# Patient Record
Sex: FEMALE | Race: WHITE | NOT HISPANIC OR LATINO | Employment: OTHER | URBAN - METROPOLITAN AREA
[De-identification: names, ages, dates, MRNs, and addresses within clinical notes are randomized per-mention and may not be internally consistent; named-entity substitution may affect disease eponyms.]

---

## 2017-06-22 ENCOUNTER — CONVERSION ENCOUNTER (OUTPATIENT)
Dept: MAMMOGRAPHY | Facility: CLINIC | Age: 72
End: 2017-06-22

## 2017-11-28 ENCOUNTER — CONVERSION ENCOUNTER (OUTPATIENT)
Dept: MAMMOGRAPHY | Facility: CLINIC | Age: 72
End: 2017-11-28

## 2018-06-25 DIAGNOSIS — M19.90 ARTHRITIS: Primary | ICD-10-CM

## 2018-06-25 RX ORDER — PREDNISONE 2.5 MG
TABLET ORAL
COMMUNITY
Start: 2018-02-09 | End: 2018-06-25 | Stop reason: SDUPTHER

## 2018-06-25 RX ORDER — PREDNISONE 2.5 MG
2.5 TABLET ORAL DAILY
Qty: 30 TABLET | Refills: 3 | Status: SHIPPED | OUTPATIENT
Start: 2018-06-25 | End: 2018-10-14 | Stop reason: HOSPADM

## 2018-06-26 DIAGNOSIS — J30.89 NON-SEASONAL ALLERGIC RHINITIS, UNSPECIFIED TRIGGER: Primary | ICD-10-CM

## 2018-06-26 RX ORDER — DIPHENHYDRAMINE HCL 25 MG
25 TABLET ORAL EVERY 6 HOURS PRN
Qty: 30 TABLET | Refills: 3 | Status: SHIPPED | OUTPATIENT
Start: 2018-06-26 | End: 2019-02-07 | Stop reason: SDUPTHER

## 2018-08-24 ENCOUNTER — APPOINTMENT (OUTPATIENT)
Dept: LAB | Facility: HOSPITAL | Age: 73
End: 2018-08-24
Payer: MEDICARE

## 2018-08-24 ENCOUNTER — TRANSCRIBE ORDERS (OUTPATIENT)
Dept: ADMINISTRATIVE | Facility: HOSPITAL | Age: 73
End: 2018-08-24

## 2018-08-24 DIAGNOSIS — M54.5 LOW BACK PAIN, UNSPECIFIED BACK PAIN LATERALITY, UNSPECIFIED CHRONICITY, WITH SCIATICA PRESENCE UNSPECIFIED: ICD-10-CM

## 2018-08-24 DIAGNOSIS — R06.02 SHORTNESS OF BREATH: Primary | ICD-10-CM

## 2018-08-24 DIAGNOSIS — R06.02 SHORTNESS OF BREATH: ICD-10-CM

## 2018-08-24 LAB
BASOPHILS # BLD AUTO: 0.1 THOUSANDS/ΜL (ref 0–0.1)
BASOPHILS NFR BLD AUTO: 1 % (ref 0–1)
CRP SERPL QL: 11.3 MG/L
EOSINOPHIL # BLD AUTO: 0.2 THOUSAND/ΜL (ref 0–0.4)
EOSINOPHIL NFR BLD AUTO: 2 % (ref 0–6)
ERYTHROCYTE [DISTWIDTH] IN BLOOD BY AUTOMATED COUNT: 15.3 %
ERYTHROCYTE [SEDIMENTATION RATE] IN BLOOD: 27 MM/HOUR (ref 1–20)
HCT VFR BLD AUTO: 43.4 % (ref 36–46)
HGB BLD-MCNC: 13.8 G/DL (ref 12–16)
LYMPHOCYTES # BLD AUTO: 1.3 THOUSANDS/ΜL (ref 0.5–4)
LYMPHOCYTES NFR BLD AUTO: 17 % (ref 20–50)
MCH RBC QN AUTO: 26.6 PG (ref 26–34)
MCHC RBC AUTO-ENTMCNC: 31.8 G/DL (ref 31–36)
MCV RBC AUTO: 84 FL (ref 80–100)
MONOCYTES # BLD AUTO: 0.7 THOUSAND/ΜL (ref 0.2–0.9)
MONOCYTES NFR BLD AUTO: 10 % (ref 1–10)
NEUTROPHILS # BLD AUTO: 5.5 THOUSANDS/ΜL (ref 1.8–7.8)
NEUTS SEG NFR BLD AUTO: 70 % (ref 45–65)
PLATELET # BLD AUTO: 268 THOUSANDS/UL (ref 150–450)
PMV BLD AUTO: 8 FL (ref 8.9–12.7)
RBC # BLD AUTO: 5.19 MILLION/UL (ref 4–5.2)
WBC # BLD AUTO: 7.8 THOUSAND/UL (ref 4.5–11)

## 2018-08-24 PROCEDURE — 36415 COLL VENOUS BLD VENIPUNCTURE: CPT

## 2018-08-24 PROCEDURE — 85025 COMPLETE CBC W/AUTO DIFF WBC: CPT

## 2018-08-24 PROCEDURE — 85652 RBC SED RATE AUTOMATED: CPT

## 2018-08-24 PROCEDURE — 86140 C-REACTIVE PROTEIN: CPT

## 2018-08-27 DIAGNOSIS — E04.9 ENLARGEMENT OF THYROID: Primary | ICD-10-CM

## 2018-08-27 RX ORDER — LEVOTHYROXINE SODIUM 0.03 MG/1
25 TABLET ORAL DAILY
COMMUNITY
Start: 2018-05-01 | End: 2018-08-27 | Stop reason: SDUPTHER

## 2018-08-27 RX ORDER — LEVOTHYROXINE SODIUM 0.03 MG/1
25 TABLET ORAL DAILY
Qty: 30 TABLET | Refills: 3 | Status: SHIPPED | OUTPATIENT
Start: 2018-08-27 | End: 2019-01-14 | Stop reason: SDUPTHER

## 2018-08-28 ENCOUNTER — OFFICE VISIT (OUTPATIENT)
Dept: FAMILY MEDICINE CLINIC | Facility: CLINIC | Age: 73
End: 2018-08-28
Payer: MEDICARE

## 2018-08-28 VITALS
OXYGEN SATURATION: 98 % | HEIGHT: 63 IN | HEART RATE: 71 BPM | RESPIRATION RATE: 14 BRPM | TEMPERATURE: 97.9 F | SYSTOLIC BLOOD PRESSURE: 120 MMHG | BODY MASS INDEX: 24.61 KG/M2 | WEIGHT: 138.9 LBS | DIASTOLIC BLOOD PRESSURE: 72 MMHG

## 2018-08-28 DIAGNOSIS — E03.9 HYPOTHYROIDISM, UNSPECIFIED TYPE: Primary | ICD-10-CM

## 2018-08-28 DIAGNOSIS — M31.6 TEMPORAL ARTERITIS (HCC): ICD-10-CM

## 2018-08-28 DIAGNOSIS — J44.9 CHRONIC OBSTRUCTIVE PULMONARY DISEASE, UNSPECIFIED COPD TYPE (HCC): ICD-10-CM

## 2018-08-28 DIAGNOSIS — H60.311 ACUTE DIFFUSE OTITIS EXTERNA OF RIGHT EAR: ICD-10-CM

## 2018-08-28 PROBLEM — E03.8 OTHER SPECIFIED HYPOTHYROIDISM: Status: ACTIVE | Noted: 2018-08-28

## 2018-08-28 PROBLEM — K21.9 GASTROESOPHAGEAL REFLUX DISEASE WITHOUT ESOPHAGITIS: Status: ACTIVE | Noted: 2018-08-28

## 2018-08-28 PROCEDURE — 99214 OFFICE O/P EST MOD 30 MIN: CPT | Performed by: INTERNAL MEDICINE

## 2018-08-28 RX ORDER — PANTOPRAZOLE SODIUM 40 MG/1
TABLET, DELAYED RELEASE ORAL EVERY 24 HOURS
COMMUNITY
Start: 2018-03-08 | End: 2018-10-17 | Stop reason: SDUPTHER

## 2018-08-28 RX ORDER — ALBUTEROL SULFATE 90 UG/1
AEROSOL, METERED RESPIRATORY (INHALATION)
COMMUNITY
Start: 2017-12-05 | End: 2020-01-07 | Stop reason: SDUPTHER

## 2018-08-28 RX ORDER — OFLOXACIN 3 MG/ML
5 SOLUTION AURICULAR (OTIC) 2 TIMES DAILY
Qty: 5 ML | Refills: 1 | Status: SHIPPED | OUTPATIENT
Start: 2018-08-28 | End: 2018-10-06 | Stop reason: ALTCHOICE

## 2018-08-28 NOTE — PROGRESS NOTES
Assessment/Plan:         Diagnoses and all orders for this visit:    Hypothyroidism, unspecified type: Stable  Continue same  RTC in 3mos w Blood work  -     Basic metabolic panel; Future  -     CBC and differential; Future  -     TSH, 3rd generation; Future  -     T4, free; Future  -     Sedimentation rate, automated; Future  -     C-reactive protein; Future  -     Urinalysis with reflex to microscopic; Future  -     Hepatic function panel; Future  -     Lipid panel; Future    Temporal arteritis ([facility] ): Pt would like to stay on prednisone 2 5 mg daily only  RTC in 2-3 mos w Blood work  -     Ambulatory referral to Ophthalmology; Future    Chronic obstructive pulmonary disease, unspecified COPD type ([facility] ): pt is Not smoking for almost 2 years  Continue Inhalers  -     indacaterol-glycopyrrolate (Marlan Flash) 27 5-15 6 MCG inhaler; Place 1 capsule into inhaler and inhale 2 (two) times a day    Acute diffuse otitis externa of right ear  -     ofloxacin (FLOXIN) 0 3 % otic solution; Administer 5 drops to the right ear 2 (two) times a day    Other orders  -     albuterol (PROVENTIL HFA) 90 mcg/act inhaler; inhale 2 puff by inhalation route  every 4 - 6 hours as needed  -     Discontinue: indacaterol-glycopyrrolate (UTIBRON NEOHALER) 27 5-15 6 MCG inhaler; Every 12 hours  -     pantoprazole (PROTONIX) 40 mg tablet; every 24 hours  -     aspirin (ASPIRIN LOW DOSE) 81 MG tablet; take one tab  every other day with Dinner        Subjective:      Patient ID: Carl Melendrez is a 67 y o  female  67 Y O lady with H/O Severe COPd,Temporal arteritis,  Is here toady for regular check up    Recent blood work showed mild elevated Esr and CRp    No new symptoms    Med list reviewed w pt in Detail            The following portions of the patient's history were reviewed and updated as appropriate: allergies, current medications, past family history, past medical history, past social history, past surgical history and problem list     Review of Systems   Constitutional: Positive for fatigue  Negative for chills and fever  HENT: Negative for congestion, facial swelling, sore throat, trouble swallowing and voice change  Eyes: Negative for pain, discharge and visual disturbance  Respiratory: Negative for cough, shortness of breath and wheezing  Cardiovascular: Negative for chest pain, palpitations and leg swelling  Gastrointestinal: Negative for abdominal pain, blood in stool, constipation, diarrhea and nausea  Endocrine: Negative for polydipsia, polyphagia and polyuria  Genitourinary: Negative for difficulty urinating, hematuria and urgency  Musculoskeletal: Positive for arthralgias and myalgias  Skin: Negative for rash  Neurological: Negative for dizziness, tremors, weakness and headaches  Hematological: Negative for adenopathy  Does not bruise/bleed easily  Psychiatric/Behavioral: Negative for dysphoric mood, sleep disturbance and suicidal ideas  Objective:      /72 (BP Location: Left arm, Patient Position: Sitting, Cuff Size: Standard)   Pulse 71   Temp 97 9 °F (36 6 °C) (Oral)   Resp 14   Ht 5' 3" (1 6 m)   Wt 63 kg (138 lb 14 4 oz)   SpO2 98%   BMI 24 61 kg/m²          Physical Exam   Constitutional: She is oriented to person, place, and time  She appears well-nourished  No distress  HENT:   Head: Normocephalic  Mouth/Throat: Oropharynx is clear and moist  No oropharyngeal exudate  Eyes: Conjunctivae are normal  Pupils are equal, round, and reactive to light  No scleral icterus  Neck: Neck supple  No thyromegaly present  Cardiovascular: Normal rate, regular rhythm and normal heart sounds  No murmur heard  Pulmonary/Chest: Effort normal and breath sounds normal  No respiratory distress  She has no wheezes  She has no rales  Decrease breathing sounds both bases   Abdominal: Soft  Bowel sounds are normal  She exhibits no distension  There is no tenderness   There is no rebound and no guarding  Obese   Musculoskeletal: She exhibits tenderness  She exhibits no edema  Lymphadenopathy:     She has no cervical adenopathy  Neurological: She is alert and oriented to person, place, and time  No cranial nerve deficit  Coordination normal    Skin: No rash noted  No erythema  Psychiatric: She has a normal mood and affect

## 2018-10-06 ENCOUNTER — HOSPITAL ENCOUNTER (EMERGENCY)
Facility: HOSPITAL | Age: 73
Discharge: HOME | End: 2018-10-06
Attending: EMERGENCY MEDICINE | Admitting: EMERGENCY MEDICINE
Payer: MEDICARE

## 2018-10-06 ENCOUNTER — APPOINTMENT (EMERGENCY)
Dept: RADIOLOGY | Facility: HOSPITAL | Age: 73
End: 2018-10-06
Payer: MEDICARE

## 2018-10-06 ENCOUNTER — APPOINTMENT (EMERGENCY)
Dept: CT IMAGING | Facility: HOSPITAL | Age: 73
End: 2018-10-06
Payer: MEDICARE

## 2018-10-06 VITALS
HEART RATE: 90 BPM | DIASTOLIC BLOOD PRESSURE: 86 MMHG | SYSTOLIC BLOOD PRESSURE: 161 MMHG | OXYGEN SATURATION: 96 % | WEIGHT: 123.9 LBS | RESPIRATION RATE: 16 BRPM | BODY MASS INDEX: 21.95 KG/M2 | TEMPERATURE: 98.3 F

## 2018-10-06 DIAGNOSIS — G47.00 INSOMNIA: ICD-10-CM

## 2018-10-06 DIAGNOSIS — F41.8 ANXIETY ABOUT HEALTH: Primary | ICD-10-CM

## 2018-10-06 DIAGNOSIS — E86.0 DEHYDRATION: ICD-10-CM

## 2018-10-06 DIAGNOSIS — J44.1 COPD EXACERBATION (HCC): ICD-10-CM

## 2018-10-06 LAB
ALBUMIN SERPL BCP-MCNC: 4.9 G/DL (ref 3–5.2)
ALP SERPL-CCNC: 99 U/L (ref 43–122)
ALT SERPL W P-5'-P-CCNC: 27 U/L (ref 9–52)
ANION GAP SERPL CALCULATED.3IONS-SCNC: 13 MMOL/L (ref 5–14)
AST SERPL W P-5'-P-CCNC: 40 U/L (ref 14–36)
ATRIAL RATE: 104 BPM
BACTERIA UR QL AUTO: ABNORMAL /HPF
BASOPHILS # BLD AUTO: 0.1 THOUSANDS/ΜL (ref 0–0.1)
BASOPHILS NFR BLD AUTO: 1 % (ref 0–1)
BILIRUB SERPL-MCNC: 0.9 MG/DL
BILIRUB UR QL STRIP: NEGATIVE
BUN SERPL-MCNC: 14 MG/DL (ref 5–25)
CALCIUM SERPL-MCNC: 9.9 MG/DL (ref 8.4–10.2)
CHLORIDE SERPL-SCNC: 99 MMOL/L (ref 97–108)
CLARITY UR: CLEAR
CO2 SERPL-SCNC: 28 MMOL/L (ref 22–30)
COLOR UR: ABNORMAL
CREAT SERPL-MCNC: 0.8 MG/DL (ref 0.6–1.2)
D-DIMER: 2.02 MG/L
EOSINOPHIL # BLD AUTO: 0.2 THOUSAND/ΜL (ref 0–0.4)
EOSINOPHIL NFR BLD AUTO: 2 % (ref 0–6)
ERYTHROCYTE [DISTWIDTH] IN BLOOD BY AUTOMATED COUNT: 15.3 %
GFR SERPL CREATININE-BSD FRML MDRD: 73 ML/MIN/1.73SQ M
GLUCOSE SERPL-MCNC: 113 MG/DL (ref 70–99)
GLUCOSE UR STRIP-MCNC: NEGATIVE MG/DL
HCT VFR BLD AUTO: 44.3 % (ref 36–46)
HGB BLD-MCNC: 14.2 G/DL (ref 12–16)
HGB UR QL STRIP.AUTO: 25
KETONES UR STRIP-MCNC: ABNORMAL MG/DL
LEUKOCYTE ESTERASE UR QL STRIP: 500
LYMPHOCYTES # BLD AUTO: 1 THOUSANDS/ΜL (ref 0.5–4)
LYMPHOCYTES NFR BLD AUTO: 8 % (ref 20–50)
MCH RBC QN AUTO: 26.6 PG (ref 26–34)
MCHC RBC AUTO-ENTMCNC: 32.1 G/DL (ref 31–36)
MCV RBC AUTO: 83 FL (ref 80–100)
MONOCYTES # BLD AUTO: 0.9 THOUSAND/ΜL (ref 0.2–0.9)
MONOCYTES NFR BLD AUTO: 8 % (ref 1–10)
NEUTROPHILS # BLD AUTO: 10.1 THOUSANDS/ΜL (ref 1.8–7.8)
NEUTS SEG NFR BLD AUTO: 82 % (ref 45–65)
NITRITE UR QL STRIP: NEGATIVE
NON-SQ EPI CELLS URNS QL MICRO: ABNORMAL /HPF
P AXIS: 36 DEGREES
PH UR STRIP.AUTO: 7 [PH] (ref 4.5–8)
PLATELET # BLD AUTO: 255 THOUSANDS/UL (ref 150–450)
PMV BLD AUTO: 8.1 FL (ref 8.9–12.7)
POTASSIUM SERPL-SCNC: 4.1 MMOL/L (ref 3.6–5)
PR INTERVAL: 124 MS
PROT SERPL-MCNC: 8.1 G/DL (ref 5.9–8.4)
PROT UR STRIP-MCNC: NEGATIVE MG/DL
QRS AXIS: 36 DEGREES
QRSD INTERVAL: 72 MS
QT INTERVAL: 350 MS
QTC INTERVAL: 460 MS
RBC # BLD AUTO: 5.35 MILLION/UL (ref 4–5.2)
RBC #/AREA URNS AUTO: ABNORMAL /HPF
SODIUM SERPL-SCNC: 140 MMOL/L (ref 137–147)
SP GR UR STRIP.AUTO: 1.01 (ref 1–1.04)
T WAVE AXIS: 67 DEGREES
TROPONIN I SERPL-MCNC: <0.01 NG/ML (ref 0–0.03)
TROPONIN I SERPL-MCNC: <0.01 NG/ML (ref 0–0.03)
TSH SERPL DL<=0.05 MIU/L-ACNC: 2.45 UIU/ML (ref 0.47–4.68)
UROBILINOGEN UA: NEGATIVE MG/DL
VENTRICULAR RATE: 104 BPM
WBC # BLD AUTO: 12.2 THOUSAND/UL (ref 4.5–11)
WBC #/AREA URNS AUTO: ABNORMAL /HPF

## 2018-10-06 PROCEDURE — 81001 URINALYSIS AUTO W/SCOPE: CPT | Performed by: EMERGENCY MEDICINE

## 2018-10-06 PROCEDURE — 81003 URINALYSIS AUTO W/O SCOPE: CPT | Performed by: EMERGENCY MEDICINE

## 2018-10-06 PROCEDURE — 71275 CT ANGIOGRAPHY CHEST: CPT

## 2018-10-06 PROCEDURE — 93005 ELECTROCARDIOGRAM TRACING: CPT

## 2018-10-06 PROCEDURE — 71046 X-RAY EXAM CHEST 2 VIEWS: CPT

## 2018-10-06 PROCEDURE — 80053 COMPREHEN METABOLIC PANEL: CPT | Performed by: EMERGENCY MEDICINE

## 2018-10-06 PROCEDURE — 96360 HYDRATION IV INFUSION INIT: CPT

## 2018-10-06 PROCEDURE — 84443 ASSAY THYROID STIM HORMONE: CPT | Performed by: EMERGENCY MEDICINE

## 2018-10-06 PROCEDURE — 84484 ASSAY OF TROPONIN QUANT: CPT | Performed by: EMERGENCY MEDICINE

## 2018-10-06 PROCEDURE — 85025 COMPLETE CBC W/AUTO DIFF WBC: CPT | Performed by: EMERGENCY MEDICINE

## 2018-10-06 PROCEDURE — 85379 FIBRIN DEGRADATION QUANT: CPT | Performed by: EMERGENCY MEDICINE

## 2018-10-06 PROCEDURE — 36415 COLL VENOUS BLD VENIPUNCTURE: CPT

## 2018-10-06 PROCEDURE — 93010 ELECTROCARDIOGRAM REPORT: CPT | Performed by: INTERNAL MEDICINE

## 2018-10-06 PROCEDURE — 99285 EMERGENCY DEPT VISIT HI MDM: CPT

## 2018-10-06 PROCEDURE — 96361 HYDRATE IV INFUSION ADD-ON: CPT

## 2018-10-06 RX ORDER — CHOLECALCIFEROL (VITAMIN D3) 125 MCG
1000 CAPSULE ORAL DAILY
COMMUNITY
End: 2019-01-31 | Stop reason: ALTCHOICE

## 2018-10-06 RX ORDER — SODIUM CHLORIDE 9 MG/ML
1000 INJECTION, SOLUTION INTRAVENOUS ONCE
Status: COMPLETED | OUTPATIENT
Start: 2018-10-06 | End: 2018-10-06

## 2018-10-06 RX ORDER — PREDNISONE 10 MG/1
TABLET ORAL
Status: DISCONTINUED
Start: 2018-10-06 | End: 2018-10-06 | Stop reason: HOSPADM

## 2018-10-06 RX ORDER — PREDNISONE 20 MG/1
TABLET ORAL
Status: DISCONTINUED
Start: 2018-10-06 | End: 2018-10-06 | Stop reason: HOSPADM

## 2018-10-06 RX ADMIN — IOHEXOL 100 ML: 350 INJECTION, SOLUTION INTRAVENOUS at 15:37

## 2018-10-06 RX ADMIN — PREDNISONE 50 MG: 20 TABLET ORAL at 13:47

## 2018-10-06 RX ADMIN — SODIUM CHLORIDE 1000 ML/HR: 9 INJECTION, SOLUTION INTRAVENOUS at 12:01

## 2018-10-06 NOTE — ED NOTES
Pt changed into a gown and made comfortable in room  Pt placed on the monitor for continuous cardiac monitoring  EKG done and given to Dr  For review       Sara Hines  10/06/18 3857

## 2018-10-06 NOTE — ED RE-EVALUATION NOTE
Patient felt much better after fluids and after prednisone and treatment for COPD  Patient was notably anxious at the beginning of the exam and felt but less anxious towards the and the exam   She patient admits that anxiety may be playing a large component for her symptoms today    I would agree     Tayla Hilliard MD  10/06/18 8879

## 2018-10-06 NOTE — ED PROVIDER NOTES
History  Chief Complaint   Patient presents with    Shortness of Breath     Patient states she has a cough for 5 days, chills, heaviness in chest, and dizziness started today  66-year-old white female COPD patient who has had trouble sleeping presents with an episode of presyncope where she almost passed out today when after she stood up  She did not pass out she did not have vertigo she did not have any nausea or vomiting should not have any vomiting or diarrhea  She said she felt a little bit of epigastric discomfort but no chest pain and no shortness of breath other than her usual COPD  She says she did feel a little bit of queasiness or nausea at 1 point but that was transient today  She did not sleep well last night and did not eat much the today at all  Patient denies any fevers or chills  Prior to Admission Medications   Prescriptions Last Dose Informant Patient Reported? Taking? Fluticasone Furoate (ARNUITY ELLIPTA IN)  Self Yes Yes   Sig: Inhale daily   albuterol (PROVENTIL HFA) 90 mcg/act inhaler 10/5/2018 at Unknown time  Yes Yes   Sig: inhale 2 puff by inhalation route  every 4 - 6 hours as needed   aspirin (ASPIRIN LOW DOSE) 81 MG tablet 10/5/2018 at Unknown time  Yes Yes   Sig: take one tab   every other day with Dinner   calcium carbonate-vitamin D (OSCAL-D) 500 mg-200 units per tablet  Self Yes Yes   Sig: Take 1 tablet by mouth 2 (two) times a day with meals   cyanocobalamin (VITAMIN B-12) 500 mcg tablet  Self Yes Yes   Sig: Take 1,000 mcg by mouth daily   diphenhydrAMINE (BENADRYL) 25 mg tablet 10/5/2018 at Unknown time  No Yes   Sig: Take 1 tablet (25 mg total) by mouth every 6 (six) hours as needed for itching   indacaterol-glycopyrrolate (UTIBRON NEOHALER) 27 5-15 6 MCG inhaler 10/5/2018 at Unknown time  No Yes   Sig: Place 1 capsule into inhaler and inhale 2 (two) times a day   levothyroxine (SYNTHROID) 25 mcg tablet 10/5/2018 at Unknown time  No Yes   Sig: Take 1 tablet (25 mcg total) by mouth daily   pantoprazole (PROTONIX) 40 mg tablet 10/5/2018 at Unknown time  Yes Yes   Sig: every 24 hours   predniSONE 2 5 mg tablet 10/5/2018 at Unknown time  No Yes   Sig: Take 1 tablet (2 5 mg total) by mouth daily      Facility-Administered Medications: None       Past Medical History:   Diagnosis Date    Asthma     Chronic obstructive pulmonary disease ([facility] ) 9/26/2012    GERD (gastroesophageal reflux disease)     Hypothyroid     No known health problems     ALSO NO RELEVANT PAST MEDICAL HX AS PER [facility]    Temporal arteritis ([place] [address] )     Tubular adenoma        Past Surgical History:   Procedure Laterality Date    HYSTERECTOMY      NO PAST SURGERIES      ALSO NO RELEVANT PAST SURRGICAL HX AS PER [facility]       Family History   Problem Relation Age of Onset    Breast cancer Mother     Emphysema Father      I have reviewed and agree with the history as documented  Social History   Substance Use Topics    Smoking status: Former Smoker     Years: 15 00     Types: Cigarettes    Smokeless tobacco: Never Used      Comment: TOBACCO USE, NO PASSIVE SMOKE EXPOSURE    Alcohol use No        Review of Systems   Constitutional: Negative  Eyes: Negative  Cardiovascular: Negative  Endocrine: Negative  Genitourinary: Negative  Allergic/Immunologic: Negative  Neurological: Positive for light-headedness  Negative for tremors, seizures, syncope, speech difficulty and weakness  Hematological: Negative  All other systems reviewed and are negative  Physical Exam  Physical Exam   Constitutional: She is oriented to person, place, and time  She appears well-developed and well-nourished  No distress  HENT:   Head: Normocephalic and atraumatic  Eyes: Pupils are equal, round, and reactive to light  Conjunctivae and EOM are normal    Neck: Normal range of motion  Neck supple  Cardiovascular: Normal rate, regular rhythm, normal heart sounds and intact distal pulses    Exam reveals no friction rub  No murmur heard  Pulmonary/Chest: Effort normal and breath sounds normal  No respiratory distress  She has no wheezes  Abdominal: Soft  Bowel sounds are normal  She exhibits no distension and no mass  There is no tenderness  There is no rebound and no guarding  Musculoskeletal: Normal range of motion  Neurological: She is alert and oriented to person, place, and time  No cranial nerve deficit  Skin: Skin is warm  Capillary refill takes less than 2 seconds  No rash noted  She is not diaphoretic  No erythema  Psychiatric: She has a normal mood and affect  Her behavior is normal  Judgment and thought content normal    Nursing note and vitals reviewed        Vital Signs  ED Triage Vitals [10/06/18 1100]   Temperature Pulse Respirations Blood Pressure SpO2   98 3 °F (36 8 °C) 95 20 155/93 95 %      Temp Source Heart Rate Source Patient Position - Orthostatic VS BP Location FiO2 (%)   Tympanic Monitor Sitting Right arm --      Pain Score       No Pain           Vitals:    10/06/18 1349 10/06/18 1356 10/06/18 1608 10/06/18 1643   BP: 155/97 (!) 185/94 170/96 161/86   Pulse: 99 96 98 90   Patient Position - Orthostatic VS: Sitting - Orthostatic VS Standing - Orthostatic VS Sitting Lying       Visual Acuity      ED Medications  Medications   predniSONE 20 mg tablet **AcuDose Override Pull** (  Not Given 10/6/18 1357)   predniSONE 10 mg tablet **AcuDose Override Pull** (  Not Given 10/6/18 1357)   sodium chloride 0 9 % infusion (0 mL/hr Intravenous Stopped 10/6/18 1336)   predniSONE tablet 50 mg (50 mg Oral Given 10/6/18 1347)   iohexol (OMNIPAQUE) 350 MG/ML injection (MULTI-DOSE) 100 mL (100 mL Intravenous Given 10/6/18 1537)       Diagnostic Studies  Results Reviewed     Procedure Component Value Units Date/Time    Urine Microscopic [[id number]]  (Abnormal) Collected:  10/06/18 1609    Lab Status:  Final result Specimen:  Urine from Urine, Clean Catch Updated:  10/06/18 1633     RBC, UA 1-2 (A) /hpf      WBC, UA 2-4 (A) /hpf      Epithelial Cells Occasional /hpf      Bacteria, UA Occasional /hpf     UA w Reflex to Microscopic w Reflex to Culture [[id number]]  (Abnormal) Collected:  10/06/18 1609    Lab Status:  Final result Specimen:  Urine from Urine, Clean Catch Updated:  10/06/18 1627     Color, UA Straw     Clarity, UA Clear     Specific Gravity, UA 1 010     pH, UA 7 0     Leukocytes,  0 (A)     Nitrite, UA Negative     Protein, UA Negative mg/dl      Glucose, UA Negative mg/dl      Ketones, UA 15 (1+) (A) mg/dl      Bilirubin, UA Negative     Blood, UA 25 0 (A)     UROBILINOGEN UA Negative mg/dL     Troponin I [[id number]]  (Normal) Collected:  10/06/18 1347    Lab Status:  Final result Specimen:  Blood from Arm, Right Updated:  10/06/18 1430     Troponin I <0 01 ng/mL     D-dimer, quantitative [[id number]]  (Abnormal) Collected:  10/06/18 1350    Lab Status:  Final result Specimen:  Blood from Arm, Right Updated:  10/06/18 1427     D-DIMER 2 02 (H) mg/L     Narrative:       D-DIMER:      TSH [[id number]]  (Normal) Collected:  10/06/18 1111    Lab Status:  Final result Specimen:  Blood from Arm, Right Updated:  10/06/18 1233     TSH 3RD GENERATON 2 450 uIU/mL     Narrative:         Patients undergoing fluorescein dye angiography may retain small amounts of fluorescein in the body for 48-72 hours post procedure  Samples containing fluorescein can produce falsely depressed TSH values  If the patient had this procedure,a specimen should be resubmitted post fluorescein clearance            The recommended reference ranges for TSH during pregnancy are as follows:  First trimester 0 1 to 2 5 uIU/mL  Second trimester  0 2 to 3 0 uIU/mL  Third trimester 0 3 to 3 0 uIU/m      Troponin I [[id number]]  (Normal) Collected:  10/06/18 1111    Lab Status:  Final result Specimen:  Blood from Arm, Right Updated:  10/06/18 1147     Troponin I <0 01 ng/mL     Comprehensive metabolic panel [[id number]]  (Abnormal) Collected:  10/06/18 1111    Lab Status:  Final result Specimen:  Blood from Arm, Right Updated:  10/06/18 1137     Sodium 140 mmol/L      Potassium 4 1 mmol/L      Chloride 99 mmol/L      CO2 28 mmol/L      ANION GAP 13 mmol/L      BUN 14 mg/dL      Creatinine 0 80 mg/dL      Glucose 113 (H) mg/dL      Calcium 9 9 mg/dL      AST 40 (H) U/L      ALT 27 U/L      Alkaline Phosphatase 99 U/L      Total Protein 8 1 g/dL      Albumin 4 9 g/dL      Total Bilirubin 0 90 mg/dL      eGFR 73 ml/min/1 73sq m     Narrative:         National Kidney Disease Education Program recommendations are as follows:  GFR calculation is accurate only with a steady state creatinine  Chronic Kidney disease less than 60 ml/min/1 73 sq  meters  Kidney failure less than 15 ml/min/1 73 sq  meters  CBC and differential [[id number]]  (Abnormal) Collected:  10/06/18 1111    Lab Status:  Final result Specimen:  Blood from Arm, Right Updated:  10/06/18 1124     WBC 12 20 (H) Thousand/uL      RBC 5 35 (H) Million/uL      Hemoglobin 14 2 g/dL      Hematocrit 44 3 %      MCV 83 fL      MCH 26 6 pg      MCHC 32 1 g/dL      RDW 15 3 (H) %      MPV 8 1 (L) fL      Platelets 982 Thousands/uL      Neutrophils Relative 82 (H) %      Lymphocytes Relative 8 (L) %      Monocytes Relative 8 %      Eosinophils Relative 2 %      Basophils Relative 1 %      Neutrophils Absolute 10 10 (H) Thousands/µL      Lymphocytes Absolute 1 00 Thousands/µL      Monocytes Absolute 0 90 Thousand/µL      Eosinophils Absolute 0 20 Thousand/µL      Basophils Absolute 0 10 Thousands/µL                  CTA ED chest PE study   Final Result by Claire Sun MD (10/06 0094)         1  No pulmonary embolism  2   Emphysema and mild bronchial wall thickening, consistent with COPD  Likely chronic atelectasis within the right middle lobe appears stable dating back to 4/19/2013              Workstation performed: [id number]         X-ray chest 2 views   ED Interpretation by Hardik Strickland Deep Almanzar MD (10/06 1447)   No infiltrate; wide mediast        Final Result by Giles Aguilar MD (10/06 1457)      No acute cardiopulmonary disease  Workstation performed: [id number]                    Procedures  Procedures       Phone Contacts  ED Phone Contact    ED Course                               MDM  Number of Diagnoses or Management Options  Diagnosis management comments: Differential for this patient include COPD exacerbation insomnia dehydration  Patient was given IV fluids and felt much better after the IV fluids  The patient was never orthostatic and did not have any lightheadedness when we stood her up or walker  Patient's laboratory data were unremarkable although she did have a positive D-dimer the CT scan ruled out a pulmonary embolus  Patient's cardiac enzymes and cardiac exam were also negative         Amount and/or Complexity of Data Reviewed  Clinical lab tests: reviewed and ordered  Tests in the radiology section of CPT®: ordered and reviewed  Tests in the medicine section of CPT®: ordered and reviewed  Review and summarize past medical records: yes  Discuss the patient with other providers: yes      CritCare Time    Disposition  Final diagnoses:   COPD exacerbation ([facility] 75 )   Insomnia   Dehydration   Anxiety about health     Time reflects when diagnosis was documented in both MDM as applicable and the Disposition within this note     Time User Action Codes Description Comment    10/6/2018  4:53 PM Ramirez Bitter Add [J44 1] COPD exacerbation ([facility] )     10/6/2018  4:53 PM Jim Fitzgerald [G47 00] Insomnia     10/6/2018  4:53 PM Jim Fitzgerald [E86 0] Dehydration     10/6/2018  4:53 PM Ramirez Bitter Add [F41 8] Anxiety about health     10/6/2018  4:53 PM Dolly Montero [J44 1] COPD exacerbation ([facility] )     10/6/2018  4:53 PM Ramirez Bitter Modify [F41 8] Anxiety about health       ED Disposition     ED Disposition Condition Comment    Discharge  Lakshmi Perez discharge to home/self care  Condition at discharge: Good        Follow-up Information     Follow up With Specialties Details Why Antoinette Gaines MD Internal Medicine In 1 day  [address]  [phone]            Patient's Medications   Discharge Prescriptions    No medications on file     No discharge procedures on file      ED Provider  Electronically Signed by           Kelvin Nunn MD  10/06/18 Shawn Martinez MD  10/06/18 4985

## 2018-10-06 NOTE — DISCHARGE INSTRUCTIONS
Anxiety   WHAT YOU SHOULD KNOW:   Anxiety is a condition that causes you to feel excessive worry, uneasiness, or fear  Family or work stress, smoking, caffeine, and alcohol can increase your risk for anxiety  Certain medicines or health conditions can also increase your risk  Anxiety may begin gradually, and can become a long-term condition if it is not managed or treated  AFTER YOU LEAVE:   Medicines:   · Medicines  can help you feel more calm and relaxed, and decrease your symptoms  · Take your medicine as directed  Contact your healthcare provider if you think your medicine is not helping or if you have side effects  Tell him if you are allergic to any medicine  Keep a list of the medicines, vitamins, and herbs you take  Include the amounts, and when and why you take them  Bring the list or the pill bottles to follow-up visits  Carry your medicine list with you in case of an emergency  Follow up with your healthcare provider within 2 weeks or as directed:  Write down your questions so you remember to ask them during your visits  Manage anxiety:   · Go to counseling as directed  Cognitive behavioral therapy can help you understand and change how you react to events that trigger your symptoms  · Find ways to manage your symptoms  Activities such as exercise, meditation, or listening to music can help you relax  · Practice deep breathing  Breathing can change how your body reacts to stress  Focus on taking slow, deep breaths several times a day, or during an anxiety attack  Breathe in through your nose, and out through your mouth  · Avoid caffeine  Caffeine can make your symptoms worse  Avoid foods or drinks that are meant to increase your energy level  · Limit or avoid alcohol  Ask your healthcare provider if alcohol is safe for you  You may not be able to drink alcohol if you take certain anxiety or depression medicines  Limit alcohol to 1 drink per day if you are a woman   Limit alcohol to 2 drinks per day if you are a man  A drink of alcohol is 12 ounces of beer, 5 ounces of wine, or 1½ ounces of liquor  Contact your healthcare provider if:   · Your symptoms get worse or do not get better with treatment  · You think your medicine may be causing side effects  · Your anxiety keeps you from doing your regular daily activities  · You have new symptoms since your last visit  · You have questions or concerns about your condition or care  Seek care immediately or call 911 if:   · You have chest pain, tightness, or heaviness that may spread to your shoulders, arms, jaw, neck, or back  · You feel like hurting yourself or someone else  · You feel dizzy, lightheaded, or faint  © 2014 [employer] is for End User's use only and may not be sold, redistributed or otherwise used for commercial purposes  All illustrations and images included in CareNotes® are the copyrighted property of A [employer] M [employer]  or [employer]  The above information is an  only  It is not intended as medical advice for individual conditions or treatments  Talk to your doctor, nurse or pharmacist before following any medical regimen to see if it is safe and effective for you

## 2018-10-06 NOTE — ED NOTES
Received report on pt from Diane, [facility]  Pt gowned and placed on monitor        Thalia Sherman RN  10/06/18 4303

## 2018-10-06 NOTE — ED NOTES
Pt states that she feels better since arriving  Son at bedside  Pt stable  Awaiting physician catalina  Pt states that she was having chest heaviness related to her COPD but it felt slightly heavier than normal  Pt has productive cough  Denies fever  Had recent ear problems  Pt states that today she had increase in dizziness and was not able to walk well  Pt had similar symptoms about 1 year ago and was admitted for 1 month per pt        Jethro Moe RN  10/06/18 2743

## 2018-10-10 ENCOUNTER — APPOINTMENT (EMERGENCY)
Dept: RADIOLOGY | Facility: HOSPITAL | Age: 73
DRG: 605 | End: 2018-10-10
Payer: MEDICARE

## 2018-10-10 ENCOUNTER — HOSPITAL ENCOUNTER (EMERGENCY)
Facility: HOSPITAL | Age: 73
End: 2018-10-10
Attending: EMERGENCY MEDICINE | Admitting: EMERGENCY MEDICINE
Payer: MEDICARE

## 2018-10-10 ENCOUNTER — HOSPITAL ENCOUNTER (INPATIENT)
Facility: HOSPITAL | Age: 73
LOS: 3 days | Discharge: HOME | DRG: 605 | End: 2018-10-14
Attending: SURGERY | Admitting: SURGERY
Payer: MEDICARE

## 2018-10-10 ENCOUNTER — APPOINTMENT (EMERGENCY)
Dept: CT IMAGING | Facility: HOSPITAL | Age: 73
End: 2018-10-10
Payer: MEDICARE

## 2018-10-10 VITALS
RESPIRATION RATE: 16 BRPM | HEIGHT: 63 IN | TEMPERATURE: 98.4 F | HEART RATE: 80 BPM | OXYGEN SATURATION: 98 % | BODY MASS INDEX: 21.97 KG/M2 | WEIGHT: 124 LBS | SYSTOLIC BLOOD PRESSURE: 129 MMHG | DIASTOLIC BLOOD PRESSURE: 71 MMHG

## 2018-10-10 DIAGNOSIS — S30.1XXA ABDOMINAL WALL HEMATOMA, INITIAL ENCOUNTER: Primary | ICD-10-CM

## 2018-10-10 DIAGNOSIS — J44.9 CHRONIC OBSTRUCTIVE PULMONARY DISEASE, UNSPECIFIED COPD TYPE (HCC): ICD-10-CM

## 2018-10-10 DIAGNOSIS — J44.1 CHRONIC OBSTRUCTIVE PULMONARY DISEASE WITH ACUTE EXACERBATION (HCC): ICD-10-CM

## 2018-10-10 DIAGNOSIS — R05.9 COUGH: ICD-10-CM

## 2018-10-10 DIAGNOSIS — S30.1XXA RECTUS SHEATH HEMATOMA, INITIAL ENCOUNTER: Primary | ICD-10-CM

## 2018-10-10 LAB
ALBUMIN SERPL BCP-MCNC: 4.2 G/DL (ref 3–5.2)
ALP SERPL-CCNC: 76 U/L (ref 43–122)
ALT SERPL W P-5'-P-CCNC: 36 U/L (ref 9–52)
ANION GAP SERPL CALCULATED.3IONS-SCNC: 9 MMOL/L (ref 5–14)
APTT PPP: 28 SECONDS (ref 23–34)
AST SERPL W P-5'-P-CCNC: 49 U/L (ref 14–36)
BACTERIA UR QL AUTO: ABNORMAL /HPF
BASOPHILS # BLD AUTO: 0.1 THOUSANDS/ΜL (ref 0–0.1)
BASOPHILS NFR BLD AUTO: 1 % (ref 0–1)
BILIRUB SERPL-MCNC: 0.7 MG/DL
BILIRUB UR QL STRIP: NEGATIVE
BUN SERPL-MCNC: 12 MG/DL (ref 5–25)
CALCIUM SERPL-MCNC: 9.3 MG/DL (ref 8.4–10.2)
CHLORIDE SERPL-SCNC: 97 MMOL/L (ref 97–108)
CLARITY UR: CLEAR
CO2 SERPL-SCNC: 31 MMOL/L (ref 22–30)
COLOR UR: ABNORMAL
CREAT SERPL-MCNC: 0.63 MG/DL (ref 0.6–1.2)
EOSINOPHIL # BLD AUTO: 0.1 THOUSAND/ΜL (ref 0–0.4)
EOSINOPHIL NFR BLD AUTO: 1 % (ref 0–6)
ERYTHROCYTE [DISTWIDTH] IN BLOOD BY AUTOMATED COUNT: 15.1 %
GFR SERPL CREATININE-BSD FRML MDRD: 89 ML/MIN/1.73SQ M
GLUCOSE SERPL-MCNC: 110 MG/DL (ref 70–99)
GLUCOSE UR STRIP-MCNC: NEGATIVE MG/DL
HCT VFR BLD AUTO: 37.9 % (ref 34.8–46.1)
HCT VFR BLD AUTO: 39.6 % (ref 36–46)
HGB BLD-MCNC: 11.7 G/DL (ref 11.5–15.4)
HGB BLD-MCNC: 12.8 G/DL (ref 12–16)
HGB UR QL STRIP.AUTO: 25
INR PPP: 0.95 (ref 0.89–1.1)
KETONES UR STRIP-MCNC: NEGATIVE MG/DL
LACTATE SERPL-SCNC: 1.2 MMOL/L (ref 0.7–2)
LEUKOCYTE ESTERASE UR QL STRIP: 500
LIPASE SERPL-CCNC: 56 U/L (ref 23–300)
LYMPHOCYTES # BLD AUTO: 1.2 THOUSANDS/ΜL (ref 0.5–4)
LYMPHOCYTES NFR BLD AUTO: 12 % (ref 20–50)
MCH RBC QN AUTO: 26.8 PG (ref 26–34)
MCHC RBC AUTO-ENTMCNC: 32.3 G/DL (ref 31–36)
MCV RBC AUTO: 83 FL (ref 80–100)
MONOCYTES # BLD AUTO: 0.8 THOUSAND/ΜL (ref 0.2–0.9)
MONOCYTES NFR BLD AUTO: 8 % (ref 1–10)
NEUTROPHILS # BLD AUTO: 7.6 THOUSANDS/ΜL (ref 1.8–7.8)
NEUTS SEG NFR BLD AUTO: 79 % (ref 45–65)
NITRITE UR QL STRIP: NEGATIVE
NON-SQ EPI CELLS URNS QL MICRO: ABNORMAL /HPF
PH UR STRIP.AUTO: 8 [PH] (ref 4.5–8)
PLATELET # BLD AUTO: 250 THOUSANDS/UL (ref 150–450)
PMV BLD AUTO: 8.9 FL (ref 8.9–12.7)
POTASSIUM SERPL-SCNC: 3.7 MMOL/L (ref 3.6–5)
PROT SERPL-MCNC: 7 G/DL (ref 5.9–8.4)
PROT UR STRIP-MCNC: NEGATIVE MG/DL
PROTHROMBIN TIME: 10.1 SECONDS (ref 9.5–11.6)
RBC # BLD AUTO: 4.76 MILLION/UL (ref 4–5.2)
RBC #/AREA URNS AUTO: ABNORMAL /HPF
SODIUM SERPL-SCNC: 137 MMOL/L (ref 137–147)
SP GR UR STRIP.AUTO: 1.01 (ref 1–1.04)
UROBILINOGEN UA: NEGATIVE MG/DL
WBC # BLD AUTO: 9.7 THOUSAND/UL (ref 4.5–11)
WBC #/AREA URNS AUTO: ABNORMAL /HPF

## 2018-10-10 PROCEDURE — 87186 SC STD MICRODIL/AGAR DIL: CPT | Performed by: EMERGENCY MEDICINE

## 2018-10-10 PROCEDURE — 96374 THER/PROPH/DIAG INJ IV PUSH: CPT

## 2018-10-10 PROCEDURE — 99285 EMERGENCY DEPT VISIT HI MDM: CPT

## 2018-10-10 PROCEDURE — 99220 PR INITIAL OBSERVATION CARE/DAY 70 MINUTES: CPT | Performed by: SURGERY

## 2018-10-10 PROCEDURE — 80053 COMPREHEN METABOLIC PANEL: CPT | Performed by: EMERGENCY MEDICINE

## 2018-10-10 PROCEDURE — 74177 CT ABD & PELVIS W/CONTRAST: CPT

## 2018-10-10 PROCEDURE — 85025 COMPLETE CBC W/AUTO DIFF WBC: CPT | Performed by: EMERGENCY MEDICINE

## 2018-10-10 PROCEDURE — 71046 X-RAY EXAM CHEST 2 VIEWS: CPT

## 2018-10-10 PROCEDURE — 85610 PROTHROMBIN TIME: CPT | Performed by: EMERGENCY MEDICINE

## 2018-10-10 PROCEDURE — 85014 HEMATOCRIT: CPT | Performed by: SURGERY

## 2018-10-10 PROCEDURE — 87086 URINE CULTURE/COLONY COUNT: CPT | Performed by: EMERGENCY MEDICINE

## 2018-10-10 PROCEDURE — 36415 COLL VENOUS BLD VENIPUNCTURE: CPT | Performed by: EMERGENCY MEDICINE

## 2018-10-10 PROCEDURE — 96361 HYDRATE IV INFUSION ADD-ON: CPT

## 2018-10-10 PROCEDURE — 85018 HEMOGLOBIN: CPT | Performed by: SURGERY

## 2018-10-10 PROCEDURE — 81003 URINALYSIS AUTO W/O SCOPE: CPT | Performed by: EMERGENCY MEDICINE

## 2018-10-10 PROCEDURE — 81001 URINALYSIS AUTO W/SCOPE: CPT | Performed by: EMERGENCY MEDICINE

## 2018-10-10 PROCEDURE — 83690 ASSAY OF LIPASE: CPT | Performed by: EMERGENCY MEDICINE

## 2018-10-10 PROCEDURE — 83605 ASSAY OF LACTIC ACID: CPT | Performed by: EMERGENCY MEDICINE

## 2018-10-10 PROCEDURE — 1124F ACP DISCUSS-NO DSCNMKR DOCD: CPT | Performed by: SURGERY

## 2018-10-10 PROCEDURE — 85730 THROMBOPLASTIN TIME PARTIAL: CPT | Performed by: EMERGENCY MEDICINE

## 2018-10-10 PROCEDURE — 87077 CULTURE AEROBIC IDENTIFY: CPT | Performed by: EMERGENCY MEDICINE

## 2018-10-10 PROCEDURE — 94640 AIRWAY INHALATION TREATMENT: CPT

## 2018-10-10 RX ORDER — HYDROMORPHONE HCL/PF 1 MG/ML
0.2 SYRINGE (ML) INJECTION EVERY 4 HOURS PRN
Status: DISCONTINUED | OUTPATIENT
Start: 2018-10-10 | End: 2018-10-14 | Stop reason: HOSPADM

## 2018-10-10 RX ORDER — PREDNISONE 2.5 MG
2.5 TABLET ORAL DAILY
Status: DISCONTINUED | OUTPATIENT
Start: 2018-10-11 | End: 2018-10-11

## 2018-10-10 RX ORDER — ONDANSETRON 2 MG/ML
4 INJECTION INTRAMUSCULAR; INTRAVENOUS EVERY 4 HOURS PRN
Status: DISCONTINUED | OUTPATIENT
Start: 2018-10-10 | End: 2018-10-14 | Stop reason: HOSPADM

## 2018-10-10 RX ORDER — BENZONATATE 100 MG/1
100 CAPSULE ORAL 3 TIMES DAILY PRN
Status: DISCONTINUED | OUTPATIENT
Start: 2018-10-10 | End: 2018-10-14 | Stop reason: HOSPADM

## 2018-10-10 RX ORDER — PANTOPRAZOLE SODIUM 40 MG/1
40 TABLET, DELAYED RELEASE ORAL
Status: DISCONTINUED | OUTPATIENT
Start: 2018-10-11 | End: 2018-10-14 | Stop reason: HOSPADM

## 2018-10-10 RX ORDER — GUAIFENESIN 100 MG/5ML
200 SOLUTION ORAL ONCE
Status: COMPLETED | OUTPATIENT
Start: 2018-10-10 | End: 2018-10-10

## 2018-10-10 RX ORDER — ACETAMINOPHEN 325 MG/1
650 TABLET ORAL ONCE
Status: COMPLETED | OUTPATIENT
Start: 2018-10-10 | End: 2018-10-10

## 2018-10-10 RX ORDER — FENTANYL CITRATE 50 UG/ML
50 INJECTION, SOLUTION INTRAMUSCULAR; INTRAVENOUS ONCE
Status: COMPLETED | OUTPATIENT
Start: 2018-10-10 | End: 2018-10-10

## 2018-10-10 RX ORDER — GUAIFENESIN/DEXTROMETHORPHAN 100-10MG/5
10 SYRUP ORAL EVERY 4 HOURS PRN
Status: DISCONTINUED | OUTPATIENT
Start: 2018-10-10 | End: 2018-10-12

## 2018-10-10 RX ORDER — ACETAMINOPHEN 325 MG/1
TABLET ORAL
Status: COMPLETED
Start: 2018-10-10 | End: 2018-10-10

## 2018-10-10 RX ORDER — OXYCODONE HYDROCHLORIDE 5 MG/1
5 TABLET ORAL EVERY 4 HOURS PRN
Status: DISCONTINUED | OUTPATIENT
Start: 2018-10-10 | End: 2018-10-14 | Stop reason: HOSPADM

## 2018-10-10 RX ORDER — LEVOTHYROXINE SODIUM 0.03 MG/1
25 TABLET ORAL
Status: DISCONTINUED | OUTPATIENT
Start: 2018-10-11 | End: 2018-10-14 | Stop reason: HOSPADM

## 2018-10-10 RX ORDER — BENZONATATE 100 MG/1
CAPSULE ORAL
Status: COMPLETED
Start: 2018-10-10 | End: 2018-10-10

## 2018-10-10 RX ORDER — BENZONATATE 100 MG/1
100 CAPSULE ORAL ONCE
Status: COMPLETED | OUTPATIENT
Start: 2018-10-10 | End: 2018-10-10

## 2018-10-10 RX ORDER — OXYCODONE HYDROCHLORIDE 5 MG/1
2.5 TABLET ORAL EVERY 4 HOURS PRN
Status: DISCONTINUED | OUTPATIENT
Start: 2018-10-10 | End: 2018-10-14 | Stop reason: HOSPADM

## 2018-10-10 RX ORDER — MORPHINE SULFATE 4 MG/ML
4 INJECTION, SOLUTION INTRAMUSCULAR; INTRAVENOUS ONCE
Status: DISCONTINUED | OUTPATIENT
Start: 2018-10-10 | End: 2018-10-10 | Stop reason: HOSPADM

## 2018-10-10 RX ORDER — DIPHENHYDRAMINE HCL 25 MG
25 TABLET ORAL EVERY 6 HOURS PRN
Status: DISCONTINUED | OUTPATIENT
Start: 2018-10-10 | End: 2018-10-14 | Stop reason: HOSPADM

## 2018-10-10 RX ORDER — ACETAMINOPHEN 325 MG/1
975 TABLET ORAL EVERY 8 HOURS SCHEDULED
Status: DISCONTINUED | OUTPATIENT
Start: 2018-10-10 | End: 2018-10-14 | Stop reason: HOSPADM

## 2018-10-10 RX ORDER — ALBUTEROL SULFATE 90 UG/1
1 AEROSOL, METERED RESPIRATORY (INHALATION) EVERY 4 HOURS PRN
Status: DISCONTINUED | OUTPATIENT
Start: 2018-10-10 | End: 2018-10-14 | Stop reason: HOSPADM

## 2018-10-10 RX ORDER — GUAIFENESIN 100 MG/5ML
SOLUTION ORAL
Status: COMPLETED
Start: 2018-10-10 | End: 2018-10-10

## 2018-10-10 RX ORDER — AMOXICILLIN 250 MG
1 CAPSULE ORAL 2 TIMES DAILY
Status: DISCONTINUED | OUTPATIENT
Start: 2018-10-10 | End: 2018-10-14 | Stop reason: HOSPADM

## 2018-10-10 RX ORDER — FENTANYL CITRATE 50 UG/ML
INJECTION, SOLUTION INTRAMUSCULAR; INTRAVENOUS
Status: COMPLETED
Start: 2018-10-10 | End: 2018-10-10

## 2018-10-10 RX ORDER — CHOLECALCIFEROL (VITAMIN D3) 125 MCG
1000 CAPSULE ORAL DAILY
Status: DISCONTINUED | OUTPATIENT
Start: 2018-10-11 | End: 2018-10-14 | Stop reason: HOSPADM

## 2018-10-10 RX ORDER — CALCIUM CARBONATE/VITAMIN D3 250-3.125
2 TABLET ORAL 2 TIMES DAILY WITH MEALS
Status: DISCONTINUED | OUTPATIENT
Start: 2018-10-10 | End: 2018-10-14 | Stop reason: HOSPADM

## 2018-10-10 RX ADMIN — SODIUM CHLORIDE 1000 ML: 9 INJECTION, SOLUTION INTRAVENOUS at 05:07

## 2018-10-10 RX ADMIN — FENTANYL CITRATE 50 MCG: 50 INJECTION INTRAMUSCULAR; INTRAVENOUS at 05:08

## 2018-10-10 RX ADMIN — ACETAMINOPHEN 650 MG: 325 TABLET ORAL at 05:09

## 2018-10-10 RX ADMIN — GUAIFENESIN 200 MG: 100 SOLUTION ORAL at 07:59

## 2018-10-10 RX ADMIN — GUAIFENESIN AND DEXTROMETHORPHAN 10 ML: 100; 10 SYRUP ORAL at 22:47

## 2018-10-10 RX ADMIN — IOHEXOL 100 ML: 350 INJECTION, SOLUTION INTRAVENOUS at 05:55

## 2018-10-10 RX ADMIN — BENZONATATE 100 MG: 100 CAPSULE ORAL at 07:59

## 2018-10-10 RX ADMIN — BENZONATATE 100 MG: 100 CAPSULE ORAL at 18:07

## 2018-10-10 RX ADMIN — FENTANYL CITRATE 50 MCG: 50 INJECTION, SOLUTION INTRAMUSCULAR; INTRAVENOUS at 05:08

## 2018-10-10 RX ADMIN — CALCIUM CARBONATE-CHOLECALCIFEROL TAB 250 MG-125 UNIT 2 TABLET: 250-125 TAB at 22:36

## 2018-10-10 RX ADMIN — ACETAMINOPHEN 975 MG: 325 TABLET ORAL at 18:06

## 2018-10-10 RX ADMIN — ACETAMINOPHEN 975 MG: 325 TABLET ORAL at 22:36

## 2018-10-10 RX ADMIN — ALBUTEROL SULFATE 1 PUFF: 90 AEROSOL, METERED RESPIRATORY (INHALATION) at 20:56

## 2018-10-10 NOTE — ED PROVIDER NOTES
History  Chief Complaint   Patient presents with    Abdominal Pain     This is a 51-year-old female with past medical history of COPD presents for evaluation of acute onset left lower quadrant pain  Patient states she had a coughing episode approximately 21:00 this evening when she felt a pop and started having severe sharp left lower quadrant pain was not radiating and seemed to subside a little bit when she was standing still but was excruciating whenever she would try to move  She tried to wait it out and went to sleep and did not take any medications for pain control  The pain continued to keep her up and got worse every time she tried to move her leg  No similar pain in the past, no associated nausea vomiting diarrhea constipation  No history of abdominal surgeries  No urinary or vaginal complaints  Otherwise denies any chest pain or shortness of breath  Prior to Admission Medications   Prescriptions Last Dose Informant Patient Reported? Taking? Fluticasone Furoate (ARNUITY ELLIPTA IN)  Self Yes No   Sig: Inhale daily   albuterol (PROVENTIL HFA) 90 mcg/act inhaler   Yes No   Sig: inhale 2 puff by inhalation route  every 4 - 6 hours as needed   aspirin (ASPIRIN LOW DOSE) 81 MG tablet   Yes No   Sig: take one tab   every other day with Dinner   calcium carbonate-vitamin D (OSCAL-D) 500 mg-200 units per tablet  Self Yes No   Sig: Take 1 tablet by mouth 2 (two) times a day with meals   cyanocobalamin (VITAMIN B-12) 500 mcg tablet  Self Yes No   Sig: Take 1,000 mcg by mouth daily   diphenhydrAMINE (BENADRYL) 25 mg tablet   No No   Sig: Take 1 tablet (25 mg total) by mouth every 6 (six) hours as needed for itching   indacaterol-glycopyrrolate (UTIBRON NEOHALER) 27 5-15 6 MCG inhaler   No No   Sig: Place 1 capsule into inhaler and inhale 2 (two) times a day   levothyroxine (SYNTHROID) 25 mcg tablet   No No   Sig: Take 1 tablet (25 mcg total) by mouth daily   pantoprazole (PROTONIX) 40 mg tablet Yes No   Sig: every 24 hours   predniSONE 2 5 mg tablet   No No   Sig: Take 1 tablet (2 5 mg total) by mouth daily      Facility-Administered Medications: None       Past Medical History:   Diagnosis Date    Asthma     Chronic obstructive pulmonary disease ([facility] [address] ) 9/26/2012    GERD (gastroesophageal reflux disease)     Hypothyroid     Temporal arteritis (HCC)     Tubular adenoma        Past Surgical History:   Procedure Laterality Date    HYSTERECTOMY      NO PAST SURGERIES      ALSO NO RELEVANT PAST SURRGICAL HX AS PER [facility]       Family History   Problem Relation Age of Onset    Breast cancer Mother     Emphysema Father      I have reviewed and agree with the history as documented  Social History   Substance Use Topics    Smoking status: Former Smoker     Years: 15 00     Types: Cigarettes    Smokeless tobacco: Never Used      Comment: TOBACCO USE, NO PASSIVE SMOKE EXPOSURE    Alcohol use No        Review of Systems   Constitutional: Negative for appetite change and fever  HENT: Negative for rhinorrhea and sore throat  Eyes: Negative for photophobia and visual disturbance  Respiratory: Negative for cough, chest tightness and wheezing  Cardiovascular: Negative for chest pain, palpitations and leg swelling  Gastrointestinal: Positive for abdominal pain  Negative for abdominal distention, blood in stool, constipation and diarrhea  Genitourinary: Negative for dysuria, flank pain, frequency, hematuria and urgency  Musculoskeletal: Negative for back pain  Skin: Negative for rash  Neurological: Negative for dizziness, weakness and headaches  All other systems reviewed and are negative  Physical Exam  Physical Exam   Constitutional: She is oriented to person, place, and time  She appears well-developed and well-nourished  HENT:   Head: Normocephalic and atraumatic  Eyes: Pupils are equal, round, and reactive to light  EOM are normal    Neck: Normal range of motion   Neck supple  Cardiovascular: Normal rate and regular rhythm  Exam reveals no gallop and no friction rub  No murmur heard  Pulmonary/Chest: Effort normal  She has no wheezes  She has no rales  She exhibits no tenderness  Abdominal: Soft  She exhibits distension  She exhibits no mass  There is tenderness  There is no rebound and no guarding  Mildly distended abdomen with voluntary guarding when palpated in the left lower quadrant, no palpable hernia   Neurological: She is alert and oriented to person, place, and time  Skin: Skin is warm and dry  Psychiatric: She has a normal mood and affect  Nursing note and vitals reviewed        Vital Signs  ED Triage Vitals   Temperature Pulse Respirations Blood Pressure SpO2   10/10/18 0418 10/10/18 0418 10/10/18 0418 10/10/18 0418 10/10/18 0418   98 4 °F (36 9 °C) 90 20 (!) 197/95 95 %      Temp Source Heart Rate Source Patient Position - Orthostatic VS BP Location FiO2 (%)   10/10/18 0418 10/10/18 0418 10/10/18 0418 10/10/18 0418 --   Tympanic Monitor Sitting Left arm       Pain Score       10/10/18 0508       6           Vitals:    10/10/18 0558 10/10/18 0600 10/10/18 0801 10/10/18 0859   BP: (!) 179/87 (!) 179/87 127/70 129/71   Pulse: 83 86 91 80   Patient Position - Orthostatic VS: Lying  Lying Lying       Visual Acuity      ED Medications  Medications   sodium chloride 0 9 % bolus 1,000 mL (0 mL Intravenous Stopped 10/10/18 0707)   fentanyl citrate (PF) 100 MCG/2ML 50 mcg (50 mcg Intravenous Given 10/10/18 0508)   acetaminophen (TYLENOL) tablet 650 mg (650 mg Oral Given 10/10/18 0509)   iohexol (OMNIPAQUE) 350 MG/ML injection (MULTI-DOSE) 100 mL (100 mL Intravenous Given 10/10/18 0555)   benzonatate (TESSALON PERLES) capsule 100 mg (100 mg Oral Given 10/10/18 0759)   guaiFENesin (ROBITUSSIN) oral solution 200 mg (200 mg Oral Given 10/10/18 0759)       Diagnostic Studies  Results Reviewed     Procedure Component Value Units Date/Time    Urine Microscopic [[id number]]  (Abnormal) Collected:  10/10/18 0736    Lab Status:  Final result Specimen:  Urine from Urine, Clean Catch Updated:  10/10/18 0806     RBC, UA 0-1 (A) /hpf      WBC, UA 30-50 (A) /hpf      Epithelial Cells Occasional /hpf      Bacteria, UA Moderate (A) /hpf     Urine culture [[id number]] Collected:  10/10/18 0736    Lab Status:   In process Specimen:  Urine from Urine, Clean Catch Updated:  10/10/18 0806    UA w Reflex to Microscopic w Reflex to Culture [[id number]]  (Abnormal) Collected:  10/10/18 0736    Lab Status:  Final result Specimen:  Urine from Urine, Clean Catch Updated:  10/10/18 0744     Color, UA Straw     Clarity, UA Clear     Specific Gravity, UA 1 010     pH, UA 8 0     Leukocytes,  0 (A)     Nitrite, UA Negative     Protein, UA Negative mg/dl      Glucose, UA Negative mg/dl      Ketones, UA Negative mg/dl      Bilirubin, UA Negative     Blood, UA 25 0 (A)     UROBILINOGEN UA Negative mg/dL     Protime-INR [[id number]]  (Normal) Collected:  10/10/18 0610    Lab Status:  Final result Specimen:  Blood from Arm, Right Updated:  10/10/18 5823     Protime 10 1 seconds      INR 0 95    Narrative:       INR:  ,PROTIME:      APTT [[id number]]  (Normal) Collected:  10/10/18 0610    Lab Status:  Final result Specimen:  Blood from Arm, Right Updated:  10/10/18 0632     PTT 28 seconds     Narrative:       PTT:      Lipase [[id number]]  (Normal) Collected:  10/10/18 0510    Lab Status:  Final result Specimen:  Blood from Arm, Right Updated:  10/10/18 0533     Lipase 56 u/L     Comprehensive metabolic panel [[id number]]  (Abnormal) Collected:  10/10/18 0510    Lab Status:  Final result Specimen:  Blood from Arm, Right Updated:  10/10/18 0533     Sodium 137 mmol/L      Potassium 3 7 mmol/L      Chloride 97 mmol/L      CO2 31 (H) mmol/L      ANION GAP 9 mmol/L      BUN 12 mg/dL      Creatinine 0 63 mg/dL      Glucose 110 (H) mg/dL      Calcium 9 3 mg/dL      AST 49 (H) U/L      ALT 36 U/L      Alkaline Phosphatase 76 U/L      Total Protein 7 0 g/dL      Albumin 4 2 g/dL      Total Bilirubin 0 70 mg/dL      eGFR 89 ml/min/1 73sq m     Narrative:         National Kidney Disease Education Program recommendations are as follows:  GFR calculation is accurate only with a steady state creatinine  Chronic Kidney disease less than 60 ml/min/1 73 sq  meters  Kidney failure less than 15 ml/min/1 73 sq  meters  Lactic acid, plasma [[id number]]  (Normal) Collected:  10/10/18 0510    Lab Status:  Final result Specimen:  Blood from Arm, Right Updated:  10/10/18 0530     LACTIC ACID 1 2 mmol/L     Narrative:         Result may be elevated if tourniquet was used during collection  CBC and differential [[id number]]  (Abnormal) Collected:  10/10/18 0510    Lab Status:  Final result Specimen:  Blood from Arm, Right Updated:  10/10/18 0528     WBC 9 70 Thousand/uL      RBC 4 76 Million/uL      Hemoglobin 12 8 g/dL      Hematocrit 39 6 %      MCV 83 fL      MCH 26 8 pg      MCHC 32 3 g/dL      RDW 15 1 %      MPV 8 9 fL      Platelets 164 Thousands/uL      Neutrophils Relative 79 (H) %      Lymphocytes Relative 12 (L) %      Monocytes Relative 8 %      Eosinophils Relative 1 %      Basophils Relative 1 %      Neutrophils Absolute 7 60 Thousands/µL      Lymphocytes Absolute 1 20 Thousands/µL      Monocytes Absolute 0 80 Thousand/µL      Eosinophils Absolute 0 10 Thousand/µL      Basophils Absolute 0 10 Thousands/µL                  CT abdomen pelvis with contrast   Final Result by Narayan Dunn MD (10/10 7092)      Left mid to lower quadrant intramuscular abdominal wall hematoma approximately 18 cm craniocaudal by 9 5 cm transverse by 4 5 cm AP  Punctate hyperdensities in the posterior upper portion of the hematoma  No corresponding calcifications in the    abdominal wall on the prior CT  This is attributed to tiny active bleeding vessels  No acute intra-abdominal or intrapelvic process  Hepatic steatosis  Bilateral renal cysts, the largest of which measures 8 1 cm in the lower pole of the left kidney  No perinephric collection  Colonic diverticulosis  I personally discussed pertinent acute findings on this study with KEI JEFF on 10/10/2018 at 6:17  Workstation performed: [id number]                    Procedures  Procedures       Phone Contacts  ED Phone Contact    ED Course  ED Course as of Oct 11 0507   Wed Oct 10, 2018   2353 Paged Dr Chele Scott,  General surgery    2209 Spoke to Dr Chele Scott, General Surgery, we do not have capability for intervention Radiology in case the patient decompensates as we only have IR on Tuesdays  Given this patient likely would benefit from being transferred to a facility that has 24/7 IR capabilities InCase she decompensates from the standpoint of her bleed  Case discussed with Dr Kadeem Johansen, trauma surgery [facility] and she will be transferred to [facility] for further care  Abdominal binder will be applied    0745 Patient resting comfortably in no acute distress, states that the pain is well controlled when she is not moving  Attempting to find abdominal binder prior to patient being transferred out to [address] [place] [address]  Number of Diagnoses or Management Options  Abdominal wall hematoma, initial encounter:   Diagnosis management comments: 80-year-old female presents for evaluation of acute onset left lower quadrant pain, will obtain lab work including lactic acid, urinalysis, CT of the abdomen    Will treat symptomatically and re-evaluate    CritCare Time    Disposition  Final diagnoses:   Abdominal wall hematoma, initial encounter   Cough     Time reflects when diagnosis was documented in both MDM as applicable and the Disposition within this note     Time User Action Codes Description Comment    10/10/2018  6:49 AM Ingrid Blackburn Add [S30 1XXA] Abdominal wall hematoma, initial encounter 10/10/2018  7:31 AM Chata Remy Add [R05] Cough       ED Disposition     ED Disposition Condition Comment    Transfer to Another [address] [place] [address] should be transferred out to [facility], Dr Deana Quintanilla MD Documentation      Most Recent Value   Patient Condition  The patient has been stabilized such that within reasonable medical probability, no material deterioration of the patient condition or the condition of the unborn child(luis) is likely to result from the transfer   Reason for Transfer  Level of Care needed not available at this facility   Benefits of Transfer  Specialized equipment and/or services available at the receiving facility (Include comment)________________________ [facility]]   Risks of Transfer  Potential for delay in receiving treatment, Potential deterioration of medical condition, Loss of IV, Increased discomfort during transfer, Possible worsening of condition or death during transfer   Accepting Physician  Dr Deana Quintanilla   Sending MD  Dr Tiara Peralta   Provider Certification  General risk, such as traffic hazards, adverse weather conditions, rough terrain or turbulence, possible failure of equipment (including vehicle or aircraft), or consequences of actions of persons outside the control of the transport personnel, Unanticipated needs of medical equipment and personnel during transport, Risk of worsening condition, The possibility of a transport vehicle being unavailable      Follow-up Information    None         Discharge Medication List as of 10/10/2018 10:09 AM      CONTINUE these medications which have NOT CHANGED    Details   aspirin (ASPIRIN LOW DOSE) 81 MG tablet take one tab   every other day with Dinner, Historical Med      albuterol (PROVENTIL HFA) 90 mcg/act inhaler inhale 2 puff by inhalation route  every 4 - 6 hours as needed, Historical Med      calcium carbonate-vitamin D (OSCAL-D) 500 mg-200 units per tablet Take 1 tablet by mouth 2 (two) times a day with meals, Historical Med cyanocobalamin (VITAMIN B-12) 500 mcg tablet Take 1,000 mcg by mouth daily, Historical Med      diphenhydrAMINE (BENADRYL) 25 mg tablet Take 1 tablet (25 mg total) by mouth every 6 (six) hours as needed for itching, Starting Tue 6/26/2018, Normal      Fluticasone Furoate (ARNUITY ELLIPTA IN) Inhale daily, Historical Med      indacaterol-glycopyrrolate (UTIBRON NEOHALER) 27 5-15 6 MCG inhaler Place 1 capsule into inhaler and inhale 2 (two) times a day, Starting Tue 8/28/2018, Normal      levothyroxine (SYNTHROID) 25 mcg tablet Take 1 tablet (25 mcg total) by mouth daily, Starting Mon 8/27/2018, Normal      pantoprazole (PROTONIX) 40 mg tablet every 24 hours, Starting Thu 3/8/2018, Historical Med      predniSONE 2 5 mg tablet Take 1 tablet (2 5 mg total) by mouth daily, Starting Mon 6/25/2018, Normal           No discharge procedures on file      ED Provider  Electronically Signed by           Jonathan Howard MD  10/10/18 121 MARK Lewis MD  10/11/18 0413

## 2018-10-10 NOTE — ASSESSMENT & PLAN NOTE
- Continue home medication regimen  - Obtain chest x-ray due to acute onset productive cough with subjective chills to rule out COPD exacerbation/pneumonia

## 2018-10-10 NOTE — EMTALA/ACUTE CARE TRANSFER
[facility] EMERGENCY DEPARTMENT  [address] [facility] [address] [phone]  Dept: [phone]      EMTALA TRANSFER CONSENT    NAME Morris Mckeon                                                                       MRN [id number]    I have been informed of my rights regarding examination, treatment, and transfer   by Dr Merry Romo MD    Benefits: Specialized equipment and/or services available at the receiving facility (Include comment)________________________ (Trauma/ IR)    Risks: Potential for delay in receiving treatment, Potential deterioration of medical condition, Loss of IV, Increased discomfort during transfer, Possible worsening of condition or death during transfer      Consent for Transfer:  I acknowledge that my medical condition has been evaluated and explained to me by the emergency department physician or other qualified medical person and/or my attending physician, who has recommended that I be transferred to the service of  Accepting Physician: Dr Roberto Donohue at    The above potential benefits of such transfer, the potential risks associated with such transfer, and the probable risks of not being transferred have been explained to me, and I fully understand them  The doctor has explained that, in my case, the benefits of transfer outweigh the risks  I agree to be transferred  I authorize the performance of emergency medical procedures and treatments upon me in both transit and upon arrival at the receiving facility  Additionally, I authorize the release of any and all medical records to the receiving facility and request they be transported with me, if possible  I understand that the safest mode of transportation during a medical emergency is an ambulance and that the Hospital advocates the use of this mode of transport   Risks of traveling to the receiving facility by car, including absence of medical control, life sustaining equipment, such as oxygen, and medical personnel has been explained to me and I fully understand them  (RAIN CORRECT BOX BELOW)  [  ]  I consent to the stated transfer and to be transported by ambulance/helicopter  [  ]  I consent to the stated transfer, but refuse transportation by ambulance and accept full responsibility for my transportation by car  I understand the risks of non-ambulance transfers and I exonerate the Hospital and its staff from any deterioration in my condition that results from this refusal     X___________________________________________    DATE  10/10/18  TIME________  Signature of patient or legally responsible individual signing on patient behalf           RELATIONSHIP TO PATIENT_________________________          Provider Certification    NAME Ekta Birmingham                                         1945                              MRN [id number]    A medical screening exam was performed on the above named patient  Based on the examination:    Condition Necessitating Transfer The encounter diagnosis was Abdominal wall hematoma, initial encounter      Patient Condition: The patient has been stabilized such that within reasonable medical probability, no material deterioration of the patient condition or the condition of the unborn child(luis) is likely to result from the transfer    Reason for Transfer: Level of Care needed not available at this facility    Transfer Requirements: Facility     · Space available and qualified personnel available for treatment as acknowledged by    · Agreed to accept transfer and to provide appropriate medical treatment as acknowledged by       Dr Dann Covarrubias  · Appropriate medical records of the examination and treatment of the patient are provided at the time of transfer   [address], [address] _______  · Transfer will be performed by qualified personnel from    and appropriate transfer equipment as required, including the use of necessary and appropriate life support measures  Provider Certification: I have examined the patient and explained the following risks and benefits of being transferred/refusing transfer to the patient/family:  General risk, such as traffic hazards, adverse weather conditions, rough terrain or turbulence, possible failure of equipment (including vehicle or aircraft), or consequences of actions of persons outside the control of the transport personnel, Unanticipated needs of medical equipment and personnel during transport, Risk of worsening condition, The possibility of a transport vehicle being unavailable      Based on these reasonable risks and benefits to the patient and/or the unborn child(luis), and based upon the information available at the time of the patients examination, I certify that the medical benefits reasonably to be expected from the provision of appropriate medical treatments at another medical facility outweigh the increasing risks, if any, to the individuals medical condition, and in the case of labor to the unborn child, from effecting the transfer      X____________________________________________ DATE 10/10/18        TIME_______      ORIGINAL - SEND TO MEDICAL RECORDS   COPY - SEND WITH PATIENT DURING TRANSFER

## 2018-10-10 NOTE — ED TRIAGE NOTES
Patient had coughing episode around 9pm tonight and developed sharp left lower quadrant pain  Patient states that it has not subsided since then

## 2018-10-10 NOTE — ASSESSMENT & PLAN NOTE
- Rectus sheath hematoma with evidence of active bleeding on contrast CT scan of the abdomen and pelvis  - Admit to acute care surgery  - Regular diet  - Serial abdominal exams and serial H&Hs  - Abdominal binder should be worn at all times for external compression   - Initiate multimodal analgesic regimen   - Consider IR consult for possible embolization of bleeding vessel if patient demonstrates continued drop in hemoglobin or evidence of active bleeding on clinical exam   - Transfuse packed red blood cells as indicated  - Hold aspirin for now

## 2018-10-10 NOTE — H&P
Acute Care Surgery H&P- Ky Ansari 1945, 68 y o  female MRN: [id number]    Unit/Bed#: [facility] 922-01 Encounter: [id number]    Primary Care Provider: Nikko Velasquez MD   Date and time admitted to hospital: 10/10/2018 10:33 AM        * Rectus sheath hematoma, initial encounter   Assessment & Plan    - Rectus sheath hematoma with evidence of active bleeding on contrast CT scan of the abdomen and pelvis  - Admit to acute care surgery  - Regular diet  - Serial abdominal exams and serial H&Hs  - Abdominal binder should be worn at all times for external compression   - Initiate multimodal analgesic regimen   - Consider IR consult for possible embolization of bleeding vessel if patient demonstrates continued drop in hemoglobin or evidence of active bleeding on clinical exam   - Transfuse packed red blood cells as indicated  - Hold aspirin for now  Chronic obstructive pulmonary disease (HCC)   Assessment & Plan    - Continue home medication regimen  - Obtain chest x-ray due to acute onset productive cough with subjective chills to rule out COPD exacerbation/pneumonia  Above assessment and plan was discussed with Dr Hill Presume  History of Present Illness     HPI:  Ky Ansari is a 68 y o  female who presents with acute onset moderately severe left abdominal pain  The patient states that she has had a significant cough over the last several days with it worsening yesterday  She had multiple coughing fits over the last 24 hours  During a severe coughing fit yesterday, she felt a popping sensation followed by significant pain in her left abdominal wall  She denied any nausea, vomiting, change in her bowel habits, or decrease in appetite  She was able to tolerated diet since he abdominal popping and pain began  She denied any fever, but noted some chills with her productive cough over the last several days  She offered no other new complaints    She denied any dysuria or change in her bowel habits  She was initially evaluated at [facility] prior to being transferred to [facility]  Review of Systems   Constitutional: Positive for chills  Negative for activity change, appetite change, diaphoresis, fatigue and fever  HENT: Negative  Negative for congestion, facial swelling and sore throat  Eyes: Negative  Respiratory: Positive for cough  Negative for chest tightness, shortness of breath and wheezing  Cardiovascular: Negative  Negative for chest pain and leg swelling  Gastrointestinal: Positive for abdominal distention and abdominal pain  Negative for constipation, diarrhea, nausea and vomiting  Endocrine: Negative  Genitourinary: Negative  Negative for difficulty urinating, dysuria, flank pain, frequency and hematuria  Musculoskeletal: Negative  Negative for arthralgias, back pain, myalgias and neck pain  Skin: Negative  Negative for color change, pallor, rash and wound  Allergic/Immunologic: Negative  Neurological: Negative  Negative for dizziness, syncope, weakness, light-headedness and headaches  Hematological: Negative  Psychiatric/Behavioral: Negative  Negative for agitation and confusion         Historical Information   Past Medical History:   Diagnosis Date    Asthma     Chronic obstructive pulmonary disease ([facility] [address] ) 9/26/2012    GERD (gastroesophageal reflux disease)     Hypothyroid     Temporal arteritis (HCC)     Tubular adenoma      Past Surgical History:   Procedure Laterality Date    HYSTERECTOMY      NO PAST SURGERIES      ALSO [facility]     Social History   History   Alcohol Use No     History   Drug Use No     History   Smoking Status    Former Smoker    Years: 15 00    Types: Cigarettes   Smokeless Tobacco    Never Used     Comment: TOBACCO USE, NO PASSIVE SMOKE EXPOSURE     Family History: non-contributory    Meds/Allergies   all medications and allergies reviewed  No Known Allergies    Objective   First Vitals:   Blood Pressure: 157/76 (10/10/18 1035)  Pulse: 80 (10/10/18 1035)  Temperature: 98 4 °F (36 9 °C) (10/10/18 1035)  Temp Source: Tympanic (10/10/18 1035)  Respirations: 18 (10/10/18 1035)  Height: 5' 3" (160 cm) (10/10/18 1645)  Weight - Scale: 53 7 kg (118 lb 6 2 oz) (10/10/18 1645)  SpO2: 98 % (10/10/18 1035)    Current Vitals:   Blood Pressure: 165/80 (10/10/18 1645)  Pulse: 103 (10/10/18 1645)  Temperature: 98 7 °F (37 1 °C) (10/10/18 1645)  Temp Source: Oral (10/10/18 1645)  Respirations: 18 (10/10/18 1645)  Height: 5' 3" (160 cm) (10/10/18 1645)  Weight - Scale: 53 7 kg (118 lb 6 2 oz) (10/10/18 1645)  SpO2: 98 % (10/10/18 1645)    No intake or output data in the 24 hours ending 10/10/18 1806    Invasive Devices     Peripheral Intravenous Line            Peripheral IV 10/10/18 Right Antecubital less than 1 day                Physical Exam   Constitutional: She is oriented to person, place, and time  She appears well-developed and well-nourished  She is active  No distress  Nasal cannula in place  HENT:   Head: Normocephalic and atraumatic  Right Ear: External ear normal    Left Ear: External ear normal    Eyes: Pupils are equal, round, and reactive to light  EOM are normal    Neck: Normal range of motion  Neck supple  No tracheal deviation present  Cardiovascular: Normal rate, regular rhythm, normal heart sounds and intact distal pulses  Exam reveals no gallop and no friction rub  No murmur heard  Pulses:       Radial pulses are 2+ on the right side, and 2+ on the left side  Dorsalis pedis pulses are 2+ on the right side, and 2+ on the left side  Pulmonary/Chest: Effort normal and breath sounds normal  No accessory muscle usage  No tachypnea  No respiratory distress  She has no decreased breath sounds  She has no wheezes  She has no rhonchi  She has no rales  She exhibits no tenderness  Abdominal: Soft   Bowel sounds are normal  She exhibits mass (Palpable mass in the left lower abdominal wall to the left of midline)  She exhibits no distension  There is tenderness  There is no rebound and no guarding  Musculoskeletal: Normal range of motion  She exhibits no edema, tenderness or deformity  Neurological: She is alert and oriented to person, place, and time  GCS eye subscore is 4  GCS verbal subscore is 5  GCS motor subscore is 6  Skin: Skin is warm, dry and intact  No rash noted  She is not diaphoretic  No erythema  No pallor  Psychiatric: She has a normal mood and affect  Nursing note and vitals reviewed  Lab Results:   I have personally reviewed pertinent lab results  , CBC:   Lab Results   Component Value Date    WBC 9 70 10/10/2018    HGB 11 7 10/10/2018    HCT 37 9 10/10/2018    MCV 83 10/10/2018     10/10/2018    MCH 26 8 10/10/2018    MCHC 32 3 10/10/2018    RDW 15 1 10/10/2018    MPV 8 9 10/10/2018   , CMP:   Lab Results   Component Value Date     10/10/2018    K 3 7 10/10/2018    CL 97 10/10/2018    CO2 31 (H) 10/10/2018    BUN 12 10/10/2018    CREATININE 0 63 10/10/2018    CALCIUM 9 3 10/10/2018    AST 49 (H) 10/10/2018    ALT 36 10/10/2018    ALKPHOS 76 10/10/2018    EGFR 89 10/10/2018   , Coagulation:   Lab Results   Component Value Date    INR 0 95 10/10/2018   , Urinalysis:   Lab Results   Component Value Date    COLORU Straw 10/10/2018    CLARITYU Clear 10/10/2018    SPECGRAV 1 010 10/10/2018    PHUR 8 0 10/10/2018    LEUKOCYTESUR 500 0 (A) 10/10/2018    NITRITE Negative 10/10/2018    PROTEINUA Negative 10/10/2018    GLUCOSEU Negative 10/10/2018    KETONESU Negative 10/10/2018    BILIRUBINUR Negative 10/10/2018    BLOODU 25 0 (A) 10/10/2018   , Lipase:   Lab Results   Component Value Date    LIPASE 56 10/10/2018     Imaging: I have personally reviewed pertinent reports  and I have personally reviewed pertinent films in PACS  EKG, Pathology, and Other Studies: I have personally reviewed pertinent reports        Code Status: Level 1 - Full Code  Advance Directive and Living Will:      Power of :    POLST:      Counseling / Coordination of Care  Total floor / unit time spent today 40 minutes  This involved direct patient contact where I performed a full history and physical, reviewed previous records, and reviewed laboratory and other diagnostic studies  Greater than 50% of total time was spent with the patient and / or family counseling and / or coordination of care      Yasemin Soto PA-C  10/10/2018 6:06 PM

## 2018-10-10 NOTE — RESPIRATORY THERAPY NOTE
RT Protocol Note  Ekta Birmingham 68 y o  female MRN: [id number]  Unit/Bed#: [facility] 922-01 Encounter: [id number]    Assessment    Principal Problem:    Rectus sheath hematoma, initial encounter  Active Problems:    Chronic obstructive pulmonary disease (HCC)      Home Pulmonary Medications Arnuity inhaler, Utibron Neohaler, Albuterol HFA       Past Medical History:   Diagnosis Date    Asthma     Chronic obstructive pulmonary disease ([facility] ) 9/26/2012    GERD (gastroesophageal reflux disease)     Hypothyroid     Temporal arteritis ([facility] )     Tubular adenoma      Social History     Social History    Marital status: Legally      Spouse name: N/A    Number of children: N/A    Years of education: N/A     Social History Main Topics    Smoking status: Former Smoker     Years: 15 00     Types: Cigarettes    Smokeless tobacco: Never Used      Comment: TOBACCO USE, NO PASSIVE SMOKE EXPOSURE    Alcohol use No    Drug use: No    Sexual activity: Not Asked     Other Topics Concern    None     Social History Narrative    None       Subjective         Objective    Physical Exam:   Assessment Type: Assess only  General Appearance: Alert, Awake  Respiratory Pattern: Normal  Chest Assessment: Chest expansion symmetrical  Bilateral Breath Sounds: Clear    Vitals:  Blood pressure 165/80, pulse 103, temperature 98 7 °F (37 1 °C), temperature source Oral, resp  rate 18, height 5' 3" (1 6 m), weight 53 7 kg (118 lb 6 2 oz), SpO2 98 %  Imaging and other studies: I have personally reviewed pertinent reports  Plan    Respiratory Plan: Home Bronchodilator Patient pathway        Resp Comments: Pt has history of COPD and utilizes Arnuity inhaler and Utibron Neohaler at home on scheduled doses, with albuterol HFA prn  will continue with the Albuterol HFA at this time and have left a sticky not on epic for MD to order something for pt to use in place of other inhalers

## 2018-10-10 NOTE — ED NOTES
Oxygen saturation while sleeping on room air dropped down to 84 & 85%, Dr Nell Garnica made aware and oxygen 2 liters put on via nasal cannula  Patient was given IV Fentanyl for pain and oxygen saturations started to drop after that       Poli George RN  10/10/18 1134

## 2018-10-11 PROBLEM — I10 HYPERTENSION: Status: ACTIVE | Noted: 2018-10-11

## 2018-10-11 LAB
ANION GAP SERPL CALCULATED.3IONS-SCNC: 6 MMOL/L (ref 4–13)
BUN SERPL-MCNC: 10 MG/DL (ref 5–25)
CALCIUM SERPL-MCNC: 8.7 MG/DL (ref 8.3–10.1)
CHLORIDE SERPL-SCNC: 100 MMOL/L (ref 100–108)
CO2 SERPL-SCNC: 29 MMOL/L (ref 21–32)
CREAT SERPL-MCNC: 0.68 MG/DL (ref 0.6–1.3)
ERYTHROCYTE [DISTWIDTH] IN BLOOD BY AUTOMATED COUNT: 14.2 % (ref 11.6–15.1)
GFR SERPL CREATININE-BSD FRML MDRD: 87 ML/MIN/1.73SQ M
GLUCOSE SERPL-MCNC: 97 MG/DL (ref 65–140)
HCT VFR BLD AUTO: 38.2 % (ref 34.8–46.1)
HGB BLD-MCNC: 11.8 G/DL (ref 11.5–15.4)
MCH RBC QN AUTO: 26.5 PG (ref 26.8–34.3)
MCHC RBC AUTO-ENTMCNC: 30.9 G/DL (ref 31.4–37.4)
MCV RBC AUTO: 86 FL (ref 82–98)
PLATELET # BLD AUTO: 232 THOUSANDS/UL (ref 149–390)
PMV BLD AUTO: 10.1 FL (ref 8.9–12.7)
POTASSIUM SERPL-SCNC: 3.4 MMOL/L (ref 3.5–5.3)
RBC # BLD AUTO: 4.46 MILLION/UL (ref 3.81–5.12)
SODIUM SERPL-SCNC: 135 MMOL/L (ref 136–145)
WBC # BLD AUTO: 8.9 THOUSAND/UL (ref 4.31–10.16)

## 2018-10-11 PROCEDURE — 87205 SMEAR GRAM STAIN: CPT | Performed by: SURGERY

## 2018-10-11 PROCEDURE — 85027 COMPLETE CBC AUTOMATED: CPT | Performed by: PHYSICIAN ASSISTANT

## 2018-10-11 PROCEDURE — 99232 SBSQ HOSP IP/OBS MODERATE 35: CPT | Performed by: SURGERY

## 2018-10-11 PROCEDURE — 99221 1ST HOSP IP/OBS SF/LOW 40: CPT | Performed by: INTERNAL MEDICINE

## 2018-10-11 PROCEDURE — 94640 AIRWAY INHALATION TREATMENT: CPT

## 2018-10-11 PROCEDURE — 80048 BASIC METABOLIC PNL TOTAL CA: CPT | Performed by: PHYSICIAN ASSISTANT

## 2018-10-11 PROCEDURE — 94760 N-INVAS EAR/PLS OXIMETRY 1: CPT

## 2018-10-11 RX ORDER — HEPARIN SODIUM 5000 [USP'U]/ML
5000 INJECTION, SOLUTION INTRAVENOUS; SUBCUTANEOUS EVERY 8 HOURS SCHEDULED
Status: DISCONTINUED | OUTPATIENT
Start: 2018-10-11 | End: 2018-10-14 | Stop reason: HOSPADM

## 2018-10-11 RX ORDER — IPRATROPIUM BROMIDE AND ALBUTEROL SULFATE 2.5; .5 MG/3ML; MG/3ML
3 SOLUTION RESPIRATORY (INHALATION)
Status: DISCONTINUED | OUTPATIENT
Start: 2018-10-11 | End: 2018-10-14 | Stop reason: HOSPADM

## 2018-10-11 RX ORDER — METHYLPREDNISOLONE SODIUM SUCCINATE 40 MG/ML
40 INJECTION, POWDER, LYOPHILIZED, FOR SOLUTION INTRAMUSCULAR; INTRAVENOUS EVERY 12 HOURS SCHEDULED
Status: DISCONTINUED | OUTPATIENT
Start: 2018-10-11 | End: 2018-10-12

## 2018-10-11 RX ORDER — POTASSIUM CHLORIDE 20 MEQ/1
40 TABLET, EXTENDED RELEASE ORAL ONCE
Status: COMPLETED | OUTPATIENT
Start: 2018-10-11 | End: 2018-10-11

## 2018-10-11 RX ADMIN — PANTOPRAZOLE SODIUM 40 MG: 40 TABLET, DELAYED RELEASE ORAL at 07:18

## 2018-10-11 RX ADMIN — ACETAMINOPHEN 975 MG: 325 TABLET ORAL at 21:02

## 2018-10-11 RX ADMIN — HEPARIN SODIUM 5000 UNITS: 5000 INJECTION, SOLUTION INTRAVENOUS; SUBCUTANEOUS at 12:11

## 2018-10-11 RX ADMIN — BENZONATATE 100 MG: 100 CAPSULE ORAL at 05:09

## 2018-10-11 RX ADMIN — SENNOSIDES AND DOCUSATE SODIUM 1 TABLET: 8.6; 5 TABLET ORAL at 17:04

## 2018-10-11 RX ADMIN — IPRATROPIUM BROMIDE AND ALBUTEROL SULFATE 3 ML: .5; 3 SOLUTION RESPIRATORY (INHALATION) at 14:07

## 2018-10-11 RX ADMIN — HEPARIN SODIUM 5000 UNITS: 5000 INJECTION, SOLUTION INTRAVENOUS; SUBCUTANEOUS at 21:02

## 2018-10-11 RX ADMIN — GUAIFENESIN AND DEXTROMETHORPHAN 10 ML: 100; 10 SYRUP ORAL at 21:59

## 2018-10-11 RX ADMIN — METHYLPREDNISOLONE SODIUM SUCCINATE 40 MG: 40 INJECTION, POWDER, FOR SOLUTION INTRAMUSCULAR; INTRAVENOUS at 09:55

## 2018-10-11 RX ADMIN — IPRATROPIUM BROMIDE AND ALBUTEROL SULFATE 3 ML: .5; 3 SOLUTION RESPIRATORY (INHALATION) at 20:24

## 2018-10-11 RX ADMIN — POTASSIUM CHLORIDE 40 MEQ: 1500 TABLET, EXTENDED RELEASE ORAL at 09:55

## 2018-10-11 RX ADMIN — BENZONATATE 100 MG: 100 CAPSULE ORAL at 21:02

## 2018-10-11 RX ADMIN — ACETAMINOPHEN 975 MG: 325 TABLET ORAL at 05:08

## 2018-10-11 RX ADMIN — AZITHROMYCIN MONOHYDRATE 500 MG: 500 INJECTION, POWDER, LYOPHILIZED, FOR SOLUTION INTRAVENOUS at 10:42

## 2018-10-11 RX ADMIN — ACETAMINOPHEN 975 MG: 325 TABLET ORAL at 14:00

## 2018-10-11 RX ADMIN — METHYLPREDNISOLONE SODIUM SUCCINATE 40 MG: 40 INJECTION, POWDER, FOR SOLUTION INTRAMUSCULAR; INTRAVENOUS at 21:02

## 2018-10-11 RX ADMIN — ALBUTEROL SULFATE 1 PUFF: 90 AEROSOL, METERED RESPIRATORY (INHALATION) at 09:53

## 2018-10-11 RX ADMIN — CYANOCOBALAMIN TAB 500 MCG 1000 MCG: 500 TAB at 09:55

## 2018-10-11 RX ADMIN — IPRATROPIUM BROMIDE AND ALBUTEROL SULFATE 3 ML: .5; 3 SOLUTION RESPIRATORY (INHALATION) at 10:26

## 2018-10-11 RX ADMIN — CALCIUM CARBONATE-CHOLECALCIFEROL TAB 250 MG-125 UNIT 2 TABLET: 250-125 TAB at 09:56

## 2018-10-11 RX ADMIN — LEVOTHYROXINE SODIUM 25 MCG: 25 TABLET ORAL at 06:00

## 2018-10-11 RX ADMIN — SENNOSIDES AND DOCUSATE SODIUM 1 TABLET: 8.6; 5 TABLET ORAL at 09:56

## 2018-10-11 RX ADMIN — CALCIUM CARBONATE-CHOLECALCIFEROL TAB 250 MG-125 UNIT 2 TABLET: 250-125 TAB at 16:35

## 2018-10-11 NOTE — ASSESSMENT & PLAN NOTE
- Hypertension noted on admission without any clear history of baseline hypertension   - Blood pressure trend has improved overnight and into this morning  Hypertension may have been secondary to pain associated with abdominal wall hematoma  - If patient continues to be hypertensive during her admission, consider initiating antihypertensive  Will need outpatient follow-up with primary care provider

## 2018-10-11 NOTE — ASSESSMENT & PLAN NOTE
- Continue home medication regimen  - Chest x-ray on 10/10/2018 demonstrated no acute cardiopulmonary disease   - Pulmonary consult today due to persistent cough and patient subjective a not feeling at her baseline   - Hold off on antibiotics without clear source of infectious pathology  - Continue p r n  Medications for symptomatic management

## 2018-10-11 NOTE — RESTORATIVE TECHNICIAN NOTE
Restorative Specialist Mobility Note       Activity: [facility] [place] [place] [facility]     Assistive Device: None     Ambulation Response: Tolerated well (O2 sats between 88-90% during ambulation)  Repositioned: Sitting, Up in chair           Range of Motion: Active, All extremities  Anti-Embolism Device On: Bilateral, Antiembolism stockings, knee     Patient left resting comfortably in bedside recliner, with call bell and table within reach

## 2018-10-11 NOTE — PROGRESS NOTES
Rounded with Dedra Aponte PA-C of [facility] plan for pt is to consult pulmonology, to trial on room air and maintain oxygen saturation between 88-92% due to COPD  Plan for pt to ambulate today

## 2018-10-11 NOTE — ASSESSMENT & PLAN NOTE
- Rectus sheath hematoma with evidence of active bleeding on contrast CT scan of the abdomen and pelvis, but no evidence of active bleeding clinically since admission with stable vital signs and stable hemoglobin  - Regular diet  - Continue to monitor abdominal exam   - Repeat CBC on 10/12/2018  - Abdominal binder should be worn at all times for external compression   - Initiate multimodal analgesic regimen  - Hold aspirin for now

## 2018-10-11 NOTE — PROGRESS NOTES
Progress Note - Jaqui Boone 1945, 68 y o  female MRN: [id number]    Unit/Bed#: [facility] 922-01 Encounter: [id number]    Primary Care Provider: Nani Figueroa MD   Date and time admitted to hospital: 10/10/2018 10:33 AM        * Rectus sheath hematoma, initial encounter   Assessment & Plan    - Rectus sheath hematoma with evidence of active bleeding on contrast CT scan of the abdomen and pelvis, but no evidence of active bleeding clinically since admission with stable vital signs and stable hemoglobin  - Regular diet  - Continue to monitor abdominal exam   - Repeat CBC on 10/12/2018  - Abdominal binder should be worn at all times for external compression   - Initiate multimodal analgesic regimen  - Hold aspirin for now  Chronic obstructive pulmonary disease (HCC)   Assessment & Plan    - Continue home medication regimen  - Chest x-ray on 10/10/2018 demonstrated no acute cardiopulmonary disease   - Pulmonary consult today due to persistent cough and patient subjective a not feeling at her baseline   - Hold off on antibiotics without clear source of infectious pathology  - Continue p r n  Medications for symptomatic management  Hypertension   Assessment & Plan    - Hypertension noted on admission without any clear history of baseline hypertension   - Blood pressure trend has improved overnight and into this morning  Hypertension may have been secondary to pain associated with abdominal wall hematoma  - If patient continues to be hypertensive during her admission, consider initiating antihypertensive  Will need outpatient follow-up with primary care provider  Acute Care Surgery  Progress Note   Jaqui Boone 68 y o  female MRN: [id number]  [facility] Encounter: [id number]        Subjective/Objective     Subjective: Patient is feeling ok this morning  She did not sleep to well secondary to persistent cough   She notes abdominal pain only with movement and feels it is well controlled with Tylenol  Aside from her cough, she offers no complaints right now  No fever, chills, chest pain, shortness of breath, nausea, vomiting or change in bowel habits  Objective:     Blood pressure 134/74, pulse 96, temperature 98 4 °F (36 9 °C), temperature source Oral, resp  rate 18, height 5' 3" (1 6 m), weight 53 7 kg (118 lb 6 2 oz), SpO2 98 %  ,Body mass index is 20 97 kg/m²  Temp (24hrs), Av 5 °F (36 9 °C), Min:98 4 °F (36 9 °C), Max:98 7 °F (37 1 °C)  Current: Temperature: 98 4 °F (36 9 °C)      Intake/Output Summary (Last 24 hours) at 10/11/18 0831  Last data filed at 10/11/18 9070   Gross per 24 hour   Intake              120 ml   Output              200 ml   Net              -80 ml       Invasive Devices     Peripheral Intravenous Line            Peripheral IV 10/10/18 Right Antecubital 1 day                Physical Exam:    GENERAL APPEARANCE: Patient in no acute distress  HEENT: NCAT; PERRL, EOMs intact; Mucous membranes moist  CV: Regular rate and rhythm; + S1, S2; no murmur/gallops/rubs appreciated  LUNGS: Clear to auscultation; no wheezes/rales/rhonci  ABD: NABS; soft; non-distended; minimally tender over left mid and left lower abdomen without peritonitis  EXT: +2 pulses bilaterally upper & lower extremities; no clubbing/cyanosis/edema  NEURO: GCS 15; no focal neurologic deficits; neurovascularly intact  SKIN: Warm, dry and well perfused; no rash; no jaundice  Lab, Imaging and other studies:  I have personally reviewed pertinent lab results    , CBC:   Lab Results   Component Value Date    WBC 8 90 10/11/2018    HGB 11 8 10/11/2018    HCT 38 2 10/11/2018    MCV 86 10/11/2018     10/11/2018    MCH 26 5 (L) 10/11/2018    MCHC 30 9 (L) 10/11/2018    RDW 14 2 10/11/2018    MPV 10 1 10/11/2018   , CMP:   Lab Results   Component Value Date     (L) 10/11/2018    K 3 4 (L) 10/11/2018     10/11/2018    CO2 29 10/11/2018    BUN 10 10/11/2018    CREATININE 0 68 10/11/2018    CALCIUM 8 7 10/11/2018    EGFR 87 10/11/2018   , Coagulation: No results found for: PT, INR, APTT  VTE Pharmacologic Prophylaxis: Heparin  VTE Mechanical Prophylaxis: sequential compression device    Elisabeth Caballero PA-C  10/11/2018 8:31 AM

## 2018-10-11 NOTE — UTILIZATION REVIEW
Initial Clinical Review    Admission: Date/Time/Statement: Observation 10/10 @ 1308    Orders Placed This Encounter   Procedures    Place in Observation     Standing Status:   Standing     Number of Occurrences:   1     Order Specific Question:   Admitting Physician     Answer:   Sigrid Pavon     Order Specific Question:   Level of Care     Answer:   Med Surg [16]       ED: Date/Time/Mode of Arrival:   ED Arrival Information     Expected Arrival [address] of Arrival Escorted By Service Admission Type    10/10/2018  10/10/2018 10:33 [facility]) Trauma Emergency    Arrival Complaint    hematoma          Chief Complaint:   Chief Complaint   Patient presents with    Trauma     pt is a trauma transfer from [facility]  pt coughed last night and felt a "pop" in abdomen  pt with abdominal wall hematoma  abdominal binder applied pta       History of Illness: 68 y o  female who presents with acute onset moderately severe left abdominal pain  The patient states that she has had a significant cough over the last several days with it worsening yesterday  She had multiple coughing fits over the last 24 hours  During a severe coughing fit yesterday, she felt a popping sensation followed by significant pain in her left abdominal wall  She was able to tolerated diet since he abdominal popping and pain began  She denied any fever, but noted some chills with her productive cough over the last several days      ED Vital Signs:   ED Triage Vitals [10/10/18 1035]   Temperature Pulse Respirations Blood Pressure SpO2   98 4 °F (36 9 °C) 80 18 157/76 98 %      Temp Source Heart Rate Source Patient Position - Orthostatic VS BP Location FiO2 (%)   Tympanic Monitor Lying Left arm --      Pain Score       3        Wt Readings from Last 1 Encounters:   10/10/18 53 7 kg (118 lb 6 2 oz)       Vital Signs (abnormal): B/P = 191/ 82    Abnormal Labs: Na = 135  K = 3 4  H/H = 11 8/ 38 2    10/11/2018  9:11 AM     Urine Culture 10,000-19,000 cfu/ml Gram Negative Artie            Diagnostic Test Results: CT Abd/ Pelvis - Left mid to lower quadrant intramuscular abdominal wall hematoma approximately 18 cm craniocaudal by 9 5 cm transverse by 4 5 cm AP  Punctate hyperdensities in the posterior upper portion of the hematoma  No corresponding calcifications in the   abdominal wall on the prior CT  This is attributed to tiny active bleeding vessels  Treatment:   Medication Administration from 10/10/2018 1030 to 10/10/2018 1612     None          Past Medical/Surgical History: Active Ambulatory Problems     Diagnosis Date Noted    Chronic obstructive pulmonary disease ([facility] ) 09/26/2012    Vitamin D deficiency 09/26/2012    Tobacco dependence 09/26/2012    Other specified hypothyroidism 08/28/2018    Gastroesophageal reflux disease without esophagitis 08/28/2018     Resolved Ambulatory Problems     Diagnosis Date Noted    No Resolved Ambulatory Problems     Past Medical History:   Diagnosis Date    Asthma     Chronic obstructive pulmonary disease ([facility] ) 9/26/2012    GERD (gastroesophageal reflux disease)     Hypertension 10/11/2018    Hypothyroid     Temporal arteritis (HCC)     Tubular adenoma        Admitting Diagnosis: Hematoma [T14  8XXA]    Age/Sex: 68 y o  female    Assessment/Plan:   76y Female Rectus Sheath Hematoma/ History of COPD  Acute Care Surgery consult  Serial abdominal exams and serial H&H  Abdominal binder to be worn at all times  Consider IR consult for possible embolization of bleeding vessel if drop in hemoglobin or evidence of active bleeding  Bld transfusion if needed  Hold aspirin for now   CXR     Admission Orders:  Abdominal Binder  Pulmonology cons    Scheduled Meds:   Current Facility-Administered Medications:  acetaminophen 975 mg Oral Q8H [facility] & long-term   azithromycin 500 mg Intravenous Q24H   calcium carbonate-vitamin D 2 tablet Oral BID With Meals   cyanocobalamin 1,000 mcg Oral Daily heparin (porcine) 5,000 Units Subcutaneous Q8H [facility] & alf   ipratropium-albuterol 3 mL Nebulization Q6H   levothyroxine 25 mcg Oral Early Morning   methylPREDNISolone sodium succinate 40 mg Intravenous Q12H RACHEL   pantoprazole 40 mg Oral Early Morning   senna-docusate sodium 1 tablet Oral BID     Continuous Infusions:    PRN Meds: albuterol    benzonatate    dextromethorphan-guaiFENesin    diphenhydrAMINE    HYDROmorphone    naloxone    ondansetron    oxyCODONE

## 2018-10-11 NOTE — UTILIZATION REVIEW
WAS OBSERVATION 10/10/18 @1312 CONVERTED TO INPATIENT ADMISSION 10/11/18 @1318 DUE TO >2MN REQUIRED FOR CONTINUED TREATMENT OF RECTUS SHEATH HEMATOMA  10/11/18 1319  Inpatient Admission Once     Transfer Service: Surgery-General       Question Answer Comment   Admitting Physician EVANGELINA Friend    Level of Care Med Surg    Estimated length of stay More than 2 Midnights    Certification I certify that inpatient services are medically necessary for this patient for a duration of greater than two midnights  See H&P and MD Progress Notes for additional information about the patient's course of treatment          10/11/18 1310

## 2018-10-11 NOTE — CONSULTS
Pulmonary Consultation   Lakshmi Perez 68 y o  female MRN: [id number]  Unit/Bed#: [facility] 922-01 Encounter: [id number]      Reason for consultation:   COPD    Requesting physician:   Surgery  Impressions:   Rectus sheath hematoma  , surgery managing    COPD with exacerbation due to URI  Acute bronchitis,  No definite pneumonia on CXR or Ct    Former smoker  15 PY    Recommendations: To help decrease the cough will start her on Solumedrol 40 mg IV Q 12 h ,  Continue Neb treatment  Check sputum sample  Zithromax daily then switch to oral to finish the course when discharge    Will see her tomorrow and switch to oral if better  Maintain O2 sat over 90%  Will need OP pulmonary follow up,  She has never been seen by Pulmonologist    Will follow    History of Present Illness   HPI:  Lakshmi Perez is a 68 y o  female who started having respiratory congestion, cough with greenish sputum for the last week then when she was coughing she felt sharp pain at L lower abdominal wall and because of that she came to hospital and was found to have hematoma at Rectus Sheath  Former smoker   No previous follow up with Pulmonary  Her COPD has been well controlled on inhalers Narda Gibson,   No hemoptysis    Review of systems:  12 point review of systems was completed and was otherwise negative except as listed in HPI  Historical Information   Past Medical History:   Diagnosis Date    Asthma     Chronic obstructive pulmonary disease ([address] ) 9/26/2012    GERD (gastroesophageal reflux disease)     Hypertension 10/11/2018    Hypothyroid     Temporal arteritis (HCC)     Tubular adenoma      Past Surgical History:   Procedure Laterality Date    HYSTERECTOMY      NO PAST SURGERIES      ALSO NO RELEVANT PAST SURRGICAL HX AS PER NEXTGEN     Family History   Problem Relation Age of Onset   Daly Aguilera Breast cancer Mother     Emphysema Father        Occupational History: none  Social History:  Former smoker 15 PY  Meds/Allergies   Current Facility-Administered Medications   Medication Dose Route Frequency    acetaminophen (TYLENOL) tablet 975 mg  975 mg Oral Q8H [facility]    albuterol (PROVENTIL HFA,VENTOLIN HFA) inhaler 1 puff  1 puff Inhalation Q4H PRN    benzonatate (TESSALON PERLES) capsule 100 mg  100 mg Oral TID PRN    calcium carbonate-vitamin D (OYSTER SHELL,OSCAL+D) 250 mg-125 units per tablet 2 tablet  2 tablet Oral BID With Meals    cyanocobalamin (VITAMIN B-12) tablet 1,000 mcg  1,000 mcg Oral Daily    dextromethorphan-guaiFENesin (ROBITUSSIN DM)  mg/5 mL oral syrup 10 mL  10 mL Oral Q4H PRN    diphenhydrAMINE (BENADRYL) tablet 25 mg  25 mg Oral Q6H PRN    heparin (porcine) subcutaneous injection 5,000 Units  5,000 Units Subcutaneous Q8H [facility]    HYDROmorphone (DILAUDID) injection 0 2 mg  0 2 mg Intravenous Q4H PRN    levothyroxine tablet 25 mcg  25 mcg Oral Early Morning    naloxone (NARCAN) 0 04 mg/mL syringe 0 04 mg  0 04 mg Intravenous Q1MIN PRN    ondansetron (ZOFRAN) injection 4 mg  4 mg Intravenous Q4H PRN    oxyCODONE (ROXICODONE) IR tablet 2 5 mg  2 5 mg Oral Q4H PRN    oxyCODONE (ROXICODONE) IR tablet 5 mg  5 mg Oral Q4H PRN    pantoprazole (PROTONIX) EC tablet 40 mg  40 mg Oral Early Morning    potassium chloride (K-DUR,KLOR-CON) CR tablet 40 mEq  40 mEq Oral Once    predniSONE tablet 2 5 mg  2 5 mg Oral Daily    senna-docusate sodium (SENOKOT S) 8 6-50 mg per tablet 1 tablet  1 tablet Oral BID     Prescriptions Prior to Admission   Medication    aspirin (ASPIRIN LOW DOSE) 81 MG tablet    albuterol (PROVENTIL HFA) 90 mcg/act inhaler    calcium carbonate-vitamin D (OSCAL-D) 500 mg-200 units per tablet    cyanocobalamin (VITAMIN B-12) 500 mcg tablet    diphenhydrAMINE (BENADRYL) 25 mg tablet    Fluticasone Furoate (ARNUITY ELLIPTA IN)    indacaterol-glycopyrrolate (UTIBRON NEOHALER) 27 5-15 6 MCG inhaler    levothyroxine (SYNTHROID) 25 mcg tablet    pantoprazole (PROTONIX) 40 mg tablet    predniSONE 2 5 mg tablet     No Known Allergies    Vitals: Blood pressure 134/74, pulse 96, temperature 98 4 °F (36 9 °C), temperature source Oral, resp  rate 18, height 5' 3" (1 6 m), weight 53 7 kg (118 lb 6 2 oz), SpO2 98 % , 2L NC Body mass index is 20 97 kg/m²  Intake/Output Summary (Last 24 hours) at 10/11/18 0839  Last data filed at 10/11/18 0643   Gross per 24 hour   Intake              120 ml   Output              200 ml   Net              -80 ml       Physical Exam  General: , Awake alert and oriented x 3, conversant without conversational dyspnea, NAD, normal affect  HEENT:  PERRL, Sclera noninjected, nonicteric OU, Nares patent, no nasal flaring, no nasal drainage, Mucous membranes, moist, no oral lesions, normal dentition  NECK: Trachea midline, no accessory muscle use, no stridor, no cervical or supraclavicular adenopathy, JVP not elevated  CARDIAC: RR  PULM: Decrease BS bilateral with rhonchi  CHEST: No gross deformities, equal chest expansion on inspiration bilaterally  ABD: pain and swelling over abdominal wall just left to umbilicus area    EXT: No cyanosis, no clubbing, no edema, normal capillary refill  SKIN:  No rashes, no lesions  NEURO: no focal neurologic deficits, AAOx3, moving all extremities appropriately    Labs: I have personally reviewed pertinent lab results  , ABG: No results found for: PHART, VQO5LCU, PO2ART, BOG3NHQ, Z6HTCOFK, BEART, SOURCE, BNP: No results found for: BNP, CBC:   Lab Results   Component Value Date    WBC 8 90 10/11/2018    HGB 11 8 10/11/2018    HCT 38 2 10/11/2018    MCV 86 10/11/2018     10/11/2018    MCH 26 5 (L) 10/11/2018    MCHC 30 9 (L) 10/11/2018    RDW 14 2 10/11/2018    MPV 10 1 10/11/2018   , CMP:   Lab Results   Component Value Date     (L) 10/11/2018    K 3 4 (L) 10/11/2018     10/11/2018    CO2 29 10/11/2018    BUN 10 10/11/2018    CREATININE 0 68 10/11/2018    CALCIUM 8 7 10/11/2018    EGFR 87 10/11/2018   , PT/INR: No results found for: PT, INR, Troponin: No results found for: TROPONINI    Results from last 7 days  Lab Units 10/11/18  0456 10/10/18  1635 10/10/18  0510 10/06/18  1111   WBC Thousand/uL 8 90  --  9 70 12 20*   HEMOGLOBIN g/dL 11 8 11 7 12 8 14 2   HEMATOCRIT % 38 2 37 9 39 6 44 3   PLATELETS Thousands/uL 232  --  250 255   NEUTROS PCT %  --   --  79* 82*   MONOS PCT %  --   --  8 8      Results from last 7 days  Lab Units 10/11/18  0456 10/10/18  0510 10/06/18  1111   SODIUM mmol/L 135* 137 140   POTASSIUM mmol/L 3 4* 3 7 4 1   CHLORIDE mmol/L 100 97 99   CO2 mmol/L 29 31* 28   BUN mg/dL 10 12 14   CREATININE mg/dL 0 68 0 63 0 80   CALCIUM mg/dL 8 7 9 3 9 9   ALK PHOS U/L  --  76 99   ALT U/L  --  36 27   AST U/L  --  49* 40*                Results from last 7 days  Lab Units 10/10/18  0610   INR  0 95   PTT seconds 28       Results from last 7 days  Lab Units 10/10/18  0510   LACTIC ACID mmol/L 1 2       0  Lab Value Date/Time   TROPONINI <0 01 10/06/2018 1347   TROPONINI <0 01 10/06/2018 1111       Imaging and other studies: I have personally reviewed pertinent reports  and I have personally reviewed pertinent films in PACS    Pulmonary function testing: NA    EKG, Pathology, and Other Studies: I have personally reviewed pertinent reports     and I have personally reviewed pertinent films in PACS      Code Status: Level 1 - Full Code    Antwon Riojas MD  [address]

## 2018-10-11 NOTE — SOCIAL WORK
MSW Student reviewed patient in care coordination rounds  Student met with patient and explained her role  Patient was alert and reported that her emergency contact is her son Jeremy  [phone]  Patient reported that she lives with her sister in a 3-story home  Pt reported that there is 1 step to enter the home and there is a bathroom on the first floor  Pt reported she goes upstairs to the bedroom at night and there is about 12 steps  Pt denied any hx of D/A or MH stays  Pt reported that she had HHC through [facility] for 4wks about 2 years ago  Pt reported that she had inpatient rehab at [facility]  Patient reported that she was independent prior to admittance with ADLS  Pt reported that she has a walker but does not use it  Patient reported that her PCP is Dr Patt Oakley in [place] [address] and her pharmacy of choice is [facility]  CM reviewed d/c planning process including the following: identifying help at home, patient preference for d/c planning needs, Discharge Lounge, [facility] Meds to Bed program, availability of treatment team to discuss questions or concerns patient and/or family may have regarding understanding medications and recognizing signs and symptoms once discharged  CM also encouraged patient to follow up with all recommended appointments after discharge   Patient advised of importance for patient and family to participate in managing patients medical well being     ~I have read and agree to the above documentation~    REESE Magallanes

## 2018-10-12 LAB
ANION GAP SERPL CALCULATED.3IONS-SCNC: 4 MMOL/L (ref 4–13)
BACTERIA SPT RESP CULT: NORMAL
BACTERIA UR CULT: ABNORMAL
BACTERIA UR CULT: ABNORMAL
BASOPHILS # BLD AUTO: 0.01 THOUSANDS/ΜL (ref 0–0.1)
BASOPHILS NFR BLD AUTO: 0 % (ref 0–1)
BUN SERPL-MCNC: 13 MG/DL (ref 5–25)
CALCIUM SERPL-MCNC: 9.6 MG/DL (ref 8.3–10.1)
CHLORIDE SERPL-SCNC: 102 MMOL/L (ref 100–108)
CO2 SERPL-SCNC: 30 MMOL/L (ref 21–32)
CREAT SERPL-MCNC: 0.66 MG/DL (ref 0.6–1.3)
EOSINOPHIL # BLD AUTO: 0 THOUSAND/ΜL (ref 0–0.61)
EOSINOPHIL NFR BLD AUTO: 0 % (ref 0–6)
ERYTHROCYTE [DISTWIDTH] IN BLOOD BY AUTOMATED COUNT: 14.2 % (ref 11.6–15.1)
GFR SERPL CREATININE-BSD FRML MDRD: 88 ML/MIN/1.73SQ M
GLUCOSE SERPL-MCNC: 152 MG/DL (ref 65–140)
GRAM STN SPEC: NORMAL
HCT VFR BLD AUTO: 35.6 % (ref 34.8–46.1)
HGB BLD-MCNC: 10.8 G/DL (ref 11.5–15.4)
IMM GRANULOCYTES # BLD AUTO: 0.02 THOUSAND/UL (ref 0–0.2)
IMM GRANULOCYTES NFR BLD AUTO: 0 % (ref 0–2)
LYMPHOCYTES # BLD AUTO: 0.3 THOUSANDS/ΜL (ref 0.6–4.47)
LYMPHOCYTES NFR BLD AUTO: 3 % (ref 14–44)
MCH RBC QN AUTO: 25.8 PG (ref 26.8–34.3)
MCHC RBC AUTO-ENTMCNC: 30.3 G/DL (ref 31.4–37.4)
MCV RBC AUTO: 85 FL (ref 82–98)
MONOCYTES # BLD AUTO: 0.08 THOUSAND/ΜL (ref 0.17–1.22)
MONOCYTES NFR BLD AUTO: 1 % (ref 4–12)
NEUTROPHILS # BLD AUTO: 9.34 THOUSANDS/ΜL (ref 1.85–7.62)
NEUTS SEG NFR BLD AUTO: 96 % (ref 43–75)
NRBC BLD AUTO-RTO: 0 /100 WBCS
PLATELET # BLD AUTO: 258 THOUSANDS/UL (ref 149–390)
PMV BLD AUTO: 10.3 FL (ref 8.9–12.7)
POTASSIUM SERPL-SCNC: 4.4 MMOL/L (ref 3.5–5.3)
RBC # BLD AUTO: 4.19 MILLION/UL (ref 3.81–5.12)
SODIUM SERPL-SCNC: 136 MMOL/L (ref 136–145)
WBC # BLD AUTO: 9.75 THOUSAND/UL (ref 4.31–10.16)

## 2018-10-12 PROCEDURE — 80048 BASIC METABOLIC PNL TOTAL CA: CPT | Performed by: SURGERY

## 2018-10-12 PROCEDURE — 85025 COMPLETE CBC W/AUTO DIFF WBC: CPT | Performed by: SURGERY

## 2018-10-12 PROCEDURE — 94640 AIRWAY INHALATION TREATMENT: CPT

## 2018-10-12 PROCEDURE — 94668 MNPJ CHEST WALL SBSQ: CPT

## 2018-10-12 PROCEDURE — 87205 SMEAR GRAM STAIN: CPT | Performed by: SURGERY

## 2018-10-12 PROCEDURE — 87070 CULTURE OTHR SPECIMN AEROBIC: CPT | Performed by: SURGERY

## 2018-10-12 PROCEDURE — 94760 N-INVAS EAR/PLS OXIMETRY 1: CPT

## 2018-10-12 PROCEDURE — 99232 SBSQ HOSP IP/OBS MODERATE 35: CPT | Performed by: INTERNAL MEDICINE

## 2018-10-12 RX ORDER — PREDNISONE 10 MG/1
TABLET ORAL
Qty: 40 TABLET | Refills: 0 | Status: SHIPPED | OUTPATIENT
Start: 2018-10-12 | End: 2018-10-14

## 2018-10-12 RX ORDER — AZITHROMYCIN 250 MG/1
TABLET, FILM COATED ORAL
Qty: 6 TABLET | Refills: 0 | Status: SHIPPED | OUTPATIENT
Start: 2018-10-13 | End: 2018-10-17

## 2018-10-12 RX ORDER — AZITHROMYCIN 250 MG/1
500 TABLET, FILM COATED ORAL EVERY 24 HOURS
Status: DISCONTINUED | OUTPATIENT
Start: 2018-10-13 | End: 2018-10-14 | Stop reason: HOSPADM

## 2018-10-12 RX ORDER — METHYLPREDNISOLONE SODIUM SUCCINATE 40 MG/ML
40 INJECTION, POWDER, LYOPHILIZED, FOR SOLUTION INTRAMUSCULAR; INTRAVENOUS EVERY 8 HOURS SCHEDULED
Status: DISCONTINUED | OUTPATIENT
Start: 2018-10-12 | End: 2018-10-13

## 2018-10-12 RX ORDER — GUAIFENESIN 600 MG
600 TABLET, EXTENDED RELEASE 12 HR ORAL EVERY 12 HOURS SCHEDULED
Status: DISCONTINUED | OUTPATIENT
Start: 2018-10-12 | End: 2018-10-14 | Stop reason: HOSPADM

## 2018-10-12 RX ADMIN — ACETAMINOPHEN 975 MG: 325 TABLET ORAL at 22:09

## 2018-10-12 RX ADMIN — AZITHROMYCIN MONOHYDRATE 500 MG: 500 INJECTION, POWDER, LYOPHILIZED, FOR SOLUTION INTRAVENOUS at 09:36

## 2018-10-12 RX ADMIN — METHYLPREDNISOLONE SODIUM SUCCINATE 40 MG: 40 INJECTION, POWDER, FOR SOLUTION INTRAMUSCULAR; INTRAVENOUS at 09:24

## 2018-10-12 RX ADMIN — GUAIFENESIN 600 MG: 600 TABLET, EXTENDED RELEASE ORAL at 13:38

## 2018-10-12 RX ADMIN — GUAIFENESIN AND DEXTROMETHORPHAN 10 ML: 100; 10 SYRUP ORAL at 05:42

## 2018-10-12 RX ADMIN — GUAIFENESIN 600 MG: 600 TABLET, EXTENDED RELEASE ORAL at 22:08

## 2018-10-12 RX ADMIN — SENNOSIDES AND DOCUSATE SODIUM 1 TABLET: 8.6; 5 TABLET ORAL at 17:11

## 2018-10-12 RX ADMIN — IPRATROPIUM BROMIDE AND ALBUTEROL SULFATE 3 ML: .5; 3 SOLUTION RESPIRATORY (INHALATION) at 01:06

## 2018-10-12 RX ADMIN — LEVOTHYROXINE SODIUM 25 MCG: 25 TABLET ORAL at 05:42

## 2018-10-12 RX ADMIN — IPRATROPIUM BROMIDE AND ALBUTEROL SULFATE 3 ML: .5; 3 SOLUTION RESPIRATORY (INHALATION) at 07:26

## 2018-10-12 RX ADMIN — HEPARIN SODIUM 5000 UNITS: 5000 INJECTION, SOLUTION INTRAVENOUS; SUBCUTANEOUS at 05:42

## 2018-10-12 RX ADMIN — CYANOCOBALAMIN TAB 500 MCG 1000 MCG: 500 TAB at 09:24

## 2018-10-12 RX ADMIN — IPRATROPIUM BROMIDE AND ALBUTEROL SULFATE 3 ML: .5; 3 SOLUTION RESPIRATORY (INHALATION) at 14:27

## 2018-10-12 RX ADMIN — ACETAMINOPHEN 975 MG: 325 TABLET ORAL at 13:38

## 2018-10-12 RX ADMIN — CALCIUM CARBONATE-CHOLECALCIFEROL TAB 250 MG-125 UNIT 2 TABLET: 250-125 TAB at 17:11

## 2018-10-12 RX ADMIN — HEPARIN SODIUM 5000 UNITS: 5000 INJECTION, SOLUTION INTRAVENOUS; SUBCUTANEOUS at 13:37

## 2018-10-12 RX ADMIN — METHYLPREDNISOLONE SODIUM SUCCINATE 40 MG: 40 INJECTION, POWDER, FOR SOLUTION INTRAMUSCULAR; INTRAVENOUS at 22:09

## 2018-10-12 RX ADMIN — CALCIUM CARBONATE-CHOLECALCIFEROL TAB 250 MG-125 UNIT 2 TABLET: 250-125 TAB at 09:24

## 2018-10-12 RX ADMIN — METHYLPREDNISOLONE SODIUM SUCCINATE 40 MG: 40 INJECTION, POWDER, FOR SOLUTION INTRAMUSCULAR; INTRAVENOUS at 13:38

## 2018-10-12 RX ADMIN — ACETAMINOPHEN 975 MG: 325 TABLET ORAL at 05:42

## 2018-10-12 RX ADMIN — PANTOPRAZOLE SODIUM 40 MG: 40 TABLET, DELAYED RELEASE ORAL at 05:42

## 2018-10-12 RX ADMIN — SENNOSIDES AND DOCUSATE SODIUM 1 TABLET: 8.6; 5 TABLET ORAL at 09:24

## 2018-10-12 RX ADMIN — IPRATROPIUM BROMIDE AND ALBUTEROL SULFATE 3 ML: .5; 3 SOLUTION RESPIRATORY (INHALATION) at 18:53

## 2018-10-12 NOTE — PROGRESS NOTES
Progress Note - General Surgery   Lakshmi Perez 68 y o  female MRN: [id number]  Unit/Bed#: [facility] 922-01 Encounter: [id number]    Assessment:  77-year-old female with COPD presents with rectus sheath hematoma  Hemodynamically stable and repeated hemoglobin level stable  Acute blood loss anemia, injury to abdominal wall blood vessel    Plan:  Diet as tolerated  Continue abdominal binder for rectus sheath hematoma  Check another Hgb level  Activity as tolerated  Okay to resume aspirin today  Appreciate pulmonology recommendations for COPD exacerbation  DVT prophylaxis  Continue home medications  Dispo planning  Anticipate d/c to home today vs tomorrow    Subjective/Objective   Chief Complaint:     Subjective:  Multiple bouts of coughing overnight  Abdominal pain controlled  Remains hemodynamically stable    Objective:     Blood pressure 165/77, pulse 85, temperature 97 7 °F (36 5 °C), temperature source Oral, resp  rate 20, height 5' 3" (1 6 m), weight 53 7 kg (118 lb 6 2 oz), SpO2 93 %  ,Body mass index is 20 97 kg/m²  Intake/Output Summary (Last 24 hours) at 10/12/18 0341  Last data filed at 10/12/18 0030   Gross per 24 hour   Intake              970 ml   Output              700 ml   Net              270 ml       Invasive Devices     Peripheral Intravenous Line            Peripheral IV 10/11/18 Right Wrist less than 1 day                Physical Exam:   No acute distress  Regular rate and rhythm  Nonlabored respirations on room air  Abdomen soft, mildly tender to palpation over hematoma    Nondistended    Lab, Imaging and other studies:  CBC:   Lab Results   Component Value Date    WBC 8 90 10/11/2018    HGB 11 8 10/11/2018    HCT 38 2 10/11/2018    MCV 86 10/11/2018     10/11/2018    MCH 26 5 (L) 10/11/2018    MCHC 30 9 (L) 10/11/2018    RDW 14 2 10/11/2018    MPV 10 1 10/11/2018   , CMP:   Lab Results   Component Value Date     (L) 10/11/2018    K 3 4 (L) 10/11/2018     10/11/2018    CO2 29 10/11/2018    BUN 10 10/11/2018    CREATININE 0 68 10/11/2018    CALCIUM 8 7 10/11/2018    EGFR 87 10/11/2018   , Coagulation: No results found for: PT, INR, APTT, Urinalysis: No results found for: COLORU, CLARITYU, SPECGRAV, PHUR, LEUKOCYTESUR, NITRITE, PROTEINUA, GLUCOSEU, KETONESU, BILIRUBINUR, BLOODU, Amylase: No results found for: AMYLASE, Lipase: No results found for: LIPASE  VTE Pharmacologic Prophylaxis: Heparin  VTE Mechanical Prophylaxis: sequential compression device

## 2018-10-12 NOTE — RESTORATIVE TECHNICIAN NOTE
Restorative Specialist Mobility Note       Activity: Ambulate in santana, Jayess privileges, Chair     Assistive Device: None, Other (Comment) (Started out with no AD, HHAx1 halway through ambulation )     Ambulation Response: Tolerated fairly well (Patient stating she is feeling more weak today compared to yesterday )  Repositioned: Sitting, Up in chair           Range of Motion: Active, All extremities  Anti-Embolism Device On: Bilateral, Antiembolism stockings, knee     Patient left resting comfortably in chair, with call bell and table within reach

## 2018-10-12 NOTE — PROGRESS NOTES
Progress Note - Pulmonary   Birgit Bertrand 68 y o  female MRN: [id number]  Unit/Bed#: [facility] 922-01 Encounter: [id number]      Assessment:  Rectus sheath hematoma  , surgery managing     COPD with exacerbation due to URI  Acute bronchitis,  No definite pneumonia on CXR or Ct     Former smoker  15 PY       Plan:  She continue to cough significantly and very anxious of going home today  Will increase her Steroid to Q 8 h to see if we can improve the cough  Continue Neb treatment  Mucinex 600mg BID and upon discharge  Go back on her inhalers upon discharge  And Z pack  For 5 days     When discharge hopefully tomorrow switch her to oral Prednisone 40 mg a day x 4 days and go down by 10 mg every 4 days till she gets back to 2 5 mg a day  Please  Call us tomorrow if needed    Will make her appointment in Pulmonary office    Subjective:   Cough kept her awake all night    Objective:       Vitals: Blood pressure 168/72, pulse 99, temperature 98 °F (36 7 °C), temperature source Oral, resp  rate 22, height 5' 3" (1 6 m), weight 53 7 kg (118 lb 6 2 oz), SpO2 95 % , RA Body mass index is 20 97 kg/m²  Intake/Output Summary (Last 24 hours) at 10/12/18 1246  Last data filed at 10/12/18 1227   Gross per 24 hour   Intake              600 ml   Output             1250 ml   Net             -650 ml         Physical Exam  Gen: Awake, alert, oriented x 3, no acute distress  HEENT: Mucous membranes moist, no oral lesions, no thrush  NECK: No accessory muscle use, JVP not elevated  Cardiac: Regular, single S1, single S2, no murmurs, no rubs, no gallops  Lungs: Decrease BS, scattered rhonchi  Abdomen: normoactive bowel sounds, soft   Extremities: no cyanosis, no clubbing, no edema    Labs: I have personally reviewed pertinent lab results  , ABG: No results found for: PHART, LXH1IYK, PO2ART, POK4MFD, C9IUHDZP, BEART, SOURCE, BNP: No results found for: BNP, CBC:   Lab Results   Component Value Date    WBC 9 75 10/12/2018    HGB 10 8 (L) 10/12/2018    HCT 35 6 10/12/2018    MCV 85 10/12/2018     10/12/2018    MCH 25 8 (L) 10/12/2018    MCHC 30 3 (L) 10/12/2018    RDW 14 2 10/12/2018    MPV 10 3 10/12/2018    NRBC 0 10/12/2018   , CMP:   Lab Results   Component Value Date     10/12/2018    K 4 4 10/12/2018     10/12/2018    CO2 30 10/12/2018    BUN 13 10/12/2018    CREATININE 0 66 10/12/2018    CALCIUM 9 6 10/12/2018    EGFR 88 10/12/2018   , PT/INR: No results found for: PT, INR, Troponin: No results found for: TROPONINI    Results from last 7 days  Lab Units 10/12/18  0504 10/11/18  0456 10/10/18  1635 10/10/18  0510 10/06/18  1111   WBC Thousand/uL 9 75 8 90  --  9 70 12 20*   HEMOGLOBIN g/dL 10 8* 11 8 11 7 12 8 14 2   HEMATOCRIT % 35 6 38 2 37 9 39 6 44 3   PLATELETS Thousands/uL 258 232  --  250 255   NEUTROS PCT % 96*  --   --  79* 82*   MONOS PCT % 1*  --   --  8 8      Results from last 7 days  Lab Units 10/12/18  0504 10/11/18  0456 10/10/18  0510 10/06/18  1111   SODIUM mmol/L 136 135* 137 140   POTASSIUM mmol/L 4 4 3 4* 3 7 4 1   CHLORIDE mmol/L 102 100 97 99   CO2 mmol/L 30 29 31* 28   BUN mg/dL 13 10 12 14   CREATININE mg/dL 0 66 0 68 0 63 0 80   CALCIUM mg/dL 9 6 8 7 9 3 9 9   ALK PHOS U/L  --   --  76 99   ALT U/L  --   --  36 27   AST U/L  --   --  49* 40*                Results from last 7 days  Lab Units 10/10/18  0610   INR  0 95   PTT seconds 28       Results from last 7 days  Lab Units 10/10/18  0510   LACTIC ACID mmol/L 1 2       0  Lab Value Date/Time   TROPONINI <0 01 10/06/2018 1347   TROPONINI <0 01 10/06/2018 1111       Imaging and other studies: I have personally reviewed pertinent reports     and I have personally reviewed pertinent films in PACS        MD Nora Marshall

## 2018-10-12 NOTE — PHYSICIAN ADVISOR
Current patient class: Inpatient  The patient is currently on Hospital Day: 2 at [address] [place] [address]      The patient was admitted to the hospital at 1318 on 10/11/18 for the following diagnosis:  Hematoma [T14  8XXA]       There is documentation in the medical record of an expected length of stay of at least 2 midnights  The patient is therefore expected to satisfy the 2 midnight benchmark and given the 2 midnight presumption is appropriate for INPATIENT ADMISSION  Given this expectation of a satisfying stay, CMS instructs us that the patient is most often appropriate for inpatient admission under part A provided medical necessity is documented in the chart  After review of the relevant documentation, labs, vital signs and test results, the patient is appropriate for INPATIENT ADMISSION  Admission to the hospital as an inpatient is a complex decision making process which requires the practitioner to consider the patients presenting complaint, history and physical examination and all relevant testing  With this in mind, in this case, the patient was deemed appropriate for INPATIENT ADMISSION  After review of the documentation and testing available at the time of the admission I concur with this clinical determination of medical necessity  Rationale is as follows: The patient is a 68 yrs old Female who presented to the ED at 10/10/2018 10:33 AM with a chief complaint of Trauma (pt is a trauma transfer [facility]  pt coughed last night and felt a "pop" in abdomen  pt with abdominal wall hematoma  abdominal binder applied pta)     Given the need for further hospitalization, and along with the documentation of medical necessity present in the chart, the patient is appropriate for inpatient admission  The patient is expected to satisfy the 2 midnight benchmark, and will require further acute medical care   The patient does have comorbid conditions which increases the risk for significant adverse outcome  Given this the patient is appropriate for inpatient admission  The patients vitals on arrival were ED Triage Vitals [10/10/18 1035]   Temperature Pulse Respirations Blood Pressure SpO2   98 4 °F (36 9 °C) 80 18 157/76 98 %      Temp Source Heart Rate Source Patient Position - Orthostatic VS BP Location FiO2 (%)   Tympanic Monitor Lying Left arm --      Pain Score       3           Past Medical History:   Diagnosis Date    Asthma     Chronic obstructive pulmonary disease (HCC) 9/26/2012    GERD (gastroesophageal reflux disease)     Hypertension 10/11/2018    Hypothyroid     Temporal arteritis (HCC)     Tubular adenoma      Past Surgical History:   Procedure Laterality Date    HYSTERECTOMY      NO PAST SURGERIES      ALSO NO RELEVANT PAST SURRGICAL HX AS PER NEXTGEN           Consults have been placed to:   IP CONSULT TO PULMONOLOGY    Vitals:    10/11/18 1511 10/11/18 1657 10/11/18 1754 10/11/18 2025   BP: 120/60      BP Location: Right arm      Pulse: (!) 112  98 (!) 110   Resp: 18   (!) 24   Temp: 98 2 °F (36 8 °C)      TempSrc: Oral      SpO2: 91% 94%  93%   Weight:       Height:           Most recent labs:    Recent Labs      10/10/18   0510  10/10/18   0610   10/11/18   0456   WBC  9 70   --    --   8 90   HGB  12 8   --    < >  11 8   HCT  39 6   --    < >  38 2   PLT  250   --    --   232   K  3 7   --    --   3 4*   NA  137   --    --   135*   CALCIUM  9 3   --    --   8 7   BUN  12   --    --   10   CREATININE  0 63   --    --   0 68   LIPASE  56   --    --    --    INR   --   0 95   --    --    AST  49*   --    --    --    ALT  36   --    --    --    ALKPHOS  76   --    --    --     < > = values in this interval not displayed         Scheduled Meds:  Current Facility-Administered Medications:  acetaminophen 975 mg Oral Q8H [facility] Shahbaz Hill PA-C    albuterol 1 puff Inhalation Q4H PRN Elvis Broderick PA-C    azithromycin 500 mg Intravenous Q24H Yo Rivers MD Nila Last Rate: Stopped (10/11/18 1142)   benzonatate 100 mg Oral TID PRN Radha Duncan    calcium carbonate-vitamin D 2 tablet Oral BID With Meals Shahbaz Hill PA-C    cyanocobalamin 1,000 mcg Oral Daily Yasemin Soto PA-C    dextromethorphan-guaiFENesin 10 mL Oral Q4H PRN Radha Duncan    diphenhydrAMINE 25 mg Oral Q6H PRN Yasemin Soto PA-C    heparin (porcine) 5,000 Units Subcutaneous [facility] [employer] MD    HYDROmorphone 0 2 mg Intravenous Q4H PRN Shahbaz Hill PA-C    ipratropium-albuterol 3 mL Nebulization Q6H Ck Barrett MD    levothyroxine 25 mcg Oral Early Morning Shahbaz Hill PA-C    methylPREDNISolone sodium succinate 40 mg Intravenous Q12H 401 Mariah Reagan MD    naloxone 0 04 mg Intravenous Q1MIN PRN Waldo GRANT Soto    ondansetron 4 mg Intravenous Q4H PRN Waldogerardo Soto PA-C    oxyCODONE 2 5 mg Oral Q4H PRN Yasemingerardo Soto PA-C    oxyCODONE 5 mg Oral Q4H PRN Yasemingerardo Soto PA-C    pantoprazole 40 mg Oral Early Morning Shahbaz Hill PA-C    senna-docusate sodium 1 tablet Oral BID Yasemin Soto PA-C      Continuous Infusions:   PRN Meds: albuterol    benzonatate    dextromethorphan-guaiFENesin    diphenhydrAMINE    HYDROmorphone    naloxone    ondansetron    oxyCODONE    oxyCODONE    Surgical procedures (if appropriate):

## 2018-10-12 NOTE — PROGRESS NOTES
The azithromycin has / have been converted to Oral per [facility] IV-to-PO Auto-Conversion Protocol for Adults as approved by the Pharmacy and Therapeutics Committee  The patient met all eligible criteria:  3 Age = 25years old   2) Received at least one dose of the IV form   3) Receiving at least one other scheduled oral/enteral medication   4) Tolerating an oral/enteral diet   and did not have any exclusions:   1) Critical care patient   2) Active GI bleed (IF assessing H2RAs or PPIs)   3) Continuous tube feeding (IF assessing cipro, doxycycline, levofloxacin, minocycline, rifampin, or voriconazole)   4) Receiving PO vancomycin (IF assessing metronidazole)   5) Persistent nausea and/or vomiting   6) Ileus or gastrointestinal obstruction   7) [place]/nasogastric tube set for continuous suction   8) Specific order not to automatically convert to PO (in the order's comments or if discussed in the most recent Infectious Disease or primary team's progress notes)

## 2018-10-13 LAB
BASOPHILS # BLD AUTO: 0.02 THOUSANDS/ΜL (ref 0–0.1)
BASOPHILS NFR BLD AUTO: 0 % (ref 0–1)
EOSINOPHIL # BLD AUTO: 0.01 THOUSAND/ΜL (ref 0–0.61)
EOSINOPHIL NFR BLD AUTO: 0 % (ref 0–6)
ERYTHROCYTE [DISTWIDTH] IN BLOOD BY AUTOMATED COUNT: 14.6 % (ref 11.6–15.1)
HCT VFR BLD AUTO: 38.3 % (ref 34.8–46.1)
HGB BLD-MCNC: 11.8 G/DL (ref 11.5–15.4)
IMM GRANULOCYTES # BLD AUTO: 0.15 THOUSAND/UL (ref 0–0.2)
IMM GRANULOCYTES NFR BLD AUTO: 1 % (ref 0–2)
LYMPHOCYTES # BLD AUTO: 0.52 THOUSANDS/ΜL (ref 0.6–4.47)
LYMPHOCYTES NFR BLD AUTO: 3 % (ref 14–44)
MCH RBC QN AUTO: 26.2 PG (ref 26.8–34.3)
MCHC RBC AUTO-ENTMCNC: 30.8 G/DL (ref 31.4–37.4)
MCV RBC AUTO: 85 FL (ref 82–98)
MONOCYTES # BLD AUTO: 1 THOUSAND/ΜL (ref 0.17–1.22)
MONOCYTES NFR BLD AUTO: 6 % (ref 4–12)
NEUTROPHILS # BLD AUTO: 14.74 THOUSANDS/ΜL (ref 1.85–7.62)
NEUTS SEG NFR BLD AUTO: 90 % (ref 43–75)
NRBC BLD AUTO-RTO: 0 /100 WBCS
PLATELET # BLD AUTO: 352 THOUSANDS/UL (ref 149–390)
PMV BLD AUTO: 10 FL (ref 8.9–12.7)
RBC # BLD AUTO: 4.51 MILLION/UL (ref 3.81–5.12)
WBC # BLD AUTO: 16.44 THOUSAND/UL (ref 4.31–10.16)

## 2018-10-13 PROCEDURE — 99232 SBSQ HOSP IP/OBS MODERATE 35: CPT | Performed by: INTERNAL MEDICINE

## 2018-10-13 PROCEDURE — 94760 N-INVAS EAR/PLS OXIMETRY 1: CPT

## 2018-10-13 PROCEDURE — 94668 MNPJ CHEST WALL SBSQ: CPT

## 2018-10-13 PROCEDURE — 94664 DEMO&/EVAL PT USE INHALER: CPT

## 2018-10-13 PROCEDURE — 85025 COMPLETE CBC W/AUTO DIFF WBC: CPT | Performed by: SURGERY

## 2018-10-13 PROCEDURE — 94640 AIRWAY INHALATION TREATMENT: CPT

## 2018-10-13 RX ORDER — ASPIRIN 81 MG/1
81 TABLET, CHEWABLE ORAL DAILY
Status: DISCONTINUED | OUTPATIENT
Start: 2018-10-14 | End: 2018-10-14 | Stop reason: HOSPADM

## 2018-10-13 RX ORDER — PREDNISONE 20 MG/1
40 TABLET ORAL DAILY
Status: DISCONTINUED | OUTPATIENT
Start: 2018-10-14 | End: 2018-10-14 | Stop reason: HOSPADM

## 2018-10-13 RX ADMIN — CALCIUM CARBONATE-CHOLECALCIFEROL TAB 250 MG-125 UNIT 2 TABLET: 250-125 TAB at 19:09

## 2018-10-13 RX ADMIN — HEPARIN SODIUM 5000 UNITS: 5000 INJECTION, SOLUTION INTRAVENOUS; SUBCUTANEOUS at 16:01

## 2018-10-13 RX ADMIN — HEPARIN SODIUM 5000 UNITS: 5000 INJECTION, SOLUTION INTRAVENOUS; SUBCUTANEOUS at 06:00

## 2018-10-13 RX ADMIN — GUAIFENESIN 600 MG: 600 TABLET, EXTENDED RELEASE ORAL at 21:11

## 2018-10-13 RX ADMIN — ACETAMINOPHEN 975 MG: 325 TABLET ORAL at 16:00

## 2018-10-13 RX ADMIN — LEVOTHYROXINE SODIUM 25 MCG: 25 TABLET ORAL at 06:00

## 2018-10-13 RX ADMIN — IPRATROPIUM BROMIDE AND ALBUTEROL SULFATE 3 ML: .5; 3 SOLUTION RESPIRATORY (INHALATION) at 07:48

## 2018-10-13 RX ADMIN — HEPARIN SODIUM 5000 UNITS: 5000 INJECTION, SOLUTION INTRAVENOUS; SUBCUTANEOUS at 00:15

## 2018-10-13 RX ADMIN — SENNOSIDES AND DOCUSATE SODIUM 1 TABLET: 8.6; 5 TABLET ORAL at 19:14

## 2018-10-13 RX ADMIN — AZITHROMYCIN 500 MG: 250 TABLET, FILM COATED ORAL at 09:24

## 2018-10-13 RX ADMIN — CYANOCOBALAMIN TAB 500 MCG 1000 MCG: 500 TAB at 09:16

## 2018-10-13 RX ADMIN — IPRATROPIUM BROMIDE AND ALBUTEROL SULFATE 3 ML: .5; 3 SOLUTION RESPIRATORY (INHALATION) at 00:50

## 2018-10-13 RX ADMIN — IPRATROPIUM BROMIDE AND ALBUTEROL SULFATE 3 ML: .5; 3 SOLUTION RESPIRATORY (INHALATION) at 19:17

## 2018-10-13 RX ADMIN — HEPARIN SODIUM 5000 UNITS: 5000 INJECTION, SOLUTION INTRAVENOUS; SUBCUTANEOUS at 21:11

## 2018-10-13 RX ADMIN — ACETAMINOPHEN 975 MG: 325 TABLET ORAL at 06:00

## 2018-10-13 RX ADMIN — PANTOPRAZOLE SODIUM 40 MG: 40 TABLET, DELAYED RELEASE ORAL at 06:00

## 2018-10-13 RX ADMIN — IPRATROPIUM BROMIDE AND ALBUTEROL SULFATE 3 ML: .5; 3 SOLUTION RESPIRATORY (INHALATION) at 13:46

## 2018-10-13 RX ADMIN — METHYLPREDNISOLONE SODIUM SUCCINATE 40 MG: 40 INJECTION, POWDER, FOR SOLUTION INTRAMUSCULAR; INTRAVENOUS at 06:00

## 2018-10-13 RX ADMIN — SENNOSIDES AND DOCUSATE SODIUM 1 TABLET: 8.6; 5 TABLET ORAL at 09:16

## 2018-10-13 RX ADMIN — METHYLPREDNISOLONE SODIUM SUCCINATE 40 MG: 40 INJECTION, POWDER, FOR SOLUTION INTRAMUSCULAR; INTRAVENOUS at 16:01

## 2018-10-13 RX ADMIN — CALCIUM CARBONATE-CHOLECALCIFEROL TAB 250 MG-125 UNIT 2 TABLET: 250-125 TAB at 09:25

## 2018-10-13 RX ADMIN — GUAIFENESIN 600 MG: 600 TABLET, EXTENDED RELEASE ORAL at 09:16

## 2018-10-13 RX ADMIN — ACETAMINOPHEN 975 MG: 325 TABLET ORAL at 21:11

## 2018-10-13 NOTE — PROGRESS NOTES
Progress Note - General Surgery   Kathy Clarks 68 y o  female MRN: [id number]  Unit/Bed#: [facility] 922-01 Encounter: [id number]    Assessment:  24-year-old female with COPD presents with rectus sheath hematoma  Hemodynamically stable and repeated hemoglobin level stable  Acute blood loss anemia, injury to abdominal wall blood vessel    Plan:  Diet Regular; Regular House  Discharge Diet  Continue abdominal binder for rectus sheath hematoma  Check another Hgb level  Activity as tolerated  Okay to resume aspirin today  Appreciate pulmonology recs for COPD exacerbation  DVT prophylaxis  Continue home medications    Subjective/Objective   Subjective:  Patient still having cough and "heavy breathing" keeping her up at night  She states that last night was better then the previous night  Afebrile  Objective:   Blood pressure 161/72, pulse 95, temperature 98 7 °F (37 1 °C), temperature source Oral, resp  rate 18, height 5' 3" (1 6 m), weight 53 7 kg (118 lb 6 2 oz), SpO2 98 %  ,Body mass index is 20 97 kg/m²        Intake/Output Summary (Last 24 hours) at 10/13/18 0729  Last data filed at 10/12/18 1700   Gross per 24 hour   Intake              480 ml   Output              350 ml   Net              130 ml       Invasive Devices     Peripheral Intravenous Line            Peripheral IV 10/11/18 Right Wrist 1 day                Physical Exam:   Gen: NAD, A&O, Comfortable   Chest: Normal work of breathing, no resp distress  Abd: S, ND, NT, abdominal binder in place  Ext: No edema  Skin: warm, dry, intact        Lab, Imaging and other studies:  CBC:   No results found for: WBC, HGB, HCT, MCV, PLT, ADJUSTEDWBC, MCH, MCHC, RDW, MPV, NRBC, CMP:   No results found for: NA, K, CL, CO2, ANIONGAP, BUN, CREATININE, GLUCOSE, CALCIUM, AST, ALT, ALKPHOS, PROT, ALBUMIN, BILITOT, EGFR, Coagulation: No results found for: PT, INR, APTT, Urinalysis: No results found for: COLORU, CLARITYU, SPECGRAV, PHUR, LEUKOCYTESUR, NITRITE, PROTEINUA, GLUCOSEU, Titus Vilchis, BLOODU, Amylase: No results found for: AMYLASE, Lipase: No results found for: LIPASE  VTE Pharmacologic Prophylaxis: Heparin  VTE Mechanical Prophylaxis: sequential compression device

## 2018-10-13 NOTE — PROGRESS NOTES
Progress Note - Pulmonary   Juan Alexandre 68 y o  female MRN: [id number]  Unit/Bed#: [facility] 922-01 Encounter: [id number]      Assessment:  Rectus sheath hematoma  , surgery managing    COPD with exacerbation due to URI  improving  Acute bronchitis,  No definite pneumonia on CXR or Ct     Former smoker  15 PY    Plan:  Switch to oral Steroids and antibiotics and discharge on following regimen    Mucinex 600mg BID and upon discharge  Go back on her inhalers upon discharge  And Z pack  For 5 days      When discharge hopefully tomorrow switch her to oral Prednisone 40 mg a day x 4 days and go down by 10 mg every 4 days till she gets back to 2 5 mg a day         Subjective:   Feels better    Objective:   Looks better      Vitals: Blood pressure 152/62, pulse 102, temperature 98 2 °F (36 8 °C), temperature source Oral, resp  rate 18, height 5' 3" (1 6 m), weight 53 7 kg (118 lb 6 2 oz), SpO2 96 % , RA, Body mass index is 20 97 kg/m²  Intake/Output Summary (Last 24 hours) at 10/13/18 1659  Last data filed at 10/13/18 1500   Gross per 24 hour   Intake             1890 ml   Output                0 ml   Net             1890 ml         Physical Exam  Gen: Awake, alert, oriented x 3, no acute distress  HEENT: Mucous membranes moist, no oral lesions, no thrush  NECK: No accessory muscle use, JVP not elevated  Cardiac: Regular, single S1, single S2, no murmurs, no rubs, no gallops  Lungs: Decrease BS bilateral  Better air flow  Abdomen: normoactive bowel sounds, soft nontender, nondistended, no rebound or rigidity, no guarding soft  Hematoma stable  Extremities: no cyanosis, no clubbing, no edema    Labs: I have personally reviewed pertinent lab results  , ABG: No results found for: PHART, FYA5XSO, PO2ART, AVV1HEA, Q3GZKRWG, BEART, SOURCE, BNP: No results found for: BNP, CBC:   Lab Results   Component Value Date    WBC 16 44 (H) 10/13/2018    HGB 11 8 10/13/2018    HCT 38 3 10/13/2018    MCV 85 10/13/2018     10/13/2018 MCH 26 2 (L) 10/13/2018    MCHC 30 8 (L) 10/13/2018    RDW 14 6 10/13/2018    MPV 10 0 10/13/2018   , CMP: No results found for: NA, K, CL, CO2, ANIONGAP, BUN, CREATININE, GLUCOSE, CALCIUM, AST, ALT, ALKPHOS, PROT, ALBUMIN, BILITOT, EGFR, PT/INR: No results found for: PT, INR, Troponin: No results found for: TROPONINI    Results from last 7 days  Lab Units 10/13/18  1631 10/12/18  0504 10/11/18  0456  10/10/18  0510   WBC Thousand/uL 16 44* 9 75 8 90  --  9 70   HEMOGLOBIN g/dL 11 8 10 8* 11 8  < > 12 8   HEMATOCRIT % 38 3 35 6 38 2  < > 39 6   PLATELETS Thousands/uL 352 258 232  --  250   NEUTROS PCT %  --  96*  --   --  79*   MONOS PCT %  --  1*  --   --  8   < > = values in this interval not displayed  Results from last 7 days  Lab Units 10/12/18  0504 10/11/18  0456 10/10/18  0510   SODIUM mmol/L 136 135* 137   POTASSIUM mmol/L 4 4 3 4* 3 7   CHLORIDE mmol/L 102 100 97   CO2 mmol/L 30 29 31*   BUN mg/dL 13 10 12   CREATININE mg/dL 0 66 0 68 0 63   CALCIUM mg/dL 9 6 8 7 9 3   ALK PHOS U/L  --   --  76   ALT U/L  --   --  36   AST U/L  --   --  49*                Results from last 7 days  Lab Units 10/10/18  0610   INR  0 95   PTT seconds 28       Results from last 7 days  Lab Units 10/10/18  0510   LACTIC ACID mmol/L 1 2       0  Lab Value Date/Time   TROPONINI <0 01 10/06/2018 1347   TROPONINI <0 01 10/06/2018 1111       Imaging and other studies: I have personally reviewed pertinent reports     and I have personally reviewed pertinent films in PACS        Raul Jaimes MD  [address]

## 2018-10-14 VITALS
HEART RATE: 87 BPM | BODY MASS INDEX: 20.98 KG/M2 | HEIGHT: 63 IN | DIASTOLIC BLOOD PRESSURE: 83 MMHG | OXYGEN SATURATION: 96 % | RESPIRATION RATE: 18 BRPM | WEIGHT: 118.39 LBS | SYSTOLIC BLOOD PRESSURE: 179 MMHG | TEMPERATURE: 97.9 F

## 2018-10-14 LAB
BACTERIA SPT RESP CULT: NORMAL
GRAM STN SPEC: NORMAL

## 2018-10-14 PROCEDURE — 99239 HOSP IP/OBS DSCHRG MGMT >30: CPT | Performed by: SURGERY

## 2018-10-14 PROCEDURE — 94760 N-INVAS EAR/PLS OXIMETRY 1: CPT

## 2018-10-14 PROCEDURE — 94640 AIRWAY INHALATION TREATMENT: CPT

## 2018-10-14 RX ORDER — GUAIFENESIN 600 MG
600 TABLET, EXTENDED RELEASE 12 HR ORAL EVERY 12 HOURS SCHEDULED
Qty: 14 TABLET | Refills: 0 | Status: SHIPPED | OUTPATIENT
Start: 2018-10-14 | End: 2018-10-19 | Stop reason: SURG

## 2018-10-14 RX ORDER — PREDNISONE 10 MG/1
TABLET ORAL
Qty: 40 TABLET | Refills: 0 | Status: SHIPPED | OUTPATIENT
Start: 2018-10-14 | End: 2018-10-19 | Stop reason: SURG

## 2018-10-14 RX ORDER — ACETAMINOPHEN 325 MG/1
975 TABLET ORAL EVERY 8 HOURS PRN
Qty: 30 TABLET | Refills: 0 | Status: SHIPPED | OUTPATIENT
Start: 2018-10-14 | End: 2020-01-14

## 2018-10-14 RX ORDER — BENZONATATE 100 MG/1
100 CAPSULE ORAL 3 TIMES DAILY PRN
Qty: 21 CAPSULE | Refills: 0 | Status: SHIPPED | OUTPATIENT
Start: 2018-10-14 | End: 2018-10-19 | Stop reason: SURG

## 2018-10-14 RX ADMIN — GUAIFENESIN 600 MG: 600 TABLET, EXTENDED RELEASE ORAL at 11:57

## 2018-10-14 RX ADMIN — SENNOSIDES AND DOCUSATE SODIUM 1 TABLET: 8.6; 5 TABLET ORAL at 11:57

## 2018-10-14 RX ADMIN — ASPIRIN 81 MG 81 MG: 81 TABLET ORAL at 11:55

## 2018-10-14 RX ADMIN — HEPARIN SODIUM 5000 UNITS: 5000 INJECTION, SOLUTION INTRAVENOUS; SUBCUTANEOUS at 05:45

## 2018-10-14 RX ADMIN — IPRATROPIUM BROMIDE AND ALBUTEROL SULFATE 3 ML: .5; 3 SOLUTION RESPIRATORY (INHALATION) at 00:25

## 2018-10-14 RX ADMIN — IPRATROPIUM BROMIDE AND ALBUTEROL SULFATE 3 ML: .5; 3 SOLUTION RESPIRATORY (INHALATION) at 07:26

## 2018-10-14 RX ADMIN — IPRATROPIUM BROMIDE AND ALBUTEROL SULFATE 3 ML: .5; 3 SOLUTION RESPIRATORY (INHALATION) at 13:17

## 2018-10-14 RX ADMIN — LEVOTHYROXINE SODIUM 25 MCG: 25 TABLET ORAL at 05:45

## 2018-10-14 RX ADMIN — PANTOPRAZOLE SODIUM 40 MG: 40 TABLET, DELAYED RELEASE ORAL at 05:45

## 2018-10-14 RX ADMIN — CYANOCOBALAMIN TAB 500 MCG 1000 MCG: 500 TAB at 11:56

## 2018-10-14 RX ADMIN — ACETAMINOPHEN 975 MG: 325 TABLET ORAL at 05:45

## 2018-10-14 RX ADMIN — AZITHROMYCIN 500 MG: 250 TABLET, FILM COATED ORAL at 12:08

## 2018-10-14 RX ADMIN — BENZONATATE 100 MG: 100 CAPSULE ORAL at 08:16

## 2018-10-14 RX ADMIN — PREDNISONE 40 MG: 20 TABLET ORAL at 11:55

## 2018-10-14 RX ADMIN — CALCIUM CARBONATE-CHOLECALCIFEROL TAB 250 MG-125 UNIT 2 TABLET: 250-125 TAB at 08:16

## 2018-10-14 NOTE — DISCHARGE SUMMARY
Discharge Summary - General Surgery   Kathya Nelson 68 y o  female MRN: [id number]  Unit/Bed#: [facility] 922-01 Encounter: [id number]    Admission Date: 10/10/2018     Discharge Date: 10/14/2018    Admitting Diagnosis: Hematoma [T14  8XXA]    Discharge Diagnosis: Rectus sheath hematoma, COPD exacerbation    Attending and Service: Myles Snow DO, [facility]  Consulting Physician(s):   Ugo Conteh MD - Pulmonology    Procedures Performed: None    Imaging:  CTA Chest 10/6:  1  No pulmonary embolism  2   Emphysema and mild bronchial wall thickening, consistent with COPD  Likely chronic atelectasis within the right middle lobe appears stable dating back to 4/19/2013  CT A/P 10/10:  Left mid to lower quadrant intramuscular abdominal wall hematoma approximately 18 cm craniocaudal by 9 5 cm transverse by 4 5 cm AP  Punctate hyperdensities in the posterior upper portion of the hematoma  No corresponding calcifications in the abdominal wall on the prior CT  This is attributed to tiny active bleeding vessels  No acute intra-abdominal or intrapelvic process  Hepatic steatosis  Bilateral renal cysts, the largest of which measures 8 1 cm in the lower pole of the left kidney  No perinephric collection  Colonic diverticulosis  Hospital Course: Kathya Nelson is a 68 y o  female who presented 10/10/2018 with left rectus sheath hematoma and COPD exacerbation  The patient was treated conservatively with serial exams and HGBs, which remained stable  Pulmonary was consulted for the patients COPD exacerbation  The patients hospital course was uncomplicated  Patient was discharged on HD5  On the day of discharge, the patient was voiding spontaneously, tolerating diet, ambulating at baseline, and pain was well controlled  The patient was sent home with medication for her COPD exacerbation per Pulmonary recommendations  She understood all instructions for discharge    She was also given the names and numbers of the providers as well as instructions for follow up appointments  Condition at Discharge: stable     Discharge instructions/Information to patient and family:   See after visit summary for information provided to patient and family  Provisions for Follow-Up Care:  See after visit summary for information related to follow-up care and any pertinent home health orders  Disposition: See After Visit Summary for discharge disposition information  Planned Readmission: No    Discharge Statement   I spent 30 minutes discharging the patient  This time was spent on the day of discharge  I had direct contact with the patient on the day of discharge  Additional documentation is required if more than 30 minutes were spent on discharge  Discharge Medications:  See after visit summary for reconciled discharge medications provided to patient and family      Ken Romo MD  10/14/2018  9:46 AM

## 2018-10-14 NOTE — PROGRESS NOTES
Progress Note - General Surgery   Donavon Greene 68 y o  female MRN: [id number]  Unit/Bed#: [facility] 922-01 Encounter: [id number]    Assessment:  79-year-old female with COPD presents with rectus sheath hematoma  Hemodynamically stable and repeated hemoglobin level stable  Acute blood loss anemia, injury to abdominal wall blood vessel    Plan:  Diet Regular; Regular House  Discharge Diet  Continue abdominal binder for rectus sheath hematoma, HGB Stable  Activity as tolerated  Appreciate pulmonology recs for COPD exacerbation  DVT prophylaxis  Continue home medications  Possible discharge today    Subjective/Objective   Subjective:  Patient states she is feeling much better  Sleep is improved  Cough is improved though still producing some mucus  Patient denies any fevers or chills  Patient denies any abdominal pain  Objective:   Blood pressure (!) 179/83, pulse 87, temperature 97 9 °F (36 6 °C), temperature source Oral, resp  rate 18, height 5' 3" (1 6 m), weight 53 7 kg (118 lb 6 2 oz), SpO2 93 %  ,Body mass index is 20 97 kg/m²        Intake/Output Summary (Last 24 hours) at 10/14/18 5842  Last data filed at 10/13/18 1924   Gross per 24 hour   Intake             1890 ml   Output              800 ml   Net             1090 ml       Invasive Devices     Peripheral Intravenous Line            Peripheral IV 10/11/18 Right Wrist 2 days                Physical Exam:   Gen: NAD, A&O, Comfortable, ambulating to restroom  Chest: Normal work of breathing, no resp distress  Abd: S, ND, NT, abd binder in place  Ext: No edema  Skin: warm, dry, intact      Lab, Imaging and other studies:  CBC:   Lab Results   Component Value Date    WBC 16 44 (H) 10/13/2018    HGB 11 8 10/13/2018    HCT 38 3 10/13/2018    MCV 85 10/13/2018     10/13/2018    MCH 26 2 (L) 10/13/2018    MCHC 30 8 (L) 10/13/2018    RDW 14 6 10/13/2018    MPV 10 0 10/13/2018    NRBC 0 10/13/2018   , CMP:   No results found for: NA, K, CL, CO2, ANIONGAP, BUN, CREATININE, GLUCOSE, CALCIUM, AST, ALT, ALKPHOS, PROT, ALBUMIN, BILITOT, EGFR, Coagulation: No results found for: PT, INR, APTT, Urinalysis: No results found for: COLORU, CLARITYU, SPECGRAV, PHUR, LEUKOCYTESUR, NITRITE, PROTEINUA, GLUCOSEU, KETONESU, BILIRUBINUR, BLOODU, Amylase: No results found for: AMYLASE, Lipase: No results found for: LIPASE  VTE Pharmacologic Prophylaxis: Heparin  VTE Mechanical Prophylaxis: sequential compression device

## 2018-10-14 NOTE — NURSING NOTE
ivsite discontinued  Scripts filled at [address]    Pt  dic ambulatory aescorted by rn to [address]

## 2018-10-15 ENCOUNTER — PATIENT OUTREACH (OUTPATIENT)
Dept: PULMONOLOGY | Age: 73
End: 2018-10-15

## 2018-10-15 NOTE — PROGRESS NOTES
Communication: Follow-up call  Date of Discharge: 10/14/  30 Day Readmission: no    Identified Risk for Readmission Per Risk Stratification: Medium Risk  Current Disposition: Home    FEV1 %: unknown  Gold Stage of COPD: unknown  History   Smoking Status    Former Smoker    Years: 15 00    Types: Cigarettes   Smokeless Tobacco    Never Used     Comment: TOBACCO USE, NO PASSIVE SMOKE EXPOSURE        Symptoms:  Wheezing: No  Cough: Yes  Difficulty Breathing (Dyspnea): No  Fever: No  Sputum Expectoration: Yes   clear/white    Oxygenation:  Pulse Oximetry: unknown  Oxygen therapy -no  Room air: Yes  Oxygen Used: No    Zone Tool Status: Yellow  Follow up Appointments: PCP: 10/19 and Pulmonary: 11/13    Counseling and Topics Reviewed:  Rescue vs Maintenance inhalers, Energy conservation, Physician follow-ups    Did you feel ready to be discharged from the hospital? Yes    Were you given written and/or verbal educational materials specific to your COPD diagnosis? yes    Were you given the opportunity to walk in the hallways with staff assistance to assess your breathing? no    Were you instructed on when to return to your normal activities (ie: work, social activities, etc)? No    Did staff offer to assist you in making follow-up appointments with your family doctor or other specialists? No    Narrative Summary: (ie: respiratory status, sputum production, persistent wheezing or coughing)  Patient is feeling better, still coughing and feeling a bit of tightness  She has her medication and taking as directed  She has her PCP, surgical and Pulmonary appts scheduled  She has not had PFT's done, and will see Dr Sergio Salinas in November  She quit smoking in 2016  Discussed activity limitation  Will send COPD Guide in the mail

## 2018-10-17 DIAGNOSIS — K21.9 GASTROESOPHAGEAL REFLUX DISEASE, ESOPHAGITIS PRESENCE NOT SPECIFIED: Primary | ICD-10-CM

## 2018-10-17 RX ORDER — PANTOPRAZOLE SODIUM 40 MG/1
40 TABLET, DELAYED RELEASE ORAL DAILY
Qty: 30 TABLET | Refills: 3 | Status: SHIPPED | OUTPATIENT
Start: 2018-10-17 | End: 2019-02-04 | Stop reason: SDUPTHER

## 2018-10-19 ENCOUNTER — OFFICE VISIT (OUTPATIENT)
Dept: FAMILY MEDICINE CLINIC | Facility: CLINIC | Age: 73
End: 2018-10-19
Payer: MEDICARE

## 2018-10-19 ENCOUNTER — TRANSITIONAL CARE MANAGEMENT (OUTPATIENT)
Dept: FAMILY MEDICINE CLINIC | Facility: CLINIC | Age: 73
End: 2018-10-19

## 2018-10-19 VITALS
WEIGHT: 126 LBS | RESPIRATION RATE: 14 BRPM | SYSTOLIC BLOOD PRESSURE: 150 MMHG | OXYGEN SATURATION: 100 % | HEIGHT: 62 IN | BODY MASS INDEX: 23.19 KG/M2 | TEMPERATURE: 95.9 F | DIASTOLIC BLOOD PRESSURE: 74 MMHG | HEART RATE: 74 BPM

## 2018-10-19 DIAGNOSIS — R05.9 COUGH: ICD-10-CM

## 2018-10-19 DIAGNOSIS — S30.1XXD ABDOMINAL WALL HEMATOMA, SUBSEQUENT ENCOUNTER: ICD-10-CM

## 2018-10-19 DIAGNOSIS — J44.1 ACUTE EXACERBATION OF CHRONIC OBSTRUCTIVE PULMONARY DISEASE (COPD) (HCC): Primary | ICD-10-CM

## 2018-10-19 PROCEDURE — 99495 TRANSJ CARE MGMT MOD F2F 14D: CPT | Performed by: INTERNAL MEDICINE

## 2018-10-19 RX ORDER — PROMETHAZINE HYDROCHLORIDE AND CODEINE PHOSPHATE 6.25; 1 MG/5ML; MG/5ML
5 SYRUP ORAL
Qty: 120 ML | Refills: 0 | Status: SHIPPED | OUTPATIENT
Start: 2018-10-19 | End: 2019-02-18 | Stop reason: ALTCHOICE

## 2018-10-19 NOTE — PROGRESS NOTES
Assessment/Plan:         Diagnoses and all orders for this visit:    Acute exacerbation of chronic obstructive pulmonary disease (COPD) ([facility] [address] ): Improving nicely  Finish Up Levaquin and Prednisone  Use Inhalers  -     promethazine-codeine (PHENERGAN WITH CODEINE) 6 25-10 mg/5 mL syrup; Take 5 mL by mouth daily at bedtime as needed for cough  RTC in 2-3 weeks    Abdominal wall hematoma, subsequent encounter: Improving nicely  RTC in 2-3 weeks    Cough; as above  -     promethazine-codeine (PHENERGAN WITH CODEINE) 6 25-10 mg/5 mL syrup; Take 5 mL by mouth daily at bedtime as needed for cough        Subjective:      Patient ID: Alia Owens is a 68 y o  female  68 Y O lady with Severe COPd, was recently D/C from Hospital for Hematoma abd  Wall and Exacerbation of COPD    She Feels Better    Med List reviewed  w pt in Detail     She still Coughs    The following portions of the patient's history were reviewed and updated as appropriate: allergies, current medications, past family history, past medical history, past social history, past surgical history and problem list     Review of Systems   Constitutional: Negative for chills, fatigue and fever  HENT: Negative for congestion, facial swelling, sore throat, trouble swallowing and voice change  Eyes: Negative for pain, discharge and visual disturbance  Respiratory: Positive for cough  Negative for shortness of breath and wheezing  Cardiovascular: Negative for chest pain, palpitations and leg swelling  Gastrointestinal: Negative for abdominal pain, blood in stool, constipation, diarrhea and nausea  Endocrine: Negative for polydipsia, polyphagia and polyuria  Genitourinary: Negative for difficulty urinating, hematuria and urgency  Musculoskeletal: Negative for arthralgias and myalgias  Skin: Negative for rash  Neurological: Negative for dizziness, tremors, weakness and headaches  Hematological: Negative for adenopathy   Does not bruise/bleed easily  Psychiatric/Behavioral: Negative for dysphoric mood, sleep disturbance and suicidal ideas  Objective:      /74 (BP Location: Left arm, Patient Position: Sitting, Cuff Size: Standard)   Pulse 74   Temp (!) 95 9 °F (35 5 °C) (Oral)   Resp 14   Ht 5' 2" (1 575 m)   Wt 57 2 kg (126 lb)   SpO2 100%   BMI 23 05 kg/m²          Physical Exam   Constitutional: She is oriented to person, place, and time  She appears well-nourished  No distress  HENT:   Head: Normocephalic  Mouth/Throat: Oropharynx is clear and moist  No oropharyngeal exudate  Eyes: Pupils are equal, round, and reactive to light  Conjunctivae are normal  No scleral icterus  Neck: Neck supple  No thyromegaly present  Cardiovascular: Normal rate and regular rhythm  Murmur heard  Pulmonary/Chest: Effort normal  No respiratory distress  She has wheezes  She has no rales  Abdominal: Soft  Bowel sounds are normal  She exhibits no distension  There is no tenderness  There is no rebound and no guarding  Musculoskeletal: She exhibits no edema or tenderness  Lymphadenopathy:     She has no cervical adenopathy  Neurological: She is alert and oriented to person, place, and time  No cranial nerve deficit  Coordination normal    Skin: No rash noted  No erythema  No pallor  Hematoma left abdominal wall is Improving Nicely    Psychiatric: She has a normal mood and affect

## 2018-10-23 ENCOUNTER — OFFICE VISIT (OUTPATIENT)
Dept: SURGERY | Facility: CLINIC | Age: 73
End: 2018-10-23
Payer: MEDICARE

## 2018-10-23 VITALS
BODY MASS INDEX: 23.74 KG/M2 | DIASTOLIC BLOOD PRESSURE: 78 MMHG | HEIGHT: 62 IN | SYSTOLIC BLOOD PRESSURE: 132 MMHG | TEMPERATURE: 98.5 F | WEIGHT: 129 LBS

## 2018-10-23 DIAGNOSIS — S30.1XXA RECTUS SHEATH HEMATOMA, INITIAL ENCOUNTER: Primary | ICD-10-CM

## 2018-10-23 PROCEDURE — 99212 OFFICE O/P EST SF 10 MIN: CPT | Performed by: SURGERY

## 2018-10-23 NOTE — PROGRESS NOTES
Office Visit - General Surgery  Fabricio Schaffer MRN: [id number]  Encounter: [id number]    Assessment and Plan    Problem List Items Addressed This Visit        Musculoskeletal and Integument    Rectus sheath hematoma, initial encounter - Primary     Rectus sheath hematoma seem stable at this point time  I told her this should gradually resolve but it may take a couple of months for to go away completely  Nothing to do surgically at this point time  See back if needed  Chief Complaint:  Fabricio Schaffer is a 68 y o  female who presents for Wound Check (rectus sheath hematoma )    Subjective  44-year-old female who was admitted to hospital and a with a and left rectus sheath hematoma  She has since been discharged  She still has a little discomfort on the left side of her abdomen  Still notices a fullness in that region  No other complaints or problems at this time  Past Medical History  Past Medical History:   Diagnosis Date    Asthma     Chronic obstructive pulmonary disease ([facility] [address] ) 9/26/2012    GERD (gastroesophageal reflux disease)     Hypertension 10/11/2018    Hypothyroid     Temporal arteritis ([facility] [address] )     Tubular adenoma        Past Surgical History  Past Surgical History:   Procedure Laterality Date    HYSTERECTOMY      NO PAST SURGERIES      ALSO NO RELEVANT PAST SURRGICAL HX AS PER NEXTGEN       Family History  Family History   Problem Relation Age of Onset    Breast cancer Mother     Emphysema Father        Medications  Current Outpatient Prescriptions on File Prior to Visit   Medication Sig Dispense Refill    acetaminophen (TYLENOL) 325 mg tablet Take 3 tablets (975 mg total) by mouth every 8 (eight) hours as needed for mild pain 30 tablet 0    albuterol (PROVENTIL HFA) 90 mcg/act inhaler inhale 2 puff by inhalation route  every 4 - 6 hours as needed      aspirin (ASPIRIN LOW DOSE) 81 MG tablet take one tab   every other day with Dinner      calcium carbonate-vitamin D (OSCAL-D) 500 mg-200 units per tablet Take 1 tablet by mouth 2 (two) times a day with meals      cyanocobalamin (VITAMIN B-12) 500 mcg tablet Take 1,000 mcg by mouth daily      diphenhydrAMINE (BENADRYL) 25 mg tablet Take 1 tablet (25 mg total) by mouth every 6 (six) hours as needed for itching 30 tablet 3    Fluticasone Furoate (ARNUITY ELLIPTA IN) Inhale daily      indacaterol-glycopyrrolate (UTIBRON NEOHALER) 27 5-15 6 MCG inhaler Place 1 capsule into inhaler and inhale 2 (two) times a day 60 capsule 3    levothyroxine (SYNTHROID) 25 mcg tablet Take 1 tablet (25 mcg total) by mouth daily 30 tablet 3    pantoprazole (PROTONIX) 40 mg tablet Take 1 tablet (40 mg total) by mouth daily 30 tablet 3    promethazine-codeine (PHENERGAN WITH CODEINE) 6 25-10 mg/5 mL syrup Take 5 mL by mouth daily at bedtime as needed for cough 120 mL 0     No current facility-administered medications on file prior to visit  Allergies  No Known Allergies    Review of Systems    Objective  Vitals:    10/23/18 1121   BP: 132/78   Temp: 98 5 °F (36 9 °C)       Physical Exam   Abdomen: flat soft no real tenderness  On the left rectus region in the lower abdomen is a fullness about a 10 x 8 cm  No real tenderness  No skin changes    Remaining abdomen soft

## 2018-10-23 NOTE — LETTER
October 23, 2018     Akua Robbins MD  [address] [facility] [address]    Patient: Juan Alexandre   YOB: 1945   Date of Visit: 10/23/2018       Dear Dr Krys Monroy: Thank you for referring Juan Alexandre to me for evaluation  Below are my notes for this consultation  If you have questions, please do not hesitate to call me  I look forward to following your patient along with you  Sincerely,        Gisele Pang MD        CC: No Recipients  Gisele Pang MD  10/23/2018  9:18 PM  Sign at close encounter  Office Visit - General Surgery  Juan Alexandre MRN: [id number]  Encounter: [id number]    Assessment and Plan    Problem List Items Addressed This Visit        Musculoskeletal and Integument    Rectus sheath hematoma, initial encounter - Primary     Rectus sheath hematoma seem stable at this point time  I told her this should gradually resolve but it may take a couple of months for to go away completely  Nothing to do surgically at this point time  See back if needed  Chief Complaint:  Juan Alexandre is a 68 y o  female who presents for Wound Check (rectus sheath hematoma )    Subjective  71-year-old female who was admitted to hospital and a with a and left rectus sheath hematoma  She has since been discharged  She still has a little discomfort on the left side of her abdomen  Still notices a fullness in that region  No other complaints or problems at this time      Past Medical History  Past Medical History:   Diagnosis Date    Asthma     Chronic obstructive pulmonary disease ([facility] [address] ) 9/26/2012    GERD (gastroesophageal reflux disease)     Hypertension 10/11/2018    Hypothyroid     Temporal arteritis ([facility] [address] )     Tubular adenoma        Past Surgical History  Past Surgical History:   Procedure Laterality Date    HYSTERECTOMY      NO PAST SURGERIES      ALSO NO RELEVANT PAST SURRGICAL HX AS PER NEXTGEN       Family History  Family History   Problem Relation Age of Onset    Breast cancer Mother     Emphysema Father        Medications  Current Outpatient Prescriptions on File Prior to Visit   Medication Sig Dispense Refill    acetaminophen (TYLENOL) 325 mg tablet Take 3 tablets (975 mg total) by mouth every 8 (eight) hours as needed for mild pain 30 tablet 0    albuterol (PROVENTIL HFA) 90 mcg/act inhaler inhale 2 puff by inhalation route  every 4 - 6 hours as needed      aspirin (ASPIRIN LOW DOSE) 81 MG tablet take one tab  every other day with Dinner      calcium carbonate-vitamin D (OSCAL-D) 500 mg-200 units per tablet Take 1 tablet by mouth 2 (two) times a day with meals      cyanocobalamin (VITAMIN B-12) 500 mcg tablet Take 1,000 mcg by mouth daily      diphenhydrAMINE (BENADRYL) 25 mg tablet Take 1 tablet (25 mg total) by mouth every 6 (six) hours as needed for itching 30 tablet 3    Fluticasone Furoate (ARNUITY ELLIPTA IN) Inhale daily      indacaterol-glycopyrrolate (UTIBRON NEOHALER) 27 5-15 6 MCG inhaler Place 1 capsule into inhaler and inhale 2 (two) times a day 60 capsule 3    levothyroxine (SYNTHROID) 25 mcg tablet Take 1 tablet (25 mcg total) by mouth daily 30 tablet 3    pantoprazole (PROTONIX) 40 mg tablet Take 1 tablet (40 mg total) by mouth daily 30 tablet 3    promethazine-codeine (PHENERGAN WITH CODEINE) 6 25-10 mg/5 mL syrup Take 5 mL by mouth daily at bedtime as needed for cough 120 mL 0     No current facility-administered medications on file prior to visit  Allergies  No Known Allergies    Review of Systems    Objective  Vitals:    10/23/18 1121   BP: 132/78   Temp: 98 5 °F (36 9 °C)       Physical Exam   Abdomen: flat soft no real tenderness  On the left rectus region in the lower abdomen is a fullness about a 10 x 8 cm  No real tenderness  No skin changes    Remaining abdomen soft

## 2018-10-24 NOTE — ASSESSMENT & PLAN NOTE
Rectus sheath hematoma seem stable at this point time  I told her this should gradually resolve but it may take a couple of months for to go away completely  Nothing to do surgically at this point time  See back if needed

## 2018-11-01 ENCOUNTER — OFFICE VISIT (OUTPATIENT)
Dept: FAMILY MEDICINE CLINIC | Facility: CLINIC | Age: 73
End: 2018-11-01
Payer: MEDICARE

## 2018-11-01 VITALS
TEMPERATURE: 98.1 F | SYSTOLIC BLOOD PRESSURE: 130 MMHG | OXYGEN SATURATION: 97 % | RESPIRATION RATE: 14 BRPM | HEIGHT: 62 IN | WEIGHT: 128.9 LBS | BODY MASS INDEX: 23.72 KG/M2 | HEART RATE: 72 BPM | DIASTOLIC BLOOD PRESSURE: 76 MMHG

## 2018-11-01 DIAGNOSIS — J43.9 PULMONARY EMPHYSEMA, UNSPECIFIED EMPHYSEMA TYPE (HCC): ICD-10-CM

## 2018-11-01 DIAGNOSIS — R73.01 IMPAIRED FASTING GLUCOSE: ICD-10-CM

## 2018-11-01 DIAGNOSIS — K21.9 GASTROESOPHAGEAL REFLUX DISEASE WITHOUT ESOPHAGITIS: ICD-10-CM

## 2018-11-01 DIAGNOSIS — E03.9 HYPOTHYROIDISM, UNSPECIFIED TYPE: Primary | ICD-10-CM

## 2018-11-01 DIAGNOSIS — M31.6 TEMPORAL ARTERITIS (HCC): ICD-10-CM

## 2018-11-01 DIAGNOSIS — Z23 NEED FOR VACCINATION: ICD-10-CM

## 2018-11-01 PROCEDURE — 99214 OFFICE O/P EST MOD 30 MIN: CPT | Performed by: INTERNAL MEDICINE

## 2018-11-01 PROCEDURE — 90662 IIV NO PRSV INCREASED AG IM: CPT

## 2018-11-01 PROCEDURE — G0008 ADMIN INFLUENZA VIRUS VAC: HCPCS

## 2018-11-01 RX ORDER — PREDNISONE 10 MG/1
10 TABLET ORAL DAILY
COMMUNITY
Start: 2018-11-01 | End: 2018-11-01 | Stop reason: ALTCHOICE

## 2018-11-01 RX ORDER — PREDNISONE 2.5 MG
2.5 TABLET ORAL DAILY
Qty: 30 TABLET | Refills: 3 | Status: SHIPPED | OUTPATIENT
Start: 2018-11-01 | End: 2019-03-27 | Stop reason: SDUPTHER

## 2018-11-01 RX ORDER — FLUTICASONE FUROATE 200 UG/1
POWDER RESPIRATORY (INHALATION)
COMMUNITY
Start: 2018-10-12 | End: 2018-11-13 | Stop reason: SDUPTHER

## 2018-11-01 NOTE — PROGRESS NOTES
Assessment/Plan:         Diagnoses and all orders for this visit:    Hypothyroidism, unspecified type ; stable   continue same  rtc in 3 mos w blood work ;  -     Basic metabolic panel; Future  -     CBC and differential; Future  -     Cancel: Hemoglobin A1C; Future  -     Lipid panel; Future  -     Hepatic function panel; Future  -     TSH, 3rd generation; Future  -     Urinalysis with reflex to microscopic  -     T4, free; Future    Pulmonary emphysema, unspecified emphysema type ([facility] [address] ); improving  Continue home neb txs and inhalers  -     influenza vaccine, 8901-0947, high-dose, PF 0 5 mL, for patients 65 yr+ (FLUZONE HIGH-DOSE)    Gastroesophageal reflux disease without esophagitis; stable  continue same  rtc in 3 mos w blood work;  -     Basic metabolic panel; Future  -     CBC and differential; Future  -     Cancel: Hemoglobin A1C; Future  -     Lipid panel; Future  -     Hepatic function panel; Future  -     TSH, 3rd generation; Future  -     Urinalysis with reflex to microscopic    Temporal arteritis (HCC)  -     Sedimentation rate, automated; Future  -     C-reactive protein; Future  -     predniSONE 2 5 mg tablet; Take 1 tablet (2 5 mg total) by mouth daily With food    Impaired fasting glucose  -     HEMOGLOBIN A1C W/ EAG ESTIMATION; Future    Other orders  -     ARNUITY ELLIPTA 200 MCG/ACT AEPB inhaler;   -     Discontinue: predniSONE 10 mg tablet; Take 10 mg by mouth daily        Subjective:      Patient ID: Kathya Nelson is a 68 y o  female  Nice 68 Y O lady with H/O COPD,Hypothyroidism,  Is here for Regular check up,  She feels better, less cough,less wheezings,  No new Symptoms,  Med list reviewed w pt in Detail         Cough   Pertinent negatives include no chest pain, chills, fever, headaches, myalgias, rash, sore throat, shortness of breath or wheezing     Fatigue   Pertinent negatives include no abdominal pain, arthralgias, chest pain, chills, congestion, coughing, fatigue, fever, headaches, myalgias, nausea, rash, sore throat or weakness  The following portions of the patient's history were reviewed and updated as appropriate: allergies, current medications, past family history, past medical history, past social history, past surgical history and problem list     Review of Systems   Constitutional: Negative for chills, fatigue and fever  HENT: Negative for congestion, facial swelling, sore throat, trouble swallowing and voice change  Eyes: Negative for pain, discharge and visual disturbance  Respiratory: Negative for cough, shortness of breath and wheezing  Cardiovascular: Negative for chest pain, palpitations and leg swelling  Gastrointestinal: Negative for abdominal pain, blood in stool, constipation, diarrhea and nausea  Endocrine: Negative for polydipsia, polyphagia and polyuria  Genitourinary: Negative for difficulty urinating, hematuria and urgency  Musculoskeletal: Negative for arthralgias and myalgias  Skin: Negative for rash  Neurological: Negative for dizziness, tremors, weakness and headaches  Hematological: Negative for adenopathy  Does not bruise/bleed easily  Psychiatric/Behavioral: Negative for dysphoric mood, sleep disturbance and suicidal ideas  Objective:      /76 (BP Location: Left arm, Patient Position: Sitting, Cuff Size: Standard)   Pulse 72   Temp 98 1 °F (36 7 °C) (Oral)   Resp 14   Ht 5' 2" (1 575 m)   Wt 58 5 kg (128 lb 14 4 oz)   SpO2 97%   BMI 23 58 kg/m²          Physical Exam   Constitutional: She is oriented to person, place, and time  She appears well-nourished  No distress  HENT:   Head: Normocephalic  Mouth/Throat: Oropharynx is clear and moist  No oropharyngeal exudate  Eyes: Pupils are equal, round, and reactive to light  Conjunctivae are normal  No scleral icterus  Neck: Neck supple  No thyromegaly present  Cardiovascular: Normal rate, regular rhythm and normal heart sounds      No murmur heard   Pulmonary/Chest: Effort normal  No respiratory distress  She has wheezes  She has no rales  Abdominal: Soft  Bowel sounds are normal  She exhibits no distension  There is no tenderness  There is no rebound and no guarding  Musculoskeletal: She exhibits no edema or tenderness  Lymphadenopathy:     She has no cervical adenopathy  Neurological: She is alert and oriented to person, place, and time  No cranial nerve deficit  Coordination normal    Skin: No rash noted  No erythema  No pallor  Psychiatric: She has a normal mood and affect

## 2018-11-13 ENCOUNTER — OFFICE VISIT (OUTPATIENT)
Dept: PULMONOLOGY | Facility: CLINIC | Age: 73
End: 2018-11-13
Payer: MEDICARE

## 2018-11-13 VITALS
TEMPERATURE: 98.1 F | HEIGHT: 63 IN | RESPIRATION RATE: 20 BRPM | WEIGHT: 129 LBS | HEART RATE: 90 BPM | SYSTOLIC BLOOD PRESSURE: 122 MMHG | DIASTOLIC BLOOD PRESSURE: 82 MMHG | BODY MASS INDEX: 22.86 KG/M2 | OXYGEN SATURATION: 93 %

## 2018-11-13 DIAGNOSIS — J44.1 CHRONIC OBSTRUCTIVE PULMONARY DISEASE WITH ACUTE EXACERBATION (HCC): Primary | ICD-10-CM

## 2018-11-13 DIAGNOSIS — K21.9 GASTROESOPHAGEAL REFLUX DISEASE WITHOUT ESOPHAGITIS: ICD-10-CM

## 2018-11-13 PROCEDURE — 99214 OFFICE O/P EST MOD 30 MIN: CPT | Performed by: INTERNAL MEDICINE

## 2018-11-15 NOTE — PROGRESS NOTES
Progress note - Pulmonary Medicine   Soraida Suarez 68 y o  female MRN: [id number]       Impression & Plan:     Chronic obstructive pulmonary disease ([address] )  Stable at this time and well managed by Dr Shakir Siegel, continue LAMA/LABA and ICS  Patient prefers to continue to follow up with her primary care physician  Gastroesophageal reflux disease without esophagitis  Continue Protonix      ______________________________________________________________________    HPI:    Soraida Suarez is a 68 y o  female who presents for post hospital follow-up after recent hospitalization with acute exacerbation of COPD  Patient his chronic COPD and she is currently managed by her primary care physician Dr Shakir Siegel with [facility], and she states that she is doing fine, she has mild shortness of breath with exertion only, she denies chronic cough, she denies any chest pain or fever or chills  She was admitted recently with COPD exacerbation and treated as acute bronchitis with antibiotics and corticosteroids and completed the course  She also had spontaneous left rectus sheath hematoma  She denies any weight loss, she denies any history of hemoptysis  Currently she feels at baseline with her chronic symptoms of emphysematous  She has GERD symptoms/hiatal hernia and currently on PPI with good control of symptoms  She smoked cigarettes in the past with 15 pack year history  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  She lives at home with her sister  Review of Systems:  Review of Systems   Constitutional: Negative  HENT: Negative  Eyes: Negative  Respiratory:        As Jose   Cardiovascular: Negative  Gastrointestinal: Negative  Endocrine: Negative  Genitourinary: Negative  Musculoskeletal: Negative  Skin: Negative  Allergic/Immunologic: Negative  Neurological: Negative  Hematological: Negative  Psychiatric/Behavioral: Negative            Historical Information Past Medical History:   Diagnosis Date    Asthma     Chronic obstructive pulmonary disease ([facility] [address] ) 9/26/2012    GERD (gastroesophageal reflux disease)     Hypertension 10/11/2018    Hypothyroid     Temporal arteritis (HCC)     Tubular adenoma      Past Surgical History:   Procedure Laterality Date    HYSTERECTOMY      NO PAST SURGERIES      ALSO NO RELEVANT PAST SURRGICAL HX AS PER [facility]EXTGEN     Social History   History   Smoking Status    Former Smoker    Years: 15 00    Types: Cigarettes   Smokeless Tobacco    Never Used     Comment: TOBACCO USE, NO PASSIVE SMOKE EXPOSURE       Occupational history:  No occupational exposure    Family History:   Family History   Problem Relation Age of Onset   [address] Breast cancer Mother     Emphysema Father          PhysicalExamination:  Vitals:   /82 (BP Location: Left arm, Patient Position: Sitting, Cuff Size: Standard)   Pulse 90   Temp 98 1 °F (36 7 °C) (Tympanic)   Resp 20   Ht 5' 3" (1 6 m)   Wt 58 5 kg (129 lb)   SpO2 93%   BMI 22 85 kg/m²     General: alert, not in acute distress  HEENT: PERRL, no icteric sclera or cyanosis, no thrush  Neck:  Supple, no lymphadenopathy or thyromegaly, no JVD  Lungs:  Equal but diminished breath sounds and clear auscultations bilaterally, no wheezing or crackles  Heart: S1S2 regular, no murmures or gallops  Abdomen: soft, non-tender, bowel sounds  present  Extrimities: no edema, no clubbing or cyanosis  Neuro: Alert and oriented x 3, no focal neurodeficits   Skin: intact, no rashes      Diagnostic Data:  Labs:   I personally reviewed the most recent laboratory data pertinent to today's visit    Lab Results   Component Value Date    WBC 16 44 (H) 10/13/2018    HGB 11 8 10/13/2018    HCT 38 3 10/13/2018    MCV 85 10/13/2018     10/13/2018     Lab Results   Component Value Date    CALCIUM 9 6 10/12/2018    K 4 4 10/12/2018    CO2 30 10/12/2018     10/12/2018    BUN 13 10/12/2018    CREATININE 0 66 10/12/2018 No results found for: IGE  Lab Results   Component Value Date    ALT 36 10/10/2018    AST 49 (H) 10/10/2018    ALKPHOS 76 10/10/2018         Imaging:  I personally reviewed the images on the [facility] system pertinent to today's visit  Chest x-ray reviewed on PACs:  Clear lungs  Chest CT scan reviewed on PACs:  Emphysema with right middle lobe chronic atelectasis can be seen on CT scan from 2013    Eugene Griggs MD

## 2018-11-15 NOTE — ASSESSMENT & PLAN NOTE
Stable at this time and well managed by Dr Yessy Shine, continue LAMA/LABA and ICS  Patient prefers to continue to follow up with her primary care physician

## 2018-12-06 DIAGNOSIS — J44.9 OBSTRUCTIVE CHRONIC BRONCHITIS WITHOUT EXACERBATION (HCC): Primary | ICD-10-CM

## 2018-12-12 ENCOUNTER — TELEPHONE (OUTPATIENT)
Dept: FAMILY MEDICINE CLINIC | Facility: CLINIC | Age: 73
End: 2018-12-12

## 2018-12-12 DIAGNOSIS — R05.9 COUGH: Primary | ICD-10-CM

## 2018-12-12 RX ORDER — BENZONATATE 100 MG/1
100 CAPSULE ORAL 3 TIMES DAILY PRN
Qty: 20 CAPSULE | Refills: 0 | Status: CANCELLED | OUTPATIENT
Start: 2018-12-12

## 2018-12-12 NOTE — TELEPHONE ENCOUNTER
Patient call she has a dry cough  and would like tessalon pearls called in to express care    No other symptoms

## 2019-01-11 DIAGNOSIS — J44.9 CHRONIC OBSTRUCTIVE PULMONARY DISEASE, UNSPECIFIED COPD TYPE (HCC): ICD-10-CM

## 2019-01-14 DIAGNOSIS — E04.9 ENLARGEMENT OF THYROID: ICD-10-CM

## 2019-01-14 RX ORDER — LEVOTHYROXINE SODIUM 0.03 MG/1
25 TABLET ORAL DAILY
Qty: 30 TABLET | Refills: 3 | Status: SHIPPED | OUTPATIENT
Start: 2019-01-14 | End: 2019-04-30 | Stop reason: SDUPTHER

## 2019-01-16 DIAGNOSIS — D51.3 OTHER DIETARY VITAMIN B12 DEFICIENCY ANEMIA: Primary | ICD-10-CM

## 2019-01-16 RX ORDER — LANOLIN ALCOHOL/MO/W.PET/CERES
1000 CREAM (GRAM) TOPICAL DAILY
Qty: 90 TABLET | Refills: 1 | Status: SHIPPED | OUTPATIENT
Start: 2019-01-16 | End: 2019-06-18 | Stop reason: SDUPTHER

## 2019-01-29 ENCOUNTER — APPOINTMENT (OUTPATIENT)
Dept: LAB | Facility: HOSPITAL | Age: 74
End: 2019-01-29
Payer: MEDICARE

## 2019-01-29 DIAGNOSIS — M31.6 TEMPORAL ARTERITIS (HCC): ICD-10-CM

## 2019-01-29 DIAGNOSIS — K21.9 GASTROESOPHAGEAL REFLUX DISEASE WITHOUT ESOPHAGITIS: ICD-10-CM

## 2019-01-29 DIAGNOSIS — R73.01 IMPAIRED FASTING GLUCOSE: ICD-10-CM

## 2019-01-29 DIAGNOSIS — E03.9 HYPOTHYROIDISM, UNSPECIFIED TYPE: ICD-10-CM

## 2019-01-29 LAB
ALBUMIN SERPL BCP-MCNC: 4.6 G/DL (ref 3–5.2)
ALP SERPL-CCNC: 92 U/L (ref 43–122)
ALT SERPL W P-5'-P-CCNC: 23 U/L (ref 9–52)
ANION GAP SERPL CALCULATED.3IONS-SCNC: 10 MMOL/L (ref 5–14)
AST SERPL W P-5'-P-CCNC: 29 U/L (ref 14–36)
BACTERIA UR QL AUTO: ABNORMAL /HPF
BASOPHILS # BLD AUTO: 0.1 THOUSANDS/ΜL (ref 0–0.1)
BASOPHILS NFR BLD AUTO: 1 % (ref 0–1)
BILIRUB DIRECT SERPL-MCNC: 0.1 MG/DL
BILIRUB SERPL-MCNC: 0.6 MG/DL
BILIRUB UR QL STRIP: NEGATIVE
BUN SERPL-MCNC: 16 MG/DL (ref 5–25)
CALCIUM SERPL-MCNC: 9.9 MG/DL (ref 8.4–10.2)
CHLORIDE SERPL-SCNC: 96 MMOL/L (ref 97–108)
CHOLEST SERPL-MCNC: 146 MG/DL
CLARITY UR: ABNORMAL
CO2 SERPL-SCNC: 32 MMOL/L (ref 22–30)
COLOR UR: YELLOW
CREAT SERPL-MCNC: 0.72 MG/DL (ref 0.6–1.2)
CRP SERPL QL: 15.3 MG/L
EOSINOPHIL # BLD AUTO: 0.3 THOUSAND/ΜL (ref 0–0.4)
EOSINOPHIL NFR BLD AUTO: 3 % (ref 0–6)
ERYTHROCYTE [DISTWIDTH] IN BLOOD BY AUTOMATED COUNT: 14.7 %
ERYTHROCYTE [SEDIMENTATION RATE] IN BLOOD: 36 MM/HOUR (ref 1–20)
EST. AVERAGE GLUCOSE BLD GHB EST-MCNC: 128 MG/DL
GFR SERPL CREATININE-BSD FRML MDRD: 83 ML/MIN/1.73SQ M
GLUCOSE P FAST SERPL-MCNC: 102 MG/DL (ref 70–99)
GLUCOSE UR STRIP-MCNC: NEGATIVE MG/DL
HBA1C MFR BLD: 6.1 % (ref 4.2–6.3)
HCT VFR BLD AUTO: 45.8 % (ref 36–46)
HDLC SERPL-MCNC: 52 MG/DL (ref 40–59)
HGB BLD-MCNC: 14.5 G/DL (ref 12–16)
HGB UR QL STRIP.AUTO: 10
KETONES UR STRIP-MCNC: NEGATIVE MG/DL
LDLC SERPL CALC-MCNC: 74 MG/DL
LEUKOCYTE ESTERASE UR QL STRIP: 500
LYMPHOCYTES # BLD AUTO: 1.3 THOUSANDS/ΜL (ref 0.5–4)
LYMPHOCYTES NFR BLD AUTO: 12 % (ref 25–45)
MCH RBC QN AUTO: 26.1 PG (ref 26–34)
MCHC RBC AUTO-ENTMCNC: 31.6 G/DL (ref 31–36)
MCV RBC AUTO: 83 FL (ref 80–100)
MONOCYTES # BLD AUTO: 0.9 THOUSAND/ΜL (ref 0.2–0.9)
MONOCYTES NFR BLD AUTO: 9 % (ref 1–10)
NEUTROPHILS # BLD AUTO: 8 THOUSANDS/ΜL (ref 1.8–7.8)
NEUTS SEG NFR BLD AUTO: 76 % (ref 45–65)
NITRITE UR QL STRIP: NEGATIVE
NON-SQ EPI CELLS URNS QL MICRO: ABNORMAL /HPF
NONHDLC SERPL-MCNC: 94 MG/DL
PH UR STRIP.AUTO: 6 [PH] (ref 4.5–8)
PLATELET # BLD AUTO: 319 THOUSANDS/UL (ref 150–450)
PMV BLD AUTO: 8.5 FL (ref 8.9–12.7)
POTASSIUM SERPL-SCNC: 4.1 MMOL/L (ref 3.6–5)
PROT SERPL-MCNC: 7.9 G/DL (ref 5.9–8.4)
PROT UR STRIP-MCNC: NEGATIVE MG/DL
RBC # BLD AUTO: 5.55 MILLION/UL (ref 4–5.2)
RBC #/AREA URNS AUTO: ABNORMAL /HPF
SODIUM SERPL-SCNC: 138 MMOL/L (ref 137–147)
SP GR UR STRIP.AUTO: 1.02 (ref 1–1.04)
T4 FREE SERPL-MCNC: 1.42 NG/DL (ref 0.76–1.46)
TRIGL SERPL-MCNC: 100 MG/DL
TSH SERPL DL<=0.05 MIU/L-ACNC: 2.67 UIU/ML (ref 0.47–4.68)
UROBILINOGEN UA: NEGATIVE MG/DL
WBC # BLD AUTO: 10.4 THOUSAND/UL (ref 4.5–11)
WBC #/AREA URNS AUTO: ABNORMAL /HPF

## 2019-01-29 PROCEDURE — 36415 COLL VENOUS BLD VENIPUNCTURE: CPT

## 2019-01-29 PROCEDURE — 84439 ASSAY OF FREE THYROXINE: CPT

## 2019-01-29 PROCEDURE — 81001 URINALYSIS AUTO W/SCOPE: CPT | Performed by: INTERNAL MEDICINE

## 2019-01-29 PROCEDURE — 80061 LIPID PANEL: CPT

## 2019-01-29 PROCEDURE — 80076 HEPATIC FUNCTION PANEL: CPT

## 2019-01-29 PROCEDURE — 84443 ASSAY THYROID STIM HORMONE: CPT

## 2019-01-29 PROCEDURE — 80048 BASIC METABOLIC PNL TOTAL CA: CPT

## 2019-01-29 PROCEDURE — 85652 RBC SED RATE AUTOMATED: CPT

## 2019-01-29 PROCEDURE — 83036 HEMOGLOBIN GLYCOSYLATED A1C: CPT

## 2019-01-29 PROCEDURE — 86140 C-REACTIVE PROTEIN: CPT

## 2019-01-29 PROCEDURE — 85025 COMPLETE CBC W/AUTO DIFF WBC: CPT

## 2019-01-31 ENCOUNTER — OFFICE VISIT (OUTPATIENT)
Dept: FAMILY MEDICINE CLINIC | Facility: CLINIC | Age: 74
End: 2019-01-31
Payer: MEDICARE

## 2019-01-31 VITALS
BODY MASS INDEX: 21.79 KG/M2 | HEART RATE: 70 BPM | HEIGHT: 63 IN | TEMPERATURE: 98.1 F | WEIGHT: 123 LBS | RESPIRATION RATE: 14 BRPM | SYSTOLIC BLOOD PRESSURE: 138 MMHG | OXYGEN SATURATION: 91 % | DIASTOLIC BLOOD PRESSURE: 88 MMHG

## 2019-01-31 DIAGNOSIS — Z12.11 SCREENING FOR COLON CANCER: ICD-10-CM

## 2019-01-31 DIAGNOSIS — Z00.01 ENCOUNTER FOR GENERAL ADULT MEDICAL EXAMINATION WITH ABNORMAL FINDINGS: Primary | ICD-10-CM

## 2019-01-31 DIAGNOSIS — E11.8 TYPE 2 DIABETES MELLITUS WITH COMPLICATION, WITHOUT LONG-TERM CURRENT USE OF INSULIN (HCC): ICD-10-CM

## 2019-01-31 DIAGNOSIS — N39.0 ACUTE UTI: ICD-10-CM

## 2019-01-31 DIAGNOSIS — J44.1 ACUTE EXACERBATION OF CHRONIC OBSTRUCTIVE PULMONARY DISEASE (COPD) (HCC): ICD-10-CM

## 2019-01-31 PROCEDURE — G0439 PPPS, SUBSEQ VISIT: HCPCS | Performed by: INTERNAL MEDICINE

## 2019-01-31 PROCEDURE — 99214 OFFICE O/P EST MOD 30 MIN: CPT | Performed by: INTERNAL MEDICINE

## 2019-01-31 PROCEDURE — 96372 THER/PROPH/DIAG INJ SC/IM: CPT | Performed by: INTERNAL MEDICINE

## 2019-01-31 RX ORDER — GUAIFENESIN/DEXTROMETHORPHAN 100-10MG/5
5 SYRUP ORAL 3 TIMES DAILY PRN
Qty: 118 ML | Refills: 0 | Status: SHIPPED | OUTPATIENT
Start: 2019-01-31 | End: 2019-02-18 | Stop reason: SDUPTHER

## 2019-01-31 RX ORDER — METHYLPREDNISOLONE SODIUM SUCCINATE 125 MG/2ML
125 INJECTION, POWDER, LYOPHILIZED, FOR SOLUTION INTRAMUSCULAR; INTRAVENOUS ONCE
Status: COMPLETED | OUTPATIENT
Start: 2019-01-31 | End: 2019-01-31

## 2019-01-31 RX ORDER — ALBUTEROL SULFATE 2.5 MG/3ML
2.5 SOLUTION RESPIRATORY (INHALATION) ONCE
Status: COMPLETED | OUTPATIENT
Start: 2019-01-31 | End: 2019-01-31

## 2019-01-31 RX ORDER — PREDNISONE 10 MG/1
TABLET ORAL
Qty: 20 TABLET | Refills: 0 | Status: SHIPPED | OUTPATIENT
Start: 2019-01-31 | End: 2019-02-18 | Stop reason: ALTCHOICE

## 2019-01-31 RX ORDER — LEVOFLOXACIN 500 MG/1
500 TABLET, FILM COATED ORAL EVERY 24 HOURS
Qty: 10 TABLET | Refills: 0 | Status: SHIPPED | OUTPATIENT
Start: 2019-01-31 | End: 2019-02-07 | Stop reason: ALTCHOICE

## 2019-01-31 RX ADMIN — Medication 0.5 MG: at 10:11

## 2019-01-31 RX ADMIN — ALBUTEROL SULFATE 2.5 MG: 2.5 SOLUTION RESPIRATORY (INHALATION) at 10:11

## 2019-01-31 RX ADMIN — METHYLPREDNISOLONE SODIUM SUCCINATE 125 MG: 125 INJECTION, POWDER, LYOPHILIZED, FOR SOLUTION INTRAMUSCULAR; INTRAVENOUS at 10:12

## 2019-01-31 NOTE — PROGRESS NOTES
Assessment and Plan:    Problem List Items Addressed This Visit     None      Visit Diagnoses     Screening for colon cancer    -  Primary        Health Maintenance Due   Topic Date Due    Hepatitis C Screening  1945    Depression Screening PHQ  1945    Medicare Annual Wellness Visit (AWV)  1945    CRC Screening: Colonoscopy  1945    Fall Risk  09/16/2010    Urinary Incontinence Screening  09/16/2010    DTaP,Tdap,and Td Vaccines (1 - Tdap) 11/14/2017         HPI:  Marcos Solorzano is a 68 y o  female here for her Subsequent Wellness Visit      Patient Active Problem List   Diagnosis    Chronic obstructive pulmonary disease (HCC)    Vitamin D deficiency    Other specified hypothyroidism    Gastroesophageal reflux disease without esophagitis    Rectus sheath hematoma, initial encounter    Hypertension     Past Medical History:   Diagnosis Date    Asthma     Chronic obstructive pulmonary disease ([facility] [address] ) 9/26/2012    GERD (gastroesophageal reflux disease)     Hypertension 10/11/2018    Hypothyroid     Temporal arteritis ([facility] )     Tubular adenoma      Past Surgical History:   Procedure Laterality Date    HYSTERECTOMY      NO PAST SURGERIES      ALSO NO RELEVANT PAST SURRGICAL HX AS PER [facility]     Family History   Problem Relation Age of Onset   Mehran Mejia Breast cancer Mother     Emphysema Father      History   Smoking Status    Former Smoker    Years: 15 00    Types: Cigarettes   Smokeless Tobacco    Never Used     Comment: TOBACCO USE, NO PASSIVE SMOKE EXPOSURE     History   Alcohol Use No      History   Drug Use No       Current Outpatient Prescriptions   Medication Sig Dispense Refill    acetaminophen (TYLENOL) 325 mg tablet Take 3 tablets (975 mg total) by mouth every 8 (eight) hours as needed for mild pain 30 tablet 0    albuterol (PROVENTIL HFA) 90 mcg/act inhaler inhale 2 puff by inhalation route  every 4 - 6 hours as needed      aspirin (ASPIRIN LOW DOSE) 81 MG tablet take one tab  every other day with Dinner      calcium carbonate-vitamin D (OSCAL-D) 500 mg-200 units per tablet Take 1 tablet by mouth 2 (two) times a day with meals      cyanocobalamin (VITAMIN B-12) 1,000 mcg tablet Take 1 tablet (1,000 mcg total) by mouth daily 90 tablet 1    cyanocobalamin (VITAMIN B-12) 500 mcg tablet Take 1,000 mcg by mouth daily      diphenhydrAMINE (BENADRYL) 25 mg tablet Take 1 tablet (25 mg total) by mouth every 6 (six) hours as needed for itching (Patient not taking: Reported on 11/13/2018 ) 30 tablet 3    fluticasone (ARNUITY ELLIPTA) 200 MCG/ACT AEPB inhaler Inhale 1 puff (200 mcg total) daily 30 Act 3    indacaterol-glycopyrrolate ([employer]) 27 5-15 6 MCG inhaler Place 1 capsule into inhaler and inhale 2 (two) times a day 60 capsule 3    levothyroxine (SYNTHROID) 25 mcg tablet Take 1 tablet (25 mcg total) by mouth daily 30 tablet 3    pantoprazole (PROTONIX) 40 mg tablet Take 1 tablet (40 mg total) by mouth daily 30 tablet 3    predniSONE 2 5 mg tablet Take 1 tablet (2 5 mg total) by mouth daily With food 30 tablet 3    promethazine-codeine (PHENERGAN WITH CODEINE) 6 25-10 mg/5 mL syrup Take 5 mL by mouth daily at bedtime as needed for cough (Patient not taking: Reported on 11/13/2018 ) 120 mL 0     No current facility-administered medications for this visit        No Known Allergies  Immunization History   Administered Date(s) Administered    Influenza 10/17/2007, 10/13/2010, 10/07/2015, 10/13/2016, 10/02/2017, 11/01/2018    Influenza Split 10/04/2012, 09/23/2013    Influenza Split High Dose Preservative Free IM 10/13/2016, 10/02/2017    Influenza TIV (IM) 10/16/2014, 10/07/2015    Influenza, high dose seasonal 0 5 mL 11/01/2018    Pneumococcal Conjugate 13-Valent 10/02/2017    Pneumococcal Polysaccharide PPV23 09/01/2011    TD (adult) Preservative Free 11/13/2017    Td (adult), adsorbed 11/13/2017       Patient Care Team:  Natalie Brand MD as PCP - General (Internal Medicine)    Medicare Screening Tests and Risk Assessments: Carlos Phan is here for her Subsequent Wellness visit  Health Risk Assessment:  Patient rates overall health as fair  Patient feels that their physical health rating is Slightly worse  Eyesight was rated as Slightly worse  Hearing was rated as Same  Patient feels that their emotional and mental health rating is Much better  Pain experienced by patient in the last 7 days has been Some  Patient's pain rating has been 1/10  Patient states that she has experienced no weight loss or gain in last 6 months  (Additional comments: Back pain)    Emotional/Mental Health:  Patient has been feeling nervous/anxious  PHQ-9 Depression Screening:    Frequency of the following problems over the past two weeks:      1  Little interest or pleasure in doing things: 0 - not at all      2  Feeling down, depressed, or hopeless: 0 - not at all  PHQ-2 Score: 0          Broken Bones/Falls: Fall Risk Assessment:    In the past year, patient has experienced: No history of falling in past year          Bladder/Bowel:  Patient has leaked urine accidently in the last six months  Patient reports no loss of bowel control  Immunizations:  Patient has had a flu vaccination within the last year  Patient has received a pneumonia shot  Patient has not received a shingles shot  Patient has received tetanus/diphtheria shot  Date of tetanus/diphtheria shot: 11/13/2017    Home Safety:  Patient does not have trouble with stairs inside or outside of their home  Patient currently reports that there are no safety hazards present in home, working smoke alarms, working carbon monoxide detectors        Preventative Screenings:   No breast cancer screening performed, colon cancer screen completed, cholesterol screen completed, no glaucoma eye exam completed    Nutrition:  Current diet: Regular with servings of the following:    Medications:  Patient is currently taking over-the-counter supplements  List of OTC medications includes: Multi Vitmains and Caltrate  Patient is able to manage medications  Lifestyle Choices:  Patient reports no tobacco use  Patient has smoked or used tobacco in the past   Patient has stopped her tobacco use  Tobacco use quit date: 2016  Patient reports no alcohol use  Patient does not drive a vehicle  Patient wears seat belt  Current level of exercise of physical activity described by patient as: moderately active  Activities of Daily Living:  Can get out of bed by his or her self, able to dress self, able to make own meals, able to do own shopping, able to bathe self, can do own laundry/housekeeping, can manage own money, pay bills and track expenses    Previous Hospitalizations:  Hospitalization or ED visit in past 12 months  Number of hospitalizations within the last year: 1-2        Advanced Directives:  Patient has decided on a power of   Patient has spoken to designated power of   Patient has completed advanced directive

## 2019-01-31 NOTE — PROGRESS NOTES
Assessment/Plan:         Diagnoses and all orders for this visit:    Encounter for general adult medical examination with abnormal findings  : done In detail       Screening for colon cancer: Pt is Up to date    Acute exacerbation of chronic obstructive pulmonary disease (COPD) ([facility] ):   -     Peak flow: pre and Post Neb Tx     -     albuterol inhalation solution 2 5 mg; Take 3 mL (2 5 mg total) by nebulization once   -     ipratropium (ATROVENT) 0 02 % inhalation solution 0 5 mg; Take 2 5 mL (0 5 mg total) by nebulization once   -     methylPREDNISolone sodium succinate (Solu-MEDROL) injection 125 mg; Inject 2 mL (125 mg total) into a muscle once   -     levofloxacin (LEVAQUIN) 500 mg tablet; Take 1 tablet (500 mg total) by mouth every 24 hours for 10 days  -     fluticasone-umeclidinium-vilanterol (TRELEGY ELLIPTA) 100-62 5-25 MCG/INH inhaler; Inhale 1 puff daily Rinse mouth after use  -     dextromethorphan-guaiFENesin (ROBITUSSIN DM)  mg/5 mL syrup; Take 5 mL by mouth 3 (three) times a day as needed for cough  -     predniSONE 10 mg tablet; Take 3 Tabs Daily with Breakfast for 3 days then 2 Tabs Daily for 3 days then one Tab daily for 3 days then stop  RTC in 1 week    Type 2 diabetes mellitus with complication, without long-term current use of insulin ([facility] ): Life Style mod  RTC in 3mos w Blood work    Acute UTI: Try :  -     levofloxacin (LEVAQUIN) 500 mg tablet; Take 1 tablet (500 mg total) by mouth every 24 hours for 10 days  Patient's shoes and socks removed  Right Foot/Ankle   Right Foot Inspection  Skin Exam: skin intact, dry skin and abnormal color no ulcer                          Toe Exam: swelling, tenderness and erythema  Sensory   Vibration: diminished  Proprioception: diminished   Monofilament testing: diminished  Vascular    The right DP pulse is 1+  The right PT pulse is 1+       Left Foot/Ankle  Left Foot Inspection  Skin Exam: skin intact, dry skin and abnormal colorno ulcer Toe Exam: swelling, tenderness and erythema                   Sensory   Vibration: diminished  Proprioception: diminished  Monofilament: diminished  Vascular    The left DP pulse is 1+  The left PT pulse is 1+  Assign Risk Category:  No deformity present; No loss of protective sensation; Weak pulses       Risk: 1    Other orders  -     Cancel: Ambulatory referral to Gastroenterology; Future        Subjective:      Patient ID: Ekta Birmingham is a 68 y o  female  [id number] is here for AWV, and also for Regular check up and Increasing SOB,Cough,Wheezing lately    Recent Blood work and med list reviewed w pt in detail           The following portions of the patient's history were reviewed and updated as appropriate: allergies, current medications, past family history, past medical history, past social history, past surgical history and problem list     Review of Systems   Constitutional: Positive for fatigue  Negative for chills and fever  HENT: Positive for sore throat  Negative for congestion, facial swelling, trouble swallowing and voice change  Eyes: Negative for pain, discharge and visual disturbance  Respiratory: Positive for cough, shortness of breath and wheezing  Cardiovascular: Negative for chest pain, palpitations and leg swelling  Gastrointestinal: Negative for abdominal pain, blood in stool, constipation, diarrhea and nausea  Endocrine: Negative for polydipsia, polyphagia and polyuria  Genitourinary: Positive for frequency  Negative for difficulty urinating, hematuria and urgency  Musculoskeletal: Negative for arthralgias and myalgias  Skin: Negative for rash  Neurological: Positive for dizziness  Negative for tremors, weakness and headaches  Hematological: Negative for adenopathy  Does not bruise/bleed easily  Psychiatric/Behavioral: Negative for dysphoric mood, sleep disturbance and suicidal ideas           Objective:      /88 (BP Location: Left arm, Patient Position: Sitting, Cuff Size: Standard)   Pulse 70   Temp 98 1 °F (36 7 °C) (Oral)   Resp 14   Ht 5' 3" (1 6 m)   Wt 55 8 kg (123 lb)   SpO2 91%    L/min   BMI 21 79 kg/m²          Physical Exam   Constitutional: She is oriented to person, place, and time  She appears well-nourished  No distress  HENT:   Head: Normocephalic  Mouth/Throat: Oropharynx is clear and moist  No oropharyngeal exudate  Acute URI   Eyes: Pupils are equal, round, and reactive to light  Conjunctivae are normal  No scleral icterus  Neck: Neck supple  No thyromegaly present  Cardiovascular: Normal rate, regular rhythm and normal heart sounds  Pulses are weak pulses  No murmur heard  Pulses:       Dorsalis pedis pulses are 1+ on the right side, and 1+ on the left side  Posterior tibial pulses are 1+ on the right side, and 1+ on the left side  Pulmonary/Chest: Effort normal  No respiratory distress  She has wheezes  She has rales  Abdominal: Soft  Bowel sounds are normal  She exhibits no distension  There is no tenderness  There is no rebound and no guarding  Musculoskeletal: She exhibits tenderness  She exhibits no edema  Feet:   Right Foot:   Skin Integrity: Positive for dry skin  Negative for ulcer  Left Foot:   Skin Integrity: Positive for dry skin  Negative for ulcer  Lymphadenopathy:     She has no cervical adenopathy  Neurological: She is alert and oriented to person, place, and time  No cranial nerve deficit  Skin: No rash noted  No erythema  Psychiatric: She has a normal mood and affect         Assessment and Plan:    Problem List Items Addressed This Visit     None      Visit Diagnoses     Encounter for general adult medical examination with abnormal findings    -  Primary    Screening for colon cancer        Acute exacerbation of chronic obstructive pulmonary disease (COPD) ([facility] )        Relevant Medications    albuterol inhalation solution 2 5 mg (Completed)    ipratropium (ATROVENT) 0 02 % inhalation solution 0 5 mg (Completed)    methylPREDNISolone sodium succinate (Solu-MEDROL) injection 125 mg (Completed)    levofloxacin (LEVAQUIN) 500 mg tablet    fluticasone-umeclidinium-vilanterol (TRELEGY ELLIPTA) 100-62 5-25 MCG/INH inhaler    dextromethorphan-guaiFENesin (ROBITUSSIN DM)  mg/5 mL syrup    predniSONE 10 mg tablet    Other Relevant Orders    Peak flow    Type 2 diabetes mellitus with complication, without long-term current use of insulin (HCC)        Acute UTI        Relevant Medications    levofloxacin (LEVAQUIN) 500 mg tablet        Health Maintenance Due   Topic Date Due    Hepatitis C Screening  1945   Maritza Singh Medicare Annual Wellness Visit (AWV)  1945    CRC Screening: Colonoscopy  1945    Diabetic Foot Exam  09/16/1955    DM Eye Exam  09/16/1955    URINE MICROALBUMIN  09/16/1955         HPI:  Juan Alexandre is a 68 y o  female here for her Subsequent Wellness Visit      Patient Active Problem List   Diagnosis    Chronic obstructive pulmonary disease (HCC)    Vitamin D deficiency    Other specified hypothyroidism    Gastroesophageal reflux disease without esophagitis    Rectus sheath hematoma, initial encounter    Hypertension     Past Medical History:   Diagnosis Date    Asthma     Chronic obstructive pulmonary disease ([address] ) 9/26/2012    GERD (gastroesophageal reflux disease)     Hypertension 10/11/2018    Hypothyroid     Temporal arteritis ([facility] )     Tubular adenoma      Past Surgical History:   Procedure Laterality Date    HYSTERECTOMY      NO PAST SURGERIES      ALSO NO RELEVANT PAST SURRGICAL HX AS PER NEXTGEN     Family History   Problem Relation Age of Onset   Maritza Singh Breast cancer Mother     Emphysema Father      History   Smoking Status    Former Smoker    Years: 15 00    Types: Cigarettes   Smokeless Tobacco    Never Used     Comment: TOBACCO USE, NO PASSIVE SMOKE EXPOSURE     History   Alcohol Use No      History   Drug Use No       Current Outpatient Prescriptions   Medication Sig Dispense Refill    acetaminophen (TYLENOL) 325 mg tablet Take 3 tablets (975 mg total) by mouth every 8 (eight) hours as needed for mild pain 30 tablet 0    albuterol (PROVENTIL HFA) 90 mcg/act inhaler inhale 2 puff by inhalation route  every 4 - 6 hours as needed      aspirin (ASPIRIN LOW DOSE) 81 MG tablet take one tab  every other day with Dinner      calcium carbonate-vitamin D (OSCAL-D) 500 mg-200 units per tablet Take 1 tablet by mouth 2 (two) times a day with meals      cyanocobalamin (VITAMIN B-12) 1,000 mcg tablet Take 1 tablet (1,000 mcg total) by mouth daily 90 tablet 1    dextromethorphan-guaiFENesin (ROBITUSSIN DM)  mg/5 mL syrup Take 5 mL by mouth 3 (three) times a day as needed for cough 118 mL 0    diphenhydrAMINE (BENADRYL) 25 mg tablet Take 1 tablet (25 mg total) by mouth every 6 (six) hours as needed for itching (Patient not taking: Reported on 11/13/2018 ) 30 tablet 3    fluticasone-umeclidinium-vilanterol (TRELEGY ELLIPTA) 100-62 5-25 MCG/INH inhaler Inhale 1 puff daily Rinse mouth after use  1 Inhaler 3    levofloxacin (LEVAQUIN) 500 mg tablet Take 1 tablet (500 mg total) by mouth every 24 hours for 10 days 10 tablet 0    levothyroxine (SYNTHROID) 25 mcg tablet Take 1 tablet (25 mcg total) by mouth daily 30 tablet 3    pantoprazole (PROTONIX) 40 mg tablet Take 1 tablet (40 mg total) by mouth daily 30 tablet 3    predniSONE 10 mg tablet Take 3 Tabs Daily with Breakfast for 3 days then 2 Tabs Daily for 3 days then one Tab daily for 3 days then stop 20 tablet 0    predniSONE 2 5 mg tablet Take 1 tablet (2 5 mg total) by mouth daily With food 30 tablet 3    promethazine-codeine (PHENERGAN WITH CODEINE) 6 25-10 mg/5 mL syrup Take 5 mL by mouth daily at bedtime as needed for cough (Patient not taking: Reported on 11/13/2018 ) 120 mL 0     No current facility-administered medications for this visit        No Known Allergies  Immunization History   Administered Date(s) Administered    Influenza 10/17/2007, 10/13/2010, 10/07/2015, 10/13/2016, 10/02/2017, 11/01/2018    Influenza Split 10/04/2012, 09/23/2013    Influenza Split High Dose Preservative Free IM 10/13/2016, 10/02/2017    Influenza TIV (IM) 10/16/2014, 10/07/2015    Influenza, high dose seasonal 0 5 mL 11/01/2018    Pneumococcal Conjugate 13-Valent 10/02/2017    Pneumococcal Polysaccharide PPV23 09/01/2011    TD (adult) Preservative Free 11/13/2017    Td (adult), adsorbed 11/13/2017       Patient Care Team:  Jossy Arnett MD as PCP - General (Internal Medicine)    Medicare Screening Tests and Risk Assessments:  Last Medicare Wellness visit information reviewed, patient interviewed and updates made to the record today  Broken Bones/Falls: Fall Risk Assessment:    In the past year, patient has experienced: visual disturbance    Preventative Screening/Counseling:      Cardiovascular:      General: Risks and Benefits Discussed          Diabetes:      General: Risks and Benefits Discussed          Colorectal Cancer:      General: Risks and Benefits Discussed          Breast Cancer:      General: Risks and Benefits Discussed          Cervical Cancer:      General: Risks and Benefits Discussed          Osteoporosis:      General: Risks and Benefits Discussed          AAA:      General: Risks and Benefits Discussed          Glaucoma:      General: Risks and Benefits Discussed          HIV:      General: Screening Not Indicated          Hepatitis C:      General: Screening Not Indicated        Advanced Directives:   Patient has no living will for healthcare, does not have durable POA for healthcare, patient does not have an advanced directive  Information on ACP and/or AD not provided  No 5 wishes given  No end of life assessment reviewed with patient  Provider does not agree with end of life deisions  Other Preventative Counseling (Non-Medicare):   Fall Prevention, Increase physical activity and Car/seat belt/driving safety reviewed

## 2019-02-04 DIAGNOSIS — K21.9 GASTROESOPHAGEAL REFLUX DISEASE, ESOPHAGITIS PRESENCE NOT SPECIFIED: ICD-10-CM

## 2019-02-05 ENCOUNTER — TELEPHONE (OUTPATIENT)
Dept: OTHER | Facility: OTHER | Age: 74
End: 2019-02-05

## 2019-02-05 ENCOUNTER — TELEPHONE (OUTPATIENT)
Dept: FAMILY MEDICINE CLINIC | Facility: CLINIC | Age: 74
End: 2019-02-05

## 2019-02-05 RX ORDER — PANTOPRAZOLE SODIUM 40 MG/1
40 TABLET, DELAYED RELEASE ORAL DAILY
Qty: 30 TABLET | Refills: 3 | Status: SHIPPED | OUTPATIENT
Start: 2019-02-05 | End: 2019-05-21 | Stop reason: SDUPTHER

## 2019-02-05 NOTE — TELEPHONE ENCOUNTER
Wilmer Thomason 1945  CONFIDENTIALTY NOTICE: This fax transmission is intended only for the addressee  It contains information that is legally privileged,  confidential or otherwise protected from use or disclosure  If you are not the intended recipient, you are strictly prohibited from reviewing,  disclosing, copying using or disseminating any of this information or taking any action in reliance on or regarding this information  If you have  received this fax in error, please notify us immediately by telephone so that we can arrange for its return to us  Page:   Call Id: [id number]  Health Call  Standard Call Report  Health Call  Patient Name: Wilmer Thomason  Gender: Female  : 1945  Age: 68 Y 3 M 21 D  Return Phone  Number: [phone] (Home)  Address: [address]/State/Zip: [place]  [address]  Practice Name: [address] [place] [address] Charged:  Physician:  [address] [place] [address] Name:  Relationship To  Patient: Self  Return Phone Number: [phone] (Home)  Presenting Problem: "I noticed some swelling and  tenderness to the left side of my face  near my jaw  I'm not sure if this could  be a reaction to my new inhalder  Trelegy "  Service Type: Triage  Charged Service 1: [address]  38  Name and  Number:  Nurse Assessment  Nurse: Eulalia Almaguer RN, Javier Guzmán Date/Time: 2019 3:33:32 PM  Type of assessment required:  ---General (Adult or Child)  Duration of Current S/S  ---Today since the morning  Location/Radiation  ---Left side of face  Temperature (F) and route:  ---Denies fever  Symptom Specific Meds (Dose/Time):  ---None  Other S/S  ---Noticed some swelling near the jaw area on the left side of her face towards her  ear and down the jaw  Swelling is said to be larger then an inch in size  No redness to  the skin  No rashes anywhere on body or itchiness  Was concerned that she could be  having a reaction to her new inhaler Trelegy Ellipta   Patient denies itchiness to face or  difficulty breathing  Pain Scale on scale of 1-10, 10 being the worst:  ---No pain  Symptom progression:  Juan Alexandre 1945  CONFIDENTIALTY NOTICE: This fax transmission is intended only for the addressee  It contains information that is legally privileged,  confidential or otherwise protected from use or disclosure  If you are not the intended recipient, you are strictly prohibited from reviewing,  disclosing, copying using or disseminating any of this information or taking any action in reliance on or regarding this information  If you have  received this fax in error, please notify us immediately by telephone so that we can arrange for its return to us  Page: 2 of 2  Call Id: [id number]  Nurse Assessment  ---same  Intake and Output  ---Drinking normally / WNL  Last Exam/Treatment:  ---Seen in the office last on 1/31/2019  Protocols  Protocol Title Nurse Date/Time  Lymph Nodes Swollen BRANDEN Duenas Heddie Faes 2/5/2019 3:41:20 PM  Question Caller Affirmed  Disp  Time Disposition Final User  2/5/2019 3:51:10 PM [facility] Elton Webber RN, Heddie Faes  2/5/2019 3:52:48 PM RN Triaged Yes Lili Barbosa RN, [facility] Advice Given Per Protocol  SEE PHYSICIAN WITHIN 24 HOURS: * IF OFFICE WILL BE OPEN: You need to be seen within the next 24 hours  Call your doctor  when the office opens, and make an appointment  PAIN MEDICINES: CAUTION - NSAIDS (E G , IBUPROFEN, NAPROXEN):  * Do not take nonsteroidal anti-inflammatory drugs (NSAIDs) if you have stomach problems, kidney disease, heart failure, or other  contraindications to using this type of medication  * Do not take NSAID medications for over 7 days without consulting your PCP  * Do  not take NSAID medications if you are pregnant  * You may take this medicine with or without food  Taking it with food or milk may  lessen the chance the drug will upset your stomach  * GASTROINTESTINAL RISK: There is an increased risk of stomach ulcers, GI  bleeding, perforation   * CARDIOVASCULAR RISK: There may be an increased risk of heart attack and stroke  CALL BACK IF: * You  become worse  CARE ADVICE given per Lymph Nodes Swollen (Adult) guideline  Caller Understands: Yes   Caller Disagree/Comply: Comply  PreDisposition: Unsure  Comments  User: Jo Rubin RN Date/Time: 2/5/2019 3:52:42 PM  Patient already has an appointment with the office on Thursday  Said she would follow up with the office tomorrow morning

## 2019-02-07 ENCOUNTER — OFFICE VISIT (OUTPATIENT)
Dept: FAMILY MEDICINE CLINIC | Facility: CLINIC | Age: 74
End: 2019-02-07
Payer: MEDICARE

## 2019-02-07 VITALS
WEIGHT: 124.6 LBS | TEMPERATURE: 97.7 F | SYSTOLIC BLOOD PRESSURE: 140 MMHG | DIASTOLIC BLOOD PRESSURE: 88 MMHG | HEIGHT: 63 IN | BODY MASS INDEX: 22.08 KG/M2 | HEART RATE: 86 BPM | OXYGEN SATURATION: 94 % | RESPIRATION RATE: 14 BRPM

## 2019-02-07 DIAGNOSIS — J30.89 NON-SEASONAL ALLERGIC RHINITIS, UNSPECIFIED TRIGGER: ICD-10-CM

## 2019-02-07 DIAGNOSIS — K04.7 ABSCESSED TOOTH: Primary | ICD-10-CM

## 2019-02-07 DIAGNOSIS — Z12.11 SCREENING FOR COLON CANCER: ICD-10-CM

## 2019-02-07 DIAGNOSIS — Z11.59 ENCOUNTER FOR HEPATITIS C SCREENING TEST FOR LOW RISK PATIENT: ICD-10-CM

## 2019-02-07 DIAGNOSIS — J43.9 PULMONARY EMPHYSEMA, UNSPECIFIED EMPHYSEMA TYPE (HCC): ICD-10-CM

## 2019-02-07 DIAGNOSIS — J30.1 ALLERGIC RHINITIS DUE TO POLLEN, UNSPECIFIED SEASONALITY: ICD-10-CM

## 2019-02-07 PROCEDURE — 99213 OFFICE O/P EST LOW 20 MIN: CPT | Performed by: INTERNAL MEDICINE

## 2019-02-07 PROCEDURE — 96372 THER/PROPH/DIAG INJ SC/IM: CPT | Performed by: INTERNAL MEDICINE

## 2019-02-07 RX ORDER — CLINDAMYCIN HYDROCHLORIDE 150 MG/1
150 CAPSULE ORAL 2 TIMES DAILY WITH MEALS
Qty: 14 CAPSULE | Refills: 0 | Status: SHIPPED | OUTPATIENT
Start: 2019-02-07 | End: 2019-02-14

## 2019-02-07 RX ORDER — METHYLPREDNISOLONE SODIUM SUCCINATE 125 MG/2ML
125 INJECTION, POWDER, LYOPHILIZED, FOR SOLUTION INTRAMUSCULAR; INTRAVENOUS ONCE
Status: COMPLETED | OUTPATIENT
Start: 2019-02-07 | End: 2019-02-07

## 2019-02-07 RX ORDER — DIPHENHYDRAMINE HCL 25 MG
25 TABLET ORAL EVERY 6 HOURS PRN
Qty: 30 TABLET | Refills: 3 | Status: SHIPPED | OUTPATIENT
Start: 2019-02-07 | End: 2019-07-09 | Stop reason: ALTCHOICE

## 2019-02-07 RX ADMIN — METHYLPREDNISOLONE SODIUM SUCCINATE 125 MG: 125 INJECTION, POWDER, LYOPHILIZED, FOR SOLUTION INTRAMUSCULAR; INTRAVENOUS at 10:32

## 2019-02-07 NOTE — PROGRESS NOTES
Assessment/Plan:         Diagnoses and all orders for this visit:    Abscessed tooth: most Likely : try ;  -     methylPREDNISolone sodium succinate (Solu-MEDROL) injection 125 mg; Inject 2 mL (125 mg total) into a muscle once   -     clindamycin (CLEOCIN) 150 mg capsule; Take 1 capsule (150 mg total) by mouth 2 (two) times a day with meals for 7 days  -     Ambulatory referral to Dentistry; Future  RTC in 2 weeks    Screening for colon cancer; discussed w Pt    Encounter for hepatitis C screening test for low risk patient  -     Hepatitis panel, acute; Future    Non-seasonal allergic rhinitis, unspecified trigger: renew :  -     diphenhydrAMINE (BENADRYL) 25 mg tablet; Take 1 tablet (25 mg total) by mouth every 6 (six) hours as needed for itching    Pulmonary emphysema, unspecified emphysema type ([facility] [address] ): stop smoking  Use Trelegy daily      Other orders  -     Cancel: Ambulatory referral to Gastroenterology; Future        Subjective:      Patient ID: Bren Sesay is a 68 y o  female  68 Y O  lady is here for Increasing swelling tenderness erythema  left lower jaw     For 3-4 days, it is Improving slowly , Pt was on levaquin     Shortness of Breath   Pertinent negatives include no abdominal pain, chest pain, fever, headaches, leg swelling, rash, sore throat or wheezing  Cough   Pertinent negatives include no chest pain, chills, fever, headaches, myalgias, rash, sore throat, shortness of breath or wheezing  Sinus Problem   Pertinent negatives include no chills, congestion, coughing, headaches, shortness of breath or sore throat  The following portions of the patient's history were reviewed and updated as appropriate: allergies, current medications, past family history, past medical history, past social history, past surgical history and problem list     Review of Systems   Constitutional: Negative for chills, fatigue and fever     HENT: Negative for congestion, facial swelling, sore throat, trouble swallowing and voice change  Eyes: Negative for pain, discharge and visual disturbance  Respiratory: Negative for cough, shortness of breath and wheezing  Cardiovascular: Negative for chest pain, palpitations and leg swelling  Gastrointestinal: Negative for abdominal pain, blood in stool, constipation, diarrhea and nausea  Endocrine: Negative for polydipsia, polyphagia and polyuria  Genitourinary: Negative for difficulty urinating, hematuria and urgency  Musculoskeletal: Negative for arthralgias and myalgias  Skin: Negative for rash  Neurological: Negative for dizziness, tremors, weakness and headaches  Hematological: Negative for adenopathy  Does not bruise/bleed easily  Psychiatric/Behavioral: Negative for dysphoric mood, sleep disturbance and suicidal ideas  Objective:      /88 (BP Location: Left arm, Patient Position: Sitting, Cuff Size: Standard)   Pulse 86   Temp 97 7 °F (36 5 °C) (Tympanic)   Resp 14   Ht 5' 3" (1 6 m)   Wt 56 5 kg (124 lb 9 6 oz)   SpO2 94%   BMI 22 07 kg/m²          Physical Exam   Constitutional: She is oriented to person, place, and time  She appears well-nourished  No distress  HENT:   Head: Normocephalic  Mouth/Throat: Oropharynx is clear and moist  No oropharyngeal exudate  Left lower jaw swelling tenderness    Eyes: Pupils are equal, round, and reactive to light  Conjunctivae are normal  No scleral icterus  Neck: Neck supple  No thyromegaly present  Cardiovascular: Normal rate, regular rhythm and normal heart sounds  No murmur heard  Pulmonary/Chest: Effort normal  No respiratory distress  She has wheezes  She has no rales  Abdominal: Soft  Bowel sounds are normal  She exhibits no distension  There is no tenderness  There is no rebound and no guarding  Musculoskeletal: She exhibits no edema or tenderness  Lymphadenopathy:     She has no cervical adenopathy     Neurological: She is alert and oriented to person, place, and time  Skin: No erythema  Psychiatric: She has a normal mood and affect

## 2019-02-18 ENCOUNTER — OFFICE VISIT (OUTPATIENT)
Dept: FAMILY MEDICINE CLINIC | Facility: CLINIC | Age: 74
End: 2019-02-18
Payer: MEDICARE

## 2019-02-18 VITALS
WEIGHT: 126 LBS | DIASTOLIC BLOOD PRESSURE: 90 MMHG | HEART RATE: 88 BPM | SYSTOLIC BLOOD PRESSURE: 140 MMHG | TEMPERATURE: 97.5 F | OXYGEN SATURATION: 91 % | BODY MASS INDEX: 22.32 KG/M2 | HEIGHT: 63 IN | RESPIRATION RATE: 16 BRPM

## 2019-02-18 DIAGNOSIS — I10 ESSENTIAL HYPERTENSION: ICD-10-CM

## 2019-02-18 DIAGNOSIS — M19.90 ARTHRITIS: ICD-10-CM

## 2019-02-18 DIAGNOSIS — R22.1 LOCALIZED SWELLING, MASS AND LUMP, NECK: Primary | ICD-10-CM

## 2019-02-18 DIAGNOSIS — J44.1 ACUTE EXACERBATION OF CHRONIC OBSTRUCTIVE PULMONARY DISEASE (COPD) (HCC): ICD-10-CM

## 2019-02-18 DIAGNOSIS — Z12.11 SCREENING FOR COLON CANCER: ICD-10-CM

## 2019-02-18 DIAGNOSIS — J43.9 PULMONARY EMPHYSEMA, UNSPECIFIED EMPHYSEMA TYPE (HCC): ICD-10-CM

## 2019-02-18 PROCEDURE — 99214 OFFICE O/P EST MOD 30 MIN: CPT | Performed by: INTERNAL MEDICINE

## 2019-02-18 RX ORDER — GUAIFENESIN/DEXTROMETHORPHAN 100-10MG/5
5 SYRUP ORAL 3 TIMES DAILY PRN
Qty: 118 ML | Refills: 1 | Status: SHIPPED | OUTPATIENT
Start: 2019-02-18 | End: 2019-06-27 | Stop reason: SDUPTHER

## 2019-02-18 RX ORDER — ALBUTEROL SULFATE 90 UG/1
2 AEROSOL, METERED RESPIRATORY (INHALATION) EVERY 6 HOURS PRN
Qty: 1 INHALER | Refills: 5 | Status: SHIPPED | OUTPATIENT
Start: 2019-02-18 | End: 2020-01-07 | Stop reason: SDUPTHER

## 2019-02-18 NOTE — PROGRESS NOTES
Assessment/Plan:         Diagnoses and all orders for this visit:    Localized swelling, mass and lump, neck: left side, is getting a lot smaller,   -      head neck soft tissue; Future    Screening for colon cancer:  -     Ambulatory referral to Gastroenterology; Future    Pulmonary emphysema, unspecified emphysema type ([place] [address] ): stable  Continue trelegy daily  -     albuterol (PROVENTIL HFA,VENTOLIN HFA) 90 mcg/act inhaler; Inhale 2 puffs every 6 (six) hours as needed for wheezing or shortness of breath    Acute exacerbation of chronic obstructive pulmonary disease (COPD) ([facility] ): Improving nicely  -     dextromethorphan-guaiFENesin (ROBITUSSIN DM)  mg/5 mL syrup; Take 5 mL by mouth 3 (three) times a day as needed for cough or congestion    Essential hypertension: Stable  Continue same  RTC in 2-3 mos    Arthritis  -     Basic metabolic panel; Future  -     CBC and differential; Future  -     Sedimentation rate, automated; Future  -     Hepatic function panel; Future  -     C-reactive protein; Future  -     Urinalysis with reflex to microscopic    Other orders  -     Discontinue: fluticasone (ARNUITY ELLIPTA) 200 MCG/ACT AEPB inhaler; 1 puff every 24 hours        Subjective:      Patient ID: Eleanor Renae is a 68 y o  female  [id number] is here for Re check from last visit, she feels better, less cough,Less Sob, Recent blood work and med list reviewed w pt         The following portions of the patient's history were reviewed and updated as appropriate: allergies, current medications, past family history, past medical history, past social history, past surgical history and problem list     Review of Systems   Constitutional: Negative for chills, fatigue and fever  HENT: Negative for congestion, facial swelling, sore throat, trouble swallowing and voice change  Eyes: Negative for pain, discharge and visual disturbance  Respiratory: Positive for shortness of breath  Negative for cough and wheezing  Cardiovascular: Negative for chest pain, palpitations and leg swelling  Gastrointestinal: Negative for abdominal pain, blood in stool, constipation, diarrhea and nausea  Endocrine: Negative for polydipsia, polyphagia and polyuria  Genitourinary: Negative for difficulty urinating, hematuria and urgency  Musculoskeletal: Negative for arthralgias and myalgias  Skin: Negative for rash  Neurological: Negative for dizziness, tremors, weakness and headaches  Hematological: Negative for adenopathy  Does not bruise/bleed easily  Psychiatric/Behavioral: Negative for dysphoric mood, sleep disturbance and suicidal ideas  Objective:      /90 (BP Location: Left arm, Patient Position: Sitting, Cuff Size: Standard)   Pulse 88   Temp 97 5 °F (36 4 °C) (Tympanic)   Resp 16   Ht 5' 3" (1 6 m)   Wt 57 2 kg (126 lb)   SpO2 91%   BMI 22 32 kg/m²          Physical Exam   Constitutional: She is oriented to person, place, and time  She appears well-nourished  No distress  HENT:   Head: Normocephalic  Mouth/Throat: Oropharynx is clear and moist  No oropharyngeal exudate  Eyes: Pupils are equal, round, and reactive to light  Conjunctivae are normal  No scleral icterus  Neck: Neck supple  No thyromegaly present  Cardiovascular: Normal rate, regular rhythm and normal heart sounds  No murmur heard  Pulmonary/Chest: Effort normal  No respiratory distress  She has wheezes  She has no rales  Abdominal: Soft  Bowel sounds are normal  She exhibits no distension  There is no tenderness  There is no rebound and no guarding  Musculoskeletal: She exhibits tenderness  She exhibits no edema  Lymphadenopathy:     She has cervical adenopathy  Neurological: She is alert and oriented to person, place, and time  Psychiatric: She has a normal mood and affect

## 2019-02-28 ENCOUNTER — APPOINTMENT (OUTPATIENT)
Dept: LAB | Facility: HOSPITAL | Age: 74
End: 2019-02-28
Payer: MEDICARE

## 2019-02-28 ENCOUNTER — HOSPITAL ENCOUNTER (OUTPATIENT)
Dept: ULTRASOUND IMAGING | Facility: HOSPITAL | Age: 74
Discharge: HOME | End: 2019-02-28
Payer: MEDICARE

## 2019-02-28 DIAGNOSIS — M19.90 ARTHRITIS: ICD-10-CM

## 2019-02-28 DIAGNOSIS — Z11.59 ENCOUNTER FOR HEPATITIS C SCREENING TEST FOR LOW RISK PATIENT: ICD-10-CM

## 2019-02-28 DIAGNOSIS — R22.1 LOCALIZED SWELLING, MASS AND LUMP, NECK: ICD-10-CM

## 2019-02-28 LAB
ALBUMIN SERPL BCP-MCNC: 4.7 G/DL (ref 3–5.2)
ALP SERPL-CCNC: 80 U/L (ref 43–122)
ALT SERPL W P-5'-P-CCNC: 20 U/L (ref 9–52)
ANION GAP SERPL CALCULATED.3IONS-SCNC: 8 MMOL/L (ref 5–14)
AST SERPL W P-5'-P-CCNC: 32 U/L (ref 14–36)
BACTERIA UR QL AUTO: ABNORMAL /HPF
BASOPHILS # BLD AUTO: 0 THOUSANDS/ΜL (ref 0–0.1)
BASOPHILS NFR BLD AUTO: 1 % (ref 0–1)
BILIRUB DIRECT SERPL-MCNC: 0.1 MG/DL
BILIRUB SERPL-MCNC: 0.7 MG/DL
BILIRUB UR QL STRIP: NEGATIVE
BUN SERPL-MCNC: 24 MG/DL (ref 5–25)
CALCIUM SERPL-MCNC: 10 MG/DL (ref 8.4–10.2)
CHLORIDE SERPL-SCNC: 98 MMOL/L (ref 97–108)
CLARITY UR: CLEAR
CO2 SERPL-SCNC: 32 MMOL/L (ref 22–30)
COLOR UR: YELLOW
CREAT SERPL-MCNC: 0.76 MG/DL (ref 0.6–1.2)
CRP SERPL QL: 18.5 MG/L
EOSINOPHIL # BLD AUTO: 0.2 THOUSAND/ΜL (ref 0–0.4)
EOSINOPHIL NFR BLD AUTO: 3 % (ref 0–6)
ERYTHROCYTE [DISTWIDTH] IN BLOOD BY AUTOMATED COUNT: 15.7 %
ERYTHROCYTE [SEDIMENTATION RATE] IN BLOOD: 42 MM/HOUR (ref 1–20)
GFR SERPL CREATININE-BSD FRML MDRD: 78 ML/MIN/1.73SQ M
GLUCOSE P FAST SERPL-MCNC: 83 MG/DL (ref 70–99)
GLUCOSE UR STRIP-MCNC: NEGATIVE MG/DL
HCT VFR BLD AUTO: 43.4 % (ref 36–46)
HGB BLD-MCNC: 13.7 G/DL (ref 12–16)
HGB UR QL STRIP.AUTO: 50
KETONES UR STRIP-MCNC: NEGATIVE MG/DL
LEUKOCYTE ESTERASE UR QL STRIP: 500
LYMPHOCYTES # BLD AUTO: 1.1 THOUSANDS/ΜL (ref 0.5–4)
LYMPHOCYTES NFR BLD AUTO: 16 % (ref 25–45)
MCH RBC QN AUTO: 25.9 PG (ref 26–34)
MCHC RBC AUTO-ENTMCNC: 31.6 G/DL (ref 31–36)
MCV RBC AUTO: 82 FL (ref 80–100)
MONOCYTES # BLD AUTO: 0.7 THOUSAND/ΜL (ref 0.2–0.9)
MONOCYTES NFR BLD AUTO: 11 % (ref 1–10)
MUCOUS THREADS UR QL AUTO: ABNORMAL
NEUTROPHILS # BLD AUTO: 4.8 THOUSANDS/ΜL (ref 1.8–7.8)
NEUTS SEG NFR BLD AUTO: 70 % (ref 45–65)
NITRITE UR QL STRIP: NEGATIVE
NON-SQ EPI CELLS URNS QL MICRO: ABNORMAL /HPF
PH UR STRIP.AUTO: 5 [PH] (ref 4.5–8)
PLATELET # BLD AUTO: 250 THOUSANDS/UL (ref 150–450)
PMV BLD AUTO: 8.6 FL (ref 8.9–12.7)
POTASSIUM SERPL-SCNC: 4.6 MMOL/L (ref 3.6–5)
PROT SERPL-MCNC: 7.6 G/DL (ref 5.9–8.4)
PROT UR STRIP-MCNC: NEGATIVE MG/DL
RBC # BLD AUTO: 5.29 MILLION/UL (ref 4–5.2)
RBC #/AREA URNS AUTO: ABNORMAL /HPF
SODIUM SERPL-SCNC: 138 MMOL/L (ref 137–147)
SP GR UR STRIP.AUTO: 1.02 (ref 1–1.04)
UROBILINOGEN UA: NEGATIVE MG/DL
WBC # BLD AUTO: 6.9 THOUSAND/UL (ref 4.5–11)
WBC #/AREA URNS AUTO: ABNORMAL /HPF

## 2019-02-28 PROCEDURE — 36415 COLL VENOUS BLD VENIPUNCTURE: CPT

## 2019-02-28 PROCEDURE — 80076 HEPATIC FUNCTION PANEL: CPT

## 2019-02-28 PROCEDURE — 76536 US EXAM OF HEAD AND NECK: CPT

## 2019-02-28 PROCEDURE — 80074 ACUTE HEPATITIS PANEL: CPT

## 2019-02-28 PROCEDURE — 80048 BASIC METABOLIC PNL TOTAL CA: CPT

## 2019-02-28 PROCEDURE — 86140 C-REACTIVE PROTEIN: CPT

## 2019-02-28 PROCEDURE — 85025 COMPLETE CBC W/AUTO DIFF WBC: CPT

## 2019-02-28 PROCEDURE — 85652 RBC SED RATE AUTOMATED: CPT

## 2019-02-28 PROCEDURE — 81001 URINALYSIS AUTO W/SCOPE: CPT | Performed by: INTERNAL MEDICINE

## 2019-03-01 LAB
HAV IGM SER QL: NORMAL
HBV CORE IGM SER QL: NORMAL
HBV SURFACE AG SER QL: NORMAL
HCV AB SER QL: NORMAL

## 2019-03-27 DIAGNOSIS — M31.6 TEMPORAL ARTERITIS (HCC): ICD-10-CM

## 2019-03-27 RX ORDER — PREDNISONE 2.5 MG
2.5 TABLET ORAL DAILY
Qty: 30 TABLET | Refills: 3 | Status: SHIPPED | OUTPATIENT
Start: 2019-03-27 | End: 2019-08-07 | Stop reason: SDUPTHER

## 2019-04-30 DIAGNOSIS — E04.9 ENLARGEMENT OF THYROID: ICD-10-CM

## 2019-04-30 RX ORDER — LEVOTHYROXINE SODIUM 0.03 MG/1
25 TABLET ORAL DAILY
Qty: 30 TABLET | Refills: 3 | Status: SHIPPED | OUTPATIENT
Start: 2019-04-30 | End: 2019-08-13 | Stop reason: SDUPTHER

## 2019-05-15 ENCOUNTER — OFFICE VISIT (OUTPATIENT)
Dept: FAMILY MEDICINE CLINIC | Facility: CLINIC | Age: 74
End: 2019-05-15
Payer: MEDICARE

## 2019-05-15 VITALS
WEIGHT: 126.3 LBS | RESPIRATION RATE: 16 BRPM | DIASTOLIC BLOOD PRESSURE: 84 MMHG | OXYGEN SATURATION: 94 % | TEMPERATURE: 98.2 F | HEIGHT: 63 IN | BODY MASS INDEX: 22.38 KG/M2 | SYSTOLIC BLOOD PRESSURE: 138 MMHG | HEART RATE: 88 BPM
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
## 2019-05-15 DIAGNOSIS — S09.90XA INJURY OF HEAD, INITIAL ENCOUNTER: ICD-10-CM

## 2019-05-15 PROCEDURE — 3044F HG A1C LEVEL LT 7.0%: CPT | Performed by: INTERNAL MEDICINE

## 2019-05-15 PROCEDURE — 99213 OFFICE O/P EST LOW 20 MIN: CPT | Performed by: INTERNAL MEDICINE

## 2019-05-15 PROCEDURE — 1160F RVW MEDS BY RX/DR IN RCRD: CPT | Performed by: INTERNAL MEDICINE

## 2019-05-21 ENCOUNTER — HOSPITAL ENCOUNTER (OUTPATIENT)
Dept: CT IMAGING | Facility: HOSPITAL | Age: 74
Discharge: HOME | End: 2019-05-21
Payer: MEDICARE

## 2019-05-21 DIAGNOSIS — S09.90XA INJURY OF HEAD, INITIAL ENCOUNTER: ICD-10-CM

## 2019-05-21 DIAGNOSIS — J44.1 ACUTE EXACERBATION OF CHRONIC OBSTRUCTIVE PULMONARY DISEASE (COPD) (HCC): ICD-10-CM

## 2019-05-21 DIAGNOSIS — K21.9 GASTROESOPHAGEAL REFLUX DISEASE, ESOPHAGITIS PRESENCE NOT SPECIFIED: ICD-10-CM
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
## 2019-05-21 PROCEDURE — 70450 CT HEAD/BRAIN W/O DYE: CPT

## 2019-05-21 RX ORDER — PANTOPRAZOLE SODIUM 40 MG/1
40 TABLET, DELAYED RELEASE ORAL DAILY
Qty: 30 TABLET | Refills: 3 | Status: SHIPPED | OUTPATIENT
Start: 2019-05-21 | End: 2019-09-04 | Stop reason: SDUPTHER

## 2019-06-18 DIAGNOSIS — D51.3 OTHER DIETARY VITAMIN B12 DEFICIENCY ANEMIA: ICD-10-CM

## 2019-06-18 RX ORDER — LANOLIN ALCOHOL/MO/W.PET/CERES
1000 CREAM (GRAM) TOPICAL DAILY
Qty: 90 TABLET | Refills: 1 | Status: SHIPPED | OUTPATIENT
Start: 2019-06-18 | End: 2019-09-04 | Stop reason: SDUPTHER

## 2019-06-27 ENCOUNTER — OFFICE VISIT (OUTPATIENT)
Dept: FAMILY MEDICINE CLINIC | Facility: CLINIC | Age: 74
End: 2019-06-27
Payer: MEDICARE

## 2019-06-27 VITALS
RESPIRATION RATE: 14 BRPM | TEMPERATURE: 97.8 F | DIASTOLIC BLOOD PRESSURE: 88 MMHG | BODY MASS INDEX: 22.32 KG/M2 | OXYGEN SATURATION: 91 % | SYSTOLIC BLOOD PRESSURE: 130 MMHG | WEIGHT: 126 LBS | HEIGHT: 63 IN | HEART RATE: 88 BPM

## 2019-06-27 DIAGNOSIS — J44.1 ACUTE EXACERBATION OF CHRONIC OBSTRUCTIVE PULMONARY DISEASE (COPD) (HCC): ICD-10-CM

## 2019-06-27 DIAGNOSIS — J44.1 CHRONIC OBSTRUCTIVE PULMONARY DISEASE WITH ACUTE EXACERBATION (HCC): Primary | ICD-10-CM

## 2019-06-27 LAB — NON VENT ROOM AIR: 110 %

## 2019-06-27 PROCEDURE — 99213 OFFICE O/P EST LOW 20 MIN: CPT | Performed by: INTERNAL MEDICINE

## 2019-06-27 PROCEDURE — 94640 AIRWAY INHALATION TREATMENT: CPT | Performed by: INTERNAL MEDICINE

## 2019-06-27 RX ORDER — PREDNISONE 10 MG/1
TABLET ORAL
Qty: 20 TABLET | Refills: 0 | Status: SHIPPED | OUTPATIENT
Start: 2019-06-27 | End: 2019-07-09 | Stop reason: ALTCHOICE

## 2019-06-27 RX ORDER — ALBUTEROL SULFATE 2.5 MG/3ML
2.5 SOLUTION RESPIRATORY (INHALATION) ONCE
Status: COMPLETED | OUTPATIENT
Start: 2019-06-27 | End: 2019-06-27

## 2019-06-27 RX ORDER — GUAIFENESIN/DEXTROMETHORPHAN 100-10MG/5
5 SYRUP ORAL 3 TIMES DAILY PRN
Qty: 118 ML | Refills: 1 | Status: SHIPPED | OUTPATIENT
Start: 2019-06-27 | End: 2019-07-09 | Stop reason: ALTCHOICE

## 2019-06-27 RX ORDER — ALBUTEROL SULFATE 2.5 MG/3ML
2.5 SOLUTION RESPIRATORY (INHALATION) EVERY 6 HOURS PRN
Status: CANCELLED | OUTPATIENT
Start: 2019-06-27

## 2019-06-27 RX ORDER — LEVOFLOXACIN 500 MG/1
500 TABLET, FILM COATED ORAL EVERY 24 HOURS
Qty: 10 TABLET | Refills: 0 | Status: SHIPPED | OUTPATIENT
Start: 2019-06-27 | End: 2019-07-07

## 2019-06-27 RX ADMIN — ALBUTEROL SULFATE 2.5 MG: 2.5 SOLUTION RESPIRATORY (INHALATION) at 10:35

## 2019-06-27 RX ADMIN — Medication 0.5 MG: at 10:35

## 2019-07-09 ENCOUNTER — OFFICE VISIT (OUTPATIENT)
Dept: FAMILY MEDICINE CLINIC | Facility: CLINIC | Age: 74
End: 2019-07-09
Payer: COMMERCIAL

## 2019-07-09 VITALS
RESPIRATION RATE: 14 BRPM | OXYGEN SATURATION: 93 % | SYSTOLIC BLOOD PRESSURE: 144 MMHG | WEIGHT: 127 LBS | DIASTOLIC BLOOD PRESSURE: 90 MMHG | BODY MASS INDEX: 22.5 KG/M2 | TEMPERATURE: 97.5 F | HEART RATE: 78 BPM | HEIGHT: 63 IN

## 2019-07-09 DIAGNOSIS — R22.1 LOCALIZED SWELLING, MASS AND LUMP, NECK: ICD-10-CM

## 2019-07-09 DIAGNOSIS — F17.210 CIGARETTE SMOKER: ICD-10-CM

## 2019-07-09 DIAGNOSIS — J44.1 ACUTE EXACERBATION OF CHRONIC OBSTRUCTIVE PULMONARY DISEASE (COPD) (HCC): Primary | ICD-10-CM

## 2019-07-09 DIAGNOSIS — J44.1 CHRONIC OBSTRUCTIVE PULMONARY DISEASE WITH ACUTE EXACERBATION (HCC): ICD-10-CM

## 2019-07-09 PROCEDURE — 99213 OFFICE O/P EST LOW 20 MIN: CPT | Performed by: INTERNAL MEDICINE

## 2019-07-09 PROCEDURE — 96372 THER/PROPH/DIAG INJ SC/IM: CPT | Performed by: INTERNAL MEDICINE

## 2019-07-09 RX ORDER — PREDNISONE 10 MG/1
TABLET ORAL
Qty: 20 TABLET | Refills: 0 | Status: SHIPPED | OUTPATIENT
Start: 2019-07-09 | End: 2019-07-24 | Stop reason: SDUPTHER

## 2019-07-09 RX ORDER — METHYLPREDNISOLONE SODIUM SUCCINATE 125 MG/2ML
125 INJECTION, POWDER, LYOPHILIZED, FOR SOLUTION INTRAMUSCULAR; INTRAVENOUS ONCE
Status: COMPLETED | OUTPATIENT
Start: 2019-07-09 | End: 2019-07-09

## 2019-07-09 RX ORDER — BENZONATATE 100 MG/1
100 CAPSULE ORAL 3 TIMES DAILY PRN
Qty: 30 CAPSULE | Refills: 0 | Status: SHIPPED | OUTPATIENT
Start: 2019-07-09 | End: 2019-10-23

## 2019-07-09 RX ORDER — AZITHROMYCIN 250 MG/1
TABLET, FILM COATED ORAL
Qty: 10 TABLET | Refills: 0 | Status: SHIPPED | OUTPATIENT
Start: 2019-07-09 | End: 2019-07-17

## 2019-07-09 RX ORDER — ALBUTEROL SULFATE 2.5 MG/3ML
2.5 SOLUTION RESPIRATORY (INHALATION) ONCE
Status: COMPLETED | OUTPATIENT
Start: 2019-07-09 | End: 2019-07-09

## 2019-07-09 RX ADMIN — METHYLPREDNISOLONE SODIUM SUCCINATE 125 MG: 125 INJECTION, POWDER, LYOPHILIZED, FOR SOLUTION INTRAMUSCULAR; INTRAVENOUS at 09:24

## 2019-07-09 RX ADMIN — ALBUTEROL SULFATE 2.5 MG: 2.5 SOLUTION RESPIRATORY (INHALATION) at 09:44

## 2019-07-09 RX ADMIN — Medication 0.5 MG: at 09:44

## 2019-07-09 NOTE — PROGRESS NOTES
Assessment/Plan:         Diagnoses and all orders for this visit:    Acute exacerbation of chronic obstructive pulmonary disease (COPD) ([facility] [address] ) ; improved Only slightly ;  -     predniSONE 10 mg tablet; Take 3 tabs daily with breakfast for 3 days then Take 2 tabs daily for 3 Days then take one tab daily for 3 days then stop     -     fluticasone-umeclidinium-vilanterol (TRELEGY ELLIPTA) 100-62 5-25 MCG/INH inhaler; Inhale 1 puff daily Rinse mouth after use  -     azithromycin (ZITHROMAX) 250 mg tablet; Take two tabs Daily with food for 3 days then one Tab Daily for 4 Days  -     benzonatate (TESSALON PERLES) 100 mg capsule; Take 1 capsule (100 mg total) by mouth 3 (three) times a day as needed for cough With food  -     methylPREDNISolone sodium succinate (Solu-MEDROL) injection 125 mg  -     albuterol inhalation solution 2 5 mg  -     ipratropium (ATROVENT) 0 02 % inhalation solution 0 5 mg  Pt was advised to stay inside when it is Hot and Humid  Joanne Cutting RTc in 10-14 days  Chronic obstructive pulmonary disease with acute exacerbation (HCC)  -     Peak flow    Localized swelling, mass and lump, neck  -     CT soft tissue neck w wo contrast; Future    Cigarette smoker  -     CT lung screening program; Future        Subjective:      Patient ID: Royal Holder is a 68 y o  female  68 Y O lady is here for Re check from last visit, she feels Only a little Better, But still Sob,wheezing,       The following portions of the patient's history were reviewed and updated as appropriate: allergies, current medications, past family history, past medical history, past social history, past surgical history and problem list     Review of Systems   Constitutional: Positive for fatigue  Negative for chills and fever  HENT: Negative for congestion, facial swelling, sore throat, trouble swallowing and voice change  Eyes: Negative for pain, discharge and visual disturbance     Respiratory: Positive for cough, shortness of breath and wheezing  Cardiovascular: Negative for chest pain, palpitations and leg swelling  Gastrointestinal: Negative for abdominal pain, blood in stool, constipation, diarrhea and nausea  Endocrine: Negative for polydipsia, polyphagia and polyuria  Genitourinary: Negative for difficulty urinating, hematuria and urgency  Musculoskeletal: Negative for arthralgias and myalgias  Skin: Negative for rash  Neurological: Negative for dizziness, tremors, weakness and headaches  Hematological: Negative for adenopathy  Does not bruise/bleed easily  Psychiatric/Behavioral: Negative for dysphoric mood, sleep disturbance and suicidal ideas  Objective:      /90 (BP Location: Left arm, Patient Position: Sitting, Cuff Size: Standard)   Pulse 78   Temp 97 5 °F (36 4 °C) (Oral)   Resp 14   Ht 5' 3" (1 6 m)   Wt 57 6 kg (127 lb)   SpO2 93%   PF 75 L/min   BMI 22 50 kg/m²          Physical Exam   Constitutional: She is oriented to person, place, and time  She appears well-nourished  No distress  HENT:   Head: Normocephalic  Mouth/Throat: Oropharynx is clear and moist  No oropharyngeal exudate  Eyes: Pupils are equal, round, and reactive to light  Conjunctivae are normal  No scleral icterus  Neck: Neck supple  No thyromegaly present  Cardiovascular: Normal rate, regular rhythm and normal heart sounds  No murmur heard  Pulmonary/Chest: Effort normal  No respiratory distress  She has wheezes  She has no rales  Abdominal: Soft  Bowel sounds are normal  She exhibits no distension  There is no tenderness  There is no rebound and no guarding  Musculoskeletal: She exhibits no edema  Lymphadenopathy:     She has no cervical adenopathy  Neurological: She is alert and oriented to person, place, and time  No cranial nerve deficit  Skin: No erythema  Psychiatric: She has a normal mood and affect

## 2019-07-17 ENCOUNTER — HOSPITAL ENCOUNTER (OUTPATIENT)
Dept: CT IMAGING | Facility: HOSPITAL | Age: 74
Discharge: HOME | End: 2019-07-17
Payer: COMMERCIAL

## 2019-07-17 DIAGNOSIS — R22.1 LOCALIZED SWELLING, MASS AND LUMP, NECK: ICD-10-CM

## 2019-07-17 DIAGNOSIS — F17.210 CIGARETTE SMOKER: ICD-10-CM

## 2019-07-17 PROCEDURE — 70491 CT SOFT TISSUE NECK W/DYE: CPT

## 2019-07-17 RX ADMIN — IOHEXOL 85 ML: 350 INJECTION, SOLUTION INTRAVENOUS at 11:06

## 2019-07-24 ENCOUNTER — OFFICE VISIT (OUTPATIENT)
Dept: FAMILY MEDICINE CLINIC | Facility: CLINIC | Age: 74
End: 2019-07-24
Payer: COMMERCIAL

## 2019-07-24 VITALS
HEIGHT: 63 IN | RESPIRATION RATE: 14 BRPM | TEMPERATURE: 98.2 F | OXYGEN SATURATION: 97 % | DIASTOLIC BLOOD PRESSURE: 88 MMHG | SYSTOLIC BLOOD PRESSURE: 138 MMHG | HEART RATE: 80 BPM | WEIGHT: 127.4 LBS | BODY MASS INDEX: 22.57 KG/M2

## 2019-07-24 DIAGNOSIS — M31.6 TEMPORAL ARTERITIS (HCC): ICD-10-CM

## 2019-07-24 DIAGNOSIS — J44.1 CHRONIC OBSTRUCTIVE PULMONARY DISEASE WITH ACUTE EXACERBATION (HCC): ICD-10-CM

## 2019-07-24 DIAGNOSIS — M54.2 NECK PAIN: ICD-10-CM

## 2019-07-24 DIAGNOSIS — H53.8 BLURRED VISION: Primary | ICD-10-CM

## 2019-07-24 PROCEDURE — 99214 OFFICE O/P EST MOD 30 MIN: CPT | Performed by: INTERNAL MEDICINE

## 2019-07-24 RX ORDER — BENZONATATE 100 MG/1
100 CAPSULE ORAL 3 TIMES DAILY PRN
Qty: 30 CAPSULE | Refills: 1 | Status: SHIPPED | OUTPATIENT
Start: 2019-07-24 | End: 2019-10-23

## 2019-07-24 NOTE — PROGRESS NOTES
Assessment/Plan:         Diagnoses and all orders for this visit:    Blurred vision; cause ?? ;  -     Ambulatory Referral to Ophthalmology; Future    Neck pain; Muscle spasm, moist heat  Home P T ,  Chronic obstructive pulmonary disease with acute exacerbation ([facility] [address] ); Stable, RTC in 3mos w :  -     Comprehensive metabolic panel; Future  -     CBC and differential; Future  -     Magnesium; Future  -     C-reactive protein; Future  -     Sedimentation rate, automated; Future  -     Lipid panel; Future  -     Hepatic function panel; Future  -     Urinalysis with reflex to microscopic  -     benzonatate (TESSALON PERLES) 100 mg capsule; Take 1 capsule (100 mg total) by mouth 3 (three) times a day as needed for cough With food    Temporal arteritis (HCC); stable, On prednisone 2 5 mg daily  RTC in 3mos w :  -     C-reactive protein; Future  -     Sedimentation rate, automated; Future        Subjective:      Patient ID: Brannon Quiroga is a 68 y o  female  68 Y O lady is here for Regular check up,and Re Check from last Visit, she feels better,  Recent blood work and Med list reviewed w pt in detail, she has few issues as per r O S ,  The following portions of the patient's history were reviewed and updated as appropriate: allergies, current medications, past family history, past medical history, past social history, past surgical history and problem list     Review of Systems   Constitutional: Negative for chills, fatigue and fever  HENT: Negative for congestion, facial swelling, sore throat, trouble swallowing and voice change  Eyes: Positive for visual disturbance  Negative for pain and discharge  Respiratory: Negative for cough, shortness of breath and wheezing  Cardiovascular: Negative for chest pain, palpitations and leg swelling  Gastrointestinal: Negative for abdominal pain, blood in stool, constipation, diarrhea and nausea  Endocrine: Negative for polydipsia, polyphagia and polyuria  Genitourinary: Negative for difficulty urinating, hematuria and urgency  Musculoskeletal: Positive for neck pain and neck stiffness  Negative for arthralgias and myalgias  Skin: Negative for rash  Neurological: Negative for dizziness, tremors, weakness and headaches  Hematological: Negative for adenopathy  Does not bruise/bleed easily  Psychiatric/Behavioral: Negative for dysphoric mood, sleep disturbance and suicidal ideas  Objective:      /88 (BP Location: Left arm, Patient Position: Sitting, Cuff Size: Standard)   Pulse 80   Temp 98 2 °F (36 8 °C) (Oral)   Resp 14   Ht 5' 3" (1 6 m)   Wt 57 8 kg (127 lb 6 4 oz)   SpO2 97%   BMI 22 57 kg/m²          Physical Exam   Constitutional: She is oriented to person, place, and time  She appears well-nourished  No distress  HENT:   Head: Normocephalic  Mouth/Throat: Oropharynx is clear and moist  No oropharyngeal exudate  Eyes: Pupils are equal, round, and reactive to light  Conjunctivae are normal  No scleral icterus  Neck: Neck supple  No thyromegaly present  Cardiovascular: Normal rate, regular rhythm and normal heart sounds  No murmur heard  Pulmonary/Chest: Effort normal  No respiratory distress  She has wheezes  She has no rales  Abdominal: Soft  Bowel sounds are normal  She exhibits no distension  There is no tenderness  There is no rebound and no guarding  Musculoskeletal: She exhibits tenderness  She exhibits no edema  Lymphadenopathy:     She has no cervical adenopathy  Neurological: She is alert and oriented to person, place, and time  No cranial nerve deficit  Coordination normal    Skin: No erythema  Psychiatric: She has a normal mood and affect

## 2019-08-07 DIAGNOSIS — M31.6 TEMPORAL ARTERITIS (HCC): ICD-10-CM

## 2019-08-07 RX ORDER — PREDNISONE 2.5 MG
2.5 TABLET ORAL DAILY
Qty: 30 TABLET | Refills: 3 | Status: SHIPPED | OUTPATIENT
Start: 2019-08-07 | End: 2019-08-07 | Stop reason: SDUPTHER

## 2019-08-07 RX ORDER — PREDNISONE 2.5 MG
2.5 TABLET ORAL DAILY
Qty: 30 TABLET | Refills: 3 | Status: SHIPPED | OUTPATIENT
Start: 2019-08-07 | End: 2019-12-03 | Stop reason: SDUPTHER

## 2019-08-13 DIAGNOSIS — E04.9 ENLARGEMENT OF THYROID: ICD-10-CM

## 2019-08-13 RX ORDER — LEVOTHYROXINE SODIUM 0.03 MG/1
25 TABLET ORAL DAILY
Qty: 30 TABLET | Refills: 3 | Status: SHIPPED | OUTPATIENT
Start: 2019-08-13 | End: 2019-11-19 | Stop reason: SDUPTHER

## 2019-09-04 DIAGNOSIS — D51.3 OTHER DIETARY VITAMIN B12 DEFICIENCY ANEMIA: ICD-10-CM

## 2019-09-04 DIAGNOSIS — K21.9 GASTROESOPHAGEAL REFLUX DISEASE, ESOPHAGITIS PRESENCE NOT SPECIFIED: ICD-10-CM

## 2019-09-04 RX ORDER — LANOLIN ALCOHOL/MO/W.PET/CERES
1000 CREAM (GRAM) TOPICAL DAILY
Qty: 90 TABLET | Refills: 1 | Status: SHIPPED | OUTPATIENT
Start: 2019-09-04 | End: 2019-09-09 | Stop reason: SDUPTHER

## 2019-09-04 RX ORDER — PANTOPRAZOLE SODIUM 40 MG/1
40 TABLET, DELAYED RELEASE ORAL DAILY
Qty: 90 TABLET | Refills: 1 | Status: SHIPPED | OUTPATIENT
Start: 2019-09-04 | End: 2020-02-12 | Stop reason: SDUPTHER

## 2019-09-09 DIAGNOSIS — D51.3 OTHER DIETARY VITAMIN B12 DEFICIENCY ANEMIA: ICD-10-CM

## 2019-09-09 RX ORDER — LANOLIN ALCOHOL/MO/W.PET/CERES
1000 CREAM (GRAM) TOPICAL DAILY
Qty: 90 TABLET | Refills: 1 | Status: SHIPPED | OUTPATIENT
Start: 2019-09-09 | End: 2020-01-14 | Stop reason: SDUPTHER

## 2019-10-23 ENCOUNTER — OFFICE VISIT (OUTPATIENT)
Dept: FAMILY MEDICINE CLINIC | Facility: CLINIC | Age: 74
End: 2019-10-23
Payer: COMMERCIAL

## 2019-10-23 VITALS
HEIGHT: 63 IN | TEMPERATURE: 98.5 F | RESPIRATION RATE: 14 BRPM | DIASTOLIC BLOOD PRESSURE: 88 MMHG | OXYGEN SATURATION: 97 % | SYSTOLIC BLOOD PRESSURE: 134 MMHG | BODY MASS INDEX: 22.38 KG/M2 | WEIGHT: 126.3 LBS | HEART RATE: 80 BPM

## 2019-10-23 DIAGNOSIS — J43.9 PULMONARY EMPHYSEMA, UNSPECIFIED EMPHYSEMA TYPE (HCC): ICD-10-CM

## 2019-10-23 DIAGNOSIS — J30.9 ALLERGIC RHINITIS, UNSPECIFIED SEASONALITY, UNSPECIFIED TRIGGER: ICD-10-CM

## 2019-10-23 DIAGNOSIS — E03.9 HYPOTHYROIDISM, UNSPECIFIED TYPE: Primary | ICD-10-CM

## 2019-10-23 DIAGNOSIS — Z12.31 SCREENING MAMMOGRAM, ENCOUNTER FOR: ICD-10-CM

## 2019-10-23 DIAGNOSIS — M31.6 TEMPORAL ARTERITIS (HCC): ICD-10-CM

## 2019-10-23 DIAGNOSIS — Z23 FLU VACCINE NEED: ICD-10-CM

## 2019-10-23 PROCEDURE — 90662 IIV NO PRSV INCREASED AG IM: CPT

## 2019-10-23 PROCEDURE — 99214 OFFICE O/P EST MOD 30 MIN: CPT | Performed by: INTERNAL MEDICINE

## 2019-10-23 PROCEDURE — G0008 ADMIN INFLUENZA VIRUS VAC: HCPCS

## 2019-10-23 RX ORDER — LORATADINE 10 MG/1
10 TABLET ORAL DAILY
Qty: 30 TABLET | Refills: 3 | Status: SHIPPED | OUTPATIENT
Start: 2019-10-23 | End: 2020-09-28

## 2019-10-24 NOTE — PROGRESS NOTES
Assessment/Plan:         Diagnoses and all orders for this visit:    Hypothyroidism, unspecified type; stable  Continue same  RTC in 3mos w Blood work    Flu vaccine need  -     influenza vaccine, 2923-5344, high-dose, PF 0 5 mL (FLUZONE HIGH-DOSE)    Pulmonary emphysema, unspecified emphysema type ([facility] [address] ); severe  Continue Home neb Txs and Inhalers  RTC in 3mos w PFts       Temporal arteritis ([facility] ); stable  Continue prednisone  RTC in 3mos w ESR    Screening mammogram, encounter for  -     Mammo screening bilateral w 3d & cad; Future    Allergic rhinitis, unspecified seasonality, unspecified trigger; Start :  -     loratadine (CLARITIN) 10 mg tablet; Take 1 tablet (10 mg total) by mouth daily In the evening        Subjective:      Patient ID: Trey Olmos is a 76 y o  female  76 Y O lady is here for Regular check up, she feels Ok, No New Symptoms, recent blood work and med List reviewed w pt in detail    The following portions of the patient's history were reviewed and updated as appropriate: allergies, current medications, past family history, past medical history, past social history, past surgical history and problem list     Review of Systems   Constitutional: Positive for fatigue  Negative for chills and fever  HENT: Negative for congestion, facial swelling, sore throat, trouble swallowing and voice change  Eyes: Negative for pain, discharge and visual disturbance  Respiratory: Positive for shortness of breath  Negative for cough and wheezing  Cardiovascular: Negative for chest pain, palpitations and leg swelling  Gastrointestinal: Negative for abdominal pain, blood in stool, constipation, diarrhea and nausea  Endocrine: Negative for polydipsia, polyphagia and polyuria  Genitourinary: Negative for difficulty urinating, hematuria and urgency  Musculoskeletal: Positive for arthralgias  Negative for myalgias  Skin: Negative for rash     Neurological: Negative for dizziness, tremors, weakness and headaches  Hematological: Negative for adenopathy  Does not bruise/bleed easily  Psychiatric/Behavioral: Negative for dysphoric mood, sleep disturbance and suicidal ideas  Objective:      /88 (BP Location: Left arm, Patient Position: Sitting, Cuff Size: Standard)   Pulse 80   Temp 98 5 °F (36 9 °C) (Probe)   Resp 14   Ht 5' 3" (1 6 m)   Wt 57 3 kg (126 lb 4 8 oz)   SpO2 97%   BMI 22 37 kg/m²          Physical Exam   Constitutional: She is oriented to person, place, and time  She appears well-nourished  No distress  HENT:   Head: Normocephalic  Mouth/Throat: Oropharynx is clear and moist  No oropharyngeal exudate  Eyes: Pupils are equal, round, and reactive to light  Conjunctivae are normal  No scleral icterus  Neck: Neck supple  No thyromegaly present  Cardiovascular: Normal rate and regular rhythm  Murmur heard  Pulmonary/Chest: Effort normal  No respiratory distress  She has wheezes  She has no rales  Abdominal: Soft  Bowel sounds are normal  She exhibits no distension  There is no tenderness  There is no rebound and no guarding  Musculoskeletal: She exhibits no edema or tenderness  Lymphadenopathy:     She has no cervical adenopathy  Neurological: She is alert and oriented to person, place, and time  No cranial nerve deficit  Coordination normal    Skin: No rash noted  No erythema  Psychiatric: She has a normal mood and affect

## 2019-11-19 DIAGNOSIS — E04.9 ENLARGEMENT OF THYROID: ICD-10-CM

## 2019-11-19 RX ORDER — LEVOTHYROXINE SODIUM 0.03 MG/1
25 TABLET ORAL DAILY
Qty: 30 TABLET | Refills: 3 | Status: SHIPPED | OUTPATIENT
Start: 2019-11-19 | End: 2020-03-03 | Stop reason: SDUPTHER

## 2019-12-03 ENCOUNTER — CONSULT (OUTPATIENT)
Dept: FAMILY MEDICINE CLINIC | Facility: CLINIC | Age: 74
End: 2019-12-03
Payer: COMMERCIAL

## 2019-12-03 VITALS
RESPIRATION RATE: 14 BRPM | SYSTOLIC BLOOD PRESSURE: 132 MMHG | TEMPERATURE: 98.6 F | OXYGEN SATURATION: 96 % | HEIGHT: 63 IN | HEART RATE: 92 BPM | DIASTOLIC BLOOD PRESSURE: 82 MMHG | WEIGHT: 123 LBS | BODY MASS INDEX: 21.79 KG/M2

## 2019-12-03 DIAGNOSIS — M31.6 TEMPORAL ARTERITIS (HCC): Primary | ICD-10-CM

## 2019-12-03 DIAGNOSIS — E06.3 HYPOTHYROIDISM DUE TO HASHIMOTO'S THYROIDITIS: ICD-10-CM

## 2019-12-03 DIAGNOSIS — H25.9 AGE-RELATED CATARACT OF BOTH EYES, UNSPECIFIED AGE-RELATED CATARACT TYPE: ICD-10-CM

## 2019-12-03 DIAGNOSIS — J43.9 PULMONARY EMPHYSEMA, UNSPECIFIED EMPHYSEMA TYPE (HCC): ICD-10-CM

## 2019-12-03 DIAGNOSIS — E03.8 HYPOTHYROIDISM DUE TO HASHIMOTO'S THYROIDITIS: ICD-10-CM

## 2019-12-03 PROCEDURE — 99214 OFFICE O/P EST MOD 30 MIN: CPT | Performed by: INTERNAL MEDICINE

## 2019-12-03 RX ORDER — PREDNISONE 2.5 MG
2.5 TABLET ORAL DAILY
Qty: 30 TABLET | Refills: 3 | Status: SHIPPED | OUTPATIENT
Start: 2019-12-03 | End: 2020-03-17 | Stop reason: SDUPTHER

## 2019-12-03 NOTE — PROGRESS NOTES
Assessment/Plan:         Diagnoses and all orders for this visit:    Temporal arteritis ([facility] [address] ); stable/in remission  On :  -     predniSONE 2 5 mg tablet; Take 1 tablet (2 5 mg total) by mouth daily With food    Pulmonary emphysema, unspecified emphysema type ([address] ): Stable  Continue same  RTC in 2-3 mos w Blood work    Hypothyroidism due to Hashimoto's thyroiditis; stable, continue Synthroid  RTC in 2-3 mos w :  -     Thyroid Antibodies Panel; Future  -     T4, free; Future  -     TSH, 3rd generation; Future    Age-related cataract of both eyes, unspecified age-related cataract type : Pt is Clinically stable at this time for cataract surgery         Subjective:      Patient ID: Abiola León is a 76 y o  female  76 Y O lady is here for pre Cataract surgery Clearance, she feels Ok, No New Symptoms, No recent Blood work, Med list Reviewed w pt in Detail    The following portions of the patient's history were reviewed and updated as appropriate: allergies, current medications, past medical history, past social history, past surgical history and problem list     Review of Systems   Constitutional: Negative for chills, fatigue and fever  HENT: Negative for congestion, facial swelling, sore throat, trouble swallowing and voice change  Eyes: Negative for pain, discharge and visual disturbance  Respiratory: Negative for cough, shortness of breath and wheezing  Cardiovascular: Negative for chest pain, palpitations and leg swelling  Gastrointestinal: Negative for abdominal pain, blood in stool, constipation, diarrhea and nausea  Endocrine: Negative for polydipsia, polyphagia and polyuria  Genitourinary: Negative for difficulty urinating, hematuria and urgency  Musculoskeletal: Negative for arthralgias and myalgias  Skin: Negative for rash  Neurological: Negative for dizziness, tremors, weakness and headaches  Hematological: Negative for adenopathy  Does not bruise/bleed easily  Psychiatric/Behavioral: Negative for dysphoric mood, sleep disturbance and suicidal ideas  Objective:      /82 (BP Location: Left arm, Patient Position: Sitting, Cuff Size: Standard)   Pulse 92   Temp 98 6 °F (37 °C) (Probe)   Resp 14   Ht 5' 3" (1 6 m)   Wt 55 8 kg (123 lb)   SpO2 96%   BMI 21 79 kg/m²          Physical Exam   Constitutional: She is oriented to person, place, and time  She appears well-nourished  No distress  HENT:   Head: Normocephalic  Mouth/Throat: Oropharynx is clear and moist  No oropharyngeal exudate  Eyes: Pupils are equal, round, and reactive to light  Conjunctivae are normal  No scleral icterus  Neck: Neck supple  No thyromegaly present  Cardiovascular: Normal rate and regular rhythm  Murmur heard  Pulmonary/Chest: Effort normal and breath sounds normal  No respiratory distress  She has no wheezes  She has no rales  Abdominal: Soft  Bowel sounds are normal  She exhibits no distension  There is no tenderness  There is no rebound and no guarding  Musculoskeletal: She exhibits no edema or tenderness  Lymphadenopathy:     She has no cervical adenopathy  Neurological: She is alert and oriented to person, place, and time  No cranial nerve deficit  Coordination normal    Skin: No rash noted  No erythema  Psychiatric: She has a normal mood and affect

## 2019-12-10 DIAGNOSIS — J44.1 ACUTE EXACERBATION OF CHRONIC OBSTRUCTIVE PULMONARY DISEASE (COPD) (HCC): ICD-10-CM

## 2019-12-27 ENCOUNTER — HOSPITAL ENCOUNTER (EMERGENCY)
Facility: HOSPITAL | Age: 74
Discharge: HOME | End: 2019-12-27
Attending: EMERGENCY MEDICINE
Payer: COMMERCIAL

## 2019-12-27 ENCOUNTER — APPOINTMENT (EMERGENCY)
Dept: RADIOLOGY | Facility: HOSPITAL | Age: 74
End: 2019-12-27
Payer: COMMERCIAL

## 2019-12-27 VITALS
TEMPERATURE: 98.4 F | HEART RATE: 109 BPM | WEIGHT: 123.46 LBS | SYSTOLIC BLOOD PRESSURE: 132 MMHG | DIASTOLIC BLOOD PRESSURE: 60 MMHG | RESPIRATION RATE: 18 BRPM | BODY MASS INDEX: 21.87 KG/M2 | OXYGEN SATURATION: 97 %

## 2019-12-27 DIAGNOSIS — J10.1 INFLUENZA A: Primary | ICD-10-CM

## 2019-12-27 LAB
ANION GAP SERPL CALCULATED.3IONS-SCNC: 11 MMOL/L (ref 5–14)
APTT PPP: 28 SECONDS (ref 25–32)
BASOPHILS # BLD AUTO: 0 THOUSANDS/ΜL (ref 0–0.1)
BASOPHILS NFR BLD AUTO: 1 % (ref 0–1)
BUN SERPL-MCNC: 13 MG/DL (ref 5–25)
CALCIUM SERPL-MCNC: 9.7 MG/DL (ref 8.4–10.2)
CHLORIDE SERPL-SCNC: 98 MMOL/L (ref 97–108)
CO2 SERPL-SCNC: 29 MMOL/L (ref 22–30)
CREAT SERPL-MCNC: 0.6 MG/DL (ref 0.6–1.2)
EOSINOPHIL # BLD AUTO: 0 THOUSAND/ΜL (ref 0–0.4)
EOSINOPHIL NFR BLD AUTO: 0 % (ref 0–6)
ERYTHROCYTE [DISTWIDTH] IN BLOOD BY AUTOMATED COUNT: 15 %
FLUAV RNA NPH QL NAA+PROBE: DETECTED
FLUBV RNA NPH QL NAA+PROBE: ABNORMAL
GFR SERPL CREATININE-BSD FRML MDRD: 90 ML/MIN/1.73SQ M
GLUCOSE SERPL-MCNC: 99 MG/DL (ref 70–99)
HCT VFR BLD AUTO: 42.2 % (ref 36–46)
HGB BLD-MCNC: 13.7 G/DL (ref 12–16)
INR PPP: 0.91 (ref 0.91–1.09)
LACTATE SERPL-SCNC: 1 MMOL/L (ref 0.7–2)
LIPASE SERPL-CCNC: 90 U/L (ref 23–300)
LYMPHOCYTES # BLD AUTO: 0.4 THOUSANDS/ΜL (ref 0.5–4)
LYMPHOCYTES NFR BLD AUTO: 6 % (ref 25–45)
MCH RBC QN AUTO: 27.2 PG (ref 26–34)
MCHC RBC AUTO-ENTMCNC: 32.6 G/DL (ref 31–36)
MCV RBC AUTO: 84 FL (ref 80–100)
MONOCYTES # BLD AUTO: 0.8 THOUSAND/ΜL (ref 0.2–0.9)
MONOCYTES NFR BLD AUTO: 11 % (ref 1–10)
NEUTROPHILS # BLD AUTO: 5.6 THOUSANDS/ΜL (ref 1.8–7.8)
NEUTS SEG NFR BLD AUTO: 82 % (ref 45–65)
PLATELET # BLD AUTO: 226 THOUSANDS/UL (ref 150–450)
PMV BLD AUTO: 8.1 FL (ref 8.9–12.7)
POTASSIUM SERPL-SCNC: 4.1 MMOL/L (ref 3.6–5)
PROCALCITONIN SERPL-MCNC: <0.05 NG/ML
PROTHROMBIN TIME: 10.1 SECONDS (ref 9.8–12)
RBC # BLD AUTO: 5.05 MILLION/UL (ref 4–5.2)
RSV RNA NPH QL NAA+PROBE: ABNORMAL
S PYO DNA THROAT QL NAA+PROBE: NORMAL
SODIUM SERPL-SCNC: 138 MMOL/L (ref 137–147)
TROPONIN I SERPL-MCNC: <0.01 NG/ML (ref 0–0.03)
WBC # BLD AUTO: 6.8 THOUSAND/UL (ref 4.5–11)

## 2019-12-27 PROCEDURE — 85025 COMPLETE CBC W/AUTO DIFF WBC: CPT | Performed by: PHYSICIAN ASSISTANT

## 2019-12-27 PROCEDURE — 83690 ASSAY OF LIPASE: CPT | Performed by: PHYSICIAN ASSISTANT

## 2019-12-27 PROCEDURE — 99285 EMERGENCY DEPT VISIT HI MDM: CPT | Performed by: PHYSICIAN ASSISTANT

## 2019-12-27 PROCEDURE — 87040 BLOOD CULTURE FOR BACTERIA: CPT | Performed by: PHYSICIAN ASSISTANT

## 2019-12-27 PROCEDURE — 85610 PROTHROMBIN TIME: CPT | Performed by: PHYSICIAN ASSISTANT

## 2019-12-27 PROCEDURE — 83605 ASSAY OF LACTIC ACID: CPT | Performed by: PHYSICIAN ASSISTANT

## 2019-12-27 PROCEDURE — 99284 EMERGENCY DEPT VISIT MOD MDM: CPT

## 2019-12-27 PROCEDURE — 85730 THROMBOPLASTIN TIME PARTIAL: CPT | Performed by: PHYSICIAN ASSISTANT

## 2019-12-27 PROCEDURE — 94640 AIRWAY INHALATION TREATMENT: CPT

## 2019-12-27 PROCEDURE — 36415 COLL VENOUS BLD VENIPUNCTURE: CPT | Performed by: PHYSICIAN ASSISTANT

## 2019-12-27 PROCEDURE — 87651 STREP A DNA AMP PROBE: CPT | Performed by: PHYSICIAN ASSISTANT

## 2019-12-27 PROCEDURE — 84484 ASSAY OF TROPONIN QUANT: CPT | Performed by: PHYSICIAN ASSISTANT

## 2019-12-27 PROCEDURE — 71046 X-RAY EXAM CHEST 2 VIEWS: CPT

## 2019-12-27 PROCEDURE — 80048 BASIC METABOLIC PNL TOTAL CA: CPT | Performed by: PHYSICIAN ASSISTANT

## 2019-12-27 PROCEDURE — 87631 RESP VIRUS 3-5 TARGETS: CPT | Performed by: PHYSICIAN ASSISTANT

## 2019-12-27 PROCEDURE — 84145 PROCALCITONIN (PCT): CPT | Performed by: PHYSICIAN ASSISTANT

## 2019-12-27 PROCEDURE — 93005 ELECTROCARDIOGRAM TRACING: CPT

## 2019-12-27 RX ORDER — ACETAMINOPHEN 325 MG/1
650 TABLET ORAL ONCE
Status: COMPLETED | OUTPATIENT
Start: 2019-12-27 | End: 2019-12-27

## 2019-12-27 RX ORDER — ALBUTEROL SULFATE 90 UG/1
2 AEROSOL, METERED RESPIRATORY (INHALATION) EVERY 4 HOURS PRN
Qty: 1 INHALER | Refills: 0 | Status: SHIPPED | OUTPATIENT
Start: 2019-12-27 | End: 2021-03-02

## 2019-12-27 RX ORDER — ACETAMINOPHEN 500 MG
500 TABLET ORAL EVERY 6 HOURS PRN
Qty: 30 TABLET | Refills: 0 | Status: SHIPPED | OUTPATIENT
Start: 2019-12-27

## 2019-12-27 RX ORDER — ALBUTEROL SULFATE 2.5 MG/3ML
10 SOLUTION RESPIRATORY (INHALATION) ONCE
Status: COMPLETED | OUTPATIENT
Start: 2019-12-27 | End: 2019-12-27

## 2019-12-27 RX ADMIN — ACETAMINOPHEN 650 MG: 325 TABLET ORAL at 12:30

## 2019-12-27 RX ADMIN — IPRATROPIUM BROMIDE 0.5 MG: 0.5 SOLUTION RESPIRATORY (INHALATION) at 12:31

## 2019-12-27 RX ADMIN — ALBUTEROL SULFATE 10 MG: 2.5 SOLUTION RESPIRATORY (INHALATION) at 12:31

## 2019-12-27 NOTE — ED PROVIDER NOTES
History  Chief Complaint   Patient presents with    Flu Symptoms     reports cough and aching legs  since yesterday     Patient is a 42-year-old female hx of COPD presents today with her son for evaluation of 1 day's worth of nonproductive tight cough and body aches and subjective fevers and chills  Patient is in contact with her granddaughter who was recently diagnosed with both the flu and strep pharyngitis  Patient reports generalized fatigue, nasal congestion and a nonproductive cough  Patient denies any sore throat, ear pain, lightheadedness or dizziness, chest pain, shortness of breath, abdominal pain, nausea, vomiting, diarrhea  Patient reports she has been taking Mucinex with minimal relief for symptoms  History provided by:  Patient   used: No    Flu Symptoms   Presenting symptoms: cough, fatigue, fever, headache, myalgias and rhinorrhea    Presenting symptoms: no diarrhea, no nausea, no shortness of breath, no sore throat and no vomiting    Fatigue:     Severity:  Moderate    Duration:  1 day    Timing:  Constant    Progression:  Unchanged  Associated symptoms: chills and nasal congestion    Associated symptoms: no ear pain    Risk factors: being elderly        Prior to Admission Medications   Prescriptions Last Dose Informant Patient Reported? Taking?   acetaminophen (TYLENOL) 325 mg tablet  Self No No   Sig: Take 3 tablets (975 mg total) by mouth every 8 (eight) hours as needed for mild pain   albuterol (PROVENTIL HFA) 90 mcg/act inhaler  Self Yes No   Sig: inhale 2 puff by inhalation route  every 4 - 6 hours as needed   albuterol (PROVENTIL HFA,VENTOLIN HFA) 90 mcg/act inhaler   No No   Sig: Inhale 2 puffs every 6 (six) hours as needed for wheezing or shortness of breath   aspirin (ASPIRIN LOW DOSE) 81 MG tablet  Self Yes No   Sig: take one tab   every other day with Dinner   calcium carbonate-vitamin D (OSCAL-D) 500 mg-200 units per tablet  Self Yes No   Sig: Take 1 tablet by mouth 2 (two) times a day with meals   fluticasone-umeclidinium-vilanterol (TRELEGY) 100-62 5-25 MCG/INH inhaler   No No   Sig: Inhale 1 puff daily Rinse mouth after use  levothyroxine (SYNTHROID) 25 mcg tablet   No No   Sig: Take 1 tablet (25 mcg total) by mouth daily   loratadine (CLARITIN) 10 mg tablet   No No   Sig: Take 1 tablet (10 mg total) by mouth daily In the evening   pantoprazole (PROTONIX) 40 mg tablet   No No   Sig: Take 1 tablet (40 mg total) by mouth daily   predniSONE 2 5 mg tablet   No No   Sig: Take 1 tablet (2 5 mg total) by mouth daily With food   vitamin B-12 (VITAMIN B-12) 1,000 mcg tablet   No No   Sig: Take 1 tablet (1,000 mcg total) by mouth daily      Facility-Administered Medications: None       Past Medical History:   Diagnosis Date    Asthma     Chronic obstructive pulmonary disease ([facility] ) 9/26/2012    GERD (gastroesophageal reflux disease)     Hypertension 10/11/2018    Hypothyroid     Osteoarthritis 9/26/2012    Temporal arteritis ([facility] )     Tubular adenoma        Past Surgical History:   Procedure Laterality Date    EYE SURGERY      HYSTERECTOMY      NO PAST SURGERIES      ALSO NO RELEVANT PAST SURRGICAL HX AS PER [facility]       Family History   Problem Relation Age of Onset    Breast cancer Mother     Emphysema Father      I have reviewed and agree with the history as documented  Social History     Tobacco Use    Smoking status: Former Smoker     Years: 15 00     Types: Cigarettes    Smokeless tobacco: Never Used    Tobacco comment: TOBACCO USE, NO PASSIVE SMOKE EXPOSURE   Substance Use Topics    Alcohol use: No    Drug use: No        Review of Systems   Constitutional: Positive for appetite change, chills, fatigue and fever  HENT: Positive for congestion, postnasal drip and rhinorrhea  Negative for ear pain and sore throat  Eyes: Negative for redness  Respiratory: Positive for cough  Negative for chest tightness and shortness of breath  Cardiovascular: Negative for chest pain and palpitations  Gastrointestinal: Negative for abdominal pain, diarrhea, nausea and vomiting  Genitourinary: Negative for dysuria and hematuria  Musculoskeletal: Positive for myalgias  Skin: Negative for rash  Neurological: Positive for headaches  Negative for dizziness, syncope, light-headedness and numbness  Physical Exam  Physical Exam   Constitutional: She is oriented to person, place, and time  She appears well-developed and well-nourished  Tired appearing female in no acute distress   HENT:   Head: Normocephalic  Head is with right periorbital erythema ( mild)  Eyes: No scleral icterus  Cardiovascular: Normal rate and regular rhythm  Pulmonary/Chest: Effort normal and breath sounds normal  No stridor  Abdominal: Soft  She exhibits no distension  There is no tenderness  Musculoskeletal: Normal range of motion  Lymphadenopathy:     She has no cervical adenopathy  Neurological: She is alert and oriented to person, place, and time  Skin: Skin is warm and dry  Capillary refill takes less than 2 seconds  Psychiatric: She has a normal mood and affect  Nursing note and vitals reviewed        Vital Signs  ED Triage Vitals   Temperature Pulse Respirations Blood Pressure SpO2   12/27/19 1128 12/27/19 1128 12/27/19 1128 12/27/19 1128 12/27/19 1128   99 3 °F (37 4 °C) 102 20 (!) 185/103 90 %      Temp Source Heart Rate Source Patient Position - Orthostatic VS BP Location FiO2 (%)   12/27/19 1128 12/27/19 1128 12/27/19 1128 12/27/19 1128 --   Tympanic Monitor Sitting Left arm       Pain Score       12/27/19 1326       No Pain           Vitals:    12/27/19 1128 12/27/19 1232 12/27/19 1508   BP: (!) 185/103 140/85 138/62   Pulse: 102 105 (!) 111   Patient Position - Orthostatic VS: Sitting Sitting Sitting         Visual Acuity      ED Medications  Medications   albuterol inhalation solution 10 mg (10 mg Nebulization Given 12/27/19 1231) ipratropium (ATROVENT) 0 02 % inhalation solution 0 5 mg (0 5 mg Nebulization Given 12/27/19 1231)   acetaminophen (TYLENOL) tablet 650 mg (650 mg Oral Given 12/27/19 1230)       Diagnostic Studies  Results Reviewed     Procedure Component Value Units Date/Time    Influenza A/B and RSV PCR [[id number]]  (Abnormal) Collected:  12/27/19 1507    Lab Status:  Final result Specimen:  Nose Updated:  12/27/19 1550     INFLUENZA A PCR Detected     INFLUENZA B PCR None Detected     RSV PCR None Detected    Strep A PCR [[id number]]  (Normal) Collected:  12/27/19 1508    Lab Status:  Final result Specimen:  Throat Updated:  12/27/19 1543     STREP A PCR None Detected    Blood culture #2 [[id number]] Collected:  12/27/19 1244    Lab Status: In process Specimen:  Blood from Arm, Right Updated:  12/27/19 1445    Blood culture #1 [[id number]] Collected:  12/27/19 1209    Lab Status:   In process Specimen:  Blood from Arm, Left Updated:  12/27/19 1445    Troponin I [[id number]]  (Normal) Collected:  12/27/19 1244    Lab Status:  Final result Specimen:  Blood from Arm, Right Updated:  12/27/19 1323     Troponin I <0 01 ng/mL     Protime-INR [[id number]]  (Normal) Collected:  12/27/19 1244    Lab Status:  Final result Specimen:  Blood from Arm, Right Updated:  12/27/19 1312     Protime 10 1 seconds      INR 0 91    APTT [[id number]]  (Normal) Collected:  12/27/19 1244    Lab Status:  Final result Specimen:  Blood from Arm, Right Updated:  12/27/19 1312     PTT 28 seconds     Lipase [[id number]]  (Normal) Collected:  12/27/19 1244    Lab Status:  Final result Specimen:  Blood from Arm, Right Updated:  12/27/19 1311     Lipase 90 u/L     Basic metabolic panel [[id number]]  (Normal) Collected:  12/27/19 1244    Lab Status:  Final result Specimen:  Blood from Arm, Right Updated:  12/27/19 1311     Sodium 138 mmol/L      Potassium 4 1 mmol/L      Chloride 98 mmol/L      CO2 29 mmol/L      ANION GAP 11 mmol/L      BUN 13 mg/dL      Creatinine 0 60 mg/dL      Glucose 99 mg/dL      Calcium 9 7 mg/dL      eGFR 90 ml/min/1 73sq m     Narrative:       [place] guidelines for Chronic Kidney Disease (CKD):     Stage 1 with normal or high GFR (GFR > 90 mL/min/1 73 square meters)    Stage 2 Mild CKD (GFR = 60-89 mL/min/1 73 square meters)    Stage 3A Moderate CKD (GFR = 45-59 mL/min/1 73 square meters)    Stage 3B Moderate CKD (GFR = 30-44 mL/min/1 73 square meters)    Stage 4 Severe CKD (GFR = 15-29 mL/min/1 73 square meters)    Stage 5 End Stage CKD (GFR <15 mL/min/1 73 square meters)  Note: GFR calculation is accurate only with a steady state creatinine    Lactic acid x2 [[id number]]  (Normal) Collected:  12/27/19 1244    Lab Status:  Final result Specimen:  Blood from Arm, Right Updated:  12/27/19 1311     LACTIC ACID 1 0 mmol/L     Narrative:       Result may be elevated if tourniquet was used during collection  CBC and differential [[id number]]  (Abnormal) Collected:  12/27/19 1244    Lab Status:  Final result Specimen:  Blood from Arm, Right Updated:  12/27/19 1303     WBC 6 80 Thousand/uL      RBC 5 05 Million/uL      Hemoglobin 13 7 g/dL      Hematocrit 42 2 %      MCV 84 fL      MCH 27 2 pg      MCHC 32 6 g/dL      RDW 15 0 %      MPV 8 1 fL      Platelets 264 Thousands/uL      Neutrophils Relative 82 %      Lymphocytes Relative 6 %      Monocytes Relative 11 %      Eosinophils Relative 0 %      Basophils Relative 1 %      Neutrophils Absolute 5 60 Thousands/µL      Lymphocytes Absolute 0 40 Thousands/µL      Monocytes Absolute 0 80 Thousand/µL      Eosinophils Absolute 0 00 Thousand/µL      Basophils Absolute 0 00 Thousands/µL     Procalcitonin [[id number]] Collected:  12/27/19 1244    Lab Status:   In process Specimen:  Blood from Arm, Right Updated:  12/27/19 1259    Lactic acid x2 [[id number]]     Lab Status:  No result Specimen:  Blood                  XR chest pa & lateral   ED Interpretation by Magdalena Cooley, GRANT (12/27 7233)   No acute consolidation to suggest pneumonia      Final Result by Addie Strickland MD (12/27 7114)      No acute pulmonary disease            Workstation performed: [id number]                    Procedures  ECG 12 Lead Documentation Only  Date/Time: 12/27/2019 2:12 PM  Performed by: Lexx Grace PA-C  Authorized by: Lexx Grace PA-C     Indications / Diagnosis:  Fevers / cough  ECG reviewed by me, the ED Provider: yes    Patient location:  ED  Previous ECG:     Previous ECG:  Compared to current    Comparison ECG info:  10/06/18    Similarity:  No change    Comparison to cardiac monitor: Yes    Interpretation:     Interpretation: normal    Rate:     ECG rate:  106    ECG rate assessment: tachycardic    Rhythm:     Rhythm: sinus tachycardia    Ectopy:     Ectopy: none    QRS:     QRS axis:  Normal    QRS intervals:  Normal  Conduction:     Conduction: normal    ST segments:     ST segments:  Normal  T waves:     T waves: normal    Comments:                   ED Course  ED Course as of Dec 27 1612   Fri Dec 27, 2019   1602 Patient reports significant improvement in symptoms after albuterol treatment  Patient was reexamined at this time and informed of laboratory and/or imaging results and was found to be stable for discharge  Return to emergency department criteria was reviewed with the patient who verbalized understanding and was agreeable to discharge and the treatment plan at this time  MDM      Disposition  Final diagnoses:   Influenza A     Time reflects when diagnosis was documented in both MDM as applicable and the Disposition within this note     Time User Action Codes Description Comment    12/27/2019  4:03 PM Anais Garrett [J10 1] Influenza A       ED Disposition     ED Disposition Condition Date/Time Comment    Discharge Stable Fri Dec 27, 2019  4:03 PM Irma Lezama discharge to home/self care              Follow-up Information     Follow up With Specialties Details Why Mary Ann Baker MD Internal Medicine Schedule an appointment as soon as possible for a visit in 2 days  [place] [address]  [phone]            Patient's Medications   Discharge Prescriptions    ACETAMINOPHEN (TYLENOL) 500 MG TABLET    Take 1 tablet (500 mg total) by mouth every 6 (six) hours as needed (pain fevers)       Start Date: 12/27/2019End Date: --       Order Dose: 500 mg       Quantity: 30 tablet    Refills: 0    ALBUTEROL (PROVENTIL HFA,VENTOLIN HFA) 90 MCG/ACT INHALER    Inhale 2 puffs every 4 (four) hours as needed for wheezing       Start Date: 12/27/2019End Date: --       Order Dose: 2 puffs       Quantity: 1 Inhaler    Refills: 0     No discharge procedures on file      ED Provider  Electronically Signed by           Brittney Holm PA-C  12/27/19 7729

## 2019-12-27 NOTE — ED NOTES
Patient provided with face masks to wear out of ED and for public use     Eleazar Dutta RN  12/27/19 3968

## 2019-12-29 LAB
ATRIAL RATE: 106 BPM
P AXIS: 31 DEGREES
PR INTERVAL: 118 MS
QRS AXIS: 36 DEGREES
QRSD INTERVAL: 74 MS
QT INTERVAL: 348 MS
QTC INTERVAL: 462 MS
T WAVE AXIS: 57 DEGREES
VENTRICULAR RATE: 106 BPM

## 2019-12-29 PROCEDURE — 93010 ELECTROCARDIOGRAM REPORT: CPT | Performed by: INTERNAL MEDICINE

## 2020-01-01 LAB
BACTERIA BLD CULT: NORMAL
BACTERIA BLD CULT: NORMAL

## 2020-01-07 ENCOUNTER — CONSULT (OUTPATIENT)
Dept: FAMILY MEDICINE CLINIC | Facility: CLINIC | Age: 75
End: 2020-01-07
Payer: COMMERCIAL

## 2020-01-07 VITALS
SYSTOLIC BLOOD PRESSURE: 134 MMHG | BODY MASS INDEX: 21.69 KG/M2 | HEART RATE: 84 BPM | WEIGHT: 122.4 LBS | TEMPERATURE: 98.2 F | HEIGHT: 63 IN | OXYGEN SATURATION: 96 % | RESPIRATION RATE: 16 BRPM | DIASTOLIC BLOOD PRESSURE: 78 MMHG

## 2020-01-07 DIAGNOSIS — J10.1: ICD-10-CM

## 2020-01-07 DIAGNOSIS — J44.1 ACUTE EXACERBATION OF CHRONIC OBSTRUCTIVE PULMONARY DISEASE (COPD) (HCC): Primary | ICD-10-CM

## 2020-01-07 LAB
NON VENT ROOM AIR: 120 %
NON VENT ROOM AIR: 60 %

## 2020-01-07 PROCEDURE — 94640 AIRWAY INHALATION TREATMENT: CPT | Performed by: INTERNAL MEDICINE

## 2020-01-07 PROCEDURE — 96372 THER/PROPH/DIAG INJ SC/IM: CPT | Performed by: INTERNAL MEDICINE

## 2020-01-07 PROCEDURE — 99213 OFFICE O/P EST LOW 20 MIN: CPT | Performed by: INTERNAL MEDICINE

## 2020-01-07 PROCEDURE — 3725F SCREEN DEPRESSION PERFORMED: CPT | Performed by: INTERNAL MEDICINE

## 2020-01-07 RX ORDER — OSELTAMIVIR PHOSPHATE 75 MG/1
75 CAPSULE ORAL 2 TIMES DAILY
Qty: 10 CAPSULE | Refills: 0 | Status: SHIPPED | OUTPATIENT
Start: 2020-01-07 | End: 2020-01-12

## 2020-01-07 RX ORDER — LEVOFLOXACIN 500 MG/1
500 TABLET, FILM COATED ORAL EVERY 24 HOURS
Qty: 10 TABLET | Refills: 0 | Status: SHIPPED | OUTPATIENT
Start: 2020-01-07 | End: 2020-01-14

## 2020-01-07 RX ORDER — METHYLPREDNISOLONE SODIUM SUCCINATE 125 MG/2ML
125 INJECTION, POWDER, LYOPHILIZED, FOR SOLUTION INTRAMUSCULAR; INTRAVENOUS ONCE
Status: COMPLETED | OUTPATIENT
Start: 2020-01-07 | End: 2020-01-07

## 2020-01-07 RX ORDER — ALBUTEROL SULFATE 2.5 MG/3ML
2.5 SOLUTION RESPIRATORY (INHALATION) ONCE
Status: COMPLETED | OUTPATIENT
Start: 2020-01-07 | End: 2020-01-07

## 2020-01-07 RX ORDER — PREDNISONE 10 MG/1
TABLET ORAL
Qty: 20 TABLET | Refills: 0 | Status: SHIPPED | OUTPATIENT
Start: 2020-01-07 | End: 2020-01-14

## 2020-01-07 RX ORDER — GUAIFENESIN/DEXTROMETHORPHAN 100-10MG/5
5 SYRUP ORAL 3 TIMES DAILY PRN
Qty: 118 ML | Refills: 1 | Status: SHIPPED | OUTPATIENT
Start: 2020-01-07 | End: 2020-01-14

## 2020-01-07 RX ADMIN — ALBUTEROL SULFATE 2.5 MG: 2.5 SOLUTION RESPIRATORY (INHALATION) at 16:16

## 2020-01-07 RX ADMIN — Medication 0.5 MG: at 16:17

## 2020-01-07 RX ADMIN — METHYLPREDNISOLONE SODIUM SUCCINATE 125 MG: 125 INJECTION, POWDER, LYOPHILIZED, FOR SOLUTION INTRAMUSCULAR; INTRAVENOUS at 14:29

## 2020-01-07 NOTE — PROGRESS NOTES
Assessment/Plan:         Diagnoses and all orders for this visit:    Acute exacerbation of chronic obstructive pulmonary disease (COPD) ([facility] ) : Bed Rest, Increase PO fluids, TRY :  -     POCT peak flow  -     methylPREDNISolone sodium succinate (Solu-MEDROL) injection 125 mg  -     oseltamivir (TAMIFLU) 75 mg capsule; Take 1 capsule (75 mg total) by mouth 2 (two) times a day for 5 days  -     levofloxacin (LEVAQUIN) 500 mg tablet; Take 1 tablet (500 mg total) by mouth every 24 hours for 10 days With Food/Lunch  -     predniSONE 10 mg tablet; Take 3 tabs daily with breakfast for 3 days then Take  2 tabs daily for 3 Days then take one tab daily for 3 days then stop     -     dextromethorphan-guaiFENesin (ROBITUSSIN DM)  mg/5 mL syrup; Take 5 mL by mouth 3 (three) times a day as needed for cough or congestion  -     albuterol inhalation solution 2 5 mg  -     ipratropium (ATROVENT) 0 02 % inhalation solution 0 5 mg  RTC in 10 days  Acute respiratory infection due to influenza A subtype H1N1 virus  -     oseltamivir (TAMIFLU) 75 mg capsule; Take 1 capsule (75 mg total) by mouth 2 (two) times a day for 5 days  -     albuterol inhalation solution 2 5 mg  -     ipratropium (ATROVENT) 0 02 % inhalation solution 0 5 mg        Subjective:      Patient ID: Pallavi Dillon is a 76 y o  female  [address] is here for Increasing cold symptoms for 2-3 weeks, she was seen in ER, But No treatment ? She has worse SOB/COLD symptoms,    The following portions of the patient's history were reviewed and updated as appropriate: allergies, current medications, past family history, past social history, past surgical history and problem list     Review of Systems   Constitutional: Positive for fatigue  Negative for chills and fever  HENT: Positive for postnasal drip, sinus pressure, sinus pain and sore throat  Negative for congestion, facial swelling, trouble swallowing and voice change      Eyes: Negative for pain, discharge and visual disturbance  Respiratory: Positive for cough and wheezing  Negative for shortness of breath  Cardiovascular: Negative for chest pain, palpitations and leg swelling  Gastrointestinal: Negative for abdominal pain, blood in stool, constipation, diarrhea and nausea  Endocrine: Negative for polydipsia, polyphagia and polyuria  Genitourinary: Negative for difficulty urinating, hematuria and urgency  Musculoskeletal: Negative for arthralgias and myalgias  Skin: Negative for rash  Neurological: Negative for dizziness, tremors, weakness and headaches  Hematological: Negative for adenopathy  Does not bruise/bleed easily  Psychiatric/Behavioral: Negative for dysphoric mood, sleep disturbance and suicidal ideas  Objective:      /78 (BP Location: Left arm, Patient Position: Sitting, Cuff Size: Standard)   Pulse 84   Temp 98 2 °F (36 8 °C) (Probe)   Resp 16   Ht 5' 3" (1 6 m)   Wt 55 5 kg (122 lb 6 4 oz)   SpO2 96%   BMI 21 68 kg/m²          Physical Exam   Constitutional: She is oriented to person, place, and time  She appears well-nourished  No distress  HENT:   Head: Normocephalic  Mouth/Throat: No oropharyngeal exudate  Acute URI   Eyes: Pupils are equal, round, and reactive to light  Conjunctivae are normal  No scleral icterus  Neck: Neck supple  No thyromegaly present  Cardiovascular: Normal rate, regular rhythm and normal heart sounds  No murmur heard  Pulmonary/Chest: Effort normal  No respiratory distress  She has wheezes  She has no rales  Abdominal: Soft  Bowel sounds are normal  She exhibits no distension  There is no tenderness  There is no rebound and no guarding  Musculoskeletal: She exhibits no edema or tenderness  Lymphadenopathy:     She has no cervical adenopathy  Neurological: She is alert and oriented to person, place, and time  No cranial nerve deficit  Skin: No erythema  Psychiatric: She has a normal mood and affect

## 2020-01-14 ENCOUNTER — OFFICE VISIT (OUTPATIENT)
Dept: FAMILY MEDICINE CLINIC | Facility: CLINIC | Age: 75
End: 2020-01-14
Payer: COMMERCIAL

## 2020-01-14 VITALS
DIASTOLIC BLOOD PRESSURE: 90 MMHG | HEIGHT: 63 IN | RESPIRATION RATE: 14 BRPM | OXYGEN SATURATION: 93 % | SYSTOLIC BLOOD PRESSURE: 140 MMHG | TEMPERATURE: 98.9 F | WEIGHT: 122 LBS | BODY MASS INDEX: 21.62 KG/M2 | HEART RATE: 80 BPM

## 2020-01-14 DIAGNOSIS — M81.8 IDIOPATHIC OSTEOPOROSIS: ICD-10-CM

## 2020-01-14 DIAGNOSIS — M31.6 TEMPORAL ARTERITIS (HCC): ICD-10-CM

## 2020-01-14 DIAGNOSIS — E11.8 TYPE 2 DIABETES MELLITUS WITH COMPLICATION, WITHOUT LONG-TERM CURRENT USE OF INSULIN (HCC): ICD-10-CM

## 2020-01-14 DIAGNOSIS — D51.3 OTHER DIETARY VITAMIN B12 DEFICIENCY ANEMIA: ICD-10-CM

## 2020-01-14 DIAGNOSIS — J44.1 ACUTE EXACERBATION OF CHRONIC OBSTRUCTIVE PULMONARY DISEASE (COPD) (HCC): Primary | ICD-10-CM

## 2020-01-14 LAB
NON VENT ROOM AIR: 70 %
NON VENT ROOM AIR: 80 %

## 2020-01-14 PROCEDURE — 99213 OFFICE O/P EST LOW 20 MIN: CPT | Performed by: INTERNAL MEDICINE

## 2020-01-14 PROCEDURE — 96372 THER/PROPH/DIAG INJ SC/IM: CPT | Performed by: INTERNAL MEDICINE

## 2020-01-14 PROCEDURE — 1160F RVW MEDS BY RX/DR IN RCRD: CPT | Performed by: INTERNAL MEDICINE

## 2020-01-14 RX ORDER — LANOLIN ALCOHOL/MO/W.PET/CERES
1000 CREAM (GRAM) TOPICAL DAILY
Qty: 90 TABLET | Refills: 1 | Status: SHIPPED | OUTPATIENT
Start: 2020-01-14 | End: 2020-06-16 | Stop reason: SDUPTHER

## 2020-01-14 RX ORDER — BENZONATATE 100 MG/1
100 CAPSULE ORAL 3 TIMES DAILY PRN
Qty: 30 CAPSULE | Refills: 1 | Status: SHIPPED | OUTPATIENT
Start: 2020-01-14 | End: 2020-06-08

## 2020-01-14 RX ORDER — ALBUTEROL SULFATE 2.5 MG/3ML
2.5 SOLUTION RESPIRATORY (INHALATION) ONCE
Status: COMPLETED | OUTPATIENT
Start: 2020-01-14 | End: 2020-01-14

## 2020-01-14 RX ORDER — CYANOCOBALAMIN 1000 UG/ML
1000 INJECTION INTRAMUSCULAR; SUBCUTANEOUS
Status: SHIPPED | OUTPATIENT
Start: 2020-01-14

## 2020-01-14 RX ADMIN — ALBUTEROL SULFATE 2.5 MG: 2.5 SOLUTION RESPIRATORY (INHALATION) at 17:18

## 2020-01-14 RX ADMIN — CYANOCOBALAMIN 1000 MCG: 1000 INJECTION INTRAMUSCULAR; SUBCUTANEOUS at 15:08

## 2020-01-14 RX ADMIN — Medication 0.5 MG: at 17:17

## 2020-01-15 ENCOUNTER — DOCUMENTATION (OUTPATIENT)
Dept: FAMILY MEDICINE CLINIC | Facility: CLINIC | Age: 75
End: 2020-01-15

## 2020-01-15 NOTE — PROGRESS NOTES
Assessment/Plan:         Diagnoses and all orders for this visit:    Acute exacerbation of chronic obstructive pulmonary disease (COPD) ([facility] ); finish up meds;   -     POCT peak flow  -     benzonatate (TESSALON PERLES) 100 mg capsule; Take 1 capsule (100 mg total) by mouth 3 (three) times a day as needed for cough With Food/Meals  -     albuterol inhalation solution 2 5 mg  -     ipratropium (ATROVENT) 0 02 % inhalation solution 0 5 mg  RTC in 2-3 weeks  Other dietary vitamin B12 deficiency anemia  -     cyanocobalamin injection 1,000 mcg  -     vitamin B-12 (VITAMIN B-12) 1,000 mcg tablet; Take 1 tablet (1,000 mcg total) by mouth daily    Temporal arteritis (HCC) : Re start prednisone 2 5 mg Daily  RTC in 3mos w ESR/Crp    Type 2 diabetes mellitus with complication, without long-term current use of insulin ([facility] ); life style mod  Continue same  RTC in 3mos w Blood work    Idiopathic osteoporosis  -     DXA bone density spine hip and pelvis; Future    Pt is Clinically Stable at this Time for Eye Surgery    Subjective:      Patient ID: Pavel Hennesys is a 76 y o  female  76 Y O lady is here for Re check from last visit, she feels Better, no new symptoms, still with cough,  Med list reviewed  w pt in Detail    The following portions of the patient's history were reviewed and updated as appropriate: allergies, current medications, past family history, past social history, past surgical history and problem list     Review of Systems   Constitutional: Negative for chills, fatigue and fever  HENT: Negative for congestion, facial swelling, sore throat, trouble swallowing and voice change  Eyes: Negative for pain, discharge and visual disturbance  Respiratory: Positive for cough and wheezing  Negative for shortness of breath  Cardiovascular: Negative for chest pain, palpitations and leg swelling  Gastrointestinal: Negative for abdominal pain, blood in stool, constipation, diarrhea and nausea  Endocrine: Negative for polydipsia, polyphagia and polyuria  Genitourinary: Negative for difficulty urinating, hematuria and urgency  Musculoskeletal: Negative for arthralgias and myalgias  Skin: Negative for rash  Neurological: Negative for dizziness, tremors, weakness and headaches  Hematological: Negative for adenopathy  Does not bruise/bleed easily  Psychiatric/Behavioral: Negative for dysphoric mood, sleep disturbance and suicidal ideas  Objective:      /90 (BP Location: Left arm, Patient Position: Sitting, Cuff Size: Standard)   Pulse 80   Temp 98 9 °F (37 2 °C) (Tympanic)   Resp 14   Ht 5' 3" (1 6 m)   Wt 55 3 kg (122 lb)   SpO2 93%   BMI 21 61 kg/m²          Physical Exam   Constitutional: She is oriented to person, place, and time  She appears well-nourished  No distress  HENT:   Head: Normocephalic  Mouth/Throat: Oropharynx is clear and moist  No oropharyngeal exudate  Eyes: Pupils are equal, round, and reactive to light  Conjunctivae are normal  No scleral icterus  Neck: Neck supple  No thyromegaly present  Cardiovascular: Normal rate and regular rhythm  Murmur heard  Pulmonary/Chest: Effort normal  No respiratory distress  She has wheezes  She has no rales  Abdominal: Soft  Bowel sounds are normal  She exhibits no distension  There is no tenderness  There is no rebound and no guarding  Musculoskeletal: She exhibits no edema  Lymphadenopathy:     She has no cervical adenopathy  Neurological: She is alert and oriented to person, place, and time  No cranial nerve deficit  Skin: No erythema  Psychiatric: She has a normal mood and affect

## 2020-01-22 ENCOUNTER — HOSPITAL ENCOUNTER (OUTPATIENT)
Dept: BONE DENSITY | Facility: CLINIC | Age: 75
Discharge: HOME | End: 2020-01-22
Payer: COMMERCIAL

## 2020-01-22 DIAGNOSIS — M81.8 IDIOPATHIC OSTEOPOROSIS: ICD-10-CM

## 2020-01-22 PROCEDURE — 77080 DXA BONE DENSITY AXIAL: CPT

## 2020-01-28 ENCOUNTER — OFFICE VISIT (OUTPATIENT)
Dept: FAMILY MEDICINE CLINIC | Facility: CLINIC | Age: 75
End: 2020-01-28
Payer: COMMERCIAL

## 2020-01-28 VITALS
SYSTOLIC BLOOD PRESSURE: 137 MMHG | WEIGHT: 121 LBS | BODY MASS INDEX: 21.44 KG/M2 | RESPIRATION RATE: 16 BRPM | DIASTOLIC BLOOD PRESSURE: 88 MMHG | TEMPERATURE: 98.4 F | HEART RATE: 86 BPM | OXYGEN SATURATION: 98 % | HEIGHT: 63 IN

## 2020-01-28 DIAGNOSIS — J30.9 ALLERGIC RHINITIS, UNSPECIFIED SEASONALITY, UNSPECIFIED TRIGGER: ICD-10-CM

## 2020-01-28 DIAGNOSIS — J43.9 PULMONARY EMPHYSEMA, UNSPECIFIED EMPHYSEMA TYPE (HCC): Primary | ICD-10-CM

## 2020-01-28 LAB — NON VENT ROOM AIR: 70 %

## 2020-01-28 PROCEDURE — 1036F TOBACCO NON-USER: CPT | Performed by: INTERNAL MEDICINE

## 2020-01-28 PROCEDURE — 94150 VITAL CAPACITY TEST: CPT | Performed by: INTERNAL MEDICINE

## 2020-01-28 PROCEDURE — 99213 OFFICE O/P EST LOW 20 MIN: CPT | Performed by: INTERNAL MEDICINE

## 2020-01-28 PROCEDURE — 1160F RVW MEDS BY RX/DR IN RCRD: CPT | Performed by: INTERNAL MEDICINE

## 2020-01-28 RX ORDER — DIPHENHYDRAMINE HCL 25 MG
25 TABLET ORAL EVERY 6 HOURS PRN
Qty: 30 TABLET | Refills: 3 | Status: SHIPPED | OUTPATIENT
Start: 2020-01-28 | End: 2020-09-28

## 2020-01-28 RX ORDER — FEXOFENADINE HCL 180 MG/1
180 TABLET ORAL DAILY
Qty: 30 TABLET | Refills: 3 | Status: SHIPPED | OUTPATIENT
Start: 2020-01-28 | End: 2020-03-23 | Stop reason: SDUPTHER

## 2020-01-29 NOTE — PROGRESS NOTES
Assessment/Plan:         Diagnoses and all orders for this visit:    Pulmonary emphysema, unspecified emphysema type ([facility] [address] ); stable, continue same  RTC in 3mos w Blood work  -     POCT peak flow  -     fexofenadine (ALLEGRA) 180 MG tablet; Take 1 tablet (180 mg total) by mouth daily In the morning with Breakfast/food  -     diphenhydrAMINE (BENADRYL) 25 mg tablet; Take 1 tablet (25 mg total) by mouth every 6 (six) hours as needed for itching In the evening    Allergic rhinitis, unspecified seasonality, unspecified trigger; which is causing cough : TRY :  -     fexofenadine (ALLEGRA) 180 MG tablet; Take 1 tablet (180 mg total) by mouth daily In the morning with Breakfast/food  -     diphenhydrAMINE (BENADRYL) 25 mg tablet; Take 1 tablet (25 mg total) by mouth every 6 (six) hours as needed for itching In the evening    RTC in 1 mo    Subjective:      Patient ID: Usha Bocanegra is a 76 y o  female  76 y O lady is here for Re check from last visit  And pre cataract surgery clearance,  She still has dry cough, no other issues    The following portions of the patient's history were reviewed and updated as appropriate: allergies, current medications, past medical history, past social history, past surgical history and problem list     Review of Systems   Constitutional: Negative for chills, fatigue and fever  HENT: Positive for postnasal drip  Negative for congestion, facial swelling, sore throat, trouble swallowing and voice change  Eyes: Negative for pain, discharge and visual disturbance  Respiratory: Positive for cough  Negative for shortness of breath and wheezing  Cardiovascular: Negative for chest pain, palpitations and leg swelling  Gastrointestinal: Negative for abdominal pain, blood in stool, constipation, diarrhea and nausea  Endocrine: Negative for polydipsia, polyphagia and polyuria  Genitourinary: Negative for difficulty urinating, hematuria and urgency     Musculoskeletal: Negative for arthralgias and myalgias  Skin: Negative for rash  Neurological: Negative for dizziness, tremors, weakness and headaches  Hematological: Negative for adenopathy  Does not bruise/bleed easily  Psychiatric/Behavioral: Negative for dysphoric mood, sleep disturbance and suicidal ideas  Objective:      /88 (BP Location: Left arm, Patient Position: Sitting, Cuff Size: Standard)   Pulse 86   Temp 98 4 °F (36 9 °C) (Probe)   Resp 16   Ht 5' 3" (1 6 m)   Wt 54 9 kg (121 lb)   SpO2 98%   BMI 21 43 kg/m²          Physical Exam   Constitutional: She is oriented to person, place, and time  She appears well-nourished  No distress  HENT:   Head: Normocephalic  Mouth/Throat: No oropharyngeal exudate  Post nasal drip/allergic rhinitis   Eyes: Pupils are equal, round, and reactive to light  Conjunctivae are normal  No scleral icterus  Neck: Neck supple  No thyromegaly present  Cardiovascular: Normal rate and regular rhythm  Murmur heard  Pulmonary/Chest: Effort normal  No respiratory distress  She has wheezes  She has no rales  Abdominal: Soft  Bowel sounds are normal  She exhibits no distension  There is no tenderness  There is no rebound and no guarding  Musculoskeletal: She exhibits no edema or tenderness  Lymphadenopathy:     She has no cervical adenopathy  Neurological: She is alert and oriented to person, place, and time  No cranial nerve deficit  Skin: No erythema  Psychiatric: She has a normal mood and affect

## 2020-02-12 DIAGNOSIS — K21.9 GASTROESOPHAGEAL REFLUX DISEASE, ESOPHAGITIS PRESENCE NOT SPECIFIED: ICD-10-CM

## 2020-02-12 RX ORDER — PANTOPRAZOLE SODIUM 40 MG/1
40 TABLET, DELAYED RELEASE ORAL DAILY
Qty: 90 TABLET | Refills: 1 | Status: SHIPPED | OUTPATIENT
Start: 2020-02-12 | End: 2020-08-19 | Stop reason: SDUPTHER

## 2020-03-03 DIAGNOSIS — E04.9 ENLARGEMENT OF THYROID: ICD-10-CM

## 2020-03-03 RX ORDER — LEVOTHYROXINE SODIUM 0.03 MG/1
25 TABLET ORAL DAILY
Qty: 30 TABLET | Refills: 3 | Status: SHIPPED | OUTPATIENT
Start: 2020-03-03 | End: 2020-06-09 | Stop reason: SDUPTHER

## 2020-03-17 DIAGNOSIS — M31.6 TEMPORAL ARTERITIS (HCC): ICD-10-CM

## 2020-03-17 RX ORDER — PREDNISONE 2.5 MG
2.5 TABLET ORAL DAILY
Qty: 30 TABLET | Refills: 3 | Status: SHIPPED | OUTPATIENT
Start: 2020-03-17 | End: 2020-08-19 | Stop reason: SDUPTHER

## 2020-03-23 DIAGNOSIS — J30.9 ALLERGIC RHINITIS, UNSPECIFIED SEASONALITY, UNSPECIFIED TRIGGER: ICD-10-CM

## 2020-03-23 DIAGNOSIS — J43.9 PULMONARY EMPHYSEMA, UNSPECIFIED EMPHYSEMA TYPE (HCC): ICD-10-CM

## 2020-03-23 RX ORDER — FEXOFENADINE HCL 180 MG/1
180 TABLET ORAL DAILY
Qty: 30 TABLET | Refills: 3 | Status: SHIPPED | OUTPATIENT
Start: 2020-03-23 | End: 2020-06-22 | Stop reason: SDUPTHER

## 2020-04-02 ENCOUNTER — TELEPHONE (OUTPATIENT)
Dept: FAMILY MEDICINE CLINIC | Facility: CLINIC | Age: 75
End: 2020-04-02

## 2020-04-28 DIAGNOSIS — J44.1 ACUTE EXACERBATION OF CHRONIC OBSTRUCTIVE PULMONARY DISEASE (COPD) (HCC): ICD-10-CM

## 2020-06-08 ENCOUNTER — OFFICE VISIT (OUTPATIENT)
Dept: FAMILY MEDICINE CLINIC | Facility: CLINIC | Age: 75
End: 2020-06-08
Payer: COMMERCIAL

## 2020-06-08 VITALS
HEART RATE: 83 BPM | WEIGHT: 120.4 LBS | HEIGHT: 63 IN | RESPIRATION RATE: 16 BRPM | OXYGEN SATURATION: 95 % | SYSTOLIC BLOOD PRESSURE: 134 MMHG | BODY MASS INDEX: 21.33 KG/M2 | TEMPERATURE: 98 F | DIASTOLIC BLOOD PRESSURE: 90 MMHG

## 2020-06-08 DIAGNOSIS — K21.9 GASTROESOPHAGEAL REFLUX DISEASE WITHOUT ESOPHAGITIS: ICD-10-CM

## 2020-06-08 DIAGNOSIS — L03.211 CELLULITIS, FACE: ICD-10-CM

## 2020-06-08 DIAGNOSIS — I10 ESSENTIAL HYPERTENSION: ICD-10-CM

## 2020-06-08 DIAGNOSIS — E11.8 TYPE 2 DIABETES MELLITUS WITH COMPLICATION, WITHOUT LONG-TERM CURRENT USE OF INSULIN (HCC): ICD-10-CM

## 2020-06-08 DIAGNOSIS — Z12.31 SCREENING MAMMOGRAM FOR HIGH-RISK PATIENT: ICD-10-CM

## 2020-06-08 DIAGNOSIS — Z01.00 DIABETIC EYE EXAM (HCC): ICD-10-CM

## 2020-06-08 DIAGNOSIS — Z00.01 ENCOUNTER FOR GENERAL ADULT MEDICAL EXAMINATION WITH ABNORMAL FINDINGS: Primary | ICD-10-CM

## 2020-06-08 DIAGNOSIS — M81.0 OSTEOPOROSIS, POSTMENOPAUSAL: ICD-10-CM

## 2020-06-08 DIAGNOSIS — E11.8 TYPE II DIABETES MELLITUS WITH MANIFESTATIONS (HCC): ICD-10-CM

## 2020-06-08 DIAGNOSIS — Z12.11 SCREEN FOR COLON CANCER: ICD-10-CM

## 2020-06-08 DIAGNOSIS — E11.9 DIABETIC EYE EXAM (HCC): ICD-10-CM

## 2020-06-08 DIAGNOSIS — E04.9 ENLARGEMENT OF THYROID: ICD-10-CM

## 2020-06-08 LAB
ALBUMIN SERPL BCP-MCNC: 4 G/DL (ref 3.5–5)
ALP SERPL-CCNC: 80 U/L (ref 46–116)
ALT SERPL W P-5'-P-CCNC: 24 U/L (ref 12–78)
ANION GAP SERPL CALCULATED.3IONS-SCNC: 6 MMOL/L (ref 4–13)
AST SERPL W P-5'-P-CCNC: 20 U/L (ref 5–45)
BACTERIA UR QL AUTO: ABNORMAL /HPF
BASOPHILS # BLD AUTO: 0.05 THOUSANDS/ΜL (ref 0–0.1)
BASOPHILS NFR BLD AUTO: 1 % (ref 0–1)
BILIRUB DIRECT SERPL-MCNC: 0.15 MG/DL (ref 0–0.2)
BILIRUB SERPL-MCNC: 0.52 MG/DL (ref 0.2–1)
BILIRUB UR QL STRIP: NEGATIVE
BUN SERPL-MCNC: 24 MG/DL (ref 5–25)
CALCIUM SERPL-MCNC: 9.5 MG/DL (ref 8.3–10.1)
CHLORIDE SERPL-SCNC: 103 MMOL/L (ref 100–108)
CHOLEST SERPL-MCNC: 124 MG/DL (ref 50–200)
CLARITY UR: ABNORMAL
CO2 SERPL-SCNC: 30 MMOL/L (ref 21–32)
COLOR UR: YELLOW
CREAT SERPL-MCNC: 0.76 MG/DL (ref 0.6–1.3)
CREAT UR-MCNC: 92.2 MG/DL
CRP SERPL HS-MCNC: 11.9 MG/L
EOSINOPHIL # BLD AUTO: 0.38 THOUSAND/ΜL (ref 0–0.61)
EOSINOPHIL NFR BLD AUTO: 5 % (ref 0–6)
ERYTHROCYTE [DISTWIDTH] IN BLOOD BY AUTOMATED COUNT: 13.6 % (ref 11.6–15.1)
ERYTHROCYTE [SEDIMENTATION RATE] IN BLOOD: 47 MM/HOUR (ref 0–20)
EST. AVERAGE GLUCOSE BLD GHB EST-MCNC: 126 MG/DL
GFR SERPL CREATININE-BSD FRML MDRD: 78 ML/MIN/1.73SQ M
GLUCOSE P FAST SERPL-MCNC: 88 MG/DL (ref 65–99)
GLUCOSE UR STRIP-MCNC: NEGATIVE MG/DL
HBA1C MFR BLD: 6 %
HCT VFR BLD AUTO: 44.1 % (ref 34.8–46.1)
HDLC SERPL-MCNC: 62 MG/DL
HGB BLD-MCNC: 13.7 G/DL (ref 11.5–15.4)
HGB UR QL STRIP.AUTO: ABNORMAL
HYALINE CASTS #/AREA URNS LPF: ABNORMAL /LPF
IMM GRANULOCYTES # BLD AUTO: 0.01 THOUSAND/UL (ref 0–0.2)
IMM GRANULOCYTES NFR BLD AUTO: 0 % (ref 0–2)
KETONES UR STRIP-MCNC: NEGATIVE MG/DL
LDLC SERPL CALC-MCNC: 53 MG/DL (ref 0–100)
LEUKOCYTE ESTERASE UR QL STRIP: ABNORMAL
LYMPHOCYTES # BLD AUTO: 1.37 THOUSANDS/ΜL (ref 0.6–4.47)
LYMPHOCYTES NFR BLD AUTO: 17 % (ref 14–44)
MAGNESIUM SERPL-MCNC: 2.6 MG/DL (ref 1.6–2.6)
MCH RBC QN AUTO: 27.2 PG (ref 26.8–34.3)
MCHC RBC AUTO-ENTMCNC: 31.1 G/DL (ref 31.4–37.4)
MCV RBC AUTO: 88 FL (ref 82–98)
MICROALBUMIN UR-MCNC: 58.3 MG/L (ref 0–20)
MICROALBUMIN/CREAT 24H UR: 63 MG/G CREATININE (ref 0–30)
MONOCYTES # BLD AUTO: 0.77 THOUSAND/ΜL (ref 0.17–1.22)
MONOCYTES NFR BLD AUTO: 9 % (ref 4–12)
NEUTROPHILS # BLD AUTO: 5.68 THOUSANDS/ΜL (ref 1.85–7.62)
NEUTS SEG NFR BLD AUTO: 68 % (ref 43–75)
NITRITE UR QL STRIP: NEGATIVE
NON-SQ EPI CELLS URNS QL MICRO: ABNORMAL /HPF
NONHDLC SERPL-MCNC: 62 MG/DL
NRBC BLD AUTO-RTO: 0 /100 WBCS
PH UR STRIP.AUTO: 6.5 [PH]
PLATELET # BLD AUTO: 256 THOUSANDS/UL (ref 149–390)
PMV BLD AUTO: 11.1 FL (ref 8.9–12.7)
POTASSIUM SERPL-SCNC: 3.7 MMOL/L (ref 3.5–5.3)
PROT SERPL-MCNC: 7.7 G/DL (ref 6.4–8.2)
PROT UR STRIP-MCNC: NEGATIVE MG/DL
RBC # BLD AUTO: 5.03 MILLION/UL (ref 3.81–5.12)
RBC #/AREA URNS AUTO: ABNORMAL /HPF
SODIUM SERPL-SCNC: 139 MMOL/L (ref 136–145)
SP GR UR STRIP.AUTO: 1.02 (ref 1–1.03)
TRIGL SERPL-MCNC: 46 MG/DL
TSH SERPL DL<=0.05 MIU/L-ACNC: 1.37 UIU/ML (ref 0.36–3.74)
UROBILINOGEN UR QL STRIP.AUTO: 0.2 E.U./DL
WBC # BLD AUTO: 8.26 THOUSAND/UL (ref 4.31–10.16)
WBC #/AREA URNS AUTO: ABNORMAL /HPF

## 2020-06-08 PROCEDURE — 3080F DIAST BP >= 90 MM HG: CPT | Performed by: INTERNAL MEDICINE

## 2020-06-08 PROCEDURE — 86141 C-REACTIVE PROTEIN HS: CPT | Performed by: INTERNAL MEDICINE

## 2020-06-08 PROCEDURE — 4040F PNEUMOC VAC/ADMIN/RCVD: CPT | Performed by: INTERNAL MEDICINE

## 2020-06-08 PROCEDURE — 82043 UR ALBUMIN QUANTITATIVE: CPT | Performed by: INTERNAL MEDICINE

## 2020-06-08 PROCEDURE — 83735 ASSAY OF MAGNESIUM: CPT | Performed by: INTERNAL MEDICINE

## 2020-06-08 PROCEDURE — 3060F POS MICROALBUMINURIA REV: CPT | Performed by: INTERNAL MEDICINE

## 2020-06-08 PROCEDURE — 85025 COMPLETE CBC W/AUTO DIFF WBC: CPT | Performed by: INTERNAL MEDICINE

## 2020-06-08 PROCEDURE — 99214 OFFICE O/P EST MOD 30 MIN: CPT | Performed by: INTERNAL MEDICINE

## 2020-06-08 PROCEDURE — 81001 URINALYSIS AUTO W/SCOPE: CPT | Performed by: INTERNAL MEDICINE

## 2020-06-08 PROCEDURE — 86800 THYROGLOBULIN ANTIBODY: CPT | Performed by: INTERNAL MEDICINE

## 2020-06-08 PROCEDURE — 3008F BODY MASS INDEX DOCD: CPT | Performed by: INTERNAL MEDICINE

## 2020-06-08 PROCEDURE — 3288F FALL RISK ASSESSMENT DOCD: CPT | Performed by: INTERNAL MEDICINE

## 2020-06-08 PROCEDURE — G0439 PPPS, SUBSEQ VISIT: HCPCS | Performed by: INTERNAL MEDICINE

## 2020-06-08 PROCEDURE — 3044F HG A1C LEVEL LT 7.0%: CPT | Performed by: INTERNAL MEDICINE

## 2020-06-08 PROCEDURE — 86376 MICROSOMAL ANTIBODY EACH: CPT | Performed by: INTERNAL MEDICINE

## 2020-06-08 PROCEDURE — 83036 HEMOGLOBIN GLYCOSYLATED A1C: CPT | Performed by: INTERNAL MEDICINE

## 2020-06-08 PROCEDURE — 1160F RVW MEDS BY RX/DR IN RCRD: CPT | Performed by: INTERNAL MEDICINE

## 2020-06-08 PROCEDURE — 36415 COLL VENOUS BLD VENIPUNCTURE: CPT | Performed by: INTERNAL MEDICINE

## 2020-06-08 PROCEDURE — 1101F PT FALLS ASSESS-DOCD LE1/YR: CPT | Performed by: INTERNAL MEDICINE

## 2020-06-08 PROCEDURE — 80061 LIPID PANEL: CPT | Performed by: INTERNAL MEDICINE

## 2020-06-08 PROCEDURE — 1125F AMNT PAIN NOTED PAIN PRSNT: CPT | Performed by: INTERNAL MEDICINE

## 2020-06-08 PROCEDURE — 1170F FXNL STATUS ASSESSED: CPT | Performed by: INTERNAL MEDICINE

## 2020-06-08 PROCEDURE — 85652 RBC SED RATE AUTOMATED: CPT | Performed by: INTERNAL MEDICINE

## 2020-06-08 PROCEDURE — 1036F TOBACCO NON-USER: CPT | Performed by: INTERNAL MEDICINE

## 2020-06-08 PROCEDURE — 82248 BILIRUBIN DIRECT: CPT | Performed by: INTERNAL MEDICINE

## 2020-06-08 PROCEDURE — 80053 COMPREHEN METABOLIC PANEL: CPT | Performed by: INTERNAL MEDICINE

## 2020-06-08 PROCEDURE — 82570 ASSAY OF URINE CREATININE: CPT | Performed by: INTERNAL MEDICINE

## 2020-06-08 PROCEDURE — 84443 ASSAY THYROID STIM HORMONE: CPT | Performed by: INTERNAL MEDICINE

## 2020-06-08 PROCEDURE — 96372 THER/PROPH/DIAG INJ SC/IM: CPT | Performed by: INTERNAL MEDICINE

## 2020-06-08 PROCEDURE — 3075F SYST BP GE 130 - 139MM HG: CPT | Performed by: INTERNAL MEDICINE

## 2020-06-08 RX ORDER — AMOXICILLIN AND CLAVULANATE POTASSIUM 875; 125 MG/1; MG/1
1 TABLET, FILM COATED ORAL EVERY 12 HOURS SCHEDULED
Qty: 14 TABLET | Refills: 0 | Status: SHIPPED | OUTPATIENT
Start: 2020-06-08 | End: 2020-06-15

## 2020-06-09 DIAGNOSIS — E04.9 ENLARGEMENT OF THYROID: ICD-10-CM

## 2020-06-09 LAB
THYROGLOB AB SERPL-ACNC: 2.5 IU/ML (ref 0–0.9)
THYROPEROXIDASE AB SERPL-ACNC: 70 IU/ML (ref 0–34)

## 2020-06-09 RX ORDER — LEVOTHYROXINE SODIUM 0.03 MG/1
25 TABLET ORAL DAILY
Qty: 30 TABLET | Refills: 3 | Status: SHIPPED | OUTPATIENT
Start: 2020-06-09 | End: 2020-08-19 | Stop reason: SDUPTHER

## 2020-06-16 DIAGNOSIS — D51.3 OTHER DIETARY VITAMIN B12 DEFICIENCY ANEMIA: ICD-10-CM

## 2020-06-16 RX ORDER — LANOLIN ALCOHOL/MO/W.PET/CERES
1000 CREAM (GRAM) TOPICAL DAILY
Qty: 90 TABLET | Refills: 1 | Status: SHIPPED | OUTPATIENT
Start: 2020-06-16

## 2020-06-22 ENCOUNTER — OFFICE VISIT (OUTPATIENT)
Dept: FAMILY MEDICINE CLINIC | Facility: CLINIC | Age: 75
End: 2020-06-22
Payer: COMMERCIAL

## 2020-06-22 VITALS
HEIGHT: 63 IN | WEIGHT: 118.9 LBS | BODY MASS INDEX: 21.07 KG/M2 | RESPIRATION RATE: 14 BRPM | TEMPERATURE: 97.9 F | HEART RATE: 82 BPM | DIASTOLIC BLOOD PRESSURE: 84 MMHG | OXYGEN SATURATION: 97 % | SYSTOLIC BLOOD PRESSURE: 136 MMHG

## 2020-06-22 DIAGNOSIS — M31.6 TEMPORAL ARTERITIS (HCC): ICD-10-CM

## 2020-06-22 DIAGNOSIS — J30.9 ALLERGIC RHINITIS, UNSPECIFIED SEASONALITY, UNSPECIFIED TRIGGER: ICD-10-CM

## 2020-06-22 DIAGNOSIS — L03.211 CELLULITIS, FACE: ICD-10-CM

## 2020-06-22 DIAGNOSIS — J43.9 PULMONARY EMPHYSEMA, UNSPECIFIED EMPHYSEMA TYPE (HCC): Primary | ICD-10-CM

## 2020-06-22 PROCEDURE — 3075F SYST BP GE 130 - 139MM HG: CPT | Performed by: INTERNAL MEDICINE

## 2020-06-22 PROCEDURE — 4040F PNEUMOC VAC/ADMIN/RCVD: CPT | Performed by: INTERNAL MEDICINE

## 2020-06-22 PROCEDURE — 1160F RVW MEDS BY RX/DR IN RCRD: CPT | Performed by: INTERNAL MEDICINE

## 2020-06-22 PROCEDURE — 1170F FXNL STATUS ASSESSED: CPT | Performed by: INTERNAL MEDICINE

## 2020-06-22 PROCEDURE — 1036F TOBACCO NON-USER: CPT | Performed by: INTERNAL MEDICINE

## 2020-06-22 PROCEDURE — 3008F BODY MASS INDEX DOCD: CPT | Performed by: INTERNAL MEDICINE

## 2020-06-22 PROCEDURE — 3079F DIAST BP 80-89 MM HG: CPT | Performed by: INTERNAL MEDICINE

## 2020-06-22 PROCEDURE — 3060F POS MICROALBUMINURIA REV: CPT | Performed by: INTERNAL MEDICINE

## 2020-06-22 PROCEDURE — 99213 OFFICE O/P EST LOW 20 MIN: CPT | Performed by: INTERNAL MEDICINE

## 2020-06-22 PROCEDURE — 3044F HG A1C LEVEL LT 7.0%: CPT | Performed by: INTERNAL MEDICINE

## 2020-06-22 RX ORDER — FEXOFENADINE HCL 180 MG/1
180 TABLET ORAL DAILY
Qty: 30 TABLET | Refills: 3 | Status: SHIPPED | OUTPATIENT
Start: 2020-06-22 | End: 2020-09-28

## 2020-06-30 ENCOUNTER — TELEPHONE (OUTPATIENT)
Dept: FAMILY MEDICINE CLINIC | Facility: CLINIC | Age: 75
End: 2020-06-30

## 2020-08-19 DIAGNOSIS — K21.9 GASTROESOPHAGEAL REFLUX DISEASE, ESOPHAGITIS PRESENCE NOT SPECIFIED: ICD-10-CM

## 2020-08-19 DIAGNOSIS — J44.1 ACUTE EXACERBATION OF CHRONIC OBSTRUCTIVE PULMONARY DISEASE (COPD) (HCC): ICD-10-CM

## 2020-08-19 DIAGNOSIS — E04.9 ENLARGEMENT OF THYROID: ICD-10-CM

## 2020-08-19 DIAGNOSIS — M31.6 TEMPORAL ARTERITIS (HCC): ICD-10-CM

## 2020-08-19 RX ORDER — PANTOPRAZOLE SODIUM 40 MG/1
40 TABLET, DELAYED RELEASE ORAL DAILY
Qty: 90 TABLET | Refills: 1 | Status: SHIPPED | OUTPATIENT
Start: 2020-08-19 | End: 2021-04-19 | Stop reason: SDUPTHER

## 2020-08-19 RX ORDER — LEVOTHYROXINE SODIUM 0.03 MG/1
25 TABLET ORAL DAILY
Qty: 30 TABLET | Refills: 3 | Status: SHIPPED | OUTPATIENT
Start: 2020-08-19 | End: 2020-09-28 | Stop reason: SDUPTHER

## 2020-08-19 RX ORDER — PREDNISONE 2.5 MG
2.5 TABLET ORAL DAILY
Qty: 30 TABLET | Refills: 3 | Status: SHIPPED | OUTPATIENT
Start: 2020-08-19 | End: 2020-09-28

## 2020-08-27 ENCOUNTER — HOSPITAL ENCOUNTER (EMERGENCY)
Facility: HOSPITAL | Age: 75
Discharge: HOME | End: 2020-08-27
Attending: EMERGENCY MEDICINE | Admitting: EMERGENCY MEDICINE
Payer: COMMERCIAL

## 2020-08-27 ENCOUNTER — APPOINTMENT (EMERGENCY)
Dept: RADIOLOGY | Facility: HOSPITAL | Age: 75
End: 2020-08-27
Payer: COMMERCIAL

## 2020-08-27 ENCOUNTER — APPOINTMENT (EMERGENCY)
Dept: CT IMAGING | Facility: HOSPITAL | Age: 75
End: 2020-08-27
Payer: COMMERCIAL

## 2020-08-27 VITALS
HEART RATE: 89 BPM | OXYGEN SATURATION: 95 % | BODY MASS INDEX: 21.51 KG/M2 | RESPIRATION RATE: 20 BRPM | WEIGHT: 121.4 LBS | DIASTOLIC BLOOD PRESSURE: 96 MMHG | SYSTOLIC BLOOD PRESSURE: 182 MMHG | TEMPERATURE: 99 F

## 2020-08-27 DIAGNOSIS — R20.2 NUMBNESS AND TINGLING: Primary | ICD-10-CM

## 2020-08-27 DIAGNOSIS — R20.0 NUMBNESS AND TINGLING: Primary | ICD-10-CM

## 2020-08-27 LAB
ALBUMIN SERPL BCP-MCNC: 4.7 G/DL (ref 3–5.2)
ALP SERPL-CCNC: 64 U/L (ref 43–122)
ALT SERPL W P-5'-P-CCNC: 15 U/L (ref 9–52)
AMORPH URATE CRY URNS QL MICRO: ABNORMAL /HPF
ANION GAP SERPL CALCULATED.3IONS-SCNC: 6 MMOL/L (ref 5–14)
APTT PPP: 26 SECONDS (ref 23–37)
AST SERPL W P-5'-P-CCNC: 33 U/L (ref 14–36)
BACTERIA UR QL AUTO: ABNORMAL /HPF
BASOPHILS # BLD AUTO: 0.1 THOUSANDS/ΜL (ref 0–0.1)
BASOPHILS NFR BLD AUTO: 1 % (ref 0–1)
BILIRUB SERPL-MCNC: 0.5 MG/DL
BILIRUB UR QL STRIP: NEGATIVE
BUN SERPL-MCNC: 21 MG/DL (ref 5–25)
CALCIUM SERPL-MCNC: 10.5 MG/DL (ref 8.4–10.2)
CHLORIDE SERPL-SCNC: 100 MMOL/L (ref 97–108)
CLARITY UR: CLEAR
CO2 SERPL-SCNC: 31 MMOL/L (ref 22–30)
COLOR UR: YELLOW
CREAT SERPL-MCNC: 0.6 MG/DL (ref 0.6–1.2)
EOSINOPHIL # BLD AUTO: 0.1 THOUSAND/ΜL (ref 0–0.4)
EOSINOPHIL NFR BLD AUTO: 1 % (ref 0–6)
ERYTHROCYTE [DISTWIDTH] IN BLOOD BY AUTOMATED COUNT: 14.8 %
GFR SERPL CREATININE-BSD FRML MDRD: 90 ML/MIN/1.73SQ M
GLUCOSE SERPL-MCNC: 95 MG/DL (ref 70–99)
GLUCOSE UR STRIP-MCNC: NEGATIVE MG/DL
HCT VFR BLD AUTO: 44.6 % (ref 36–46)
HGB BLD-MCNC: 14.8 G/DL (ref 12–16)
HGB UR QL STRIP.AUTO: 25
HYALINE CASTS #/AREA URNS LPF: ABNORMAL /LPF
INR PPP: 0.91 (ref 0.84–1.19)
KETONES UR STRIP-MCNC: ABNORMAL MG/DL
LEUKOCYTE ESTERASE UR QL STRIP: 500
LYMPHOCYTES # BLD AUTO: 0.8 THOUSANDS/ΜL (ref 0.5–4)
LYMPHOCYTES NFR BLD AUTO: 8 % (ref 25–45)
MCH RBC QN AUTO: 28 PG (ref 26–34)
MCHC RBC AUTO-ENTMCNC: 33.2 G/DL (ref 31–36)
MCV RBC AUTO: 85 FL (ref 80–100)
MONOCYTES # BLD AUTO: 0.4 THOUSAND/ΜL (ref 0.2–0.9)
MONOCYTES NFR BLD AUTO: 5 % (ref 1–10)
MUCOUS THREADS UR QL AUTO: ABNORMAL
NEUTROPHILS # BLD AUTO: 8.3 THOUSANDS/ΜL (ref 1.8–7.8)
NEUTS SEG NFR BLD AUTO: 86 % (ref 45–65)
NITRITE UR QL STRIP: NEGATIVE
NON-SQ EPI CELLS URNS QL MICRO: ABNORMAL /HPF
NT-PROBNP SERPL-MCNC: 166 PG/ML (ref 0–299)
PH UR STRIP.AUTO: 6 [PH]
PLATELET # BLD AUTO: 225 THOUSANDS/UL (ref 150–450)
PMV BLD AUTO: 8.1 FL (ref 8.9–12.7)
POTASSIUM SERPL-SCNC: 4.4 MMOL/L (ref 3.6–5)
PROT SERPL-MCNC: 7.9 G/DL (ref 5.9–8.4)
PROT UR STRIP-MCNC: ABNORMAL MG/DL
PROTHROMBIN TIME: 12.3 SECONDS (ref 11.6–14.5)
RBC # BLD AUTO: 5.28 MILLION/UL (ref 4–5.2)
RBC #/AREA URNS AUTO: ABNORMAL /HPF
SODIUM SERPL-SCNC: 137 MMOL/L (ref 137–147)
SP GR UR STRIP.AUTO: 1.02 (ref 1–1.04)
TROPONIN I SERPL-MCNC: <0.01 NG/ML (ref 0–0.03)
TSH SERPL DL<=0.05 MIU/L-ACNC: 0.41 UIU/ML (ref 0.47–4.68)
UROBILINOGEN UA: NEGATIVE MG/DL
WBC # BLD AUTO: 9.7 THOUSAND/UL (ref 4.5–11)
WBC #/AREA URNS AUTO: ABNORMAL /HPF

## 2020-08-27 PROCEDURE — 70450 CT HEAD/BRAIN W/O DYE: CPT

## 2020-08-27 PROCEDURE — 71046 X-RAY EXAM CHEST 2 VIEWS: CPT

## 2020-08-27 PROCEDURE — 84484 ASSAY OF TROPONIN QUANT: CPT | Performed by: EMERGENCY MEDICINE

## 2020-08-27 PROCEDURE — 83880 ASSAY OF NATRIURETIC PEPTIDE: CPT | Performed by: EMERGENCY MEDICINE

## 2020-08-27 PROCEDURE — 36415 COLL VENOUS BLD VENIPUNCTURE: CPT | Performed by: EMERGENCY MEDICINE

## 2020-08-27 PROCEDURE — 99284 EMERGENCY DEPT VISIT MOD MDM: CPT | Performed by: EMERGENCY MEDICINE

## 2020-08-27 PROCEDURE — G1004 CDSM NDSC: HCPCS

## 2020-08-27 PROCEDURE — 84443 ASSAY THYROID STIM HORMONE: CPT | Performed by: EMERGENCY MEDICINE

## 2020-08-27 PROCEDURE — 87086 URINE CULTURE/COLONY COUNT: CPT | Performed by: EMERGENCY MEDICINE

## 2020-08-27 PROCEDURE — 80053 COMPREHEN METABOLIC PANEL: CPT | Performed by: EMERGENCY MEDICINE

## 2020-08-27 PROCEDURE — 93005 ELECTROCARDIOGRAM TRACING: CPT

## 2020-08-27 PROCEDURE — 85610 PROTHROMBIN TIME: CPT | Performed by: EMERGENCY MEDICINE

## 2020-08-27 PROCEDURE — 81001 URINALYSIS AUTO W/SCOPE: CPT | Performed by: EMERGENCY MEDICINE

## 2020-08-27 PROCEDURE — 99285 EMERGENCY DEPT VISIT HI MDM: CPT

## 2020-08-27 PROCEDURE — 85730 THROMBOPLASTIN TIME PARTIAL: CPT | Performed by: EMERGENCY MEDICINE

## 2020-08-27 PROCEDURE — 85025 COMPLETE CBC W/AUTO DIFF WBC: CPT | Performed by: EMERGENCY MEDICINE

## 2020-08-27 NOTE — ED PROVIDER NOTES
History  Chief Complaint   Patient presents with    Numbness     pt c/o BLE numbness x 2 days     75 yo female with a complicated past medical history including asthma, COPD, HTN, DM, hypothyroidism, and anxiety presents to the ED with bilateral lower extremity numbness x 3 days  The patient reports intermittent "pins and needles" in both feet and lower legs  She says the symptoms occur most often at rest or "when I'm thinking about it"  The numbness started on the right but now involves both legs  No pain, swelling, or discoloration  No weakness in the legs  The patient denies chest pain and shortness of breath  No difficulty ambulating/weight bearing  No fevers/chills  She denies speech difficulty, contusion, and facial droop  No other specific complaints  Of note, the patient is currently asymptomatic  Prior to Admission Medications   Prescriptions Last Dose Informant Patient Reported? Taking?   acetaminophen (TYLENOL) 500 mg tablet   No No   Sig: Take 1 tablet (500 mg total) by mouth every 6 (six) hours as needed (pain fevers)   albuterol (PROVENTIL HFA,VENTOLIN HFA) 90 mcg/act inhaler   No No   Sig: Inhale 2 puffs every 4 (four) hours as needed for wheezing   aspirin (ASPIRIN LOW DOSE) 81 MG tablet  Self Yes No   Sig: take one tab  every other day with Dinner   calcium carbonate-vitamin D (OSCAL-D) 500 mg-200 units per tablet  Self Yes No   Sig: Take 1 tablet by mouth 2 (two) times a day with meals   diphenhydrAMINE (BENADRYL) 25 mg tablet   No No   Sig: Take 1 tablet (25 mg total) by mouth every 6 (six) hours as needed for itching In the evening   fexofenadine (ALLEGRA) 180 MG tablet   No No   Sig: Take 1 tablet (180 mg total) by mouth daily In the morning with Breakfast/food   fluticasone-umeclidinium-vilanterol (TRELEGY) 100-62 5-25 MCG/INH inhaler   No No   Sig: Inhale 1 puff daily Rinse mouth after use     levothyroxine (Synthroid) 25 mcg tablet   No No   Sig: Take 1 tablet (25 mcg total) by mouth daily   loratadine (CLARITIN) 10 mg tablet   No No   Sig: Take 1 tablet (10 mg total) by mouth daily In the evening   mupirocin (BACTROBAN) 2 % ointment   No No   Sig: Apply topically 2 (two) times a day Apply to face   pantoprazole (Protonix) 40 mg tablet   No No   Sig: Take 1 tablet (40 mg total) by mouth daily   predniSONE 2 5 mg tablet   No No   Sig: Take 1 tablet (2 5 mg total) by mouth daily With food   vitamin B-12 (VITAMIN B-12) 1,000 mcg tablet   No No   Sig: Take 1 tablet (1,000 mcg total) by mouth daily      Facility-Administered Medications Last Administration Doses Remaining   cyanocobalamin injection 1,000 mcg 1/14/2020  3:08 PM           Past Medical History:   Diagnosis Date    Asthma     [facility] 9/26/2012    GERD (gastroesophageal reflux disease)     Hypertension 10/11/2018    Hypothyroid     Osteoarthritis 9/26/2012    Temporal arteritis ([facility] )     Tubular adenoma     Type 2 diabetes mellitus with complication, without long-term current use of insulin ([facility] ) 1/14/2020       Past Surgical History:   Procedure Laterality Date    EYE SURGERY Right 12/06/2019    cataract LVCFS    HYSTERECTOMY      NO PAST SURGERIES      ALSO NO RELEVANT PAST SURRGICAL HX AS PER [facility]       Family History   Problem Relation Age of Onset    Breast cancer Mother     Emphysema Father      I have reviewed and agree with the history as documented  E-Cigarette/Vaping    E-Cigarette Use Never User      E-Cigarette/Vaping Substances    Nicotine No     THC No     CBD No     Flavoring No      Social History     Tobacco Use    Smoking status: Former Smoker     Years: 15 00     Types: Cigarettes    Smokeless tobacco: Never Used    Tobacco comment: TOBACCO USE, NO PASSIVE SMOKE EXPOSURE   Substance Use Topics    Alcohol use: No    Drug use: No       Review of Systems   Constitutional: Negative for chills and fever  HENT: Negative for sore throat      Eyes: Negative for visual disturbance  Respiratory: Negative for cough and shortness of breath  Cardiovascular: Negative for chest pain  Gastrointestinal: Negative for abdominal pain, diarrhea and vomiting  Endocrine: Negative for cold intolerance and heat intolerance  Genitourinary: Negative for dysuria and frequency  Musculoskeletal: Negative for back pain and gait problem  Skin: Negative for rash  Allergic/Immunologic: Negative for immunocompromised state  Neurological: Positive for numbness  Negative for dizziness, weakness and light-headedness  Hematological: Negative for adenopathy  Psychiatric/Behavioral: Negative for self-injury  The patient is nervous/anxious  Physical Exam  Physical Exam  Constitutional:       General: She is not in acute distress  Appearance: She is well-developed  HENT:      Head: Normocephalic and atraumatic  Eyes:      Pupils: Pupils are equal, round, and reactive to light  Neck:      Musculoskeletal: Normal range of motion and neck supple  Cardiovascular:      Rate and Rhythm: Normal rate and regular rhythm  Pulses:           Dorsalis pedis pulses are 2+ on the right side and 2+ on the left side  Posterior tibial pulses are 2+ on the right side and 2+ on the left side  Pulmonary:      Effort: Pulmonary effort is normal       Breath sounds: Normal breath sounds  Abdominal:      General: There is no distension  Palpations: Abdomen is soft  Tenderness: There is no abdominal tenderness  Musculoskeletal: Normal range of motion  Skin:     General: Skin is warm and dry  Neurological:      Mental Status: She is alert and oriented to person, place, and time  Cranial Nerves: Cranial nerves are intact  Sensory: Sensation is intact  Motor: Motor function is intact  Coordination: Coordination is intact  Gait: Gait is intact  Deep Tendon Reflexes: Reflexes are normal and symmetric        Comments: 5/5 strength in all extremities  Sensation intact           Vital Signs  ED Triage Vitals [08/27/20 1529]   Temperature Pulse Respirations Blood Pressure SpO2   99 °F (37 2 °C) (!) 107 18 (!) 224/130 93 %      Temp Source Heart Rate Source Patient Position - Orthostatic VS BP Location FiO2 (%)   Tympanic Monitor Sitting Left arm --      Pain Score       --           Vitals:    08/27/20 1529 08/27/20 1625   BP: (!) 224/130 (!) 182/96   Pulse: (!) 107 89   Patient Position - Orthostatic VS: Sitting Sitting         Visual Acuity  Visual Acuity      Most Recent Value   L Pupil Size (mm)  3   R Pupil Size (mm)  3          ED Medications  Medications - No data to display    Diagnostic Studies  Results Reviewed     Procedure Component Value Units Date/Time    Urine culture [[id number]]     Lab Status:  No result Specimen:  Urine     Urine Microscopic [[id number]]  (Abnormal) Collected:  08/27/20 1714    Lab Status:  Final result Specimen:  Urine, Clean Catch Updated:  08/27/20 1744     RBC, UA 2-4 /hpf      WBC, UA 2-4 /hpf      Epithelial Cells Moderate /hpf      Bacteria, UA Occasional /hpf      Hyaline Casts, UA 0-1 /lpf      AMORPH URATES Occasional /hpf      MUCUS THREADS Occasional    UA (URINE) with reflex to Scope [[id number]]  (Abnormal) Collected:  08/27/20 1714    Lab Status:  Final result Specimen:  Urine, Clean Catch Updated:  08/27/20 1739     Color, UA Yellow     Clarity, UA Clear     Specific [place], UA 1 020     pH, UA 6 0     Leukocytes,  0     Nitrite, UA Negative     Protein, UA 30 (1+) mg/dl      Glucose, UA Negative mg/dl      Ketones, UA 5 (Trace) mg/dl      Bilirubin, UA Negative     Blood, UA 25 0     UROBILINOGEN UA Negative mg/dL     TSH [[id number]]  (Abnormal) Collected:  08/27/20 1621    Lab Status:  Final result Specimen:  Blood from Arm, Right Updated:  08/27/20 1712     TSH 3RD GENERATON 0 414 uIU/mL     Narrative:       Patients undergoing fluorescein dye angiography may retain small amounts of fluorescein in the body for 48-72 hours post procedure  Samples containing fluorescein can produce falsely depressed TSH values  If the patient had this procedure,a specimen should be resubmitted post fluorescein clearance        Troponin I [[id number]]  (Normal) Collected:  08/27/20 1621    Lab Status:  Final result Specimen:  Blood from Arm, Right Updated:  08/27/20 1654     Troponin I <0 01 ng/mL     NT-BNP PRO [[id number]]  (Normal) Collected:  08/27/20 1621    Lab Status:  Final result Specimen:  Blood from Arm, Right Updated:  08/27/20 1651     NT-proBNP 166 pg/mL     Comprehensive metabolic panel [[id number]]  (Abnormal) Collected:  08/27/20 1621    Lab Status:  Final result Specimen:  Blood from Arm, Right Updated:  08/27/20 1644     Sodium 137 mmol/L      Potassium 4 4 mmol/L      Chloride 100 mmol/L      CO2 31 mmol/L      ANION GAP 6 mmol/L      BUN 21 mg/dL      Creatinine 0 60 mg/dL      Glucose 95 mg/dL      Calcium 10 5 mg/dL      AST 33 U/L      ALT 15 U/L      Alkaline Phosphatase 64 U/L      Total Protein 7 9 g/dL      Albumin 4 7 g/dL      Total Bilirubin 0 50 mg/dL      eGFR 90 ml/min/1 73sq m     Narrative:       [place] guidelines for Chronic Kidney Disease (CKD):     Stage 1 with normal or high GFR (GFR > 90 mL/min/1 73 square meters)    Stage 2 Mild CKD (GFR = 60-89 mL/min/1 73 square meters)    Stage 3A Moderate CKD (GFR = 45-59 mL/min/1 73 square meters)    Stage 3B Moderate CKD (GFR = 30-44 mL/min/1 73 square meters)    Stage 4 Severe CKD (GFR = 15-29 mL/min/1 73 square meters)    Stage 5 End Stage CKD (GFR <15 mL/min/1 73 square meters)  Note: GFR calculation is accurate only with a steady state creatinine    Protime-INR [[id number]]  (Normal) Collected:  08/27/20 1621    Lab Status:  Final result Specimen:  Blood from Arm, Right Updated:  08/27/20 1642     Protime 12 3 seconds      INR 0 91    APTT [[id number]]  (Normal) Collected:  08/27/20 1621    Lab Status: Final result Specimen:  Blood from Arm, Right Updated:  08/27/20 1642     PTT 26 seconds     CBC and differential [[id number]]  (Abnormal) Collected:  08/27/20 1621    Lab Status:  Final result Specimen:  Blood from Arm, Right Updated:  08/27/20 1633     WBC 9 70 Thousand/uL      RBC 5 28 Million/uL      Hemoglobin 14 8 g/dL      Hematocrit 44 6 %      MCV 85 fL      MCH 28 0 pg      MCHC 33 2 g/dL      RDW 14 8 %      MPV 8 1 fL      Platelets 069 Thousands/uL      Neutrophils Relative 86 %      Lymphocytes Relative 8 %      Monocytes Relative 5 %      Eosinophils Relative 1 %      Basophils Relative 1 %      Neutrophils Absolute 8 30 Thousands/µL      Lymphocytes Absolute 0 80 Thousands/µL      Monocytes Absolute 0 40 Thousand/µL      Eosinophils Absolute 0 10 Thousand/µL      Basophils Absolute 0 10 Thousands/µL                  XR chest 2 views   Final Result by Celio Ratliff MD (08/27 1711)      No acute cardiopulmonary disease  Workstation performed: [id number]         CT head without contrast   Final Result by Viridiana Wilkes MD (08/27 1723)      No evidence of acute vascular territorial infarction, intracranial hemorrhage or mass effect  Workstation performed: [id number]                    Procedures  ECG 12 Lead Documentation Only    Date/Time: 8/27/2020 6:51 PM  Performed by: Heena Rothman MD  Authorized by: Heena Rothman MD     Indications / Diagnosis:  Numbness  ECG reviewed by me, the ED Provider: yes    Patient location:  ED  Interpretation:     Interpretation: abnormal    Rate:     ECG rate:  88 bpm    ECG rate assessment: normal    Rhythm:     Rhythm: sinus rhythm    Ectopy:     Ectopy: none    QRS:     QRS axis:  Normal  Conduction:     Conduction: normal    ST segments:     ST segments:  Non-specific  T waves:     T waves: normal               ED Course       US AUDIT      Most Recent Value   Initial Alcohol Screen: US AUDIT-C    1   How often do you have a drink containing alcohol?  0 Filed at: 08/27/2020 1530   2  How many drinks containing alcohol do you have on a typical day you are drinking? 0 Filed at: 08/27/2020 1530   3b  FEMALE Any Age, or MALE 65+: How often do you have 4 or more drinks on one occassion? 0 Filed at: 08/27/2020 1530   Audit-C Score  0 Filed at: 08/27/2020 1530                  MARIMAR/DAST-10      Most Recent Value   How many times in the past year have you    Used an illegal drug or used a prescription medication for non-medical reasons? Never Filed at: 08/27/2020 1530                                [facility]  Number of Diagnoses or Management Options  Numbness and tingling:   Diagnosis management comments: The patient is obviously anxious but otherwise well appearing  BP and HR moderately elevated but the patient says he pressure "always goes high" when she is nervous  She is otherwise currently asymptomatic  Neuro exam unremarkable  Very low clinical suspicion for an acute CVA or peripheral vascular occlusion  Will check EKG, head CT, CXR, basic labs, and troponin  Will continue to monitor in the ED  18:15 Workup unremarkable  BP trending down and the patient says she feels "better"  She is requesting discharge and says she will follow up with her PCP tomorrow  The patient was also encouraged to schedule an appointment with Neurology next week  She is agreeable to this plan  Strict return precautions provided         Amount and/or Complexity of Data Reviewed  Clinical lab tests: ordered and reviewed  Tests in the radiology section of CPT®: ordered and reviewed  Tests in the medicine section of CPT®: ordered and reviewed    Patient Progress  Patient progress: improved        Disposition  Final diagnoses:   Numbness and tingling     Time reflects when diagnosis was documented in both MDM as applicable and the Disposition within this note     Time User Action Codes Description Comment    8/27/2020  6:15 PM Brea Lopez 22 [R20 0,  R20 2] Numbness and tingling       ED Disposition     ED Disposition Condition Date/Time Comment    Discharge Stable Thu Aug 27, 2020  6:15 PM Yulia Dobbins discharge to home/self care  Follow-up Information     Follow up With Specialties Details Why Contact Taqueria Lund MD Internal Medicine Schedule an appointment as soon as possible for a visit in 1 day  [place] [id number]  [phone]            Discharge Medication List as of 8/27/2020  6:15 PM      CONTINUE these medications which have NOT CHANGED    Details   acetaminophen (TYLENOL) 500 mg tablet Take 1 tablet (500 mg total) by mouth every 6 (six) hours as needed (pain fevers), Starting Fri 12/27/2019, Print      albuterol (PROVENTIL HFA,VENTOLIN HFA) 90 mcg/act inhaler Inhale 2 puffs every 4 (four) hours as needed for wheezing, Starting Fri 12/27/2019, Print      aspirin (ASPIRIN LOW DOSE) 81 MG tablet take one tab   every other day with Dinner, Historical Med      calcium carbonate-vitamin D (OSCAL-D) 500 mg-200 units per tablet Take 1 tablet by mouth 2 (two) times a day with meals, Historical Med      diphenhydrAMINE (BENADRYL) 25 mg tablet Take 1 tablet (25 mg total) by mouth every 6 (six) hours as needed for itching In the evening, Starting Tue 1/28/2020, Normal      fexofenadine (ALLEGRA) 180 MG tablet Take 1 tablet (180 mg total) by mouth daily In the morning with Breakfast/food, Starting Mon 6/22/2020, Normal      fluticasone-umeclidinium-vilanterol (TRELEGY) 100-62 5-25 MCG/INH inhaler Inhale 1 puff daily Rinse mouth after use , Starting Wed 8/19/2020, Normal      levothyroxine (Synthroid) 25 mcg tablet Take 1 tablet (25 mcg total) by mouth daily, Starting Wed 8/19/2020, Normal      loratadine (CLARITIN) 10 mg tablet Take 1 tablet (10 mg total) by mouth daily In the evening, Starting Wed 10/23/2019, Normal      mupirocin (BACTROBAN) 2 % ointment Apply topically 2 (two) times a day Apply to face, Starting Mon 6/22/2020, Normal      pantoprazole (Protonix) 40 mg tablet Take 1 tablet (40 mg total) by mouth daily, Starting Wed 8/19/2020, Normal      predniSONE 2 5 mg tablet Take 1 tablet (2 5 mg total) by mouth daily With food, Starting Wed 8/19/2020, Normal      vitamin B-12 (VITAMIN B-12) 1,000 mcg tablet Take 1 tablet (1,000 mcg total) by mouth daily, Starting Tue 6/16/2020, Normal           No discharge procedures on file      PDMP Review     None          ED Provider  Electronically Signed by           Randy Gibson MD  08/27/20 9778

## 2020-08-28 ENCOUNTER — TELEPHONE (OUTPATIENT)
Dept: NEUROLOGY | Facility: CLINIC | Age: 75
End: 2020-08-28

## 2020-08-28 LAB
ATRIAL RATE: 88 BPM
BACTERIA UR CULT: NORMAL
P AXIS: 55 DEGREES
PR INTERVAL: 132 MS
QRS AXIS: 32 DEGREES
QRSD INTERVAL: 74 MS
QT INTERVAL: 372 MS
QTC INTERVAL: 450 MS
T WAVE AXIS: 61 DEGREES
VENTRICULAR RATE: 88 BPM

## 2020-08-28 PROCEDURE — 93010 ELECTROCARDIOGRAM REPORT: CPT | Performed by: INTERNAL MEDICINE

## 2020-08-28 NOTE — TELEPHONE ENCOUNTER
Best contact number for DHRUV:[phone]    Emergency Contact name and number:None    Referring provider and telephone number:[facility] Provider Name and if affiliated with Juvencio Arshad:     Reason for Appointment/Dx:Myasthenia Gravis W/O Acute Exacerbation    Neurology Location patient would like to be seen:[facility] received? Yes                                                Records Received? No    Have you ever seen another Neurologist?       No    Insurance [id number] Medicare Replacement PPO    ID/Policy #:    Secondary Insurance:    ID/Policy#: Workman's Comp/ Accident/ School  Information      Workman's Comp/Accident/School related?        None    If yes name of Insurance company:    Date of Injury:    Type of Injury:     Name and Telephone Number:    Notes:Appointment schedule with patient new patient pack sent                    Appointment date: 1-19-21 9:30am with Dr Oneal Osler

## 2020-09-14 ENCOUNTER — TRANSCRIBE ORDERS (OUTPATIENT)
Dept: LAB | Facility: HOSPITAL | Age: 75
End: 2020-09-14

## 2020-09-14 ENCOUNTER — APPOINTMENT (OUTPATIENT)
Dept: LAB | Facility: HOSPITAL | Age: 75
End: 2020-09-14
Payer: COMMERCIAL

## 2020-09-14 DIAGNOSIS — G70.00 MYASTHENIA GRAVIS WITHOUT EXACERBATION (HCC): Primary | ICD-10-CM

## 2020-09-14 DIAGNOSIS — M31.6 TEMPORAL ARTERITIS (HCC): ICD-10-CM

## 2020-09-14 DIAGNOSIS — E06.3 HYPOTHYROIDISM DUE TO HASHIMOTO'S THYROIDITIS: ICD-10-CM

## 2020-09-14 DIAGNOSIS — E03.8 HYPOTHYROIDISM DUE TO HASHIMOTO'S THYROIDITIS: ICD-10-CM

## 2020-09-14 LAB
ALBUMIN SERPL BCP-MCNC: 4.6 G/DL (ref 3–5.2)
ALP SERPL-CCNC: 63 U/L (ref 43–122)
ALT SERPL W P-5'-P-CCNC: 20 U/L (ref 9–52)
ANION GAP SERPL CALCULATED.3IONS-SCNC: 7 MMOL/L (ref 5–14)
AST SERPL W P-5'-P-CCNC: 34 U/L (ref 14–36)
BACTERIA UR QL AUTO: ABNORMAL /HPF
BASOPHILS # BLD AUTO: 0 THOUSANDS/ΜL (ref 0–0.1)
BASOPHILS NFR BLD AUTO: 1 % (ref 0–1)
BILIRUB SERPL-MCNC: 0.5 MG/DL
BILIRUB UR QL STRIP: NEGATIVE
BUN SERPL-MCNC: 17 MG/DL (ref 5–25)
CALCIUM SERPL-MCNC: 9.8 MG/DL (ref 8.4–10.2)
CHLORIDE SERPL-SCNC: 98 MMOL/L (ref 97–108)
CLARITY UR: CLEAR
CO2 SERPL-SCNC: 32 MMOL/L (ref 22–30)
COLOR UR: YELLOW
CREAT SERPL-MCNC: 0.58 MG/DL (ref 0.6–1.2)
CRP SERPL QL: 7 MG/L
EOSINOPHIL # BLD AUTO: 0.1 THOUSAND/ΜL (ref 0–0.4)
EOSINOPHIL NFR BLD AUTO: 2 % (ref 0–6)
ERYTHROCYTE [DISTWIDTH] IN BLOOD BY AUTOMATED COUNT: 14.4 %
ERYTHROCYTE [SEDIMENTATION RATE] IN BLOOD: 44 MM/HOUR (ref 0–29)
GFR SERPL CREATININE-BSD FRML MDRD: 91 ML/MIN/1.73SQ M
GLUCOSE P FAST SERPL-MCNC: 93 MG/DL (ref 70–99)
GLUCOSE UR STRIP-MCNC: NEGATIVE MG/DL
HCT VFR BLD AUTO: 44.7 % (ref 36–46)
HGB BLD-MCNC: 14.6 G/DL (ref 12–16)
HGB UR QL STRIP.AUTO: 25
KETONES UR STRIP-MCNC: NEGATIVE MG/DL
LEUKOCYTE ESTERASE UR QL STRIP: 500
LYMPHOCYTES # BLD AUTO: 1.1 THOUSANDS/ΜL (ref 0.5–4)
LYMPHOCYTES NFR BLD AUTO: 14 % (ref 25–45)
MCH RBC QN AUTO: 27.3 PG (ref 26–34)
MCHC RBC AUTO-ENTMCNC: 32.5 G/DL (ref 31–36)
MCV RBC AUTO: 84 FL (ref 80–100)
MONOCYTES # BLD AUTO: 0.7 THOUSAND/ΜL (ref 0.2–0.9)
MONOCYTES NFR BLD AUTO: 8 % (ref 1–10)
NEUTROPHILS # BLD AUTO: 6.1 THOUSANDS/ΜL (ref 1.8–7.8)
NEUTS SEG NFR BLD AUTO: 75 % (ref 45–65)
NITRITE UR QL STRIP: NEGATIVE
NON-SQ EPI CELLS URNS QL MICRO: ABNORMAL /HPF
PH UR STRIP.AUTO: 7 [PH]
PLATELET # BLD AUTO: 263 THOUSANDS/UL (ref 150–450)
PMV BLD AUTO: 9.2 FL (ref 8.9–12.7)
POTASSIUM SERPL-SCNC: 4.3 MMOL/L (ref 3.6–5)
PROT SERPL-MCNC: 8 G/DL (ref 5.9–8.4)
PROT UR STRIP-MCNC: NEGATIVE MG/DL
RBC # BLD AUTO: 5.34 MILLION/UL (ref 4–5.2)
RBC #/AREA URNS AUTO: ABNORMAL /HPF
SODIUM SERPL-SCNC: 137 MMOL/L (ref 137–147)
SP GR UR STRIP.AUTO: 1.01 (ref 1–1.04)
T4 FREE SERPL-MCNC: 1.51 NG/DL (ref 0.76–1.46)
TSH SERPL DL<=0.05 MIU/L-ACNC: 1.01 UIU/ML (ref 0.47–4.68)
URINE COMMENT: ABNORMAL
UROBILINOGEN UA: NEGATIVE MG/DL
WBC # BLD AUTO: 8 THOUSAND/UL (ref 4.5–11)
WBC #/AREA URNS AUTO: ABNORMAL /HPF

## 2020-09-14 PROCEDURE — 36415 COLL VENOUS BLD VENIPUNCTURE: CPT

## 2020-09-14 PROCEDURE — 84439 ASSAY OF FREE THYROXINE: CPT

## 2020-09-14 PROCEDURE — 85025 COMPLETE CBC W/AUTO DIFF WBC: CPT

## 2020-09-14 PROCEDURE — 84443 ASSAY THYROID STIM HORMONE: CPT

## 2020-09-14 PROCEDURE — 80053 COMPREHEN METABOLIC PANEL: CPT

## 2020-09-14 PROCEDURE — 86140 C-REACTIVE PROTEIN: CPT

## 2020-09-14 PROCEDURE — 81001 URINALYSIS AUTO W/SCOPE: CPT | Performed by: INTERNAL MEDICINE

## 2020-09-14 PROCEDURE — 83519 RIA NONANTIBODY: CPT

## 2020-09-14 PROCEDURE — 84238 ASSAY NONENDOCRINE RECEPTOR: CPT

## 2020-09-14 PROCEDURE — 85652 RBC SED RATE AUTOMATED: CPT

## 2020-09-16 LAB — ACHR BIND AB SER-SCNC: <0.03 NMOL/L (ref 0–0.24)

## 2020-09-17 LAB — ACHR BLOCK AB/ACHR TOTAL SFR SER: 21 % (ref 0–25)

## 2020-09-28 ENCOUNTER — OFFICE VISIT (OUTPATIENT)
Dept: FAMILY MEDICINE CLINIC | Facility: CLINIC | Age: 75
End: 2020-09-28
Payer: COMMERCIAL

## 2020-09-28 VITALS
TEMPERATURE: 98.7 F | BODY MASS INDEX: 20.98 KG/M2 | DIASTOLIC BLOOD PRESSURE: 84 MMHG | HEART RATE: 86 BPM | OXYGEN SATURATION: 93 % | WEIGHT: 118.4 LBS | RESPIRATION RATE: 16 BRPM | SYSTOLIC BLOOD PRESSURE: 134 MMHG | HEIGHT: 63 IN

## 2020-09-28 DIAGNOSIS — E03.8 HYPOTHYROIDISM DUE TO HASHIMOTO'S THYROIDITIS: ICD-10-CM

## 2020-09-28 DIAGNOSIS — E06.3 HYPOTHYROIDISM DUE TO HASHIMOTO'S THYROIDITIS: ICD-10-CM

## 2020-09-28 DIAGNOSIS — E11.8 TYPE 2 DIABETES MELLITUS WITH COMPLICATION, WITHOUT LONG-TERM CURRENT USE OF INSULIN (HCC): ICD-10-CM

## 2020-09-28 DIAGNOSIS — Z23 NEED FOR INFLUENZA VACCINATION: ICD-10-CM

## 2020-09-28 DIAGNOSIS — E11.8 TYPE II DIABETES MELLITUS WITH MANIFESTATIONS (HCC): ICD-10-CM

## 2020-09-28 DIAGNOSIS — E04.9 ENLARGEMENT OF THYROID: ICD-10-CM

## 2020-09-28 DIAGNOSIS — M31.6 TEMPORAL ARTERITIS (HCC): Primary | ICD-10-CM

## 2020-09-28 PROCEDURE — 99214 OFFICE O/P EST MOD 30 MIN: CPT | Performed by: INTERNAL MEDICINE

## 2020-09-28 PROCEDURE — G0008 ADMIN INFLUENZA VIRUS VAC: HCPCS

## 2020-09-28 PROCEDURE — 3075F SYST BP GE 130 - 139MM HG: CPT | Performed by: INTERNAL MEDICINE

## 2020-09-28 PROCEDURE — 3079F DIAST BP 80-89 MM HG: CPT | Performed by: INTERNAL MEDICINE

## 2020-09-28 PROCEDURE — 1036F TOBACCO NON-USER: CPT | Performed by: INTERNAL MEDICINE

## 2020-09-28 PROCEDURE — 1160F RVW MEDS BY RX/DR IN RCRD: CPT | Performed by: INTERNAL MEDICINE

## 2020-09-28 PROCEDURE — 90662 IIV NO PRSV INCREASED AG IM: CPT

## 2020-09-28 RX ORDER — LEVOTHYROXINE SODIUM 0.03 MG/1
12.5 TABLET ORAL DAILY
Qty: 30 TABLET | Refills: 3 | Status: SHIPPED | OUTPATIENT
Start: 2020-09-28 | End: 2020-11-30

## 2020-09-28 RX ORDER — PREDNISONE 1 MG/1
5 TABLET ORAL DAILY
Qty: 30 TABLET | Refills: 3 | Status: SHIPPED | OUTPATIENT
Start: 2020-09-28 | End: 2020-11-30

## 2020-09-28 NOTE — PROGRESS NOTES
Assessment/Plan:         Diagnoses and all orders for this visit:    Tem[facility] ([facility] ); Increase :  -     predniSONE TO  5 mg tablet; Take 1 tablet (5 mg total) by mouth daily  RTC in 2 mos w :  -     Sedimentation rate, automated; Future  -     C-reactive protein; Future    Need for influenza vaccination  -     influenza vaccine, high-dose, PF 0 7 mL (FLUZONE HIGH-DOSE)  -     Ambulatory referral to Ophthalmology; Future    Reduce :  -     levothyroxine (Synthroid) 25 mcg tablet;  TO Take 0 5 tablets (12 5 mcg total) by mouth daily    Type II diabetes mellitus with manifestations ([facility] ); Life style mod  RTC in 2-3 mos w Blood work    Hypothyroidism due to Hashimoto's thyroiditis; as above :  RTC in 2mos w :  -     T4, free; Future  -     TSH, 3rd generation; Future        Subjective:      Patient ID: Eleanor Renae is a 76 y o  female  76 Y O lady is here for Regular Check up, she feels Erlin Vargas, recent Blood work and med List reviewed w pt in detail, No New symptoms,  The following portions of the patient's history were reviewed and updated as appropriate: allergies, current medications, past family history, past social history, past surgical history and problem list     Review of Systems   Constitutional: Positive for fatigue  Negative for chills and fever  HENT: Negative for congestion, facial swelling, sore throat, trouble swallowing and voice change  Eyes: Negative for pain, discharge and visual disturbance  Respiratory: Negative for cough, shortness of breath and wheezing  Cardiovascular: Negative for chest pain, palpitations and leg swelling  Gastrointestinal: Negative for abdominal pain, blood in stool, constipation, diarrhea and nausea  Endocrine: Negative for polydipsia, polyphagia and polyuria  Genitourinary: Negative for difficulty urinating, hematuria and urgency  Musculoskeletal: Positive for arthralgias  Negative for myalgias  Skin: Negative for rash     Neurological: Negative for dizziness, tremors, weakness and headaches  Hematological: Negative for adenopathy  Does not bruise/bleed easily  Psychiatric/Behavioral: Negative for dysphoric mood, sleep disturbance and suicidal ideas  Objective:      /84 (BP Location: Left arm, Patient Position: Sitting, Cuff Size: Standard)   Pulse 86   Temp 98 7 °F (37 1 °C) (Temporal)   Resp 16   Ht 5' 3" (1 6 m)   Wt 53 7 kg (118 lb 6 4 oz)   SpO2 93%   BMI 20 97 kg/m²          Physical Exam  Constitutional:       General: She is not in acute distress  HENT:      Head: Normocephalic  Mouth/Throat:      Pharynx: No oropharyngeal exudate  Eyes:      General: No scleral icterus  Conjunctiva/sclera: Conjunctivae normal       Pupils: Pupils are equal, round, and reactive to light  Neck:      Musculoskeletal: Neck supple  Thyroid: No thyromegaly  Cardiovascular:      Rate and Rhythm: Normal rate and regular rhythm  Heart sounds: Murmur present  Pulmonary:      Effort: Pulmonary effort is normal  No respiratory distress  Breath sounds: Normal breath sounds  No wheezing or rales  Abdominal:      General: Bowel sounds are normal  There is no distension  Palpations: Abdomen is soft  Tenderness: There is no abdominal tenderness  There is no guarding or rebound  Musculoskeletal:         General: No tenderness  Lymphadenopathy:      Cervical: No cervical adenopathy  Skin:     Coloration: Skin is not pale  Neurological:      Mental Status: She is alert and oriented to person, place, and time  Sensory: No sensory deficit  Motor: No weakness

## 2020-10-27 DIAGNOSIS — J44.1 ACUTE EXACERBATION OF CHRONIC OBSTRUCTIVE PULMONARY DISEASE (COPD) (HCC): ICD-10-CM

## 2020-11-27 ENCOUNTER — APPOINTMENT (OUTPATIENT)
Dept: LAB | Facility: HOSPITAL | Age: 75
End: 2020-11-27
Payer: COMMERCIAL

## 2020-11-27 DIAGNOSIS — M31.6 TEMPORAL ARTERITIS (HCC): ICD-10-CM

## 2020-11-27 DIAGNOSIS — E06.3 HYPOTHYROIDISM DUE TO HASHIMOTO'S THYROIDITIS: ICD-10-CM

## 2020-11-27 DIAGNOSIS — E03.8 HYPOTHYROIDISM DUE TO HASHIMOTO'S THYROIDITIS: ICD-10-CM

## 2020-11-27 LAB
CRP SERPL QL: 7.3 MG/L
ERYTHROCYTE [SEDIMENTATION RATE] IN BLOOD: 34 MM/HOUR (ref 0–29)
T4 FREE SERPL-MCNC: 1.37 NG/DL (ref 0.76–1.46)
TSH SERPL DL<=0.05 MIU/L-ACNC: 4.86 UIU/ML (ref 0.47–4.68)

## 2020-11-27 PROCEDURE — 36415 COLL VENOUS BLD VENIPUNCTURE: CPT

## 2020-11-27 PROCEDURE — 84443 ASSAY THYROID STIM HORMONE: CPT

## 2020-11-27 PROCEDURE — 86140 C-REACTIVE PROTEIN: CPT

## 2020-11-27 PROCEDURE — 84439 ASSAY OF FREE THYROXINE: CPT

## 2020-11-27 PROCEDURE — 85652 RBC SED RATE AUTOMATED: CPT

## 2020-11-30 ENCOUNTER — OFFICE VISIT (OUTPATIENT)
Dept: FAMILY MEDICINE CLINIC | Facility: CLINIC | Age: 75
End: 2020-11-30
Payer: COMMERCIAL

## 2020-11-30 VITALS
SYSTOLIC BLOOD PRESSURE: 140 MMHG | WEIGHT: 120 LBS | OXYGEN SATURATION: 98 % | HEART RATE: 88 BPM | HEIGHT: 63 IN | TEMPERATURE: 98.6 F | BODY MASS INDEX: 21.26 KG/M2 | DIASTOLIC BLOOD PRESSURE: 88 MMHG | RESPIRATION RATE: 16 BRPM

## 2020-11-30 DIAGNOSIS — M81.8 IDIOPATHIC OSTEOPOROSIS: ICD-10-CM

## 2020-11-30 DIAGNOSIS — I10 ESSENTIAL HYPERTENSION: ICD-10-CM

## 2020-11-30 DIAGNOSIS — E06.3 HYPOTHYROIDISM DUE TO HASHIMOTO'S THYROIDITIS: ICD-10-CM

## 2020-11-30 DIAGNOSIS — G93.81 TEMPORAL LOBE EPILEPSY WITH MESIAL TEMPORAL SCLEROSIS (HCC): ICD-10-CM

## 2020-11-30 DIAGNOSIS — E03.8 HYPOTHYROIDISM DUE TO HASHIMOTO'S THYROIDITIS: ICD-10-CM

## 2020-11-30 DIAGNOSIS — R06.02 SOB (SHORTNESS OF BREATH): ICD-10-CM

## 2020-11-30 DIAGNOSIS — M31.6 TEMPORAL ARTERITIS (HCC): Primary | ICD-10-CM

## 2020-11-30 DIAGNOSIS — G40.109 TEMPORAL LOBE EPILEPSY WITH MESIAL TEMPORAL SCLEROSIS (HCC): ICD-10-CM

## 2020-11-30 DIAGNOSIS — E04.9 ENLARGEMENT OF THYROID: ICD-10-CM

## 2020-11-30 DIAGNOSIS — J44.1 ACUTE EXACERBATION OF CHRONIC OBSTRUCTIVE PULMONARY DISEASE (COPD) (HCC): ICD-10-CM

## 2020-11-30 DIAGNOSIS — J43.9 PULMONARY EMPHYSEMA, UNSPECIFIED EMPHYSEMA TYPE (HCC): ICD-10-CM

## 2020-11-30 PROCEDURE — 99214 OFFICE O/P EST MOD 30 MIN: CPT | Performed by: INTERNAL MEDICINE

## 2020-11-30 RX ORDER — CALCIUM CARBONATE/VITAMIN D3 600 MG-20
1 TABLET ORAL 2 TIMES DAILY
Qty: 60 TABLET | Refills: 6 | Status: SHIPPED | OUTPATIENT
Start: 2020-11-30 | End: 2021-03-02 | Stop reason: SDUPTHER

## 2020-11-30 RX ORDER — PREDNISONE 2.5 MG
2.5 TABLET ORAL DAILY
Qty: 30 TABLET | Refills: 3 | Status: SHIPPED | OUTPATIENT
Start: 2020-11-30 | End: 2021-03-02 | Stop reason: SDUPTHER

## 2020-11-30 RX ORDER — ALBUTEROL SULFATE 90 UG/1
2 AEROSOL, METERED RESPIRATORY (INHALATION) EVERY 6 HOURS PRN
Qty: 1 INHALER | Refills: 5 | Status: SHIPPED | OUTPATIENT
Start: 2020-11-30

## 2020-11-30 RX ORDER — LEVOTHYROXINE SODIUM 0.03 MG/1
25 TABLET ORAL DAILY
Qty: 30 TABLET | Refills: 3 | Status: SHIPPED | OUTPATIENT
Start: 2020-11-30 | End: 2021-03-02 | Stop reason: SDUPTHER

## 2021-01-04 ENCOUNTER — CLINICAL SUPPORT (OUTPATIENT)
Dept: FAMILY MEDICINE CLINIC | Facility: CLINIC | Age: 76
End: 2021-01-04
Payer: COMMERCIAL

## 2021-01-04 DIAGNOSIS — M81.8 IDIOPATHIC OSTEOPOROSIS: Primary | ICD-10-CM

## 2021-01-04 PROCEDURE — 96372 THER/PROPH/DIAG INJ SC/IM: CPT

## 2021-02-04 DIAGNOSIS — J44.1 ACUTE EXACERBATION OF CHRONIC OBSTRUCTIVE PULMONARY DISEASE (COPD) (HCC): ICD-10-CM

## 2021-02-08 ENCOUNTER — IMMUNIZATIONS (OUTPATIENT)
Dept: FAMILY MEDICINE CLINIC | Facility: HOSPITAL | Age: 76
End: 2021-02-08

## 2021-02-08 DIAGNOSIS — Z23 ENCOUNTER FOR IMMUNIZATION: Primary | ICD-10-CM

## 2021-02-08 PROCEDURE — 0011A SARS-COV-2 / COVID-19 MRNA VACCINE (MODERNA) 100 MCG: CPT

## 2021-02-08 PROCEDURE — 91301 SARS-COV-2 / COVID-19 MRNA VACCINE (MODERNA) 100 MCG: CPT

## 2021-03-02 ENCOUNTER — OFFICE VISIT (OUTPATIENT)
Dept: FAMILY MEDICINE CLINIC | Facility: CLINIC | Age: 76
End: 2021-03-02
Payer: COMMERCIAL

## 2021-03-02 VITALS
WEIGHT: 119 LBS | RESPIRATION RATE: 14 BRPM | OXYGEN SATURATION: 96 % | SYSTOLIC BLOOD PRESSURE: 122 MMHG | HEART RATE: 80 BPM | DIASTOLIC BLOOD PRESSURE: 84 MMHG | TEMPERATURE: 97.3 F | HEIGHT: 63 IN | BODY MASS INDEX: 21.09 KG/M2

## 2021-03-02 DIAGNOSIS — J44.9 STAGE 4 VERY SEVERE COPD BY GOLD CLASSIFICATION (HCC): ICD-10-CM

## 2021-03-02 DIAGNOSIS — G57.90 NEUROPATHY OF FOOT, UNSPECIFIED LATERALITY: ICD-10-CM

## 2021-03-02 DIAGNOSIS — E11.8 TYPE 2 DIABETES MELLITUS WITH COMPLICATION, WITHOUT LONG-TERM CURRENT USE OF INSULIN (HCC): ICD-10-CM

## 2021-03-02 DIAGNOSIS — M81.8 IDIOPATHIC OSTEOPOROSIS: ICD-10-CM

## 2021-03-02 DIAGNOSIS — G40.109 TEMPORAL LOBE EPILEPSY WITH MESIAL TEMPORAL SCLEROSIS (HCC): ICD-10-CM

## 2021-03-02 DIAGNOSIS — J44.1 ACUTE EXACERBATION OF CHRONIC OBSTRUCTIVE PULMONARY DISEASE (COPD) (HCC): Primary | ICD-10-CM

## 2021-03-02 DIAGNOSIS — E04.9 ENLARGEMENT OF THYROID: ICD-10-CM

## 2021-03-02 DIAGNOSIS — R73.09 ELEVATED HEMOGLOBIN A1C: ICD-10-CM

## 2021-03-02 DIAGNOSIS — G93.81 TEMPORAL LOBE EPILEPSY WITH MESIAL TEMPORAL SCLEROSIS (HCC): ICD-10-CM

## 2021-03-02 DIAGNOSIS — M31.6 TEMPORAL ARTERITIS (HCC): ICD-10-CM

## 2021-03-02 PROCEDURE — 1160F RVW MEDS BY RX/DR IN RCRD: CPT | Performed by: INTERNAL MEDICINE

## 2021-03-02 PROCEDURE — 3074F SYST BP LT 130 MM HG: CPT | Performed by: INTERNAL MEDICINE

## 2021-03-02 PROCEDURE — 3079F DIAST BP 80-89 MM HG: CPT | Performed by: INTERNAL MEDICINE

## 2021-03-02 PROCEDURE — 99214 OFFICE O/P EST MOD 30 MIN: CPT | Performed by: INTERNAL MEDICINE

## 2021-03-02 PROCEDURE — 1036F TOBACCO NON-USER: CPT | Performed by: INTERNAL MEDICINE

## 2021-03-02 RX ORDER — LEVOTHYROXINE SODIUM 0.03 MG/1
25 TABLET ORAL DAILY
Qty: 30 TABLET | Refills: 3 | Status: SHIPPED | OUTPATIENT
Start: 2021-03-02 | End: 2021-04-12 | Stop reason: SDUPTHER

## 2021-03-02 RX ORDER — CALCIUM CARBONATE/VITAMIN D3 600 MG-20
1 TABLET ORAL 2 TIMES DAILY
Qty: 60 TABLET | Refills: 6 | Status: SHIPPED | OUTPATIENT
Start: 2021-03-02

## 2021-03-02 RX ORDER — IPRATROPIUM BROMIDE AND ALBUTEROL SULFATE 2.5; .5 MG/3ML; MG/3ML
3 SOLUTION RESPIRATORY (INHALATION) 4 TIMES DAILY
Qty: 120 VIAL | Refills: 5 | Status: SHIPPED | OUTPATIENT
Start: 2021-03-02 | End: 2021-03-08 | Stop reason: SDUPTHER

## 2021-03-02 RX ORDER — PREDNISONE 2.5 MG
2.5 TABLET ORAL DAILY
Qty: 30 TABLET | Refills: 3 | Status: SHIPPED | OUTPATIENT
Start: 2021-03-02 | End: 2021-04-19 | Stop reason: SDUPTHER

## 2021-03-02 RX ORDER — ALBUTEROL SULFATE 2.5 MG/3ML
2.5 SOLUTION RESPIRATORY (INHALATION) ONCE
Status: COMPLETED | OUTPATIENT
Start: 2021-03-02 | End: 2021-03-02

## 2021-03-02 RX ADMIN — Medication 0.5 MG: at 14:48

## 2021-03-02 RX ADMIN — ALBUTEROL SULFATE 2.5 MG: 2.5 SOLUTION RESPIRATORY (INHALATION) at 14:49

## 2021-03-02 NOTE — PROGRESS NOTES
Assessment/Plan:         Diagnoses and all orders for this visit:    Acute exacerbation of chronic obstructive pulmonary disease (COPD) ([facility] ); [place], neb tx  -     fluticasone-umeclidinium-vilanterol (TRELEGY) 100-62 5-25 MCG/INH inhaler; Inhale 1 puff daily Rinse mouth after use  Use Neb txs  RTC in 1-2 weeks w Blood work    Temporal arteritis ([place] [address] )  -     predniSONE 2 5 mg tablet; Take 1 tablet (2 5 mg total) by mouth daily  -     Sedimentation rate, automated; Future  -     C-reactive protein; Future    Enlargement of thyroid/ Hypothyroidism : continue :  -     levothyroxine (Synthroid) 25 mcg tablet; Take 1 tablet (25 mcg total) by mouth daily    Idiopathic osteoporosis  -     Calcium Carb-Cholecalciferol (Caltrate 600+D3) 600-800 MG-UNIT TABS; Take 1 tablet by mouth 2 (two) times a day    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 ([place] [address] ); stable  Continue same  FU w neurology    Stage 4 very severe COPD by GOLD classification ([place] [address] ); Start :  -     Nebulizer  -     Nebulizer Supplies  -     ipratropium-albuterol (DUO-NEB) 0 5-2 5 mg/3 mL nebulizer solution; Take 1 vial (3 mL total) by nebulization 4 (four) times a day  -     albuterol inhalation solution 2 5 mg  -     ipratropium (ATROVENT) 0 02 % inhalation solution 0 5 mg    Elevated hemoglobin A1c ; life style Mod  RTC in 3 mos w :  -     Comprehensive metabolic panel; Future  -     CBC and differential; Future  -     Ferritin; Future  -     Magnesium; Future  -     Lipid panel; Future  -     TSH, 3rd generation; Future  -     UA (URINE) with reflex to Scope; Future  -     Ambulatory referral to Podiatry; Future  -     Hemoglobin A1C; Future    Neuropathy of foot, unspecified laterality  -     Ferritin; Future  -     TSH, 3rd generation; Future  -     T4, free; Future  -     Vitamin B12; Future  -     Vitamin D 25 hydroxy; Future  -     Ambulatory referral to Podiatry;  Future    Other orders  -     Cancel: POCT hemoglobin A1c        Subjective: Patient ID: Stephanie Taveras is a 76 y o  female  [id number] is here for Regular check up, and increased cold symptoms for last few days, No expose to covid, recent Blood work and med list reviewed    The following portions of the patient's history were reviewed and updated as appropriate: allergies, current medications, past family history, past social history, past surgical history and problem list     Review of Systems   Constitutional: Positive for fatigue  Negative for chills and fever  HENT: Positive for congestion, postnasal drip and sore throat  Negative for facial swelling, trouble swallowing and voice change  Eyes: Negative for pain, discharge and visual disturbance  Respiratory: Positive for shortness of breath and wheezing  Negative for cough  Cardiovascular: Negative for chest pain, palpitations and leg swelling  Gastrointestinal: Negative for abdominal pain, blood in stool, constipation, diarrhea and nausea  Endocrine: Negative for polydipsia, polyphagia and polyuria  Genitourinary: Negative for difficulty urinating, hematuria and urgency  Musculoskeletal: Negative for arthralgias and myalgias  Skin: Negative for rash  Neurological: Negative for dizziness, tremors, weakness and headaches  Hematological: Negative for adenopathy  Does not bruise/bleed easily  Psychiatric/Behavioral: Negative for dysphoric mood, sleep disturbance and suicidal ideas  Objective:      /84 (BP Location: Left arm, Patient Position: Sitting, Cuff Size: Standard)   Pulse 80   Temp (!) 97 3 °F (36 3 °C) (Tympanic)   Resp 14   Ht 5' 3" (1 6 m)   Wt 54 kg (119 lb)   SpO2 96%   BMI 21 08 kg/m²          Physical Exam  Constitutional:       General: She is not in acute distress  HENT:      Head: Normocephalic  Mouth/Throat:      Pharynx: No oropharyngeal exudate  Eyes:      General: No scleral icterus       Conjunctiva/sclera: Conjunctivae normal       Pupils: Pupils are equal, round, and reactive to light  Neck:      Musculoskeletal: Neck supple  Thyroid: No thyromegaly  Cardiovascular:      Rate and Rhythm: Normal rate and regular rhythm  Heart sounds: Normal heart sounds  No murmur  Pulmonary:      Effort: Pulmonary effort is normal  No respiratory distress  Breath sounds: Wheezing present  No rales  Abdominal:      General: Bowel sounds are normal  There is no distension  Palpations: Abdomen is soft  Tenderness: There is no abdominal tenderness  There is no guarding or rebound  Musculoskeletal:         General: No tenderness  Lymphadenopathy:      Cervical: No cervical adenopathy  Skin:     Coloration: Skin is not pale  Findings: No rash  Neurological:      Mental Status: She is alert and oriented to person, place, and time  Sensory: No sensory deficit  Motor: No weakness

## 2021-03-08 ENCOUNTER — IMMUNIZATIONS (OUTPATIENT)
Dept: FAMILY MEDICINE CLINIC | Facility: HOSPITAL | Age: 76
End: 2021-03-08

## 2021-03-08 DIAGNOSIS — Z23 ENCOUNTER FOR IMMUNIZATION: Primary | ICD-10-CM

## 2021-03-08 DIAGNOSIS — J44.9 STAGE 4 VERY SEVERE COPD BY GOLD CLASSIFICATION (HCC): ICD-10-CM

## 2021-03-08 PROCEDURE — 0012A SARS-COV-2 / COVID-19 MRNA VACCINE (MODERNA) 100 MCG: CPT

## 2021-03-08 PROCEDURE — 91301 SARS-COV-2 / COVID-19 MRNA VACCINE (MODERNA) 100 MCG: CPT

## 2021-03-08 RX ORDER — IPRATROPIUM BROMIDE AND ALBUTEROL SULFATE 2.5; .5 MG/3ML; MG/3ML
3 SOLUTION RESPIRATORY (INHALATION) 4 TIMES DAILY
Qty: 120 VIAL | Refills: 5 | Status: SHIPPED | OUTPATIENT
Start: 2021-03-08

## 2021-03-22 ENCOUNTER — APPOINTMENT (OUTPATIENT)
Dept: LAB | Facility: HOSPITAL | Age: 76
End: 2021-03-22
Payer: COMMERCIAL

## 2021-03-22 DIAGNOSIS — E06.3 HYPOTHYROIDISM DUE TO HASHIMOTO'S THYROIDITIS: ICD-10-CM

## 2021-03-22 DIAGNOSIS — M31.6 TEMPORAL ARTERITIS (HCC): ICD-10-CM

## 2021-03-22 DIAGNOSIS — R73.09 ELEVATED HEMOGLOBIN A1C: ICD-10-CM

## 2021-03-22 DIAGNOSIS — G57.90 NEUROPATHY OF FOOT, UNSPECIFIED LATERALITY: ICD-10-CM

## 2021-03-22 DIAGNOSIS — E03.8 HYPOTHYROIDISM DUE TO HASHIMOTO'S THYROIDITIS: ICD-10-CM

## 2021-03-22 LAB
25(OH)D3 SERPL-MCNC: 68.1 NG/ML (ref 30–100)
ALBUMIN SERPL BCP-MCNC: 4.9 G/DL (ref 3–5.2)
ALP SERPL-CCNC: 70 U/L (ref 43–122)
ALT SERPL W P-5'-P-CCNC: 25 U/L (ref 9–52)
ANION GAP SERPL CALCULATED.3IONS-SCNC: 10 MMOL/L (ref 5–14)
AST SERPL W P-5'-P-CCNC: 40 U/L (ref 14–36)
BASOPHILS # BLD AUTO: 0.1 THOUSANDS/ΜL (ref 0–0.1)
BASOPHILS NFR BLD AUTO: 1 % (ref 0–1)
BILIRUB SERPL-MCNC: 0.7 MG/DL
BUN SERPL-MCNC: 19 MG/DL (ref 5–25)
CALCIUM SERPL-MCNC: 9.7 MG/DL (ref 8.4–10.2)
CHLORIDE SERPL-SCNC: 96 MMOL/L (ref 97–108)
CHOLEST SERPL-MCNC: 152 MG/DL
CO2 SERPL-SCNC: 32 MMOL/L (ref 22–30)
CREAT SERPL-MCNC: 0.64 MG/DL (ref 0.6–1.2)
CRP SERPL QL: 11.8 MG/L
EOSINOPHIL # BLD AUTO: 0.2 THOUSAND/ΜL (ref 0–0.4)
EOSINOPHIL NFR BLD AUTO: 2 % (ref 0–6)
ERYTHROCYTE [DISTWIDTH] IN BLOOD BY AUTOMATED COUNT: 14.3 %
ERYTHROCYTE [SEDIMENTATION RATE] IN BLOOD: 32 MM/HOUR (ref 0–29)
FERRITIN SERPL-MCNC: 62 NG/ML (ref 8–388)
GFR SERPL CREATININE-BSD FRML MDRD: 88 ML/MIN/1.73SQ M
GLUCOSE P FAST SERPL-MCNC: 89 MG/DL (ref 70–99)
HCT VFR BLD AUTO: 44.8 % (ref 36–46)
HDLC SERPL-MCNC: 48 MG/DL
HGB BLD-MCNC: 14.3 G/DL (ref 12–16)
LDLC SERPL CALC-MCNC: 81 MG/DL
LYMPHOCYTES # BLD AUTO: 1.1 THOUSANDS/ΜL (ref 0.5–4)
LYMPHOCYTES NFR BLD AUTO: 12 % (ref 25–45)
MAGNESIUM SERPL-MCNC: 2.2 MG/DL (ref 1.6–2.3)
MCH RBC QN AUTO: 27.5 PG (ref 26–34)
MCHC RBC AUTO-ENTMCNC: 31.8 G/DL (ref 31–36)
MCV RBC AUTO: 86 FL (ref 80–100)
MONOCYTES # BLD AUTO: 0.6 THOUSAND/ΜL (ref 0.2–0.9)
MONOCYTES NFR BLD AUTO: 7 % (ref 1–10)
NEUTROPHILS # BLD AUTO: 6.8 THOUSANDS/ΜL (ref 1.8–7.8)
NEUTS SEG NFR BLD AUTO: 78 % (ref 45–65)
NONHDLC SERPL-MCNC: 104 MG/DL
PLATELET # BLD AUTO: 262 THOUSANDS/UL (ref 150–450)
PMV BLD AUTO: 8.6 FL (ref 8.9–12.7)
POTASSIUM SERPL-SCNC: 3.7 MMOL/L (ref 3.6–5)
PROT SERPL-MCNC: 8.5 G/DL (ref 5.9–8.4)
RBC # BLD AUTO: 5.18 MILLION/UL (ref 4–5.2)
SODIUM SERPL-SCNC: 138 MMOL/L (ref 137–147)
T4 FREE SERPL-MCNC: 1.38 NG/DL (ref 0.76–1.46)
TRIGL SERPL-MCNC: 113 MG/DL
TSH SERPL DL<=0.05 MIU/L-ACNC: 2.51 UIU/ML (ref 0.47–4.68)
VIT B12 SERPL-MCNC: >6000 PG/ML (ref 100–900)
WBC # BLD AUTO: 8.7 THOUSAND/UL (ref 4.5–11)

## 2021-03-22 PROCEDURE — 85025 COMPLETE CBC W/AUTO DIFF WBC: CPT

## 2021-03-22 PROCEDURE — 84439 ASSAY OF FREE THYROXINE: CPT

## 2021-03-22 PROCEDURE — 83735 ASSAY OF MAGNESIUM: CPT

## 2021-03-22 PROCEDURE — 36415 COLL VENOUS BLD VENIPUNCTURE: CPT

## 2021-03-22 PROCEDURE — 84443 ASSAY THYROID STIM HORMONE: CPT

## 2021-03-22 PROCEDURE — 86800 THYROGLOBULIN ANTIBODY: CPT

## 2021-03-22 PROCEDURE — 86140 C-REACTIVE PROTEIN: CPT

## 2021-03-22 PROCEDURE — 80061 LIPID PANEL: CPT

## 2021-03-22 PROCEDURE — 82306 VITAMIN D 25 HYDROXY: CPT

## 2021-03-22 PROCEDURE — 82607 VITAMIN B-12: CPT

## 2021-03-22 PROCEDURE — 80053 COMPREHEN METABOLIC PANEL: CPT

## 2021-03-22 PROCEDURE — 86376 MICROSOMAL ANTIBODY EACH: CPT

## 2021-03-22 PROCEDURE — 82728 ASSAY OF FERRITIN: CPT

## 2021-03-22 PROCEDURE — 85652 RBC SED RATE AUTOMATED: CPT

## 2021-03-23 LAB
THYROGLOB AB SERPL-ACNC: <1 IU/ML (ref 0–0.9)
THYROPEROXIDASE AB SERPL-ACNC: 92 IU/ML (ref 0–34)

## 2021-04-12 DIAGNOSIS — E04.9 ENLARGEMENT OF THYROID: ICD-10-CM

## 2021-04-12 RX ORDER — LEVOTHYROXINE SODIUM 0.03 MG/1
25 TABLET ORAL DAILY
Qty: 30 TABLET | Refills: 3 | Status: SHIPPED | OUTPATIENT
Start: 2021-04-12 | End: 2021-04-19 | Stop reason: SDUPTHER

## 2021-04-19 ENCOUNTER — OFFICE VISIT (OUTPATIENT)
Dept: FAMILY MEDICINE CLINIC | Facility: CLINIC | Age: 76
End: 2021-04-19
Payer: COMMERCIAL

## 2021-04-19 VITALS
OXYGEN SATURATION: 96 % | WEIGHT: 120.6 LBS | HEART RATE: 94 BPM | DIASTOLIC BLOOD PRESSURE: 82 MMHG | HEIGHT: 63 IN | TEMPERATURE: 98 F | RESPIRATION RATE: 22 BRPM | SYSTOLIC BLOOD PRESSURE: 142 MMHG | BODY MASS INDEX: 21.37 KG/M2

## 2021-04-19 DIAGNOSIS — K21.9 GASTROESOPHAGEAL REFLUX DISEASE: ICD-10-CM

## 2021-04-19 DIAGNOSIS — M31.6 TEMPORAL ARTERITIS (HCC): ICD-10-CM

## 2021-04-19 DIAGNOSIS — E04.9 ENLARGEMENT OF THYROID: ICD-10-CM

## 2021-04-19 DIAGNOSIS — J30.9 ALLERGIC RHINITIS, UNSPECIFIED SEASONALITY, UNSPECIFIED TRIGGER: Primary | ICD-10-CM

## 2021-04-19 PROCEDURE — 1036F TOBACCO NON-USER: CPT | Performed by: INTERNAL MEDICINE

## 2021-04-19 PROCEDURE — 1160F RVW MEDS BY RX/DR IN RCRD: CPT | Performed by: INTERNAL MEDICINE

## 2021-04-19 PROCEDURE — 3725F SCREEN DEPRESSION PERFORMED: CPT | Performed by: INTERNAL MEDICINE

## 2021-04-19 PROCEDURE — 3079F DIAST BP 80-89 MM HG: CPT | Performed by: INTERNAL MEDICINE

## 2021-04-19 PROCEDURE — 3288F FALL RISK ASSESSMENT DOCD: CPT | Performed by: INTERNAL MEDICINE

## 2021-04-19 PROCEDURE — 99214 OFFICE O/P EST MOD 30 MIN: CPT | Performed by: INTERNAL MEDICINE

## 2021-04-19 PROCEDURE — 1101F PT FALLS ASSESS-DOCD LE1/YR: CPT | Performed by: INTERNAL MEDICINE

## 2021-04-19 PROCEDURE — 3077F SYST BP >= 140 MM HG: CPT | Performed by: INTERNAL MEDICINE

## 2021-04-19 RX ORDER — LEVOTHYROXINE SODIUM 0.03 MG/1
25 TABLET ORAL DAILY
Qty: 30 TABLET | Refills: 3 | Status: SHIPPED | OUTPATIENT
Start: 2021-04-19 | End: 2021-08-09 | Stop reason: SDUPTHER

## 2021-04-19 RX ORDER — CETIRIZINE HYDROCHLORIDE 10 MG/1
10 TABLET ORAL DAILY
Qty: 30 TABLET | Refills: 3 | Status: SHIPPED | OUTPATIENT
Start: 2021-04-19 | End: 2022-03-07 | Stop reason: SDUPTHER

## 2021-04-19 RX ORDER — AZELASTINE 1 MG/ML
1 SPRAY, METERED NASAL 2 TIMES DAILY
Qty: 1 BOTTLE | Refills: 3 | Status: SHIPPED | OUTPATIENT
Start: 2021-04-19

## 2021-04-19 RX ORDER — PREDNISONE 2.5 MG
2.5 TABLET ORAL DAILY
Qty: 30 TABLET | Refills: 3 | Status: SHIPPED | OUTPATIENT
Start: 2021-04-19 | End: 2021-11-15 | Stop reason: SDUPTHER

## 2021-04-19 RX ORDER — PANTOPRAZOLE SODIUM 40 MG/1
40 TABLET, DELAYED RELEASE ORAL DAILY
Qty: 90 TABLET | Refills: 1 | Status: SHIPPED | OUTPATIENT
Start: 2021-04-19 | End: 2021-10-28 | Stop reason: SDUPTHER

## 2021-04-20 NOTE — PROGRESS NOTES
Assessment/Plan:         Diagnoses and all orders for this visit:    Allergic rhinitis, unspecified seasonality, unspecified trigger;  -     cetirizine (ZyrTEC) 10 mg tablet; Take 1 tablet (10 mg total) by mouth daily With food/in the evening  -     azelastine (ASTELIN) 0 1 % nasal spray; 1 spray into each nostril 2 (two) times a day Use in each nostril as directed    Gastroesophageal reflux disease  -     pantoprazole (Protonix) 40 mg tablet; Take 1 tablet (40 mg total) by mouth daily    Temporal arteritis ([facility])  -     predniSONE 2 5 mg tablet; Take 1 tablet (2 5 mg total) by mouth daily    Hypothyroid ;  -     levothyroxine (Synthroid) 25 mcg tablet; Take 1 tablet (25 mcg total) by mouth daily        Subjective:      Patient ID: Birgit Bertrand is a 76 y o  female  [address] is here for regular check up, she feels same, recent blood work and med list reviewed ,  The following portions of the patient's history were reviewed and updated as appropriate: allergies, past family history, past medical history, past social history, past surgical history and problem list     Review of Systems   Constitutional: Negative for chills, fatigue and fever  HENT: Positive for postnasal drip and rhinorrhea  Negative for congestion, facial swelling, sore throat, trouble swallowing and voice change  Eyes: Negative for pain, discharge and visual disturbance  Respiratory: Positive for cough  Negative for shortness of breath and wheezing  Cardiovascular: Negative for chest pain, palpitations and leg swelling  Gastrointestinal: Negative for abdominal pain, blood in stool, constipation, diarrhea and nausea  Endocrine: Negative for polydipsia, polyphagia and polyuria  Genitourinary: Negative for difficulty urinating, hematuria and urgency  Musculoskeletal: Negative for arthralgias and myalgias  Skin: Negative for rash  Neurological: Negative for dizziness, tremors, weakness and headaches     Hematological: Negative for adenopathy  Does not bruise/bleed easily  Psychiatric/Behavioral: Negative for dysphoric mood, sleep disturbance and suicidal ideas  Objective:      /82   Pulse 94   Temp 98 °F (36 7 °C)   Resp 22   Ht 5' 3" (1 6 m)   Wt 54 7 kg (120 lb 9 6 oz)   SpO2 96%   BMI 21 36 kg/m²          Physical Exam  Constitutional:       General: She is not in acute distress  HENT:      Head: Normocephalic  Ears:      Comments: Post nasal drip     Mouth/Throat:      Pharynx: No oropharyngeal exudate  Eyes:      General: No scleral icterus  Conjunctiva/sclera: Conjunctivae normal       Pupils: Pupils are equal, round, and reactive to light  Neck:      Musculoskeletal: Neck supple  Thyroid: No thyromegaly  Cardiovascular:      Rate and Rhythm: Normal rate and regular rhythm  Heart sounds: Murmur present  Pulmonary:      Effort: Pulmonary effort is normal  No respiratory distress  Breath sounds: Wheezing present  No rales  Abdominal:      General: Bowel sounds are normal  There is no distension  Palpations: Abdomen is soft  Tenderness: There is no abdominal tenderness  There is no guarding or rebound  Musculoskeletal:         General: No tenderness  Lymphadenopathy:      Cervical: No cervical adenopathy  Skin:     Coloration: Skin is not pale  Findings: No rash  Neurological:      Mental Status: She is alert and oriented to person, place, and time  Sensory: No sensory deficit  Motor: No weakness

## 2021-07-12 ENCOUNTER — TELEPHONE (OUTPATIENT)
Dept: FAMILY MEDICINE CLINIC | Facility: CLINIC | Age: 76
End: 2021-07-12

## 2021-07-12 DIAGNOSIS — J06.9 ACUTE URI: Primary | ICD-10-CM

## 2021-07-12 RX ORDER — BENZONATATE 100 MG/1
100 CAPSULE ORAL 3 TIMES DAILY PRN
Qty: 30 CAPSULE | Refills: 0 | Status: SHIPPED | OUTPATIENT
Start: 2021-07-12 | End: 2021-08-09 | Stop reason: SDUPTHER

## 2021-07-12 RX ORDER — AMOXICILLIN 500 MG/1
500 CAPSULE ORAL EVERY 8 HOURS SCHEDULED
Qty: 30 CAPSULE | Refills: 0 | Status: SHIPPED | OUTPATIENT
Start: 2021-07-12 | End: 2021-07-20 | Stop reason: SDUPTHER

## 2021-07-20 DIAGNOSIS — J06.9 ACUTE URI: ICD-10-CM

## 2021-07-20 RX ORDER — AMOXICILLIN 500 MG/1
500 CAPSULE ORAL EVERY 8 HOURS SCHEDULED
Qty: 30 CAPSULE | Refills: 0 | Status: SHIPPED | OUTPATIENT
Start: 2021-07-20 | End: 2021-07-30

## 2021-08-09 ENCOUNTER — OFFICE VISIT (OUTPATIENT)
Dept: FAMILY MEDICINE CLINIC | Facility: CLINIC | Age: 76
End: 2021-08-09
Payer: COMMERCIAL

## 2021-08-09 VITALS
TEMPERATURE: 98 F | BODY MASS INDEX: 20.2 KG/M2 | RESPIRATION RATE: 18 BRPM | SYSTOLIC BLOOD PRESSURE: 138 MMHG | WEIGHT: 114 LBS | OXYGEN SATURATION: 96 % | HEIGHT: 63 IN | HEART RATE: 93 BPM | DIASTOLIC BLOOD PRESSURE: 78 MMHG

## 2021-08-09 DIAGNOSIS — E11.8 TYPE 2 DIABETES MELLITUS WITH COMPLICATION, WITHOUT LONG-TERM CURRENT USE OF INSULIN (HCC): ICD-10-CM

## 2021-08-09 DIAGNOSIS — J44.1 ACUTE EXACERBATION OF CHRONIC OBSTRUCTIVE PULMONARY DISEASE (COPD) (HCC): ICD-10-CM

## 2021-08-09 DIAGNOSIS — J06.9 ACUTE URI: ICD-10-CM

## 2021-08-09 DIAGNOSIS — Z12.31 ENCOUNTER FOR SCREENING MAMMOGRAM FOR MALIGNANT NEOPLASM OF BREAST: ICD-10-CM

## 2021-08-09 DIAGNOSIS — E04.9 ENLARGEMENT OF THYROID: ICD-10-CM

## 2021-08-09 DIAGNOSIS — E03.8 HYPOTHYROIDISM DUE TO HASHIMOTO'S THYROIDITIS: ICD-10-CM

## 2021-08-09 DIAGNOSIS — E06.3 HYPOTHYROIDISM DUE TO HASHIMOTO'S THYROIDITIS: ICD-10-CM

## 2021-08-09 DIAGNOSIS — J43.9 PULMONARY EMPHYSEMA, UNSPECIFIED EMPHYSEMA TYPE (HCC): ICD-10-CM

## 2021-08-09 DIAGNOSIS — Z00.01 ENCOUNTER FOR GENERAL ADULT MEDICAL EXAMINATION WITH ABNORMAL FINDINGS: Primary | ICD-10-CM

## 2021-08-09 PROCEDURE — 1160F RVW MEDS BY RX/DR IN RCRD: CPT | Performed by: INTERNAL MEDICINE

## 2021-08-09 PROCEDURE — 1170F FXNL STATUS ASSESSED: CPT | Performed by: INTERNAL MEDICINE

## 2021-08-09 PROCEDURE — G0439 PPPS, SUBSEQ VISIT: HCPCS | Performed by: INTERNAL MEDICINE

## 2021-08-09 PROCEDURE — 1036F TOBACCO NON-USER: CPT | Performed by: INTERNAL MEDICINE

## 2021-08-09 PROCEDURE — 1125F AMNT PAIN NOTED PAIN PRSNT: CPT | Performed by: INTERNAL MEDICINE

## 2021-08-09 PROCEDURE — 3288F FALL RISK ASSESSMENT DOCD: CPT | Performed by: INTERNAL MEDICINE

## 2021-08-09 PROCEDURE — 99214 OFFICE O/P EST MOD 30 MIN: CPT | Performed by: INTERNAL MEDICINE

## 2021-08-09 RX ORDER — BENZONATATE 100 MG/1
100 CAPSULE ORAL 3 TIMES DAILY PRN
Qty: 30 CAPSULE | Refills: 0 | Status: SHIPPED | OUTPATIENT
Start: 2021-08-09 | End: 2022-03-07 | Stop reason: SDUPTHER

## 2021-08-09 RX ORDER — LEVOTHYROXINE SODIUM 0.03 MG/1
25 TABLET ORAL DAILY
Qty: 30 TABLET | Refills: 3 | Status: SHIPPED | OUTPATIENT
Start: 2021-08-09 | End: 2021-11-15 | Stop reason: SDUPTHER

## 2021-08-09 NOTE — PATIENT INSTRUCTIONS
Medicare Preventive Visit Patient Instructions  Thank you for completing your Welcome to Medicare Visit or Medicare Annual Wellness Visit today  Your next wellness visit will be due in one year (8/10/2022)  The screening/preventive services that you may require over the next 5-10 years are detailed below  Some tests may not apply to you based off risk factors and/or age  Screening tests ordered at today's visit but not completed yet may show as past due  Also, please note that scanned in results may not display below  Preventive Screenings:  Service Recommendations Previous Testing/Comments   Colorectal Cancer Screening  * Colonoscopy    * Fecal Occult Blood Test (FOBT)/Fecal Immunochemical Test (FIT)  * Fecal DNA/Cologuard Test  * Flexible Sigmoidoscopy Age: 54-65 years old   Colonoscopy: every 10 years (may be performed more frequently if at higher risk)  OR  FOBT/FIT: every 1 year  OR  Cologuard: every 3 years  OR  Sigmoidoscopy: every 5 years  Screening may be recommended earlier than age 48 if at higher risk for colorectal cancer  Also, an individualized decision between you and your healthcare provider will decide whether screening between the ages of 74-80 would be appropriate  Colonoscopy: 02/01/2018  FOBT/FIT: Not on file  Cologuard: Not on file  Sigmoidoscopy: Not on file    Screening Current     Breast Cancer Screening Age: 36 years old  Frequency: every 1-2 years  Not required if history of left and right mastectomy Mammogram: 11/28/2017        Cervical Cancer Screening Between the ages of 21-29, pap smear recommended once every 3 years  Between the ages of 33-67, can perform pap smear with HPV co-testing every 5 years     Recommendations may differ for women with a history of total hysterectomy, cervical cancer, or abnormal pap smears in past  Pap Smear: Not on file    Screening Not Indicated   Hepatitis C Screening Once for adults born between 1945 and 1965  More frequently in patients at high risk for Hepatitis C Hep C Antibody: 02/28/2019    Screening Current   Diabetes Screening 1-2 times per year if you're at risk for diabetes or have pre-diabetes Fasting glucose: 89 mg/dL   A1C: 6 0 %    Screening Not Indicated  History Diabetes   Cholesterol Screening Once every 5 years if you don't have a lipid disorder  May order more often based on risk factors  Lipid panel: 03/22/2021    Screening Current     Other Preventive Screenings Covered by Medicare:  1  Abdominal Aortic Aneurysm (AAA) Screening: covered once if your at risk  You're considered to be at risk if you have a family history of AAA  2  Lung Cancer Screening: covers low dose CT scan once per year if you meet all of the following conditions: (1) Age 50-69; (2) No signs or symptoms of lung cancer; (3) Current smoker or have quit smoking within the last 15 years; (4) You have a tobacco smoking history of at least 30 pack years (packs per day multiplied by number of years you smoked); (5) You get a written order from a healthcare provider  3  Glaucoma Screening: covered annually if you're considered high risk: (1) You have diabetes OR (2) Family history of glaucoma OR (3)  aged 48 and older OR (3)  American aged 72 and older  3  Osteoporosis Screening: covered every 2 years if you meet one of the following conditions: (1) You're estrogen deficient and at risk for osteoporosis based off medical history and other findings; (2) Have a vertebral abnormality; (3) On glucocorticoid therapy for more than 3 months; (4) Have primary hyperparathyroidism; (5) On osteoporosis medications and need to assess response to drug therapy  · Last bone density test (DXA Scan): 01/22/2020   5  HIV Screening: covered annually if you're between the age of 15-65  Also covered annually if you are younger than 13 and older than 72 with risk factors for HIV infection   For pregnant patients, it is covered up to 3 times per pregnancy  Immunizations:  Immunization Recommendations   Influenza Vaccine Annual influenza vaccination during flu season is recommended for all persons aged >= 6 months who do not have contraindications   Pneumococcal Vaccine (Prevnar and Pneumovax)  * Prevnar = PCV13  * Pneumovax = PPSV23   Adults 25-60 years old: 1-3 doses may be recommended based on certain risk factors  Adults 72 years old: Prevnar (PCV13) vaccine recommended followed by Pneumovax (PPSV23) vaccine  If already received PPSV23 since turning 65, then PCV13 recommended at least one year after PPSV23 dose  Hepatitis B Vaccine 3 dose series if at intermediate or high risk (ex: diabetes, end stage renal disease, liver disease)   Tetanus (Td) Vaccine - COST NOT COVERED BY MEDICARE PART B Following completion of primary series, a booster dose should be given every 10 years to maintain immunity against tetanus  Td may also be given as tetanus wound prophylaxis  Tdap Vaccine - COST NOT COVERED BY MEDICARE PART B Recommended at least once for all adults  For pregnant patients, recommended with each pregnancy  Shingles Vaccine (Shingrix) - COST NOT COVERED BY MEDICARE PART B  2 shot series recommended in those aged 48 and above     Health Maintenance Due:      Topic Date Due    Breast Cancer Screening: Mammogram  11/28/2019    Colorectal Cancer Screening  02/01/2028    Hepatitis C Screening  Completed     Immunizations Due:      Topic Date Due    Influenza Vaccine (1) 09/01/2021     Advance Directives   What are advance directives? Advance directives are legal documents that state your wishes and plans for medical care  These plans are made ahead of time in case you lose your ability to make decisions for yourself  Advance directives can apply to any medical decision, such as the treatments you want, and if you want to donate organs  What are the types of advance directives?   There are many types of advance directives, and each state has rules about how to use them  You may choose a combination of any of the following:  · Living will: This is a written record of the treatment you want  You can also choose which treatments you do not want, which to limit, and which to stop at a certain time  This includes surgery, medicine, IV fluid, and tube feedings  · Durable power of  for healthcare [place] [facility]): This is a written record that states who you want to make healthcare choices for you when you are unable to make them for yourself  This person, called a proxy, is usually a family member or a friend  You may choose more than 1 proxy  · Do not resuscitate (DNR) order:  A DNR order is used in case your heart stops beating or you stop breathing  It is a request not to have certain forms of treatment, such as CPR  A DNR order may be included in other types of advance directives  · Medical directive: This covers the care that you want if you are in a coma, near death, or unable to make decisions for yourself  You can list the treatments you want for each condition  Treatment may include pain medicine, surgery, blood transfusions, dialysis, IV or tube feedings, and a ventilator (breathing machine)  · Values history: This document has questions about your views, beliefs, and how you feel and think about life  This information can help others choose the care that you would choose  Why are advance directives important? An advance directive helps you control your care  Although spoken wishes may be used, it is better to have your wishes written down  Spoken wishes can be misunderstood, or not followed  Treatments may be given even if you do not want them  An advance directive may make it easier for your family to make difficult choices about your care  © Copyright [employer] 2018 Information is for End User's use only and may not be sold, redistributed or otherwise used for commercial purposes   All illustrations and images included in CareNotes® are the copyrighted property of A D A M , [employer]  or [address] [place], [place] [address]bria pablito

## 2021-08-09 NOTE — PROGRESS NOTES
Assessment and Plan:     Problem List Items Addressed This Visit        Endocrine    Type 2 diabetes mellitus with complication, without long-term current use of insulin ([facility] )      Other Visit Diagnoses     Encounter for screening mammogram for malignant neoplasm of breast    -  Primary           Preventive health issues were discussed with patient, and age appropriate screening tests were ordered as noted in patient's After Visit Summary  Personalized health advice and appropriate referrals for health education or preventive services given if needed, as noted in patient's After Visit Summary       History of Present Illness:     Patient presents for Medicare Annual Wellness visit    Patient Care Team:  Segun Uribe MD as PCP - General (Internal Medicine)     Problem List:     Patient Active Problem List   Diagnosis    Chronic obstructive pulmonary disease ([facility] )    Vitamin D deficiency    Other specified hypothyroidism    Gastroesophageal reflux disease without esophagitis    Rectus sheath hematoma, initial encounter    Hypertension    Osteoarthritis    Temporal arteritis ([facility] )    Type 2 diabetes mellitus with complication, without long-term current use of insulin ([facility] )    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 [facility])      Past Medical and Surgical History:     Past Medical History:   Diagnosis Date    Asthma     Chronic obstructive pulmonary disease ([facility] ) 9/26/2012    GERD (gastroesophageal reflux disease)     Hypertension 10/11/2018    Hypothyroid     Osteoarthritis 9/26/2012    Temporal arteritis ([facility] )     Tubular adenoma     Type 2 diabetes mellitus with complication, without long-term current use of insulin ([facility] ) 1/14/2020     Past Surgical History:   Procedure Laterality Date    EYE SURGERY Right 12/06/2019    cataract LVCFS    HYSTERECTOMY      NO PAST SURGERIES      ALSO NO RELEVANT PAST SURRGICAL HX AS PER [facility]EXTGEN      Family History:     Family History   Problem Relation Age of Onset    Breast cancer Mother     Emphysema Father       Social History:     Social History     Socioeconomic History    Marital status: Legally      Spouse name: None    Number of children: None    Years of education: None    Highest education level: None   Occupational History    Occupation: retired   Tobacco Use    Smoking status: Former Smoker     Years: 15 00     Types: Cigarettes    Smokeless tobacco: Never Used    Tobacco comment: TOBACCO USE, NO PASSIVE SMOKE EXPOSURE   Vaping Use    Vaping Use: Never used   Substance and Sexual Activity    Alcohol use: No    Drug use: No    Sexual activity: Not Currently   Other Topics Concern    None   Social History Narrative    None     Social Determinants of Health     Financial Resource Strain: Low Risk     Difficulty of Paying Living Expenses: Not hard at all   Food Insecurity: No Food Insecurity    Worried About Running Out of Food in the Last Year: Never true    Suman of Food in the Last Year: Never true   Transportation Needs: No Transportation Needs    Lack of Transportation (Medical): No    Lack of Transportation (Non-Medical):  No   Physical Activity:     Days of Exercise per Week:     Minutes of Exercise per Session:    Stress:     Feeling of Stress :    Social Connections:     Frequency of Communication with Friends and Family:     Frequency of Social Gatherings with Friends and Family:     Attends Sabianist Services:     Active Member of Clubs or Organizations:     Attends Club or Organization Meetings:     Marital Status:    Intimate Partner Violence:     Fear of Current or Ex-Partner:     Emotionally Abused:     Physically Abused:     Sexually Abused:       Medications and Allergies:     Current Outpatient Medications   Medication Sig Dispense Refill    acetaminophen (TYLENOL) 500 mg tablet Take 1 tablet (500 mg total) by mouth every 6 (six) hours as needed (pain fevers) 30 tablet 0    albuterol (PROVENTIL HFA,VENTOLIN HFA) 90 mcg/act inhaler Inhale 2 puffs every 6 (six) hours as needed for wheezing or shortness of breath 1 Inhaler 5    aspirin (ASPIRIN LOW DOSE) 81 MG tablet take one tab  every other day with Dinner      azelastine (ASTELIN) 0 1 % nasal spray 1 spray into each nostril 2 (two) times a day Use in each nostril as directed 1 Bottle 3    benzonatate (TESSALON PERLES) 100 mg capsule Take 1 capsule (100 mg total) by mouth 3 (three) times a day as needed for cough With food/Meals 30 capsule 0    Calcium Carb-Cholecalciferol (Caltrate 600+D3) 600-800 MG-UNIT TABS Take 1 tablet by mouth 2 (two) times a day 60 tablet 6    cetirizine (ZyrTEC) 10 mg tablet Take 1 tablet (10 mg total) by mouth daily With food/in the evening 30 tablet 3    fluticasone-umeclidinium-vilanterol (TRELEGY) 100-62 5-25 MCG/INH inhaler Inhale 1 puff daily Rinse mouth after use   1 Inhaler 3    ipratropium-albuterol (DUO-NEB) 0 5-2 5 mg/3 mL nebulizer solution Take 1 vial (3 mL total) by nebulization 4 (four) times a day 120 vial 5    levothyroxine (Synthroid) 25 mcg tablet Take 1 tablet (25 mcg total) by mouth daily 30 tablet 3    pantoprazole (Protonix) 40 mg tablet Take 1 tablet (40 mg total) by mouth daily 90 tablet 1    predniSONE 2 5 mg tablet Take 1 tablet (2 5 mg total) by mouth daily 30 tablet 3    vitamin B-12 (VITAMIN B-12) 1,000 mcg tablet Take 1 tablet (1,000 mcg total) by mouth daily 90 tablet 1     Current Facility-Administered Medications   Medication Dose Route Frequency Provider Last Rate Last Admin    cyanocobalamin injection 1,000 mcg  1,000 mcg Intramuscular Q30 Days Kael Aaron MD   1,000 mcg at 01/14/20 1508     No Known Allergies   Immunizations:     Immunization History   Administered Date(s) Administered    INFLUENZA 10/17/2007, 10/13/2010, 10/07/2015, 10/13/2016, 10/02/2017, 11/01/2018    Influenza Split 10/04/2012, 09/23/2013    Influenza Split High Dose Preservative Free IM 10/13/2016, 10/02/2017    Influenza, high dose seasonal 0 7 mL 11/01/2018, 10/23/2019, 09/28/2020    Influenza, seasonal, injectable 10/16/2014, 10/07/2015    Pneumococcal Conjugate 13-Valent 10/02/2017    Pneumococcal Polysaccharide PPV23 09/01/2011    SARS-CoV-2 / COVID-19 mRNA IM (Ferman Epley) 02/08/2021, 03/08/2021    TD (adult) Preservative Free 11/13/2017    Td (adult), adsorbed 11/13/2017      Health Maintenance:         Topic Date Due    Breast Cancer Screening: Mammogram  11/28/2019    Colorectal Cancer Screening  02/01/2028    Hepatitis C Screening  Completed         Topic Date Due    Influenza Vaccine (1) 09/01/2021      Medicare Health Risk Assessment:     /78 (BP Location: Left arm, Patient Position: Sitting, Cuff Size: Standard)   Pulse 93   Temp 98 °F (36 7 °C) (Tympanic)   Resp 18   Ht 5' 3" (1 6 m)   Wt 51 7 kg (114 lb)   SpO2 96%   BMI 20 19 kg/m²          Health Risk Assessment:   Patient rates overall health as good  Patient feels that their physical health rating is same  Patient is satisfied with their life  Eyesight was rated as same  Hearing was rated as same  Patient feels that their emotional and mental health rating is same  Patients states they are never, rarely angry  Patient states they are never, rarely unusually tired/fatigued  Pain experienced in the last 7 days has been none  Patient states that she has experienced no weight loss or gain in last 6 months  Fall Risk Screening: In the past year, patient has experienced: no history of falling in past year      Urinary Incontinence Screening:   Patient has not leaked urine accidently in the last six months  Home Safety:  Patient does not have trouble with stairs inside or outside of their home  Patient has working smoke alarms and has working carbon monoxide detector  Home safety hazards include: none  Nutrition:   Current diet is Regular       Medications:   Patient is not currently taking any over-the-counter supplements  Patient is able to manage medications  Activities of Daily Living (ADLs)/Instrumental Activities of Daily Living (IADLs):   Walk and transfer into and out of bed and chair?: Yes  Dress and groom yourself?: Yes    Bathe or shower yourself?: Yes    Feed yourself?  Yes  Do your laundry/housekeeping?: Yes  Manage your money, pay your bills and track your expenses?: Yes  Make your own meals?: Yes    Do your own shopping?: Yes    Previous Hospitalizations:   Any hospitalizations or ED visits within the last 12 months?: No      Advance Care Planning:   Living will: No    Durable POA for healthcare: No    Advanced directive counseling given: No    Five wishes given: No      PREVENTIVE SCREENINGS      Cardiovascular Screening:    General: Screening Current and Risks and Benefits Discussed      Diabetes Screening:     General: Screening Not Indicated, History Diabetes and Risks and Benefits Discussed      Colorectal Cancer Screening:     General: Screening Current      Breast Cancer Screening:     General: Risks and Benefits Discussed      Cervical Cancer Screening:    General: Screening Not Indicated and Risks and Benefits Discussed      Osteoporosis Screening:    General: Screening Not Indicated, History Osteoporosis and Risks and Benefits Discussed      Abdominal Aortic Aneurysm (AAA) Screening:        General: Risks and Benefits Discussed      Lung Cancer Screening:     General: Screening Not Indicated and Risks and Benefits Discussed      Hepatitis C Screening:    General: Screening Current and Risks and Benefits Discussed    Screening, Brief Intervention, and Referral to Treatment (SBIRT)    Screening      Single Item Drug Screening:  How often have you used an illegal drug (including marijuana) or a prescription medication for non-medical reasons in the past year? never    Single Item Drug Screen Score: 0  Interpretation: Negative screen for possible drug use disorder    Other Counseling Topics: Car/seat belt/driving safety, skin self-exam, sunscreen and calcium and vitamin D intake and regular weightbearing exercise         Ilir Zurita MD

## 2021-08-09 NOTE — PROGRESS NOTES
Assessment/Plan:         Diagnoses and all orders for this visit:    Encounter for general adult medical examination with abnormal findings; Done in detail    Encounter for screening mammogram for malignant neoplasm of breast  -     Mammo screening bilateral w 3d & cad; Future    RTC in 3mos w :  -     Microalbumin / creatinine urine ratio    Acute URI; improved ;  -     benzonatate (TESSALON PERLES) 100 mg capsule; Take 1 capsule (100 mg total) by mouth 3 (three) times a day as needed for cough With food/Meals    Acute exacerbation of chronic obstructive pulmonary disease (COPD) ([facility] ); Improved,  -     fluticasone-umeclidinium-vilanterol (TRELEGY) 100-62 5-25 MCG/INH inhaler; Inhale 1 puff daily Rinse mouth after use  Hypothyroidism;  Continue :  -     levothyroxine (Synthroid) 25 mcg tablet; Take 1 tablet (25 mcg total) by mouth daily  RTC in 3mos w Blood  Work    Pulmonary emphysema, unspecified emphysema type ([facility] )  -     UA (URINE) with reflex to Scope; Future  -     Comprehensive metabolic panel; Future  -     CBC and differential; Future  -     Lipid panel; Future    Hypothyroidism due to Hashimoto's thyroiditis; as above,   -     TSH, 3rd generation; Future  -     T4, free; Future    Other orders  -     Cancel: Mammo screening bilateral w cad; Future        Subjective:      Patient ID: Tabitha Seals is a 76 y o  female  [employer] is here for AWV and Regular check up, she feels better, recent Blood work and med list reviewed w Pt,  The following portions of the patient's history were reviewed and updated as appropriate: allergies, current medications, past medical history, past social history, past surgical history and problem list     Review of Systems   Constitutional: Negative for chills, fatigue and fever  HENT: Negative for congestion, facial swelling, sore throat, trouble swallowing and voice change  Eyes: Negative for pain, discharge and visual disturbance     Respiratory: Negative for cough, shortness of breath and wheezing  Cardiovascular: Negative for chest pain, palpitations and leg swelling  Gastrointestinal: Negative for abdominal pain, blood in stool, constipation, diarrhea and nausea  Endocrine: Negative for polydipsia, polyphagia and polyuria  Genitourinary: Negative for difficulty urinating, hematuria and urgency  Musculoskeletal: Negative for arthralgias and myalgias  Skin: Negative for rash  Neurological: Negative for dizziness, tremors, weakness and headaches  Hematological: Negative for adenopathy  Does not bruise/bleed easily  Psychiatric/Behavioral: Negative for dysphoric mood, sleep disturbance and suicidal ideas  Objective:      /78 (BP Location: Left arm, Patient Position: Sitting, Cuff Size: Standard)   Pulse 93   Temp 98 °F (36 7 °C) (Tympanic)   Resp 18   Ht 5' 3" (1 6 m)   Wt 51 7 kg (114 lb)   SpO2 96%   BMI 20 19 kg/m²          Physical Exam  Constitutional:       General: She is not in acute distress  HENT:      Head: Normocephalic  Mouth/Throat:      Pharynx: No oropharyngeal exudate  Eyes:      General: No scleral icterus  Conjunctiva/sclera: Conjunctivae normal       Pupils: Pupils are equal, round, and reactive to light  Neck:      Thyroid: No thyromegaly  Cardiovascular:      Rate and Rhythm: Normal rate and regular rhythm  Heart sounds: Normal heart sounds  No murmur heard  Pulmonary:      Effort: Pulmonary effort is normal  No respiratory distress  Breath sounds: Normal breath sounds  No wheezing or rales  Abdominal:      General: Bowel sounds are normal  There is no distension  Palpations: Abdomen is soft  Tenderness: There is no abdominal tenderness  There is no guarding or rebound  Musculoskeletal:         General: No tenderness  Cervical back: Neck supple  Lymphadenopathy:      Cervical: No cervical adenopathy  Skin:     Coloration: Skin is not pale        Findings: No rash  Neurological:      Mental Status: She is alert and oriented to person, place, and time  Sensory: No sensory deficit  Motor: No weakness

## 2021-10-28 DIAGNOSIS — K21.9 GASTROESOPHAGEAL REFLUX DISEASE: ICD-10-CM

## 2021-10-28 RX ORDER — PANTOPRAZOLE SODIUM 40 MG/1
40 TABLET, DELAYED RELEASE ORAL DAILY
Qty: 90 TABLET | Refills: 1 | Status: SHIPPED | OUTPATIENT
Start: 2021-10-28 | End: 2022-03-07 | Stop reason: SDUPTHER

## 2021-11-15 ENCOUNTER — OFFICE VISIT (OUTPATIENT)
Dept: FAMILY MEDICINE CLINIC | Facility: CLINIC | Age: 76
End: 2021-11-15
Payer: COMMERCIAL

## 2021-11-15 VITALS
OXYGEN SATURATION: 96 % | SYSTOLIC BLOOD PRESSURE: 126 MMHG | TEMPERATURE: 97.4 F | RESPIRATION RATE: 16 BRPM | WEIGHT: 114 LBS | HEART RATE: 62 BPM | HEIGHT: 63 IN | BODY MASS INDEX: 20.2 KG/M2 | DIASTOLIC BLOOD PRESSURE: 84 MMHG

## 2021-11-15 DIAGNOSIS — E04.9 ENLARGEMENT OF THYROID: ICD-10-CM

## 2021-11-15 DIAGNOSIS — E11.8 TYPE 2 DIABETES MELLITUS WITH COMPLICATION, WITHOUT LONG-TERM CURRENT USE OF INSULIN (HCC): ICD-10-CM

## 2021-11-15 DIAGNOSIS — M31.6 TEMPORAL ARTERITIS (HCC): ICD-10-CM

## 2021-11-15 DIAGNOSIS — E03.8 HYPOTHYROIDISM DUE TO HASHIMOTO'S THYROIDITIS: ICD-10-CM

## 2021-11-15 DIAGNOSIS — E06.3 HYPOTHYROIDISM DUE TO HASHIMOTO'S THYROIDITIS: ICD-10-CM

## 2021-11-15 DIAGNOSIS — R73.09 ELEVATED HEMOGLOBIN A1C: ICD-10-CM

## 2021-11-15 DIAGNOSIS — Z23 NEED FOR INFLUENZA VACCINATION: Primary | ICD-10-CM

## 2021-11-15 PROCEDURE — 1160F RVW MEDS BY RX/DR IN RCRD: CPT | Performed by: INTERNAL MEDICINE

## 2021-11-15 PROCEDURE — 90662 IIV NO PRSV INCREASED AG IM: CPT

## 2021-11-15 PROCEDURE — 99214 OFFICE O/P EST MOD 30 MIN: CPT | Performed by: INTERNAL MEDICINE

## 2021-11-15 PROCEDURE — 1036F TOBACCO NON-USER: CPT | Performed by: INTERNAL MEDICINE

## 2021-11-15 PROCEDURE — G0008 ADMIN INFLUENZA VIRUS VAC: HCPCS

## 2021-11-15 RX ORDER — PREDNISONE 2.5 MG
2.5 TABLET ORAL DAILY
Qty: 30 TABLET | Refills: 3 | Status: SHIPPED | OUTPATIENT
Start: 2021-11-15 | End: 2022-04-18 | Stop reason: SDUPTHER

## 2021-11-15 RX ORDER — LEVOTHYROXINE SODIUM 0.03 MG/1
25 TABLET ORAL DAILY
Qty: 30 TABLET | Refills: 3 | Status: SHIPPED | OUTPATIENT
Start: 2021-11-15 | End: 2022-03-07 | Stop reason: SDUPTHER

## 2022-02-28 ENCOUNTER — APPOINTMENT (OUTPATIENT)
Dept: LAB | Facility: HOSPITAL | Age: 77
End: 2022-02-28
Payer: COMMERCIAL

## 2022-02-28 DIAGNOSIS — E03.8 HYPOTHYROIDISM DUE TO HASHIMOTO'S THYROIDITIS: ICD-10-CM

## 2022-02-28 DIAGNOSIS — J43.9 PULMONARY EMPHYSEMA, UNSPECIFIED EMPHYSEMA TYPE (HCC): ICD-10-CM

## 2022-02-28 DIAGNOSIS — R73.09 ELEVATED HEMOGLOBIN A1C: ICD-10-CM

## 2022-02-28 DIAGNOSIS — E06.3 HYPOTHYROIDISM DUE TO HASHIMOTO'S THYROIDITIS: ICD-10-CM

## 2022-02-28 LAB
ALBUMIN SERPL BCP-MCNC: 4.6 G/DL (ref 3–5.2)
ALP SERPL-CCNC: 95 U/L (ref 43–122)
ALT SERPL W P-5'-P-CCNC: 23 U/L
ANION GAP SERPL CALCULATED.3IONS-SCNC: 10 MMOL/L (ref 5–14)
AST SERPL W P-5'-P-CCNC: 35 U/L (ref 14–36)
BASOPHILS # BLD AUTO: 0 THOUSANDS/ΜL (ref 0–0.1)
BASOPHILS NFR BLD AUTO: 1 % (ref 0–1)
BILIRUB SERPL-MCNC: 0.64 MG/DL
BUN SERPL-MCNC: 21 MG/DL (ref 5–25)
CALCIUM SERPL-MCNC: 9.4 MG/DL (ref 8.4–10.2)
CHLORIDE SERPL-SCNC: 101 MMOL/L (ref 97–108)
CHOLEST SERPL-MCNC: 139 MG/DL
CO2 SERPL-SCNC: 29 MMOL/L (ref 22–30)
CREAT SERPL-MCNC: 0.62 MG/DL (ref 0.6–1.2)
EOSINOPHIL # BLD AUTO: 0.2 THOUSAND/ΜL (ref 0–0.4)
EOSINOPHIL NFR BLD AUTO: 3 % (ref 0–6)
ERYTHROCYTE [DISTWIDTH] IN BLOOD BY AUTOMATED COUNT: 14.7 %
GFR SERPL CREATININE-BSD FRML MDRD: 87 ML/MIN/1.73SQ M
GLUCOSE P FAST SERPL-MCNC: 99 MG/DL (ref 70–99)
HCT VFR BLD AUTO: 43.2 % (ref 36–46)
HDLC SERPL-MCNC: 54 MG/DL
HGB BLD-MCNC: 13.9 G/DL (ref 12–16)
LDLC SERPL CALC-MCNC: 67 MG/DL
LYMPHOCYTES # BLD AUTO: 1.7 THOUSANDS/ΜL (ref 0.5–4)
LYMPHOCYTES NFR BLD AUTO: 24 % (ref 25–45)
MCH RBC QN AUTO: 27.6 PG (ref 26–34)
MCHC RBC AUTO-ENTMCNC: 32.1 G/DL (ref 31–36)
MCV RBC AUTO: 86 FL (ref 80–100)
MONOCYTES # BLD AUTO: 0.7 THOUSAND/ΜL (ref 0.2–0.9)
MONOCYTES NFR BLD AUTO: 10 % (ref 1–10)
NEUTROPHILS # BLD AUTO: 4.3 THOUSANDS/ΜL (ref 1.8–7.8)
NEUTS SEG NFR BLD AUTO: 62 % (ref 45–65)
NONHDLC SERPL-MCNC: 85 MG/DL
PLATELET # BLD AUTO: 248 THOUSANDS/UL (ref 150–450)
PMV BLD AUTO: 9.2 FL (ref 8.9–12.7)
POTASSIUM SERPL-SCNC: 3.4 MMOL/L (ref 3.6–5)
PROT SERPL-MCNC: 8 G/DL (ref 5.9–8.4)
RBC # BLD AUTO: 5.02 MILLION/UL (ref 4–5.2)
SODIUM SERPL-SCNC: 140 MMOL/L (ref 137–147)
TRIGL SERPL-MCNC: 90 MG/DL
TSH SERPL DL<=0.05 MIU/L-ACNC: 0.62 UIU/ML (ref 0.47–4.68)
WBC # BLD AUTO: 6.9 THOUSAND/UL (ref 4.5–11)

## 2022-02-28 PROCEDURE — 80053 COMPREHEN METABOLIC PANEL: CPT

## 2022-02-28 PROCEDURE — 80061 LIPID PANEL: CPT

## 2022-02-28 PROCEDURE — 84443 ASSAY THYROID STIM HORMONE: CPT

## 2022-02-28 PROCEDURE — 84439 ASSAY OF FREE THYROXINE: CPT

## 2022-02-28 PROCEDURE — 36415 COLL VENOUS BLD VENIPUNCTURE: CPT

## 2022-02-28 PROCEDURE — 83036 HEMOGLOBIN GLYCOSYLATED A1C: CPT

## 2022-02-28 PROCEDURE — 85025 COMPLETE CBC W/AUTO DIFF WBC: CPT

## 2022-03-01 ENCOUNTER — RA CDI HCC (OUTPATIENT)
Dept: OTHER | Facility: HOSPITAL | Age: 77
End: 2022-03-01

## 2022-03-01 LAB
EST. AVERAGE GLUCOSE BLD GHB EST-MCNC: 114 MG/DL
HBA1C MFR BLD: 5.6 %
T4 FREE SERPL-MCNC: 1.33 NG/DL (ref 0.76–1.46)

## 2022-03-01 NOTE — PROGRESS NOTES
[facility]  coding opportunities       Chart reviewed, no opportunity found: CHART REVIEWED, NO OPPORTUNITY FOUND                        Patients insurance company: [employer] (Medicare Advantage and Commercial)

## 2022-03-07 ENCOUNTER — OFFICE VISIT (OUTPATIENT)
Dept: FAMILY MEDICINE CLINIC | Facility: CLINIC | Age: 77
End: 2022-03-07
Payer: COMMERCIAL

## 2022-03-07 VITALS
DIASTOLIC BLOOD PRESSURE: 76 MMHG | SYSTOLIC BLOOD PRESSURE: 128 MMHG | RESPIRATION RATE: 14 BRPM | BODY MASS INDEX: 19.84 KG/M2 | HEIGHT: 63 IN | HEART RATE: 93 BPM | OXYGEN SATURATION: 94 % | TEMPERATURE: 97.1 F | WEIGHT: 112 LBS

## 2022-03-07 DIAGNOSIS — K21.9 GASTROESOPHAGEAL REFLUX DISEASE: ICD-10-CM

## 2022-03-07 DIAGNOSIS — M31.6 TEMPORAL ARTERITIS (HCC): ICD-10-CM

## 2022-03-07 DIAGNOSIS — J30.9 ALLERGIC RHINITIS, UNSPECIFIED SEASONALITY, UNSPECIFIED TRIGGER: ICD-10-CM

## 2022-03-07 DIAGNOSIS — G93.81 TEMPORAL LOBE EPILEPSY WITH MESIAL TEMPORAL SCLEROSIS (HCC): ICD-10-CM

## 2022-03-07 DIAGNOSIS — Z01.00 DIABETIC EYE EXAM (HCC): ICD-10-CM

## 2022-03-07 DIAGNOSIS — E87.6 HYPOKALEMIA: Primary | ICD-10-CM

## 2022-03-07 DIAGNOSIS — J44.1 ACUTE EXACERBATION OF CHRONIC OBSTRUCTIVE PULMONARY DISEASE (COPD) (HCC): ICD-10-CM

## 2022-03-07 DIAGNOSIS — G40.109 TEMPORAL LOBE EPILEPSY WITH MESIAL TEMPORAL SCLEROSIS (HCC): ICD-10-CM

## 2022-03-07 DIAGNOSIS — E04.9 ENLARGEMENT OF THYROID: ICD-10-CM

## 2022-03-07 DIAGNOSIS — J06.9 ACUTE URI: ICD-10-CM

## 2022-03-07 DIAGNOSIS — E11.9 DIABETIC EYE EXAM (HCC): ICD-10-CM

## 2022-03-07 PROCEDURE — 1036F TOBACCO NON-USER: CPT | Performed by: INTERNAL MEDICINE

## 2022-03-07 PROCEDURE — 99214 OFFICE O/P EST MOD 30 MIN: CPT | Performed by: INTERNAL MEDICINE

## 2022-03-07 PROCEDURE — 1160F RVW MEDS BY RX/DR IN RCRD: CPT | Performed by: INTERNAL MEDICINE

## 2022-03-07 RX ORDER — BENZONATATE 100 MG/1
100 CAPSULE ORAL 3 TIMES DAILY PRN
Qty: 30 CAPSULE | Refills: 0 | Status: SHIPPED | OUTPATIENT
Start: 2022-03-07

## 2022-03-07 RX ORDER — LEVOTHYROXINE SODIUM 0.03 MG/1
25 TABLET ORAL DAILY
Qty: 30 TABLET | Refills: 3 | Status: SHIPPED | OUTPATIENT
Start: 2022-03-07 | End: 2022-06-13 | Stop reason: SDUPTHER

## 2022-03-07 RX ORDER — CETIRIZINE HYDROCHLORIDE 10 MG/1
10 TABLET ORAL DAILY
Qty: 30 TABLET | Refills: 3 | Status: SHIPPED | OUTPATIENT
Start: 2022-03-07

## 2022-03-07 RX ORDER — AMOXICILLIN 500 MG/1
500 CAPSULE ORAL EVERY 8 HOURS SCHEDULED
Qty: 30 CAPSULE | Refills: 0 | Status: SHIPPED | OUTPATIENT
Start: 2022-03-07 | End: 2022-03-17

## 2022-03-07 RX ORDER — PREDNISONE 10 MG/1
TABLET ORAL
Qty: 20 TABLET | Refills: 0 | Status: SHIPPED | OUTPATIENT
Start: 2022-03-07 | End: 2022-05-11

## 2022-03-07 RX ORDER — POTASSIUM CHLORIDE 750 MG/1
10 CAPSULE, EXTENDED RELEASE ORAL DAILY
Qty: 30 CAPSULE | Refills: 0 | Status: SHIPPED | OUTPATIENT
Start: 2022-03-07 | End: 2022-06-13

## 2022-03-07 RX ORDER — PANTOPRAZOLE SODIUM 40 MG/1
40 TABLET, DELAYED RELEASE ORAL DAILY
Qty: 90 TABLET | Refills: 1 | Status: SHIPPED | OUTPATIENT
Start: 2022-03-07 | End: 2022-05-02 | Stop reason: SDUPTHER

## 2022-03-07 NOTE — PROGRESS NOTES
Assessment/Plan:         Diagnoses and all orders for this visit:    Hypokalemia; start :  -     potassium chloride (MICRO-K) 10 MEQ CR capsule; Take 1 capsule (10 mEq total) by mouth daily    Acute URI; start;  -     benzonatate (TESSALON PERLES) 100 mg capsule; Take 1 capsule (100 mg total) by mouth 3 (three) times a day as needed for cough With food/Meals    Allergic rhinitis, unspecified seasonality, unspecified trigger  -     cetirizine (ZyrTEC) 10 mg tablet; Take 1 tablet (10 mg total) by mouth daily With food/in the evening    Acute exacerbation of chronic obstructive pulmonary disease (COPD) ([facility] ); start :  -     fluticasone-umeclidinium-vilanterol (TRELEGY) 100-62 5-25 MCG/INH inhaler; Inhale 1 puff daily Rinse mouth after use  -     amoxicillin (AMOXIL) 500 mg capsule; Take 1 capsule (500 mg total) by mouth every 8 (eight) hours for 10 days  -     predniSONE 10 mg tablet; Take 3 tabs daily with breakfast for 3 days then Take 2 tabs daily for 3 Days then take one tab daily for 3 days then stop       Enlargement of thyroid/Hypothyroidism; continue :  -     levothyroxine (Synthroid) 25 mcg tablet; Take 1 tablet (25 mcg total) by mouth daily  RTC in 3mos w Blood work    Gastroesophageal reflux disease  -     pantoprazole (Protonix) 40 mg tablet; Take 1 tablet (40 mg total) by mouth daily      Temporal arteritis ([facility] ); stable  Continue prednisone 2 5 mg daily  RTC in 3mos w Blood  work        Subjective:      Patient ID: Abdoul Zamora is a 68 y o  female  [address] [place] [address] is here for Regular check up, and Increased cold symptoms, recent Blood work and med list reviewed w Pt in detail    The following portions of the patient's history were reviewed and updated as appropriate: allergies, current medications, past medical history, past social history, past surgical history and problem list     Review of Systems   Constitutional: Negative for chills, fatigue and fever     HENT: Positive for congestion, postnasal drip and sinus pressure  Negative for facial swelling, sore throat, trouble swallowing and voice change  Eyes: Negative for pain, discharge and visual disturbance  Respiratory: Positive for cough and shortness of breath  Negative for wheezing  Cardiovascular: Negative for chest pain, palpitations and leg swelling  Gastrointestinal: Negative for abdominal pain, blood in stool, constipation, diarrhea and nausea  Endocrine: Negative for polydipsia, polyphagia and polyuria  Genitourinary: Negative for difficulty urinating, hematuria and urgency  Musculoskeletal: Negative for arthralgias and myalgias  Skin: Negative for rash  Neurological: Negative for dizziness, tremors, weakness and headaches  Hematological: Negative for adenopathy  Does not bruise/bleed easily  Psychiatric/Behavioral: Negative for dysphoric mood, sleep disturbance and suicidal ideas  Objective:      /76 (BP Location: Left arm, Patient Position: Sitting, Cuff Size: Standard)   Pulse 93   Temp (!) 97 1 °F (36 2 °C) (Tympanic)   Resp 14   Ht 5' 3" (1 6 m)   Wt 50 8 kg (112 lb)   SpO2 94%   BMI 19 84 kg/m²          Physical Exam  Constitutional:       General: She is not in acute distress  HENT:      Head: Normocephalic  Mouth/Throat:      Pharynx: No oropharyngeal exudate  Eyes:      General: No scleral icterus  Conjunctiva/sclera: Conjunctivae normal       Pupils: Pupils are equal, round, and reactive to light  Neck:      Thyroid: No thyromegaly  Cardiovascular:      Rate and Rhythm: Normal rate and regular rhythm  Heart sounds: Normal heart sounds  No murmur heard  Pulmonary:      Effort: Pulmonary effort is normal  No respiratory distress  Breath sounds: Wheezing present  No rales  Abdominal:      General: Bowel sounds are normal  There is no distension  Palpations: Abdomen is soft  Tenderness: There is no abdominal tenderness  There is no guarding or rebound  Musculoskeletal:         General: No tenderness  Cervical back: Neck supple  Lymphadenopathy:      Cervical: No cervical adenopathy  Skin:     Coloration: Skin is not pale  Findings: No rash  Neurological:      Mental Status: She is alert and oriented to person, place, and time  Sensory: Sensory deficit present  Motor: No weakness

## 2022-04-13 DIAGNOSIS — M31.6 TEMPORAL ARTERITIS (HCC): ICD-10-CM

## 2022-04-13 DIAGNOSIS — J44.1 ACUTE EXACERBATION OF CHRONIC OBSTRUCTIVE PULMONARY DISEASE (COPD) (HCC): ICD-10-CM

## 2022-04-13 RX ORDER — PREDNISONE 10 MG/1
TABLET ORAL
Qty: 20 TABLET | Refills: 0 | OUTPATIENT
Start: 2022-04-13

## 2022-04-13 RX ORDER — PREDNISONE 2.5 MG
2.5 TABLET ORAL DAILY
Qty: 30 TABLET | Refills: 3 | OUTPATIENT
Start: 2022-04-13

## 2022-04-18 DIAGNOSIS — M31.6 TEMPORAL ARTERITIS (HCC): ICD-10-CM

## 2022-04-18 RX ORDER — PREDNISONE 2.5 MG
2.5 TABLET ORAL DAILY
Qty: 30 TABLET | Refills: 3 | Status: SHIPPED | OUTPATIENT
Start: 2022-04-18 | End: 2022-06-13 | Stop reason: SDUPTHER

## 2022-05-02 DIAGNOSIS — K21.9 GASTROESOPHAGEAL REFLUX DISEASE: ICD-10-CM

## 2022-05-02 RX ORDER — PANTOPRAZOLE SODIUM 40 MG/1
40 TABLET, DELAYED RELEASE ORAL DAILY
Qty: 90 TABLET | Refills: 1 | Status: SHIPPED | OUTPATIENT
Start: 2022-05-02

## 2022-05-11 ENCOUNTER — TELEPHONE (OUTPATIENT)
Dept: FAMILY MEDICINE CLINIC | Facility: CLINIC | Age: 77
End: 2022-05-11

## 2022-05-11 DIAGNOSIS — N39.0 ACUTE UTI: Primary | ICD-10-CM

## 2022-05-11 RX ORDER — CIPROFLOXACIN 250 MG/1
250 TABLET, FILM COATED ORAL EVERY 12 HOURS SCHEDULED
Qty: 10 TABLET | Refills: 0 | Status: SHIPPED | OUTPATIENT
Start: 2022-05-11 | End: 2022-05-16

## 2022-05-11 NOTE — TELEPHONE ENCOUNTER
Patient c/o UTI  Lower back pain, and cloudy urine  She is unable to get into the office, and is asking if medication can be sent in for her  Please advise

## 2022-06-09 ENCOUNTER — APPOINTMENT (OUTPATIENT)
Dept: LAB | Facility: HOSPITAL | Age: 77
End: 2022-06-09
Payer: COMMERCIAL

## 2022-06-09 DIAGNOSIS — E11.8 TYPE 2 DIABETES MELLITUS WITH COMPLICATION, WITHOUT LONG-TERM CURRENT USE OF INSULIN (HCC): ICD-10-CM

## 2022-06-09 DIAGNOSIS — I10 ESSENTIAL HYPERTENSION: ICD-10-CM

## 2022-06-09 DIAGNOSIS — E06.3 HYPOTHYROIDISM DUE TO HASHIMOTO'S THYROIDITIS: ICD-10-CM

## 2022-06-09 DIAGNOSIS — E03.8 HYPOTHYROIDISM DUE TO HASHIMOTO'S THYROIDITIS: ICD-10-CM

## 2022-06-09 DIAGNOSIS — E87.6 HYPOKALEMIA: Primary | ICD-10-CM

## 2022-06-09 DIAGNOSIS — E87.6 HYPOKALEMIA: ICD-10-CM

## 2022-06-09 LAB
ALBUMIN SERPL BCP-MCNC: 4.8 G/DL (ref 3–5.2)
ALP SERPL-CCNC: 117 U/L (ref 43–122)
ALT SERPL W P-5'-P-CCNC: 22 U/L
ANION GAP SERPL CALCULATED.3IONS-SCNC: 10 MMOL/L (ref 5–14)
AST SERPL W P-5'-P-CCNC: 41 U/L (ref 14–36)
BACTERIA UR QL AUTO: ABNORMAL /HPF
BASOPHILS # BLD AUTO: 0.07 THOUSANDS/ΜL (ref 0–0.1)
BASOPHILS NFR BLD AUTO: 1 % (ref 0–1)
BILIRUB SERPL-MCNC: 0.6 MG/DL
BILIRUB UR QL STRIP: NEGATIVE
BUN SERPL-MCNC: 20 MG/DL (ref 5–25)
CALCIUM SERPL-MCNC: 10.1 MG/DL (ref 8.4–10.2)
CHLORIDE SERPL-SCNC: 99 MMOL/L (ref 97–108)
CHOLEST SERPL-MCNC: 157 MG/DL
CLARITY UR: ABNORMAL
CO2 SERPL-SCNC: 31 MMOL/L (ref 22–30)
COLOR UR: ABNORMAL
CREAT SERPL-MCNC: 0.66 MG/DL (ref 0.6–1.2)
EOSINOPHIL # BLD AUTO: 0.22 THOUSAND/ΜL (ref 0–0.61)
EOSINOPHIL NFR BLD AUTO: 2 % (ref 0–6)
ERYTHROCYTE [DISTWIDTH] IN BLOOD BY AUTOMATED COUNT: 13.2 % (ref 11.6–15.1)
EST. AVERAGE GLUCOSE BLD GHB EST-MCNC: 123 MG/DL
GFR SERPL CREATININE-BSD FRML MDRD: 86 ML/MIN/1.73SQ M
GLUCOSE P FAST SERPL-MCNC: 89 MG/DL (ref 70–99)
GLUCOSE UR STRIP-MCNC: NEGATIVE MG/DL
HBA1C MFR BLD: 5.9 %
HCT VFR BLD AUTO: 48.3 % (ref 34.8–46.1)
HDLC SERPL-MCNC: 63 MG/DL
HGB BLD-MCNC: 15.3 G/DL (ref 11.5–15.4)
HGB UR QL STRIP.AUTO: 10
IMM GRANULOCYTES # BLD AUTO: 0.03 THOUSAND/UL (ref 0–0.2)
IMM GRANULOCYTES NFR BLD AUTO: 0 % (ref 0–2)
KETONES UR STRIP-MCNC: NEGATIVE MG/DL
LDLC SERPL CALC-MCNC: 76 MG/DL
LEUKOCYTE ESTERASE UR QL STRIP: 500
LYMPHOCYTES # BLD AUTO: 1.46 THOUSANDS/ΜL (ref 0.6–4.47)
LYMPHOCYTES NFR BLD AUTO: 14 % (ref 14–44)
MCH RBC QN AUTO: 27.5 PG (ref 26.8–34.3)
MCHC RBC AUTO-ENTMCNC: 31.7 G/DL (ref 31.4–37.4)
MCV RBC AUTO: 87 FL (ref 82–98)
MONOCYTES # BLD AUTO: 0.96 THOUSAND/ΜL (ref 0.17–1.22)
MONOCYTES NFR BLD AUTO: 10 % (ref 4–12)
NEUTROPHILS # BLD AUTO: 7.41 THOUSANDS/ΜL (ref 1.85–7.62)
NEUTS SEG NFR BLD AUTO: 73 % (ref 43–75)
NITRITE UR QL STRIP: NEGATIVE
NON-SQ EPI CELLS URNS QL MICRO: ABNORMAL /HPF
NONHDLC SERPL-MCNC: 94 MG/DL
NRBC BLD AUTO-RTO: 0 /100 WBCS
PH UR STRIP.AUTO: 6 [PH]
PLATELET # BLD AUTO: 263 THOUSANDS/UL (ref 149–390)
PMV BLD AUTO: 10.7 FL (ref 8.9–12.7)
POTASSIUM SERPL-SCNC: 5.1 MMOL/L (ref 3.6–5)
PROT SERPL-MCNC: 8.3 G/DL (ref 5.9–8.4)
PROT UR STRIP-MCNC: ABNORMAL MG/DL
RBC # BLD AUTO: 5.57 MILLION/UL (ref 3.81–5.12)
RBC #/AREA URNS AUTO: ABNORMAL /HPF
SODIUM SERPL-SCNC: 140 MMOL/L (ref 137–147)
SP GR UR STRIP.AUTO: 1.01 (ref 1–1.04)
T4 FREE SERPL-MCNC: 1.38 NG/DL (ref 0.76–1.46)
TRIGL SERPL-MCNC: 91 MG/DL
TSH SERPL DL<=0.05 MIU/L-ACNC: 0.63 UIU/ML (ref 0.45–4.5)
UROBILINOGEN UA: NEGATIVE MG/DL
WBC # BLD AUTO: 10.15 THOUSAND/UL (ref 4.31–10.16)
WBC #/AREA URNS AUTO: ABNORMAL /HPF

## 2022-06-09 PROCEDURE — 80061 LIPID PANEL: CPT

## 2022-06-09 PROCEDURE — 80053 COMPREHEN METABOLIC PANEL: CPT

## 2022-06-09 PROCEDURE — 85025 COMPLETE CBC W/AUTO DIFF WBC: CPT

## 2022-06-09 PROCEDURE — 81003 URINALYSIS AUTO W/O SCOPE: CPT

## 2022-06-09 PROCEDURE — 83036 HEMOGLOBIN GLYCOSYLATED A1C: CPT

## 2022-06-09 PROCEDURE — 81001 URINALYSIS AUTO W/SCOPE: CPT

## 2022-06-09 PROCEDURE — 87086 URINE CULTURE/COLONY COUNT: CPT

## 2022-06-09 PROCEDURE — 84439 ASSAY OF FREE THYROXINE: CPT

## 2022-06-09 PROCEDURE — 84443 ASSAY THYROID STIM HORMONE: CPT

## 2022-06-09 PROCEDURE — 87077 CULTURE AEROBIC IDENTIFY: CPT

## 2022-06-09 PROCEDURE — 36415 COLL VENOUS BLD VENIPUNCTURE: CPT

## 2022-06-11 LAB — BACTERIA UR CULT: ABNORMAL

## 2022-06-13 ENCOUNTER — OFFICE VISIT (OUTPATIENT)
Dept: FAMILY MEDICINE CLINIC | Facility: CLINIC | Age: 77
End: 2022-06-13
Payer: COMMERCIAL

## 2022-06-13 ENCOUNTER — HOSPITAL ENCOUNTER (OUTPATIENT)
Dept: RADIOLOGY | Facility: HOSPITAL | Age: 77
Discharge: HOME | End: 2022-06-13
Payer: COMMERCIAL

## 2022-06-13 ENCOUNTER — APPOINTMENT (OUTPATIENT)
Dept: LAB | Facility: HOSPITAL | Age: 77
End: 2022-06-13
Payer: COMMERCIAL

## 2022-06-13 VITALS
OXYGEN SATURATION: 99 % | DIASTOLIC BLOOD PRESSURE: 82 MMHG | WEIGHT: 111 LBS | TEMPERATURE: 97.3 F | BODY MASS INDEX: 19.67 KG/M2 | HEART RATE: 101 BPM | RESPIRATION RATE: 14 BRPM | SYSTOLIC BLOOD PRESSURE: 126 MMHG | HEIGHT: 63 IN

## 2022-06-13 DIAGNOSIS — R31.9 HEMATURIA, UNSPECIFIED TYPE: ICD-10-CM

## 2022-06-13 DIAGNOSIS — E06.3 HYPOTHYROIDISM DUE TO HASHIMOTO'S THYROIDITIS: Primary | ICD-10-CM

## 2022-06-13 DIAGNOSIS — E04.9 ENLARGEMENT OF THYROID: ICD-10-CM

## 2022-06-13 DIAGNOSIS — R73.09 ELEVATED HEMOGLOBIN A1C: ICD-10-CM

## 2022-06-13 DIAGNOSIS — M31.6 TEMPORAL ARTERITIS (HCC): ICD-10-CM

## 2022-06-13 DIAGNOSIS — M81.0 AGE-RELATED OSTEOPOROSIS WITHOUT CURRENT PATHOLOGICAL FRACTURE: ICD-10-CM

## 2022-06-13 DIAGNOSIS — E06.3 HYPOTHYROIDISM DUE TO HASHIMOTO'S THYROIDITIS: ICD-10-CM

## 2022-06-13 DIAGNOSIS — E03.8 HYPOTHYROIDISM DUE TO HASHIMOTO'S THYROIDITIS: ICD-10-CM

## 2022-06-13 DIAGNOSIS — E03.8 HYPOTHYROIDISM DUE TO HASHIMOTO'S THYROIDITIS: Primary | ICD-10-CM

## 2022-06-13 LAB
BACTERIA UR QL AUTO: ABNORMAL /HPF
BILIRUB UR QL STRIP: NEGATIVE
CHOLEST SERPL-MCNC: 149 MG/DL
CLARITY UR: ABNORMAL
COLOR UR: YELLOW
CRP SERPL QL: 7.9 MG/L
ERYTHROCYTE [SEDIMENTATION RATE] IN BLOOD: 23 MM/HOUR (ref 0–29)
GLUCOSE UR STRIP-MCNC: NEGATIVE MG/DL
HDLC SERPL-MCNC: 61 MG/DL
HGB UR QL STRIP.AUTO: 25
KETONES UR STRIP-MCNC: NEGATIVE MG/DL
LDLC SERPL CALC-MCNC: 64 MG/DL
LEUKOCYTE ESTERASE UR QL STRIP: 500
NITRITE UR QL STRIP: NEGATIVE
NON-SQ EPI CELLS URNS QL MICRO: ABNORMAL /HPF
NONHDLC SERPL-MCNC: 88 MG/DL
PH UR STRIP.AUTO: 6 [PH]
PROT UR STRIP-MCNC: ABNORMAL MG/DL
RBC #/AREA URNS AUTO: ABNORMAL /HPF
SP GR UR STRIP.AUTO: 1.02 (ref 1–1.04)
T4 FREE SERPL-MCNC: 1.24 NG/DL (ref 0.76–1.46)
TRIGL SERPL-MCNC: 121 MG/DL
TSH SERPL DL<=0.05 MIU/L-ACNC: 0.47 UIU/ML (ref 0.45–4.5)
UROBILINOGEN UA: NEGATIVE MG/DL
WBC #/AREA URNS AUTO: ABNORMAL /HPF

## 2022-06-13 PROCEDURE — 1160F RVW MEDS BY RX/DR IN RCRD: CPT | Performed by: INTERNAL MEDICINE

## 2022-06-13 PROCEDURE — 85652 RBC SED RATE AUTOMATED: CPT

## 2022-06-13 PROCEDURE — 86140 C-REACTIVE PROTEIN: CPT

## 2022-06-13 PROCEDURE — 80061 LIPID PANEL: CPT

## 2022-06-13 PROCEDURE — 36415 COLL VENOUS BLD VENIPUNCTURE: CPT

## 2022-06-13 PROCEDURE — 84443 ASSAY THYROID STIM HORMONE: CPT

## 2022-06-13 PROCEDURE — 74018 RADEX ABDOMEN 1 VIEW: CPT

## 2022-06-13 PROCEDURE — 96372 THER/PROPH/DIAG INJ SC/IM: CPT | Performed by: INTERNAL MEDICINE

## 2022-06-13 PROCEDURE — 1036F TOBACCO NON-USER: CPT | Performed by: INTERNAL MEDICINE

## 2022-06-13 PROCEDURE — 99214 OFFICE O/P EST MOD 30 MIN: CPT | Performed by: INTERNAL MEDICINE

## 2022-06-13 PROCEDURE — 3725F SCREEN DEPRESSION PERFORMED: CPT | Performed by: INTERNAL MEDICINE

## 2022-06-13 PROCEDURE — 84439 ASSAY OF FREE THYROXINE: CPT

## 2022-06-13 PROCEDURE — 81001 URINALYSIS AUTO W/SCOPE: CPT

## 2022-06-13 RX ORDER — LEVOTHYROXINE SODIUM 0.03 MG/1
25 TABLET ORAL DAILY
Qty: 30 TABLET | Refills: 3 | Status: SHIPPED | OUTPATIENT
Start: 2022-06-13

## 2022-06-13 RX ORDER — PREDNISONE 2.5 MG
2.5 TABLET ORAL DAILY
Qty: 30 TABLET | Refills: 3 | Status: SHIPPED | OUTPATIENT
Start: 2022-06-13

## 2022-06-13 NOTE — PATIENT INSTRUCTIONS
Osteoporosis Education   Osteoporosis  is a long-term medical condition that causes your bones to become weak, brittle, and more likely to fracture  Osteoporosis occurs when your body absorbs more bone than it makes  It is also caused by a lack of calcium and estrogen (female hormone)  Common symptoms include the following: You may not have any signs or symptoms  You may break a bone after a muscle strain, bump, or fall  A break usually occurs in the hip, spine, or wrist  A collapsed vertebra (bone in your spine) may cause severe back pain or loss of height from bent posture  Call your doctor if:   ·  You have severe pain  ·  You have increasing pain after a fall  ·  You have pain when you do your daily activities  ·  You have questions or concerns about your condition or care  Diagnosis of osteoporosis:   · Blood and urine tests  measure your calcium, vitamin D, and estrogen levels  · An x-ray or CT may show thinned bones or a fracture  You may be given contrast liquid to help the bones show up better in the pictures  Tell the healthcare provider if you have ever had an allergic reaction to contrast liquid  Do not enter the MRI room with anything metal  Metal can cause serious injury  Tell the healthcare provider if you have any metal in or on your body  · A bone density test  compares your bone thickness with what is expected for someone of your age, gender, and ethnicity  Treatment for osteoporosis may include medicines to prevent bone loss, build new bone, and increase estrogen  These medicines help prevent fractures and may be given as a pill or injection  Ask your healthcare provider for more information on these medicines  Prevent bone loss:  · Eat healthy foods that are high in calcium  This helps keep your bones strong  Good sources of calcium are milk, cheese, broccoli, tofu, almonds, and canned salmon and sardines  Recommended to get at least 1200mg daily of calcium    · Increase your vitamin D intake  Vitamin D is in fish oils, some vegetables, and fortified milk, cereal, and bread  Vitamin D is also formed in the skin when it is exposed to the sun  Ask your healthcare provider how much sunlight is safe for you  You will require at least 800 units of vitamin D daily taken as a supplement  · Drink liquids as directed  Ask your healthcare provider how much liquid to drink each day and which liquids are best for you  Do not have alcohol or caffeine  They decrease bone mineral density, which can weaken your bones  · Exercise regularly  Ask your healthcare provider about the best exercise plan for you  Weight bearing exercise for 30 minutes, 3 times a week can help build and strengthen bone  · Do not smoke  Nicotine and other chemicals in cigarettes and cigars can cause lung damage  Ask your healthcare provider for information if you currently smoke and need help to quit  E-cigarettes or smokeless tobacco still contain nicotine  Talk to your healthcare provider before you use these products  · Go to physical therapy as directed  A physical therapist teaches you exercises to help improve movement and muscle strength  · Alcohol  It is recommended to avoid heavy alcohol use as increased consumption of alcohol is known to cause bone loss  © Copyright [employer] 2021 Information is for End User's use only and may not be sold, redistributed or otherwise used for commercial purposes  All illustrations and images included in CareNotes® are the copyrighted property of [employer]  or [facility] [employer]     Calcium and vitamin D for bone health (Beyond the Basics)    CALCIUM AND VITAMIN D OVERVIEW  Osteoporosis is a common bone disorder that causes a progressive loss in bone density and mass  As a result, bones become thin, weakened, and easily fractured   It is estimated that more than 1 3 million osteoporosis-associated (or "osteoporotic") fractures occur every year in the [place], primarily of bone within the spine (the vertebrae), the hip, and the forearm near the wrist  =    A number of treatments can help to prevent loss of bone and treat low bone mass  However, the first step in preventing or treating osteoporosis is to consume foods and drinks that provide calcium, a mineral essential for bone strength, and vitamin D, which aids in calcium breakdown and absorption  =    CALCIUM AND VITAMIN D BENEFITS  Good nutrition is important at all ages to keep the bones healthy  · Taking calcium reduces bone loss and decreases the risk of fracturing the vertebrae (the bones that surround the spinal cord)  · Consuming calcium during childhood (eg, in milk) can lead to higher bone mass in adulthood  This increase in bone density can reduce the risk of fractures later in life  · Calcium may also have benefits in other body systems by reducing blood pressure and cholesterol levels  · Calcium and vitamin D supplements may help prevent tooth loss in older adults  RECOMMENDATIONS FOR CALCIUM    General recommendations -- Premenopausal women and men should consume at least 1000 mg of calcium, while postmenopausal women should consume 1200 mg (total diet plus supplement)  You should not consume more than 2000 mg of calcium per day (total diet plus supplement) due to the risk of side effects  Calcium in the diet -- The primary sources of calcium in the diet include milk and other dairy products, such as hard cheese, cottage cheese, or yogurt, as well as green vegetables, such as kale and broccoli (table 1)  Some cereals, soy products, and fruit juices are fortified with calcium  Calcium supplements -- The body is able to absorb calcium contained in supplements as well as from dietary sources  If it is not possible to get enough calcium from dietary sources, consult a health care provider to determine the best type, dose, and timing of calcium supplements       · Calcium carbonate is effective and is the least expensive form of calcium  It is best absorbed with a low-iron meal (such as breakfast)  Calcium carbonate may not be absorbed well in people who also take a specific medication for gastroesophageal reflux (called a proton pump inhibitor or H2 blocker), which blocks stomach acid  · Many natural calcium carbonate preparations such as oyster shells or bone meal contain some lead  · Calcium citrate is well absorbed in the fasting state as well as with a meal   · Calcium doses above 500 mg are not absorbed as well as smaller doses, so large doses of supplements should be taken in divided doses (eg, in the morning and evening)  · Calcium supplements do not replace other osteoporosis treatments such as hormone replacement, bisphosphonates (eg, risedronate [sample brand name: Actonel] and alendronate [brand name: Fosamax]), and raloxifene (brand name: Evista)  Calcium and vitamin D supplements alone are usually insufficient to prevent age-related bone loss, although they may be beneficial in some subgroups (older adults, those with very low intake)  In most patients with or at risk for osteoporosis, the addition of medication or hormonal therapy is necessary in order to slow the breakdown and removal of bone (ie, resorption)  Underlying gastrointestinal diseases -- Patients who do not adequately absorb nutrients from the gastrointestinal tract (due to malabsorption) may require more than 1000 to 1200 mg of calcium per day  In such cases, a health care provider can help to determine the optimal dose of calcium  Medications -- All medications should be discussed with a health care provider to ensure that possible interactions with calcium are identified  Certain medications change the amount of calcium that is absorbed and/or excreted  As an example, loop diuretics (eg, furosemide [sample brand name: Lasix]) increase the amount of calcium excreted in the urine      On the other hand, thiazide diuretics (eg, hydrochlorothiazide) can reduce levels of calcium in the urine, potentially reducing the risk of bone loss and kidney stones  Side effects of calcium -- Calcium is usually easily tolerated when it is taken in divided doses several times per day  Some people experience side effects related to calcium, including constipation and indigestion  Calcium supplements interfere with the absorption of iron and thyroid hormone, and therefore, these medications should be taken at different times  Kidney stones -- There is no evidence that consuming large amounts of calcium (from foods and drinks) increases the risk of kidney stones, or that avoiding dietary calcium decreases the risk  In fact, avoiding dairy products is likely to increase the risk of kidney stones  However, use of calcium supplements may increase the risk of kidney stones in susceptible individuals by raising the level of calcium in the urine  This is particularly true if the supplement is taken between meals or at bedtime, and this is one of the reasons we prefer for patients to get as much of their calcium requirement through dietary means  IMPORTANCE OF VITAMIN D  Vitamin D decreases bone loss and lowers the risk of fracture, especially in older men and women  Along with calcium, vitamin D also helps to prevent and treat osteoporosis  To absorb calcium efficiently, an adequate amount of vitamin D must be present  Vitamin D is normally made in the skin after exposure to sunlight  Recommendations for vitamin D -- The current recommendation is that men over 70 years and postmenopausal women consume at least 800 international units (20 micrograms) of vitamin D per day  Lower levels of vitamin D are not as effective, while high doses can be toxic, especially if taken for long periods of time   Although the optimal intake has not been clearly established in premenopausal women or in younger men with osteoporosis, 600 international units (15 micrograms) of vitamin D daily is generally suggested  Vitamin D is available as an individual supplement and is included in most multivitamins and some calcium supplements (table 2)  Milk is a relatively good dietary source of vitamin D, with approximately 100 international units (2 5 micrograms) per cup (240 mL), and salmon has 800 to 1000 international units (20 to 25 micrograms) of vitamin D per serving  Most healthy people don't need to check with their health care provider before taking standard doses of vitamin D and don't need monitoring of vitamin D levels if they are not being treated for vitamin D deficiency  However, recommendations for vitamin D supplementation may be different in people with certain underlying medical conditions, such as sarcoidosis or lymphoma  In these situations, a provider will determine if supplements are needed; if so, the person's vitamin D and calcium levels should be monitored with regular tests  ©3912 [employer], [address] rights reserved

## 2022-06-13 NOTE — PROGRESS NOTES
Assessment/Plan:         Diagnoses and all orders for this visit:    Hypothyroidism due to Hashimoto's thyroiditis; continue same  RTC in 3 mos w :  -     TSH, 3rd generation; Future  -     T4, free; Future    Renew :  -     levothyroxine (Synthroid) 25 mcg tablet; Take 1 tablet (25 mcg total) by mouth daily    Temporal arteritis ([address] ); stable on :  -     predniSONE 2 5 mg tablet; Take 1 tablet (2 5 mg total) by mouth daily  RTC in 3 mos w :  -     Sedimentation rate, automated; Future  -     C-reactive protein; Future    Hematuria, unspecified type;  -     RTC in 2-3 mos w :  -     XR abdomen 1 view kub; Future  -     UA (URINE) with reflex to Scope; Future    Elevated hemoglobin A1c  -     Lipid panel; Future  -     Hemoglobin A1C; Future    Age-related osteoporosis without current pathological fracture  -     denosumab (PROLIA) subcutaneous injection 60 mg        Subjective:      Patient ID: Chris Bush is a 68 y o  female  [facility] is here for Regular check up, she feels ok, recent b;lood work and med list reviewed w Pt in detail         The following portions of the patient's history were reviewed and updated as appropriate: allergies, current medications, past medical history, past social history, past surgical history and problem list     Review of Systems   Constitutional: Negative for chills, fatigue and fever  HENT: Negative for congestion, facial swelling, sore throat, trouble swallowing and voice change  Eyes: Negative for pain, discharge and visual disturbance  Respiratory: Negative for cough, shortness of breath and wheezing  Cardiovascular: Negative for chest pain, palpitations and leg swelling  Gastrointestinal: Negative for abdominal pain, blood in stool, constipation, diarrhea and nausea  Endocrine: Negative for polydipsia, polyphagia and polyuria  Genitourinary: Negative for difficulty urinating, hematuria and urgency  Musculoskeletal: Negative for arthralgias and myalgias  Skin: Negative for rash  Neurological: Negative for dizziness, tremors, weakness and headaches  Hematological: Negative for adenopathy  Does not bruise/bleed easily  Psychiatric/Behavioral: Negative for dysphoric mood, sleep disturbance and suicidal ideas  Objective:      /82 (BP Location: Left arm, Patient Position: Sitting, Cuff Size: Standard)   Pulse 101   Temp (!) 97 3 °F (36 3 °C) (Tympanic)   Resp 14   Ht 5' 3" (1 6 m)   Wt 50 3 kg (111 lb)   SpO2 99%   BMI 19 66 kg/m²          Physical Exam  Constitutional:       General: She is not in acute distress  HENT:      Head: Normocephalic  Mouth/Throat:      Pharynx: No oropharyngeal exudate  Eyes:      General: No scleral icterus  Conjunctiva/sclera: Conjunctivae normal       Pupils: Pupils are equal, round, and reactive to light  Neck:      Thyroid: No thyromegaly  Cardiovascular:      Rate and Rhythm: Normal rate and regular rhythm  Heart sounds: Normal heart sounds  No murmur heard  Pulmonary:      Effort: Pulmonary effort is normal  No respiratory distress  Breath sounds: Normal breath sounds  No wheezing or rales  Abdominal:      General: Bowel sounds are normal  There is no distension  Palpations: Abdomen is soft  Tenderness: There is no abdominal tenderness  There is no guarding or rebound  Musculoskeletal:         General: No tenderness  Cervical back: Neck supple  Lymphadenopathy:      Cervical: No cervical adenopathy  Skin:     Coloration: Skin is not pale  Findings: No rash  Neurological:      Mental Status: She is alert and oriented to person, place, and time  Sensory: No sensory deficit  Motor: No weakness

## 2022-06-17 ENCOUNTER — TELEPHONE (OUTPATIENT)
Dept: FAMILY MEDICINE CLINIC | Facility: CLINIC | Age: 77
End: 2022-06-17

## 2022-08-09 ENCOUNTER — APPOINTMENT (OUTPATIENT)
Dept: LAB | Facility: HOSPITAL | Age: 77
End: 2022-08-09
Payer: COMMERCIAL

## 2022-08-09 DIAGNOSIS — R73.09 ELEVATED HEMOGLOBIN A1C: ICD-10-CM

## 2022-08-09 LAB
CREAT UR-MCNC: 64.4 MG/DL
EST. AVERAGE GLUCOSE BLD GHB EST-MCNC: 126 MG/DL
HBA1C MFR BLD: 6 %
MICROALBUMIN UR-MCNC: 21.5 MG/L (ref 0–20)
MICROALBUMIN/CREAT 24H UR: 33 MG/G CREATININE (ref 0–30)

## 2022-08-09 PROCEDURE — 36415 COLL VENOUS BLD VENIPUNCTURE: CPT

## 2022-08-09 PROCEDURE — 82043 UR ALBUMIN QUANTITATIVE: CPT | Performed by: INTERNAL MEDICINE

## 2022-08-09 PROCEDURE — 83036 HEMOGLOBIN GLYCOSYLATED A1C: CPT

## 2022-08-09 PROCEDURE — 82570 ASSAY OF URINE CREATININE: CPT | Performed by: INTERNAL MEDICINE

## 2022-08-15 ENCOUNTER — OFFICE VISIT (OUTPATIENT)
Dept: FAMILY MEDICINE CLINIC | Facility: CLINIC | Age: 77
End: 2022-08-15
Payer: COMMERCIAL

## 2022-08-15 VITALS
HEART RATE: 96 BPM | BODY MASS INDEX: 20.09 KG/M2 | HEIGHT: 63 IN | RESPIRATION RATE: 14 BRPM | WEIGHT: 113.4 LBS | DIASTOLIC BLOOD PRESSURE: 98 MMHG | SYSTOLIC BLOOD PRESSURE: 142 MMHG | OXYGEN SATURATION: 99 % | TEMPERATURE: 97.8 F

## 2022-08-15 DIAGNOSIS — E11.69 HYPERLIPIDEMIA ASSOCIATED WITH TYPE 2 DIABETES MELLITUS (HCC): ICD-10-CM

## 2022-08-15 DIAGNOSIS — E04.9 ENLARGEMENT OF THYROID: ICD-10-CM

## 2022-08-15 DIAGNOSIS — Z00.01 ENCOUNTER FOR GENERAL ADULT MEDICAL EXAMINATION WITH ABNORMAL FINDINGS: Primary | ICD-10-CM

## 2022-08-15 DIAGNOSIS — R73.09 ELEVATED HEMOGLOBIN A1C: ICD-10-CM

## 2022-08-15 DIAGNOSIS — E78.5 HYPERLIPIDEMIA ASSOCIATED WITH TYPE 2 DIABETES MELLITUS (HCC): ICD-10-CM

## 2022-08-15 DIAGNOSIS — I73.9 PVD (PERIPHERAL VASCULAR DISEASE) (HCC): ICD-10-CM

## 2022-08-15 DIAGNOSIS — J43.9 PULMONARY EMPHYSEMA, UNSPECIFIED EMPHYSEMA TYPE (HCC): ICD-10-CM

## 2022-08-15 DIAGNOSIS — E03.8 HYPOTHYROIDISM DUE TO HASHIMOTO'S THYROIDITIS: ICD-10-CM

## 2022-08-15 DIAGNOSIS — J44.1 ACUTE EXACERBATION OF CHRONIC OBSTRUCTIVE PULMONARY DISEASE (COPD) (HCC): ICD-10-CM

## 2022-08-15 DIAGNOSIS — M81.8 IDIOPATHIC OSTEOPOROSIS: ICD-10-CM

## 2022-08-15 DIAGNOSIS — G62.9 NEUROPATHY: ICD-10-CM

## 2022-08-15 DIAGNOSIS — Z12.11 SCREEN FOR COLON CANCER: ICD-10-CM

## 2022-08-15 DIAGNOSIS — E06.3 HYPOTHYROIDISM DUE TO HASHIMOTO'S THYROIDITIS: ICD-10-CM

## 2022-08-15 DIAGNOSIS — Z12.31 ENCOUNTER FOR SCREENING MAMMOGRAM FOR BREAST CANCER: ICD-10-CM

## 2022-08-15 PROCEDURE — 3288F FALL RISK ASSESSMENT DOCD: CPT | Performed by: INTERNAL MEDICINE

## 2022-08-15 PROCEDURE — 1003F LEVEL OF ACTIVITY ASSESS: CPT | Performed by: INTERNAL MEDICINE

## 2022-08-15 PROCEDURE — G0439 PPPS, SUBSEQ VISIT: HCPCS | Performed by: INTERNAL MEDICINE

## 2022-08-15 PROCEDURE — 1125F AMNT PAIN NOTED PAIN PRSNT: CPT | Performed by: INTERNAL MEDICINE

## 2022-08-15 PROCEDURE — 1160F RVW MEDS BY RX/DR IN RCRD: CPT | Performed by: INTERNAL MEDICINE

## 2022-08-15 PROCEDURE — 1090F PRES/ABSN URINE INCON ASSESS: CPT | Performed by: INTERNAL MEDICINE

## 2022-08-15 PROCEDURE — 99214 OFFICE O/P EST MOD 30 MIN: CPT | Performed by: INTERNAL MEDICINE

## 2022-08-15 PROCEDURE — 1170F FXNL STATUS ASSESSED: CPT | Performed by: INTERNAL MEDICINE

## 2022-08-15 RX ORDER — ALBUTEROL SULFATE 90 UG/1
2 AEROSOL, METERED RESPIRATORY (INHALATION) EVERY 6 HOURS PRN
Qty: 18 G | Refills: 2 | Status: SHIPPED | OUTPATIENT
Start: 2022-08-15

## 2022-08-15 RX ORDER — LEVOTHYROXINE SODIUM 0.03 MG/1
25 TABLET ORAL DAILY
Qty: 30 TABLET | Refills: 3 | Status: SHIPPED | OUTPATIENT
Start: 2022-08-15

## 2022-08-15 RX ORDER — GABAPENTIN 100 MG/1
100 CAPSULE ORAL 2 TIMES DAILY
Qty: 60 CAPSULE | Refills: 2 | Status: SHIPPED | OUTPATIENT
Start: 2022-08-15

## 2022-08-15 NOTE — PATIENT INSTRUCTIONS
Medicare Preventive Visit Patient Instructions  Thank you for completing your Welcome to Medicare Visit or Medicare Annual Wellness Visit today  Your next wellness visit will be due in one year (8/16/2023)  The screening/preventive services that you may require over the next 5-10 years are detailed below  Some tests may not apply to you based off risk factors and/or age  Screening tests ordered at today's visit but not completed yet may show as past due  Also, please note that scanned in results may not display below  Preventive Screenings:  Service Recommendations Previous Testing/Comments   Colorectal Cancer Screening  * Colonoscopy    * Fecal Occult Blood Test (FOBT)/Fecal Immunochemical Test (FIT)  * Fecal DNA/Cologuard Test  * Flexible Sigmoidoscopy Age: 39-70 years old   Colonoscopy: every 10 years (may be performed more frequently if at higher risk)  OR  FOBT/FIT: every 1 year  OR  Cologuard: every 3 years  OR  Sigmoidoscopy: every 5 years  Screening may be recommended earlier than age 39 if at higher risk for colorectal cancer  Also, an individualized decision between you and your healthcare provider will decide whether screening between the ages of 74-80 would be appropriate  Colonoscopy: 02/01/2018  FOBT/FIT: Not on file  Cologuard: Not on file  Sigmoidoscopy: Not on file    Screening Current     Breast Cancer Screening Age: 36 years old  Frequency: every 1-2 years  Not required if history of left and right mastectomy Mammogram: 11/28/2017    Risks and Benefits Discussed   Cervical Cancer Screening Between the ages of 21-29, pap smear recommended once every 3 years  Between the ages of 33-67, can perform pap smear with HPV co-testing every 5 years     Recommendations may differ for women with a history of total hysterectomy, cervical cancer, or abnormal pap smears in past  Pap Smear: Not on file    Screening Not Indicated   Hepatitis C Screening Once for adults born between [facility] frequently in patients at high risk for Hepatitis C Hep C Antibody: 02/28/2019    Screening Current   Diabetes Screening 1-2 times per year if you're at risk for diabetes or have pre-diabetes Fasting glucose: 89 mg/dL (6/9/2022)  A1C: 6 0 % (8/9/2022)  Screening Not Indicated  History Diabetes   Cholesterol Screening Once every 5 years if you don't have a lipid disorder  May order more often based on risk factors  Lipid panel: 06/13/2022    Screening Current     Other Preventive Screenings Covered by Medicare:  1  Abdominal Aortic Aneurysm (AAA) Screening: covered once if your at risk  You're considered to be at risk if you have a family history of AAA  2  Lung Cancer Screening: covers low dose CT scan once per year if you meet all of the following conditions: (1) Age 50-69; (2) No signs or symptoms of lung cancer; (3) Current smoker or have quit smoking within the last 15 years; (4) You have a tobacco smoking history of at least 20 pack years (packs per day multiplied by number of years you smoked); (5) You get a written order from a healthcare provider  3  Glaucoma Screening: covered annually if you're considered high risk: (1) You have diabetes OR (2) Family history of glaucoma OR (3)  aged 48 and older OR (3)  American aged 72 and older  3  Osteoporosis Screening: covered every 2 years if you meet one of the following conditions: (1) You're estrogen deficient and at risk for osteoporosis based off medical history and other findings; (2) Have a vertebral abnormality; (3) On glucocorticoid therapy for more than 3 months; (4) Have primary hyperparathyroidism; (5) On osteoporosis medications and need to assess response to drug therapy  · Last bone density test (DXA Scan): 01/22/2020   5  HIV Screening: covered annually if you're between the age of 15-65  Also covered annually if you are younger than 13 and older than 72 with risk factors for HIV infection   For pregnant patients, it is covered up to 3 times per pregnancy  Immunizations:  Immunization Recommendations   Influenza Vaccine Annual influenza vaccination during flu season is recommended for all persons aged >= 6 months who do not have contraindications   Pneumococcal Vaccine   * Pneumococcal conjugate vaccine = PCV13 (Prevnar 13), PCV15 (Vaxneuvance), PCV20 (Prevnar 20)  * Pneumococcal polysaccharide vaccine = PPSV23 (Pneumovax) Adults 25-60 years old: 1-3 doses may be recommended based on certain risk factors  Adults 72 years old: 1-2 doses may be recommended based off what pneumonia vaccine you previously received   Hepatitis B Vaccine 3 dose series if at intermediate or high risk (ex: diabetes, end stage renal disease, liver disease)   Tetanus (Td) Vaccine - COST NOT COVERED BY MEDICARE PART B Following completion of primary series, a booster dose should be given every 10 years to maintain immunity against tetanus  Td may also be given as tetanus wound prophylaxis  Tdap Vaccine - COST NOT COVERED BY MEDICARE PART B Recommended at least once for all adults  For pregnant patients, recommended with each pregnancy  Shingles Vaccine (Shingrix) - COST NOT COVERED BY MEDICARE PART B  2 shot series recommended in those aged 48 and above     Health Maintenance Due:      Topic Date Due    Breast Cancer Screening: Mammogram  11/28/2019    Hepatitis C Screening  Completed     Immunizations Due:      Topic Date Due    COVID-19 Vaccine (3 - Booster for Moderna series) 08/08/2021    Influenza Vaccine (1) 09/01/2022     Advance Directives   What are advance directives? Advance directives are legal documents that state your wishes and plans for medical care  These plans are made ahead of time in case you lose your ability to make decisions for yourself  Advance directives can apply to any medical decision, such as the treatments you want, and if you want to donate organs  What are the types of advance directives?   There are many types of advance directives, and each state has rules about how to use them  You may choose a combination of any of the following:  · Living will: This is a written record of the treatment you want  You can also choose which treatments you do not want, which to limit, and which to stop at a certain time  This includes surgery, medicine, IV fluid, and tube feedings  · Durable power of  for healthcare [facility]): This is a written record that states who you want to make healthcare choices for you when you are unable to make them for yourself  This person, called a proxy, is usually a family member or a friend  You may choose more than 1 proxy  · Do not resuscitate (DNR) order:  A DNR order is used in case your heart stops beating or you stop breathing  It is a request not to have certain forms of treatment, such as CPR  A DNR order may be included in other types of advance directives  · Medical directive: This covers the care that you want if you are in a coma, near death, or unable to make decisions for yourself  You can list the treatments you want for each condition  Treatment may include pain medicine, surgery, blood transfusions, dialysis, IV or tube feedings, and a ventilator (breathing machine)  · Values history: This document has questions about your views, beliefs, and how you feel and think about life  This information can help others choose the care that you would choose  Why are advance directives important? An advance directive helps you control your care  Although spoken wishes may be used, it is better to have your wishes written down  Spoken wishes can be misunderstood, or not followed  Treatments may be given even if you do not want them  An advance directive may make it easier for your family to make difficult choices about your care  © Copyright [address] 2018 Information is for End User's use only and may not be sold, redistributed or otherwise used for commercial purposes  All illustrations and images included in CareNotes® are the copyrighted property of A D A M , Inc  or [address] [place], [place] [address]bria Tuttle     Osteoporosis Education   Osteoporosis  is a long-term medical condition that causes your bones to become weak, brittle, and more likely to fracture  Osteoporosis occurs when your body absorbs more bone than it makes  It is also caused by a lack of calcium and estrogen (female hormone)  Common symptoms include the following: You may not have any signs or symptoms  You may break a bone after a muscle strain, bump, or fall  A break usually occurs in the hip, spine, or wrist  A collapsed vertebra (bone in your spine) may cause severe back pain or loss of height from bent posture  Call your doctor if:   ·  You have severe pain  ·  You have increasing pain after a fall  ·  You have pain when you do your daily activities  ·  You have questions or concerns about your condition or care  Diagnosis of osteoporosis:   · Blood and urine tests  measure your calcium, vitamin D, and estrogen levels  · An x-ray or CT may show thinned bones or a fracture  You may be given contrast liquid to help the bones show up better in the pictures  Tell the healthcare provider if you have ever had an allergic reaction to contrast liquid  Do not enter the MRI room with anything metal  Metal can cause serious injury  Tell the healthcare provider if you have any metal in or on your body  · A bone density test  compares your bone thickness with what is expected for someone of your age, gender, and ethnicity  Treatment for osteoporosis may include medicines to prevent bone loss, build new bone, and increase estrogen  These medicines help prevent fractures and may be given as a pill or injection  Ask your healthcare provider for more information on these medicines  Prevent bone loss:  · Eat healthy foods that are high in calcium  This helps keep your bones strong   Good sources of calcium are milk, cheese, broccoli, tofu, almonds, and canned salmon and sardines  Recommended to get at least 1200mg daily of calcium  · Increase your vitamin D intake  Vitamin D is in fish oils, some vegetables, and fortified milk, cereal, and bread  Vitamin D is also formed in the skin when it is exposed to the sun  Ask your healthcare provider how much sunlight is safe for you  You will require at least 800 units of vitamin D daily taken as a supplement  · Drink liquids as directed  Ask your healthcare provider how much liquid to drink each day and which liquids are best for you  Do not have alcohol or caffeine  They decrease bone mineral density, which can weaken your bones  · Exercise regularly  Ask your healthcare provider about the best exercise plan for you  Weight bearing exercise for 30 minutes, 3 times a week can help build and strengthen bone  · Do not smoke  Nicotine and other chemicals in cigarettes and cigars can cause lung damage  Ask your healthcare provider for information if you currently smoke and need help to quit  E-cigarettes or smokeless tobacco still contain nicotine  Talk to your healthcare provider before you use these products  · Go to physical therapy as directed  A physical therapist teaches you exercises to help improve movement and muscle strength  · Alcohol  It is recommended to avoid heavy alcohol use as increased consumption of alcohol is known to cause bone loss  © Copyright [employer] 2021 Information is for End User's use only and may not be sold, redistributed or otherwise used for commercial purposes  All illustrations and images included in CareNotes® are the copyrighted property of [employer]  or [facility] Meet Tuttle     Calcium and vitamin D for bone health (Beyond the Basics)    CALCIUM AND VITAMIN D OVERVIEW  Osteoporosis is a common bone disorder that causes a progressive loss in bone density and mass  As a result, bones become thin, weakened, and easily fractured   It is estimated that more than 1 3 million osteoporosis-associated (or "osteoporotic") fractures occur every year in the [place], primarily of bone within the spine (the vertebrae), the hip, and the forearm near the wrist  =    A number of treatments can help to prevent loss of bone and treat low bone mass  However, the first step in preventing or treating osteoporosis is to consume foods and drinks that provide calcium, a mineral essential for bone strength, and vitamin D, which aids in calcium breakdown and absorption  =    CALCIUM AND VITAMIN D BENEFITS  Good nutrition is important at all ages to keep the bones healthy  · Taking calcium reduces bone loss and decreases the risk of fracturing the vertebrae (the bones that surround the spinal cord)  · Consuming calcium during childhood (eg, in milk) can lead to higher bone mass in adulthood  This increase in bone density can reduce the risk of fractures later in life  · Calcium may also have benefits in other body systems by reducing blood pressure and cholesterol levels  · Calcium and vitamin D supplements may help prevent tooth loss in older adults  RECOMMENDATIONS FOR CALCIUM    General recommendations -- Premenopausal women and men should consume at least 1000 mg of calcium, while postmenopausal women should consume 1200 mg (total diet plus supplement)  You should not consume more than 2000 mg of calcium per day (total diet plus supplement) due to the risk of side effects  Calcium in the diet -- The primary sources of calcium in the diet include milk and other dairy products, such as hard cheese, cottage cheese, or yogurt, as well as green vegetables, such as kale and broccoli (table 1)  Some cereals, soy products, and fruit juices are fortified with calcium  Calcium supplements -- The body is able to absorb calcium contained in supplements as well as from dietary sources   If it is not possible to get enough calcium from dietary sources, consult a health care provider to determine the best type, dose, and timing of calcium supplements  · Calcium carbonate is effective and is the least expensive form of calcium  It is best absorbed with a low-iron meal (such as breakfast)  Calcium carbonate may not be absorbed well in people who also take a specific medication for gastroesophageal reflux (called a proton pump inhibitor or H2 blocker), which blocks stomach acid  · Many natural calcium carbonate preparations such as oyster shells or bone meal contain some lead  · Calcium citrate is well absorbed in the fasting state as well as with a meal   · Calcium doses above 500 mg are not absorbed as well as smaller doses, so large doses of supplements should be taken in divided doses (eg, in the morning and evening)  · Calcium supplements do not replace other osteoporosis treatments such as hormone replacement, bisphosphonates (eg, risedronate [sample brand name: Actonel] and alendronate [brand name: Fosamax]), and raloxifene (brand name: Evista)  Calcium and vitamin D supplements alone are usually insufficient to prevent age-related bone loss, although they may be beneficial in some subgroups (older adults, those with very low intake)  In most patients with or at risk for osteoporosis, the addition of medication or hormonal therapy is necessary in order to slow the breakdown and removal of bone (ie, resorption)  Underlying gastrointestinal diseases -- Patients who do not adequately absorb nutrients from the gastrointestinal tract (due to malabsorption) may require more than 1000 to 1200 mg of calcium per day  In such cases, a health care provider can help to determine the optimal dose of calcium  Medications -- All medications should be discussed with a health care provider to ensure that possible interactions with calcium are identified  Certain medications change the amount of calcium that is absorbed and/or excreted   As an example, loop diuretics (eg, furosemide [sample brand name: Lasix]) increase the amount of calcium excreted in the urine  On the other hand, thiazide diuretics (eg, hydrochlorothiazide) can reduce levels of calcium in the urine, potentially reducing the risk of bone loss and kidney stones  Side effects of calcium -- Calcium is usually easily tolerated when it is taken in divided doses several times per day  Some people experience side effects related to calcium, including constipation and indigestion  Calcium supplements interfere with the absorption of iron and thyroid hormone, and therefore, these medications should be taken at different times  Kidney stones -- There is no evidence that consuming large amounts of calcium (from foods and drinks) increases the risk of kidney stones, or that avoiding dietary calcium decreases the risk  In fact, avoiding dairy products is likely to increase the risk of kidney stones  However, use of calcium supplements may increase the risk of kidney stones in susceptible individuals by raising the level of calcium in the urine  This is particularly true if the supplement is taken between meals or at bedtime, and this is one of the reasons we prefer for patients to get as much of their calcium requirement through dietary means  IMPORTANCE OF VITAMIN D  Vitamin D decreases bone loss and lowers the risk of fracture, especially in older men and women  Along with calcium, vitamin D also helps to prevent and treat osteoporosis  To absorb calcium efficiently, an adequate amount of vitamin D must be present  Vitamin D is normally made in the skin after exposure to sunlight  Recommendations for vitamin D -- The current recommendation is that men over 70 years and postmenopausal women consume at least 800 international units (20 micrograms) of vitamin D per day  Lower levels of vitamin D are not as effective, while high doses can be toxic, especially if taken for long periods of time   Although the optimal intake has not been clearly established in premenopausal women or in younger men with osteoporosis, 600 international units (15 micrograms) of vitamin D daily is generally suggested  Vitamin D is available as an individual supplement and is included in most multivitamins and some calcium supplements (table 2)  Milk is a relatively good dietary source of vitamin D, with approximately 100 international units (2 5 micrograms) per cup (240 mL), and salmon has 800 to 1000 international units (20 to 25 micrograms) of vitamin D per serving  Most healthy people don't need to check with their health care provider before taking standard doses of vitamin D and don't need monitoring of vitamin D levels if they are not being treated for vitamin D deficiency  However, recommendations for vitamin D supplementation may be different in people with certain underlying medical conditions, such as sarcoidosis or lymphoma  In these situations, a provider will determine if supplements are needed; if so, the person's vitamin D and calcium levels should be monitored with regular tests  ©5235 [employer], [address] rights reserved

## 2022-08-15 NOTE — PROGRESS NOTES
Assessment and Plan:     Problem List Items Addressed This Visit    None     Visit Diagnoses     Encounter for screening mammogram for breast cancer    -  Primary           Preventive health issues were discussed with patient, and age appropriate screening tests were ordered as noted in patient's After Visit Summary  Personalized health advice and appropriate referrals for health education or preventive services given if needed, as noted in patient's After Visit Summary       History of Present Illness:     Patient presents for a Medicare Wellness Visit    HPI   Patient Care Team:  Georgie Malcolm MD as PCP - General (Internal Medicine)     Review of Systems:     Review of Systems     Problem List:     Patient Active Problem List   Diagnosis    Chronic obstructive pulmonary disease ([address] )    Vitamin D deficiency    Other specified hypothyroidism    Gastroesophageal reflux disease without esophagitis    Rectus sheath hematoma, initial encounter    Hypertension    Osteoarthritis    [facility]    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 ([address] )    Diabetic eye exam ([facility] )      Past Medical and Surgical History:     Past Medical History:   Diagnosis Date    Asthma     Chronic obstructive pulmonary disease ([address] ) 9/26/2012    GERD (gastroesophageal reflux disease)     Hypertension 10/11/2018    Hypothyroid     Osteoarthritis 9/26/2012    Temporal arteritis ([address] )     Tubular adenoma     Type 2 diabetes mellitus with complication, without long-term current use of insulin ([address] ) 1/14/2020     Past Surgical History:   Procedure Laterality Date    EYE SURGERY Right 12/06/2019    cataract LVCFS    HYSTERECTOMY      NO PAST SURGERIES      ALSO NO RELEVANT PAST SURRGICAL HX AS PER [facility]      Family History:     Family History   Problem Relation Age of Onset    Breast cancer Mother     Emphysema Father       Social History:     Social History     Socioeconomic History    Marital status: Legally      Spouse name: Not on file    Number of children: Not on file    Years of education: Not on file    Highest education level: Not on file   Occupational History    Occupation: retired   Tobacco Use    Smoking status: Former Smoker     Years: 15 00     Types: Cigarettes    Smokeless tobacco: Never Used    Tobacco comment: TOBACCO USE, NO PASSIVE SMOKE EXPOSURE   Vaping Use    Vaping Use: Never used   Substance and Sexual Activity    Alcohol use: No    Drug use: No    Sexual activity: Not Currently   Other Topics Concern    Not on file   Social History Narrative    Not on file     Social Determinants of Health     Financial Resource Strain: Not on file   Food Insecurity: Not on file   Transportation Needs: Not on file   Physical Activity: Not on file   Stress: Not on file   Social Connections: Not on file   Intimate Partner Violence: Not on file   Housing Stability: Not on file      Medications and Allergies:     Current Outpatient Medications   Medication Sig Dispense Refill    acetaminophen (TYLENOL) 500 mg tablet Take 1 tablet (500 mg total) by mouth every 6 (six) hours as needed (pain fevers) 30 tablet 0    albuterol (PROVENTIL HFA,VENTOLIN HFA) 90 mcg/act inhaler Inhale 2 puffs every 6 (six) hours as needed for wheezing or shortness of breath 1 Inhaler 5    aspirin 81 MG tablet take one tab   every other day with Dinner      azelastine (ASTELIN) 0 1 % nasal spray 1 spray into each nostril 2 (two) times a day Use in each nostril as directed 1 Bottle 3    benzonatate (TESSALON PERLES) 100 mg capsule Take 1 capsule (100 mg total) by mouth 3 (three) times a day as needed for cough With food/Meals 30 capsule 0    Calcium Carb-Cholecalciferol (Caltrate 600+D3) 600-800 MG-UNIT TABS Take 1 tablet by mouth 2 (two) times a day 60 tablet 6    cetirizine (ZyrTEC) 10 mg tablet Take 1 tablet (10 mg total) by mouth daily With food/in the evening 30 tablet 3    fluticasone-umeclidinium-vilanterol (TRELEGY) 100-62 5-25 MCG/INH inhaler Inhale 1 puff daily Rinse mouth after use  3 blister 3    ipratropium-albuterol (DUO-NEB) 0 5-2 5 mg/3 mL nebulizer solution Take 1 vial (3 mL total) by nebulization 4 (four) times a day 120 vial 5    levothyroxine (Synthroid) 25 mcg tablet Take 1 tablet (25 mcg total) by mouth daily 30 tablet 3    pantoprazole (Protonix) 40 mg tablet Take 1 tablet (40 mg total) by mouth daily 90 tablet 1    predniSONE 2 5 mg tablet Take 1 tablet (2 5 mg total) by mouth daily 30 tablet 3    vitamin B-12 (VITAMIN B-12) 1,000 mcg tablet Take 1 tablet (1,000 mcg total) by mouth daily 90 tablet 1     Current Facility-Administered Medications   Medication Dose Route Frequency Provider Last Rate Last Admin    cyanocobalamin injection 1,000 mcg  1,000 mcg Intramuscular Q30 Days Natalie Brand MD   1,000 mcg at 01/14/20 1508     No Known Allergies   Immunizations:     Immunization History   Administered Date(s) Administered    COVID-19 MODERNA VACC 0 5 ML IM 02/08/2021, 03/08/2021    INFLUENZA 10/17/2007, 10/13/2010, 10/07/2015, 10/13/2016, 10/02/2017, 11/01/2018    Influenza Split 10/04/2012, 09/23/2013    Influenza Split High Dose Preservative Free IM 10/13/2016, 10/02/2017    Influenza, high dose seasonal 0 7 mL 11/01/2018, 10/23/2019, 09/28/2020, 11/15/2021    Influenza, seasonal, injectable 10/16/2014, 10/07/2015    Pneumococcal Conjugate 13-Valent 10/02/2017    Pneumococcal Polysaccharide PPV23 09/01/2011    TD (adult) Preservative Free 11/13/2017    Td (adult), adsorbed 11/13/2017      Health Maintenance:         Topic Date Due    Breast Cancer Screening: Mammogram  11/28/2019    Hepatitis C Screening  Completed         Topic Date Due    COVID-19 Vaccine (3 - Booster for Moderna series) 08/08/2021    Influenza Vaccine (1) 09/01/2022      Medicare Screening Tests and Risk Assessments: Saurav Lopez is here for her Subsequent Wellness visit  Health Risk Assessment:   Patient rates overall health as good  Patient feels that their physical health rating is same  Patient is satisfied with their life  Eyesight was rated as same  Hearing was rated as same  Patient feels that their emotional and mental health rating is same  Patients states they are never, rarely angry  Patient states they are never, rarely unusually tired/fatigued  Pain experienced in the last 7 days has been none  Patient states that she has experienced no weight loss or gain in last 6 months  Fall Risk Screening: In the past year, patient has experienced: no history of falling in past year      Urinary Incontinence Screening:   Patient has not leaked urine accidently in the last six months  Home Safety:  Patient does not have trouble with stairs inside or outside of their home  Patient has working smoke alarms and has working carbon monoxide detector  Home safety hazards include: none  Nutrition:   Current diet is Regular  Medications:   Patient is not currently taking any over-the-counter supplements  Patient is able to manage medications  Activities of Daily Living (ADLs)/Instrumental Activities of Daily Living (IADLs):   Walk and transfer into and out of bed and chair?: Yes  Dress and groom yourself?: Yes    Bathe or shower yourself?: Yes    Feed yourself?  Yes  Do your laundry/housekeeping?: Yes  Manage your money, pay your bills and track your expenses?: Yes  Make your own meals?: Yes    Do your own shopping?: Yes    Previous Hospitalizations:   Any hospitalizations or ED visits within the last 12 months?: No      Advance Care Planning:   Living will: No    Durable POA for healthcare: No    Advanced directive counseling given: No    Five wishes given: No      PREVENTIVE SCREENINGS      Cardiovascular Screening:    General: Screening Current and Risks and Benefits Discussed      Diabetes Screening:     General: Screening Not Indicated, History Diabetes, Risks and Benefits Discussed and Screening Current      Colorectal Cancer Screening:     General: Screening Current and Risks and Benefits Discussed      Breast Cancer Screening:     General: Risks and Benefits Discussed      Cervical Cancer Screening:    General: Screening Not Indicated and Risks and Benefits Discussed      Osteoporosis Screening:    General: Screening Not Indicated, History Osteoporosis and Risks and Benefits Discussed      Abdominal Aortic Aneurysm (AAA) Screening:        General: Risks and Benefits Discussed      Lung Cancer Screening:     General: Screening Not Indicated and Risks and Benefits Discussed      Hepatitis C Screening:    General: Screening Current and Risks and Benefits Discussed    Screening, Brief Intervention, and Referral to Treatment (SBIRT)    Screening      Single Item Drug Screening:  How often have you used an illegal drug (including marijuana) or a prescription medication for non-medical reasons in the past year? never    Single Item Drug Screen Score: 0  Interpretation: Negative screen for possible drug use disorder    Other Counseling Topics:   Car/seat belt/driving safety, skin self-exam, sunscreen and calcium and vitamin D intake and regular weightbearing exercise  No exam data present     Physical Exam:     There were no vitals taken for this visit      Physical Exam     Gilmer Orozco MD

## 2022-08-15 NOTE — PROGRESS NOTES
Assessment/Plan:         Diagnoses and all orders for this visit:    Encounter for general adult medical examination with abnormal findings; done in Detail  RTc in 3 mos w  :  -     UA (URINE) with reflex to Scope; Future  -     Comprehensive metabolic panel; Future  -     CBC and differential; Future  -     Magnesium; Future    Encounter for screening mammogram for breast cancer  -     Mammo screening bilateral w 3d & cad; Future    Pulmonary emphysema, unspecified emphysema type ([place] [address] )  -     albuterol (PROVENTIL HFA,VENTOLIN HFA) 90 mcg/act inhaler; Inhale 2 puffs every 6 (six) hours as needed for wheezing or shortness of breath    PVD (peripheral vascular disease) ([place] [address] ) ; RTC in 3 mos w :  -     EMG 2 limb lower extremity; Future  -     VAS lower limb arterial duplex, complete bilateral; Future    Neuropathy; RTC in 3 mos w :  -     EMG 2 limb lower extremity; Future  -     VAS lower limb arterial duplex, complete bilateral; Future  -     b complex-C-E-zinc (STRESS FORMULA/ZINC) tablet; Take 1 tablet by mouth daily  -     gabapentin (Neurontin) 100 mg capsule; Take 1 capsule (100 mg total) by mouth 2 (two) times a day With food/Meals    Idiopathic osteoporosis  -     Calcium Carb-Cholecalciferol (Caltrate 600+D3) 600-800 MG-UNIT TABS; Take 1 tablet by mouth 2 (two) times a day  -     DXA bone density spine hip and pelvis; Future  -     Vitamin B12; Future  -     Vitamin D 25 hydroxy; Future    Acute exacerbation of chronic obstructive pulmonary disease (COPD) ([place] [address] )  -     fluticasone-umeclidinium-vilanterol (TRELEGY) 100-62 5-25 MCG/INH inhaler; Inhale 1 puff daily Rinse mouth after use  Enlargement of thyroid  -     levothyroxine (Synthroid) 25 mcg tablet; Take 1 tablet (25 mcg total) by mouth daily    Screen for colon cancer  -     Cologuard    Elevated hemoglobin A1c  -     Vitamin B12; Future    Hyperlipidemia associated with type 2 diabetes mellitus ([place] [address] )  -     Lipid panel;  Future  -     Vitamin B12; Future    Hypothyroidism due to Hashimoto's thyroiditis  -     TSH, 3rd generation; Future  -     T4, free; Future  -     Vitamin B12; Future        Subjective:      Patient ID: Delaney Owens is a 68 y o  female  68 Y O lady is here for AWV and Regular check Up, she has few symptoms, recent Blood work and med list reviewed w Pt in detail    The following portions of the patient's history were reviewed and updated as appropriate: allergies, current medications, past family history, past medical history, past social history, past surgical history and problem list     Review of Systems   Constitutional: Negative for chills, fatigue and fever  HENT: Negative for congestion, facial swelling, sore throat, trouble swallowing and voice change  Eyes: Negative for pain, discharge and visual disturbance  Respiratory: Negative for cough, shortness of breath and wheezing  Cardiovascular: Negative for chest pain, palpitations and leg swelling  Gastrointestinal: Negative for abdominal pain, blood in stool, constipation, diarrhea and nausea  Endocrine: Negative for polydipsia, polyphagia and polyuria  Genitourinary: Negative for difficulty urinating, hematuria and urgency  Musculoskeletal: Negative for arthralgias and myalgias  Skin: Negative for rash  Neurological: Positive for numbness  Negative for dizziness, tremors, weakness and headaches  Hematological: Negative for adenopathy  Does not bruise/bleed easily  Psychiatric/Behavioral: Negative for dysphoric mood, sleep disturbance and suicidal ideas  Objective:      /98 (BP Location: Left arm, Patient Position: Sitting, Cuff Size: Standard)   Pulse 96   Temp 97 8 °F (36 6 °C)   Resp 14   Ht 5' 3" (1 6 m)   Wt 51 4 kg (113 lb 6 4 oz)   SpO2 99%   BMI 20 09 kg/m²          Physical Exam  Constitutional:       General: She is not in acute distress  HENT:      Head: Normocephalic        Mouth/Throat:      Pharynx: No oropharyngeal exudate  Eyes:      General: No scleral icterus  Conjunctiva/sclera: Conjunctivae normal       Pupils: Pupils are equal, round, and reactive to light  Neck:      Thyroid: No thyromegaly  Cardiovascular:      Rate and Rhythm: Normal rate and regular rhythm  Heart sounds: Murmur heard  Pulmonary:      Effort: Pulmonary effort is normal  No respiratory distress  Breath sounds: Normal breath sounds  No wheezing or rales  Abdominal:      General: Bowel sounds are normal  There is no distension  Palpations: Abdomen is soft  Tenderness: There is no abdominal tenderness  There is no guarding or rebound  Musculoskeletal:         General: No tenderness  Cervical back: Neck supple  Lymphadenopathy:      Cervical: No cervical adenopathy  Skin:     Coloration: Skin is not pale  Findings: No rash  Neurological:      Mental Status: She is alert and oriented to person, place, and time  Sensory: Sensory deficit present  Motor: No weakness

## 2022-09-06 ENCOUNTER — HOSPITAL ENCOUNTER (EMERGENCY)
Facility: HOSPITAL | Age: 77
Discharge: HOME | End: 2022-09-06
Attending: EMERGENCY MEDICINE
Payer: COMMERCIAL

## 2022-09-06 ENCOUNTER — APPOINTMENT (OUTPATIENT)
Dept: RADIOLOGY | Facility: HOSPITAL | Age: 77
End: 2022-09-06
Payer: COMMERCIAL

## 2022-09-06 ENCOUNTER — APPOINTMENT (EMERGENCY)
Dept: CT IMAGING | Facility: HOSPITAL | Age: 77
End: 2022-09-06
Payer: COMMERCIAL

## 2022-09-06 VITALS
TEMPERATURE: 97.5 F | BODY MASS INDEX: 19.69 KG/M2 | DIASTOLIC BLOOD PRESSURE: 91 MMHG | WEIGHT: 111.11 LBS | SYSTOLIC BLOOD PRESSURE: 169 MMHG | HEART RATE: 83 BPM | HEIGHT: 63 IN | RESPIRATION RATE: 16 BRPM | OXYGEN SATURATION: 95 %

## 2022-09-06 DIAGNOSIS — R07.89 OTHER CHEST PAIN: Primary | ICD-10-CM

## 2022-09-06 LAB
2HR DELTA HS TROPONIN: 0 NG/L
ANION GAP SERPL CALCULATED.3IONS-SCNC: 9 MMOL/L (ref 5–14)
ATRIAL RATE: 84 BPM
BASOPHILS # BLD AUTO: 0.04 THOUSANDS/ΜL (ref 0–0.1)
BASOPHILS NFR BLD AUTO: 1 % (ref 0–1)
BUN SERPL-MCNC: 18 MG/DL (ref 5–25)
CALCIUM SERPL-MCNC: 9.3 MG/DL (ref 8.4–10.2)
CARDIAC TROPONIN I PNL SERPL HS: 5 NG/L
CARDIAC TROPONIN I PNL SERPL HS: 5 NG/L
CHLORIDE SERPL-SCNC: 99 MMOL/L (ref 96–108)
CO2 SERPL-SCNC: 32 MMOL/L (ref 21–32)
CREAT SERPL-MCNC: 0.56 MG/DL (ref 0.6–1.2)
D DIMER PPP FEU-MCNC: 0.42 UG/ML FEU
EOSINOPHIL # BLD AUTO: 0.11 THOUSAND/ΜL (ref 0–0.61)
EOSINOPHIL NFR BLD AUTO: 1 % (ref 0–6)
ERYTHROCYTE [DISTWIDTH] IN BLOOD BY AUTOMATED COUNT: 13.6 % (ref 11.6–15.1)
GFR SERPL CREATININE-BSD FRML MDRD: 90 ML/MIN/1.73SQ M
GLUCOSE SERPL-MCNC: 97 MG/DL (ref 70–99)
HCT VFR BLD AUTO: 48 % (ref 34.8–46.1)
HGB BLD-MCNC: 15.2 G/DL (ref 11.5–15.4)
IMM GRANULOCYTES # BLD AUTO: 0.02 THOUSAND/UL (ref 0–0.2)
IMM GRANULOCYTES NFR BLD AUTO: 0 % (ref 0–2)
LYMPHOCYTES # BLD AUTO: 1.01 THOUSANDS/ΜL (ref 0.6–4.47)
LYMPHOCYTES NFR BLD AUTO: 12 % (ref 14–44)
MCH RBC QN AUTO: 27.1 PG (ref 26.8–34.3)
MCHC RBC AUTO-ENTMCNC: 31.7 G/DL (ref 31.4–37.4)
MCV RBC AUTO: 86 FL (ref 82–98)
MONOCYTES # BLD AUTO: 0.68 THOUSAND/ΜL (ref 0.17–1.22)
MONOCYTES NFR BLD AUTO: 8 % (ref 4–12)
NEUTROPHILS # BLD AUTO: 6.45 THOUSANDS/ΜL (ref 1.85–7.62)
NEUTS SEG NFR BLD AUTO: 78 % (ref 43–75)
NRBC BLD AUTO-RTO: 0 /100 WBCS
P AXIS: 41 DEGREES
PLATELET # BLD AUTO: 241 THOUSANDS/UL (ref 149–390)
PMV BLD AUTO: 10.1 FL (ref 8.9–12.7)
POTASSIUM SERPL-SCNC: 3.9 MMOL/L (ref 3.5–5.3)
PR INTERVAL: 118 MS
QRS AXIS: 26 DEGREES
QRSD INTERVAL: 76 MS
QT INTERVAL: 378 MS
QTC INTERVAL: 446 MS
RBC # BLD AUTO: 5.61 MILLION/UL (ref 3.81–5.12)
SODIUM SERPL-SCNC: 140 MMOL/L (ref 135–147)
T WAVE AXIS: 51 DEGREES
VENTRICULAR RATE: 84 BPM
WBC # BLD AUTO: 8.31 THOUSAND/UL (ref 4.31–10.16)

## 2022-09-06 PROCEDURE — 84484 ASSAY OF TROPONIN QUANT: CPT

## 2022-09-06 PROCEDURE — 70450 CT HEAD/BRAIN W/O DYE: CPT

## 2022-09-06 PROCEDURE — 99285 EMERGENCY DEPT VISIT HI MDM: CPT

## 2022-09-06 PROCEDURE — 93010 ELECTROCARDIOGRAM REPORT: CPT | Performed by: INTERNAL MEDICINE

## 2022-09-06 PROCEDURE — 93005 ELECTROCARDIOGRAM TRACING: CPT

## 2022-09-06 PROCEDURE — 80048 BASIC METABOLIC PNL TOTAL CA: CPT

## 2022-09-06 PROCEDURE — G1004 CDSM NDSC: HCPCS

## 2022-09-06 PROCEDURE — 36415 COLL VENOUS BLD VENIPUNCTURE: CPT

## 2022-09-06 PROCEDURE — 71046 X-RAY EXAM CHEST 2 VIEWS: CPT

## 2022-09-06 PROCEDURE — 85379 FIBRIN DEGRADATION QUANT: CPT

## 2022-09-06 PROCEDURE — 85025 COMPLETE CBC W/AUTO DIFF WBC: CPT

## 2022-09-06 RX ORDER — SODIUM CHLORIDE 9 MG/ML
3 INJECTION INTRAVENOUS
Status: DISCONTINUED | OUTPATIENT
Start: 2022-09-06 | End: 2022-09-06 | Stop reason: HOSPADM

## 2022-09-06 RX ORDER — ASPIRIN 81 MG/1
324 TABLET, CHEWABLE ORAL ONCE
Status: COMPLETED | OUTPATIENT
Start: 2022-09-06 | End: 2022-09-06

## 2022-09-06 RX ADMIN — ASPIRIN 81 MG CHEWABLE TABLET 324 MG: 81 TABLET CHEWABLE at 12:21

## 2022-09-06 NOTE — DISCHARGE INSTRUCTIONS
Follow up with PCP, Cardiology  Cardiology should call you to make an appointment, if they do not call you tomorrow, call them to make an appointment  Return to ED for new or worsening symptoms as discussed

## 2022-09-06 NOTE — ED PROVIDER NOTES
History  Chief Complaint   Patient presents with    Chest Pain     States yesterday morning had chest pain for 10 minutes- states has COPD but hasn't ever has pain like this, sister said to come today to have it checked  68 y o  F with PMH of COPD, HTN, hypothyroid, DM2, temporal arteritis, GERD, OA presents to ED c/o CP x 1 day  States that yesterday she had really sharp chest pain lasted 10 minutes  Substernal   She states she was diaphoretic when it was occurring  She denies any radiation  Since then she has had chest pressure  No radiation  Has not taken anything  She reports left upper arm numbness for few months, intermittent, not currently  Reports left eye droop about baseline        History provided by:  Patient  Chest Pain  Pain location:  Substernal area  Pain quality: pressure and sharp    Pain radiates to:  Does not radiate  Pain radiates to the back: no    Pain severity:  Severe  Onset quality:  Sudden  Duration:  10 minutes  Date/time of last known well:  9/5/2022 8:00 AM  Progression:  Partially resolved (sharp pain resolved after 10 minutes, she notes light pressure since then )  Chronicity:  New  Context: at rest    Relieved by:  Nothing  Worsened by:  Nothing tried  Ineffective treatments:  None tried  Associated symptoms: anxiety, diaphoresis (during episode only ) and numbness    Associated symptoms: no abdominal pain, no altered mental status, no anorexia, no back pain, no claudication, no cough, no dizziness, no dysphagia, no fatigue, no fever, no headache, no heartburn, no lower extremity edema, no nausea, no near-syncope, no orthopnea, no palpitations, no PND, no shortness of breath (per baseline with her COPD, no acute SOB ), no syncope, not vomiting and no weakness    Risk factors: diabetes mellitus, hypertension and smoking    Risk factors: no aortic disease, no coronary artery disease, no immobilization, no prior DVT/PE and no surgery        Prior to Admission Medications   Prescriptions Last Dose Informant Patient Reported? Taking? Calcium Carb-Cholecalciferol (Caltrate 600+D3) 600-800 MG-UNIT TABS   No No   Sig: Take 1 tablet by mouth 2 (two) times a day   acetaminophen (TYLENOL) 500 mg tablet   No No   Sig: Take 1 tablet (500 mg total) by mouth every 6 (six) hours as needed (pain fevers)   albuterol (PROVENTIL HFA,VENTOLIN HFA) 90 mcg/act inhaler   No No   Sig: Inhale 2 puffs every 6 (six) hours as needed for wheezing or shortness of breath   aspirin 81 MG tablet  Self Yes No   Sig: take one tab   every other day with Dinner   azelastine (ASTELIN) 0 1 % nasal spray   No No   Si spray into each nostril 2 (two) times a day Use in each nostril as directed   b complex-C-E-zinc (STRESS FORMULA/ZINC) tablet   No No   Sig: Take 1 tablet by mouth daily   cetirizine (ZyrTEC) 10 mg tablet   No No   Sig: Take 1 tablet (10 mg total) by mouth daily With food/in the evening   fluticasone-umeclidinium-vilanterol (TRELEGY) 100-62 5-25 MCG/INH inhaler   No No   Sig: Inhale 1 puff daily Rinse mouth after use    gabapentin (Neurontin) 100 mg capsule   No No   Sig: Take 1 capsule (100 mg total) by mouth 2 (two) times a day With food/Meals   ipratropium-albuterol (DUO-NEB) 0 5-2 5 mg/3 mL nebulizer solution   No No   Sig: Take 1 vial (3 mL total) by nebulization 4 (four) times a day   levothyroxine (Synthroid) 25 mcg tablet   No No   Sig: Take 1 tablet (25 mcg total) by mouth daily   pantoprazole (Protonix) 40 mg tablet   No No   Sig: Take 1 tablet (40 mg total) by mouth daily   predniSONE 2 5 mg tablet   No No   Sig: Take 1 tablet (2 5 mg total) by mouth daily   vitamin B-12 (VITAMIN B-12) 1,000 mcg tablet   No No   Sig: Take 1 tablet (1,000 mcg total) by mouth daily      Facility-Administered Medications Last Administration Doses Remaining   cyanocobalamin injection 1,000 mcg 2020  3:08 PM           Past Medical History:   Diagnosis Date    Asthma     Chronic obstructive pulmonary disease ([facility] ) 9/26/2012    GERD (gastroesophageal reflux disease)     Hypertension 10/11/2018    Hypothyroid     Osteoarthritis 9/26/2012    Temporal arteritis (HCC)     Tubular adenoma     Type 2 diabetes mellitus with complication, without long-term current use of insulin ([facility] ) 1/14/2020       Past Surgical History:   Procedure Laterality Date    EYE SURGERY Right 12/06/2019    cataract LVCFS    HYSTERECTOMY      NO PAST SURGERIES      ALSO NO RELEVANT PAST SURRGICAL HX AS PER [facility]       Family History   Problem Relation Age of Onset    Breast cancer Mother     Emphysema Father      I have reviewed and agree with the history as documented  E-Cigarette/Vaping    E-Cigarette Use Never User      E-Cigarette/Vaping Substances    Nicotine No     THC No     CBD No     Flavoring No      Social History     Tobacco Use    Smoking status: Former Smoker     Years: 15 00     Types: Cigarettes    Smokeless tobacco: Never Used    Tobacco comment: TOBACCO USE, NO PASSIVE SMOKE EXPOSURE   Vaping Use    Vaping Use: Never used   Substance Use Topics    Alcohol use: No    Drug use: No       Review of Systems   Constitutional: Positive for diaphoresis (during episode only )  Negative for fatigue and fever  HENT: Negative for congestion, rhinorrhea and trouble swallowing  Eyes: Negative for pain and visual disturbance  Respiratory: Negative for cough and shortness of breath (per baseline with her COPD, no acute SOB )  Cardiovascular: Positive for chest pain  Negative for palpitations, orthopnea, claudication, leg swelling, syncope, PND and near-syncope  Gastrointestinal: Negative for abdominal pain, anorexia, blood in stool, heartburn, nausea and vomiting  Genitourinary: Negative for decreased urine volume, difficulty urinating and hematuria  Musculoskeletal: Negative for back pain, joint swelling and neck stiffness  Skin: Negative for color change and rash     Neurological: Positive for numbness  Negative for dizziness, seizures, syncope, speech difficulty, weakness and headaches  Facial asymmetry: per baseline    Psychiatric/Behavioral: Negative for confusion  All other systems reviewed and are negative  Physical Exam  Physical Exam  Vitals and nursing note reviewed  Constitutional:       General: She is awake  She is not in acute distress  Appearance: Normal appearance  She is not toxic-appearing or diaphoretic  HENT:      Head: Normocephalic and atraumatic  Jaw: No swelling  Right Ear: External ear normal       Left Ear: External ear normal       Mouth/Throat:      Lips: Pink  Mouth: Mucous membranes are moist    Eyes:      General: Lids are normal  Vision grossly intact  Right eye: No discharge  Left eye: No discharge  Extraocular Movements: Extraocular movements intact  Conjunctiva/sclera: Conjunctivae normal       Pupils: Pupils are equal, round, and reactive to light  Cardiovascular:      Rate and Rhythm: Normal rate and regular rhythm  Pulses:           Radial pulses are 2+ on the right side and 2+ on the left side  Dorsalis pedis pulses are 2+ on the right side and 2+ on the left side  Heart sounds: Normal heart sounds  Pulmonary:      Effort: Pulmonary effort is normal  No tachypnea, accessory muscle usage or respiratory distress  Breath sounds: Normal breath sounds  No wheezing, rhonchi or rales  Comments: Decreased breath sounds everywhere   Chest:      Chest wall: No tenderness  Abdominal:      General: There is no distension  Palpations: Abdomen is soft  Tenderness: There is no abdominal tenderness  Musculoskeletal:      Cervical back: Neck supple  Right lower leg: No edema  Left lower leg: No edema  Skin:     General: Skin is warm and dry  Capillary Refill: Capillary refill takes less than 2 seconds  Coloration: Skin is not jaundiced or pale  Findings: No rash  Neurological:      General: No focal deficit present  Mental Status: She is alert and oriented to person, place, and time  Mental status is at baseline  Cranial Nerves: No cranial nerve deficit  Sensory: No sensory deficit  Motor: No weakness  Coordination: Coordination normal       Gait: Abnormal gait: ambulated in and out without difficulty or ataxia    Psychiatric:         Mood and Affect: Mood normal          Behavior: Behavior normal          Thought Content:  Thought content normal          Vital Signs  ED Triage Vitals [09/06/22 1203]   Temperature Pulse Respirations Blood Pressure SpO2   97 5 °F (36 4 °C) 95 18 (!) 175/98 95 %      Temp Source Heart Rate Source Patient Position - Orthostatic VS BP Location FiO2 (%)   Oral Monitor Sitting Left arm --      Pain Score       No Pain           Vitals:    09/06/22 1203 09/06/22 1505   BP: (!) 175/98 169/91   Pulse: 95 83   Patient Position - Orthostatic VS: Sitting Sitting         Visual Acuity      ED Medications  Medications   sodium chloride (PF) 0 9 % injection 3 mL (has no administration in time range)   aspirin chewable tablet 324 mg (324 mg Oral Given 9/6/22 1221)       Diagnostic Studies  Results Reviewed     Procedure Component Value Units Date/Time    HS Troponin I 2hr [[id number]]  (Normal) Collected: 09/06/22 1433    Lab Status: Final result Specimen: Blood from Line, Venous Updated: 09/06/22 1500     hs TnI 2hr 5 ng/L      Delta 2hr hsTnI 0 ng/L     HS Troponin I 4hr [[id number]]     Lab Status: No result Specimen: Blood     HS Troponin 0hr (reflex protocol) [[id number]]  (Normal) Collected: 09/06/22 1236    Lab Status: Final result Specimen: Blood from Arm, Right Updated: 09/06/22 1308     hs TnI 0hr 5 ng/L     D-dimer, quantitative [[id number]]  (Normal) Collected: 09/06/22 1236    Lab Status: Final result Specimen: Blood from Arm, Right Updated: 09/06/22 1258     D-Dimer, Quant 0 42 ug/ml FEU     Narrative: In the evaluation for possible pulmonary embolism, in the appropriate (Well's Score of 4 or less) patient, the age adjusted d-dimer cutoff for this patient can be calculated as:    Age x 0 01 (in ug/mL) for Age-adjusted D-dimer exclusion threshold for a patient over 50 years      Basic metabolic panel [[id number]]  (Abnormal) Collected: 09/06/22 1236    Lab Status: Final result Specimen: Blood from Arm, Right Updated: 09/06/22 1257     Sodium 140 mmol/L      Potassium 3 9 mmol/L      Chloride 99 mmol/L      CO2 32 mmol/L      ANION GAP 9 mmol/L      BUN 18 mg/dL      Creatinine 0 56 mg/dL      Glucose 97 mg/dL      Calcium 9 3 mg/dL      eGFR 90 ml/min/1 73sq m     Narrative:      [place] guidelines for Chronic Kidney Disease (CKD):     Stage 1 with normal or high GFR (GFR > 90 mL/min/1 73 square meters)    Stage 2 Mild CKD (GFR = 60-89 mL/min/1 73 square meters)    Stage 3A Moderate CKD (GFR = 45-59 mL/min/1 73 square meters)    Stage 3B Moderate CKD (GFR = 30-44 mL/min/1 73 square meters)    Stage 4 Severe CKD (GFR = 15-29 mL/min/1 73 square meters)    Stage 5 End Stage CKD (GFR <15 mL/min/1 73 square meters)  Note: GFR calculation is accurate only with a steady state creatinine    CBC and differential [[id number]]  (Abnormal) Collected: 09/06/22 1236    Lab Status: Final result Specimen: Blood from Arm, Right Updated: 09/06/22 1246     WBC 8 31 Thousand/uL      RBC 5 61 Million/uL      Hemoglobin 15 2 g/dL      Hematocrit 48 0 %      MCV 86 fL      MCH 27 1 pg      MCHC 31 7 g/dL      RDW 13 6 %      MPV 10 1 fL      Platelets 531 Thousands/uL      nRBC 0 /100 WBCs      Neutrophils Relative 78 %      Immat GRANS % 0 %      Lymphocytes Relative 12 %      Monocytes Relative 8 %      Eosinophils Relative 1 %      Basophils Relative 1 %      Neutrophils Absolute 6 45 Thousands/µL      Immature Grans Absolute 0 02 Thousand/uL      Lymphocytes Absolute 1 01 Thousands/µL      Monocytes Absolute 0 68 Thousand/µL      Eosinophils Absolute 0 11 Thousand/µL      Basophils Absolute 0 04 Thousands/µL                  X-ray chest 2 views   Final Result by Josh Encinas MD (09/06 1514)      No acute cardiopulmonary disease  Workstation performed: [id number]         CT head without contrast   Final Result by Ian Kirkland MD (09/06 1424)      No acute intracranial abnormality  Workstation performed: [id number]                    Procedures  ECG 12 Lead Documentation Only    Date/Time: 9/6/2022 1:02 PM  Performed by: Karina Desir PA-C  Authorized by: Karina Desir PA-C     Indications / Diagnosis:  Cp   ECG reviewed by me, the ED Provider: yes    Patient location:  ED  Previous ECG:     Previous ECG:  Compared to current    Similarity:  No change    Comparison to cardiac monitor: Yes    Interpretation:     Interpretation: abnormal    Rate:     ECG rate:  84 bpm    ECG rate assessment: normal    Rhythm:     Rhythm: sinus rhythm      Rhythm comment:  With sinus arrhythmia   QRS:     QRS axis:  Normal    QRS intervals:  Normal  ST segments:     ST segments:  Non-specific  T waves:     T waves: normal    Comments:      WI interval 118ms   QRS Duration 76 ms  QT/QTc 378/446 ms   P-R-T axes 41 26 51              ED Course  ED Course as of 09/06/22 1542   Tue Sep 06, 2022   1334 Waiting for xray, CT to be performed    1334 hs TnI 0hr: 5  Requires delta    1432   IMPRESSION:     No acute intracranial abnormality      1500 Delta 2hr hsTnI: 0   1500 Patient was reexamined at this time and informed of laboratory and/or imaging results and was found to be stable for discharge  Return to emergency department criteria was reviewed with the patient who verbalized understanding and was agreeable to discharge and the treatment plan at this time                   HEART Risk Score    Flowsheet Row Most Recent Value   Heart Score Risk Calculator    History 1 Filed at: 09/06/2022 1500   ECG 1 Filed at: 09/06/2022 1500   Age 2 Filed at: 09/06/2022 1500   Risk Factors 2 Filed at: 09/06/2022 1500   Troponin 0 Filed at: 09/06/2022 1500   HEART Score 6 Filed at: 09/06/2022 1500                                      MDM  Number of Diagnoses or Management Options  Other chest pain  Diagnosis management comments: Background: 68 y o  female with chest pain    Differential DX includes but is not limited to: acs/mi, pe, pleurisy, dissection, pneumonia, musculoskeletal chest pain    Plan: cardiac workup    VSS  Afebrile  No focal neuro deficits on exam, findings per baseline  Symptoms have been occurring for at least today, some greater than a day  Will do CT head  No acute ischemic changes or other acute findings on CT head  No acute shortness of breath, no other signs of COPD exacerbation  ECG similar to previous, no signs of acute ischemia, pathologic arrhythmia  Chest x-ray without any acute changes, no focal consolidation  High sensitivity troponin delta of 0  Negative D-dimer, low suspicion for PE this time no further workup required  Heart score is 6, ambulatory referral to Cardiology placed  All imaging and/or lab testing discussed with patient, strict return to ED precautions discussed  Patient recommended to follow up promptly with appropriate outpatient provider  Patient and/or family members verbalizes understanding and agrees with plan  Patient and/or family members were given opportunity to ask questions, all questions were answered at this time  Patient is stable for discharge      Portions of the record may have been created with voice recognition software  Occasional wrong word or "sound a like" substitutions may have occurred due to the inherent limitations of voice recognition software  Read the chart carefully and recognize, using context, where substitutions have occurred            Amount and/or Complexity of Data Reviewed  Clinical lab tests: ordered and reviewed  Tests in the radiology section of CPT®: ordered and reviewed        Disposition  Final diagnoses:   Other chest pain     Time reflects when diagnosis was documented in both MDM as applicable and the Disposition within this note     Time User Action Codes Description Comment    9/6/2022  3:01 PM Tj Pryor Jl [R07 89] Other chest pain       ED Disposition     ED Disposition   Discharge    Condition   Stable    Date/Time   Tue Sep 6, 2022  3:01 PM    [id number] [address] discharge to home/self care  Follow-up Information     Follow up With Specialties Details Why Contact Info Additional Marisol Cruz MD Internal Medicine Schedule an appointment as soon as possible for a visit  For follow up regarding your symptoms [address] [facility]   [facility] [address]  [address], [place], [address] [phone]          Discharge Medication List as of 9/6/2022  3:06 PM      CONTINUE these medications which have NOT CHANGED    Details   acetaminophen (TYLENOL) 500 mg tablet Take 1 tablet (500 mg total) by mouth every 6 (six) hours as needed (pain fevers), Starting Fri 12/27/2019, Print      albuterol (PROVENTIL HFA,VENTOLIN HFA) 90 mcg/act inhaler Inhale 2 puffs every 6 (six) hours as needed for wheezing or shortness of breath, Starting Mon 8/15/2022, Normal      aspirin 81 MG tablet take one tab   every other day with Dinner, Historical Med      azelastine (ASTELIN) 0 1 % nasal spray 1 spray into each nostril 2 (two) times a day Use in each nostril as directed, Starting Mon 4/19/2021, Normal      b complex-C-E-zinc (STRESS FORMULA/ZINC) tablet Take 1 tablet by mouth daily, Starting Mon 8/15/2022, Normal      Calcium Carb-Cholecalciferol (Caltrate 600+D3) 600-800 MG-UNIT TABS Take 1 tablet by mouth 2 (two) times a day, Starting Mon 8/15/2022, Normal      cetirizine (ZyrTEC) 10 mg tablet Take 1 tablet (10 mg total) by mouth daily With food/in the evening, Starting Mon 3/7/2022, Normal      fluticasone-umeclidinium-vilanterol (TRELEGY) 100-62 5-25 MCG/INH inhaler Inhale 1 puff daily Rinse mouth after use , Starting Mon 8/15/2022, Normal      gabapentin (Neurontin) 100 mg capsule Take 1 capsule (100 mg total) by mouth 2 (two) times a day With food/Meals, Starting Mon 8/15/2022, Normal      ipratropium-albuterol (DUO-NEB) 0 5-2 5 mg/3 mL nebulizer solution Take 1 vial (3 mL total) by nebulization 4 (four) times a day, Starting Mon 3/8/2021, Normal      levothyroxine (Synthroid) 25 mcg tablet Take 1 tablet (25 mcg total) by mouth daily, Starting Mon 8/15/2022, Normal      pantoprazole (Protonix) 40 mg tablet Take 1 tablet (40 mg total) by mouth daily, Starting Mon 5/2/2022, Normal      predniSONE 2 5 mg tablet Take 1 tablet (2 5 mg total) by mouth daily, Starting Mon 6/13/2022, Normal      vitamin B-12 (VITAMIN B-12) 1,000 mcg tablet Take 1 tablet (1,000 mcg total) by mouth daily, Starting Tue 6/16/2020, Normal                 PDMP Review     None          ED Provider  Electronically Signed by           Gabriela Carson PA-C  09/06/22 [id number]

## 2022-09-07 ENCOUNTER — TELEPHONE (OUTPATIENT)
Dept: FAMILY MEDICINE CLINIC | Facility: CLINIC | Age: 77
End: 2022-09-07

## 2022-09-07 NOTE — TELEPHONE ENCOUNTER
Patient wants to follow up with Cardiology first   If she feels she needs to see Dr Grey, she will call back

## 2022-09-19 ENCOUNTER — HOSPITAL ENCOUNTER (OUTPATIENT)
Dept: NON INVASIVE DIAGNOSTICS | Facility: HOSPITAL | Age: 77
Discharge: HOME | End: 2022-09-19
Payer: COMMERCIAL

## 2022-09-19 DIAGNOSIS — G62.9 NEUROPATHY: ICD-10-CM

## 2022-09-19 DIAGNOSIS — I73.9 PVD (PERIPHERAL VASCULAR DISEASE) (HCC): ICD-10-CM

## 2022-09-19 PROCEDURE — 93925 LOWER EXTREMITY STUDY: CPT

## 2022-09-19 PROCEDURE — 93923 UPR/LXTR ART STDY 3+ LVLS: CPT

## 2022-09-20 PROCEDURE — 93925 LOWER EXTREMITY STUDY: CPT | Performed by: SURGERY

## 2022-09-20 PROCEDURE — 93922 UPR/L XTREMITY ART 2 LEVELS: CPT | Performed by: SURGERY

## 2022-09-21 DIAGNOSIS — I73.9 PVD (PERIPHERAL VASCULAR DISEASE) (HCC): Primary | ICD-10-CM

## 2022-10-02 NOTE — PROGRESS NOTES
Cardiology Consultation     Hedy Jimenez  [id number]  1945  [facility] CARDIOLOGY [place]  [address] [place] [id number]      1  Other chest pain  Ambulatory Referral to Cardiology    POCT ECG    NM myocardial perfusion spect (rx stress and/or rest)   2  Primary hypertension  lisinopril (ZESTRIL) 20 mg tablet   3  Pulmonary emphysema, unspecified emphysema type ([address] )         Discussion/Summary:  Chest pain   -2 episodes of chest pain at rest  - EKG today shows sinus rhythm with PACs and subtle ST depressions that appear unchanged compared to prior EKGs going back to 2018  -on aspirin 81 mg daily  -given her multiple risk factors and limited mobility, will check nuclear pharmacologic stress test to rule out ischemic heart disease as a cause of her chest pain  Hypertension   -BP elevated at today's visit and appears to been elevated on her last 2 office visits as well  -initial BP was 167/100 and on my repeat it was 180/95  -will start patient on lisinopril 20 mg daily  Hyperlipidemia  - not on statin currently   -lipid panel 06/13/2022 total cholesterol 149, triglycerides 121, HDL 61, LDL 64  Prediabetic  -hemoglobin A1c 6 0 on 08/09/2022   -not currently on diabetic medications  COPD   -former smoker, quit about 7 years ago  -on prednisone 2 5 mg daily  Temporal arteritis     History of Present Illness:  Hedy Jimenez is a 68y o  year old female with a past medical history of hyperlipidemia, prediabetic, COPD, temporal arteritis, GERD, hypothyroidism and MARIA ELENA  She had an ED visit on 09/06/2022 with chest pain  Troponins negative x2  She reports 2 episodes of chest pain  First 1 occurred on 09/06 prompted to go to the emergency department  She described the chest pain as a twisting like sensation in the middle of her chest that last about 10 seconds before resolving    It occurred at rest while she was sitting watching TV and denies having this type of chest pain in the past   She had a 2nd episode of the same type of pain a few days ago  This time it lasted a few seconds before resolving  Also reports sitting down watching TV when this chest pain occurred  Reports not being very active due to neuropathy in her feet  Denies any prior heart attacks or other cardiac problems        Patient Active Problem List   Diagnosis    Chronic obstructive pulmonary disease (HCC)    Vitamin D deficiency    Other specified hypothyroidism    Gastroesophageal reflux disease without esophagitis    Rectus sheath hematoma, initial encounter    Hypertension    Osteoarthritis    Temporal arteritis ([facility] )    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 ([place] [id number] )    Diabetic eye exam ([place] [address] )     Past Medical History:   Diagnosis Date    Asthma     Chronic obstructive pulmonary disease ([facility] ) 9/26/2012    GERD (gastroesophageal reflux disease)     Hypertension 10/11/2018    Hypothyroid     Osteoarthritis 9/26/2012    Temporal arteritis (HCC)     Tubular adenoma     Type 2 diabetes mellitus with complication, without long-term current use of insulin ([place] [address] ) 1/14/2020     Social History     Socioeconomic History    Marital status: Legally      Spouse name: Not on file    Number of children: Not on file    Years of education: Not on file    Highest education level: Not on file   Occupational History    Occupation: retired   Tobacco Use    Smoking status: Former Smoker     Years: 15 00     Types: Cigarettes    Smokeless tobacco: Never Used    Tobacco comment: TOBACCO USE, NO PASSIVE SMOKE EXPOSURE   Vaping Use    Vaping Use: Never used   Substance and Sexual Activity    Alcohol use: No    Drug use: No    Sexual activity: Not Currently   Other Topics Concern    Not on file   Social History Narrative    Not on file     Social Determinants of Health     Financial Resource Strain: Not on file   Food Insecurity: Not on file   Transportation Needs: Not on file   Physical Activity: Not on file Stress: Not on file   Social Connections: Not on file   Intimate Partner Violence: Not on file   Housing Stability: Not on file      Family History   Problem Relation Age of Onset    Breast cancer Mother     Emphysema Father      Past Surgical History:   Procedure Laterality Date    EYE SURGERY Right 12/06/2019    cataract LVCFS    HYSTERECTOMY      NO PAST SURGERIES      ALSO NO RELEVANT PAST SURRGICAL HX AS PER [facility]       Current Outpatient Medications:     acetaminophen (TYLENOL) 500 mg tablet, Take 1 tablet (500 mg total) by mouth every 6 (six) hours as needed (pain fevers), Disp: 30 tablet, Rfl: 0    albuterol (PROVENTIL HFA,VENTOLIN HFA) 90 mcg/act inhaler, Inhale 2 puffs every 6 (six) hours as needed for wheezing or shortness of breath, Disp: 18 g, Rfl: 2    aspirin 81 MG tablet, take one tab   every other day with Dinner, Disp: , Rfl:     b complex-C-E-zinc (STRESS FORMULA/ZINC) tablet, Take 1 tablet by mouth daily, Disp: 90 tablet, Rfl: 3    Biotin 10 MG TABS, Take by mouth in the morning, Disp: , Rfl:     Calcium Carb-Cholecalciferol (Caltrate 600+D3) 600-800 MG-UNIT TABS, Take 1 tablet by mouth 2 (two) times a day, Disp: 60 tablet, Rfl: 6    cetirizine (ZyrTEC) 10 mg tablet, Take 1 tablet (10 mg total) by mouth daily With food/in the evening, Disp: 30 tablet, Rfl: 3    fluticasone-umeclidinium-vilanterol (TRELEGY) 100-62 5-25 MCG/INH inhaler, Inhale 1 puff daily Rinse mouth after use , Disp: 3 blister, Rfl: 3    levothyroxine (Synthroid) 25 mcg tablet, Take 1 tablet (25 mcg total) by mouth daily, Disp: 30 tablet, Rfl: 3    lisinopril (ZESTRIL) 20 mg tablet, Take 1 tablet (20 mg total) by mouth daily, Disp: 30 tablet, Rfl: 3    pantoprazole (Protonix) 40 mg tablet, Take 1 tablet (40 mg total) by mouth daily, Disp: 90 tablet, Rfl: 1    predniSONE 2 5 mg tablet, Take 1 tablet (2 5 mg total) by mouth daily, Disp: 30 tablet, Rfl: 3    vitamin B-12 (VITAMIN B-12) 1,000 mcg tablet, Take 1 tablet (1,000 mcg total) by mouth daily, Disp: 90 tablet, Rfl: 1    azelastine (ASTELIN) 0 1 % nasal spray, 1 spray into each nostril 2 (two) times a day Use in each nostril as directed (Patient not taking: Reported on 10/3/2022), Disp: 1 Bottle, Rfl: 3    gabapentin (Neurontin) 100 mg capsule, Take 1 capsule (100 mg total) by mouth 2 (two) times a day With food/Meals (Patient not taking: Reported on 10/3/2022), Disp: 60 capsule, Rfl: 2    ipratropium-albuterol (DUO-NEB) 0 5-2 5 mg/3 mL nebulizer solution, Take 1 vial (3 mL total) by nebulization 4 (four) times a day (Patient not taking: Reported on 10/3/2022), Disp: 120 vial, Rfl: 5    Current Facility-Administered Medications:     cyanocobalamin injection 1,000 mcg, 1,000 mcg, Intramuscular, Q30 Days, Nicolas Grey MD, 1,000 mcg at 01/14/20 1508  No Known Allergies      Labs:  Lab Results   Component Value Date    ALT 22 06/09/2022    AST 41 (H) 06/09/2022    BUN 18 09/06/2022    CALCIUM 9 3 09/06/2022    CL 99 09/06/2022    CO2 32 09/06/2022    CREATININE 0 56 (L) 09/06/2022    HDL 61 06/13/2022    HCT 48 0 (H) 09/06/2022    HGB 15 2 09/06/2022    HGBA1C 6 0 (H) 08/09/2022    MG 2 2 03/22/2021     09/06/2022    K 3 9 09/06/2022    TRIG 121 06/13/2022    WBC 8 31 09/06/2022       Imaging: X-ray chest 2 views    Result Date: 9/6/2022  Narrative: CHEST INDICATION:   chest pain  COMPARISON:  CXR from August 27, 2020 EXAM PERFORMED/VIEWS:  XR CHEST PA & LATERAL FINDINGS: Cardiomediastinal silhouette appears unremarkable  The lungs are clear  No pneumothorax or pleural effusion  Kyphosis and degenerative changes of the thoracic spine  Impression: No acute cardiopulmonary disease  Workstation performed: [id number]     CT head without contrast    Result Date: 9/6/2022  Narrative: CT BRAIN - WITHOUT CONTRAST INDICATION:   1 day of left arm numbness  COMPARISON:  August 27, 2020 TECHNIQUE:  CT examination of the brain was performed    In addition to axial images, sagittal and coronal 2D reformatted images were created and submitted for interpretation  Radiation dose length product (DLP) for this visit:  809 mGy-cm   This examination, like all CT scans performed in the [facility], was performed utilizing techniques to minimize radiation dose exposure, including the use of iterative reconstruction and automated exposure control  IMAGE QUALITY:  Diagnostic  FINDINGS: PARENCHYMA: Decreased attenuation is noted in periventricular and subcortical white matter demonstrating an appearance that is statistically most likely to represent mild microangiopathic change; this appearance is similar when compared to most recent prior examination  No CT signs of acute infarction  No intracranial mass, mass effect or midline shift  No acute parenchymal hemorrhage  VENTRICLES AND EXTRA-AXIAL SPACES:  Age-appropriate volume loss  No hydrocephalus  No acute extra-axial hemorrhage  VISUALIZED ORBITS AND PARANASAL SINUSES:  Unremarkable  CALVARIUM AND EXTRACRANIAL SOFT TISSUES:  Normal      Impression: No acute intracranial abnormality  Workstation performed: [id number]     VAS lower limb arterial duplex, complete bilateral    Result Date: 9/20/2022  Narrative:  [facility] REPORT CLINICAL: Indications:  PVD, Unspecified [I73 9]  Patient presents with pain in the  legs after walking  Clinical Right Pressure:  148/ mm Hg, Left Pressure:  152/ mm Hg    FINDINGS:  Segment                Right            Left                                          PSV (cm/s)  EDV  PSV (cm/s)  EDV  Common Femoral Artery          91    0          94    9  Prox Profunda                  71    0          78    7  Prox SFA                      101    4         104    5  Mid SFA                        91    6          81    0  Dist SFA                       76    9          84    6  Proximal Pop                   56    9          79    5  Distal Pop                     74    2 88    0  Dist Post Tibial               48    2          82    5  Dist  Ant  Tibial              56    0          56    0     CONCLUSION: Impression: RIGHT LOWER LIMB: Diffuse disease noted throughout the femoral-popliteal arteries without significant focal stenosis  Ankle/Brachial index:   1 39 within supra-normal PVR/ PPG tracings are normal  Metatarsal pressure of 124 mmHg Great toe pressure of 132 mmHg, within the healing range  LEFT LOWER LIMB: Diffuse disease noted throughout the femoral-popliteal arteries without significant focal stenosis  The ankle brachial indices could not be obtained secondary to the inability to occlude the ankle arteries  PVR/ PPG tracings are normal  Metatarsal pressure of 161 mmHg Great toe pressure of 139 mmHg, within the healing range  SIGNATURE: Electronically Signed by: Carl Vann MD, RPVI on 2022-09-20 04:09:56 PM      ECG:  Sinus rhythm with PACs, nonspecific ST abnormalities    Review of Systems:  Review of Systems   Constitutional: Negative for chills, diaphoresis, fatigue and fever  HENT: Negative for congestion  Eyes: Negative for photophobia and visual disturbance  Respiratory: Negative for chest tightness and shortness of breath  Cardiovascular: Positive for chest pain  Negative for palpitations and leg swelling  Gastrointestinal: Negative for abdominal distention, abdominal pain, diarrhea, nausea and vomiting  Genitourinary: Negative for difficulty urinating and dysuria  Musculoskeletal: Positive for arthralgias  Negative for gait problem and joint swelling  Skin: Negative for color change, pallor and rash  Neurological: Negative for dizziness, syncope, numbness and headaches         + neuropathy   Psychiatric/Behavioral: Negative for agitation, behavioral problems and confusion  Vitals:    10/03/22 1434   BP: 167/100   Pulse: 97      There were no vitals filed for this visit          Physical Exam:  General appearance:  Appears stated age, alert, well appearing and in no distress  HEENT:  PERRLA, EOMI, no scleral icterus, no conjunctival pallor  NECK:  Supple, No elevated JVP, no thyromegaly, no carotid bruits  HEART:  Tachycardic, regular rhythm, normal S1/S2, no significant audible murmurs  LUNGS:  Clear to auscultation bilaterally  ABDOMEN:  Soft, non-tender, positive bowel sounds, no rebound or guarding, no organomegaly   EXTREMITIES:  No edema  VASCULAR:  Normal pedal pulses   SKIN: No lesions or rashes on exposed skin  NEURO:  CN II-XII intact, no focal deficits

## 2022-10-03 ENCOUNTER — CONSULT (OUTPATIENT)
Dept: CARDIOLOGY CLINIC | Facility: CLINIC | Age: 77
End: 2022-10-03
Payer: COMMERCIAL

## 2022-10-03 VITALS — HEART RATE: 97 BPM | SYSTOLIC BLOOD PRESSURE: 167 MMHG | DIASTOLIC BLOOD PRESSURE: 100 MMHG

## 2022-10-03 DIAGNOSIS — I10 PRIMARY HYPERTENSION: ICD-10-CM

## 2022-10-03 DIAGNOSIS — J43.9 PULMONARY EMPHYSEMA, UNSPECIFIED EMPHYSEMA TYPE (HCC): ICD-10-CM

## 2022-10-03 DIAGNOSIS — R07.89 OTHER CHEST PAIN: Primary | ICD-10-CM

## 2022-10-03 PROCEDURE — 93000 ELECTROCARDIOGRAM COMPLETE: CPT | Performed by: STUDENT IN AN ORGANIZED HEALTH CARE EDUCATION/TRAINING PROGRAM

## 2022-10-03 PROCEDURE — 99204 OFFICE O/P NEW MOD 45 MIN: CPT | Performed by: STUDENT IN AN ORGANIZED HEALTH CARE EDUCATION/TRAINING PROGRAM

## 2022-10-03 RX ORDER — BIOTIN 10 MG
TABLET ORAL DAILY
COMMUNITY

## 2022-10-03 RX ORDER — LISINOPRIL 20 MG/1
20 TABLET ORAL DAILY
Qty: 30 TABLET | Refills: 3 | Status: SHIPPED | OUTPATIENT
Start: 2022-10-03

## 2022-10-10 ENCOUNTER — TELEPHONE (OUTPATIENT)
Dept: FAMILY MEDICINE CLINIC | Facility: CLINIC | Age: 77
End: 2022-10-10

## 2022-10-10 DIAGNOSIS — J44.1 ACUTE EXACERBATION OF CHRONIC OBSTRUCTIVE PULMONARY DISEASE (COPD) (HCC): Primary | ICD-10-CM

## 2022-10-10 RX ORDER — BENZONATATE 100 MG/1
100 CAPSULE ORAL 3 TIMES DAILY PRN
Qty: 30 CAPSULE | Refills: 0 | Status: SHIPPED | OUTPATIENT
Start: 2022-10-10

## 2022-10-10 RX ORDER — CLINDAMYCIN HYDROCHLORIDE 300 MG/1
300 CAPSULE ORAL 2 TIMES DAILY WITH MEALS
Qty: 20 CAPSULE | Refills: 0 | Status: SHIPPED | OUTPATIENT
Start: 2022-10-10 | End: 2022-10-20

## 2022-10-10 NOTE — TELEPHONE ENCOUNTER
Unable to l/m, patient's v/m is not set up  Dr Tania Viramontes did send medication to the pharmacy

## 2022-10-10 NOTE — TELEPHONE ENCOUNTER
Patient l/m c/o dry cough, congestion  Nose is clogged  X 3 days  She is asking if you can call something in because she has tests the next couple of Mondays and she doesn't not want to reschedule  Please advise

## 2022-10-11 DIAGNOSIS — M31.6 TEMPORAL ARTERITIS (HCC): ICD-10-CM

## 2022-10-11 RX ORDER — PREDNISONE 2.5 MG
TABLET ORAL
Qty: 30 TABLET | Refills: 2 | Status: SHIPPED | OUTPATIENT
Start: 2022-10-11

## 2022-10-25 ENCOUNTER — HOSPITAL ENCOUNTER (EMERGENCY)
Facility: HOSPITAL | Age: 77
Discharge: HOME | End: 2022-10-25
Attending: EMERGENCY MEDICINE
Payer: COMMERCIAL

## 2022-10-25 ENCOUNTER — APPOINTMENT (EMERGENCY)
Dept: RADIOLOGY | Facility: HOSPITAL | Age: 77
End: 2022-10-25
Payer: COMMERCIAL

## 2022-10-25 VITALS
DIASTOLIC BLOOD PRESSURE: 71 MMHG | RESPIRATION RATE: 22 BRPM | SYSTOLIC BLOOD PRESSURE: 137 MMHG | TEMPERATURE: 97.9 F | HEART RATE: 82 BPM | OXYGEN SATURATION: 95 % | WEIGHT: 111.8 LBS | BODY MASS INDEX: 19.8 KG/M2

## 2022-10-25 DIAGNOSIS — K21.9 GASTROESOPHAGEAL REFLUX DISEASE: ICD-10-CM

## 2022-10-25 DIAGNOSIS — J44.9 COPD (CHRONIC OBSTRUCTIVE PULMONARY DISEASE) (HCC): Primary | ICD-10-CM

## 2022-10-25 DIAGNOSIS — R05.9 COUGH: ICD-10-CM

## 2022-10-25 LAB
ALBUMIN SERPL BCP-MCNC: 4.6 G/DL (ref 3.5–5)
ALP SERPL-CCNC: 52 U/L (ref 43–122)
ALT SERPL W P-5'-P-CCNC: 26 U/L
ANION GAP SERPL CALCULATED.3IONS-SCNC: 9 MMOL/L (ref 5–14)
AST SERPL W P-5'-P-CCNC: 42 U/L (ref 14–36)
ATRIAL RATE: 90 BPM
BASOPHILS # BLD AUTO: 0.05 THOUSANDS/ÂΜL (ref 0–0.1)
BASOPHILS NFR BLD AUTO: 1 % (ref 0–1)
BILIRUB SERPL-MCNC: 0.85 MG/DL (ref 0.2–1)
BUN SERPL-MCNC: 11 MG/DL (ref 5–25)
CALCIUM SERPL-MCNC: 9.6 MG/DL (ref 8.4–10.2)
CARDIAC TROPONIN I PNL SERPL HS: 4 NG/L (ref 8–18)
CHLORIDE SERPL-SCNC: 100 MMOL/L (ref 96–108)
CO2 SERPL-SCNC: 29 MMOL/L (ref 21–32)
CREAT SERPL-MCNC: 0.55 MG/DL (ref 0.6–1.2)
EOSINOPHIL # BLD AUTO: 0.14 THOUSAND/ÂΜL (ref 0–0.61)
EOSINOPHIL NFR BLD AUTO: 2 % (ref 0–6)
ERYTHROCYTE [DISTWIDTH] IN BLOOD BY AUTOMATED COUNT: 13.6 % (ref 11.6–15.1)
FLUAV RNA RESP QL NAA+PROBE: NEGATIVE
FLUBV RNA RESP QL NAA+PROBE: NEGATIVE
GFR SERPL CREATININE-BSD FRML MDRD: 90 ML/MIN/1.73SQ M
GLUCOSE SERPL-MCNC: 86 MG/DL (ref 70–99)
HCT VFR BLD AUTO: 46.3 % (ref 34.8–46.1)
HGB BLD-MCNC: 14.3 G/DL (ref 11.5–15.4)
IMM GRANULOCYTES # BLD AUTO: 0.02 THOUSAND/UL (ref 0–0.2)
IMM GRANULOCYTES NFR BLD AUTO: 0 % (ref 0–2)
LIPASE SERPL-CCNC: 155 U/L (ref 23–300)
LYMPHOCYTES # BLD AUTO: 1.28 THOUSANDS/ÂΜL (ref 0.6–4.47)
LYMPHOCYTES NFR BLD AUTO: 18 % (ref 14–44)
MCH RBC QN AUTO: 27.2 PG (ref 26.8–34.3)
MCHC RBC AUTO-ENTMCNC: 30.9 G/DL (ref 31.4–37.4)
MCV RBC AUTO: 88 FL (ref 82–98)
MONOCYTES # BLD AUTO: 0.7 THOUSAND/ÂΜL (ref 0.17–1.22)
MONOCYTES NFR BLD AUTO: 10 % (ref 4–12)
NEUTROPHILS # BLD AUTO: 5.09 THOUSANDS/ÂΜL (ref 1.85–7.62)
NEUTS SEG NFR BLD AUTO: 69 % (ref 43–75)
NRBC BLD AUTO-RTO: 0 /100 WBCS
NT-PROBNP SERPL-MCNC: 161 PG/ML (ref 0–299)
P AXIS: 54 DEGREES
PLATELET # BLD AUTO: 242 THOUSANDS/UL (ref 149–390)
PMV BLD AUTO: 9.9 FL (ref 8.9–12.7)
POTASSIUM SERPL-SCNC: 4.4 MMOL/L (ref 3.5–5.3)
PR INTERVAL: 136 MS
PROT SERPL-MCNC: 8 G/DL (ref 6.4–8.4)
QRS AXIS: 52 DEGREES
QRSD INTERVAL: 74 MS
QT INTERVAL: 374 MS
QTC INTERVAL: 457 MS
RBC # BLD AUTO: 5.26 MILLION/UL (ref 3.81–5.12)
RSV RNA RESP QL NAA+PROBE: NEGATIVE
SARS-COV-2 RNA RESP QL NAA+PROBE: NEGATIVE
SODIUM SERPL-SCNC: 138 MMOL/L (ref 135–147)
T WAVE AXIS: 54 DEGREES
VENTRICULAR RATE: 90 BPM
WBC # BLD AUTO: 7.28 THOUSAND/UL (ref 4.31–10.16)

## 2022-10-25 PROCEDURE — 84484 ASSAY OF TROPONIN QUANT: CPT | Performed by: EMERGENCY MEDICINE

## 2022-10-25 PROCEDURE — 0241U HB NFCT DS VIR RESP RNA 4 TRGT: CPT | Performed by: EMERGENCY MEDICINE

## 2022-10-25 PROCEDURE — 83690 ASSAY OF LIPASE: CPT | Performed by: EMERGENCY MEDICINE

## 2022-10-25 PROCEDURE — 36415 COLL VENOUS BLD VENIPUNCTURE: CPT | Performed by: EMERGENCY MEDICINE

## 2022-10-25 PROCEDURE — 99285 EMERGENCY DEPT VISIT HI MDM: CPT | Performed by: EMERGENCY MEDICINE

## 2022-10-25 PROCEDURE — 71045 X-RAY EXAM CHEST 1 VIEW: CPT

## 2022-10-25 PROCEDURE — 93010 ELECTROCARDIOGRAM REPORT: CPT | Performed by: INTERNAL MEDICINE

## 2022-10-25 PROCEDURE — 85025 COMPLETE CBC W/AUTO DIFF WBC: CPT | Performed by: EMERGENCY MEDICINE

## 2022-10-25 PROCEDURE — 80053 COMPREHEN METABOLIC PANEL: CPT | Performed by: EMERGENCY MEDICINE

## 2022-10-25 PROCEDURE — 83880 ASSAY OF NATRIURETIC PEPTIDE: CPT | Performed by: EMERGENCY MEDICINE

## 2022-10-25 PROCEDURE — 93005 ELECTROCARDIOGRAM TRACING: CPT

## 2022-10-25 RX ORDER — ALBUTEROL SULFATE 90 UG/1
2 AEROSOL, METERED RESPIRATORY (INHALATION) EVERY 6 HOURS PRN
Qty: 8.5 G | Refills: 0 | Status: SHIPPED | OUTPATIENT
Start: 2022-10-25

## 2022-10-25 RX ORDER — PANTOPRAZOLE SODIUM 40 MG/1
TABLET, DELAYED RELEASE ORAL
Qty: 90 TABLET | Refills: 0 | Status: SHIPPED | OUTPATIENT
Start: 2022-10-25 | End: 2022-10-28

## 2022-10-25 RX ORDER — IPRATROPIUM BROMIDE AND ALBUTEROL SULFATE 2.5; .5 MG/3ML; MG/3ML
3 SOLUTION RESPIRATORY (INHALATION) ONCE
Status: COMPLETED | OUTPATIENT
Start: 2022-10-25 | End: 2022-10-25

## 2022-10-25 RX ORDER — DEXAMETHASONE 4 MG/1
12 TABLET ORAL ONCE
Qty: 3 TABLET | Refills: 0 | Status: SHIPPED | OUTPATIENT
Start: 2022-10-25 | End: 2022-10-25

## 2022-10-25 RX ADMIN — DEXAMETHASONE SODIUM PHOSPHATE 10 MG: 10 INJECTION, SOLUTION INTRAMUSCULAR; INTRAVENOUS at 10:13

## 2022-10-25 RX ADMIN — IPRATROPIUM BROMIDE AND ALBUTEROL SULFATE 3 ML: 2.5; .5 SOLUTION RESPIRATORY (INHALATION) at 10:13

## 2022-10-25 NOTE — ED PROVIDER NOTES
History  Chief Complaint   Patient presents with   • Shortness of Breath     Hx of COPD, with cold sweats  Pt states chest fears heavy  She was taking clindamycin     Patient is a 70-year-old female who presents the emergency room for persistent cough  She states she has had a cough for 3 weeks  Has history of COPD  Has been taking clindamycin which prescribed by her primary care doctor as well as texture Marfan for mucus  She states she is having increased clear mucus  No fevers, subjective chills  States she gets occasional cold sweats  Coughing happens the point where she gets fatigued  No associated chest pain  She has had no fall nausea vomiting diarrhea  States he is tolerating oral intake  Denies being an active smoker this time, states she quit approximately 8 years ago  Nothing makes her symptoms significantly better or worse  She states she is having decreasing physical strength and abilities since the beginning of COVID where she has been spending more time at in her house  States she lives with her sister who is younger than her unable to take care for  States that she takes truly daily at home, has not had to use a rescue inhaler  Prior to Admission Medications   Prescriptions Last Dose Informant Patient Reported? Taking? Biotin 10 MG TABS   Yes No   Sig: Take by mouth in the morning   Calcium Carb-Cholecalciferol (Caltrate 600+D3) 600-800 MG-UNIT TABS   No No   Sig: Take 1 tablet by mouth 2 (two) times a day   acetaminophen (TYLENOL) 500 mg tablet   No No   Sig: Take 1 tablet (500 mg total) by mouth every 6 (six) hours as needed (pain fevers)   albuterol (PROVENTIL HFA,VENTOLIN HFA) 90 mcg/act inhaler   No No   Sig: Inhale 2 puffs every 6 (six) hours as needed for wheezing or shortness of breath   aspirin 81 MG tablet  Self Yes No   Sig: take one tab   every other day with Dinner   azelastine (ASTELIN) 0 1 % nasal spray   No No   Si spray into each nostril 2 (two) times a day Use in each nostril as directed   Patient not taking: Reported on 10/3/2022   b complex-C-E-zinc (STRESS FORMULA/ZINC) tablet   No No   Sig: Take 1 tablet by mouth daily   benzonatate (TESSALON PERLES) 100 mg capsule   No No   Sig: Take 1 capsule (100 mg total) by mouth 3 (three) times a day as needed for cough   cetirizine (ZyrTEC) 10 mg tablet   No No   Sig: Take 1 tablet (10 mg total) by mouth daily With food/in the evening   fluticasone-umeclidinium-vilanterol (TRELEGY) 100-62 5-25 MCG/INH inhaler   No No   Sig: Inhale 1 puff daily Rinse mouth after use    gabapentin (Neurontin) 100 mg capsule   No No   Sig: Take 1 capsule (100 mg total) by mouth 2 (two) times a day With food/Meals   Patient not taking: Reported on 10/3/2022   ipratropium-albuterol (DUO-NEB) 0 5-2 5 mg/3 mL nebulizer solution   No No   Sig: Take 1 vial (3 mL total) by nebulization 4 (four) times a day   Patient not taking: Reported on 10/3/2022   levothyroxine (Synthroid) 25 mcg tablet   No No   Sig: Take 1 tablet (25 mcg total) by mouth daily   lisinopril (ZESTRIL) 20 mg tablet   No No   Sig: Take 1 tablet (20 mg total) by mouth daily   pantoprazole (PROTONIX) 40 mg tablet   No No   Sig: TAKE ONE TABLET BY MOUTH ONCE DAILY   predniSONE 2 5 mg tablet   No No   Sig: TAKE ONE TABLET BY MOUTH ONCE DAILY   vitamin B-12 (VITAMIN B-12) 1,000 mcg tablet   No No   Sig: Take 1 tablet (1,000 mcg total) by mouth daily      Facility-Administered Medications Last Administration Doses Remaining   cyanocobalamin injection 1,000 mcg 1/14/2020  3:08 PM           Past Medical History:   Diagnosis Date   • Asthma    • Chronic obstructive pulmonary disease ([email or web address] ) 9/26/2012   • GERD (gastroesophageal reflux disease)    • Hypertension 10/11/2018   • Hypothyroid    • Osteoarthritis 9/26/2012   • Temporal arteritis (HCC)    • Tubular adenoma    • Type 2 diabetes mellitus with complication, without long-term current use of insulin ([facility] [[email or web address] 75 ) 1/14/2020       Past Surgical History:   Procedure Laterality Date   • EYE SURGERY Right 12/06/2019    cataract LVCFS   • HYSTERECTOMY     • NO PAST SURGERIES      ALSO NO RELEVANT PAST SURRGICAL HX AS PER [facility]       Family History   Problem Relation Age of Onset   • Breast cancer Mother    • Emphysema Father      I have reviewed and agree with the history as documented  E-Cigarette/Vaping   • E-Cigarette Use Never User      E-Cigarette/Vaping Substances   • Nicotine No    • THC No    • CBD No    • Flavoring No      Social History     Tobacco Use   • Smoking status: Former Smoker     Years: 15 00     Types: Cigarettes   • Smokeless tobacco: Never Used   • Tobacco comment: TOBACCO USE, NO PASSIVE SMOKE EXPOSURE   Vaping Use   • Vaping Use: Never used   Substance Use Topics   • Alcohol use: No   • Drug use: No       Review of Systems   Constitutional: Negative  Negative for chills and fever  HENT: Negative  Negative for rhinorrhea, sore throat, trouble swallowing and voice change  Eyes: Negative  Negative for pain and visual disturbance  Respiratory: Positive for cough and shortness of breath  Negative for wheezing  Cardiovascular: Negative  Negative for chest pain and palpitations  Gastrointestinal: Negative for abdominal pain, diarrhea, nausea and vomiting  Genitourinary: Negative  Negative for dysuria and frequency  Musculoskeletal: Negative  Negative for neck pain and neck stiffness  Skin: Negative  Negative for rash  Neurological: Negative  Negative for dizziness, speech difficulty, weakness, light-headedness and numbness  Physical Exam  Physical Exam  Vitals and nursing note reviewed  Constitutional:       General: She is not in acute distress  Appearance: She is well-developed  HENT:      Head: Normocephalic and atraumatic  Eyes:      Conjunctiva/sclera: Conjunctivae normal       Pupils: Pupils are equal, round, and reactive to light  Neck:      Trachea: No tracheal deviation  Cardiovascular:      Rate and Rhythm: Normal rate and regular rhythm  Pulmonary:      Effort: Pulmonary effort is normal  No respiratory distress  Breath sounds: Normal breath sounds  No wheezing or rales  Abdominal:      General: Bowel sounds are normal  There is no distension  Palpations: Abdomen is soft  Tenderness: There is no abdominal tenderness  There is no guarding or rebound  Musculoskeletal:         General: No tenderness or deformity  Normal range of motion  Cervical back: Normal range of motion and neck supple  Skin:     General: Skin is warm and dry  Capillary Refill: Capillary refill takes less than 2 seconds  Findings: No rash  Neurological:      Mental Status: She is alert and oriented to person, place, and time     Psychiatric:         Behavior: Behavior normal          Vital Signs  ED Triage Vitals [10/25/22 0957]   Temperature Pulse Respirations Blood Pressure SpO2   97 9 °F (36 6 °C) 86 18 (!) 176/98 97 %      Temp Source Heart Rate Source Patient Position - Orthostatic VS BP Location FiO2 (%)   Oral Monitor Sitting Left arm --      Pain Score       --           Vitals:    10/25/22 0957 10/25/22 1115 10/25/22 1130   BP: (!) 176/98  162/80   Pulse: 86 91    Patient Position - Orthostatic VS: Sitting  Lying         Visual Acuity      ED Medications  Medications   dexamethasone oral liquid 10 mg 1 mL (10 mg Oral Given 10/25/22 1013)   ipratropium-albuterol (DUO-NEB) 0 5-2 5 mg/3 mL inhalation solution 3 mL (3 mL Nebulization Given 10/25/22 1013)       Diagnostic Studies  Results Reviewed     Procedure Component Value Units Date/Time    FLU/RSV/COVID - if FLU/RSV clinically relevant [[id number]]  (Normal) Collected: 10/25/22 1010    Lab Status: Final result Specimen: Nares from Nose Updated: 10/25/22 1058     SARS-CoV-2 Negative     INFLUENZA A PCR Negative     INFLUENZA B PCR Negative     RSV PCR Negative    Narrative:      FOR PEDIATRIC PATIENTS - copy/paste COVID Guidelines URL to browser: [email or web address]    SARS-CoV-2 assay is a Nucleic Acid Amplification assay intended for the  qualitative detection of nucleic acid from SARS-CoV-2 in nasopharyngeal  swabs  Results are for the presumptive identification of SARS-CoV-2 RNA  Positive results are indicative of infection with SARS-CoV-2, the virus  causing COVID-19, but do not rule out bacterial infection or co-infection  with other viruses  Laboratories within the [place] and its  territories are required to report all positive results to the appropriate  public health authorities  Negative results do not preclude SARS-CoV-2  infection and should not be used as the sole basis for treatment or other  patient management decisions  Negative results must be combined with  clinical observations, patient history, and epidemiological information  This test has not been FDA cleared or approved  This test has been authorized by FDA under an Emergency Use Authorization  (EUA)  This test is only authorized for the duration of time the  declaration that circumstances exist justifying the authorization of the  emergency use of an in vitro diagnostic tests for detection of SARS-CoV-2  virus and/or diagnosis of COVID-19 infection under section 564(b)(1) of  the Act, 21 U  S C  664UXE-6(U)(9), unless the authorization is terminated  or revoked sooner  The test has been validated but independent review by FDA  and CLIA is pending  Test performed using [employer] GeneXpert: This RT-PCR assay targets N2,  a region unique to SARS-CoV-2  A conserved region in the E-gene was chosen  for pan-Sarbecovirus detection which includes SARS-CoV-2  According to CMS-2020-01-R, this platform meets the definition of high-throughput technology      High Sensitivity Troponin I Random [[id number]]  (Abnormal) Collected: 10/25/22 1010    Lab Status: Final result Specimen: Blood from Arm, Right Updated: 10/25/22 1043     HS TnI random 4 ng/L     NT-BNP PRO [[id number]]  (Normal) Collected: 10/25/22 1010    Lab Status: Final result Specimen: Blood from Arm, Right Updated: 10/25/22 1041     NT-proBNP 161 pg/mL     Lipase [[id number]]  (Normal) Collected: 10/25/22 1010    Lab Status: Final result Specimen: Blood from Arm, Right Updated: 10/25/22 1033     Lipase 155 u/L     Narrative:      Hemolysis    Comprehensive metabolic panel [[id number]]  (Abnormal) Collected: 10/25/22 1010    Lab Status: Final result Specimen: Blood from Arm, Right Updated: 10/25/22 1033     Sodium 138 mmol/L      Potassium 4 4 mmol/L      Chloride 100 mmol/L      CO2 29 mmol/L      ANION GAP 9 mmol/L      BUN 11 mg/dL      Creatinine 0 55 mg/dL      Glucose 86 mg/dL      Calcium 9 6 mg/dL      AST 42 U/L      ALT 26 U/L      Alkaline Phosphatase 52 U/L      Total Protein 8 0 g/dL      Albumin 4 6 g/dL      Total Bilirubin 0 85 mg/dL      eGFR 90 ml/min/1 73sq m     Narrative:      Hemolysis  National Kidney Disease Foundation guidelines for Chronic Kidney Disease (CKD):   •  Stage 1 with normal or high GFR (GFR > 90 mL/min/1 73 square meters)  •  Stage 2 Mild CKD (GFR = 60-89 mL/min/1 73 square meters)  •  Stage 3A Moderate CKD (GFR = 45-59 mL/min/1 73 square meters)  •  Stage 3B Moderate CKD (GFR = 30-44 mL/min/1 73 square meters)  •  Stage 4 Severe CKD (GFR = 15-29 mL/min/1 73 square meters)  •  Stage 5 End Stage CKD (GFR <15 mL/min/1 73 square meters)  Note: GFR calculation is accurate only with a steady state creatinine    CBC and differential [[id number]]  (Abnormal) Collected: 10/25/22 1010    Lab Status: Final result Specimen: Blood from Arm, Right Updated: 10/25/22 1030     WBC 7 28 Thousand/uL      RBC 5 26 Million/uL      Hemoglobin 14 3 g/dL      Hematocrit 46 3 %      MCV 88 fL      MCH 27 2 pg      MCHC 30 9 g/dL      RDW 13 6 %      MPV 9 9 fL      Platelets 294 Thousands/uL      nRBC 0 /100 WBCs      Neutrophils Relative 69 %      Immat GRANS % 0 %      Lymphocytes Relative 18 %      Monocytes Relative 10 %      Eosinophils Relative 2 %      Basophils Relative 1 %      Neutrophils Absolute 5 09 Thousands/µL      Immature Grans Absolute 0 02 Thousand/uL      Lymphocytes Absolute 1 28 Thousands/µL      Monocytes Absolute 0 70 Thousand/µL      Eosinophils Absolute 0 14 Thousand/µL      Basophils Absolute 0 05 Thousands/µL     UA (URINE) with reflex to Scope [[id number]]     Lab Status: No result Specimen: Urine                  XR chest 1 view portable    (Results Pending)              Procedures  Procedures         ED Course  ED Course as of 10/25/22 [address] [place] [address] Oct 25, 2022   9885 Procedure Note: EKG  Date/Time: 10/25/22 9:54 AM   Performed by: Delaney Marcano  Authorized by: Delaney Marcano  ECG interpreted by me, the ED Provider: yes   The EKG demonstrates:  Rate 90  Rhythm sinus  QTc 457  No ST elevations/depressions       1106 Patient feeling better  1205 Awaiting urine sample to test for UTI as patient states she is drained and has poor energy  Otherwise testing his benign at this point  1328 Patient declining UA now  Requesting to go home  SBIRT 22yo+    Flowsheet Row Most Recent Value   SBIRT (25 yo +)    In order to provide better care to our patients, we are screening all of our patients for alcohol and drug use  Would it be okay to ask you these screening questions? No Filed at: 10/25/2022 9664                    MDM  Number of Diagnoses or Management Options  COPD (chronic obstructive pulmonary disease) (HCC)  Cough  Diagnosis management comments: Patient feeling better after treatment in the emergency room  Workup without acute abnormalities  Patient declined to have urinalysis drawn  Will continue symptomatic treatment  I have reviewed the patient's visit and any testing done in the emergency department    They have verbalized their understanding of any testing done today and have no further questions or concerns regarding their care in the emergency room  They will follow up with their primary care physician as well as with any specialist in their discharge instructions  Strict return precautions were discussed  Amount and/or Complexity of Data Reviewed  Clinical lab tests: ordered and reviewed  Tests in the radiology section of CPT®: ordered and reviewed  Tests in the medicine section of CPT®: ordered and reviewed  Independent visualization of images, tracings, or specimens: yes        Disposition  Final diagnoses:   COPD (chronic obstructive pulmonary disease) ([facility] 75 )   Cough     Time reflects when diagnosis was documented in both MDM as applicable and the Disposition within this note     Time User Action Codes Description Comment    10/25/2022 11:46 AM [place] [facility] Add [J44 9] COPD (chronic obstructive pulmonary disease) ([facility] 75 )     10/25/2022 11:48 AM [place] [facility] Add [R05 9] Cough       ED Disposition     ED Disposition   Discharge    Condition   Stable    Date/Time   Tue Oct 25, 2022 [address] discharge to home/self care  Follow-up Information     Follow up With Specialties Details Why Allen Vaughn MD Internal Medicine   [address] [place] [address]  [phone]            Patient's Medications   Discharge Prescriptions    ALBUTEROL (PROAIR HFA) 90 MCG/ACT INHALER    Inhale 2 puffs every 6 (six) hours as needed for wheezing       Start Date: 10/25/2022End Date: --       Order Dose: 2 puffs       Quantity: 8 5 g    Refills: 0    DEXAMETHASONE (DECADRON) 4 MG TABLET    Take 3 tablets (12 mg total) by mouth 1 (one) time for 1 dose       Start Date: 10/25/2022End Date: 10/25/2022       Order Dose: 12 mg       Quantity: 3 tablet    Refills: 0       No discharge procedures on file      PDMP Review     None          ED Provider  Electronically Signed by           Gladys Kate DO  10/25/22 1500

## 2022-10-28 DIAGNOSIS — K21.9 GASTROESOPHAGEAL REFLUX DISEASE: ICD-10-CM

## 2022-10-28 RX ORDER — PANTOPRAZOLE SODIUM 40 MG/1
TABLET, DELAYED RELEASE ORAL
Qty: 90 TABLET | Refills: 0 | Status: SHIPPED | OUTPATIENT
Start: 2022-10-28

## 2022-11-07 ENCOUNTER — OFFICE VISIT (OUTPATIENT)
Dept: FAMILY MEDICINE CLINIC | Facility: CLINIC | Age: 77
End: 2022-11-07

## 2022-11-07 VITALS
DIASTOLIC BLOOD PRESSURE: 82 MMHG | HEART RATE: 89 BPM | TEMPERATURE: 98.3 F | BODY MASS INDEX: 19.14 KG/M2 | SYSTOLIC BLOOD PRESSURE: 124 MMHG | HEIGHT: 63 IN | WEIGHT: 108 LBS | OXYGEN SATURATION: 94 % | RESPIRATION RATE: 16 BRPM

## 2022-11-07 DIAGNOSIS — Z12.31 SCREENING MAMMOGRAM FOR BREAST CANCER: ICD-10-CM

## 2022-11-07 DIAGNOSIS — M31.6 TEMPORAL ARTERITIS (HCC): ICD-10-CM

## 2022-11-07 DIAGNOSIS — J44.1 ACUTE EXACERBATION OF CHRONIC OBSTRUCTIVE PULMONARY DISEASE (COPD) (HCC): ICD-10-CM

## 2022-11-07 DIAGNOSIS — R05.3 CHRONIC COUGH: ICD-10-CM

## 2022-11-07 DIAGNOSIS — M81.8 IDIOPATHIC OSTEOPOROSIS: ICD-10-CM

## 2022-11-07 DIAGNOSIS — R61 NIGHT SWEAT: ICD-10-CM

## 2022-11-07 DIAGNOSIS — J45.901 ACUTE EXACERBATION OF COPD WITH ASTHMA (HCC): Primary | ICD-10-CM

## 2022-11-07 DIAGNOSIS — J44.1 ACUTE EXACERBATION OF COPD WITH ASTHMA (HCC): Primary | ICD-10-CM

## 2022-11-07 RX ORDER — BENZONATATE 100 MG/1
100 CAPSULE ORAL 3 TIMES DAILY PRN
Qty: 30 CAPSULE | Refills: 0 | Status: SHIPPED | OUTPATIENT
Start: 2022-11-07 | End: 2022-11-15

## 2022-11-07 RX ORDER — VALSARTAN 160 MG/1
160 TABLET ORAL DAILY
Qty: 90 TABLET | Refills: 3 | Status: SHIPPED | OUTPATIENT
Start: 2022-11-07

## 2022-11-07 RX ORDER — ALBUTEROL SULFATE 2.5 MG/3ML
2.5 SOLUTION RESPIRATORY (INHALATION) ONCE
Status: COMPLETED | OUTPATIENT
Start: 2022-11-07 | End: 2022-11-07

## 2022-11-07 RX ORDER — METHYLPREDNISOLONE SODIUM SUCCINATE 125 MG/2ML
125 INJECTION, POWDER, LYOPHILIZED, FOR SOLUTION INTRAMUSCULAR; INTRAVENOUS ONCE
Status: COMPLETED | OUTPATIENT
Start: 2022-11-07 | End: 2022-11-07

## 2022-11-07 RX ADMIN — Medication 0.5 MG: at 14:12

## 2022-11-07 RX ADMIN — ALBUTEROL SULFATE 2.5 MG: 2.5 SOLUTION RESPIRATORY (INHALATION) at 14:12

## 2022-11-07 RX ADMIN — METHYLPREDNISOLONE SODIUM SUCCINATE 125 MG: 125 INJECTION, POWDER, LYOPHILIZED, FOR SOLUTION INTRAMUSCULAR; INTRAVENOUS at 14:11

## 2022-11-07 NOTE — PATIENT INSTRUCTIONS
Osteoporosis Education   Osteoporosis  is a long-term medical condition that causes your bones to become weak, brittle, and more likely to fracture  Osteoporosis occurs when your body absorbs more bone than it makes  It is also caused by a lack of calcium and estrogen (female hormone)  Common symptoms include the following: You may not have any signs or symptoms  You may break a bone after a muscle strain, bump, or fall  A break usually occurs in the hip, spine, or wrist  A collapsed vertebra (bone in your spine) may cause severe back pain or loss of height from bent posture  Call your doctor if:   ·  You have severe pain  ·  You have increasing pain after a fall  ·  You have pain when you do your daily activities  ·  You have questions or concerns about your condition or care  Diagnosis of osteoporosis:   · Blood and urine tests  measure your calcium, vitamin D, and estrogen levels  · An x-ray or CT may show thinned bones or a fracture  You may be given contrast liquid to help the bones show up better in the pictures  Tell the healthcare provider if you have ever had an allergic reaction to contrast liquid  Do not enter the MRI room with anything metal  Metal can cause serious injury  Tell the healthcare provider if you have any metal in or on your body  · A bone density test  compares your bone thickness with what is expected for someone of your age, gender, and ethnicity  Treatment for osteoporosis may include medicines to prevent bone loss, build new bone, and increase estrogen  These medicines help prevent fractures and may be given as a pill or injection  Ask your healthcare provider for more information on these medicines  Prevent bone loss:  · Eat healthy foods that are high in calcium  This helps keep your bones strong  Good sources of calcium are milk, cheese, broccoli, tofu, almonds, and canned salmon and sardines  Recommended to get at least 1200mg daily of calcium    · Increase your vitamin D intake  Vitamin D is in fish oils, some vegetables, and fortified milk, cereal, and bread  Vitamin D is also formed in the skin when it is exposed to the sun  Ask your healthcare provider how much sunlight is safe for you  You will require at least 800 units of vitamin D daily taken as a supplement  · Drink liquids as directed  Ask your healthcare provider how much liquid to drink each day and which liquids are best for you  Do not have alcohol or caffeine  They decrease bone mineral density, which can weaken your bones  · Exercise regularly  Ask your healthcare provider about the best exercise plan for you  Weight bearing exercise for 30 minutes, 3 times a week can help build and strengthen bone  · Do not smoke  Nicotine and other chemicals in cigarettes and cigars can cause lung damage  Ask your healthcare provider for information if you currently smoke and need help to quit  E-cigarettes or smokeless tobacco still contain nicotine  Talk to your healthcare provider before you use these products  · Go to physical therapy as directed  A physical therapist teaches you exercises to help improve movement and muscle strength  · Alcohol  It is recommended to avoid heavy alcohol use as increased consumption of alcohol is known to cause bone loss  © Copyright [employer] 2021 Information is for End User's use only and may not be sold, redistributed or otherwise used for commercial purposes  All illustrations and images included in CareNotes® are the copyrighted property of [employer]  or [facility]     Calcium and vitamin D for bone health (Beyond the Basics)    CALCIUM AND VITAMIN D OVERVIEW  Osteoporosis is a common bone disorder that causes a progressive loss in bone density and mass  As a result, bones become thin, weakened, and easily fractured   It is estimated that more than 1 3 million osteoporosis-associated (or "osteoporotic") fractures occur every year in the [place], primarily of bone within the spine (the vertebrae), the hip, and the forearm near the wrist  =    A number of treatments can help to prevent loss of bone and treat low bone mass  However, the first step in preventing or treating osteoporosis is to consume foods and drinks that provide calcium, a mineral essential for bone strength, and vitamin D, which aids in calcium breakdown and absorption  =    CALCIUM AND VITAMIN D BENEFITS  Good nutrition is important at all ages to keep the bones healthy  · Taking calcium reduces bone loss and decreases the risk of fracturing the vertebrae (the bones that surround the spinal cord)  · Consuming calcium during childhood (eg, in milk) can lead to higher bone mass in adulthood  This increase in bone density can reduce the risk of fractures later in life  · Calcium may also have benefits in other body systems by reducing blood pressure and cholesterol levels  · Calcium and vitamin D supplements may help prevent tooth loss in older adults  RECOMMENDATIONS FOR CALCIUM    General recommendations -- Premenopausal women and men should consume at least 1000 mg of calcium, while postmenopausal women should consume 1200 mg (total diet plus supplement)  You should not consume more than 2000 mg of calcium per day (total diet plus supplement) due to the risk of side effects  Calcium in the diet -- The primary sources of calcium in the diet include milk and other dairy products, such as hard cheese, cottage cheese, or yogurt, as well as green vegetables, such as kale and broccoli (table 1)  Some cereals, soy products, and fruit juices are fortified with calcium  Calcium supplements -- The body is able to absorb calcium contained in supplements as well as from dietary sources  If it is not possible to get enough calcium from dietary sources, consult a health care provider to determine the best type, dose, and timing of calcium supplements       · Calcium carbonate is effective and is the least expensive form of calcium  It is best absorbed with a low-iron meal (such as breakfast)  Calcium carbonate may not be absorbed well in people who also take a specific medication for gastroesophageal reflux (called a proton pump inhibitor or H2 blocker), which blocks stomach acid  · Many natural calcium carbonate preparations such as oyster shells or bone meal contain some lead  · Calcium citrate is well absorbed in the fasting state as well as with a meal   · Calcium doses above 500 mg are not absorbed as well as smaller doses, so large doses of supplements should be taken in divided doses (eg, in the morning and evening)  · Calcium supplements do not replace other osteoporosis treatments such as hormone replacement, bisphosphonates (eg, risedronate [sample brand name: Actonel] and alendronate [brand name: Fosamax]), and raloxifene (brand name: Evista)  Calcium and vitamin D supplements alone are usually insufficient to prevent age-related bone loss, although they may be beneficial in some subgroups (older adults, those with very low intake)  In most patients with or at risk for osteoporosis, the addition of medication or hormonal therapy is necessary in order to slow the breakdown and removal of bone (ie, resorption)  Underlying gastrointestinal diseases -- Patients who do not adequately absorb nutrients from the gastrointestinal tract (due to malabsorption) may require more than 1000 to 1200 mg of calcium per day  In such cases, a health care provider can help to determine the optimal dose of calcium  Medications -- All medications should be discussed with a health care provider to ensure that possible interactions with calcium are identified  Certain medications change the amount of calcium that is absorbed and/or excreted  As an example, loop diuretics (eg, furosemide [sample brand name: Lasix]) increase the amount of calcium excreted in the urine      On the other hand, thiazide diuretics (eg, hydrochlorothiazide) can reduce levels of calcium in the urine, potentially reducing the risk of bone loss and kidney stones  Side effects of calcium -- Calcium is usually easily tolerated when it is taken in divided doses several times per day  Some people experience side effects related to calcium, including constipation and indigestion  Calcium supplements interfere with the absorption of iron and thyroid hormone, and therefore, these medications should be taken at different times  Kidney stones -- There is no evidence that consuming large amounts of calcium (from foods and drinks) increases the risk of kidney stones, or that avoiding dietary calcium decreases the risk  In fact, avoiding dairy products is likely to increase the risk of kidney stones  However, use of calcium supplements may increase the risk of kidney stones in susceptible individuals by raising the level of calcium in the urine  This is particularly true if the supplement is taken between meals or at bedtime, and this is one of the reasons we prefer for patients to get as much of their calcium requirement through dietary means  IMPORTANCE OF VITAMIN D  Vitamin D decreases bone loss and lowers the risk of fracture, especially in older men and women  Along with calcium, vitamin D also helps to prevent and treat osteoporosis  To absorb calcium efficiently, an adequate amount of vitamin D must be present  Vitamin D is normally made in the skin after exposure to sunlight  Recommendations for vitamin D -- The current recommendation is that men over 70 years and postmenopausal women consume at least 800 international units (20 micrograms) of vitamin D per day  Lower levels of vitamin D are not as effective, while high doses can be toxic, especially if taken for long periods of time   Although the optimal intake has not been clearly established in premenopausal women or in younger men with osteoporosis, 600 international units (15 micrograms) of vitamin D daily is generally suggested  Vitamin D is available as an individual supplement and is included in most multivitamins and some calcium supplements (table 2)  Milk is a relatively good dietary source of vitamin D, with approximately 100 international units (2 5 micrograms) per cup (240 mL), and salmon has 800 to 1000 international units (20 to 25 micrograms) of vitamin D per serving  Most healthy people don't need to check with their health care provider before taking standard doses of vitamin D and don't need monitoring of vitamin D levels if they are not being treated for vitamin D deficiency  However, recommendations for vitamin D supplementation may be different in people with certain underlying medical conditions, such as sarcoidosis or lymphoma  In these situations, a provider will determine if supplements are needed; if so, the person's vitamin D and calcium levels should be monitored with regular tests  ©6049 [employer], [address] rights reserved

## 2022-11-07 NOTE — PROGRESS NOTES
Name: Ángel Tabor      : [birth year]      MRN: [id number]  Encounter Provider: Gael Durand MD  Encounter Date: 2022   Encounter department: [facility] [address] [place] [address]     1  Acute exacerbation of COPD with asthma ([facility] [address] )  -     valsartan (DIOVAN) 160 mg tablet; Take 1 tablet (160 mg total) by mouth daily Please STOP Lisinopril,     -     Quantiferon TB Gold Plus; Future  -     XR chest pa & lateral; Future; Expected date: 2022  -     methylPREDNISolone sodium succinate (Solu-MEDROL) injection 125 mg  -     albuterol inhalation solution 2 5 mg  -     ipratropium (ATROVENT) 0 02 % inhalation solution 0 5 mg    2  Chronic cough  -     valsartan (DIOVAN) 160 mg tablet; Take 1 tablet (160 mg total) by mouth daily Please STOP Lisinopril,     -     Quantiferon TB Gold Plus; Future  -     XR chest pa & lateral; Future; Expected date: 2022    3  Screening mammogram for breast cancer  -     Mammo screening bilateral w 3d & cad; Future; Expected date: 2022    4  Idiopathic osteoporosis  -     DXA bone density spine hip and pelvis; Future; Expected date: 2022    5  Night sweat  -     Ferritin; Future  -     UA (URINE) with reflex to Scope; Future; Expected date: 2022  -     Comprehensive metabolic panel; Future  -     CBC and differential; Future  -     Magnesium; Future  -     Sedimentation rate, automated; Future  -     C-reactive protein; Future  -     TSH, 3rd generation; Future; Expected date: 2022    6  Temporal arteritis (HCC)  -     Ferritin; Future  -     UA (URINE) with reflex to Scope; Future; Expected date: 2022  -     Comprehensive metabolic panel; Future  -     CBC and differential; Future  -     Magnesium; Future  -     Sedimentation rate, automated; Future  -     C-reactive protein; Future  -     TSH, 3rd generation; Future; Expected date: 2022    7   Acute exacerbation of chronic obstructive pulmonary disease (COPD) ([address] )  -     benzonatate (TESSALON PERLES) 100 mg capsule; Take 1 capsule (100 mg total) by mouth 3 (three) times a day as needed for cough    RTC in 1 mo w Blood work       Subjective      [address] is here for Regular check up, and has increased cold symptoms, and still with cough for last few weeks, recent Blood work and med list reviewed w Pt in detail    Review of Systems   Constitutional: Negative for chills, fatigue and fever  HENT: Positive for postnasal drip, sinus pressure and sore throat  Negative for congestion, facial swelling, trouble swallowing and voice change  Eyes: Negative for pain, discharge and visual disturbance  Respiratory: Positive for cough and wheezing  Negative for shortness of breath  Cardiovascular: Negative for chest pain, palpitations and leg swelling  Gastrointestinal: Negative for abdominal pain, blood in stool, constipation, diarrhea and nausea  Endocrine: Negative for polydipsia, polyphagia and polyuria  Genitourinary: Negative for difficulty urinating, hematuria and urgency  Musculoskeletal: Negative for arthralgias and myalgias  Skin: Negative for rash  Neurological: Negative for dizziness, tremors, weakness and headaches  Hematological: Negative for adenopathy  Does not bruise/bleed easily  Psychiatric/Behavioral: Negative for dysphoric mood, sleep disturbance and suicidal ideas  Current Outpatient Medications on File Prior to Visit   Medication Sig   • acetaminophen (TYLENOL) 500 mg tablet Take 1 tablet (500 mg total) by mouth every 6 (six) hours as needed (pain fevers)   • albuterol (ProAir HFA) 90 mcg/act inhaler Inhale 2 puffs every 6 (six) hours as needed for wheezing   • albuterol (PROVENTIL HFA,VENTOLIN HFA) 90 mcg/act inhaler Inhale 2 puffs every 6 (six) hours as needed for wheezing or shortness of breath   • aspirin 81 MG tablet take one tab   every other day with Dinner   • azelastine (ASTELIN) 0 1 % nasal spray 1 spray into each nostril 2 (two) times a day Use in each nostril as directed (Patient not taking: Reported on 10/3/2022)   • b complex-C-E-zinc (STRESS FORMULA/ZINC) tablet Take 1 tablet by mouth daily   • Biotin 10 MG TABS Take by mouth in the morning   • Calcium Carb-Cholecalciferol (Caltrate 600+D3) 600-800 MG-UNIT TABS Take 1 tablet by mouth 2 (two) times a day   • cetirizine (ZyrTEC) 10 mg tablet Take 1 tablet (10 mg total) by mouth daily With food/in the evening   • fluticasone-umeclidinium-vilanterol (TRELEGY) 100-62 5-25 MCG/INH inhaler Inhale 1 puff daily Rinse mouth after use  • ipratropium-albuterol (DUO-NEB) 0 5-2 5 mg/3 mL nebulizer solution Take 1 vial (3 mL total) by nebulization 4 (four) times a day (Patient not taking: Reported on 10/3/2022)   • levothyroxine (Synthroid) 25 mcg tablet Take 1 tablet (25 mcg total) by mouth daily   • pantoprazole (PROTONIX) 40 mg tablet TAKE ONE TABLET BY MOUTH ONCE DAILY   • predniSONE 2 5 mg tablet TAKE ONE TABLET BY MOUTH ONCE DAILY   • vitamin B-12 (VITAMIN B-12) 1,000 mcg tablet Take 1 tablet (1,000 mcg total) by mouth daily   • [DISCONTINUED] benzonatate (TESSALON PERLES) 100 mg capsule Take 1 capsule (100 mg total) by mouth 3 (three) times a day as needed for cough   • [DISCONTINUED] gabapentin (Neurontin) 100 mg capsule Take 1 capsule (100 mg total) by mouth 2 (two) times a day With food/Meals (Patient not taking: Reported on 10/3/2022)   • [DISCONTINUED] lisinopril (ZESTRIL) 20 mg tablet Take 1 tablet (20 mg total) by mouth daily       Objective     /82 (BP Location: Left arm, Patient Position: Sitting, Cuff Size: Standard)   Pulse 89   Temp 98 3 °F (36 8 °C) (Tympanic)   Resp 16   Ht 5' 3" (1 6 m)   Wt 49 kg (108 lb)   SpO2 94%   BMI 19 13 kg/m²     Physical Exam  Constitutional:       General: She is not in acute distress  HENT:      Head: Normocephalic  Mouth/Throat:      Pharynx: No oropharyngeal exudate  Eyes:      General: No scleral icterus  Conjunctiva/sclera: Conjunctivae normal       Pupils: Pupils are equal, round, and reactive to light  Neck:      Thyroid: No thyromegaly  Cardiovascular:      Rate and Rhythm: Normal rate and regular rhythm  Heart sounds: Murmur heard  Pulmonary:      Effort: Pulmonary effort is normal  No respiratory distress  Breath sounds: Wheezing present  No rales  Abdominal:      General: Bowel sounds are normal  There is no distension  Palpations: Abdomen is soft  Tenderness: There is no abdominal tenderness  There is no guarding or rebound  Musculoskeletal:         General: No tenderness  Cervical back: Neck supple  Lymphadenopathy:      Cervical: No cervical adenopathy  Skin:     Coloration: Skin is not pale  Findings: No rash  Neurological:      Mental Status: She is alert and oriented to person, place, and time  Sensory: No sensory deficit  Motor: No weakness         Nicole Covington MD

## 2022-11-08 DIAGNOSIS — J44.9 STAGE 4 VERY SEVERE COPD BY GOLD CLASSIFICATION (HCC): ICD-10-CM

## 2022-11-08 RX ORDER — IPRATROPIUM BROMIDE AND ALBUTEROL SULFATE 2.5; .5 MG/3ML; MG/3ML
3 SOLUTION RESPIRATORY (INHALATION) 4 TIMES DAILY
Qty: 120 ML | Refills: 4 | Status: SHIPPED | OUTPATIENT
Start: 2022-11-08

## 2022-11-10 ENCOUNTER — HOSPITAL ENCOUNTER (OUTPATIENT)
Dept: RADIOLOGY | Facility: HOSPITAL | Age: 77
End: 2022-11-10

## 2022-11-10 ENCOUNTER — APPOINTMENT (OUTPATIENT)
Dept: LAB | Facility: HOSPITAL | Age: 77
End: 2022-11-10

## 2022-11-10 DIAGNOSIS — J45.901 ACUTE EXACERBATION OF COPD WITH ASTHMA (HCC): ICD-10-CM

## 2022-11-10 DIAGNOSIS — R61 NIGHT SWEAT: ICD-10-CM

## 2022-11-10 DIAGNOSIS — R05.3 CHRONIC COUGH: ICD-10-CM

## 2022-11-10 DIAGNOSIS — J44.1 ACUTE EXACERBATION OF COPD WITH ASTHMA (HCC): ICD-10-CM

## 2022-11-10 DIAGNOSIS — M31.6 TEMPORAL ARTERITIS (HCC): ICD-10-CM

## 2022-11-10 LAB
ALBUMIN SERPL BCP-MCNC: 4.6 G/DL (ref 3.5–5)
ALP SERPL-CCNC: 62 U/L (ref 43–122)
ALT SERPL W P-5'-P-CCNC: 33 U/L
ANION GAP SERPL CALCULATED.3IONS-SCNC: 10 MMOL/L (ref 5–14)
AST SERPL W P-5'-P-CCNC: 33 U/L (ref 14–36)
BACTERIA UR QL AUTO: ABNORMAL /HPF
BASOPHILS # BLD AUTO: 0.05 THOUSANDS/ÂΜL (ref 0–0.1)
BASOPHILS NFR BLD AUTO: 1 % (ref 0–1)
BILIRUB SERPL-MCNC: 0.58 MG/DL (ref 0.2–1)
BILIRUB UR QL STRIP: NEGATIVE
BUN SERPL-MCNC: 22 MG/DL (ref 5–25)
CALCIUM SERPL-MCNC: 9.9 MG/DL (ref 8.4–10.2)
CHLORIDE SERPL-SCNC: 100 MMOL/L (ref 96–108)
CLARITY UR: ABNORMAL
CO2 SERPL-SCNC: 33 MMOL/L (ref 21–32)
COLOR UR: ABNORMAL
CREAT SERPL-MCNC: 0.64 MG/DL (ref 0.6–1.2)
CRP SERPL QL: 5.8 MG/L
EOSINOPHIL # BLD AUTO: 0.17 THOUSAND/ÂΜL (ref 0–0.61)
EOSINOPHIL NFR BLD AUTO: 2 % (ref 0–6)
ERYTHROCYTE [DISTWIDTH] IN BLOOD BY AUTOMATED COUNT: 14.2 % (ref 11.6–15.1)
ERYTHROCYTE [SEDIMENTATION RATE] IN BLOOD: 32 MM/HOUR (ref 0–29)
FERRITIN SERPL-MCNC: 77 NG/ML (ref 8–388)
GFR SERPL CREATININE-BSD FRML MDRD: 86 ML/MIN/1.73SQ M
GLUCOSE P FAST SERPL-MCNC: 101 MG/DL (ref 70–99)
GLUCOSE UR STRIP-MCNC: NEGATIVE MG/DL
HCT VFR BLD AUTO: 47.7 % (ref 34.8–46.1)
HGB BLD-MCNC: 15.1 G/DL (ref 11.5–15.4)
HGB UR QL STRIP.AUTO: 25
IMM GRANULOCYTES # BLD AUTO: 0.02 THOUSAND/UL (ref 0–0.2)
IMM GRANULOCYTES NFR BLD AUTO: 0 % (ref 0–2)
KETONES UR STRIP-MCNC: ABNORMAL MG/DL
LEUKOCYTE ESTERASE UR QL STRIP: 500
LYMPHOCYTES # BLD AUTO: 1.8 THOUSANDS/ÂΜL (ref 0.6–4.47)
LYMPHOCYTES NFR BLD AUTO: 18 % (ref 14–44)
MAGNESIUM SERPL-MCNC: 2.1 MG/DL (ref 1.6–2.3)
MCH RBC QN AUTO: 28 PG (ref 26.8–34.3)
MCHC RBC AUTO-ENTMCNC: 31.7 G/DL (ref 31.4–37.4)
MCV RBC AUTO: 88 FL (ref 82–98)
MONOCYTES # BLD AUTO: 0.79 THOUSAND/ÂΜL (ref 0.17–1.22)
MONOCYTES NFR BLD AUTO: 8 % (ref 4–12)
NEUTROPHILS # BLD AUTO: 6.95 THOUSANDS/ÂΜL (ref 1.85–7.62)
NEUTS SEG NFR BLD AUTO: 71 % (ref 43–75)
NITRITE UR QL STRIP: NEGATIVE
NON-SQ EPI CELLS URNS QL MICRO: ABNORMAL /HPF
NRBC BLD AUTO-RTO: 0 /100 WBCS
PH UR STRIP.AUTO: 6 [PH]
PLATELET # BLD AUTO: 265 THOUSANDS/UL (ref 149–390)
PMV BLD AUTO: 10.8 FL (ref 8.9–12.7)
POTASSIUM SERPL-SCNC: 4.1 MMOL/L (ref 3.5–5.3)
PROT SERPL-MCNC: 8.2 G/DL (ref 6.4–8.4)
PROT UR STRIP-MCNC: ABNORMAL MG/DL
RBC # BLD AUTO: 5.4 MILLION/UL (ref 3.81–5.12)
RBC #/AREA URNS AUTO: ABNORMAL /HPF
SODIUM SERPL-SCNC: 143 MMOL/L (ref 135–147)
SP GR UR STRIP.AUTO: 1.02 (ref 1–1.04)
TSH SERPL DL<=0.05 MIU/L-ACNC: 2.38 UIU/ML (ref 0.45–4.5)
URINE COMMENT: ABNORMAL
UROBILINOGEN UA: NEGATIVE MG/DL
WBC # BLD AUTO: 9.78 THOUSAND/UL (ref 4.31–10.16)
WBC #/AREA URNS AUTO: ABNORMAL /HPF

## 2022-11-12 LAB
GAMMA INTERFERON BACKGROUND BLD IA-ACNC: 0.04 IU/ML
M TB IFN-G BLD-IMP: ABNORMAL
M TB IFN-G CD4+ BCKGRND COR BLD-ACNC: -0.01 IU/ML
M TB IFN-G CD4+ BCKGRND COR BLD-ACNC: 0.01 IU/ML
MITOGEN IGNF BCKGRD COR BLD-ACNC: 0.45 IU/ML

## 2022-11-14 ENCOUNTER — CONSULT (OUTPATIENT)
Dept: VASCULAR SURGERY | Facility: CLINIC | Age: 77
End: 2022-11-14

## 2022-11-14 DIAGNOSIS — J44.1 ACUTE EXACERBATION OF CHRONIC OBSTRUCTIVE PULMONARY DISEASE (COPD) (HCC): ICD-10-CM

## 2022-11-14 DIAGNOSIS — I73.00 RAYNAUD'S PHENOMENON WITHOUT GANGRENE: Primary | ICD-10-CM

## 2022-11-14 DIAGNOSIS — I73.9 PVD (PERIPHERAL VASCULAR DISEASE) (HCC): ICD-10-CM

## 2022-11-14 NOTE — PROGRESS NOTES
Assessment/Plan:    Raynaud's phenomenon without gangrene  Bilateral sensitivity of the fingers and toes to cold  Symptoms have been longstanding but progressively worse in both forefeet/toes over the past year  She has no claudication symptoms, rest pain or tissue loss but reports imbalance from the numbness in her feet  She is not walking as much due to uncertainty that she experiences when walking  She also reports severe dyspnea from COPD that limits her ability to ambulate far  She feels that her legs feel heavy and fatigued all the time  LEAD - supernormal BRODY/unobtainable due to calcified vessels, normal metatarsal and great toe pressures/waveforms bilaterally  Easily palpable DP/PT pulse on exam  Purplish discoloration of fingers or toes on exam  No obvious varicosities, spider veins or leg swelling  No skin changes     -Discussed the pathophysiology of Raynaud's phenomenon, which is most commonly idiopathic and a clinical diagnosis though further testing can be done to confirm the diagnosis if there is doubt  Suspect peripheral neuropathy in the toes is secondary to severe vasospasm of the digital vessels   -No obvious connective tissue disorder or other associated symptoms suggests that she has an underlying disease process causing secondary Raynaud's  Her arterial perfusion appears normal on her duplex and she has palpable pedal pulses  She does have a history of temporal arteritis - repeat ESR/CRP levels were obtained recently and are fairly unremarkable  -Treatment is mostly conservative and preventative to reduce frequency and duration of attacks    -Recommend simple measures to maintain warmth and avoid cold such as wearing gloves and thick socks, avoid nicotine products and vasoconstricting medications   Pharmacologic therapy is reserved for those who fail conservative measures, which could include calcium channel blockers   -Discussed rheumatology referral for further work-up/evaluation but she declines seeing additional physicians at this time  -Recommend increasing activity to improve overall perfusion to the lower extremities  She states that walking has been an issue but is considering an exercise bike  -She does report feeling of heaviness and fatigue in both legs  Could consider trial of light over the counter compression socks (8-15mmHg) to see if she has any relief   -Follow-up as needed         Diagnoses and all orders for this visit:    Raynaud's phenomenon without gangrene    PVD (peripheral vascular disease) ([facility] [address] )  -     Ambulatory Referral to Vascular Surgery        I have spent 30 minutes with Patient  today in which greater than 50% of this time was spent in counseling/coordination of care regarding Intructions for management, Importance of tx compliance and Impressions  Subjective:      Patient ID: Kathya Nelson is a 68 y o  female  This patient is a consult and referred by Dr Linsey Greco  She is here to review an ERICA done on 9/19/22  Patient c/o numbness in both of her feet, she also c/o pain in both calfs when she lays down in bed  She said that only happened 2x in a couple of days  Pt does not wear compression stockings  Pt hasn't smoked for 10 years  HPI  Ms Jeffrey Lee is a 68yo female former smoker with HTN, COPD, GERD, temporal arteritis, OA who presents as a new referral for progressive peripheral neuropathy in her feet  She states that the symptoms have been ongoing for the past year but getting worse  She used to love walking all the time but reports a sense of imbalance and uneasiness walking because of the numbness in her feet  She no longer walks and is fairly inactive  She also reports chronic cough and shortness of breath with activity  She also feels that her legs feel heavy and fatigued and she has difficulty picking them up though she does not notice any swelling or varicose veins in her legs  She has had cold fingers and toes for as long as she can remember   She does not necessarily not a sensitivity to cold in particular but simply states that they are cold all the time  The following portions of the patient's history were reviewed and updated as appropriate: allergies, current medications, past family history, past medical history, past social history, past surgical history and problem list     I have reviewed and made appropriate changes to the review of systems input by the medical assistant  There were no vitals filed for this visit      Patient Active Problem List   Diagnosis   • Chronic obstructive pulmonary disease (HCC)   • Vitamin D deficiency   • Other specified hypothyroidism   • Gastroesophageal reflux disease without esophagitis   • Rectus sheath hematoma, initial encounter   • Hypertension   • Osteoarthritis   • Temporal arteritis ([facility] [address] )   • 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 ([facility] [address] )   • Diabetic eye exam ([facility] [address] )   • Raynaud's phenomenon without gangrene       Past Surgical History:   Procedure Laterality Date   • EYE SURGERY Right 12/06/2019    cataract LVCFS   • HYSTERECTOMY     • NO PAST SURGERIES      ALSO NO RELEVANT PAST SURRGICAL HX AS PER [facility]       Family History   Problem Relation Age of Onset   • Breast cancer Mother    • Emphysema Father        Social History     Socioeconomic History   • Marital status: Legally      Spouse name: Not on file   • Number of children: Not on file   • Years of education: Not on file   • Highest education level: Not on file   Occupational History   • Occupation: retired   Tobacco Use   • Smoking status: Former Smoker     Years: 15 00     Types: Cigarettes   • Smokeless tobacco: Never Used   • Tobacco comment: TOBACCO USE, NO PASSIVE SMOKE EXPOSURE   Vaping Use   • Vaping Use: Never used   Substance and Sexual Activity   • Alcohol use: No   • Drug use: No   • Sexual activity: Not Currently   Other Topics Concern   • Not on file   Social History Narrative   • Not on file     Social Determinants of Health     Financial Resource Strain: Not on file   Food Insecurity: Not on file   Transportation Needs: Not on file   Physical Activity: Not on file   Stress: Not on file   Social Connections: Not on file   Intimate Partner Violence: Not on file   Housing Stability: Not on file       No Known Allergies      Current Outpatient Medications:   •  acetaminophen (TYLENOL) 500 mg tablet, Take 1 tablet (500 mg total) by mouth every 6 (six) hours as needed (pain fevers), Disp: 30 tablet, Rfl: 0  •  albuterol (ProAir HFA) 90 mcg/act inhaler, Inhale 2 puffs every 6 (six) hours as needed for wheezing, Disp: 8 5 g, Rfl: 0  •  albuterol (PROVENTIL HFA,VENTOLIN HFA) 90 mcg/act inhaler, Inhale 2 puffs every 6 (six) hours as needed for wheezing or shortness of breath, Disp: 18 g, Rfl: 2  •  aspirin 81 MG tablet, take one tab   every other day with Dinner, Disp: , Rfl:   •  b complex-C-E-zinc (STRESS FORMULA/ZINC) tablet, Take 1 tablet by mouth daily, Disp: 90 tablet, Rfl: 3  •  benzonatate (TESSALON PERLES) 100 mg capsule, Take 1 capsule (100 mg total) by mouth 3 (three) times a day as needed for cough, Disp: 30 capsule, Rfl: 0  •  Biotin 10 MG TABS, Take by mouth in the morning, Disp: , Rfl:   •  Calcium Carb-Cholecalciferol (Caltrate 600+D3) 600-800 MG-UNIT TABS, Take 1 tablet by mouth 2 (two) times a day, Disp: 60 tablet, Rfl: 6  •  fluticasone-umeclidinium-vilanterol (TRELEGY) 100-62 5-25 MCG/INH inhaler, Inhale 1 puff daily Rinse mouth after use , Disp: 3 blister, Rfl: 3  •  ipratropium-albuterol (DUO-NEB) 0 5-2 5 mg/3 mL nebulizer solution, Take 3 mL by nebulization 4 (four) times a day, Disp: 120 mL, Rfl: 4  •  levothyroxine (Synthroid) 25 mcg tablet, Take 1 tablet (25 mcg total) by mouth daily, Disp: 30 tablet, Rfl: 3  •  pantoprazole (PROTONIX) 40 mg tablet, TAKE ONE TABLET BY MOUTH ONCE DAILY, Disp: 90 tablet, Rfl: 0  •  predniSONE 2 5 mg tablet, TAKE ONE TABLET BY MOUTH ONCE DAILY, Disp: 30 tablet, Rfl: 2  •  valsartan (DIOVAN) 160 mg tablet, Take 1 tablet (160 mg total) by mouth daily Please STOP Lisinopril, , Disp: 90 tablet, Rfl: 3  •  vitamin B-12 (VITAMIN B-12) 1,000 mcg tablet, Take 1 tablet (1,000 mcg total) by mouth daily, Disp: 90 tablet, Rfl: 1  •  azelastine (ASTELIN) 0 1 % nasal spray, 1 spray into each nostril 2 (two) times a day Use in each nostril as directed (Patient not taking: No sig reported), Disp: 1 Bottle, Rfl: 3  •  cetirizine (ZyrTEC) 10 mg tablet, Take 1 tablet (10 mg total) by mouth daily With food/in the evening (Patient not taking: Reported on 11/14/2022), Disp: 30 tablet, Rfl: 3    Current Facility-Administered Medications:   •  cyanocobalamin injection 1,000 mcg, 1,000 mcg, Intramuscular, Q30 Days, Aayush Barboza MD, 1,000 mcg at 01/14/20 1508    Review of Systems   Constitutional: Positive for activity change  HENT: Negative  Eyes: Positive for visual disturbance (pt has macular degeneration)  Respiratory: Positive for cough, chest tightness and shortness of breath  Cardiovascular: Negative  Gastrointestinal: Positive for constipation  Endocrine: Negative  Genitourinary: Negative  Musculoskeletal: Positive for back pain  Skin: Negative  Allergic/Immunologic: Negative  Neurological: Positive for numbness (both feet)  Hematological: Bruises/bleeds easily  Psychiatric/Behavioral: Negative  I have personally reviewed the ROS entered by MA and agree as documented  Objective: There were no vitals taken for this visit  Physical Exam  Constitutional:       Appearance: Normal appearance  HENT:      Head: Normocephalic and atraumatic  Cardiovascular:      Rate and Rhythm: Normal rate  Pulses: Normal pulses  Pulmonary:      Effort: Pulmonary effort is normal    Abdominal:      General: There is no distension  Palpations: Abdomen is soft  Tenderness: There is no abdominal tenderness     Musculoskeletal:         General: Normal range of motion  Cervical back: Normal range of motion and neck supple  Skin:     General: Skin is warm and dry  Capillary Refill: Capillary refill takes less than 2 seconds  Comments: Purplish discoloration of all the fingertips and toes   Neurological:      General: No focal deficit present  Mental Status: She is alert and oriented to person, place, and time  Psychiatric:         Mood and Affect: Mood normal          Behavior: Behavior normal          Thought Content:  Thought content normal          Judgment: Judgment normal

## 2022-11-14 NOTE — PATIENT INSTRUCTIONS
Raynaud's phenomenon without gangrene  Bilateral sensitivity of the fingers and toes to cold  Symptoms have been longstanding but progressively worse in both forefeet/toes over the past year  She has no claudication symptoms, rest pain or tissue loss but reports imbalance from the numbness in her feet  She is not walking as much due to uncertainty that she experiences when walking  She also reports severe dyspnea from COPD that limits her ability to ambulate far  She feels that her legs feel heavy and fatigued all the time  LEAD - supernormal BRODY/unobtainable due to calcified vessels, normal metatarsal and great toe pressures/waveforms bilaterally  Easily palpable DP/PT pulse on exam  Purplish discoloration of fingers or toes on exam  No obvious varicosities, spider veins or leg swelling  No skin changes      -Discussed the pathophysiology of Raynaud's phenomenon, which is most commonly idiopathic and a clinical diagnosis though further testing can be done to confirm the diagnosis if there is doubt  Suspect peripheral neuropathy in the toes is secondary to severe vasospasm of the digital vessels   -No obvious connective tissue disorder or other associated symptoms suggests that she has an underlying disease process causing secondary Raynaud's  Her arterial perfusion appears normal on her duplex and she has palpable pedal pulses  She does have a history of temporal arteritis - repeat ESR/CRP levels were obtained recently and are fairly unremarkable  -Treatment is mostly conservative and preventative to reduce frequency and duration of attacks    -Recommend simple measures to maintain warmth and avoid cold such as wearing gloves and thick socks, avoid nicotine products and vasoconstricting medications   Pharmacologic therapy is reserved for those who fail conservative measures, which could include calcium channel blockers   -Discussed rheumatology referral for further work-up/evaluation but she declines seeing additional physicians at this time  -Recommend increasing activity to improve overall perfusion to the lower extremities  She states that walking has been an issue but is considering an exercise bike  -She does report feeling of heaviness and fatigue in both legs   Could consider trial of light over the counter compression socks (8-15mmHg) to see if she has any relief   -Follow-up as needed

## 2022-11-14 NOTE — LETTER
November 14, 2022     [address]     Patient: Angelica Garcia   YOB: 1945   Date of Visit: 11/14/2022       Dear Dr Rajni Ward: Thank you for referring Angelica Garcia to me for evaluation  Below are the relevant portions of my assessment and plan of care  Diagnoses and all orders for this visit:    Raynaud's phenomenon without gangrene  Bilateral sensitivity of the fingers and toes to cold  Symptoms have been longstanding but progressively worse in both forefeet/toes over the past year  She has no claudication symptoms, rest pain or tissue loss but reports imbalance from the numbness in her feet  She is not walking as much due to uncertainty that she experiences when walking  She also reports severe dyspnea from COPD that limits her ability to ambulate far  She feels that her legs feel heavy and fatigued all the time      LEAD - supernormal BRODY/unobtainable due to calcified vessels, normal metatarsal and great toe pressures/waveforms bilaterally  Easily palpable DP/PT pulse on exam  Purplish discoloration of fingers or toes on exam  No obvious varicosities, spider veins or leg swelling  No skin changes      -Discussed the pathophysiology of Raynaud's phenomenon, which is most commonly idiopathic and a clinical diagnosis though further testing can be done to confirm the diagnosis if there is doubt  Suspect peripheral neuropathy in the toes is secondary to severe vasospasm of the digital vessels   -No obvious connective tissue disorder or other associated symptoms suggests that she has an underlying disease process causing secondary Raynaud's  Her arterial perfusion appears normal on her duplex and she has palpable pedal pulses  She does have a history of temporal arteritis - repeat ESR/CRP levels were obtained recently and are fairly unremarkable    -Treatment is mostly conservative and preventative to reduce frequency and duration of attacks    -Recommend simple measures to maintain warmth and avoid cold such as wearing gloves and thick socks, avoid nicotine products and vasoconstricting medications  Pharmacologic therapy is reserved for those who fail conservative measures, which could include calcium channel blockers   -Discussed rheumatology referral for further work-up/evaluation but she declines seeing additional physicians at this time  -Recommend increasing activity to improve overall perfusion to the lower extremities  She states that walking has been an issue but is considering an exercise bike  -She does report feeling of heaviness and fatigue in both legs  Could consider trial of light over the counter compression socks (8-15mmHg) to see if she has any relief   -Follow-up as needed      If you have questions, please do not hesitate to call me  I look forward to following Fritz Valadez along with you           Sincerely,        Elvira Johnson MD        CC: No Recipients

## 2022-11-14 NOTE — ASSESSMENT & PLAN NOTE
Bilateral sensitivity of the fingers and toes to cold  Symptoms have been longstanding but progressively worse in both forefeet/toes over the past year  She has no claudication symptoms, rest pain or tissue loss but reports imbalance from the numbness in her feet  She is not walking as much due to uncertainty that she experiences when walking  She also reports severe dyspnea from COPD that limits her ability to ambulate far  She feels that her legs feel heavy and fatigued all the time  LEAD - supernormal BRODY/unobtainable due to calcified vessels, normal metatarsal and great toe pressures/waveforms bilaterally  Easily palpable DP/PT pulse on exam  Purplish discoloration of fingers or toes on exam  No obvious varicosities, spider veins or leg swelling  No skin changes     -Discussed the pathophysiology of Raynaud's phenomenon, which is most commonly idiopathic and a clinical diagnosis though further testing can be done to confirm the diagnosis if there is doubt  Suspect peripheral neuropathy in the toes is secondary to severe vasospasm of the digital vessels   -No obvious connective tissue disorder or other associated symptoms suggests that she has an underlying disease process causing secondary Raynaud's  Her arterial perfusion appears normal on her duplex and she has palpable pedal pulses  She does have a history of temporal arteritis - repeat ESR/CRP levels were obtained recently and are fairly unremarkable  -Treatment is mostly conservative and preventative to reduce frequency and duration of attacks    -Recommend simple measures to maintain warmth and avoid cold such as wearing gloves and thick socks, avoid nicotine products and vasoconstricting medications   Pharmacologic therapy is reserved for those who fail conservative measures, which could include calcium channel blockers   -Discussed rheumatology referral for further work-up/evaluation but she declines seeing additional physicians at this time   -Recommend increasing activity to improve overall perfusion to the lower extremities  She states that walking has been an issue but is considering an exercise bike  -She does report feeling of heaviness and fatigue in both legs   Could consider trial of light over the counter compression socks (8-15mmHg) to see if she has any relief   -Follow-up as needed

## 2022-11-15 RX ORDER — BENZONATATE 100 MG/1
CAPSULE ORAL
Qty: 30 CAPSULE | Refills: 0 | Status: SHIPPED | OUTPATIENT
Start: 2022-11-15 | End: 2022-11-21

## 2022-11-16 ENCOUNTER — RA CDI HCC (OUTPATIENT)
Dept: OTHER | Facility: HOSPITAL | Age: 77
End: 2022-11-16

## 2022-11-16 NOTE — PROGRESS NOTES
[facility]  coding opportunities       Chart reviewed, no opportunity found:   [address]        Patients Insurance     Medicare Insurance: [employer]

## 2022-11-21 ENCOUNTER — OFFICE VISIT (OUTPATIENT)
Dept: FAMILY MEDICINE CLINIC | Facility: CLINIC | Age: 77
End: 2022-11-21

## 2022-11-21 VITALS
RESPIRATION RATE: 16 BRPM | HEIGHT: 63 IN | BODY MASS INDEX: 18.96 KG/M2 | DIASTOLIC BLOOD PRESSURE: 68 MMHG | HEART RATE: 102 BPM | WEIGHT: 107 LBS | TEMPERATURE: 97.7 F | SYSTOLIC BLOOD PRESSURE: 122 MMHG | OXYGEN SATURATION: 92 %

## 2022-11-21 DIAGNOSIS — R05.2 SUBACUTE COUGH: ICD-10-CM

## 2022-11-21 DIAGNOSIS — Z23 FLU VACCINE NEED: ICD-10-CM

## 2022-11-21 DIAGNOSIS — R06.02 SOB (SHORTNESS OF BREATH): ICD-10-CM

## 2022-11-21 DIAGNOSIS — J30.9 ALLERGIC RHINITIS, UNSPECIFIED SEASONALITY, UNSPECIFIED TRIGGER: ICD-10-CM

## 2022-11-21 DIAGNOSIS — R31.9 HEMATURIA, UNSPECIFIED TYPE: Primary | ICD-10-CM

## 2022-11-21 DIAGNOSIS — R80.8 OTHER PROTEINURIA: ICD-10-CM

## 2022-11-21 LAB
SL AMB  POCT GLUCOSE, UA: ABNORMAL
SL AMB LEUKOCYTE ESTERASE,UA: ABNORMAL
SL AMB POCT BILIRUBIN,UA: ABNORMAL
SL AMB POCT BLOOD,UA: ABNORMAL
SL AMB POCT CLARITY,UA: ABNORMAL
SL AMB POCT COLOR,UA: YELLOW
SL AMB POCT KETONES,UA: ABNORMAL
SL AMB POCT NITRITE,UA: ABNORMAL
SL AMB POCT PH,UA: 5
SL AMB POCT SPECIFIC GRAVITY,UA: 1.02
SL AMB POCT URINE PROTEIN: ABNORMAL
SL AMB POCT UROBILINOGEN: 0.2

## 2022-11-21 RX ORDER — DEXTROMETHORPHAN HYDROBROMIDE AND GUAIFENESIN 5; 100 MG/5ML; MG/5ML
10 SOLUTION ORAL 3 TIMES DAILY PRN
Qty: 237 ML | Refills: 0 | Status: SHIPPED | OUTPATIENT
Start: 2022-11-21

## 2022-11-21 NOTE — PROGRESS NOTES
Name: Ata Keller      :       MRN: [id number]  Encounter Provider: Janice Mary MD  Encounter Date: 2022   Encounter department: [address] [place] [address]     1  Hematuria, unspecified type  -     POCT urine dip  -     US kidney and bladder with pvr; Future; Expected date: 2022    2  Subacute cough  -     Dextromethorphan-guaiFENesin (Delsym Cgh/Chest Den DM Child) 5-100 MG/5ML LIQD; Take 10 mL by mouth 3 (three) times a day as needed (cough)  -     CT chest wo contrast; Future; Expected date: 2022    3  Allergic rhinitis, unspecified seasonality, unspecified trigger  -     sodium chloride (OCEAN) 0 65 % nasal spray; 1 spray into each nostril 3 (three) times a day    4  Flu vaccine need  -     influenza vaccine, high-dose, PF 0 7 mL (FLUZONE HIGH-DOSE)    5  Other proteinuria  -     US kidney and bladder with pvr; Future; Expected date: 2022    6  SOB (shortness of breath)  -     CT chest wo contrast; Future; Expected date: 2022     Finish Up Meds  Life style Mod  RTC in 2-3 mos w Blood  work       Subjective      68 Y O lady is here for Regular check Up, and Re check From last visit, she feels a Lot better, yet still has Cough, recent CXR,Blood work,and med list all reviewed w pt in detail    Review of Systems   Constitutional: Negative for chills, fatigue and fever  HENT: Positive for postnasal drip  Negative for congestion, facial swelling, sore throat, trouble swallowing and voice change  Eyes: Negative for pain, discharge and visual disturbance  Respiratory: Positive for cough  Negative for shortness of breath and wheezing  Cardiovascular: Negative for chest pain, palpitations and leg swelling  Gastrointestinal: Negative for abdominal pain, blood in stool, constipation, diarrhea and nausea  Endocrine: Negative for polydipsia, polyphagia and polyuria     Genitourinary: Negative for difficulty urinating, hematuria and urgency  Musculoskeletal: Negative for arthralgias and myalgias  Skin: Negative for rash  Neurological: Negative for dizziness, tremors, weakness and headaches  Hematological: Negative for adenopathy  Does not bruise/bleed easily  Psychiatric/Behavioral: Negative for dysphoric mood, sleep disturbance and suicidal ideas  Current Outpatient Medications on File Prior to Visit   Medication Sig   • acetaminophen (TYLENOL) 500 mg tablet Take 1 tablet (500 mg total) by mouth every 6 (six) hours as needed (pain fevers)   • albuterol (ProAir HFA) 90 mcg/act inhaler Inhale 2 puffs every 6 (six) hours as needed for wheezing   • albuterol (PROVENTIL HFA,VENTOLIN HFA) 90 mcg/act inhaler Inhale 2 puffs every 6 (six) hours as needed for wheezing or shortness of breath   • aspirin 81 MG tablet take one tab  every other day with Dinner   • b complex-C-E-zinc (STRESS FORMULA/ZINC) tablet Take 1 tablet by mouth daily   • Biotin 10 MG TABS Take by mouth in the morning   • Calcium Carb-Cholecalciferol (Caltrate 600+D3) 600-800 MG-UNIT TABS Take 1 tablet by mouth 2 (two) times a day   • cetirizine (ZyrTEC) 10 mg tablet Take 1 tablet (10 mg total) by mouth daily With food/in the evening   • fluticasone-umeclidinium-vilanterol (TRELEGY) 100-62 5-25 MCG/INH inhaler Inhale 1 puff daily Rinse mouth after use  • ipratropium-albuterol (DUO-NEB) 0 5-2 5 mg/3 mL nebulizer solution Take 3 mL by nebulization 4 (four) times a day   • levothyroxine (Synthroid) 25 mcg tablet Take 1 tablet (25 mcg total) by mouth daily   • pantoprazole (PROTONIX) 40 mg tablet TAKE ONE TABLET BY MOUTH ONCE DAILY   • predniSONE 2 5 mg tablet TAKE ONE TABLET BY MOUTH ONCE DAILY   • valsartan (DIOVAN) 160 mg tablet Take 1 tablet (160 mg total) by mouth daily Please STOP Lisinopril,      • vitamin B-12 (VITAMIN B-12) 1,000 mcg tablet Take 1 tablet (1,000 mcg total) by mouth daily   • [DISCONTINUED] azelastine (ASTELIN) 0 1 % nasal spray 1 spray into each nostril 2 (two) times a day Use in each nostril as directed   • [DISCONTINUED] benzonatate (TESSALON PERLES) 100 mg capsule TAKE ONE CAPSULE BY MOUTH THREE TIMES A DAY AS NEEDED FOR COUGH       Objective     /68 (BP Location: Left arm, Patient Position: Sitting, Cuff Size: Standard)   Pulse 102   Temp 97 7 °F (36 5 °C) (Tympanic)   Resp 16   Ht 5' 3" (1 6 m)   Wt 48 5 kg (107 lb)   SpO2 92%   BMI 18 95 kg/m²     Physical Exam  Constitutional:       General: She is not in acute distress  Appearance: She is well-nourished  HENT:      Head: Normocephalic  Mouth/Throat:      Mouth: Oropharynx is clear and moist       Pharynx: No oropharyngeal exudate  Eyes:      General: No scleral icterus  Conjunctiva/sclera: Conjunctivae normal       Pupils: Pupils are equal, round, and reactive to light  Neck:      Thyroid: No thyromegaly  Cardiovascular:      Rate and Rhythm: Normal rate and regular rhythm  Heart sounds: Murmur heard  Pulmonary:      Effort: Pulmonary effort is normal  No respiratory distress  Breath sounds: Normal breath sounds  No wheezing or rales  Abdominal:      General: Bowel sounds are normal  There is no distension  Palpations: Abdomen is soft  Tenderness: There is no abdominal tenderness  There is no guarding or rebound  Musculoskeletal:         General: No tenderness or edema  Cervical back: Neck supple  Lymphadenopathy:      Cervical: No cervical adenopathy  Skin:     Coloration: Skin is not pale  Findings: No rash  Neurological:      Mental Status: She is alert and oriented to person, place, and time  Sensory: No sensory deficit  Motor: No weakness     Psychiatric:         Mood and Affect: Mood and affect normal        Libia Alexis MD

## 2022-12-02 DIAGNOSIS — E04.9 ENLARGEMENT OF THYROID: ICD-10-CM

## 2022-12-02 RX ORDER — LEVOTHYROXINE SODIUM 0.03 MG/1
TABLET ORAL
Qty: 30 TABLET | Refills: 2 | Status: SHIPPED | OUTPATIENT
Start: 2022-12-02

## 2022-12-06 ENCOUNTER — TELEPHONE (OUTPATIENT)
Dept: FAMILY MEDICINE CLINIC | Facility: CLINIC | Age: 77
End: 2022-12-06

## 2022-12-06 DIAGNOSIS — R05.2 SUBACUTE COUGH: Primary | ICD-10-CM

## 2022-12-06 DIAGNOSIS — R05.3 CHRONIC COUGH: ICD-10-CM

## 2022-12-12 ENCOUNTER — HOSPITAL ENCOUNTER (OUTPATIENT)
Dept: ULTRASOUND IMAGING | Facility: HOSPITAL | Age: 77
Discharge: HOME | End: 2022-12-12

## 2022-12-12 ENCOUNTER — APPOINTMENT (OUTPATIENT)
Dept: CT IMAGING | Facility: HOSPITAL | Age: 77
End: 2022-12-12

## 2022-12-12 DIAGNOSIS — R80.8 OTHER PROTEINURIA: ICD-10-CM

## 2022-12-12 DIAGNOSIS — R31.9 HEMATURIA, UNSPECIFIED TYPE: ICD-10-CM

## 2022-12-16 DIAGNOSIS — N28.1 RENAL CYST: Primary | ICD-10-CM

## 2022-12-16 DIAGNOSIS — R31.9 HEMATURIA, UNSPECIFIED TYPE: ICD-10-CM

## 2022-12-21 ENCOUNTER — TELEPHONE (OUTPATIENT)
Dept: UROLOGY | Facility: MEDICAL CENTER | Age: 77
End: 2022-12-21

## 2022-12-21 NOTE — TELEPHONE ENCOUNTER
Please Triage -   New Patient- WE     What is the reason for the patients appointment? Patient called stating she was referred by her family doctor due to having a u/s kidney/bladder and it showed Bilateral renal cysts with dominant 9 cm in the left lower pole cyst containing a thin, partial septation  Overall size is increased compared to prior ultrasound of 12/27/2016  Also referred for Hematuria  Patient stated the only day she can come is on a Monday  Her son is the one who can bring her  Please review and adivise  Patient can be reached at [phone]            Imaging/Lab Results:      Do we accept the patient's insurance or is the patient Self-Pay? Provider & Plan:   Member ID#:       Has the patient had any previous urologist(s)?no       Have patient records been requested?in epic        Has the patient had any outside testing done?u/s       Does the patient have a personal history of cancer?no       Patient can be reached at :

## 2022-12-21 NOTE — TELEPHONE ENCOUNTER
New available  Unable to schedule at [address] [facility] location  Scheduled at other [place] site [facility], [address] [facility]    Apt made for 01/30/2023 at 13:30       Telephone call to pt and she is aware of date and location

## 2023-01-09 ENCOUNTER — CONSULT (OUTPATIENT)
Dept: PULMONOLOGY | Facility: CLINIC | Age: 78
End: 2023-01-09

## 2023-01-09 VITALS
BODY MASS INDEX: 18.96 KG/M2 | HEART RATE: 107 BPM | WEIGHT: 107 LBS | HEIGHT: 63 IN | SYSTOLIC BLOOD PRESSURE: 150 MMHG | OXYGEN SATURATION: 98 % | DIASTOLIC BLOOD PRESSURE: 80 MMHG

## 2023-01-09 DIAGNOSIS — R05.2 SUBACUTE COUGH: ICD-10-CM

## 2023-01-09 DIAGNOSIS — I73.00 RAYNAUD'S PHENOMENON WITHOUT GANGRENE: ICD-10-CM

## 2023-01-09 DIAGNOSIS — K21.9 GASTROESOPHAGEAL REFLUX DISEASE WITHOUT ESOPHAGITIS: ICD-10-CM

## 2023-01-09 DIAGNOSIS — Z12.2 ENCOUNTER FOR SCREENING FOR MALIGNANT NEOPLASM OF LUNG IN FORMER SMOKER WHO QUIT IN PAST 15 YEARS WITH 30 PACK YEAR HISTORY OR GREATER: ICD-10-CM

## 2023-01-09 DIAGNOSIS — Z87.891 ENCOUNTER FOR SCREENING FOR MALIGNANT NEOPLASM OF LUNG IN FORMER SMOKER WHO QUIT IN PAST 15 YEARS WITH 30 PACK YEAR HISTORY OR GREATER: ICD-10-CM

## 2023-01-09 DIAGNOSIS — R05.8 COUGH SECONDARY TO ANGIOTENSIN CONVERTING ENZYME INHIBITOR (ACE-I): ICD-10-CM

## 2023-01-09 DIAGNOSIS — T46.4X5A COUGH SECONDARY TO ANGIOTENSIN CONVERTING ENZYME INHIBITOR (ACE-I): ICD-10-CM

## 2023-01-09 DIAGNOSIS — R09.82 POSTNASAL DRIP: ICD-10-CM

## 2023-01-09 DIAGNOSIS — J43.9 PULMONARY EMPHYSEMA, UNSPECIFIED EMPHYSEMA TYPE (HCC): ICD-10-CM

## 2023-01-09 DIAGNOSIS — M31.6 TEMPORAL ARTERITIS (HCC): ICD-10-CM

## 2023-01-09 DIAGNOSIS — R05.3 CHRONIC COUGH: Primary | ICD-10-CM

## 2023-01-09 RX ORDER — IPRATROPIUM BROMIDE 21 UG/1
2 SPRAY, METERED NASAL EVERY 12 HOURS
Qty: 30 ML | Refills: 6 | Status: SHIPPED | OUTPATIENT
Start: 2023-01-09

## 2023-01-09 NOTE — ASSESSMENT & PLAN NOTE
Resolved after switching to valsartan    No further intervention Full range of motion of upper and lower extremities, no joint tenderness/swelling.

## 2023-01-09 NOTE — ASSESSMENT & PLAN NOTE
I discussed with her that she is a candidate for lung cancer CT screening  The following Shared Decision-Making points were covered:  1  Benefits of screening were discussed, including the rates of reduction in death from lung cancer and other causes  Harms of screening were reviewed, including false positive tests, radiation exposure levels, risks of invasive procedures, risks of complications of screening, and risk of overdiagnosis  2  I counseled on the importance of adherence to annual lung cancer LDCT screening, impact of co-morbidities, and ability or willingness to undergo diagnosis and treatment  3  I counseled on the importance of maintaining abstinence as a former smoker or was counseled on the importance of smoking cessation if a current smoker    Review of Eligibility Criteria: She meets all of the criteria for Lung Cancer Screening  · She is 68 y o  · She has 20 pack year tobacco history and is a current smoker or has quit within the past 15 years  · She presents no signs or symptoms of lung cancer    After discussion, the patient decided to elect lung cancer screening      Will order low-dose CT scan

## 2023-01-09 NOTE — PROGRESS NOTES
Consultation - Pulmonary Medicine   Lyndell Lesches 68 y o  female MRN: [id number]    Physician Requesting Consult: Jennifer Dunn MD  Reason for Consult: cough    Cough secondary to angiotensin converting enzyme inhibitor (ACE-I)  Resolved after switching to valsartan  No further intervention    Chronic obstructive pulmonary disease (HCC)  Currently stable and managed by PCP, continue Trelegy and as needed    Temporal arteritis (HCC)  Continue prednisone 2 5 mg daily and follow-up with PCP    Postnasal drip  Will order ipratropium nasal spray to try    Encounter for screening for malignant neoplasm of lung in former smoker who quit in past 15 years with 30 pack year history or greater  I discussed with her that she is a candidate for lung cancer CT screening  The following Shared Decision-Making points were covered:  1  Benefits of screening were discussed, including the rates of reduction in death from lung cancer and other causes  Harms of screening were reviewed, including false positive tests, radiation exposure levels, risks of invasive procedures, risks of complications of screening, and risk of overdiagnosis  2  I counseled on the importance of adherence to annual lung cancer LDCT screening, impact of co-morbidities, and ability or willingness to undergo diagnosis and treatment  3  I counseled on the importance of maintaining abstinence as a former smoker or was counseled on the importance of smoking cessation if a current smoker    Review of Eligibility Criteria: She meets all of the criteria for Lung Cancer Screening  · She is 68 y o  · She has 20 pack year tobacco history and is a current smoker or has quit within the past 15 years  · She presents no signs or symptoms of lung cancer    After discussion, the patient decided to elect lung cancer screening      Will order low-dose CT scan    Raynaud's phenomenon without gangrene  No evidence of autoimmune disease    Gastroesophageal reflux disease without esophagitis  Controlled, continue pantoprazole      Diagnoses and all orders for this visit:    Chronic cough  -     Ambulatory Referral to Pulmonology    Subacute cough  -     Ambulatory Referral to Pulmonology    Raynaud's phenomenon without gangrene    Pulmonary emphysema, unspecified emphysema type ([facility] )    Gastroesophageal reflux disease without esophagitis    Postnasal drip  -     ipratropium (ATROVENT) 0 03 % nasal spray; 2 sprays into each nostril every 12 (twelve) hours    Temporal arteritis ([facility] )    Encounter for screening for malignant neoplasm of lung in former smoker who quit in past 15 years with 30 pack year history or greater  -     CT lung screening program; Future    Cough secondary to angiotensin converting enzyme inhibitor (ACE-I)      ______________________________________________________________________    HPI:    Juan Alexandre is a 68 y o  female who presents for evaluation of chronic cough     Patient has history of COPD currently managed with Trelegy and as needed albuterol  Denies exacerbations recently  She has chronic mild dyspnea on exertion and denies chest pain or fever chills or night sweats, denies wheezing  She fortunately quit smoking  She has hypertension and was started on lisinopril in the past that started to cause her dry cough after few months, she was scheduled for evaluation of this cough but Dr Krys Monroy switched her to valsartan and her cough has resolved  She presents today feeling fine and denies cough  She denies legs edema or orthopnea  Patient has postnasal drip and runny nose, did not respond to Flonase before  She has GERD that is controlled with pantoprazole  Patient is on chronic prednisone 2 5 mg initially she did not know why but on her problem list she has history of temporal arteritis  Patient has Raynaud's phenomenon but no autoimmune disease    She lives with her sister, no pets at home, 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  She is a former smoker who smoked 1 pack/day for more than 30 years and quit 10 years ago  Review of Systems:  Review of Systems   Constitutional: Negative  HENT: Negative  Eyes: Negative  Cardiovascular: Negative  Gastrointestinal: Negative  Endocrine: Negative  Genitourinary: Negative  Musculoskeletal: Negative  Skin: Negative  Allergic/Immunologic: Negative  Neurological: Negative  Hematological: Negative  Psychiatric/Behavioral: Negative  Aside from what is mentioned in the HPI, the review of systems otherwise negative  Current Medications:    Current Outpatient Medications:   •  acetaminophen (TYLENOL) 500 mg tablet, Take 1 tablet (500 mg total) by mouth every 6 (six) hours as needed (pain fevers), Disp: 30 tablet, Rfl: 0  •  albuterol (ProAir HFA) 90 mcg/act inhaler, Inhale 2 puffs every 6 (six) hours as needed for wheezing, Disp: 8 5 g, Rfl: 0  •  albuterol (PROVENTIL HFA,VENTOLIN HFA) 90 mcg/act inhaler, Inhale 2 puffs every 6 (six) hours as needed for wheezing or shortness of breath, Disp: 18 g, Rfl: 2  •  aspirin 81 MG tablet, take one tab   every other day with Dinner, Disp: , Rfl:   •  b complex-C-E-zinc (STRESS FORMULA/ZINC) tablet, Take 1 tablet by mouth daily, Disp: 90 tablet, Rfl: 3  •  Biotin 10 MG TABS, Take by mouth in the morning, Disp: , Rfl:   •  Calcium Carb-Cholecalciferol (Caltrate 600+D3) 600-800 MG-UNIT TABS, Take 1 tablet by mouth 2 (two) times a day, Disp: 60 tablet, Rfl: 6  •  fluticasone-umeclidinium-vilanterol (TRELEGY) 100-62 5-25 MCG/INH inhaler, Inhale 1 puff daily Rinse mouth after use , Disp: 3 blister, Rfl: 3  •  ipratropium-albuterol (DUO-NEB) 0 5-2 5 mg/3 mL nebulizer solution, Take 3 mL by nebulization 4 (four) times a day, Disp: 120 mL, Rfl: 4  •  levothyroxine 25 mcg tablet, TAKE ONE TABLET BY MOUTH ONCE DAILY, Disp: 30 tablet, Rfl: 2  •  pantoprazole (PROTONIX) 40 mg tablet, TAKE ONE TABLET BY MOUTH ONCE DAILY, Disp: 90 tablet, Rfl: 0  •  predniSONE 2 5 mg tablet, TAKE ONE TABLET BY MOUTH ONCE DAILY, Disp: 30 tablet, Rfl: 2  •  sodium chloride (OCEAN) 0 65 % nasal spray, 1 spray into each nostril 3 (three) times a day, Disp: 30 mL, Rfl: 2  •  valsartan (DIOVAN) 160 mg tablet, Take 1 tablet (160 mg total) by mouth daily Please STOP Lisinopril, , Disp: 90 tablet, Rfl: 3  •  vitamin B-12 (VITAMIN B-12) 1,000 mcg tablet, Take 1 tablet (1,000 mcg total) by mouth daily, Disp: 90 tablet, Rfl: 1  •  cetirizine (ZyrTEC) 10 mg tablet, Take 1 tablet (10 mg total) by mouth daily With food/in the evening (Patient not taking: Reported on 1/9/2023), Disp: 30 tablet, Rfl: 3  •  Dextromethorphan-guaiFENesin (Delsym Cgh/Chest Den DM Child) 5-100 MG/5ML LIQD, Take 10 mL by mouth 3 (three) times a day as needed (cough) (Patient not taking: Reported on 1/9/2023), Disp: 237 mL, Rfl: 0    Current Facility-Administered Medications:   •  cyanocobalamin injection 1,000 mcg, 1,000 mcg, Intramuscular, Q30 Days, Jossy Arnett MD, 1,000 mcg at 01/14/20 1508    Historical Information   Past Medical History:   Diagnosis Date   • Asthma    • Chronic obstructive pulmonary disease ([facility] ) 9/26/2012   • GERD (gastroesophageal reflux disease)    • Hypertension 10/11/2018   • Hypothyroid    • Osteoarthritis 9/26/2012   • Temporal arteritis ([facility] )    • Tubular adenoma    • Type 2 diabetes mellitus with complication, without long-term current use of insulin ([facility] ) 1/14/2020     Past Surgical History:   Procedure Laterality Date   • EYE SURGERY Right 12/06/2019    cataract LVCFS   • HYSTERECTOMY     • NO PAST SURGERIES      ALSO NO RELEVANT PAST SURRGICAL HX AS PER [facility]     Social History   Social History     Tobacco Use   Smoking Status Former   • Packs/day: 1 00   • Years: 35 00   • Pack years: 35 00   • Types: Cigarettes   • Quit date: 2013   • Years since quitting: 10 0   Smokeless Tobacco Never   Tobacco Comments    TOBACCO USE, NO PASSIVE SMOKE EXPOSURE Occupational history:  No occupational exposure    Family History:   Family History   Problem Relation Age of Onset   • Breast cancer Mother    • Emphysema Father          PhysicalExamination:  Vitals:   /80 (BP Location: Right arm, Patient Position: Sitting, Cuff Size: Adult)   Pulse (!) 107   Ht 5' 3" (1 6 m)   Wt 48 5 kg (107 lb)   SpO2 98%   BMI 18 95 kg/m²     General: alert, not in acute distress  HEENT: PERRL, no icteric sclera or cyanosis, no thrush  Neck: Supple, no lymphadenopathy or thyromegaly, no JVD  Lungs: Equal but slightly diminished breath sounds and clear auscultations bilaterally, no wheezing or crackles  Heart: S1S2 regular, no murmurs or gallops  Abdomen: soft, nontender, bowel sounds present  Extremities: no edema, no clubbing or cyanosis  Neuro: Alert and oriented x 3, no focal neurodeficits   Skin: intact, no rashes      Diagnostic Data:  Labs:   I personally reviewed the most recent laboratory data pertinent to today's visit    Lab Results   Component Value Date    WBC 9 78 11/10/2022    HGB 15 1 11/10/2022    HCT 47 7 (H) 11/10/2022    MCV 88 11/10/2022     11/10/2022     Lab Results   Component Value Date    CALCIUM 9 9 11/10/2022    K 4 1 11/10/2022    CO2 33 (H) 11/10/2022     11/10/2022    BUN 22 11/10/2022    CREATININE 0 64 11/10/2022     No results found for: IGE  Lab Results   Component Value Date    ALT 33 11/10/2022    AST 33 11/10/2022    ALKPHOS 62 11/10/2022         Imaging:  I personally reviewed the images on the [facility] system pertinent to today's visit  Chest x-ray from November reviewed on PACS: Clear lungs with possible emphysematous changes and kyphoscoliosis    Chest CT scan from 2019 reviewed on PACS: Mild emphysematous changes, no suspicious nodules or lesions        Kayla Gómez MD

## 2023-01-11 DIAGNOSIS — M31.6 TEMPORAL ARTERITIS (HCC): ICD-10-CM

## 2023-01-11 RX ORDER — PREDNISONE 2.5 MG
TABLET ORAL
Qty: 30 TABLET | Refills: 1 | Status: SHIPPED | OUTPATIENT
Start: 2023-01-11

## 2023-01-27 DIAGNOSIS — K21.9 GASTROESOPHAGEAL REFLUX DISEASE: ICD-10-CM

## 2023-01-27 RX ORDER — PANTOPRAZOLE SODIUM 40 MG/1
TABLET, DELAYED RELEASE ORAL
Qty: 90 TABLET | Refills: 0 | Status: SHIPPED | OUTPATIENT
Start: 2023-01-27

## 2023-01-30 ENCOUNTER — OFFICE VISIT (OUTPATIENT)
Dept: UROLOGY | Facility: MEDICAL CENTER | Age: 78
End: 2023-01-30

## 2023-01-30 ENCOUNTER — APPOINTMENT (OUTPATIENT)
Dept: LAB | Facility: HOSPITAL | Age: 78
End: 2023-01-30

## 2023-01-30 VITALS
OXYGEN SATURATION: 98 % | HEART RATE: 81 BPM | HEIGHT: 63 IN | WEIGHT: 105.8 LBS | DIASTOLIC BLOOD PRESSURE: 74 MMHG | SYSTOLIC BLOOD PRESSURE: 118 MMHG | BODY MASS INDEX: 18.75 KG/M2

## 2023-01-30 DIAGNOSIS — N28.1 RENAL CYST: ICD-10-CM

## 2023-01-30 DIAGNOSIS — E78.5 HYPERLIPIDEMIA ASSOCIATED WITH TYPE 2 DIABETES MELLITUS (HCC): ICD-10-CM

## 2023-01-30 DIAGNOSIS — R31.9 HEMATURIA, UNSPECIFIED TYPE: Primary | ICD-10-CM

## 2023-01-30 DIAGNOSIS — E11.69 HYPERLIPIDEMIA ASSOCIATED WITH TYPE 2 DIABETES MELLITUS (HCC): ICD-10-CM

## 2023-01-30 LAB
ANION GAP SERPL CALCULATED.3IONS-SCNC: 10 MMOL/L (ref 5–14)
BUN SERPL-MCNC: 22 MG/DL (ref 5–25)
CALCIUM SERPL-MCNC: 9.7 MG/DL (ref 8.4–10.2)
CHLORIDE SERPL-SCNC: 99 MMOL/L (ref 96–108)
CO2 SERPL-SCNC: 30 MMOL/L (ref 21–32)
CREAT SERPL-MCNC: 0.65 MG/DL (ref 0.6–1.2)
GFR SERPL CREATININE-BSD FRML MDRD: 85 ML/MIN/1.73SQ M
GLUCOSE SERPL-MCNC: 133 MG/DL (ref 70–99)
POTASSIUM SERPL-SCNC: 4.4 MMOL/L (ref 3.5–5.3)
SL AMB  POCT GLUCOSE, UA: NORMAL
SL AMB LEUKOCYTE ESTERASE,UA: NORMAL
SL AMB POCT BILIRUBIN,UA: NORMAL
SL AMB POCT BLOOD,UA: NORMAL
SL AMB POCT CLARITY,UA: CLEAR
SL AMB POCT COLOR,UA: YELLOW
SL AMB POCT KETONES,UA: NORMAL
SL AMB POCT NITRITE,UA: NORMAL
SL AMB POCT PH,UA: 6.5
SL AMB POCT SPECIFIC GRAVITY,UA: 1.01
SL AMB POCT URINE PROTEIN: NORMAL
SL AMB POCT UROBILINOGEN: 0.2
SODIUM SERPL-SCNC: 139 MMOL/L (ref 135–147)

## 2023-01-30 NOTE — PROGRESS NOTES
1/30/2023      Chief Complaint   Patient presents with   • Kidney Cyst   • Microhematuria         Assessment and Plan    68 y o  female new patient     1  Renal cysts   · Urine today: negative   · Simple left renal cyst on CT w contrast in 2018  · Thin septation on US from Dec 2022  · CT renal protocol ordered for further evaluation of thin septation and newly appreciated right sided cyst with peripheral echogenicity  · BMP prior  · Will call with results  History of Present Illness  Agusto Dyson is a 68 y o  female here for evaluation of renal cysts  Patient with known renal cysts  Microscopic hematuria noted in consult but no evidence of true microscopic hematuria with greater than 3 red blood cells on urine testing since 2020  Patient notes these tests were during issues with infections  CT abdomen pelvis with contrast revealed left renal cysts, largest measuring 8 1 cm in the left lower pole  Ultrasound kidneys and bladder from 5- revealed 9 appearing cysts measuring up to 1 cm in the right kidney with the additional upper pole cyst with peripheral echogenicity consistent with milk of calcium or possible adjacent stone  Left kidney with multiple simple cysts, with the 8 9 cm cyst with a thin partial septation  Denies any episodes of gross hematuria  She denies any flank pain  She offers no urinary symptoms  She denies any fevers or chills  He is agreeable to plan above  Review of Systems   Constitutional: Negative for chills and fever  HENT: Negative  Respiratory: Negative  Cardiovascular: Negative  Gastrointestinal: Negative for abdominal pain, nausea and vomiting  Genitourinary: Negative for difficulty urinating, dysuria, flank pain, frequency, hematuria and urgency  Musculoskeletal: Negative  Skin: Negative  Neurological: Negative        Vitals  Vitals:    01/30/23 1319   BP: 118/74   BP Location: Left arm   Patient Position: Sitting   Cuff Size: Adult Pulse: 81   SpO2: 98%   Weight: 48 kg (105 lb 12 8 oz)   Height: 5' 3" (1 6 m)       Physical Exam  Vitals reviewed  Constitutional:       General: She is not in acute distress  Appearance: Normal appearance  She is normal weight  She is not ill-appearing, toxic-appearing or diaphoretic  HENT:      Head: Normocephalic and atraumatic  Eyes:      Conjunctiva/sclera: Conjunctivae normal    Pulmonary:      Effort: Pulmonary effort is normal  No respiratory distress  Abdominal:      General: There is no distension  Musculoskeletal:         General: Normal range of motion  Cervical back: Normal range of motion  Skin:     General: Skin is warm and dry  Neurological:      General: No focal deficit present  Mental Status: She is alert and oriented to person, place, and time  Psychiatric:         Mood and Affect: Mood normal          Behavior: Behavior normal          Thought Content:  Thought content normal          Judgment: Judgment normal        Past History  Past Medical History:   Diagnosis Date   • Asthma    • Chronic obstructive pulmonary disease ([facility] ) 9/26/2012   • GERD (gastroesophageal reflux disease)    • Hypertension 10/11/2018   • Hypothyroid    • Osteoarthritis 9/26/2012   • Temporal arteritis (HCC)    • Tubular adenoma    • Type 2 diabetes mellitus with complication, without long-term current use of insulin ([facility] ) 1/14/2020     Social History     Socioeconomic History   • Marital status: Legally      Spouse name: None   • Number of children: None   • Years of education: None   • Highest education level: None   Occupational History   • Occupation: retired   Tobacco Use   • Smoking status: Former     Packs/day: 1 00     Years: 35 00     Pack years: 35 00     Types: Cigarettes     Quit date: 2013     Years since quitting: 10 0   • Smokeless tobacco: Never   • Tobacco comments:     TOBACCO USE, NO PASSIVE SMOKE EXPOSURE   Vaping Use   • Vaping Use: Never used   Substance and Sexual Activity   • Alcohol use: No   • Drug use: No   • Sexual activity: Not Currently   Other Topics Concern   • None   Social History Narrative   • None     Social Determinants of Health     Financial Resource Strain: Not on file   Food Insecurity: Not on file   Transportation Needs: Not on file   Physical Activity: Not on file   Stress: Not on file   Social Connections: Not on file   Intimate Partner Violence: Not on file   Housing Stability: Not on file     Social History     Tobacco Use   Smoking Status Former   • Packs/day: 1 00   • Years: 35 00   • Pack years: 35 00   • Types: Cigarettes   • Quit date: 2013   • Years since quitting: 10 0   Smokeless Tobacco Never   Tobacco Comments    TOBACCO USE, NO PASSIVE SMOKE EXPOSURE     Family History   Problem Relation Age of Onset   • Breast cancer Mother    • Emphysema Father        The following portions of the patient's history were reviewed and updated as appropriate: allergies, current medications, past medical history, past social history, past surgical history and problem list     Results  No results found for this or any previous visit (from the past 1 hour(s))  ]  No results found for: PSA  Lab Results   Component Value Date    CALCIUM 9 9 11/10/2022    K 4 1 11/10/2022    CO2 33 (H) 11/10/2022     11/10/2022    BUN 22 11/10/2022    CREATININE 0 64 11/10/2022     Lab Results   Component Value Date    WBC 9 78 11/10/2022    HGB 15 1 11/10/2022    HCT 47 7 (H) 11/10/2022    MCV 88 11/10/2022     11/10/2022     Nataliia Vázquez PA-C

## 2023-01-30 NOTE — PATIENT INSTRUCTIONS
Left kidney cyst with "thin septation" - want to take closer look with a CT with contrast  Gives us a clearer, more descriptive picture than an US  Currently looks benign on US  CT is to make sure we aren't missing anything  Will need BMP (blood test to evaluate kidneys) prior to the CT to make sure kidney function is okay to receive contrast  If it looks like kidneys are working too hard to get a CT, we will have to order an MRI instead  The CT dye is harder on the kidneys than MRI dye  Kidney function looked great in November but just want to make sure

## 2023-01-31 ENCOUNTER — TELEPHONE (OUTPATIENT)
Dept: UROLOGY | Facility: MEDICAL CENTER | Age: 78
End: 2023-01-31

## 2023-01-31 NOTE — TELEPHONE ENCOUNTER
Call placed to Pt to give her results and recommendations from [facility] is not set up and I couldn't leave message

## 2023-01-31 NOTE — TELEPHONE ENCOUNTER
----- Message from Jessica Lambert PA-C sent at 1/30/2023  4:23 PM EST -----  Pls call patient and let her know her renal fn is ok for CT contrast  Keep CT as scheduled       Thanks

## 2023-02-06 ENCOUNTER — HOSPITAL ENCOUNTER (OUTPATIENT)
Dept: CT IMAGING | Facility: HOSPITAL | Age: 78
Discharge: HOME | End: 2023-02-06
Attending: INTERNAL MEDICINE

## 2023-02-06 DIAGNOSIS — Z87.891 ENCOUNTER FOR SCREENING FOR MALIGNANT NEOPLASM OF LUNG IN FORMER SMOKER WHO QUIT IN PAST 15 YEARS WITH 30 PACK YEAR HISTORY OR GREATER: ICD-10-CM

## 2023-02-06 DIAGNOSIS — N28.1 RENAL CYST: ICD-10-CM

## 2023-02-06 DIAGNOSIS — Z12.2 ENCOUNTER FOR SCREENING FOR MALIGNANT NEOPLASM OF LUNG IN FORMER SMOKER WHO QUIT IN PAST 15 YEARS WITH 30 PACK YEAR HISTORY OR GREATER: ICD-10-CM

## 2023-02-06 RX ADMIN — IOHEXOL 85 ML: 350 INJECTION, SOLUTION INTRAVENOUS at 14:23

## 2023-02-13 ENCOUNTER — TELEPHONE (OUTPATIENT)
Dept: FAMILY MEDICINE CLINIC | Facility: CLINIC | Age: 78
End: 2023-02-13

## 2023-02-13 ENCOUNTER — TELEPHONE (OUTPATIENT)
Dept: PULMONOLOGY | Facility: CLINIC | Age: 78
End: 2023-02-13

## 2023-02-13 DIAGNOSIS — R91.1 LUNG NODULE: Primary | ICD-10-CM

## 2023-02-16 ENCOUNTER — TELEPHONE (OUTPATIENT)
Dept: FAMILY MEDICINE CLINIC | Facility: CLINIC | Age: 78
End: 2023-02-16

## 2023-02-16 NOTE — TELEPHONE ENCOUNTER
Patient has been calling both specialists for results of her CT renal and CT lung  The office won't give her the results  Can you read results for patient? Please advise

## 2023-02-16 NOTE — TELEPHONE ENCOUNTER
Called spoke with patient there is a new left lower lobe nodule of 5 mm that was not there in 2018    -Patient agreeable to repeating chest CT in 6 months-has been placed

## 2023-02-20 DIAGNOSIS — N28.1 RENAL CYST: Primary | ICD-10-CM

## 2023-02-27 ENCOUNTER — OFFICE VISIT (OUTPATIENT)
Dept: FAMILY MEDICINE CLINIC | Facility: CLINIC | Age: 78
End: 2023-02-27

## 2023-02-27 VITALS
HEIGHT: 63 IN | DIASTOLIC BLOOD PRESSURE: 62 MMHG | SYSTOLIC BLOOD PRESSURE: 126 MMHG | WEIGHT: 108.8 LBS | HEART RATE: 93 BPM | OXYGEN SATURATION: 96 % | TEMPERATURE: 98.6 F | BODY MASS INDEX: 19.28 KG/M2 | RESPIRATION RATE: 14 BRPM

## 2023-02-27 DIAGNOSIS — N28.1 KIDNEY CYSTS: ICD-10-CM

## 2023-02-27 DIAGNOSIS — R73.09 ELEVATED HEMOGLOBIN A1C: ICD-10-CM

## 2023-02-27 DIAGNOSIS — R91.1 LUNG NODULE: ICD-10-CM

## 2023-02-27 DIAGNOSIS — J45.901 ACUTE EXACERBATION OF COPD WITH ASTHMA (HCC): ICD-10-CM

## 2023-02-27 DIAGNOSIS — E78.5 HYPERLIPIDEMIA ASSOCIATED WITH TYPE 2 DIABETES MELLITUS (HCC): ICD-10-CM

## 2023-02-27 DIAGNOSIS — G40.109 TEMPORAL LOBE EPILEPSY WITH MESIAL TEMPORAL SCLEROSIS (HCC): ICD-10-CM

## 2023-02-27 DIAGNOSIS — J44.1 ACUTE EXACERBATION OF COPD WITH ASTHMA (HCC): ICD-10-CM

## 2023-02-27 DIAGNOSIS — E04.9 ENLARGEMENT OF THYROID: ICD-10-CM

## 2023-02-27 DIAGNOSIS — E06.3 HYPOTHYROIDISM DUE TO HASHIMOTO'S THYROIDITIS: ICD-10-CM

## 2023-02-27 DIAGNOSIS — R05.3 CHRONIC COUGH: ICD-10-CM

## 2023-02-27 DIAGNOSIS — J30.9 ALLERGIC RHINITIS, UNSPECIFIED SEASONALITY, UNSPECIFIED TRIGGER: ICD-10-CM

## 2023-02-27 DIAGNOSIS — I73.9 PVD (PERIPHERAL VASCULAR DISEASE) (HCC): ICD-10-CM

## 2023-02-27 DIAGNOSIS — E03.8 HYPOTHYROIDISM DUE TO HASHIMOTO'S THYROIDITIS: ICD-10-CM

## 2023-02-27 DIAGNOSIS — G93.81 TEMPORAL LOBE EPILEPSY WITH MESIAL TEMPORAL SCLEROSIS (HCC): ICD-10-CM

## 2023-02-27 DIAGNOSIS — M79.602 LEFT ARM PAIN: Primary | ICD-10-CM

## 2023-02-27 DIAGNOSIS — M31.6 GIANT CELL ARTERITIS (HCC): ICD-10-CM

## 2023-02-27 DIAGNOSIS — E11.69 HYPERLIPIDEMIA ASSOCIATED WITH TYPE 2 DIABETES MELLITUS (HCC): ICD-10-CM

## 2023-02-27 LAB — SL AMB POCT HEMOGLOBIN AIC: 5.8 (ref ?–6.5)

## 2023-02-27 RX ORDER — VALSARTAN 160 MG/1
160 TABLET ORAL DAILY
Qty: 90 TABLET | Refills: 3 | Status: SHIPPED | OUTPATIENT
Start: 2023-02-27

## 2023-02-27 RX ORDER — LEVOTHYROXINE SODIUM 0.03 MG/1
25 TABLET ORAL DAILY
Qty: 30 TABLET | Refills: 2 | Status: SHIPPED | OUTPATIENT
Start: 2023-02-27

## 2023-02-27 RX ORDER — AZELASTINE 1 MG/ML
1 SPRAY, METERED NASAL 2 TIMES DAILY
Qty: 30 ML | Refills: 3 | Status: SHIPPED | OUTPATIENT
Start: 2023-02-27

## 2023-02-27 RX ORDER — CETIRIZINE HYDROCHLORIDE 10 MG/1
10 TABLET ORAL DAILY
Qty: 30 TABLET | Refills: 3 | Status: SHIPPED | OUTPATIENT
Start: 2023-02-27

## 2023-02-27 NOTE — PROGRESS NOTES
Name: Agusto Dyson      :       MRN: [id number]  Encounter Provider: Erika Witt MD  Encounter Date: 2023   Encounter department: [facility] [address] [place] [address]     1  Left arm pain  -     XR humerus left; Future; Expected date: 2023    2  Hyperlipidemia associated with type 2 diabetes mellitus ([place] [address] )  -     Ambulatory Referral to Ophthalmology; Future  -     POCT hemoglobin A1c  -     Lipid panel; Future    3  Lung nodule    4  Kidney cysts    5  Elevated hemoglobin A1c  -     UA (URINE) with reflex to Scope; Future; Expected date: 2023  -     Comprehensive metabolic panel; Future  -     CBC and differential; Future  -     Magnesium; Future    6  Acute exacerbation of COPD with asthma ([place] [address] )  -     valsartan (DIOVAN) 160 mg tablet; Take 1 tablet (160 mg total) by mouth daily Please STOP Lisinopril,     7  Chronic cough  -     valsartan (DIOVAN) 160 mg tablet; Take 1 tablet (160 mg total) by mouth daily Please STOP Lisinopril,     8  Enlargement of thyroid  -     levothyroxine 25 mcg tablet; Take 1 tablet (25 mcg total) by mouth daily    9  Allergic rhinitis, unspecified seasonality, unspecified trigger  -     cetirizine (ZyrTEC) 10 mg tablet; Take 1 tablet (10 mg total) by mouth daily With food/in the evening  -     azelastine (ASTELIN) 0 1 % nasal spray; 1 spray into each nostril 2 (two) times a day Use in each nostril as directed    10  PVD (peripheral vascular disease) ([place] [address] )    11  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 ([place] [address] )    12  Giant cell arteritis (HCC)  -     Sedimentation rate, automated; Future  -     C-reactive protein; Future    13  Hypothyroidism due to Hashimoto's thyroiditis  -     TSH, 3rd generation; Future; Expected date: 2023  -     T4, free;  Future; Expected date: 2023    Life style Mod  Continue and Renew Meds  Repeat Ct Lungs in 6 mos  Repeat US Kidneys in 1 year  RTC in 3 mos w Blood work Subjective      [address] is here for Regular check Up, she feels better, recent Blood work and med list reviewed w  Pt in detail    Review of Systems   Constitutional: Negative for chills, fatigue and fever  HENT: Negative for congestion, facial swelling, sore throat, trouble swallowing and voice change  Eyes: Negative for pain, discharge and visual disturbance  Respiratory: Negative for cough, shortness of breath and wheezing  Cardiovascular: Negative for chest pain, palpitations and leg swelling  Gastrointestinal: Negative for abdominal pain, blood in stool, constipation, diarrhea and nausea  Endocrine: Negative for polydipsia, polyphagia and polyuria  Genitourinary: Negative for difficulty urinating, hematuria and urgency  Musculoskeletal: Negative for arthralgias and myalgias  Skin: Negative for rash  Neurological: Negative for dizziness, tremors, weakness and headaches  Hematological: Negative for adenopathy  Does not bruise/bleed easily  Psychiatric/Behavioral: Negative for dysphoric mood, sleep disturbance and suicidal ideas  Current Outpatient Medications on File Prior to Visit   Medication Sig   • albuterol (ProAir HFA) 90 mcg/act inhaler Inhale 2 puffs every 6 (six) hours as needed for wheezing   • albuterol (PROVENTIL HFA,VENTOLIN HFA) 90 mcg/act inhaler Inhale 2 puffs every 6 (six) hours as needed for wheezing or shortness of breath   • aspirin 81 MG tablet take one tab  every other day with Dinner   • b complex-C-E-zinc (STRESS FORMULA/ZINC) tablet Take 1 tablet by mouth daily   • Biotin 10 MG TABS Take by mouth in the morning   • fluticasone-umeclidinium-vilanterol (TRELEGY) 100-62 5-25 MCG/INH inhaler Inhale 1 puff daily Rinse mouth after use     • ipratropium (ATROVENT) 0 03 % nasal spray 2 sprays into each nostril every 12 (twelve) hours   • pantoprazole (PROTONIX) 40 mg tablet TAKE ONE TABLET BY MOUTH ONCE DAILY   • predniSONE 2 5 mg tablet TAKE ONE TABLET BY MOUTH ONCE DAILY   • vitamin B-12 (VITAMIN B-12) 1,000 mcg tablet Take 1 tablet (1,000 mcg total) by mouth daily   • [DISCONTINUED] levothyroxine 25 mcg tablet TAKE ONE TABLET BY MOUTH ONCE DAILY   • [DISCONTINUED] valsartan (DIOVAN) 160 mg tablet Take 1 tablet (160 mg total) by mouth daily Please STOP Lisinopril,      • acetaminophen (TYLENOL) 500 mg tablet Take 1 tablet (500 mg total) by mouth every 6 (six) hours as needed (pain fevers) (Patient not taking: Reported on 1/30/2023)   • Calcium Carb-Cholecalciferol (Caltrate 600+D3) 600-800 MG-UNIT TABS Take 1 tablet by mouth 2 (two) times a day   • ipratropium-albuterol (DUO-NEB) 0 5-2 5 mg/3 mL nebulizer solution Take 3 mL by nebulization 4 (four) times a day (Patient not taking: Reported on 1/30/2023)   • sodium chloride (OCEAN) 0 65 % nasal spray 1 spray into each nostril 3 (three) times a day (Patient not taking: Reported on 1/30/2023)   • [DISCONTINUED] cetirizine (ZyrTEC) 10 mg tablet Take 1 tablet (10 mg total) by mouth daily With food/in the evening (Patient not taking: Reported on 1/9/2023)   • [DISCONTINUED] Dextromethorphan-guaiFENesin (Delsym Cgh/Chest Den DM Child) 5-100 MG/5ML LIQD Take 10 mL by mouth 3 (three) times a day as needed (cough) (Patient not taking: Reported on 1/9/2023)       Objective     /62 (BP Location: Left arm, Patient Position: Standing, Cuff Size: Standard)   Pulse 93   Temp 98 6 °F (37 °C) (Tympanic)   Resp 14   Ht 5' 3" (1 6 m)   Wt 49 4 kg (108 lb 12 8 oz)   SpO2 96%   BMI 19 27 kg/m²     Physical Exam  Constitutional:       General: She is not in acute distress  HENT:      Head: Normocephalic  Mouth/Throat:      Pharynx: No oropharyngeal exudate  Eyes:      General: No scleral icterus  Conjunctiva/sclera: Conjunctivae normal       Pupils: Pupils are equal, round, and reactive to light  Neck:      Thyroid: No thyromegaly  Cardiovascular:      Rate and Rhythm: Normal rate and regular rhythm  Heart sounds: Normal heart sounds  No murmur heard  Pulmonary:      Effort: Pulmonary effort is normal  No respiratory distress  Breath sounds: Normal breath sounds  No wheezing or rales  Abdominal:      General: Bowel sounds are normal  There is no distension  Palpations: Abdomen is soft  Tenderness: There is no abdominal tenderness  There is no guarding or rebound  Musculoskeletal:         General: Tenderness present  Cervical back: Neck supple  Lymphadenopathy:      Cervical: No cervical adenopathy  Skin:     Coloration: Skin is not pale  Findings: No rash  Neurological:      Mental Status: She is alert and oriented to person, place, and time  Sensory: No sensory deficit  Motor: No weakness         Ann Reilly MD

## 2023-02-27 NOTE — PROGRESS NOTES
Diabetic Foot Exam    Patient's shoes and socks removed  Right Foot/Ankle   Right Foot Inspection  Skin Exam: skin normal and skin intact  No dry skin, no warmth, no callus, no erythema, no maceration, no abnormal color, no pre-ulcer, no ulcer and no callus  Toe Exam: right toe deformity  Sensory   Monofilament testing: intact    Vascular  Capillary refills: < 3 seconds          Left Foot/Ankle  Left Foot Inspection  Skin Exam: skin normal and skin intact  No dry skin, no warmth, no erythema, no maceration, normal color, no pre-ulcer, no ulcer and no callus  Toe Exam: left toe deformity       Sensory   Monofilament testing: intact    Vascular  Capillary refills: < 3 seconds        Assign Risk Category  Deformity present  No loss of protective sensation  No weak pulses  Risk: 0

## 2023-03-08 ENCOUNTER — HOSPITAL ENCOUNTER (OUTPATIENT)
Dept: RADIOLOGY | Facility: HOSPITAL | Age: 78
Discharge: HOME | End: 2023-03-08

## 2023-03-08 ENCOUNTER — APPOINTMENT (OUTPATIENT)
Dept: LAB | Facility: HOSPITAL | Age: 78
End: 2023-03-08

## 2023-03-08 DIAGNOSIS — M31.6 GIANT CELL ARTERITIS (HCC): ICD-10-CM

## 2023-03-08 DIAGNOSIS — E03.8 HYPOTHYROIDISM DUE TO HASHIMOTO'S THYROIDITIS: ICD-10-CM

## 2023-03-08 DIAGNOSIS — M79.602 LEFT ARM PAIN: ICD-10-CM

## 2023-03-08 DIAGNOSIS — E06.3 HYPOTHYROIDISM DUE TO HASHIMOTO'S THYROIDITIS: ICD-10-CM

## 2023-03-08 DIAGNOSIS — R73.09 ELEVATED HEMOGLOBIN A1C: ICD-10-CM

## 2023-03-08 DIAGNOSIS — E78.5 HYPERLIPIDEMIA ASSOCIATED WITH TYPE 2 DIABETES MELLITUS (HCC): ICD-10-CM

## 2023-03-08 DIAGNOSIS — E11.69 HYPERLIPIDEMIA ASSOCIATED WITH TYPE 2 DIABETES MELLITUS (HCC): ICD-10-CM

## 2023-03-08 LAB
ALBUMIN SERPL BCP-MCNC: 4.8 G/DL (ref 3.5–5)
ALP SERPL-CCNC: 64 U/L (ref 43–122)
ALT SERPL W P-5'-P-CCNC: 22 U/L
ANION GAP SERPL CALCULATED.3IONS-SCNC: 10 MMOL/L (ref 5–14)
AST SERPL W P-5'-P-CCNC: 33 U/L (ref 14–36)
BASOPHILS # BLD AUTO: 0.04 THOUSANDS/ÂΜL (ref 0–0.1)
BASOPHILS NFR BLD AUTO: 1 % (ref 0–1)
BILIRUB SERPL-MCNC: 0.63 MG/DL (ref 0.2–1)
BUN SERPL-MCNC: 23 MG/DL (ref 5–25)
CALCIUM SERPL-MCNC: 9.7 MG/DL (ref 8.4–10.2)
CHLORIDE SERPL-SCNC: 98 MMOL/L (ref 96–108)
CHOLEST SERPL-MCNC: 158 MG/DL
CO2 SERPL-SCNC: 31 MMOL/L (ref 21–32)
CREAT SERPL-MCNC: 0.71 MG/DL (ref 0.6–1.2)
CRP SERPL QL: <5 MG/L
EOSINOPHIL # BLD AUTO: 0.19 THOUSAND/ÂΜL (ref 0–0.61)
EOSINOPHIL NFR BLD AUTO: 2 % (ref 0–6)
ERYTHROCYTE [DISTWIDTH] IN BLOOD BY AUTOMATED COUNT: 13.3 % (ref 11.6–15.1)
ERYTHROCYTE [SEDIMENTATION RATE] IN BLOOD: 28 MM/HOUR (ref 0–29)
GFR SERPL CREATININE-BSD FRML MDRD: 82 ML/MIN/1.73SQ M
GLUCOSE P FAST SERPL-MCNC: 103 MG/DL (ref 70–99)
HCT VFR BLD AUTO: 46.8 % (ref 34.8–46.1)
HDLC SERPL-MCNC: 67 MG/DL
HGB BLD-MCNC: 14.5 G/DL (ref 11.5–15.4)
IMM GRANULOCYTES # BLD AUTO: 0.02 THOUSAND/UL (ref 0–0.2)
IMM GRANULOCYTES NFR BLD AUTO: 0 % (ref 0–2)
LDLC SERPL CALC-MCNC: 79 MG/DL
LYMPHOCYTES # BLD AUTO: 1.81 THOUSANDS/ÂΜL (ref 0.6–4.47)
LYMPHOCYTES NFR BLD AUTO: 21 % (ref 14–44)
MAGNESIUM SERPL-MCNC: 2.3 MG/DL (ref 1.6–2.3)
MCH RBC QN AUTO: 28 PG (ref 26.8–34.3)
MCHC RBC AUTO-ENTMCNC: 31 G/DL (ref 31.4–37.4)
MCV RBC AUTO: 91 FL (ref 82–98)
MONOCYTES # BLD AUTO: 0.85 THOUSAND/ÂΜL (ref 0.17–1.22)
MONOCYTES NFR BLD AUTO: 10 % (ref 4–12)
NEUTROPHILS # BLD AUTO: 5.76 THOUSANDS/ÂΜL (ref 1.85–7.62)
NEUTS SEG NFR BLD AUTO: 66 % (ref 43–75)
NONHDLC SERPL-MCNC: 91 MG/DL
NRBC BLD AUTO-RTO: 0 /100 WBCS
PLATELET # BLD AUTO: 258 THOUSANDS/UL (ref 149–390)
PMV BLD AUTO: 9.8 FL (ref 8.9–12.7)
POTASSIUM SERPL-SCNC: 4.1 MMOL/L (ref 3.5–5.3)
PROT SERPL-MCNC: 8.4 G/DL (ref 6.4–8.4)
RBC # BLD AUTO: 5.17 MILLION/UL (ref 3.81–5.12)
SODIUM SERPL-SCNC: 139 MMOL/L (ref 135–147)
T4 FREE SERPL-MCNC: 1.34 NG/DL (ref 0.76–1.46)
TRIGL SERPL-MCNC: 60 MG/DL
TSH SERPL DL<=0.05 MIU/L-ACNC: 1.09 UIU/ML (ref 0.45–4.5)
WBC # BLD AUTO: 8.67 THOUSAND/UL (ref 4.31–10.16)

## 2023-03-10 PROBLEM — R05.8 COUGH SECONDARY TO ANGIOTENSIN CONVERTING ENZYME INHIBITOR (ACE-I): Status: RESOLVED | Noted: 2023-01-09 | Resolved: 2023-03-10

## 2023-03-10 PROBLEM — T46.4X5A COUGH SECONDARY TO ANGIOTENSIN CONVERTING ENZYME INHIBITOR (ACE-I): Status: RESOLVED | Noted: 2023-01-09 | Resolved: 2023-03-10

## 2023-03-14 DIAGNOSIS — M31.6 TEMPORAL ARTERITIS (HCC): ICD-10-CM

## 2023-03-14 RX ORDER — PREDNISONE 2.5 MG
TABLET ORAL
Qty: 30 TABLET | Refills: 0 | Status: SHIPPED | OUTPATIENT
Start: 2023-03-14

## 2023-04-03 ENCOUNTER — TELEPHONE (OUTPATIENT)
Dept: FAMILY MEDICINE CLINIC | Facility: CLINIC | Age: 78
End: 2023-04-03

## 2023-04-03 NOTE — TELEPHONE ENCOUNTER
PC from pt asking about her bill from 6/13/2023  Spoke to patient and told her I will reach out to billing and see what the issue is and I will get back to her

## 2023-05-02 ENCOUNTER — HOSPITAL ENCOUNTER (EMERGENCY)
Facility: HOSPITAL | Age: 78
Discharge: HOME | End: 2023-05-02
Attending: EMERGENCY MEDICINE

## 2023-05-02 ENCOUNTER — APPOINTMENT (EMERGENCY)
Dept: RADIOLOGY | Facility: HOSPITAL | Age: 78
End: 2023-05-02

## 2023-05-02 VITALS
WEIGHT: 123.8 LBS | DIASTOLIC BLOOD PRESSURE: 66 MMHG | OXYGEN SATURATION: 96 % | SYSTOLIC BLOOD PRESSURE: 156 MMHG | TEMPERATURE: 98.3 F | BODY MASS INDEX: 21.93 KG/M2 | HEART RATE: 73 BPM | RESPIRATION RATE: 18 BRPM

## 2023-05-02 DIAGNOSIS — R42 EPISODIC LIGHTHEADEDNESS: Primary | ICD-10-CM

## 2023-05-02 DIAGNOSIS — N39.0 UTI (URINARY TRACT INFECTION): ICD-10-CM

## 2023-05-02 LAB
2HR DELTA HS TROPONIN: 0 NG/L
ALBUMIN SERPL BCP-MCNC: 4.3 G/DL (ref 3.5–5)
ALP SERPL-CCNC: 45 U/L (ref 34–104)
ALT SERPL W P-5'-P-CCNC: 16 U/L (ref 7–52)
ANION GAP SERPL CALCULATED.3IONS-SCNC: 11 MMOL/L (ref 4–13)
AST SERPL W P-5'-P-CCNC: 22 U/L (ref 13–39)
ATRIAL RATE: 71 BPM
BACTERIA UR QL AUTO: ABNORMAL /HPF
BASOPHILS # BLD AUTO: 0.04 THOUSANDS/ÂΜL (ref 0–0.1)
BASOPHILS NFR BLD AUTO: 0 % (ref 0–1)
BILIRUB SERPL-MCNC: 0.5 MG/DL (ref 0.2–1)
BILIRUB UR QL STRIP: NEGATIVE
BUN SERPL-MCNC: 22 MG/DL (ref 5–25)
CALCIUM SERPL-MCNC: 9.9 MG/DL (ref 8.4–10.2)
CARDIAC TROPONIN I PNL SERPL HS: 5 NG/L
CARDIAC TROPONIN I PNL SERPL HS: 5 NG/L
CHLORIDE SERPL-SCNC: 98 MMOL/L (ref 96–108)
CLARITY UR: ABNORMAL
CO2 SERPL-SCNC: 29 MMOL/L (ref 21–32)
COLOR UR: YELLOW
CREAT SERPL-MCNC: 0.73 MG/DL (ref 0.6–1.3)
EOSINOPHIL # BLD AUTO: 0.13 THOUSAND/ÂΜL (ref 0–0.61)
EOSINOPHIL NFR BLD AUTO: 1 % (ref 0–6)
ERYTHROCYTE [DISTWIDTH] IN BLOOD BY AUTOMATED COUNT: 12.9 % (ref 11.6–15.1)
GFR SERPL CREATININE-BSD FRML MDRD: 79 ML/MIN/1.73SQ M
GLUCOSE SERPL-MCNC: 100 MG/DL (ref 65–140)
GLUCOSE SERPL-MCNC: 155 MG/DL (ref 65–140)
GLUCOSE UR STRIP-MCNC: NEGATIVE MG/DL
HCT VFR BLD AUTO: 42.3 % (ref 34.8–46.1)
HGB BLD-MCNC: 13.7 G/DL (ref 11.5–15.4)
HGB UR QL STRIP.AUTO: 10
IMM GRANULOCYTES # BLD AUTO: 0.03 THOUSAND/UL (ref 0–0.2)
IMM GRANULOCYTES NFR BLD AUTO: 0 % (ref 0–2)
KETONES UR STRIP-MCNC: NEGATIVE MG/DL
LEUKOCYTE ESTERASE UR QL STRIP: 100
LYMPHOCYTES # BLD AUTO: 0.89 THOUSANDS/ÂΜL (ref 0.6–4.47)
LYMPHOCYTES NFR BLD AUTO: 10 % (ref 14–44)
MAGNESIUM SERPL-MCNC: 2 MG/DL (ref 1.9–2.7)
MCH RBC QN AUTO: 28.7 PG (ref 26.8–34.3)
MCHC RBC AUTO-ENTMCNC: 32.4 G/DL (ref 31.4–37.4)
MCV RBC AUTO: 89 FL (ref 82–98)
MONOCYTES # BLD AUTO: 0.57 THOUSAND/ÂΜL (ref 0.17–1.22)
MONOCYTES NFR BLD AUTO: 6 % (ref 4–12)
NEUTROPHILS # BLD AUTO: 7.37 THOUSANDS/ÂΜL (ref 1.85–7.62)
NEUTS SEG NFR BLD AUTO: 83 % (ref 43–75)
NITRITE UR QL STRIP: NEGATIVE
NON-SQ EPI CELLS URNS QL MICRO: ABNORMAL /HPF
NRBC BLD AUTO-RTO: 0 /100 WBCS
P AXIS: 56 DEGREES
PH UR STRIP.AUTO: 7 [PH]
PLATELET # BLD AUTO: 219 THOUSANDS/UL (ref 149–390)
PMV BLD AUTO: 9.5 FL (ref 8.9–12.7)
POTASSIUM SERPL-SCNC: 4.1 MMOL/L (ref 3.5–5.3)
PR INTERVAL: 152 MS
PROT SERPL-MCNC: 7.3 G/DL (ref 6.4–8.4)
PROT UR STRIP-MCNC: NEGATIVE MG/DL
QRS AXIS: 52 DEGREES
QRSD INTERVAL: 74 MS
QT INTERVAL: 400 MS
QTC INTERVAL: 434 MS
RBC # BLD AUTO: 4.78 MILLION/UL (ref 3.81–5.12)
RBC #/AREA URNS AUTO: ABNORMAL /HPF
SODIUM SERPL-SCNC: 138 MMOL/L (ref 135–147)
SP GR UR STRIP.AUTO: 1.01 (ref 1–1.04)
T WAVE AXIS: 58 DEGREES
T4 FREE SERPL-MCNC: 1.24 NG/DL (ref 0.76–1.46)
TSH SERPL DL<=0.05 MIU/L-ACNC: 5.7 UIU/ML (ref 0.45–4.5)
UROBILINOGEN UA: NEGATIVE MG/DL
VENTRICULAR RATE: 71 BPM
WBC # BLD AUTO: 9.03 THOUSAND/UL (ref 4.31–10.16)
WBC #/AREA URNS AUTO: ABNORMAL /HPF

## 2023-05-02 RX ORDER — CEFUROXIME AXETIL 250 MG/1
500 TABLET ORAL ONCE
Status: COMPLETED | OUTPATIENT
Start: 2023-05-02 | End: 2023-05-02

## 2023-05-02 RX ORDER — CEFUROXIME AXETIL 500 MG/1
500 TABLET ORAL EVERY 12 HOURS SCHEDULED
Qty: 13 TABLET | Refills: 0 | Status: SHIPPED | OUTPATIENT
Start: 2023-05-02 | End: 2023-05-09

## 2023-05-02 RX ADMIN — CEFUROXIME AXETIL 500 MG: 250 TABLET ORAL at 16:16

## 2023-05-02 RX ADMIN — SODIUM CHLORIDE 1000 ML: 0.9 INJECTION, SOLUTION INTRAVENOUS at 15:18

## 2023-05-02 NOTE — DISCHARGE INSTRUCTIONS
Stay hydrated, follow up with PCP  Return to ED for new or worsening symptoms as discussed  Take antibiotic as prescribed

## 2023-05-02 NOTE — ED PROVIDER NOTES
History  Chief Complaint   Patient presents with   • Dizziness     Pt was at home when she became light headed, nausea, and hot flash  Pt brought by EMS  26-year-old female past medical history of COPD, hypertension, hypothyroid, temporal arteritis, type 2 diabetes mellitus presents to emergency department complaining of a 10-minute episode of lightheadedness, shakiness, weakness, nausea  She states that she was scared to walk because she thought that she would fall or pass out  She said that the symptoms stopped when she was in the ambulance  No report from EMS any stroke signs or symptoms  She reports that her only remaining symptom is generalized weakness/fatigue  History provided by:  Patient  Dizziness  Quality:  Lightheadedness  Duration: 10 minutes   Progression:  Resolved  Chronicity:  New  Context: not with loss of consciousness    Context comment:  Was standing and cooking  had not yet eaten anything   Associated symptoms: nausea, palpitations, shortness of breath (per baseline with COPD, no acute worsening) and weakness (generalized, only remaining symptom  denies focal weakness  )    Associated symptoms: no blood in stool, no chest pain, no diarrhea, no headaches, no hearing loss, no syncope, no tinnitus, no vision changes and no vomiting    Risk factors: no hx of stroke        Prior to Admission Medications   Prescriptions Last Dose Informant Patient Reported? Taking?    Biotin 10 MG TABS   Yes No   Sig: Take by mouth in the morning   Calcium Carb-Cholecalciferol (Caltrate 600+D3) 600-800 MG-UNIT TABS   No No   Sig: Take 1 tablet by mouth 2 (two) times a day   acetaminophen (TYLENOL) 500 mg tablet   No No   Sig: Take 1 tablet (500 mg total) by mouth every 6 (six) hours as needed (pain fevers)   Patient not taking: Reported on 1/30/2023   albuterol (PROVENTIL HFA,VENTOLIN HFA) 90 mcg/act inhaler   No No   Sig: Inhale 2 puffs every 6 (six) hours as needed for wheezing or shortness of breath albuterol (ProAir HFA) 90 mcg/act inhaler   No No   Sig: Inhale 2 puffs every 6 (six) hours as needed for wheezing   aspirin 81 MG tablet   Yes No   Sig: take one tab  every other day with Dinner   azelastine (ASTELIN) 0 1 % nasal spray   No No   Si spray into each nostril 2 (two) times a day Use in each nostril as directed   b complex-C-E-zinc (STRESS FORMULA/ZINC) tablet   No No   Sig: Take 1 tablet by mouth daily   cetirizine (ZyrTEC) 10 mg tablet   No No   Sig: Take 1 tablet (10 mg total) by mouth daily With food/in the evening   fluticasone-umeclidinium-vilanterol (TRELEGY) 100-62 5-25 MCG/INH inhaler   No No   Sig: Inhale 1 puff daily Rinse mouth after use  ipratropium (ATROVENT) 0 03 % nasal spray   No No   Si sprays into each nostril every 12 (twelve) hours   ipratropium-albuterol (DUO-NEB) 0 5-2 5 mg/3 mL nebulizer solution   No No   Sig: Take 3 mL by nebulization 4 (four) times a day   Patient not taking: Reported on 2023   levothyroxine 25 mcg tablet   No No   Sig: Take 1 tablet (25 mcg total) by mouth daily   pantoprazole (PROTONIX) 40 mg tablet   No No   Sig: TAKE ONE TABLET BY MOUTH ONCE DAILY   predniSONE 2 5 mg tablet   No No   Sig: TAKE ONE TABLET BY MOUTH ONCE DAILY   sodium chloride (OCEAN) 0 65 % nasal spray   No No   Si spray into each nostril 3 (three) times a day   Patient not taking: Reported on 2023   valsartan (DIOVAN) 160 mg tablet   No No   Sig: Take 1 tablet (160 mg total) by mouth daily Please STOP Lisinopril,      vitamin B-12 (VITAMIN B-12) 1,000 mcg tablet   No No   Sig: Take 1 tablet (1,000 mcg total) by mouth daily      Facility-Administered Medications Last Administration Doses Remaining   cyanocobalamin injection 1,000 mcg 2020  3:08 PM           Past Medical History:   Diagnosis Date   • Asthma    • Chronic obstructive pulmonary disease ([facility] ) 2012   • GERD (gastroesophageal reflux disease)    • Hypertension 10/11/2018   • Hypothyroid    • Osteoarthritis 9/26/2012   • Temporal arteritis (HCC)    • Tubular adenoma    • Type 2 diabetes mellitus with complication, without long-term current use of insulin ([facility] [address] ) 1/14/2020       Past Surgical History:   Procedure Laterality Date   • EYE SURGERY Right 12/06/2019    cataract LVCFS   • HYSTERECTOMY     • NO PAST SURGERIES      ALSO NO RELEVANT PAST SURRGICAL HX AS PER [facility]       Family History   Problem Relation Age of Onset   • Breast cancer Mother    • Emphysema Father      I have reviewed and agree with the history as documented  E-Cigarette/Vaping   • E-Cigarette Use Never User      E-Cigarette/Vaping Substances   • Nicotine No    • THC No    • CBD No    • Flavoring No    • Other No    • Unknown No      Social History     Tobacco Use   • Smoking status: Former     Packs/day: 1 00     Years: 35 00     Pack years: 35 00     Types: Cigarettes     Quit date: 2013     Years since quitting: 10 3   • Smokeless tobacco: Never   • Tobacco comments:     TOBACCO USE, NO PASSIVE SMOKE EXPOSURE   Vaping Use   • Vaping Use: Never used   Substance Use Topics   • Alcohol use: No   • Drug use: No       Review of Systems   Constitutional: Negative for chills and fever  HENT: Negative for hearing loss and tinnitus  Eyes: Negative for pain and visual disturbance  Respiratory: Positive for shortness of breath (per baseline with COPD, no acute worsening)  Cardiovascular: Positive for palpitations  Negative for chest pain and syncope  Gastrointestinal: Positive for nausea  Negative for blood in stool, diarrhea and vomiting  Genitourinary: Negative for decreased urine volume, difficulty urinating, frequency and hematuria  Musculoskeletal: Negative for myalgias  Skin: Negative for color change and rash  Neurological: Positive for dizziness, weakness (generalized, only remaining symptom  denies focal weakness  ) and light-headedness   Negative for seizures, syncope, facial asymmetry, speech difficulty, numbness and headaches  Psychiatric/Behavioral: Negative for confusion  All other systems reviewed and are negative  Physical Exam  Physical Exam  Vitals and nursing note reviewed  Constitutional:       General: She is awake  She is not in acute distress  Appearance: Normal appearance  She is not ill-appearing, toxic-appearing or diaphoretic  HENT:      Head: Normocephalic and atraumatic  Mouth/Throat:      Lips: Pink  Mouth: Mucous membranes are moist       Pharynx: Oropharynx is clear  Eyes:      General: Vision grossly intact  Extraocular Movements: Extraocular movements intact  Conjunctiva/sclera: Conjunctivae normal       Pupils: Pupils are equal, round, and reactive to light  Cardiovascular:      Rate and Rhythm: Normal rate and regular rhythm  Pulses:           Radial pulses are 2+ on the right side and 2+ on the left side  Dorsalis pedis pulses are 2+ on the right side and 2+ on the left side  Heart sounds: Normal heart sounds  Pulmonary:      Effort: Pulmonary effort is normal  No respiratory distress  Breath sounds: Normal breath sounds  Comments: Patient in no respiratory distress, speaking in full sentences, managing oral secretions without difficulty, no accessory muscle use, retractions, or belly breathing noted, no adventitious lung sounds auscultated bilaterally  Abdominal:      General: There is no distension  Palpations: Abdomen is soft  Tenderness: There is no abdominal tenderness  Musculoskeletal:      Right lower leg: No edema  Left lower leg: No edema  Skin:     General: Skin is warm and dry  Capillary Refill: Capillary refill takes less than 2 seconds  Findings: No rash  Neurological:      General: No focal deficit present  Mental Status: She is alert and oriented to person, place, and time        Cranial Nerves: No cranial nerve deficit (asymmetric L eyelid but per baseline according to patient and son- and no deficits on cranial nerve testing  )  Comments: GCS 15  AAOx4  No focal neuro deficits  CN II-XII intact  PERRL  EOMI  No pronator drift   strength 5/5 bilaterally  B/L UE strength 5/5 throughout  Finger to nose, heel shin, rapid alternating movements Cerebellar function normal  Ambulates without difficulty  B/L LE strength 5/5 throughout  Gross sensation to b/l upper and lower extremities intact              Vital Signs  ED Triage Vitals [05/02/23 1319]   Temperature Pulse Respirations Blood Pressure SpO2   98 3 °F (36 8 °C) 75 20 152/88 94 %      Temp Source Heart Rate Source Patient Position - Orthostatic VS BP Location FiO2 (%)   Tympanic Monitor Lying Left arm --      Pain Score       --           Vitals:    05/02/23 1403 05/02/23 1404 05/02/23 1406 05/02/23 1514   BP: 145/80 133/81 127/70 156/66   Pulse: 73 78 89 73   Patient Position - Orthostatic VS: Lying - Orthostatic VS Sitting - Orthostatic VS Standing - Orthostatic VS Sitting         Visual Acuity  Visual Acuity    Flowsheet Row Most Recent Value   L Pupil Size (mm) 2   R Pupil Size (mm) 2          ED Medications  Medications   sodium chloride 0 9 % bolus 1,000 mL (0 mL Intravenous Stopped 5/2/23 1633)   cefuroxime (CEFTIN) tablet 500 mg (500 mg Oral Given 5/2/23 1616)       Diagnostic Studies  Results Reviewed     Procedure Component Value Units Date/Time    Urine culture [[id number]] Collected: 05/02/23 1518    Lab Status: Preliminary result Specimen: Urine, Clean Catch Updated: 05/03/23 1449     Urine Culture Culture results to follow      T4, free [id number]  (Normal) Collected: 05/02/23 1342    Lab Status: Final result Specimen: Blood from Arm, Right Updated: 05/02/23 2104     Free T4 1 24 ng/dL     Urine Microscopic [[id number]]  (Abnormal) Collected: 05/02/23 1518    Lab Status: Final result Specimen: Urine, Clean Catch Updated: 05/02/23 1605     RBC, UA 1-2 /hpf      WBC, UA 4-10 /hpf      Epithelial Cells Occasional /hpf      Bacteria, UA Occasional /hpf     HS Troponin I 2hr [[id number]]  (Normal) Collected: 05/02/23 1517    Lab Status: Final result Specimen: Blood from Arm, Right Updated: 05/02/23 1557     hs TnI 2hr 5 ng/L      Delta 2hr hsTnI 0 ng/L     UA (URINE) with reflex to Scope [[id number]]  (Abnormal) Collected: 05/02/23 1518    Lab Status: Final result Specimen: Urine, Clean Catch Updated: 05/02/23 1537     Color, UA Yellow     Clarity, UA Cloudy     Specific Gravity, UA 1 015     pH, UA 7 0     Leukocytes,  0     Nitrite, UA Negative     Protein, UA Negative mg/dl      Glucose, UA Negative mg/dl      Ketones, UA Negative mg/dl      Bilirubin, UA Negative     Occult Blood, UA 10 0     UROBILINOGEN UA Negative mg/dL     TSH, 3rd generation with Free T4 reflex [[id number]]  (Abnormal) Collected: 05/02/23 1342    Lab Status: Final result Specimen: Blood from Arm, Right Updated: 05/02/23 1424     TSH 3RD GENERATON 5 704 uIU/mL     HS Troponin 0hr (reflex protocol) [[id number]]  (Normal) Collected: 05/02/23 1342    Lab Status: Final result Specimen: Blood from Arm, Right Updated: 05/02/23 1415     hs TnI 0hr 5 ng/L     Comprehensive metabolic panel [[id number]]  (Abnormal) Collected: 05/02/23 1342    Lab Status: Final result Specimen: Blood from Arm, Right Updated: 05/02/23 1412     Sodium 138 mmol/L      Potassium 4 1 mmol/L      Chloride 98 mmol/L      CO2 29 mmol/L      ANION GAP 11 mmol/L      BUN 22 mg/dL      Creatinine 0 73 mg/dL      Glucose 155 mg/dL      Calcium 9 9 mg/dL      AST 22 U/L      ALT 16 U/L      Alkaline Phosphatase 45 U/L      Total Protein 7 3 g/dL      Albumin 4 3 g/dL      Total Bilirubin 0 50 mg/dL      eGFR 79 ml/min/1 73sq m     Narrative:      Sammie guidelines for Chronic Kidney Disease (CKD):   •  Stage 1 with normal or high GFR (GFR > 90 mL/min/1 73 square meters)  •  Stage 2 Mild CKD (GFR = 60-89 mL/min/1 73 square meters)  •  Stage 3A Moderate CKD (GFR = 45-59 mL/min/1 73 square meters)  •  Stage 3B Moderate CKD (GFR = 30-44 mL/min/1 73 square meters)  •  Stage 4 Severe CKD (GFR = 15-29 mL/min/1 73 square meters)  •  Stage 5 End Stage CKD (GFR <15 mL/min/1 73 square meters)  Note: GFR calculation is accurate only with a steady state creatinine    Magnesium [[id number]]  (Normal) Collected: 05/02/23 1342    Lab Status: Final result Specimen: Blood from Arm, Right Updated: 05/02/23 1412     Magnesium 2 0 mg/dL     Fingerstick Glucose (POCT) [[id number]]  (Normal) Collected: 05/02/23 1402    Lab Status: Final result Updated: 05/02/23 1411     POC Glucose 100 mg/dl     CBC and differential [[id number]]  (Abnormal) Collected: 05/02/23 1342    Lab Status: Final result Specimen: Blood from Arm, Right Updated: 05/02/23 1352     WBC 9 03 Thousand/uL      RBC 4 78 Million/uL      Hemoglobin 13 7 g/dL      Hematocrit 42 3 %      MCV 89 fL      MCH 28 7 pg      MCHC 32 4 g/dL      RDW 12 9 %      MPV 9 5 fL      Platelets 275 Thousands/uL      nRBC 0 /100 WBCs      Neutrophils Relative 83 %      Immat GRANS % 0 %      Lymphocytes Relative 10 %      Monocytes Relative 6 %      Eosinophils Relative 1 %      Basophils Relative 0 %      Neutrophils Absolute 7 37 Thousands/µL      Immature Grans Absolute 0 03 Thousand/uL      Lymphocytes Absolute 0 89 Thousands/µL      Monocytes Absolute 0 57 Thousand/µL      Eosinophils Absolute 0 13 Thousand/µL      Basophils Absolute 0 04 Thousands/µL                  XR chest 2 views   Final Result by Kameron Burden MD (05/02 1504)      No acute cardiopulmonary disease                    Workstation performed: [id number]                    Procedures  Procedures         ED Course  ED Course as of 05/03/23 1526   Tue May 02, 2023   1341 Procedure Note: EKG  Date/Time: 05/02/23 1:41 PM   Performed by: Ger Ochoa by: Samantha Beltran  ECG interpreted by me, the ED Provider: yes   The EKG demonstrates:  Rate 71 bpm  Rhythm NSR QTc 434 ms   No ST elevations/depressions     1602 Delta 2hr hsTnI: 0   1609 WBC, UA(!): 4-10   1609 Bacteria, UA: Occasional   1623 Ambulatory pulse ox WNL, no recurrence of symptoms  SBIRT 20yo+    Flowsheet Row Most Recent Value   Initial Alcohol Screen: US AUDIT-C     1  How often do you have a drink containing alcohol? 0 Filed at: 05/02/2023 1321   2  How many drinks containing alcohol do you have on a typical day you are drinking? 0 Filed at: 05/02/2023 1321   3b  FEMALE Any Age, or MALE 65+: How often do you have 4 or more drinks on one occassion? 0 Filed at: 05/02/2023 1321   Audit-C Score 0 Filed at: 05/02/2023 1321   MARIMAR: How many times in the past year have you    Used an illegal drug or used a prescription medication for non-medical reasons? Never Filed at: 05/02/2023 1321                    Medical Decision Making  Patient c/o generalized weakness following 10 minute episode of pre-syncopal symptoms  Ddx includes but is not limited to acs, arrhythmia, infection/sepsis, hypoglycemia, electrolyte disturbance, anemia, dehydration, hypothyroidism, polypharmacy  No focal neuro deficits  Strength intact  Sensation intact  Low clinical suspicion for CVA, neurologic etiology  Will plan to obtain CBC, CMP, ECG, TSH, glucose  UA with leukocytes, some bacteria, will treat for UTI  No other evidence of acute infection, VSS, no leukocytosis  Hgb stable, denies active bleeding  ECG without acute ischemic changes or dysrhythmia  Hs troponin delta of 0, do not suspect ACS at this time  electrolytes WNL  Kidney function WNL  No hypoglycemia  TSH abnormal however no evidence of myxedema coma, patient to follow up with PCP  Symptoms resolved with IVF  Panic vs pre-syncopal episode low suspicion for cardiac etiology at this time, will have her follow up outpatient  All imaging and/or lab testing discussed with patient, strict return to ED precautions discussed   Patient recommended "to follow up promptly with appropriate outpatient provider  Patient and/or family members verbalizes understanding and agrees with plan  Patient and/or family members were given opportunity to ask questions, all questions were answered at this time  Patient is stable for discharge      Portions of the record may have been created with voice recognition software  Occasional wrong word or \"sound a like\" substitutions may have occurred due to the inherent limitations of voice recognition software  Read the chart carefully and recognize, using context, where substitutions have occurred  Amount and/or Complexity of Data Reviewed  Labs: ordered  Decision-making details documented in ED Course  Radiology: ordered  Risk  Prescription drug management  Disposition  Final diagnoses:   Episodic lightheadedness   UTI (urinary tract infection)     Time reflects when diagnosis was documented in both MDM as applicable and the Disposition within this note     Time User Action Codes Description Comment    5/2/2023  4:22 PM Nakia Shell Add [R42] Episodic lightheadedness     5/2/2023  4:23 PM Nakia Shell Add [N39 0] UTI (urinary tract infection)       ED Disposition     ED Disposition   Discharge    Condition   Stable    Date/Time   Tue May 2, 2023  4:24 PM    [id number] [facility] discharge to home/self care                 Follow-up Information     Follow up With Specialties Details Why Noelle Bhagat MD Internal Medicine Schedule an appointment as soon as possible for a visit  For follow up regarding your symptoms, abnormal thyroid labs [facility] [address]   [phone]            Discharge Medication List as of 5/2/2023  4:25 PM      START taking these medications    Details   cefuroxime (CEFTIN) 500 mg tablet Take 1 tablet (500 mg total) by mouth every 12 (twelve) hours for 7 days, Starting Tue 5/2/2023, Until Tue 5/9/2023, Normal         CONTINUE these medications which have NOT " CHANGED    Details   acetaminophen (TYLENOL) 500 mg tablet Take 1 tablet (500 mg total) by mouth every 6 (six) hours as needed (pain fevers), Starting Fri 12/27/2019, Print      !! albuterol (ProAir HFA) 90 mcg/act inhaler Inhale 2 puffs every 6 (six) hours as needed for wheezing, Starting Tue 10/25/2022, Normal      !! albuterol (PROVENTIL HFA,VENTOLIN HFA) 90 mcg/act inhaler Inhale 2 puffs every 6 (six) hours as needed for wheezing or shortness of breath, Starting Mon 8/15/2022, Normal      aspirin 81 MG tablet take one tab   every other day with Dinner, Historical Med      azelastine (ASTELIN) 0 1 % nasal spray 1 spray into each nostril 2 (two) times a day Use in each nostril as directed, Starting Mon 2/27/2023, Normal      b complex-C-E-zinc (STRESS FORMULA/ZINC) tablet Take 1 tablet by mouth daily, Starting Mon 8/15/2022, Normal      Biotin 10 MG TABS Take by mouth in the morning, Historical Med      Calcium Carb-Cholecalciferol (Caltrate 600+D3) 600-800 MG-UNIT TABS Take 1 tablet by mouth 2 (two) times a day, Starting Mon 8/15/2022, Normal      cetirizine (ZyrTEC) 10 mg tablet Take 1 tablet (10 mg total) by mouth daily With food/in the evening, Starting Mon 2/27/2023, Normal      fluticasone-umeclidinium-vilanterol (TRELEGY) 100-62 5-25 MCG/INH inhaler Inhale 1 puff daily Rinse mouth after use , Starting Mon 8/15/2022, Normal      ipratropium (ATROVENT) 0 03 % nasal spray 2 sprays into each nostril every 12 (twelve) hours, Starting Mon 1/9/2023, Normal      ipratropium-albuterol (DUO-NEB) 0 5-2 5 mg/3 mL nebulizer solution Take 3 mL by nebulization 4 (four) times a day, Starting Tue 11/8/2022, Normal      levothyroxine 25 mcg tablet Take 1 tablet (25 mcg total) by mouth daily, Starting Mon 2/27/2023, Normal      pantoprazole (PROTONIX) 40 mg tablet TAKE ONE TABLET BY MOUTH ONCE DAILY, Normal      predniSONE 2 5 mg tablet TAKE ONE TABLET BY MOUTH ONCE DAILY, Normal      sodium chloride (OCEAN) 0 65 % nasal spray 1 spray into each nostril 3 (three) times a day, Starting Mon 11/21/2022, Normal      valsartan (DIOVAN) 160 mg tablet Take 1 tablet (160 mg total) by mouth daily Please STOP Lisinopril, , Starting Mon 2/27/2023, Normal      vitamin B-12 (VITAMIN B-12) 1,000 mcg tablet Take 1 tablet (1,000 mcg total) by mouth daily, Starting Tue 6/16/2020, Normal       !! - Potential duplicate medications found  Please discuss with provider  No discharge procedures on file      PDMP Review     None          ED Provider  Electronically Signed by           Karey Hernández PA-C  05/03/23 9015

## 2023-05-02 NOTE — ED NOTES
Provider at bedside, Madeleine Aviles [facility]       Zainab Ayers RN  05/02/23 5875

## 2023-05-03 LAB — BACTERIA UR CULT: ABNORMAL

## 2023-05-04 ENCOUNTER — RA CDI HCC (OUTPATIENT)
Dept: OTHER | Facility: HOSPITAL | Age: 78
End: 2023-05-04

## 2023-05-04 NOTE — PROGRESS NOTES
[address]  coding opportunities     E11 51     Chart Reviewed number of suggestions sent to Provider: 1     Patients Insurance     Medicare Insurance: [facility]

## 2023-05-08 DIAGNOSIS — M31.6 TEMPORAL ARTERITIS (HCC): ICD-10-CM

## 2023-05-08 RX ORDER — PREDNISONE 2.5 MG/1
TABLET ORAL
Qty: 30 TABLET | Refills: 0 | Status: SHIPPED | OUTPATIENT
Start: 2023-05-08

## 2023-05-09 ENCOUNTER — OFFICE VISIT (OUTPATIENT)
Dept: FAMILY MEDICINE CLINIC | Facility: CLINIC | Age: 78
End: 2023-05-09

## 2023-05-09 VITALS
OXYGEN SATURATION: 96 % | RESPIRATION RATE: 14 BRPM | TEMPERATURE: 97.6 F | DIASTOLIC BLOOD PRESSURE: 62 MMHG | SYSTOLIC BLOOD PRESSURE: 124 MMHG | HEIGHT: 63 IN | WEIGHT: 122 LBS | BODY MASS INDEX: 21.62 KG/M2 | HEART RATE: 87 BPM

## 2023-05-09 DIAGNOSIS — E78.5 HYPERLIPIDEMIA ASSOCIATED WITH TYPE 2 DIABETES MELLITUS (HCC): ICD-10-CM

## 2023-05-09 DIAGNOSIS — E11.69 HYPERLIPIDEMIA ASSOCIATED WITH TYPE 2 DIABETES MELLITUS (HCC): ICD-10-CM

## 2023-05-09 DIAGNOSIS — M31.6 TEMPORAL ARTERITIS (HCC): ICD-10-CM

## 2023-05-09 DIAGNOSIS — E86.0 DEHYDRATION: ICD-10-CM

## 2023-05-09 DIAGNOSIS — R73.09 ELEVATED HEMOGLOBIN A1C: ICD-10-CM

## 2023-05-09 DIAGNOSIS — R42 DIZZINESS: Primary | ICD-10-CM

## 2023-05-09 NOTE — PROGRESS NOTES
Name: Edwar Sainz      :       MRN: [id number]  Encounter Provider: Bailee Judge MD  Encounter Date: 2023   Encounter department: [facility] [address] [place] [address]     1  Temporal arteritis (HCC)  -     TSH, 3rd generation; Future; Expected date: 2023  -     T4, free; Future; Expected date: 2023  -     UA (URINE) with reflex to Scope; Future; Expected date: 2023  -     Sedimentation rate, automated; Future  -     C-reactive protein; Future    2  Hyperlipidemia associated with type 2 diabetes mellitus (HCC)  -     TSH, 3rd generation; Future; Expected date: 2023  -     T4, free; Future; Expected date: 2023  -     UA (URINE) with reflex to Scope; Future; Expected date: 2023  -     Comprehensive metabolic panel; Future  -     CBC and differential; Future  -     Magnesium; Future  -     Lipid panel; Future    3  Elevated hemoglobin A1c  -     TSH, 3rd generation; Future; Expected date: 2023  -     T4, free; Future; Expected date: 2023  -     UA (URINE) with reflex to Scope; Future; Expected date: 2023  -     Comprehensive metabolic panel; Future  -     CBC and differential; Future  -     Magnesium; Future  -     Lipid panel; Future    bed rest  Increase po fluids  Continue meds  rtc in 3-4 weeks w blood work       Subjective      68 y o lady is here for post E R Visit, she feels a lot better, recent blood work, and med list reviewed w pt,  Review of Systems   Constitutional: Negative for chills, fatigue and fever  HENT: Positive for postnasal drip  Negative for congestion, facial swelling, sore throat, trouble swallowing and voice change  Eyes: Negative for pain, discharge and visual disturbance  Respiratory: Negative for cough, shortness of breath and wheezing  Cardiovascular: Negative for chest pain, palpitations and leg swelling     Gastrointestinal: Negative for abdominal pain, blood in stool, constipation, diarrhea and nausea  Endocrine: Negative for polydipsia, polyphagia and polyuria  Genitourinary: Negative for difficulty urinating, hematuria and urgency  Musculoskeletal: Negative for arthralgias and myalgias  Skin: Negative for rash  Neurological: Negative for dizziness, tremors, weakness and headaches  Hematological: Negative for adenopathy  Does not bruise/bleed easily  Psychiatric/Behavioral: Negative for dysphoric mood, sleep disturbance and suicidal ideas  Current Outpatient Medications on File Prior to Visit   Medication Sig   • albuterol (ProAir HFA) 90 mcg/act inhaler Inhale 2 puffs every 6 (six) hours as needed for wheezing   • albuterol (PROVENTIL HFA,VENTOLIN HFA) 90 mcg/act inhaler Inhale 2 puffs every 6 (six) hours as needed for wheezing or shortness of breath   • aspirin 81 MG tablet take one tab  every other day with Dinner   • azelastine (ASTELIN) 0 1 % nasal spray 1 spray into each nostril 2 (two) times a day Use in each nostril as directed   • b complex-C-E-zinc (STRESS FORMULA/ZINC) tablet Take 1 tablet by mouth daily   • Biotin 10 MG TABS Take by mouth in the morning   • Calcium Carb-Cholecalciferol (Caltrate 600+D3) 600-800 MG-UNIT TABS Take 1 tablet by mouth 2 (two) times a day   • cefuroxime (CEFTIN) 500 mg tablet Take 1 tablet (500 mg total) by mouth every 12 (twelve) hours for 7 days   • cetirizine (ZyrTEC) 10 mg tablet Take 1 tablet (10 mg total) by mouth daily With food/in the evening   • fluticasone-umeclidinium-vilanterol (TRELEGY) 100-62 5-25 MCG/INH inhaler Inhale 1 puff daily Rinse mouth after use     • ipratropium (ATROVENT) 0 03 % nasal spray 2 sprays into each nostril every 12 (twelve) hours   • levothyroxine 25 mcg tablet Take 1 tablet (25 mcg total) by mouth daily   • pantoprazole (PROTONIX) 40 mg tablet TAKE ONE TABLET BY MOUTH ONCE DAILY   • predniSONE 2 5 mg tablet TAKE ONE TABLET BY MOUTH ONCE DAILY   • valsartan (DIOVAN) 160 mg tablet Take "1 tablet (160 mg total) by mouth daily Please STOP Lisinopril,      • vitamin B-12 (VITAMIN B-12) 1,000 mcg tablet Take 1 tablet (1,000 mcg total) by mouth daily   • acetaminophen (TYLENOL) 500 mg tablet Take 1 tablet (500 mg total) by mouth every 6 (six) hours as needed (pain fevers) (Patient not taking: Reported on 1/30/2023)   • ipratropium-albuterol (DUO-NEB) 0 5-2 5 mg/3 mL nebulizer solution Take 3 mL by nebulization 4 (four) times a day (Patient not taking: Reported on 1/30/2023)   • sodium chloride (OCEAN) 0 65 % nasal spray 1 spray into each nostril 3 (three) times a day (Patient not taking: Reported on 1/30/2023)       Objective     /62 (BP Location: Left arm, Patient Position: Sitting, Cuff Size: Standard)   Pulse 87   Temp 97 6 °F (36 4 °C) (Tympanic)   Resp 14   Ht 5' 3\" (1 6 m)   Wt 55 3 kg (122 lb)   SpO2 96%   BMI 21 61 kg/m²     Physical Exam  Constitutional:       General: She is not in acute distress  HENT:      Head: Normocephalic  Mouth/Throat:      Pharynx: No oropharyngeal exudate  Eyes:      General: No scleral icterus  Conjunctiva/sclera: Conjunctivae normal       Pupils: Pupils are equal, round, and reactive to light  Neck:      Thyroid: No thyromegaly  Cardiovascular:      Rate and Rhythm: Normal rate and regular rhythm  Heart sounds: Murmur heard  Pulmonary:      Effort: Pulmonary effort is normal  No respiratory distress  Breath sounds: Normal breath sounds  No wheezing or rales  Abdominal:      General: Bowel sounds are normal  There is no distension  Palpations: Abdomen is soft  Tenderness: There is no abdominal tenderness  There is no guarding or rebound  Musculoskeletal:         General: No tenderness  Cervical back: Neck supple  Lymphadenopathy:      Cervical: No cervical adenopathy  Skin:     Coloration: Skin is not pale  Findings: No rash     Neurological:      Mental Status: She is alert and oriented to person, " place, and time  Sensory: No sensory deficit  Motor: No weakness         Cathy Samuels MD

## 2023-05-09 NOTE — PROGRESS NOTES
BMI Counseling: Body mass index is 21 93 kg/m²  The BMI is below normal  Patient was advised to gain weight

## 2023-05-30 DIAGNOSIS — M31.6 TEMPORAL ARTERITIS (HCC): ICD-10-CM

## 2023-05-30 RX ORDER — PREDNISONE 2.5 MG/1
TABLET ORAL
Qty: 30 TABLET | Refills: 0 | Status: SHIPPED | OUTPATIENT
Start: 2023-05-30 | End: 2023-06-05 | Stop reason: SDUPTHER

## 2023-06-03 ENCOUNTER — APPOINTMENT (OUTPATIENT)
Dept: LAB | Facility: HOSPITAL | Age: 78
End: 2023-06-03
Payer: COMMERCIAL

## 2023-06-03 DIAGNOSIS — R73.09 ELEVATED HEMOGLOBIN A1C: ICD-10-CM

## 2023-06-03 DIAGNOSIS — E78.5 HYPERLIPIDEMIA ASSOCIATED WITH TYPE 2 DIABETES MELLITUS (HCC): ICD-10-CM

## 2023-06-03 DIAGNOSIS — E11.69 HYPERLIPIDEMIA ASSOCIATED WITH TYPE 2 DIABETES MELLITUS (HCC): ICD-10-CM

## 2023-06-03 DIAGNOSIS — M31.6 TEMPORAL ARTERITIS (HCC): ICD-10-CM

## 2023-06-03 LAB
ALBUMIN SERPL BCP-MCNC: 4.7 G/DL (ref 3.5–5)
ALP SERPL-CCNC: 53 U/L (ref 34–104)
ALT SERPL W P-5'-P-CCNC: 13 U/L (ref 7–52)
ANION GAP SERPL CALCULATED.3IONS-SCNC: 9 MMOL/L (ref 4–13)
AST SERPL W P-5'-P-CCNC: 22 U/L (ref 13–39)
BASOPHILS # BLD AUTO: 0.06 THOUSANDS/ÂΜL (ref 0–0.1)
BASOPHILS NFR BLD AUTO: 1 % (ref 0–1)
BILIRUB SERPL-MCNC: 0.6 MG/DL (ref 0.2–1)
BUN SERPL-MCNC: 16 MG/DL (ref 5–25)
CALCIUM SERPL-MCNC: 10 MG/DL (ref 8.4–10.2)
CHLORIDE SERPL-SCNC: 101 MMOL/L (ref 96–108)
CHOLEST SERPL-MCNC: 155 MG/DL
CO2 SERPL-SCNC: 30 MMOL/L (ref 21–32)
CREAT SERPL-MCNC: 0.7 MG/DL (ref 0.6–1.3)
CRP SERPL QL: 4.5 MG/L
EOSINOPHIL # BLD AUTO: 0.2 THOUSAND/ÂΜL (ref 0–0.61)
EOSINOPHIL NFR BLD AUTO: 3 % (ref 0–6)
ERYTHROCYTE [DISTWIDTH] IN BLOOD BY AUTOMATED COUNT: 12.5 % (ref 11.6–15.1)
ERYTHROCYTE [SEDIMENTATION RATE] IN BLOOD: 32 MM/HOUR (ref 0–29)
GFR SERPL CREATININE-BSD FRML MDRD: 83 ML/MIN/1.73SQ M
GLUCOSE P FAST SERPL-MCNC: 93 MG/DL (ref 65–99)
HCT VFR BLD AUTO: 46.4 % (ref 34.8–46.1)
HDLC SERPL-MCNC: 81 MG/DL
HGB BLD-MCNC: 14.1 G/DL (ref 11.5–15.4)
IMM GRANULOCYTES # BLD AUTO: 0.02 THOUSAND/UL (ref 0–0.2)
IMM GRANULOCYTES NFR BLD AUTO: 0 % (ref 0–2)
LDLC SERPL CALC-MCNC: 59 MG/DL (ref 0–100)
LYMPHOCYTES # BLD AUTO: 1.55 THOUSANDS/ÂΜL (ref 0.6–4.47)
LYMPHOCYTES NFR BLD AUTO: 21 % (ref 14–44)
MAGNESIUM SERPL-MCNC: 2.2 MG/DL (ref 1.9–2.7)
MCH RBC QN AUTO: 27.4 PG (ref 26.8–34.3)
MCHC RBC AUTO-ENTMCNC: 30.4 G/DL (ref 31.4–37.4)
MCV RBC AUTO: 90 FL (ref 82–98)
MONOCYTES # BLD AUTO: 0.64 THOUSAND/ÂΜL (ref 0.17–1.22)
MONOCYTES NFR BLD AUTO: 9 % (ref 4–12)
NEUTROPHILS # BLD AUTO: 5.03 THOUSANDS/ÂΜL (ref 1.85–7.62)
NEUTS SEG NFR BLD AUTO: 66 % (ref 43–75)
NONHDLC SERPL-MCNC: 74 MG/DL
NRBC BLD AUTO-RTO: 0 /100 WBCS
PLATELET # BLD AUTO: 240 THOUSANDS/UL (ref 149–390)
PMV BLD AUTO: 9.9 FL (ref 8.9–12.7)
POTASSIUM SERPL-SCNC: 4.2 MMOL/L (ref 3.5–5.3)
PROT SERPL-MCNC: 7.6 G/DL (ref 6.4–8.4)
RBC # BLD AUTO: 5.15 MILLION/UL (ref 3.81–5.12)
SODIUM SERPL-SCNC: 140 MMOL/L (ref 135–147)
T4 FREE SERPL-MCNC: 1.23 NG/DL (ref 0.61–1.12)
TRIGL SERPL-MCNC: 73 MG/DL
TSH SERPL DL<=0.05 MIU/L-ACNC: 4.62 UIU/ML (ref 0.45–4.5)
WBC # BLD AUTO: 7.5 THOUSAND/UL (ref 4.31–10.16)

## 2023-06-03 PROCEDURE — 84439 ASSAY OF FREE THYROXINE: CPT

## 2023-06-03 PROCEDURE — 80061 LIPID PANEL: CPT

## 2023-06-03 PROCEDURE — 85652 RBC SED RATE AUTOMATED: CPT

## 2023-06-03 PROCEDURE — 84443 ASSAY THYROID STIM HORMONE: CPT

## 2023-06-03 PROCEDURE — 83735 ASSAY OF MAGNESIUM: CPT

## 2023-06-03 PROCEDURE — 36415 COLL VENOUS BLD VENIPUNCTURE: CPT

## 2023-06-03 PROCEDURE — 80053 COMPREHEN METABOLIC PANEL: CPT

## 2023-06-03 PROCEDURE — 86140 C-REACTIVE PROTEIN: CPT

## 2023-06-03 PROCEDURE — 85025 COMPLETE CBC W/AUTO DIFF WBC: CPT

## 2023-06-05 ENCOUNTER — OFFICE VISIT (OUTPATIENT)
Dept: FAMILY MEDICINE CLINIC | Facility: CLINIC | Age: 78
End: 2023-06-05
Payer: COMMERCIAL

## 2023-06-05 VITALS
RESPIRATION RATE: 15 BRPM | OXYGEN SATURATION: 96 % | BODY MASS INDEX: 19.35 KG/M2 | HEIGHT: 63 IN | HEART RATE: 75 BPM | DIASTOLIC BLOOD PRESSURE: 74 MMHG | TEMPERATURE: 98 F | SYSTOLIC BLOOD PRESSURE: 118 MMHG | WEIGHT: 109.2 LBS

## 2023-06-05 DIAGNOSIS — I50.9 HEART FAILURE, UNSPECIFIED HF CHRONICITY, UNSPECIFIED HEART FAILURE TYPE (HCC): ICD-10-CM

## 2023-06-05 DIAGNOSIS — J43.9 PULMONARY EMPHYSEMA, UNSPECIFIED EMPHYSEMA TYPE (HCC): ICD-10-CM

## 2023-06-05 DIAGNOSIS — M81.8 IDIOPATHIC OSTEOPOROSIS: ICD-10-CM

## 2023-06-05 DIAGNOSIS — J45.901 ACUTE EXACERBATION OF COPD WITH ASTHMA (HCC): ICD-10-CM

## 2023-06-05 DIAGNOSIS — E11.69 HYPERLIPIDEMIA ASSOCIATED WITH TYPE 2 DIABETES MELLITUS (HCC): Primary | ICD-10-CM

## 2023-06-05 DIAGNOSIS — E06.3 HYPOTHYROIDISM DUE TO HASHIMOTO'S THYROIDITIS: ICD-10-CM

## 2023-06-05 DIAGNOSIS — K21.9 GASTROESOPHAGEAL REFLUX DISEASE: ICD-10-CM

## 2023-06-05 DIAGNOSIS — J44.1 ACUTE EXACERBATION OF COPD WITH ASTHMA (HCC): ICD-10-CM

## 2023-06-05 DIAGNOSIS — I73.9 PVD (PERIPHERAL VASCULAR DISEASE) (HCC): ICD-10-CM

## 2023-06-05 DIAGNOSIS — D51.3 OTHER DIETARY VITAMIN B12 DEFICIENCY ANEMIA: ICD-10-CM

## 2023-06-05 DIAGNOSIS — R53.83 OTHER FATIGUE: ICD-10-CM

## 2023-06-05 DIAGNOSIS — R73.09 ELEVATED HEMOGLOBIN A1C: ICD-10-CM

## 2023-06-05 DIAGNOSIS — E03.8 HYPOTHYROIDISM DUE TO HASHIMOTO'S THYROIDITIS: ICD-10-CM

## 2023-06-05 DIAGNOSIS — E78.5 HYPERLIPIDEMIA ASSOCIATED WITH TYPE 2 DIABETES MELLITUS (HCC): Primary | ICD-10-CM

## 2023-06-05 DIAGNOSIS — M31.6 TEMPORAL ARTERITIS (HCC): ICD-10-CM

## 2023-06-05 DIAGNOSIS — E44.1 MILD PROTEIN-CALORIE MALNUTRITION (HCC): ICD-10-CM

## 2023-06-05 DIAGNOSIS — J30.9 ALLERGIC RHINITIS, UNSPECIFIED SEASONALITY, UNSPECIFIED TRIGGER: ICD-10-CM

## 2023-06-05 DIAGNOSIS — J44.1 ACUTE EXACERBATION OF CHRONIC OBSTRUCTIVE PULMONARY DISEASE (COPD) (HCC): ICD-10-CM

## 2023-06-05 PROBLEM — E11.9 DIABETIC EYE EXAM (HCC): Status: RESOLVED | Noted: 2022-03-07 | Resolved: 2023-06-05

## 2023-06-05 PROBLEM — Z01.00 DIABETIC EYE EXAM (HCC): Status: RESOLVED | Noted: 2022-03-07 | Resolved: 2023-06-05

## 2023-06-05 PROBLEM — G93.81 TEMPORAL LOBE EPILEPSY WITH MESIAL TEMPORAL SCLEROSIS (HCC): Status: RESOLVED | Noted: 2020-11-30 | Resolved: 2023-06-05

## 2023-06-05 PROBLEM — G40.109 TEMPORAL LOBE EPILEPSY WITH MESIAL TEMPORAL SCLEROSIS (HCC): Status: RESOLVED | Noted: 2020-11-30 | Resolved: 2023-06-05

## 2023-06-05 LAB — SL AMB POCT HEMOGLOBIN AIC: 5.8 (ref ?–6.5)

## 2023-06-05 PROCEDURE — 83036 HEMOGLOBIN GLYCOSYLATED A1C: CPT | Performed by: INTERNAL MEDICINE

## 2023-06-05 PROCEDURE — 99214 OFFICE O/P EST MOD 30 MIN: CPT | Performed by: INTERNAL MEDICINE

## 2023-06-05 RX ORDER — CETIRIZINE HYDROCHLORIDE 10 MG/1
10 TABLET ORAL DAILY
Qty: 90 TABLET | Refills: 3 | Status: SHIPPED | OUTPATIENT
Start: 2023-06-05

## 2023-06-05 RX ORDER — GINGER ROOT/GINGER ROOT EXT 262.5 MG
1 CAPSULE ORAL 2 TIMES DAILY
Qty: 200 TABLET | Refills: 6 | Status: SHIPPED | OUTPATIENT
Start: 2023-06-05

## 2023-06-05 RX ORDER — LEVOTHYROXINE SODIUM 0.05 MG/1
50 TABLET ORAL
Qty: 30 TABLET | Refills: 5 | Status: SHIPPED | OUTPATIENT
Start: 2023-06-05

## 2023-06-05 RX ORDER — ASPIRIN 81 MG/1
81 TABLET ORAL DAILY
Qty: 90 TABLET | Refills: 3 | Status: SHIPPED | OUTPATIENT
Start: 2023-06-05

## 2023-06-05 RX ORDER — VALSARTAN 160 MG/1
160 TABLET ORAL DAILY
Qty: 90 TABLET | Refills: 3 | Status: SHIPPED | OUTPATIENT
Start: 2023-06-05

## 2023-06-05 RX ORDER — LANOLIN ALCOHOL/MO/W.PET/CERES
1000 CREAM (GRAM) TOPICAL DAILY
Qty: 90 TABLET | Refills: 1 | Status: SHIPPED | OUTPATIENT
Start: 2023-06-05

## 2023-06-05 RX ORDER — PREDNISONE 2.5 MG/1
2.5 TABLET ORAL DAILY
Qty: 90 TABLET | Refills: 0 | Status: SHIPPED | OUTPATIENT
Start: 2023-06-05 | End: 2023-06-12

## 2023-06-05 RX ORDER — ALBUTEROL SULFATE 90 UG/1
2 AEROSOL, METERED RESPIRATORY (INHALATION) EVERY 6 HOURS PRN
Qty: 8.5 G | Refills: 0 | Status: SHIPPED | OUTPATIENT
Start: 2023-06-05

## 2023-06-05 RX ORDER — FLUTICASONE FUROATE, UMECLIDINIUM BROMIDE AND VILANTEROL TRIFENATATE 100; 62.5; 25 UG/1; UG/1; UG/1
1 POWDER RESPIRATORY (INHALATION) DAILY
Qty: 1 EACH | Refills: 5 | Status: SHIPPED | OUTPATIENT
Start: 2023-06-05

## 2023-06-05 RX ORDER — PANTOPRAZOLE SODIUM 40 MG/1
40 TABLET, DELAYED RELEASE ORAL DAILY
Qty: 90 TABLET | Refills: 3 | Status: SHIPPED | OUTPATIENT
Start: 2023-06-05

## 2023-06-05 NOTE — PROGRESS NOTES
Name: Gogo Barrett      :       MRN: [id number]  Encounter Provider: Archana Oro MD  Encounter Date: 2023   Encounter department: [address] [place] [address]     1  Hyperlipidemia associated with type 2 diabetes mellitus ([place] [address] )  -     Lipid Panel with Direct LDL reflex; Future; Expected date: 2023    2  Acute exacerbation of chronic obstructive pulmonary disease (COPD) ([place] [address] )  -     fluticasone-umeclidinium-vilanterol (Trelegy Ellipta) 100-62 5-25 mcg/actuation inhaler; Inhale 1 puff daily Rinse mouth after use  -     Ambulatory Referral to Cardiology; Future  -     aspirin (ECOTRIN LOW STRENGTH) 81 mg EC tablet; Take 1 tablet (81 mg total) by mouth daily    3  Pulmonary emphysema, unspecified emphysema type ([place] [address] )  -     Ambulatory Referral to Cardiology; Future  -     aspirin (ECOTRIN LOW STRENGTH) 81 mg EC tablet; Take 1 tablet (81 mg total) by mouth daily    4  Temporal arteritis ([facility])  -     predniSONE 2 5 mg tablet; Take 1 tablet (2 5 mg total) by mouth daily    5  PVD (peripheral vascular disease) ([facility])    6  Other fatigue  -     albuterol (ProAir HFA) 90 mcg/act inhaler; Inhale 2 puffs every 6 (six) hours as needed for wheezing    7  Idiopathic osteoporosis  -     Calcium Carb-Cholecalciferol (Caltrate 600+D3) 600-20 MG-MCG TABS; Take 1 tablet by mouth 2 (two) times a day    8  Allergic rhinitis, unspecified seasonality, unspecified trigger  -     cetirizine (ZyrTEC) 10 mg tablet; Take 1 tablet (10 mg total) by mouth daily With food/in the evening    9  Gastroesophageal reflux disease  -     pantoprazole (PROTONIX) 40 mg tablet; Take 1 tablet (40 mg total) by mouth daily    10  Acute exacerbation of COPD with asthma ([place] [address] )  -     valsartan (DIOVAN) 160 mg tablet; Take 1 tablet (160 mg total) by mouth daily Please STOP Lisinopril,     11  Other dietary vitamin B12 deficiency anemia  -     vitamin B-12 (VITAMIN B-12) 1,000 mcg tablet;  Take 1 tablet (1,000 mcg total) by mouth daily    12  Elevated hemoglobin A1c  -     POCT hemoglobin A1c  -     Comprehensive metabolic panel; Future; Expected date: 09/05/2023  -     CBC and differential; Future; Expected date: 09/05/2023  -     Lipid Panel with Direct LDL reflex; Future; Expected date: 09/05/2023  -     TSH, 3rd generation with Free T4 reflex; Future; Expected date: 09/05/2023    13  Hypothyroidism due to Hashimoto's thyroiditis  -     TSH, 3rd generation with Free T4 reflex; Future; Expected date: 09/05/2023  -     levothyroxine (Synthroid) 50 mcg tablet; Take 1 tablet (50 mcg total) by mouth daily in the early morning    14  Mild protein-calorie malnutrition ([facility] [address] )    15  Heart failure, unspecified HF chronicity, unspecified heart failure type ([facility] [address] )     Continue and Renew Meds  F/U w Pulmonary  Cardiology consult ASAP  RTC in 2-3 mos w  Blood work       Subjective      68 Y O lady is here for Regular check Up, she feels a Lot better, recent blood work and med list reviewed w  Pt in detail    Review of Systems   Constitutional: Negative for chills, fatigue and fever  HENT: Positive for postnasal drip  Negative for congestion, facial swelling, sore throat, trouble swallowing and voice change  Eyes: Negative for pain, discharge and visual disturbance  Respiratory: Negative for cough, shortness of breath and wheezing  Cardiovascular: Negative for chest pain, palpitations and leg swelling  Gastrointestinal: Negative for abdominal pain, blood in stool, constipation, diarrhea and nausea  Endocrine: Negative for polydipsia, polyphagia and polyuria  Genitourinary: Negative for difficulty urinating, hematuria and urgency  Musculoskeletal: Negative for arthralgias and myalgias  Skin: Negative for rash  Neurological: Negative for dizziness, tremors, weakness and headaches  Hematological: Negative for adenopathy  Does not bruise/bleed easily     Psychiatric/Behavioral: Negative for dysphoric mood, sleep disturbance and suicidal ideas  Current Outpatient Medications on File Prior to Visit   Medication Sig   • albuterol (PROVENTIL HFA,VENTOLIN HFA) 90 mcg/act inhaler Inhale 2 puffs every 6 (six) hours as needed for wheezing or shortness of breath   • azelastine (ASTELIN) 0 1 % nasal spray 1 spray into each nostril 2 (two) times a day Use in each nostril as directed   • b complex-C-E-zinc (STRESS FORMULA/ZINC) tablet Take 1 tablet by mouth daily   • Biotin 10 MG TABS Take by mouth in the morning   • ipratropium (ATROVENT) 0 03 % nasal spray 2 sprays into each nostril every 12 (twelve) hours   • [DISCONTINUED] albuterol (ProAir HFA) 90 mcg/act inhaler Inhale 2 puffs every 6 (six) hours as needed for wheezing   • [DISCONTINUED] aspirin 81 MG tablet take one tab  every other day with Dinner   • [DISCONTINUED] Calcium Carb-Cholecalciferol (Caltrate 600+D3) 600-800 MG-UNIT TABS Take 1 tablet by mouth 2 (two) times a day   • [DISCONTINUED] cetirizine (ZyrTEC) 10 mg tablet Take 1 tablet (10 mg total) by mouth daily With food/in the evening   • [DISCONTINUED] fluticasone-umeclidinium-vilanterol (TRELEGY) 100-62 5-25 MCG/INH inhaler Inhale 1 puff daily Rinse mouth after use     • [DISCONTINUED] levothyroxine 25 mcg tablet Take 1 tablet (25 mcg total) by mouth daily   • [DISCONTINUED] pantoprazole (PROTONIX) 40 mg tablet TAKE ONE TABLET BY MOUTH ONCE DAILY   • [DISCONTINUED] valsartan (DIOVAN) 160 mg tablet Take 1 tablet (160 mg total) by mouth daily Please STOP Lisinopril,    • [DISCONTINUED] vitamin B-12 (VITAMIN B-12) 1,000 mcg tablet Take 1 tablet (1,000 mcg total) by mouth daily   • acetaminophen (TYLENOL) 500 mg tablet Take 1 tablet (500 mg total) by mouth every 6 (six) hours as needed (pain fevers) (Patient not taking: Reported on 1/30/2023)   • ipratropium-albuterol (DUO-NEB) 0 5-2 5 mg/3 mL nebulizer solution Take 3 mL by nebulization 4 (four) times a day (Patient not taking: Reported on 1/30/2023)   • "[DISCONTINUED] predniSONE 2 5 mg tablet TAKE ONE TABLET BY MOUTH ONCE DAILY   • [DISCONTINUED] sodium chloride (OCEAN) 0 65 % nasal spray 1 spray into each nostril 3 (three) times a day (Patient not taking: Reported on 1/30/2023)       Objective     /74 (BP Location: Left arm, Patient Position: Sitting, Cuff Size: Standard)   Pulse 75   Temp 98 °F (36 7 °C) (Tympanic)   Resp 15   Ht 5' 3\" (1 6 m)   Wt 49 5 kg (109 lb 3 2 oz)   SpO2 96%   BMI 19 34 kg/m²     Physical Exam  Constitutional:       General: She is not in acute distress  HENT:      Head: Normocephalic  Mouth/Throat:      Pharynx: No oropharyngeal exudate  Eyes:      General: No scleral icterus  Conjunctiva/sclera: Conjunctivae normal       Pupils: Pupils are equal, round, and reactive to light  Neck:      Thyroid: No thyromegaly  Cardiovascular:      Rate and Rhythm: Normal rate and regular rhythm  Heart sounds: Normal heart sounds  No murmur heard  Pulmonary:      Effort: Pulmonary effort is normal  No respiratory distress  Breath sounds: Normal breath sounds  No wheezing or rales  Abdominal:      General: Bowel sounds are normal  There is no distension  Palpations: Abdomen is soft  Tenderness: There is no abdominal tenderness  There is no guarding or rebound  Musculoskeletal:         General: Tenderness present  Cervical back: Neck supple  Lymphadenopathy:      Cervical: No cervical adenopathy  Skin:     Coloration: Skin is not pale  Findings: No rash  Neurological:      Mental Status: She is alert and oriented to person, place, and time  Sensory: No sensory deficit  Motor: No weakness         Sonali Wolff MD  "

## 2023-06-05 NOTE — PATIENT INSTRUCTIONS
Osteoporosis Education   Osteoporosis  is a long-term medical condition that causes your bones to become weak, brittle, and more likely to fracture  Osteoporosis occurs when your body absorbs more bone than it makes  It is also caused by a lack of calcium and estrogen (female hormone)  Common symptoms include the following: You may not have any signs or symptoms  You may break a bone after a muscle strain, bump, or fall  A break usually occurs in the hip, spine, or wrist  A collapsed vertebra (bone in your spine) may cause severe back pain or loss of height from bent posture  Call your doctor if:   ·  You have severe pain  ·  You have increasing pain after a fall  ·  You have pain when you do your daily activities  ·  You have questions or concerns about your condition or care  Diagnosis of osteoporosis:   · Blood and urine tests  measure your calcium, vitamin D, and estrogen levels  · An x-ray or CT may show thinned bones or a fracture  You may be given contrast liquid to help the bones show up better in the pictures  Tell the healthcare provider if you have ever had an allergic reaction to contrast liquid  Do not enter the MRI room with anything metal  Metal can cause serious injury  Tell the healthcare provider if you have any metal in or on your body  · A bone density test  compares your bone thickness with what is expected for someone of your age, gender, and ethnicity  Treatment for osteoporosis may include medicines to prevent bone loss, build new bone, and increase estrogen  These medicines help prevent fractures and may be given as a pill or injection  Ask your healthcare provider for more information on these medicines  Prevent bone loss:  · Eat healthy foods that are high in calcium  This helps keep your bones strong  Good sources of calcium are milk, cheese, broccoli, tofu, almonds, and canned salmon and sardines  Recommended to get at least 1200mg daily of calcium    · Increase your "vitamin D intake  Vitamin D is in fish oils, some vegetables, and fortified milk, cereal, and bread  Vitamin D is also formed in the skin when it is exposed to the sun  Ask your healthcare provider how much sunlight is safe for you  You will require at least 800 units of vitamin D daily taken as a supplement  · Drink liquids as directed  Ask your healthcare provider how much liquid to drink each day and which liquids are best for you  Do not have alcohol or caffeine  They decrease bone mineral density, which can weaken your bones  · Exercise regularly  Ask your healthcare provider about the best exercise plan for you  Weight bearing exercise for 30 minutes, 3 times a week can help build and strengthen bone  · Do not smoke  Nicotine and other chemicals in cigarettes and cigars can cause lung damage  Ask your healthcare provider for information if you currently smoke and need help to quit  E-cigarettes or smokeless tobacco still contain nicotine  Talk to your healthcare provider before you use these products  · Go to physical therapy as directed  A physical therapist teaches you exercises to help improve movement and muscle strength  · Alcohol  It is recommended to avoid heavy alcohol use as increased consumption of alcohol is known to cause bone loss  © Copyright [employer] 2021 Information is for End User's use only and may not be sold, redistributed or otherwise used for commercial purposes  All illustrations and images included in CareNotes® are the copyrighted property of [employer]  or [facility]    Calcium and vitamin D for bone health (Beyond the Basics)    CALCIUM AND VITAMIN D OVERVIEW  Osteoporosis is a common bone disorder that causes a progressive loss in bone density and mass  As a result, bones become thin, weakened, and easily fractured   It is estimated that more than 1 3 million osteoporosis-associated (or \"osteoporotic\") fractures occur every year in the [place], " primarily of bone within the spine (the vertebrae), the hip, and the forearm near the wrist  =    A number of treatments can help to prevent loss of bone and treat low bone mass  However, the first step in preventing or treating osteoporosis is to consume foods and drinks that provide calcium, a mineral essential for bone strength, and vitamin D, which aids in calcium breakdown and absorption  =    CALCIUM AND VITAMIN D BENEFITS  Good nutrition is important at all ages to keep the bones healthy  · Taking calcium reduces bone loss and decreases the risk of fracturing the vertebrae (the bones that surround the spinal cord)  · Consuming calcium during childhood (eg, in milk) can lead to higher bone mass in adulthood  This increase in bone density can reduce the risk of fractures later in life  · Calcium may also have benefits in other body systems by reducing blood pressure and cholesterol levels  · Calcium and vitamin D supplements may help prevent tooth loss in older adults  RECOMMENDATIONS FOR CALCIUM    General recommendations -- Premenopausal women and men should consume at least 1000 mg of calcium, while postmenopausal women should consume 1200 mg (total diet plus supplement)  You should not consume more than 2000 mg of calcium per day (total diet plus supplement) due to the risk of side effects  Calcium in the diet -- The primary sources of calcium in the diet include milk and other dairy products, such as hard cheese, cottage cheese, or yogurt, as well as green vegetables, such as kale and broccoli (table 1)  Some cereals, soy products, and fruit juices are fortified with calcium  Calcium supplements -- The body is able to absorb calcium contained in supplements as well as from dietary sources  If it is not possible to get enough calcium from dietary sources, consult a health care provider to determine the best type, dose, and timing of calcium supplements       · Calcium carbonate is effective and is the least expensive form of calcium  It is best absorbed with a low-iron meal (such as breakfast)  Calcium carbonate may not be absorbed well in people who also take a specific medication for gastroesophageal reflux (called a proton pump inhibitor or H2 blocker), which blocks stomach acid  · Many natural calcium carbonate preparations such as oyster shells or bone meal contain some lead  · Calcium citrate is well absorbed in the fasting state as well as with a meal   · Calcium doses above 500 mg are not absorbed as well as smaller doses, so large doses of supplements should be taken in divided doses (eg, in the morning and evening)  · Calcium supplements do not replace other osteoporosis treatments such as hormone replacement, bisphosphonates (eg, risedronate [sample brand name: Actonel] and alendronate [brand name: Fosamax]), and raloxifene (brand name: Evista)  Calcium and vitamin D supplements alone are usually insufficient to prevent age-related bone loss, although they may be beneficial in some subgroups (older adults, those with very low intake)  In most patients with or at risk for osteoporosis, the addition of medication or hormonal therapy is necessary in order to slow the breakdown and removal of bone (ie, resorption)  Underlying gastrointestinal diseases -- Patients who do not adequately absorb nutrients from the gastrointestinal tract (due to malabsorption) may require more than 1000 to 1200 mg of calcium per day  In such cases, a health care provider can help to determine the optimal dose of calcium  Medications -- All medications should be discussed with a health care provider to ensure that possible interactions with calcium are identified  Certain medications change the amount of calcium that is absorbed and/or excreted  As an example, loop diuretics (eg, furosemide [sample brand name: Lasix]) increase the amount of calcium excreted in the urine      On the other hand, thiazide diuretics (eg, hydrochlorothiazide) can reduce levels of calcium in the urine, potentially reducing the risk of bone loss and kidney stones  Side effects of calcium -- Calcium is usually easily tolerated when it is taken in divided doses several times per day  Some people experience side effects related to calcium, including constipation and indigestion  Calcium supplements interfere with the absorption of iron and thyroid hormone, and therefore, these medications should be taken at different times  Kidney stones -- There is no evidence that consuming large amounts of calcium (from foods and drinks) increases the risk of kidney stones, or that avoiding dietary calcium decreases the risk  In fact, avoiding dairy products is likely to increase the risk of kidney stones  However, use of calcium supplements may increase the risk of kidney stones in susceptible individuals by raising the level of calcium in the urine  This is particularly true if the supplement is taken between meals or at bedtime, and this is one of the reasons we prefer for patients to get as much of their calcium requirement through dietary means  IMPORTANCE OF VITAMIN D  Vitamin D decreases bone loss and lowers the risk of fracture, especially in older men and women  Along with calcium, vitamin D also helps to prevent and treat osteoporosis  To absorb calcium efficiently, an adequate amount of vitamin D must be present  Vitamin D is normally made in the skin after exposure to sunlight  Recommendations for vitamin D -- The current recommendation is that men over 70 years and postmenopausal women consume at least 800 international units (20 micrograms) of vitamin D per day  Lower levels of vitamin D are not as effective, while high doses can be toxic, especially if taken for long periods of time   Although the optimal intake has not been clearly established in premenopausal women or in younger men with osteoporosis, 600 international units (15 micrograms) of vitamin D daily is generally suggested  Vitamin D is available as an individual supplement and is included in most multivitamins and some calcium supplements (table 2)  Milk is a relatively good dietary source of vitamin D, with approximately 100 international units (2 5 micrograms) per cup (240 mL), and salmon has 800 to 1000 international units (20 to 25 micrograms) of vitamin D per serving  Most healthy people don't need to check with their health care provider before taking standard doses of vitamin D and don't need monitoring of vitamin D levels if they are not being treated for vitamin D deficiency  However, recommendations for vitamin D supplementation may be different in people with certain underlying medical conditions, such as sarcoidosis or lymphoma  In these situations, a provider will determine if supplements are needed; if so, the person's vitamin D and calcium levels should be monitored with regular tests  ©6239 [employer], [address] rights reserved

## 2023-06-12 DIAGNOSIS — M31.6 TEMPORAL ARTERITIS (HCC): ICD-10-CM

## 2023-06-12 RX ORDER — PREDNISONE 2.5 MG/1
TABLET ORAL
Qty: 30 TABLET | Refills: 0 | Status: SHIPPED | OUTPATIENT
Start: 2023-06-12

## 2023-06-26 DIAGNOSIS — M31.6 TEMPORAL ARTERITIS (HCC): ICD-10-CM

## 2023-06-26 RX ORDER — PREDNISONE 2.5 MG/1
TABLET ORAL
Qty: 30 TABLET | Refills: 0 | Status: SHIPPED | OUTPATIENT
Start: 2023-06-26

## 2023-07-31 DIAGNOSIS — J44.1 ACUTE EXACERBATION OF CHRONIC OBSTRUCTIVE PULMONARY DISEASE (COPD) (HCC): ICD-10-CM

## 2023-07-31 DIAGNOSIS — M31.6 TEMPORAL ARTERITIS (HCC): ICD-10-CM

## 2023-07-31 RX ORDER — FLUTICASONE FUROATE, UMECLIDINIUM BROMIDE AND VILANTEROL TRIFENATATE 100; 62.5; 25 UG/1; UG/1; UG/1
POWDER RESPIRATORY (INHALATION)
Qty: 3 EACH | Refills: 3 | Status: SHIPPED | OUTPATIENT
Start: 2023-07-31

## 2023-07-31 RX ORDER — PREDNISONE 2.5 MG/1
TABLET ORAL
Qty: 30 TABLET | Refills: 0 | Status: SHIPPED | OUTPATIENT
Start: 2023-07-31

## 2023-08-21 DIAGNOSIS — M31.6 TEMPORAL ARTERITIS (HCC): ICD-10-CM

## 2023-08-21 RX ORDER — PREDNISONE 2.5 MG/1
TABLET ORAL
Qty: 30 TABLET | Refills: 0 | Status: SHIPPED | OUTPATIENT
Start: 2023-08-21

## 2023-08-28 DIAGNOSIS — R53.83 OTHER FATIGUE: ICD-10-CM

## 2023-08-28 RX ORDER — ALBUTEROL SULFATE 90 UG/1
2 AEROSOL, METERED RESPIRATORY (INHALATION) EVERY 6 HOURS PRN
Qty: 6.7 G | Refills: 6 | Status: SHIPPED | OUTPATIENT
Start: 2023-08-28

## 2023-09-11 DIAGNOSIS — M31.6 TEMPORAL ARTERITIS (HCC): ICD-10-CM

## 2023-09-11 RX ORDER — PREDNISONE 2.5 MG/1
TABLET ORAL
Qty: 30 TABLET | Refills: 0 | Status: SHIPPED | OUTPATIENT
Start: 2023-09-11 | End: 2023-09-18 | Stop reason: SDUPTHER

## 2023-09-15 ENCOUNTER — OFFICE VISIT (OUTPATIENT)
Dept: DENTISTRY | Facility: CLINIC | Age: 78
End: 2023-09-15

## 2023-09-15 VITALS — SYSTOLIC BLOOD PRESSURE: 174 MMHG | HEART RATE: 105 BPM | TEMPERATURE: 98 F | DIASTOLIC BLOOD PRESSURE: 71 MMHG

## 2023-09-15 DIAGNOSIS — K08.531 FRACTURED DENTAL RESTORATION WITH LOSS OF MATERIAL: Primary | ICD-10-CM

## 2023-09-15 PROCEDURE — D0220 INTRAORAL - PERIAPICAL FIRST RADIOGRAPHIC IMAGE: HCPCS

## 2023-09-15 PROCEDURE — D0140 LIMITED ORAL EVALUATION - PROBLEM FOCUSED: HCPCS

## 2023-09-15 NOTE — DENTAL PROCEDURE DETAILS
Quintin Hicks is a 67 y/o F that presents for an emergency visit. PMH reviewed: ASA III. CC: "a part of my filling on my lower right fell out over the past weekend". Pain level: 6/10. Patient said she took tylenol for the pain which helped a little. Patient pointed to #30. PA and BW of #30 taken. Radiographic and clinical findings: #30 has a large DOL amalgam that is fractured. It also has a B amalgam restoration. No periapical pathology noted. Explained to patient that it is necessary to take the amalgam out and replace with composite restoration. Explained to patient that it might necessitate an RCT. The other option is extraction of #30. Patient said that she does not want an RCT. After discussion with Dr. Simone Maciel, it is unlikely that #30 would need an RCT and a composite restoration is the favorable treatment option. Patient emphasized she is interested in saving the tooth. Treatment planned #30-DOL resin for the next appointment. Smoothened the existing amalgam on #30 until it was comfortable for the patient. Patient left satisfied and ambulatory.    Attending: Dr. Simone Maciel   NV: #30-DOL resin  NVV: comprehensive exam

## 2023-09-18 ENCOUNTER — OFFICE VISIT (OUTPATIENT)
Dept: FAMILY MEDICINE CLINIC | Facility: CLINIC | Age: 78
End: 2023-09-18
Payer: COMMERCIAL

## 2023-09-18 VITALS
TEMPERATURE: 98.4 F | OXYGEN SATURATION: 97 % | SYSTOLIC BLOOD PRESSURE: 124 MMHG | WEIGHT: 107.4 LBS | BODY MASS INDEX: 19.03 KG/M2 | HEART RATE: 99 BPM | HEIGHT: 63 IN | RESPIRATION RATE: 14 BRPM | DIASTOLIC BLOOD PRESSURE: 64 MMHG

## 2023-09-18 DIAGNOSIS — M31.6 TEMPORAL ARTERITIS (HCC): ICD-10-CM

## 2023-09-18 DIAGNOSIS — J44.9 STAGE 4 VERY SEVERE COPD BY GOLD CLASSIFICATION (HCC): ICD-10-CM

## 2023-09-18 DIAGNOSIS — J44.1 ACUTE EXACERBATION OF CHRONIC OBSTRUCTIVE PULMONARY DISEASE (COPD) (HCC): ICD-10-CM

## 2023-09-18 DIAGNOSIS — Z23 NEED FOR VACCINATION AGAINST STREPTOCOCCUS PNEUMONIAE USING PNEUMOCOCCAL CONJUGATE VACCINE 13: ICD-10-CM

## 2023-09-18 DIAGNOSIS — I50.9 HEART FAILURE, UNSPECIFIED HF CHRONICITY, UNSPECIFIED HEART FAILURE TYPE (HCC): ICD-10-CM

## 2023-09-18 DIAGNOSIS — Z23 FLU VACCINE NEED: ICD-10-CM

## 2023-09-18 DIAGNOSIS — R06.02 SOB (SHORTNESS OF BREATH): ICD-10-CM

## 2023-09-18 DIAGNOSIS — I73.9 PVD (PERIPHERAL VASCULAR DISEASE) (HCC): ICD-10-CM

## 2023-09-18 DIAGNOSIS — J43.9 PULMONARY EMPHYSEMA, UNSPECIFIED EMPHYSEMA TYPE (HCC): ICD-10-CM

## 2023-09-18 DIAGNOSIS — E11.69 HYPERLIPIDEMIA ASSOCIATED WITH TYPE 2 DIABETES MELLITUS: ICD-10-CM

## 2023-09-18 DIAGNOSIS — E78.5 HYPERLIPIDEMIA ASSOCIATED WITH TYPE 2 DIABETES MELLITUS: ICD-10-CM

## 2023-09-18 DIAGNOSIS — Z00.01 ENCOUNTER FOR GENERAL ADULT MEDICAL EXAMINATION WITH ABNORMAL FINDINGS: Primary | ICD-10-CM

## 2023-09-18 PROBLEM — E44.1 MILD PROTEIN-CALORIE MALNUTRITION (HCC): Status: RESOLVED | Noted: 2023-06-05 | Resolved: 2023-09-18

## 2023-09-18 LAB — SL AMB POCT HEMOGLOBIN AIC: 6.1 (ref ?–6.5)

## 2023-09-18 PROCEDURE — G0009 ADMIN PNEUMOCOCCAL VACCINE: HCPCS

## 2023-09-18 PROCEDURE — 99214 OFFICE O/P EST MOD 30 MIN: CPT | Performed by: INTERNAL MEDICINE

## 2023-09-18 PROCEDURE — 90677 PCV20 VACCINE IM: CPT

## 2023-09-18 PROCEDURE — 90686 IIV4 VACC NO PRSV 0.5 ML IM: CPT

## 2023-09-18 PROCEDURE — 83036 HEMOGLOBIN GLYCOSYLATED A1C: CPT | Performed by: INTERNAL MEDICINE

## 2023-09-18 PROCEDURE — G0439 PPPS, SUBSEQ VISIT: HCPCS | Performed by: INTERNAL MEDICINE

## 2023-09-18 PROCEDURE — G0008 ADMIN INFLUENZA VIRUS VAC: HCPCS

## 2023-09-18 RX ORDER — IPRATROPIUM BROMIDE AND ALBUTEROL SULFATE 2.5; .5 MG/3ML; MG/3ML
3 SOLUTION RESPIRATORY (INHALATION) 4 TIMES DAILY
Qty: 120 ML | Refills: 4 | Status: SHIPPED | OUTPATIENT
Start: 2023-09-18

## 2023-09-18 RX ORDER — PREDNISONE 10 MG/1
TABLET ORAL
Qty: 20 TABLET | Refills: 0 | Status: SHIPPED | OUTPATIENT
Start: 2023-09-18

## 2023-09-18 RX ORDER — METHYLPREDNISOLONE SODIUM SUCCINATE 125 MG/2ML
125 INJECTION, POWDER, LYOPHILIZED, FOR SOLUTION INTRAMUSCULAR; INTRAVENOUS ONCE
Status: COMPLETED | OUTPATIENT
Start: 2023-09-18 | End: 2023-09-18

## 2023-09-18 RX ORDER — BUDESONIDE, GLYCOPYRROLATE, AND FORMOTEROL FUMARATE 160; 9; 4.8 UG/1; UG/1; UG/1
2 AEROSOL, METERED RESPIRATORY (INHALATION) 2 TIMES DAILY
Qty: 10.7 G | Refills: 3 | Status: SHIPPED | OUTPATIENT
Start: 2023-09-18

## 2023-09-18 RX ORDER — IPRATROPIUM BROMIDE AND ALBUTEROL SULFATE 2.5; .5 MG/3ML; MG/3ML
3 SOLUTION RESPIRATORY (INHALATION) ONCE
Status: COMPLETED | OUTPATIENT
Start: 2023-09-18 | End: 2023-09-18

## 2023-09-18 RX ORDER — PREDNISONE 2.5 MG/1
2.5 TABLET ORAL DAILY
Qty: 90 TABLET | Refills: 3 | Status: SHIPPED | OUTPATIENT
Start: 2023-09-18

## 2023-09-18 RX ORDER — BENZONATATE 100 MG/1
100 CAPSULE ORAL 3 TIMES DAILY PRN
Qty: 30 CAPSULE | Refills: 0 | Status: SHIPPED | OUTPATIENT
Start: 2023-09-18

## 2023-09-18 RX ADMIN — METHYLPREDNISOLONE SODIUM SUCCINATE 125 MG: 125 INJECTION, POWDER, LYOPHILIZED, FOR SOLUTION INTRAMUSCULAR; INTRAVENOUS at 11:54

## 2023-09-18 RX ADMIN — IPRATROPIUM BROMIDE AND ALBUTEROL SULFATE 3 ML: 2.5; .5 SOLUTION RESPIRATORY (INHALATION) at 11:54

## 2023-09-18 NOTE — PROGRESS NOTES
Name: Severo Spearman      :       MRN: [id number]  Encounter Provider: Aziza Wolf MD  Encounter Date: 2023   Encounter department: [address] [place] [address]     1. Encounter for general adult medical examination with abnormal findings  -     POCT hemoglobin A1c  -     Comprehensive metabolic panel; Future; Expected date: 2023  -     CBC and differential; Future; Expected date: 2023  -     Lipid Panel with Direct LDL reflex; Future; Expected date: 2023  -     TSH, 3rd generation with Free T4 reflex; Future; Expected date: 2023  -     Microalbumin, Random Urine (W/Creatinine) ([facility] ONLY); Future; Expected date: 2023    2. Hyperlipidemia associated with type 2 diabetes mellitus ([address])  Comments:  life style mod. continue same. rtc in 1-2 mos w blood work  Orders:  -     POCT hemoglobin A1c  -     Comprehensive metabolic panel; Future; Expected date: 2023  -     CBC and differential; Future; Expected date: 2023  -     Lipid Panel with Direct LDL reflex; Future; Expected date: 2023  -     TSH, 3rd generation with Free T4 reflex; Future; Expected date: 2023  -     Microalbumin, Random Urine (W/Creatinine) ([facility] ONLY); Future; Expected date: 2023  -     Echo complete w/ contrast if indicated; Future; Expected date: 2023    3. Pulmonary emphysema, unspecified emphysema type ([address])  Comments:  is getting worse. change trelegy to breztri. use neb tx q 6 h at home. cxr. pfts, consider pulmonary consult. she might need home O2. ..    4. Temporal arteritis ([address])  Comments:  stable continue prednisone 2.5 mg daily  Orders:  -     Ferritin; Future  -     Sedimentation rate, automated; Future  -     C-reactive protein; Future  -     Magnesium; Future  -     UA (URINE) with reflex to Scope; Future; Expected date: 2023  -     predniSONE 2.5 mg tablet; Take 1 tablet (2.5 mg total) by mouth daily    5.  PVD (peripheral vascular disease) ([address])  Comments:  stable. continue same . fu w vascular    6. Heart failure, unspecified HF chronicity, unspecified heart failure type ([address])  Comments:  stable. continue same . fu w cardiology. rtc in 1 mo w echo and blood work  Orders:  -     Echo complete w/ contrast if indicated; Future; Expected date: 09/18/2023    7. Stage 4 very severe COPD by GOLD classification ([address] [facility])  Comments:  as above. .  Orders:  -     ipratropium-albuterol (DUO-NEB) 0.5-2.5 mg/3 mL nebulizer solution; Take 3 mL by nebulization 4 (four) times a day    8. Acute exacerbation of chronic obstructive pulmonary disease (COPD) ([facility])  Comments:  Prednisone 10 mg. .., tessalon Pearles. .  Orders:  -     XR chest pa & lateral; Future; Expected date: 09/18/2023  -     Complete PFT with post bronchodilator; Future  -     Budeson-Glycopyrrol-Formoterol (Breztri Aerosphere) 160-9-4.8 MCG/ACT AERO; Inhale 2 puffs 2 (two) times a day Rinse mouth after use. Please Stop Trelegy  -     predniSONE 10 mg tablet; Take 3 tabs daily with breakfast for 3 days then Take 2 tabs daily for 3 Days then take one tab daily for 3 days then stop. .  -     methylPREDNISolone sodium succinate (Solu-MEDROL) injection 125 mg  -     ipratropium-albuterol (DUO-NEB) 0.5-2.5 mg/3 mL inhalation solution 3 mL  -     benzonatate (TESSALON PERLES) 100 mg capsule; Take 1 capsule (100 mg total) by mouth 3 (three) times a day as needed for cough    9. SOB (shortness of breath)  -     XR chest pa & lateral; Future; Expected date: 09/18/2023  -     Complete PFT with post bronchodilator; Future  -     Budeson-Glycopyrrol-Formoterol (Breztri Aerosphere) 160-9-4.8 MCG/ACT AERO; Inhale 2 puffs 2 (two) times a day Rinse mouth after use. Please Stop Trelegy  -     Ferritin; Future  -     Ferritin; Future  -     Sedimentation rate, automated; Future  -     C-reactive protein; Future  -     Magnesium; Future  -     UA (URINE) with reflex to Scope;  Future; Expected date: 09/25/2023  -     Echo complete w/ contrast if indicated; Future; Expected date: 09/18/2023    10. Flu vaccine need  -     influenza vaccine, quadrivalent, 0.5 mL, preservative-free, for adult and pediatric patients 6 mos+ (AFLURIA, FLUARIX, FLULAVAL, FLUZONE)    11. Need for vaccination against Streptococcus pneumoniae using pneumococcal conjugate vaccine 13  -     Pneumococcal Conjugate Vaccine 20-valent (Pcv20)    consider Pulmonary  FU w cardiology  RTc in 1-2 mos w Blood work  AWV; Done in Detail. .. Subjective      [address] is here for AWV and Regular check Up, she has increasing SOB lately, No recent Blood work, med list reviewed w  Pt. .. Review of Systems   Constitutional: Positive for fatigue. Negative for chills and fever. HENT: Positive for postnasal drip. Negative for congestion, facial swelling, sore throat, trouble swallowing and voice change. Eyes: Negative for pain, discharge and visual disturbance. Respiratory: Positive for cough and shortness of breath. Negative for wheezing. Cardiovascular: Negative for chest pain, palpitations and leg swelling. Gastrointestinal: Negative for abdominal pain, blood in stool, constipation, diarrhea and nausea. Endocrine: Negative for polydipsia, polyphagia and polyuria. Genitourinary: Negative for difficulty urinating, hematuria and urgency. Musculoskeletal: Negative for arthralgias and myalgias. Skin: Negative for rash. Neurological: Positive for dizziness. Negative for tremors, weakness and headaches. Hematological: Negative for adenopathy. Does not bruise/bleed easily. Psychiatric/Behavioral: Negative for dysphoric mood, sleep disturbance and suicidal ideas.        Current Outpatient Medications on File Prior to Visit   Medication Sig   • albuterol (PROVENTIL HFA,VENTOLIN HFA) 90 mcg/act inhaler Inhale 2 puffs every 6 (six) hours as needed for wheezing or shortness of breath   • albuterol (PROVENTIL HFA,VENTOLIN HFA) 90 mcg/act inhaler INHALE 2 PUFFS BY MOUTH EVERY 6 HOURS AS NEEDED FOR WHEEZING   • aspirin (ECOTRIN LOW STRENGTH) 81 mg EC tablet Take 1 tablet (81 mg total) by mouth daily   • azelastine (ASTELIN) 0.1 % nasal spray 1 spray into each nostril 2 (two) times a day Use in each nostril as directed   • b complex-C-E-zinc (STRESS FORMULA/ZINC) tablet Take 1 tablet by mouth daily   • Biotin 10 MG TABS Take by mouth in the morning   • Calcium Carb-Cholecalciferol (Caltrate 600+D3) 600-20 MG-MCG TABS Take 1 tablet by mouth 2 (two) times a day   • cetirizine (ZyrTEC) 10 mg tablet Take 1 tablet (10 mg total) by mouth daily With food/in the evening   • ipratropium (ATROVENT) 0.03 % nasal spray 2 sprays into each nostril every 12 (twelve) hours   • levothyroxine (Synthroid) 50 mcg tablet Take 1 tablet (50 mcg total) by mouth daily in the early morning   • pantoprazole (PROTONIX) 40 mg tablet Take 1 tablet (40 mg total) by mouth daily   • valsartan (DIOVAN) 160 mg tablet Take 1 tablet (160 mg total) by mouth daily Please STOP Lisinopril,. .   • vitamin B-12 (VITAMIN B-12) 1,000 mcg tablet Take 1 tablet (1,000 mcg total) by mouth daily   • [DISCONTINUED] predniSONE 2.5 mg tablet TAKE ONE TABLET BY MOUTH ONCE DAILY   • [DISCONTINUED] Trelegy Ellipta 100-62.5-25 MCG/ACT inhaler INHALE 1 PUFF BY MOUTH ONCE DAILY AND RINSE MOUTH AFTER USE   • acetaminophen (TYLENOL) 500 mg tablet Take 1 tablet (500 mg total) by mouth every 6 (six) hours as needed (pain fevers) (Patient not taking: Reported on 1/30/2023)   • [DISCONTINUED] ipratropium-albuterol (DUO-NEB) 0.5-2.5 mg/3 mL nebulizer solution Take 3 mL by nebulization 4 (four) times a day (Patient not taking: Reported on 1/30/2023)       Objective     /64 (BP Location: Left arm, Patient Position: Sitting, Cuff Size: Standard)   Pulse 99   Temp 98.4 °F (36.9 °C) (Tympanic)   Resp 14   Ht 5' 3" (1.6 m)   Wt 48.7 kg (107 lb 6.4 oz)   SpO2 97%   BMI 19.03 kg/m²     Physical Exam  Constitutional:       General: She is not in acute distress. HENT:      Head: Normocephalic. Mouth/Throat:      Pharynx: No oropharyngeal exudate. Eyes:      General: No scleral icterus. Conjunctiva/sclera: Conjunctivae normal.      Pupils: Pupils are equal, round, and reactive to light. Neck:      Thyroid: No thyromegaly. Cardiovascular:      Rate and Rhythm: Normal rate and regular rhythm. Heart sounds: Normal heart sounds. No murmur heard. Pulmonary:      Effort: Pulmonary effort is normal. No respiratory distress. Breath sounds: Wheezing present. No rales. Comments: There is No enough air going in and out. .. Abdominal:      General: Bowel sounds are normal. There is no distension. Palpations: Abdomen is soft. Tenderness: There is no abdominal tenderness. There is no guarding or rebound. Musculoskeletal:         General: No tenderness. Cervical back: Neck supple. Lymphadenopathy:      Cervical: No cervical adenopathy. Skin:     Coloration: Skin is not pale. Findings: No rash. Neurological:      Mental Status: She is alert and oriented to person, place, and time. Sensory: No sensory deficit. Motor: No weakness.        Justine Mahmood MD

## 2023-09-18 NOTE — PROGRESS NOTES
BMI Counseling: Body mass index is 19.03 kg/m². The BMI is Under Normal,. Pt was advised to increase Po calories intake. Sabi Ho

## 2023-09-18 NOTE — PATIENT INSTRUCTIONS
Medicare Preventive Visit Patient Instructions  Thank you for completing your Welcome to Medicare Visit or Medicare Annual Wellness Visit today. Your next wellness visit will be due in one year (9/18/2024). The screening/preventive services that you may require over the next 5-10 years are detailed below. Some tests may not apply to you based off risk factors and/or age. Screening tests ordered at today's visit but not completed yet may show as past due. Also, please note that scanned in results may not display below. Preventive Screenings:  Service Recommendations Previous Testing/Comments   Colorectal Cancer Screening  * Colonoscopy    * Fecal Occult Blood Test (FOBT)/Fecal Immunochemical Test (FIT)  * Fecal DNA/Cologuard Test  * Flexible Sigmoidoscopy Age: 43-73 years old   Colonoscopy: every 10 years (may be performed more frequently if at higher risk)  OR  FOBT/FIT: every 1 year  OR  Cologuard: every 3 years  OR  Sigmoidoscopy: every 5 years  Screening may be recommended earlier than age 39 if at higher risk for colorectal cancer. Also, an individualized decision between you and your healthcare provider will decide whether screening between the ages of 77-80 would be appropriate. Colonoscopy: 02/01/2018  FOBT/FIT: Not on file  Cologuard: Not on file  Sigmoidoscopy: Not on file          Breast Cancer Screening Age: 36 years old  Frequency: every 1-2 years  Not required if history of left and right mastectomy Mammogram: 06/22/2017        Cervical Cancer Screening Between the ages of 21-29, pap smear recommended once every 3 years. Between the ages of 32-69, can perform pap smear with HPV co-testing every 5 years.    Recommendations may differ for women with a history of total hysterectomy, cervical cancer, or abnormal pap smears in past. Pap Smear: Not on file    Screening Not Indicated   Hepatitis C Screening Once for adults born between [address] [place] [address]  More frequently in patients at high risk for Hepatitis C Hep C Antibody: 02/28/2019    Screening Current   Diabetes Screening 1-2 times per year if you're at risk for diabetes or have pre-diabetes Fasting glucose: 93 mg/dL (6/3/2023)  A1C: 5.8 (6/5/2023)  Screening Not Indicated  History Diabetes   Cholesterol Screening Once every 5 years if you don't have a lipid disorder. May order more often based on risk factors. Lipid panel: 06/03/2023    Screening Not Indicated  History Lipid Disorder     Other Preventive Screenings Covered by Medicare:  1. Abdominal Aortic Aneurysm (AAA) Screening: covered once if your at risk. You're considered to be at risk if you have a family history of AAA. 2. Lung Cancer Screening: covers low dose CT scan once per year if you meet all of the following conditions: (1) Age 48-67; (2) No signs or symptoms of lung cancer; (3) Current smoker or have quit smoking within the last 15 years; (4) You have a tobacco smoking history of at least 20 pack years (packs per day multiplied by number of years you smoked); (5) You get a written order from a healthcare provider. 3. Glaucoma Screening: covered annually if you're considered high risk: (1) You have diabetes OR (2) Family history of glaucoma OR (3)  aged 48 and older OR (3)  American aged 72 and older  3. Osteoporosis Screening: covered every 2 years if you meet one of the following conditions: (1) You're estrogen deficient and at risk for osteoporosis based off medical history and other findings; (2) Have a vertebral abnormality; (3) On glucocorticoid therapy for more than 3 months; (4) Have primary hyperparathyroidism; (5) On osteoporosis medications and need to assess response to drug therapy. · Last bone density test (DXA Scan): 01/22/2020.  5. HIV Screening: covered annually if you're between the age of 15-65. Also covered annually if you are younger than 13 and older than 72 with risk factors for HIV infection.  For pregnant patients, it is covered up to 3 times per pregnancy. Immunizations:  Immunization Recommendations   Influenza Vaccine Annual influenza vaccination during flu season is recommended for all persons aged >= 6 months who do not have contraindications   Pneumococcal Vaccine   * Pneumococcal conjugate vaccine = PCV13 (Prevnar 13), PCV15 (Vaxneuvance), PCV20 (Prevnar 20)  * Pneumococcal polysaccharide vaccine = PPSV23 (Pneumovax) Adults 20-63 years old: 1-3 doses may be recommended based on certain risk factors  Adults 72 years old: 1-2 doses may be recommended based off what pneumonia vaccine you previously received   Hepatitis B Vaccine 3 dose series if at intermediate or high risk (ex: diabetes, end stage renal disease, liver disease)   Tetanus (Td) Vaccine - COST NOT COVERED BY MEDICARE PART B Following completion of primary series, a booster dose should be given every 10 years to maintain immunity against tetanus. Td may also be given as tetanus wound prophylaxis. Tdap Vaccine - COST NOT COVERED BY MEDICARE PART B Recommended at least once for all adults. For pregnant patients, recommended with each pregnancy. Shingles Vaccine (Shingrix) - COST NOT COVERED BY MEDICARE PART B  2 shot series recommended in those aged 48 and above     Health Maintenance Due:      Topic Date Due   • Breast Cancer Screening: Mammogram  06/22/2019   • Lung Cancer Screening  02/06/2024   • Hepatitis C Screening  Completed   • Colorectal Cancer Screening  Discontinued     Immunizations Due:      Topic Date Due   • COVID-19 Vaccine (3 - Moderna series) 05/03/2021   • Influenza Vaccine (1) 09/01/2023     Advance Directives   What are advance directives? Advance directives are legal documents that state your wishes and plans for medical care. These plans are made ahead of time in case you lose your ability to make decisions for yourself. Advance directives can apply to any medical decision, such as the treatments you want, and if you want to donate organs.    What are the types of advance directives? There are many types of advance directives, and each state has rules about how to use them. You may choose a combination of any of the following:  · Living will: This is a written record of the treatment you want. You can also choose which treatments you do not want, which to limit, and which to stop at a certain time. This includes surgery, medicine, IV fluid, and tube feedings. · Durable power of  for healthcare [facility]): This is a written record that states who you want to make healthcare choices for you when you are unable to make them for yourself. This person, called a proxy, is usually a family member or a friend. You may choose more than 1 proxy. · Do not resuscitate (DNR) order:  A DNR order is used in case your heart stops beating or you stop breathing. It is a request not to have certain forms of treatment, such as CPR. A DNR order may be included in other types of advance directives. · Medical directive: This covers the care that you want if you are in a coma, near death, or unable to make decisions for yourself. You can list the treatments you want for each condition. Treatment may include pain medicine, surgery, blood transfusions, dialysis, IV or tube feedings, and a ventilator (breathing machine). · Values history: This document has questions about your views, beliefs, and how you feel and think about life. This information can help others choose the care that you would choose. Why are advance directives important? An advance directive helps you control your care. Although spoken wishes may be used, it is better to have your wishes written down. Spoken wishes can be misunderstood, or not followed. Treatments may be given even if you do not want them. An advance directive may make it easier for your family to make difficult choices about your care.        © Copyright [employer] 2018 Information is for End User's use only and may not be sold, redistributed or otherwise used for commercial purposes.  All illustrations and images included in CareNotes® are the copyrighted property of [employer].D.A[employer]M., Inc. or [address] [place] [address]

## 2023-09-18 NOTE — PROGRESS NOTES
Assessment and Plan:     Problem List Items Addressed This Visit    None       Preventive health issues were discussed with patient, and age appropriate screening tests were ordered as noted in patient's After Visit Summary. Personalized health advice and appropriate referrals for health education or preventive services given if needed, as noted in patient's After Visit Summary.      History of Present Illness:     Patient presents for a Medicare Wellness Visit    HPI   Patient Care Team:  Adrienne Gillis MD as PCP - General (Internal Medicine)     Review of Systems:     Review of Systems     Problem List:     Patient Active Problem List   Diagnosis   • Chronic obstructive pulmonary disease ([address])   • Vitamin D deficiency   • Other specified hypothyroidism   • Gastroesophageal reflux disease without esophagitis   • Rectus sheath hematoma, initial encounter   • Hypertension   • Osteoarthritis   • Temporal arteritis ([address])   • Raynaud's phenomenon without gangrene   • Postnasal drip   • Encounter for screening for malignant neoplasm of lung in former smoker who quit in past 15 years with 30 pack year history or greater   • Hyperlipidemia associated with type 2 diabetes mellitus ([address])   • PVD (peripheral vascular disease) ([address])   • Numbness and tingling of both feet   • Mild protein-calorie malnutrition ([address])   • Heart failure, unspecified HF chronicity, unspecified heart failure type [facility])      Past Medical and Surgical History:     Past Medical History:   Diagnosis Date   • Asthma    • Chronic obstructive pulmonary disease ([address]) 9/26/2012   • GERD (gastroesophageal reflux disease)    • Hypertension 10/11/2018   • Hypothyroid    • Osteoarthritis 9/26/2012   • Temporal arteritis ([address])    • Tubular adenoma    • Type 2 diabetes mellitus with complication, without long-term current use of insulin ([address]) 1/14/2020     Past Surgical History:   Procedure Laterality Date   • EYE SURGERY Right 12/06/2019    cataract LVCFS   • HYSTERECTOMY     • NO PAST SURGERIES      ALSO NO RELEVANT PAST SURRGICAL HX AS PER [facility]      Family History:     Family History   Problem Relation Age of Onset   • Breast cancer Mother    • Emphysema Father       Social History:     Social History     Socioeconomic History   • Marital status: Legally      Spouse name: Not on file   • Number of children: Not on file   • Years of education: Not on file   • Highest education level: Not on file   Occupational History   • Occupation: retired   Tobacco Use   • Smoking status: Former     Packs/day: 1.00     Years: 35.00     Total pack years: 35.00     Types: Cigarettes     Quit date: 2013     Years since quitting: 10.7   • Smokeless tobacco: Never   • Tobacco comments:     TOBACCO USE, NO PASSIVE SMOKE EXPOSURE   Vaping Use   • Vaping Use: Never used   Substance and Sexual Activity   • Alcohol use: No   • Drug use: No   • Sexual activity: Not Currently   Other Topics Concern   • Not on file   Social History Narrative   • Not on file     Social Determinants of Health     Financial Resource Strain: Low Risk  (3/2/2021)    Overall Financial Resource Strain (CARDIA)    • Difficulty of Paying Living Expenses: Not hard at all   Food Insecurity: No Food Insecurity (3/2/2021)    Hunger Vital Sign    • Worried About Running Out of Food in the Last Year: Never true    • Ran Out of Food in the Last Year: Never true   Transportation Needs: No Transportation Needs (3/2/2021)    PRAPARE - Transportation    • Lack of Transportation (Medical): No    • Lack of Transportation (Non-Medical):  No   Physical Activity: Unknown (1/7/2020)    Exercise Vital Sign    • Days of Exercise per Week: Patient refused    • Minutes of Exercise per Session: Patient refused   Stress: No Stress Concern Present (1/7/2020)    [address] [place] [address]    • Feeling of Stress : Not at all   Social Connections: Unknown (1/7/2020)    Social Connection and Isolation Panel [NHANES]    • Frequency of Communication with Friends and Family: Patient refused    • Frequency of Social Gatherings with Friends and Family: Patient refused    • Attends Cheondo[employer] Services: Patient refused    • Active Member of Clubs or Organizations: Patient refused    • Attends Club or Organization Meetings: Patient refused    • Marital Status: Patient refused   Intimate Partner Violence: Not At Risk (1/7/2020)    Humiliation, Afraid, Rape, and Kick questionnaire    • Fear of Current or Ex-Partner: No    • Emotionally Abused: No    • Physically Abused: No    • Sexually Abused: No   Housing Stability: Not on file      Medications and Allergies:     Current Outpatient Medications   Medication Sig Dispense Refill   • acetaminophen (TYLENOL) 500 mg tablet Take 1 tablet (500 mg total) by mouth every 6 (six) hours as needed (pain fevers) (Patient not taking: Reported on 1/30/2023) 30 tablet 0   • albuterol (PROVENTIL HFA,VENTOLIN HFA) 90 mcg/act inhaler Inhale 2 puffs every 6 (six) hours as needed for wheezing or shortness of breath 18 g 2   • albuterol (PROVENTIL HFA,VENTOLIN HFA) 90 mcg/act inhaler INHALE 2 PUFFS BY MOUTH EVERY 6 HOURS AS NEEDED FOR WHEEZING 6.7 g 6   • aspirin (ECOTRIN LOW STRENGTH) 81 mg EC tablet Take 1 tablet (81 mg total) by mouth daily 90 tablet 3   • azelastine (ASTELIN) 0.1 % nasal spray 1 spray into each nostril 2 (two) times a day Use in each nostril as directed 30 mL 3   • b complex-C-E-zinc (STRESS FORMULA/ZINC) tablet Take 1 tablet by mouth daily 90 tablet 3   • Biotin 10 MG TABS Take by mouth in the morning     • Calcium Carb-Cholecalciferol (Caltrate 600+D3) 600-20 MG-MCG TABS Take 1 tablet by mouth 2 (two) times a day 200 tablet 6   • cetirizine (ZyrTEC) 10 mg tablet Take 1 tablet (10 mg total) by mouth daily With food/in the evening 90 tablet 3   • ipratropium (ATROVENT) 0.03 % nasal spray 2 sprays into each nostril every 12 (twelve) hours 30 mL 6   • ipratropium-albuterol (DUO-NEB) 0.5-2.5 mg/3 mL nebulizer solution Take 3 mL by nebulization 4 (four) times a day (Patient not taking: Reported on 1/30/2023) 120 mL 4   • levothyroxine (Synthroid) 50 mcg tablet Take 1 tablet (50 mcg total) by mouth daily in the early morning 30 tablet 5   • pantoprazole (PROTONIX) 40 mg tablet Take 1 tablet (40 mg total) by mouth daily 90 tablet 3   • predniSONE 2.5 mg tablet TAKE ONE TABLET BY MOUTH ONCE DAILY 30 tablet 0   • Trelegy Ellipta 100-62.5-25 MCG/ACT inhaler INHALE 1 PUFF BY MOUTH ONCE DAILY AND RINSE MOUTH AFTER USE 3 each 3   • valsartan (DIOVAN) 160 mg tablet Take 1 tablet (160 mg total) by mouth daily Please STOP Lisinopril,. . 90 tablet 3   • vitamin B-12 (VITAMIN B-12) 1,000 mcg tablet Take 1 tablet (1,000 mcg total) by mouth daily 90 tablet 1     Current Facility-Administered Medications   Medication Dose Route Frequency Provider Last Rate Last Admin   • cyanocobalamin injection 1,000 mcg  1,000 mcg Intramuscular Q30 Days Romel Callejas MD   1,000 mcg at 01/14/20 1508     No Known Allergies   Immunizations:     Immunization History   Administered Date(s) Administered   • COVID-19 MODERNA VACC 0.5 ML IM 02/08/2021, 03/08/2021   • INFLUENZA 10/17/2007, 10/13/2010, 10/07/2015, 10/13/2016, 10/02/2017, 11/01/2018   • Influenza Split 10/04/2012, 09/23/2013   • Influenza Split High Dose Preservative Free IM 10/13/2016, 10/02/2017   • Influenza, high dose seasonal 0.7 mL 11/01/2018, 10/23/2019, 09/28/2020, 11/15/2021, 11/21/2022   • Influenza, seasonal, injectable 10/16/2014, 10/07/2015   • Pneumococcal Conjugate 13-Valent 10/02/2017   • Pneumococcal Polysaccharide PPV23 09/01/2011   • TD (adult) Preservative Free 11/13/2017   • Td (adult), adsorbed 11/13/2017      Health Maintenance:         Topic Date Due   • Breast Cancer Screening: Mammogram  06/22/2019   • Lung Cancer Screening  02/06/2024   • Hepatitis C Screening  Completed   • Colorectal Cancer Screening Discontinued         Topic Date Due   • COVID-19 Vaccine (3 - Moderna series) 05/03/2021   • Influenza Vaccine (1) 09/01/2023      Medicare Screening Tests and Risk Assessments: Marquez Diaz is here for her Subsequent Wellness visit. Health Risk Assessment:   Patient rates overall health as fair. Patient feels that their physical health rating is same. Patient is satisfied with their life. Eyesight was rated as same. Hearing was rated as same. Patient feels that their emotional and mental health rating is same. Patients states they are sometimes angry. Patient states they are sometimes unusually tired/fatigued. Pain experienced in the last 7 days has been none. Patient states that she has experienced no weight loss or gain in last 6 months. Depression Screening:   PHQ-2 Score: 0      Fall Risk Screening: In the past year, patient has experienced: no history of falling in past year      Urinary Incontinence Screening:   Patient has not leaked urine accidently in the last six months. Home Safety:  Patient does not have trouble with stairs inside or outside of their home. Patient has working smoke alarms and has working carbon monoxide detector. Home safety hazards include: none. Nutrition:   Current diet is Regular. Medications:   Patient is not currently taking any over-the-counter supplements. Patient is able to manage medications. Activities of Daily Living (ADLs)/Instrumental Activities of Daily Living (IADLs):   Walk and transfer into and out of bed and chair?: Yes  Dress and groom yourself?: Yes    Bathe or shower yourself?: Yes    Feed yourself?  Yes  Do your laundry/housekeeping?: Yes  Manage your money, pay your bills and track your expenses?: Yes  Make your own meals?: Yes    Do your own shopping?: Yes    Previous Hospitalizations:   Any hospitalizations or ED visits within the last 12 months?: No      Advance Care Planning:   Living will: No    Durable POA for healthcare: No    Advanced directive counseling given: No    Five wishes given: No      PREVENTIVE SCREENINGS      Cardiovascular Screening:    General: Screening Not Indicated, History Lipid Disorder and Risks and Benefits Discussed      Diabetes Screening:     General: Screening Not Indicated, History Diabetes, Risks and Benefits Discussed and Screening Current      Colorectal Cancer Screening:     General: Risks and Benefits Discussed      Breast Cancer Screening:     General: Risks and Benefits Discussed      Cervical Cancer Screening:    General: Screening Not Indicated and Risks and Benefits Discussed      Osteoporosis Screening:    General: Screening Not Indicated, History Osteoporosis and Risks and Benefits Discussed      Abdominal Aortic Aneurysm (AAA) Screening:        General: Risks and Benefits Discussed      Lung Cancer Screening:     General: Screening Not Indicated and Risks and Benefits Discussed      Hepatitis C Screening:    General: Screening Current and Risks and Benefits Discussed    Other Counseling Topics:   Car/seat belt/driving safety, skin self-exam, sunscreen and calcium and vitamin D intake and regular weightbearing exercise. No results found. Physical Exam:     There were no vitals taken for this visit.     Physical Exam     Remy Weinberg MD

## 2023-09-21 ENCOUNTER — OFFICE VISIT (OUTPATIENT)
Dept: DENTISTRY | Facility: CLINIC | Age: 78
End: 2023-09-21

## 2023-09-21 VITALS — HEART RATE: 84 BPM | DIASTOLIC BLOOD PRESSURE: 90 MMHG | TEMPERATURE: 97.5 F | SYSTOLIC BLOOD PRESSURE: 165 MMHG

## 2023-09-21 DIAGNOSIS — K08.50 DEFECTIVE DENTAL RESTORATION: Primary | ICD-10-CM

## 2023-09-21 PROCEDURE — D2391 RESIN-BASED COMPOSITE - 1 SURFACE, POSTERIOR: HCPCS | Performed by: DENTIST

## 2023-09-21 NOTE — DENTAL PROCEDURE DETAILS
Composite Filling #30 L    Ricki Gutiérrez presents for composite filling. Patient consent treatment. PMH reviewed, no changes. ASA: III  Paibn score: 0  Chief complain: "Chipped tooth, I know they told me may I need root canal but I would rather extract the tooth when needed". DX: Tooth #30 with extensive large MODBL old amalgam filling, gum recession around 4-5mm, no mobility, asymptomatic compromised tooth. Chipped lingual part of existing filling. Informed and demonstrated to patient. Informed patient tooth #30 eventually needs to be extracted but we can provide composite bonding filling to seal the chipped part for now. Patient agreed and approved. Applied topical benzocaine, administered half one carps 2% lido 1:100k epi via mandibular infiltration injection. Prepped tooth #30 L with 245 carbide on high speed. Isolation with cotton rolls and dri-angles    Etch with 37% H2PO4, rinse, dry. Applied Adhese with 20 second scrub once, gentle air dry and light cured for 10s. Restored with Tetric bulk jevon shade A3 and light cured. Refined with finishing burs, polished with enhance point. Verified occlusion and contacts. POI is given. Pt left satisfied and ambulatory.     NV: Prophy, COMP Exam and X-rays with Hyg.

## 2023-09-25 ENCOUNTER — HOSPITAL ENCOUNTER (OUTPATIENT)
Dept: RADIOLOGY | Facility: HOSPITAL | Age: 78
Discharge: HOME | End: 2023-09-25
Payer: COMMERCIAL

## 2023-09-25 ENCOUNTER — APPOINTMENT (OUTPATIENT)
Dept: LAB | Facility: HOSPITAL | Age: 78
End: 2023-09-25
Payer: COMMERCIAL

## 2023-09-25 ENCOUNTER — TELEPHONE (OUTPATIENT)
Dept: OTHER | Facility: OTHER | Age: 78
End: 2023-09-25

## 2023-09-25 DIAGNOSIS — J44.1 ACUTE EXACERBATION OF CHRONIC OBSTRUCTIVE PULMONARY DISEASE (COPD) (HCC): ICD-10-CM

## 2023-09-25 DIAGNOSIS — R06.02 SOB (SHORTNESS OF BREATH): ICD-10-CM

## 2023-09-25 DIAGNOSIS — M31.6 TEMPORAL ARTERITIS (HCC): ICD-10-CM

## 2023-09-25 LAB
BACTERIA UR QL AUTO: ABNORMAL /HPF
BILIRUB UR QL STRIP: NEGATIVE
CLARITY UR: CLEAR
COLOR UR: YELLOW
CRP SERPL QL: 2.2 MG/L
ERYTHROCYTE [SEDIMENTATION RATE] IN BLOOD: 17 MM/HOUR (ref 0–29)
FERRITIN SERPL-MCNC: 52 NG/ML (ref 11–307)
GLUCOSE UR STRIP-MCNC: NEGATIVE MG/DL
HGB UR QL STRIP.AUTO: 10
HYALINE CASTS #/AREA URNS LPF: ABNORMAL /LPF
KETONES UR STRIP-MCNC: NEGATIVE MG/DL
LEUKOCYTE ESTERASE UR QL STRIP: 100
MAGNESIUM SERPL-MCNC: 2.1 MG/DL (ref 1.9–2.7)
MUCOUS THREADS UR QL AUTO: ABNORMAL
NITRITE UR QL STRIP: NEGATIVE
NON-SQ EPI CELLS URNS QL MICRO: ABNORMAL /HPF
PH UR STRIP.AUTO: 7 [PH]
PROT UR STRIP-MCNC: NEGATIVE MG/DL
RBC #/AREA URNS AUTO: ABNORMAL /HPF
SP GR UR STRIP.AUTO: 1.01 (ref 1–1.04)
UROBILINOGEN UA: NEGATIVE MG/DL
WBC #/AREA URNS AUTO: ABNORMAL /HPF

## 2023-09-25 PROCEDURE — 85652 RBC SED RATE AUTOMATED: CPT

## 2023-09-25 PROCEDURE — 71046 X-RAY EXAM CHEST 2 VIEWS: CPT

## 2023-09-25 PROCEDURE — 36415 COLL VENOUS BLD VENIPUNCTURE: CPT

## 2023-09-25 PROCEDURE — 82728 ASSAY OF FERRITIN: CPT

## 2023-09-25 PROCEDURE — 86140 C-REACTIVE PROTEIN: CPT

## 2023-09-25 PROCEDURE — 83735 ASSAY OF MAGNESIUM: CPT

## 2023-09-25 PROCEDURE — 81001 URINALYSIS AUTO W/SCOPE: CPT

## 2023-09-25 NOTE — TELEPHONE ENCOUNTER
Patient calling to inquire about whether she needs to fast for am blood work. Advised patient that she does need to fast for some tests; ok to take medications unless directed otherwise by physician.

## 2023-09-26 ENCOUNTER — TELEPHONE (OUTPATIENT)
Dept: FAMILY MEDICINE CLINIC | Facility: CLINIC | Age: 78
End: 2023-09-26

## 2023-09-26 NOTE — TELEPHONE ENCOUNTER
Pt called stating that her nebulizer machine is not working properly I offered her to come to the office for us to check it but she stated that she called her insurance company and might have a nurse come out to look at it.  - as

## 2023-10-02 ENCOUNTER — HOSPITAL ENCOUNTER (OUTPATIENT)
Dept: NON INVASIVE DIAGNOSTICS | Facility: HOSPITAL | Age: 78
Discharge: HOME | End: 2023-10-02
Payer: COMMERCIAL

## 2023-10-02 VITALS
SYSTOLIC BLOOD PRESSURE: 165 MMHG | HEIGHT: 63 IN | WEIGHT: 107 LBS | HEART RATE: 84 BPM | DIASTOLIC BLOOD PRESSURE: 90 MMHG | BODY MASS INDEX: 18.96 KG/M2

## 2023-10-02 DIAGNOSIS — E11.69 HYPERLIPIDEMIA ASSOCIATED WITH TYPE 2 DIABETES MELLITUS: ICD-10-CM

## 2023-10-02 DIAGNOSIS — R06.02 SOB (SHORTNESS OF BREATH): ICD-10-CM

## 2023-10-02 DIAGNOSIS — I50.9 HEART FAILURE, UNSPECIFIED HF CHRONICITY, UNSPECIFIED HEART FAILURE TYPE (HCC): ICD-10-CM

## 2023-10-02 DIAGNOSIS — E78.5 HYPERLIPIDEMIA ASSOCIATED WITH TYPE 2 DIABETES MELLITUS: ICD-10-CM

## 2023-10-02 LAB
AORTIC ROOT: 3.6 CM
AORTIC VALVE MEAN VELOCITY: 9 M/S
APICAL FOUR CHAMBER EJECTION FRACTION: 67 %
AV LVOT MEAN GRADIENT: 2 MMHG
AV LVOT PEAK GRADIENT: 5 MMHG
AV MEAN GRADIENT: 4 MMHG
AV PEAK GRADIENT: 8 MMHG
AV REGURGITATION PRESSURE HALF TIME: 864 MS
AV VELOCITY RATIO: 0.77
DOP CALC AO PEAK VEL: 1.42 M/S
DOP CALC AO VTI: 21.56 CM
DOP CALC LVOT PEAK VEL VTI: 19.45 CM
DOP CALC LVOT PEAK VEL: 1.1 M/S
E WAVE DECELERATION TIME: 160 MS
FRACTIONAL SHORTENING: 26 (ref 28–44)
INTERVENTRICULAR SEPTUM IN DIASTOLE (PARASTERNAL SHORT AXIS VIEW): 1.2 CM
INTERVENTRICULAR SEPTUM: 1.2 CM (ref 0.6–1.1)
LAAS-AP2: 10.5 CM2
LAAS-AP4: 6.6 CM2
LEFT ATRIUM SIZE: 4 CM
LEFT ATRIUM VOLUME (MOD BIPLANE): 19 ML
LEFT INTERNAL DIMENSION IN SYSTOLE: 2.5 CM (ref 2.1–4)
LEFT VENTRICULAR INTERNAL DIMENSION IN DIASTOLE: 3.4 CM (ref 3.5–6)
LEFT VENTRICULAR POSTERIOR WALL IN END DIASTOLE: 1 CM
LEFT VENTRICULAR STROKE VOLUME: 25 ML
LVSV (TEICH): 25 ML
MV E'TISSUE VEL-SEP: 7 CM/S
MV PEAK A VEL: 0.86 M/S
MV PEAK E VEL: 80 CM/S
MV STENOSIS PRESSURE HALF TIME: 47 MS
MV VALVE AREA P 1/2 METHOD: 4.68
RA PRESSURE ESTIMATED: 3 MMHG
RIGHT VENTRICLE ID DIMENSION: 3.6 CM
RV PSP: 56 MMHG
SL CV AV DECELERATION TIME RETROGRADE: 2978 MS
SL CV AV PEAK GRADIENT RETROGRADE: 61 MMHG
SL CV LEFT ATRIUM LENGTH A2C: 4.1 CM
SL CV LV EF: 75
SL CV PED ECHO LEFT VENTRICLE DIASTOLIC VOLUME (MOD BIPLANE) 2D: 47 ML
SL CV PED ECHO LEFT VENTRICLE SYSTOLIC VOLUME (MOD BIPLANE) 2D: 22 ML
TR MAX PG: 53 MMHG
TR PEAK VELOCITY: 3.6 M/S
TRICUSPID ANNULAR PLANE SYSTOLIC EXCURSION: 1.9 CM
TRICUSPID VALVE PEAK REGURGITATION VELOCITY: 3.64 M/S

## 2023-10-02 PROCEDURE — 93306 TTE W/DOPPLER COMPLETE: CPT

## 2023-10-03 ENCOUNTER — TELEPHONE (OUTPATIENT)
Dept: FAMILY MEDICINE CLINIC | Facility: CLINIC | Age: 78
End: 2023-10-03

## 2023-10-30 ENCOUNTER — TELEPHONE (OUTPATIENT)
Dept: FAMILY MEDICINE CLINIC | Facility: CLINIC | Age: 78
End: 2023-10-30

## 2023-10-30 ENCOUNTER — OFFICE VISIT (OUTPATIENT)
Dept: FAMILY MEDICINE CLINIC | Facility: CLINIC | Age: 78
End: 2023-10-30
Payer: COMMERCIAL

## 2023-10-30 VITALS
RESPIRATION RATE: 15 BRPM | TEMPERATURE: 97.4 F | SYSTOLIC BLOOD PRESSURE: 126 MMHG | DIASTOLIC BLOOD PRESSURE: 74 MMHG | HEIGHT: 63 IN | BODY MASS INDEX: 17.89 KG/M2 | HEART RATE: 90 BPM | WEIGHT: 101 LBS | OXYGEN SATURATION: 94 %

## 2023-10-30 DIAGNOSIS — I50.9 HEART FAILURE, UNSPECIFIED HF CHRONICITY, UNSPECIFIED HEART FAILURE TYPE (HCC): ICD-10-CM

## 2023-10-30 DIAGNOSIS — E03.8 HYPOTHYROIDISM DUE TO HASHIMOTO'S THYROIDITIS: ICD-10-CM

## 2023-10-30 DIAGNOSIS — J43.8 OTHER EMPHYSEMA (HCC): ICD-10-CM

## 2023-10-30 DIAGNOSIS — F41.9 ANXIETY: Primary | ICD-10-CM

## 2023-10-30 DIAGNOSIS — R06.02 SOB (SHORTNESS OF BREATH): ICD-10-CM

## 2023-10-30 DIAGNOSIS — I73.9 PVD (PERIPHERAL VASCULAR DISEASE) (HCC): ICD-10-CM

## 2023-10-30 DIAGNOSIS — E06.3 HYPOTHYROIDISM DUE TO HASHIMOTO'S THYROIDITIS: ICD-10-CM

## 2023-10-30 DIAGNOSIS — J44.1 ACUTE EXACERBATION OF CHRONIC OBSTRUCTIVE PULMONARY DISEASE (COPD) (HCC): ICD-10-CM

## 2023-10-30 DIAGNOSIS — M31.6 TEMPORAL ARTERITIS (HCC): ICD-10-CM

## 2023-10-30 PROCEDURE — 99214 OFFICE O/P EST MOD 30 MIN: CPT | Performed by: INTERNAL MEDICINE

## 2023-10-30 RX ORDER — LEVOTHYROXINE SODIUM 0.05 MG/1
TABLET ORAL
Qty: 30 TABLET | Refills: 4 | Status: SHIPPED | OUTPATIENT
Start: 2023-10-30

## 2023-10-30 RX ORDER — BENZONATATE 100 MG/1
100 CAPSULE ORAL 3 TIMES DAILY PRN
Qty: 30 CAPSULE | Refills: 0 | Status: SHIPPED | OUTPATIENT
Start: 2023-10-30

## 2023-10-30 RX ORDER — ALPRAZOLAM 0.25 MG/1
0.25 TABLET ORAL
Qty: 30 TABLET | Refills: 1 | Status: SHIPPED | OUTPATIENT
Start: 2023-10-30

## 2023-10-30 RX ORDER — LEVALBUTEROL INHALATION SOLUTION 0.63 MG/3ML
0.63 SOLUTION RESPIRATORY (INHALATION) ONCE
Status: DISCONTINUED | OUTPATIENT
Start: 2023-10-30 | End: 2023-10-30

## 2023-10-30 RX ORDER — BUDESONIDE, GLYCOPYRROLATE, AND FORMOTEROL FUMARATE 160; 9; 4.8 UG/1; UG/1; UG/1
2 AEROSOL, METERED RESPIRATORY (INHALATION) 2 TIMES DAILY
Qty: 10.7 G | Refills: 3 | Status: CANCELLED | OUTPATIENT
Start: 2023-10-30

## 2023-10-30 RX ORDER — LEVALBUTEROL INHALATION SOLUTION 1.25 MG/3ML
1.25 SOLUTION RESPIRATORY (INHALATION) ONCE
Status: COMPLETED | OUTPATIENT
Start: 2023-10-30 | End: 2023-10-30

## 2023-10-30 RX ADMIN — LEVALBUTEROL INHALATION SOLUTION 1.25 MG: 1.25 SOLUTION RESPIRATORY (INHALATION) at 11:17

## 2023-10-30 NOTE — PROGRESS NOTES
Name: Anupama Frazier      : [id number]      MRN: [id number]  Encounter Provider: Justine Mahmood MD  Encounter Date: 10/30/2023   Encounter department: [address] [place] [address]     1. Anxiety  -     ALPRAZolam (XANAX) 0.25 mg tablet; Take 1 tablet (0.25 mg total) by mouth daily at bedtime as needed for anxiety or sleep    2. Other emphysema ([address])  Comments:  severe  Pulmonary consult  Orders:  -     CT chest wo contrast; Future; Expected date: 10/30/2023  -     Ambulatory Referral to Pulmonology; Future    3. Temporal arteritis ([address])  Comments:  in remission  continue Prednione 2.5 mg daily  RTC in 3 mos w Blood work    4. PVD (peripheral vascular disease) ([address])  Comments:  stable  continue same    5. Heart failure, unspecified HF chronicity, unspecified heart failure type ([address])  Comments:  continue same  FU w cardiology    6. SOB (shortness of breath)  -     CT chest wo contrast; Future; Expected date: 10/30/2023  -     Ambulatory Referral to Pulmonology; Future    7. Acute exacerbation of chronic obstructive pulmonary disease (COPD) ([facility])  Comments:  Prednisone 10 mg. .., tessalon Pearles. .  Orders:  -     benzonatate (TESSALON PERLES) 100 mg capsule; Take 1 capsule (100 mg total) by mouth 3 (three) times a day as needed for cough  -     CT chest wo contrast; Future; Expected date: 10/30/2023  -     Ambulatory Referral to Pulmonology; Future  -     levalbuterol (XOPENEX) inhalation solution 1.25 mg    Consider Home O2. .. RTC in 2-3 mos w Blood work    Depression Screening and Follow-up Plan: Patient was screened for depression during today's encounter. They screened negative with a PHQ-2 score of 0. Subjective      66 Y O lady is here for regular check up, she still has sob, recent blood work and med List reviewed w Pt,... Review of Systems   Constitutional:  Negative for chills, fatigue and fever.    HENT:  Negative for congestion, facial swelling, sore throat, trouble swallowing and voice change. Eyes:  Negative for pain, discharge and visual disturbance. Respiratory:  Positive for shortness of breath. Negative for cough and wheezing. Cardiovascular:  Negative for chest pain, palpitations and leg swelling. Gastrointestinal:  Negative for abdominal pain, blood in stool, constipation, diarrhea and nausea. Endocrine: Negative for polydipsia, polyphagia and polyuria. Genitourinary:  Negative for difficulty urinating, hematuria and urgency. Musculoskeletal:  Negative for arthralgias and myalgias. Skin:  Negative for rash. Neurological:  Negative for dizziness, tremors, weakness and headaches. Hematological:  Negative for adenopathy. Does not bruise/bleed easily. Psychiatric/Behavioral:  Negative for dysphoric mood, sleep disturbance and suicidal ideas. Current Outpatient Medications on File Prior to Visit   Medication Sig    albuterol (PROVENTIL HFA,VENTOLIN HFA) 90 mcg/act inhaler Inhale 2 puffs every 6 (six) hours as needed for wheezing or shortness of breath    albuterol (PROVENTIL HFA,VENTOLIN HFA) 90 mcg/act inhaler INHALE 2 PUFFS BY MOUTH EVERY 6 HOURS AS NEEDED FOR WHEEZING    aspirin (ECOTRIN LOW STRENGTH) 81 mg EC tablet Take 1 tablet (81 mg total) by mouth daily    azelastine (ASTELIN) 0.1 % nasal spray 1 spray into each nostril 2 (two) times a day Use in each nostril as directed    b complex-C-E-zinc (STRESS FORMULA/ZINC) tablet Take 1 tablet by mouth daily    Biotin 10 MG TABS Take by mouth in the morning    Budeson-Glycopyrrol-Formoterol (Breztri Aerosphere) 160-9-4.8 MCG/ACT AERO Inhale 2 puffs 2 (two) times a day Rinse mouth after use.  Please Stop Trelegy    Calcium Carb-Cholecalciferol (Caltrate 600+D3) 600-20 MG-MCG TABS Take 1 tablet by mouth 2 (two) times a day    cetirizine (ZyrTEC) 10 mg tablet Take 1 tablet (10 mg total) by mouth daily With food/in the evening    ipratropium (ATROVENT) 0.03 % nasal spray 2 sprays into each nostril every 12 (twelve) hours    pantoprazole (PROTONIX) 40 mg tablet Take 1 tablet (40 mg total) by mouth daily    predniSONE 2.5 mg tablet Take 1 tablet (2.5 mg total) by mouth daily    valsartan (DIOVAN) 160 mg tablet Take 1 tablet (160 mg total) by mouth daily Please STOP Lisinopril,. .    vitamin B-12 (VITAMIN B-12) 1,000 mcg tablet Take 1 tablet (1,000 mcg total) by mouth daily    [DISCONTINUED] benzonatate (TESSALON PERLES) 100 mg capsule Take 1 capsule (100 mg total) by mouth 3 (three) times a day as needed for cough    [DISCONTINUED] ipratropium-albuterol (DUO-NEB) 0.5-2.5 mg/3 mL nebulizer solution Take 3 mL by nebulization 4 (four) times a day    [DISCONTINUED] levothyroxine (Synthroid) 50 mcg tablet Take 1 tablet (50 mcg total) by mouth daily in the early morning    [DISCONTINUED] predniSONE 10 mg tablet Take 3 tabs daily with breakfast for 3 days then Take 2 tabs daily for 3 Days then take one tab daily for 3 days then stop. Jody Cisneros acetaminophen (TYLENOL) 500 mg tablet Take 1 tablet (500 mg total) by mouth every 6 (six) hours as needed (pain fevers) (Patient not taking: Reported on 1/30/2023)       Objective     /74 (BP Location: Left arm, Patient Position: Sitting, Cuff Size: Adult)   Pulse 90   Temp (!) 97.4 °F (36.3 °C) (Tympanic)   Resp 15   Ht 5' 3" (1.6 m)   Wt 45.8 kg (101 lb)   SpO2 94%   BMI 17.89 kg/m²     Physical Exam  Constitutional:       General: She is not in acute distress. HENT:      Head: Normocephalic. Mouth/Throat:      Pharynx: No oropharyngeal exudate. Eyes:      General: No scleral icterus. Conjunctiva/sclera: Conjunctivae normal.      Pupils: Pupils are equal, round, and reactive to light. Neck:      Thyroid: No thyromegaly. Cardiovascular:      Rate and Rhythm: Normal rate and regular rhythm. Heart sounds: Normal heart sounds. No murmur heard. Pulmonary:      Effort: Pulmonary effort is normal. No respiratory distress.       Breath sounds: Normal breath sounds. No wheezing or rales. Comments: Decrease Breathing sounds Both bases  Abdominal:      General: Bowel sounds are normal. There is no distension. Palpations: Abdomen is soft. Tenderness: There is no abdominal tenderness. There is no guarding or rebound. Musculoskeletal:         General: No tenderness. Cervical back: Neck supple. Lymphadenopathy:      Cervical: No cervical adenopathy. Skin:     Coloration: Skin is not pale. Findings: No rash. Neurological:      Mental Status: She is alert and oriented to person, place, and time. Sensory: No sensory deficit. Motor: No weakness.        MD Cooper De La Rosa

## 2023-10-30 NOTE — TELEPHONE ENCOUNTER
Patient is checking to be sure she should still be taking prednisone 2.5 once a day because her visit summary stated to change the way she is taking it. Please advise.

## 2023-11-02 ENCOUNTER — TELEPHONE (OUTPATIENT)
Dept: FAMILY MEDICINE CLINIC | Facility: CLINIC | Age: 78
End: 2023-11-02

## 2023-11-06 ENCOUNTER — OFFICE VISIT (OUTPATIENT)
Dept: PULMONOLOGY | Facility: CLINIC | Age: 78
End: 2023-11-06
Payer: COMMERCIAL

## 2023-11-06 VITALS
HEIGHT: 63 IN | OXYGEN SATURATION: 93 % | SYSTOLIC BLOOD PRESSURE: 120 MMHG | HEART RATE: 102 BPM | BODY MASS INDEX: 17.89 KG/M2 | DIASTOLIC BLOOD PRESSURE: 80 MMHG | TEMPERATURE: 98.3 F

## 2023-11-06 DIAGNOSIS — J43.8 OTHER EMPHYSEMA (HCC): Primary | ICD-10-CM

## 2023-11-06 DIAGNOSIS — Z12.2 ENCOUNTER FOR SCREENING FOR MALIGNANT NEOPLASM OF LUNG IN FORMER SMOKER WHO QUIT IN PAST 15 YEARS WITH 30 PACK YEAR HISTORY OR GREATER: ICD-10-CM

## 2023-11-06 DIAGNOSIS — R06.02 SOB (SHORTNESS OF BREATH): ICD-10-CM

## 2023-11-06 DIAGNOSIS — J44.1 ACUTE EXACERBATION OF CHRONIC OBSTRUCTIVE PULMONARY DISEASE (COPD) (HCC): ICD-10-CM

## 2023-11-06 DIAGNOSIS — R91.8 LUNG NODULES: ICD-10-CM

## 2023-11-06 DIAGNOSIS — Z87.891 ENCOUNTER FOR SCREENING FOR MALIGNANT NEOPLASM OF LUNG IN FORMER SMOKER WHO QUIT IN PAST 15 YEARS WITH 30 PACK YEAR HISTORY OR GREATER: ICD-10-CM

## 2023-11-06 DIAGNOSIS — K21.9 GASTROESOPHAGEAL REFLUX DISEASE WITHOUT ESOPHAGITIS: ICD-10-CM

## 2023-11-06 DIAGNOSIS — R09.82 POSTNASAL DRIP: ICD-10-CM

## 2023-11-06 PROCEDURE — 99214 OFFICE O/P EST MOD 30 MIN: CPT | Performed by: INTERNAL MEDICINE

## 2023-11-06 NOTE — PROGRESS NOTES
Progress note - Pulmonary Medicine   Severa Popp 66 y.o. female MRN: [id number]       Impression & Plan:     Chronic obstructive pulmonary disease ([address])  Continue breast 3 twice daily. I explained to the patient that the [facility] and [facility] are similar and so it all depends on her preference or insurance coverage. He could not tolerate albuterol as it made her anxious/jittery so I told her she can use only as needed and she does not have to use it frequently. We can consider Xopenex in the future if needed although it may not make a difference. I believe patient needs to exercise more so I referred patient to pulmonary rehab at [facility], at least she can do few sessions and if she cannot commute in the future she will learn some exercises that she can do at home. Meanwhile I encouraged her to start walking as much as possible. Patient was hesitant about doing her PFTs due to the commute and the procedure itself, I agree with her and I believe no need for PFTs since she is on maximal inhaler therapy and knowing the severity of her COPD will not change her management. Lung nodules  We will repeat CT scan anytime now, this has been going for about 9 months and these nodules with her smoking history needs follow-up. Encounter for screening for malignant neoplasm of lung in former smoker who quit in past 15 years with 30 pack year history or greater  After establishing stability of the current lung nodules she might benefit from 1 more lung cancer screening CT scan as she will be 79 next year    Postnasal drip  Continue azelastine and as needed antihistamine    Gastroesophageal reflux disease without esophagitis  Controlled, continue pantoprazole      No follow-ups on file. Diagnoses and all orders for this visit:    Other emphysema [facility])  Comments:  severe  Pulmonary consult  Orders:  -     Ambulatory Referral to Pulmonology  -     Ambulatory Referral to Pulmonary Rehabilitation;  Future    SOB (shortness of breath)  -     Ambulatory Referral to Pulmonology  -     Ambulatory Referral to Pulmonary Rehabilitation; Future    Acute exacerbation of chronic obstructive pulmonary disease (COPD) ([facility])  Comments:  Prednisone 10 mg. .., tessalon Pearles. .  Orders:  -     Ambulatory Referral to Pulmonology    Encounter for screening for malignant neoplasm of lung in former smoker who quit in past 15 years with 30 pack year history or greater    Lung nodules  -     CT chest without contrast; Future    Gastroesophageal reflux disease without esophagitis    Postnasal drip    She is up-to-date with vaccines for pneumonia and influenza  ______________________________________________________________________    HPI:    La Jenkins presents today for follow-up of severe COPD/emphysema. .  Patient feels the same as before but in general stable, she has chronic dyspnea on exertion with minimal activity but she is usually very sedentary. Denies significant cough or sputum production, denies wheezing, denies chest pain or fever chills or night sweats. She is currently on Breztri twice daily. She does not use her as needed albuterol MDI frequently but recently was given his albuterol that caused her to feel jittery and so she stopped. She has anxiety in general.  Patient has postnasal drip on azelastine. She has GERD that is controlled with pantoprazole and denies dysphagia. She is a former smoker who quit in 2013.     Current Medications:    Current Outpatient Medications:     acetaminophen (TYLENOL) 500 mg tablet, Take 1 tablet (500 mg total) by mouth every 6 (six) hours as needed (pain fevers), Disp: 30 tablet, Rfl: 0    albuterol (PROVENTIL HFA,VENTOLIN HFA) 90 mcg/act inhaler, Inhale 2 puffs every 6 (six) hours as needed for wheezing or shortness of breath, Disp: 18 g, Rfl: 2    albuterol (PROVENTIL HFA,VENTOLIN HFA) 90 mcg/act inhaler, INHALE 2 PUFFS BY MOUTH EVERY 6 HOURS AS NEEDED FOR WHEEZING, Disp: 6.7 g, Rfl: 6 aspirin (ECOTRIN LOW STRENGTH) 81 mg EC tablet, Take 1 tablet (81 mg total) by mouth daily, Disp: 90 tablet, Rfl: 3    b complex-C-E-zinc (STRESS FORMULA/ZINC) tablet, Take 1 tablet by mouth daily, Disp: 90 tablet, Rfl: 3    benzonatate (TESSALON PERLES) 100 mg capsule, Take 1 capsule (100 mg total) by mouth 3 (three) times a day as needed for cough, Disp: 30 capsule, Rfl: 0    Biotin 10 MG TABS, Take by mouth in the morning, Disp: , Rfl:     Budeson-Glycopyrrol-Formoterol (Breztri Aerosphere) 160-9-4.8 MCG/ACT AERO, Inhale 2 puffs 2 (two) times a day Rinse mouth after use.  Please Stop Trelegy, Disp: 10.7 g, Rfl: 3    Calcium Carb-Cholecalciferol (Caltrate 600+D3) 600-20 MG-MCG TABS, Take 1 tablet by mouth 2 (two) times a day, Disp: 200 tablet, Rfl: 6    cetirizine (ZyrTEC) 10 mg tablet, Take 1 tablet (10 mg total) by mouth daily With food/in the evening, Disp: 90 tablet, Rfl: 3    ipratropium (ATROVENT) 0.03 % nasal spray, 2 sprays into each nostril every 12 (twelve) hours, Disp: 30 mL, Rfl: 6    levothyroxine 50 mcg tablet, TAKE ONE TABLET BY MOUTH ONCE DAILY EARLY IN THE MORNING, Disp: 30 tablet, Rfl: 4    pantoprazole (PROTONIX) 40 mg tablet, Take 1 tablet (40 mg total) by mouth daily, Disp: 90 tablet, Rfl: 3    predniSONE 2.5 mg tablet, Take 1 tablet (2.5 mg total) by mouth daily, Disp: 90 tablet, Rfl: 3    valsartan (DIOVAN) 160 mg tablet, Take 1 tablet (160 mg total) by mouth daily Please STOP Lisinopril,.., Disp: 90 tablet, Rfl: 3    vitamin B-12 (VITAMIN B-12) 1,000 mcg tablet, Take 1 tablet (1,000 mcg total) by mouth daily, Disp: 90 tablet, Rfl: 1    ALPRAZolam (XANAX) 0.25 mg tablet, Take 1 tablet (0.25 mg total) by mouth daily at bedtime as needed for anxiety or sleep (Patient not taking: Reported on 11/6/2023), Disp: 30 tablet, Rfl: 1    azelastine (ASTELIN) 0.1 % nasal spray, 1 spray into each nostril 2 (two) times a day Use in each nostril as directed (Patient not taking: Reported on 11/6/2023), Disp: 30 mL, Rfl: 3    Current Facility-Administered Medications:     cyanocobalamin injection 1,000 mcg, 1,000 mcg, Intramuscular, Q30 Days, Van Sher MD, 1,000 mcg at 01/14/20 1508    Review of Systems:  Review of Systems   Constitutional: Negative. HENT: Negative. Eyes: Negative. Respiratory:  Positive for shortness of breath. Cardiovascular: Negative. Gastrointestinal: Negative. Endocrine: Negative. Genitourinary: Negative. Musculoskeletal: Negative. Skin: Negative. Allergic/Immunologic: Negative. Neurological: Negative. Hematological: Negative. Psychiatric/Behavioral: Negative. Aside from what is mentioned in the HPI, the review of systems is otherwise negative    Past medical history, surgical history, and family history were reviewed and updated as appropriate    Social history updates:  Social History     Tobacco Use   Smoking Status Former    Packs/day: 1.00    Years: 35.00    Total pack years: 35.00    Types: Cigarettes    Start date: 65    Quit date: 2013    Years since quitting: 10.8   Smokeless Tobacco Never   Tobacco Comments    TOBACCO USE, NO PASSIVE SMOKE EXPOSURE       PhysicalExamination:  Vitals:   /80 (BP Location: Left arm, Patient Position: Sitting, Cuff Size: Standard)   Pulse 102   Temp 98.3 °F (36.8 °C) (Tympanic)   Ht 5' 3" (1.6 m)   SpO2 93%   BMI 17.89 kg/m²     Gen:  Comfortable on room air. No conversational dyspnea  HEENT:  Conjugate gaze. sclerae anicteric. Oropharynx moist  Neck: Trachea is midline. No JVD. No adenopathy  Chest: Equal but severely diminished breath sounds bilaterally, clear to auscultation with no wheezing or crackles  Cardiac: S1-S2 regular. no murmur  Abdomen:  benign  Extremities: No edema  Neuro:  Normal speech and mentation    Diagnostic Data:  Labs:   I personally reviewed the most recent laboratory data pertinent to today's visit    Lab Results   Component Value Date    WBC 7.50 06/03/2023    HGB 14.1 06/03/2023    HCT 46.4 (H) 06/03/2023    MCV 90 06/03/2023     06/03/2023     Lab Results   Component Value Date    SODIUM 140 06/03/2023    K 4.2 06/03/2023    CO2 30 06/03/2023     06/03/2023    BUN 16 06/03/2023    CREATININE 0.70 06/03/2023    CALCIUM 10.0 06/03/2023         Imaging:  I personally reviewed the images on the [facility] system pertinent to today's visit  Chest x-ray reviewed on PACS: Clear lungs    Chest CT scan reviewed on PACS: Mild emphysematous changes, stable left apical scarring, chronic consolidation of the right middle lobe, 4-5 mm lung nodules bilaterally    Other studies:  Echocardiogram: LVEF 49%, grade 1 diastolic dysfunction, estimated peak PA pressure 56, normal RV    Ravinder Tyler MD

## 2023-11-06 NOTE — ASSESSMENT & PLAN NOTE
We will repeat CT scan anytime now, this has been going for about 9 months and these nodules with her smoking history needs follow-up.

## 2023-11-06 NOTE — ASSESSMENT & PLAN NOTE
Continue breast 3 twice daily. I explained to the patient that the [facility] and [facility] are similar and so it all depends on her preference or insurance coverage. He could not tolerate albuterol as it made her anxious/jittery so I told her she can use only as needed and she does not have to use it frequently. We can consider Xopenex in the future if needed although it may not make a difference. I believe patient needs to exercise more so I referred patient to pulmonary rehab at [facility], at least she can do few sessions and if she cannot commute in the future she will learn some exercises that she can do at home. Meanwhile I encouraged her to start walking as much as possible. Patient was hesitant about doing her PFTs due to the commute and the procedure itself, I agree with her and I believe no need for PFTs since she is on maximal inhaler therapy and knowing the severity of her COPD will not change her management.

## 2023-11-06 NOTE — ASSESSMENT & PLAN NOTE
After establishing stability of the current lung nodules she might benefit from 1 more lung cancer screening CT scan as she will be 79 next year

## 2023-11-13 ENCOUNTER — TELEPHONE (OUTPATIENT)
Dept: FAMILY MEDICINE CLINIC | Facility: CLINIC | Age: 78
End: 2023-11-13

## 2023-11-13 DIAGNOSIS — J43.8 OTHER EMPHYSEMA (HCC): Primary | ICD-10-CM

## 2023-11-13 RX ORDER — FLUTICASONE FUROATE, UMECLIDINIUM BROMIDE AND VILANTEROL TRIFENATATE 100; 62.5; 25 UG/1; UG/1; UG/1
1 POWDER RESPIRATORY (INHALATION) DAILY
Qty: 60 BLISTER | Refills: 3 | Status: SHIPPED | OUTPATIENT
Start: 2023-11-13 | End: 2023-12-13

## 2023-11-13 NOTE — TELEPHONE ENCOUNTER
Patient has been using the Breztri inhaler, but she states it does not agree with her. She started using her Trelegy inhaler, and feels better using this.      Is this ok, and if so, please send Trelegy refill to pharmacy,

## 2023-11-16 ENCOUNTER — DOCUMENTATION (OUTPATIENT)
Age: 78
End: 2023-11-16

## 2023-11-16 NOTE — PROGRESS NOTES
This was in error patient meant cat scan authorization. This is not scheduled until 02/2024 and will be taken care of by then from authorization department. Patient was informed of this.

## 2023-11-20 ENCOUNTER — OFFICE VISIT (OUTPATIENT)
Dept: FAMILY MEDICINE CLINIC | Facility: CLINIC | Age: 78
End: 2023-11-20
Payer: COMMERCIAL

## 2023-11-20 VITALS
DIASTOLIC BLOOD PRESSURE: 82 MMHG | RESPIRATION RATE: 14 BRPM | OXYGEN SATURATION: 96 % | HEIGHT: 63 IN | SYSTOLIC BLOOD PRESSURE: 118 MMHG | TEMPERATURE: 98.9 F | BODY MASS INDEX: 19.14 KG/M2 | WEIGHT: 108 LBS | HEART RATE: 90 BPM

## 2023-11-20 DIAGNOSIS — E03.8 HYPOTHYROIDISM DUE TO HASHIMOTO'S THYROIDITIS: ICD-10-CM

## 2023-11-20 DIAGNOSIS — R73.09 ELEVATED HEMOGLOBIN A1C: ICD-10-CM

## 2023-11-20 DIAGNOSIS — E06.3 HYPOTHYROIDISM DUE TO HASHIMOTO'S THYROIDITIS: ICD-10-CM

## 2023-11-20 DIAGNOSIS — Z12.31 ENCOUNTER FOR SCREENING MAMMOGRAM FOR BREAST CANCER: Primary | ICD-10-CM

## 2023-11-20 DIAGNOSIS — M31.6 TEMPORAL ARTERITIS (HCC): ICD-10-CM

## 2023-11-20 DIAGNOSIS — E78.5 HYPERLIPIDEMIA ASSOCIATED WITH TYPE 2 DIABETES MELLITUS: ICD-10-CM

## 2023-11-20 DIAGNOSIS — E11.69 HYPERLIPIDEMIA ASSOCIATED WITH TYPE 2 DIABETES MELLITUS: ICD-10-CM

## 2023-11-20 LAB — SL AMB POCT HEMOGLOBIN AIC: 5.8 (ref ?–6.5)

## 2023-11-20 PROCEDURE — 83036 HEMOGLOBIN GLYCOSYLATED A1C: CPT | Performed by: INTERNAL MEDICINE

## 2023-11-20 PROCEDURE — 99214 OFFICE O/P EST MOD 30 MIN: CPT | Performed by: INTERNAL MEDICINE

## 2023-11-20 NOTE — PROGRESS NOTES
Name: Jonas Villavicencio      : [birth year]      MRN: [id number]  Encounter Provider: Suzette Foote MD  Encounter Date: 2023   Encounter department: [address] [place] [address]     1. Encounter for screening mammogram for breast cancer  -     Mammo screening bilateral w 3d & cad; Future; Expected date: 2023    2. Hypothyroidism due to Hashimoto's thyroiditis  -     TSH, 3rd generation with Free T4 reflex; Future; Expected date: 2024    3. Hyperlipidemia associated with type 2 diabetes mellitus   -     POCT hemoglobin A1c  -     Comprehensive metabolic panel; Future; Expected date: 2024  -     CBC and differential; Future; Expected date: 2024  -     Lipid Panel with Direct LDL reflex; Future; Expected date: 2024  -     Microalbumin, Random Urine (W/Creatinine) ([facility] ONLY); Future; Expected date: 2024  -     Microalbumin, Random Urine (W/Creatinine) ([facility] ONLY)  -     UA (URINE) with reflex to Scope; Future; Expected date: 2023  -     Magnesium; Future  -     Sedimentation rate, automated; Future  -     C-reactive protein; Future    4. Elevated hemoglobin A1c  -     POCT hemoglobin A1c  -     Comprehensive metabolic panel; Future; Expected date: 2024  -     CBC and differential; Future; Expected date: 2024  -     Lipid Panel with Direct LDL reflex; Future; Expected date: 2024  -     Microalbumin, Random Urine (W/Creatinine) ([facility] ONLY); Future; Expected date: 2024  -     Microalbumin, Random Urine (W/Creatinine) ([facility] ONLY)    5. Temporal arteritis ([facility])  Comments:  in remission  continue Prednisone 2.5 mg daily  RTC in 3 mos w Blood work  Orders:  -     Sedimentation rate, automated; Future  -     C-reactive protein; Future    RTC in 3 mos w Blood work    Depression Screening and Follow-up Plan: Patient was screened for depression during today's encounter.  They screened negative with a PHQ-2 score of 0.        Subjective      66 Y O lady is here for regular check up, she feels a little better, med list reviewed w Pt,... Review of Systems   Constitutional:  Negative for chills, fatigue and fever. HENT:  Negative for congestion, facial swelling, sore throat, trouble swallowing and voice change. Eyes:  Negative for pain, discharge and visual disturbance. Respiratory:  Positive for shortness of breath. Negative for cough and wheezing. Cardiovascular:  Negative for chest pain, palpitations and leg swelling. Gastrointestinal:  Negative for abdominal pain, blood in stool, constipation, diarrhea and nausea. Endocrine: Negative for polydipsia, polyphagia and polyuria. Genitourinary:  Negative for difficulty urinating, hematuria and urgency. Musculoskeletal:  Negative for arthralgias and myalgias. Skin:  Negative for rash. Neurological:  Negative for dizziness, tremors, weakness and headaches. Hematological:  Negative for adenopathy. Does not bruise/bleed easily. Psychiatric/Behavioral:  Negative for dysphoric mood, sleep disturbance and suicidal ideas.         Current Outpatient Medications on File Prior to Visit   Medication Sig    acetaminophen (TYLENOL) 500 mg tablet Take 1 tablet (500 mg total) by mouth every 6 (six) hours as needed (pain fevers)    albuterol (PROVENTIL HFA,VENTOLIN HFA) 90 mcg/act inhaler Inhale 2 puffs every 6 (six) hours as needed for wheezing or shortness of breath    albuterol (PROVENTIL HFA,VENTOLIN HFA) 90 mcg/act inhaler INHALE 2 PUFFS BY MOUTH EVERY 6 HOURS AS NEEDED FOR WHEEZING    aspirin (ECOTRIN LOW STRENGTH) 81 mg EC tablet Take 1 tablet (81 mg total) by mouth daily    benzonatate (TESSALON PERLES) 100 mg capsule Take 1 capsule (100 mg total) by mouth 3 (three) times a day as needed for cough    Biotin 10 MG TABS Take by mouth in the morning    Calcium Carb-Cholecalciferol (Caltrate 600+D3) 600-20 MG-MCG TABS Take 1 tablet by mouth 2 (two) times a day cetirizine (ZyrTEC) 10 mg tablet Take 1 tablet (10 mg total) by mouth daily With food/in the evening    fluticasone-umeclidinium-vilanterol (Trelegy Ellipta) 100-62.5-25 mcg/actuation inhaler Inhale 1 puff daily Rinse mouth after use. ipratropium (ATROVENT) 0.03 % nasal spray 2 sprays into each nostril every 12 (twelve) hours    levothyroxine 50 mcg tablet TAKE ONE TABLET BY MOUTH ONCE DAILY EARLY IN THE MORNING    pantoprazole (PROTONIX) 40 mg tablet Take 1 tablet (40 mg total) by mouth daily    predniSONE 2.5 mg tablet Take 1 tablet (2.5 mg total) by mouth daily    valsartan (DIOVAN) 160 mg tablet Take 1 tablet (160 mg total) by mouth daily Please STOP Lisinopril,. .    vitamin B-12 (VITAMIN B-12) 1,000 mcg tablet Take 1 tablet (1,000 mcg total) by mouth daily    [DISCONTINUED] b complex-C-E-zinc (STRESS FORMULA/ZINC) tablet Take 1 tablet by mouth daily    ALPRAZolam (XANAX) 0.25 mg tablet Take 1 tablet (0.25 mg total) by mouth daily at bedtime as needed for anxiety or sleep (Patient not taking: Reported on 11/6/2023)    azelastine (ASTELIN) 0.1 % nasal spray 1 spray into each nostril 2 (two) times a day Use in each nostril as directed (Patient not taking: Reported on 11/6/2023)       Objective     /82 (BP Location: Left arm, Patient Position: Sitting, Cuff Size: Standard)   Pulse 90   Temp 98.9 °F (37.2 °C) (Tympanic)   Resp 14   Ht 5' 3" (1.6 m)   Wt 49 kg (108 lb)   SpO2 96%   BMI 19.13 kg/m²     Physical Exam  Constitutional:       General: She is not in acute distress. HENT:      Head: Normocephalic. Mouth/Throat:      Pharynx: No oropharyngeal exudate. Eyes:      General: No scleral icterus. Conjunctiva/sclera: Conjunctivae normal.      Pupils: Pupils are equal, round, and reactive to light. Neck:      Thyroid: No thyromegaly. Cardiovascular:      Rate and Rhythm: Normal rate and regular rhythm. Heart sounds: Murmur heard.    Pulmonary:      Effort: Pulmonary effort is normal. No respiratory distress. Breath sounds: No wheezing or rales. Abdominal:      General: Bowel sounds are normal. There is no distension. Palpations: Abdomen is soft. Tenderness: There is no abdominal tenderness. There is no guarding or rebound. Musculoskeletal:         General: No tenderness. Cervical back: Neck supple. Lymphadenopathy:      Cervical: No cervical adenopathy. Skin:     Coloration: Skin is not pale. Findings: No rash. Neurological:      Mental Status: She is alert and oriented to person, place, and time. Sensory: No sensory deficit. Motor: No weakness.        Aziza Wolf MD

## 2023-11-21 ENCOUNTER — TELEPHONE (OUTPATIENT)
Dept: FAMILY MEDICINE CLINIC | Facility: CLINIC | Age: 78
End: 2023-11-21

## 2023-11-21 NOTE — TELEPHONE ENCOUNTER
Discharge instructions reviewed with patient and daughter. Daughter had questions regarding Eye gtts and ASA, in which I TT with Dr Sen Fu and instructed daughter per Dr. Goddard Band reply. Mask placed on patient and pt taken to [facility] where he met daughter with her car for transport to home.
Patient woke up today with laryngitis. She has no symptoms, but can't talk much, and is very hoarse.
Spoke to patient,she is aware
0
0

## 2023-11-22 ENCOUNTER — TELEPHONE (OUTPATIENT)
Dept: FAMILY MEDICINE CLINIC | Facility: CLINIC | Age: 78
End: 2023-11-22

## 2023-11-22 ENCOUNTER — HOSPITAL ENCOUNTER (EMERGENCY)
Facility: HOSPITAL | Age: 78
Discharge: HOME | DRG: 872 | End: 2023-11-22
Attending: EMERGENCY MEDICINE
Payer: COMMERCIAL

## 2023-11-22 ENCOUNTER — APPOINTMENT (EMERGENCY)
Dept: CT IMAGING | Facility: HOSPITAL | Age: 78
DRG: 872 | End: 2023-11-22
Payer: COMMERCIAL

## 2023-11-22 ENCOUNTER — APPOINTMENT (EMERGENCY)
Dept: RADIOLOGY | Facility: HOSPITAL | Age: 78
DRG: 872 | End: 2023-11-22
Payer: COMMERCIAL

## 2023-11-22 VITALS
WEIGHT: 108.91 LBS | DIASTOLIC BLOOD PRESSURE: 74 MMHG | SYSTOLIC BLOOD PRESSURE: 99 MMHG | TEMPERATURE: 98 F | OXYGEN SATURATION: 97 % | BODY MASS INDEX: 19.29 KG/M2 | HEART RATE: 97 BPM | RESPIRATION RATE: 20 BRPM

## 2023-11-22 DIAGNOSIS — J02.9 VIRAL PHARYNGITIS: Primary | ICD-10-CM

## 2023-11-22 LAB
ALBUMIN SERPL BCP-MCNC: 4.6 G/DL (ref 3.5–5)
ALP SERPL-CCNC: 62 U/L (ref 34–104)
ALT SERPL W P-5'-P-CCNC: 15 U/L (ref 7–52)
ANION GAP SERPL CALCULATED.3IONS-SCNC: 8 MMOL/L
AST SERPL W P-5'-P-CCNC: 22 U/L (ref 13–39)
BASOPHILS # BLD AUTO: 0.04 THOUSANDS/ÂΜL (ref 0–0.1)
BASOPHILS NFR BLD AUTO: 0 % (ref 0–1)
BILIRUB SERPL-MCNC: 0.41 MG/DL (ref 0.2–1)
BUN SERPL-MCNC: 17 MG/DL (ref 5–25)
CALCIUM SERPL-MCNC: 10.6 MG/DL (ref 8.4–10.2)
CHLORIDE SERPL-SCNC: 97 MMOL/L (ref 96–108)
CO2 SERPL-SCNC: 31 MMOL/L (ref 21–32)
CREAT SERPL-MCNC: 0.66 MG/DL (ref 0.6–1.3)
EOSINOPHIL # BLD AUTO: 0.06 THOUSAND/ÂΜL (ref 0–0.61)
EOSINOPHIL NFR BLD AUTO: 1 % (ref 0–6)
ERYTHROCYTE [DISTWIDTH] IN BLOOD BY AUTOMATED COUNT: 13.7 % (ref 11.6–15.1)
FLUAV RNA RESP QL NAA+PROBE: NEGATIVE
FLUBV RNA RESP QL NAA+PROBE: NEGATIVE
GFR SERPL CREATININE-BSD FRML MDRD: 84 ML/MIN/1.73SQ M
GLUCOSE SERPL-MCNC: 113 MG/DL (ref 65–140)
HCT VFR BLD AUTO: 44.9 % (ref 34.8–46.1)
HGB BLD-MCNC: 14.1 G/DL (ref 11.5–15.4)
IMM GRANULOCYTES # BLD AUTO: 0.05 THOUSAND/UL (ref 0–0.2)
IMM GRANULOCYTES NFR BLD AUTO: 0 % (ref 0–2)
LYMPHOCYTES # BLD AUTO: 0.78 THOUSANDS/ÂΜL (ref 0.6–4.47)
LYMPHOCYTES NFR BLD AUTO: 6 % (ref 14–44)
MCH RBC QN AUTO: 27.6 PG (ref 26.8–34.3)
MCHC RBC AUTO-ENTMCNC: 31.4 G/DL (ref 31.4–37.4)
MCV RBC AUTO: 88 FL (ref 82–98)
MONOCYTES # BLD AUTO: 0.74 THOUSAND/ÂΜL (ref 0.17–1.22)
MONOCYTES NFR BLD AUTO: 6 % (ref 4–12)
NEUTROPHILS # BLD AUTO: 10.87 THOUSANDS/ÂΜL (ref 1.85–7.62)
NEUTS SEG NFR BLD AUTO: 87 % (ref 43–75)
NRBC BLD AUTO-RTO: 0 /100 WBCS
PLATELET # BLD AUTO: 234 THOUSANDS/UL (ref 149–390)
PMV BLD AUTO: 9.9 FL (ref 8.9–12.7)
POTASSIUM SERPL-SCNC: 4.3 MMOL/L (ref 3.5–5.3)
PROCALCITONIN SERPL-MCNC: 0.09 NG/ML
PROT SERPL-MCNC: 7.4 G/DL (ref 6.4–8.4)
RBC # BLD AUTO: 5.1 MILLION/UL (ref 3.81–5.12)
RSV RNA RESP QL NAA+PROBE: NEGATIVE
SARS-COV-2 RNA RESP QL NAA+PROBE: NEGATIVE
SODIUM SERPL-SCNC: 136 MMOL/L (ref 135–147)
WBC # BLD AUTO: 12.54 THOUSAND/UL (ref 4.31–10.16)

## 2023-11-22 PROCEDURE — 99285 EMERGENCY DEPT VISIT HI MDM: CPT

## 2023-11-22 PROCEDURE — 71260 CT THORAX DX C+: CPT

## 2023-11-22 PROCEDURE — 71046 X-RAY EXAM CHEST 2 VIEWS: CPT

## 2023-11-22 PROCEDURE — 99285 EMERGENCY DEPT VISIT HI MDM: CPT | Performed by: EMERGENCY MEDICINE

## 2023-11-22 PROCEDURE — 80053 COMPREHEN METABOLIC PANEL: CPT | Performed by: EMERGENCY MEDICINE

## 2023-11-22 PROCEDURE — 70491 CT SOFT TISSUE NECK W/DYE: CPT

## 2023-11-22 PROCEDURE — 36415 COLL VENOUS BLD VENIPUNCTURE: CPT | Performed by: EMERGENCY MEDICINE

## 2023-11-22 PROCEDURE — 85025 COMPLETE CBC W/AUTO DIFF WBC: CPT | Performed by: EMERGENCY MEDICINE

## 2023-11-22 PROCEDURE — G1004 CDSM NDSC: HCPCS

## 2023-11-22 PROCEDURE — 0241U HB NFCT DS VIR RESP RNA 4 TRGT: CPT | Performed by: EMERGENCY MEDICINE

## 2023-11-22 PROCEDURE — 84145 PROCALCITONIN (PCT): CPT | Performed by: EMERGENCY MEDICINE

## 2023-11-22 RX ADMIN — IOHEXOL 140 ML: 350 INJECTION, SOLUTION INTRAVENOUS at 20:01

## 2023-11-22 NOTE — ED PROVIDER NOTES
History  Chief Complaint   Patient presents with    Sore Throat     Pt reports she woke up this morning with a sore throat, states "feels like I have laryngitis" reports she was coughing up mucus and noticed a little bit of blood in the mucus. HPI  Patient 66year old female with hx of COPD, GERD, noninsulin dependent diabetes, hypothyroidism presenting with cough and sore throat. Patient states that the symptom started since this morning. She's been having increase in cough and states that her mucus appeared slightly green with a tinge of blood. Patient reports no chest pain and doesn't have shortness of breath. States that the only complaint is the cough and sore throat. Denies any fever, chills, n/v, diarrhea. Prior to Admission Medications   Prescriptions Last Dose Informant Patient Reported? Taking?    ALPRAZolam (XANAX) 0.25 mg tablet   No No   Sig: Take 1 tablet (0.25 mg total) by mouth daily at bedtime as needed for anxiety or sleep   Patient not taking: Reported on 2023   Biotin 10 MG TABS  Self Yes No   Sig: Take by mouth in the morning   Calcium Carb-Cholecalciferol (Caltrate 600+D3) 600-20 MG-MCG TABS   No No   Sig: Take 1 tablet by mouth 2 (two) times a day   acetaminophen (TYLENOL) 500 mg tablet  Self No No   Sig: Take 1 tablet (500 mg total) by mouth every 6 (six) hours as needed (pain fevers)   albuterol (PROVENTIL HFA,VENTOLIN HFA) 90 mcg/act inhaler  Self No No   Sig: Inhale 2 puffs every 6 (six) hours as needed for wheezing or shortness of breath   albuterol (PROVENTIL HFA,VENTOLIN HFA) 90 mcg/act inhaler   No No   Sig: INHALE 2 PUFFS BY MOUTH EVERY 6 HOURS AS NEEDED FOR WHEEZING   aspirin (ECOTRIN LOW STRENGTH) 81 mg EC tablet   No No   Sig: Take 1 tablet (81 mg total) by mouth daily   azelastine (ASTELIN) 0.1 % nasal spray   No No   Si spray into each nostril 2 (two) times a day Use in each nostril as directed   Patient not taking: Reported on 2023   benzonatate (TESSALON PERLES) 100 mg capsule   No No   Sig: Take 1 capsule (100 mg total) by mouth 3 (three) times a day as needed for cough   cetirizine (ZyrTEC) 10 mg tablet   No No   Sig: Take 1 tablet (10 mg total) by mouth daily With food/in the evening   fluticasone-umeclidinium-vilanterol (Trelegy Ellipta) 100-62.5-25 mcg/actuation inhaler   No No   Sig: Inhale 1 puff daily Rinse mouth after use. ipratropium (ATROVENT) 0.03 % nasal spray  Self No No   Si sprays into each nostril every 12 (twelve) hours   levothyroxine 50 mcg tablet   No No   Sig: TAKE ONE TABLET BY MOUTH ONCE DAILY EARLY IN THE MORNING   pantoprazole (PROTONIX) 40 mg tablet   No No   Sig: Take 1 tablet (40 mg total) by mouth daily   predniSONE 2.5 mg tablet   No No   Sig: Take 1 tablet (2.5 mg total) by mouth daily   valsartan (DIOVAN) 160 mg tablet   No No   Sig: Take 1 tablet (160 mg total) by mouth daily Please STOP Lisinopril,. .   vitamin B-12 (VITAMIN B-12) 1,000 mcg tablet   No No   Sig: Take 1 tablet (1,000 mcg total) by mouth daily      Facility-Administered Medications Last Administration Doses Remaining   cyanocobalamin injection 1,000 mcg 2020  3:08 PM           Past Medical History:   Diagnosis Date    Asthma     Chronic obstructive pulmonary disease ([address]) 2012    GERD (gastroesophageal reflux disease)     Hypertension 10/11/2018    Hypothyroid     Osteoarthritis 2012    Temporal arteritis ([address])     Tubular adenoma     Type 2 diabetes mellitus with complication, without long-term current use of insulin ([address]) 2020       Past Surgical History:   Procedure Laterality Date    EYE SURGERY Right 2019    cataract LVCFS    HYSTERECTOMY      NO PAST SURGERIES      ALSO NO RELEVANT PAST SURRGICAL HX AS PER [facility]       Family History   Problem Relation Age of Onset    Breast cancer Mother     Emphysema Father      I have reviewed and agree with the history as documented.     E-Cigarette/Vaping    E-Cigarette Use Never User E-Cigarette/Vaping Substances    Nicotine No     THC No     CBD No     Flavoring No     Other No     Unknown No      Social History     Tobacco Use    Smoking status: Former     Packs/day: 1.00     Years: 35.00     Total pack years: 35.00     Types: Cigarettes     Start date: 65     Quit date: 2013     Years since quitting: 10.8    Smokeless tobacco: Never    Tobacco comments:     TOBACCO USE, NO PASSIVE SMOKE EXPOSURE   Vaping Use    Vaping Use: Never used   Substance Use Topics    Alcohol use: No    Drug use: No       Review of Systems   Constitutional:  Negative for chills, diaphoresis, fever and unexpected weight change. HENT:  Positive for sore throat. Negative for ear pain. Eyes:  Negative for visual disturbance. Respiratory:  Positive for cough. Negative for chest tightness and shortness of breath. Cardiovascular:  Negative for chest pain and leg swelling. Gastrointestinal:  Negative for abdominal distention, abdominal pain, constipation, diarrhea, nausea and vomiting. Endocrine: Negative. Genitourinary:  Negative for difficulty urinating and dysuria. Musculoskeletal: Negative. Skin: Negative. Allergic/Immunologic: Negative. Neurological: Negative. Hematological: Negative. Psychiatric/Behavioral: Negative. All other systems reviewed and are negative. Physical Exam  Physical Exam  Vitals reviewed. Constitutional:       General: She is not in acute distress. Appearance: Normal appearance. She is not ill-appearing. HENT:      Head: Normocephalic and atraumatic. Right Ear: External ear normal.      Left Ear: External ear normal.      Nose: Nose normal.      Mouth/Throat:      Mouth: Mucous membranes are moist.      Pharynx: Oropharynx is clear. Eyes:      General: No scleral icterus. Right eye: No discharge. Left eye: No discharge. Extraocular Movements: Extraocular movements intact.       Conjunctiva/sclera: Conjunctivae normal. Pupils: Pupils are equal, round, and reactive to light. Cardiovascular:      Rate and Rhythm: Normal rate and regular rhythm. Pulses: Normal pulses. Heart sounds: Normal heart sounds. Pulmonary:      Effort: Pulmonary effort is normal.      Breath sounds: Normal breath sounds. Abdominal:      General: Abdomen is flat. Bowel sounds are normal. There is no distension. Palpations: Abdomen is soft. Tenderness: There is no abdominal tenderness. There is no guarding or rebound. Musculoskeletal:         General: Normal range of motion. Cervical back: Normal range of motion and neck supple. Skin:     General: Skin is warm and dry. Capillary Refill: Capillary refill takes less than 2 seconds. Neurological:      General: No focal deficit present. Mental Status: She is alert and oriented to person, place, and time. Mental status is at baseline. Psychiatric:         Mood and Affect: Mood normal.         Behavior: Behavior normal.         Thought Content:  Thought content normal.         Judgment: Judgment normal.         Vital Signs  ED Triage Vitals [11/22/23 1725]   Temperature Pulse Respirations Blood Pressure SpO2   98 °F (36.7 °C) 99 22 (!) 188/92 90 %      Temp Source Heart Rate Source Patient Position - Orthostatic VS BP Location FiO2 (%)   Tympanic Monitor Lying Left arm --      Pain Score       --           Vitals:    11/22/23 1725 11/22/23 2133   BP: (!) 188/92 99/74   Pulse: 99 97   Patient Position - Orthostatic VS: Lying Sitting         Visual Acuity      ED Medications  Medications   iohexol (OMNIPAQUE) 350 MG/ML injection (MULTI-DOSE) 140 mL (140 mL Intravenous Given 11/22/23 2001)       Diagnostic Studies  Results Reviewed       Procedure Component Value Units Date/Time    FLU/RSV/COVID - if FLU/RSV clinically relevant [[id number]]  (Normal) Collected: 11/22/23 1920    Lab Status: Final result Specimen: Nares from Nose Updated: 11/22/23 2006     SARS-CoV-2 Negative     INFLUENZA A PCR Negative     INFLUENZA B PCR Negative     RSV PCR Negative    Narrative:      FOR PEDIATRIC PATIENTS - copy/paste COVID Guidelines URL to browser: [email or web address]    SARS-CoV-2 assay is a Nucleic Acid Amplification assay intended for the  qualitative detection of nucleic acid from SARS-CoV-2 in nasopharyngeal  swabs. Results are for the presumptive identification of SARS-CoV-2 RNA. Positive results are indicative of infection with SARS-CoV-2, the virus  causing COVID-19, but do not rule out bacterial infection or co-infection  with other viruses. Laboratories within the [facility] and its  territories are required to report all positive results to the appropriate  public health authorities. Negative results do not preclude SARS-CoV-2  infection and should not be used as the sole basis for treatment or other  patient management decisions. Negative results must be combined with  clinical observations, patient history, and epidemiological information. This test has not been FDA cleared or approved. This test has been authorized by FDA under an Emergency Use Authorization  (EUA). This test is only authorized for the duration of time the  declaration that circumstances exist justifying the authorization of the  emergency use of an in vitro diagnostic tests for detection of SARS-CoV-2  virus and/or diagnosis of COVID-19 infection under section 564(b)(1) of  the Act, 21 U. S.C. 383EII-6(H)(2), unless the authorization is terminated  or revoked sooner. The test has been validated but independent review by FDA  and CLIA is pending. Test performed using [employer] GeneXpert: This RT-PCR assay targets N2,  a region unique to SARS-CoV-2. A conserved region in the E-gene was chosen  for pan-Sarbecovirus detection which includes SARS-CoV-2.     According to CMS-2020-01-R, this platform meets the definition of high-throughput technology.     Procalcitonin [[id number]]  (Normal) Collected: 11/22/23 1920    Lab Status: Final result Specimen: Blood from Arm, Right Updated: 11/22/23 1951     Procalcitonin 0.09 ng/ml     Comprehensive metabolic panel [[id number]]  (Abnormal) Collected: 11/22/23 1920    Lab Status: Final result Specimen: Blood from Arm, Right Updated: 11/1945     Sodium 136 mmol/L      Potassium 4.3 mmol/L      Chloride 97 mmol/L      CO2 31 mmol/L      ANION GAP 8 mmol/L      BUN 17 mg/dL      Creatinine 0.66 mg/dL      Glucose 113 mg/dL      Calcium 10.6 mg/dL      AST 22 U/L      ALT 15 U/L      Alkaline Phosphatase 62 U/L      Total Protein 7.4 g/dL      Albumin 4.6 g/dL      Total Bilirubin 0.41 mg/dL      eGFR 84 ml/min/1.73sq m     Narrative:      [facility] guidelines for Chronic Kidney Disease (CKD):     Stage 1 with normal or high GFR (GFR > 90 mL/min/1.73 square meters)    Stage 2 Mild CKD (GFR = 60-89 mL/min/1.73 square meters)    Stage 3A Moderate CKD (GFR = 45-59 mL/min/1.73 square meters)    Stage 3B Moderate CKD (GFR = 30-44 mL/min/1.73 square meters)    Stage 4 Severe CKD (GFR = 15-29 mL/min/1.73 square meters)    Stage 5 End Stage CKD (GFR <15 mL/min/1.73 square meters)  Note: GFR calculation is accurate only with a steady state creatinine    CBC and differential [[id number]]  (Abnormal) Collected: 11/22/23 1920    Lab Status: Final result Specimen: Blood from Arm, Right Updated: 11/22/23 1926     WBC 12.54 Thousand/uL      RBC 5.10 Million/uL      Hemoglobin 14.1 g/dL      Hematocrit 44.9 %      MCV 88 fL      MCH 27.6 pg      MCHC 31.4 g/dL      RDW 13.7 %      MPV 9.9 fL      Platelets 342 Thousands/uL      nRBC 0 /100 WBCs      Neutrophils Relative 87 %      Immat GRANS % 0 %      Lymphocytes Relative 6 %      Monocytes Relative 6 %      Eosinophils Relative 1 %      Basophils Relative 0 %      Neutrophils Absolute 10.87 Thousands/µL      Immature Grans Absolute 0.05 Thousand/uL Lymphocytes Absolute 0.78 Thousands/µL      Monocytes Absolute 0.74 Thousand/µL      Eosinophils Absolute 0.06 Thousand/µL      Basophils Absolute 0.04 Thousands/µL                    CT soft tissue neck   Final Result by Basia Urban MD (11/22 2135)      No evidence of mass, inflammation or lymphadenopathy in the neck            Workstation performed: [id number]         CT chest with contrast   Final Result by Basia Urban MD (11/22 2132)         1. No acute cardiopulmonary process. 2. New left upper lobe 5 mm pulmonary nodule and stable left lower lobe 4 mm pulmonary nodule. Recommend 12-month follow-up CT. Workstation performed: [id number]         XR chest 2 views   Final Result by Shu Miranda MD (11/23 4813)      No acute cardiopulmonary disease. Workstation performed: [id number]                    Procedures  Procedures         ED Course                               SBIRT 22yo+      Flowsheet Row Most Recent Value   Initial Alcohol Screen: US AUDIT-C     1. How often do you have a drink containing alcohol? 0 Filed at: 11/22/2023 1725   2. How many drinks containing alcohol do you have on a typical day you are drinking? 0 Filed at: 11/22/2023 1725   3a. Male UNDER 65: How often do you have five or more drinks on one occasion? 0 Filed at: 11/22/2023 1725   3b. FEMALE Any Age, or MALE 65+: How often do you have 4 or more drinks on one occassion? 0 Filed at: 11/22/2023 1725   Audit-C Score 0 Filed at: 11/22/2023 1725   MARIMAR: How many times in the past year have you. .. Used an illegal drug or used a prescription medication for non-medical reasons?  Never Filed at: 11/22/2023 1725                      Medical Decision Making  66year old female presenting with cough, and sore throat   Patient does have hx of COPD but without hx of supplemental oxygen   Patient with O2 sat ranging between 88-92  Also complaining of throat swelling   On exam no signs of bacterial pharyngitis or peritonsilar abscess   CXR did not reveal any obvious sign of pneumonia   Due to unknown baseline O2 status and it being in the high 80s at times coinciding with cough will assess for pneumonia   While obtaining causes of throat narrowing symptom will assess for possible lung pathology via CT soft tissue neck and CT Chest   Labs ordered with procal   Labs unremarkable   CT scan did show lung nodule with recommended follow up. Otherwise no lung pathology and no throat abscess or mass  Patient was informed of this information and to follow up with PCP   Patient agreed. Discharged with return precaution provided     Problems Addressed:  Viral pharyngitis: acute illness or injury    Amount and/or Complexity of Data Reviewed  Labs: ordered. Radiology: ordered. Risk  Prescription drug management. Disposition  Final diagnoses:   Viral pharyngitis     Time reflects when diagnosis was documented in both MDM as applicable and the Disposition within this note       Time User Action Codes Description Comment    11/22/2023  9:43 PM Dong Rosales Add [J02.9] Viral pharyngitis           ED Disposition       ED Disposition   Discharge    Condition   Stable    Date/Time   Wed Nov 22, 2023 2143    [address] discharge to home/self care.                    Follow-up Information       Follow up With Specialties Details Why Durga Lazo MD Internal Medicine Schedule an appointment as soon as possible for a visit   [address]  [phone]              Discharge Medication List as of 11/22/2023  9:43 PM        CONTINUE these medications which have NOT CHANGED    Details   acetaminophen (TYLENOL) 500 mg tablet Take 1 tablet (500 mg total) by mouth every 6 (six) hours as needed (pain fevers), Starting Fri 12/27/2019, Print      !! albuterol (PROVENTIL HFA,VENTOLIN HFA) 90 mcg/act inhaler Inhale 2 puffs every 6 (six) hours as needed for wheezing or shortness of breath, Starting Mon 8/15/2022, Normal      !! albuterol (PROVENTIL HFA,VENTOLIN HFA) 90 mcg/act inhaler INHALE 2 PUFFS BY MOUTH EVERY 6 HOURS AS NEEDED FOR WHEEZING, Starting Mon 8/28/2023, Normal      ALPRAZolam (XANAX) 0.25 mg tablet Take 1 tablet (0.25 mg total) by mouth daily at bedtime as needed for anxiety or sleep, Starting Mon 10/30/2023, Normal      aspirin (ECOTRIN LOW STRENGTH) 81 mg EC tablet Take 1 tablet (81 mg total) by mouth daily, Starting Mon 6/5/2023, Normal      azelastine (ASTELIN) 0.1 % nasal spray 1 spray into each nostril 2 (two) times a day Use in each nostril as directed, Starting Mon 2/27/2023, Normal      benzonatate (TESSALON PERLES) 100 mg capsule Take 1 capsule (100 mg total) by mouth 3 (three) times a day as needed for cough, Starting Mon 10/30/2023, Normal      Biotin 10 MG TABS Take by mouth in the morning, Historical Med      Calcium Carb-Cholecalciferol (Caltrate 600+D3) 600-20 MG-MCG TABS Take 1 tablet by mouth 2 (two) times a day, Starting Mon 6/5/2023, Normal      cetirizine (ZyrTEC) 10 mg tablet Take 1 tablet (10 mg total) by mouth daily With food/in the evening, Starting Mon 6/5/2023, Normal      fluticasone-umeclidinium-vilanterol (Trelegy Ellipta) 100-62.5-25 mcg/actuation inhaler Inhale 1 puff daily Rinse mouth after use., Starting Mon 11/13/2023, Until Wed 12/13/2023, Normal      ipratropium (ATROVENT) 0.03 % nasal spray 2 sprays into each nostril every 12 (twelve) hours, Starting Mon 1/9/2023, Normal      levothyroxine 50 mcg tablet TAKE ONE TABLET BY MOUTH ONCE DAILY EARLY IN THE MORNING, Normal      pantoprazole (PROTONIX) 40 mg tablet Take 1 tablet (40 mg total) by mouth daily, Starting Mon 6/5/2023, Normal      predniSONE 2.5 mg tablet Take 1 tablet (2.5 mg total) by mouth daily, Starting Mon 9/18/2023, Normal      valsartan (DIOVAN) 160 mg tablet Take 1 tablet (160 mg total) by mouth daily Please STOP Lisinopril,.., Starting Mon 6/5/2023, Normal vitamin B-12 (VITAMIN B-12) 1,000 mcg tablet Take 1 tablet (1,000 mcg total) by mouth daily, Starting Mon 6/5/2023, Normal       !! - Potential duplicate medications found. Please discuss with provider. No discharge procedures on file.     PDMP Review         Value Time User    PDMP Reviewed  Yes 11/20/2023  1:58 PM Alexandria Ronquillo MD            ED Provider  Electronically Signed by             Sherley Henry MD  11/23/23 1253       Sherley Henry MD  11/23/23 1253

## 2023-11-22 NOTE — TELEPHONE ENCOUNTER
Pt called again stating that she still is hoarse and has a sore throat. She noted that she is aware of the dayquil and nitequill that was recommended to her yesterday but is now concerned because she is coughing up green mucus. Pt notes that she is very nervous because she is travelling tomorrow with her family for the holiday. She would like something called into the pharmacy for her.  Please advise - as

## 2023-11-23 ENCOUNTER — HOSPITAL ENCOUNTER (INPATIENT)
Facility: HOSPITAL | Age: 78
LOS: 2 days | Discharge: HOME | DRG: 872 | End: 2023-11-25
Attending: EMERGENCY MEDICINE | Admitting: INTERNAL MEDICINE
Payer: COMMERCIAL

## 2023-11-23 ENCOUNTER — APPOINTMENT (EMERGENCY)
Dept: CT IMAGING | Facility: HOSPITAL | Age: 78
DRG: 872 | End: 2023-11-23
Payer: COMMERCIAL

## 2023-11-23 DIAGNOSIS — K11.20 SIALOADENITIS: Primary | ICD-10-CM

## 2023-11-23 DIAGNOSIS — K11.20 INFECTIOUS SIALOADENITIS OF MAJOR SALIVARY GLAND: ICD-10-CM

## 2023-11-23 DIAGNOSIS — L03.90 CELLULITIS: ICD-10-CM

## 2023-11-23 DIAGNOSIS — L03.221 CELLULITIS OF NECK: ICD-10-CM

## 2023-11-23 PROBLEM — E78.5 HYPERLIPIDEMIA ASSOCIATED WITH TYPE 2 DIABETES MELLITUS: Status: RESOLVED | Noted: 2023-01-30 | Resolved: 2023-11-23

## 2023-11-23 PROBLEM — E11.69 HYPERLIPIDEMIA ASSOCIATED WITH TYPE 2 DIABETES MELLITUS: Status: RESOLVED | Noted: 2023-01-30 | Resolved: 2023-11-23

## 2023-11-23 PROBLEM — I50.9 HEART FAILURE, UNSPECIFIED HF CHRONICITY, UNSPECIFIED HEART FAILURE TYPE (HCC): Status: RESOLVED | Noted: 2023-06-05 | Resolved: 2023-11-23

## 2023-11-23 PROBLEM — R91.1 LEFT UPPER LOBE PULMONARY NODULE: Status: ACTIVE | Noted: 2023-11-06

## 2023-11-23 PROBLEM — Z12.2 ENCOUNTER FOR SCREENING FOR MALIGNANT NEOPLASM OF LUNG IN FORMER SMOKER WHO QUIT IN PAST 15 YEARS WITH 30 PACK YEAR HISTORY OR GREATER: Status: RESOLVED | Noted: 2023-01-09 | Resolved: 2023-11-23

## 2023-11-23 PROBLEM — Z87.891 ENCOUNTER FOR SCREENING FOR MALIGNANT NEOPLASM OF LUNG IN FORMER SMOKER WHO QUIT IN PAST 15 YEARS WITH 30 PACK YEAR HISTORY OR GREATER: Status: RESOLVED | Noted: 2023-01-09 | Resolved: 2023-11-23

## 2023-11-23 LAB
ALBUMIN SERPL BCP-MCNC: 4.7 G/DL (ref 3.5–5)
ALP SERPL-CCNC: 66 U/L (ref 34–104)
ALT SERPL W P-5'-P-CCNC: 19 U/L (ref 7–52)
ANION GAP SERPL CALCULATED.3IONS-SCNC: 10 MMOL/L
AST SERPL W P-5'-P-CCNC: 23 U/L (ref 13–39)
BASOPHILS # BLD AUTO: 0.05 THOUSANDS/ÂΜL (ref 0–0.1)
BASOPHILS NFR BLD AUTO: 0 % (ref 0–1)
BILIRUB SERPL-MCNC: 0.69 MG/DL (ref 0.2–1)
BUN SERPL-MCNC: 14 MG/DL (ref 5–25)
CALCIUM SERPL-MCNC: 10.3 MG/DL (ref 8.4–10.2)
CHLORIDE SERPL-SCNC: 95 MMOL/L (ref 96–108)
CO2 SERPL-SCNC: 32 MMOL/L (ref 21–32)
CREAT SERPL-MCNC: 0.75 MG/DL (ref 0.6–1.3)
EOSINOPHIL # BLD AUTO: 0.06 THOUSAND/ÂΜL (ref 0–0.61)
EOSINOPHIL NFR BLD AUTO: 0 % (ref 0–6)
ERYTHROCYTE [DISTWIDTH] IN BLOOD BY AUTOMATED COUNT: 13.8 % (ref 11.6–15.1)
GFR SERPL CREATININE-BSD FRML MDRD: 76 ML/MIN/1.73SQ M
GLUCOSE SERPL-MCNC: 147 MG/DL (ref 65–140)
HCT VFR BLD AUTO: 47.1 % (ref 34.8–46.1)
HGB BLD-MCNC: 14.8 G/DL (ref 11.5–15.4)
IMM GRANULOCYTES # BLD AUTO: 0.06 THOUSAND/UL (ref 0–0.2)
IMM GRANULOCYTES NFR BLD AUTO: 0 % (ref 0–2)
LYMPHOCYTES # BLD AUTO: 0.84 THOUSANDS/ÂΜL (ref 0.6–4.47)
LYMPHOCYTES NFR BLD AUTO: 6 % (ref 14–44)
MCH RBC QN AUTO: 27.5 PG (ref 26.8–34.3)
MCHC RBC AUTO-ENTMCNC: 31.4 G/DL (ref 31.4–37.4)
MCV RBC AUTO: 88 FL (ref 82–98)
MONOCYTES # BLD AUTO: 1.05 THOUSAND/ÂΜL (ref 0.17–1.22)
MONOCYTES NFR BLD AUTO: 7 % (ref 4–12)
NEUTROPHILS # BLD AUTO: 12.04 THOUSANDS/ÂΜL (ref 1.85–7.62)
NEUTS SEG NFR BLD AUTO: 87 % (ref 43–75)
NRBC BLD AUTO-RTO: 0 /100 WBCS
PLATELET # BLD AUTO: 270 THOUSANDS/UL (ref 149–390)
PMV BLD AUTO: 10.1 FL (ref 8.9–12.7)
POTASSIUM SERPL-SCNC: 3.8 MMOL/L (ref 3.5–5.3)
PROT SERPL-MCNC: 7.7 G/DL (ref 6.4–8.4)
RBC # BLD AUTO: 5.38 MILLION/UL (ref 3.81–5.12)
SODIUM SERPL-SCNC: 137 MMOL/L (ref 135–147)
TSH SERPL DL<=0.05 MIU/L-ACNC: 1.2 UIU/ML (ref 0.45–4.5)
WBC # BLD AUTO: 14.1 THOUSAND/UL (ref 4.31–10.16)

## 2023-11-23 PROCEDURE — 70491 CT SOFT TISSUE NECK W/DYE: CPT

## 2023-11-23 PROCEDURE — G1004 CDSM NDSC: HCPCS

## 2023-11-23 PROCEDURE — 99285 EMERGENCY DEPT VISIT HI MDM: CPT | Performed by: PHYSICIAN ASSISTANT

## 2023-11-23 PROCEDURE — 80053 COMPREHEN METABOLIC PANEL: CPT | Performed by: PHYSICIAN ASSISTANT

## 2023-11-23 PROCEDURE — 99223 1ST HOSP IP/OBS HIGH 75: CPT | Performed by: FAMILY MEDICINE

## 2023-11-23 PROCEDURE — 84443 ASSAY THYROID STIM HORMONE: CPT | Performed by: PHYSICIAN ASSISTANT

## 2023-11-23 PROCEDURE — 85025 COMPLETE CBC W/AUTO DIFF WBC: CPT | Performed by: PHYSICIAN ASSISTANT

## 2023-11-23 PROCEDURE — 36415 COLL VENOUS BLD VENIPUNCTURE: CPT | Performed by: PHYSICIAN ASSISTANT

## 2023-11-23 RX ORDER — PREDNISONE 2.5 MG/1
2.5 TABLET ORAL DAILY
Status: DISCONTINUED | OUTPATIENT
Start: 2023-11-24 | End: 2023-11-23

## 2023-11-23 RX ORDER — PANTOPRAZOLE SODIUM 40 MG/1
40 TABLET, DELAYED RELEASE ORAL DAILY
Status: DISCONTINUED | OUTPATIENT
Start: 2023-11-24 | End: 2023-11-25 | Stop reason: HOSPADM

## 2023-11-23 RX ORDER — ACETAMINOPHEN 325 MG/1
650 TABLET ORAL EVERY 6 HOURS PRN
Status: DISCONTINUED | OUTPATIENT
Start: 2023-11-23 | End: 2023-11-25 | Stop reason: HOSPADM

## 2023-11-23 RX ORDER — ALBUTEROL SULFATE 90 UG/1
2 AEROSOL, METERED RESPIRATORY (INHALATION) EVERY 6 HOURS PRN
Status: DISCONTINUED | OUTPATIENT
Start: 2023-11-23 | End: 2023-11-25 | Stop reason: HOSPADM

## 2023-11-23 RX ORDER — FLUTICASONE FUROATE AND VILANTEROL 100; 25 UG/1; UG/1
1 POWDER RESPIRATORY (INHALATION) DAILY
Status: DISCONTINUED | OUTPATIENT
Start: 2023-11-24 | End: 2023-11-25 | Stop reason: HOSPADM

## 2023-11-23 RX ORDER — BENZONATATE 100 MG/1
100 CAPSULE ORAL 3 TIMES DAILY PRN
Status: DISCONTINUED | OUTPATIENT
Start: 2023-11-23 | End: 2023-11-25 | Stop reason: HOSPADM

## 2023-11-23 RX ORDER — LOSARTAN POTASSIUM 50 MG/1
100 TABLET ORAL DAILY
Status: DISCONTINUED | OUTPATIENT
Start: 2023-11-24 | End: 2023-11-25 | Stop reason: HOSPADM

## 2023-11-23 RX ORDER — ENOXAPARIN SODIUM 100 MG/ML
40 INJECTION SUBCUTANEOUS DAILY
Status: DISCONTINUED | OUTPATIENT
Start: 2023-11-24 | End: 2023-11-25 | Stop reason: HOSPADM

## 2023-11-23 RX ORDER — LEVOTHYROXINE SODIUM 0.05 MG/1
50 TABLET ORAL
Status: DISCONTINUED | OUTPATIENT
Start: 2023-11-24 | End: 2023-11-25 | Stop reason: HOSPADM

## 2023-11-23 RX ORDER — DEXAMETHASONE SODIUM PHOSPHATE 10 MG/ML
8 INJECTION, SOLUTION INTRAMUSCULAR; INTRAVENOUS EVERY 8 HOURS SCHEDULED
Status: DISCONTINUED | OUTPATIENT
Start: 2023-11-23 | End: 2023-11-24

## 2023-11-23 RX ADMIN — DEXAMETHASONE SODIUM PHOSPHATE 8 MG: 10 INJECTION INTRAMUSCULAR; INTRAVENOUS at 22:46

## 2023-11-23 RX ADMIN — DEXAMETHASONE SODIUM PHOSPHATE 8 MG: 10 INJECTION INTRAMUSCULAR; INTRAVENOUS at 17:41

## 2023-11-23 RX ADMIN — SODIUM CHLORIDE 3 G: 900 INJECTION INTRAVENOUS at 21:52

## 2023-11-23 RX ADMIN — AMPICILLIN SODIUM AND SULBACTAM SODIUM 3 G: 2; 1 INJECTION, POWDER, FOR SOLUTION INTRAMUSCULAR; INTRAVENOUS at 15:56

## 2023-11-23 RX ADMIN — IOHEXOL 85 ML: 350 INJECTION, SOLUTION INTRAVENOUS at 14:54

## 2023-11-23 NOTE — ASSESSMENT & PLAN NOTE
- Monitor airway   - Continue IV unasyn Q8H  - Discussed case with ENT who also recommends dexamethasone 8mg TID and taper upon discharge   - Trend CBC

## 2023-11-23 NOTE — H&P
[facility]  H&P  Name: Baldo Mcgregor 66 y.o. female I MRN: [id number]  Unit/Bed#: 7T [facility] 703-01 I Date of Admission: 11/23/2023   Date of Service: 11/23/2023 I Hospital Day: 0      Assessment/Plan   * Infectious sialoadenitis of major salivary gland  Assessment & Plan  - Monitor airway   - Continue IV unasyn Q8H  - Discussed case with ENT who also recommends dexamethasone 8mg TID and taper upon discharge   - Trend CBC        Cellulitis of neck  Assessment & Plan  - Management as above with IV abx    Left upper lobe pulmonary nodule  Assessment & Plan  - 6MM Noted on CT scan   - Recommended outpatient follow up with outpatient CT scan in 3 months     Hypertension  Assessment & Plan  - Not at goal   - Will give losartan 100mg QD   - BP goal <150/90  - Home medication: Valsartan 160mg QD    Gastroesophageal reflux disease without esophagitis  Assessment & Plan  - Continue PPI QD    Other specified hypothyroidism  Assessment & Plan  - Continue levothryroxine 50mcg     Chronic obstructive pulmonary disease (HCC)  Assessment & Plan  - Continue outpatient Trelagy, albuterol PRN  - Will hold home dose of prednisone as giving dexamethasone while inpatient          VTE Prophylaxis: Lovenox   Code Status: Full code     Anticipated Length of Stay:  Patient will be admitted on an Inpatient basis with an anticipated length of stay of < 2 midnights. Justification for Hospital Stay: need of IV antibiotics.      Total Time for Visit, including Counseling / Coordination of Care: I have spent a total time of 45 minutes on 11/23/23 in caring for this patient including Diagnostic results, Prognosis, Risks and benefits of tx options, Instructions for management, Patient and family education, Impressions, Counseling / Coordination of care, Documenting in the medical record, Reviewing / ordering tests, medicine, procedures  , Obtaining or reviewing history  , and Communicating with other healthcare professionals . Chief Complaint:   Neck swelling    History of Present Illness:    Sophia Servin is a 66 y.o. female with PMH of HTN, Hypothyroidism, GERD, and COPD on chronic prednisone who presents to the ED for neck swelling. Patient reports that she was in the ED yesterday for sore throat and coughing up sputum that was green compared to her regular white sputum. She states that from yesterday to today she noticed increased swelling in her neck and got worried and decided to come back to the ED where she was found to have an infection of her submandibular glands on CT scan. Patient denies any sick contacts, fever, CP, SOB, N/V, diarrhea, or constipation. States she has been complaint with following up with her PCP and adherent to her medication. ED Course: Given 1 dose of IV Unasyn. Review of Systems:    Review of Systems   Constitutional:  Negative for appetite change, chills and fatigue. HENT:  Positive for sore throat. Negative for congestion, ear pain, hearing loss, trouble swallowing and voice change. Respiratory:  Positive for cough. Negative for chest tightness and shortness of breath. Cardiovascular:  Negative for chest pain, palpitations and leg swelling. Gastrointestinal:  Negative for abdominal pain, anal bleeding, nausea and vomiting. Endocrine: Negative for cold intolerance, heat intolerance, polydipsia, polyphagia and polyuria. Genitourinary:  Negative for difficulty urinating, pelvic pain, vaginal bleeding and vaginal discharge. Musculoskeletal:  Negative for back pain, joint swelling and neck stiffness. Neurological:  Negative for dizziness, syncope, numbness and headaches.        Past Medical and Surgical History:     Past Medical History:   Diagnosis Date    Asthma     Chronic obstructive pulmonary disease ([facility]) 9/26/2012    GERD (gastroesophageal reflux disease)     Hypertension 10/11/2018    Hypothyroid     Osteoarthritis 9/26/2012    Temporal arteritis ([address] [place] [address]) Tubular adenoma     Type 2 diabetes mellitus with complication, without long-term current use of insulin ([address]) 1/14/2020       Past Surgical History:   Procedure Laterality Date    EYE SURGERY Right 12/06/2019    cataract LVCFS    HYSTERECTOMY      NO PAST SURGERIES      ALSO NO RELEVANT PAST SURRGICAL HX AS PER [facility]       Meds/Allergies:    Prior to Admission medications    Medication Sig Start Date End Date Taking?  Authorizing Provider   acetaminophen (TYLENOL) 500 mg tablet Take 1 tablet (500 mg total) by mouth every 6 (six) hours as needed (pain fevers) 12/27/19   Tracy Hastings PA-C   albuterol (PROVENTIL HFA,VENTOLIN HFA) 90 mcg/act inhaler Inhale 2 puffs every 6 (six) hours as needed for wheezing or shortness of breath 8/15/22   Raenell Fothergill, MD   albuterol (PROVENTIL HFA,VENTOLIN HFA) 90 mcg/act inhaler INHALE 2 PUFFS BY MOUTH EVERY 6 HOURS AS NEEDED FOR WHEEZING 8/28/23   Raenell Fothergill, MD   ALPRAZolam [place]) 0.25 mg tablet Take 1 tablet (0.25 mg total) by mouth daily at bedtime as needed for anxiety or sleep  Patient not taking: Reported on 11/6/2023 10/30/23   Raenell Fothergill, MD   aspirin (ECOTRIN LOW STRENGTH) 81 mg EC tablet Take 1 tablet (81 mg total) by mouth daily 6/5/23   Raenell Fothergill, MD   azelastine (ASTELIN) 0.1 % nasal spray 1 spray into each nostril 2 (two) times a day Use in each nostril as directed  Patient not taking: Reported on 11/6/2023 2/27/23   Raenell Fothergill, MD   benzonatate (TESSALON PERLES) 100 mg capsule Take 1 capsule (100 mg total) by mouth 3 (three) times a day as needed for cough 10/30/23   Raenell Fothergill, MD   Biotin 10 MG TABS Take by mouth in the morning    Historical Provider, MD   Calcium Carb-Cholecalciferol (Caltrate 600+D3) 600-20 MG-MCG TABS Take 1 tablet by mouth 2 (two) times a day 6/5/23   Raenell Fothergill, MD   cetirizine (ZyrTEC) 10 mg tablet Take 1 tablet (10 mg total) by mouth daily With food/in the evening 6/5/23   Raenell Fothergill, MD fluticasone-umeclidinium-vilanterol (Trelegy Ellipta) 100-62.5-25 mcg/actuation inhaler Inhale 1 puff daily Rinse mouth after use. 11/13/23 12/13/23  Justine Mahmood MD   ipratropium (ATROVENT) 0.03 % nasal spray 2 sprays into each nostril every 12 (twelve) hours 1/9/23   Tiki Parr MD   levothyroxine 50 mcg tablet TAKE ONE TABLET BY MOUTH ONCE DAILY EARLY IN THE MORNING 10/30/23   Justine Mahmood MD   pantoprazole (PROTONIX) 40 mg tablet Take 1 tablet (40 mg total) by mouth daily 6/5/23   Justine Mahmood MD   predniSONE 2.5 mg tablet Take 1 tablet (2.5 mg total) by mouth daily 9/18/23   Justine Mahmood MD   valsartan (DIOVAN) 160 mg tablet Take 1 tablet (160 mg total) by mouth daily Please STOP Lisinopril,. . 6/5/23   Nicolas Bautista MD   vitamin B-12 (VITAMIN B-12) 1,000 mcg tablet Take 1 tablet (1,000 mcg total) by mouth daily 6/5/23   Justine Mahmood MD       Allergies: No Known Allergies    Social History:     Marital Status: Legally    Substance Use History:   Social History     Substance and Sexual Activity   Alcohol Use No     Social History     Tobacco Use   Smoking Status Former    Packs/day: 1.00    Years: 35.00    Total pack years: 35.00    Types: Cigarettes    Start date: 65    Quit date: 2013    Years since quitting: 10.8   Smokeless Tobacco Never   Tobacco Comments    TOBACCO USE, NO PASSIVE SMOKE EXPOSURE     Social History     Substance and Sexual Activity   Drug Use No       Family History:    Pertinent family history reviewed    Physical Exam:     Vitals:   Blood Pressure: (!) 171/91 (11/23/23 1304)  Pulse: 88 (11/23/23 1304)  Temperature: 98.9 °F (37.2 °C) (11/23/23 1304)  Temp Source: Tympanic (11/23/23 1304)  Respirations: 18 (11/23/23 1304)  Weight - Scale: 48.6 kg (107 lb 2.3 oz) (11/23/23 1304)  SpO2: 92 % (11/23/23 1304)    Physical Exam  Vitals and nursing note reviewed. Constitutional:       General: She is not in acute distress. Appearance: She is well-developed.    HENT: Head: Normocephalic and atraumatic. Eyes:      Conjunctiva/sclera: Conjunctivae normal.   Neck:      Thyroid: Thyroid mass present. Trachea: Trachea normal.        Comments: Movable, tender mass noted anteriorly under mandible   Cardiovascular:      Rate and Rhythm: Normal rate and regular rhythm. Heart sounds: No murmur heard. Pulmonary:      Effort: Pulmonary effort is normal. No respiratory distress. Breath sounds: Normal breath sounds. Abdominal:      Palpations: Abdomen is soft. Tenderness: There is no abdominal tenderness. Musculoskeletal:         General: No swelling. Cervical back: Neck supple. No edema, erythema, signs of trauma, rigidity, torticollis or crepitus. No pain with movement. Normal range of motion. Lymphadenopathy:      Cervical: No cervical adenopathy. Skin:     General: Skin is warm and dry. Capillary Refill: Capillary refill takes less than 2 seconds. Neurological:      Mental Status: She is alert.    Psychiatric:         Mood and Affect: Mood normal.          Additional Data:     Lab Results: I have reviewed pertinent results     Results from last 7 days   Lab Units 11/23/23  1316   WBC Thousand/uL 14.10*   HEMOGLOBIN g/dL 14.8   HEMATOCRIT % 47.1*   PLATELETS Thousands/uL 270   NEUTROS PCT % 87*   LYMPHS PCT % 6*   MONOS PCT % 7   EOS PCT % 0     Results from last 7 days   Lab Units 11/23/23  1316   SODIUM mmol/L 137   POTASSIUM mmol/L 3.8   CHLORIDE mmol/L 95*   CO2 mmol/L 32   BUN mg/dL 14   CREATININE mg/dL 0.75   ANION GAP mmol/L 10   CALCIUM mg/dL 10.3*   ALBUMIN g/dL 4.7   TOTAL BILIRUBIN mg/dL 0.69   ALK PHOS U/L 66   ALT U/L 19   AST U/L 23   GLUCOSE RANDOM mg/dL 147*             Results from last 7 days   Lab Units 11/20/23  1434   HEMOGLOBIN A1C  5.8     Results from last 7 days   Lab Units 11/22/23  1920   PROCALCITONIN ng/ml 0.09       Imaging: I have reviewed pertinent imaging     CT soft tissue neck   Final Result by Yash Betts MD (11/23 1542)      Interval development of bilateral submandibular and sublingual sialoadenitis with cellulitis within the right greater than left submandibular space and tracking inferiorly along the anterior neck. Given rapid progression from yesterday correlate    clinically for developing Edwin angina. No significant airway compromise at time of exam. No abscess. New 6 mm pulmonary nodule within the left upper lobe compared to CT from 2/6/2023. Recommend follow-up chest CT in 3 months. Findings were directly discussed with Lupe MATAMOROS at 3:31 p.m. on 11/23/2023 by Dr. Nani Ferris. Workstation performed: [id number]             EKG, Pathology, and Other Studies Reviewed on Admission:   EKG: Pending review    Allscripts / Epic Records Reviewed    ** Please Note: This note has been constructed using a voice recognition system.  **

## 2023-11-23 NOTE — ASSESSMENT & PLAN NOTE
- Not at goal   - Will give losartan 100mg QD   - BP goal <150/90  - Home medication: Valsartan 160mg QD

## 2023-11-23 NOTE — ED PROVIDER NOTES
History  Chief Complaint   Patient presents with    Neck Swelling     Pt. Has a Fluid filled pocket on the outside of her throat. Seen last night for sore throat and cough     22-year-old female presents the emergency department with complaints of swelling over the anterior part of the neck. States that she had sensation of swelling in the neck yesterday along with some labored breathing. Seen in the emergency department with a negative evaluation. Notes that last night she had some coughing overnight and now she has some swelling over the anterior part of the neck starting today. Feels that it is pushing on her airway at times. Patient has history of COPD and states that she does not breathe well at baseline. Does not require supplemental oxygen at home. She denies fevers. History provided by:  Patient   used: No        Prior to Admission Medications   Prescriptions Last Dose Informant Patient Reported? Taking?    ALPRAZolam (XANAX) 0.25 mg tablet   No No   Sig: Take 1 tablet (0.25 mg total) by mouth daily at bedtime as needed for anxiety or sleep   Patient not taking: Reported on 2023   Biotin 10 MG TABS  Self Yes No   Sig: Take by mouth in the morning   Calcium Carb-Cholecalciferol (Caltrate 600+D3) 600-20 MG-MCG TABS   No No   Sig: Take 1 tablet by mouth 2 (two) times a day   acetaminophen (TYLENOL) 500 mg tablet  Self No No   Sig: Take 1 tablet (500 mg total) by mouth every 6 (six) hours as needed (pain fevers)   albuterol (PROVENTIL HFA,VENTOLIN HFA) 90 mcg/act inhaler  Self No No   Sig: Inhale 2 puffs every 6 (six) hours as needed for wheezing or shortness of breath   albuterol (PROVENTIL HFA,VENTOLIN HFA) 90 mcg/act inhaler   No No   Sig: INHALE 2 PUFFS BY MOUTH EVERY 6 HOURS AS NEEDED FOR WHEEZING   aspirin (ECOTRIN LOW STRENGTH) 81 mg EC tablet   No No   Sig: Take 1 tablet (81 mg total) by mouth daily   azelastine (ASTELIN) 0.1 % nasal spray   No No   Si spray into each nostril 2 (two) times a day Use in each nostril as directed   Patient not taking: Reported on 2023   benzonatate (TESSALON PERLES) 100 mg capsule   No No   Sig: Take 1 capsule (100 mg total) by mouth 3 (three) times a day as needed for cough   cetirizine (ZyrTEC) 10 mg tablet   No No   Sig: Take 1 tablet (10 mg total) by mouth daily With food/in the evening   fluticasone-umeclidinium-vilanterol (Trelegy Ellipta) 100-62.5-25 mcg/actuation inhaler   No No   Sig: Inhale 1 puff daily Rinse mouth after use. ipratropium (ATROVENT) 0.03 % nasal spray  Self No No   Si sprays into each nostril every 12 (twelve) hours   levothyroxine 50 mcg tablet   No No   Sig: TAKE ONE TABLET BY MOUTH ONCE DAILY EARLY IN THE MORNING   pantoprazole (PROTONIX) 40 mg tablet   No No   Sig: Take 1 tablet (40 mg total) by mouth daily   predniSONE 2.5 mg tablet   No No   Sig: Take 1 tablet (2.5 mg total) by mouth daily   valsartan (DIOVAN) 160 mg tablet   No No   Sig: Take 1 tablet (160 mg total) by mouth daily Please STOP Lisinopril,. .   vitamin B-12 (VITAMIN B-12) 1,000 mcg tablet   No No   Sig: Take 1 tablet (1,000 mcg total) by mouth daily      Facility-Administered Medications Last Administration Doses Remaining   cyanocobalamin injection 1,000 mcg 2020  3:08 PM           Past Medical History:   Diagnosis Date    Asthma     Chronic obstructive pulmonary disease ([address]) 2012    GERD (gastroesophageal reflux disease)     Hypertension 10/11/2018    Hypothyroid     Osteoarthritis 2012    Temporal arteritis ([address])     Tubular adenoma     Type 2 diabetes mellitus with complication, without long-term current use of insulin ([address]) 2020       Past Surgical History:   Procedure Laterality Date    EYE SURGERY Right 2019    cataract LVCFS    HYSTERECTOMY      NO PAST SURGERIES      ALSO NO RELEVANT PAST SURRGICAL HX AS PER [facility]       Family History   Problem Relation Age of Onset    Breast cancer Mother Emphysema Father      I have reviewed and agree with the history as documented. E-Cigarette/Vaping    E-Cigarette Use Never User      E-Cigarette/Vaping Substances    Nicotine No     THC No     CBD No     Flavoring No     Other No     Unknown No      Social History     Tobacco Use    Smoking status: Former     Packs/day: 1.00     Years: 35.00     Total pack years: 35.00     Types: Cigarettes     Start date: 65     Quit date: 2013     Years since quitting: 10.8    Smokeless tobacco: Never    Tobacco comments:     TOBACCO USE, NO PASSIVE SMOKE EXPOSURE   Vaping Use    Vaping Use: Never used   Substance Use Topics    Alcohol use: No    Drug use: No       Review of Systems   Constitutional:  Negative for activity change, appetite change, chills and fever. HENT:  Negative for congestion, dental problem, drooling, ear discharge, ear pain, mouth sores, nosebleeds, rhinorrhea, sore throat and trouble swallowing. Neck/throat swelling   Eyes:  Negative for pain, discharge and itching. Respiratory:  Positive for shortness of breath. Negative for cough, chest tightness and wheezing. Cardiovascular:  Negative for chest pain and palpitations. Gastrointestinal:  Negative for abdominal pain, blood in stool, constipation, diarrhea, nausea and vomiting. Endocrine: Negative for cold intolerance and heat intolerance. Genitourinary:  Negative for difficulty urinating, dysuria, flank pain, frequency and urgency. Skin:  Negative for rash and wound. Allergic/Immunologic: Negative for food allergies and immunocompromised state. Neurological:  Negative for dizziness, seizures, syncope, weakness, numbness and headaches. Psychiatric/Behavioral:  Negative for agitation, behavioral problems and confusion. Physical Exam  Physical Exam  Vitals and nursing note reviewed. Constitutional:       Appearance: She is well-developed. HENT:      Head: Normocephalic and atraumatic.    Neck:     Cardiovascular: Rate and Rhythm: Normal rate and regular rhythm. Pulmonary:      Effort: Pulmonary effort is normal. No respiratory distress. Breath sounds: Decreased breath sounds present. No wheezing, rhonchi or rales. Musculoskeletal:      Cervical back: Edema present. No erythema. Normal range of motion. Skin:     General: Skin is warm and dry. Neurological:      General: No focal deficit present. Mental Status: She is alert and oriented to person, place, and time. Mental status is at baseline.    Psychiatric:         Mood and Affect: Mood normal.         Behavior: Behavior normal.         Vital Signs  ED Triage Vitals [11/23/23 1304]   Temperature Pulse Respirations Blood Pressure SpO2   98.9 °F (37.2 °C) 88 18 (!) 171/91 92 %      Temp Source Heart Rate Source Patient Position - Orthostatic VS BP Location FiO2 (%)   Tympanic Monitor Sitting Left arm --      Pain Score       5           Vitals:    11/23/23 1304   BP: (!) 171/91   Pulse: 88   Patient Position - Orthostatic VS: Sitting         Visual Acuity      ED Medications  Medications   iohexol (OMNIPAQUE) 350 MG/ML injection (MULTI-DOSE) 85 mL (85 mL Intravenous Given 11/23/23 1454)   ampicillin-sulbactam (UNASYN) 3 g in sodium chloride 0.9 % 100 mL IVPB (3 g Intravenous New Bag 11/23/23 1556)       Diagnostic Studies  Results Reviewed       Procedure Component Value Units Date/Time    Comprehensive metabolic panel [[id number]]  (Abnormal) Collected: 11/23/23 1316    Lab Status: Final result Specimen: Blood from Arm, Right Updated: 11/23/23 1341     Sodium 137 mmol/L      Potassium 3.8 mmol/L      Chloride 95 mmol/L      CO2 32 mmol/L      ANION GAP 10 mmol/L      BUN 14 mg/dL      Creatinine 0.75 mg/dL      Glucose 147 mg/dL      Calcium 10.3 mg/dL      AST 23 U/L      ALT 19 U/L      Alkaline Phosphatase 66 U/L      Total Protein 7.7 g/dL      Albumin 4.7 g/dL      Total Bilirubin 0.69 mg/dL      eGFR 76 ml/min/1.73sq m     Narrative:      [employer] guidelines for Chronic Kidney Disease (CKD):     Stage 1 with normal or high GFR (GFR > 90 mL/min/1.73 square meters)    Stage 2 Mild CKD (GFR = 60-89 mL/min/1.73 square meters)    Stage 3A Moderate CKD (GFR = 45-59 mL/min/1.73 square meters)    Stage 3B Moderate CKD (GFR = 30-44 mL/min/1.73 square meters)    Stage 4 Severe CKD (GFR = 15-29 mL/min/1.73 square meters)    Stage 5 End Stage CKD (GFR <15 mL/min/1.73 square meters)  Note: GFR calculation is accurate only with a steady state creatinine    TSH [[id number]]     Lab Status: No result Specimen: Blood     CBC and differential [[id number]]  (Abnormal) Collected: 11/23/23 1316    Lab Status: Final result Specimen: Blood from Arm, Right Updated: 11/23/23 1327     WBC 14.10 Thousand/uL      RBC 5.38 Million/uL      Hemoglobin 14.8 g/dL      Hematocrit 47.1 %      MCV 88 fL      MCH 27.5 pg      MCHC 31.4 g/dL      RDW 13.8 %      MPV 10.1 fL      Platelets 636 Thousands/uL      nRBC 0 /100 WBCs      Neutrophils Relative 87 %      Immat GRANS % 0 %      Lymphocytes Relative 6 %      Monocytes Relative 7 %      Eosinophils Relative 0 %      Basophils Relative 0 %      Neutrophils Absolute 12.04 Thousands/µL      Immature Grans Absolute 0.06 Thousand/uL      Lymphocytes Absolute 0.84 Thousands/µL      Monocytes Absolute 1.05 Thousand/µL      Eosinophils Absolute 0.06 Thousand/µL      Basophils Absolute 0.05 Thousands/µL                    CT soft tissue neck   Final Result by Jordan Wong MD (11/23 4942)      Interval development of bilateral submandibular and sublingual sialoadenitis with cellulitis within the right greater than left submandibular space and tracking inferiorly along the anterior neck. Given rapid progression from yesterday correlate    clinically for developing Edwin angina. No significant airway compromise at time of exam. No abscess. New 6 mm pulmonary nodule within the left upper lobe compared to CT from 2/6/2023. Recommend follow-up chest CT in 3 months. Findings were directly discussed with Rekha MATAMOROS at 3:31 p.m. on 11/23/2023 by Dr. Terence Avelar. Workstation performed: [id number]                    Procedures  Procedures         ED Course  ED Course as of 11/23/23 1634   Thu Nov 23, 2023   1541 Message sent to [facility] for admission                                             Medical Decision Making  Differential diagnosis includes but not limited to: cellulitis, edema    Problems Addressed:  Cellulitis: acute illness or injury  Sialoadenitis: acute illness or injury    Amount and/or Complexity of Data Reviewed  Labs: ordered. Decision-making details documented in ED Course. Radiology: ordered. Decision-making details documented in ED Course. Risk  Prescription drug management. Decision regarding hospitalization. Disposition  Final diagnoses:   Sialoadenitis   Cellulitis     Time reflects when diagnosis was documented in both MDM as applicable and the Disposition within this note       Time User Action Codes Description Comment    11/23/2023  4:14 PM Trevin Hines Add [K11.20] Sialoadenitis     11/23/2023  4:14 PM Raul Brown Add [L03.90] Cellulitis           ED Disposition       ED Disposition   Admit    Condition   Stable    Date/Time   u Nov 23, 2023  4:10 PM    Comment   Case was discussed with [facility] and the patient's admission status was agreed to be Admission Status: inpatient status to the service of Dr. Valentino Hernandez . Follow-up Information    None         Patient's Medications   Discharge Prescriptions    No medications on file       No discharge procedures on file.     PDMP Review         Value Time User    PDMP Reviewed  Yes 11/20/2023  1:58 PM Rachel Werner MD            ED Provider  Electronically Signed by             Trevin Hines PA-C  11/23/23 8084

## 2023-11-23 NOTE — PLAN OF CARE
Problem: PAIN - ADULT  Goal: Verbalizes/displays adequate comfort level or baseline comfort level  Description: Interventions:  - Encourage patient to monitor pain and request assistance  - Assess pain using appropriate pain scale  - Administer analgesics based on type and severity of pain and evaluate response  - Implement non-pharmacological measures as appropriate and evaluate response  - Consider cultural and social influences on pain and pain management  - Notify physician/advanced practitioner if interventions unsuccessful or patient reports new pain  Outcome: Progressing     Problem: INFECTION - ADULT  Goal: Absence or prevention of progression during hospitalization  Description: INTERVENTIONS:  - Assess and monitor for signs and symptoms of infection  - Monitor lab/diagnostic results  - Monitor all insertion sites, i.e. indwelling lines, tubes, and drains  - Monitor endotracheal if appropriate and nasal secretions for changes in amount and color  - [place] appropriate cooling/warming therapies per order  - Administer medications as ordered  - Instruct and encourage patient and family to use good hand hygiene technique  - Identify and instruct in appropriate isolation precautions for identified infection/condition  Outcome: Progressing  Goal: Absence of fever/infection during neutropenic period  Description: INTERVENTIONS:  - Monitor WBC    Outcome: Progressing     Problem: SAFETY ADULT  Goal: Patient will remain free of falls  Description: INTERVENTIONS:  - Educate patient/family on patient safety including physical limitations  - Instruct patient to call for assistance with activity   - Consult OT/PT to assist with strengthening/mobility   - Keep Call bell within reach  - Keep bed low and locked with side rails adjusted as appropriate  - Keep care items and personal belongings within reach  - Initiate and maintain comfort rounds  - Make Fall Risk Sign visible to staff  - Offer Toileting every 2 Hours, in advance of need  - Apply yellow socks and bracelet for high fall risk patients  - Consider moving patient to room near nurses station  Outcome: Progressing  Goal: Maintain or return to baseline ADL function  Description: INTERVENTIONS:  -  Assess patient's ability to carry out ADLs; assess patient's baseline for ADL function and identify physical deficits which impact ability to perform ADLs (bathing, care of mouth/teeth, toileting, grooming, dressing, etc.)  - Assess/evaluate cause of self-care deficits   - Assess range of motion  - Assess patient's mobility; develop plan if impaired  - Assess patient's need for assistive devices and provide as appropriate  - Encourage maximum independence but intervene and supervise when necessary  - Involve family in performance of ADLs  - Assess for home care needs following discharge   - Consider OT consult to assist with ADL evaluation and planning for discharge  - Provide patient education as appropriate  Outcome: Progressing  Goal: Maintains/Returns to pre admission functional level  Description: INTERVENTIONS:  - Perform AM-PAC 6 Click Basic Mobility/ Daily Activity assessment daily.  - Set and communicate daily mobility goal to care team and patient/family/caregiver. - Collaborate with rehabilitation services on mobility goals if consulted  - Perform Range of Motion 2 times a day. - Reposition patient every 2 hours.   - Dangle patient 2 times a day  - Stand patient 2 times a day  - Ambulate patient 2 times a day  - Out of bed to chair 2 times a day   - Out of bed for meals 2 times a day  - Out of bed for toileting  - Record patient progress and toleration of activity level   Outcome: Progressing     Problem: DISCHARGE PLANNING  Goal: Discharge to home or other facility with appropriate resources  Description: INTERVENTIONS:  - Identify barriers to discharge w/patient and caregiver  - Arrange for needed discharge resources and transportation as appropriate  - Identify discharge learning needs (meds, wound care, etc.)  - Arrange for interpretive services to assist at discharge as needed  - Refer to Case Management Department for coordinating discharge planning if the patient needs post-hospital services based on physician/advanced practitioner order or complex needs related to functional status, cognitive ability, or social support system  Outcome: Progressing     Problem: Knowledge Deficit  Goal: Patient/family/caregiver demonstrates understanding of disease process, treatment plan, medications, and discharge instructions  Description: Complete learning assessment and assess knowledge base.   Interventions:  - Provide teaching at level of understanding  - Provide teaching via preferred learning methods  Outcome: Progressing

## 2023-11-23 NOTE — ASSESSMENT & PLAN NOTE
- Continue outpatient Trelagy, albuterol PRN  - Will hold home dose of prednisone as giving dexamethasone while inpatient

## 2023-11-24 PROBLEM — A41.9 SEPSIS (HCC): Status: ACTIVE | Noted: 2023-11-24

## 2023-11-24 PROBLEM — A41.9 SEPSIS (HCC): Status: RESOLVED | Noted: 2023-11-24 | Resolved: 2023-11-24

## 2023-11-24 LAB
ANION GAP SERPL CALCULATED.3IONS-SCNC: 10 MMOL/L
BASOPHILS # BLD MANUAL: 0 THOUSAND/UL (ref 0–0.1)
BASOPHILS NFR MAR MANUAL: 0 % (ref 0–1)
BUN SERPL-MCNC: 16 MG/DL (ref 5–25)
CALCIUM SERPL-MCNC: 9.6 MG/DL (ref 8.4–10.2)
CHLORIDE SERPL-SCNC: 99 MMOL/L (ref 96–108)
CO2 SERPL-SCNC: 29 MMOL/L (ref 21–32)
CREAT SERPL-MCNC: 0.72 MG/DL (ref 0.6–1.3)
EOSINOPHIL # BLD MANUAL: 0 THOUSAND/UL (ref 0–0.4)
EOSINOPHIL NFR BLD MANUAL: 0 % (ref 0–6)
ERYTHROCYTE [DISTWIDTH] IN BLOOD BY AUTOMATED COUNT: 13.6 % (ref 11.6–15.1)
GFR SERPL CREATININE-BSD FRML MDRD: 80 ML/MIN/1.73SQ M
GLUCOSE SERPL-MCNC: 130 MG/DL (ref 65–140)
HCT VFR BLD AUTO: 41.1 % (ref 34.8–46.1)
HGB BLD-MCNC: 13.2 G/DL (ref 11.5–15.4)
LYMPHOCYTES # BLD AUTO: 0.34 THOUSAND/UL (ref 0.6–4.47)
LYMPHOCYTES # BLD AUTO: 3 % (ref 14–44)
MCH RBC QN AUTO: 27.8 PG (ref 26.8–34.3)
MCHC RBC AUTO-ENTMCNC: 32.1 G/DL (ref 31.4–37.4)
MCV RBC AUTO: 87 FL (ref 82–98)
MONOCYTES # BLD AUTO: 0.07 THOUSAND/UL (ref 0–1.22)
MONOCYTES NFR BLD: 1 % (ref 4–12)
NEUTROPHILS # BLD MANUAL: 6.45 THOUSAND/UL (ref 1.85–7.62)
NEUTS SEG NFR BLD AUTO: 94 % (ref 43–75)
PLATELET # BLD AUTO: 227 THOUSANDS/UL (ref 149–390)
PLATELET BLD QL SMEAR: ADEQUATE
PMV BLD AUTO: 10.8 FL (ref 8.9–12.7)
POTASSIUM SERPL-SCNC: 4.1 MMOL/L (ref 3.5–5.3)
RBC # BLD AUTO: 4.74 MILLION/UL (ref 3.81–5.12)
RBC MORPH BLD: NORMAL
SODIUM SERPL-SCNC: 138 MMOL/L (ref 135–147)
VARIANT LYMPHS # BLD AUTO: 2 %
WBC # BLD AUTO: 6.86 THOUSAND/UL (ref 4.31–10.16)

## 2023-11-24 PROCEDURE — 85027 COMPLETE CBC AUTOMATED: CPT | Performed by: FAMILY MEDICINE

## 2023-11-24 PROCEDURE — 80048 BASIC METABOLIC PNL TOTAL CA: CPT | Performed by: FAMILY MEDICINE

## 2023-11-24 PROCEDURE — 99233 SBSQ HOSP IP/OBS HIGH 50: CPT | Performed by: FAMILY MEDICINE

## 2023-11-24 PROCEDURE — 85007 BL SMEAR W/DIFF WBC COUNT: CPT | Performed by: FAMILY MEDICINE

## 2023-11-24 RX ORDER — DEXAMETHASONE SODIUM PHOSPHATE 10 MG/ML
6 INJECTION, SOLUTION INTRAMUSCULAR; INTRAVENOUS EVERY 8 HOURS SCHEDULED
Status: DISCONTINUED | OUTPATIENT
Start: 2023-11-24 | End: 2023-11-25

## 2023-11-24 RX ADMIN — SODIUM CHLORIDE 3 G: 900 INJECTION INTRAVENOUS at 08:41

## 2023-11-24 RX ADMIN — ENOXAPARIN SODIUM 40 MG: 40 INJECTION SUBCUTANEOUS at 08:41

## 2023-11-24 RX ADMIN — DEXAMETHASONE SODIUM PHOSPHATE 6 MG: 10 INJECTION INTRAMUSCULAR; INTRAVENOUS at 15:27

## 2023-11-24 RX ADMIN — SODIUM CHLORIDE 3 G: 900 INJECTION INTRAVENOUS at 20:55

## 2023-11-24 RX ADMIN — PANTOPRAZOLE SODIUM 40 MG: 40 TABLET, DELAYED RELEASE ORAL at 08:42

## 2023-11-24 RX ADMIN — CYANOCOBALAMIN TAB 1000 MCG 1000 MCG: 1000 TAB at 08:42

## 2023-11-24 RX ADMIN — UMECLIDINIUM 1 PUFF: 62.5 AEROSOL, POWDER ORAL at 08:40

## 2023-11-24 RX ADMIN — FLUTICASONE FUROATE AND VILANTEROL TRIFENATATE 1 PUFF: 100; 25 POWDER RESPIRATORY (INHALATION) at 08:40

## 2023-11-24 RX ADMIN — SODIUM CHLORIDE 3 G: 900 INJECTION INTRAVENOUS at 15:27

## 2023-11-24 RX ADMIN — DEXAMETHASONE SODIUM PHOSPHATE 6 MG: 10 INJECTION INTRAMUSCULAR; INTRAVENOUS at 20:55

## 2023-11-24 RX ADMIN — SODIUM CHLORIDE 3 G: 900 INJECTION INTRAVENOUS at 03:50

## 2023-11-24 RX ADMIN — LOSARTAN POTASSIUM 100 MG: 50 TABLET, FILM COATED ORAL at 08:42

## 2023-11-24 RX ADMIN — LEVOTHYROXINE SODIUM 50 MCG: 50 TABLET ORAL at 05:00

## 2023-11-24 RX ADMIN — DEXAMETHASONE SODIUM PHOSPHATE 8 MG: 10 INJECTION INTRAMUSCULAR; INTRAVENOUS at 05:00

## 2023-11-24 NOTE — PROGRESS NOTES
[facility]  Progress Note  Name: Dwaine Woods  MRN: [id number]  Unit/Bed#: 7T [facility] 703-01 I Date of Admission: 11/23/2023   Date of Service: 11/24/2023 I Hospital Day: 1    Assessment/Plan   * Infectious sialoadenitis of major salivary gland  Assessment & Plan  - Much improved  - Continue IV unasyn Q8H  - decrease dexamethasone to 6mg TID and taper upon discharge   - Trend CBC        Cellulitis of neck  Assessment & Plan  - Management as above with IV abx    Sepsis (HCC)-resolved as of 11/24/2023  Assessment & Plan  Sepsis POA (now resolved)) due to cellulitis of neck in the setting of Infectious sialadenitis a/e/b leukocytosis (WBC 14.10 ) and tachycardia (HR 91 - 108) treated with IV Unasyn. Left upper lobe pulmonary nodule  Assessment & Plan  - 6MM Noted on CT scan   - Recommended outpatient follow up with outpatient CT scan in 3 months     Hypertension  Assessment & Plan  - BP at goal   - Continue losartan 100mg QD   - BP goal <150/90  - Home medication: Valsartan 160mg QD    Gastroesophageal reflux disease without esophagitis  Assessment & Plan  - Continue PPI QD    Other specified hypothyroidism  Assessment & Plan  - Recent TSH reviewed and WNL  - Continue levothryroxine 50mcg       Chronic obstructive pulmonary disease (HCC)  Assessment & Plan  - Continue outpatient Trelagy, albuterol PRN  - Will hold home dose of prednisone as giving dexamethasone while inpatient                  VTE Pharmacologic Prophylaxis: VTE Score: 4 Moderate Risk (Score 3-4) - Pharmacological DVT Prophylaxis Ordered: enoxaparin (Lovenox). Mobility:   Basic Mobility Inpatient Raw Score: 19  JH-HLM Goal: 6: Walk 10 steps or more  JH-HLM Achieved: 7: Walk 25 feet or more  HLM Goal achieved. Continue to encourage appropriate mobility. Patient Centered Rounds: I performed bedside rounds with nursing staff today.    Discussions with Specialists or Other Care Team Provider: None    Education and Discussions with Family / Patient: Patient declined call to . Total Time Spent on Date of Encounter in care of patient: 35 mins. This time was spent on one or more of the following: performing physical exam; counseling and coordination of care; obtaining or reviewing history; documenting in the medical record; reviewing/ordering tests, medications or procedures; communicating with other healthcare professionals and discussing with patient's family/caregivers. Current Length of Stay: 1 day(s)  Current Patient Status: Inpatient   Certification Statement: The patient will continue to require additional inpatient hospital stay due to continued need for IV abx. Discharge Plan: Anticipate discharge tomorrow to home. Code Status: Level 1 - Full Code    Subjective:   Patient seen and examined at the bedside and reports she is feeling better then yesterday. Still reports sore throat and some mild cough that is chronic for her. Denies any other complaints. Objective:     Vitals:   Temp (24hrs), Av.8 °F (36.6 °C), Min:97.5 °F (36.4 °C), Max:98 °F (36.7 °C)    Temp:  [97.5 °F (36.4 °C)-98 °F (36.7 °C)] 97.8 °F (36.6 °C)  HR:  [] 94  Resp:  [16-20] 19  BP: (127-151)/(78-90) 127/78  SpO2:  [94 %-95 %] 95 %  Body mass index is 19.31 kg/m². Input and Output Summary (last 24 hours): Intake/Output Summary (Last 24 hours) at 2023 1744  Last data filed at 2023 1701  Gross per 24 hour   Intake 1090 ml   Output --   Net 1090 ml       Physical Exam:   Physical Exam  Vitals and nursing note reviewed. Constitutional:       General: She is not in acute distress. Appearance: She is well-developed. HENT:      Head: Normocephalic and atraumatic. Mouth/Throat:      Mouth: Mucous membranes are moist.      Pharynx: Oropharynx is clear. Uvula midline. No pharyngeal swelling, oropharyngeal exudate, posterior oropharyngeal erythema or uvula swelling.       Tonsils: No tonsillar exudate. Eyes:      Conjunctiva/sclera: Conjunctivae normal.   Neck:        Comments: - Anterior swelling that is TTP and improved   Cardiovascular:      Rate and Rhythm: Normal rate and regular rhythm. Heart sounds: No murmur heard. Pulmonary:      Effort: Pulmonary effort is normal. No respiratory distress. Breath sounds: Normal breath sounds. Decreased air movement present. Abdominal:      Palpations: Abdomen is soft. Tenderness: There is no abdominal tenderness. Musculoskeletal:         General: No swelling. Cervical back: Neck supple. Skin:     General: Skin is warm and dry. Capillary Refill: Capillary refill takes less than 2 seconds. Neurological:      Mental Status: She is alert. Psychiatric:         Mood and Affect: Mood normal.          Additional Data:     Labs:  Results from last 7 days   Lab Units 11/24/23  0443 11/23/23  1316   WBC Thousand/uL 6.86 14.10*   HEMOGLOBIN g/dL 13.2 14.8   HEMATOCRIT % 41.1 47.1*   PLATELETS Thousands/uL 227 270   NEUTROS PCT %  --  87*   LYMPHS PCT %  --  6*   LYMPHO PCT % 3*  --    MONOS PCT %  --  7   MONO PCT % 1*  --    EOS PCT % 0 0     Results from last 7 days   Lab Units 11/24/23  0443 11/23/23  1316   SODIUM mmol/L 138 137   POTASSIUM mmol/L 4.1 3.8   CHLORIDE mmol/L 99 95*   CO2 mmol/L 29 32   BUN mg/dL 16 14   CREATININE mg/dL 0.72 0.75   ANION GAP mmol/L 10 10   CALCIUM mg/dL 9.6 10.3*   ALBUMIN g/dL  --  4.7   TOTAL BILIRUBIN mg/dL  --  0.69   ALK PHOS U/L  --  66   ALT U/L  --  19   AST U/L  --  23   GLUCOSE RANDOM mg/dL 130 147*             Results from last 7 days   Lab Units 11/20/23  1434   HEMOGLOBIN A1C  5.8     Results from last 7 days   Lab Units 11/22/23  1920   PROCALCITONIN ng/ml 0.09       Lines/Drains:  Invasive Devices       Peripheral Intravenous Line  Duration             Peripheral IV 11/23/23 Left Antecubital 1 day                          Imaging: No pertinent imaging reviewed.     Recent Cultures (last 7 days): Last 24 Hours Medication List:   Current Facility-Administered Medications   Medication Dose Route Frequency Provider Last Rate    acetaminophen  650 mg Oral Q6H PRN Demetris Navarro MD      albuterol  2 puff Inhalation Q6H PRN Demetris Navarro MD      ampicillin-sulbactam  3 g Intravenous [address] [facility], MD 3 g (11/24/23 1837)    aspirin  81 mg Oral Every Other Day Demetris Navarro MD      benzonatate  100 mg Oral TID PRN Demetris Navarro MD      vitamin B-12  1,000 mcg Oral Daily Demetris Navarro MD      dexamethasone  6 mg Intravenous [facility] Demetris Navarro MD      enoxaparin  40 mg Subcutaneous Daily Demetris Navarro MD      Fluticasone Furoate-Vilanterol  1 puff Inhalation Daily Demetris Navarro MD      And    umeclidinium  1 puff Inhalation Daily Demetris Navarro MD      levothyroxine  50 mcg Oral Early Morning Demetris Navarro MD      losartan  100 mg Oral Daily Demetris Navarro MD      pantoprazole  40 mg Oral Daily Demetris Navarro MD          Today, Patient Was Seen By: Demetris Navarro MD    **Please Note: This note may have been constructed using a voice recognition system. **

## 2023-11-24 NOTE — ASSESSMENT & PLAN NOTE
- Much improved  - Continue IV unasyn Q8H  - decrease dexamethasone to 6mg TID and taper upon discharge   - Trend CBC

## 2023-11-24 NOTE — ASSESSMENT & PLAN NOTE
- BP at goal   - Continue losartan 100mg QD   - BP goal <150/90  - Home medication: Valsartan 160mg QD

## 2023-11-24 NOTE — ASSESSMENT & PLAN NOTE
- Much improved  - Will discharge with Augmentin 875mg BID for 7 days  - Will discharge with prednisone taper 40mg x 2, 30mg x 2, 20mg x 2, 10mg x 2, 5mg x 2 then back to her regular 2.5mg QD does

## 2023-11-24 NOTE — UTILIZATION REVIEW
Initial Clinical Review    Admission: Date/Time/Statement:   Admission Orders (From admission, onward)       Ordered        11/23/23 1559  INPATIENT ADMISSION  Once                          Orders Placed This Encounter   Procedures    INPATIENT ADMISSION     Standing Status:   Standing     Number of Occurrences:   1     Order Specific Question:   Level of Care     Answer:   Med Surg [16]     Order Specific Question:   Estimated length of stay     Answer:   More than 2 Midnights     Order Specific Question:   Certification     Answer:   I certify that inpatient services are medically necessary for this patient for a duration of greater than two midnights. See H&P and MD Progress Notes for additional information about the patient's course of treatment. ED Arrival Information       Expected   -    Arrival   11/23/2023 12:50    Acuity   Urgent              Means of arrival   Walk-In    Escorted by   Family Member    Service   Hospitalist    Admission type   Emergency              Arrival complaint   lump on throat             Chief Complaint   Patient presents with    Neck Swelling     Pt. Has a Fluid filled pocket on the outside of her throat. Seen last night for sore throat and cough       Initial Presentation: 66 y.o. female who presented self from home to [facility] ED. Inpatient admission for evaluation and treatment of sialoadenitis of salivary gland. PMHx: asthma, COPD (on chronic prednisone), GERD, HTN, hypothyroidism, T2DM. Presented w/ neck swelling, sore throat, green sputum. CT showed infection of submandibular glands. On exam, mass that is moveable/tender anterior under mandible. WBC 14.10. Plan: IV ABX, IV dexamethasone TID, continue PTA meds except prednisone, losartan. Trend labs, replete electrolytes as needed. Date: 11/24/23   Day 2: Reports feeling improved from yesterday. Notes ongoing sore throat and cough that is chronic.  On exam, anterior submandibular mass swelling but improved, decreased air movement. WBC 14.10. Plan: continue IV ABX, decrease dexamethasone to 6 mg TID. Continue other current meds, Trend labs, replete electrolytes as needed. ED Triage Vitals [11/23/23 1304]   Temperature Pulse Respirations Blood Pressure SpO2   98.9 °F (37.2 °C) 88 18 (!) 171/91 92 %      Temp Source Heart Rate Source Patient Position - Orthostatic VS BP Location FiO2 (%)   Tympanic Monitor Sitting Left arm --      Pain Score       5          Wt Readings from Last 1 Encounters:   11/23/23 47.9 kg (105 lb 9.6 oz)     Additional Vital Signs:   Date/Time Temp Pulse Resp BP MAP (mmHg) SpO2 O2 Device   11/24/23 0716 97.5 °F (36.4 °C) 92 19 151/83 97 94 % None (Room air)   11/23/23 2035 98 °F (36.7 °C) 91 20 138/90 98 94 % None (Room air)   11/23/23 1824 -- 102 16 -- -- 94 % --   11/23/23 1712 96.4 °F (35.8 °C) Abnormal  108 Abnormal  20 158/88 98 92 % None (Room air)     Pertinent Labs/Diagnostic Test Results:   CT soft tissue neck   Final Result by Jonathon Hester MD (11/23 1542)      Interval development of bilateral submandibular and sublingual sialoadenitis with cellulitis within the right greater than left submandibular space and tracking inferiorly along the anterior neck. Given rapid progression from yesterday correlate    clinically for developing Edwin angina. No significant airway compromise at time of exam. No abscess. New 6 mm pulmonary nodule within the left upper lobe compared to CT from 2/6/2023. Recommend follow-up chest CT in 3 months. Findings were directly discussed with Angelique MATAMOROS at 3:31 p.m. on 11/23/2023 by Dr. Jonathon Hester.       Workstation performed: [id number]           Results from last 7 days   Lab Units 11/22/23 1920   SARS-COV-2  Negative     Results from last 7 days   Lab Units 11/24/23  0443 11/23/23  1316 11/22/23  1920   WBC Thousand/uL 6.86 14.10* 12.54*   HEMOGLOBIN g/dL 13.2 14.8 14.1   HEMATOCRIT % 41.1 47.1* 44.9   PLATELETS Thousands/uL 227 270 555 [facility] Thousands/µL  --  12.04* 10.87*         Results from last 7 days   Lab Units 11/24/23  0443 11/23/23  1316 11/22/23  1920   SODIUM mmol/L 138 137 136   POTASSIUM mmol/L 4.1 3.8 4.3   CHLORIDE mmol/L 99 95* 97   CO2 mmol/L 29 32 31   ANION GAP mmol/L 10 10 8   BUN mg/dL 16 14 17   CREATININE mg/dL 0.72 0.75 0.66   EGFR ml/min/1.73sq m 80 76 84   CALCIUM mg/dL 9.6 10.3* 10.6*     Results from last 7 days   Lab Units 11/23/23  1316 11/22/23  1920   AST U/L 23 22   ALT U/L 19 15   ALK PHOS U/L 66 62   TOTAL PROTEIN g/dL 7.7 7.4   ALBUMIN g/dL 4.7 4.6   TOTAL BILIRUBIN mg/dL 0.69 0.41         Results from last 7 days   Lab Units 11/24/23  0443 11/23/23  1316 11/22/23  1920   GLUCOSE RANDOM mg/dL 130 147* 113         Results from last 7 days   Lab Units 11/20/23  1434   HEMOGLOBIN A1C  5.8     Results from last 7 days   Lab Units 11/23/23  1316   TSH 3RD GENERATON uIU/mL 1.203     Results from last 7 days   Lab Units 11/22/23  1920   PROCALCITONIN ng/ml 0.09         Results from last 7 days   Lab Units 11/22/23  1920   INFLUENZA A PCR  Negative   INFLUENZA B PCR  Negative   RSV PCR  Negative           ED Treatment:   Medication Administration from 11/23/2023 1250 to 11/23/2023 1704         Date/Time Order Dose Route Action     11/23/2023 1454 EST iohexol (OMNIPAQUE) 350 MG/ML injection (MULTI-DOSE) 85 mL 85 mL Intravenous Given     11/23/2023 1556 EST ampicillin-sulbactam (UNASYN) 3 g in sodium chloride 0.9 % 100 mL IVPB 3 g Intravenous New Bag          Past Medical History:   Diagnosis Date    Asthma     Chronic obstructive pulmonary disease ([address]) 9/26/2012    GERD (gastroesophageal reflux disease)     Hypertension 10/11/2018    Hypothyroid     Osteoarthritis 9/26/2012    Temporal arteritis ([address])     Tubular adenoma     Type 2 diabetes mellitus with complication, without long-term current use of insulin ([address]) 1/14/2020     Present on Admission:   Chronic obstructive pulmonary disease ([address])   Other specified hypothyroidism   Gastroesophageal reflux disease without esophagitis   Hypertension   Left upper lobe pulmonary nodule   Infectious sialoadenitis of major salivary gland   Cellulitis of neck      Admitting Diagnosis: Sialoadenitis [K11.20]  Cellulitis [L03.90]  Neck swelling [R22.1]  Age/Sex: 66 y.o. female  Admission Orders:  Cardiac Diet. DW. SCDs. Scheduled Medications:  ampicillin-sulbactam, 3 g, Intravenous, Q6H  aspirin, 81 mg, Oral, Every Other Day  vitamin B-12, 1,000 mcg, Oral, Daily  dexamethasone, 6 mg, Intravenous, Q8H RACHEL  enoxaparin, 40 mg, Subcutaneous, Daily  Fluticasone Furoate-Vilanterol, 1 puff, Inhalation, Daily   And  umeclidinium, 1 puff, Inhalation, Daily  levothyroxine, 50 mcg, Oral, Early Morning  losartan, 100 mg, Oral, Daily  pantoprazole, 40 mg, Oral, Daily    Continuous IV Infusions: none    PRN Meds:  acetaminophen, 650 mg, Oral, Q6H PRN  albuterol, 2 puff, Inhalation, Q6H PRN  benzonatate, 100 mg, Oral, TID PRN      dexamethasone (PF) (DECADRON) injection 8 mg  Dose: 8 mg  Freq: Every 8 hours scheduled Route: IV  Start: 11/23/23 1715 End: 11/24/23 0916     Network Utilization Review Department  ATTENTION: Please call with any questions or concerns to [phone] and carefully listen to the prompts so that you are directed to the right person. All voicemails are confidential.   For Discharge needs, contact Care Management DC Support Team at [phone] opt. 2  Send all requests for admission clinical reviews, approved or denied determinations and any other requests to dedicated fax number below belonging to the campus where the patient is receiving treatment. List of dedicated fax numbers for the Facilities:  [place] DENIALS (Administrative/Medical Necessity) [phone]   DISCHARGE SUPPORT TEAM (NETWORK) [id number] Rashad Agrawal (Maternity/NICU/Pediatrics) [phone]   [address] [place] [address] Drive [phone]   [facility] [address] [facility] [phone] [address] [facility] [address] [address] [facility] [address] [place] [address] [phone]

## 2023-11-24 NOTE — PLAN OF CARE
Problem: INFECTION - ADULT  Goal: Absence or prevention of progression during hospitalization  Description: INTERVENTIONS:  - Assess and monitor for signs and symptoms of infection  - Monitor lab/diagnostic results  - Monitor all insertion sites, i.e. indwelling lines, tubes, and drains  - Monitor endotracheal if appropriate and nasal secretions for changes in amount and color  - [place] appropriate cooling/warming therapies per order  - Administer medications as ordered  - Instruct and encourage patient and family to use good hand hygiene technique  - Identify and instruct in appropriate isolation precautions for identified infection/condition  11/23/2023 2343 by Rose Fernando  Outcome: Progressing  11/23/2023 2343 by Rose Fernando  Outcome: Progressing  Goal: Absence of fever/infection during neutropenic period  Description: INTERVENTIONS:  - Monitor WBC    11/23/2023 2343 by Rose Fernando  Outcome: Progressing  11/23/2023 2343 by Rose Fernando  Outcome: Progressing     Problem: SAFETY ADULT  Goal: Patient will remain free of falls  Description: INTERVENTIONS:  - Educate patient/family on patient safety including physical limitations  - Instruct patient to call for assistance with activity   - Consult OT/PT to assist with strengthening/mobility   - Keep Call bell within reach  - Keep bed low and locked with side rails adjusted as appropriate  - Keep care items and personal belongings within reach  - Initiate and maintain comfort rounds  - Make Fall Risk Sign visible to staff  - Apply yellow socks and bracelet for high fall risk patients  - Consider moving patient to room near nurses station  11/23/2023 2343 by Rose Fernando  Outcome: Progressing  11/23/2023 2343 by Rose Fernando  Outcome: Progressing  Goal: Maintain or return to baseline ADL function  Description: INTERVENTIONS:  -  Assess patient's ability to carry out ADLs; assess patient's baseline for ADL function and identify physical deficits which impact ability to perform ADLs (bathing, care of mouth/teeth, toileting, grooming, dressing, etc.)  - Assess/evaluate cause of self-care deficits   - Assess range of motion  - Assess patient's mobility; develop plan if impaired  - Assess patient's need for assistive devices and provide as appropriate  - Encourage maximum independence but intervene and supervise when necessary  - Involve family in performance of ADLs  - Assess for home care needs following discharge   - Consider OT consult to assist with ADL evaluation and planning for discharge  - Provide patient education as appropriate  Outcome: Progressing  Goal: Maintains/Returns to pre admission functional level  Description: INTERVENTIONS:  - Perform AM-PAC 6 Click Basic Mobility/ Daily Activity assessment daily.  - Set and communicate daily mobility goal to care team and patient/family/caregiver.    - Collaborate with rehabilitation services on mobility goals if consulted  - Out of bed for toileting  - Record patient progress and toleration of activity level   Outcome: Progressing     Problem: DISCHARGE PLANNING  Goal: Discharge to home or other facility with appropriate resources  Description: INTERVENTIONS:  - Identify barriers to discharge w/patient and caregiver  - Arrange for needed discharge resources and transportation as appropriate  - Identify discharge learning needs (meds, wound care, etc.)  - Arrange for interpretive services to assist at discharge as needed  - Refer to Case Management Department for coordinating discharge planning if the patient needs post-hospital services based on physician/advanced practitioner order or complex needs related to functional status, cognitive ability, or social support system  Outcome: Progressing     Problem: Knowledge Deficit  Goal: Patient/family/caregiver demonstrates understanding of disease process, treatment plan, medications, and discharge instructions  Description: Complete learning assessment and assess knowledge base.   Interventions:  - Provide teaching at level of understanding  - Provide teaching via preferred learning methods  Outcome: Progressing

## 2023-11-24 NOTE — ASSESSMENT & PLAN NOTE
Sepsis POA (now resolved)) due to cellulitis of neck in the setting of Infectious sialadenitis a/e/b leukocytosis (WBC 14.10 ) and tachycardia (HR 91 - 108) treated with IV Unasyn.

## 2023-11-25 VITALS
HEART RATE: 83 BPM | BODY MASS INDEX: 19.43 KG/M2 | OXYGEN SATURATION: 92 % | RESPIRATION RATE: 20 BRPM | DIASTOLIC BLOOD PRESSURE: 84 MMHG | HEIGHT: 62 IN | SYSTOLIC BLOOD PRESSURE: 158 MMHG | WEIGHT: 105.6 LBS | TEMPERATURE: 97.4 F

## 2023-11-25 LAB
ANION GAP SERPL CALCULATED.3IONS-SCNC: 10 MMOL/L
BASOPHILS # BLD AUTO: 0.02 THOUSANDS/ÂΜL (ref 0–0.1)
BASOPHILS NFR BLD AUTO: 0 % (ref 0–1)
BUN SERPL-MCNC: 28 MG/DL (ref 5–25)
CALCIUM SERPL-MCNC: 9.1 MG/DL (ref 8.4–10.2)
CHLORIDE SERPL-SCNC: 101 MMOL/L (ref 96–108)
CO2 SERPL-SCNC: 26 MMOL/L (ref 21–32)
CREAT SERPL-MCNC: 0.74 MG/DL (ref 0.6–1.3)
EOSINOPHIL # BLD AUTO: 0 THOUSAND/ÂΜL (ref 0–0.61)
EOSINOPHIL NFR BLD AUTO: 0 % (ref 0–6)
ERYTHROCYTE [DISTWIDTH] IN BLOOD BY AUTOMATED COUNT: 13.9 % (ref 11.6–15.1)
GFR SERPL CREATININE-BSD FRML MDRD: 77 ML/MIN/1.73SQ M
GLUCOSE SERPL-MCNC: 120 MG/DL (ref 65–140)
HCT VFR BLD AUTO: 36.6 % (ref 34.8–46.1)
HGB BLD-MCNC: 11.9 G/DL (ref 11.5–15.4)
IMM GRANULOCYTES # BLD AUTO: 0.06 THOUSAND/UL (ref 0–0.2)
IMM GRANULOCYTES NFR BLD AUTO: 1 % (ref 0–2)
LYMPHOCYTES # BLD AUTO: 0.46 THOUSANDS/ÂΜL (ref 0.6–4.47)
LYMPHOCYTES NFR BLD AUTO: 4 % (ref 14–44)
MCH RBC QN AUTO: 27.8 PG (ref 26.8–34.3)
MCHC RBC AUTO-ENTMCNC: 32.5 G/DL (ref 31.4–37.4)
MCV RBC AUTO: 86 FL (ref 82–98)
MONOCYTES # BLD AUTO: 0.79 THOUSAND/ÂΜL (ref 0.17–1.22)
MONOCYTES NFR BLD AUTO: 6 % (ref 4–12)
NEUTROPHILS # BLD AUTO: 10.96 THOUSANDS/ÂΜL (ref 1.85–7.62)
NEUTS SEG NFR BLD AUTO: 89 % (ref 43–75)
NRBC BLD AUTO-RTO: 0 /100 WBCS
PLATELET # BLD AUTO: 224 THOUSANDS/UL (ref 149–390)
PMV BLD AUTO: 10.8 FL (ref 8.9–12.7)
POTASSIUM SERPL-SCNC: 4 MMOL/L (ref 3.5–5.3)
RBC # BLD AUTO: 4.28 MILLION/UL (ref 3.81–5.12)
SODIUM SERPL-SCNC: 137 MMOL/L (ref 135–147)
WBC # BLD AUTO: 12.29 THOUSAND/UL (ref 4.31–10.16)

## 2023-11-25 PROCEDURE — 80048 BASIC METABOLIC PNL TOTAL CA: CPT | Performed by: FAMILY MEDICINE

## 2023-11-25 PROCEDURE — 99238 HOSP IP/OBS DSCHRG MGMT 30/<: CPT | Performed by: FAMILY MEDICINE

## 2023-11-25 PROCEDURE — 85025 COMPLETE CBC W/AUTO DIFF WBC: CPT | Performed by: FAMILY MEDICINE

## 2023-11-25 RX ORDER — AMOXICILLIN AND CLAVULANATE POTASSIUM 875; 125 MG/1; MG/1
1 TABLET, FILM COATED ORAL EVERY 12 HOURS SCHEDULED
Qty: 14 TABLET | Refills: 0 | Status: SHIPPED | OUTPATIENT
Start: 2023-11-25 | End: 2023-12-04

## 2023-11-25 RX ORDER — PREDNISONE 10 MG/1
TABLET ORAL
Qty: 21 TABLET | Refills: 0 | Status: SHIPPED | OUTPATIENT
Start: 2023-11-25 | End: 2023-12-04

## 2023-11-25 RX ADMIN — SODIUM CHLORIDE 3 G: 900 INJECTION INTRAVENOUS at 03:30

## 2023-11-25 RX ADMIN — FLUTICASONE FUROATE AND VILANTEROL TRIFENATATE 1 PUFF: 100; 25 POWDER RESPIRATORY (INHALATION) at 08:31

## 2023-11-25 RX ADMIN — DEXAMETHASONE SODIUM PHOSPHATE 6 MG: 10 INJECTION INTRAMUSCULAR; INTRAVENOUS at 05:33

## 2023-11-25 RX ADMIN — CYANOCOBALAMIN TAB 1000 MCG 1000 MCG: 1000 TAB at 08:31

## 2023-11-25 RX ADMIN — SODIUM CHLORIDE 3 G: 900 INJECTION INTRAVENOUS at 08:31

## 2023-11-25 RX ADMIN — PANTOPRAZOLE SODIUM 40 MG: 40 TABLET, DELAYED RELEASE ORAL at 08:31

## 2023-11-25 RX ADMIN — UMECLIDINIUM 1 PUFF: 62.5 AEROSOL, POWDER ORAL at 08:31

## 2023-11-25 RX ADMIN — LOSARTAN POTASSIUM 100 MG: 50 TABLET, FILM COATED ORAL at 08:31

## 2023-11-25 RX ADMIN — ENOXAPARIN SODIUM 40 MG: 40 INJECTION SUBCUTANEOUS at 08:31

## 2023-11-25 RX ADMIN — ASPIRIN 81 MG: 81 TABLET, COATED ORAL at 08:31

## 2023-11-25 RX ADMIN — LEVOTHYROXINE SODIUM 50 MCG: 50 TABLET ORAL at 05:33

## 2023-11-25 NOTE — NURSING NOTE
Patient discharged to home, IV removed. Discharge instructions reviewed with patient with stated understanding. Patient's home meds returned and new medications picked up from [address] and given to patient. Patient left unit with belongings via w/c in stable condition.

## 2023-11-25 NOTE — DISCHARGE SUMMARY
[facility]  Discharge- Stepan Fernandez 1945, 66 y.o. female MRN: [id number]  Unit/Bed#: [address] [place] [address] Encounter: [id number]  Primary Care Provider: Rachel Werner MD   Date and time admitted to hospital: 11/23/2023 12:51 PM    * Infectious sialoadenitis of major salivary gland  Assessment & Plan  - Much improved  - Will discharge with Augmentin 875mg BID for 7 days  - Will discharge with prednisone taper 40mg x 2, 30mg x 2, 20mg x 2, 10mg x 2, 5mg x 2 then back to her regular 2.5mg QD does            Cellulitis of neck  Assessment & Plan  - Management as above with oral abx    Sepsis (HCC)-resolved as of 11/24/2023  Assessment & Plan  Sepsis POA (now resolved)) due to cellulitis of neck in the setting of Infectious sialadenitis a/e/b leukocytosis (WBC 14.10 ) and tachycardia (HR 91 - 108) treated with IV Unasyn. Left upper lobe pulmonary nodule  Assessment & Plan  - 6MM Noted on CT scan   - Recommended outpatient follow up with outpatient CT scan in 3 months     Hypertension  Assessment & Plan  - BP at goal   - BP goal <150/90  - Home medication: Valsartan 160mg QD    Gastroesophageal reflux disease without esophagitis  Assessment & Plan  - Continue PPI QD    Other specified hypothyroidism  Assessment & Plan  - Recent TSH reviewed and WNL  - Continue levothryroxine 50mcg       Chronic obstructive pulmonary disease (HCC)  Assessment & Plan  - Continue outpatient Trelagy, albuterol PRN  - Resume home dose of 2.5m QD after prednisone taper                 Medical Problems       Resolved Problems  Date Reviewed: 11/23/2023            Resolved    Sepsis ([address]) 11/24/2023     Resolved by  Randy Pelletier MD          Admission Date:   Admission Orders (From admission, onward)       Ordered        11/23/23 1559  INPATIENT ADMISSION  Once                            Admitting Diagnosis: Sialoadenitis [K11.20]  Cellulitis [L03.90]  Neck swelling [R22.1]    HPI: Stepan Fernandez is a 66 y.o. female with PMH of HTN, Hypothyroidism, GERD, and COPD on chronic prednisone who presents to the ED for neck swelling. Patient reports that she was in the ED yesterday for sore throat and coughing up sputum that was green compared to her regular white sputum. She states that from yesterday to today she noticed increased swelling in her neck and got worried and decided to come back to the ED where she was found to have an infection of her submandibular glands on CT scan. Patient denies any sick contacts, fever, CP, SOB, N/V, diarrhea, or constipation. States she has been complaint with following up with her PCP and adherent to her medication. Procedures Performed: No orders of the defined types were placed in this encounter. Summary of Hospital Course: Patient was treated with IV steroids and abx for her infection which showed much improvement. Management was discussed with ENT who agreed with plan. Patient improved significantly, specifically the swelling on her neck. On day of discharge patient felt very good. Had no issues swallowing or breathing during her entire hospital course. Will be discharged with oral abx for 7 days and prednisone taper for the next 10 days. Significant Findings, Care, Treatment and Services Provided: CT scan which was reviewed with ENT. Complications: None    Condition at Discharge: stable   Physical Exam  Vitals reviewed. Constitutional:       General: She is not in acute distress. Appearance: She is well-developed. HENT:      Head: Normocephalic and atraumatic. Mouth/Throat:      Pharynx: No pharyngeal swelling or posterior oropharyngeal erythema. Eyes:      Pupils: Pupils are equal, round, and reactive to light. Cardiovascular:      Rate and Rhythm: Normal rate and regular rhythm. Heart sounds: Normal heart sounds. No murmur heard. No friction rub. No gallop. Pulmonary:      Effort: Pulmonary effort is normal. No respiratory distress. Breath sounds: Normal breath sounds. Abdominal:      General: Bowel sounds are normal. There is no distension. Palpations: Abdomen is soft. Musculoskeletal:         General: Normal range of motion. Cervical back: Normal range of motion and neck supple. Skin:     General: Skin is warm. Neurological:      Mental Status: She is alert and oriented to person, place, and time. Discharge instructions/Information to patient and family:   See after visit summary for information provided to patient and family. Provisions for Follow-Up Care:  See after visit summary for information related to follow-up care and any pertinent home health orders. PCP: Martita Rivera MD    Disposition: Home    Planned Readmission: No    Discharge Statement   I spent 30 minutes discharging the patient. This time was spent on the day of discharge. I had direct contact with the patient on the day of discharge. Additional documentation is required if more than 30 minutes were spent on discharge. Discharge Medications:  See after visit summary for reconciled discharge medications provided to patient and family.

## 2023-11-27 ENCOUNTER — TRANSITIONAL CARE MANAGEMENT (OUTPATIENT)
Dept: FAMILY MEDICINE CLINIC | Facility: CLINIC | Age: 78
End: 2023-11-27

## 2023-11-27 DIAGNOSIS — Z71.89 COORDINATION OF COMPLEX CARE: Primary | ICD-10-CM

## 2023-11-27 NOTE — UTILIZATION REVIEW
NOTIFICATION OF ADMISSION DISCHARGE   This is a Notification of Discharge from [address] [facility]. Please be advised that this patient has been discharge from our facility. Below you will find the admission and discharge date and time including the patient’s disposition. UTILIZATION REVIEW CONTACT:  Viviana Mello  Utilization   Network Utilization Review Department  Phone: [phone] x carefully listen to the prompts. All voicemails are confidential.  Email: [email or web address]     ADMISSION INFORMATION  PRESENTATION DATE: 11/23/2023 12:51 PM  OBERVATION ADMISSION DATE:   INPATIENT ADMISSION DATE: 11/23/23  3:59 PM   DISCHARGE DATE: 11/25/2023 12:46 PM   DISPOSITION:Home/Self Care    Network Utilization Review Department  ATTENTION: Please call with any questions or concerns to [phone] and carefully listen to the prompts so that you are directed to the right person. All voicemails are confidential.   For Discharge needs, contact Care Management DC Support Team at [phone] opt. 2  Send all requests for admission clinical reviews, approved or denied determinations and any other requests to dedicated fax number below belonging to the campus where the patient is receiving treatment.  List of dedicated fax numbers for the Facilities:  [place] DENIALS (Administrative/Medical Necessity) [phone]   DISCHARGE SUPPORT TEAM (Network) [phone] [facility] ([facility]/N[facility]/Pediatrics) [phone]   [address] [facility] [address] [phone] [address] [facility] [phone]   [address] [place] [address]  [address]  [phone]

## 2023-11-28 ENCOUNTER — PATIENT OUTREACH (OUTPATIENT)
Dept: FAMILY MEDICINE CLINIC | Facility: CLINIC | Age: 78
End: 2023-11-28

## 2023-11-28 NOTE — PROGRESS NOTES
spoke with pt who has over 80% improvement of redness and swelling at the cellulitis area. She is still taking prednisone and antibiotic. Pt reports that she had excellent care while inpt at the hospital and is grateful that the drs found a nodule, which will be watched. Reviewed upcoming appts. Pt declines need for future outreach for care management and was appreciative of the call.
No

## 2023-12-04 ENCOUNTER — OFFICE VISIT (OUTPATIENT)
Dept: FAMILY MEDICINE CLINIC | Facility: CLINIC | Age: 78
End: 2023-12-04
Payer: COMMERCIAL

## 2023-12-04 VITALS
OXYGEN SATURATION: 95 % | DIASTOLIC BLOOD PRESSURE: 68 MMHG | TEMPERATURE: 98.1 F | SYSTOLIC BLOOD PRESSURE: 106 MMHG | BODY MASS INDEX: 19.77 KG/M2 | HEIGHT: 62 IN | WEIGHT: 107.4 LBS | HEART RATE: 71 BPM

## 2023-12-04 DIAGNOSIS — J43.8 OTHER EMPHYSEMA (HCC): ICD-10-CM

## 2023-12-04 DIAGNOSIS — K11.20 INFECTIOUS SIALOADENITIS OF MAJOR SALIVARY GLAND: ICD-10-CM

## 2023-12-04 DIAGNOSIS — M31.6 TEMPORAL ARTERITIS (HCC): Primary | ICD-10-CM

## 2023-12-04 PROCEDURE — 99496 TRANSJ CARE MGMT HIGH F2F 7D: CPT | Performed by: INTERNAL MEDICINE

## 2023-12-04 NOTE — PROGRESS NOTES
Name: Sophia Servin      :       MRN: [id number]  Encounter Provider: Kristian Guevara MD  Encounter Date: 2023   Encounter department: [address] [place] [address]     1. Temporal arteritis ([address])  Comments:  stable  continue prednisone 2.5 mg daily  RTC in 2-3 mosw  Blood work    2. Infectious sialoadenitis of major salivary gland  Comments:  improved Nicely  finish up meds then stop  RTC in 2-3 mos w Blood work    3. Other emphysema ([address])    Continue same  FU w pulmonary  RTC in 2-3 mos w Blood work       Subjective      [address] Whipple is here for [facility] /TCM Visit, and  regular check Up, she feels a lot better, d/C summary and med list reviewed w Pt,... Review of Systems   Constitutional:  Negative for chills, fatigue and fever. HENT:  Negative for congestion, facial swelling, sore throat, trouble swallowing and voice change. Eyes:  Negative for pain, discharge and visual disturbance. Respiratory:  Negative for cough, shortness of breath and wheezing. Cardiovascular:  Negative for chest pain, palpitations and leg swelling. Gastrointestinal:  Negative for abdominal pain, blood in stool, constipation, diarrhea and nausea. Endocrine: Negative for polydipsia, polyphagia and polyuria. Genitourinary:  Negative for difficulty urinating, hematuria and urgency. Musculoskeletal:  Negative for arthralgias and myalgias. Skin:  Negative for rash. Neurological:  Negative for dizziness, tremors, weakness and headaches. Hematological:  Negative for adenopathy. Does not bruise/bleed easily. Psychiatric/Behavioral:  Negative for dysphoric mood, sleep disturbance and suicidal ideas.         Current Outpatient Medications on File Prior to Visit   Medication Sig    acetaminophen (TYLENOL) 500 mg tablet Take 1 tablet (500 mg total) by mouth every 6 (six) hours as needed (pain fevers)    albuterol (PROVENTIL HFA,VENTOLIN HFA) 90 mcg/act inhaler Inhale 2 puffs every 6 (six) hours as needed for wheezing or shortness of breath    ALPRAZolam (XANAX) 0.25 mg tablet Take 1 tablet (0.25 mg total) by mouth daily at bedtime as needed for anxiety or sleep    aspirin (ECOTRIN LOW STRENGTH) 81 mg EC tablet Take 1 tablet (81 mg total) by mouth daily    Biotin 10 MG TABS Take by mouth in the morning    Calcium Carb-Cholecalciferol (Caltrate 600+D3) 600-20 MG-MCG TABS Take 1 tablet by mouth 2 (two) times a day    cetirizine (ZyrTEC) 10 mg tablet Take 1 tablet (10 mg total) by mouth daily With food/in the evening    fluticasone-umeclidinium-vilanterol (Trelegy Ellipta) 100-62.5-25 mcg/actuation inhaler Inhale 1 puff daily Rinse mouth after use. ipratropium (ATROVENT) 0.03 % nasal spray 2 sprays into each nostril every 12 (twelve) hours    levothyroxine 50 mcg tablet TAKE ONE TABLET BY MOUTH ONCE DAILY EARLY IN THE MORNING    pantoprazole (PROTONIX) 40 mg tablet Take 1 tablet (40 mg total) by mouth daily    predniSONE 2.5 mg tablet Take 1 tablet (2.5 mg total) by mouth daily    valsartan (DIOVAN) 160 mg tablet Take 1 tablet (160 mg total) by mouth daily Please STOP Lisinopril,. .    vitamin B-12 (VITAMIN B-12) 1,000 mcg tablet Take 1 tablet (1,000 mcg total) by mouth daily    [DISCONTINUED] predniSONE 10 mg tablet Take 4 tablets (40 mg total) by mouth daily for 2 days, THEN 3 tablets (30 mg total) daily for 2 days, THEN 2 tablets (20 mg total) daily for 2 days, THEN 1 tablet (10 mg total) daily for 2 days, THEN 0.5 tablets (5 mg total) daily for 2 days.     [DISCONTINUED] amoxicillin-clavulanate (AUGMENTIN) 875-125 mg per tablet Take 1 tablet by mouth every 12 (twelve) hours for 7 days       Objective     /68 (BP Location: Left arm, Patient Position: Sitting, Cuff Size: Adult)   Pulse 71   Temp 98.1 °F (36.7 °C) (Tympanic)   Ht 5' 2" (1.575 m)   Wt 48.7 kg (107 lb 6.4 oz)   SpO2 95%   BMI 19.64 kg/m²     Physical Exam  Constitutional:       General: She is not in acute distress. HENT:      Head: Normocephalic. Mouth/Throat:      Pharynx: No oropharyngeal exudate. Eyes:      General: No scleral icterus. Conjunctiva/sclera: Conjunctivae normal.      Pupils: Pupils are equal, round, and reactive to light. Neck:      Thyroid: No thyromegaly. Cardiovascular:      Rate and Rhythm: Normal rate and regular rhythm. Heart sounds: Normal heart sounds. No murmur heard. Pulmonary:      Effort: Pulmonary effort is normal. No respiratory distress. Breath sounds: Normal breath sounds. No wheezing or rales. Abdominal:      General: Bowel sounds are normal. There is no distension. Palpations: Abdomen is soft. Tenderness: There is no abdominal tenderness. There is no guarding or rebound. Musculoskeletal:         General: No tenderness. Cervical back: Neck supple. Lymphadenopathy:      Cervical: No cervical adenopathy. Skin:     Coloration: Skin is not pale. Findings: No rash. Neurological:      Mental Status: She is alert and oriented to person, place, and time. Sensory: No sensory deficit. Motor: No weakness.        Remy Weinberg MD

## 2023-12-06 ENCOUNTER — TELEPHONE (OUTPATIENT)
Dept: FAMILY MEDICINE CLINIC | Facility: CLINIC | Age: 78
End: 2023-12-06

## 2023-12-06 DIAGNOSIS — K11.20 INFECTIOUS SIALOADENITIS OF MAJOR SALIVARY GLAND: Primary | ICD-10-CM

## 2023-12-06 RX ORDER — DOXYCYCLINE HYCLATE 100 MG/1
100 CAPSULE ORAL EVERY 12 HOURS SCHEDULED
Qty: 20 CAPSULE | Refills: 0 | Status: ON HOLD | OUTPATIENT
Start: 2023-12-06 | End: 2023-12-09 | Stop reason: SDUPTHER

## 2023-12-06 RX ORDER — SACCHAROMYCES BOULARDII 250 MG
250 CAPSULE ORAL 2 TIMES DAILY
Qty: 60 CAPSULE | Refills: 6 | Status: SHIPPED | OUTPATIENT
Start: 2023-12-06

## 2023-12-06 RX ORDER — BENZONATATE 100 MG/1
100 CAPSULE ORAL 3 TIMES DAILY PRN
Qty: 30 CAPSULE | Refills: 0 | Status: SHIPPED | OUTPATIENT
Start: 2023-12-06

## 2023-12-06 NOTE — TELEPHONE ENCOUNTER
Patient is still not feeling better. She was up all night coughing, and she states her neck feels heavy. Should she go back to ER? Please advise.

## 2023-12-07 ENCOUNTER — APPOINTMENT (EMERGENCY)
Dept: CT IMAGING | Facility: HOSPITAL | Age: 78
DRG: 871 | End: 2023-12-07
Payer: COMMERCIAL

## 2023-12-07 ENCOUNTER — APPOINTMENT (EMERGENCY)
Dept: RADIOLOGY | Facility: HOSPITAL | Age: 78
DRG: 871 | End: 2023-12-07
Payer: COMMERCIAL

## 2023-12-07 ENCOUNTER — HOSPITAL ENCOUNTER (INPATIENT)
Facility: HOSPITAL | Age: 78
LOS: 2 days | Discharge: HOME HEALTH | DRG: 871 | End: 2023-12-09
Attending: EMERGENCY MEDICINE | Admitting: INTERNAL MEDICINE
Payer: COMMERCIAL

## 2023-12-07 DIAGNOSIS — U07.1 COVID-19: ICD-10-CM

## 2023-12-07 DIAGNOSIS — J96.01 ACUTE RESPIRATORY FAILURE WITH HYPOXIA (HCC): Primary | ICD-10-CM

## 2023-12-07 DIAGNOSIS — K11.20 INFECTIOUS SIALOADENITIS OF MAJOR SALIVARY GLAND: ICD-10-CM

## 2023-12-07 PROBLEM — R93.89 ABNORMAL CT SCAN: Status: ACTIVE | Noted: 2023-12-07

## 2023-12-07 LAB
2HR DELTA HS TROPONIN: 1 NG/L
ALBUMIN SERPL BCP-MCNC: 4.5 G/DL (ref 3.5–5)
ALP SERPL-CCNC: 52 U/L (ref 34–104)
ALT SERPL W P-5'-P-CCNC: 22 U/L (ref 7–52)
ANION GAP SERPL CALCULATED.3IONS-SCNC: 8 MMOL/L
APTT PPP: 28 SECONDS (ref 23–37)
AST SERPL W P-5'-P-CCNC: 24 U/L (ref 13–39)
ATRIAL RATE: 86 BPM
BASOPHILS # BLD AUTO: 0.02 THOUSANDS/ÂΜL (ref 0–0.1)
BASOPHILS NFR BLD AUTO: 0 % (ref 0–1)
BILIRUB SERPL-MCNC: 0.59 MG/DL (ref 0.2–1)
BNP SERPL-MCNC: 33 PG/ML (ref 0–100)
BUN SERPL-MCNC: 12 MG/DL (ref 5–25)
CALCIUM SERPL-MCNC: 9.5 MG/DL (ref 8.4–10.2)
CARDIAC TROPONIN I PNL SERPL HS: 6 NG/L
CARDIAC TROPONIN I PNL SERPL HS: 7 NG/L
CHLORIDE SERPL-SCNC: 94 MMOL/L (ref 96–108)
CK SERPL-CCNC: 41 U/L (ref 26–192)
CO2 SERPL-SCNC: 32 MMOL/L (ref 21–32)
CREAT SERPL-MCNC: 0.68 MG/DL (ref 0.6–1.3)
D DIMER PPP FEU-MCNC: <0.27 UG/ML FEU
EOSINOPHIL # BLD AUTO: 0.01 THOUSAND/ÂΜL (ref 0–0.61)
EOSINOPHIL NFR BLD AUTO: 0 % (ref 0–6)
ERYTHROCYTE [DISTWIDTH] IN BLOOD BY AUTOMATED COUNT: 14.7 % (ref 11.6–15.1)
FLUAV RNA RESP QL NAA+PROBE: NEGATIVE
FLUBV RNA RESP QL NAA+PROBE: NEGATIVE
GFR SERPL CREATININE-BSD FRML MDRD: 84 ML/MIN/1.73SQ M
GLUCOSE SERPL-MCNC: 105 MG/DL (ref 65–140)
HCT VFR BLD AUTO: 44 % (ref 34.8–46.1)
HGB BLD-MCNC: 13.8 G/DL (ref 11.5–15.4)
IMM GRANULOCYTES # BLD AUTO: 0.04 THOUSAND/UL (ref 0–0.2)
IMM GRANULOCYTES NFR BLD AUTO: 1 % (ref 0–2)
INR PPP: 0.96 (ref 0.84–1.19)
LYMPHOCYTES # BLD AUTO: 0.59 THOUSANDS/ÂΜL (ref 0.6–4.47)
LYMPHOCYTES NFR BLD AUTO: 8 % (ref 14–44)
MAGNESIUM SERPL-MCNC: 1.9 MG/DL (ref 1.9–2.7)
MCH RBC QN AUTO: 27.6 PG (ref 26.8–34.3)
MCHC RBC AUTO-ENTMCNC: 31.4 G/DL (ref 31.4–37.4)
MCV RBC AUTO: 88 FL (ref 82–98)
MONOCYTES # BLD AUTO: 1.06 THOUSAND/ÂΜL (ref 0.17–1.22)
MONOCYTES NFR BLD AUTO: 14 % (ref 4–12)
NEUTROPHILS # BLD AUTO: 5.98 THOUSANDS/ÂΜL (ref 1.85–7.62)
NEUTS SEG NFR BLD AUTO: 77 % (ref 43–75)
NRBC BLD AUTO-RTO: 0 /100 WBCS
P AXIS: 61 DEGREES
PHOSPHATE SERPL-MCNC: 2.8 MG/DL (ref 2.3–4.1)
PLATELET # BLD AUTO: 194 THOUSANDS/UL (ref 149–390)
PMV BLD AUTO: 9.6 FL (ref 8.9–12.7)
POTASSIUM SERPL-SCNC: 3.5 MMOL/L (ref 3.5–5.3)
PR INTERVAL: 106 MS
PROCALCITONIN SERPL-MCNC: 0.15 NG/ML
PROT SERPL-MCNC: 7.4 G/DL (ref 6.4–8.4)
PROTHROMBIN TIME: 13.1 SECONDS (ref 11.6–14.5)
QRS AXIS: 36 DEGREES
QRSD INTERVAL: 72 MS
QT INTERVAL: 366 MS
QTC INTERVAL: 437 MS
RBC # BLD AUTO: 5 MILLION/UL (ref 3.81–5.12)
RSV RNA RESP QL NAA+PROBE: NEGATIVE
SARS-COV-2 RNA RESP QL NAA+PROBE: POSITIVE
SODIUM SERPL-SCNC: 134 MMOL/L (ref 135–147)
T WAVE AXIS: 51 DEGREES
VENTRICULAR RATE: 86 BPM
WBC # BLD AUTO: 7.7 THOUSAND/UL (ref 4.31–10.16)

## 2023-12-07 PROCEDURE — 99285 EMERGENCY DEPT VISIT HI MDM: CPT

## 2023-12-07 PROCEDURE — 85730 THROMBOPLASTIN TIME PARTIAL: CPT | Performed by: EMERGENCY MEDICINE

## 2023-12-07 PROCEDURE — 93005 ELECTROCARDIOGRAM TRACING: CPT

## 2023-12-07 PROCEDURE — 85025 COMPLETE CBC W/AUTO DIFF WBC: CPT | Performed by: EMERGENCY MEDICINE

## 2023-12-07 PROCEDURE — 87040 BLOOD CULTURE FOR BACTERIA: CPT | Performed by: EMERGENCY MEDICINE

## 2023-12-07 PROCEDURE — 96361 HYDRATE IV INFUSION ADD-ON: CPT

## 2023-12-07 PROCEDURE — G1004 CDSM NDSC: HCPCS

## 2023-12-07 PROCEDURE — 96374 THER/PROPH/DIAG INJ IV PUSH: CPT

## 2023-12-07 PROCEDURE — 70491 CT SOFT TISSUE NECK W/DYE: CPT

## 2023-12-07 PROCEDURE — 84484 ASSAY OF TROPONIN QUANT: CPT | Performed by: EMERGENCY MEDICINE

## 2023-12-07 PROCEDURE — 71275 CT ANGIOGRAPHY CHEST: CPT

## 2023-12-07 PROCEDURE — 99285 EMERGENCY DEPT VISIT HI MDM: CPT | Performed by: EMERGENCY MEDICINE

## 2023-12-07 PROCEDURE — 83880 ASSAY OF NATRIURETIC PEPTIDE: CPT | Performed by: EMERGENCY MEDICINE

## 2023-12-07 PROCEDURE — XW033E5 INTRODUCTION OF REMDESIVIR ANTI-INFECTIVE INTO PERIPHERAL VEIN, PERCUTANEOUS APPROACH, NEW TECHNOLOGY GROUP 5: ICD-10-PCS | Performed by: INTERNAL MEDICINE

## 2023-12-07 PROCEDURE — 85379 FIBRIN DEGRADATION QUANT: CPT | Performed by: INTERNAL MEDICINE

## 2023-12-07 PROCEDURE — 84100 ASSAY OF PHOSPHORUS: CPT | Performed by: EMERGENCY MEDICINE

## 2023-12-07 PROCEDURE — 85610 PROTHROMBIN TIME: CPT | Performed by: EMERGENCY MEDICINE

## 2023-12-07 PROCEDURE — 82550 ASSAY OF CK (CPK): CPT | Performed by: EMERGENCY MEDICINE

## 2023-12-07 PROCEDURE — 0241U HB NFCT DS VIR RESP RNA 4 TRGT: CPT | Performed by: EMERGENCY MEDICINE

## 2023-12-07 PROCEDURE — 80053 COMPREHEN METABOLIC PANEL: CPT | Performed by: EMERGENCY MEDICINE

## 2023-12-07 PROCEDURE — 83735 ASSAY OF MAGNESIUM: CPT | Performed by: EMERGENCY MEDICINE

## 2023-12-07 PROCEDURE — 71045 X-RAY EXAM CHEST 1 VIEW: CPT

## 2023-12-07 PROCEDURE — 99222 1ST HOSP IP/OBS MODERATE 55: CPT | Performed by: INTERNAL MEDICINE

## 2023-12-07 PROCEDURE — 84145 PROCALCITONIN (PCT): CPT | Performed by: INTERNAL MEDICINE

## 2023-12-07 PROCEDURE — 36415 COLL VENOUS BLD VENIPUNCTURE: CPT | Performed by: EMERGENCY MEDICINE

## 2023-12-07 RX ORDER — ACETAMINOPHEN 325 MG/1
650 TABLET ORAL EVERY 6 HOURS PRN
Status: DISCONTINUED | OUTPATIENT
Start: 2023-12-07 | End: 2023-12-09 | Stop reason: HOSPADM

## 2023-12-07 RX ORDER — LORATADINE 10 MG/1
10 TABLET ORAL DAILY
Status: DISCONTINUED | OUTPATIENT
Start: 2023-12-08 | End: 2023-12-09 | Stop reason: HOSPADM

## 2023-12-07 RX ORDER — ENOXAPARIN SODIUM 100 MG/ML
30 INJECTION SUBCUTANEOUS EVERY 12 HOURS SCHEDULED
Status: DISCONTINUED | OUTPATIENT
Start: 2023-12-07 | End: 2023-12-09 | Stop reason: HOSPADM

## 2023-12-07 RX ORDER — ALBUTEROL SULFATE 90 UG/1
2 AEROSOL, METERED RESPIRATORY (INHALATION) EVERY 6 HOURS PRN
Status: DISCONTINUED | OUTPATIENT
Start: 2023-12-07 | End: 2023-12-09 | Stop reason: HOSPADM

## 2023-12-07 RX ORDER — LORAZEPAM 2 MG/ML
0.5 INJECTION INTRAMUSCULAR ONCE
Status: COMPLETED | OUTPATIENT
Start: 2023-12-07 | End: 2023-12-07

## 2023-12-07 RX ORDER — IPRATROPIUM BROMIDE 21 UG/1
2 SPRAY, METERED NASAL EVERY 12 HOURS
Status: DISCONTINUED | OUTPATIENT
Start: 2023-12-07 | End: 2023-12-07

## 2023-12-07 RX ORDER — LANOLIN ALCOHOL/MO/W.PET/CERES
1 CREAM (GRAM) TOPICAL 2 TIMES DAILY WITH MEALS
Status: DISCONTINUED | OUTPATIENT
Start: 2023-12-07 | End: 2023-12-08

## 2023-12-07 RX ORDER — ONDANSETRON 2 MG/ML
4 INJECTION INTRAMUSCULAR; INTRAVENOUS EVERY 6 HOURS PRN
Status: DISCONTINUED | OUTPATIENT
Start: 2023-12-07 | End: 2023-12-09 | Stop reason: HOSPADM

## 2023-12-07 RX ORDER — ALPRAZOLAM 0.5 MG/1
0.25 TABLET ORAL
Status: DISCONTINUED | OUTPATIENT
Start: 2023-12-07 | End: 2023-12-09 | Stop reason: HOSPADM

## 2023-12-07 RX ORDER — BENZONATATE 100 MG/1
100 CAPSULE ORAL 3 TIMES DAILY PRN
Status: DISCONTINUED | OUTPATIENT
Start: 2023-12-07 | End: 2023-12-09 | Stop reason: HOSPADM

## 2023-12-07 RX ORDER — LOSARTAN POTASSIUM 50 MG/1
100 TABLET ORAL DAILY
Status: DISCONTINUED | OUTPATIENT
Start: 2023-12-08 | End: 2023-12-09 | Stop reason: HOSPADM

## 2023-12-07 RX ORDER — ASPIRIN 81 MG/1
81 TABLET, CHEWABLE ORAL ONCE
Status: COMPLETED | OUTPATIENT
Start: 2023-12-07 | End: 2023-12-07

## 2023-12-07 RX ORDER — POLYETHYLENE GLYCOL 3350 17 G/17G
17 POWDER, FOR SOLUTION ORAL DAILY PRN
Status: DISCONTINUED | OUTPATIENT
Start: 2023-12-07 | End: 2023-12-09 | Stop reason: HOSPADM

## 2023-12-07 RX ORDER — FLUTICASONE FUROATE AND VILANTEROL 100; 25 UG/1; UG/1
1 POWDER RESPIRATORY (INHALATION) DAILY
Status: DISCONTINUED | OUTPATIENT
Start: 2023-12-08 | End: 2023-12-09 | Stop reason: HOSPADM

## 2023-12-07 RX ORDER — IPRATROPIUM BROMIDE AND ALBUTEROL SULFATE 2.5; .5 MG/3ML; MG/3ML
3 SOLUTION RESPIRATORY (INHALATION) ONCE
Status: COMPLETED | OUTPATIENT
Start: 2023-12-07 | End: 2023-12-07

## 2023-12-07 RX ORDER — LEVOTHYROXINE SODIUM 0.05 MG/1
50 TABLET ORAL
Status: DISCONTINUED | OUTPATIENT
Start: 2023-12-08 | End: 2023-12-09 | Stop reason: HOSPADM

## 2023-12-07 RX ORDER — PANTOPRAZOLE SODIUM 40 MG/1
40 TABLET, DELAYED RELEASE ORAL
Status: DISCONTINUED | OUTPATIENT
Start: 2023-12-08 | End: 2023-12-09 | Stop reason: HOSPADM

## 2023-12-07 RX ORDER — SACCHAROMYCES BOULARDII 250 MG
250 CAPSULE ORAL 2 TIMES DAILY
Status: DISCONTINUED | OUTPATIENT
Start: 2023-12-07 | End: 2023-12-09 | Stop reason: HOSPADM

## 2023-12-07 RX ORDER — DEXAMETHASONE SODIUM PHOSPHATE 10 MG/ML
6 INJECTION, SOLUTION INTRAMUSCULAR; INTRAVENOUS EVERY 24 HOURS
Status: DISCONTINUED | OUTPATIENT
Start: 2023-12-07 | End: 2023-12-09 | Stop reason: HOSPADM

## 2023-12-07 RX ORDER — MAGNESIUM HYDROXIDE/ALUMINUM HYDROXICE/SIMETHICONE 120; 1200; 1200 MG/30ML; MG/30ML; MG/30ML
30 SUSPENSION ORAL EVERY 6 HOURS PRN
Status: DISCONTINUED | OUTPATIENT
Start: 2023-12-07 | End: 2023-12-09 | Stop reason: HOSPADM

## 2023-12-07 RX ORDER — LEVALBUTEROL INHALATION SOLUTION 1.25 MG/3ML
1.25 SOLUTION RESPIRATORY (INHALATION) EVERY 6 HOURS PRN
Status: DISCONTINUED | OUTPATIENT
Start: 2023-12-07 | End: 2023-12-09 | Stop reason: HOSPADM

## 2023-12-07 RX ADMIN — IOHEXOL 100 ML: 350 INJECTION, SOLUTION INTRAVENOUS at 13:41

## 2023-12-07 RX ADMIN — ASPIRIN 81 MG CHEWABLE TABLET 81 MG: 81 TABLET CHEWABLE at 12:05

## 2023-12-07 RX ADMIN — BENZONATATE 100 MG: 100 CAPSULE ORAL at 18:02

## 2023-12-07 RX ADMIN — IPRATROPIUM BROMIDE AND ALBUTEROL SULFATE 3 ML: 2.5; .5 SOLUTION RESPIRATORY (INHALATION) at 13:53

## 2023-12-07 RX ADMIN — SODIUM CHLORIDE 1000 ML: 0.9 INJECTION, SOLUTION INTRAVENOUS at 12:05

## 2023-12-07 RX ADMIN — LORAZEPAM 0.5 MG: 2 INJECTION INTRAMUSCULAR; INTRAVENOUS at 12:54

## 2023-12-07 RX ADMIN — Medication 250 MG: at 17:47

## 2023-12-07 RX ADMIN — DEXAMETHASONE SODIUM PHOSPHATE 6 MG: 10 INJECTION INTRAMUSCULAR; INTRAVENOUS at 17:48

## 2023-12-07 RX ADMIN — REMDESIVIR 200 MG: 100 INJECTION, POWDER, LYOPHILIZED, FOR SOLUTION INTRAVENOUS at 18:06

## 2023-12-07 RX ADMIN — ENOXAPARIN SODIUM 30 MG: 30 INJECTION SUBCUTANEOUS at 20:37

## 2023-12-07 NOTE — ASSESSMENT & PLAN NOTE
Incidental findings of soft tissue in the right breast is of uncertain significance. Mammography is advised.

## 2023-12-07 NOTE — ED PROVIDER NOTES
History  Chief Complaint   Patient presents with    Generalized Body Aches     Pt reports to ER after an admission for a throat infection, having body aches, sore throat, and shortness of breath      51-year-old female present emerged department with shortness of breath body aches sore throat. Patient very short of breath this morning. Notes that was feeling better after antibiotics for salivary gland infection. No lower extremity edema. Cough that is new. Prior to Admission Medications   Prescriptions Last Dose Informant Patient Reported? Taking?    ALPRAZolam (XANAX) 0.25 mg tablet Not Taking  No No   Sig: Take 1 tablet (0.25 mg total) by mouth daily at bedtime as needed for anxiety or sleep   Patient not taking: Reported on 12/7/2023   Biotin 10 MG TABS 12/6/2023 Self Yes Yes   Sig: Take by mouth in the morning   Calcium Carb-Cholecalciferol (Caltrate 600+D3) 600-20 MG-MCG TABS 12/6/2023  No Yes   Sig: Take 1 tablet by mouth 2 (two) times a day   acetaminophen (TYLENOL) 500 mg tablet Not Taking Self No No   Sig: Take 1 tablet (500 mg total) by mouth every 6 (six) hours as needed (pain fevers)   Patient not taking: Reported on 12/7/2023   albuterol (PROVENTIL HFA,VENTOLIN HFA) 90 mcg/act inhaler 12/7/2023 Self No Yes   Sig: Inhale 2 puffs every 6 (six) hours as needed for wheezing or shortness of breath   aspirin (ECOTRIN LOW STRENGTH) 81 mg EC tablet 12/7/2023  No Yes   Sig: Take 1 tablet (81 mg total) by mouth daily   benzonatate (TESSALON PERLES) 100 mg capsule 12/6/2023  No Yes   Sig: Take 1 capsule (100 mg total) by mouth 3 (three) times a day as needed for cough   cetirizine (ZyrTEC) 10 mg tablet Not Taking  No No   Sig: Take 1 tablet (10 mg total) by mouth daily With food/in the evening   Patient not taking: Reported on 12/7/2023   doxycycline hyclate (VIBRAMYCIN) 100 mg capsule 12/7/2023  No Yes   Sig: Take 1 capsule (100 mg total) by mouth every 12 (twelve) hours for 10 days fluticasone-umeclidinium-vilanterol (Trelegy Ellipta) 100-62.5-25 mcg/actuation inhaler Unknown  No No   Sig: Inhale 1 puff daily Rinse mouth after use. ipratropium (ATROVENT) 0.03 % nasal spray Unknown Self No No   Si sprays into each nostril every 12 (twelve) hours   levothyroxine 50 mcg tablet 2023  No Yes   Sig: TAKE ONE TABLET BY MOUTH ONCE DAILY EARLY IN THE MORNING   pantoprazole (PROTONIX) 40 mg tablet 2023  No Yes   Sig: Take 1 tablet (40 mg total) by mouth daily   predniSONE 2.5 mg tablet 2023  No Yes   Sig: Take 1 tablet (2.5 mg total) by mouth daily   saccharomyces boulardii (FLORASTOR) 250 mg capsule 2023  No Yes   Sig: Take 1 capsule (250 mg total) by mouth 2 (two) times a day   valsartan (DIOVAN) 160 mg tablet 2023  No Yes   Sig: Take 1 tablet (160 mg total) by mouth daily Please STOP Lisinopril,. .   vitamin B-12 (VITAMIN B-12) 1,000 mcg tablet Not Taking  No No   Sig: Take 1 tablet (1,000 mcg total) by mouth daily   Patient not taking: Reported on 2023      Facility-Administered Medications: None       Past Medical History:   Diagnosis Date    Asthma     Chronic obstructive pulmonary disease ([address]) 2012    GERD (gastroesophageal reflux disease)     Hypertension 10/11/2018    Hypothyroid     Osteoarthritis 2012    Temporal arteritis ([address])     Tubular adenoma     Type 2 diabetes mellitus with complication, without long-term current use of insulin ([address]) 2020       Past Surgical History:   Procedure Laterality Date    EYE SURGERY Right 2019    cataract LVCFS    HYSTERECTOMY      NO PAST SURGERIES      ALSO NO RELEVANT PAST SURRGICAL HX AS PER [facility]       Family History   Problem Relation Age of Onset    Breast cancer Mother     Emphysema Father      I have reviewed and agree with the history as documented.     E-Cigarette/Vaping    E-Cigarette Use Never User      E-Cigarette/Vaping Substances    Nicotine No     THC No     CBD No     Flavoring No Other No     Unknown No      Social History     Tobacco Use    Smoking status: Former     Packs/day: 1.00     Years: 35.00     Total pack years: 35.00     Types: Cigarettes     Start date: 65     Quit date: 2013     Years since quitting: 10.9    Smokeless tobacco: Never    Tobacco comments:     TOBACCO USE, NO PASSIVE SMOKE EXPOSURE   Vaping Use    Vaping Use: Never used   Substance Use Topics    Alcohol use: Never    Drug use: No       Review of Systems   Constitutional:  Negative for chills and fever. HENT:  Negative for ear pain and sore throat. Eyes:  Negative for pain and visual disturbance. Respiratory:  Positive for cough, shortness of breath and wheezing. Cardiovascular:  Negative for chest pain and palpitations. Gastrointestinal:  Negative for abdominal pain and vomiting. Genitourinary:  Negative for dysuria and hematuria. Musculoskeletal:  Negative for arthralgias and back pain. Skin:  Negative for color change and rash. Neurological:  Negative for seizures and syncope. All other systems reviewed and are negative. Physical Exam  Physical Exam  Vitals and nursing note reviewed. Constitutional:       General: She is not in acute distress. Appearance: She is well-developed. HENT:      Head: Normocephalic and atraumatic. Nose: Nose normal.      Mouth/Throat:      Mouth: Mucous membranes are moist.   Eyes:      Conjunctiva/sclera: Conjunctivae normal.   Cardiovascular:      Rate and Rhythm: Normal rate and regular rhythm. Heart sounds: No murmur heard. Pulmonary:      Effort: Pulmonary effort is normal. No respiratory distress. Breath sounds: Wheezing present. Abdominal:      Palpations: Abdomen is soft. Tenderness: There is no abdominal tenderness. Musculoskeletal:         General: No swelling. Cervical back: Neck supple. Skin:     General: Skin is warm and dry. Capillary Refill: Capillary refill takes less than 2 seconds. Neurological:      Mental Status: She is alert.    Psychiatric:         Mood and Affect: Mood normal.         Vital Signs  ED Triage Vitals   Temperature Pulse Respirations Blood Pressure SpO2   12/07/23 1202 12/07/23 1144 12/07/23 1144 12/07/23 1144 12/07/23 1144   97.6 °F (36.4 °C) 97 22 161/91 (!) 89 %      Temp Source Heart Rate Source Patient Position - Orthostatic VS BP Location FiO2 (%)   12/07/23 1202 12/07/23 1144 12/07/23 1144 12/07/23 1144 --   Tympanic Monitor Sitting Left arm       Pain Score       12/07/23 1300       No Pain           Vitals:    12/07/23 1356 12/07/23 1601 12/07/23 1633 12/07/23 1700   BP: 115/59 107/74 125/74 137/79   Pulse: 85 101 96 101   Patient Position - Orthostatic VS: Lying Lying Lying Lying         Visual Acuity      ED Medications  Medications   dexamethasone (PF) (DECADRON) injection 6 mg (6 mg Intravenous Given 12/7/23 1748)   enoxaparin (LOVENOX) subcutaneous injection 30 mg (has no administration in time range)   remdesivir (Veklury) 200 mg in sodium chloride 0.9 % 290 mL IVPB (has no administration in time range)     Followed by   remdesivir Harden ) 100 mg in sodium chloride 0.9 % 270 mL IVPB (has no administration in time range)   acetaminophen (TYLENOL) tablet 650 mg (has no administration in time range)   polyethylene glycol (MIRALAX) packet 17 g (has no administration in time range)   ondansetron (ZOFRAN) injection 4 mg (has no administration in time range)   aluminum-magnesium hydroxide-simethicone (MAALOX) oral suspension 30 mL (has no administration in time range)   albuterol (PROVENTIL HFA,VENTOLIN HFA) inhaler 2 puff (has no administration in time range)   ALPRAZolam (XANAX) tablet 0.25 mg (has no administration in time range)   aspirin (ECOTRIN LOW STRENGTH) EC tablet 81 mg (has no administration in time range)   benzonatate (TESSALON PERLES) capsule 100 mg (has no administration in time range)   calcium carbonate-vitamin D 500 mg-5 mcg tablet 1 tablet (1 tablet Oral Not Given 12/7/23 1746)   loratadine (CLARITIN) tablet 10 mg (has no administration in time range)   Fluticasone Furoate-Vilanterol 100-25 mcg/actuation 1 puff (has no administration in time range)     And   umeclidinium 62.5 mcg/actuation inhaler AEPB 1 puff (has no administration in time range)   levothyroxine tablet 50 mcg (has no administration in time range)   pantoprazole (PROTONIX) EC tablet 40 mg (has no administration in time range)   saccharomyces boulardii (FLORASTOR) capsule 250 mg (250 mg Oral Given 12/7/23 1747)   losartan (COZAAR) tablet 100 mg (has no administration in time range)   cyanocobalamin (VITAMIN B-12) tablet 1,000 mcg (has no administration in time range)   levalbuterol (XOPENEX) inhalation solution 1.25 mg (has no administration in time range)   sodium chloride 0.9 % bolus 1,000 mL (0 mL Intravenous Stopped 12/7/23 1601)   aspirin chewable tablet 81 mg (81 mg Oral Given 12/7/23 1205)   LORazepam (ATIVAN) injection 0.5 mg (0.5 mg Intravenous Given 12/7/23 1254)   ipratropium-albuterol (DUO-NEB) 0.5-2.5 mg/3 mL inhalation solution 3 mL (3 mL Nebulization Given 12/7/23 1353)   iohexol (OMNIPAQUE) 350 MG/ML injection (SINGLE-DOSE) 100 mL (100 mL Intravenous Given 12/7/23 1341)       Diagnostic Studies  Results Reviewed       Procedure Component Value Units Date/Time    Procalcitonin [[id number]]  (Normal) Collected: 12/07/23 1204    Lab Status: Final result Specimen: Blood from Arm, Right Updated: 12/07/23 1751     Procalcitonin 0.15 ng/ml     HS Troponin I 2hr [[id number]] Collected: 12/07/23 1735    Lab Status: In process Specimen: Blood from Arm, Left Updated: 12/07/23 1739    D-dimer, quantitative [[id number]] Collected: 12/07/23 1204    Lab Status:  In process Specimen: Blood from Arm, Right Updated: 12/07/23 1625    FLU/RSV/COVID - if FLU/RSV clinically relevant [[id number]]  (Abnormal) Collected: 12/07/23 1204    Lab Status: Final result Specimen: Nares from Nose Updated: 12/07/23 1252     SARS-CoV-2 Positive     INFLUENZA A PCR Negative     INFLUENZA B PCR Negative     RSV PCR Negative    Narrative:      FOR PEDIATRIC PATIENTS - copy/paste COVID Guidelines URL to browser: [email or web address]    SARS-CoV-2 assay is a Nucleic Acid Amplification assay intended for the  qualitative detection of nucleic acid from SARS-CoV-2 in nasopharyngeal  swabs. Results are for the presumptive identification of SARS-CoV-2 RNA. Positive results are indicative of infection with SARS-CoV-2, the virus  causing COVID-19, but do not rule out bacterial infection or co-infection  with other viruses. Laboratories within the [facility] and its  territories are required to report all positive results to the appropriate  public health authorities. Negative results do not preclude SARS-CoV-2  infection and should not be used as the sole basis for treatment or other  patient management decisions. Negative results must be combined with  clinical observations, patient history, and epidemiological information. This test has not been FDA cleared or approved. This test has been authorized by FDA under an Emergency Use Authorization  (EUA). This test is only authorized for the duration of time the  declaration that circumstances exist justifying the authorization of the  emergency use of an in vitro diagnostic tests for detection of SARS-CoV-2  virus and/or diagnosis of COVID-19 infection under section 564(b)(1) of  the Act, 21 U. S.C. 043TNS-4(M)(8), unless the authorization is terminated  or revoked sooner. The test has been validated but independent review by FDA  and CLIA is pending. Test performed using [employer] GeneXpert: This RT-PCR assay targets N2,  a region unique to SARS-CoV-2. A conserved region in the E-gene was chosen  for pan-Sarbecovirus detection which includes SARS-CoV-2.     According to CMS-2020-01-R, this platform meets the definition of high-Dmailer technology.     B-Type Natriuretic Peptide(BNP) [[id number]]  (Normal) Collected: 12/07/23 1204    Lab Status: Final result Specimen: Blood from Arm, Right Updated: 12/07/23 1243     BNP 33 pg/mL     HS Troponin I 4hr [[id number]]     Lab Status: No result Specimen: Blood     HS Troponin 0hr (reflex protocol) [[id number]]  (Normal) Collected: 12/07/23 1204    Lab Status: Final result Specimen: Blood from Arm, Right Updated: 12/07/23 1242     hs TnI 0hr 6 ng/L     CK [[id number]]  (Normal) Collected: 12/07/23 1204    Lab Status: Final result Specimen: Blood from Arm, Right Updated: 12/07/23 1237     Total CK 41 U/L     Comprehensive metabolic panel [[id number]]  (Abnormal) Collected: 12/07/23 1204    Lab Status: Final result Specimen: Blood from Arm, Right Updated: 12/07/23 1237     Sodium 134 mmol/L      Potassium 3.5 mmol/L      Chloride 94 mmol/L      CO2 32 mmol/L      ANION GAP 8 mmol/L      BUN 12 mg/dL      Creatinine 0.68 mg/dL      Glucose 105 mg/dL      Calcium 9.5 mg/dL      AST 24 U/L      ALT 22 U/L      Alkaline Phosphatase 52 U/L      Total Protein 7.4 g/dL      Albumin 4.5 g/dL      Total Bilirubin 0.59 mg/dL      eGFR 84 ml/min/1.73sq m     Narrative:      [facility] guidelines for Chronic Kidney Disease (CKD):     Stage 1 with normal or high GFR (GFR > 90 mL/min/1.73 square meters)    Stage 2 Mild CKD (GFR = 60-89 mL/min/1.73 square meters)    Stage 3A Moderate CKD (GFR = 45-59 mL/min/1.73 square meters)    Stage 3B Moderate CKD (GFR = 30-44 mL/min/1.73 square meters)    Stage 4 Severe CKD (GFR = 15-29 mL/min/1.73 square meters)    Stage 5 End Stage CKD (GFR <15 mL/min/1.73 square meters)  Note: GFR calculation is accurate only with a steady state creatinine    Phosphorus [[id number]]  (Normal) Collected: 12/07/23 1204    Lab Status: Final result Specimen: Blood from Arm, Right Updated: 12/07/23 1237     Phosphorus 2.8 mg/dL     Magnesium [[id number]]  (Normal) Collected: 12/07/23 1204    Lab Status: Final result Specimen: Blood from Arm, Right Updated: 12/07/23 1237     Magnesium 1.9 mg/dL     Protime-INR [[id number]]  (Normal) Collected: 12/07/23 1204    Lab Status: Final result Specimen: Blood from Arm, Right Updated: 12/07/23 1232     Protime 13.1 seconds      INR 0.96    APTT [[id number]]  (Normal) Collected: 12/07/23 1204    Lab Status: Final result Specimen: Blood from Arm, Right Updated: 12/07/23 1232     PTT 28 seconds     CBC and differential [[id number]]  (Abnormal) Collected: 12/07/23 1204    Lab Status: Final result Specimen: Blood from Arm, Right Updated: 12/07/23 1215     WBC 7.70 Thousand/uL      RBC 5.00 Million/uL      Hemoglobin 13.8 g/dL      Hematocrit 44.0 %      MCV 88 fL      MCH 27.6 pg      MCHC 31.4 g/dL      RDW 14.7 %      MPV 9.6 fL      Platelets 097 Thousands/uL      nRBC 0 /100 WBCs      Neutrophils Relative 77 %      Immat GRANS % 1 %      Lymphocytes Relative 8 %      Monocytes Relative 14 %      Eosinophils Relative 0 %      Basophils Relative 0 %      Neutrophils Absolute 5.98 Thousands/µL      Immature Grans Absolute 0.04 Thousand/uL      Lymphocytes Absolute 0.59 Thousands/µL      Monocytes Absolute 1.06 Thousand/µL      Eosinophils Absolute 0.01 Thousand/µL      Basophils Absolute 0.02 Thousands/µL     Blood culture #1 [[id number]] Collected: 12/07/23 1204    Lab Status: In process Specimen: Blood from Arm, Left Updated: 12/07/23 1211    Blood culture #2 [[id number]] Collected: 12/07/23 1204    Lab Status: In process Specimen: Blood from Arm, Right Updated: 12/07/23 1211                   CTA ED chest PE Study   Final Result by Ailyn Alvarado MD (12/07 1538)      1. No pulmonary embolus or acute thoracic pathology. 2.  Soft tissue in the right breast is of uncertain significance. Mammography is advised. 3.  Few small pulmonary nodules are unchanged. Follow-up noncontrast chest CT is advised in 12 months.    4.  Chronic complete right middle lobe atelectasis. The study was marked in [facility] for immediate notification. Resident: Christine Lowry, the attending radiologist, have reviewed the images and agree with the final report above. Workstation performed: [id number]         CT soft tissue neck with contrast   Final Result by Kaci Loja MD (12/07 9343)      There is no suspicious mucosal lesion or tonsillar enlargement. The previously noted hyperenhancement involving the bilateral submandibular and sublingual glands related to sialoadenitis appears resolved. Resolved adjacent cellulitic changes. Workstation performed: [id number]         XR chest 1 view portable   ED Interpretation by Matheus Sadler MD (12/07 9406)   No acute cardiopulmonary disease                 Procedures  Procedures         ED Course                               SBIRT 22yo+      Flowsheet Row Most Recent Value   Initial Alcohol Screen: US AUDIT-C     1. How often do you have a drink containing alcohol? 0 Filed at: 12/07/2023 1142   2. How many drinks containing alcohol do you have on a typical day you are drinking? 0 Filed at: 12/07/2023 1142   3a. Male UNDER 65: How often do you have five or more drinks on one occasion? 0 Filed at: 12/07/2023 1142   3b. FEMALE Any Age, or MALE 65+: How often do you have 4 or more drinks on one occassion? 0 Filed at: 12/07/2023 1142   Audit-C Score 0 Filed at: 12/07/2023 1142   MARIMAR: How many times in the past year have you. .. Used an illegal drug or used a prescription medication for non-medical reasons? Never Filed at: 12/07/2023 1142                      Medical Decision Making  66-year-old female present emerged department shortness of breath. Patient found to be 60% on room air on arrival.  Required 6 L nasal cannula oxygen to maintain pulse ox over 90%. Differential diagnosis includes pulmonary embolism, COVID, pneumonia, pneumothorax.   Slight wheezing on exam, DuoNeb given.  The soft tissue neck shows resolution of cellulitis and salivary gland infection. Chest x-ray without pneumonia or pneumothorax. CT PE negative. COVID test positive, likely cause of hypoxia. Admit to internal medicine service for further management. Amount and/or Complexity of Data Reviewed  Labs: ordered. Radiology: ordered and independent interpretation performed. Risk  OTC drugs. Prescription drug management. Decision regarding hospitalization. Disposition  Final diagnoses:   Acute respiratory failure with hypoxia ([address])   COVID-19     Time reflects when diagnosis was documented in both MDM as applicable and the Disposition within this note       Time User Action Codes Description Comment    12/7/2023 11:54 AM Arpit Atkins Add [J96.01] Acute respiratory failure with hypoxia ([address])     12/7/2023  3:52 PM Arpit Atkins Add [U07.1] COVID-19           ED Disposition       ED Disposition   Admit    Condition   Stable    Date/Time   Thu Dec 7, 2023 5420    Comment   Case was discussed with Dr. Kenzie Miranda and the patient's admission status was agreed to be Admission Status: inpatient status to the service of Dr. Kenzie Miranda . Follow-up Information    None         Current Discharge Medication List        CONTINUE these medications which have NOT CHANGED    Details   albuterol (PROVENTIL HFA,VENTOLIN HFA) 90 mcg/act inhaler Inhale 2 puffs every 6 (six) hours as needed for wheezing or shortness of breath  Qty: 18 g, Refills: 2    Comments: Substitution to a formulary equivalent within the same pharmaceutical class is authorized. Associated Diagnoses: Pulmonary emphysema, unspecified emphysema type (HCC)      aspirin (ECOTRIN LOW STRENGTH) 81 mg EC tablet Take 1 tablet (81 mg total) by mouth daily  Qty: 90 tablet, Refills: 3    Associated Diagnoses: Acute exacerbation of chronic obstructive pulmonary disease (COPD) ([address]);  Pulmonary emphysema, unspecified emphysema type ([address]) benzonatate (TESSALON PERLES) 100 mg capsule Take 1 capsule (100 mg total) by mouth 3 (three) times a day as needed for cough  Qty: 30 capsule, Refills: 0    Associated Diagnoses: Infectious sialoadenitis of major salivary gland      Biotin 10 MG TABS Take by mouth in the morning      Calcium Carb-Cholecalciferol (Caltrate 600+D3) 600-20 MG-MCG TABS Take 1 tablet by mouth 2 (two) times a day  Qty: 200 tablet, Refills: 6    Associated Diagnoses: Idiopathic osteoporosis      doxycycline hyclate (VIBRAMYCIN) 100 mg capsule Take 1 capsule (100 mg total) by mouth every 12 (twelve) hours for 10 days  Qty: 20 capsule, Refills: 0    Associated Diagnoses: Infectious sialoadenitis of major salivary gland      levothyroxine 50 mcg tablet TAKE ONE TABLET BY MOUTH ONCE DAILY EARLY IN THE MORNING  Qty: 30 tablet, Refills: 4    Associated Diagnoses: Hypothyroidism due to Hashimoto's thyroiditis      pantoprazole (PROTONIX) 40 mg tablet Take 1 tablet (40 mg total) by mouth daily  Qty: 90 tablet, Refills: 3    Associated Diagnoses: Gastroesophageal reflux disease      predniSONE 2.5 mg tablet Take 1 tablet (2.5 mg total) by mouth daily  Qty: 90 tablet, Refills: 3    Associated Diagnoses: Temporal arteritis (HCC)      saccharomyces boulardii (FLORASTOR) 250 mg capsule Take 1 capsule (250 mg total) by mouth 2 (two) times a day  Qty: 60 capsule, Refills: 6    Associated Diagnoses: Infectious sialoadenitis of major salivary gland      valsartan (DIOVAN) 160 mg tablet Take 1 tablet (160 mg total) by mouth daily Please STOP Lisinopril,. [facility]: 90 tablet, Refills: 3    Associated Diagnoses: Acute exacerbation of COPD with asthma       acetaminophen (TYLENOL) 500 mg tablet Take 1 tablet (500 mg total) by mouth every 6 (six) hours as needed (pain fevers)  Qty: 30 tablet, Refills: 0    Associated Diagnoses: Influenza A      ALPRAZolam (XANAX) 0.25 mg tablet Take 1 tablet (0.25 mg total) by mouth daily at bedtime as needed for anxiety or sleep  Qty: 30 tablet, Refills: 1    Associated Diagnoses: Anxiety      cetirizine (ZyrTEC) 10 mg tablet Take 1 tablet (10 mg total) by mouth daily With food/in the evening  Qty: 90 tablet, Refills: 3    Associated Diagnoses: Allergic rhinitis, unspecified seasonality, unspecified trigger      fluticasone-umeclidinium-vilanterol (Trelegy Ellipta) 100-62.5-25 mcg/actuation inhaler Inhale 1 puff daily Rinse mouth after use. Qty: 60 blister, Refills: 3    Associated Diagnoses: Other emphysema (HCC)      ipratropium (ATROVENT) 0.03 % nasal spray 2 sprays into each nostril every 12 (twelve) hours  Qty: 30 mL, Refills: 6    Associated Diagnoses: Postnasal drip      vitamin B-12 (VITAMIN B-12) 1,000 mcg tablet Take 1 tablet (1,000 mcg total) by mouth daily  Qty: 90 tablet, Refills: 1    Associated Diagnoses: Other dietary vitamin B12 deficiency anemia             No discharge procedures on file.     PDMP Review         Value Time User    PDMP Reviewed  Yes 11/20/2023  1:58 PM Justine Mahmood MD            ED Provider  Electronically Signed by             Mina Saha MD  12/07/23 8559

## 2023-12-07 NOTE — ASSESSMENT & PLAN NOTE
Not on home oxygen  Continue prehospital inhalers  Will give nebulizer treatment while in the hospital.

## 2023-12-07 NOTE — ASSESSMENT & PLAN NOTE
CT PE study showed few small pulmonary nodules are unchanged. Follow-up noncontrast chest CT is advised in 12 months.

## 2023-12-07 NOTE — H&P
[facility]  H&P  Name: Katlyn Villalobos 66 y.o. female I MRN: [id number]  Unit/Bed#: ED 10 I Date of Admission: 12/7/2023   Date of Service: 12/7/2023 I Hospital Day: 0      Assessment/Plan   * COVID-19  Assessment & Plan  Background: Presented with shortness of breath, throat infection  Mild COVID pathway as below   COVID19- positive on 12/7/2023   Supplemental oxygen with 2 L oxygen saturating 93%  Wean as tolerated for oxygen saturation greater than 89-92%  D-dimer pending  Chest CT scan revealed " no PE, small pulmonary nodules"   CBC and CMP daily  Normal troponin, CK, BNP   Anticoagulation per protocol   Since patient is on oxygen  Dexamethasone 6mg IV daily X 10 days  Remdesivir 200mg IV day 1 and 100mg IV daily X4 days   Baricitinib 4mg PO daily if escalated to any supplemental O2 for > 12 hours x 14 days  No fever, no leukocytosis hold off antibiotic for now   OOB TID; ambulation and mobilization strongly encouraged  PT/OT consult and evaluation   Self proning strongly encouraged  Supportive care      Abnormal CT scan  Assessment & Plan  Incidental findings of soft tissue in the right breast is of uncertain significance. Mammography is advised. Acute hypoxic respiratory failure (HCC)  Assessment & Plan  Due to COVID infection  See treatment as above    Left upper lobe pulmonary nodule  Assessment & Plan  CT PE study showed few small pulmonary nodules are unchanged. Follow-up noncontrast chest CT is advised in 12 months.      Hypertension  Assessment & Plan  Continue prehospital Cozaar 100 mg daily    Gastroesophageal reflux disease without esophagitis  Assessment & Plan  Continue prehospital Protonix 40 mg daily    Other specified hypothyroidism  Assessment & Plan  Continue prehospital Synthroid 50 mcg daily    Vitamin D deficiency  Assessment & Plan  Continue prehospital vitamin D supplementation    Chronic obstructive pulmonary disease ([address])  Assessment & Plan  Not on home oxygen  Continue prehospital inhalers  Will give nebulizer treatment while in the hospital.         VTE Prophylaxis: Enoxaparin (Lovenox)  / sequential compression device   Code Status: Full code  POLST: There is no POLST form on file for this patient (pre-hospital)  Discussion with family: Patient declined calling a     Anticipated Length of Stay:  Patient will be admitted on an Inpatient basis with an anticipated length of stay of less than 2 midnights. Justification for Hospital Stay: Acute hypoxic respiratory failure due to COVID infection    Total Time for Visit, including Counseling / Coordination of Care: 60 minutes. Greater than 50% of this total time spent on direct patient counseling and coordination of care. Chief Complaint:   Shortness of breath    History of Present Illness:    Saida Nolan is a 66 y.o. female with PMH of COPD not on home oxygen, on chronic prednisone, HTN, hypothyroidism, GERD, pulmonary nodules who presents with shortness of breath, throat infection and generalized body ache. According to the patient, symptom started yesterday and this morning which has been progressively worse. She does not need oxygen at baseline for her COPD. Her shortness of breath got better after treatment in ED. Patient was found to have COVID infection in oxygen dropped to 89% in ED requiring 2 L oxygen via NC. Review of Systems:    Review of Systems Patient was seen and examined at bedside. The patient stated her breathing is better. She has severe generalized body ache. She denies any fever, cough, chest pain, palpitation, N/V, abd pain.       Past Medical and Surgical History:     Past Medical History:   Diagnosis Date    Asthma     Chronic obstructive pulmonary disease ([address]) 9/26/2012    GERD (gastroesophageal reflux disease)     Hypertension 10/11/2018    Hypothyroid     Osteoarthritis 9/26/2012    Temporal arteritis ([address])     Tubular adenoma     Type 2 diabetes mellitus with complication, without long-term current use of insulin ([address]) 1/14/2020       Past Surgical History:   Procedure Laterality Date    EYE SURGERY Right 12/06/2019    cataract LVCFS    HYSTERECTOMY      NO PAST SURGERIES      ALSO NO RELEVANT PAST SURRGICAL HX AS PER [facility]       Meds/Allergies:    Prior to Admission medications    Medication Sig Start Date End Date Taking? Authorizing Provider   acetaminophen (TYLENOL) 500 mg tablet Take 1 tablet (500 mg total) by mouth every 6 (six) hours as needed (pain fevers) 12/27/19   Helena Fletcher PA-C   albuterol (PROVENTIL HFA,VENTOLIN HFA) 90 mcg/act inhaler Inhale 2 puffs every 6 (six) hours as needed for wheezing or shortness of breath 8/15/22   Thomas Morrison MD   ALPRAZolam [place]) 0.25 mg tablet Take 1 tablet (0.25 mg total) by mouth daily at bedtime as needed for anxiety or sleep 10/30/23   Thomas Morrison MD   aspirin (ECOTRIN LOW STRENGTH) 81 mg EC tablet Take 1 tablet (81 mg total) by mouth daily 6/5/23   Thomas Morrison MD   benzonatate (TESSALON PERLES) 100 mg capsule Take 1 capsule (100 mg total) by mouth 3 (three) times a day as needed for cough 12/6/23   Thomas Morrison MD   Biotin 10 MG TABS Take by mouth in the morning    Historical Provider, MD   Calcium Carb-Cholecalciferol (Caltrate 600+D3) 600-20 MG-MCG TABS Take 1 tablet by mouth 2 (two) times a day 6/5/23   Thomas Morrison MD   cetirizine (ZyrTEC) 10 mg tablet Take 1 tablet (10 mg total) by mouth daily With food/in the evening 6/5/23   Thomas Morrison MD   doxycycline hyclate (VIBRAMYCIN) 100 mg capsule Take 1 capsule (100 mg total) by mouth every 12 (twelve) hours for 10 days 12/6/23 12/16/23  Thomas Morrison MD   fluticasone-umeclidinium-vilanterol (Trelegy Ellipta) 100-62.5-25 mcg/actuation inhaler Inhale 1 puff daily Rinse mouth after use.  11/13/23 12/13/23  Thomas Morrison MD   ipratropium (ATROVENT) 0.03 % nasal spray 2 sprays into each nostril every 12 (twelve) hours 1/9/23   Sherri Burris MD levothyroxine 50 mcg tablet TAKE ONE TABLET BY MOUTH ONCE DAILY EARLY IN THE MORNING 10/30/23   Luis Mederos MD   pantoprazole (PROTONIX) 40 mg tablet Take 1 tablet (40 mg total) by mouth daily 6/5/23   Luis Mederos MD   predniSONE 2.5 mg tablet Take 1 tablet (2.5 mg total) by mouth daily 9/18/23   Luis Mederos MD   saccharomyces boulardii (FLORASTOR) 250 mg capsule Take 1 capsule (250 mg total) by mouth 2 (two) times a day 12/6/23   Luis Mederos MD   valsartan (DIOVAN) 160 mg tablet Take 1 tablet (160 mg total) by mouth daily Please STOP Lisinopril,. . 6/5/23   Luis Mederos MD   vitamin B-12 (VITAMIN B-12) 1,000 mcg tablet Take 1 tablet (1,000 mcg total) by mouth daily 6/5/23   Luis Mederos MD     I have reviewed home medications with patient personally.     Allergies: No Known Allergies    Social History:     Marital Status: Legally    Occupation: Retired  Patient Pre-hospital Living Situation: Lives with sister  Patient Pre-hospital Level of Mobility: Independent  Patient Pre-hospital Diet Restrictions: No restrictions  Substance Use History:   Social History     Substance and Sexual Activity   Alcohol Use Never     Social History     Tobacco Use   Smoking Status Former    Packs/day: 1.00    Years: 35.00    Total pack years: 35.00    Types: Cigarettes    Start date: 65    Quit date: 2013    Years since quitting: 10.9   Smokeless Tobacco Never   Tobacco Comments    TOBACCO USE, NO PASSIVE SMOKE EXPOSURE     Social History     Substance and Sexual Activity   Drug Use No       Family History:    non-contributory    Physical Exam:     Vitals:   Blood Pressure: 107/74 (12/07/23 1601)  Pulse: 101 (12/07/23 1601)  Temperature: 97.6 °F (36.4 °C) (12/07/23 1202)  Temp Source: Tympanic (12/07/23 1202)  Respirations: 18 (12/07/23 1601)  Weight - Scale: 47.8 kg (105 lb 6.1 oz) (12/07/23 1202)  SpO2: 95 % (12/07/23 1601)    Physical Exam    General: no acute distress, 2 L oxygen via NC  HEENT: NC/AT, PERRL, EOM - normal  Neck: Supple  Pulm/Chest: Normal chest wall expansion, diminished breath sounds on both side  CVS: normal S1&S2, capillary refill <2s  Abd: soft, non tender, non distended, bowel sounds +  MSK: move all 4 extremities spontaneously  Skin: warm  CNS: no acute focal neuro deficit      Additional Data:     Lab Results: I have personally reviewed pertinent reports. Results from last 7 days   Lab Units 12/07/23  1204   WBC Thousand/uL 7.70   HEMOGLOBIN g/dL 13.8   HEMATOCRIT % 44.0   PLATELETS Thousands/uL 194   NEUTROS PCT % 77*   LYMPHS PCT % 8*   MONOS PCT % 14*   EOS PCT % 0     Results from last 7 days   Lab Units 12/07/23  1204   SODIUM mmol/L 134*   POTASSIUM mmol/L 3.5   CHLORIDE mmol/L 94*   CO2 mmol/L 32   BUN mg/dL 12   CREATININE mg/dL 0.68   ANION GAP mmol/L 8   CALCIUM mg/dL 9.5   ALBUMIN g/dL 4.5   TOTAL BILIRUBIN mg/dL 0.59   ALK PHOS U/L 52   ALT U/L 22   AST U/L 24   GLUCOSE RANDOM mg/dL 105     Results from last 7 days   Lab Units 12/07/23  1204   INR  0.96                   Imaging: I have personally reviewed pertinent reports. CTA ED chest PE Study   Final Result by Horace Varela MD (12/07 0978)      1. No pulmonary embolus or acute thoracic pathology. 2.  Soft tissue in the right breast is of uncertain significance. Mammography is advised. 3.  Few small pulmonary nodules are unchanged. Follow-up noncontrast chest CT is advised in 12 months. 4.  Chronic complete right middle lobe atelectasis. The study was marked in [facility] for immediate notification. Resident: Luann Naidu, the attending radiologist, have reviewed the images and agree with the final report above. Workstation performed: [id number]         CT soft tissue neck with contrast   Final Result by Pooja Link MD (12/07 7943)      There is no suspicious mucosal lesion or tonsillar enlargement.       The previously noted hyperenhancement involving the bilateral submandibular and sublingual glands related to sialoadenitis appears resolved. Resolved adjacent cellulitic changes. Workstation performed: [id number]         XR chest 1 view portable   ED Interpretation by Ora Alexandre MD (12/07 1336)   No acute cardiopulmonary disease          EKG, Pathology, and Other Studies Reviewed on Admission:   EKG: Sinus rhythm with short VT    Allscripts / Epic Records Reviewed: Yes     ** Please Note: This note has been constructed using a voice recognition system.  **

## 2023-12-07 NOTE — ASSESSMENT & PLAN NOTE
Background: Presented with shortness of breath, throat infection  Mild COVID pathway as below   COVID19- positive on 12/7/2023   Supplemental oxygen with 2 L oxygen saturating 93%  Wean as tolerated for oxygen saturation greater than 89-92%  D-dimer pending  Chest CT scan revealed " no PE, small pulmonary nodules"   CBC and CMP daily  Normal troponin, CK, BNP   Anticoagulation per protocol   Since patient is on oxygen  Dexamethasone 6mg IV daily X 10 days  Remdesivir 200mg IV day 1 and 100mg IV daily X4 days   Baricitinib 4mg PO daily if escalated to any supplemental O2 for > 12 hours x 14 days  No fever, no leukocytosis hold off antibiotic for now   OOB TID; ambulation and mobilization strongly encouraged  PT/OT consult and evaluation   Self proning strongly encouraged  Supportive care

## 2023-12-08 ENCOUNTER — HOME HEALTH ADMISSION (OUTPATIENT)
Dept: HOME HEALTH SERVICES | Facility: HOME HEALTHCARE | Age: 78
End: 2023-12-08
Payer: COMMERCIAL

## 2023-12-08 PROBLEM — A41.89 VIRAL SEPSIS: Status: ACTIVE | Noted: 2023-12-08

## 2023-12-08 PROBLEM — B97.89 VIRAL SEPSIS: Status: ACTIVE | Noted: 2023-12-08

## 2023-12-08 LAB
ANION GAP SERPL CALCULATED.3IONS-SCNC: 9 MMOL/L
BASOPHILS # BLD AUTO: 0.02 THOUSANDS/ÂΜL (ref 0–0.1)
BASOPHILS NFR BLD AUTO: 0 % (ref 0–1)
BUN SERPL-MCNC: 15 MG/DL (ref 5–25)
CALCIUM SERPL-MCNC: 9.2 MG/DL (ref 8.4–10.2)
CHLORIDE SERPL-SCNC: 101 MMOL/L (ref 96–108)
CO2 SERPL-SCNC: 28 MMOL/L (ref 21–32)
CREAT SERPL-MCNC: 0.59 MG/DL (ref 0.6–1.3)
CRP SERPL QL: 40.2 MG/L
EOSINOPHIL # BLD AUTO: 0 THOUSAND/ÂΜL (ref 0–0.61)
EOSINOPHIL NFR BLD AUTO: 0 % (ref 0–6)
ERYTHROCYTE [DISTWIDTH] IN BLOOD BY AUTOMATED COUNT: 14.6 % (ref 11.6–15.1)
GFR SERPL CREATININE-BSD FRML MDRD: 88 ML/MIN/1.73SQ M
GLUCOSE SERPL-MCNC: 104 MG/DL (ref 65–140)
HCT VFR BLD AUTO: 42.6 % (ref 34.8–46.1)
HGB BLD-MCNC: 13.1 G/DL (ref 11.5–15.4)
IMM GRANULOCYTES # BLD AUTO: 0.02 THOUSAND/UL (ref 0–0.2)
IMM GRANULOCYTES NFR BLD AUTO: 0 % (ref 0–2)
LYMPHOCYTES # BLD AUTO: 0.39 THOUSANDS/ÂΜL (ref 0.6–4.47)
LYMPHOCYTES NFR BLD AUTO: 8 % (ref 14–44)
MCH RBC QN AUTO: 27.8 PG (ref 26.8–34.3)
MCHC RBC AUTO-ENTMCNC: 30.8 G/DL (ref 31.4–37.4)
MCV RBC AUTO: 90 FL (ref 82–98)
MONOCYTES # BLD AUTO: 0.34 THOUSAND/ÂΜL (ref 0.17–1.22)
MONOCYTES NFR BLD AUTO: 7 % (ref 4–12)
NEUTROPHILS # BLD AUTO: 4.35 THOUSANDS/ÂΜL (ref 1.85–7.62)
NEUTS SEG NFR BLD AUTO: 85 % (ref 43–75)
NRBC BLD AUTO-RTO: 0 /100 WBCS
PLATELET # BLD AUTO: 205 THOUSANDS/UL (ref 149–390)
PMV BLD AUTO: 10.4 FL (ref 8.9–12.7)
POTASSIUM SERPL-SCNC: 5.2 MMOL/L (ref 3.5–5.3)
PROCALCITONIN SERPL-MCNC: 0.15 NG/ML
RBC # BLD AUTO: 4.72 MILLION/UL (ref 3.81–5.12)
SODIUM SERPL-SCNC: 138 MMOL/L (ref 135–147)
WBC # BLD AUTO: 5.12 THOUSAND/UL (ref 4.31–10.16)

## 2023-12-08 PROCEDURE — 86140 C-REACTIVE PROTEIN: CPT | Performed by: INTERNAL MEDICINE

## 2023-12-08 PROCEDURE — 84145 PROCALCITONIN (PCT): CPT | Performed by: INTERNAL MEDICINE

## 2023-12-08 PROCEDURE — 99232 SBSQ HOSP IP/OBS MODERATE 35: CPT | Performed by: INTERNAL MEDICINE

## 2023-12-08 PROCEDURE — 85025 COMPLETE CBC W/AUTO DIFF WBC: CPT | Performed by: INTERNAL MEDICINE

## 2023-12-08 PROCEDURE — 97166 OT EVAL MOD COMPLEX 45 MIN: CPT

## 2023-12-08 PROCEDURE — 80048 BASIC METABOLIC PNL TOTAL CA: CPT | Performed by: INTERNAL MEDICINE

## 2023-12-08 PROCEDURE — 97163 PT EVAL HIGH COMPLEX 45 MIN: CPT

## 2023-12-08 PROCEDURE — XW0DXM6 INTRODUCTION OF BARICITINIB INTO MOUTH AND PHARYNX, EXTERNAL APPROACH, NEW TECHNOLOGY GROUP 6: ICD-10-PCS | Performed by: INTERNAL MEDICINE

## 2023-12-08 RX ORDER — DOXYCYCLINE HYCLATE 100 MG/1
100 CAPSULE ORAL EVERY 12 HOURS SCHEDULED
Status: DISCONTINUED | OUTPATIENT
Start: 2023-12-08 | End: 2023-12-08

## 2023-12-08 RX ADMIN — DEXAMETHASONE SODIUM PHOSPHATE 6 MG: 10 INJECTION INTRAMUSCULAR; INTRAVENOUS at 17:10

## 2023-12-08 RX ADMIN — FLUTICASONE FUROATE AND VILANTEROL TRIFENATATE 1 PUFF: 100; 25 POWDER RESPIRATORY (INHALATION) at 09:22

## 2023-12-08 RX ADMIN — CYANOCOBALAMIN TAB 1000 MCG 1000 MCG: 1000 TAB at 09:25

## 2023-12-08 RX ADMIN — ENOXAPARIN SODIUM 30 MG: 30 INJECTION SUBCUTANEOUS at 23:48

## 2023-12-08 RX ADMIN — ASPIRIN 81 MG: 81 TABLET, COATED ORAL at 09:25

## 2023-12-08 RX ADMIN — REMDESIVIR 100 MG: 100 INJECTION, POWDER, LYOPHILIZED, FOR SOLUTION INTRAVENOUS at 17:10

## 2023-12-08 RX ADMIN — LEVOTHYROXINE SODIUM 50 MCG: 50 TABLET ORAL at 06:45

## 2023-12-08 RX ADMIN — BARICITINIB 4 MG: 2 TABLET, FILM COATED ORAL at 09:26

## 2023-12-08 RX ADMIN — LOSARTAN POTASSIUM 100 MG: 50 TABLET, FILM COATED ORAL at 09:25

## 2023-12-08 RX ADMIN — Medication 250 MG: at 17:10

## 2023-12-08 RX ADMIN — UMECLIDINIUM 1 PUFF: 62.5 AEROSOL, POWDER ORAL at 09:22

## 2023-12-08 RX ADMIN — LORATADINE 10 MG: 10 TABLET ORAL at 09:25

## 2023-12-08 RX ADMIN — ALPRAZOLAM 0.25 MG: 0.5 TABLET ORAL at 23:48

## 2023-12-08 RX ADMIN — Medication 1 TABLET: at 09:26

## 2023-12-08 RX ADMIN — Medication 250 MG: at 09:25

## 2023-12-08 RX ADMIN — BENZONATATE 100 MG: 100 CAPSULE ORAL at 23:48

## 2023-12-08 RX ADMIN — PANTOPRAZOLE SODIUM 40 MG: 40 TABLET, DELAYED RELEASE ORAL at 06:45

## 2023-12-08 RX ADMIN — DOXYCYCLINE 100 MG: 100 CAPSULE ORAL at 13:44

## 2023-12-08 RX ADMIN — ENOXAPARIN SODIUM 30 MG: 30 INJECTION SUBCUTANEOUS at 09:25

## 2023-12-08 NOTE — CASE MANAGEMENT
Case Management Discharge Planning Note    Patient name Jonas Villavicencio  Location 7T U 716/7T U 716-01 MRN [id number]  : 1945 Date 2023       Current Admission Date: 2023  Current Admission Diagnosis:COVID-19   Patient Active Problem List    Diagnosis Date Noted    Viral sepsis  2023    Acute hypoxic respiratory failure ([address]) 2023    COVID-19 2023    Abnormal CT scan 2023    Infectious sialoadenitis of major salivary gland 2023    Cellulitis of neck 2023    Left upper lobe pulmonary nodule 2023    PVD (peripheral vascular disease) ([address]) 2023    Postnasal drip 2023    Raynaud's phenomenon without gangrene 2022    Temporal arteritis ([address]) 2020    Hypertension 10/11/2018    Rectus sheath hematoma, initial encounter 10/10/2018    Other specified hypothyroidism 2018    Gastroesophageal reflux disease without esophagitis 2018    Chronic obstructive pulmonary disease ([address]) 2012    Vitamin D deficiency 2012    Osteoarthritis 2012      LOS (days): 1  Geometric Mean LOS (GMLOS) (days): 5.00  Days to GMLOS:4     OBJECTIVE:  Risk of Unplanned Readmission Score: 17.83         Current admission status: Inpatient   Preferred Pharmacy:   [address] [place] [address], [address] [place] [address] [address] [facility]  [address] [place] [address]  Phone: [phone] Fax: [phone] [address] [facility], [address] [place] [address]  Phone: [phone] Fax: [phone]    Primary Care Provider: Suzette Foote MD    Primary Insurance: [employer]im[employer] Ellison [facility]  Secondary Insurance:     DISCHARGE DETAILS:    Discharge planning discussed with[de-identified] Patient     Comments - Freedom of Choice: Provided with [address] [place] [address].   Choice is [facility]     Were Treatment Team discharge recommendations reviewed with patient/caregiver?: Yes  Did patient/caregiver verbalize understanding of patient care needs?: Yes  Were patient/caregiver advised of the risks associated with not following Treatment Team discharge recommendations?: Yes      Requested [address]          Is the patient interested in [facility] at discharge?: Yes  [facility] requested[de-identified] Nursing, Physical [address] [facility] Name[de-identified] Gael Provider[de-identified] PCP  Home Health Services Needed[de-identified] COPD Management, Evaluate Functional Status and Safety, Gait/ADL Training, Strengthening/Theraputic Exercises to Improve Function  Homebound Criteria Met[de-identified] Requires the Assistance of Another Person for Safe Ambulation or to Leave the Home  Supporting Clincal Findings[de-identified] Dyspnea with Exertion, Fatigues Easliy in [employer], Limited Endurance      Treatment Team Recommendation: Home with [address]

## 2023-12-08 NOTE — PHYSICAL THERAPY NOTE
Physical Therapy Evaluation    Patient's Name: Rebecca Chandra    Admitting Diagnosis  Acute respiratory failure with hypoxia ([address]) [J96.01]  Generalized body aches [R52]  COVID-19 [U07.1]    Problem List  Patient Active Problem List   Diagnosis    Chronic obstructive pulmonary disease ([address])    Vitamin D deficiency    Other specified hypothyroidism    Gastroesophageal reflux disease without esophagitis    Rectus sheath hematoma, initial encounter    Hypertension    Osteoarthritis    Temporal arteritis ([address])    Raynaud's phenomenon without gangrene    Postnasal drip    PVD (peripheral vascular disease) ([address])    Left upper lobe pulmonary nodule    Infectious sialoadenitis of major salivary gland    Cellulitis of neck    Acute hypoxic respiratory failure ([address])    COVID-19    Abnormal CT scan       Past Medical History  Past Medical History:   Diagnosis Date    Asthma     Chronic obstructive pulmonary disease ([address]) 9/26/2012    GERD (gastroesophageal reflux disease)     Hypertension 10/11/2018    Hypothyroid     Osteoarthritis 9/26/2012    Temporal arteritis ([address])     Tubular adenoma     Type 2 diabetes mellitus with complication, without long-term current use of insulin ([address]) 1/14/2020       Past Surgical History  Past Surgical History:   Procedure Laterality Date    EYE SURGERY Right 12/06/2019    cataract LVCFS    HYSTERECTOMY      NO PAST SURGERIES      ALSO NO RELEVANT PAST SURRGICAL HX AS PER [facility]       Recent Imaging  CTA ED chest PE Study   Final Result by Tiffanie Blanco MD (12/07 6688)      1. No pulmonary embolus or acute thoracic pathology. 2.  Soft tissue in the right breast is of uncertain significance. Mammography is advised. 3.  Few small pulmonary nodules are unchanged. Follow-up noncontrast chest CT is advised in 12 months. 4.  Chronic complete right middle lobe atelectasis. The study was marked in [facility] for immediate notification.       Resident: Sydnie Rg, the attending radiologist, have reviewed the images and agree with the final report above. Workstation performed: [id number]         CT soft tissue neck with contrast   Final Result by Ilda Reyes MD (12/07 8643)      There is no suspicious mucosal lesion or tonsillar enlargement. The previously noted hyperenhancement involving the bilateral submandibular and sublingual glands related to sialoadenitis appears resolved. Resolved adjacent cellulitic changes. Workstation performed: [id number]         XR chest 1 view portable   ED Interpretation by Serafin Srinivasan MD (12/07 1336)   No acute cardiopulmonary disease      Final Result by Moy Siddiqui DO (12/08 9656)      Limited study with no acute cardiopulmonary disease. Workstation performed: [id number]             Recent Vital Signs  Vitals:    12/07/23 1802 12/08/23 0007 12/08/23 0749 12/08/23 1000   BP:  (!) 125/110 122/77    BP Location:  Right arm Right arm    Pulse:  92 77    Resp:  18 17    Temp:  97.6 °F (36.4 °C) (!) 97.4 °F (36.3 °C)    TempSrc:  Temporal Temporal    SpO2: 97% 98% 97% 97%   Weight:       Height:            12/08/23 1025   PT Last Visit   PT Visit Date 12/08/23   Note Type   Note type Evaluation   Pain Assessment   Pain Assessment Tool 0-10   Pain Score No Pain   Restrictions/Precautions   Other Precautions Airborne/isolation   Home Living   Type of Home House   Home Layout Two level   Bathroom Shower/Tub Tub/shower unit   Bathroom Toilet Standard   Prior Function   Level of [place] Independent with ADLs; Independent with functional mobility   Lives With [facility] Help From Family   General   Family/Caregiver Present No   Cognition   Overall Cognitive Status WFL   Arousal/Participation Alert   Orientation Level Oriented X4   Memory Within functional limits   Following Commands Follows all commands and directions without difficulty   RLE Assessment   RLE Assessment   (4/5)   LLE Assessment   LLE Assessment   (4/5)   Coordination   Movements are Fluid and Coordinated 0   Coordination and Movement Description decreased gross motor   Sensation WFL   Light Touch   RLE Light Touch Grossly intact   LLE Light Touch Grossly intact   Bed Mobility   Supine to Sit 6  Modified independent   Additional items Increased time required   Sit to Supine 6  Modified independent   Additional items Increased time required   Transfers   Sit to Stand 5  Supervision   Additional items Increased time required   Stand to Sit 5  Supervision   Additional items Increased time required   Ambulation/Elevation   Gait pattern Step through pattern;Decreased toe off;Decreased heel strike;Decreased foot clearance   Gait Assistance 5  Supervision   Additional items Verbal cues   Assistive Device None   Distance 125ft   Balance   Static Sitting Fair +   Dynamic Sitting Fair +   Static Standing Fair   Dynamic Standing Fair -   Ambulatory Fair -   Endurance Deficit   Endurance Deficit Yes   Endurance Deficit Description reduced from baseline due to fatigue   Activity Tolerance   Activity Tolerance Patient tolerated treatment well   Medical Staff Made Aware spoke to CM   Nurse Made Aware spoke to RN   Assessment   Prognosis Good   Problem List Decreased strength;Decreased endurance; Impaired balance;Decreased mobility; Decreased coordination   Barriers to Discharge Inaccessible home environment;Decreased caregiver support   Goals   Patient Goals to go home   STG Expiration Date 12/18/23   Plan   Treatment/Interventions ADL retraining;LE strengthening/ROM; Functional transfer training;Elevations; Therapeutic exercise; Endurance training;Patient/family training;Equipment eval/education; Bed mobility;Gait training;Spoke to case management;Spoke to nursing;OT   PT Frequency 2-3x/wk   Discharge Recommendation   Rehab Resource Intensity Level, PT III (Minimum Resource Intensity)  (in home therapy)   AM-PAC Basic Mobility Inpatient   Turning in Flat Bed Without Bedrails 4   Lying on Back to Sitting on Edge of Flat Bed Without Bedrails 4   Moving Bed to Chair 4   Standing Up From Chair Using Arms 4   Walk in Room 3   Climb 3-5 Stairs With Railing 3   Basic Mobility Inpatient Raw Score 22   Basic Mobility Standardized Score 47.4   Highest Level Of Mobility   JH-HLM Goal 7: Walk 25 feet or more   JH-HLM Achieved 7: Walk 25 feet or more   End of Consult   Patient Position at End of Consult Bedside chair; All needs within reach       ASSESSMENT                                                                                                                     Genaro Dia is a 66 y.o. female admitted to [facility] on [facility]/7/[facility]23 for COVID-19. Pt  has a past medical history of Asthma, Chronic obstructive pulmonary disease ([address]) (9/26/2012), GERD (gastroesophageal reflux disease), Hypertension (10/11/2018), Hypothyroid, Osteoarthritis (9/26/2012), Temporal arteritis ([address]), Tubular adenoma, and Type 2 diabetes mellitus with complication, without long-term current use of insulin ([address]) (1/14/2020). . PT was consulted and pt was seen on 12/8/2023 for mobility assessment and d/c planning. Impairments limiting pt at this time include impaired balance, decreased endurance, decreased coordination, and decreased strength. Pt is currently functioning at a modified independent assistance level for bed mobility, supervision assistance x1 level for transfers, supervision assistance x1 level for ambulation with no assistive device. The patient's AM-PAC Basic Mobility Inpatient Short Form Raw Score is 22. A Raw score of greater than 16 suggests the patient may benefit from discharge to home. Please also refer to the recommendation of the Physical Therapist for safe discharge planning.     Goals                                                                                                                                    1) Bed mobility skills with modified independent assistance to facilitate safe return to previous living environment 2) Functional transfers with modified independent assistance to facilitate safe return to previous living environment  3) Ambulation with least restrictive AD modified independent assistance without LOB and stable vitals for safe ambulation home/ community distances. 4) Stair training up/down flight 12 step/s with appropriate rail/s  and modified independent assistance for safe access to previous living environment. 5) Improve balance grades to fair + to reduce risk of falls. 6)Improve LE strength grades by 1 to increase independence w/ transfers and gait. 7) PT for ongoing pt and family education; DME needs and D/C planning to promote highest level of function in least restrictive environment. Recommendations                                                                                                              Pt will benefit from continued skilled IP PT to address the above mentioned impairments in order to maximize recovery and increase functional independence when completing mobility and ADLs. See flow sheet for goals and POC.      DME: None    Discharge Disposition:  Home with Home Physical Therapy      Jaye Villaseñor PT, DPT Breathing spontaneous and unlabored. Breath sounds clear and equal bilaterally with regular rhythm.

## 2023-12-08 NOTE — PLAN OF CARE
Problem: PAIN - ADULT  Goal: Verbalizes/displays adequate comfort level or baseline comfort level  Description: Interventions:  - Encourage patient to monitor pain and request assistance  - Assess pain using appropriate pain scale  - Administer analgesics based on type and severity of pain and evaluate response  - Implement non-pharmacological measures as appropriate and evaluate response  - Consider cultural and social influences on pain and pain management  - Notify physician/advanced practitioner if interventions unsuccessful or patient reports new pain  Outcome: Progressing     Problem: SAFETY ADULT  Goal: Patient will remain free of falls  Description: INTERVENTIONS:  - Educate patient/family on patient safety including physical limitations  - Instruct patient to call for assistance with activity   - Consult OT/PT to assist with strengthening/mobility   - Keep Call bell within reach  - Keep bed low and locked with side rails adjusted as appropriate  - Keep care items and personal belongings within reach  - Initiate and maintain comfort rounds  - Make Fall Risk Sign visible to staff  - Apply yellow socks and bracelet for high fall risk patients  - Consider moving patient to room near nurses station  Outcome: Progressing     Problem: DISCHARGE PLANNING  Goal: Discharge to home or other facility with appropriate resources  Description: INTERVENTIONS:  - Identify barriers to discharge w/patient and caregiver  - Arrange for needed discharge resources and transportation as appropriate  - Identify discharge learning needs (meds, wound care, etc.)  - Arrange for interpretive services to assist at discharge as needed  - Refer to Case Management Department for coordinating discharge planning if the patient needs post-hospital services based on physician/advanced practitioner order or complex needs related to functional status, cognitive ability, or social support system  Outcome: Progressing     Problem: Knowledge Deficit  Goal: Patient/family/caregiver demonstrates understanding of disease process, treatment plan, medications, and discharge instructions  Description: Complete learning assessment and assess knowledge base.   Interventions:  - Provide teaching at level of understanding  - Provide teaching via preferred learning methods  Outcome: Progressing

## 2023-12-08 NOTE — OCCUPATIONAL THERAPY NOTE
Occupational Therapy Evaluation     Patient Name: Quintin Hicks  Today's Date: 12/8/2023  Problem List  Principal Problem:    COVID-19  Active Problems:    Chronic obstructive pulmonary disease ([address])    Vitamin D deficiency    Other specified hypothyroidism    Gastroesophageal reflux disease without esophagitis    Hypertension    Left upper lobe pulmonary nodule    Acute hypoxic respiratory failure (HCC)    Abnormal CT scan    Viral sepsis     Past Medical History  Past Medical History:   Diagnosis Date    Asthma     Chronic obstructive pulmonary disease ([address]) 9/26/2012    GERD (gastroesophageal reflux disease)     Hypertension 10/11/2018    Hypothyroid     Osteoarthritis 9/26/2012    Temporal arteritis ([address])     Tubular adenoma     Type 2 diabetes mellitus with complication, without long-term current use of insulin ([address]) 1/14/2020     Past Surgical History  Past Surgical History:   Procedure Laterality Date    EYE SURGERY Right 12/06/2019    cataract LVCFS    HYSTERECTOMY      NO PAST SURGERIES      ALSO NO RELEVANT PAST SURRGICAL HX AS PER [facility]             12/08/23 1445   OT Last Visit   OT Visit Date 12/08/23   Note Type   Note type Evaluation   Pain Assessment   Pain Assessment Tool 0-10   Pain Score No Pain   Restrictions/Precautions   Weight Bearing Precautions Per Order No   Other Precautions Airborne/isolation   Home Living   Type of [address] [place] [address] Two level;1/2 bath on main level;Bed/bath upstairs   Bathroom Shower/Tub Tub/shower unit   Bathroom Toilet Standard   Bathroom Equipment   (none)   One CORD:USE [address]   (none)   Prior Function   Level of [place] Independent with ADLs; Independent with functional mobility   Lives With [facility] Help From Family   Vocational Retired   Lifestyle   Autonomy (I) PLOF. ambulates without AD.    Reciprocal Relationships Family   Subjective   Subjective " I just want to feel better"   ADL   Eating Assistance 7 Independent   Grooming Assistance 7  Independent   UB Bathing Assistance 7  Independent   LB Bathing Assistance 5  Supervision/Setup   [address] [facility] 5  Supervision/Setup   Toileting Assistance  6  Modified independent   Additional Comments Endorses increased SOB when bending down. Encouraged tailor sit method for dressing and reacher for retrieving items. Bed Mobility   Additional Comments Received OOB in recliner. Remains there w/ all needs. Transfers   Sit to Stand 6  Modified independent   Stand to Sit 6  Modified independent   Stand pivot 5  Supervision   Additional Comments without AD   Functional Mobility   Functional Mobility 5  Supervision   Additional Comments (S) without AD multiple laps in room. + coughing. Balance   Static Sitting Good   Dynamic Sitting Fair +   Static Standing Fair +   Dynamic Standing Fair   Ambulatory Fair   Activity Tolerance   Activity Tolerance Patient tolerated treatment well   Nurse Made Aware spoke to RN   RUE Assessment   RUE Assessment WFL  (4-/5)   LUE Assessment   LUE Assessment WFL  (4-/5)   Hand Function   Gross Motor Coordination Functional   Fine Motor Coordination Functional   Vision-Basic Assessment   Current Vision Wears glasses all the time   Psychosocial   Psychosocial (WDL) WDL   Cognition   Overall Cognitive Status WFL   Arousal/Participation Responsive; Cooperative   Attention Within functional limits   Orientation Level Oriented X4   Memory Within functional limits   Following Commands Follows all commands and directions without difficulty   Comments Pleasant and cooperative. Assessment   Limitation Decreased ADL status; Decreased UE strength;Decreased endurance;Decreased high-level ADLs   Prognosis Good   Assessment Pt admit 12/7/23 with COVID. OT completed expanded review of pt's medical and social history. Pt with h/o asthma, COPD, GERD, HTN, OA, DM, and pulmonary nodules.  Prior to admit was living w/ family in multi story house. SD on 1st fl and full bath and bed on 2nd fl. (I) PLOF. Ambulates without AD. Pt presents to OT below baseline due to the following performance deficits: strength; balance; stand tolerance; functional mobility; community integration; self care; and IADLs. Therefore, pt would benefit from OT services to achieve optimal level of performance. Occupational performance areas to be addressed include: bathing, toileting, dressing, activity tolerance, functional mobility, and clothing management. Pt is Independent for UB ADLS and (S) for LB ADLS. Would benefit from further education on EC and AE to decrease need to bend. Encouraged tailor sit method. MI STS and (S) for mobility in room without AD. + coughing w/ increased activity. Based on findings, pt is of moderate complexity. The patient's raw score on the -PAC Daily Activity Inpatient Short Form is 22. A raw score of greater than or equal to 19 suggests the patient may benefit from discharge to home. Recommend minimal level 3 resources. Goals   Patient Goals to go home   Plan   Treatment Interventions ADL retraining;Functional transfer training;UE strengthening/ROM; Endurance training;Patient/family training;Equipment evaluation/education; Energy conservation; Activityengagement   Goal Expiration Date 12/22/23   OT Treatment Day 0   OT Frequency 2-3x/wk   Discharge Recommendation   Rehab Resource Intensity Level, OT III (Minimum Resource Intensity)   -PAC Daily Activity Inpatient   Lower Body Dressing 3   Bathing 3   Toileting 4   Upper Body Dressing 4   Grooming 4   Eating 4   Daily Activity Raw Score 22   Daily Activity Standardized Score (Calc for Raw Score >=11) 47.1      GOALS:     Pt will complete UB ADL's w/ MI     Pt will complete LB ADL's w/ MI using AE PRN    Pt will complete toileting including hygiene and clothing management w/ MI     Pt will complete functional transfers w/ MI without AD    Pt will complete dynamic and unsupported stand balance w/ G- for safe clothing management     Pt will improve stand tolerance to 5-10 mins for safety w/ ADL tasks     Pt will improve activity tolerance to G for 20- 30 min tx session for increased engagement in functional tasks     Pt will achieve UE MMT 4/5 to increase independence w/ ADL txfs      After education, pt will verbalize 3 energy conservation techs to utilize to increase activity tolerance during functional tasks       Sunny MONET, OTR/L

## 2023-12-08 NOTE — CASE MANAGEMENT
Case Management Assessment & Discharge Planning Note    Patient name Kimo Jalloh  Location 7T U 716/7T U 716-01 MRN [id number]  : 1945 Date 2023       Current Admission Date: 2023  Current Admission Diagnosis:COVID-19   Patient Active Problem List    Diagnosis Date Noted    Acute hypoxic respiratory failure ([address]) 2023    COVID-19 2023    Abnormal CT scan 2023    Infectious sialoadenitis of major salivary gland 2023    Cellulitis of neck 2023    Left upper lobe pulmonary nodule 2023    PVD (peripheral vascular disease) ([address]) 2023    Postnasal drip 2023    Raynaud's phenomenon without gangrene 2022    Temporal arteritis ([address]) 2020    Hypertension 10/11/2018    Rectus sheath hematoma, initial encounter 10/10/2018    Other specified hypothyroidism 2018    Gastroesophageal reflux disease without esophagitis 2018    Chronic obstructive pulmonary disease ([address]) 2012    Vitamin D deficiency 2012    Osteoarthritis 2012      LOS (days): 1  Geometric Mean LOS (GMLOS) (days): 5.00  Days to GMLOS:4.1     OBJECTIVE:  PATIENT READMITTED TO HOSPITAL  Risk of Unplanned Readmission Score: 17.83         Current admission status: Inpatient  Referral Reason: VNA    Preferred Pharmacy:   [address] [place] [address], [address] [place] [address] [address]  [address]  [address] [place] [address]  Phone: [phone] Fax: [phone] [facility], [id number] [place] [address]  Phone: [phone] Fax: [phone]    Primary Care Provider: Jessi Raya MD    Primary Insurance: [facility]  Secondary Insurance:     ASSESSMENT:  [place] Proxies    There are no active Health Care Proxies on file.          Readmission Root Cause  30 Day Readmission: Yes  Who directed you to return to the hospital?: PCP  Did you understand whom to contact if you had questions or problems?: Yes  Did you get your prescriptions before you left the hospital?: Yes  Were you able to get your prescriptions filled when you left the hospital?: Yes  Did you take your medications as prescribed?: Yes  Were you able to get to your follow-up appointments?: Yes  During previous admission, was a post-acute recommendation made?: No  Patient was readmitted due to: [address]: Home with [facility]    Patient Information  Admitted from[de-identified] Home  Mental Status: Alert  During Assessment patient was accompanied by: Not accompanied during assessment  Assessment information provided by[de-identified] Patient  Primary Caregiver: Self  Support Systems: Children, Family members, [address] of Residence: [address] [place] [address] do you live in?: [address] [facility],[address] entry access options. Select all that apply.: Stairs  Number of steps to enter home. : 1  Do the steps have railings?: No  Type of Current Residence: [address] [place] home  Upon entering residence, is there a bedroom on the main floor (no further steps)?: No  A bedroom is located on the following floor levels of residence (select all that apply):: 2nd Floor  Upon entering residence, is there a bathroom on the main floor (no further steps)?: Yes  Number of steps to 2nd floor from main floor: One Flight  Living Arrangements: Lives w/ Extended Family  Is patient a ?: No    Activities of Daily Living Prior to Admission  Functional Status: Independent  Completes ADLs independently?: Yes  Ambulates independently?: Yes  Does patient use assisted devices?: No  Does patient currently own DME?: No  Does patient have a history of Outpatient Therapy (PT/OT)?: No  Does the patient have a history of Short-Term Rehab?: No  Does patient have a history of HHC?: Yes  Does patient currently have [facility]?: No      Patient Information Continued  Income Source: Pension/jail  Does patient have prescription coverage?: Yes  Does patient receive dialysis treatments?: No  Does patient have a history of substance abuse?: No  Does patient have a history of Mental Health Diagnosis?: No      Means of Transportation  Means of Transport to Appts[de-identified] Family transport      Housing Stability: Low Risk  (12/8/2023)    Housing Stability Vital Sign     Unable to Pay for Housing in the Last Year: No     Number of Places Lived in the Last Year: 1     Unstable Housing in the Last Year: No   Food Insecurity: No Food Insecurity (12/8/2023)    Hunger Vital Sign     Worried About Running Out of Food in the Last Year: Never true     Ran Out of Food in the Last Year: Never true   Transportation Needs: No Transportation Needs (12/8/2023)    PRAPARE - Transportation     Lack of Transportation (Medical): No     Lack of Transportation (Non-Medical): No   Utilities: Not At Risk (12/8/2023)    [facility] Utilities     Threatened with loss of utilities: No       DISCHARGE DETAILS:    Discharge planning discussed with[de-identified] Patient  Freedom of Choice: Yes  Comments - Freedom of Choice: Discussed therapy eval and recs for [facility] [address]. Agreeable to [address] [place] [address] blanket referrals made via Aidin  CM contacted family/caregiver?: No- see comments (AAOx3)    Other Referral/Resources/Interventions Provided:  Interventions: C    Would you like to participate in our [address] [facility] [address] [facility] [address] service program?  : No - Declined    Treatment Team Recommendation: Home with [address] [facility]       Additional Comments: Patient in [facility]. Talked to patient on phone and assessment completed. [facility] referrals made via Aidin.    department following thru discharge

## 2023-12-08 NOTE — PLAN OF CARE
Problem: OCCUPATIONAL THERAPY ADULT  Goal: Performs self-care activities at highest level of function for planned discharge setting. See evaluation for individualized goals. Description: Treatment Interventions: ADL retraining, Functional transfer training, UE strengthening/ROM, Endurance training, Patient/family training, Equipment evaluation/education, Energy conservation, Activityengagement          See flowsheet documentation for full assessment, interventions and recommendations. 12/8/2023 1500 by Thong Fajardo OT  Note: Limitation: Decreased ADL status, Decreased UE strength, Decreased endurance, Decreased high-level ADLs  Prognosis: Good  Assessment: Pt admit 12/7/23 with COVID. OT completed expanded review of pt's medical and social history. Pt with h/o asthma, COPD, GERD, HTN, OA, DM, and pulmonary nodules. Prior to admit was living w/ family in multi story house. IN on 1st fl and full bath and bed on 2nd fl. (I) PLOF. Ambulates without AD. Pt presents to OT below baseline due to the following performance deficits: strength; balance; stand tolerance; functional mobility; community integration; self care; and IADLs. Therefore, pt would benefit from OT services to achieve optimal level of performance. Occupational performance areas to be addressed include: bathing, toileting, dressing, activity tolerance, functional mobility, and clothing management. Pt is Independent for UB ADLS and (S) for LB ADLS. Would benefit from further education on EC and AE to decrease need to bend. Encouraged tailor sit method. MI STS and (S) for mobility in room without AD. + coughing w/ increased activity. Based on findings, pt is of moderate complexity. The patient's raw score on the AM-PAC Daily Activity Inpatient Short Form is 22. A raw score of greater than or equal to 19 suggests the patient may benefit from discharge to home. Recommend minimal level 3 resources.      Rehab Resource Intensity Level, OT: III (Minimum Resource Intensity)       12/8/2023 1459 by Nathaniel Flores OT  Note: Limitation: Decreased ADL status, Decreased UE strength, Decreased endurance, Decreased high-level ADLs  Prognosis: Good  Assessment: Pt admit 12/7/23 with COVID. OT completed expanded review of pt's medical and social history. Pt with h/o asthma, COPD, GERD, HTN, OA, DM, and pulmonary nodules. Prior to admit was living w/ family in multi story house. OR on 1st fl and full bath and bed on 2nd fl. (I) PLOF. Ambulates without AD. Pt presents to OT below baseline due to the following performance deficits: strength; balance; stand tolerance; functional mobility; community integration; self care; and IADLs. Therefore, pt would benefit from OT services to achieve optimal level of performance. Occupational performance areas to be addressed include: bathing, toileting, dressing, activity tolerance, functional mobility, and clothing management. Pt is Independent for UB ADLS and (S) for LB ADLS. Would benefit from further education on EC and AE to decrease need to bend. Encouraged tailor sit method. MI STS and (S) for mobility in room without AD. + coughing w/ increased activity. Based on findings, pt is of moderate complexity. The patient's raw score on the AM-PAC Daily Activity Inpatient Short Form is 22. A raw score of greater than or equal to 19 suggests the patient may benefit from discharge to home. Recommend minimal level 3 resources.      Rehab Resource Intensity Level, OT: III (Minimum Resource Intensity)

## 2023-12-08 NOTE — UTILIZATION REVIEW
Initial Clinical Review    Admission: Date/Time/Statement:   Admission Orders (From admission, onward)       Ordered        12/07/23 1552  INPATIENT ADMISSION  Once                          Orders Placed This Encounter   Procedures    INPATIENT ADMISSION     Standing Status:   Standing     Number of Occurrences:   1     Order Specific Question:   Level of Care     Answer:   Med Surg [16]     Order Specific Question:   Estimated length of stay     Answer:   More than 2 Midnights     Order Specific Question:   Certification     Answer:   I certify that inpatient services are medically necessary for this patient for a duration of greater than two midnights. See H&P and MD Progress Notes for additional information about the patient's course of treatment. ED Arrival Information       Expected   -    Arrival   12/7/2023 11:35    Acuity   Emergent              Means of arrival   Walk-In    Escorted by   Family Member    Service   Hospitalist    Admission type   Emergency              Arrival complaint   sob/body aches             Chief Complaint   Patient presents with    Generalized Body Aches     Pt reports to ER after an admission for a throat infection, having body aches, sore throat, and shortness of breath        Initial Presentation: 66 y.o. female who presented self from home to [facility] ED. Inpatient admission for evaluation and treatment of COVID-19 infection. PMHx: COPD (on chronic prednisone), GERD, HTN, hypothyroidism, T2DM. Presented w/ SOB, throat irritaion, generalized body aches worsening overnight. O2 89% on room air; improved w/ supplemental O2. On exam, diminished breath sounds, on 2L O2 NC. COVID-19 positive. Plan: IV remdesivir, IV decadron, baricitinib, SQ Lovenox, supplemental O2 weaned as tolerated, self-proning, incentive spirometry, trend labs, replete electrolytes as needed. PT/OT evals. Continue PTA meds. Continuous pulse ox.      Date: 12/08/23   Day 2: Pt reports SOB improving, but remains weak and fatigued. Exam unremarkable. Plan: continue current meds, IV remdesivir, IV decadron, baricitinib, Supplemental O2, weaned as tolerated; Trend labs, replete electrolytes as needed. ED Triage Vitals   Temperature Pulse Respirations Blood Pressure SpO2   12/07/23 1202 12/07/23 1144 12/07/23 1144 12/07/23 1144 12/07/23 1144   97.6 °F (36.4 °C) 97 22 161/91 (!) 89 %      Temp Source Heart Rate Source Patient Position - Orthostatic VS BP Location FiO2 (%)   12/07/23 1202 12/07/23 1144 12/07/23 1144 12/07/23 1144 --   Tympanic Monitor Sitting Left arm       Pain Score       12/07/23 1300       No Pain          Wt Readings from Last 1 Encounters:   12/07/23 47.8 kg (105 lb 6.1 oz)     Additional Vital Signs:   Date/Time Temp Pulse Resp BP MAP (mmHg) SpO2 Calculated FIO2 (%) - Nasal Cannula O2 Flow Rate (L/min) Nasal Cannula O2 Flow Rate (L/min) O2 Device   12/08/23 0749 97.4 °F (36.3 °C) Abnormal  77 17 122/77 94 97 % 28 -- 2 L/min Nasal cannula   12/08/23 0007 97.6 °F (36.4 °C) 92 18 125/110 Abnormal  115 98 % 28 -- 2 L/min Nasal cannula   12/07/23 1802 -- -- -- -- -- 97 % -- 2 L/min -- Nasal cannula   12/07/23 1700 97.5 °F (36.4 °C) 101 18 137/79 100 98 % 28 -- 2 L/min Nasal cannula   12/07/23 1633 -- 96 18 125/74 -- 94 % 28 -- 2 L/min Nasal cannula   12/07/23 1601 -- 101 18 107/74 -- 95 % 28 -- 2 L/min Nasal cannula   12/07/23 1356 -- 85 18 115/59 -- 97 % 28 -- 2 L/min Nasal cannula     Pertinent Labs/Diagnostic Test Results:   12/7 - EKG  Sinus rhythm with short CA  Poor anterior R wave progression is noted    CTA ED chest PE Study   Final Result by Antony Delgadillo MD (12/07 4035)      1. No pulmonary embolus or acute thoracic pathology. 2.  Soft tissue in the right breast is of uncertain significance. Mammography is advised. 3.  Few small pulmonary nodules are unchanged. Follow-up noncontrast chest CT is advised in 12 months.    4.  Chronic complete right middle lobe atelectasis. The study was marked in [facility] for immediate notification. Resident: Trina Borrego, the attending radiologist, have reviewed the images and agree with the final report above. Workstation performed: [id number]         CT soft tissue neck with contrast   Final Result by Raquel Ott MD (12/07 6583)      There is no suspicious mucosal lesion or tonsillar enlargement. The previously noted hyperenhancement involving the bilateral submandibular and sublingual glands related to sialoadenitis appears resolved. Resolved adjacent cellulitic changes. Workstation performed: [id number]         XR chest 1 view portable   ED Interpretation by Don Parr MD (12/07 3950)   No acute cardiopulmonary disease      Final Result by Abelardo Denise DO (12/08 5977)      Limited study with no acute cardiopulmonary disease.                   Workstation performed: [id number]           Results from last 7 days   Lab Units 12/07/23  1204   SARS-COV-2  Positive*     Results from last 7 days   Lab Units 12/08/23  0512 12/07/23  1204   WBC Thousand/uL 5.12 7.70   HEMOGLOBIN g/dL 13.1 13.8   HEMATOCRIT % 42.6 44.0   PLATELETS Thousands/uL 205 194   NEUTROS ABS Thousands/µL 4.35 5.98         Results from last 7 days   Lab Units 12/08/23  0512 12/07/23  1204   SODIUM mmol/L 138 134*   POTASSIUM mmol/L 5.2 3.5   CHLORIDE mmol/L 101 94*   CO2 mmol/L 28 32   ANION GAP mmol/L 9 8   BUN mg/dL 15 12   CREATININE mg/dL 0.59* 0.68   EGFR ml/min/1.73sq m 88 84   CALCIUM mg/dL 9.2 9.5   MAGNESIUM mg/dL  --  1.9   PHOSPHORUS mg/dL  --  2.8     Results from last 7 days   Lab Units 12/07/23  1204   AST U/L 24   ALT U/L 22   ALK PHOS U/L 52   TOTAL PROTEIN g/dL 7.4   ALBUMIN g/dL 4.5   TOTAL BILIRUBIN mg/dL 0.59         Results from last 7 days   Lab Units 12/08/23  0512 12/07/23  1204   GLUCOSE RANDOM mg/dL 104 105     Results from last 7 days   Lab Units 12/07/23  1204   CK TOTAL U/L 41     Results from last 7 days   Lab Units 12/07/23  1735 12/07/23  1204   HS TNI 0HR ng/L  --  6   HS TNI 2HR ng/L 7  --    HSTNI D2 ng/L 1  --      Results from last 7 days   Lab Units 12/07/23  1204   D-DIMER QUANTITATIVE ug/ml FEU <0.27     Results from last 7 days   Lab Units 12/07/23  1204   PROTIME seconds 13.1   INR  0.96   PTT seconds 28         Results from last 7 days   Lab Units 12/08/23  0512 12/07/23  1204   PROCALCITONIN ng/ml 0.15 0.15     Results from last 7 days   Lab Units 12/07/23  1204   BNP pg/mL 33     Results from last 7 days   Lab Units 12/08/23  0512   CRP mg/L 40.2*     Results from last 7 days   Lab Units 12/07/23  1204   INFLUENZA A PCR  Negative   INFLUENZA B PCR  Negative   RSV PCR  Negative     Results from last 7 days   Lab Units 12/07/23  1204   BLOOD CULTURE  Received in Microbiology Lab. Culture in Progress. Received in Microbiology Lab. Culture in Progress.        ED Treatment:   Medication Administration from 12/07/2023 1135 to 12/07/2023 1654         Date/Time Order Dose Route Action     12/07/2023 1205 EST sodium chloride 0.9 % bolus 1,000 mL 1,000 mL Intravenous New Bag     12/07/2023 1205 EST aspirin chewable tablet 81 mg 81 mg Oral Given     12/07/2023 1254 EST LORazepam (ATIVAN) injection 0.5 mg 0.5 mg Intravenous Given     12/07/2023 1353 EST ipratropium-albuterol (DUO-NEB) 0.5-2.5 mg/3 mL inhalation solution 3 mL 3 mL Nebulization Given     12/07/2023 1341 EST iohexol (OMNIPAQUE) 350 MG/ML injection (SINGLE-DOSE) 100 mL 100 mL Intravenous Given          Past Medical History:   Diagnosis Date    Asthma     Chronic obstructive pulmonary disease ([address]) 9/26/2012    GERD (gastroesophageal reflux disease)     Hypertension 10/11/2018    Hypothyroid     Osteoarthritis 9/26/2012    Temporal arteritis ([address])     Tubular adenoma     Type 2 diabetes mellitus with complication, without long-term current use of insulin ([address]) 1/14/2020     Present on Admission:   Vitamin D deficiency   Other specified hypothyroidism   Hypertension   Left upper lobe pulmonary nodule   Gastroesophageal reflux disease without esophagitis   Chronic obstructive pulmonary disease (HCC)      Admitting Diagnosis: Acute respiratory failure with hypoxia (HCC) [J96.01]  Generalized body aches [R52]  COVID-19 [U07.1]  Age/Sex: 66 y.o. female  Admission Orders:  Regular Diet. Incentive Spirometry. Continuous Pulse Ox. SCDs. Scheduled Medications:  aspirin, 81 mg, Oral, Daily  baricitinib, 4 mg, Oral, Q24H  calcium carbonate-vitamin D, 1 tablet, Oral, BID With Meals  vitamin B-12, 1,000 mcg, Oral, Daily  dexamethasone, 6 mg, Intravenous, Q24H  enoxaparin, 30 mg, Subcutaneous, Q12H RACHEL  Fluticasone Furoate-Vilanterol, 1 puff, Inhalation, Daily   And  umeclidinium, 1 puff, Inhalation, Daily  levothyroxine, 50 mcg, Oral, Early Morning  loratadine, 10 mg, Oral, Daily  losartan, 100 mg, Oral, Daily  pantoprazole, 40 mg, Oral, Daily Before Breakfast  remdesivir, 100 mg, Intravenous, Q24H  saccharomyces boulardii, 250 mg, Oral, BID    Continuous IV Infusions: none    PRN Meds:  acetaminophen, 650 mg, Oral, Q6H PRN  albuterol, 2 puff, Inhalation, Q6H PRN  ALPRAZolam, 0.25 mg, Oral, HS PRN  aluminum-magnesium hydroxide-simethicone, 30 mL, Oral, Q6H PRN  benzonatate, 100 mg, Oral, TID PRN; 12/7 x1  levalbuterol, 1.25 mg, Nebulization, Q6H PRN  ondansetron, 4 mg, Intravenous, Q6H PRN  polyethylene glycol, 17 g, Oral, Daily PRN    remdesivir (Veklury) 200 mg in sodium chloride 0.9 % 290 mL IVPB  Dose: 200 mg  Freq: Every 24 hours Route: IV  Indications of Use: INFECTION CAUSED BY 2019 NOVEL CORONAVIRUS  Last Dose: 200 mg (12/07/23 1806)  Start: 12/07/23 1630 End: 12/07/23 1836       Network Utilization Review Department  ATTENTION: Please call with any questions or concerns to [phone] and carefully listen to the prompts so that you are directed to the right person.  All voicemails are confidential.   For Discharge needs, contact Care Management DC Support Team at [phone] opt. [phone]  Send all requests for admission clinical reviews, approved or denied determinations and any other requests to dedicated fax number below belonging to the campus where the patient is receiving treatment.  List of dedicated fax numbers for the Facilities:  [place] PAUL (Administrative/Medical Necessity) [phone]   DISCHARGE SUPPORT TEAM (NETWORK) [id number] [facility] (Maternity/NICU/Pediatrics) [phone]   [address] [place] [address] Drive [address] [place] [address] [address] [facility] [phone]   [facility] [address] [place] [address] [address] [address] [facility] [address] [facility] [address] [phone]

## 2023-12-08 NOTE — PLAN OF CARE
Problem: PHYSICAL THERAPY ADULT  Goal: Performs mobility at highest level of function for planned discharge setting. See evaluation for individualized goals. Description: Treatment/Interventions: ADL retraining, LE strengthening/ROM, Functional transfer training, Elevations, Therapeutic exercise, Endurance training, Patient/family training, Equipment eval/education, Bed mobility, Gait training, Spoke to case management, Spoke to nursing, OT          See flowsheet documentation for full assessment, interventions and recommendations. Outcome: Progressing  Note: Prognosis: Good  Problem List: Decreased strength, Decreased endurance, Impaired balance, Decreased mobility, Decreased coordination     Barriers to Discharge: Inaccessible home environment, Decreased caregiver support     Rehab Resource Intensity Level, PT: III (Minimum Resource Intensity) (in home therapy)    See flowsheet documentation for full assessment.

## 2023-12-08 NOTE — ASSESSMENT & PLAN NOTE
Viral sepsis due to [employer], [address]   Patient had tachycardia (HR ) and tachypnea (RR 22)  Treated with blood cultures, IVF bolus, IV Remdesivir, IV Decadron and serial labs.

## 2023-12-08 NOTE — PROGRESS NOTES
Patient:    MRN:  [id number]    [place] Request ID:  [id number]    Level of care reserved:    Partner Reserved:    Clinical needs requested:    Geography searched:  [id number]    Start of Service:    Request sent:  12:02pm EST on 12/8/2023 by Chemo Meza    Partner reserved:  by Chemo Meza    Choice list shared:  3:05pm EST on 12/8/2023 by Chemo Meza

## 2023-12-08 NOTE — PROGRESS NOTES
[address] [place]  Progress Note  Name: Marquis Wise  MRN: [id number]  Unit/Bed#: 7T [facility] 716-01 I Date of Admission: 12/7/2023   Date of Service: 12/8/2023 I Hospital Day: 1    Assessment/Plan   * COVID-19  Assessment & Plan  Background: Presented with shortness of breath, throat infection  Mild COVID pathway as below   COVID19- positive on 12/7/2023   Supplemental oxygen with 2 L oxygen saturating 93%  Wean as tolerated for oxygen saturation greater than 89-92%  D-dimer normal  Chest CT scan revealed " no PE, small pulmonary nodules"   CBC and CMP daily  Normal troponin, CK, BNP   Anticoagulation per protocol   Since patient is on oxygen  Dexamethasone 6mg IV daily X 10 days  Remdesivir 200mg IV day 1 and 100mg IV daily X4 days   Baricitinib 4mg PO daily if escalated to any supplemental O2 for > 12 hours x 14 days  No fever, no leukocytosis, procalcitonin negative for 2 times-hold off antibiotic for now. OOB TID; ambulation and mobilization strongly encouraged  PT/OT consult and evaluation   Self proning strongly encouraged  Supportive care      Viral sepsis   Assessment & Plan  Viral sepsis due to [facility], [facility] [address]   Patient had tachycardia (HR ) and tachypnea (RR 22)  Treated with blood cultures, IVF bolus, IV Remdesivir, IV Decadron and serial labs. Abnormal CT scan  Assessment & Plan  Incidental findings of soft tissue in the right breast is of uncertain significance. Mammography is advised. Acute hypoxic respiratory failure (HCC)  Assessment & Plan  Due to COVID infection  See treatment as above    Left upper lobe pulmonary nodule  Assessment & Plan  CT PE study showed few small pulmonary nodules are unchanged. Follow-up noncontrast chest CT is advised in 12 months.      Hypertension  Assessment & Plan  Continue prehospital Cozaar 100 mg daily    Gastroesophageal reflux disease without esophagitis  Assessment & Plan  Continue prehospital Protonix 40 mg daily    Other specified hypothyroidism  Assessment & Plan  Continue prehospital Synthroid 50 mcg daily    Vitamin D deficiency  Assessment & Plan  Continue prehospital vitamin D supplementation    Chronic obstructive pulmonary disease (HCC)  Assessment & Plan  Not on home oxygen  Continue prehospital inhalers             VTE Pharmacologic Prophylaxis:   Pharmacologic: Enoxaparin (Lovenox)  Mechanical VTE Prophylaxis in Place: Yes    Patient Centered Rounds: I have performed bedside rounds with nursing staff today. Discussions with Specialists or Other Care Team Provider: Discussed with , RN    Education and Discussions with Family / Patient: Discussed with patient    Time Spent for Care:  35 minutes . This time was spent on one or more of the following: performing physical exam; counseling and coordination of care; obtaining or reviewing history; documenting in the medical record; reviewing/ordering tests, medications or procedures; communicating with other healthcare professionals and discussing with patient's family/caregivers. Current Length of Stay: 1 day(s)    Current Patient Status: Inpatient   Certification Statement: The patient will continue to require additional inpatient hospital stay due to viralsepsis due to COVID infection    Discharge Plan / Estimated Discharge Date: Tomorrow      Code Status: Level 1 - Full Code      Subjective:   Patient was seen and examined at bedside. The patient stated her shortness of breath is better, however still very weak and fatigue. She denies chest pain, palpitation, N/V, abd pain. Objective:     Vitals:   Temp (24hrs), Av.5 °F (36.4 °C), Min:97.4 °F (36.3 °C), Max:97.6 °F (36.4 °C)    Temp:  [97.4 °F (36.3 °C)-97.6 °F (36.4 °C)] 97.4 °F (36.3 °C)  HR:  [] 77  Resp:  [17-18] 17  BP: (107-137)/() 122/77  SpO2:  [94 %-100 %] 100 %  Body mass index is 19.27 kg/m². Input and Output Summary (last 24 hours):        Intake/Output Summary (Last 24 hours) at 12/8/2023 1449  Last data filed at 12/8/2023 1424  Gross per 24 hour   Intake 1600 ml   Output --   Net 1600 ml       Physical Exam:     Physical Exam  General: no acute distress  HEENT: NC/AT, PERRL, EOM - normal  Neck: Supple  Pulm/Chest: Normal chest wall expansion, clear breath sounds on both side  CVS: normal S1&S2, capillary refill <2s  Abd: soft, non tender, non distended, bowel sounds +  MSK: move all 4 extremities spontaneously  Skin: warm  CNS: no acute focal neuro deficit      Additional Data:     Labs:    Results from last 7 days   Lab Units 12/08/23  0512   WBC Thousand/uL 5.12   HEMOGLOBIN g/dL 13.1   HEMATOCRIT % 42.6   PLATELETS Thousands/uL 205   NEUTROS PCT % 85*   LYMPHS PCT % 8*   MONOS PCT % 7   EOS PCT % 0     Results from last 7 days   Lab Units 12/08/23  0512 12/07/23  1204   POTASSIUM mmol/L 5.2 3.5   CHLORIDE mmol/L 101 94*   CO2 mmol/L 28 32   BUN mg/dL 15 12   CREATININE mg/dL 0.59* 0.68   CALCIUM mg/dL 9.2 9.5   ALK PHOS U/L  --  52   ALT U/L  --  22   AST U/L  --  24     Results from last 7 days   Lab Units 12/07/23  1204   INR  0.96       * I Have Reviewed All Lab Data Listed Above. * Additional Pertinent Lab Tests Reviewed: [address] Admission Reviewed    Imaging:      I have reviewed pertinent imaging. Recent Cultures (last 7 days):     Results from last 7 days   Lab Units 12/07/23  1204   BLOOD CULTURE  Received in Microbiology Lab. Culture in Progress. Received in Microbiology Lab. Culture in Progress.        Last 24 Hours Medication List:   Current Facility-Administered Medications   Medication Dose Route Frequency Provider Last Rate    acetaminophen  650 mg Oral Q6H PRN Megan Leyva MD      albuterol  2 puff Inhalation Q6H PRN Megan Leyva MD      ALPRAZolam  0.25 mg Oral HS PRN Megan Leyva MD      aluminum-magnesium hydroxide-simethicone  30 mL Oral Q6H PRN Megan Leyva MD      aspirin  81 mg Oral Daily Megan Leyva MD      baricitinib  4 mg Oral Q24H [facility] MD Jaziel      benzonatate  100 mg Oral TID PRN Kinjal Rai MD      vitamin B-12  1,000 mcg Oral Daily Kinjal Rai MD      dexamethasone  6 mg Intravenous Q24H Kinjal Rai MD      doxycycline hyclate  100 mg Oral Q12H [address] Kinjal Rai MD      enoxaparin  30 mg Subcutaneous Q12H [address] Kinjal Rai MD      Fluticasone Furoate-Vilanterol  1 puff Inhalation Daily Kinjal Rai MD      And    umeclidinium  1 puff Inhalation Daily Kinjal Rai MD      levalbuterol  1.25 mg Nebulization Q6H PRN Kinjal Rai MD      levothyroxine  50 mcg Oral Early Morning iKnjal Rai MD      loratadine  10 mg Oral Daily Kinjal Rai MD      losartan  100 mg Oral Daily Kinjal Rai MD      ondansetron  4 mg Intravenous Q6H PRN Kinjal Rai MD      pantoprazole  40 mg Oral Daily Before Breakfast Kinjal Rai MD      polyethylene glycol  17 g Oral Daily PRN Kinjal Rai MD      remdesivir  100 mg Intravenous Q24H Kinjal Rai MD      saccharomyces boulardii  250 mg Oral BID Kinjal Rai MD          Today, Patient Was Seen By: Kinjal Rai MD    ** Please Note: Dragon 360 Dictation voice to text software may have been used in the creation of this document.  **

## 2023-12-09 VITALS
TEMPERATURE: 97.4 F | BODY MASS INDEX: 19.39 KG/M2 | DIASTOLIC BLOOD PRESSURE: 79 MMHG | OXYGEN SATURATION: 93 % | WEIGHT: 105.38 LBS | HEART RATE: 69 BPM | SYSTOLIC BLOOD PRESSURE: 130 MMHG | HEIGHT: 62 IN | RESPIRATION RATE: 19 BRPM

## 2023-12-09 LAB
ANION GAP SERPL CALCULATED.3IONS-SCNC: 7 MMOL/L
BASOPHILS # BLD AUTO: 0 THOUSANDS/ÂΜL (ref 0–0.1)
BASOPHILS NFR BLD AUTO: 0 % (ref 0–1)
BUN SERPL-MCNC: 22 MG/DL (ref 5–25)
CALCIUM SERPL-MCNC: 9.1 MG/DL (ref 8.4–10.2)
CHLORIDE SERPL-SCNC: 100 MMOL/L (ref 96–108)
CO2 SERPL-SCNC: 31 MMOL/L (ref 21–32)
CREAT SERPL-MCNC: 0.58 MG/DL (ref 0.6–1.3)
EOSINOPHIL # BLD AUTO: 0 THOUSAND/ÂΜL (ref 0–0.61)
EOSINOPHIL NFR BLD AUTO: 0 % (ref 0–6)
ERYTHROCYTE [DISTWIDTH] IN BLOOD BY AUTOMATED COUNT: 14.4 % (ref 11.6–15.1)
GFR SERPL CREATININE-BSD FRML MDRD: 88 ML/MIN/1.73SQ M
GLUCOSE SERPL-MCNC: 120 MG/DL (ref 65–140)
HCT VFR BLD AUTO: 38.1 % (ref 34.8–46.1)
HGB BLD-MCNC: 11.8 G/DL (ref 11.5–15.4)
IMM GRANULOCYTES # BLD AUTO: 0.01 THOUSAND/UL (ref 0–0.2)
IMM GRANULOCYTES NFR BLD AUTO: 1 % (ref 0–2)
LYMPHOCYTES # BLD AUTO: 0.36 THOUSANDS/ÂΜL (ref 0.6–4.47)
LYMPHOCYTES NFR BLD AUTO: 18 % (ref 14–44)
MCH RBC QN AUTO: 27.3 PG (ref 26.8–34.3)
MCHC RBC AUTO-ENTMCNC: 31 G/DL (ref 31.4–37.4)
MCV RBC AUTO: 88 FL (ref 82–98)
MONOCYTES # BLD AUTO: 0.22 THOUSAND/ÂΜL (ref 0.17–1.22)
MONOCYTES NFR BLD AUTO: 11 % (ref 4–12)
NEUTROPHILS # BLD AUTO: 1.4 THOUSANDS/ÂΜL (ref 1.85–7.62)
NEUTS SEG NFR BLD AUTO: 70 % (ref 43–75)
NRBC BLD AUTO-RTO: 0 /100 WBCS
PLATELET # BLD AUTO: 192 THOUSANDS/UL (ref 149–390)
PMV BLD AUTO: 10.5 FL (ref 8.9–12.7)
POTASSIUM SERPL-SCNC: 4.9 MMOL/L (ref 3.5–5.3)
RBC # BLD AUTO: 4.32 MILLION/UL (ref 3.81–5.12)
SODIUM SERPL-SCNC: 138 MMOL/L (ref 135–147)
WBC # BLD AUTO: 2.01 THOUSAND/UL (ref 4.31–10.16)

## 2023-12-09 PROCEDURE — 85025 COMPLETE CBC W/AUTO DIFF WBC: CPT | Performed by: INTERNAL MEDICINE

## 2023-12-09 PROCEDURE — 80048 BASIC METABOLIC PNL TOTAL CA: CPT | Performed by: INTERNAL MEDICINE

## 2023-12-09 PROCEDURE — 99239 HOSP IP/OBS DSCHRG MGMT >30: CPT | Performed by: INTERNAL MEDICINE

## 2023-12-09 RX ORDER — DOXYCYCLINE HYCLATE 100 MG/1
100 CAPSULE ORAL EVERY 12 HOURS SCHEDULED
Qty: 16 CAPSULE | Refills: 0 | Status: SHIPPED | OUTPATIENT
Start: 2023-12-09 | End: 2023-12-09

## 2023-12-09 RX ADMIN — ASPIRIN 81 MG: 81 TABLET, COATED ORAL at 08:54

## 2023-12-09 RX ADMIN — CYANOCOBALAMIN TAB 1000 MCG 1000 MCG: 1000 TAB at 08:54

## 2023-12-09 RX ADMIN — Medication 250 MG: at 08:54

## 2023-12-09 RX ADMIN — LORATADINE 10 MG: 10 TABLET ORAL at 08:54

## 2023-12-09 RX ADMIN — ENOXAPARIN SODIUM 30 MG: 30 INJECTION SUBCUTANEOUS at 08:53

## 2023-12-09 RX ADMIN — UMECLIDINIUM 1 PUFF: 62.5 AEROSOL, POWDER ORAL at 08:54

## 2023-12-09 RX ADMIN — LOSARTAN POTASSIUM 100 MG: 50 TABLET, FILM COATED ORAL at 08:54

## 2023-12-09 RX ADMIN — LEVOTHYROXINE SODIUM 50 MCG: 50 TABLET ORAL at 06:13

## 2023-12-09 RX ADMIN — BARICITINIB 4 MG: 2 TABLET, FILM COATED ORAL at 08:54

## 2023-12-09 RX ADMIN — FLUTICASONE FUROATE AND VILANTEROL TRIFENATATE 1 PUFF: 100; 25 POWDER RESPIRATORY (INHALATION) at 08:54

## 2023-12-09 RX ADMIN — PANTOPRAZOLE SODIUM 40 MG: 40 TABLET, DELAYED RELEASE ORAL at 06:13

## 2023-12-09 NOTE — DISCHARGE INSTR - AVS FIRST PAGE
Discharge instructions from hospitalist  1. Follow-up with your primary care physician in 1 week in regards to recent hospitalization. Soft tissue in the right breast is of uncertain significance. Mammography is advised. Few small pulmonary nodules are unchanged. Follow-up noncontrast chest CT is advised in 12 months. 2. Take medications regularly    3. Come back to the ER if symptoms recur or worsen  4. Activity as tolerated  5.  Diet :  Heart healthy diet; information packet has more detailed information

## 2023-12-09 NOTE — ASSESSMENT & PLAN NOTE
[facility] [address]   Patient had tachycardia (HR ) and tachypnea (RR 22)  Treated with blood cultures, IVF bolus, IV Remdesivir, IV Decadron and serial labs.

## 2023-12-09 NOTE — UTILIZATION REVIEW
Date: 12/9    Day 3: Has surpassed a 2nd midnight with active treatments and services, which include repeat BMP, CBC, assessment of O2 sat on RA       ate/Time Temp Pulse Resp BP MAP (mmHg) SpO2 O2 Device Patient Position - Orthostatic VS   12/09/23 0756 97.4 °F (36.3 °C) Abnormal  69 19 130/79 91 93 % None (Room air) Lying

## 2023-12-09 NOTE — DISCHARGE SUMMARY
[address] [facility]  Discharge- Bertrum Frazier 1945, 66 y.o. female MRN: [id number]  Unit/Bed#: 7T [facility] 716-01 Encounter: [id number]  Primary Care Provider: Justine Mahmood MD   Date and time admitted to hospital: 12/7/2023 11:44 AM    * COVID-19  Assessment & Plan  Background: Presented with shortness of breath, throat infection  Mild COVID pathway as below   COVID19- positive on 12/7/2023   Patient is on room air wean as tolerated for oxygen saturation greater than 89-92%  D-dimer normal  Chest CT scan revealed " no PE, small pulmonary nodules"   CBC and CMP daily  Normal troponin, CK, BNP   Anticoagulation per protocol   Since patient is on oxygen  Dexamethasone 6mg IV daily X 10 days  Remdesivir 200mg IV day 1 and 100mg IV daily X4 days   Baricitinib 4mg PO daily if escalated to any supplemental O2 for > 12 hours x 14 days  No fever, no leukocytosis hold off antibiotic for now   OOB TID; ambulation and mobilization strongly encouraged  PT/OT consult and evaluation   Self proning strongly encouraged  Supportive care  Patient has significant clinical improvement, currently on room air. Patient will be discharged today. Viral sepsis   Assessment & Plan  Viral sepsis due to [facility] [address]   Patient had tachycardia (HR ) and tachypnea (RR 22)  Treated with blood cultures, IVF bolus, IV Remdesivir, IV Decadron and serial labs. Abnormal CT scan  Assessment & Plan  Incidental findings of soft tissue in the right breast is of uncertain significance. Mammography is advised. Acute hypoxic respiratory failure (HCC)  Assessment & Plan  Due to COVID infection  See treatment as above    Left upper lobe pulmonary nodule  Assessment & Plan  CT PE study showed few small pulmonary nodules are unchanged. Follow-up noncontrast chest CT is advised in 12 months.      Hypertension  Assessment & Plan  Continue prehospital Cozaar 100 mg daily    Gastroesophageal reflux disease without esophagitis  Assessment & Plan  Continue prehospital Protonix 40 mg daily    Other specified hypothyroidism  Assessment & Plan  Continue prehospital Synthroid 50 mcg daily    Vitamin D deficiency  Assessment & Plan  Continue prehospital vitamin D supplementation    Chronic obstructive pulmonary disease ([address])  Assessment & Plan  Not on home oxygen  Continue prehospital inhalers        Discharging Physician / Practitioner: Lucas Bailey MD  PCP: Chacho Art MD  Admission Date:   Admission Orders (From admission, onward)       Ordered        12/07/23 1552  INPATIENT ADMISSION  Once                          Discharge Date: 12/09/23    Medical Problems       Resolved Problems  Date Reviewed: 12/9/2023   None         Consultations During Hospital Stay:  None    Procedures Performed:   None    Significant Findings / Test Results:   XR chest 1 view portable Result Date: 12/8/2023  Impression: Limited study with no acute cardiopulmonary disease. Workstation performed: [id number]     CTA ED chest PE Study Result Date: 12/7/2023  Impression: 1. No pulmonary embolus or acute thoracic pathology. 2.  Soft tissue in the right breast is of uncertain significance. Mammography is advised. 3.  Few small pulmonary nodules are unchanged. Follow-up noncontrast chest CT is advised in 12 months. 4.  Chronic complete right middle lobe atelectasis. The study was marked in [facility] for immediate notification. Resident: Flo Samson, the attending radiologist, have reviewed the images and agree with the final report above. Workstation performed: [id number]     CT soft tissue neck with contrast Result Date: 12/7/2023  Impression: There is no suspicious mucosal lesion or tonsillar enlargement. The previously noted hyperenhancement involving the bilateral submandibular and sublingual glands related to sialoadenitis appears resolved. Resolved adjacent cellulitic changes.  Workstation performed: [id number]       Incidental Findings:   See above      Test Results Pending at Discharge (will require follow up): None     Outpatient Tests Requested:  None    Complications:  None    Reason for Admission: Shortness of breath    Hospital Course: Manjula Genao is a 66 y.o. female patient who originally presented to the hospital on 12/7/2023 due to shortness of breath and generalized weakness. Patient was admitted for viral sepsis due to COVID infection. Patient was started on mild COVID pathway as she required 2 L oxygen initially. On the next day, patient had clinical improvement and did not require oxygen anymore. Patient was evaluated by PT and OT who recommended home with home health. Since patient does not require oxygen over the last 24 hours, COVID protocol was discontinued. Patient had no fever or no leukocytosis with normal procalcitonin, therefore antibiotic was hold off. CT PE study showed soft tissue in the right breast is of uncertain significance. Mammography is advised. Few small pulmonary nodules are unchanged. Follow-up noncontrast chest CT is advised in 12 months. Patient is recommended to follow-up with PCP in 1 week. Patient is clinically and hemodynamically stable to be discharged today. Please see above list of diagnoses and related plan for additional information. Condition at Discharge: stable     Discharge Day Visit / Exam:     Subjective:  Patient was seen and examined at bedside. The patient denies any pain, headache, blurry vision, chest pain, palpitation, shortness of breath, N/V, abd pain.     Vitals: Blood Pressure: 130/79 (12/09/23 0756)  Pulse: 69 (12/09/23 0756)  Temperature: (!) 97.4 °F (36.3 °C) (12/09/23 0756)  Temp Source: Temporal (12/09/23 0756)  Respirations: 19 (12/09/23 0756)  Height: 5' 2" (157.5 cm) (12/07/23 1700)  Weight - Scale: 47.8 kg (105 lb 6.1 oz) (12/07/23 1700)  SpO2: 93 % (12/09/23 0756)  Exam:   Physical Exam  General: no acute distress  HEENT: NC/AT, PERRL, EOM - normal  Neck: Supple  Pulm/Chest: Normal chest wall expansion, clear breath sounds on both side  CVS: normal S1&S2, capillary refill <2s  Abd: soft, non tender, non distended, bowel sounds +  MSK: move all 4 extremities spontaneously  Skin: warm  CNS: no acute focal neuro deficit    Discussion with Family: Discussed with patient. Patient stated she spoke with her sister and declined calling a     Discharge instructions/Information to patient and family:   See after visit summary for information provided to patient and family. Provisions for Follow-Up Care:  See after visit summary for information related to follow-up care and any pertinent home health orders. Disposition:     Home    For Discharges to [facility] SNF:   Not Applicable to this Patient - Not Applicable to this Patient    Planned Readmission: No     Discharge Statement:  I spent 45 minutes discharging the patient. This time was spent on the day of discharge. I had direct contact with the patient on the day of discharge. Greater than 50% of the total time was spent examining patient, answering all patient questions, arranging and discussing plan of care with patient as well as directly providing post-discharge instructions. Additional time then spent on discharge activities. Discharge Medications:  See after visit summary for reconciled discharge medications provided to patient and family.       ** Please Note: This note has been constructed using a voice recognition system **

## 2023-12-09 NOTE — ASSESSMENT & PLAN NOTE
Background: Presented with shortness of breath, throat infection  Mild COVID pathway as below   COVID19- positive on 12/7/2023   Patient is on room air wean as tolerated for oxygen saturation greater than 89-92%  D-dimer normal  Chest CT scan revealed " no PE, small pulmonary nodules"   CBC and CMP daily  Normal troponin, CK, BNP   Anticoagulation per protocol   Since patient is on oxygen  Dexamethasone 6mg IV daily X 10 days  Remdesivir 200mg IV day 1 and 100mg IV daily X4 days   Baricitinib 4mg PO daily if escalated to any supplemental O2 for > 12 hours x 14 days  No fever, no leukocytosis hold off antibiotic for now   OOB TID; ambulation and mobilization strongly encouraged  PT/OT consult and evaluation   Self proning strongly encouraged  Supportive care  Patient has significant clinical improvement, currently on room air. Patient will be discharged today.

## 2023-12-09 NOTE — CASE MANAGEMENT
Case Management Discharge Planning Note    Patient name Myles Cornea  Location 7T /7T -01 MRN [id number]  : 1945 Date 2023       Current Admission Date: 2023  Current Admission Diagnosis:COVID-19   Patient Active Problem List    Diagnosis Date Noted    Viral sepsis  2023    Acute hypoxic respiratory failure ([address]) 2023    COVID-19 2023    Abnormal CT scan 2023    Infectious sialoadenitis of major salivary gland 2023    Cellulitis of neck 2023    Left upper lobe pulmonary nodule 2023    PVD (peripheral vascular disease) ([address]) 2023    Postnasal drip 2023    Raynaud's phenomenon without gangrene 2022    Temporal arteritis ([address]) 2020    Hypertension 10/11/2018    Rectus sheath hematoma, initial encounter 10/10/2018    Other specified hypothyroidism 2018    Gastroesophageal reflux disease without esophagitis 2018    Chronic obstructive pulmonary disease ([address]) 2012    Vitamin D deficiency 2012    Osteoarthritis 2012      LOS (days): 2  Geometric Mean LOS (GMLOS) (days): 5.00  Days to GMLOS:3.2     OBJECTIVE:  Risk of Unplanned Readmission Score: 16.78         Current admission status: Inpatient   Preferred Pharmacy:   [address]  [address] [place] [address] Road [address]  Phone: [phone] Fax: [phone]    [address] [place] [address] [facility] [place] [address] [address] [address]  Phone: [phone] Fax: [phone]    Primary Care Provider: Mina Amanda MD    [facility]  Secondary Insurance:     DISCHARGE DETAILS:    Freedom of Choice: Yes  Comments - Freedom of Choice: Pt discharging home w/ preferred VNA    Discharge Destination Plan[de-identified] Home with [address] at Discharge : Auto with designated   Dispatcher Contacted: Yes  Number/Name of Dispatcher: Roundtrip  Transported by (Company and Unit #): [employer]  ETA of Transport (Date): 12/09/23  ETA of Transport (Time): 1130  Transfer Mode: Ambulate  Accompanied by:  Other (Comment) ()

## 2023-12-11 ENCOUNTER — HOME CARE VISIT (OUTPATIENT)
Dept: HOME HEALTH SERVICES | Facility: HOME HEALTHCARE | Age: 78
End: 2023-12-11

## 2023-12-11 ENCOUNTER — TRANSITIONAL CARE MANAGEMENT (OUTPATIENT)
Dept: FAMILY MEDICINE CLINIC | Facility: CLINIC | Age: 78
End: 2023-12-11

## 2023-12-11 ENCOUNTER — TELEPHONE (OUTPATIENT)
Dept: FAMILY MEDICINE CLINIC | Facility: CLINIC | Age: 78
End: 2023-12-11

## 2023-12-11 NOTE — UTILIZATION REVIEW
NOTIFICATION OF ADMISSION DISCHARGE   This is a Notification of Discharge from [address] [facility]. Please be advised that this patient has been discharge from our facility. Below you will find the admission and discharge date and time including the patient’s disposition. UTILIZATION REVIEW CONTACT:  Luan Tolbert MA  Utilization   Network Utilization Review Department  Phone: [phone] x carefully listen to the prompts. All voicemails are confidential.  Email: [email or web address]     ADMISSION INFORMATION  PRESENTATION DATE: 12/7/2023 11:44 AM  OBERVATION ADMISSION DATE:  INPATIENT ADMISSION DATE: 12/7/23  3:52 PM   DISCHARGE DATE: 12/9/2023 11:31 AM   DISPOSITION:Home/Self Care    Network Utilization Review Department  ATTENTION: Please call with any questions or concerns to [phone] and carefully listen to the prompts so that you are directed to the right person. All voicemails are confidential.   For Discharge needs, contact Care Management DC Support Team at [phone] opt. 2  Send all requests for admission clinical reviews, approved or denied determinations and any other requests to dedicated fax number below belonging to the campus where the patient is receiving treatment.  List of dedicated fax numbers for the Facilities:  [place] DENIALS (Administrative/Medical Necessity) [phone]   DISCHARGE SUPPORT TEAM (Network) [phone] [facility]Pediatric[facility]) [phone] [address] [facility] [address] [facility] [address] [place] [address] [address] [phone]

## 2023-12-11 NOTE — TELEPHONE ENCOUNTER
Patient is asking if she can do a virtual TCM apt? She does not want to come into the office due to having COVID>    Please advise.

## 2023-12-12 ENCOUNTER — HOME CARE VISIT (OUTPATIENT)
Dept: HOME HEALTH SERVICES | Facility: HOME HEALTHCARE | Age: 78
End: 2023-12-12
Payer: COMMERCIAL

## 2023-12-12 LAB
BACTERIA BLD CULT: NORMAL
BACTERIA BLD CULT: NORMAL

## 2023-12-12 PROCEDURE — 10330081 VN NO-PAY CLAIM PROCEDURE

## 2023-12-12 PROCEDURE — G0299 HHS/HOSPICE OF RN EA 15 MIN: HCPCS

## 2023-12-12 PROCEDURE — 400013 VN SOC

## 2023-12-13 ENCOUNTER — HOME CARE VISIT (OUTPATIENT)
Dept: HOME HEALTH SERVICES | Facility: HOME HEALTHCARE | Age: 78
End: 2023-12-13
Payer: COMMERCIAL

## 2023-12-13 VITALS — OXYGEN SATURATION: 95 % | HEART RATE: 67 BPM

## 2023-12-13 PROCEDURE — G0151 HHCP-SERV OF PT,EA 15 MIN: HCPCS

## 2023-12-14 ENCOUNTER — HOME CARE VISIT (OUTPATIENT)
Dept: HOME HEALTH SERVICES | Facility: HOME HEALTHCARE | Age: 78
End: 2023-12-14
Payer: COMMERCIAL

## 2023-12-14 PROCEDURE — G0299 HHS/HOSPICE OF RN EA 15 MIN: HCPCS

## 2023-12-17 VITALS
TEMPERATURE: 97.3 F | SYSTOLIC BLOOD PRESSURE: 140 MMHG | OXYGEN SATURATION: 96 % | RESPIRATION RATE: 18 BRPM | HEART RATE: 78 BPM | DIASTOLIC BLOOD PRESSURE: 80 MMHG

## 2023-12-17 VITALS
TEMPERATURE: 97.4 F | HEART RATE: 68 BPM | RESPIRATION RATE: 18 BRPM | OXYGEN SATURATION: 95 % | DIASTOLIC BLOOD PRESSURE: 88 MMHG | SYSTOLIC BLOOD PRESSURE: 130 MMHG

## 2023-12-19 ENCOUNTER — HOME CARE VISIT (OUTPATIENT)
Dept: HOME HEALTH SERVICES | Facility: HOME HEALTHCARE | Age: 78
End: 2023-12-19
Payer: COMMERCIAL

## 2023-12-19 ENCOUNTER — TELEPHONE (OUTPATIENT)
Dept: FAMILY MEDICINE CLINIC | Facility: CLINIC | Age: 78
End: 2023-12-19

## 2023-12-19 VITALS
OXYGEN SATURATION: 96 % | HEART RATE: 83 BPM | RESPIRATION RATE: 20 BRPM | DIASTOLIC BLOOD PRESSURE: 80 MMHG | TEMPERATURE: 97.9 F | SYSTOLIC BLOOD PRESSURE: 140 MMHG

## 2023-12-19 PROCEDURE — G0299 HHS/HOSPICE OF RN EA 15 MIN: HCPCS

## 2023-12-19 PROCEDURE — G0151 HHCP-SERV OF PT,EA 15 MIN: HCPCS

## 2023-12-19 NOTE — TELEPHONE ENCOUNTER
Natalie her visiting nurse called, [phone]    Patient is still having a thick mucous cough.  She was only d/c with Tessalon Perles, and its not breaking up the mucous.    Because of her history of high blood pressure, is it ok to take Mucinex or Delsym?  Please advise.

## 2023-12-21 ENCOUNTER — HOME CARE VISIT (OUTPATIENT)
Dept: HOME HEALTH SERVICES | Facility: HOME HEALTHCARE | Age: 78
End: 2023-12-21
Payer: COMMERCIAL

## 2023-12-21 VITALS — OXYGEN SATURATION: 99 % | SYSTOLIC BLOOD PRESSURE: 140 MMHG | HEART RATE: 78 BPM | DIASTOLIC BLOOD PRESSURE: 76 MMHG

## 2023-12-21 PROCEDURE — G0151 HHCP-SERV OF PT,EA 15 MIN: HCPCS

## 2023-12-21 PROCEDURE — G0299 HHS/HOSPICE OF RN EA 15 MIN: HCPCS

## 2023-12-24 PROCEDURE — G0180 MD CERTIFICATION HHA PATIENT: HCPCS | Performed by: INTERNAL MEDICINE

## 2023-12-26 ENCOUNTER — HOME CARE VISIT (OUTPATIENT)
Dept: HOME HEALTH SERVICES | Facility: HOME HEALTHCARE | Age: 78
End: 2023-12-26
Payer: COMMERCIAL

## 2023-12-26 VITALS
DIASTOLIC BLOOD PRESSURE: 80 MMHG | OXYGEN SATURATION: 97 % | TEMPERATURE: 97.4 F | HEART RATE: 78 BPM | RESPIRATION RATE: 18 BRPM | SYSTOLIC BLOOD PRESSURE: 146 MMHG

## 2023-12-26 VITALS — OXYGEN SATURATION: 97 % | HEART RATE: 96 BPM

## 2023-12-26 PROCEDURE — G0151 HHCP-SERV OF PT,EA 15 MIN: HCPCS

## 2023-12-26 PROCEDURE — G0299 HHS/HOSPICE OF RN EA 15 MIN: HCPCS

## 2023-12-28 ENCOUNTER — TELEPHONE (OUTPATIENT)
Dept: HOME HEALTH SERVICES | Facility: HOME HEALTHCARE | Age: 78
End: 2023-12-28

## 2023-12-28 ENCOUNTER — HOME CARE VISIT (OUTPATIENT)
Dept: HOME HEALTH SERVICES | Facility: HOME HEALTHCARE | Age: 78
End: 2023-12-28
Payer: COMMERCIAL

## 2023-12-29 ENCOUNTER — HOME CARE VISIT (OUTPATIENT)
Dept: HOME HEALTH SERVICES | Facility: HOME HEALTHCARE | Age: 78
End: 2023-12-29
Payer: COMMERCIAL

## 2023-12-29 VITALS — HEART RATE: 80 BPM | OXYGEN SATURATION: 83 % | SYSTOLIC BLOOD PRESSURE: 150 MMHG | DIASTOLIC BLOOD PRESSURE: 70 MMHG

## 2023-12-29 PROCEDURE — G0151 HHCP-SERV OF PT,EA 15 MIN: HCPCS

## 2024-01-03 ENCOUNTER — HOME CARE VISIT (OUTPATIENT)
Dept: HOME HEALTH SERVICES | Facility: HOME HEALTHCARE | Age: 79
End: 2024-01-03
Payer: COMMERCIAL

## 2024-01-03 VITALS
OXYGEN SATURATION: 95 % | TEMPERATURE: 97.8 F | RESPIRATION RATE: 18 BRPM | DIASTOLIC BLOOD PRESSURE: 68 MMHG | HEART RATE: 90 BPM | SYSTOLIC BLOOD PRESSURE: 124 MMHG

## 2024-01-03 VITALS — SYSTOLIC BLOOD PRESSURE: 122 MMHG | DIASTOLIC BLOOD PRESSURE: 62 MMHG | HEART RATE: 92 BPM | OXYGEN SATURATION: 92 %

## 2024-01-03 PROCEDURE — G0151 HHCP-SERV OF PT,EA 15 MIN: HCPCS

## 2024-01-03 PROCEDURE — G0299 HHS/HOSPICE OF RN EA 15 MIN: HCPCS

## 2024-01-05 ENCOUNTER — HOME CARE VISIT (OUTPATIENT)
Dept: HOME HEALTH SERVICES | Facility: HOME HEALTHCARE | Age: 79
End: 2024-01-05
Payer: COMMERCIAL

## 2024-01-05 VITALS — DIASTOLIC BLOOD PRESSURE: 88 MMHG | SYSTOLIC BLOOD PRESSURE: 134 MMHG

## 2024-01-05 PROCEDURE — G0151 HHCP-SERV OF PT,EA 15 MIN: HCPCS

## 2024-01-05 NOTE — CASE COMMUNICATION
Note informational only, no response needed.  Pt DC from home PT services as goals have been met. Pt to continue to HEP and walking program

## 2024-01-10 ENCOUNTER — HOME CARE VISIT (OUTPATIENT)
Dept: HOME HEALTH SERVICES | Facility: HOME HEALTHCARE | Age: 79
End: 2024-01-10
Payer: COMMERCIAL

## 2024-02-01 DIAGNOSIS — J45.901 ACUTE EXACERBATION OF COPD WITH ASTHMA: ICD-10-CM

## 2024-02-01 DIAGNOSIS — J44.1 ACUTE EXACERBATION OF COPD WITH ASTHMA: ICD-10-CM

## 2024-02-01 NOTE — TELEPHONE ENCOUNTER
Patient called back, [facility] about no refills left and they stated no refills remain. I spoke to the pharmacy and they looked up her record and found remaining refill. They will get it ready for the patient. Patient thankful for call back

## 2024-02-01 NOTE — TELEPHONE ENCOUNTER
Patient called for refill of her valsartan, pt advised there [facility], pt stated her bottle says no refill, I advised her to call [facility] to verify and if they need a new script to call back and we will be happy to reorder for her.    Patient verbally acknowledged.

## 2024-02-06 PROBLEM — B97.89 VIRAL SEPSIS: Status: RESOLVED | Noted: 2023-12-08 | Resolved: 2024-02-06

## 2024-02-06 PROBLEM — A41.89 VIRAL SEPSIS: Status: RESOLVED | Noted: 2023-12-08 | Resolved: 2024-02-06

## 2024-02-19 DIAGNOSIS — J43.8 OTHER EMPHYSEMA (HCC): ICD-10-CM

## 2024-02-19 RX ORDER — FLUTICASONE FUROATE, UMECLIDINIUM BROMIDE AND VILANTEROL TRIFENATATE 100; 62.5; 25 UG/1; UG/1; UG/1
1 POWDER RESPIRATORY (INHALATION) DAILY
Qty: 3 EACH | Refills: 2 | Status: SHIPPED | OUTPATIENT
Start: 2024-02-19

## 2024-03-04 ENCOUNTER — APPOINTMENT (OUTPATIENT)
Dept: LAB | Facility: HOSPITAL | Age: 79
End: 2024-03-04
Payer: COMMERCIAL

## 2024-03-04 DIAGNOSIS — E11.69 HYPERLIPIDEMIA ASSOCIATED WITH TYPE 2 DIABETES MELLITUS: ICD-10-CM

## 2024-03-04 DIAGNOSIS — E03.8 HYPOTHYROIDISM DUE TO HASHIMOTO'S THYROIDITIS: ICD-10-CM

## 2024-03-04 DIAGNOSIS — R73.09 ELEVATED HEMOGLOBIN A1C: ICD-10-CM

## 2024-03-04 DIAGNOSIS — E06.3 HYPOTHYROIDISM DUE TO HASHIMOTO'S THYROIDITIS: ICD-10-CM

## 2024-03-04 DIAGNOSIS — M31.6 TEMPORAL ARTERITIS (HCC): ICD-10-CM

## 2024-03-04 DIAGNOSIS — R06.02 SOB (SHORTNESS OF BREATH): ICD-10-CM

## 2024-03-04 DIAGNOSIS — E78.5 HYPERLIPIDEMIA ASSOCIATED WITH TYPE 2 DIABETES MELLITUS: ICD-10-CM

## 2024-03-04 LAB
ALBUMIN SERPL BCP-MCNC: 4.6 G/DL (ref 3.5–5)
ALP SERPL-CCNC: 62 U/L (ref 34–104)
ALT SERPL W P-5'-P-CCNC: 15 U/L (ref 7–52)
ANION GAP SERPL CALCULATED.3IONS-SCNC: 7 MMOL/L
AST SERPL W P-5'-P-CCNC: 24 U/L (ref 13–39)
BASOPHILS # BLD AUTO: 0.05 THOUSANDS/ÂΜL (ref 0–0.1)
BASOPHILS NFR BLD AUTO: 1 % (ref 0–1)
BILIRUB SERPL-MCNC: 0.57 MG/DL (ref 0.2–1)
BUN SERPL-MCNC: 15 MG/DL (ref 5–25)
CALCIUM SERPL-MCNC: 10.2 MG/DL (ref 8.4–10.2)
CHLORIDE SERPL-SCNC: 100 MMOL/L (ref 96–108)
CHOLEST SERPL-MCNC: 139 MG/DL
CO2 SERPL-SCNC: 32 MMOL/L (ref 21–32)
CREAT SERPL-MCNC: 0.72 MG/DL (ref 0.6–1.3)
CRP SERPL QL: 4.4 MG/L
EOSINOPHIL # BLD AUTO: 0.14 THOUSAND/ÂΜL (ref 0–0.61)
EOSINOPHIL NFR BLD AUTO: 2 % (ref 0–6)
ERYTHROCYTE [DISTWIDTH] IN BLOOD BY AUTOMATED COUNT: 13.7 % (ref 11.6–15.1)
ERYTHROCYTE [SEDIMENTATION RATE] IN BLOOD: 20 MM/HOUR (ref 0–29)
FERRITIN SERPL-MCNC: 34 NG/ML (ref 11–307)
GFR SERPL CREATININE-BSD FRML MDRD: 80 ML/MIN/1.73SQ M
GLUCOSE P FAST SERPL-MCNC: 90 MG/DL (ref 65–99)
HCT VFR BLD AUTO: 45.7 % (ref 34.8–46.1)
HDLC SERPL-MCNC: 79 MG/DL
HGB BLD-MCNC: 14 G/DL (ref 11.5–15.4)
IMM GRANULOCYTES # BLD AUTO: 0.03 THOUSAND/UL (ref 0–0.2)
IMM GRANULOCYTES NFR BLD AUTO: 0 % (ref 0–2)
LDLC SERPL CALC-MCNC: 45 MG/DL (ref 0–100)
LYMPHOCYTES # BLD AUTO: 1.62 THOUSANDS/ÂΜL (ref 0.6–4.47)
LYMPHOCYTES NFR BLD AUTO: 20 % (ref 14–44)
MAGNESIUM SERPL-MCNC: 2.1 MG/DL (ref 1.9–2.7)
MCH RBC QN AUTO: 27 PG (ref 26.8–34.3)
MCHC RBC AUTO-ENTMCNC: 30.6 G/DL (ref 31.4–37.4)
MCV RBC AUTO: 88 FL (ref 82–98)
MONOCYTES # BLD AUTO: 0.64 THOUSAND/ÂΜL (ref 0.17–1.22)
MONOCYTES NFR BLD AUTO: 8 % (ref 4–12)
NEUTROPHILS # BLD AUTO: 5.63 THOUSANDS/ÂΜL (ref 1.85–7.62)
NEUTS SEG NFR BLD AUTO: 69 % (ref 43–75)
NRBC BLD AUTO-RTO: 0 /100 WBCS
PLATELET # BLD AUTO: 256 THOUSANDS/UL (ref 149–390)
PMV BLD AUTO: 10.1 FL (ref 8.9–12.7)
POTASSIUM SERPL-SCNC: 4.1 MMOL/L (ref 3.5–5.3)
PROT SERPL-MCNC: 7.4 G/DL (ref 6.4–8.4)
RBC # BLD AUTO: 5.19 MILLION/UL (ref 3.81–5.12)
SODIUM SERPL-SCNC: 139 MMOL/L (ref 135–147)
TRIGL SERPL-MCNC: 74 MG/DL
TSH SERPL DL<=0.05 MIU/L-ACNC: 2.89 UIU/ML (ref 0.45–4.5)
WBC # BLD AUTO: 8.11 THOUSAND/UL (ref 4.31–10.16)

## 2024-03-04 PROCEDURE — 86140 C-REACTIVE PROTEIN: CPT

## 2024-03-04 PROCEDURE — 85652 RBC SED RATE AUTOMATED: CPT

## 2024-03-04 PROCEDURE — 36415 COLL VENOUS BLD VENIPUNCTURE: CPT

## 2024-03-04 PROCEDURE — 84443 ASSAY THYROID STIM HORMONE: CPT

## 2024-03-04 PROCEDURE — 82728 ASSAY OF FERRITIN: CPT

## 2024-03-04 PROCEDURE — 85025 COMPLETE CBC W/AUTO DIFF WBC: CPT

## 2024-03-04 PROCEDURE — 80053 COMPREHEN METABOLIC PANEL: CPT

## 2024-03-04 PROCEDURE — 83735 ASSAY OF MAGNESIUM: CPT

## 2024-03-04 PROCEDURE — 80061 LIPID PANEL: CPT

## 2024-03-11 ENCOUNTER — TELEPHONE (OUTPATIENT)
Age: 79
End: 2024-03-11

## 2024-03-11 ENCOUNTER — OFFICE VISIT (OUTPATIENT)
Dept: FAMILY MEDICINE CLINIC | Facility: CLINIC | Age: 79
End: 2024-03-11
Payer: COMMERCIAL

## 2024-03-11 VITALS
WEIGHT: 107 LBS | BODY MASS INDEX: 19.69 KG/M2 | HEART RATE: 90 BPM | OXYGEN SATURATION: 95 % | HEIGHT: 62 IN | RESPIRATION RATE: 14 BRPM | DIASTOLIC BLOOD PRESSURE: 90 MMHG | TEMPERATURE: 97.8 F | SYSTOLIC BLOOD PRESSURE: 162 MMHG

## 2024-03-11 DIAGNOSIS — E06.3 HYPOTHYROIDISM DUE TO HASHIMOTO'S THYROIDITIS: ICD-10-CM

## 2024-03-11 DIAGNOSIS — M31.6 TEMPORAL ARTERITIS (HCC): ICD-10-CM

## 2024-03-11 DIAGNOSIS — E03.8 HYPOTHYROIDISM DUE TO HASHIMOTO'S THYROIDITIS: ICD-10-CM

## 2024-03-11 DIAGNOSIS — F41.9 ANXIETY: Primary | ICD-10-CM

## 2024-03-11 DIAGNOSIS — J44.9 COPD, SEVERE (HCC): ICD-10-CM

## 2024-03-11 DIAGNOSIS — Z23 ENCOUNTER FOR IMMUNIZATION: ICD-10-CM

## 2024-03-11 DIAGNOSIS — J43.8 OTHER EMPHYSEMA (HCC): ICD-10-CM

## 2024-03-11 DIAGNOSIS — J45.901 ACUTE EXACERBATION OF COPD WITH ASTHMA: ICD-10-CM

## 2024-03-11 DIAGNOSIS — J44.1 ACUTE EXACERBATION OF COPD WITH ASTHMA: ICD-10-CM

## 2024-03-11 PROBLEM — I73.9 PVD (PERIPHERAL VASCULAR DISEASE) (HCC): Status: RESOLVED | Noted: 2023-02-27 | Resolved: 2024-03-11

## 2024-03-11 PROBLEM — J96.01 ACUTE HYPOXIC RESPIRATORY FAILURE (HCC): Status: RESOLVED | Noted: 2023-12-07 | Resolved: 2024-03-11

## 2024-03-11 PROCEDURE — 90678 RSV VACC PREF BIVALENT IM: CPT

## 2024-03-11 PROCEDURE — 99214 OFFICE O/P EST MOD 30 MIN: CPT | Performed by: INTERNAL MEDICINE

## 2024-03-11 PROCEDURE — 96372 THER/PROPH/DIAG INJ SC/IM: CPT

## 2024-03-11 PROCEDURE — 90471 IMMUNIZATION ADMIN: CPT

## 2024-03-11 RX ORDER — FLUTICASONE FUROATE, UMECLIDINIUM BROMIDE AND VILANTEROL TRIFENATATE 100; 62.5; 25 UG/1; UG/1; UG/1
1 POWDER RESPIRATORY (INHALATION) DAILY
Qty: 3 EACH | Refills: 2 | Status: SHIPPED | OUTPATIENT
Start: 2024-03-11

## 2024-03-11 RX ORDER — ALPRAZOLAM 0.25 MG/1
0.25 TABLET ORAL
Qty: 30 TABLET | Refills: 1 | Status: SHIPPED | OUTPATIENT
Start: 2024-03-11

## 2024-03-11 RX ORDER — VALSARTAN 160 MG/1
160 TABLET ORAL DAILY
Qty: 90 TABLET | Refills: 3 | Status: SHIPPED | OUTPATIENT
Start: 2024-03-11

## 2024-03-11 RX ORDER — LEVOTHYROXINE SODIUM 0.05 MG/1
50 TABLET ORAL DAILY
Qty: 30 TABLET | Refills: 4 | Status: SHIPPED | OUTPATIENT
Start: 2024-03-11

## 2024-03-11 RX ORDER — METHYLPREDNISOLONE SODIUM SUCCINATE 125 MG/2ML
125 INJECTION, POWDER, LYOPHILIZED, FOR SOLUTION INTRAMUSCULAR; INTRAVENOUS ONCE
Status: COMPLETED | OUTPATIENT
Start: 2024-03-11 | End: 2024-03-11

## 2024-03-11 RX ORDER — IPRATROPIUM BROMIDE AND ALBUTEROL SULFATE 2.5; .5 MG/3ML; MG/3ML
3 SOLUTION RESPIRATORY (INHALATION) ONCE
Status: COMPLETED | OUTPATIENT
Start: 2024-03-11 | End: 2024-03-11

## 2024-03-11 RX ORDER — PROPRANOLOL HYDROCHLORIDE 10 MG/1
10 TABLET ORAL 2 TIMES DAILY
Qty: 60 TABLET | Refills: 3 | Status: SHIPPED | OUTPATIENT
Start: 2024-03-11

## 2024-03-11 RX ORDER — IPRATROPIUM BROMIDE AND ALBUTEROL SULFATE 2.5; .5 MG/3ML; MG/3ML
3 SOLUTION RESPIRATORY (INHALATION) EVERY 6 HOURS PRN
Qty: 270 ML | Refills: 3 | Status: SHIPPED | OUTPATIENT
Start: 2024-03-11 | End: 2024-06-09

## 2024-03-11 RX ADMIN — IPRATROPIUM BROMIDE AND ALBUTEROL SULFATE 3 ML: 2.5; .5 SOLUTION RESPIRATORY (INHALATION) at 14:33

## 2024-03-11 RX ADMIN — METHYLPREDNISOLONE SODIUM SUCCINATE 125 MG: 125 INJECTION, POWDER, LYOPHILIZED, FOR SOLUTION INTRAMUSCULAR; INTRAVENOUS at 14:34

## 2024-03-11 NOTE — PROGRESS NOTES
Name: Sarah Zayas      : 1945      MRN: [id number]  Encounter Provider: Nicolas Nix MD  Encounter Date: 3/11/2024   Encounter department: [facility]    Assessment & Plan     1. Anxiety  Comments:  try; Inderal 10 mg BID  RTC in 1 mo w Blood work  Orders:  -     ALPRAZolam (XANAX) 0.25 mg tablet; Take 1 tablet (0.25 mg total) by mouth daily at bedtime as needed for anxiety or sleep  -     propranolol (INDERAL) 10 mg tablet; Take 1 tablet (10 mg total) by mouth 2 (two) times a day    2. Hypothyroidism due to Hashimoto's thyroiditis  -     levothyroxine 50 mcg tablet; Take 1 tablet (50 mcg total) by mouth daily    3. Acute exacerbation of COPD with asthma   -     valsartan (DIOVAN) 160 mg tablet; Take 1 tablet (160 mg total) by mouth daily Please STOP Lisinopril,..  -     methylPREDNISolone sodium succinate (Solu-MEDROL) injection 125 mg  -     ipratropium-albuterol (DUO-NEB) 0.5-2.5 mg/3 mL inhalation solution 3 mL    4. Encounter for immunization  -     Respiratory Syncytial Virus (RSV) vaccine (recombinant) (Abrysvo)    5. Temporal arteritis (HCC)    6. Other emphysema (HCC)  -     fluticasone-umeclidinium-vilanterol (Trelegy Ellipta) 100-62.5-25 mcg/actuation inhaler; Inhale 1 puff daily Rinse mouth after use    7. COPD, severe (HCC)  Comments:  consider Home O2..  use inhalers  Pulmonary consult ASAP  RTC in 1 mo w Blood work  Orders:  -     Nebulizer  -     Nebulizer Supplies  -     ipratropium-albuterol (DUO-NEB) 0.5-2.5 mg/3 mL nebulizer solution; Take 3 mL by nebulization every 6 (six) hours as needed for wheezing or shortness of breath  -     Complete PFT with post bronchodilator; Future  -     Ambulatory Referral to Pulmonology; Future    RTC in 1 mo w Blood work       Subjective      78 Y O lady is here for Regular check up, she has few symptoms, med list reviewed,...      Review of Systems   Constitutional:  Negative for chills, fatigue and fever.   HENT:   Positive for postnasal drip. Negative for congestion, facial swelling, sore throat, trouble swallowing and voice change.    Eyes:  Negative for pain, discharge and visual disturbance.   Respiratory:  Positive for shortness of breath. Negative for cough and wheezing.    Cardiovascular:  Positive for palpitations. Negative for chest pain and leg swelling.   Gastrointestinal:  Negative for abdominal pain, blood in stool, constipation, diarrhea and nausea.   Endocrine: Negative for polydipsia, polyphagia and polyuria.   Genitourinary:  Negative for difficulty urinating, hematuria and urgency.   Musculoskeletal:  Negative for arthralgias and myalgias.   Skin:  Negative for rash.   Neurological:  Negative for dizziness, tremors, weakness and headaches.   Hematological:  Negative for adenopathy. Does not bruise/bleed easily.   Psychiatric/Behavioral:  Negative for dysphoric mood, sleep disturbance and suicidal ideas. The patient is nervous/anxious.        Current Outpatient Medications on File Prior to Visit   Medication Sig    acetaminophen (TYLENOL) 500 mg tablet Take 1 tablet (500 mg total) by mouth every 6 (six) hours as needed (pain fevers)    albuterol (PROVENTIL HFA,VENTOLIN HFA) 90 mcg/act inhaler Inhale 2 puffs every 6 (six) hours as needed for wheezing or shortness of breath    aspirin (ECOTRIN LOW STRENGTH) 81 mg EC tablet Take 1 tablet (81 mg total) by mouth daily    benzonatate (TESSALON PERLES) 100 mg capsule Take 1 capsule (100 mg total) by mouth 3 (three) times a day as needed for cough    Biotin 10 MG TABS Take by mouth in the morning    Calcium Carb-Cholecalciferol (Caltrate 600+D3) 600-20 MG-MCG TABS Take 1 tablet by mouth 2 (two) times a day    cetirizine (ZyrTEC) 10 mg tablet Take 1 tablet (10 mg total) by mouth daily With food/in the evening    ipratropium (ATROVENT) 0.03 % nasal spray 2 sprays into each nostril every 12 (twelve) hours    pantoprazole (PROTONIX) 40 mg tablet Take 1 tablet (40 mg  "total) by mouth daily    predniSONE 2.5 mg tablet Take 1 tablet (2.5 mg total) by mouth daily    saccharomyces boulardii (FLORASTOR) 250 mg capsule Take 1 capsule (250 mg total) by mouth 2 (two) times a day    vitamin B-12 (VITAMIN B-12) 1,000 mcg tablet Take 1 tablet (1,000 mcg total) by mouth daily    [DISCONTINUED] ALPRAZolam (XANAX) 0.25 mg tablet Take 1 tablet (0.25 mg total) by mouth daily at bedtime as needed for anxiety or sleep    [DISCONTINUED] fluticasone-umeclidinium-vilanterol (Trelegy Ellipta) 100-62.5-25 mcg/actuation inhaler INHALE 1 PUFF BY MOUTH ONCE DAILY . RINSE MOUTH AFTER USE    [DISCONTINUED] levothyroxine 50 mcg tablet TAKE ONE TABLET BY MOUTH ONCE DAILY EARLY IN THE MORNING    [DISCONTINUED] valsartan (DIOVAN) 160 mg tablet Take 1 tablet (160 mg total) by mouth daily Please STOP Lisinopril,..       Objective     /90 (BP Location: Left arm, Patient Position: Sitting, Cuff Size: Standard)   Pulse 90   Temp 97.8 °F (36.6 °C) (Tympanic)   Resp 14   Ht 5' 2\" (1.575 m)   Wt 48.5 kg (107 lb)   SpO2 95%   BMI 19.57 kg/m²     Physical Exam  Constitutional:       General: She is not in acute distress.  HENT:      Head: Normocephalic.      Mouth/Throat:      Pharynx: No oropharyngeal exudate.   Eyes:      General: No scleral icterus.     Conjunctiva/sclera: Conjunctivae normal.      Pupils: Pupils are equal, round, and reactive to light.   Neck:      Thyroid: No thyromegaly.   Cardiovascular:      Rate and Rhythm: Normal rate and regular rhythm.      Heart sounds: Murmur heard.   Pulmonary:      Effort: Pulmonary effort is normal. No respiratory distress.      Breath sounds: Wheezing present. No rales.   Abdominal:      General: Bowel sounds are normal. There is no distension.      Palpations: Abdomen is soft.      Tenderness: There is no abdominal tenderness. There is no guarding or rebound.   Musculoskeletal:         General: No tenderness.      Cervical back: Neck supple. "   Lymphadenopathy:      Cervical: No cervical adenopathy.   Skin:     Coloration: Skin is not pale.      Findings: No rash.   Neurological:      Mental Status: She is alert and oriented to person, place, and time.      Sensory: No sensory deficit.      Motor: No weakness.       Nicolas Nix MD

## 2024-03-11 NOTE — TELEPHONE ENCOUNTER
Scheduled patient per pulm referral, patient is current pt. [place]. She could only do Monday afternoons.

## 2024-04-15 ENCOUNTER — OFFICE VISIT (OUTPATIENT)
Dept: FAMILY MEDICINE CLINIC | Facility: CLINIC | Age: 79
End: 2024-04-15
Payer: COMMERCIAL

## 2024-04-15 VITALS
OXYGEN SATURATION: 94 % | WEIGHT: 100 LBS | DIASTOLIC BLOOD PRESSURE: 78 MMHG | BODY MASS INDEX: 18.4 KG/M2 | RESPIRATION RATE: 15 BRPM | HEART RATE: 94 BPM | TEMPERATURE: 96.7 F | HEIGHT: 62 IN | SYSTOLIC BLOOD PRESSURE: 124 MMHG

## 2024-04-15 DIAGNOSIS — M81.8 IDIOPATHIC OSTEOPOROSIS: Primary | ICD-10-CM

## 2024-04-15 DIAGNOSIS — Z12.31 ENCOUNTER FOR SCREENING MAMMOGRAM FOR BREAST CANCER: ICD-10-CM

## 2024-04-15 DIAGNOSIS — M31.6 TEMPORAL ARTERITIS (HCC): ICD-10-CM

## 2024-04-15 DIAGNOSIS — J44.9 COPD, SEVERE (HCC): ICD-10-CM

## 2024-04-15 DIAGNOSIS — E03.8 HYPOTHYROIDISM DUE TO HASHIMOTO'S THYROIDITIS: ICD-10-CM

## 2024-04-15 DIAGNOSIS — E06.3 HYPOTHYROIDISM DUE TO HASHIMOTO'S THYROIDITIS: ICD-10-CM

## 2024-04-15 PROCEDURE — 96372 THER/PROPH/DIAG INJ SC/IM: CPT | Performed by: INTERNAL MEDICINE

## 2024-04-15 PROCEDURE — 99214 OFFICE O/P EST MOD 30 MIN: CPT | Performed by: INTERNAL MEDICINE

## 2024-04-15 RX ORDER — METHYLPREDNISOLONE SODIUM SUCCINATE 125 MG/2ML
125 INJECTION, POWDER, LYOPHILIZED, FOR SOLUTION INTRAMUSCULAR; INTRAVENOUS ONCE
Status: COMPLETED | OUTPATIENT
Start: 2024-04-15 | End: 2024-04-15

## 2024-04-15 RX ORDER — IPRATROPIUM BROMIDE AND ALBUTEROL SULFATE 2.5; .5 MG/3ML; MG/3ML
3 SOLUTION RESPIRATORY (INHALATION) ONCE
Status: COMPLETED | OUTPATIENT
Start: 2024-04-15 | End: 2024-04-15

## 2024-04-15 RX ORDER — PREDNISONE 5 MG/1
5 TABLET ORAL
Qty: 30 TABLET | Refills: 1 | Status: SHIPPED | OUTPATIENT
Start: 2024-04-15

## 2024-04-15 RX ADMIN — METHYLPREDNISOLONE SODIUM SUCCINATE 125 MG: 125 INJECTION, POWDER, LYOPHILIZED, FOR SOLUTION INTRAMUSCULAR; INTRAVENOUS at 14:46

## 2024-04-15 RX ADMIN — IPRATROPIUM BROMIDE AND ALBUTEROL SULFATE 3 ML: 2.5; .5 SOLUTION RESPIRATORY (INHALATION) at 14:46

## 2024-04-15 NOTE — PATIENT INSTRUCTIONS
Osteoporosis Education   Osteoporosis  is a long-term medical condition that causes your bones to become weak, brittle, and more likely to fracture. Osteoporosis occurs when your body absorbs more bone than it makes. It is also caused by a lack of calcium and estrogen (female hormone).  Common symptoms include the following:  You may not have any signs or symptoms. You may break a bone after a muscle strain, bump, or fall. A break usually occurs in the hip, spine, or wrist. A collapsed vertebra (bone in your spine) may cause severe back pain or loss of height from bent posture.  Call your doctor if:    You have severe pain.   You have increasing pain after a fall.   You have pain when you do your daily activities.   You have questions or concerns about your condition or care.  Diagnosis of osteoporosis:   Blood and urine tests  measure your calcium, vitamin D, and estrogen levels.    An x-ray or CT may show thinned bones or a fracture. You may be given contrast liquid to help the bones show up better in the pictures. Tell the healthcare provider if you have ever had an allergic reaction to contrast liquid. Do not enter the MRI room with anything metal. Metal can cause serious injury. Tell the healthcare provider if you have any metal in or on your body.    A bone density test  compares your bone thickness with what is expected for someone of your age, gender, and ethnicity.  Treatment for osteoporosis may include medicines to prevent bone loss, build new bone, and increase estrogen. These medicines help prevent fractures and may be given as a pill or injection. Ask your healthcare provider for more information on these medicines.  Prevent bone loss:  Eat healthy foods that are high in calcium.  This helps keep your bones strong. Good sources of calcium are milk, cheese, broccoli, tofu, almonds, and canned salmon and sardines. Recommended to get at least 1200mg daily of calcium.  Increase your vitamin D intake.   "Vitamin D is in fish oils, some vegetables, and fortified milk, cereal, and bread. Vitamin D is also formed in the skin when it is exposed to the sun. Ask your healthcare provider how much sunlight is safe for you. You will require at least 800 units of vitamin D daily taken as a supplement.  Drink liquids as directed.  Ask your healthcare provider how much liquid to drink each day and which liquids are best for you. Do not have alcohol or caffeine. They decrease bone mineral density, which can weaken your bones.  Exercise regularly.  Ask your healthcare provider about the best exercise plan for you. Weight bearing exercise for 30 minutes, 3 times a week can help build and strengthen bone.  Do not smoke.  Nicotine and other chemicals in cigarettes and cigars can cause lung damage. Ask your healthcare provider for information if you currently smoke and need help to quit. E-cigarettes or smokeless tobacco still contain nicotine. Talk to your healthcare provider before you use these products.  Go to physical therapy as directed.  A physical therapist teaches you exercises to help improve movement and muscle strength.  Alcohol. It is recommended to avoid heavy alcohol use as increased consumption of alcohol is known to cause bone loss  © Copyright [employer] 2021 Information is for End User's use only and may not be sold, redistributed or otherwise used for commercial purposes. All illustrations and images included in CareNotes® are the copyrighted property of [employer] or [employer]    Calcium and vitamin D for bone health (Beyond the Basics)    CALCIUM AND VITAMIN D OVERVIEW  Osteoporosis is a common bone disorder that causes a progressive loss in bone density and mass. As a result, bones become thin, weakened, and easily fractured. It is estimated that more than 1.3 million osteoporosis-associated (or \"osteoporotic\") fractures occur every year in the [place], primarily of bone within the spine " (the vertebrae), the hip, and the forearm near the wrist. =    A number of treatments can help to prevent loss of bone and treat low bone mass. However, the first step in preventing or treating osteoporosis is to consume foods and drinks that provide calcium, a mineral essential for bone strength, and vitamin D, which aids in calcium breakdown and absorption. =    CALCIUM AND VITAMIN D BENEFITS  Good nutrition is important at all ages to keep the bones healthy.    Taking calcium reduces bone loss and decreases the risk of fracturing the vertebrae (the bones that surround the spinal cord).  Consuming calcium during childhood (eg, in milk) can lead to higher bone mass in adulthood. This increase in bone density can reduce the risk of fractures later in life.  Calcium may also have benefits in other body systems by reducing blood pressure and cholesterol levels.  Calcium and vitamin D supplements may help prevent tooth loss in older adults.    RECOMMENDATIONS FOR CALCIUM    General recommendations -- Premenopausal women and men should consume at least 1000 mg of calcium, while postmenopausal women should consume 1200 mg (total diet plus supplement). You should not consume more than 2000 mg of calcium per day (total diet plus supplement) due to the risk of side effects.    Calcium in the diet -- The primary sources of calcium in the diet include milk and other dairy products, such as hard cheese, cottage cheese, or yogurt, as well as green vegetables, such as kale and broccoli (table 1). Some cereals, soy products, and fruit juices are fortified with calcium.    Calcium supplements -- The body is able to absorb calcium contained in supplements as well as from dietary sources. If it is not possible to get enough calcium from dietary sources, consult a health care provider to determine the best type, dose, and timing of calcium supplements.     Calcium carbonate is effective and is the least expensive form of calcium. It  is best absorbed with a low-iron meal (such as breakfast). Calcium carbonate may not be absorbed well in people who also take a specific medication for gastroesophageal reflux (called a proton pump inhibitor or H2 blocker), which blocks stomach acid.  Many natural calcium carbonate preparations such as oyster shells or bone meal contain some lead.  Calcium citrate is well absorbed in the fasting state as well as with a meal.  Calcium doses above 500 mg are not absorbed as well as smaller doses, so large doses of supplements should be taken in divided doses (eg, in the morning and evening).  Calcium supplements do not replace other osteoporosis treatments such as hormone replacement, bisphosphonates (eg, risedronate [sample brand name: Actonel] and alendronate [brand name: Fosamax]), and raloxifene (brand name: Evista).    Calcium and vitamin D supplements alone are usually insufficient to prevent age-related bone loss, although they may be beneficial in some subgroups (older adults, those with very low intake). In most patients with or at risk for osteoporosis, the addition of medication or hormonal therapy is necessary in order to slow the breakdown and removal of bone (ie, resorption).     Underlying gastrointestinal diseases -- Patients who do not adequately absorb nutrients from the gastrointestinal tract (due to malabsorption) may require more than 1000 to 1200 mg of calcium per day. In such cases, a health care provider can help to determine the optimal dose of calcium.    Medications -- All medications should be discussed with a health care provider to ensure that possible interactions with calcium are identified. Certain medications change the amount of calcium that is absorbed and/or excreted. As an example, loop diuretics (eg, furosemide [sample brand name: Lasix]) increase the amount of calcium excreted in the urine.    On the other hand, thiazide diuretics (eg, hydrochlorothiazide) can reduce levels of  calcium in the urine, potentially reducing the risk of bone loss and kidney stones.    Side effects of calcium -- Calcium is usually easily tolerated when it is taken in divided doses several times per day. Some people experience side effects related to calcium, including constipation and indigestion. Calcium supplements interfere with the absorption of iron and thyroid hormone, and therefore, these medications should be taken at different times.    Kidney stones -- There is no evidence that consuming large amounts of calcium (from foods and drinks) increases the risk of kidney stones, or that avoiding dietary calcium decreases the risk. In fact, avoiding dairy products is likely to increase the risk of kidney stones.    However, use of calcium supplements may increase the risk of kidney stones in susceptible individuals by raising the level of calcium in the urine. This is particularly true if the supplement is taken between meals or at bedtime, and this is one of the reasons we prefer for patients to get as much of their calcium requirement through dietary means.     IMPORTANCE OF VITAMIN D  Vitamin D decreases bone loss and lowers the risk of fracture, especially in older men and women. Along with calcium, vitamin D also helps to prevent and treat osteoporosis. To absorb calcium efficiently, an adequate amount of vitamin D must be present.    Vitamin D is normally made in the skin after exposure to sunlight.    Recommendations for vitamin D -- The current recommendation is that men over 70 years and postmenopausal women consume at least 800 international units (20 micrograms) of vitamin D per day. Lower levels of vitamin D are not as effective, while high doses can be toxic, especially if taken for long periods of time. Although the optimal intake has not been clearly established in premenopausal women or in younger men with osteoporosis, 600 international units (15 micrograms) of vitamin D daily is generally  suggested.    Vitamin D is available as an individual supplement and is included in most multivitamins and some calcium supplements (table 2). Milk is a relatively good dietary source of vitamin D, with approximately 100 international units (2.5 micrograms) per cup (240 mL), and salmon has 800 to 1000 international units (20 to 25 micrograms) of vitamin D per serving.    Most healthy people don't need to check with their health care provider before taking standard doses of vitamin D and don't need monitoring of vitamin D levels if they are not being treated for vitamin D deficiency. However, recommendations for vitamin D supplementation may be different in people with certain underlying medical conditions, such as sarcoidosis or lymphoma. In these situations, a provider will determine if supplements are needed; if so, the person's vitamin D and calcium levels should be monitored with regular tests.    ©2021 [employer] All rights reserved.

## 2024-04-15 NOTE — PROGRESS NOTES
Name: Sarah Zayas      : 1945      MRN: [id number]  Encounter Provider: Nicolas Nix MD  Encounter Date: 4/15/2024   Encounter department: [facility]    Assessment & Plan     1. Idiopathic osteoporosis  -     DXA bone density spine hip and pelvis; Future; Expected date: 04/15/2024  -     denosumab (PROLIA) 60 mg/mL; Inject 1 mL (60 mg total) under the skin once for 1 dose    2. Hypothyroidism due to Hashimoto's thyroiditis    3. COPD, severe (HCC)  Comments:  Home Neb Tx  Use inhalers  FU w Pulmonary  RTC in  3mos w Blood work  Orders:  -     nystatin (MYCOSTATIN) 500,000 units/5 mL suspension; Apply 5 mL (500,000 Units total) to the mouth or throat 4 (four) times a day for 10 days  -     dextromethorphan-guaifenesin (MUCINEX DM)  MG per 12 hr tablet; Take 1 tablet by mouth 2 (two) times a day with meals  -     methylPREDNISolone sodium succinate (Solu-MEDROL) injection 125 mg  -     ipratropium-albuterol (DUO-NEB) 0.5-2.5 mg/3 mL inhalation solution 3 mL  -     predniSONE 5 mg tablet; Take 1 tablet (5 mg total) by mouth daily with breakfast    4. Encounter for screening mammogram for breast cancer  -     Mammo screening bilateral w 3d & cad; Future    5. Temporal arteritis (HCC)  Comments:  stable on 5 mg Prednisone daily  Orders:  -     predniSONE 5 mg tablet; Take 1 tablet (5 mg total) by mouth daily with breakfast    RTC in 2-3 mos w Blood work       Subjective      78 Y O Lady is here for Regular check up, she has few symptoms, recent blood work and med list reviewed...      Review of Systems   Constitutional:  Negative for chills, fatigue and fever.   HENT:  Positive for postnasal drip. Negative for congestion, facial swelling, sore throat, trouble swallowing and voice change.    Eyes:  Negative for pain, discharge and visual disturbance.   Respiratory:  Negative for cough, shortness of breath and wheezing.    Cardiovascular:  Negative for chest pain,  palpitations and leg swelling.   Gastrointestinal:  Negative for abdominal pain, blood in stool, constipation, diarrhea and nausea.   Endocrine: Negative for polydipsia, polyphagia and polyuria.   Genitourinary:  Negative for difficulty urinating, hematuria and urgency.   Musculoskeletal:  Negative for arthralgias and myalgias.   Skin:  Negative for rash.   Neurological:  Negative for dizziness, tremors, weakness and headaches.   Hematological:  Negative for adenopathy. Does not bruise/bleed easily.   Psychiatric/Behavioral:  Positive for sleep disturbance. Negative for dysphoric mood and suicidal ideas. The patient is nervous/anxious.        Current Outpatient Medications on File Prior to Visit   Medication Sig    acetaminophen (TYLENOL) 500 mg tablet Take 1 tablet (500 mg total) by mouth every 6 (six) hours as needed (pain fevers)    albuterol (PROVENTIL HFA,VENTOLIN HFA) 90 mcg/act inhaler Inhale 2 puffs every 6 (six) hours as needed for wheezing or shortness of breath    ALPRAZolam (XANAX) 0.25 mg tablet Take 1 tablet (0.25 mg total) by mouth daily at bedtime as needed for anxiety or sleep    aspirin (ECOTRIN LOW STRENGTH) 81 mg EC tablet Take 1 tablet (81 mg total) by mouth daily    Biotin 10 MG TABS Take by mouth in the morning    Calcium Carb-Cholecalciferol (Caltrate 600+D3) 600-20 MG-MCG TABS Take 1 tablet by mouth 2 (two) times a day    cetirizine (ZyrTEC) 10 mg tablet Take 1 tablet (10 mg total) by mouth daily With food/in the evening    fluticasone-umeclidinium-vilanterol (Trelegy Ellipta) 100-62.5-25 mcg/actuation inhaler Inhale 1 puff daily Rinse mouth after use    ipratropium (ATROVENT) 0.03 % nasal spray 2 sprays into each nostril every 12 (twelve) hours    ipratropium-albuterol (DUO-NEB) 0.5-2.5 mg/3 mL nebulizer solution Take 3 mL by nebulization every 6 (six) hours as needed for wheezing or shortness of breath    levothyroxine 50 mcg tablet Take 1 tablet (50 mcg total) by mouth daily     "pantoprazole (PROTONIX) 40 mg tablet Take 1 tablet (40 mg total) by mouth daily    propranolol (INDERAL) 10 mg tablet Take 1 tablet (10 mg total) by mouth 2 (two) times a day    saccharomyces boulardii (FLORASTOR) 250 mg capsule Take 1 capsule (250 mg total) by mouth 2 (two) times a day    valsartan (DIOVAN) 160 mg tablet Take 1 tablet (160 mg total) by mouth daily Please STOP Lisinopril,..    vitamin B-12 (VITAMIN B-12) 1,000 mcg tablet Take 1 tablet (1,000 mcg total) by mouth daily    [DISCONTINUED] benzonatate (TESSALON PERLES) 100 mg capsule Take 1 capsule (100 mg total) by mouth 3 (three) times a day as needed for cough    [DISCONTINUED] predniSONE 2.5 mg tablet Take 1 tablet (2.5 mg total) by mouth daily       Objective     /78 (BP Location: Left arm, Patient Position: Sitting, Cuff Size: Standard)   Pulse 94   Temp (!) 96.7 °F (35.9 °C) (Tympanic)   Resp 15   Ht 5' 2\" (1.575 m)   Wt 45.4 kg (100 lb)   SpO2 94%   BMI 18.29 kg/m²     Physical Exam  Constitutional:       General: She is not in acute distress.  HENT:      Head: Normocephalic.      Mouth/Throat:      Pharynx: No oropharyngeal exudate.   Eyes:      General: No scleral icterus.     Conjunctiva/sclera: Conjunctivae normal.      Pupils: Pupils are equal, round, and reactive to light.   Neck:      Thyroid: No thyromegaly.   Cardiovascular:      Rate and Rhythm: Normal rate and regular rhythm.      Heart sounds: Murmur heard.   Pulmonary:      Effort: Pulmonary effort is normal. No respiratory distress.      Breath sounds: Normal breath sounds. No wheezing or rales.   Abdominal:      General: Bowel sounds are normal. There is no distension.      Palpations: Abdomen is soft.      Tenderness: There is no abdominal tenderness. There is no guarding or rebound.   Musculoskeletal:         General: No tenderness.      Cervical back: Neck supple.   Lymphadenopathy:      Cervical: No cervical adenopathy.   Skin:     Coloration: Skin is not pale.     "  Findings: No rash.   Neurological:      Mental Status: She is alert and oriented to person, place, and time.      Sensory: No sensory deficit.      Motor: No weakness.       Nicolas Nix MD

## 2024-04-17 ENCOUNTER — TELEPHONE (OUTPATIENT)
Age: 79
End: 2024-04-17

## 2024-04-17 NOTE — TELEPHONE ENCOUNTER
Patient called in wanting to schedule a appointment with Dr. Nix for a Prolia injection. Patient stated the pharmacy delivered the mediation to her but she is unable to inject it herself. Warm transferred to [facility] clinical.

## 2024-04-17 NOTE — TELEPHONE ENCOUNTER
Patient called back and wanted the ladies in the office to know that the pharmacy stated that the Prolia is good out of the refrigerator for 2 weeks.   So all is well

## 2024-04-22 ENCOUNTER — CLINICAL SUPPORT (OUTPATIENT)
Dept: FAMILY MEDICINE CLINIC | Facility: CLINIC | Age: 79
End: 2024-04-22
Payer: COMMERCIAL

## 2024-04-22 DIAGNOSIS — M81.8 IDIOPATHIC OSTEOPOROSIS: Primary | ICD-10-CM

## 2024-04-22 PROCEDURE — 96372 THER/PROPH/DIAG INJ SC/IM: CPT

## 2024-05-06 ENCOUNTER — OFFICE VISIT (OUTPATIENT)
Dept: PULMONOLOGY | Facility: CLINIC | Age: 79
End: 2024-05-06
Payer: COMMERCIAL

## 2024-05-06 VITALS
WEIGHT: 107 LBS | HEIGHT: 62 IN | DIASTOLIC BLOOD PRESSURE: 70 MMHG | OXYGEN SATURATION: 95 % | SYSTOLIC BLOOD PRESSURE: 138 MMHG | HEART RATE: 69 BPM | TEMPERATURE: 97.7 F | BODY MASS INDEX: 19.69 KG/M2

## 2024-05-06 DIAGNOSIS — R09.82 POSTNASAL DRIP: ICD-10-CM

## 2024-05-06 DIAGNOSIS — J44.9 COPD, SEVERE (HCC): ICD-10-CM

## 2024-05-06 DIAGNOSIS — R06.02 SOB (SHORTNESS OF BREATH): Primary | ICD-10-CM

## 2024-05-06 DIAGNOSIS — R91.8 LUNG NODULES: ICD-10-CM

## 2024-05-06 DIAGNOSIS — R05.3 CHRONIC COUGH: ICD-10-CM

## 2024-05-06 PROCEDURE — 99214 OFFICE O/P EST MOD 30 MIN: CPT | Performed by: INTERNAL MEDICINE

## 2024-05-06 PROCEDURE — 94010 BREATHING CAPACITY TEST: CPT | Performed by: INTERNAL MEDICINE

## 2024-05-06 RX ORDER — BUDESONIDE 0.5 MG/2ML
0.5 INHALANT ORAL 2 TIMES DAILY
Qty: 120 ML | Refills: 5 | Status: SHIPPED | OUTPATIENT
Start: 2024-05-06 | End: 2024-06-05

## 2024-05-06 RX ORDER — LEVALBUTEROL INHALATION SOLUTION 1.25 MG/3ML
1.25 SOLUTION RESPIRATORY (INHALATION) 3 TIMES DAILY
Qty: 270 ML | Refills: 5 | Status: SHIPPED | OUTPATIENT
Start: 2024-05-06 | End: 2024-06-05

## 2024-05-06 NOTE — PROGRESS NOTES
"Office Progress Note - Pulmonary    Sarah Zayas 78 y.o. female MRN: [id number]    Encounter: [id number]      Assessment:  Chronic obstructive pulmonary disease.  Severe.  Dyspnea on exertion.  Chronic cough.  Lung nodules.    Plan:   Spirometry.  Discontinue Trelegy.  Budesonide 0.5 mg nebulized twice a day.  Levalbuterol nebulizer treatments 3 times a day.  Ipratropium nebulizer treatments 3 times a day.  Repeat CT scan of the chest in December.  Follow-up in 3 months.    Discussion:   The patient has very severe COPD this is based on the spirometry done today in the office.  She is not appropriate for inhalers as she is unable to generate enough airflow to carry the medicine to her lungs.  I have discontinued the trilogy Ellipta.  I have started her on budesonide 0.5 mg nebulized twice a day and ipratropium/levalbuterol nebulizer treatments 3 times a day.  She will try to take regular walks to improve stamina.  He will follow-up in 3 months.      Subjective:   The patient is here for a follow-up visit.  She has more mucus that she is coughing up.  She has dyspnea on exertion.  She is fairly sedentary.  She is taken Trelegy Ellipta 1 inhalation once a day.  She denies fever or chills.  No chest tightness or wheezing.  Denies chest pain.    Review of systems:  A 12 point system review is done and aside from what is stated above the rest of the review of systems is negative.      Family history and social history are reviewed.    Medications list is reviewed.      Vitals: Blood pressure 138/70, pulse 69, temperature 97.7 °F (36.5 °C), temperature source Tympanic, height 5' 2\" (1.575 m), weight 48.5 kg (107 lb), SpO2 95%, not currently breastfeeding.,     Physical Exam  Gen: Awake, alert, oriented x 3, no acute distress  HEENT: Mucous membranes moist, no oral lesions, no thrush  NECK: No accessory muscle use, JVP not elevated  Cardiac: Regular, single S1, single S2, no murmurs, no rubs, no gallops  Lungs: Decreased " breath sounds.  No wheezing or rhonchi  Abdomen: normoactive bowel sounds, soft nontender, nondistended, no rebound or rigidity, no guarding  Extremities: no cyanosis, no clubbing, no edema  Neuro:  Grossly nonfocal.  Skin:  No rash.    Spirometry done today in the office is reviewed.  It showed a very severe airflow limitation with FEV1 of 0.41 (22% predicted).    Lab Results   Component Value Date    WBC 8.11 03/04/2024    HGB 14.0 03/04/2024    HCT 45.7 03/04/2024    MCV 88 03/04/2024     03/04/2024     Lab Results   Component Value Date    SODIUM 139 03/04/2024    K 4.1 03/04/2024     03/04/2024    CO2 32 03/04/2024    BUN 15 03/04/2024    CREATININE 0.72 03/04/2024    GLUC 120 12/09/2023    CALCIUM 10.2 03/04/2024

## 2024-05-07 ENCOUNTER — TELEPHONE (OUTPATIENT)
Age: 79
End: 2024-05-07

## 2024-05-07 NOTE — TELEPHONE ENCOUNTER
Patient calling stating she needs help with using her nebulizer. She has tried to google it but cannot figure it out. She states she also is having trouble bathing herself. She feels she needs a Visiting Nurse to come to her home to help her with these things. She said she is struggling because of her COPD. Please advise.    Patient also requesting to speak with Elizabeth once order is placed.

## 2024-05-07 NOTE — TELEPHONE ENCOUNTER
Patient calling in requesting referral for Visiting nurse to help with ADL's, Showering, and using her Nebulizer. States her insurance said they will cover up to 8 hr a day, but patient doesn't think she needs them that much.

## 2024-05-09 ENCOUNTER — HOME HEALTH ADMISSION (OUTPATIENT)
Dept: HOME HEALTH SERVICES | Facility: HOME HEALTHCARE | Age: 79
End: 2024-05-09
Payer: COMMERCIAL

## 2024-05-09 DIAGNOSIS — J44.9 COPD, SEVERE (HCC): ICD-10-CM

## 2024-05-09 DIAGNOSIS — J43.8 OTHER EMPHYSEMA (HCC): Primary | ICD-10-CM

## 2024-05-10 ENCOUNTER — HOME CARE VISIT (OUTPATIENT)
Dept: HOME HEALTH SERVICES | Facility: HOME HEALTHCARE | Age: 79
End: 2024-05-10

## 2024-05-11 ENCOUNTER — HOME CARE VISIT (OUTPATIENT)
Dept: HOME HEALTH SERVICES | Facility: HOME HEALTHCARE | Age: 79
End: 2024-05-11
Payer: COMMERCIAL

## 2024-05-11 VITALS
SYSTOLIC BLOOD PRESSURE: 118 MMHG | RESPIRATION RATE: 16 BRPM | HEART RATE: 65 BPM | OXYGEN SATURATION: 95 % | TEMPERATURE: 97.7 F | DIASTOLIC BLOOD PRESSURE: 72 MMHG

## 2024-05-11 DIAGNOSIS — K21.9 GASTROESOPHAGEAL REFLUX DISEASE: ICD-10-CM

## 2024-05-11 PROCEDURE — G0299 HHS/HOSPICE OF RN EA 15 MIN: HCPCS

## 2024-05-11 PROCEDURE — 400013 VN SOC

## 2024-05-13 RX ORDER — PANTOPRAZOLE SODIUM 40 MG/1
40 TABLET, DELAYED RELEASE ORAL DAILY
Qty: 90 TABLET | Refills: 1 | Status: SHIPPED | OUTPATIENT
Start: 2024-05-13

## 2024-05-14 ENCOUNTER — HOME CARE VISIT (OUTPATIENT)
Dept: HOME HEALTH SERVICES | Facility: HOME HEALTHCARE | Age: 79
End: 2024-05-14
Payer: COMMERCIAL

## 2024-05-14 ENCOUNTER — TELEPHONE (OUTPATIENT)
Age: 79
End: 2024-05-14

## 2024-05-14 VITALS
HEART RATE: 68 BPM | SYSTOLIC BLOOD PRESSURE: 130 MMHG | DIASTOLIC BLOOD PRESSURE: 60 MMHG | RESPIRATION RATE: 18 BRPM | OXYGEN SATURATION: 99 % | TEMPERATURE: 96.6 F

## 2024-05-14 DIAGNOSIS — R06.02 SOB (SHORTNESS OF BREATH): ICD-10-CM

## 2024-05-14 DIAGNOSIS — M31.6 TEMPORAL ARTERITIS (HCC): ICD-10-CM

## 2024-05-14 DIAGNOSIS — Z71.89 COORDINATION OF COMPLEX CARE: ICD-10-CM

## 2024-05-14 DIAGNOSIS — J43.8 OTHER EMPHYSEMA (HCC): Primary | ICD-10-CM

## 2024-05-14 DIAGNOSIS — F41.9 ANXIETY: ICD-10-CM

## 2024-05-14 DIAGNOSIS — J44.9 COPD, SEVERE (HCC): ICD-10-CM

## 2024-05-14 PROCEDURE — G0300 HHS/HOSPICE OF LPN EA 15 MIN: HCPCS

## 2024-05-14 NOTE — TELEPHONE ENCOUNTER
Called Ashley, spoke with her and the patient and advised per Joellen's recommendations. No further questions at this time.

## 2024-05-14 NOTE — TELEPHONE ENCOUNTER
Ashley, with [facility] calling regarding nebulizer solutions. Pt is having difficultly managing the current regimen. Currently prescribed Budesonide twice per day and ipratropium and levalbuterol 3 times per day. Ashley is wondering if pt can combine any of the treatments to make it easier for her.

## 2024-05-14 NOTE — TELEPHONE ENCOUNTER
[facility] nurse called to ask for a referral for home OT services. Patient needs assist with showering . We need to place an order for OT services for ADL safety.

## 2024-05-14 NOTE — TELEPHONE ENCOUNTER
Can you please let patient know: Ideally it would be best to combine levalbuterol/Atrovent, then budesonide by itself-however if that is still too cumbersome she can combine all 3

## 2024-05-16 ENCOUNTER — HOME CARE VISIT (OUTPATIENT)
Dept: HOME HEALTH SERVICES | Facility: HOME HEALTHCARE | Age: 79
End: 2024-05-16
Payer: COMMERCIAL

## 2024-05-16 VITALS
OXYGEN SATURATION: 94 % | TEMPERATURE: 97.8 F | SYSTOLIC BLOOD PRESSURE: 122 MMHG | DIASTOLIC BLOOD PRESSURE: 68 MMHG | HEART RATE: 66 BPM

## 2024-05-16 PROCEDURE — G0299 HHS/HOSPICE OF RN EA 15 MIN: HCPCS

## 2024-05-20 ENCOUNTER — HOME CARE VISIT (OUTPATIENT)
Dept: HOME HEALTH SERVICES | Facility: HOME HEALTHCARE | Age: 79
End: 2024-05-20
Payer: COMMERCIAL

## 2024-05-20 VITALS — DIASTOLIC BLOOD PRESSURE: 76 MMHG | OXYGEN SATURATION: 96 % | HEART RATE: 65 BPM | SYSTOLIC BLOOD PRESSURE: 108 MMHG

## 2024-05-20 PROCEDURE — G0152 HHCP-SERV OF OT,EA 15 MIN: HCPCS

## 2024-05-20 PROCEDURE — G0180 MD CERTIFICATION HHA PATIENT: HCPCS | Performed by: INTERNAL MEDICINE

## 2024-05-21 ENCOUNTER — HOME CARE VISIT (OUTPATIENT)
Dept: HOME HEALTH SERVICES | Facility: HOME HEALTHCARE | Age: 79
End: 2024-05-21
Payer: COMMERCIAL

## 2024-05-21 VITALS
OXYGEN SATURATION: 94 % | DIASTOLIC BLOOD PRESSURE: 68 MMHG | SYSTOLIC BLOOD PRESSURE: 122 MMHG | HEART RATE: 72 BPM | TEMPERATURE: 98.2 F

## 2024-05-21 PROCEDURE — G0299 HHS/HOSPICE OF RN EA 15 MIN: HCPCS

## 2024-05-22 ENCOUNTER — HOME CARE VISIT (OUTPATIENT)
Dept: HOME HEALTH SERVICES | Facility: HOME HEALTHCARE | Age: 79
End: 2024-05-22
Payer: COMMERCIAL

## 2024-05-22 VITALS — SYSTOLIC BLOOD PRESSURE: 122 MMHG | DIASTOLIC BLOOD PRESSURE: 70 MMHG

## 2024-05-22 PROCEDURE — G0152 HHCP-SERV OF OT,EA 15 MIN: HCPCS

## 2024-05-22 NOTE — ASSESSMENT & PLAN NOTE
- 6MM Noted on CT scan   - Recommended outpatient follow up with outpatient CT scan in 3 months Initiate Treatment: Cool packs Detail Level: Zone

## 2024-05-23 ENCOUNTER — TELEPHONE (OUTPATIENT)
Dept: HOME HEALTH SERVICES | Facility: HOME HEALTHCARE | Age: 79
End: 2024-05-23

## 2024-05-24 ENCOUNTER — HOME CARE VISIT (OUTPATIENT)
Dept: HOME HEALTH SERVICES | Facility: HOME HEALTHCARE | Age: 79
End: 2024-05-24
Payer: COMMERCIAL

## 2024-05-24 NOTE — TELEPHONE ENCOUNTER
Good morning Rowena,    I forwarded your message to Dr. Nicholson and his reply is below. Please let us know if you or the patient have further questions.    Take care,  Nya  ===View-only below this line===  ----- Message -----  From: Mike Nicholson MD  Sent: 5/23/2024  12:44 PM EDT  To: Nya Moya RN; *    She can skip the Ipratropium/levalbuterol but should do the evening dose of budesonide.    MT  ----- Message -----  From: Nya Moya RN  Sent: 5/22/2024   9:25 AM EDT  To: Mike Nicholson MD; *      ----- Message -----  From: Rowena Yeh RN  Sent: 5/21/2024   6:21 PM EDT  To: Nya Moya RN    Hello I wanted to relay information re Mrs. Zayas. She is having problems with PM nebulizer treatments. She has no problems with morning and 2 pm treatments but evening treatments  leaving her with severe jitters,  she feels that her heart is racing and she feels heaviness in her chest and anxiety. This last treatment is around 7 pm and it is of  Ipratropium bromide and Levabuterol and since it makes her feel that way she does not do her second treatment of  the day of the Budesonide. Please advise, thank you.

## 2024-05-28 ENCOUNTER — HOME CARE VISIT (OUTPATIENT)
Dept: HOME HEALTH SERVICES | Facility: HOME HEALTHCARE | Age: 79
End: 2024-05-28
Payer: COMMERCIAL

## 2024-05-28 VITALS — SYSTOLIC BLOOD PRESSURE: 128 MMHG | HEART RATE: 77 BPM | DIASTOLIC BLOOD PRESSURE: 70 MMHG | OXYGEN SATURATION: 96 %

## 2024-05-28 PROCEDURE — G0152 HHCP-SERV OF OT,EA 15 MIN: HCPCS

## 2024-05-29 ENCOUNTER — HOME CARE VISIT (OUTPATIENT)
Dept: HOME HEALTH SERVICES | Facility: HOME HEALTHCARE | Age: 79
End: 2024-05-29
Payer: COMMERCIAL

## 2024-05-29 PROCEDURE — G0299 HHS/HOSPICE OF RN EA 15 MIN: HCPCS

## 2024-05-30 ENCOUNTER — HOME CARE VISIT (OUTPATIENT)
Dept: HOME HEALTH SERVICES | Facility: HOME HEALTHCARE | Age: 79
End: 2024-05-30
Payer: COMMERCIAL

## 2024-05-30 VITALS
DIASTOLIC BLOOD PRESSURE: 68 MMHG | HEART RATE: 93 BPM | OXYGEN SATURATION: 94 % | SYSTOLIC BLOOD PRESSURE: 138 MMHG | TEMPERATURE: 97.8 F

## 2024-05-30 VITALS — OXYGEN SATURATION: 97 % | DIASTOLIC BLOOD PRESSURE: 64 MMHG | HEART RATE: 74 BPM | SYSTOLIC BLOOD PRESSURE: 130 MMHG

## 2024-05-30 PROCEDURE — G0152 HHCP-SERV OF OT,EA 15 MIN: HCPCS

## 2024-05-31 ENCOUNTER — HOME CARE VISIT (OUTPATIENT)
Dept: HOME HEALTH SERVICES | Facility: HOME HEALTHCARE | Age: 79
End: 2024-05-31
Payer: COMMERCIAL

## 2024-05-31 PROCEDURE — G0299 HHS/HOSPICE OF RN EA 15 MIN: HCPCS

## 2024-06-02 ENCOUNTER — HOME CARE VISIT (OUTPATIENT)
Dept: HOME HEALTH SERVICES | Facility: HOME HEALTHCARE | Age: 79
End: 2024-06-02
Payer: COMMERCIAL

## 2024-06-02 VITALS
TEMPERATURE: 97.9 F | DIASTOLIC BLOOD PRESSURE: 62 MMHG | HEART RATE: 78 BPM | SYSTOLIC BLOOD PRESSURE: 122 MMHG | OXYGEN SATURATION: 95 %

## 2024-06-03 ENCOUNTER — HOME CARE VISIT (OUTPATIENT)
Dept: HOME HEALTH SERVICES | Facility: HOME HEALTHCARE | Age: 79
End: 2024-06-03
Payer: COMMERCIAL

## 2024-06-03 VITALS — OXYGEN SATURATION: 93 % | HEART RATE: 79 BPM | DIASTOLIC BLOOD PRESSURE: 74 MMHG | SYSTOLIC BLOOD PRESSURE: 128 MMHG

## 2024-06-03 PROCEDURE — G0152 HHCP-SERV OF OT,EA 15 MIN: HCPCS

## 2024-06-07 ENCOUNTER — HOME CARE VISIT (OUTPATIENT)
Dept: HOME HEALTH SERVICES | Facility: HOME HEALTHCARE | Age: 79
End: 2024-06-07
Payer: COMMERCIAL

## 2024-06-10 ENCOUNTER — HOME CARE VISIT (OUTPATIENT)
Dept: HOME HEALTH SERVICES | Facility: HOME HEALTHCARE | Age: 79
End: 2024-06-10
Payer: COMMERCIAL

## 2024-06-10 VITALS — OXYGEN SATURATION: 98 % | HEART RATE: 78 BPM | DIASTOLIC BLOOD PRESSURE: 70 MMHG | SYSTOLIC BLOOD PRESSURE: 128 MMHG

## 2024-06-10 DIAGNOSIS — M31.6 TEMPORAL ARTERITIS (HCC): ICD-10-CM

## 2024-06-10 DIAGNOSIS — J44.9 COPD, SEVERE (HCC): ICD-10-CM

## 2024-06-10 PROCEDURE — G0152 HHCP-SERV OF OT,EA 15 MIN: HCPCS

## 2024-06-11 RX ORDER — PREDNISONE 5 MG/1
5 TABLET ORAL
Qty: 30 TABLET | Refills: 0 | Status: SHIPPED | OUTPATIENT
Start: 2024-06-11 | End: 2024-06-14

## 2024-06-12 ENCOUNTER — HOME CARE VISIT (OUTPATIENT)
Dept: HOME HEALTH SERVICES | Facility: HOME HEALTHCARE | Age: 79
End: 2024-06-12
Payer: COMMERCIAL

## 2024-06-12 VITALS — OXYGEN SATURATION: 96 % | DIASTOLIC BLOOD PRESSURE: 74 MMHG | HEART RATE: 70 BPM | SYSTOLIC BLOOD PRESSURE: 118 MMHG

## 2024-06-12 PROCEDURE — G0152 HHCP-SERV OF OT,EA 15 MIN: HCPCS

## 2024-06-14 DIAGNOSIS — J44.9 COPD, SEVERE (HCC): ICD-10-CM

## 2024-06-14 DIAGNOSIS — M31.6 TEMPORAL ARTERITIS (HCC): ICD-10-CM

## 2024-06-14 RX ORDER — PREDNISONE 5 MG/1
5 TABLET ORAL
Qty: 30 TABLET | Refills: 0 | Status: SHIPPED | OUTPATIENT
Start: 2024-06-14

## 2024-06-16 ENCOUNTER — APPOINTMENT (EMERGENCY)
Dept: RADIOLOGY | Facility: HOSPITAL | Age: 79
End: 2024-06-16
Payer: COMMERCIAL

## 2024-06-16 ENCOUNTER — HOSPITAL ENCOUNTER (EMERGENCY)
Facility: HOSPITAL | Age: 79
Discharge: HOME | End: 2024-06-16
Attending: EMERGENCY MEDICINE | Admitting: EMERGENCY MEDICINE
Payer: COMMERCIAL

## 2024-06-16 VITALS
OXYGEN SATURATION: 97 % | WEIGHT: 103.17 LBS | BODY MASS INDEX: 18.99 KG/M2 | RESPIRATION RATE: 16 BRPM | HEART RATE: 80 BPM | HEIGHT: 62 IN | SYSTOLIC BLOOD PRESSURE: 185 MMHG | TEMPERATURE: 98.2 F | DIASTOLIC BLOOD PRESSURE: 88 MMHG

## 2024-06-16 DIAGNOSIS — J43.8 OTHER EMPHYSEMA (HCC): ICD-10-CM

## 2024-06-16 DIAGNOSIS — F41.9 ANXIETY: ICD-10-CM

## 2024-06-16 DIAGNOSIS — R07.9 CHEST PAIN WITH LOW RISK OF ACUTE CORONARY SYNDROME: Primary | ICD-10-CM

## 2024-06-16 DIAGNOSIS — J44.9 COPD, SEVERE (HCC): ICD-10-CM

## 2024-06-16 LAB
2HR DELTA HS TROPONIN: 0 NG/L
ALBUMIN SERPL BCP-MCNC: 4.4 G/DL (ref 3.5–5)
ALP SERPL-CCNC: 54 U/L (ref 34–104)
ALT SERPL W P-5'-P-CCNC: 13 U/L (ref 7–52)
ANION GAP SERPL CALCULATED.3IONS-SCNC: 7 MMOL/L (ref 4–13)
AST SERPL W P-5'-P-CCNC: 21 U/L (ref 13–39)
ATRIAL RATE: 81 BPM
ATRIAL RATE: 84 BPM
BASOPHILS # BLD AUTO: 0.05 THOUSANDS/ÂΜL (ref 0–0.1)
BASOPHILS NFR BLD AUTO: 1 % (ref 0–1)
BILIRUB SERPL-MCNC: 0.41 MG/DL (ref 0.2–1)
BUN SERPL-MCNC: 16 MG/DL (ref 5–25)
CALCIUM SERPL-MCNC: 9.9 MG/DL (ref 8.4–10.2)
CARDIAC TROPONIN I PNL SERPL HS: 4 NG/L
CARDIAC TROPONIN I PNL SERPL HS: 4 NG/L
CHLORIDE SERPL-SCNC: 100 MMOL/L (ref 96–108)
CO2 SERPL-SCNC: 31 MMOL/L (ref 21–32)
CREAT SERPL-MCNC: 0.91 MG/DL (ref 0.6–1.3)
EOSINOPHIL # BLD AUTO: 0.1 THOUSAND/ÂΜL (ref 0–0.61)
EOSINOPHIL NFR BLD AUTO: 1 % (ref 0–6)
ERYTHROCYTE [DISTWIDTH] IN BLOOD BY AUTOMATED COUNT: 13.9 % (ref 11.6–15.1)
GFR SERPL CREATININE-BSD FRML MDRD: 60 ML/MIN/1.73SQ M
GLUCOSE SERPL-MCNC: 113 MG/DL (ref 65–140)
HCT VFR BLD AUTO: 44.3 % (ref 34.8–46.1)
HGB BLD-MCNC: 14.1 G/DL (ref 11.5–15.4)
IMM GRANULOCYTES # BLD AUTO: 0.03 THOUSAND/UL (ref 0–0.2)
IMM GRANULOCYTES NFR BLD AUTO: 0 % (ref 0–2)
LYMPHOCYTES # BLD AUTO: 1.02 THOUSANDS/ÂΜL (ref 0.6–4.47)
LYMPHOCYTES NFR BLD AUTO: 12 % (ref 14–44)
MCH RBC QN AUTO: 27.6 PG (ref 26.8–34.3)
MCHC RBC AUTO-ENTMCNC: 31.8 G/DL (ref 31.4–37.4)
MCV RBC AUTO: 87 FL (ref 82–98)
MONOCYTES # BLD AUTO: 0.63 THOUSAND/ÂΜL (ref 0.17–1.22)
MONOCYTES NFR BLD AUTO: 7 % (ref 4–12)
NEUTROPHILS # BLD AUTO: 7 THOUSANDS/ÂΜL (ref 1.85–7.62)
NEUTS SEG NFR BLD AUTO: 79 % (ref 43–75)
NRBC BLD AUTO-RTO: 0 /100 WBCS
P AXIS: 65 DEGREES
P AXIS: 70 DEGREES
PLATELET # BLD AUTO: 252 THOUSANDS/UL (ref 149–390)
PMV BLD AUTO: 10.1 FL (ref 8.9–12.7)
POTASSIUM SERPL-SCNC: 4.2 MMOL/L (ref 3.5–5.3)
PR INTERVAL: 136 MS
PR INTERVAL: 136 MS
PROT SERPL-MCNC: 7.5 G/DL (ref 6.4–8.4)
QRS AXIS: 58 DEGREES
QRS AXIS: 62 DEGREES
QRSD INTERVAL: 62 MS
QRSD INTERVAL: 70 MS
QT INTERVAL: 356 MS
QT INTERVAL: 360 MS
QTC INTERVAL: 413 MS
QTC INTERVAL: 425 MS
RBC # BLD AUTO: 5.1 MILLION/UL (ref 3.81–5.12)
SODIUM SERPL-SCNC: 138 MMOL/L (ref 135–147)
T WAVE AXIS: 71 DEGREES
T WAVE AXIS: 75 DEGREES
VENTRICULAR RATE: 81 BPM
VENTRICULAR RATE: 84 BPM
WBC # BLD AUTO: 8.83 THOUSAND/UL (ref 4.31–10.16)

## 2024-06-16 PROCEDURE — 80053 COMPREHEN METABOLIC PANEL: CPT | Performed by: EMERGENCY MEDICINE

## 2024-06-16 PROCEDURE — 93005 ELECTROCARDIOGRAM TRACING: CPT

## 2024-06-16 PROCEDURE — 99285 EMERGENCY DEPT VISIT HI MDM: CPT

## 2024-06-16 PROCEDURE — 93010 ELECTROCARDIOGRAM REPORT: CPT | Performed by: INTERNAL MEDICINE

## 2024-06-16 PROCEDURE — 36415 COLL VENOUS BLD VENIPUNCTURE: CPT | Performed by: EMERGENCY MEDICINE

## 2024-06-16 PROCEDURE — 84484 ASSAY OF TROPONIN QUANT: CPT | Performed by: EMERGENCY MEDICINE

## 2024-06-16 PROCEDURE — 71045 X-RAY EXAM CHEST 1 VIEW: CPT

## 2024-06-16 PROCEDURE — 99285 EMERGENCY DEPT VISIT HI MDM: CPT | Performed by: EMERGENCY MEDICINE

## 2024-06-16 PROCEDURE — 71046 X-RAY EXAM CHEST 2 VIEWS: CPT

## 2024-06-16 PROCEDURE — 85025 COMPLETE CBC W/AUTO DIFF WBC: CPT | Performed by: EMERGENCY MEDICINE

## 2024-06-16 RX ORDER — ALPRAZOLAM 0.25 MG/1
0.25 TABLET ORAL ONCE
Status: COMPLETED | OUTPATIENT
Start: 2024-06-16 | End: 2024-06-16

## 2024-06-16 RX ORDER — ALBUTEROL SULFATE 2.5 MG/3ML
2.5 SOLUTION RESPIRATORY (INHALATION) 2 TIMES DAILY
Qty: 75 ML | Refills: 0 | Status: SHIPPED | OUTPATIENT
Start: 2024-06-16

## 2024-06-16 RX ADMIN — ALPRAZOLAM 0.25 MG: 0.25 TABLET ORAL at 19:11

## 2024-06-16 NOTE — ED PROVIDER NOTES
History  Chief Complaint   Patient presents with    Chest Pain     Pt arrives from home via ems for CP, SOB, and anxiety.  Pt attempted to use nebulizer but couldn't get it to function and states she has a panic attack which is when her chest heaviness started.       78-year-old female with history of COPD, hypertension, diabetes, anxiety presents to the ED with chest pain and shortness of breath.  Patient states she started with some shortness of breath few hours ago.  She states that her nebulizer machine was leaking and she could give herself a treatment which she usually does twice a day.  After she could not get the nebulizer to work she started complaining of left-sided chest tightness as well as increased shortness of breath.  EMS was called and patient was given nitroglycerin and aspirin with some relief.  Patient states she still has a little bit of left-sided chest pressure.  She is complaining of anxiety.  No known coronary artery disease.      History provided by:  Patient   used: No    Chest Pain  Pain location:  L chest  Pain quality: tightness    Pain radiates to:  Does not radiate  Pain radiates to the back: no    Onset quality:  Gradual  Duration:  2 hours  Timing:  Constant  Progression:  Partially resolved  Chronicity:  Recurrent  Context comment:  Felt short of breath and nebulizer was not working  Relieved by:  Nothing  Worsened by:  Nothing tried  Ineffective treatments:  Aspirin and nitroglycerin  Associated symptoms: anxiety and shortness of breath    Associated symptoms: no abdominal pain, no altered mental status, no anorexia, no back pain, no claudication, no cough, no diaphoresis, no dizziness, no dysphagia, no fatigue, no fever, no headache, no heartburn, no lower extremity edema, no nausea, no near-syncope, no numbness, no orthopnea, no palpitations, no PND, no syncope, not vomiting and no weakness    Risk factors: diabetes mellitus and hypertension    Risk factors:  no coronary artery disease, no prior DVT/PE, no smoking and no surgery        Prior to Admission Medications   Prescriptions Last Dose Informant Patient Reported? Taking?   ALPRAZolam (XANAX) 0.25 mg tablet  Self No No   Sig: Take 1 tablet (0.25 mg total) by mouth daily at bedtime as needed for anxiety or sleep   Biotin 10 MG TABS  Self Yes No   Sig: Take by mouth in the morning   Patient not taking: Reported on 5/6/2024   Calcium Carb-Cholecalciferol (Caltrate 600+D3) 600-20 MG-MCG TABS  Self No No   Sig: Take 1 tablet by mouth 2 (two) times a day   acetaminophen (TYLENOL) 500 mg tablet  Self No No   Sig: Take 1 tablet (500 mg total) by mouth every 6 (six) hours as needed (pain fevers)   albuterol (PROVENTIL HFA,VENTOLIN HFA) 90 mcg/act inhaler  Self No No   Sig: Inhale 2 puffs every 6 (six) hours as needed for wheezing or shortness of breath   aspirin (ECOTRIN LOW STRENGTH) 81 mg EC tablet  Self No No   Sig: Take 1 tablet (81 mg total) by mouth daily   budesonide (Pulmicort) 0.5 mg/2 mL nebulizer solution   No No   Sig: Take 2 mL (0.5 mg total) by nebulization 2 (two) times a day Rinse mouth after use.   Patient taking differently: Take 2 mL by nebulization 2 (two) times a day. Rinse mouth after use.   May take this right after taking levabuterol/ipratropium.   Indications: Chronic Obstructive Lung Disease   cetirizine (ZyrTEC) 10 mg tablet  Self No No   Sig: Take 1 tablet (10 mg total) by mouth daily With food/in the evening   Patient not taking: Reported on 5/6/2024   denosumab (PROLIA) 60 mg/mL   No No   Sig: Inject 1 mL (60 mg total) under the skin once for 1 dose   dextromethorphan-guaifenesin (MUCINEX DM)  MG per 12 hr tablet  Self No No   Sig: Take 1 tablet by mouth 2 (two) times a day with meals   Patient not taking: Reported on 5/6/2024   ipratropium (ATROVENT) 0.02 % nebulizer solution   No No   Sig: Take 2.5 mL (0.5 mg total) by nebulization 3 (three) times a day   Patient taking differently: Take  2.5 mL by nebulization 3 (three) times a day. May combine with levalbuterol.  Indications: Chronic Obstructive Lung Disease   levothyroxine 50 mcg tablet  Self No No   Sig: Take 1 tablet (50 mcg total) by mouth daily   pantoprazole (PROTONIX) 40 mg tablet   No No   Sig: TAKE ONE TABLET BY MOUTH ONCE DAILY   predniSONE 5 mg tablet   No No   Sig: TAKE ONE TABLET BY MOUTH ONCE DAILY WITH BREAKFAST   propranolol (INDERAL) 10 mg tablet  Self No No   Sig: Take 1 tablet (10 mg total) by mouth 2 (two) times a day   saccharomyces boulardii (FLORASTOR) 250 mg capsule  Self No No   Sig: Take 1 capsule (250 mg total) by mouth 2 (two) times a day   valsartan (DIOVAN) 160 mg tablet  Self No No   Sig: Take 1 tablet (160 mg total) by mouth daily Please STOP Lisinopril,..   vitamin B-12 (VITAMIN B-12) 1,000 mcg tablet  Self No No   Sig: Take 1 tablet (1,000 mcg total) by mouth daily      Facility-Administered Medications: None       Past Medical History:   Diagnosis Date    Asthma     Chronic obstructive pulmonary disease (HCC) 9/26/2012    GERD (gastroesophageal reflux disease)     Hypertension 10/11/2018    Hypothyroid     Osteoarthritis 9/26/2012    Temporal arteritis (HCC)     Tubular adenoma     Type 2 diabetes mellitus with complication, without long-term current use of insulin ([facility]) 1/14/2020       Past Surgical History:   Procedure Laterality Date    EYE SURGERY Right 12/06/2019    cataract LVCFS    HYSTERECTOMY      NO PAST SURGERIES      ALSO NO RELEVANT PAST SURRGICAL HX AS PER [facility]       Family History   Problem Relation Age of Onset    Breast cancer Mother     Emphysema Father      I have reviewed and agree with the history as documented.    E-Cigarette/Vaping    E-Cigarette Use Never User      E-Cigarette/Vaping Substances    Nicotine No     THC No     CBD No     Flavoring No     Other No     Unknown No      Social History     Tobacco Use    Smoking status: Former     Current packs/day: 0.00     Average packs/day: 1  pack/day for 54.0 years (54.0 ttl pk-yrs)     Types: Cigarettes     Start date:      Quit date: 2013     Years since quittin.4    Smokeless tobacco: Never    Tobacco comments:     TOBACCO USE, NO PASSIVE SMOKE EXPOSURE   Vaping Use    Vaping status: Never Used   Substance Use Topics    Alcohol use: Never    Drug use: No       Review of Systems   Constitutional: Negative.  Negative for chills, diaphoresis, fatigue and fever.   HENT: Negative.  Negative for congestion, rhinorrhea, sore throat and trouble swallowing.    Eyes: Negative.  Negative for discharge, redness and itching.   Respiratory:  Positive for shortness of breath. Negative for apnea, cough, chest tightness and wheezing.    Cardiovascular:  Positive for chest pain. Negative for palpitations, orthopnea, claudication, leg swelling, syncope, PND and near-syncope.   Gastrointestinal: Negative.  Negative for abdominal pain, anorexia, heartburn, nausea and vomiting.   Endocrine: Negative.    Genitourinary: Negative.  Negative for flank pain, frequency and urgency.   Musculoskeletal: Negative.  Negative for back pain.   Skin: Negative.    Allergic/Immunologic: Negative.    Neurological: Negative.  Negative for dizziness, syncope, weakness, light-headedness, numbness and headaches.   Hematological: Negative.    All other systems reviewed and are negative.      Physical Exam  Physical Exam  Vitals and nursing note reviewed.   Constitutional:       General: She is awake. She is not in acute distress.     Appearance: Normal appearance. She is well-developed and normal weight. She is not ill-appearing, toxic-appearing or diaphoretic.   HENT:      Head: Normocephalic and atraumatic.      Right Ear: External ear normal.      Left Ear: External ear normal.      Nose: Nose normal.      Mouth/Throat:      Mouth: Oropharynx is clear and moist.   Eyes:      General: No scleral icterus.        Right eye: No discharge.         Left eye: No discharge.       Extraocular Movements: EOM normal.      Conjunctiva/sclera: Conjunctivae normal.   Neck:      Thyroid: No thyromegaly.      Vascular: No JVD.      Trachea: No tracheal deviation.   Cardiovascular:      Rate and Rhythm: Normal rate and regular rhythm.      Heart sounds: Normal heart sounds. No murmur heard.     No friction rub. No gallop.   Pulmonary:      Effort: Pulmonary effort is normal. No tachypnea, accessory muscle usage or respiratory distress.      Breath sounds: Normal breath sounds. No stridor. No wheezing, rhonchi or rales.   Chest:      Chest wall: No tenderness.   Abdominal:      General: Bowel sounds are normal. There is no distension.      Palpations: Abdomen is soft. There is no mass.      Tenderness: There is no abdominal tenderness.      Hernia: No hernia is present.   Musculoskeletal:         General: No tenderness, deformity or edema. Normal range of motion.      Right lower leg: No edema.      Left lower leg: No edema.   Skin:     General: Skin is warm and dry.      Coloration: Skin is not jaundiced or pale.      Findings: No bruising, erythema, lesion or rash.   Neurological:      General: No focal deficit present.      Mental Status: She is alert and oriented to person, place, and time.      Motor: No weakness or abnormal muscle tone.      Deep Tendon Reflexes: Reflexes are normal and symmetric.   Psychiatric:         Mood and Affect: Mood and affect and mood normal.         Behavior: Behavior is cooperative.         Vital Signs  ED Triage Vitals [06/16/24 1859]   Temperature Pulse Respirations Blood Pressure SpO2   98.2 °F (36.8 °C) 85 18 170/86 96 %      Temp Source Heart Rate Source Patient Position - Orthostatic VS BP Location FiO2 (%)   Oral Monitor Lying Right arm --      Pain Score       --           Vitals:    06/16/24 1859 06/16/24 2105   BP: 170/86 (!) 185/88   Pulse: 85 80   Patient Position - Orthostatic VS: Lying Lying         Visual Acuity      ED Medications  Medications    ALPRAZolam (XANAX) tablet 0.25 mg (0.25 mg Oral Given 6/16/24 1911)       Diagnostic Studies  Results Reviewed       Procedure Component Value Units Date/Time    HS Troponin I 2hr [[id number]]  (Normal) Collected: 06/16/24 2104    Lab Status: Final result Specimen: Blood from Arm, Left Updated: 06/16/24 2135     hs TnI 2hr 4 ng/L      Delta 2hr hsTnI 0 ng/L     HS Troponin I 4hr [[id number]]     Lab Status: No result Specimen: Blood     Comprehensive metabolic panel [[id number]] Collected: 06/16/24 1912    Lab Status: Final result Specimen: Blood from Arm, Left Updated: 06/16/24 1943     Sodium 138 mmol/L      Potassium 4.2 mmol/L      Chloride 100 mmol/L      CO2 31 mmol/L      ANION GAP 7 mmol/L      BUN 16 mg/dL      Creatinine 0.91 mg/dL      Glucose 113 mg/dL      Calcium 9.9 mg/dL      AST 21 U/L      ALT 13 U/L      Alkaline Phosphatase 54 U/L      Total Protein 7.5 g/dL      Albumin 4.4 g/dL      Total Bilirubin 0.41 mg/dL      eGFR 60 ml/min/1.73sq m     Narrative:      National Kid[employer] Foundation guidelines for Chronic Kidney Disease (CKD):     Stage 1 with normal or high GFR (GFR > 90 mL/min/1.73 square meters)    Stage 2 Mild CKD (GFR = 60-89 mL/min/1.73 square meters)    Stage 3A Moderate CKD (GFR = 45-59 mL/min/1.73 square meters)    Stage 3B Moderate CKD (GFR = 30-44 mL/min/1.73 square meters)    Stage 4 Severe CKD (GFR = 15-29 mL/min/1.73 square meters)    Stage 5 End Stage CKD (GFR <15 mL/min/1.73 square meters)  Note: GFR calculation is accurate only with a steady state creatinine    HS Troponin 0hr (reflex protocol) [[id number]]  (Normal) Collected: 06/16/24 1912    Lab Status: Final result Specimen: Blood from Arm, Left Updated: 06/16/24 1939     hs TnI 0hr 4 ng/L     CBC and differential [[id number]]  (Abnormal) Collected: 06/16/24 1912    Lab Status: Final result Specimen: Blood from Arm, Left Updated: 06/16/24 1917     WBC 8.83 Thousand/uL      RBC 5.10 Million/uL      Hemoglobin 14.1 g/dL       Hematocrit 44.3 %      MCV 87 fL      MCH 27.6 pg      MCHC 31.8 g/dL      RDW 13.9 %      MPV 10.1 fL      Platelets 252 Thousands/uL      nRBC 0 /100 WBCs      Segmented % 79 %      Immature Grans % 0 %      Lymphocytes % 12 %      Monocytes % 7 %      Eosinophils Relative 1 %      Basophils Relative 1 %      Absolute Neutrophils 7.00 Thousands/µL      Absolute Immature Grans 0.03 Thousand/uL      Absolute Lymphocytes 1.02 Thousands/µL      Absolute Monocytes 0.63 Thousand/µL      Eosinophils Absolute 0.10 Thousand/µL      Basophils Absolute 0.05 Thousands/µL                    XR chest 2 views   ED Interpretation by Linsey Navarro DO (06/16 2031)   Nad.  Similar to prior chest x-ray with chronic middle lobe atelectasis      XR chest 1 view portable    (Results Pending)              Procedures  ECG 12 Lead Documentation Only    Date/Time: 6/16/2024 7:49 PM    Performed by: Linsey Navarro DO  Authorized by: Linsey Navarro DO    Indications / Diagnosis:  Cp  ECG reviewed by me, the ED Provider: yes    Patient location:  ED  Interpretation:     Interpretation: non-specific    Rate:     ECG rate:  84    ECG rate assessment: normal    Rhythm:     Rhythm: sinus rhythm    Ectopy:     Ectopy: none    Conduction:     Conduction: normal    ST segments:     ST segments:  Non-specific  T waves:     T waves: normal             ED Course  ED Course as of 06/16/24 2146   Sun Jun 16, 2024 1941 hs TnI 0hr: 4   1941 WBC: 8.83   1941 Hemoglobin: 14.1   1944 Normal CMP   2031 Chest x-ray:NAD.  Similar to prior chest x-ray with chronic middle lobe atelectasis   2143 hs TnI 2hr: 4   2143 Delta 2hr hsTnI: 0             HEART Risk Score      Flowsheet Row Most Recent Value   Heart Score Risk Calculator    History 0 Filed at: 06/16/2024 2143   ECG 1 Filed at: 06/16/2024 2143   Age 2 Filed at: 06/16/2024 2143   Risk Factors 1 Filed at: 06/16/2024 2143   Troponin 0 Filed at: 06/16/2024 2143   HEART Score 4 Filed  at: 06/16/2024 2143                                        Medical Decision Making  78-year-old female presents to the emergency department with chest pain, shortness of breath.  She states she started feeling short of breath a few hours ago.  She normally uses her nebulizer twice a day for her COPD, she could not get it to function tonight which caused anxiety and chest tightness.  EMS was called and patient was given nitroglycerin and aspirin with partial relief of her symptoms but she still states she has some left chest tightness and feels anxious.  She has no known coronary artery disease and had a normal nuclear medicine stress test in April 2023.  On exam she is alert in no acute distress.  Her exam is normal.  Given her age and risk factors we will do cardiac workup but lower clinical suspicion for ACS.  Will treat anxiety with her usual Xanax.  Will reassess    Amount and/or Complexity of Data Reviewed  Labs: ordered. Decision-making details documented in ED Course.  Radiology: ordered and independent interpretation performed.  ECG/medicine tests: ordered and independent interpretation performed.     Details: Normal sinus rhythm rate 84.  No ectopy.  Nonspecific ST-T wave changes.  No ischemia.  No STEMI.    Risk  Prescription drug management.             Disposition  Final diagnoses:   Chest pain with low risk of acute coronary syndrome   Anxiety   Other emphysema (HCC)     Time reflects when diagnosis was documented in both MDM as applicable and the Disposition within this note       Time User Action Codes Description Comment    6/16/2024  9:44 PM Linsey Navarro Add [R07.9] Chest pain with low risk of acute coronary syndrome     6/16/2024  9:44 PM Linsey Navarro Add [F41.9] Anxiety     6/16/2024  9:45 PM Linsey Navarro Add [J43.8] Other emphysema (HCC)           ED Disposition       ED Disposition   Discharge    Condition   Good    Date/Time   Sun Jun 16, 2024 2144    Comment   Sarah Zayas  discharge to home/self care.                   Follow-up Information       Follow up With Specialties Details Why Contact Info    Nicolas Nix MD Internal Medicine Schedule an appointment as soon as possible for a visit in 2 days  [facility] [address] [place], [place] [id number] [phone]              Patient's Medications   Discharge Prescriptions    ALBUTEROL (2.5 MG/3 ML) 0.083 % NEBULIZER SOLUTION    Take 3 mL (2.5 mg total) by nebulization 2 (two) times a day       Start Date: 6/16/2024 End Date: --       Order Dose: 2.5 mg       Quantity: 75 mL    Refills: 0    IPRATROPIUM (ATROVENT) 0.02 % NEBULIZER SOLUTION    Take 2.5 mL (0.5 mg total) by nebulization 2 (two) times a day       Start Date: 6/16/2024 End Date: 7/16/2024       Order Dose: 0.5 mg       Quantity: 150 mL    Refills: 0       No discharge procedures on file.    PDMP Review         Value Time User    PDMP Reviewed  Yes 3/11/2024  2:01 PM Nicolas Nix MD            ED Provider  Electronically Signed by             Linsey Navarro,   06/16/24 [id number]       Linsey Navarro, DO  06/16/24 [id number]

## 2024-06-17 ENCOUNTER — TELEPHONE (OUTPATIENT)
Age: 79
End: 2024-06-17

## 2024-06-17 NOTE — TELEPHONE ENCOUNTER
Patient called stating that she was in the ER last night with chest pain and anxiety.  They sent her home with a new nebulizer and she does not know how to use it. She usually uses her nebulizer twice daily.  [facility] just discharged her last week and she is asking if they could come out to see her again to help her with this.    Please f/u with patient to advise.

## 2024-06-18 DIAGNOSIS — J44.9 COPD, SEVERE (HCC): Primary | ICD-10-CM

## 2024-06-18 DIAGNOSIS — J43.8 OTHER EMPHYSEMA (HCC): ICD-10-CM

## 2024-06-18 NOTE — TELEPHONE ENCOUNTER
Regarding [facility] referral. per the note in the referral, spoke to the patient and they ordered another nebulizer. She is aware to call the pharmacy if she has further questions.

## 2024-06-22 DIAGNOSIS — F41.9 ANXIETY: ICD-10-CM

## 2024-06-22 RX ORDER — PROPRANOLOL HYDROCHLORIDE 10 MG/1
10 TABLET ORAL 2 TIMES DAILY
Qty: 60 TABLET | Refills: 5 | Status: SHIPPED | OUTPATIENT
Start: 2024-06-22

## 2024-06-25 DIAGNOSIS — F41.9 ANXIETY: ICD-10-CM

## 2024-06-25 RX ORDER — ALPRAZOLAM 0.25 MG/1
0.25 TABLET ORAL
Qty: 30 TABLET | Refills: 1 | Status: SHIPPED | OUTPATIENT
Start: 2024-06-25

## 2024-06-25 NOTE — TELEPHONE ENCOUNTER
Reason for call:   [x] Refill   [] Prior Auth  [] Other:     Office:   [x] PCP/Provider -   [] Specialty/Provider -     Medication: XANAX    Dose/Frequency: 0.25 MG    Quantity: 30    Pharmacy:   [facility] - [place] - [address]  [address], [place] [address]  Phone: [phone]  Fax: [phone]     Does the patient have enough for 3 days?   [x] Yes   [] No - Send as HP to POD

## 2024-07-03 DIAGNOSIS — J44.9 COPD, SEVERE (HCC): ICD-10-CM

## 2024-07-11 DIAGNOSIS — M31.6 TEMPORAL ARTERITIS (HCC): Primary | ICD-10-CM

## 2024-07-11 DIAGNOSIS — M31.6 TEMPORAL ARTERITIS (HCC): ICD-10-CM

## 2024-07-11 DIAGNOSIS — J44.9 COPD, SEVERE (HCC): ICD-10-CM

## 2024-07-11 RX ORDER — PREDNISONE 2.5 MG/1
2.5 TABLET ORAL
Qty: 30 TABLET | Refills: 2 | Status: SHIPPED | OUTPATIENT
Start: 2024-07-11

## 2024-07-11 RX ORDER — PREDNISONE 5 MG/1
5 TABLET ORAL
Qty: 30 TABLET | Refills: 0 | OUTPATIENT
Start: 2024-07-11

## 2024-07-22 ENCOUNTER — APPOINTMENT (EMERGENCY)
Dept: CT IMAGING | Facility: HOSPITAL | Age: 79
DRG: 602 | End: 2024-07-22
Payer: COMMERCIAL

## 2024-07-22 ENCOUNTER — HOSPITAL ENCOUNTER (EMERGENCY)
Facility: HOSPITAL | Age: 79
Discharge: HOME | DRG: 602 | End: 2024-07-22
Payer: COMMERCIAL

## 2024-07-22 ENCOUNTER — TELEPHONE (OUTPATIENT)
Dept: FAMILY MEDICINE CLINIC | Facility: CLINIC | Age: 79
End: 2024-07-22

## 2024-07-22 ENCOUNTER — TELEPHONE (OUTPATIENT)
Age: 79
End: 2024-07-22

## 2024-07-22 ENCOUNTER — APPOINTMENT (EMERGENCY)
Dept: NON INVASIVE DIAGNOSTICS | Facility: HOSPITAL | Age: 79
DRG: 602 | End: 2024-07-22
Payer: COMMERCIAL

## 2024-07-22 VITALS
DIASTOLIC BLOOD PRESSURE: 72 MMHG | HEART RATE: 82 BPM | SYSTOLIC BLOOD PRESSURE: 140 MMHG | TEMPERATURE: 97.7 F | BODY MASS INDEX: 18.47 KG/M2 | RESPIRATION RATE: 20 BRPM | OXYGEN SATURATION: 95 % | WEIGHT: 101 LBS

## 2024-07-22 DIAGNOSIS — M54.50 RIGHT LOW BACK PAIN: Primary | ICD-10-CM

## 2024-07-22 DIAGNOSIS — J44.9 COPD (CHRONIC OBSTRUCTIVE PULMONARY DISEASE) (HCC): ICD-10-CM

## 2024-07-22 LAB
ATRIAL RATE: 80 BPM
ATRIAL RATE: 82 BPM
BACTERIA UR QL AUTO: ABNORMAL /HPF
BASOPHILS # BLD AUTO: 0.04 THOUSANDS/ÂΜL (ref 0–0.1)
BASOPHILS NFR BLD AUTO: 1 % (ref 0–1)
BILIRUB UR QL STRIP: NEGATIVE
CLARITY UR: CLEAR
COLOR UR: ABNORMAL
EOSINOPHIL # BLD AUTO: 0.21 THOUSAND/ÂΜL (ref 0–0.61)
EOSINOPHIL NFR BLD AUTO: 3 % (ref 0–6)
ERYTHROCYTE [DISTWIDTH] IN BLOOD BY AUTOMATED COUNT: 13.6 % (ref 11.6–15.1)
GLUCOSE UR STRIP-MCNC: NEGATIVE MG/DL
HCT VFR BLD AUTO: 43.8 % (ref 34.8–46.1)
HGB BLD-MCNC: 13.6 G/DL (ref 11.5–15.4)
HGB UR QL STRIP.AUTO: NEGATIVE
IMM GRANULOCYTES # BLD AUTO: 0.03 THOUSAND/UL (ref 0–0.2)
IMM GRANULOCYTES NFR BLD AUTO: 0 % (ref 0–2)
KETONES UR STRIP-MCNC: NEGATIVE MG/DL
LEUKOCYTE ESTERASE UR QL STRIP: 25
LIPASE SERPL-CCNC: 53 U/L (ref 11–82)
LYMPHOCYTES # BLD AUTO: 0.85 THOUSANDS/ÂΜL (ref 0.6–4.47)
LYMPHOCYTES NFR BLD AUTO: 10 % (ref 14–44)
MCH RBC QN AUTO: 28.3 PG (ref 26.8–34.3)
MCHC RBC AUTO-ENTMCNC: 31.1 G/DL (ref 31.4–37.4)
MCV RBC AUTO: 91 FL (ref 82–98)
MONOCYTES # BLD AUTO: 0.55 THOUSAND/ÂΜL (ref 0.17–1.22)
MONOCYTES NFR BLD AUTO: 7 % (ref 4–12)
NEUTROPHILS # BLD AUTO: 6.75 THOUSANDS/ÂΜL (ref 1.85–7.62)
NEUTS SEG NFR BLD AUTO: 79 % (ref 43–75)
NITRITE UR QL STRIP: NEGATIVE
NON-SQ EPI CELLS URNS QL MICRO: ABNORMAL /HPF
NRBC BLD AUTO-RTO: 0 /100 WBCS
P AXIS: 64 DEGREES
P AXIS: 81 DEGREES
PH UR STRIP.AUTO: 7 [PH]
PLATELET # BLD AUTO: 224 THOUSANDS/UL (ref 149–390)
PMV BLD AUTO: 9.7 FL (ref 8.9–12.7)
PR INTERVAL: 112 MS
PR INTERVAL: 122 MS
PROT UR STRIP-MCNC: NEGATIVE MG/DL
QRS AXIS: 34 DEGREES
QRS AXIS: 49 DEGREES
QRSD INTERVAL: 54 MS
QRSD INTERVAL: 62 MS
QT INTERVAL: 354 MS
QT INTERVAL: 364 MS
QTC INTERVAL: 413 MS
QTC INTERVAL: 419 MS
RBC # BLD AUTO: 4.81 MILLION/UL (ref 3.81–5.12)
RBC #/AREA URNS AUTO: ABNORMAL /HPF
SP GR UR STRIP.AUTO: 1 (ref 1–1.04)
T WAVE AXIS: 41 DEGREES
T WAVE AXIS: 64 DEGREES
UROBILINOGEN UA: NEGATIVE MG/DL
VENTRICULAR RATE: 80 BPM
VENTRICULAR RATE: 82 BPM
WBC # BLD AUTO: 8.43 THOUSAND/UL (ref 4.31–10.16)
WBC #/AREA URNS AUTO: ABNORMAL /HPF

## 2024-07-22 PROCEDURE — 99285 EMERGENCY DEPT VISIT HI MDM: CPT

## 2024-07-22 PROCEDURE — 36415 COLL VENOUS BLD VENIPUNCTURE: CPT

## 2024-07-22 PROCEDURE — 83690 ASSAY OF LIPASE: CPT

## 2024-07-22 PROCEDURE — 80053 COMPREHEN METABOLIC PANEL: CPT

## 2024-07-22 PROCEDURE — 94640 AIRWAY INHALATION TREATMENT: CPT

## 2024-07-22 PROCEDURE — 93010 ELECTROCARDIOGRAM REPORT: CPT

## 2024-07-22 PROCEDURE — 93005 ELECTROCARDIOGRAM TRACING: CPT

## 2024-07-22 PROCEDURE — 93971 EXTREMITY STUDY: CPT | Performed by: SURGERY

## 2024-07-22 PROCEDURE — 81003 URINALYSIS AUTO W/O SCOPE: CPT

## 2024-07-22 PROCEDURE — 81001 URINALYSIS AUTO W/SCOPE: CPT

## 2024-07-22 PROCEDURE — 74177 CT ABD & PELVIS W/CONTRAST: CPT

## 2024-07-22 PROCEDURE — 93971 EXTREMITY STUDY: CPT

## 2024-07-22 PROCEDURE — 85025 COMPLETE CBC W/AUTO DIFF WBC: CPT

## 2024-07-22 RX ORDER — LIDOCAINE 50 MG/G
1 PATCH TOPICAL DAILY
Qty: 14 PATCH | Refills: 0 | Status: SHIPPED | OUTPATIENT
Start: 2024-07-22

## 2024-07-22 RX ORDER — ALBUTEROL SULFATE 2.5 MG/3ML
5 SOLUTION RESPIRATORY (INHALATION) ONCE
Status: COMPLETED | OUTPATIENT
Start: 2024-07-22 | End: 2024-07-22

## 2024-07-22 RX ORDER — LIDOCAINE 50 MG/G
1 PATCH TOPICAL ONCE
Status: DISCONTINUED | OUTPATIENT
Start: 2024-07-22 | End: 2024-07-22 | Stop reason: HOSPADM

## 2024-07-22 RX ORDER — ACETAMINOPHEN 325 MG/1
975 TABLET ORAL ONCE
Status: COMPLETED | OUTPATIENT
Start: 2024-07-22 | End: 2024-07-22

## 2024-07-22 RX ADMIN — IPRATROPIUM BROMIDE 0.5 MG: 0.5 SOLUTION RESPIRATORY (INHALATION) at 17:43

## 2024-07-22 RX ADMIN — LIDOCAINE 1 PATCH: 50 PATCH CUTANEOUS at 16:13

## 2024-07-22 RX ADMIN — IOHEXOL 90 ML: 350 INJECTION, SOLUTION INTRAVENOUS at 17:56

## 2024-07-22 RX ADMIN — ALBUTEROL SULFATE 5 MG: 2.5 SOLUTION RESPIRATORY (INHALATION) at 17:43

## 2024-07-22 RX ADMIN — ACETAMINOPHEN 975 MG: 325 TABLET ORAL at 16:13

## 2024-07-22 NOTE — TELEPHONE ENCOUNTER
Called and spoke with pt letting her know that dr jovel is out of office once her daughter in law calls back we will reschedule appointment.

## 2024-07-22 NOTE — ED PROVIDER NOTES
History  Chief Complaint   Patient presents with    Flank Pain     Right flank pain x 2 weeks. Denies urinary symptoms and denies fever or chills.      HPI    Patient is a 79 y/o F with pmh temporal arteritis on daily prednisone, COPD presenting with right low back pain x2 weeks. Patient states she was supposed to see her PCP today but appointment was cancelled. She describes R low back pain that is worse with movement. No known inciting event. Denies fever, chills, n/v, cp, abd pain, dysuria, hematuria, numbness, weakness.     Prior to Admission Medications   Prescriptions Last Dose Informant Patient Reported? Taking?   ALPRAZolam (XANAX) 0.25 mg tablet   No No   Sig: Take 1 tablet (0.25 mg total) by mouth daily at bedtime as needed for anxiety or sleep   Biotin 10 MG TABS  Self Yes No   Sig: Take by mouth in the morning   Patient not taking: Reported on 5/6/2024   Calcium Carb-Cholecalciferol (Caltrate 600+D3) 600-20 MG-MCG TABS  Self No No   Sig: Take 1 tablet by mouth 2 (two) times a day   acetaminophen (TYLENOL) 500 mg tablet  Self No No   Sig: Take 1 tablet (500 mg total) by mouth every 6 (six) hours as needed (pain fevers)   albuterol (2.5 mg/3 mL) 0.083 % nebulizer solution   No No   Sig: Take 3 mL (2.5 mg total) by nebulization 2 (two) times a day   albuterol (PROVENTIL HFA,VENTOLIN HFA) 90 mcg/act inhaler  Self No No   Sig: Inhale 2 puffs every 6 (six) hours as needed for wheezing or shortness of breath   aspirin (ECOTRIN LOW STRENGTH) 81 mg EC tablet  Self No No   Sig: Take 1 tablet (81 mg total) by mouth daily   budesonide (Pulmicort) 0.5 mg/2 mL nebulizer solution   No No   Sig: Take 2 mL (0.5 mg total) by nebulization 2 (two) times a day Rinse mouth after use.   Patient taking differently: Take 2 mL by nebulization 2 (two) times a day. Rinse mouth after use.   May take this right after taking levabuterol/ipratropium.   Indications: Chronic Obstructive Lung Disease   cetirizine (ZyrTEC) 10 mg tablet   Self No No   Sig: Take 1 tablet (10 mg total) by mouth daily With food/in the evening   Patient not taking: Reported on 5/6/2024   denosumab (PROLIA) 60 mg/mL   No No   Sig: Inject 1 mL (60 mg total) under the skin once for 1 dose   dextromethorphan-guaifenesin (MUCINEX DM)  MG per 12 hr tablet  Self No No   Sig: Take 1 tablet by mouth 2 (two) times a day with meals   Patient not taking: Reported on 5/6/2024   ipratropium (ATROVENT) 0.02 % nebulizer solution   No No   Sig: Take 2.5 mL (0.5 mg total) by nebulization 2 (two) times a day   ipratropium (ATROVENT) 0.02 % nebulizer solution   No No   Sig: USE 1 VIAL BY NEBULIZATION THREE TIMES A DAY   levothyroxine 50 mcg tablet  Self No No   Sig: Take 1 tablet (50 mcg total) by mouth daily   pantoprazole (PROTONIX) 40 mg tablet   No No   Sig: TAKE ONE TABLET BY MOUTH ONCE DAILY   predniSONE 2.5 mg tablet   No No   Sig: Take 1 tablet (2.5 mg total) by mouth daily with breakfast   propranolol (INDERAL) 10 mg tablet   No No   Sig: TAKE ONE TABLET BY MOUTH TWICE A DAY   saccharomyces boulardii (FLORASTOR) 250 mg capsule  Self No No   Sig: Take 1 capsule (250 mg total) by mouth 2 (two) times a day   valsartan (DIOVAN) 160 mg tablet  Self No No   Sig: Take 1 tablet (160 mg total) by mouth daily Please STOP Lisinopril,..   vitamin B-12 (VITAMIN B-12) 1,000 mcg tablet  Self No No   Sig: Take 1 tablet (1,000 mcg total) by mouth daily      Facility-Administered Medications: None       Past Medical History:   Diagnosis Date    Asthma     Chronic obstructive pulmonary disease (HCC) 9/26/2012    GERD (gastroesophageal reflux disease)     Hypertension 10/11/2018    Hypothyroid     Osteoarthritis 9/26/2012    Temporal arteritis (HCC)     Tubular adenoma     Type 2 diabetes mellitus with complication, without long-term current use of insulin (HCC) 1/14/2020       Past Surgical History:   Procedure Laterality Date    EYE SURGERY Right 12/06/2019    cataract LVCFS    HYSTERECTOMY       NO PAST SURGERIES      ALSO NO RELEVANT PAST SURRGICAL HX AS PER [facility]       Family History   Problem Relation Age of Onset    Breast cancer Mother     Emphysema Father      I have reviewed and agree with the history as documented.    E-Cigarette/Vaping    E-Cigarette Use Never User      E-Cigarette/Vaping Substances    Nicotine No     THC No     CBD No     Flavoring No     Other No     Unknown No      Social History     Tobacco Use    Smoking status: Former     Current packs/day: 0.00     Average packs/day: 1 pack/day for 54.0 years (54.0 ttl pk-yrs)     Types: Cigarettes     Start date:      Quit date:      Years since quittin.5    Smokeless tobacco: Never    Tobacco comments:     TOBACCO USE, NO PASSIVE SMOKE EXPOSURE   Vaping Use    Vaping status: Never Used   Substance Use Topics    Alcohol use: Never    Drug use: No       Review of Systems   Constitutional:  Negative for chills and fever.   HENT:  Negative for ear pain and sore throat.    Eyes:  Negative for pain and visual disturbance.   Respiratory:  Negative for cough and shortness of breath.    Cardiovascular:  Negative for chest pain and palpitations.   Gastrointestinal:  Negative for abdominal pain and vomiting.   Genitourinary:  Negative for dysuria and hematuria.   Musculoskeletal:  Positive for back pain. Negative for arthralgias.   Skin:  Negative for color change and rash.   Neurological:  Negative for seizures and syncope.   All other systems reviewed and are negative.      Physical Exam  Physical Exam  Vitals and nursing note reviewed.   Constitutional:       General: She is not in acute distress.  HENT:      Head: Normocephalic and atraumatic.      Right Ear: External ear normal.      Left Ear: External ear normal.      Nose: Nose normal.      Mouth/Throat:      Pharynx: Oropharynx is clear.   Eyes:      Extraocular Movements: Extraocular movements intact.      Pupils: Pupils are equal, round, and reactive to light.    Cardiovascular:      Rate and Rhythm: Normal rate and regular rhythm.      Pulses: Normal pulses.      Heart sounds: Normal heart sounds. No murmur heard.     No friction rub. No gallop.   Pulmonary:      Effort: Pulmonary effort is normal. No respiratory distress.      Breath sounds: Normal breath sounds. No wheezing, rhonchi or rales.   Abdominal:      General: Abdomen is flat. There is no distension.      Palpations: Abdomen is soft.      Tenderness: There is no abdominal tenderness. There is no guarding or rebound.   Musculoskeletal:         General: Tenderness present. No deformity. Normal range of motion.      Cervical back: Normal range of motion.      Right lower leg: No edema.      Left lower leg: No edema.      Comments: Mild TTP R low back, no skin lesions, no bruising, no midline spinal tenderness.  LLE edema 2+, soft, non tender, no skin changes   Skin:     General: Skin is warm and dry.      Capillary Refill: Capillary refill takes less than 2 seconds.      Findings: No rash.   Neurological:      General: No focal deficit present.      Mental Status: She is alert and oriented to person, place, and time.      Gait: Gait normal.   Psychiatric:         Mood and Affect: Mood normal.         Vital Signs  ED Triage Vitals [07/22/24 1540]   Temperature Pulse Respirations Blood Pressure SpO2   97.7 °F (36.5 °C) 86 14 (!) 206/110 94 %      Temp Source Heart Rate Source Patient Position - Orthostatic VS BP Location FiO2 (%)   Tympanic Monitor Sitting Left arm --      Pain Score       --           Vitals:    07/22/24 1540 07/22/24 1625   BP: (!) 206/110 140/72   Pulse: 86 82   Patient Position - Orthostatic VS: Sitting Sitting         Visual Acuity      ED Medications  Medications   acetaminophen (TYLENOL) tablet 975 mg (975 mg Oral Given 7/22/24 1613)   albuterol inhalation solution 5 mg (5 mg Nebulization Given 7/22/24 1743)   ipratropium (ATROVENT) 0.02 % inhalation solution 0.5 mg (0.5 mg Nebulization Given  7/22/24 1743)   iohexol (OMNIPAQUE) 350 MG/ML injection (MULTI-DOSE) 90 mL (90 mL Intravenous Given 7/22/24 1756)       Diagnostic Studies  Results Reviewed       Procedure Component Value Units Date/Time    Urine Microscopic [[id number]]  (Abnormal) Collected: 07/22/24 1842    Lab Status: Final result Specimen: Urine, Clean Catch Updated: 07/22/24 1925     RBC, UA 4-10 /hpf      WBC, UA 1-2 /hpf      Epithelial Cells Moderate /hpf      Bacteria, UA None Seen /hpf     UA w Reflex to Microscopic w Reflex to Culture [[id number]]  (Abnormal) Collected: 07/22/24 1842    Lab Status: Final result Specimen: Urine, Clean Catch Updated: 07/22/24 1912     Color, UA Fina     Clarity, UA Clear     Specific Gravity, UA 1.005     pH, UA 7.0     Leukocytes, UA 25.0     Nitrite, UA Negative     Protein, UA Negative mg/dl      Glucose, UA Negative mg/dl      Ketones, UA Negative mg/dl      Bilirubin, UA Negative     Occult Blood, UA Negative     UROBILINOGEN UA Negative mg/dL     Comprehensive metabolic panel [[id number]]  (Abnormal) Collected: 07/22/24 1622    Lab Status: Final result Specimen: Blood from Arm, Right Updated: 07/22/24 1734     Sodium 137 mmol/L      Potassium 6.3 mmol/L      Chloride 100 mmol/L      CO2 28 mmol/L      ANION GAP 9 mmol/L      BUN 16 mg/dL      Creatinine 0.68 mg/dL      Glucose 105 mg/dL      Calcium 9.4 mg/dL      AST 42 U/L      ALT 13 U/L      Alkaline Phosphatase 38 U/L      Total Protein 7.0 g/dL      Albumin 4.3 g/dL      Total Bilirubin 0.38 mg/dL      eGFR 84 ml/min/1.73sq m     Narrative:      [employer] guidelines for Chronic Kidney Disease (CKD):     Stage 1 with normal or high GFR (GFR > 90 mL/min/1.73 square meters)    Stage 2 Mild CKD (GFR = 60-89 mL/min/1.73 square meters)    Stage 3A Moderate CKD (GFR = 45-59 mL/min/1.73 square meters)    Stage 3B Moderate CKD (GFR = 30-44 mL/min/1.73 square meters)    Stage 4 Severe CKD (GFR = 15-29 mL/min/1.73 square meters)     Stage 5 End Stage CKD (GFR <15 mL/min/1.73 square meters)  Note: GFR calculation is accurate only with a steady state creatinine    Lipase [[id number]]  (Normal) Collected: 07/22/24 1622    Lab Status: Final result Specimen: Blood from Arm, Right Updated: 07/22/24 1729     Lipase 53 u/L     CBC and differential [[id number]]  (Abnormal) Collected: 07/22/24 1622    Lab Status: Final result Specimen: Blood from Arm, Right Updated: 07/22/24 1631     WBC 8.43 Thousand/uL      RBC 4.81 Million/uL      Hemoglobin 13.6 g/dL      Hematocrit 43.8 %      MCV 91 fL      MCH 28.3 pg      MCHC 31.1 g/dL      RDW 13.6 %      MPV 9.7 fL      Platelets 224 Thousands/uL      nRBC 0 /100 WBCs      Segmented % 79 %      Immature Grans % 0 %      Lymphocytes % 10 %      Monocytes % 7 %      Eosinophils Relative 3 %      Basophils Relative 1 %      Absolute Neutrophils 6.75 Thousands/µL      Absolute Immature Grans 0.03 Thousand/uL      Absolute Lymphocytes 0.85 Thousands/µL      Absolute Monocytes 0.55 Thousand/µL      Eosinophils Absolute 0.21 Thousand/µL      Basophils Absolute 0.04 Thousands/µL                    VAS VENOUS DUPLEX -LOWER LIMB UNILATERAL   Final Result by Omar Adhikari DO (07/22 2008)      CT abdomen pelvis with contrast   Final Result by Leighton Melara MD (07/22 1809)      No acute abdominopelvic pathology.         Workstation performed: [id number]                    Procedures  Procedures         ED Course  ED Course as of 07/22/24 2203 Mon Jul 22, 2024   1620 ECG 12 lead  Procedure Note: EKG  Date/Time: 07/22/24 4:20 PM   Interpreted by: Karl Arita MD  Indications / Diagnosis: flank pain  ECG reviewed by me, the ED Provider: yes   The EKG demonstrates:  Rhythm: normal sinus  Intervals: normal intervals  Axis: normal axis  QRS/Blocks: normal QRS  ST Changes: No acute ST Changes, no STD/ARCADIO.     1734 Potassium(!!): 6.3  No EKG changes likely 2/2 hemolyzed   1848 VAS VENOUS DUPLEX -LOWER LIMB  UNILATERAL  Neg per tech                                 SBIRT 20yo+      Flowsheet Row Most Recent Value   Initial Alcohol Screen: US AUDIT-C     1. How often do you have a drink containing alcohol? 0 Filed at: 07/22/2024 1534   2. How many drinks containing alcohol do you have on a typical day you are drinking?  0 Filed at: 07/22/2024 1532   3b. FEMALE Any Age, or MALE 65+: How often do you have 4 or more drinks on one occassion? 0 Filed at: 07/22/2024 1538   Audit-C Score 0 Filed at: 07/22/2024 1537   MARIMAR: How many times in the past year have you...    Used an illegal drug or used a prescription medication for non-medical reasons? Never Filed at: 07/22/2024 153                      Medical Decision Making  77 y/o F presenting with R low back pain. VSS. Overall well appearing. Will obtain w/u to evaluate for pyelonephritis vs kidney stone vs less likely occult/pathologic spinal fx. No CP or SOB, doubt PE however with the leg swelling will obtain duplex, if + will need CT PE study. Duplex performed and negative for acute DVT. EKG nonischemic. Labs WNL (K hemolyzed, EKG w/o evidence of hyperK). CT read without acute findings. Suspect MSK back pain, rec symptomatic f/u. D/w pt strict RTED precautions, she expressed verbal understanding and agreement with plan.     Amount and/or Complexity of Data Reviewed  Labs: ordered. Decision-making details documented in ED Course.  Radiology: ordered. Decision-making details documented in ED Course.  ECG/medicine tests:  Decision-making details documented in ED Course.    Risk  OTC drugs.  Prescription drug management.                 Disposition  Final diagnoses:   Right low back pain   COPD (chronic obstructive pulmonary disease) ([facility])     Time reflects when diagnosis was documented in both MDM as applicable and the Disposition within this note       Time User Action Codes Description Comment    7/22/2024  6:17 PM Karl Arita Add [M54.50] Right low back pain      7/22/2024  6:17 PM Karl Arita Jl [J44.9] COPD (chronic obstructive pulmonary disease) ([facility])           ED Disposition       ED Disposition   Discharge    Condition   Stable    Date/Time   Mon Jul 22, 2024 1817    Comment   Sarah Zayas discharge to home/self care.                   Follow-up Information       Follow up With Specialties Details Why Contact Info    Nicolas Nix MD Internal Medicine Schedule an appointment as soon as possible for a visit   [address] [place] [id number] [phone]              Discharge Medication List as of 7/22/2024  7:19 PM        START taking these medications    Details   Diclofenac Sodium (VOLTAREN) 1 % Apply 2 g topically 4 (four) times a day, Starting Mon 7/22/2024, Normal      lidocaine (Lidoderm) 5 % Apply 1 patch topically over 12 hours daily Remove & Discard patch within 12 hours or as directed by MD, Starting Mon 7/22/2024, Normal           CONTINUE these medications which have NOT CHANGED    Details   acetaminophen (TYLENOL) 500 mg tablet Take 1 tablet (500 mg total) by mouth every 6 (six) hours as needed (pain fevers), Starting Fri 12/27/2019, Print      albuterol (2.5 mg/3 mL) 0.083 % nebulizer solution Take 3 mL (2.5 mg total) by nebulization 2 (two) times a day, Starting Sun 6/16/2024, Normal      albuterol (PROVENTIL HFA,VENTOLIN HFA) 90 mcg/act inhaler Inhale 2 puffs every 6 (six) hours as needed for wheezing or shortness of breath, Starting Mon 8/15/2022, Normal      ALPRAZolam (XANAX) 0.25 mg tablet Take 1 tablet (0.25 mg total) by mouth daily at bedtime as needed for anxiety or sleep, Starting Tue 6/25/2024, Normal      aspirin (ECOTRIN LOW STRENGTH) 81 mg EC tablet Take 1 tablet (81 mg total) by mouth daily, Starting Mon 6/5/2023, Normal      Biotin 10 MG TABS Take by mouth in the morning, Historical Med      budesonide (Pulmicort) 0.5 mg/2 mL nebulizer solution Take 2 mL (0.5 mg total) by nebulization 2 (two) times a day Rinse mouth after use.,  Starting Mon 5/6/2024, Until Wed 6/5/2024, Normal      Calcium Carb-Cholecalciferol (Caltrate 600+D3) 600-20 MG-MCG TABS Take 1 tablet by mouth 2 (two) times a day, Starting Mon 6/5/2023, Normal      cetirizine (ZyrTEC) 10 mg tablet Take 1 tablet (10 mg total) by mouth daily With food/in the evening, Starting Mon 6/5/2023, Normal      denosumab (PROLIA) 60 mg/mL Inject 1 mL (60 mg total) under the skin once for 1 dose, Starting Mon 4/15/2024, Normal      dextromethorphan-guaifenesin (MUCINEX DM)  MG per 12 hr tablet Take 1 tablet by mouth 2 (two) times a day with meals, Starting Mon 4/15/2024, Normal      ipratropium (ATROVENT) 0.02 % nebulizer solution USE 1 VIAL BY NEBULIZATION THREE TIMES A DAY, Normal      levothyroxine 50 mcg tablet Take 1 tablet (50 mcg total) by mouth daily, Starting Mon 3/11/2024, Normal      pantoprazole (PROTONIX) 40 mg tablet TAKE ONE TABLET BY MOUTH ONCE DAILY, Starting Mon 5/13/2024, Normal      predniSONE 2.5 mg tablet Take 1 tablet (2.5 mg total) by mouth daily with breakfast, Starting Thu 7/11/2024, Normal      propranolol (INDERAL) 10 mg tablet TAKE ONE TABLET BY MOUTH TWICE A DAY, Starting Sat 6/22/2024, Normal      saccharomyces boulardii (FLORASTOR) 250 mg capsule Take 1 capsule (250 mg total) by mouth 2 (two) times a day, Starting Wed 12/6/2023, Normal      valsartan (DIOVAN) 160 mg tablet Take 1 tablet (160 mg total) by mouth daily Please STOP Lisinopril,.., Starting Mon 3/11/2024, Normal      vitamin B-12 (VITAMIN B-12) 1,000 mcg tablet Take 1 tablet (1,000 mcg total) by mouth daily, Starting Mon 6/5/2023, Normal             No discharge procedures on file.    PDMP Review         Value Time User    PDMP Reviewed  Yes 3/11/2024  2:01 PM Nicolas Nix MD            ED Provider  Electronically Signed by             Karl Arita MD  07/22/24 6316

## 2024-07-22 NOTE — DISCHARGE INSTRUCTIONS
Follow up with Dr. Nix for re-evaluation in 1-2 weeks if possible. Use lidoderm/voltaren to help with your pain.     If you develop new or worsening symptoms, please return to the Emergency Department for further evaluation.

## 2024-07-23 ENCOUNTER — APPOINTMENT (EMERGENCY)
Dept: CT IMAGING | Facility: HOSPITAL | Age: 79
DRG: 602 | End: 2024-07-23
Payer: COMMERCIAL

## 2024-07-23 ENCOUNTER — HOSPITAL ENCOUNTER (INPATIENT)
Facility: HOSPITAL | Age: 79
LOS: 6 days | Discharge: HOME HEALTH | DRG: 602 | End: 2024-07-29
Attending: EMERGENCY MEDICINE | Admitting: HOSPITALIST
Payer: COMMERCIAL

## 2024-07-23 ENCOUNTER — APPOINTMENT (EMERGENCY)
Dept: RADIOLOGY | Facility: HOSPITAL | Age: 79
DRG: 602 | End: 2024-07-23
Payer: COMMERCIAL

## 2024-07-23 DIAGNOSIS — J44.1 ACUTE EXACERBATION OF CHRONIC OBSTRUCTIVE PULMONARY DISEASE (COPD) (HCC): ICD-10-CM

## 2024-07-23 DIAGNOSIS — J44.1 COPD EXACERBATION (HCC): ICD-10-CM

## 2024-07-23 DIAGNOSIS — I10 HYPERTENSION: ICD-10-CM

## 2024-07-23 DIAGNOSIS — R09.02 HYPOXIA: Primary | ICD-10-CM

## 2024-07-23 DIAGNOSIS — K12.2 CELLULITIS OF SUBMANDIBULAR REGION: ICD-10-CM

## 2024-07-23 DIAGNOSIS — K11.20 SIALOADENITIS OF SUBMANDIBULAR GLAND: ICD-10-CM

## 2024-07-23 LAB
2HR DELTA HS TROPONIN: 1 NG/L
ALBUMIN SERPL BCG-MCNC: 4.4 G/DL (ref 3.5–5)
ALP SERPL-CCNC: 41 U/L (ref 34–104)
ALT SERPL W P-5'-P-CCNC: 18 U/L (ref 7–52)
AMORPH PHOS CRY URNS QL MICRO: ABNORMAL /HPF
ANION GAP SERPL CALCULATED.3IONS-SCNC: 8 MMOL/L (ref 4–13)
AST SERPL W P-5'-P-CCNC: 23 U/L (ref 13–39)
ATRIAL RATE: 78 BPM
BACTERIA UR QL AUTO: ABNORMAL /HPF
BASOPHILS # BLD AUTO: 0.04 THOUSANDS/ÂΜL (ref 0–0.1)
BASOPHILS NFR BLD AUTO: 0 % (ref 0–1)
BILIRUB SERPL-MCNC: 0.41 MG/DL (ref 0.2–1)
BILIRUB UR QL STRIP: NEGATIVE
BNP SERPL-MCNC: 82 PG/ML (ref 0–100)
BUN SERPL-MCNC: 15 MG/DL (ref 5–25)
CALCIUM SERPL-MCNC: 9.4 MG/DL (ref 8.4–10.2)
CARDIAC TROPONIN I PNL SERPL HS: 5 NG/L
CARDIAC TROPONIN I PNL SERPL HS: 6 NG/L
CHLORIDE SERPL-SCNC: 96 MMOL/L (ref 96–108)
CLARITY UR: CLEAR
CO2 SERPL-SCNC: 33 MMOL/L (ref 21–32)
COLOR UR: YELLOW
CREAT SERPL-MCNC: 0.67 MG/DL (ref 0.6–1.3)
D DIMER PPP FEU-MCNC: 0.53 UG/ML FEU
EOSINOPHIL # BLD AUTO: 0.21 THOUSAND/ÂΜL (ref 0–0.61)
EOSINOPHIL NFR BLD AUTO: 2 % (ref 0–6)
ERYTHROCYTE [DISTWIDTH] IN BLOOD BY AUTOMATED COUNT: 13.7 % (ref 11.6–15.1)
FLUAV RNA RESP QL NAA+PROBE: NEGATIVE
FLUBV RNA RESP QL NAA+PROBE: NEGATIVE
GFR SERPL CREATININE-BSD FRML MDRD: 84 ML/MIN/1.73SQ M
GLUCOSE SERPL-MCNC: 134 MG/DL (ref 65–140)
GLUCOSE UR STRIP-MCNC: NEGATIVE MG/DL
HCT VFR BLD AUTO: 43.5 % (ref 34.8–46.1)
HGB BLD-MCNC: 13.7 G/DL (ref 11.5–15.4)
HGB UR QL STRIP.AUTO: 25
IMM GRANULOCYTES # BLD AUTO: 0.03 THOUSAND/UL (ref 0–0.2)
IMM GRANULOCYTES NFR BLD AUTO: 0 % (ref 0–2)
KETONES UR STRIP-MCNC: NEGATIVE MG/DL
LACTATE SERPL-SCNC: 1.2 MMOL/L (ref 0.5–2)
LEUKOCYTE ESTERASE UR QL STRIP: 25
LYMPHOCYTES # BLD AUTO: 0.67 THOUSANDS/ÂΜL (ref 0.6–4.47)
LYMPHOCYTES NFR BLD AUTO: 7 % (ref 14–44)
MCH RBC QN AUTO: 28.4 PG (ref 26.8–34.3)
MCHC RBC AUTO-ENTMCNC: 31.5 G/DL (ref 31.4–37.4)
MCV RBC AUTO: 90 FL (ref 82–98)
MONOCYTES # BLD AUTO: 0.44 THOUSAND/ÂΜL (ref 0.17–1.22)
MONOCYTES NFR BLD AUTO: 5 % (ref 4–12)
NEUTROPHILS # BLD AUTO: 7.75 THOUSANDS/ÂΜL (ref 1.85–7.62)
NEUTS SEG NFR BLD AUTO: 86 % (ref 43–75)
NITRITE UR QL STRIP: NEGATIVE
NON-SQ EPI CELLS URNS QL MICRO: ABNORMAL /HPF
NRBC BLD AUTO-RTO: 0 /100 WBCS
PH UR STRIP.AUTO: 8 [PH]
PLATELET # BLD AUTO: 230 THOUSANDS/UL (ref 149–390)
PMV BLD AUTO: 9.5 FL (ref 8.9–12.7)
POTASSIUM SERPL-SCNC: 4.6 MMOL/L (ref 3.5–5.3)
PROT SERPL-MCNC: 6.9 G/DL (ref 6.4–8.4)
PROT UR STRIP-MCNC: ABNORMAL MG/DL
QRS AXIS: 128 DEGREES
QRSD INTERVAL: 54 MS
QT INTERVAL: 346 MS
QTC INTERVAL: 411 MS
RBC # BLD AUTO: 4.82 MILLION/UL (ref 3.81–5.12)
RBC #/AREA URNS AUTO: ABNORMAL /HPF
RSV RNA RESP QL NAA+PROBE: NEGATIVE
SARS-COV-2 RNA RESP QL NAA+PROBE: NEGATIVE
SODIUM SERPL-SCNC: 137 MMOL/L (ref 135–147)
SP GR UR STRIP.AUTO: 1.01 (ref 1–1.04)
T WAVE AXIS: 146 DEGREES
UROBILINOGEN UA: NEGATIVE MG/DL
VENTRICULAR RATE: 85 BPM
WBC # BLD AUTO: 9.14 THOUSAND/UL (ref 4.31–10.16)
WBC #/AREA URNS AUTO: ABNORMAL /HPF

## 2024-07-23 PROCEDURE — 85025 COMPLETE CBC W/AUTO DIFF WBC: CPT | Performed by: PHYSICIAN ASSISTANT

## 2024-07-23 PROCEDURE — 84484 ASSAY OF TROPONIN QUANT: CPT | Performed by: PHYSICIAN ASSISTANT

## 2024-07-23 PROCEDURE — 94640 AIRWAY INHALATION TREATMENT: CPT

## 2024-07-23 PROCEDURE — 81001 URINALYSIS AUTO W/SCOPE: CPT | Performed by: PHYSICIAN ASSISTANT

## 2024-07-23 PROCEDURE — 93010 ELECTROCARDIOGRAM REPORT: CPT | Performed by: INTERNAL MEDICINE

## 2024-07-23 PROCEDURE — 83605 ASSAY OF LACTIC ACID: CPT | Performed by: HOSPITALIST

## 2024-07-23 PROCEDURE — 99291 CRITICAL CARE FIRST HOUR: CPT | Performed by: PHYSICIAN ASSISTANT

## 2024-07-23 PROCEDURE — 93005 ELECTROCARDIOGRAM TRACING: CPT

## 2024-07-23 PROCEDURE — 70491 CT SOFT TISSUE NECK W/DYE: CPT

## 2024-07-23 PROCEDURE — 36415 COLL VENOUS BLD VENIPUNCTURE: CPT | Performed by: PHYSICIAN ASSISTANT

## 2024-07-23 PROCEDURE — 83880 ASSAY OF NATRIURETIC PEPTIDE: CPT | Performed by: PHYSICIAN ASSISTANT

## 2024-07-23 PROCEDURE — 94760 N-INVAS EAR/PLS OXIMETRY 1: CPT

## 2024-07-23 PROCEDURE — 99292 CRITICAL CARE ADDL 30 MIN: CPT | Performed by: PHYSICIAN ASSISTANT

## 2024-07-23 PROCEDURE — 71045 X-RAY EXAM CHEST 1 VIEW: CPT

## 2024-07-23 PROCEDURE — 0241U HB NFCT DS VIR RESP RNA 4 TRGT: CPT | Performed by: PHYSICIAN ASSISTANT

## 2024-07-23 PROCEDURE — 80053 COMPREHEN METABOLIC PANEL: CPT | Performed by: PHYSICIAN ASSISTANT

## 2024-07-23 PROCEDURE — 87040 BLOOD CULTURE FOR BACTERIA: CPT | Performed by: HOSPITALIST

## 2024-07-23 PROCEDURE — 99223 1ST HOSP IP/OBS HIGH 75: CPT | Performed by: HOSPITALIST

## 2024-07-23 PROCEDURE — 85379 FIBRIN DEGRADATION QUANT: CPT | Performed by: PHYSICIAN ASSISTANT

## 2024-07-23 PROCEDURE — 94664 DEMO&/EVAL PT USE INHALER: CPT

## 2024-07-23 RX ORDER — PROPRANOLOL HYDROCHLORIDE 10 MG/1
10 TABLET ORAL 2 TIMES DAILY
Status: DISCONTINUED | OUTPATIENT
Start: 2024-07-23 | End: 2024-07-29 | Stop reason: HOSPADM

## 2024-07-23 RX ORDER — SACCHAROMYCES BOULARDII 250 MG
250 CAPSULE ORAL 2 TIMES DAILY
Status: DISCONTINUED | OUTPATIENT
Start: 2024-07-23 | End: 2024-07-29 | Stop reason: HOSPADM

## 2024-07-23 RX ORDER — METHYLPREDNISOLONE SODIUM SUCCINATE 125 MG/2ML
60 INJECTION, POWDER, LYOPHILIZED, FOR SOLUTION INTRAMUSCULAR; INTRAVENOUS EVERY 8 HOURS SCHEDULED
Status: DISCONTINUED | OUTPATIENT
Start: 2024-07-23 | End: 2024-07-29 | Stop reason: HOSPADM

## 2024-07-23 RX ORDER — ALBUTEROL SULFATE 2.5 MG/3ML
2.5 SOLUTION RESPIRATORY (INHALATION) ONCE
Status: DISCONTINUED | OUTPATIENT
Start: 2024-07-23 | End: 2024-07-23

## 2024-07-23 RX ORDER — LOSARTAN POTASSIUM 50 MG/1
50 TABLET ORAL DAILY
Status: DISCONTINUED | OUTPATIENT
Start: 2024-07-24 | End: 2024-07-23

## 2024-07-23 RX ORDER — ALPRAZOLAM 0.5 MG/1
0.25 TABLET ORAL
Status: DISCONTINUED | OUTPATIENT
Start: 2024-07-23 | End: 2024-07-29 | Stop reason: HOSPADM

## 2024-07-23 RX ORDER — LEVALBUTEROL INHALATION SOLUTION 1.25 MG/3ML
1.25 SOLUTION RESPIRATORY (INHALATION) EVERY 6 HOURS
Status: DISCONTINUED | OUTPATIENT
Start: 2024-07-23 | End: 2024-07-24

## 2024-07-23 RX ORDER — ENOXAPARIN SODIUM 100 MG/ML
40 INJECTION SUBCUTANEOUS DAILY
Status: DISCONTINUED | OUTPATIENT
Start: 2024-07-24 | End: 2024-07-29 | Stop reason: HOSPADM

## 2024-07-23 RX ORDER — PANTOPRAZOLE SODIUM 40 MG/1
40 TABLET, DELAYED RELEASE ORAL DAILY
Status: DISCONTINUED | OUTPATIENT
Start: 2024-07-24 | End: 2024-07-29 | Stop reason: HOSPADM

## 2024-07-23 RX ORDER — IPRATROPIUM BROMIDE AND ALBUTEROL SULFATE 2.5; .5 MG/3ML; MG/3ML
3 SOLUTION RESPIRATORY (INHALATION) ONCE
Status: COMPLETED | OUTPATIENT
Start: 2024-07-23 | End: 2024-07-23

## 2024-07-23 RX ORDER — LOSARTAN POTASSIUM 50 MG/1
100 TABLET ORAL DAILY
Status: CANCELLED | OUTPATIENT
Start: 2024-07-24

## 2024-07-23 RX ORDER — BUDESONIDE 0.5 MG/2ML
0.5 INHALANT ORAL 2 TIMES DAILY
Status: DISCONTINUED | OUTPATIENT
Start: 2024-07-23 | End: 2024-07-24

## 2024-07-23 RX ORDER — LANOLIN ALCOHOL/MO/W.PET/CERES
1 CREAM (GRAM) TOPICAL 2 TIMES DAILY WITH MEALS
Status: DISCONTINUED | OUTPATIENT
Start: 2024-07-24 | End: 2024-07-29 | Stop reason: HOSPADM

## 2024-07-23 RX ORDER — LIDOCAINE 50 MG/G
1 PATCH TOPICAL DAILY
Status: DISCONTINUED | OUTPATIENT
Start: 2024-07-24 | End: 2024-07-29 | Stop reason: HOSPADM

## 2024-07-23 RX ORDER — ACETAMINOPHEN 325 MG/1
650 TABLET ORAL EVERY 6 HOURS PRN
Status: DISCONTINUED | OUTPATIENT
Start: 2024-07-23 | End: 2024-07-29 | Stop reason: HOSPADM

## 2024-07-23 RX ORDER — ALBUTEROL SULFATE 2.5 MG/3ML
2.5 SOLUTION RESPIRATORY (INHALATION) EVERY 4 HOURS PRN
Status: DISCONTINUED | OUTPATIENT
Start: 2024-07-23 | End: 2024-07-29 | Stop reason: HOSPADM

## 2024-07-23 RX ORDER — LOSARTAN POTASSIUM 50 MG/1
100 TABLET ORAL DAILY
Status: DISCONTINUED | OUTPATIENT
Start: 2024-07-24 | End: 2024-07-29 | Stop reason: HOSPADM

## 2024-07-23 RX ORDER — ONDANSETRON 2 MG/ML
4 INJECTION INTRAMUSCULAR; INTRAVENOUS EVERY 6 HOURS PRN
Status: DISCONTINUED | OUTPATIENT
Start: 2024-07-23 | End: 2024-07-29 | Stop reason: HOSPADM

## 2024-07-23 RX ORDER — IPRATROPIUM BROMIDE AND ALBUTEROL SULFATE 2.5; .5 MG/3ML; MG/3ML
3 SOLUTION RESPIRATORY (INHALATION)
Status: DISCONTINUED | OUTPATIENT
Start: 2024-07-23 | End: 2024-07-23

## 2024-07-23 RX ORDER — LEVOTHYROXINE SODIUM 0.05 MG/1
50 TABLET ORAL DAILY
Status: DISCONTINUED | OUTPATIENT
Start: 2024-07-24 | End: 2024-07-29 | Stop reason: HOSPADM

## 2024-07-23 RX ADMIN — IPRATROPIUM BROMIDE AND ALBUTEROL SULFATE 3 ML: 2.5; .5 SOLUTION RESPIRATORY (INHALATION) at 14:42

## 2024-07-23 RX ADMIN — BUDESONIDE 0.5 MG: 0.5 INHALANT RESPIRATORY (INHALATION) at 19:34

## 2024-07-23 RX ADMIN — LEVALBUTEROL HYDROCHLORIDE 1.25 MG: 1.25 SOLUTION RESPIRATORY (INHALATION) at 19:35

## 2024-07-23 RX ADMIN — IOHEXOL 90 ML: 350 INJECTION, SOLUTION INTRAVENOUS at 16:29

## 2024-07-23 RX ADMIN — METHYLPREDNISOLONE SODIUM SUCCINATE 60 MG: 125 INJECTION, POWDER, FOR SOLUTION INTRAMUSCULAR; INTRAVENOUS at 21:26

## 2024-07-23 RX ADMIN — Medication 250 MG: at 19:20

## 2024-07-23 RX ADMIN — IPRATROPIUM BROMIDE 0.5 MG: 0.5 SOLUTION RESPIRATORY (INHALATION) at 19:34

## 2024-07-23 RX ADMIN — SODIUM CHLORIDE 1.5 G: 9 INJECTION, SOLUTION INTRAVENOUS at 18:16

## 2024-07-23 RX ADMIN — PROPRANOLOL HYDROCHLORIDE 10 MG: 10 TABLET ORAL at 19:20

## 2024-07-23 NOTE — H&P
[facility]  H&P  Name: Sarah Zayas 78 y.o. female I MRN: [id number]  Unit/Bed#: ED 08 I Date of Admission: 7/23/2024   Date of Service: 7/23/2024 I Hospital Day: 0      Assessment & Plan   * Acute exacerbation of chronic obstructive pulmonary disease (COPD) (Formerly [facility])  Assessment & Plan  With acute hypoxic respiratory failure  Now requiring 3 liters oxygen   (does not wear any at home)    Start around the clock nebulizer treatments and steroids.     Cellulitis of neck  Assessment & Plan  Patient also noted to have some swelling of the right side of her neck    CT shows sialolith and cellulitis of the soft tissue of the neck    It is not compromising her airway.  It is mild    Start Unasyn    Consult ENT    Hypertension  Assessment & Plan  Continue ARB and propranolol                     Chief Complaint:   shortness of breath and wheezing      History of Present Illness:    Sarah Zayas is a 78 y.o. female who presents with shortness of breath and wheezing.   She says she woke up SOB.  Wheezing.  No cough.   No fever.   She was noted to be hypoxic in the ER which improved with oxygen at 3 liters NC.   She had a nebulizer treatment and the wheezing resolved.   [facility] is asked to admit for acute COPD exacerbation.       Patient also complains of right sided neck swelling similar to when she had neck cellulitis a year ago.   CT today shows sialolith with some mild subcutaneous cellulitis of the neck.   .      Review of Systems:    Review of Systems   Constitutional:  Negative for chills and fever.   HENT:  Negative for ear pain and sore throat.    Eyes:  Negative for pain and visual disturbance.   Respiratory:  Positive for shortness of breath and wheezing. Negative for cough.    Cardiovascular:  Negative for chest pain and palpitations.   Gastrointestinal:  Negative for abdominal pain and vomiting.   Genitourinary:  Negative for dysuria and hematuria.   Musculoskeletal:  Negative for arthralgias  and back pain.   Skin:  Negative for color change and rash.   Neurological:  Negative for seizures and syncope.   All other systems reviewed and are negative.        Past Medical and Surgical History:     Past Medical History:   Diagnosis Date    Asthma     Chronic obstructive pulmonary disease (HCC) 9/26/2012    GERD (gastroesophageal reflux disease)     Hypertension 10/11/2018    Hypothyroid     Osteoarthritis 9/26/2012    Temporal arteritis (HCC)     Tubular adenoma     Type 2 diabetes mellitus with complication, without long-term current use of insulin (Formerly [facility]) 1/14/2020       Past Surgical History:   Procedure Laterality Date    EYE SURGERY Right 12/06/2019    cataract LVCFS    HYSTERECTOMY      NO PAST SURGERIES      ALSO NO RELEVANT PAST SURRGICAL HX AS PER [facility]         Home Medications:    Prior to Admission medications    Medication Sig Start Date End Date Taking? Authorizing Provider   acetaminophen (TYLENOL) 500 mg tablet Take 1 tablet (500 mg total) by mouth every 6 (six) hours as needed (pain fevers) 12/27/19   Betsy Wynn PA-C   albuterol (2.5 mg/3 mL) 0.083 % nebulizer solution Take 3 mL (2.5 mg total) by nebulization 2 (two) times a day 6/16/24   Linsey Navarro DO   albuterol (PROVENTIL HFA,VENTOLIN HFA) 90 mcg/act inhaler Inhale 2 puffs every 6 (six) hours as needed for wheezing or shortness of breath 8/15/22   Nicolas Nix MD   ALPRAZolam (XANAX) 0.25 mg tablet Take 1 tablet (0.25 mg total) by mouth daily at bedtime as needed for anxiety or sleep 6/25/24   Sally Schwarz MD   aspirin (ECOTRIN LOW STRENGTH) 81 mg EC tablet Take 1 tablet (81 mg total) by mouth daily 6/5/23   Nicolas Nix MD   Biotin 10 MG TABS Take by mouth in the morning  Patient not taking: Reported on 5/6/2024    Historical Provider, MD   budesonide (Pulmicort) 0.5 mg/2 mL nebulizer solution Take 2 mL (0.5 mg total) by nebulization 2 (two) times a day Rinse mouth after use.  Patient taking differently: Take  2 mL by nebulization 2 (two) times a day. Rinse mouth after use.   May take this right after taking levabuterol/ipratropium.   Indications: Chronic Obstructive Lung Disease 5/6/24 6/5/24  Mike Nicholson MD   Calcium Carb-Cholecalciferol (Caltrate 600+D3) 600-20 MG-MCG TABS Take 1 tablet by mouth 2 (two) times a day 6/5/23   Nicolas Nix MD   cetirizine (ZyrTEC) 10 mg tablet Take 1 tablet (10 mg total) by mouth daily With food/in the evening  Patient not taking: Reported on 5/6/2024 6/5/23   Nicolas Nix MD   denosumab (PROLIA) 60 mg/mL Inject 1 mL (60 mg total) under the skin once for 1 dose 4/15/24 4/15/24  Nicolas Nix MD   dextromethorphan-guaifenesin (MUCINEX DM)  MG per 12 hr tablet Take 1 tablet by mouth 2 (two) times a day with meals  Patient not taking: Reported on 5/6/2024 4/15/24   Nicolas Nix MD   Diclofenac Sodium (VOLTAREN) 1 % Apply 2 g topically 4 (four) times a day 7/22/24   Karl Arita MD   ipratropium (ATROVENT) 0.02 % nebulizer solution Take 2.5 mL (0.5 mg total) by nebulization 2 (two) times a day 6/16/24 7/16/24  Linsey Navarro DO   ipratropium (ATROVENT) 0.02 % nebulizer solution USE 1 VIAL BY NEBULIZATION THREE TIMES A DAY 7/3/24   Mike Nicholson MD   levothyroxine 50 mcg tablet Take 1 tablet (50 mcg total) by mouth daily 3/11/24   Nicolas Nix MD   lidocaine (Lidoderm) 5 % Apply 1 patch topically over 12 hours daily Remove & Discard patch within 12 hours or as directed by MD 7/22/24   Karl Arita MD   pantoprazole (PROTONIX) 40 mg tablet TAKE ONE TABLET BY MOUTH ONCE DAILY 5/13/24   Nicolas Nix MD   predniSONE 2.5 mg tablet Take 1 tablet (2.5 mg total) by mouth daily with breakfast 7/11/24   Nicolas Nix MD   propranolol (INDERAL) 10 mg tablet TAKE ONE TABLET BY MOUTH TWICE A DAY 6/22/24   Nicolas Nix MD   saccharomyces boulardii (FLORASTOR) 250 mg capsule Take 1 capsule (250 mg total) by mouth 2 (two) times a day 12/6/23   Nicolas Nix MD    valsartan (DIOVAN) 160 mg tablet Take 1 tablet (160 mg total) by mouth daily Please STOP Lisinopril,.. 3/11/24   Nicolas Nix MD   vitamin B-12 (VITAMIN B-12) 1,000 mcg tablet Take 1 tablet (1,000 mcg total) by mouth daily 23   Nicolas Nix MD     .      Allergies: No Known Allergies      Social History:    Substance Use History:   Social History     Substance and Sexual Activity   Alcohol Use Never     Social History     Tobacco Use   Smoking Status Former    Current packs/day: 0.00    Average packs/day: 1 pack/day for 54.0 years (54.0 ttl pk-yrs)    Types: Cigarettes    Start date:     Quit date:     Years since quittin.5   Smokeless Tobacco Never   Tobacco Comments    TOBACCO USE, NO PASSIVE SMOKE EXPOSURE     Social History     Substance and Sexual Activity   Drug Use No         Family History:    non-contributory      Physical Exam:     Vitals:   Blood Pressure: (!) 197/102 (24 1552)  Pulse: 85 (24 1552)  Temperature: 98 °F (36.7 °C) (24 1442)  Temp Source: Oral (24 1442)  Respirations: (!) 28 (24 1442)  Weight - Scale: 46.4 kg (102 lb 4.7 oz) (24 1442)  SpO2: 97 % (24 1552)    Physical Exam  Vitals and nursing note reviewed.   HENT:      Head: Normocephalic and atraumatic.   Eyes:      Pupils: Pupils are equal, round, and reactive to light.   Neck:      Comments: Mild area of swelling the right side of the upper anterior neck.    No airway comprosmise.  Cardiovascular:      Rate and Rhythm: Normal rate and regular rhythm.      Heart sounds: No murmur heard.     No friction rub. No gallop.   Pulmonary:      Effort: Pulmonary effort is normal.      Breath sounds: Wheezing (mild bilat expiratory wheeze) present. No rales.   Abdominal:      General: Bowel sounds are normal.      Palpations: Abdomen is soft.      Tenderness: There is no abdominal tenderness.   Musculoskeletal:      Right lower leg: No edema.      Left lower leg: No edema.          .    Additional Data:     Lab Results: I have personally reviewed pertinent reports.      Results from last 7 days   Lab Units 07/23/24  1503   WBC Thousand/uL 9.14   HEMOGLOBIN g/dL 13.7   HEMATOCRIT % 43.5   PLATELETS Thousands/uL 230   SEGS PCT % 86*   LYMPHO PCT % 7*   MONO PCT % 5   EOS PCT % 2     Results from last 7 days   Lab Units 07/23/24  1503   POTASSIUM mmol/L 4.6   CHLORIDE mmol/L 96   CO2 mmol/L 33*   BUN mg/dL 15   CREATININE mg/dL 0.67   CALCIUM mg/dL 9.4   ALK PHOS U/L 41   ALT U/L 18   AST U/L 23                     Imaging: I have personally reviewed pertinent reports.      CT soft tissue neck with contrast   Final Result by Roberto Carlos Aldana MD (07/23 1732)      Acute bilateral submandibular gland sialoadenitis with soft tissue inflammation in the submandibular spaces (right greater than left) and associated overlying subcutaneous soft tissue cellulitis on the right extending to the anterior inferior neck to the    level of the hyoid. No abscess. No sialolith.               The study was marked in EPIC for immediate notification.            Workstation performed: [id number]         XR chest portable   Final Result by Leighton Melara MD (07/23 7643)      No acute cardiopulmonary disease.            Workstation performed: [id number]                 VTE Prophylaxis: Enoxaparin (Lovenox)        Anticipated Length of Stay:  Patient will be admitted on an Inpatient basis with an anticipated length of stay of  greater than 2 midnights.   Justification for Hospital Stay: patient has acute COPD exacerbation.  She needs IV steroids.  Length of stay will be greater than 2 midnights.          ** Please Note: This note has been constructed using a voice recognition system. **

## 2024-07-23 NOTE — ED PROVIDER NOTES
History  Chief Complaint   Patient presents with    Anxiety     Pt anxious and stating she cannot bearth. Resp unlabored in triage. Pt also states tender under chin in neck area. Pt denies pain     79 y/o F presents history of temporal arteritis on daily prednisone, hypertension managed on propranolol, hypothyroidism managed on levothyroxine, COPD managed on budesonide, albuterol inhalers, presenting for evaluation of worsening shortness of breath she reports she was short of breath when seen yesterday for her low back pain however chart review notes no patient complaints regarding this, patient reports she used an albuterol breathing treatment this morning and reports she has been out of breath since then denies fevers chills hemoptysis.  She reports she had lower leg swelling for which an ultrasound was conducted yesterday and noted to be negative for DVT.  She reports she has been compliant with her medications and denies nausea vomiting, diarrhea, abdominal pain.  Patient also reports she is concerned about swelling to the neck area and is accompanied by family ember who reports this is unchanged from her prior diagnosis of infectious sialoadenitis and cellulitis of the neck in November 2023.               Anxiety  Associated symptoms: fatigue    Associated symptoms: no abdominal pain and no chest pain        Prior to Admission Medications   Prescriptions Last Dose Informant Patient Reported? Taking?   ALPRAZolam (XANAX) 0.25 mg tablet   No No   Sig: Take 1 tablet (0.25 mg total) by mouth daily at bedtime as needed for anxiety or sleep   Biotin 10 MG TABS  Self Yes No   Sig: Take by mouth in the morning   Patient not taking: Reported on 5/6/2024   Calcium Carb-Cholecalciferol (Caltrate 600+D3) 600-20 MG-MCG TABS  Self No No   Sig: Take 1 tablet by mouth 2 (two) times a day   Diclofenac Sodium (VOLTAREN) 1 %   No No   Sig: Apply 2 g topically 4 (four) times a day   acetaminophen (TYLENOL) 500 mg tablet  Self No  No   Sig: Take 1 tablet (500 mg total) by mouth every 6 (six) hours as needed (pain fevers)   albuterol (2.5 mg/3 mL) 0.083 % nebulizer solution   No No   Sig: Take 3 mL (2.5 mg total) by nebulization 2 (two) times a day   albuterol (PROVENTIL HFA,VENTOLIN HFA) 90 mcg/act inhaler  Self No No   Sig: Inhale 2 puffs every 6 (six) hours as needed for wheezing or shortness of breath   aspirin (ECOTRIN LOW STRENGTH) 81 mg EC tablet  Self No No   Sig: Take 1 tablet (81 mg total) by mouth daily   budesonide (Pulmicort) 0.5 mg/2 mL nebulizer solution   No No   Sig: Take 2 mL (0.5 mg total) by nebulization 2 (two) times a day Rinse mouth after use.   Patient taking differently: Take 2 mL by nebulization 2 (two) times a day. Rinse mouth after use.   May take this right after taking levabuterol/ipratropium.   Indications: Chronic Obstructive Lung Disease   cetirizine (ZyrTEC) 10 mg tablet  Self No No   Sig: Take 1 tablet (10 mg total) by mouth daily With food/in the evening   Patient not taking: Reported on 5/6/2024   denosumab (PROLIA) 60 mg/mL   No No   Sig: Inject 1 mL (60 mg total) under the skin once for 1 dose   dextromethorphan-guaifenesin (MUCINEX DM)  MG per 12 hr tablet  Self No No   Sig: Take 1 tablet by mouth 2 (two) times a day with meals   Patient not taking: Reported on 5/6/2024   ipratropium (ATROVENT) 0.02 % nebulizer solution   No No   Sig: Take 2.5 mL (0.5 mg total) by nebulization 2 (two) times a day   ipratropium (ATROVENT) 0.02 % nebulizer solution   No No   Sig: USE 1 VIAL BY NEBULIZATION THREE TIMES A DAY   levothyroxine 50 mcg tablet  Self No No   Sig: Take 1 tablet (50 mcg total) by mouth daily   lidocaine (Lidoderm) 5 %   No No   Sig: Apply 1 patch topically over 12 hours daily Remove & Discard patch within 12 hours or as directed by MD   pantoprazole (PROTONIX) 40 mg tablet   No No   Sig: TAKE ONE TABLET BY MOUTH ONCE DAILY   predniSONE 2.5 mg tablet   No No   Sig: Take 1 tablet (2.5 mg total)  by mouth daily with breakfast   propranolol (INDERAL) 10 mg tablet   No No   Sig: TAKE ONE TABLET BY MOUTH TWICE A DAY   saccharomyces boulardii (FLORASTOR) 250 mg capsule  Self No No   Sig: Take 1 capsule (250 mg total) by mouth 2 (two) times a day   valsartan (DIOVAN) 160 mg tablet  Self No No   Sig: Take 1 tablet (160 mg total) by mouth daily Please STOP Lisinopril,..   vitamin B-12 (VITAMIN B-12) 1,000 mcg tablet  Self No No   Sig: Take 1 tablet (1,000 mcg total) by mouth daily      Facility-Administered Medications: None       Past Medical History:   Diagnosis Date    Asthma     Chronic obstructive pulmonary disease (HCC) 2012    GERD (gastroesophageal reflux disease)     Hypertension 10/11/2018    Hypothyroid     Osteoarthritis 2012    Temporal arteritis (HCC)     Tubular adenoma     Type 2 diabetes mellitus with complication, without long-term current use of insulin (HCC) 2020       Past Surgical History:   Procedure Laterality Date    EYE SURGERY Right 2019    cataract LVCFS    HYSTERECTOMY      NO PAST SURGERIES      ALSO NO RELEVANT PAST SURRGICAL HX AS PER [facility]       Family History   Problem Relation Age of Onset    Breast cancer Mother     Emphysema Father      I have reviewed and agree with the history as documented.    E-Cigarette/Vaping    E-Cigarette Use Never User      E-Cigarette/Vaping Substances    Nicotine No     THC No     CBD No     Flavoring No     Other No     Unknown No      Social History     Tobacco Use    Smoking status: Former     Current packs/day: 0.00     Average packs/day: 1 pack/day for 54.0 years (54.0 ttl pk-yrs)     Types: Cigarettes     Start date:      Quit date: 2013     Years since quittin.5    Smokeless tobacco: Never    Tobacco comments:     TOBACCO USE, NO PASSIVE SMOKE EXPOSURE   Vaping Use    Vaping status: Never Used   Substance Use Topics    Alcohol use: Never    Drug use: No       Review of Systems   Constitutional:  Positive for  fatigue. Negative for chills and fever.   HENT:  Negative for congestion, ear pain, rhinorrhea and sore throat.    Eyes:  Negative for redness.   Respiratory:  Positive for chest tightness and shortness of breath.    Cardiovascular:  Negative for chest pain and palpitations.   Gastrointestinal:  Negative for abdominal pain, nausea and vomiting.   Genitourinary:  Negative for dysuria and hematuria.   Musculoskeletal: Negative.    Skin:  Negative for rash.   Neurological:  Negative for dizziness, syncope, light-headedness and numbness.       Physical Exam  Physical Exam  Vitals and nursing note reviewed.   Constitutional:       Appearance: She is well-developed.   HENT:      Head: Normocephalic.   Eyes:      General: No scleral icterus.  Neck:      Comments: Patient does have mobile swelling/mass noted to the submandibular area however there is no sublingual hypertrophy, induration, erythema or tenderness on palpation to suggest an acute infection at this time.  Patient is managing oral secretions and speaking without phonation changes.     patient's family member in the room reports this is consistent with her swelling noted post discharge in November 2023 after diagnosis of sialoadenitis and cellulitis of the neck.  Cardiovascular:      Rate and Rhythm: Normal rate and regular rhythm.      Comments: Patient is tachypneic reports she feels out of breath and is speaking in 2-3 word sentences patient with some rhonchi and bibasilar wheezing auscultated.  No stridor.  Patient is sitting upright no tripod positioning appreciated.  Pulmonary:      Effort: Pulmonary effort is normal.      Breath sounds: Normal breath sounds. No stridor.   Abdominal:      General: There is no distension.      Palpations: Abdomen is soft.      Tenderness: There is no abdominal tenderness.   Musculoskeletal:         General: Normal range of motion.   Skin:     General: Skin is warm and dry.      Capillary Refill: Capillary refill takes less  than 2 seconds.   Neurological:      Mental Status: She is alert and oriented to person, place, and time.   Psychiatric:         Mood and Affect: Mood and affect normal.         Vital Signs  ED Triage Vitals   Temperature Pulse Respirations Blood Pressure SpO2   07/23/24 1442 07/23/24 1442 07/23/24 1437 07/23/24 1442 07/23/24 1437   98 °F (36.7 °C) 84 (!) 30 (!) 193/100 (!) 82 %      Temp Source Heart Rate Source Patient Position - Orthostatic VS BP Location FiO2 (%)   07/23/24 1442 07/23/24 1442 07/23/24 1442 07/23/24 1442 --   Oral Monitor Sitting Right arm       Pain Score       --                  Vitals:    07/23/24 1442 07/23/24 1552   BP: (!) 193/100 (!) 197/102   Pulse: 84 85   Patient Position - Orthostatic VS: Sitting Sitting         Visual Acuity      ED Medications  Medications   iohexol (OMNIPAQUE) 350 MG/ML injection (MULTI-DOSE) 90 mL (has no administration in time range)   ampicillin-sulbactam (UNASYN) 1.5 g in sodium chloride 0.9 % 50 mL IVPB (has no administration in time range)   ipratropium-albuterol (DUO-NEB) 0.5-2.5 mg/3 mL inhalation solution 3 mL (3 mL Nebulization Given 7/23/24 1442)   iohexol (OMNIPAQUE) 350 MG/ML injection (MULTI-DOSE) 90 mL (90 mL Intravenous Given 7/23/24 1629)       Diagnostic Studies  Results Reviewed       Procedure Component Value Units Date/Time    Blood culture #2 [[id number]]     Lab Status: No result Specimen: Blood     Blood culture #1 [[id number]]     Lab Status: No result Specimen: Blood     Lactic acid, plasma (w/reflex if result > 2.0) [[id number]]     Lab Status: No result Specimen: Blood     HS Troponin I 2hr [[id number]] Collected: 07/23/24 1706    Lab Status: In process Specimen: Blood from Arm, Left Updated: 07/23/24 1709    Urine Microscopic [[id number]]  (Abnormal) Collected: 07/23/24 1547    Lab Status: Final result Specimen: Urine, Clean Catch Updated: 07/23/24 1632     RBC, UA 2-4 /hpf      WBC, UA 2-4 /hpf      Epithelial Cells Occasional /hpf       Bacteria, UA Innumerable /hpf      AMORPH PHOSPATES Occasional /hpf     UA (URINE) with reflex to Scope [[id number]]  (Abnormal) Collected: 07/23/24 1547    Lab Status: Final result Specimen: Urine, Clean Catch Updated: 07/23/24 1619     Color, UA Yellow     Clarity, UA Clear     Specific Gravity, UA 1.015     pH, UA 8.0     Leukocytes, UA 25.0     Nitrite, UA Negative     Protein, UA 30 (1+) mg/dl      Glucose, UA Negative mg/dl      Ketones, UA Negative mg/dl      Bilirubin, UA Negative     Occult Blood, UA 25.0     UROBILINOGEN UA Negative mg/dL     HS Troponin I 4hr [[id number]]     Lab Status: No result Specimen: Blood     FLU/RSV/COVID - if FLU/RSV clinically relevant [[id number]]  (Normal) Collected: 07/23/24 1503    Lab Status: Final result Specimen: Nares from Nose Updated: 07/23/24 1554     SARS-CoV-2 Negative     INFLUENZA A PCR Negative     INFLUENZA B PCR Negative     RSV PCR Negative    Narrative:      FOR PEDIATRIC PATIENTS - copy/paste COVID Guidelines URL to browser: [email or web address]    SARS-CoV-2 assay is a Nucleic Acid Amplification assay intended for the  qualitative detection of nucleic acid from SARS-CoV-2 in nasopharyngeal  swabs. Results are for the presumptive identification of SARS-CoV-2 RNA.    Positive results are indicative of infection with SARS-CoV-2, the virus  causing COVID-19, but do not rule out bacterial infection or co-infection  with other viruses. Laboratories within the [place] and its  territories are required to report all positive results to the appropriate  public health authorities. Negative results do not preclude SARS-CoV-2  infection and should not be used as the sole basis for treatment or other  patient management decisions. Negative results must be combined with  clinical observations, patient history, and epidemiological information.  This test has not been FDA cleared or approved.    This test has been  authorized by FDA under an Emergency Use Authorization  (EUA). This test is only authorized for the duration of time the  declaration that circumstances exist justifying the authorization of the  emergency use of an in vitro diagnostic tests for detection of SARS-CoV-2  virus and/or diagnosis of COVID-19 infection under section 564(b)(1) of  the Act, 21 U.S.C. 360bbb-3(b)(1), unless the authorization is terminated  or revoked sooner. The test has been validated but independent review by FDA  and CLIA is pending.    Test performed using [employer] GeneXpert: This RT-PCR assay targets N2,  a region unique to SARS-CoV-2. A conserved region in the E-gene was chosen  for pan-Sarbecovirus detection which includes SARS-CoV-2.    According to CMS-2020-01-R, this platform meets the definition of high-throughput technology.    Comprehensive metabolic panel [[id number]]  (Abnormal) Collected: 07/23/24 1503    Lab Status: Final result Specimen: Blood from Arm, Left Updated: 07/23/24 1541     Sodium 137 mmol/L      Potassium 4.6 mmol/L      Chloride 96 mmol/L      CO2 33 mmol/L      ANION GAP 8 mmol/L      BUN 15 mg/dL      Creatinine 0.67 mg/dL      Glucose 134 mg/dL      Calcium 9.4 mg/dL      AST 23 U/L      ALT 18 U/L      Alkaline Phosphatase 41 U/L      Total Protein 6.9 g/dL      Albumin 4.4 g/dL      Total Bilirubin 0.41 mg/dL      eGFR 84 ml/min/1.73sq m     Narrative:      National Kidney Disease Foundation guidelines for Chronic Kidney Disease (CKD):     Stage 1 with normal or high GFR (GFR > 90 mL/min/1.73 square meters)    Stage 2 Mild CKD (GFR = 60-89 mL/min/1.73 square meters)    Stage 3A Moderate CKD (GFR = 45-59 mL/min/1.73 square meters)    Stage 3B Moderate CKD (GFR = 30-44 mL/min/1.73 square meters)    Stage 4 Severe CKD (GFR = 15-29 mL/min/1.73 square meters)    Stage 5 End Stage CKD (GFR <15 mL/min/1.73 square meters)  Note: GFR calculation is accurate only with a steady state creatinine    HS Troponin 0hr  (reflex protocol) [[id number]]  (Normal) Collected: 07/23/24 1503    Lab Status: Final result Specimen: Blood from Arm, Left Updated: 07/23/24 1539     hs TnI 0hr 5 ng/L     B-Type Natriuretic Peptide(BNP) [[id number]]  (Normal) Collected: 07/23/24 1503    Lab Status: Final result Specimen: Blood from Arm, Left Updated: 07/23/24 1537     BNP 82 pg/mL     D-dimer, quantitative [[id number]]  (Abnormal) Collected: 07/23/24 1503    Lab Status: Final result Specimen: Blood from Arm, Left Updated: 07/23/24 1525     D-Dimer, Quant 0.53 ug/ml FEU     Narrative:      In the evaluation for possible pulmonary embolism, in the appropriate (Well's Score of 4 or less) patient, the age adjusted d-dimer cutoff for this patient can be calculated as:    Age x 0.01 (in ug/mL) for Age-adjusted D-dimer exclusion threshold for a patient over 50 years.    CBC and differential [[id number]]  (Abnormal) Collected: 07/23/24 1503    Lab Status: Final result Specimen: Blood from Arm, Left Updated: 07/23/24 1513     WBC 9.14 Thousand/uL      RBC 4.82 Million/uL      Hemoglobin 13.7 g/dL      Hematocrit 43.5 %      MCV 90 fL      MCH 28.4 pg      MCHC 31.5 g/dL      RDW 13.7 %      MPV 9.5 fL      Platelets 230 Thousands/uL      nRBC 0 /100 WBCs      Segmented % 86 %      Immature Grans % 0 %      Lymphocytes % 7 %      Monocytes % 5 %      Eosinophils Relative 2 %      Basophils Relative 0 %      Absolute Neutrophils 7.75 Thousands/µL      Absolute Immature Grans 0.03 Thousand/uL      Absolute Lymphocytes 0.67 Thousands/µL      Absolute Monocytes 0.44 Thousand/µL      Eosinophils Absolute 0.21 Thousand/µL      Basophils Absolute 0.04 Thousands/µL                    CT soft tissue neck with contrast   Final Result by Roberto Carlos Aldana MD (07/23 1732)      Acute bilateral submandibular gland sialoadenitis with soft tissue inflammation in the submandibular spaces (right greater than left) and associated overlying subcutaneous soft tissue cellulitis on the  right extending to the anterior inferior neck to the    level of the hyoid. No abscess. No sialolith.               The study was marked in EPIC for immediate notification.            Workstation performed: [id number]         XR chest portable   Final Result by Leighton Melara MD (07/23 1456)      No acute cardiopulmonary disease.            Workstation performed: [id number]                    Procedures  CriticalCare Time    Date/Time: 7/23/2024 5:43 PM    Performed by: Betsy Wynn PA-C  Authorized by: Betsy Wynn PA-C    Critical care provider statement:     Critical care time (minutes):  90    Critical care start time:  7/23/2024 4:00 PM    Critical care end time:  7/23/2024 5:43 PM    Critical care was necessary to treat or prevent imminent or life-threatening deterioration of the following conditions:  Shock and respiratory failure    Critical care was time spent personally by me on the following activities:  Obtaining history from patient or surrogate, development of treatment plan with patient or surrogate, discussions with primary provider, evaluation of patient's response to treatment, ordering and performing treatments and interventions, ordering and review of laboratory studies, ordering and review of radiographic studies, re-evaluation of patient's condition and review of old charts           ED Course  ED Course as of 07/23/24 1744   Tue Jul 23, 2024   1518 Patient reported to nursing staff she feels as though the swelling in her neck is new and reports that it is different than her prior resolved sialoadenitis/neck cellulitis swelling.  Will await dimer to determine CT of neck and CT PE versus just CT neck.   1559 D-Dimer, Quant(!): 0.53  Age Adjusted  Dimer level not concerning for VTE, patient is low risk based upon clinical gestalt and WELLS score and recently had negative DVT study. Will pursue CT soft tissue neck for concern of neck swelling as per patient.    1630  Bacteria, UA(!): Innumerable   1736 bo bilateral submandibular gland sialoadenitis with soft tissue inflammation in the submandibular spaces (right greater than left) and associated overlying subcutaneous soft tissue cellulitis on the right extending to the anterior inferior neck to the  level of the hyoid. No abscess. No sialolith                                     Wells' Criteria for PE      Flowsheet Row Most Recent Value   Wells' Criteria for PE    Clinical signs and symptoms of DVT 0 Filed at: 07/23/2024 1602   PE is primary diagnosis or equally likely 0 Filed at: 07/23/2024 1602   HR >100 0 Filed at: 07/23/2024 1602   Immobilization at least 3 days or Surgery in the previous 4 weeks 0 Filed at: 07/23/2024 1602   Previous, objectively diagnosed PE or DVT 0 Filed at: 07/23/2024 1602   Hemoptysis 0 Filed at: 07/23/2024 1602   Malignancy with treatment within 6 months or palliative 0 Filed at: 07/23/2024 1602   Wells' Criteria Total 0 Filed at: 07/23/2024 1602                  Medical Decision Making  78-year-old female presenting for evaluation of shortness of breath, initial pulse ox noted by this provider with a good pleth and waveform noted to be 83% patient is not on home oxygen.  Workup to include troponin, BNP, EKG to evaluate for potential cardiac cause for patient's reported dyspnea and noted hypoxia, dimer to evaluate for the need for CT PE study,, chest x-ray to evaluate for potential pneumothorax versus effusion versus others and CBC/CMP to evaluate for metabolic disturbance, organ function etc.  Patient given breathing treatment and advised given the level of hypoxia and the fact the patient is not on home oxygen recommendation for admission patient is agreeable.  Remaining blood work was unremarkable, suspect contaminant on urinalysis given lack of symptoms in association with bacteriuria, CT scan demonstrated submandibular sialoadenitis with identification of cellulitis as well. in discussion with  "internal medicine physician Dr. Cárdenas Unalornan was agreed upon for IV antibiotic therapy and patient was admitted for further management.    All imaging and/or lab testing discussed with patient. Patient and/or family members verbalizes understanding and agrees with plan for admission. Patient is stable for admission.     Portions of the record may have been created with voice recognition software. Occasional wrong word or \"sound a like\" substitutions may have occurred due to the inherent limitations of voice recognition software. Read the chart carefully and recognize, using context, where substitutions have occurred.      Amount and/or Complexity of Data Reviewed  Labs: ordered. Decision-making details documented in ED Course.  Radiology: ordered.    Risk  Prescription drug management.  Decision regarding hospitalization.                 Disposition  Final diagnoses:   Hypoxia   COPD exacerbation (HCC)   Hypertension   Sialoadenitis of submandibular gland   Cellulitis of submandibular region     Time reflects when diagnosis was documented in both MDM as applicable and the Disposition within this note       Time User Action Codes Description Comment    7/23/2024  5:21 PM Betsy Wynn Add [R09.02] Hypoxia     7/23/2024  5:21 PM Betsy Wynn Add [J44.1] COPD exacerbation (HCC)     7/23/2024  5:22 PM WalkerClaudettera Add [I10] Hypertension     7/23/2024  5:42 PM Betsy Wynn Add [K11.20] Sialoadenitis of submandibular gland     7/23/2024  5:42 PM Betsy Wynn Add [K12.2] Cellulitis of submandibular region           ED Disposition       ED Disposition   Admit    Condition   Stable    Date/Time   Tue Jul 23, 2024 1721    Comment   Case was discussed with  and the patient's admission status was agreed to be Admission Status: inpatient status to the service of Dr. Cárdenas .               Follow-up Information    None         Patient's Medications   Discharge Prescriptions    No " medications on file       No discharge procedures on file.    PDMP Review         Value Time User    PDMP Reviewed  Yes 3/11/2024  2:01 PM Nicolas Nix MD            ED Provider  Electronically Signed by             Betsy Wynn PA-C  07/23/24 0068

## 2024-07-23 NOTE — PLAN OF CARE
Problem: RESPIRATORY - ADULT  Goal: Achieves optimal ventilation and oxygenation  Description: INTERVENTIONS:  - Assess for changes in respiratory status  - Assess for changes in mentation and behavior  - Position to facilitate oxygenation and minimize respiratory effort  - Oxygen administered by appropriate delivery if ordered  - Initiate smoking cessation education as indicated  - Encourage broncho-pulmonary hygiene including cough, deep breathe, Incentive Spirometry  - Assess the need for suctioning and aspirate as needed  - Assess and instruct to report SOB or any respiratory difficulty  - Respiratory Therapy support as indicated  Outcome: Progressing     Problem: PAIN - ADULT  Goal: Verbalizes/displays adequate comfort level or baseline comfort level  Description: Interventions:  - Encourage patient to monitor pain and request assistance  - Assess pain using appropriate pain scale  - Administer analgesics based on type and severity of pain and evaluate response  - Implement non-pharmacological measures as appropriate and evaluate response  - Consider cultural and social influences on pain and pain management  - Notify physician/advanced practitioner if interventions unsuccessful or patient reports new pain  Outcome: Progressing     Problem: DISCHARGE PLANNING  Goal: Discharge to home or other facility with appropriate resources  Description: INTERVENTIONS:  - Identify barriers to discharge w/patient and caregiver  - Arrange for needed discharge resources and transportation as appropriate  - Identify discharge learning needs (meds, wound care, etc.)  - Arrange for interpretive services to assist at discharge as needed  - Refer to Case Management Department for coordinating discharge planning if the patient needs post-hospital services based on physician/advanced practitioner order or complex needs related to functional status, cognitive ability, or social support system  Outcome: Progressing

## 2024-07-23 NOTE — ASSESSMENT & PLAN NOTE
Patient also noted to have some swelling of the right side of her neck    CT shows sialolith and cellulitis of the soft tissue of the neck    It is not compromising her airway.  It is mild    Start Unasyn    Consult ENT

## 2024-07-23 NOTE — ED ATTENDING ATTESTATION
7/23/2024  IShahbaz DO, saw and evaluated the patient. I have discussed the patient with the resident/non-physician practitioner and agree with the resident's/non-physician practitioner's findings, Plan of Care, and MDM as documented in the resident's/non-physician practitioner's note, except where noted. All available labs and Radiology studies were reviewed.  I was present for key portions of any procedure(s) performed by the resident/non-physician practitioner and I was immediately available to provide assistance.       At this point I agree with the current assessment done in the Emergency Department.  I have conducted an independent evaluation of this patient a history and physical is as follows:    78-year-old female returns to the emergency department for complaint of dyspnea despite using her home nebulizer treatment for her asthma.  She was seen yesterday for low back pain and lower extremity edema for which an unremarkable Doppler was obtained.  She also complains of neck swelling but this has been persistent since November 2023 when she was diagnosed with sialoadenitis and neck cellulitis.  She denies other, associated cardiac symptoms.  She has never been intubated for her asthma symptoms but has been previously admitted.  In triage, she was noted to be hypoxic at 82% on room air and has required continuous, supplemental oxygen to maintain oxygen saturations in the 90s.  She has not previously required supplemental oxygen.    Denies inhalation drug use. No recent travel or sick contacts. Denies f/c, lightheadedness/dizziness, diaphoresis, chest pain other than tightness, abdominal pain, n/v/d. 12 system ROS o/w negative.    PE: Mild distress, alert; PERRL, EOMI; MMM, no posterior oropharyngeal exudate, edema or erythema; HRR, no murmur, monitor shows sinus rhythm at 85 bpm; lungs wheezes throughout, increased WOB w/accessory muscle use, POx 82 % on RA (hypoxic) improved to 97% on 4 lpm several  word conversational dyspnea, tachypnea; abdomen s/nt/nd, nl BS in all 4 quadrants; (-) LE edema or calf TTP, FROM extremities x4; skin on neck is erythematous, warm and TTP P surrounding a firm but mobile mass under her jaw p/w/d.    MDM/DDx: Chest tightness/wheezes - asthma flare, bronchitis/bronchospasm, less likely but at risk for pneumonia, pneumothorax, ACS/MI or PE.     I independently reviewed and interpreted ordered labs, CXR and EKG from this encounter.    A/P: Will check cardiac and infectious workup, treat symptoms with duo-neb, admit.    ED Course         Critical Care Time  CriticalCare Time    Date/Time: 7/23/2024 5:57 PM    Performed by: Shahbaz Rousseau DO  Authorized by: Shahbaz Rousseau DO    Critical care provider statement:     Critical care time (minutes):  37    Critical care time was exclusive of:  Separately billable procedures and treating other patients and teaching time    Critical care was necessary to treat or prevent imminent or life-threatening deterioration of the following conditions:  Respiratory failure    Critical care was time spent personally by me on the following activities:  Obtaining history from patient or surrogate, development of treatment plan with patient or surrogate, discussions with consultants, evaluation of patient's response to treatment, examination of patient, ordering and performing treatments and interventions, ordering and review of laboratory studies, ordering and review of radiographic studies, re-evaluation of patient's condition and review of old charts    I assumed direction of critical care for this patient from another provider in my specialty: no

## 2024-07-23 NOTE — PLAN OF CARE
Problem: NEUROSENSORY - ADULT  Goal: Achieves stable or improved neurological status  Description: INTERVENTIONS  - Monitor and report changes in neurological status  - Monitor vital signs such as temperature, blood pressure, glucose, and any other labs ordered   - Initiate measures to prevent increased intracranial pressure  - Monitor for seizure activity and implement precautions if appropriate      Outcome: Progressing  Goal: Remains free of injury related to seizures activity  Description: INTERVENTIONS  - Maintain airway, patient safety  and administer oxygen as ordered  - Monitor patient for seizure activity, document and report duration and description of seizure to physician/advanced practitioner  - If seizure occurs,  ensure patient safety during seizure  - Reorient patient post seizure  - Seizure pads on all 4 side rails  - Instruct patient/family to notify RN of any seizure activity including if an aura is experienced  - Instruct patient/family to call for assistance with activity based on nursing assessment  - Administer anti-seizure medications if ordered    Outcome: Progressing  Goal: Achieves maximal functionality and self care  Description: INTERVENTIONS  - Monitor swallowing and airway patency with patient fatigue and changes in neurological status  - Encourage and assist patient to increase activity and self care.   - Encourage visually impaired, hearing impaired and aphasic patients to use assistive/communication devices  Outcome: Progressing     Problem: CARDIOVASCULAR - ADULT  Goal: Maintains optimal cardiac output and hemodynamic stability  Description: INTERVENTIONS:  - Monitor I/O, vital signs and rhythm  - Monitor for S/S and trends of decreased cardiac output  - Administer and titrate ordered vasoactive medications to optimize hemodynamic stability  - Assess quality of pulses, skin color and temperature  - Assess for signs of decreased coronary artery perfusion  - Instruct patient to  report change in severity of symptoms  Outcome: Progressing  Goal: Absence of cardiac dysrhythmias or at baseline rhythm  Description: INTERVENTIONS:  - Continuous cardiac monitoring, vital signs, obtain 12 lead EKG if ordered  - Administer antiarrhythmic and heart rate control medications as ordered  - Monitor electrolytes and administer replacement therapy as ordered  Outcome: Progressing     Problem: RESPIRATORY - ADULT  Goal: Achieves optimal ventilation and oxygenation  Description: INTERVENTIONS:  - Assess for changes in respiratory status  - Assess for changes in mentation and behavior  - Position to facilitate oxygenation and minimize respiratory effort  - Oxygen administered by appropriate delivery if ordered  - Initiate smoking cessation education as indicated  - Encourage broncho-pulmonary hygiene including cough, deep breathe, Incentive Spirometry  - Assess the need for suctioning and aspirate as needed  - Assess and instruct to report SOB or any respiratory difficulty  - Respiratory Therapy support as indicated  Outcome: Progressing     Problem: PAIN - ADULT  Goal: Verbalizes/displays adequate comfort level or baseline comfort level  Description: Interventions:  - Encourage patient to monitor pain and request assistance  - Assess pain using appropriate pain scale  - Administer analgesics based on type and severity of pain and evaluate response  - Implement non-pharmacological measures as appropriate and evaluate response  - Consider cultural and social influences on pain and pain management  - Notify physician/advanced practitioner if interventions unsuccessful or patient reports new pain  Outcome: Progressing     Problem: SAFETY ADULT  Goal: Patient will remain free of falls  Description: INTERVENTIONS:  - Educate patient/family on patient safety including physical limitations  - Instruct patient to call for assistance with activity   - Consult OT/PT to assist with strengthening/mobility   - Keep Call  bell within reach  - Keep bed low and locked with side rails adjusted as appropriate  - Keep care items and personal belongings within reach  - Initiate and maintain comfort rounds  - Make Fall Risk Sign visible to staff  - - Apply yellow socks and bracelet for high fall risk patients  - Consider moving patient to room near nurses station  Outcome: Progressing  Goal: Maintain or return to baseline ADL function  Description: INTERVENTIONS:  -  Assess patient's ability to carry out ADLs; assess patient's baseline for ADL function and identify physical deficits which impact ability to perform ADLs (bathing, care of mouth/teeth, toileting, grooming, dressing, etc.)  - Assess/evaluate cause of self-care deficits   - Assess range of motion  - Assess patient's mobility; develop plan if impaired  - Assess patient's need for assistive devices and provide as appropriate  - Encourage maximum independence but intervene and supervise when necessary  - Involve family in performance of ADLs  - Assess for home care needs following discharge   - Consider OT consult to assist with ADL evaluation and planning for discharge  - Provide patient education as appropriate  Outcome: Progressing  Goal: Maintains/Returns to pre admission functional level  Description: INTERVENTIONS:  - Perform AM-PAC 6 Click Basic Mobility/ Daily Activity assessment daily.  - Set and communicate daily mobility goal to care team and patient/family/caregiver.   - Collaborate with rehabilitation services on mobility goals if consulted  - - Out of bed for toileting  - Record patient progress and toleration of activity level   Outcome: Progressing

## 2024-07-23 NOTE — ASSESSMENT & PLAN NOTE
With acute hypoxic respiratory failure  Now requiring 3 liters oxygen   (does not wear any at home)    Start around the clock nebulizer treatments and steroids.

## 2024-07-24 PROBLEM — E43 SEVERE PROTEIN-CALORIE MALNUTRITION (HCC): Status: ACTIVE | Noted: 2024-07-24

## 2024-07-24 LAB
ALBUMIN SERPL BCG-MCNC: 4.3 G/DL (ref 3.5–5)
ALP SERPL-CCNC: 38 U/L (ref 34–104)
ALT SERPL W P-5'-P-CCNC: 13 U/L (ref 7–52)
ANION GAP SERPL CALCULATED.3IONS-SCNC: 9 MMOL/L (ref 4–13)
ANION GAP SERPL CALCULATED.3IONS-SCNC: 9 MMOL/L (ref 4–13)
AST SERPL W P-5'-P-CCNC: 42 U/L (ref 13–39)
BASOPHILS # BLD MANUAL: 0 THOUSAND/UL (ref 0–0.1)
BASOPHILS NFR MAR MANUAL: 0 % (ref 0–1)
BILIRUB SERPL-MCNC: 0.38 MG/DL (ref 0.2–1)
BUN SERPL-MCNC: 15 MG/DL (ref 5–25)
BUN SERPL-MCNC: 16 MG/DL (ref 5–25)
CALCIUM SERPL-MCNC: 9.1 MG/DL (ref 8.4–10.2)
CALCIUM SERPL-MCNC: 9.4 MG/DL (ref 8.4–10.2)
CHLORIDE SERPL-SCNC: 100 MMOL/L (ref 96–108)
CHLORIDE SERPL-SCNC: 99 MMOL/L (ref 96–108)
CO2 SERPL-SCNC: 28 MMOL/L (ref 21–32)
CO2 SERPL-SCNC: 30 MMOL/L (ref 21–32)
CREAT SERPL-MCNC: 0.64 MG/DL (ref 0.6–1.3)
CREAT SERPL-MCNC: 0.68 MG/DL (ref 0.6–1.3)
EOSINOPHIL # BLD MANUAL: 0 THOUSAND/UL (ref 0–0.4)
EOSINOPHIL NFR BLD MANUAL: 0 % (ref 0–6)
ERYTHROCYTE [DISTWIDTH] IN BLOOD BY AUTOMATED COUNT: 13.7 % (ref 11.6–15.1)
GFR SERPL CREATININE-BSD FRML MDRD: 84 ML/MIN/1.73SQ M
GFR SERPL CREATININE-BSD FRML MDRD: 85 ML/MIN/1.73SQ M
GLUCOSE SERPL-MCNC: 105 MG/DL (ref 65–140)
GLUCOSE SERPL-MCNC: 132 MG/DL (ref 65–140)
HCT VFR BLD AUTO: 39.9 % (ref 34.8–46.1)
HGB BLD-MCNC: 13.2 G/DL (ref 11.5–15.4)
LYMPHOCYTES # BLD AUTO: 0.24 THOUSAND/UL (ref 0.6–4.47)
LYMPHOCYTES # BLD AUTO: 3 % (ref 14–44)
MAGNESIUM SERPL-MCNC: 2.1 MG/DL (ref 1.9–2.7)
MCH RBC QN AUTO: 28.5 PG (ref 26.8–34.3)
MCHC RBC AUTO-ENTMCNC: 33.1 G/DL (ref 31.4–37.4)
MCV RBC AUTO: 86 FL (ref 82–98)
MONOCYTES # BLD AUTO: 0.08 THOUSAND/UL (ref 0–1.22)
MONOCYTES NFR BLD: 1 % (ref 4–12)
NEUTROPHILS # BLD MANUAL: 7.8 THOUSAND/UL (ref 1.85–7.62)
NEUTS SEG NFR BLD AUTO: 96 % (ref 43–75)
PLATELET # BLD AUTO: 229 THOUSANDS/UL (ref 149–390)
PLATELET BLD QL SMEAR: ADEQUATE
PMV BLD AUTO: 10 FL (ref 8.9–12.7)
POTASSIUM SERPL-SCNC: 4.5 MMOL/L (ref 3.5–5.3)
POTASSIUM SERPL-SCNC: 6.3 MMOL/L (ref 3.5–5.3)
PROT SERPL-MCNC: 7 G/DL (ref 6.4–8.4)
RBC # BLD AUTO: 4.63 MILLION/UL (ref 3.81–5.12)
RBC MORPH BLD: NORMAL
SODIUM SERPL-SCNC: 137 MMOL/L (ref 135–147)
SODIUM SERPL-SCNC: 138 MMOL/L (ref 135–147)
WBC # BLD AUTO: 8.12 THOUSAND/UL (ref 4.31–10.16)

## 2024-07-24 PROCEDURE — 94760 N-INVAS EAR/PLS OXIMETRY 1: CPT

## 2024-07-24 PROCEDURE — 94640 AIRWAY INHALATION TREATMENT: CPT

## 2024-07-24 PROCEDURE — 85007 BL SMEAR W/DIFF WBC COUNT: CPT | Performed by: HOSPITALIST

## 2024-07-24 PROCEDURE — 85027 COMPLETE CBC AUTOMATED: CPT | Performed by: HOSPITALIST

## 2024-07-24 PROCEDURE — 80048 BASIC METABOLIC PNL TOTAL CA: CPT | Performed by: HOSPITALIST

## 2024-07-24 PROCEDURE — 83735 ASSAY OF MAGNESIUM: CPT | Performed by: HOSPITALIST

## 2024-07-24 PROCEDURE — 99232 SBSQ HOSP IP/OBS MODERATE 35: CPT | Performed by: HOSPITALIST

## 2024-07-24 RX ORDER — LEVALBUTEROL INHALATION SOLUTION 1.25 MG/3ML
1.25 SOLUTION RESPIRATORY (INHALATION)
Status: DISCONTINUED | OUTPATIENT
Start: 2024-07-24 | End: 2024-07-29 | Stop reason: HOSPADM

## 2024-07-24 RX ORDER — BUDESONIDE 0.5 MG/2ML
0.5 INHALANT ORAL
Status: DISCONTINUED | OUTPATIENT
Start: 2024-07-24 | End: 2024-07-29 | Stop reason: HOSPADM

## 2024-07-24 RX ORDER — LEVALBUTEROL INHALATION SOLUTION 1.25 MG/3ML
1.25 SOLUTION RESPIRATORY (INHALATION)
Status: DISCONTINUED | OUTPATIENT
Start: 2024-07-24 | End: 2024-07-24

## 2024-07-24 RX ADMIN — LEVOTHYROXINE SODIUM 50 MCG: 50 TABLET ORAL at 05:20

## 2024-07-24 RX ADMIN — AMPICILLIN SODIUM AND SULBACTAM SODIUM 3 G: 2; 1 INJECTION, POWDER, FOR SOLUTION INTRAMUSCULAR; INTRAVENOUS at 22:33

## 2024-07-24 RX ADMIN — PROPRANOLOL HYDROCHLORIDE 10 MG: 10 TABLET ORAL at 17:07

## 2024-07-24 RX ADMIN — BUDESONIDE 0.5 MG: 0.5 INHALANT RESPIRATORY (INHALATION) at 20:18

## 2024-07-24 RX ADMIN — Medication 1 TABLET: at 08:35

## 2024-07-24 RX ADMIN — ENOXAPARIN SODIUM 40 MG: 40 INJECTION SUBCUTANEOUS at 08:35

## 2024-07-24 RX ADMIN — Medication 1 TABLET: at 17:06

## 2024-07-24 RX ADMIN — METHYLPREDNISOLONE SODIUM SUCCINATE 60 MG: 125 INJECTION, POWDER, FOR SOLUTION INTRAMUSCULAR; INTRAVENOUS at 05:20

## 2024-07-24 RX ADMIN — LEVALBUTEROL HYDROCHLORIDE 1.25 MG: 1.25 SOLUTION RESPIRATORY (INHALATION) at 01:39

## 2024-07-24 RX ADMIN — CYANOCOBALAMIN TAB 1000 MCG 1000 MCG: 1000 TAB at 08:35

## 2024-07-24 RX ADMIN — METHYLPREDNISOLONE SODIUM SUCCINATE 60 MG: 125 INJECTION, POWDER, FOR SOLUTION INTRAMUSCULAR; INTRAVENOUS at 14:28

## 2024-07-24 RX ADMIN — Medication 250 MG: at 08:35

## 2024-07-24 RX ADMIN — BUDESONIDE 0.5 MG: 0.5 INHALANT RESPIRATORY (INHALATION) at 08:00

## 2024-07-24 RX ADMIN — LEVALBUTEROL HYDROCHLORIDE 1.25 MG: 1.25 SOLUTION RESPIRATORY (INHALATION) at 08:00

## 2024-07-24 RX ADMIN — AMPICILLIN SODIUM AND SULBACTAM SODIUM 3 G: 2; 1 INJECTION, POWDER, FOR SOLUTION INTRAMUSCULAR; INTRAVENOUS at 12:19

## 2024-07-24 RX ADMIN — LEVALBUTEROL HYDROCHLORIDE 1.25 MG: 1.25 SOLUTION RESPIRATORY (INHALATION) at 20:18

## 2024-07-24 RX ADMIN — IPRATROPIUM BROMIDE 0.5 MG: 0.5 SOLUTION RESPIRATORY (INHALATION) at 14:30

## 2024-07-24 RX ADMIN — METHYLPREDNISOLONE SODIUM SUCCINATE 60 MG: 125 INJECTION, POWDER, FOR SOLUTION INTRAMUSCULAR; INTRAVENOUS at 21:12

## 2024-07-24 RX ADMIN — Medication 250 MG: at 17:06

## 2024-07-24 RX ADMIN — ALPRAZOLAM 0.25 MG: 0.5 TABLET ORAL at 21:11

## 2024-07-24 RX ADMIN — ASPIRIN 81 MG: 81 TABLET, COATED ORAL at 08:35

## 2024-07-24 RX ADMIN — AMPICILLIN SODIUM AND SULBACTAM SODIUM 3 G: 2; 1 INJECTION, POWDER, FOR SOLUTION INTRAMUSCULAR; INTRAVENOUS at 17:07

## 2024-07-24 RX ADMIN — IPRATROPIUM BROMIDE 0.5 MG: 0.5 SOLUTION RESPIRATORY (INHALATION) at 20:18

## 2024-07-24 RX ADMIN — LIDOCAINE 1 PATCH: 700 PATCH TOPICAL at 08:42

## 2024-07-24 RX ADMIN — LEVALBUTEROL HYDROCHLORIDE 1.25 MG: 1.25 SOLUTION RESPIRATORY (INHALATION) at 14:30

## 2024-07-24 RX ADMIN — IPRATROPIUM BROMIDE 0.5 MG: 0.5 SOLUTION RESPIRATORY (INHALATION) at 01:39

## 2024-07-24 RX ADMIN — PANTOPRAZOLE SODIUM 40 MG: 40 TABLET, DELAYED RELEASE ORAL at 08:36

## 2024-07-24 RX ADMIN — IPRATROPIUM BROMIDE 0.5 MG: 0.5 SOLUTION RESPIRATORY (INHALATION) at 08:00

## 2024-07-24 NOTE — RESULT ENCOUNTER NOTE
Inpatient provider Dr. Cárdenas acknowledged message, is aware of result and is managing. No further action from ED necessary at this time.

## 2024-07-24 NOTE — PROGRESS NOTES
[facility]  Progress Note  Name: Sarah Zayas I  MRN: [id number]  Unit/Bed#: 7T [facility] 703-01 I Date of Admission: 2024   Date of Service: 2024 I Hospital Day: 1    Assessment & Plan   * Acute exacerbation of chronic obstructive pulmonary disease (COPD) (HCC)  Assessment & Plan  She is improving  Now on 2 liters oxygen    Continue solumedrol and around the clock nebulizer treatments    Cellulitis of neck  Assessment & Plan  Patient also noted to have some swelling of the right side of her neck    CT shows sialolith and cellulitis of the soft tissue of the neck    This is very mild and resolving  \  ENT consulted  Continue Unasyn.    No airway compromise               Subjective:   Feels better  Less sob  No neck pain      Objective:     Vitals:   Temp (24hrs), Av.7 °F (36.5 °C), Min:97.2 °F (36.2 °C), Max:98.1 °F (36.7 °C)    Temp:  [97.2 °F (36.2 °C)-98.1 °F (36.7 °C)] 97.2 °F (36.2 °C)  HR:  [69-90] 78  Resp:  [16-30] 16  BP: (103-197)/() 103/69  SpO2:  [82 %-99 %] 98 %  Body mass index is 17.57 kg/m².     Input and Output Summary (last 24 hours):       Intake/Output Summary (Last 24 hours) at 2024 0929  Last data filed at 2024 0909  Gross per 24 hour   Intake 840 ml   Output --   Net 840 ml       Physical Exam:     Physical Exam  Vitals and nursing note reviewed.   HENT:      Head: Normocephalic and atraumatic.   Eyes:      Pupils: Pupils are equal, round, and reactive to light.   Neck:      Comments: Right anterior neck is less swollen than yesterday.    No obvious abscess  No redness nor warmth  Cardiovascular:      Rate and Rhythm: Normal rate and regular rhythm.      Heart sounds: No murmur heard.     No friction rub. No gallop.   Pulmonary:      Effort: Pulmonary effort is normal.      Breath sounds: Normal breath sounds. No wheezing or rales.   Abdominal:      General: Bowel sounds are normal.      Palpations: Abdomen is soft.      Tenderness: There  is no abdominal tenderness.   Musculoskeletal:      Right lower leg: No edema.      Left lower leg: No edema.       .       Additional Data:     Labs:    Results from last 7 days   Lab Units 07/24/24  0530 07/23/24  1503   WBC Thousand/uL 8.12 9.14   HEMOGLOBIN g/dL 13.2 13.7   HEMATOCRIT % 39.9 43.5   PLATELETS Thousands/uL 229 230   SEGS PCT %  --  86*   LYMPHO PCT % 3* 7*   MONO PCT % 1* 5   EOS PCT % 0 2     Results from last 7 days   Lab Units 07/24/24  0530 07/23/24  1503   POTASSIUM mmol/L 4.5 4.6   CHLORIDE mmol/L 99 96   CO2 mmol/L 30 33*   BUN mg/dL 15 15   CREATININE mg/dL 0.64 0.67   CALCIUM mg/dL 9.1 9.4   ALK PHOS U/L  --  41   ALT U/L  --  18   AST U/L  --  23                       * I Have Reviewed All Lab Data     Recent Cultures (last 7 days):     Results from last 7 days   Lab Units 07/23/24  1802   BLOOD CULTURE  Received in Microbiology Lab. Culture in Progress.  Received in Microbiology Lab. Culture in Progress.         Last 24 Hours Medication List:   Current Facility-Administered Medications   Medication Dose Route Frequency Provider Last Rate    acetaminophen  650 mg Oral Q6H PRN Lawrence S Prechtel, DO      albuterol  2.5 mg Nebulization Q4H PRN Lawrence S Prechtel, DO      ALPRAZolam  0.25 mg Oral HS PRN Lawrence S Prechtel, DO      aspirin  81 mg Oral Daily Lawrence S Prechtel, DO      budesonide  0.5 mg Nebulization BID Lawrence S Prechtel, DO      calcium carbonate-vitamin D  1 tablet Oral BID With Meals Lawrence S Prechtel, DO      vitamin B-12  1,000 mcg Oral Daily Lawrence S Prechtel, DO      Diclofenac Sodium  2 g Topical 4x Daily Lawrence S Prechtel, DO      enoxaparin  40 mg Subcutaneous Daily Lawrence S Prechtel, DO      iohexol  90 mL Intravenous Once in imaging Lawrence S Prechtel, DO      ipratropium  0.5 mg Nebulization Q6H Lawrence S Prechtel, DO      levalbuterol  1.25 mg Nebulization Q6H Lawrence S Prechtel, DO      levothyroxine  50 mcg Oral Daily Lawrence S Prechtel, DO       lidocaine  1 patch Topical Daily Lawrence S Prechtel, DO      losartan  100 mg Oral Daily Lawrence S Prechtel, DO      methylPREDNISolone sodium succinate  60 mg Intravenous Q8H RACHEL Lawrence S Prechtel, DO      ondansetron  4 mg Intravenous Q6H PRN Lawrence S Prechtel, DO      pantoprazole  40 mg Oral Daily Lawrence S Prechtel, DO      propranolol  10 mg Oral BID Lawrence S Prechtel, DO      saccharomyces boulardii  250 mg Oral BID Lawrence S Prechtel, DO           VTE Pharmacologic Prophylaxis:   Pharmacologic: Enoxaparin (Lovenox)      Current Length of Stay: 1 day(s)    Current Patient Status: Inpatient       Discharge Plan: home in 1 to 2 days once off of oxygen    Code Status: Level 1 - Full Code           Today, Patient Was Seen By: Lawrence Cárdenas DO    ** Please Note: Dictation voice to text software may have been used in the creation of this document. **

## 2024-07-24 NOTE — MALNUTRITION/BMI
This medical record reflects one or more clinical indicators suggestive of malnutrition.    Malnutrition Findings:   Adult Malnutrition type: Acute illness  Adult Degree of Malnutrition: Other severe protein calorie malnutrition  Malnutrition Characteristics: Muscle loss, Weight loss, Fat loss, Inadequate energy                  360 Statement: related to inadequate energy intake due to decreased appetite , as evidenced by, 7.5% loss x 2-3 months-significant loss, hollow supraclavicular space, sunken orbital, wasted interosseous.Treatment: Regular diet, oral nutrition supplements TID.    BMI Findings:  Adult BMI Classifications: Underweight < 18.5        Body mass index is 17.57 kg/m².     See Nutrition note dated 7/24/2024 for additional details.  Completed nutrition assessment is viewable in the nutrition documentation.

## 2024-07-24 NOTE — ASSESSMENT & PLAN NOTE
She is improving  Now on 2 liters oxygen    Continue solumedrol and around the clock nebulizer treatments

## 2024-07-24 NOTE — ASSESSMENT & PLAN NOTE
Patient also noted to have some swelling of the right side of her neck    CT shows sialolith and cellulitis of the soft tissue of the neck    This is very mild and resolving  \  ENT consulted  Continue Unasyn.    No airway compromise

## 2024-07-24 NOTE — CASE MANAGEMENT
Case Management Assessment & Discharge Planning Note    Patient name Sarah Zayas  Location 7T [facility] 703/7T [facility] 703-01 MRN [id number]  : 1945 Date 2024       Current Admission Date: 2024  Current Admission Diagnosis:Acute exacerbation of chronic obstructive pulmonary disease (COPD) ([facility])   Patient Active Problem List    Diagnosis Date Noted Date Diagnosed    Severe protein-calorie malnutrition (HCC) 2024     COPD, severe ([facility]) 2024     COVID-19 2023     Abnormal CT scan 2023     Infectious sialoadenitis of major salivary gland 2023     Cellulitis of neck 2023     Left upper lobe pulmonary nodule 2023     Postnasal drip 2023     Raynaud's phenomenon without gangrene 2022     Temporal arteritis ([facility]) 2020     Hypertension 10/11/2018     Rectus sheath hematoma, initial encounter 10/10/2018     Other specified hypothyroidism 2018     Gastroesophageal reflux disease without esophagitis 2018     Acute exacerbation of chronic obstructive pulmonary disease (COPD) ([facility]) 2012     Vitamin D deficiency 2012     Osteoarthritis 2012       LOS (days): 1  Geometric Mean LOS (GMLOS) (days): 4.8  Days to GMLOS:4     OBJECTIVE:    Risk of Unplanned Readmission Score: 17.4         Current admission status: Inpatient       Preferred Pharmacy:   [facility] - [address]  [address]  [address]  [facility] [address]  Phone: [phone] Fax: [phone]    Primary Care Provider: Nicolas Nix MD    Primary Insurance: [employer] REP  Secondary Insurance:     ASSESSMENT:  Active Health Care Proxies    There are no active Health Care Proxies on file.       Readmission Root Cause  30 Day Readmission: No    Patient Information  Admitted from:: Home  Mental Status: Alert  During Assessment patient was accompanied by: Not accompanied during assessment  Assessment information provided by::  Patient  Primary Caregiver: Self  Support Systems: Family members, Son, Daughter, Self  County of Residence: [place]  What [facility] do you live in?: [place]  Home entry access options. Select all that apply.: Stairs  Number of steps to enter home.: 1  Type of Current Residence: 2 story home  Upon entering residence, is there a bedroom on the main floor (no further steps)?: No  A bedroom is located on the following floor levels of residence (select all that apply):: 2nd Floor  Upon entering residence, is there a bathroom on the main floor (no further steps)?: Yes  Number of steps to 2nd floor from main floor: One Flight  Living Arrangements: Lives w/ Extended Family  Is patient a ?: No    Activities of Daily Living Prior to Admission  Functional Status: Assistance  Completes ADLs independently?: Yes  Ambulates independently?: Yes  Does patient use assisted devices?: Yes  Assisted Devices (DME) used: Straight Cane  Does patient currently own DME?: Yes  What DME does the patient currently own?: Straight Cane, Walker  Does patient have a history of Outpatient Therapy (PT/OT)?: No  Does the patient have a history of Short-Term Rehab?: No  Does patient have a history of HHC?: Yes ([facility])  Does patient currently have HHC?: No      Patient Information Continued  Does patient have prescription coverage?: Yes  Does patient receive dialysis treatments?: No  Does patient have a history of substance abuse?: No  Does patient have a history of Mental Health Diagnosis?: No    Means of Transportation  Means of Transport to [facility]:: Family transport      Social Determinants of Health (SDOH)      Flowsheet Row Most Recent Value   Housing Stability    In the last 12 months, was there a time when you were not able to pay the mortgage or rent on time? N   In the past 12 months, how many times have you moved where you were living? 0   At any time in the past 12 months, were you homeless or living in a shelter (including now)? N    Transportation Needs    In the past 12 months, has lack of transportation kept you from medical appointments or from getting medications? no   In the past 12 months, has lack of transportation kept you from meetings, work, or from getting things needed for daily living? No   Food Insecurity    Within the past 12 months, you worried that your food would run out before you got the money to buy more. Never true   Within the past 12 months, the food you bought just didn't last and you didn't have money to get more. Never true   Utilities    In the past 12 months has the electric, gas, oil, or water company threatened to shut off services in your home? No          DISCHARGE DETAILS:    Discharge planning discussed with:: Patient      CM contacted family/caregiver?: No- see comments ([id number])     Additional Comments: Met with patient at bedside.  CM assessment completed.  PT/OT evals pending for discharge recs.  CM department following thru discharge

## 2024-07-24 NOTE — PLAN OF CARE
Problem: NEUROSENSORY - ADULT  Goal: Achieves stable or improved neurological status  Description: INTERVENTIONS  - Monitor and report changes in neurological status  - Monitor vital signs such as temperature, blood pressure, glucose, and any other labs ordered   - Initiate measures to prevent increased intracranial pressure  - Monitor for seizure activity and implement precautions if appropriate      7/24/2024 0753 by Juan Pablo Arriaza RN  Outcome: Progressing  7/23/2024 1844 by Juan Pablo Arriaza RN  Outcome: Progressing  Goal: Remains free of injury related to seizures activity  Description: INTERVENTIONS  - Maintain airway, patient safety  and administer oxygen as ordered  - Monitor patient for seizure activity, document and report duration and description of seizure to physician/advanced practitioner  - If seizure occurs,  ensure patient safety during seizure  - Reorient patient post seizure  - Seizure pads on all 4 side rails  - Instruct patient/family to notify RN of any seizure activity including if an aura is experienced  - Instruct patient/family to call for assistance with activity based on nursing assessment  - Administer anti-seizure medications if ordered    7/24/2024 0753 by Juan Pablo Arriaza RN  Outcome: Progressing  7/23/2024 1844 by Juan Pablo Arriaza RN  Outcome: Progressing  Goal: Achieves maximal functionality and self care  Description: INTERVENTIONS  - Monitor swallowing and airway patency with patient fatigue and changes in neurological status  - Encourage and assist patient to increase activity and self care.   - Encourage visually impaired, hearing impaired and aphasic patients to use assistive/communication devices  7/24/2024 0753 by Juan Pablo Arriaza RN  Outcome: Progressing  7/23/2024 1844 by Juan Pablo Arriaza RN  Outcome: Progressing     Problem: CARDIOVASCULAR - ADULT  Goal: Maintains optimal cardiac output and hemodynamic stability  Description: INTERVENTIONS:  - Monitor I/O, vital signs and  rhythm  - Monitor for S/S and trends of decreased cardiac output  - Administer and titrate ordered vasoactive medications to optimize hemodynamic stability  - Assess quality of pulses, skin color and temperature  - Assess for signs of decreased coronary artery perfusion  - Instruct patient to report change in severity of symptoms  7/24/2024 0753 by Juan Pablo Arriaza RN  Outcome: Progressing  7/23/2024 1844 by Juan Pablo Arriaza RN  Outcome: Progressing  Goal: Absence of cardiac dysrhythmias or at baseline rhythm  Description: INTERVENTIONS:  - Continuous cardiac monitoring, vital signs, obtain 12 lead EKG if ordered  - Administer antiarrhythmic and heart rate control medications as ordered  - Monitor electrolytes and administer replacement therapy as ordered  7/24/2024 0753 by Juan Pablo Arriaza RN  Outcome: Progressing  7/23/2024 1844 by Juan Pablo Arriaza RN  Outcome: Progressing     Problem: RESPIRATORY - ADULT  Goal: Achieves optimal ventilation and oxygenation  Description: INTERVENTIONS:  - Assess for changes in respiratory status  - Assess for changes in mentation and behavior  - Position to facilitate oxygenation and minimize respiratory effort  - Oxygen administered by appropriate delivery if ordered  - Initiate smoking cessation education as indicated  - Encourage broncho-pulmonary hygiene including cough, deep breathe, Incentive Spirometry  - Assess the need for suctioning and aspirate as needed  - Assess and instruct to report SOB or any respiratory difficulty  - Respiratory Therapy support as indicated  7/24/2024 0753 by Juan Pablo Arriaza RN  Outcome: Progressing  7/23/2024 1844 by Juan Pablo Arriaza RN  Outcome: Progressing     Problem: PAIN - ADULT  Goal: Verbalizes/displays adequate comfort level or baseline comfort level  Description: Interventions:  - Encourage patient to monitor pain and request assistance  - Assess pain using appropriate pain scale  - Administer analgesics based on type and severity of pain and  evaluate response  - Implement non-pharmacological measures as appropriate and evaluate response  - Consider cultural and social influences on pain and pain management  - Notify physician/advanced practitioner if interventions unsuccessful or patient reports new pain  7/24/2024 0753 by Juan Pablo Arriaza RN  Outcome: Progressing  7/23/2024 1844 by Juan Pablo Arriaza RN  Outcome: Progressing     Problem: SAFETY ADULT  Goal: Patient will remain free of falls  Description: INTERVENTIONS:  - Educate patient/family on patient safety including physical limitations  - Instruct patient to call for assistance with activity   - Consult OT/PT to assist with strengthening/mobility   - Keep Call bell within reach  - Keep bed low and locked with side rails adjusted as appropriate  - Keep care items and personal belongings within reach  - Initiate and maintain comfort rounds  - Make Fall Risk Sign visible to staff  - Apply yellow socks and bracelet for high fall risk patients  - Consider moving patient to room near nurses station  7/24/2024 0753 by Juan Pablo Arriaza RN  Outcome: Progressing  7/23/2024 1844 by Juan Pablo Arriaza RN  Outcome: Progressing  Goal: Maintain or return to baseline ADL function  Description: INTERVENTIONS:  -  Assess patient's ability to carry out ADLs; assess patient's baseline for ADL function and identify physical deficits which impact ability to perform ADLs (bathing, care of mouth/teeth, toileting, grooming, dressing, etc.)  - Assess/evaluate cause of self-care deficits   - Assess range of motion  - Assess patient's mobility; develop plan if impaired  - Assess patient's need for assistive devices and provide as appropriate  - Encourage maximum independence but intervene and supervise when necessary  - Involve family in performance of ADLs  - Assess for home care needs following discharge   - Consider OT consult to assist with ADL evaluation and planning for discharge  - Provide patient education as  appropriate  7/24/2024 0753 by Juan Pablo Arriaza RN  Outcome: Progressing  7/23/2024 1844 by Juan Pablo rAriaza RN  Outcome: Progressing  Goal: Maintains/Returns to pre admission functional level  Description: INTERVENTIONS:  - Perform AM-PAC 6 Click Basic Mobility/ Daily Activity assessment daily.  - Set and communicate daily mobility goal to care team and patient/family/caregiver.     Problem: Nutrition/Hydration-ADULT  Goal: Nutrient/Hydration intake appropriate for improving, restoring or maintaining nutritional needs  Description: Monitor and assess patient's nutrition/hydration status for malnutrition. Collaborate with interdisciplinary team and initiate plan and interventions as ordered.  Monitor patient's weight and dietary intake as ordered or per policy. Utilize nutrition screening tool and intervene as necessary. Determine patient's food preferences and provide high-protein, high-caloric foods as appropriate.     INTERVENTIONS:  - Monitor oral intake, urinary output, labs, and treatment plans  - Assess nutrition and hydration status and recommend course of action  - Evaluate amount of meals eaten  - Assist patient with eating if necessary   - Allow adequate time for meals  - Recommend/ encourage appropriate diets, oral nutritional supplements, and vitamin/mineral supplements  - Order, calculate, and assess calorie counts as needed  - Recommend, monitor, and adjust tube feedings and TPN/PPN based on assessed needs  - Assess need for intravenous fluids  - Provide specific nutrition/hydration education as appropriate  - Include patient/family/caregiver in decisions related to nutrition  Outcome: Progressing   - Out of bed for toileting  - Record patient progress and toleration of activity level   7/24/2024 0753 by Juan Pablo Arriaza RN  Outcome: Progressing  7/23/2024 1844 by Juan Pablo Arriaza RN  Outcome: Progressing

## 2024-07-24 NOTE — RESPIRATORY THERAPY NOTE
RT Protocol Note  Sarah Zayas 78 y.o. female MRN: [id number]  Unit/Bed#: 7T [facility] 703-01 Encounter: [id number]    Assessment    Principal Problem:    Acute exacerbation of chronic obstructive pulmonary disease (COPD) ([facility])  Active Problems:    Hypertension    Cellulitis of neck      Home Pulmonary Medications:  YES   07/23/24 1935   Respiratory Protocol   Protocol Initiated? Yes   Protocol Selection Respiratory;Airway Clearance   Language Barrier? No   Medical & Social History Reviewed? Yes   Diagnostic Studies Reviewed? Yes   Physical Assessment Performed? Yes   Airway Clearance Plan Incentive Spirometer   Respiratory Plan Moderate/Severe Distress pathway;Home Bronchodilator Patient pathway   Respiratory Assessment   Assessment Type During-treatment   General Appearance Alert;Awake   Chest Assessment Chest expansion symmetrical   Bilateral Breath Sounds Diminished   R Breath Sounds Diminished   L Breath Sounds Diminished   Resp Comments Assessment of Respiratory protocol. Pt came to ED to have her shortness of breath evaluated. Pt was anxious and short of breath on arrival. Fatigue, chest tightness and congestion noted in ED.She said it was extremely hot in the ED room; she had a hard time breathing too. Her fingers were ice cold.  BS mostly diminish. She had some respiratory tx in ED. Pt has a hx of COPD and takes schedule resp tx at home. No O2 at home. She takes Albuterol, Atrovent and Pulmicort nebulizers. She said she use to take inhalers but her dr. took them away  she said. Currently  BS mostly diminish. Fingers ice cold getting low reading. I warmed them up and got 97% on 3 l/m. Xrays show No acute cardio pulmonary disease. L Atelectasis  Provider ordered  Xopenex and Atrovent Q6 and Albuterol Q4 prn.  Would suggest deep breathing for atelectasis; IS   Additional Assessments   SpO2 97 %            Past Medical History:   Diagnosis Date    Asthma     Chronic obstructive pulmonary disease (HCC) 9/26/2012     GERD (gastroesophageal reflux disease)     Hypertension 10/11/2018    Hypothyroid     Osteoarthritis 2012    Temporal arteritis (HCC)     Tubular adenoma     Type 2 diabetes mellitus with complication, without long-term current use of insulin (HCC) 2020     Social History     Socioeconomic History    Marital status: Legally      Spouse name: None    Number of children: None    Years of education: None    Highest education level: None   Occupational History    Occupation: retired   Tobacco Use    Smoking status: Former     Current packs/day: 0.00     Average packs/day: 1 pack/day for 54.0 years (54.0 ttl pk-yrs)     Types: Cigarettes     Start date:      Quit date:      Years since quittin.5    Smokeless tobacco: Never    Tobacco comments:     TOBACCO USE, NO PASSIVE SMOKE EXPOSURE   Vaping Use    Vaping status: Never Used   Substance and Sexual Activity    Alcohol use: Never    Drug use: No    Sexual activity: Not Currently   Other Topics Concern    None   Social History Narrative    None     Social Determinants of Health     Financial Resource Strain: Low Risk  (2023)    Overall Financial Resource Strain (CARDIA)     Difficulty of Paying Living Expenses: Not hard at all   Food Insecurity: No Food Insecurity (2023)    Hunger Vital Sign     Worried About Running Out of Food in the Last Year: Never true     Ran Out of Food in the Last Year: Never true   Transportation Needs: No Transportation Needs (2023)    PRAPARE - Transportation     Lack of Transportation (Medical): No     Lack of Transportation (Non-Medical): No   Physical Activity: Unknown (2020)    Exercise Vital Sign     Days of Exercise per Week: Patient declined     Minutes of Exercise per Session: Patient declined   Stress: No Stress Concern Present (2020)    [employer] [place] [employer] - Occupational Stress Questionnaire     Feeling of Stress : Not at all   Social Connections: Unknown  "(1/7/2020)    Social Connection and Isolation Panel [NHANES]     Frequency of Communication with Friends and Family: Patient declined     Frequency of Social Gatherings with Friends and Family: Patient declined     Attends Synagogue Services: Patient declined     Active Member of Clubs or Organizations: Patient declined     Attends Club or Organization Meetings: Patient declined     Marital Status: Patient declined   Intimate Partner Violence: Not At Risk (1/7/2020)    Humiliation, Afraid, Rape, and Kick questionnaire     Fear of Current or Ex-Partner: No     Emotionally Abused: No     Physically Abused: No     Sexually Abused: No   Housing Stability: Low Risk  (12/8/2023)    Housing Stability Vital Sign     Unable to Pay for Housing in the Last Year: No     Number of Times Moved in the Last Year: 1     Homeless in the Last Year: No       Subjective         Objective    Physical Exam:   Assessment Type: During-treatment  General Appearance: Alert, Awake  Chest Assessment: Chest expansion symmetrical  Bilateral Breath Sounds: Diminished  R Breath Sounds: Diminished  L Breath Sounds: Diminished    Vitals:  Blood pressure 161/87, pulse 90, temperature 97.6 °F (36.4 °C), temperature source Temporal, resp. rate 22, height 5' 3\" (1.6 m), weight 45 kg (99 lb 3.3 oz), SpO2 97%, not currently breastfeeding.          Imaging and other studies:           Plan    Respiratory Plan: Moderate/Severe Distress pathway, Home Bronchodilator Patient pathway  Airway Clearance Plan: (P) Incentive Spirometer     Resp Comments: (P) Assessment of Respiratory protocol. Pt came to ED to have her shortness of breath evaluated. Pt was anxious and short of breath on arrival. Fatigue, chest tightness and congestion noted in ED.She said it was extremely hot in the ED room; she had a hard time breathing too. Her fingers were ice cold.  BS mostly diminish. She had some respiratory tx in ED. Pt has a hx of COPD and takes schedule resp tx at home. No " O2 at home. She takes Albuterol, Atrovent and Pulmicort nebulizers. She said she use to take inhalers but her dr. took them away  she said. Currently  BS mostly diminish. Fingers ice cold getting low reading. I warmed them up and got 97% on 3 l/m. Xrays show No acute cardio pulmonary disease. RML Atelectasis  Provider ordered  Xopenex and Atrovent Q6 and Albuterol Q4 prn.  Would suggest deep breathing for atelectasis; IS

## 2024-07-24 NOTE — RESULT ENCOUNTER NOTE
Per chart review, patient currently admitted, result sent to ED pool in error. message routed to on call SLIM provider. Provider also messaged on Epic CHAT. No further action from ED necessary at this time.

## 2024-07-24 NOTE — UTILIZATION REVIEW
Initial Clinical Review    Admission: Date/Time/Statement:   Admission Orders (From admission, onward)       Ordered        07/23/24 1742  INPATIENT ADMISSION  Once                          Orders Placed This Encounter   Procedures    INPATIENT ADMISSION     Standing Status:   Standing     Number of Occurrences:   1     Order Specific Question:   Level of Care     Answer:   Med Surg [16]     Order Specific Question:   Estimated length of stay     Answer:   More than 2 Midnights     Order Specific Question:   Certification     Answer:   I certify that inpatient services are medically necessary for this patient for a duration of greater than two midnights. See H&P and MD Progress Notes for additional information about the patient's course of treatment.     ED Arrival Information       Expected   -    Arrival   7/23/2024 14:17    Acuity   Urgent              Means of arrival   Walk-In    Escorted by   Self    Service   Hospitalist    Admission type   Emergency              Arrival complaint   shortness of breath             Chief Complaint   Patient presents with    Anxiety     Pt anxious and stating she cannot bearth. Resp unlabored in triage. Pt also states tender under chin in neck area. Pt denies pain       Initial Presentation: 78 y.o. female who presented  to [facility] ED. Admitted as Inpatient for evaluation and treatment of   Hypoxia. Diagnoses of COPD exacerbation (HCC), Hypertension, Sialoadenitis of submandibular gland, and Cellulitis of submandibular region were also pertinent to this visit.  PMHx:  has a past medical history of Asthma, Chronic obstructive pulmonary disease  (9/26/2012), GERD (gastroesophageal reflux disease), Hypertension (10/11/2018), Hypothyroid, Osteoarthritis (9/26/2012), Temporal arteritis , Tubular adenoma, and Type 2 diabetes mellitus with complication, without long-term current use of insulin  (1/14/2020).  Presented w/  SOB AND WHEEZING.  She was noted to by Hypoxic  in the ED, placed on 3L NC . She had a neb treatment and her wheezing resolved. She is admitted due to acute COPD exacerbation.  She alos c/o R side neck swelling similar to when she hd neck cellulitis a year ago. CT showed sialolith with some mild subcutaneous cellulitis of the neck. .     Anticipated Length of Stay/Certification Statement: Patient will be admitted on an Inpatient basis with an anticipated length of stay of  greater than 2 midnights.   Justification for Hospital Stay: patient has acute COPD exacerbation.  She needs IV steroids.  Length of stay will be greater than 2 midnights.       Date: 7/24/24   Day 2: She reports feeling better. Less SOB,  Now on 2L NC, Continue Solumedrol and around the clock neb treatments.  Cellulitis on the neck, no obvious abscess,  This is mild and resolving. Continue Unasyn, ENT consult pending. No airway compromise.  CT shows sialolith and cellulitis of the soft tissue of the neck.       ED Triage Vitals   Temperature Pulse Respirations Blood Pressure SpO2 Pain Score   07/23/24 1442 07/23/24 1442 07/23/24 1437 07/23/24 1442 07/23/24 1437 07/23/24 1836   98 °F (36.7 °C) 84 (!) 30 (!) 193/100 (!) 82 % No Pain     Weight (last 2 days)       Date/Time Weight    07/23/24 1840 45 (99.21)    07/23/24 1442 46.4 (102.29)            Vital Signs (last 3 days)       Date/Time Temp Pulse Resp BP MAP (mmHg) SpO2 Calculated FIO2 (%) - Nasal Cannula Nasal Cannula O2 Flow Rate (L/min) O2 Device Patient Position - Orthostatic VS Pain    07/24/24 0805 -- 78 16 -- -- 98 % 32 3 L/min Nasal cannula -- --    07/24/24 0753 -- -- -- -- -- -- -- -- -- -- No Pain    07/24/24 0701 97.2 °F (36.2 °C) 83 20 103/69 74 99 % 32 3 L/min Nasal cannula Lying --    07/24/24 0138 -- -- -- -- -- 98 % -- -- -- -- --    07/23/24 2301 98.1 °F (36.7 °C) 69 21 109/60 66 98 % 32 3 L/min Nasal cannula Lying --    07/23/24 1935 -- -- -- -- -- 97 % 32 3 L/min Nasal cannula -- --    07/23/24 1915 -- -- -- -- -- -- --  -- -- -- No Pain    07/23/24 1840 97.6 °F (36.4 °C) 90 22 161/87 125 97 % 32 3 L/min Nasal cannula Lying --    07/23/24 1836 -- -- -- -- -- -- -- -- -- -- No Pain    07/23/24 1718 -- -- -- -- -- -- -- -- Nasal cannula -- --    07/23/24 1552 -- 85 -- 197/102 -- 97 % -- -- None (Room air) Sitting --    07/23/24 1525 -- -- -- -- -- -- -- -- Nasal cannula -- --    07/23/24 1442 98 °F (36.7 °C) 84 28 193/100 -- 97 % 32 3 L/min Nasal cannula Sitting --    07/23/24 1437 -- -- 30 -- -- 82 % -- -- -- -- --              Pertinent Labs/Diagnostic Test Results:   Radiology:  CT soft tissue neck with contrast   Final Interpretation by Roberto Carlos Aldana MD (07/23 1732)      Acute bilateral submandibular gland sialoadenitis with soft tissue inflammation in the submandibular spaces (right greater than left) and associated overlying subcutaneous soft tissue cellulitis on the right extending to the anterior inferior neck to the    level of the hyoid. No abscess. No sialolith.               The study was marked in EPIC for immediate notification.            Workstation performed: [id number]         XR chest portable   Final Interpretation by Leighton Melara MD (07/23 4596)      No acute cardiopulmonary disease.            Workstation performed: [id number]           Cardiology:  No orders to display     GI:  No orders to display       Results from last 7 days   Lab Units 07/23/24  1503   SARS-COV-2  Negative     Results from last 7 days   Lab Units 07/24/24  0530 07/23/24  1503 07/22/24  1622   WBC Thousand/uL 8.12 9.14 8.43   HEMOGLOBIN g/dL 13.2 13.7 13.6   HEMATOCRIT % 39.9 43.5 43.8   PLATELETS Thousands/uL 229 230 224   TOTAL NEUT ABS Thousands/µL  --  7.75* 6.75         Results from last 7 days   Lab Units 07/24/24  0530 07/23/24  1503 07/22/24  1622   SODIUM mmol/L 138 137 137   POTASSIUM mmol/L 4.5 4.6 6.3*   CHLORIDE mmol/L 99 96 100   CO2 mmol/L 30 33* 28   ANION GAP mmol/L 9 8 9   BUN mg/dL 15 15 16   CREATININE mg/dL 0.64 0.67  0.68   EGFR ml/min/1.73sq m 85 84 84   CALCIUM mg/dL 9.1 9.4 9.4   MAGNESIUM mg/dL 2.1  --   --      Results from last 7 days   Lab Units 07/23/24  1503 07/22/24  1622   AST U/L 23 42*   ALT U/L 18 13   ALK PHOS U/L 41 38   TOTAL PROTEIN g/dL 6.9 7.0   ALBUMIN g/dL 4.4 4.3   TOTAL BILIRUBIN mg/dL 0.41 0.38         Results from last 7 days   Lab Units 07/24/24  0530 07/23/24  1503 07/22/24  1622   GLUCOSE RANDOM mg/dL 132 134 105       Results from last 7 days   Lab Units 07/23/24  1706 07/23/24  1503   HS TNI 0HR ng/L  --  5   HS TNI 2HR ng/L 6  --    HSTNI D2 ng/L 1  --      Results from last 7 days   Lab Units 07/23/24  1503   D-DIMER QUANTITATIVE ug/ml FEU 0.53*       Results from last 7 days   Lab Units 07/23/24  1802   LACTIC ACID mmol/L 1.2       Results from last 7 days   Lab Units 07/23/24  1503   BNP pg/mL 82     Results from last 7 days   Lab Units 07/22/24  1622   LIPASE u/L 53       Results from last 7 days   Lab Units 07/23/24  1547 07/22/24  1842   CLARITY UA  Clear Clear   COLOR UA  Yellow Fina*   SPEC GRAV UA  1.015 1.005   PH UA  8.0 7.0   GLUCOSE UA mg/dl Negative Negative   KETONES UA mg/dl Negative Negative   BLOOD UA  25.0* Negative   PROTEIN UA mg/dl 30 (1+)* Negative   NITRITE UA  Negative Negative   BILIRUBIN UA  Negative Negative   UROBILINOGEN UA mg/dL Negative Negative   LEUKOCYTES UA  25.0* 25.0*   WBC UA /hpf 2-4 1-2   RBC UA /hpf 2-4 4-10*   BACTERIA UA /hpf Innumerable* None Seen   EPITHELIAL CELLS WET PREP /hpf Occasional Moderate*     Results from last 7 days   Lab Units 07/23/24  1503   INFLUENZA A PCR  Negative   INFLUENZA B PCR  Negative   RSV PCR  Negative       Results from last 7 days   Lab Units 07/23/24  1802   BLOOD CULTURE  Received in Microbiology Lab. Culture in Progress.  Received in Microbiology Lab. Culture in Progress.         ED Treatment-Medication Administration from 07/23/2024 1417 to 07/23/2024 1833         Date/Time Order Dose Route Action     07/23/2024 1440  ipratropium-albuterol (DUO-NEB) 0.5-2.5 mg/3 mL inhalation solution 3 mL 3 mL Nebulization Given     07/23/2024 1629 iohexol (OMNIPAQUE) 350 MG/ML injection (MULTI-DOSE) 90 mL 90 mL Intravenous Given     07/23/2024 1816 ampicillin-sulbactam (UNASYN) 1.5 g in sodium chloride 0.9 % 50 mL IVPB 1.5 g Intravenous New Bag            Past Medical History:   Diagnosis Date    Asthma     Chronic obstructive pulmonary disease (HCC) 9/26/2012    GERD (gastroesophageal reflux disease)     Hypertension 10/11/2018    Hypothyroid     Osteoarthritis 9/26/2012    Temporal arteritis ([facility])     Tubular adenoma     Type 2 diabetes mellitus with complication, without long-term current use of insulin ([facility]) 1/14/2020     Present on Admission:   Acute exacerbation of chronic obstructive pulmonary disease (COPD) ([facility])   Cellulitis of neck   Hypertension      Admitting Diagnosis: Cellulitis of submandibular region [K12.2]  Anxiety [F41.9]  Hypertension [I10]  Hypoxia [R09.02]  COPD exacerbation ([facility]) [J44.1]  Sialoadenitis of submandibular gland [K11.20]  Age/Sex: 78 y.o. female      Admission Orders:  Scheduled Medications:  aspirin, 81 mg, Oral, Daily  budesonide, 0.5 mg, Nebulization, BID  calcium carbonate-vitamin D, 1 tablet, Oral, BID With Meals  vitamin B-12, 1,000 mcg, Oral, Daily  Diclofenac Sodium, 2 g, Topical, 4x Daily  enoxaparin, 40 mg, Subcutaneous, Daily  ipratropium, 0.5 mg, Nebulization, Q6H  levalbuterol, 1.25 mg, Nebulization, Q6H  levothyroxine, 50 mcg, Oral, Daily  lidocaine, 1 patch, Topical, Daily  losartan, 100 mg, Oral, Daily  methylPREDNISolone sodium succinate, 60 mg, Intravenous, Q8H RACHEL  pantoprazole, 40 mg, Oral, Daily  propranolol, 10 mg, Oral, BID  saccharomyces boulardii, 250 mg, Oral, BID  Unasyn 3G IV a6      Continuous IV Infusions:     PRN Meds:  acetaminophen, 650 mg, Oral, Q6H PRN  albuterol, 2.5 mg, Nebulization, Q4H PRN  ALPRAZolam, 0.25 mg, Oral, HS PRN  iohexol, 90 mL, Intravenous, Once in  imaging  ondansetron, 4 mg, Intravenous, Q6H PRN            Network Utilization Review Department  ATTENTION: Please call with any questions or concerns to [phone] and carefully listen to the prompts so that you are directed to the right person. All voicemails are confidential.   For Discharge needs, contact Care Management DC Support Team at [phone] opt. 2  Send all requests for admission clinical reviews, approved or denied determinations and any other requests to dedicated fax number below belonging to the [facility] where the patient is receiving treatment. List of dedicated fax numbers for the Facilities:  FACILITY NAME UR FAX NUMBER   ADMISSION DENIALS (Administrative/Medical Necessity) [phone]   DISCHARGE SUPPORT TEAM (NETWORK) [phone]   PARENT CHILD HEALTH (Maternity/NICU/Pediatrics) [phone]   [facility] [phone]   [facility] [phone]   [facility] [phone]   [facility] [phone]   [facility] [phone]   [facility] [phone]   [facility] [phone]   [facility] [phone]   [facility] [phone]   [facility] [phone]   [facility] [phone]   [facility] [phone]

## 2024-07-25 LAB
ANION GAP SERPL CALCULATED.3IONS-SCNC: 9 MMOL/L (ref 4–13)
BUN SERPL-MCNC: 38 MG/DL (ref 5–25)
CALCIUM SERPL-MCNC: 9.5 MG/DL (ref 8.4–10.2)
CHLORIDE SERPL-SCNC: 98 MMOL/L (ref 96–108)
CO2 SERPL-SCNC: 30 MMOL/L (ref 21–32)
CREAT SERPL-MCNC: 0.98 MG/DL (ref 0.6–1.3)
ERYTHROCYTE [DISTWIDTH] IN BLOOD BY AUTOMATED COUNT: 13.8 % (ref 11.6–15.1)
GFR SERPL CREATININE-BSD FRML MDRD: 55 ML/MIN/1.73SQ M
GLUCOSE SERPL-MCNC: 142 MG/DL (ref 65–140)
HCT VFR BLD AUTO: 38.2 % (ref 34.8–46.1)
HGB BLD-MCNC: 12.1 G/DL (ref 11.5–15.4)
MAGNESIUM SERPL-MCNC: 2.5 MG/DL (ref 1.9–2.7)
MCH RBC QN AUTO: 28.7 PG (ref 26.8–34.3)
MCHC RBC AUTO-ENTMCNC: 31.7 G/DL (ref 31.4–37.4)
MCV RBC AUTO: 91 FL (ref 82–98)
PLATELET # BLD AUTO: 210 THOUSANDS/UL (ref 149–390)
PMV BLD AUTO: 10.2 FL (ref 8.9–12.7)
POTASSIUM SERPL-SCNC: 5 MMOL/L (ref 3.5–5.3)
RBC # BLD AUTO: 4.21 MILLION/UL (ref 3.81–5.12)
SODIUM SERPL-SCNC: 137 MMOL/L (ref 135–147)
WBC # BLD AUTO: 10.78 THOUSAND/UL (ref 4.31–10.16)

## 2024-07-25 PROCEDURE — 99232 SBSQ HOSP IP/OBS MODERATE 35: CPT | Performed by: HOSPITALIST

## 2024-07-25 PROCEDURE — 85027 COMPLETE CBC AUTOMATED: CPT | Performed by: HOSPITALIST

## 2024-07-25 PROCEDURE — 80048 BASIC METABOLIC PNL TOTAL CA: CPT | Performed by: HOSPITALIST

## 2024-07-25 PROCEDURE — 97163 PT EVAL HIGH COMPLEX 45 MIN: CPT

## 2024-07-25 PROCEDURE — 94640 AIRWAY INHALATION TREATMENT: CPT

## 2024-07-25 PROCEDURE — 83735 ASSAY OF MAGNESIUM: CPT | Performed by: HOSPITALIST

## 2024-07-25 PROCEDURE — 97116 GAIT TRAINING THERAPY: CPT

## 2024-07-25 PROCEDURE — 94760 N-INVAS EAR/PLS OXIMETRY 1: CPT

## 2024-07-25 RX ADMIN — METHYLPREDNISOLONE SODIUM SUCCINATE 60 MG: 125 INJECTION, POWDER, FOR SOLUTION INTRAMUSCULAR; INTRAVENOUS at 05:56

## 2024-07-25 RX ADMIN — LEVALBUTEROL HYDROCHLORIDE 1.25 MG: 1.25 SOLUTION RESPIRATORY (INHALATION) at 07:35

## 2024-07-25 RX ADMIN — PROPRANOLOL HYDROCHLORIDE 10 MG: 10 TABLET ORAL at 08:02

## 2024-07-25 RX ADMIN — PROPRANOLOL HYDROCHLORIDE 10 MG: 10 TABLET ORAL at 17:18

## 2024-07-25 RX ADMIN — Medication 250 MG: at 08:02

## 2024-07-25 RX ADMIN — Medication 1 TABLET: at 17:18

## 2024-07-25 RX ADMIN — AMPICILLIN SODIUM AND SULBACTAM SODIUM 3 G: 2; 1 INJECTION, POWDER, FOR SOLUTION INTRAMUSCULAR; INTRAVENOUS at 17:20

## 2024-07-25 RX ADMIN — METHYLPREDNISOLONE SODIUM SUCCINATE 60 MG: 125 INJECTION, POWDER, FOR SOLUTION INTRAMUSCULAR; INTRAVENOUS at 22:04

## 2024-07-25 RX ADMIN — Medication 250 MG: at 17:18

## 2024-07-25 RX ADMIN — Medication 1 TABLET: at 08:03

## 2024-07-25 RX ADMIN — LEVOTHYROXINE SODIUM 50 MCG: 50 TABLET ORAL at 05:56

## 2024-07-25 RX ADMIN — LEVALBUTEROL HYDROCHLORIDE 1.25 MG: 1.25 SOLUTION RESPIRATORY (INHALATION) at 13:28

## 2024-07-25 RX ADMIN — ASPIRIN 81 MG: 81 TABLET, COATED ORAL at 08:02

## 2024-07-25 RX ADMIN — ENOXAPARIN SODIUM 40 MG: 40 INJECTION SUBCUTANEOUS at 08:05

## 2024-07-25 RX ADMIN — AMPICILLIN SODIUM AND SULBACTAM SODIUM 3 G: 2; 1 INJECTION, POWDER, FOR SOLUTION INTRAMUSCULAR; INTRAVENOUS at 10:53

## 2024-07-25 RX ADMIN — BUDESONIDE 0.5 MG: 0.5 INHALANT RESPIRATORY (INHALATION) at 19:14

## 2024-07-25 RX ADMIN — IPRATROPIUM BROMIDE 0.5 MG: 0.5 SOLUTION RESPIRATORY (INHALATION) at 13:28

## 2024-07-25 RX ADMIN — BUDESONIDE 0.5 MG: 0.5 INHALANT RESPIRATORY (INHALATION) at 07:35

## 2024-07-25 RX ADMIN — LEVALBUTEROL HYDROCHLORIDE 1.25 MG: 1.25 SOLUTION RESPIRATORY (INHALATION) at 19:14

## 2024-07-25 RX ADMIN — AMPICILLIN SODIUM AND SULBACTAM SODIUM 3 G: 2; 1 INJECTION, POWDER, FOR SOLUTION INTRAMUSCULAR; INTRAVENOUS at 22:23

## 2024-07-25 RX ADMIN — IPRATROPIUM BROMIDE 0.5 MG: 0.5 SOLUTION RESPIRATORY (INHALATION) at 19:14

## 2024-07-25 RX ADMIN — METHYLPREDNISOLONE SODIUM SUCCINATE 60 MG: 125 INJECTION, POWDER, FOR SOLUTION INTRAMUSCULAR; INTRAVENOUS at 13:37

## 2024-07-25 RX ADMIN — IPRATROPIUM BROMIDE 0.5 MG: 0.5 SOLUTION RESPIRATORY (INHALATION) at 07:35

## 2024-07-25 RX ADMIN — PANTOPRAZOLE SODIUM 40 MG: 40 TABLET, DELAYED RELEASE ORAL at 08:02

## 2024-07-25 RX ADMIN — CYANOCOBALAMIN TAB 1000 MCG 1000 MCG: 1000 TAB at 08:02

## 2024-07-25 RX ADMIN — LOSARTAN POTASSIUM 100 MG: 50 TABLET, FILM COATED ORAL at 08:02

## 2024-07-25 RX ADMIN — AMPICILLIN SODIUM AND SULBACTAM SODIUM 3 G: 2; 1 INJECTION, POWDER, FOR SOLUTION INTRAMUSCULAR; INTRAVENOUS at 05:56

## 2024-07-25 NOTE — PHYSICAL THERAPY NOTE
Physical Therapy Evaluation    Patient's Name: Sarah Zayas    Admitting Diagnosis  Cellulitis of submandibular region [K12.2]  Anxiety [F41.9]  Hypertension [I10]  Hypoxia [R09.02]  COPD exacerbation (HCC) [J44.1]  Sialoadenitis of submandibular gland [K11.20]    Problem List  Patient Active Problem List   Diagnosis    Acute exacerbation of chronic obstructive pulmonary disease (COPD) (HCC)    Vitamin D deficiency    Other specified hypothyroidism    Gastroesophageal reflux disease without esophagitis    Rectus sheath hematoma, initial encounter    Hypertension    Osteoarthritis    Temporal arteritis (HCC)    Raynaud's phenomenon without gangrene    Postnasal drip    Left upper lobe pulmonary nodule    Infectious sialoadenitis of major salivary gland    Cellulitis of neck    COVID-19    Abnormal CT scan    COPD, severe (HCC)    Severe protein-calorie malnutrition (HCC)       Past Medical History  Past Medical History:   Diagnosis Date    Asthma     Chronic obstructive pulmonary disease (HCC) 9/26/2012    GERD (gastroesophageal reflux disease)     Hypertension 10/11/2018    Hypothyroid     Osteoarthritis 9/26/2012    Temporal arteritis (HCC)     Tubular adenoma     Type 2 diabetes mellitus with complication, without long-term current use of insulin ([facility]) 1/14/2020       Past Surgical History  Past Surgical History:   Procedure Laterality Date    EYE SURGERY Right 12/06/2019    cataract LVCFS    HYSTERECTOMY      NO PAST SURGERIES      ALSO NO RELEVANT PAST SURRGICAL HX AS PER [facility]       Recent Imaging  CT soft tissue neck with contrast   Final Result by Roberto Carlos Aldana MD (07/23 9262)      Acute bilateral submandibular gland sialoadenitis with soft tissue inflammation in the submandibular spaces (right greater than left) and associated overlying subcutaneous soft tissue cellulitis on the right extending to the anterior inferior neck to the    level of the hyoid. No abscess. No sialolith.               The study was  marked in EPIC for immediate notification.            Workstation performed: [id number]         XR chest portable   Final Result by Leighton Melara MD (07/23 1456)      No acute cardiopulmonary disease.            Workstation performed: [id number]             Recent Vital Signs  Vitals:    07/25/24 0600 07/25/24 0654 07/25/24 0736 07/25/24 0755   BP:  92/58  122/74   BP Location:  Left arm  Left arm   Pulse:  91  99   Resp:  20  18   Temp:  97.6 °F (36.4 °C)     TempSrc:  Temporal     SpO2: 96% 94% 92% 96%   Weight:       Height:            07/25/24 1100   PT Last Visit   PT Visit Date 07/25/24   Note Type   Note type Evaluation   Pain Assessment   Pain Assessment Tool 0-10   Pain Score No Pain   Restrictions/Precautions   Weight Bearing Precautions Per Order No   Home Living   Type of Home House   Home Layout Two level;Stairs to enter with rails  (1 [place])   Bathroom Accessibility Accessible via walker   Prior Function   Level of [place] Independent with ADLs;Independent with functional mobility;Independent with IADLS   Lives With Spouse   Receives Help From Family   General   Family/Caregiver Present No   Cognition   Overall Cognitive Status WFL   Arousal/Participation Alert   Orientation Level Oriented X4   Memory Within functional limits   Following Commands Follows all commands and directions without difficulty   RLE Assessment   RLE Assessment   (3+/5)   LLE Assessment   LLE Assessment   (3+/5)   Coordination   Movements are Fluid and Coordinated 0   Coordination and Movement Description mild decreased gross motor coordination   Sensation X   Light Touch   RLE Light Touch Impaired   LLE Light Touch Impaired   Bed Mobility   Supine to Sit 6  Modified independent   Additional items Increased time required   Sit to Supine 6  Modified independent   Additional items Increased time required   Transfers   Sit to Stand 5  Supervision   Additional items Increased time required   Stand to Sit 5  Supervision    Additional items Increased time required   Ambulation/Elevation   Gait pattern Step through pattern;Decreased heel strike;Decreased toe off;Short stride;Decreased foot clearance   Gait Assistance 5  Supervision   Additional items Assist x 1;Verbal cues;Tactile cues   Assistive Device SPC   Distance 200ft with standing rest breaks   Balance   Static Sitting Fair +   Dynamic Sitting Fair +   Static Standing Fair   Dynamic Standing Fair -   Ambulatory Fair -   Endurance Deficit   Endurance Deficit Yes   Endurance Deficit Description SOB with longer distances   Activity Tolerance   Activity Tolerance Patient limited by fatigue   Medical Staff Made Aware spoke to CM   Nurse Made Aware spoke to RN   Assessment   Prognosis Good   Problem List Decreased strength;Decreased endurance;Impaired balance;Decreased mobility;Decreased coordination;Impaired sensation   Barriers to Discharge Inaccessible home environment   Goals   Patient Goals to get off O2   STG Expiration Date 08/04/24   Short Term Goal #1 see eval note   PT Treatment Day 1   Plan   Treatment/Interventions LE strengthening/ROM;ADL retraining;Functional transfer training;Elevations;Therapeutic exercise;Endurance training;Patient/family training;Bed mobility;Equipment eval/education;Gait training;Spoke to nursing;Spoke to case management;OT   PT Frequency 2-3x/wk   Discharge Recommendation   Rehab Resource Intensity Level, PT III (Minimum Resource Intensity)   Equipment Recommended Cane;Walker  (will see for one more treat if possible to determine SPC vs RW)   AM-PAC Basic Mobility Inpatient   Turning in Flat Bed Without Bedrails 4   Lying on Back to Sitting on Edge of Flat Bed Without Bedrails 4   Moving Bed to Chair 3   Standing Up From Chair Using Arms 3   Walk in Room 3   Climb 3-5 Stairs With Railing 3   Basic Mobility Inpatient Raw Score 20   Basic Mobility Standardized Score 43.99   [facility] Highest Level Of Mobility   [facility] Goal 6: Walk 10 steps or  more   -[facility] Achieved 7: Walk 25 feet or more   End of Consult   Patient Position at End of Consult Bedside chair;All needs within reach         ASSESSMENT                                                                                                                     Sarah Zayas is a 78 y.o. female admitted to [facility] on 7/23/2024 for Acute exacerbation of chronic obstructive pulmonary disease (COPD) ([facility]). Pt  has a past medical history of Asthma, Chronic obstructive pulmonary disease ([facility]) (9/26/2012), GERD (gastroesophageal reflux disease), Hypertension (10/11/2018), Hypothyroid, Osteoarthritis (9/26/2012), Temporal arteritis ([facility]), Tubular adenoma, and Type 2 diabetes mellitus with complication, without long-term current use of insulin ([facility]) (1/14/2020).. PT was consulted and pt was seen on 7/25/2024 for mobility assessment and d/c planning.  Impairments limiting pt at this time include impaired balance, decreased endurance, decreased coordination, increased fall risk, new onset of impairment of functional mobility, decreased ADLS, decreased IADLS, decreased activity tolerance, decreased sensation, and decreased strength. Pt is currently functioning at a modified independent assistance level for bed mobility, supervision assistance x1 level for transfers, supervision assistance x1 level for ambulation with Single Point Cane. The patient's -[facility] Basic Mobility Inpatient Short Form Raw Score is 20. A Raw score of greater than 16 suggests the patient may benefit from discharge to home. Please also refer to the recommendation of the Physical Therapist for safe discharge planning.    Goals                                                                                                                                    1) Bed mobility skills with modified independent assistance to facilitate safe return to previous living environment 2) Functional transfers with modified independent assistance to facilitate safe  return to previous living environment  3) Ambulation with least restrictive AD modified independent assistance without LOB and stable vitals for safe ambulation home/ community distances. 4) Stair training up/down flight 12 step/s with appropriate rail/s  and modified independent assistance for safe access to previous living environment. 5) Improve balance grades to fair + to reduce risk of falls. 6)Improve LE strength grades by 1 to increase independence w/ transfers and gait.  7) PT for ongoing pt and family education; DME needs and D/C planning to promote highest level of function in least restrictive environment.     Recommendations                                                                                                              Pt will benefit from continued skilled IP PT to address the above mentioned impairments in order to maximize recovery and increase functional independence when completing mobility and ADLs. See flow sheet for goals and POC.     DME: Rolling Walker and Single Point Cane    Discharge Disposition:  Home with Home Physical Therapy      Florencio Kang PT, DPT

## 2024-07-25 NOTE — CASE MANAGEMENT
Case Management Discharge Planning Note    Patient name Sarah Zayas  Location 7T U 703/7T [facility] 703-01 MRN [id number]  : 1945 Date 2024       Current Admission Date: 2024  Current Admission Diagnosis:Acute exacerbation of chronic obstructive pulmonary disease (COPD) ([facility])   Patient Active Problem List    Diagnosis Date Noted Date Diagnosed    Severe protein-calorie malnutrition ([facility]) 2024     COPD, severe ([facility]) 2024     COVID-19 2023     Abnormal CT scan 2023     Infectious sialoadenitis of major salivary gland 2023     Cellulitis of neck 2023     Left upper lobe pulmonary nodule 2023     Postnasal drip 2023     Raynaud's phenomenon without gangrene 2022     Temporal arteritis ([facility]) 2020     Hypertension 10/11/2018     Rectus sheath hematoma, initial encounter 10/10/2018     Other specified hypothyroidism 2018     Gastroesophageal reflux disease without esophagitis 2018     Acute exacerbation of chronic obstructive pulmonary disease (COPD) ([facility]) 2012     Vitamin D deficiency 2012     Osteoarthritis 2012       LOS (days): 2  Geometric Mean LOS (GMLOS) (days): 4.8  Days to GMLOS:3     OBJECTIVE:  Risk of Unplanned Readmission Score: 19.43         Current admission status: Inpatient   Preferred Pharmacy:   [facility] - [address]  [address]  [address]  [place] [facility] [address]  Phone: [phone] Fax: [phone]    Primary Care Provider: Nicolas Nix MD    Primary Insurance: [employer] REP  Secondary Insurance:     DISCHARGE DETAILS:    Discharge planning discussed with:: Patient  Freedom of Choice: Yes     Other Referral/Resources/Interventions Provided:  Interventions: HHC    Treatment Team Recommendation: Home with Home Health Care       Additional Comments: Therapy eval done and recs are HHC.  Met with patient at bedside and discussed HHC,  Patient agreeable to HHC.   [place] [facility] referrals made.  Has used [facility] in past and would like agency again.  CM department following thru discharge

## 2024-07-25 NOTE — ASSESSMENT & PLAN NOTE
Patient also noted to have some swelling of the right side of her neck    CT shows sialolith and cellulitis of the soft tissue of the neck    This is very mild and resolving  \  ENT consulted.  On Unasyn    If ENT is unable to see her in the hospital, she can follow up with them in the office

## 2024-07-25 NOTE — UTILIZATION REVIEW
SEE INITIAL REVIEW AT BOTTOM    Continued Stay Review     Date: 7/25  Day 3: Has surpassed a 2nd midnight with active treatments and services.            Current Patient Class: Inpatient  Current Level of Care: MS    HPI:78 y.o. female initially admitted on 7/23 with COPD exac, cellulitis neck     Assessment/Plan:   COPD exac, neck cellulitis - remains on IV antibiotics, IV SoluMedrol.  Is on oxygen 1L NC. Continues to receive nebs.  ENT consult but can be done as OP.  Neck cellulitis is mild and resolving.  On exam improving, less SOB, minimal wheezing, sleeping well. No BLE edema.  Slight leukocytosis today.     Vital Signs (last 3 days)       Date/Time Temp Pulse Resp BP MAP (mmHg) SpO2 Calculated FIO2 (%) - Nasal Cannula Nasal Cannula O2 Flow Rate (L/min) O2 Device Patient Position - Orthostatic VS Pain    07/25/24 0755 -- 99 18 122/74 99 96 % 24 1 L/min Nasal cannula Sitting --    07/25/24 0736 -- -- -- -- -- 92 % -- -- -- -- --    07/25/24 0724 -- -- -- -- -- -- -- -- -- -- No Pain    07/25/24 0654 97.6 °F (36.4 °C) 91 20 92/58 64 94 % 24 1 L/min None (Room air) Lying --    07/25/24 0600 -- -- -- -- -- 96 % 24 1 L/min Nasal cannula -- --    07/24/24 2250 97.8 °F (36.6 °C) 98 18 101/54 71 98 % 32 3 L/min Nasal cannula Lying --    07/24/24 2100 -- -- -- -- -- -- -- -- -- -- No Pain    07/24/24 2018 -- -- -- -- -- 93 % -- -- -- -- --    07/24/24 1509 97.9 °F (36.6 °C) 96 18 105/58 71 100 % 32 3 L/min Nasal cannula Lying --    07/24/24 1430 -- -- -- -- -- 98 % 28 2 L/min Nasal cannula -- --    07/24/24 0805 -- 78 16 -- -- 98 % 32 3 L/min Nasal cannula -- --    07/24/24 0753 -- -- -- -- -- -- -- -- -- -- No Pain    07/24/24 0701 97.2 °F (36.2 °C) 83 20 103/69 74 99 % 32 3 L/min Nasal cannula Lying --    07/24/24 0138 -- -- -- -- -- 98 % -- -- -- -- --    07/23/24 2301 98.1 °F (36.7 °C) 69 21 109/60 66 98 % 32 3 L/min Nasal cannula Lying --    07/23/24 1935 -- -- -- -- -- 97 % 32 3 L/min Nasal cannula -- --     07/23/24 1915 -- -- -- -- -- -- -- -- -- -- No Pain    07/23/24 1840 97.6 °F (36.4 °C) 90 22 161/87 125 97 % 32 3 L/min Nasal cannula Lying --    07/23/24 1836 -- -- -- -- -- -- -- -- -- -- No Pain    07/23/24 1718 -- -- -- -- -- -- -- -- Nasal cannula -- --    07/23/24 1552 -- 85 -- 197/102 -- 97 % -- -- None (Room air) Sitting --    07/23/24 1525 -- -- -- -- -- -- -- -- Nasal cannula -- --    07/23/24 1442 98 °F (36.7 °C) 84 28 193/100 -- 97 % 32 3 L/min Nasal cannula Sitting --    07/23/24 1437 -- -- 30 -- -- 82 % -- -- -- -- --          Weight (last 2 days)       Date/Time Weight    07/23/24 1840 45 (99.21)    07/23/24 1442 46.4 (102.29)              Pertinent Labs/Diagnostic Results:   Radiology:  CT soft tissue neck with contrast   Final Interpretation by Roberto Carlos Aldana MD (07/23 1732)      Acute bilateral submandibular gland sialoadenitis with soft tissue inflammation in the submandibular spaces (right greater than left) and associated overlying subcutaneous soft tissue cellulitis on the right extending to the anterior inferior neck to the    level of the hyoid. No abscess. No sialolith.               The study was marked in EPIC for immediate notification.            Workstation performed: [id number]         XR chest portable   Final Interpretation by Leighton Melara MD (07/23 0286)      No acute cardiopulmonary disease.            Workstation performed: [id number]           Cardiology:  No orders to display     GI:  No orders to display       Results from last 7 days   Lab Units 07/23/24  1503   SARS-COV-2  Negative     Results from last 7 days   Lab Units 07/25/24  0545 07/24/24  0530 07/23/24  1503 07/22/24  1622   WBC Thousand/uL 10.78* 8.12 9.14 8.43   HEMOGLOBIN g/dL 12.1 13.2 13.7 13.6   HEMATOCRIT % 38.2 39.9 43.5 43.8   PLATELETS Thousands/uL 210 229 230 224   TOTAL NEUT ABS Thousands/µL  --   --  7.75* 6.75         Results from last 7 days   Lab Units 07/25/24  0545 07/24/24  0530 07/23/24  1504  07/22/24  1622   SODIUM mmol/L 137 138 137 137   POTASSIUM mmol/L 5.0 4.5 4.6 6.3*   CHLORIDE mmol/L 98 99 96 100   CO2 mmol/L 30 30 33* 28   ANION GAP mmol/L 9 9 8 9   BUN mg/dL 38* 15 15 16   CREATININE mg/dL 0.98 0.64 0.67 0.68   EGFR ml/min/1.73sq m 55 85 84 84   CALCIUM mg/dL 9.5 9.1 9.4 9.4   MAGNESIUM mg/dL 2.5 2.1  --   --      Results from last 7 days   Lab Units 07/23/24  1503 07/22/24  1622   AST U/L 23 42*   ALT U/L 18 13   ALK PHOS U/L 41 38   TOTAL PROTEIN g/dL 6.9 7.0   ALBUMIN g/dL 4.4 4.3   TOTAL BILIRUBIN mg/dL 0.41 0.38         Results from last 7 days   Lab Units 07/25/24  0545 07/24/24  0530 07/23/24  1503 07/22/24  1622   GLUCOSE RANDOM mg/dL 142* 132 134 105     Results from last 7 days   Lab Units 07/23/24  1706 07/23/24  1503   HS TNI 0HR ng/L  --  5   HS TNI 2HR ng/L 6  --    HSTNI D2 ng/L 1  --      Results from last 7 days   Lab Units 07/23/24  1503   D-DIMER QUANTITATIVE ug/ml FEU 0.53*           Results from last 7 days   Lab Units 07/23/24  1802   LACTIC ACID mmol/L 1.2             Results from last 7 days   Lab Units 07/23/24  1503   BNP pg/mL 82                     Results from last 7 days   Lab Units 07/22/24  1622   LIPASE u/L 53                 Results from last 7 days   Lab Units 07/23/24  1547 07/22/24  1842   CLARITY UA  Clear Clear   COLOR UA  Yellow Fina*   SPEC GRAV UA  1.015 1.005   PH UA  8.0 7.0   GLUCOSE UA mg/dl Negative Negative   KETONES UA mg/dl Negative Negative   BLOOD UA  25.0* Negative   PROTEIN UA mg/dl 30 (1+)* Negative   NITRITE UA  Negative Negative   BILIRUBIN UA  Negative Negative   UROBILINOGEN UA mg/dL Negative Negative   LEUKOCYTES UA  25.0* 25.0*   WBC UA /hpf 2-4 1-2   RBC UA /hpf 2-4 4-10*   BACTERIA UA /hpf Innumerable* None Seen   EPITHELIAL CELLS WET PREP /hpf Occasional Moderate*     Results from last 7 days   Lab Units 07/23/24  1503   INFLUENZA A PCR  Negative   INFLUENZA B PCR  Negative   RSV PCR  Negative           Results from last 7 days    Lab Units 07/23/24  1802   BLOOD CULTURE  No Growth at 24 hrs.  No Growth at 24 hrs.                   Medications:   Scheduled Medications:  ampicillin-sulbactam, 3 g, Intravenous, Q6H  aspirin, 81 mg, Oral, Daily  budesonide, 0.5 mg, Nebulization, BID  calcium carbonate-vitamin D, 1 tablet, Oral, BID With Meals  vitamin B-12, 1,000 mcg, Oral, Daily  Diclofenac Sodium, 2 g, Topical, 4x Daily  enoxaparin, 40 mg, Subcutaneous, Daily  ipratropium, 0.5 mg, Nebulization, TID  levalbuterol, 1.25 mg, Nebulization, TID  levothyroxine, 50 mcg, Oral, Daily  lidocaine, 1 patch, Topical, Daily  losartan, 100 mg, Oral, Daily  methylPREDNISolone sodium succinate, 60 mg, Intravenous, Q8H RACHEL  pantoprazole, 40 mg, Oral, Daily  propranolol, 10 mg, Oral, BID  saccharomyces boulardii, 250 mg, Oral, BID      Continuous IV Infusions:     PRN Meds:  acetaminophen, 650 mg, Oral, Q6H PRN  albuterol, 2.5 mg, Nebulization, Q4H PRN  ALPRAZolam, 0.25 mg, Oral, HS PRN - x 1 7/24  iohexol, 90 mL, Intravenous, Once in imaging  ondansetron, 4 mg, Intravenous, Q6H PRN    Discharge Plan: [facility] Utilization Review Department  ATTENTION: Please call with any questions or concerns to [phone] and carefully listen to the prompts so that you are directed to the right person. All voicemails are confidential.   For Discharge needs, contact Care Management DC Support Team at [phone] opt. [phone]  Send all requests for admission clinical reviews, approved or denied determinations and any other requests to dedicated fax number below belonging to the campus where the patient is receiving treatment. List of dedicated fax numbers for the Facilities:  FACILITY NAME UR FAX NUMBER   ADMISSION DENIALS (Administrative/Medical Necessity) [phone]   DISCHARGE SUPPORT TEAM (NETWORK) [phone]   PARENT CHILD HEALTH (Maternity/NICU/Pediatrics) [phone]   [facility] [phone]   [facility]  [phone]   [facility] [phone]   [facility] [phone]   [facility] [phone]   [facility] [phone]   [facility] [phone]   [facility] [phone]   [facility] [phone]   [facility] [phone]   [facility] [phone]   [facility] [phone]

## 2024-07-25 NOTE — PROGRESS NOTES
[facility]  Progress Note  Name: Sarah Zayas I  MRN: [id number]  Unit/Bed#: 7T [facility] 703-01 I Date of Admission: 2024   Date of Service: 2024 I Hospital Day: 2    Assessment & Plan   * Acute exacerbation of chronic obstructive pulmonary disease (COPD) (HCC)  Assessment & Plan  She continues to improve    She is down to 1 liter oxygen    Continue solumedrol and nebtx    Possibly home tomorrow.    Cellulitis of neck  Assessment & Plan  Patient also noted to have some swelling of the right side of her neck    CT shows sialolith and cellulitis of the soft tissue of the neck    This is very mild and resolving  \  ENT consulted.  On Unasyn    If ENT is unable to see her in the hospital, she can follow up with them in the office    Hypertension  Assessment & Plan  On ARB and propranolol                     Subjective:   Doing much better  Minimal SOB today  Minimal wheezing  She slept well last night    She is down to 1 liter oxygen      Objective:     Vitals:   Temp (24hrs), Av.8 °F (36.6 °C), Min:97.6 °F (36.4 °C), Max:97.9 °F (36.6 °C)    Temp:  [97.6 °F (36.4 °C)-97.9 °F (36.6 °C)] 97.6 °F (36.4 °C)  HR:  [91-99] 99  Resp:  [18-20] 18  BP: ()/(54-74) 122/74  SpO2:  [92 %-100 %] 96 %  Body mass index is 17.57 kg/m².     Input and Output Summary (last 24 hours):       Intake/Output Summary (Last 24 hours) at 2024 0919  Last data filed at 2024 0846  Gross per 24 hour   Intake 1680 ml   Output --   Net 1680 ml       Physical Exam:     Physical Exam  Vitals and nursing note reviewed.   HENT:      Head: Normocephalic and atraumatic.   Eyes:      Pupils: Pupils are equal, round, and reactive to light.   Cardiovascular:      Rate and Rhythm: Normal rate and regular rhythm.      Heart sounds: No murmur heard.     No friction rub. No gallop.   Pulmonary:      Effort: Pulmonary effort is normal.      Breath sounds: Normal breath sounds. No wheezing or rales.   Abdominal:       General: Bowel sounds are normal.      Palpations: Abdomen is soft.      Tenderness: There is no abdominal tenderness.   Musculoskeletal:      Right lower leg: No edema.      Left lower leg: No edema.       .       Additional Data:     Labs:    Results from last 7 days   Lab Units 07/25/24  0545 07/24/24  0530 07/23/24  1503   WBC Thousand/uL 10.78* 8.12 9.14   HEMOGLOBIN g/dL 12.1 13.2 13.7   HEMATOCRIT % 38.2 39.9 43.5   PLATELETS Thousands/uL 210 229 230   SEGS PCT %  --   --  86*   LYMPHO PCT %  --  3* 7*   MONO PCT %  --  1* 5   EOS PCT %  --  0 2     Results from last 7 days   Lab Units 07/24/24  0530 07/23/24  1503   POTASSIUM mmol/L 4.5 4.6   CHLORIDE mmol/L 99 96   CO2 mmol/L 30 33*   BUN mg/dL 15 15   CREATININE mg/dL 0.64 0.67   CALCIUM mg/dL 9.1 9.4   ALK PHOS U/L  --  41   ALT U/L  --  18   AST U/L  --  23                       * I Have Reviewed All Lab Data     Recent Cultures (last 7 days):     Results from last 7 days   Lab Units 07/23/24  1802   BLOOD CULTURE  No Growth at 24 hrs.  No Growth at 24 hrs.         Last 24 Hours Medication List:   Current Facility-Administered Medications   Medication Dose Route Frequency Provider Last Rate    acetaminophen  650 mg Oral Q6H PRN Lawrence S Prechtel, DO      albuterol  2.5 mg Nebulization Q4H PRN Lawrence S Prechtel, DO      ALPRAZolam  0.25 mg Oral HS PRN Lawrence S Prechtel, DO      ampicillin-sulbactam  3 g Intravenous Q6H Lawrence S Prechtel, DO 3 g (07/25/24 0556)    aspirin  81 mg Oral Daily Lawrence S Prechtel, DO      budesonide  0.5 mg Nebulization BID Lawrence S Prechtel, DO      calcium carbonate-vitamin D  1 tablet Oral BID With Meals Lawrence S Prechtel, DO      vitamin B-12  1,000 mcg Oral Daily Lawrence S Prechtel, DO      Diclofenac Sodium  2 g Topical 4x Daily Lawrence S Prechtel, DO      enoxaparin  40 mg Subcutaneous Daily Lawrence S Prechtel, DO      iohexol  90 mL Intravenous Once in imaging Lawrence S Prechtel, DO      ipratropium  0.5 mg  Nebulization TID Lawrence S Prechtel, DO      levalbuterol  1.25 mg Nebulization TID Lawrence S Prechtel, DO      levothyroxine  50 mcg Oral Daily Lawrence S Prechtel, DO      lidocaine  1 patch Topical Daily Lawrence S Prechtel, DO      losartan  100 mg Oral Daily Lawrence S Prechtel, DO      methylPREDNISolone sodium succinate  60 mg Intravenous Q8H RACHEL Lawrence S Prechtel, DO      ondansetron  4 mg Intravenous Q6H PRN Lawrence S Prechtel, DO      pantoprazole  40 mg Oral Daily Lawrence S Prechtel, DO      propranolol  10 mg Oral BID Lawrence S Prechtel, DO      saccharomyces boulardii  250 mg Oral BID Lawrence S Prechtel, DO           VTE Pharmacologic Prophylaxis:   Pharmacologic: Enoxaparin (Lovenox)      Current Length of Stay: 2 day(s)    Current Patient Status: Inpatient       Discharge Plan: home likely tomorrow if off of oxygen      Code Status: Level 1 - Full Code           Today, Patient Was Seen By: Lawrence Cárdenas DO    ** Please Note: Dictation voice to text software may have been used in the creation of this document. **

## 2024-07-25 NOTE — PLAN OF CARE
Problem: RESPIRATORY - ADULT  Goal: Achieves optimal ventilation and oxygenation  Description: INTERVENTIONS:  - Assess for changes in respiratory status  - Assess for changes in mentation and behavior  - Position to facilitate oxygenation and minimize respiratory effort  - Oxygen administered by appropriate delivery if ordered  - Initiate smoking cessation education as indicated  - Encourage broncho-pulmonary hygiene including cough, deep breathe, Incentive Spirometry  - Assess the need for suctioning and aspirate as needed  - Assess and instruct to report SOB or any respiratory difficulty  - Respiratory Therapy support as indicated  Outcome: Progressing     Problem: PAIN - ADULT  Goal: Verbalizes/displays adequate comfort level or baseline comfort level  Description: Interventions:  - Encourage patient to monitor pain and request assistance  - Assess pain using appropriate pain scale  - Administer analgesics based on type and severity of pain and evaluate response  - Implement non-pharmacological measures as appropriate and evaluate response  - Consider cultural and social influences on pain and pain management  - Notify physician/advanced practitioner if interventions unsuccessful or patient reports new pain  Outcome: Progressing     Problem: SAFETY ADULT  Goal: Maintain or return to baseline ADL function  Description: INTERVENTIONS:  -  Assess patient's ability to carry out ADLs; assess patient's baseline for ADL function and identify physical deficits which impact ability to perform ADLs (bathing, care of mouth/teeth, toileting, grooming, dressing, etc.)  - Assess/evaluate cause of self-care deficits   - Assess range of motion  - Assess patient's mobility; develop plan if impaired  - Assess patient's need for assistive devices and provide as appropriate  - Encourage maximum independence but intervene and supervise when necessary  - Involve family in performance of ADLs  - Assess for home care needs following  discharge   - Consider OT consult to assist with ADL evaluation and planning for discharge  - Provide patient education as appropriate  Outcome: Progressing

## 2024-07-25 NOTE — ASSESSMENT & PLAN NOTE
She continues to improve    She is down to 1 liter oxygen    Continue solumedrol and nebtx    Possibly home tomorrow.

## 2024-07-25 NOTE — PLAN OF CARE
Problem: PHYSICAL THERAPY ADULT  Goal: Performs mobility at highest level of function for planned discharge setting.  See evaluation for individualized goals.  Description: Treatment/Interventions: LE strengthening/ROM, ADL retraining, Functional transfer training, Elevations, Therapeutic exercise, Endurance training, Patient/family training, Bed mobility, Equipment eval/education, Gait training, Spoke to nursing, Spoke to case management, OT  Equipment Recommended: Cane, Walker (will see for one more treat if possible to determine SPC vs RW)       See flowsheet documentation for full assessment, interventions and recommendations.  Outcome: Progressing  Note: Prognosis: Good  Problem List: Decreased strength, Decreased endurance, Impaired balance, Decreased mobility, Decreased coordination, Impaired sensation     Barriers to Discharge: Inaccessible home environment     Rehab Resource Intensity Level, PT: III (Minimum Resource Intensity)    See flowsheet documentation for full assessment.

## 2024-07-25 NOTE — PLAN OF CARE
Problem: NEUROSENSORY - ADULT  Goal: Achieves stable or improved neurological status  Description: INTERVENTIONS  - Monitor and report changes in neurological status  - Monitor vital signs such as temperature, blood pressure, glucose, and any other labs ordered   - Initiate measures to prevent increased intracranial pressure  - Monitor for seizure activity and implement precautions if appropriate      Outcome: Progressing  Goal: Remains free of injury related to seizures activity  Description: INTERVENTIONS  - Maintain airway, patient safety  and administer oxygen as ordered  - Monitor patient for seizure activity, document and report duration and description of seizure to physician/advanced practitioner  - If seizure occurs,  ensure patient safety during seizure  - Reorient patient post seizure  - Seizure pads on all 4 side rails  - Instruct patient/family to notify RN of any seizure activity including if an aura is experienced  - Instruct patient/family to call for assistance with activity based on nursing assessment  - Administer anti-seizure medications if ordered    Outcome: Progressing  Goal: Achieves maximal functionality and self care  Description: INTERVENTIONS  - Monitor swallowing and airway patency with patient fatigue and changes in neurological status  - Encourage and assist patient to increase activity and self care.   - Encourage visually impaired, hearing impaired and aphasic patients to use assistive/communication devices  Outcome: Progressing     Problem: CARDIOVASCULAR - ADULT  Goal: Maintains optimal cardiac output and hemodynamic stability  Description: INTERVENTIONS:  - Monitor I/O, vital signs and rhythm  - Monitor for S/S and trends of decreased cardiac output  - Administer and titrate ordered vasoactive medications to optimize hemodynamic stability  - Assess quality of pulses, skin color and temperature  - Assess for signs of decreased coronary artery perfusion  - Instruct patient to  report change in severity of symptoms  Outcome: Progressing  Goal: Absence of cardiac dysrhythmias or at baseline rhythm  Description: INTERVENTIONS:  - Continuous cardiac monitoring, vital signs, obtain 12 lead EKG if ordered  - Administer antiarrhythmic and heart rate control medications as ordered  - Monitor electrolytes and administer replacement therapy as ordered  Outcome: Progressing     Problem: RESPIRATORY - ADULT  Goal: Achieves optimal ventilation and oxygenation  Description: INTERVENTIONS:  - Assess for changes in respiratory status  - Assess for changes in mentation and behavior  - Position to facilitate oxygenation and minimize respiratory effort  - Oxygen administered by appropriate delivery if ordered  - Initiate smoking cessation education as indicated  - Encourage broncho-pulmonary hygiene including cough, deep breathe, Incentive Spirometry  - Assess the need for suctioning and aspirate as needed  - Assess and instruct to report SOB or any respiratory difficulty  - Respiratory Therapy support as indicated  Outcome: Progressing     Problem: PAIN - ADULT  Goal: Verbalizes/displays adequate comfort level or baseline comfort level  Description: Interventions:  - Encourage patient to monitor pain and request assistance  - Assess pain using appropriate pain scale  - Administer analgesics based on type and severity of pain and evaluate response  - Implement non-pharmacological measures as appropriate and evaluate response  - Consider cultural and social influences on pain and pain management  - Notify physician/advanced practitioner if interventions unsuccessful or patient reports new pain  Outcome: Progressing     Problem: SAFETY ADULT  Goal: Patient will remain free of falls  Description: INTERVENTIONS:  - Educate patient/family on patient safety including physical limitations  - Instruct patient to call for assistance with activity   - Consult OT/PT to assist with strengthening/mobility   - Keep Call  bell within reach  - Keep bed low and locked with side rails adjusted as appropriate  - Keep care items and personal belongings within reach  - Initiate and maintain comfort rounds  - Make Fall Risk Sign visible to staff  - Apply yellow socks and bracelet for high fall risk patients  - Consider moving patient to room near nurses station  Outcome: Progressing  Goal: Maintain or return to baseline ADL function  Description: INTERVENTIONS:  -  Assess patient's ability to carry out ADLs; assess patient's baseline for ADL function and identify physical deficits which impact ability to perform ADLs (bathing, care of mouth/teeth, toileting, grooming, dressing, etc.)  - Assess/evaluate cause of self-care deficits   - Assess range of motion  - Assess patient's mobility; develop plan if impaired  - Assess patient's need for assistive devices and provide as appropriate  - Encourage maximum independence but intervene and supervise when necessary  - Involve family in performance of ADLs  - Assess for home care needs following discharge   - Consider OT consult to assist with ADL evaluation and planning for discharge  - Provide patient education as appropriate  Outcome: Progressing  Goal: Maintains/Returns to pre admission functional level  Description: INTERVENTIONS:  - Perform AM-PAC 6 Click Basic Mobility/ Daily Activity assessment daily.  - Set and communicate daily mobility goal to care team and patient/family/caregiver.   - Collaborate with rehabilitation services on mobility goals if consulted  - Out of bed for toileting  - Record patient progress and toleration of activity level   Outcome: Progressing     Problem: DISCHARGE PLANNING  Goal: Discharge to home or other facility with appropriate resources  Description: INTERVENTIONS:  - Identify barriers to discharge w/patient and caregiver  - Arrange for needed discharge resources and transportation as appropriate  - Identify discharge learning needs (meds, wound care,  etc.)  - Arrange for interpretive services to assist at discharge as needed  - Refer to Case Management Department for coordinating discharge planning if the patient needs post-hospital services based on physician/advanced practitioner order or complex needs related to functional status, cognitive ability, or social support system  Outcome: Progressing     Problem: Nutrition/Hydration-ADULT  Goal: Nutrient/Hydration intake appropriate for improving, restoring or maintaining nutritional needs  Description: Monitor and assess patient's nutrition/hydration status for malnutrition. Collaborate with interdisciplinary team and initiate plan and interventions as ordered.  Monitor patient's weight and dietary intake as ordered or per policy. Utilize nutrition screening tool and intervene as necessary. Determine patient's food preferences and provide high-protein, high-caloric foods as appropriate.     INTERVENTIONS:  - Monitor oral intake, urinary output, labs, and treatment plans  - Assess nutrition and hydration status and recommend course of action  - Evaluate amount of meals eaten  - Assist patient with eating if necessary   - Allow adequate time for meals  - Recommend/ encourage appropriate diets, oral nutritional supplements, and vitamin/mineral supplements  - Order, calculate, and assess calorie counts as needed  - Recommend, monitor, and adjust tube feedings based on assessed needs  - Assess need for intravenous fluids  - Provide nutrition/hydration education as appropriate  - Include patient/family/caregiver in decisions related to nutrition  Outcome: Progressing

## 2024-07-26 ENCOUNTER — HOME HEALTH ADMISSION (OUTPATIENT)
Dept: HOME HEALTH SERVICES | Facility: HOME HEALTHCARE | Age: 79
End: 2024-07-26
Payer: COMMERCIAL

## 2024-07-26 LAB
ANION GAP SERPL CALCULATED.3IONS-SCNC: 9 MMOL/L (ref 4–13)
BUN SERPL-MCNC: 37 MG/DL (ref 5–25)
CALCIUM SERPL-MCNC: 9.5 MG/DL (ref 8.4–10.2)
CHLORIDE SERPL-SCNC: 98 MMOL/L (ref 96–108)
CO2 SERPL-SCNC: 30 MMOL/L (ref 21–32)
CREAT SERPL-MCNC: 0.74 MG/DL (ref 0.6–1.3)
ERYTHROCYTE [DISTWIDTH] IN BLOOD BY AUTOMATED COUNT: 13.9 % (ref 11.6–15.1)
GFR SERPL CREATININE-BSD FRML MDRD: 77 ML/MIN/1.73SQ M
GLUCOSE SERPL-MCNC: 101 MG/DL (ref 65–140)
HCT VFR BLD AUTO: 43.1 % (ref 34.8–46.1)
HGB BLD-MCNC: 14.2 G/DL (ref 11.5–15.4)
MAGNESIUM SERPL-MCNC: 2.4 MG/DL (ref 1.9–2.7)
MCH RBC QN AUTO: 28.9 PG (ref 26.8–34.3)
MCHC RBC AUTO-ENTMCNC: 32.9 G/DL (ref 31.4–37.4)
MCV RBC AUTO: 88 FL (ref 82–98)
NRBC BLD AUTO-RTO: 0 /100 WBCS
PLATELET # BLD AUTO: 253 THOUSANDS/UL (ref 149–390)
PMV BLD AUTO: 10.3 FL (ref 8.9–12.7)
POTASSIUM SERPL-SCNC: 5.5 MMOL/L (ref 3.5–5.3)
RBC # BLD AUTO: 4.91 MILLION/UL (ref 3.81–5.12)
SODIUM SERPL-SCNC: 137 MMOL/L (ref 135–147)
WBC # BLD AUTO: 15.3 THOUSAND/UL (ref 4.31–10.16)

## 2024-07-26 PROCEDURE — 80048 BASIC METABOLIC PNL TOTAL CA: CPT | Performed by: HOSPITALIST

## 2024-07-26 PROCEDURE — 97116 GAIT TRAINING THERAPY: CPT

## 2024-07-26 PROCEDURE — 99232 SBSQ HOSP IP/OBS MODERATE 35: CPT | Performed by: HOSPITALIST

## 2024-07-26 PROCEDURE — 94640 AIRWAY INHALATION TREATMENT: CPT

## 2024-07-26 PROCEDURE — 97530 THERAPEUTIC ACTIVITIES: CPT

## 2024-07-26 PROCEDURE — 94760 N-INVAS EAR/PLS OXIMETRY 1: CPT

## 2024-07-26 PROCEDURE — 83735 ASSAY OF MAGNESIUM: CPT | Performed by: HOSPITALIST

## 2024-07-26 PROCEDURE — 85027 COMPLETE CBC AUTOMATED: CPT | Performed by: HOSPITALIST

## 2024-07-26 RX ADMIN — IPRATROPIUM BROMIDE 0.5 MG: 0.5 SOLUTION RESPIRATORY (INHALATION) at 08:00

## 2024-07-26 RX ADMIN — ENOXAPARIN SODIUM 40 MG: 40 INJECTION SUBCUTANEOUS at 08:30

## 2024-07-26 RX ADMIN — Medication 250 MG: at 08:31

## 2024-07-26 RX ADMIN — IPRATROPIUM BROMIDE 0.5 MG: 0.5 SOLUTION RESPIRATORY (INHALATION) at 14:05

## 2024-07-26 RX ADMIN — BUDESONIDE 0.5 MG: 0.5 INHALANT RESPIRATORY (INHALATION) at 19:40

## 2024-07-26 RX ADMIN — IPRATROPIUM BROMIDE 0.5 MG: 0.5 SOLUTION RESPIRATORY (INHALATION) at 19:40

## 2024-07-26 RX ADMIN — METHYLPREDNISOLONE SODIUM SUCCINATE 60 MG: 125 INJECTION, POWDER, FOR SOLUTION INTRAMUSCULAR; INTRAVENOUS at 05:08

## 2024-07-26 RX ADMIN — LEVALBUTEROL HYDROCHLORIDE 1.25 MG: 1.25 SOLUTION RESPIRATORY (INHALATION) at 19:40

## 2024-07-26 RX ADMIN — BUDESONIDE 0.5 MG: 0.5 INHALANT RESPIRATORY (INHALATION) at 08:00

## 2024-07-26 RX ADMIN — PANTOPRAZOLE SODIUM 40 MG: 40 TABLET, DELAYED RELEASE ORAL at 08:31

## 2024-07-26 RX ADMIN — PROPRANOLOL HYDROCHLORIDE 10 MG: 10 TABLET ORAL at 17:06

## 2024-07-26 RX ADMIN — LEVALBUTEROL HYDROCHLORIDE 1.25 MG: 1.25 SOLUTION RESPIRATORY (INHALATION) at 14:05

## 2024-07-26 RX ADMIN — AMPICILLIN SODIUM AND SULBACTAM SODIUM 3 G: 2; 1 INJECTION, POWDER, FOR SOLUTION INTRAMUSCULAR; INTRAVENOUS at 05:08

## 2024-07-26 RX ADMIN — AMPICILLIN SODIUM AND SULBACTAM SODIUM 3 G: 2; 1 INJECTION, POWDER, FOR SOLUTION INTRAMUSCULAR; INTRAVENOUS at 11:34

## 2024-07-26 RX ADMIN — ASPIRIN 81 MG: 81 TABLET, COATED ORAL at 08:31

## 2024-07-26 RX ADMIN — Medication 1 TABLET: at 16:22

## 2024-07-26 RX ADMIN — LEVOTHYROXINE SODIUM 50 MCG: 50 TABLET ORAL at 05:08

## 2024-07-26 RX ADMIN — CYANOCOBALAMIN TAB 1000 MCG 1000 MCG: 1000 TAB at 08:31

## 2024-07-26 RX ADMIN — METHYLPREDNISOLONE SODIUM SUCCINATE 60 MG: 125 INJECTION, POWDER, FOR SOLUTION INTRAMUSCULAR; INTRAVENOUS at 13:22

## 2024-07-26 RX ADMIN — METHYLPREDNISOLONE SODIUM SUCCINATE 60 MG: 125 INJECTION, POWDER, FOR SOLUTION INTRAMUSCULAR; INTRAVENOUS at 21:23

## 2024-07-26 RX ADMIN — Medication 250 MG: at 17:07

## 2024-07-26 RX ADMIN — PROPRANOLOL HYDROCHLORIDE 10 MG: 10 TABLET ORAL at 08:31

## 2024-07-26 RX ADMIN — AMPICILLIN SODIUM AND SULBACTAM SODIUM 3 G: 2; 1 INJECTION, POWDER, FOR SOLUTION INTRAMUSCULAR; INTRAVENOUS at 16:22

## 2024-07-26 RX ADMIN — Medication 1 TABLET: at 08:31

## 2024-07-26 RX ADMIN — DICLOFENAC SODIUM 2 G: 10 GEL TOPICAL at 11:38

## 2024-07-26 RX ADMIN — DICLOFENAC SODIUM 2 G: 10 GEL TOPICAL at 17:06

## 2024-07-26 RX ADMIN — AMPICILLIN SODIUM AND SULBACTAM SODIUM 3 G: 2; 1 INJECTION, POWDER, FOR SOLUTION INTRAMUSCULAR; INTRAVENOUS at 23:08

## 2024-07-26 RX ADMIN — LEVALBUTEROL HYDROCHLORIDE 1.25 MG: 1.25 SOLUTION RESPIRATORY (INHALATION) at 08:00

## 2024-07-26 RX ADMIN — LOSARTAN POTASSIUM 100 MG: 50 TABLET, FILM COATED ORAL at 08:31

## 2024-07-26 RX ADMIN — DICLOFENAC SODIUM 2 G: 10 GEL TOPICAL at 08:41

## 2024-07-26 NOTE — PROGRESS NOTES
Patient:    MRN:  [id number]    [place] Request ID:  [id number]    Level of care reserved:    Partner Reserved:    Clinical needs requested:  physical therapy, occupational therapy, copd    Geography searched:  [id number]    Start of Service:    Request sent:  12:35pm EDT on 7/25/2024 by Luna Kothari    Partner reserved:  by Luna Kothari    Choice list shared:  9:51am EDT on 7/26/2024 by Luna Kothari

## 2024-07-26 NOTE — CASE MANAGEMENT
Case Management Discharge Planning Note    Patient name Sarah Zayas  Location 7T U 703/7T [facility] 703-01 MRN [id number]  : 1945 Date 2024       Current Admission Date: 2024  Current Admission Diagnosis:Acute exacerbation of chronic obstructive pulmonary disease (COPD) ([facility])   Patient Active Problem List    Diagnosis Date Noted Date Diagnosed    Severe protein-calorie malnutrition (HCC) 2024     COPD, severe ([facility]) 2024     COVID-19 2023     Abnormal CT scan 2023     Infectious sialoadenitis of major salivary gland 2023     Cellulitis of neck 2023     Left upper lobe pulmonary nodule 2023     Postnasal drip 2023     Raynaud's phenomenon without gangrene 2022     Temporal arteritis ([facility]) 2020     Hypertension 10/11/2018     Rectus sheath hematoma, initial encounter 10/10/2018     Other specified hypothyroidism 2018     Gastroesophageal reflux disease without esophagitis 2018     Acute exacerbation of chronic obstructive pulmonary disease (COPD) (HCC) 2012     Vitamin D deficiency 2012     Osteoarthritis 2012       LOS (days): 3  Geometric Mean LOS (GMLOS) (days): 4.8  Days to GMLOS:2.1     OBJECTIVE:  Risk of Unplanned Readmission Score: 19.47         Current admission status: Inpatient   Preferred Pharmacy:   [facility] [place], [address]  [address]  [address]  [facility] [address]  Phone: [phone] Fax: [phone]    Primary Care Provider: Nicolas Nix MD    [facility]  Secondary Insurance:     DISCHARGE DETAILS:    Discharge planning discussed with:: Patient  Freedom of Choice: Yes     CM contacted family/caregiver?: No- see comments (Declines)  Were Treatment Team discharge recommendations reviewed with patient/caregiver?: Yes  Did patient/caregiver verbalize understanding of patient care needs?: Yes       Requested Home Health Care         Is the  patient interested in HHC at discharge?: Yes  Home Health Discipline requested:: Nursing, Occupational Therapy, Physical Therapy  Home Health Agency Name:: [facility]  Home Health Follow-Up Provider:: PCP  Home Health Services Needed:: COPD Management, Evaluate Functional Status and Safety, Gait/ADL Training, Strengthening/Theraputic Exercises to Improve Function  Homebound Criteria Met:: Uses an Assist Device (i.e. cane, walker, etc)  Supporting Clincal Findings:: Fatigues Easliy in Short Distances, Limited Endurance    Treatment Team Recommendation: Home with Home Health Care  Discharge Destination Plan:: Home with Home Health Care       Additional Comments: Met with patient at bedside and discussed HHC.  Provided with choice list and wants [facility].  Agency reserved via [facility].  Son will transport home today after 1600.  Per [facility] patient for discharge today

## 2024-07-26 NOTE — CASE MANAGEMENT
Case Management Discharge Planning Note    Patient name Sarah Zayas  Location 7T U 703/7T [facility] 703-01 MRN [id number]  : 1945 Date 2024       Current Admission Date: 2024  Current Admission Diagnosis:Acute exacerbation of chronic obstructive pulmonary disease (COPD) ([facility])   Patient Active Problem List    Diagnosis Date Noted Date Diagnosed    Severe protein-calorie malnutrition (HCC) 2024     COPD, severe ([facility]) 2024     COVID-19 2023     Abnormal CT scan 2023     Infectious sialoadenitis of major salivary gland 2023     Cellulitis of neck 2023     Left upper lobe pulmonary nodule 2023     Postnasal drip 2023     Raynaud's phenomenon without gangrene 2022     Temporal arteritis ([facility]) 2020     Hypertension 10/11/2018     Rectus sheath hematoma, initial encounter 10/10/2018     Other specified hypothyroidism 2018     Gastroesophageal reflux disease without esophagitis 2018     Acute exacerbation of chronic obstructive pulmonary disease (COPD) ([facility]) 2012     Vitamin D deficiency 2012     Osteoarthritis 2012       LOS (days): 3  Geometric Mean LOS (GMLOS) (days): 4.8  Days to GMLOS:2     OBJECTIVE:  Risk of Unplanned Readmission Score: 19.51         Current admission status: Inpatient   Preferred Pharmacy:   [facility] - [address]  [address]  [address]  [facility] [address]  Phone: [phone] Fax: [phone]    Primary Care Provider: Nicolas Nix MD    Primary Insurance: [employer]  Secondary Insurance:     DISCHARGE DETAILS:    Discharge planning discussed with:: Patient  Freedom of Choice: Yes       IMM Given (Date):: 24 (Copy provided to patient and media)  IMM Given to:: Patient     Additional Comments: Message from [facility] [place] patient will not discharge today tentative discharge in 24 hrs.  Met with patient at bedside and discussed discharge  planning.

## 2024-07-26 NOTE — PROGRESS NOTES
Patient:    MRN:  [id number]    [place] Request ID:  [id number]    Level of care reserved:    Partner Reserved:    Clinical needs requested:  physical therapy, occupational therapy, copd    Geography searched:  [id number]    Start of Service:    Request sent:  12:35pm EDT on 7/25/2024 by Luna Kothari    Partner reserved:  by Luna Kothari    Choice list shared:  9:51am EDT on 7/26/2024 by Luna Kothari

## 2024-07-26 NOTE — PLAN OF CARE
Problem: NEUROSENSORY - ADULT  Goal: Achieves stable or improved neurological status  Description: INTERVENTIONS  - Monitor and report changes in neurological status  - Monitor vital signs such as temperature, blood pressure, glucose, and any other labs ordered   - Initiate measures to prevent increased intracranial pressure  - Monitor for seizure activity and implement precautions if appropriate      Outcome: Progressing  Goal: Remains free of injury related to seizures activity  Description: INTERVENTIONS  - Maintain airway, patient safety  and administer oxygen as ordered  - Monitor patient for seizure activity, document and report duration and description of seizure to physician/advanced practitioner  - If seizure occurs,  ensure patient safety during seizure  - Reorient patient post seizure  - Seizure pads on all 4 side rails  - Instruct patient/family to notify RN of any seizure activity including if an aura is experienced  - Instruct patient/family to call for assistance with activity based on nursing assessment  - Administer anti-seizure medications if ordered    Outcome: Progressing  Goal: Achieves maximal functionality and self care  Description: INTERVENTIONS  - Monitor swallowing and airway patency with patient fatigue and changes in neurological status  - Encourage and assist patient to increase activity and self care.   - Encourage visually impaired, hearing impaired and aphasic patients to use assistive/communication devices  Outcome: Progressing     Problem: CARDIOVASCULAR - ADULT  Goal: Maintains optimal cardiac output and hemodynamic stability  Description: INTERVENTIONS:  - Monitor I/O, vital signs and rhythm  - Monitor for S/S and trends of decreased cardiac output  - Administer and titrate ordered vasoactive medications to optimize hemodynamic stability  - Assess quality of pulses, skin color and temperature  - Assess for signs of decreased coronary artery perfusion  - Instruct patient to  report change in severity of symptoms  Outcome: Progressing  Goal: Absence of cardiac dysrhythmias or at baseline rhythm  Description: INTERVENTIONS:  - Continuous cardiac monitoring, vital signs, obtain 12 lead EKG if ordered  - Administer antiarrhythmic and heart rate control medications as ordered  - Monitor electrolytes and administer replacement therapy as ordered  Outcome: Progressing     Problem: RESPIRATORY - ADULT  Goal: Achieves optimal ventilation and oxygenation  Description: INTERVENTIONS:  - Assess for changes in respiratory status  - Assess for changes in mentation and behavior  - Position to facilitate oxygenation and minimize respiratory effort  - Oxygen administered by appropriate delivery if ordered  - Initiate smoking cessation education as indicated  - Encourage broncho-pulmonary hygiene including cough, deep breathe, Incentive Spirometry  - Assess the need for suctioning and aspirate as needed  - Assess and instruct to report SOB or any respiratory difficulty  - Respiratory Therapy support as indicated  Outcome: Progressing     Problem: PAIN - ADULT  Goal: Verbalizes/displays adequate comfort level or baseline comfort level  Description: Interventions:  - Encourage patient to monitor pain and request assistance  - Assess pain using appropriate pain scale  - Administer analgesics based on type and severity of pain and evaluate response  - Implement non-pharmacological measures as appropriate and evaluate response  - Consider cultural and social influences on pain and pain management  - Notify physician/advanced practitioner if interventions unsuccessful or patient reports new pain  Outcome: Progressing     Problem: SAFETY ADULT  Goal: Patient will remain free of falls  Description: INTERVENTIONS:  - Educate patient/family on patient safety including physical limitations  - Instruct patient to call for assistance with activity   - Consult OT/PT to assist with strengthening/mobility   - Keep Call  bell within reach  - Keep bed low and locked with side rails adjusted as appropriate  - Keep care items and personal belongings within reach  - Initiate and maintain comfort rounds  - Make Fall Risk Sign visible to staff  - Offer Toileting every 2 Hours, in advance of need  - Initiate/Maintain bed alarm  - Obtain necessary fall risk management equipment: bed alarm  - Apply yellow socks and bracelet for high fall risk patients  - Consider moving patient to room near nurses station  Outcome: Progressing  Goal: Maintain or return to baseline ADL function  Description: INTERVENTIONS:  -  Assess patient's ability to carry out ADLs; assess patient's baseline for ADL function and identify physical deficits which impact ability to perform ADLs (bathing, care of mouth/teeth, toileting, grooming, dressing, etc.)  - Assess/evaluate cause of self-care deficits   - Assess range of motion  - Assess patient's mobility; develop plan if impaired  - Assess patient's need for assistive devices and provide as appropriate  - Encourage maximum independence but intervene and supervise when necessary  - Involve family in performance of ADLs  - Assess for home care needs following discharge   - Consider OT consult to assist with ADL evaluation and planning for discharge  - Provide patient education as appropriate  Outcome: Progressing  Goal: Maintains/Returns to pre admission functional level  Description: INTERVENTIONS:  - Perform AM-PAC 6 Click Basic Mobility/ Daily Activity assessment daily.  - Set and communicate daily mobility goal to care team and patient/family/caregiver.   - Collaborate with rehabilitation services on mobility goals if consulted  - Perform Range of Motion 2 times a day.  - Reposition patient every 2 hours.  - Dangle patient 2 times a day  - Stand patient 2 times a day  - Ambulate patient 2 times a day  - Out of bed to chair 2 times a day   - Out of bed for meals 2 times a day  - Out of bed for toileting  - Record  patient progress and toleration of activity level   Outcome: Progressing     Problem: DISCHARGE PLANNING  Goal: Discharge to home or other facility with appropriate resources  Description: INTERVENTIONS:  - Identify barriers to discharge w/patient and caregiver  - Arrange for needed discharge resources and transportation as appropriate  - Identify discharge learning needs (meds, wound care, etc.)  - Arrange for interpretive services to assist at discharge as needed  - Refer to Case Management Department for coordinating discharge planning if the patient needs post-hospital services based on physician/advanced practitioner order or complex needs related to functional status, cognitive ability, or social support system  Outcome: Progressing     Problem: Nutrition/Hydration-ADULT  Goal: Nutrient/Hydration intake appropriate for improving, restoring or maintaining nutritional needs  Description: Monitor and assess patient's nutrition/hydration status for malnutrition. Collaborate with interdisciplinary team and initiate plan and interventions as ordered.  Monitor patient's weight and dietary intake as ordered or per policy. Utilize nutrition screening tool and intervene as necessary. Determine patient's food preferences and provide high-protein, high-caloric foods as appropriate.     INTERVENTIONS:  - Monitor oral intake, urinary output, labs, and treatment plans  - Assess nutrition and hydration status and recommend course of action  - Evaluate amount of meals eaten  - Assist patient with eating if necessary   - Allow adequate time for meals  - Recommend/ encourage appropriate diets, oral nutritional supplements, and vitamin/mineral supplements  - Order, calculate, and assess calorie counts as needed  - Recommend, monitor, and adjust tube feedings based on assessed needs  - Assess need for intravenous fluids  - Provide nutrition/hydration education as appropriate  - Include patient/family/caregiver in decisions related  to nutrition  Outcome: Progressing

## 2024-07-26 NOTE — PHYSICAL THERAPY NOTE
Physical TherapyTreatment Note    Patient's Name: Sarah Zayas    Admitting Diagnosis  Cellulitis of submandibular region [K12.2]  Anxiety [F41.9]  Hypertension [I10]  Hypoxia [R09.02]  COPD exacerbation (HCC) [J44.1]  Sialoadenitis of submandibular gland [K11.20]    Problem List  Patient Active Problem List   Diagnosis    Acute exacerbation of chronic obstructive pulmonary disease (COPD) (HCC)    Vitamin D deficiency    Other specified hypothyroidism    Gastroesophageal reflux disease without esophagitis    Rectus sheath hematoma, initial encounter    Hypertension    Osteoarthritis    Temporal arteritis (HCC)    Raynaud's phenomenon without gangrene    Postnasal drip    Left upper lobe pulmonary nodule    Infectious sialoadenitis of major salivary gland    Cellulitis of neck    COVID-19    Abnormal CT scan    COPD, severe (HCC)    Severe protein-calorie malnutrition (HCC)       Past Medical History  Past Medical History:   Diagnosis Date    Asthma     Chronic obstructive pulmonary disease (HCC) 9/26/2012    GERD (gastroesophageal reflux disease)     Hypertension 10/11/2018    Hypothyroid     Osteoarthritis 9/26/2012    Temporal arteritis (HCC)     Tubular adenoma     Type 2 diabetes mellitus with complication, without long-term current use of insulin ([facility]) 1/14/2020       Past Surgical History  Past Surgical History:   Procedure Laterality Date    EYE SURGERY Right 12/06/2019    cataract LVCFS    HYSTERECTOMY      NO PAST SURGERIES      ALSO NO RELEVANT PAST SURRGICAL HX AS PER [facility]       Recent Imaging  CT soft tissue neck with contrast   Final Result by Roberto Carlos Aldana MD (07/23 2052)      Acute bilateral submandibular gland sialoadenitis with soft tissue inflammation in the submandibular spaces (right greater than left) and associated overlying subcutaneous soft tissue cellulitis on the right extending to the anterior inferior neck to the    level of the hyoid. No abscess. No sialolith.               The study was  marked in EPIC for immediate notification.            Workstation performed: [id number]         XR chest portable   Final Result by Leighton Melara MD (07/23 1456)      No acute cardiopulmonary disease.            Workstation performed: [id number]             Recent Vital Signs  Vitals:    07/25/24 2200 07/26/24 0703 07/26/24 0800 07/26/24 1520   BP: 142/91 140/73  133/77   BP Location: Left arm Left arm  Left arm   Pulse: 94 74  79   Resp: 18 20  20   Temp: 97.8 °F (36.6 °C) (!) 97.4 °F (36.3 °C)  97.6 °F (36.4 °C)   TempSrc: Temporal Temporal  Temporal   SpO2: 92% 94% 94% 94%   Weight:       Height:            07/26/24 0940   PT Last Visit   PT Visit Date 07/26/24   Note Type   Note Type Treatment   Pain Assessment   Pain Assessment Tool 0-10   Pain Score No Pain   Restrictions/Precautions   Weight Bearing Precautions Per Order No   General   Chart Reviewed Yes   Response to Previous Treatment Patient with no complaints from previous session.   Family/Caregiver Present No   Cognition   Overall Cognitive Status WFL   Arousal/Participation Alert   Attention Within functional limits   Orientation Level Oriented X4   Memory Within functional limits   Following Commands Follows all commands and directions without difficulty   Bed Mobility   Supine to Sit 6  Modified independent   Sit to Supine 6  Modified independent   Transfers   Sit to Stand 6  Modified independent   Stand to Sit 6  Modified independent   Ambulation/Elevation   Gait pattern Step through pattern;Decreased toe off;Decreased heel strike;Decreased foot clearance;Improper Weight shift   Gait Assistance 6  Modified independent   Assistive Device SPC   Distance 200ft x 2   Balance   Static Sitting Fair +   Dynamic Sitting Fair +   Static Standing Fair   Dynamic Standing Fair -   Ambulatory Fair -   Endurance Deficit   Endurance Deficit Yes   Endurance Deficit Description SOB,   Activity Tolerance   Activity Tolerance Patient limited by fatigue   Medical  Staff Made Aware spoke to CM   Nurse Made Aware spoke to Rn   Assessment   Prognosis Good   Problem List Decreased strength;Decreased endurance;Impaired balance;Decreased mobility;Decreased coordination;Impaired sensation   Assessment Pt is making progress with activity tolerance and balance. She is able to ambulate longer distances today, saturating well at rest however decreased to 84% after ambulation. No LOB and able to complete ambulation with superivison. Continue to recommend home PT at D/C   Barriers to Discharge Inaccessible home environment   Goals   Patient Goals to not need O2   STG Expiration Date 08/04/24   Short Term Goal #1 see eval note   PT Treatment Day 2   Plan   Treatment/Interventions ADL retraining;Functional transfer training;LE strengthening/ROM;Therapeutic exercise;Elevations;Endurance training;Patient/family training;Equipment eval/education;Bed mobility;Gait training;Spoke to case management;Spoke to nursing;OT   Progress Progressing toward goals   PT Frequency 2-3x/wk   Discharge Recommendation   Rehab Resource Intensity Level, PT III (Minimum Resource Intensity)   Equipment Recommended Cane   AM-PAC Basic Mobility Inpatient   Turning in Flat Bed Without Bedrails 4   Lying on Back to Sitting on Edge of Flat Bed Without Bedrails 4   Moving Bed to Chair 3   Standing Up From Chair Using Arms 3   Walk in Room 3   Climb 3-5 Stairs With Railing 3   Basic Mobility Inpatient Raw Score 20   Basic Mobility Standardized Score 43.99   [facility] Highest Level Of Mobility   -HLM Goal 6: Walk 10 steps or more   -HLM Achieved 8: Walk 250 feet ot more   Education   Education Provided Mobility training;Assistive device   Patient Demonstrates verbal understanding;Explanation/teachback used   End of Consult   Patient Position at End of Consult Bedside chair;All needs within reach         Florencio Kang PT, DPT

## 2024-07-26 NOTE — PROGRESS NOTES
[facility]  Progress Note  Name: Sarah Zayas I  MRN: [id number]  Unit/Bed#: 7T [facility] 703-01 I Date of Admission: 2024   Date of Service: 2024 I Hospital Day: 3    Assessment & Plan   * Acute exacerbation of chronic obstructive pulmonary disease (COPD) (HCC)  Assessment & Plan  Continues to improve  Off oxygen at rest but gets hypoxic with ambulation    Continue solumedrol and nebtx    I would prefer to not have to send her home on oxygen as I do not think she will be able to get around with a tank with her frail stature.    Hopefully we can get her a little better overnight and she won't need it.    Cellulitis of neck  Assessment & Plan  Spoke with ENT on the phone    There is no salivary gland stone.    There is some bilateral inflammation of the salivary glands which may be chronic    They recommend when she goes home to change to Augmentin to complete a 10 days total course    They can follow up with her in the office               Subjective:   Doing better  No SOB at rest  She was hypoxic with walking      Objective:     Vitals:   Temp (24hrs), Av °F (36.7 °C), Min:97.4 °F (36.3 °C), Max:98.9 °F (37.2 °C)    Temp:  [97.4 °F (36.3 °C)-98.9 °F (37.2 °C)] 97.4 °F (36.3 °C)  HR:  [74-94] 74  Resp:  [18-20] 20  BP: (116-142)/(70-91) 140/73  SpO2:  [92 %-95 %] 94 %  Body mass index is 17.57 kg/m².     Input and Output Summary (last 24 hours):       Intake/Output Summary (Last 24 hours) at 2024 1131  Last data filed at 2024 1724  Gross per 24 hour   Intake 420 ml   Output --   Net 420 ml       Physical Exam:     Physical Exam  Vitals and nursing note reviewed.   HENT:      Head: Normocephalic and atraumatic.   Eyes:      Pupils: Pupils are equal, round, and reactive to light.   Cardiovascular:      Rate and Rhythm: Normal rate and regular rhythm.      Heart sounds: No murmur heard.     No friction rub. No gallop.   Pulmonary:      Effort: Pulmonary effort is normal.       Breath sounds: Normal breath sounds. No wheezing or rales.   Abdominal:      General: Bowel sounds are normal.      Palpations: Abdomen is soft.      Tenderness: There is no abdominal tenderness.   Musculoskeletal:      Right lower leg: No edema.      Left lower leg: No edema.       .       Additional Data:     Labs:    Results from last 7 days   Lab Units 07/26/24  0520 07/25/24  0545 07/24/24  0530 07/23/24  1503   WBC Thousand/uL 15.30*   < > 8.12 9.14   HEMOGLOBIN g/dL 14.2   < > 13.2 13.7   HEMATOCRIT % 43.1   < > 39.9 43.5   PLATELETS Thousands/uL 253   < > 229 230   SEGS PCT %  --   --   --  86*   LYMPHO PCT %  --   --  3* 7*   MONO PCT %  --   --  1* 5   EOS PCT %  --   --  0 2    < > = values in this interval not displayed.     Results from last 7 days   Lab Units 07/26/24  0520 07/24/24  0530 07/23/24  1503   POTASSIUM mmol/L 5.5*   < > 4.6   CHLORIDE mmol/L 98   < > 96   CO2 mmol/L 30   < > 33*   BUN mg/dL 37*   < > 15   CREATININE mg/dL 0.74   < > 0.67   CALCIUM mg/dL 9.5   < > 9.4   ALK PHOS U/L  --   --  41   ALT U/L  --   --  18   AST U/L  --   --  23    < > = values in this interval not displayed.                       * I Have Reviewed All Lab Data     Recent Cultures (last 7 days):     Results from last 7 days   Lab Units 07/23/24  1802   BLOOD CULTURE  No Growth at 48 hrs.  No Growth at 48 hrs.         Last 24 Hours Medication List:   Current Facility-Administered Medications   Medication Dose Route Frequency Provider Last Rate    acetaminophen  650 mg Oral Q6H PRN Lawrence S Prechtel, DO      albuterol  2.5 mg Nebulization Q4H PRN Lawrence S Prechtel, DO      ALPRAZolam  0.25 mg Oral HS PRN Lawrence S Prechtel, DO      ampicillin-sulbactam  3 g Intravenous Q6H Lawrence S Prechtel, DO 3 g (07/26/24 0508)    aspirin  81 mg Oral Daily Lawrence S Prechtel, DO      budesonide  0.5 mg Nebulization BID Lawrence S Prechtel, DO      calcium carbonate-vitamin D  1 tablet Oral BID With Meals Lawrence S  Prechtel, DO      vitamin B-12  1,000 mcg Oral Daily Lawrence S Prechtel, DO      Diclofenac Sodium  2 g Topical 4x Daily Lawrence S Prechtel, DO      enoxaparin  40 mg Subcutaneous Daily Lawrence S Prechtel, DO      iohexol  90 mL Intravenous Once in imaging Lawrence S Prechtel, DO      ipratropium  0.5 mg Nebulization TID Lawrence S Prechtel, DO      levalbuterol  1.25 mg Nebulization TID Lawrence S Prechtel, DO      levothyroxine  50 mcg Oral Daily Lawrence S Prechtel, DO      lidocaine  1 patch Topical Daily Lawrence S Prechtel, DO      losartan  100 mg Oral Daily Lawrence S Prechtel, DO      methylPREDNISolone sodium succinate  60 mg Intravenous Q8H RACHEL Lawrence S Prechtel, DO      ondansetron  4 mg Intravenous Q6H PRN Lawrence S Prechtel, DO      pantoprazole  40 mg Oral Daily Lawrence S Prechtel, DO      propranolol  10 mg Oral BID Lawrence S Prechtel, DO      saccharomyces boulardii  250 mg Oral BID Lawrence S Prechtel, DO           VTE Pharmacologic Prophylaxis:   Pharmacologic: Enoxaparin (Lovenox)      Current Length of Stay: 3 day(s)    Current Patient Status: Inpatient       Discharge Plan: home likely tomorrow    Code Status: Level 1 - Full Code           Today, Patient Was Seen By: Lawrence Cárdenas DO    ** Please Note: Dictation voice to text software may have been used in the creation of this document. **

## 2024-07-26 NOTE — PROGRESS NOTES
Patient:    MRN:  [id number]    [place] Request ID:  [id number]    Level of care reserved:  Home Health Agency    Partner Reserved:  [facility], [place] [address] [phone]    Clinical needs requested:  physical therapy, occupational therapy, copd    Geography searched:  [phone]    Start of Service:    Request sent:  12:35pm EDT on 7/25/2024 by Luna Kothari    Partner reserved:  9:55am EDT on 7/26/2024 by Luna Kothari    Choice list shared:  9:51am EDT on 7/26/2024 by Luna Kothari

## 2024-07-26 NOTE — ASSESSMENT & PLAN NOTE
Spoke with ENT on the phone    There is no salivary gland stone.    There is some bilateral inflammation of the salivary glands which may be chronic    They recommend when she goes home to change to Augmentin to complete a 10 days total course    They can follow up with her in the office

## 2024-07-26 NOTE — ASSESSMENT & PLAN NOTE
Continues to improve  Off oxygen at rest but gets hypoxic with ambulation    Continue solumedrol and nebtx    I would prefer to not have to send her home on oxygen as I do not think she will be able to get around with a tank with her frail stature.    Hopefully we can get her a little better overnight and she won't need it.

## 2024-07-26 NOTE — PLAN OF CARE
Problem: RESPIRATORY - ADULT  Goal: Achieves optimal ventilation and oxygenation  Description: INTERVENTIONS:  - Assess for changes in respiratory status  - Assess for changes in mentation and behavior  - Position to facilitate oxygenation and minimize respiratory effort  - Oxygen administered by appropriate delivery if ordered  - Initiate smoking cessation education as indicated  - Encourage broncho-pulmonary hygiene including cough, deep breathe, Incentive Spirometry  - Assess the need for suctioning and aspirate as needed  - Assess and instruct to report SOB or any respiratory difficulty  - Respiratory Therapy support as indicated  Outcome: Progressing     Problem: SAFETY ADULT  Goal: Patient will remain free of falls  Description: INTERVENTIONS:  - Educate patient/family on patient safety including physical limitations  - Instruct patient to call for assistance with activity   - Consult OT/PT to assist with strengthening/mobility   - Keep Call bell within reach  - Keep bed low and locked with side rails adjusted as appropriate  - Keep care items and personal belongings within reach  - Initiate and maintain comfort rounds  - Make Fall Risk Sign visible to staff  - Offer Toileting every Hours, in advance of need  - Initiate/Maintain alarm  - Obtain necessary fall risk management equipment:   - Apply yellow socks and bracelet for high fall risk patients  - Consider moving patient to room near nurses station  Outcome: Progressing     Problem: SAFETY ADULT  Goal: Maintain or return to baseline ADL function  Description: INTERVENTIONS:  -  Assess patient's ability to carry out ADLs; assess patient's baseline for ADL function and identify physical deficits which impact ability to perform ADLs (bathing, care of mouth/teeth, toileting, grooming, dressing, etc.)  - Assess/evaluate cause of self-care deficits   - Assess range of motion  - Assess patient's mobility; develop plan if impaired  - Assess patient's need for  assistive devices and provide as appropriate  - Encourage maximum independence but intervene and supervise when necessary  - Involve family in performance of ADLs  - Assess for home care needs following discharge   - Consider OT consult to assist with ADL evaluation and planning for discharge  - Provide patient education as appropriate  Outcome: Progressing     Problem: Nutrition/Hydration-ADULT  Goal: Nutrient/Hydration intake appropriate for improving, restoring or maintaining nutritional needs  Description: Monitor and assess patient's nutrition/hydration status for malnutrition. Collaborate with interdisciplinary team and initiate plan and interventions as ordered.  Monitor patient's weight and dietary intake as ordered or per policy. Utilize nutrition screening tool and intervene as necessary. Determine patient's food preferences and provide high-protein, high-caloric foods as appropriate.     INTERVENTIONS:  - Monitor oral intake, urinary output, labs, and treatment plans  - Assess nutrition and hydration status and recommend course of action  - Evaluate amount of meals eaten  - Assist patient with eating if necessary   - Allow adequate time for meals  - Recommend/ encourage appropriate diets, oral nutritional supplements, and vitamin/mineral supplements  - Order, calculate, and assess calorie counts as needed  - Recommend, monitor, and adjust tube feedings based on assessed needs  - Assess need for intravenous fluids  - Provide nutrition/hydration education as appropriate  - Include patient/family/caregiver in decisions related to nutrition  Outcome: Progressing

## 2024-07-26 NOTE — PLAN OF CARE
Problem: PHYSICAL THERAPY ADULT  Goal: Performs mobility at highest level of function for planned discharge setting.  See evaluation for individualized goals.  Description: Treatment/Interventions: ADL retraining, Functional transfer training, LE strengthening/ROM, Therapeutic exercise, Elevations, Endurance training, Patient/family training, Equipment eval/education, Bed mobility, Gait training, Spoke to case management, Spoke to nursing, OT  Equipment Recommended: Ignacioe       See flowsheet documentation for full assessment, interventions and recommendations.  Outcome: Progressing  Note: Prognosis: Good  Problem List: Decreased strength, Decreased endurance, Impaired balance, Decreased mobility, Decreased coordination, Impaired sensation  Assessment: Pt is making progress with activity tolerance and balance. She is able to ambulate longer distances today, saturating well at rest however decreased to 84% after ambulation. No LOB and able to complete ambulation with superivison. Continue to recommend home PT at D/C  Barriers to Discharge: Inaccessible home environment     Rehab Resource Intensity Level, PT: III (Minimum Resource Intensity)    See flowsheet documentation for full assessment.

## 2024-07-27 PROCEDURE — 94760 N-INVAS EAR/PLS OXIMETRY 1: CPT

## 2024-07-27 PROCEDURE — 94640 AIRWAY INHALATION TREATMENT: CPT

## 2024-07-27 PROCEDURE — 99232 SBSQ HOSP IP/OBS MODERATE 35: CPT | Performed by: HOSPITALIST

## 2024-07-27 PROCEDURE — 94664 DEMO&/EVAL PT USE INHALER: CPT

## 2024-07-27 RX ADMIN — PANTOPRAZOLE SODIUM 40 MG: 40 TABLET, DELAYED RELEASE ORAL at 08:12

## 2024-07-27 RX ADMIN — CYANOCOBALAMIN TAB 1000 MCG 1000 MCG: 1000 TAB at 08:13

## 2024-07-27 RX ADMIN — Medication 1 TABLET: at 08:12

## 2024-07-27 RX ADMIN — Medication 1 TABLET: at 16:17

## 2024-07-27 RX ADMIN — AMPICILLIN SODIUM AND SULBACTAM SODIUM 3 G: 2; 1 INJECTION, POWDER, FOR SOLUTION INTRAMUSCULAR; INTRAVENOUS at 22:34

## 2024-07-27 RX ADMIN — IPRATROPIUM BROMIDE 0.5 MG: 0.5 SOLUTION RESPIRATORY (INHALATION) at 13:00

## 2024-07-27 RX ADMIN — LEVALBUTEROL HYDROCHLORIDE 1.25 MG: 1.25 SOLUTION RESPIRATORY (INHALATION) at 07:25

## 2024-07-27 RX ADMIN — BUDESONIDE 0.5 MG: 0.5 INHALANT RESPIRATORY (INHALATION) at 19:22

## 2024-07-27 RX ADMIN — IPRATROPIUM BROMIDE 0.5 MG: 0.5 SOLUTION RESPIRATORY (INHALATION) at 19:22

## 2024-07-27 RX ADMIN — ASPIRIN 81 MG: 81 TABLET, COATED ORAL at 08:12

## 2024-07-27 RX ADMIN — METHYLPREDNISOLONE SODIUM SUCCINATE 60 MG: 125 INJECTION, POWDER, FOR SOLUTION INTRAMUSCULAR; INTRAVENOUS at 13:01

## 2024-07-27 RX ADMIN — METHYLPREDNISOLONE SODIUM SUCCINATE 60 MG: 125 INJECTION, POWDER, FOR SOLUTION INTRAMUSCULAR; INTRAVENOUS at 05:59

## 2024-07-27 RX ADMIN — AMPICILLIN SODIUM AND SULBACTAM SODIUM 3 G: 2; 1 INJECTION, POWDER, FOR SOLUTION INTRAMUSCULAR; INTRAVENOUS at 11:09

## 2024-07-27 RX ADMIN — ACETAMINOPHEN 650 MG: 325 TABLET ORAL at 22:41

## 2024-07-27 RX ADMIN — BUDESONIDE 0.5 MG: 0.5 INHALANT RESPIRATORY (INHALATION) at 07:26

## 2024-07-27 RX ADMIN — IPRATROPIUM BROMIDE 0.5 MG: 0.5 SOLUTION RESPIRATORY (INHALATION) at 07:26

## 2024-07-27 RX ADMIN — ALPRAZOLAM 0.25 MG: 0.5 TABLET ORAL at 22:33

## 2024-07-27 RX ADMIN — LOSARTAN POTASSIUM 100 MG: 50 TABLET, FILM COATED ORAL at 08:12

## 2024-07-27 RX ADMIN — LEVALBUTEROL HYDROCHLORIDE 1.25 MG: 1.25 SOLUTION RESPIRATORY (INHALATION) at 13:00

## 2024-07-27 RX ADMIN — LEVALBUTEROL HYDROCHLORIDE 1.25 MG: 1.25 SOLUTION RESPIRATORY (INHALATION) at 19:22

## 2024-07-27 RX ADMIN — Medication 250 MG: at 08:12

## 2024-07-27 RX ADMIN — AMPICILLIN SODIUM AND SULBACTAM SODIUM 3 G: 2; 1 INJECTION, POWDER, FOR SOLUTION INTRAMUSCULAR; INTRAVENOUS at 16:17

## 2024-07-27 RX ADMIN — AMPICILLIN SODIUM AND SULBACTAM SODIUM 3 G: 2; 1 INJECTION, POWDER, FOR SOLUTION INTRAMUSCULAR; INTRAVENOUS at 05:59

## 2024-07-27 RX ADMIN — LEVOTHYROXINE SODIUM 50 MCG: 50 TABLET ORAL at 06:00

## 2024-07-27 RX ADMIN — Medication 250 MG: at 17:14

## 2024-07-27 RX ADMIN — PROPRANOLOL HYDROCHLORIDE 10 MG: 10 TABLET ORAL at 17:15

## 2024-07-27 RX ADMIN — ENOXAPARIN SODIUM 40 MG: 40 INJECTION SUBCUTANEOUS at 08:13

## 2024-07-27 RX ADMIN — PROPRANOLOL HYDROCHLORIDE 10 MG: 10 TABLET ORAL at 08:12

## 2024-07-27 NOTE — ASSESSMENT & PLAN NOTE
Spoke with ENT on the phone    There is no salivary gland stone.    There is some bilateral inflammation of the salivary glands which may be chronic    They recommend when she goes home to change to Augmentin to complete a 10 days total course    This is resolving  Follow up with ENT in the office

## 2024-07-27 NOTE — PROGRESS NOTES
[facility]  Progress Note  Name: Sarah Zayas I  MRN: [id number]  Unit/Bed#: 7T [facility] 703-01 I Date of Admission: 2024   Date of Service: 2024  Hospital Day: 4    Assessment & Plan   * Acute exacerbation of chronic obstructive pulmonary disease (COPD) (HCC)  Assessment & Plan  She is slowly improving    Oxygen sat with walking unfortunately is 87%      I would keep one more night on IV steroids until we can get her completely off of oxygen when walking    Cellulitis of neck  Assessment & Plan  Spoke with ENT on the phone    There is no salivary gland stone.    There is some bilateral inflammation of the salivary glands which may be chronic    They recommend when she goes home to change to Augmentin to complete a 10 days total course    This is resolving  Follow up with ENT in the office    Severe protein-calorie malnutrition (HCC)  Assessment & Plan  Malnutrition Findings:   Adult Malnutrition type: Acute illness  Adult Degree of Malnutrition: Other severe protein calorie malnutrition  Malnutrition Characteristics: Muscle loss, Weight loss, Fat loss, Inadequate energy         She is eating better in the hospital.            360 Statement: related to inadequate energy intake due to decreased appetite , as evidenced by, 7.5% loss x 2-3 months-significant loss, hollow supraclavicular space, sunken orbital, wasted interosseous.Treatment: Regular diet, oral nutrition supplements TID.    BMI Findings:  Adult BMI Classifications: Underweight < 18.5        Body mass index is 17.57 kg/m².                  Subjective:   Feels overall better  Less sob  She did not sleep well last night  No cough    When walking on room air her oxygen saturation dropped to 87%      Objective:     Vitals:   Temp (24hrs), Av.3 °F (36.3 °C), Min:96.8 °F (36 °C), Max:97.6 °F (36.4 °C)    Temp:  [96.8 °F (36 °C)-97.6 °F (36.4 °C)] 96.8 °F (36 °C)  HR:  [76-80] 80  Resp:  [20] 20  BP: (118-152)/(64-93)  152/93  SpO2:  [87 %-94 %] 92 %  Body mass index is 17.57 kg/m².     Input and Output Summary (last 24 hours):       Intake/Output Summary (Last 24 hours) at 7/27/2024 1059  Last data filed at 7/27/2024 0847  Gross per 24 hour   Intake 1080 ml   Output --   Net 1080 ml       Physical Exam:     Physical Exam  Vitals and nursing note reviewed.   HENT:      Head: Normocephalic and atraumatic.   Eyes:      Pupils: Pupils are equal, round, and reactive to light.   Cardiovascular:      Rate and Rhythm: Normal rate and regular rhythm.      Heart sounds: No murmur heard.     No friction rub. No gallop.   Pulmonary:      Effort: Pulmonary effort is normal.      Breath sounds: Normal breath sounds. No wheezing or rales.      Comments: Poor air movement bilat  No wheeze  Abdominal:      General: Bowel sounds are normal.      Palpations: Abdomen is soft.      Tenderness: There is no abdominal tenderness.   Musculoskeletal:      Right lower leg: No edema.      Left lower leg: No edema.       .       Additional Data:     Labs:    Results from last 7 days   Lab Units 07/26/24  0520 07/25/24  0545 07/24/24  0530 07/23/24  1503   WBC Thousand/uL 15.30*   < > 8.12 9.14   HEMOGLOBIN g/dL 14.2   < > 13.2 13.7   HEMATOCRIT % 43.1   < > 39.9 43.5   PLATELETS Thousands/uL 253   < > 229 230   SEGS PCT %  --   --   --  86*   LYMPHO PCT %  --   --  3* 7*   MONO PCT %  --   --  1* 5   EOS PCT %  --   --  0 2    < > = values in this interval not displayed.     Results from last 7 days   Lab Units 07/26/24  0520 07/24/24  0530 07/23/24  1503   POTASSIUM mmol/L 5.5*   < > 4.6   CHLORIDE mmol/L 98   < > 96   CO2 mmol/L 30   < > 33*   BUN mg/dL 37*   < > 15   CREATININE mg/dL 0.74   < > 0.67   CALCIUM mg/dL 9.5   < > 9.4   ALK PHOS U/L  --   --  41   ALT U/L  --   --  18   AST U/L  --   --  23    < > = values in this interval not displayed.                       * I Have Reviewed All Lab Data     Recent Cultures (last 7 days):     Results from  last 7 days   Lab Units 07/23/24  1802   BLOOD CULTURE  No Growth at 72 hrs.  No Growth at 72 hrs.         Last 24 Hours Medication List:   Current Facility-Administered Medications   Medication Dose Route Frequency Provider Last Rate    acetaminophen  650 mg Oral Q6H PRN Lawrence S Prechtel, DO      albuterol  2.5 mg Nebulization Q4H PRN Lawrence S Prechtel, DO      ALPRAZolam  0.25 mg Oral HS PRN Lawrence S Prechtel, DO      ampicillin-sulbactam  3 g Intravenous Q6H Lawrence S Prechtel, DO 3 g (07/27/24 0559)    aspirin  81 mg Oral Daily Lawrence S Prechtel, DO      budesonide  0.5 mg Nebulization BID Lawrence S Prechtel, DO      calcium carbonate-vitamin D  1 tablet Oral BID With Meals Lawrence S Prechtel, DO      vitamin B-12  1,000 mcg Oral Daily Lawrence S Prechtel, DO      Diclofenac Sodium  2 g Topical 4x Daily Lawrence S Prechtel, DO      enoxaparin  40 mg Subcutaneous Daily Lawrence S Prechtel, DO      iohexol  90 mL Intravenous Once in imaging Lawrence S Prechtel, DO      ipratropium  0.5 mg Nebulization TID Lawrence S Prechtel, DO      levalbuterol  1.25 mg Nebulization TID Lawrence S Prechtel, DO      levothyroxine  50 mcg Oral Daily Lawrence S Prechtel, DO      lidocaine  1 patch Topical Daily Lawrence S Prechtel, DO      losartan  100 mg Oral Daily Lawrence S Prechtel, DO      methylPREDNISolone sodium succinate  60 mg Intravenous Q8H RACHEL Lawrence S Prechtel, DO      ondansetron  4 mg Intravenous Q6H PRN Lawrence S Prechtel, DO      pantoprazole  40 mg Oral Daily Lawrence S Prechtel, DO      propranolol  10 mg Oral BID Lawrence S Prechtel, DO      saccharomyces boulardii  250 mg Oral BID Lawrence S Prechtel, DO           VTE Pharmacologic Prophylaxis:   Pharmacologic: Enoxaparin (Lovenox)      Current Length of Stay: 4 day(s)    Current Patient Status: Inpatient       Discharge Plan: home when no longer hypoxic with walking    Code Status: Level 1 - Full Code           Today, Patient Was Seen By: Lawrence Cárdenas,  DO    ** Please Note: Dictation voice to text software may have been used in the creation of this document. **

## 2024-07-27 NOTE — ASSESSMENT & PLAN NOTE
She is slowly improving    Oxygen sat with walking unfortunately is 87%      I would keep one more night on IV steroids until we can get her completely off of oxygen when walking

## 2024-07-27 NOTE — PLAN OF CARE
Problem: RESPIRATORY - ADULT  Goal: Achieves optimal ventilation and oxygenation  Description: INTERVENTIONS:  - Assess for changes in respiratory status  - Assess for changes in mentation and behavior  - Position to facilitate oxygenation and minimize respiratory effort  - Oxygen administered by appropriate delivery if ordered  - Initiate smoking cessation education as indicated  - Encourage broncho-pulmonary hygiene including cough, deep breathe, Incentive Spirometry  - Assess the need for suctioning and aspirate as needed  - Assess and instruct to report SOB or any respiratory difficulty  - Respiratory Therapy support as indicated  Outcome: Adequate for Discharge     Problem: SAFETY ADULT  Goal: Patient will remain free of falls  Description: INTERVENTIONS:  - Educate patient/family on patient safety including physical limitations  - Instruct patient to call for assistance with activity   - Consult OT/PT to assist with strengthening/mobility   - Keep Call bell within reach  - Keep bed low and locked with side rails adjusted as appropriate  - Keep care items and personal belongings within reach  - Initiate and maintain comfort rounds  - Make Fall Risk Sign visible to staff  - Apply yellow socks and bracelet for high fall risk patients  - Consider moving patient to room near nurses station  Outcome: Adequate for Discharge  Goal: Maintain or return to baseline ADL function  Description: INTERVENTIONS:  -  Assess patient's ability to carry out ADLs; assess patient's baseline for ADL function and identify physical deficits which impact ability to perform ADLs (bathing, care of mouth/teeth, toileting, grooming, dressing, etc.)  - Assess/evaluate cause of self-care deficits   - Assess range of motion  - Assess patient's mobility; develop plan if impaired  - Assess patient's need for assistive devices and provide as appropriate  - Encourage maximum independence but intervene and supervise when necessary  - Involve  family in performance of ADLs  - Assess for home care needs following discharge   - Consider OT consult to assist with ADL evaluation and planning for discharge  - Provide patient education as appropriate  Outcome: Adequate for Discharge  Goal: Maintains/Returns to pre admission functional level  Description: INTERVENTIONS:  - Perform AM-PAC 6 Click Basic Mobility/ Daily Activity assessment daily.  - Set and communicate daily mobility goal to care team and patient/family/caregiver.   - Collaborate with rehabilitation services on mobility goals if consulted  - Out of bed for toileting  - Record patient progress and toleration of activity level   Outcome: Adequate for Discharge     Problem: DISCHARGE PLANNING  Goal: Discharge to home or other facility with appropriate resources  Description: INTERVENTIONS:  - Identify barriers to discharge w/patient and caregiver  - Arrange for needed discharge resources and transportation as appropriate  - Identify discharge learning needs (meds, wound care, etc.)  - Arrange for interpretive services to assist at discharge as needed  - Refer to Case Management Department for coordinating discharge planning if the patient needs post-hospital services based on physician/advanced practitioner order or complex needs related to functional status, cognitive ability, or social support system  Outcome: Adequate for Discharge     Problem: Nutrition/Hydration-ADULT  Goal: Nutrient/Hydration intake appropriate for improving, restoring or maintaining nutritional needs  Description: Monitor and assess patient's nutrition/hydration status for malnutrition. Collaborate with interdisciplinary team and initiate plan and interventions as ordered.  Monitor patient's weight and dietary intake as ordered or per policy. Utilize nutrition screening tool and intervene as necessary. Determine patient's food preferences and provide high-protein, high-caloric foods as appropriate.     INTERVENTIONS:  - Monitor  oral intake, urinary output, labs, and treatment plans  - Assess nutrition and hydration status and recommend course of action  - Evaluate amount of meals eaten  - Assist patient with eating if necessary   - Allow adequate time for meals  - Recommend/ encourage appropriate diets, oral nutritional supplements, and vitamin/mineral supplements  - Order, calculate, and assess calorie counts as needed  - Recommend, monitor, and adjust tube feedings based on assessed needs  - Assess need for intravenous fluids  - Provide nutrition/hydration education as appropriate  - Include patient/family/caregiver in decisions related to nutrition  Outcome: Adequate for Discharge

## 2024-07-27 NOTE — ASSESSMENT & PLAN NOTE
Malnutrition Findings:   Adult Malnutrition type: Acute illness  Adult Degree of Malnutrition: Other severe protein calorie malnutrition  Malnutrition Characteristics: Muscle loss, Weight loss, Fat loss, Inadequate energy         She is eating better in the hospital.            360 Statement: related to inadequate energy intake due to decreased appetite , as evidenced by, 7.5% loss x 2-3 months-significant loss, hollow supraclavicular space, sunken orbital, wasted interosseous.Treatment: Regular diet, oral nutrition supplements TID.    BMI Findings:  Adult BMI Classifications: Underweight < 18.5        Body mass index is 17.57 kg/m².

## 2024-07-27 NOTE — ED PROCEDURE NOTE
Procedure  Procedures    Critical care addendum to critical care note dated 23 July 2024 5:43 PM:     Critical care documented at this time was separately billable of patients and teaching time and was exclusively separate from procedures and treating other patients.             Betsy Wynn PA-C  07/27/24 0977

## 2024-07-28 LAB
BACTERIA BLD CULT: NORMAL
BACTERIA BLD CULT: NORMAL

## 2024-07-28 PROCEDURE — 94760 N-INVAS EAR/PLS OXIMETRY 1: CPT

## 2024-07-28 PROCEDURE — 94664 DEMO&/EVAL PT USE INHALER: CPT

## 2024-07-28 PROCEDURE — 94640 AIRWAY INHALATION TREATMENT: CPT

## 2024-07-28 PROCEDURE — 99232 SBSQ HOSP IP/OBS MODERATE 35: CPT | Performed by: HOSPITALIST

## 2024-07-28 RX ORDER — AMOXICILLIN AND CLAVULANATE POTASSIUM 875; 125 MG/1; MG/1
1 TABLET, FILM COATED ORAL EVERY 12 HOURS SCHEDULED
Status: DISCONTINUED | OUTPATIENT
Start: 2024-07-28 | End: 2024-07-29 | Stop reason: HOSPADM

## 2024-07-28 RX ADMIN — Medication 250 MG: at 17:20

## 2024-07-28 RX ADMIN — ENOXAPARIN SODIUM 40 MG: 40 INJECTION SUBCUTANEOUS at 08:19

## 2024-07-28 RX ADMIN — METHYLPREDNISOLONE SODIUM SUCCINATE 60 MG: 125 INJECTION, POWDER, FOR SOLUTION INTRAMUSCULAR; INTRAVENOUS at 06:00

## 2024-07-28 RX ADMIN — IPRATROPIUM BROMIDE 0.5 MG: 0.5 SOLUTION RESPIRATORY (INHALATION) at 14:01

## 2024-07-28 RX ADMIN — ALPRAZOLAM 0.25 MG: 0.5 TABLET ORAL at 21:45

## 2024-07-28 RX ADMIN — IPRATROPIUM BROMIDE 0.5 MG: 0.5 SOLUTION RESPIRATORY (INHALATION) at 19:27

## 2024-07-28 RX ADMIN — IPRATROPIUM BROMIDE 0.5 MG: 0.5 SOLUTION RESPIRATORY (INHALATION) at 07:15

## 2024-07-28 RX ADMIN — ASPIRIN 81 MG: 81 TABLET, COATED ORAL at 08:19

## 2024-07-28 RX ADMIN — PROPRANOLOL HYDROCHLORIDE 10 MG: 10 TABLET ORAL at 08:19

## 2024-07-28 RX ADMIN — CYANOCOBALAMIN TAB 1000 MCG 1000 MCG: 1000 TAB at 08:19

## 2024-07-28 RX ADMIN — Medication 1 TABLET: at 08:22

## 2024-07-28 RX ADMIN — LEVALBUTEROL HYDROCHLORIDE 1.25 MG: 1.25 SOLUTION RESPIRATORY (INHALATION) at 07:15

## 2024-07-28 RX ADMIN — BUDESONIDE 0.5 MG: 0.5 INHALANT RESPIRATORY (INHALATION) at 07:19

## 2024-07-28 RX ADMIN — ACETAMINOPHEN 650 MG: 325 TABLET ORAL at 21:45

## 2024-07-28 RX ADMIN — PANTOPRAZOLE SODIUM 40 MG: 40 TABLET, DELAYED RELEASE ORAL at 08:19

## 2024-07-28 RX ADMIN — METHYLPREDNISOLONE SODIUM SUCCINATE 60 MG: 125 INJECTION, POWDER, FOR SOLUTION INTRAMUSCULAR; INTRAVENOUS at 13:15

## 2024-07-28 RX ADMIN — Medication 250 MG: at 08:19

## 2024-07-28 RX ADMIN — AMPICILLIN SODIUM AND SULBACTAM SODIUM 3 G: 2; 1 INJECTION, POWDER, FOR SOLUTION INTRAMUSCULAR; INTRAVENOUS at 06:00

## 2024-07-28 RX ADMIN — LOSARTAN POTASSIUM 100 MG: 50 TABLET, FILM COATED ORAL at 08:22

## 2024-07-28 RX ADMIN — LEVOTHYROXINE SODIUM 50 MCG: 50 TABLET ORAL at 06:00

## 2024-07-28 RX ADMIN — AMOXICILLIN AND CLAVULANATE POTASSIUM 1 TABLET: 875; 125 TABLET, FILM COATED ORAL at 21:45

## 2024-07-28 RX ADMIN — BUDESONIDE 0.5 MG: 0.5 INHALANT RESPIRATORY (INHALATION) at 19:27

## 2024-07-28 RX ADMIN — METHYLPREDNISOLONE SODIUM SUCCINATE 60 MG: 125 INJECTION, POWDER, FOR SOLUTION INTRAMUSCULAR; INTRAVENOUS at 21:43

## 2024-07-28 RX ADMIN — LEVALBUTEROL HYDROCHLORIDE 1.25 MG: 1.25 SOLUTION RESPIRATORY (INHALATION) at 14:00

## 2024-07-28 RX ADMIN — METHYLPREDNISOLONE SODIUM SUCCINATE 60 MG: 125 INJECTION, POWDER, FOR SOLUTION INTRAMUSCULAR; INTRAVENOUS at 00:00

## 2024-07-28 RX ADMIN — AMOXICILLIN AND CLAVULANATE POTASSIUM 1 TABLET: 875; 125 TABLET, FILM COATED ORAL at 08:53

## 2024-07-28 RX ADMIN — Medication 1 TABLET: at 15:33

## 2024-07-28 RX ADMIN — PROPRANOLOL HYDROCHLORIDE 10 MG: 10 TABLET ORAL at 17:20

## 2024-07-28 RX ADMIN — LEVALBUTEROL HYDROCHLORIDE 1.25 MG: 1.25 SOLUTION RESPIRATORY (INHALATION) at 19:27

## 2024-07-28 NOTE — ASSESSMENT & PLAN NOTE
Patient Name: Ibrahima Nava  : 1945    MRN: [id number]                              Today's Date: 2024       Admit Date: 2024    Visit Dx:     ICD-10-CM ICD-9-CM   1. History of arthroplasty of left shoulder  Z96.612 V43.61     Patient Active Problem List   Diagnosis    Primary localized osteoarthrosis of shoulder region    Hypertension    Impaired physical mobility    Acute pain of left shoulder    Status post reverse arthroplasty of shoulder, left     Past Medical History:   Diagnosis Date    Anxiety     Arthritis     Hypertension     CONTROLLED WITH MEDS PER PT     Shoulder pain, left     Sleep apnea     did not tolerate CPAP    Stroke     -- NO RESIDUAL PROBLEMS    Tremor of right hand     Wears dentures     UPPER AND LOWER PLATE      Past Surgical History:   Procedure Laterality Date    CARDIAC CATHETERIZATION      NO CORONARY STENTS     COLONOSCOPY      [place] ARTHROPLASTY GLENOHUMERAL JOINT TOTAL SHOULDER Left 2017    Procedure: LEFT TOTAL SHOULDER ARTHROPLASTY ;  Surgeon: Michael Xavier MD;  Location: [facility];  Service: Orthopedics    TOTAL KNEE ARTHROPLASTY Bilateral     TOTAL SHOULDER REPLACEMENT Right       General Information       Row Name 24 0926          Physical Therapy Time and Intention    Document Type evaluation  -LR     Mode of Treatment physical therapy;individual therapy  -LR       Row Name 24 09          General Information    Patient Profile Reviewed yes  -LR     Prior Level of Function independent:;all household mobility;community mobility;gait;transfer;bed mobility  did not use AD PTA  -LR     Existing Precautions/Restrictions fall;left;shoulder;non-weight bearing;other (see comments)  MAR[facility] PINK in [facility] with abduction pillow, L interscalene nerve cath  -LR     Barriers to Rehab none identified  -LR       Row Name 24          Living Environment    People in Home spouse  -LR       Row Name 24          Home Main  She has weaned off of oxygen  She does not feel well enough to go home today  She may be ready tomorrow.    Oxygen sat with walking was 89% today, so she does not need home oxygen    Continue solumedrol and nebtx.   Entrance    Number of Stairs, Main Entrance none  -LR       Row Name 05/21/24 0926          Stairs Within Home, Primary    Number of Stairs, Within Home, Primary none  -LR       Row Name 05/21/24 0926          Cognition    Orientation Status (Cognition) oriented x 4  -LR       Row Name 05/21/24 0926          Safety Issues, Functional Mobility    Safety Issues Affecting Function (Mobility) safety precautions follow-through/compliance;safety precaution awareness  -LR     Impairments Affecting Function (Mobility) endurance/activity tolerance;strength;range of motion (ROM);sensation/sensory awareness;pain  -LR               User Key  (r) = Recorded By, (t) = Taken By, (c) = Cosigned By      Initials Name Provider Type    LR Jyoti Billingsley, PT Physical Therapist                   Mobility       Row Name 05/21/24 0926          Bed Mobility    Bed Mobility supine-sit  -LR     Supine-Sit [place] (Bed Mobility) verbal cues;standby assist  -LR     Sit-Supine [place] (Bed Mobility) not tested  -LR     Assistive Device (Bed Mobility) head of bed elevated  -LR     Comment, (Bed Mobility) Verbal cues to move LEs towards EOB and to use trunk muscles to raise trunk into sitting. Cues to push from bed with R UE to scoot hips out to get feet on floor. Cues for NWB of L UE during transition to EOB. Required increased time to perform. Denied dizziness upon sitting up.  -LR       Row Name 05/21/24 0926          Transfers    Comment, (Transfers) Verbal cues to push up from bed with R UE to stand and to reach back for chair with R UE to lower into sitting. Cues for NWB on L UE during t/f.  -LR       Row Name 05/21/24 0926          Bed-Chair Transfer    Bed-Chair [place] (Transfers) not tested  -LR       Row Name 05/21/24 0926          Sit-Stand Transfer    Sit-Stand [place] (Transfers) verbal cues;standby assist  -LR     Assistive Device (Sit-Stand Transfers) other (see comments)  no AD  -LR       Row Name  05/21/24 0926          Gait/Stairs (Locomotion)    [place] Level (Gait) verbal cues;standby assist  -LR     Assistive Device (Gait) other (see comments)  no AD  -LR     Patient was able to Ambulate yes  -LR     Distance in Feet (Gait) 300  -LR     Deviations/Abnormal Patterns (Gait) bilateral deviations;dennise decreased;gait speed decreased;stride length decreased  -LR     Bilateral Gait Deviations heel strike decreased  -LR     [place] Level (Stairs) not tested  -LR     Comment, (Gait/Stairs) Patient ambulated with step through gait pattern at slow pace with no LOB or unsteadiness. Denied SOA throughout. C/o mild dizziness, reported he felt it was medication related. Cues to avoid tight turns and door facings on L to avoid hitting L UE. Gait limited by fatigue. O2 sats remained >92% throughout mobility assessment. Discontinued supplemental O2, notified RN who was in agreement.  -LR       Row Name 05/21/24 0926          Mobility    Extremity Weight-bearing Status left upper extremity  -LR     Left Upper Extremity (Weight-bearing Status) non weight-bearing (NWB)   -LR               User Key  (r) = Recorded By, (t) = Taken By, (c) = Cosigned By      Initials Name Provider Type    LR Jyoti Billingsley, PT Physical Therapist                   Obj/Interventions       Row Name 05/21/24 0926          Range of Motion Comprehensive    General Range of Motion bilateral lower extremity ROM WFL  -LR       Row Name 05/21/24 0926          Strength Comprehensive (MMT)    General Manual Muscle Testing (MMT) Assessment no strength deficits identified;other (see comments)  B LEs 5/5 throughout  -LR       Row Name 05/21/24 0926          Motor Skills    Therapeutic Exercise other (see comments)  defer to OT  -LR       Row Name 05/21/24 0926          Balance    Balance Assessment sitting static balance;sitting dynamic balance;standing static balance;standing dynamic balance  -LR     Static Sitting Balance independent   -LR     Dynamic Sitting Balance independent  -LR     Position, Sitting Balance unsupported;sitting edge of bed  -LR     Static Standing Balance standby assist  -LR     Dynamic Standing Balance standby assist  -LR     Position/Device Used, Standing Balance unsupported  -LR       Row Name 05/21/24 0926          Sensory Assessment (Somatosensory)    Sensory Assessment (Somatosensory) LE sensation intact;other (see comments)  denies numbness/tingling in LEs; reports light touch equal and intact upon assessment; reports diminished light touch as well as numbness/tingling in L hand/fingers d/t nerve catheter  -LR               User Key  (r) = Recorded By, (t) = Taken By, (c) = Cosigned By      Initials Name Provider Type    LR Jyoti Billingsley, PT Physical Therapist                   Goals/Plan       Row Name 05/21/24 0926          Bed Mobility Goal 1 (PT)    Activity/Assistive Device (Bed Mobility Goal 1, PT) sit to supine/supine to sit  -LR     [place] Level/Cues Needed (Bed Mobility Goal 1, PT) modified independence  -LR     Time Frame (Bed Mobility Goal 1, PT) long term goal (LTG);5 days  -LR     Progress/Outcomes (Bed Mobility Goal 1, PT) goal ongoing  -LR       Row Name 05/21/24 0926          Transfer Goal 1 (PT)    Activity/Assistive Device (Transfer Goal 1, PT) sit-to-stand/stand-to-sit  -LR     [place] Level/Cues Needed (Transfer Goal 1, PT) independent  -LR     Time Frame (Transfer Goal 1, PT) long term goal (LTG);5 days  -LR     Progress/Outcome (Transfer Goal 1, PT) goal ongoing  -LR       Row Name 05/21/24 0926          Gait Training Goal 1 (PT)    Activity/Assistive Device (Gait Training Goal 1, PT) gait (walking locomotion)  -LR     [place] (Gait Training Goal 1, PT) independent  -LR     Distance (Gait Training Goal 1, PT) 500 feet  -LR     Time Frame (Gait Training Goal 1, PT) long term goal (LTG);5 days  -LR     Progress/Outcome (Gait Training Goal 1, PT) goal ongoing  -LR        Row Name 05/21/24 0926          Therapy Assessment/Plan (PT)    Planned Therapy Interventions (PT) balance training;bed mobility training;gait training;home exercise program;patient/family education;stair training;strengthening;transfer training  -LR               User Key  (r) = Recorded By, (t) = Taken By, (c) = Cosigned By      Initials Name Provider Type    LR Jyoti Billingsley, PT Physical Therapist                   Clinical Impression       Row Name 05/21/24 0926          Pain    Pretreatment Pain Rating 3/10  -LR     Posttreatment Pain Rating 3/10  -LR     Pain Location - Side/Orientation Left  -LR     Pain Intervention(s) Ambulation/increased activity;Cold applied;Repositioned  -LR       Row Name 05/21/24 0926          Plan of Care Review    Plan of Care Reviewed With patient  -LR     Progress improving  -LR     Outcome Evaluation Patient ambulated 300 feet with SBA, step through gait pattern, no LOB or unsteadiness, limited by fatigue. B LE strength 5/5. O2 sats remained >92% on RA throughout mobility. Patient currently below baseline functioning, demonstrating decreased functional mobility status, decreased endurance, and decreased L shoulder strength/ROM. Will address these deficits to promote return to PLOF. Recommend d/c home with assist.  -LR       Row Name 05/21/24 0926          Therapy Assessment/Plan (PT)    Patient/Family Therapy Goals Statement (PT) go home  -LR     Rehab Potential (PT) good, to achieve stated therapy goals  -LR     Criteria for Skilled Interventions Met (PT) yes;meets criteria;skilled treatment is necessary  -LR     Therapy Frequency (PT) daily  -LR       Row Name 05/21/24 0926          Vital Signs    Pre SpO2 (%) 97  -LR     O2 Delivery Pre Treatment room air  -LR     Intra SpO2 (%) 96  -LR     O2 Delivery Intra Treatment room air  -LR     Post SpO2 (%) 97  -LR     O2 Delivery Post Treatment room air  -LR     Pre Patient Position Supine  -LR     Intra Patient Position  Sitting  -LR     Post Patient Position Sitting  -LR       Row Name 05/21/24 0926          Positioning and Restraints    Pre-Treatment Position in bed  -LR     Post Treatment Position chair  -LR     In Chair notified nsg;reclined;sitting;call light within reach;exit alarm on;encouraged to call for assist;legs elevated;compression device  -LR               User Key  (r) = Recorded By, (t) = Taken By, (c) = Cosigned By      Initials Name Provider Type    Jyoti Carlson, PT Physical Therapist                   Outcome Measures       Row Name 05/21/24 0926 05/21/24 0826       How much help from another person do you currently need...    Turning from your back to your side while in flat bed without using bedrails? 4  -LR 3  -MM    Moving from lying on back to sitting on the side of a flat bed without bedrails? 3  -LR 3  -MM    Moving to and from a bed to a chair (including a wheelchair)? 3  -LR 3  -MM    Standing up from a chair using your arms (e.g., wheelchair, bedside chair)? 3  -LR 3  -MM    Climbing 3-5 steps with a railing? 3  -LR 3  -MM    To walk in hospital room? 3  -LR 3  -MM    AM-PAC 6 Clicks Score (PT) 19  -LR 18  -MM    Highest Level of Mobility Goal 6 --> Walk 10 steps or more  -LR 6 --> Walk 10 steps or more  -MM      Row Name 05/21/24 0926          Functional Assessment    Outcome Measure Options AM-PAC 6 Clicks Basic Mobility (PT)  -LR               User Key  (r) = Recorded By, (t) = Taken By, (c) = Cosigned By      Initials Name Provider Type    Jyoti Carlson, PT Physical Therapist    Claudia Tan RN Registered Nurse                                 Physical Therapy Education       Title: PT OT SLP Therapies (In Progress)       Topic: Physical Therapy (In Progress)       Point: Mobility training (Done)       Learning Progress Summary             Patient Acceptance, E,D, RYLEE,DU by LESVIA at 5/21/2024 0926    Comment: Educated on safety with mobility, NWB status of L UE, precautions,  correct supine to sit t/f technique, correct sit<->stand t/f technique, correct gait mechanics, and progression of POC.                         Point: Home exercise program (Not Started)       Learner Progress:  Not documented in this visit.              Point: Body mechanics (Done)       Learning Progress Summary             Patient Acceptance, E,D, VU,DU by LR at 5/21/2024 0926    Comment: Educated on safety with mobility, NWB status of L UE, precautions, correct supine to sit t/f technique, correct sit<->stand t/f technique, correct gait mechanics, and progression of POC.                         Point: Precautions (Done)       Learning Progress Summary             Patient Acceptance, E,D, VU,DU by LR at 5/21/2024 0926    Comment: Educated on safety with mobility, NWB status of L UE, precautions, correct supine to sit t/f technique, correct sit<->stand t/f technique, correct gait mechanics, and progression of POC.                                         User Key       Initials Effective Dates Name Provider Type Discipline     02/03/23 -  Jyoti Billingsley, PT Physical Therapist PT                  PT Recommendation and Plan  Planned Therapy Interventions (PT): balance training, bed mobility training, gait training, home exercise program, patient/family education, stair training, strengthening, transfer training  Plan of Care Reviewed With: patient  Progress: improving  Outcome Evaluation: Patient ambulated 300 feet with SBA, step through gait pattern, no LOB or unsteadiness, limited by fatigue. B LE strength 5/5. O2 sats remained >92% on RA throughout mobility. Patient currently below baseline functioning, demonstrating decreased functional mobility status, decreased endurance, and decreased L shoulder strength/ROM. Will address these deficits to promote return to PLOF. Recommend d/c home with assist.     Time Calculation:         PT Charges       Row Name 05/21/24 0926             Time Calculation    Start  Time 0926  -LR      PT Received On 05/21/24  -LR      PT Goal Re-Cert Due Date 05/31/24  -LR         Timed Charges    04396 - Gait Training Minutes  10  -LR         Untimed Charges    PT Eval/Re-eval Minutes 40  -LR         Total Minutes    Timed Charges Total Minutes 10  -LR      Untimed Charges Total Minutes 40  -LR       Total Minutes 50  -LR                User Key  (r) = Recorded By, (t) = Taken By, (c) = Cosigned By      Initials Name Provider Type    LR Jyoti Billingsley, PT Physical Therapist                  Therapy Charges for Today       Code Description Service Date Service Provider Modifiers Qty    39092166487 HC GAIT TRAINING EA 15 MIN 5/21/2024 Jyoti Billingsley, PT GP 1    14952191413 HC PT EVAL LOW COMPLEXITY 3 5/21/2024 Jyoti Billingsley, PT GP 1            PT G-Codes  Outcome Measure Options: AM-PAC 6 Clicks Basic Mobility (PT)  AM-PAC 6 Clicks Score (PT): 19  AM-PAC 6 Clicks Score (OT): 14  PT Discharge Summary  Anticipated Discharge Disposition (PT): home with assist    Jyoti Billingsley, PT  5/21/2024

## 2024-07-28 NOTE — ASSESSMENT & PLAN NOTE
Spoke with ENT on the phone    There is no salivary gland stone.    There is some bilateral inflammation of the salivary glands which may be chronic    ENT recommends 10 days course of Augmentin and follow up with them in the office.    The cellulitis has essentially resolved at this point

## 2024-07-28 NOTE — ASSESSMENT & PLAN NOTE
Malnutrition Findings:   Adult Malnutrition type: Acute illness  Adult Degree of Malnutrition: Other severe protein calorie malnutrition  Malnutrition Characteristics: Muscle loss, Weight loss, Fat loss, Inadequate energy        She is eating well  Encouraged she drink Ensure            360 Statement: related to inadequate energy intake due to decreased appetite , as evidenced by, 7.5% loss x 2-3 months-significant loss, hollow supraclavicular space, sunken orbital, wasted interosseous.Treatment: Regular diet, oral nutrition supplements TID.    BMI Findings:  Adult BMI Classifications: Underweight < 18.5        Body mass index is 17.57 kg/m².
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
## 2024-07-29 ENCOUNTER — TRANSITIONAL CARE MANAGEMENT (OUTPATIENT)
Dept: FAMILY MEDICINE CLINIC | Facility: CLINIC | Age: 79
End: 2024-07-29

## 2024-07-29 ENCOUNTER — TELEPHONE (OUTPATIENT)
Age: 79
End: 2024-07-29

## 2024-07-29 VITALS
RESPIRATION RATE: 20 BRPM | WEIGHT: 99.21 LBS | HEART RATE: 75 BPM | SYSTOLIC BLOOD PRESSURE: 165 MMHG | HEIGHT: 63 IN | DIASTOLIC BLOOD PRESSURE: 84 MMHG | OXYGEN SATURATION: 94 % | BODY MASS INDEX: 17.58 KG/M2 | TEMPERATURE: 96.6 F

## 2024-07-29 LAB
ANION GAP SERPL CALCULATED.3IONS-SCNC: 8 MMOL/L (ref 4–13)
BASOPHILS # BLD MANUAL: 0 THOUSAND/UL (ref 0–0.1)
BASOPHILS NFR MAR MANUAL: 0 % (ref 0–1)
BUN SERPL-MCNC: 41 MG/DL (ref 5–25)
CALCIUM SERPL-MCNC: 9.1 MG/DL (ref 8.4–10.2)
CHLORIDE SERPL-SCNC: 103 MMOL/L (ref 96–108)
CO2 SERPL-SCNC: 30 MMOL/L (ref 21–32)
CREAT SERPL-MCNC: 0.62 MG/DL (ref 0.6–1.3)
EOSINOPHIL # BLD MANUAL: 0 THOUSAND/UL (ref 0–0.4)
EOSINOPHIL NFR BLD MANUAL: 0 % (ref 0–6)
ERYTHROCYTE [DISTWIDTH] IN BLOOD BY AUTOMATED COUNT: 13.8 % (ref 11.6–15.1)
GFR SERPL CREATININE-BSD FRML MDRD: 86 ML/MIN/1.73SQ M
GLUCOSE SERPL-MCNC: 100 MG/DL (ref 65–140)
HCT VFR BLD AUTO: 41.1 % (ref 34.8–46.1)
HGB BLD-MCNC: 12.6 G/DL (ref 11.5–15.4)
LYMPHOCYTES # BLD AUTO: 0.15 THOUSAND/UL (ref 0.6–4.47)
LYMPHOCYTES # BLD AUTO: 2 % (ref 14–44)
MAGNESIUM SERPL-MCNC: 2.5 MG/DL (ref 1.9–2.7)
MCH RBC QN AUTO: 27.9 PG (ref 26.8–34.3)
MCHC RBC AUTO-ENTMCNC: 30.7 G/DL (ref 31.4–37.4)
MCV RBC AUTO: 91 FL (ref 82–98)
MONOCYTES # BLD AUTO: 0.38 THOUSAND/UL (ref 0–1.22)
MONOCYTES NFR BLD: 5 % (ref 4–12)
NEUTROPHILS # BLD MANUAL: 7.15 THOUSAND/UL (ref 1.85–7.62)
NEUTS BAND NFR BLD MANUAL: 1 % (ref 0–8)
NEUTS SEG NFR BLD AUTO: 92 % (ref 43–75)
PLATELET # BLD AUTO: 196 THOUSANDS/UL (ref 149–390)
PLATELET BLD QL SMEAR: ADEQUATE
PMV BLD AUTO: 10.3 FL (ref 8.9–12.7)
POTASSIUM SERPL-SCNC: 4.6 MMOL/L (ref 3.5–5.3)
RBC # BLD AUTO: 4.52 MILLION/UL (ref 3.81–5.12)
RBC MORPH BLD: NORMAL
SODIUM SERPL-SCNC: 141 MMOL/L (ref 135–147)
WBC # BLD AUTO: 7.69 THOUSAND/UL (ref 4.31–10.16)

## 2024-07-29 PROCEDURE — 94640 AIRWAY INHALATION TREATMENT: CPT

## 2024-07-29 PROCEDURE — 83735 ASSAY OF MAGNESIUM: CPT | Performed by: HOSPITALIST

## 2024-07-29 PROCEDURE — 97167 OT EVAL HIGH COMPLEX 60 MIN: CPT

## 2024-07-29 PROCEDURE — 94760 N-INVAS EAR/PLS OXIMETRY 1: CPT

## 2024-07-29 PROCEDURE — 85007 BL SMEAR W/DIFF WBC COUNT: CPT | Performed by: HOSPITALIST

## 2024-07-29 PROCEDURE — 99239 HOSP IP/OBS DSCHRG MGMT >30: CPT | Performed by: INTERNAL MEDICINE

## 2024-07-29 PROCEDURE — 80048 BASIC METABOLIC PNL TOTAL CA: CPT | Performed by: HOSPITALIST

## 2024-07-29 PROCEDURE — 85027 COMPLETE CBC AUTOMATED: CPT | Performed by: HOSPITALIST

## 2024-07-29 RX ORDER — PREDNISONE 10 MG/1
TABLET ORAL
Qty: 30 TABLET | Refills: 0 | Status: SHIPPED | OUTPATIENT
Start: 2024-07-29

## 2024-07-29 RX ORDER — AMOXICILLIN AND CLAVULANATE POTASSIUM 875; 125 MG/1; MG/1
1 TABLET, FILM COATED ORAL EVERY 12 HOURS SCHEDULED
Qty: 10 TABLET | Refills: 0 | Status: SHIPPED | OUTPATIENT
Start: 2024-07-29 | End: 2024-08-03

## 2024-07-29 RX ADMIN — PROPRANOLOL HYDROCHLORIDE 10 MG: 10 TABLET ORAL at 08:16

## 2024-07-29 RX ADMIN — ENOXAPARIN SODIUM 40 MG: 40 INJECTION SUBCUTANEOUS at 08:20

## 2024-07-29 RX ADMIN — LOSARTAN POTASSIUM 100 MG: 50 TABLET, FILM COATED ORAL at 08:15

## 2024-07-29 RX ADMIN — LEVOTHYROXINE SODIUM 50 MCG: 50 TABLET ORAL at 06:17

## 2024-07-29 RX ADMIN — Medication 250 MG: at 08:16

## 2024-07-29 RX ADMIN — ALPRAZOLAM 0.25 MG: 0.5 TABLET ORAL at 11:19

## 2024-07-29 RX ADMIN — AMOXICILLIN AND CLAVULANATE POTASSIUM 1 TABLET: 875; 125 TABLET, FILM COATED ORAL at 08:15

## 2024-07-29 RX ADMIN — Medication 1 TABLET: at 08:15

## 2024-07-29 RX ADMIN — LEVALBUTEROL HYDROCHLORIDE 1.25 MG: 1.25 SOLUTION RESPIRATORY (INHALATION) at 07:50

## 2024-07-29 RX ADMIN — METHYLPREDNISOLONE SODIUM SUCCINATE 60 MG: 125 INJECTION, POWDER, FOR SOLUTION INTRAMUSCULAR; INTRAVENOUS at 06:50

## 2024-07-29 RX ADMIN — CYANOCOBALAMIN TAB 1000 MCG 1000 MCG: 1000 TAB at 08:16

## 2024-07-29 RX ADMIN — PANTOPRAZOLE SODIUM 40 MG: 40 TABLET, DELAYED RELEASE ORAL at 08:15

## 2024-07-29 RX ADMIN — IPRATROPIUM BROMIDE 0.5 MG: 0.5 SOLUTION RESPIRATORY (INHALATION) at 07:50

## 2024-07-29 RX ADMIN — BUDESONIDE 0.5 MG: 0.5 INHALANT RESPIRATORY (INHALATION) at 07:50

## 2024-07-29 RX ADMIN — ASPIRIN 81 MG: 81 TABLET, COATED ORAL at 08:16

## 2024-07-29 NOTE — PLAN OF CARE
Problem: NEUROSENSORY - ADULT  Goal: Achieves stable or improved neurological status  Description: INTERVENTIONS  - Monitor and report changes in neurological status  - Monitor vital signs such as temperature, blood pressure, glucose, and any other labs ordered   - Initiate measures to prevent increased intracranial pressure  - Monitor for seizure activity and implement precautions if appropriate      Outcome: Progressing  Goal: Remains free of injury related to seizures activity  Description: INTERVENTIONS  - Maintain airway, patient safety  and administer oxygen as ordered  - Monitor patient for seizure activity, document and report duration and description of seizure to physician/advanced practitioner  - If seizure occurs,  ensure patient safety during seizure  - Reorient patient post seizure  - Seizure pads on all 4 side rails  - Instruct patient/family to notify RN of any seizure activity including if an aura is experienced  - Instruct patient/family to call for assistance with activity based on nursing assessment  - Administer anti-seizure medications if ordered    Outcome: Progressing  Goal: Achieves maximal functionality and self care  Description: INTERVENTIONS  - Monitor swallowing and airway patency with patient fatigue and changes in neurological status  - Encourage and assist patient to increase activity and self care.   - Encourage visually impaired, hearing impaired and aphasic patients to use assistive/communication devices  Outcome: Progressing     Problem: CARDIOVASCULAR - ADULT  Goal: Maintains optimal cardiac output and hemodynamic stability  Description: INTERVENTIONS:  - Monitor I/O, vital signs and rhythm  - Monitor for S/S and trends of decreased cardiac output  - Administer and titrate ordered vasoactive medications to optimize hemodynamic stability  - Assess quality of pulses, skin color and temperature  - Assess for signs of decreased coronary artery perfusion  - Instruct patient to  report change in severity of symptoms  Outcome: Progressing  Goal: Absence of cardiac dysrhythmias or at baseline rhythm  Description: INTERVENTIONS:  - Continuous cardiac monitoring, vital signs, obtain 12 lead EKG if ordered  - Administer antiarrhythmic and heart rate control medications as ordered  - Monitor electrolytes and administer replacement therapy as ordered  Outcome: Progressing     Problem: RESPIRATORY - ADULT  Goal: Achieves optimal ventilation and oxygenation  Description: INTERVENTIONS:  - Assess for changes in respiratory status  - Assess for changes in mentation and behavior  - Position to facilitate oxygenation and minimize respiratory effort  - Oxygen administered by appropriate delivery if ordered  - Initiate smoking cessation education as indicated  - Encourage broncho-pulmonary hygiene including cough, deep breathe, Incentive Spirometry  - Assess the need for suctioning and aspirate as needed  - Assess and instruct to report SOB or any respiratory difficulty  - Respiratory Therapy support as indicated  Outcome: Progressing     Problem: PAIN - ADULT  Goal: Verbalizes/displays adequate comfort level or baseline comfort level  Description: Interventions:  - Encourage patient to monitor pain and request assistance  - Assess pain using appropriate pain scale  - Administer analgesics based on type and severity of pain and evaluate response  - Implement non-pharmacological measures as appropriate and evaluate response  - Consider cultural and social influences on pain and pain management  - Notify physician/advanced practitioner if interventions unsuccessful or patient reports new pain  Outcome: Progressing     Problem: DISCHARGE PLANNING  Goal: Discharge to home or other facility with appropriate resources  Description: INTERVENTIONS:  - Identify barriers to discharge w/patient and caregiver  - Arrange for needed discharge resources and transportation as appropriate  - Identify discharge learning  needs (meds, wound care, etc.)  - Arrange for interpretive services to assist at discharge as needed  - Refer to Case Management Department for coordinating discharge planning if the patient needs post-hospital services based on physician/advanced practitioner order or complex needs related to functional status, cognitive ability, or social support system  Outcome: Progressing     Problem: Nutrition/Hydration-ADULT  Goal: Nutrient/Hydration intake appropriate for improving, restoring or maintaining nutritional needs  Description: Monitor and assess patient's nutrition/hydration status for malnutrition. Collaborate with interdisciplinary team and initiate plan and interventions as ordered.  Monitor patient's weight and dietary intake as ordered or per policy. Utilize nutrition screening tool and intervene as necessary. Determine patient's food preferences and provide high-protein, high-caloric foods as appropriate.     INTERVENTIONS:  - Monitor oral intake, urinary output, labs, and treatment plans  - Assess nutrition and hydration status and recommend course of action  - Evaluate amount of meals eaten  - Assist patient with eating if necessary   - Allow adequate time for meals  - Recommend/ encourage appropriate diets, oral nutritional supplements, and vitamin/mineral supplements  - Order, calculate, and assess calorie counts as needed  - Recommend, monitor, and adjust tube feedings based on assessed needs  - Assess need for intravenous fluids  - Provide nutrition/hydration education as appropriate  - Include patient/family/caregiver in decisions related to nutrition  Outcome: Progressing

## 2024-07-29 NOTE — TELEPHONE ENCOUNTER
Patient states she was just released from the hospital and is asking if she is supposed to follow her normal medication schedule or change things. Patient likely due for TCM visit to help discuss this stuff.

## 2024-07-29 NOTE — PLAN OF CARE
Problem: NEUROSENSORY - ADULT  Goal: Achieves stable or improved neurological status  Description: INTERVENTIONS  - Monitor and report changes in neurological status  - Monitor vital signs such as temperature, blood pressure, glucose, and any other labs ordered   - Initiate measures to prevent increased intracranial pressure  - Monitor for seizure activity and implement precautions if appropriate      7/29/2024 0916 by Juan Pablo Arriaza RN  Outcome: Progressing  7/29/2024 0914 by Juan Pablo Arriaza RN  Outcome: Progressing  Goal: Remains free of injury related to seizures activity  Description: INTERVENTIONS  - Maintain airway, patient safety  and administer oxygen as ordered  - Monitor patient for seizure activity, document and report duration and description of seizure to physician/advanced practitioner  - If seizure occurs,  ensure patient safety during seizure  - Reorient patient post seizure  - Seizure pads on all 4 side rails  - Instruct patient/family to notify RN of any seizure activity including if an aura is experienced  - Instruct patient/family to call for assistance with activity based on nursing assessment  - Administer anti-seizure medications if ordered    7/29/2024 0916 by Juan Pablo Arriaza RN  Outcome: Progressing  7/29/2024 0914 by Juan Pablo Arriaza RN  Outcome: Progressing  Goal: Achieves maximal functionality and self care  Description: INTERVENTIONS  - Monitor swallowing and airway patency with patient fatigue and changes in neurological status  - Encourage and assist patient to increase activity and self care.   - Encourage visually impaired, hearing impaired and aphasic patients to use assistive/communication devices  7/29/2024 0916 by Juan Pablo Arriaza RN  Outcome: Progressing  7/29/2024 0914 by Juan Pablo Arriaza RN  Outcome: Progressing     Problem: CARDIOVASCULAR - ADULT  Goal: Maintains optimal cardiac output and hemodynamic stability  Description: INTERVENTIONS:  - Monitor I/O, vital signs and  rhythm  - Monitor for S/S and trends of decreased cardiac output  - Administer and titrate ordered vasoactive medications to optimize hemodynamic stability  - Assess quality of pulses, skin color and temperature  - Assess for signs of decreased coronary artery perfusion  - Instruct patient to report change in severity of symptoms  7/29/2024 0916 by Juan Pablo Arriaza RN  Outcome: Progressing  7/29/2024 0914 by Juan Pablo Arriaza RN  Outcome: Progressing  Goal: Absence of cardiac dysrhythmias or at baseline rhythm  Description: INTERVENTIONS:  - Continuous cardiac monitoring, vital signs, obtain 12 lead EKG if ordered  - Administer antiarrhythmic and heart rate control medications as ordered  - Monitor electrolytes and administer replacement therapy as ordered  7/29/2024 0916 by Juan Pablo Arriaza RN  Outcome: Progressing  7/29/2024 0914 by Juan Pablo Arriaza RN  Outcome: Progressing     Problem: RESPIRATORY - ADULT  Goal: Achieves optimal ventilation and oxygenation  Description: INTERVENTIONS:  - Assess for changes in respiratory status  - Assess for changes in mentation and behavior  - Position to facilitate oxygenation and minimize respiratory effort  - Oxygen administered by appropriate delivery if ordered  - Initiate smoking cessation education as indicated  - Encourage broncho-pulmonary hygiene including cough, deep breathe, Incentive Spirometry  - Assess the need for suctioning and aspirate as needed  - Assess and instruct to report SOB or any respiratory difficulty  - Respiratory Therapy support as indicated  7/29/2024 0916 by Juan Pablo Arriaza RN  Outcome: Progressing  7/29/2024 0914 by Juan Pablo Arriaza RN  Outcome: Progressing     Problem: PAIN - ADULT  Goal: Verbalizes/displays adequate comfort level or baseline comfort level  Description: Interventions:  - Encourage patient to monitor pain and request assistance  - Assess pain using appropriate pain scale  - Administer analgesics based on type and severity of pain and  evaluate response  - Implement non-pharmacological measures as appropriate and evaluate response  - Consider cultural and social influences on pain and pain management  - Notify physician/advanced practitioner if interventions unsuccessful or patient reports new pain  7/29/2024 0916 by Juan Pablo Arriaza RN  Outcome: Progressing  7/29/2024 0914 by Juan Pablo Arriaza RN  Outcome: Progressing     Problem: SAFETY ADULT  Goal: Patient will remain free of falls  Description: INTERVENTIONS:  - Educate patient/family on patient safety including physical limitations  - Instruct patient to call for assistance with activity   - Consult OT/PT to assist with strengthening/mobility   - Keep Call bell within reach  - Keep bed low and locked with side rails adjusted as appropriate  - Keep care items and personal belongings within reach  - Initiate and maintain comfort rounds  - Make Fall Risk Sign visible to staff  - Apply yellow socks and bracelet for high fall risk patients  - Consider moving patient to room near nurses station  7/29/2024 0916 by Juan Pablo Arriaza RN  Outcome: Progressing  7/29/2024 0914 by Juan Pablo Arriaza RN  Outcome: Progressing  Goal: Maintain or return to baseline ADL function  Description: INTERVENTIONS:  -  Assess patient's ability to carry out ADLs; assess patient's baseline for ADL function and identify physical deficits which impact ability to perform ADLs (bathing, care of mouth/teeth, toileting, grooming, dressing, etc.)  - Assess/evaluate cause of self-care deficits   - Assess range of motion  - Assess patient's mobility; develop plan if impaired  - Assess patient's need for assistive devices and provide as appropriate  - Encourage maximum independence but intervene and supervise when necessary  - Involve family in performance of ADLs  - Assess for home care needs following discharge   - Consider OT consult to assist with ADL evaluation and planning for discharge  - Provide patient education as  appropriate  7/29/2024 0916 by Juan Pablo Arriaza RN  Outcome: Progressing  7/29/2024 0914 by Juan Pablo Arriaza RN  Outcome: Progressing  Goal: Maintains/Returns to pre admission functional level  Description: INTERVENTIONS:  - Perform AM-PAC 6 Click Basic Mobility/ Daily Activity assessment daily.  - Set and communicate daily mobility goal to care team and patient/family/caregiver.   - Collaborate with rehabilitation services on mobility goals if consulted  - Out of bed for toileting  - Record patient progress and toleration of activity level   7/29/2024 0916 by Juan Pablo Arriaza RN  Outcome: Progressing  7/29/2024 0914 by Juan Pablo Arriaza RN  Outcome: Progressing     Problem: DISCHARGE PLANNING  Goal: Discharge to home or other facility with appropriate resources  Description: INTERVENTIONS:  - Identify barriers to discharge w/patient and caregiver  - Arrange for needed discharge resources and transportation as appropriate  - Identify discharge learning needs (meds, wound care, etc.)  - Arrange for interpretive services to assist at discharge as needed  - Refer to Case Management Department for coordinating discharge planning if the patient needs post-hospital services based on physician/advanced practitioner order or complex needs related to functional status, cognitive ability, or social support system  Outcome: Progressing     Problem: Nutrition/Hydration-ADULT  Goal: Nutrient/Hydration intake appropriate for improving, restoring or maintaining nutritional needs  Description: Monitor and assess patient's nutrition/hydration status for malnutrition. Collaborate with interdisciplinary team and initiate plan and interventions as ordered.  Monitor patient's weight and dietary intake as ordered or per policy. Utilize nutrition screening tool and intervene as necessary. Determine patient's food preferences and provide high-protein, high-caloric foods as appropriate.     INTERVENTIONS:  - Monitor oral intake, urinary output,  labs, and treatment plans  - Assess nutrition and hydration status and recommend course of action  - Evaluate amount of meals eaten  - Assist patient with eating if necessary   - Allow adequate time for meals  - Recommend/ encourage appropriate diets, oral nutritional supplements, and vitamin/mineral supplements  - Order, calculate, and assess calorie counts as needed  - Recommend, monitor, and adjust tube feedings based on assessed needs  - Assess need for intravenous fluids  - Provide nutrition/hydration education as appropriate  - Include patient/family/caregiver in decisions related to nutrition  7/29/2024 0916 by Juan Pablo Arriaza RN  Outcome: Progressing  7/29/2024 0914 by Juan Pablo Arriaza, RN  Outcome: Progressing

## 2024-07-29 NOTE — ASSESSMENT & PLAN NOTE
She has weaned off of oxygen  Will discharge on steriod taper  Patient can resume home dose of Prednisone 2.5 mg once taper completed  Follow up with Dr. Nicholson has appt scheduled on 8/16/2024

## 2024-07-29 NOTE — CASE MANAGEMENT
Case Management Discharge Planning Note    Patient name Sarah Zayas  Location 7T U 703/7T [facility] 703-01 MRN [id number]  : 1945 Date 2024       Current Admission Date: 2024  Current Admission Diagnosis:Acute exacerbation of chronic obstructive pulmonary disease (COPD) ([facility])   Patient Active Problem List    Diagnosis Date Noted Date Diagnosed    Severe protein-calorie malnutrition (HCC) 2024     COPD, severe ([facility]) 2024     COVID-19 2023     Abnormal CT scan 2023     Infectious sialoadenitis of major salivary gland 2023     Cellulitis of neck 2023     Left upper lobe pulmonary nodule 2023     Postnasal drip 2023     Raynaud's phenomenon without gangrene 2022     Temporal arteritis ([facility]) 2020     Hypertension 10/11/2018     Rectus sheath hematoma, initial encounter 10/10/2018     Other specified hypothyroidism 2018     Gastroesophageal reflux disease without esophagitis 2018     Acute exacerbation of chronic obstructive pulmonary disease (COPD) ([facility]) 2012     Vitamin D deficiency 2012     Osteoarthritis 2012       LOS (days): 6  Geometric Mean LOS (GMLOS) (days): 4.8  Days to GMLOS:-0.8     OBJECTIVE:  Risk of Unplanned Readmission Score: 20.3         Current admission status: Inpatient   Preferred Pharmacy:   [facility] - [facility]IL[facility] Griffin [address]  [address] [place] [address]  [address]  [facility] [address]  Phone: [phone] Fax: [phone]    Primary Care Provider: Nicolas Nix MD    Primary Insurance: [employer]  REP  Secondary Insurance:     DISCHARGE DETAILS:    Discharge planning discussed with:: Patient        CM contacted family/caregiver?: No- see comments (Declines)        Treatment Team Recommendation: Home with Home Health Care  Discharge Destination Plan:: Home with Home Health Care        IMM Given (Date):: 24 (Reviewed and signed)  IMM Given to:: Patient     Met with patient at  bedside to discuss discharge planning.  Son asked nurse for home care.  Explained to patient DI will see her.  Provided patient with list of non-skilled HHC agencies for private duty.  Provided with [facility] info.  Offered to call son to discuss same however patient declines call.  Offered referral for [facility] but patient declines need

## 2024-07-29 NOTE — ASSESSMENT & PLAN NOTE
Spoke with ENT on the phone  There is no salivary gland stone.  There is some bilateral inflammation of the salivary glands which may be chronic  ENT recommends 10 days course of Augmentin and follow up with them in the office.

## 2024-07-29 NOTE — DISCHARGE INSTR - AVS FIRST PAGE
Dear Sarah Zayas,     It was our pleasure to care for you here at [facility].  It is our hope that we were always able to meet and exceed the expected standards for your care during your stay.  You were hospitalized due to COPD exacerbation and salivary stone causing cellulitis .  You were cared for on the 7th floor under the service of Saad Mckeon, DO with the [facility] who covers for your primary care physician (PCP), Nicolas Nix MD, while you were hospitalized.  If you have any questions or concerns related to this hospitalization, you may contact us at .  For follow up, we recommend that you follow up with your primary care physician.  Please review this entire discharge summary as additional general instructions may be provided later as well.  However, at this time we provide for you here, the most important instructions / recommendations at discharge:     As stated in the follow up appointments section of this After Visit Summary, Please call to arrange a follow up with the Pulmonologist (lung doctor) for outpatient follow-up, even if feeling better.  Part of the plan of care will be to avoid additional flare ups of the COPD in the future.  Continue to take the prednisone steroid taper as indicated on the discharge medication list.  Please note that the dose is a tapering dose that starts off higher and gets to smaller and smaller doses as you go along.  Initially, you will need multiple pills to equal the daily dose recommended (for example if the pills are 10 mg pills and your initial dose is 40 mg, then 4 pills would be needed).  Please look carefully at the medication instructions.    If you begin to notice wheezing or begin to get more short of breath again, it is important that you call the lung doctor (or your PCP) as soon as possible to avoid symptoms getting so bad that you would require a rehospitalization.      Sincerely,     Saad Valenzuela  DO Linda and Nurse Juan Pablo Arriaza

## 2024-07-30 ENCOUNTER — HOME CARE VISIT (OUTPATIENT)
Dept: HOME HEALTH SERVICES | Facility: HOME HEALTHCARE | Age: 79
End: 2024-07-30

## 2024-07-30 ENCOUNTER — PATIENT OUTREACH (OUTPATIENT)
Dept: CASE MANAGEMENT | Facility: OTHER | Age: 79
End: 2024-07-30

## 2024-07-30 DIAGNOSIS — Z71.89 COMPLEX CARE COORDINATION: Primary | ICD-10-CM

## 2024-07-30 NOTE — OCCUPATIONAL THERAPY NOTE
Occupational Therapy Evaluation     Patient Name: Sarah Zayas  Today's Date: 7/30/2024  Problem List  Principal Problem:    Acute exacerbation of chronic obstructive pulmonary disease (COPD) ([facility])  Active Problems:    Hypertension    Cellulitis of neck    Severe protein-calorie malnutrition (HCC)    Past Medical History  Past Medical History:   Diagnosis Date    Asthma     Chronic obstructive pulmonary disease (HCC) 9/26/2012    GERD (gastroesophageal reflux disease)     Hypertension 10/11/2018    Hypothyroid     Osteoarthritis 9/26/2012    Temporal arteritis ([facility])     Tubular adenoma     Type 2 diabetes mellitus with complication, without long-term current use of insulin ([facility]) 1/14/2020     Past Surgical History  Past Surgical History:   Procedure Laterality Date    EYE SURGERY Right 12/06/2019    cataract LVCFS    HYSTERECTOMY      NO PAST SURGERIES      ALSO NO RELEVANT PAST SURRGICAL HX AS PER [facility]EXTGEN             07/29/24 0817   OT Last Visit   OT Visit Date 07/29/24   Note Type   Note type Evaluation   Pain Assessment   Pain Assessment Tool 0-10   Pain Score No Pain   Restrictions/Precautions   Weight Bearing Precautions Per Order No   Home Living   Type of Home House   Home Layout Two level;Stairs to enter with rails   Bathroom Shower/Tub Walk-in shower   Bathroom Accessibility Accessible via walker   Prior Function   Level of [place] Independent with ADLs   Lives With Spouse   Receives Help From Family   ADL   Grooming Assistance 6  Modified Independent   UB Bathing Assistance 6  Modified Independent   LB Bathing Assistance 5  Supervision/Setup   UB Dressing Assistance 6  Modified independent   LB Dressing Assistance 5  Supervision/Setup   Toileting Assistance  6  Modified independent   Transfers   Sit to Stand 6  Modified independent   Stand to Sit 6  Modified independent   Functional Mobility   Functional Mobility 6  Modified independent   Balance   Static Sitting Good   Dynamic Sitting Fair +    Static Standing Fair   Dynamic Standing Fair   Ambulatory Fair   Activity Tolerance   Activity Tolerance Patient tolerated treatment well   RUE Assessment   RUE Assessment WFL   LUE Assessment   LUE Assessment WFL   Cognition   Overall Cognitive Status WFL   Arousal/Participation Alert;Cooperative   Attention Within functional limits   Orientation Level Oriented X4   Assessment   Limitation Decreased high-level ADLs;Decreased endurance;Decreased Safe judgement during ADL   Assessment   Pt is a 78 y.o. female seen for OT evaluation s/p admit to [facility] on 7/23/2024 w/ Acute exacerbation of chronic obstructive pulmonary disease (COPD) (HCC).  See medical history above for extensive list of comorbidities affecting Pt's functional performance at time of assessment. Personal factors affecting Pt at time of IE include:difficulty performing IADLS  and health management . Upon evaluation: Pt Berna for functional ambulation including bathroom mobility and negotiation of small spaces, Berna for ADL transfers.  Pt requires supervision for ADLs in standing. Pt's primary barrier(s) at this time: decreased endurance, onset of fatigue requiring cues for pacing and activity modifications. The following deficits impact occupational performance: weakness, decreased strength, decreased balance, decreased tolerance, and decreased safety awareness. Pt to benefit from continued skilled OT services while in the hospital to address deficits as defined above and maximize level of functional independence w ADL's and functional mobility. Occupational performance areas to address include: grooming, bathing/shower, toilet hygiene, health maintenance, functional mobility, and clothing management. From OT standpoint, recommendation at time of d/c would be minimum resource intensity.       Goals   STG Time Frame   (1 week)   Short Term Goals Pt will tolerate ~10 minutes of standing activity without a seated rest break.   Pt will complete IADL Berna.     Plan   Treatment Interventions ADL retraining;Functional transfer training;UE strengthening/ROM;Endurance training;Continued evaluation;Energy conservation;Activityengagement   Goal Expiration Date 08/05/24   OT Frequency 2-3x/wk   Discharge Recommendation   Rehab Resource Intensity Level, OT III (Minimum Resource Intensity)   AM-PAC Daily Activity Inpatient   Lower Body Dressing 3   Bathing 3   Toileting 3   Upper Body Dressing 4   Grooming 4   Eating 4   Daily Activity Raw Score 21   Daily Activity Standardized Score (Calc for Raw Score >=11) 44.27

## 2024-07-30 NOTE — PROGRESS NOTES
Contacted patient for f/u post hospitalization for copd exacerbation.  She lives at home with her family and has good support.  She is not on oxygen but does monitor pulse ox.  She is usually between 92-95%.  She is sob with exertion, endorses a productive cough for clear sputum.  She has occasional wheezing.  She uses nebulizer treatments as prescribed.  She has IS from hospital that she uses several times daily.  Encouraged continued use.   She does relay she is an anxious person and worries a lot.  She has xanax ordered to use as needed at bedtime.  She reports a decreased appetite but is eating several times daily.  She does supplement with ensure 1 can per day.  Medications reviewed and she is taking all as prescribed.  Follow up appointments scheduled.  No transportation issues, family transports to appointments.  She will be receiving home health services of SN, PT/OT.  Explained the complex care management program and she is willing to participate.  Provided my contact information, she is agreeable to continued outreach.

## 2024-07-30 NOTE — UTILIZATION REVIEW
NOTIFICATION OF ADMISSION DISCHARGE   This is a Notification of Discharge from [facility]. Please be advised that this patient has been discharge from our facility. Below you will find the admission and discharge date and time including the patient’s disposition.   UTILIZATION REVIEW CONTACT:  Bethany Willams MA  Utilization   Network Utilization Review Department  Phone: [phone] x carefully listen to the prompts. All voicemails are confidential.  Email: [email or web address]     ADMISSION INFORMATION  PRESENTATION DATE: 7/23/2024  2:20 PM  OBERVATION ADMISSION DATE: N/A  INPATIENT ADMISSION DATE: 7/23/24  5:42 PM   DISCHARGE DATE: 7/29/2024  1:05 PM   DISPOSITION:Home with Home Health Care    Network Utilization Review Department  ATTENTION: Please call with any questions or concerns to [phone] and carefully listen to the prompts so that you are directed to the right person. All voicemails are confidential.   For Discharge needs, contact Care Management DC Support Team at [phone] opt. 2  Send all requests for admission clinical reviews, approved or denied determinations and any other requests to dedicated fax number below belonging to the campus where the patient is receiving treatment. List of dedicated fax numbers for the Facilities:  FACILITY NAME UR FAX NUMBER   ADMISSION DENIALS (Administrative/Medical Necessity) [phone]   DISCHARGE SUPPORT TEAM ([facility]) [phone]   PARENT CHILD HEALTH (Maternity/NICU/Pediatrics) [phone]   [facility] [phone]   [facility] [phone]   [facility] [phone]   [facility] [phone]   [facility] [phone]   [facility] [phone]   [facility] [phone]   [facility]  [place] [phone]   [facility] [phone]   [facility] [phone]   [facility] [phone]   [facility] [phone]

## 2024-07-31 ENCOUNTER — NURSE TRIAGE (OUTPATIENT)
Age: 79
End: 2024-07-31

## 2024-07-31 DIAGNOSIS — E06.3 HYPOTHYROIDISM DUE TO HASHIMOTO'S THYROIDITIS: ICD-10-CM

## 2024-07-31 DIAGNOSIS — E03.8 HYPOTHYROIDISM DUE TO HASHIMOTO'S THYROIDITIS: ICD-10-CM

## 2024-07-31 NOTE — TELEPHONE ENCOUNTER
"Received call from patient with c/o BL ankle swelling.  She was discharged from the hospital on Monday 7/29.  States that her ankles started swelling in the hospital and have worsened since coming home.  Swelling is limited to the ankles. Denies any redness or pain just states that it is uncomfortable.  She has not been elevating her legs.  She does have slight SOB but she states that this is at her baseline as she has COPD.    She is scheduled for a TCM visit on Monday 8/5.  Care advice given including instructions to limit salt intake and elevate feet as much as possible. Instructed to call back if swelling or SOB worsens.    Reason for Disposition   MILD swelling of both ankles (i.e., pedal edema) AND new-onset or worsening    Answer Assessment - Initial Assessment Questions  1. ONSET: \"When did the swelling start?\" (e.g., minutes, hours, days)      Started in the hospital but has worsened since discharge home  2. LOCATION: \"What part of the leg is swollen?\"  \"Are both legs swollen or just one leg?\"      Both ankles, left worse than right  3. SEVERITY: \"How bad is the swelling?\" (e.g., localized; mild, moderate, severe)   - Localized - small area of swelling localized to one leg   - MILD pedal edema - swelling limited to foot and ankle, pitting edema < 1/4 inch (6 mm) deep, rest and elevation eliminate most or all swelling   - MODERATE edema - swelling of lower leg to knee, pitting edema > 1/4 inch (6 mm) deep, rest and elevation only partially reduce swelling   - SEVERE edema - swelling extends above knee, facial or hand swelling present       MILD  4. REDNESS: \"Does the swelling look red or infected?\"      Denies  5. PAIN: \"Is the swelling painful to touch?\" If Yes, ask: \"How painful is it?\"   (Scale 1-10; mild, moderate or severe)      Uncomfortable, not painful  6. FEVER: \"Do you have a fever?\" If Yes, ask: \"What is it, how was it measured, and when did it start?\"       Denies  7. CAUSE: \"What do you think is " "causing the leg swelling?\"      Unsure  8. MEDICAL HISTORY: \"Do you have a history of heart failure, kidney disease, liver failure, or cancer?\"      HTN  9. RECURRENT SYMPTOM: \"Have you had leg swelling before?\" If Yes, ask: \"When was the last time?\" \"What happened that time?\"      Denies  10. OTHER SYMPTOMS: \"Do you have any other symptoms?\" (e.g., chest pain, difficulty breathing)        Has COPD, SOB no worse than normal  11. PREGNANCY: \"Is there any chance you are pregnant?\" \"When was your last menstrual period?\"        N/A    Protocols used: Leg Swelling and Edema-ADULT-[place]    "

## 2024-07-31 NOTE — TELEPHONE ENCOUNTER
Regarding: Swelling in ankles  ----- Message from Radha TRUJILLO sent at 7/31/2024 12:01 PM EDT -----  Patient called in regards to swelling in ankles,mouth feel sore, and trouble with breathing

## 2024-08-01 ENCOUNTER — HOME CARE VISIT (OUTPATIENT)
Dept: HOME HEALTH SERVICES | Facility: HOME HEALTHCARE | Age: 79
End: 2024-08-01
Payer: COMMERCIAL

## 2024-08-01 VITALS
HEART RATE: 80 BPM | DIASTOLIC BLOOD PRESSURE: 60 MMHG | TEMPERATURE: 97.9 F | SYSTOLIC BLOOD PRESSURE: 110 MMHG | RESPIRATION RATE: 18 BRPM | OXYGEN SATURATION: 95 %

## 2024-08-01 PROCEDURE — G0299 HHS/HOSPICE OF RN EA 15 MIN: HCPCS

## 2024-08-01 PROCEDURE — 400013 VN SOC

## 2024-08-01 RX ORDER — LEVOTHYROXINE SODIUM 0.05 MG/1
50 TABLET ORAL DAILY
Qty: 30 TABLET | Refills: 5 | Status: SHIPPED | OUTPATIENT
Start: 2024-08-01

## 2024-08-01 NOTE — TELEPHONE ENCOUNTER
L/m for patient to call back to see if she can come in today or Friday morning.  Transfer call to Janey in office

## 2024-08-01 NOTE — TELEPHONE ENCOUNTER
Patient returned call, Upon chart review/ messages, confirmed previous message regarding appointment. Contacted practice/clinical patient warm transferred to (Janey).

## 2024-08-03 ENCOUNTER — HOME CARE VISIT (OUTPATIENT)
Dept: HOME HEALTH SERVICES | Facility: HOME HEALTHCARE | Age: 79
End: 2024-08-03
Payer: COMMERCIAL

## 2024-08-03 PROCEDURE — G0151 HHCP-SERV OF PT,EA 15 MIN: HCPCS

## 2024-08-04 VITALS — DIASTOLIC BLOOD PRESSURE: 62 MMHG | OXYGEN SATURATION: 94 % | HEART RATE: 88 BPM | SYSTOLIC BLOOD PRESSURE: 118 MMHG

## 2024-08-05 ENCOUNTER — OFFICE VISIT (OUTPATIENT)
Dept: FAMILY MEDICINE CLINIC | Facility: CLINIC | Age: 79
End: 2024-08-05
Payer: COMMERCIAL

## 2024-08-05 VITALS
RESPIRATION RATE: 14 BRPM | SYSTOLIC BLOOD PRESSURE: 132 MMHG | WEIGHT: 100 LBS | TEMPERATURE: 98.2 F | HEIGHT: 63 IN | OXYGEN SATURATION: 92 % | HEART RATE: 104 BPM | DIASTOLIC BLOOD PRESSURE: 82 MMHG | BODY MASS INDEX: 17.72 KG/M2

## 2024-08-05 DIAGNOSIS — M31.6 TEMPORAL ARTERITIS (HCC): ICD-10-CM

## 2024-08-05 DIAGNOSIS — N39.0 ACUTE UTI: Primary | ICD-10-CM

## 2024-08-05 DIAGNOSIS — J44.9 COPD, SEVERE (HCC): ICD-10-CM

## 2024-08-05 DIAGNOSIS — E06.3 HYPOTHYROIDISM DUE TO HASHIMOTO'S THYROIDITIS: ICD-10-CM

## 2024-08-05 DIAGNOSIS — E03.8 HYPOTHYROIDISM DUE TO HASHIMOTO'S THYROIDITIS: ICD-10-CM

## 2024-08-05 LAB
SL AMB  POCT GLUCOSE, UA: ABNORMAL
SL AMB LEUKOCYTE ESTERASE,UA: 70
SL AMB POCT BILIRUBIN,UA: ABNORMAL
SL AMB POCT BLOOD,UA: ABNORMAL
SL AMB POCT CLARITY,UA: CLEAR
SL AMB POCT COLOR,UA: YELLOW
SL AMB POCT KETONES,UA: ABNORMAL
SL AMB POCT NITRITE,UA: ABNORMAL
SL AMB POCT PH,UA: 6.5
SL AMB POCT SPECIFIC GRAVITY,UA: 1.01
SL AMB POCT URINE PROTEIN: ABNORMAL
SL AMB POCT UROBILINOGEN: 0.2

## 2024-08-05 PROCEDURE — 99496 TRANSJ CARE MGMT HIGH F2F 7D: CPT | Performed by: INTERNAL MEDICINE

## 2024-08-05 PROCEDURE — 81002 URINALYSIS NONAUTO W/O SCOPE: CPT | Performed by: INTERNAL MEDICINE

## 2024-08-05 RX ORDER — CIPROFLOXACIN 250 MG/1
250 TABLET, FILM COATED ORAL EVERY 12 HOURS SCHEDULED
Qty: 10 TABLET | Refills: 0 | Status: SHIPPED | OUTPATIENT
Start: 2024-08-05 | End: 2024-08-10

## 2024-08-05 RX ORDER — FLUTICASONE FUROATE, UMECLIDINIUM BROMIDE AND VILANTEROL TRIFENATATE 100; 62.5; 25 UG/1; UG/1; UG/1
POWDER RESPIRATORY (INHALATION)
Status: ON HOLD | COMMUNITY
Start: 2024-06-03

## 2024-08-05 RX ORDER — LEVALBUTEROL INHALATION SOLUTION 1.25 MG/3ML
SOLUTION RESPIRATORY (INHALATION)
Status: ON HOLD | COMMUNITY
Start: 2024-07-31

## 2024-08-05 NOTE — PROGRESS NOTES
TCM Call       Date and time call was made  7/29/2024  2:48 PM    Patient was hospitialized at  [facility]    Date of Admission  07/23/24    Date of discharge  07/29/24    Disposition  Home          TCM Call       Scheduled for follow up?  Yes    Referrals needed  patient will keep her regular apt in February    I have advised the patient to call PCP with any new or worsening symptoms  Janey Hoskins MR

## 2024-08-05 NOTE — PROGRESS NOTES
Ambulatory Visit  Name: Sarah Zayas      : 1945      MRN: [id number]  Encounter Provider: Nicolas Nix MD  Encounter Date: 2024   Encounter department: [facility]    Assessment & Plan   1. Acute UTI  -     POCT urine dip  -     ciprofloxacin (CIPRO) 250 mg tablet; Take 1 tablet (250 mg total) by mouth every 12 (twelve) hours for 5 days  2. Hypothyroidism due to Hashimoto's thyroiditis  3. Temporal arteritis (HCC)  4. COPD, severe (HCC)  -     Sedimentation rate, automated; Future  -     C-reactive protein; Future  -     CBC and differential; Future; Expected date: 2024  -     UA (URINE) with reflex to Scope; Future  -     Comprehensive metabolic panel; Future; Expected date: 2024  RTC in 1-2 mos w Blood work       History of Present Illness     78 Y O Lady is here for Post hospital /TCM Visit, she feels a lot Better, D/C summary and med list reviewed,...        Review of Systems   Constitutional:  Negative for chills, fatigue and fever.   HENT:  Negative for congestion, facial swelling, sore throat, trouble swallowing and voice change.    Eyes:  Negative for pain, discharge and visual disturbance.   Respiratory:  Negative for cough, shortness of breath and wheezing.    Cardiovascular:  Negative for chest pain, palpitations and leg swelling.   Gastrointestinal:  Negative for abdominal pain, blood in stool, constipation, diarrhea and nausea.   Endocrine: Negative for polydipsia, polyphagia and polyuria.   Genitourinary:  Positive for dysuria and frequency. Negative for difficulty urinating, hematuria and urgency.   Musculoskeletal:  Negative for arthralgias and myalgias.   Skin:  Negative for rash.   Neurological:  Negative for dizziness, tremors, weakness and headaches.   Hematological:  Negative for adenopathy. Does not bruise/bleed easily.   Psychiatric/Behavioral:  Negative for dysphoric mood, sleep disturbance and suicidal ideas.        Objective  "    /82 (BP Location: Left arm, Patient Position: Sitting, Cuff Size: Standard)   Pulse 104   Temp 98.2 °F (36.8 °C) (Tympanic)   Resp 14   Ht 5' 3\" (1.6 m)   Wt 45.4 kg (100 lb)   SpO2 92%   BMI 17.71 kg/m²     Physical Exam  Vitals and nursing note reviewed.   Constitutional:       General: She is not in acute distress.     Appearance: She is well-developed. She is not diaphoretic.   HENT:      Head: Normocephalic and atraumatic.      Right Ear: External ear normal.      Left Ear: External ear normal.      Nose: Nose normal.   Eyes:      General:         Right eye: No discharge.         Left eye: No discharge.      Extraocular Movements: EOM normal.      Conjunctiva/sclera: Conjunctivae normal.      Pupils: Pupils are equal, round, and reactive to light.   Neck:      Thyroid: No thyromegaly.      Trachea: No tracheal deviation.   Cardiovascular:      Rate and Rhythm: Normal rate and regular rhythm.      Heart sounds: Normal heart sounds. No murmur heard.     No friction rub.   Pulmonary:      Effort: Pulmonary effort is normal. No respiratory distress.      Breath sounds: Normal breath sounds. No stridor. No wheezing or rales.   Abdominal:      General: Bowel sounds are normal. There is no distension.      Palpations: Abdomen is soft.      Tenderness: There is no abdominal tenderness. There is no guarding.   Musculoskeletal:         General: No swelling, tenderness, deformity or edema. Normal range of motion.      Cervical back: Normal range of motion and neck supple.   Lymphadenopathy:      Cervical: No cervical adenopathy.   Skin:     General: Skin is warm and dry.      Capillary Refill: Capillary refill takes less than 2 seconds.      Coloration: Skin is not pale.      Findings: No erythema or rash.   Neurological:      Mental Status: She is alert and oriented to person, place, and time.      Cranial Nerves: No cranial nerve deficit.      Coordination: Coordination normal.   Psychiatric:         " Mood and Affect: Mood and affect and mood normal.         Behavior: Behavior normal.       Administrative Statements

## 2024-08-06 ENCOUNTER — HOME CARE VISIT (OUTPATIENT)
Dept: HOME HEALTH SERVICES | Facility: HOME HEALTHCARE | Age: 79
End: 2024-08-06
Payer: COMMERCIAL

## 2024-08-06 VITALS — OXYGEN SATURATION: 96 % | SYSTOLIC BLOOD PRESSURE: 130 MMHG | DIASTOLIC BLOOD PRESSURE: 64 MMHG | HEART RATE: 81 BPM

## 2024-08-06 PROCEDURE — G0152 HHCP-SERV OF OT,EA 15 MIN: HCPCS

## 2024-08-06 PROCEDURE — G0299 HHS/HOSPICE OF RN EA 15 MIN: HCPCS

## 2024-08-06 NOTE — CASE COMMUNICATION
OT catalina completed on 8/6.  OT to see pt 2 week 1. 1 week 2 to address bathing in the shower, fall prevention, energy conservation and safety instructions.  Pt in agreement with OT POC.

## 2024-08-07 ENCOUNTER — HOME CARE VISIT (OUTPATIENT)
Dept: HOME HEALTH SERVICES | Facility: HOME HEALTHCARE | Age: 79
End: 2024-08-07
Payer: COMMERCIAL

## 2024-08-07 VITALS
TEMPERATURE: 98.6 F | SYSTOLIC BLOOD PRESSURE: 108 MMHG | HEART RATE: 76 BPM | DIASTOLIC BLOOD PRESSURE: 60 MMHG | OXYGEN SATURATION: 96 %

## 2024-08-07 VITALS — OXYGEN SATURATION: 95 % | DIASTOLIC BLOOD PRESSURE: 62 MMHG | SYSTOLIC BLOOD PRESSURE: 118 MMHG | HEART RATE: 96 BPM

## 2024-08-07 PROCEDURE — G0151 HHCP-SERV OF PT,EA 15 MIN: HCPCS

## 2024-08-08 ENCOUNTER — OFFICE VISIT (OUTPATIENT)
Dept: PULMONOLOGY | Facility: CLINIC | Age: 79
End: 2024-08-08
Payer: COMMERCIAL

## 2024-08-08 ENCOUNTER — HOME CARE VISIT (OUTPATIENT)
Dept: HOME HEALTH SERVICES | Facility: HOME HEALTHCARE | Age: 79
End: 2024-08-08
Payer: COMMERCIAL

## 2024-08-08 VITALS
SYSTOLIC BLOOD PRESSURE: 100 MMHG | HEIGHT: 63 IN | OXYGEN SATURATION: 95 % | WEIGHT: 100 LBS | DIASTOLIC BLOOD PRESSURE: 68 MMHG | TEMPERATURE: 96.6 F | RESPIRATION RATE: 22 BRPM | HEART RATE: 66 BPM | BODY MASS INDEX: 17.72 KG/M2

## 2024-08-08 DIAGNOSIS — F17.211 NICOTINE DEPENDENCE, CIGARETTES, IN REMISSION: ICD-10-CM

## 2024-08-08 DIAGNOSIS — R91.8 LUNG NODULES: ICD-10-CM

## 2024-08-08 DIAGNOSIS — F41.9 ANXIETY: ICD-10-CM

## 2024-08-08 DIAGNOSIS — R06.02 SOB (SHORTNESS OF BREATH): ICD-10-CM

## 2024-08-08 DIAGNOSIS — J44.1 ACUTE EXACERBATION OF CHRONIC OBSTRUCTIVE PULMONARY DISEASE (COPD) (HCC): Primary | ICD-10-CM

## 2024-08-08 PROCEDURE — 99214 OFFICE O/P EST MOD 30 MIN: CPT | Performed by: INTERNAL MEDICINE

## 2024-08-08 PROCEDURE — G0151 HHCP-SERV OF PT,EA 15 MIN: HCPCS

## 2024-08-08 NOTE — PROGRESS NOTES
Office Progress Note - Pulmonary    Sarah Zayas 78 y.o. female MRN: [id number]    Encounter: [id number]      Assessment:  Chronic obstructive pulmonary disease with a recent acute exacerbation and hospitalization.  Dyspnea on exertion.  History of tobacco use.  Pulmonary nodules.    Plan:   Complete the prednisone taper.  Budesonide 0.5 mg nebulized twice a day.  Levalbuterol nebulizer treatments 3 times a day.  Ipratropium nebulizer treatments 3 times a day.  CT of the chest without contrast in December 2024.  Follow-up in 4 months.    Discussion:   The patient's COPD acute exacerbation has markedly improved.  She is close to her baseline.  I told her to complete the prednisone taper.  I have maintained her on the budesonide 0.5 mg nebulized twice a day and levalbuterol/ipratropium nebulizer treatments 3 times a day.  She will take regular walks to improve stamina.  She needs repeat CT of the chest without contrast in December 2024.  I will see her in 4 months.      Subjective:   The patient is here for a follow-up visit.  She was recently in the hospital because of acute exacerbation of the COPD.  Also she had swelling in the floor of her mouth and the upper part of her neck.  She was diagnosed with sialadenitis.  She was treated with antibiotics.  Treated with Solu-Medrol and bronchodilators and was discharged on prednisone taper.  Today she feels that her breathing is back to her baseline.  No significant cough, wheezing or sputum production.  She has no nocturnal symptoms.  She told me her anxiety is acting up.  And a lot of times she feels that her respiratory symptoms are due to anxiety.    Review of systems:  A 12 point system review is done and aside from what is stated above the rest of the review of systems is negative.      Family history and social history are reviewed.    Medications list is reviewed.      Vitals: Blood pressure 100/68, pulse 66, temperature (!) 96.6 °F (35.9 °C), temperature source  "Tympanic, resp. rate 22, height 5' 3\" (1.6 m), weight 45.4 kg (100 lb), SpO2 95%, not currently breastfeeding.,     Physical Exam  Gen: Awake, alert, oriented x 3, no acute distress  HEENT: Mucous membranes moist, no oral lesions, no thrush  NECK: No accessory muscle use, JVP not elevated  Cardiac: Regular, single S1, single S2, no murmurs, no rubs, no gallops  Lungs: Diminished breath sounds.  No wheezing or rhonchi.  Abdomen: normoactive bowel sounds, soft nontender, nondistended, no rebound or rigidity, no guarding  Extremities: no cyanosis, no clubbing, no edema  Neuro:  Grossly nonfocal.  Skin:  No rash.    Lab Results   Component Value Date    WBC 7.69 07/29/2024    HGB 12.6 07/29/2024    HCT 41.1 07/29/2024    MCV 91 07/29/2024     07/29/2024     Lab Results   Component Value Date    SODIUM 140 08/06/2024    K 5.2 08/06/2024    CL 98 08/06/2024    CO2 32 08/06/2024    BUN 30 (H) 08/06/2024    CREATININE 0.61 08/06/2024    GLUC 151 (H) 08/06/2024    CALCIUM 10.0 08/06/2024     X-rays reviewed on PACS system.  It showed no acute pulmonary findings.  Hyperinflated lungs.      "

## 2024-08-09 ENCOUNTER — TELEPHONE (OUTPATIENT)
Dept: HOME HEALTH SERVICES | Facility: HOME HEALTHCARE | Age: 79
End: 2024-08-09

## 2024-08-09 ENCOUNTER — HOME CARE VISIT (OUTPATIENT)
Dept: HOME HEALTH SERVICES | Facility: HOME HEALTHCARE | Age: 79
End: 2024-08-09
Payer: COMMERCIAL

## 2024-08-09 VITALS — SYSTOLIC BLOOD PRESSURE: 120 MMHG | OXYGEN SATURATION: 95 % | HEART RATE: 80 BPM | DIASTOLIC BLOOD PRESSURE: 58 MMHG

## 2024-08-09 VITALS — OXYGEN SATURATION: 94 %

## 2024-08-09 PROCEDURE — G0152 HHCP-SERV OF OT,EA 15 MIN: HCPCS

## 2024-08-12 ENCOUNTER — HOME CARE VISIT (OUTPATIENT)
Dept: HOME HEALTH SERVICES | Facility: HOME HEALTHCARE | Age: 79
End: 2024-08-12
Payer: COMMERCIAL

## 2024-08-12 VITALS
DIASTOLIC BLOOD PRESSURE: 60 MMHG | SYSTOLIC BLOOD PRESSURE: 108 MMHG | HEART RATE: 90 BPM | OXYGEN SATURATION: 94 % | TEMPERATURE: 98.9 F

## 2024-08-12 PROCEDURE — G0155 HHCP-SVS OF CSW,EA 15 MIN: HCPCS

## 2024-08-12 PROCEDURE — G0180 MD CERTIFICATION HHA PATIENT: HCPCS | Performed by: INTERNAL MEDICINE

## 2024-08-12 PROCEDURE — G0299 HHS/HOSPICE OF RN EA 15 MIN: HCPCS

## 2024-08-13 ENCOUNTER — HOME CARE VISIT (OUTPATIENT)
Dept: HOME HEALTH SERVICES | Facility: HOME HEALTHCARE | Age: 79
End: 2024-08-13
Payer: COMMERCIAL

## 2024-08-13 ENCOUNTER — TELEPHONE (OUTPATIENT)
Age: 79
End: 2024-08-13

## 2024-08-13 VITALS — SYSTOLIC BLOOD PRESSURE: 112 MMHG | HEART RATE: 110 BPM | OXYGEN SATURATION: 94 % | DIASTOLIC BLOOD PRESSURE: 62 MMHG

## 2024-08-13 PROCEDURE — G0151 HHCP-SERV OF PT,EA 15 MIN: HCPCS

## 2024-08-13 PROCEDURE — G0152 HHCP-SERV OF OT,EA 15 MIN: HCPCS

## 2024-08-13 NOTE — TELEPHONE ENCOUNTER
Spoke to patient. She states the chest pain went away.  She states her extremities are always cold, but this morning they were blue, and it also went away.  Per Dr Schwarz advised patient to go to ER.  She states she is ok now, and I emphasized, if it happens again, she needs to go to the ER.  Patient agreed.

## 2024-08-13 NOTE — TELEPHONE ENCOUNTER
Patient's OT was just calling to make PCP aware of patient's status. She reports that patient is still experiencing some chest pressure and her extremities were cold at the beginning of her visit. Vitals were: /54, O2 90-98%, -120.

## 2024-08-14 ENCOUNTER — APPOINTMENT (EMERGENCY)
Dept: RADIOLOGY | Facility: HOSPITAL | Age: 79
DRG: 190 | End: 2024-08-14
Payer: COMMERCIAL

## 2024-08-14 ENCOUNTER — HOSPITAL ENCOUNTER (INPATIENT)
Facility: HOSPITAL | Age: 79
LOS: 11 days | Discharge: NURSING, REHAB OR LONG-TERM CARE | DRG: 190 | End: 2024-08-26
Attending: EMERGENCY MEDICINE | Admitting: INTERNAL MEDICINE
Payer: COMMERCIAL

## 2024-08-14 ENCOUNTER — HOME CARE VISIT (OUTPATIENT)
Dept: HOME HEALTH SERVICES | Facility: HOME HEALTHCARE | Age: 79
End: 2024-08-14
Payer: COMMERCIAL

## 2024-08-14 VITALS — HEART RATE: 110 BPM | OXYGEN SATURATION: 95 % | SYSTOLIC BLOOD PRESSURE: 106 MMHG | DIASTOLIC BLOOD PRESSURE: 54 MMHG

## 2024-08-14 DIAGNOSIS — R22.1 NECK SWELLING: ICD-10-CM

## 2024-08-14 DIAGNOSIS — J44.1 COPD EXACERBATION (HCC): Primary | ICD-10-CM

## 2024-08-14 DIAGNOSIS — E43 SEVERE PROTEIN-CALORIE MALNUTRITION (HCC): ICD-10-CM

## 2024-08-14 DIAGNOSIS — F41.9 ANXIETY: ICD-10-CM

## 2024-08-14 DIAGNOSIS — R09.02 HYPOXIA: ICD-10-CM

## 2024-08-14 LAB
ANION GAP SERPL CALCULATED.3IONS-SCNC: 8 MMOL/L (ref 4–13)
BACTERIA UR QL AUTO: ABNORMAL /HPF
BASE EX.OXY STD BLDV CALC-SCNC: 60 % (ref 60–80)
BASE EXCESS BLDV CALC-SCNC: -2.5 MMOL/L
BASOPHILS # BLD AUTO: 0.02 THOUSANDS/ÂΜL (ref 0–0.1)
BASOPHILS NFR BLD AUTO: 0 % (ref 0–1)
BILIRUB UR QL STRIP: NEGATIVE
BNP SERPL-MCNC: 62 PG/ML (ref 0–100)
BUN SERPL-MCNC: 33 MG/DL (ref 5–25)
CALCIUM SERPL-MCNC: 9.8 MG/DL (ref 8.4–10.2)
CAOX CRY URNS QL MICRO: ABNORMAL /HPF
CARDIAC TROPONIN I PNL SERPL HS: 5 NG/L
CHLORIDE SERPL-SCNC: 97 MMOL/L (ref 96–108)
CLARITY UR: CLEAR
CO2 SERPL-SCNC: 35 MMOL/L (ref 21–32)
COLOR UR: YELLOW
CREAT SERPL-MCNC: 0.64 MG/DL (ref 0.6–1.3)
D DIMER PPP FEU-MCNC: 0.53 UG/ML FEU
EOSINOPHIL # BLD AUTO: 0.16 THOUSAND/ÂΜL (ref 0–0.61)
EOSINOPHIL NFR BLD AUTO: 2 % (ref 0–6)
ERYTHROCYTE [DISTWIDTH] IN BLOOD BY AUTOMATED COUNT: 13.8 % (ref 11.6–15.1)
GFR SERPL CREATININE-BSD FRML MDRD: 85 ML/MIN/1.73SQ M
GLUCOSE SERPL-MCNC: 151 MG/DL (ref 65–140)
GLUCOSE UR STRIP-MCNC: NEGATIVE MG/DL
HCO3 BLDV-SCNC: 23.9 MMOL/L (ref 24–30)
HCT VFR BLD AUTO: 43.1 % (ref 34.8–46.1)
HGB BLD-MCNC: 13.2 G/DL (ref 11.5–15.4)
HGB UR QL STRIP.AUTO: 25
IMM GRANULOCYTES # BLD AUTO: 0.03 THOUSAND/UL (ref 0–0.2)
IMM GRANULOCYTES NFR BLD AUTO: 0 % (ref 0–2)
KETONES UR STRIP-MCNC: NEGATIVE MG/DL
LEUKOCYTE ESTERASE UR QL STRIP: 100
LYMPHOCYTES # BLD AUTO: 0.53 THOUSANDS/ÂΜL (ref 0.6–4.47)
LYMPHOCYTES NFR BLD AUTO: 7 % (ref 14–44)
MCH RBC QN AUTO: 28.3 PG (ref 26.8–34.3)
MCHC RBC AUTO-ENTMCNC: 30.6 G/DL (ref 31.4–37.4)
MCV RBC AUTO: 93 FL (ref 82–98)
MONOCYTES # BLD AUTO: 0.63 THOUSAND/ÂΜL (ref 0.17–1.22)
MONOCYTES NFR BLD AUTO: 8 % (ref 4–12)
NEUTROPHILS # BLD AUTO: 6.48 THOUSANDS/ÂΜL (ref 1.85–7.62)
NEUTS SEG NFR BLD AUTO: 83 % (ref 43–75)
NITRITE UR QL STRIP: NEGATIVE
NON-SQ EPI CELLS URNS QL MICRO: ABNORMAL /HPF
NRBC BLD AUTO-RTO: 0 /100 WBCS
O2 CT BLDV-SCNC: 9.6 ML/DL
OTHER STN SPEC: ABNORMAL
PCO2 BLDV: 48.2 MM HG (ref 42–50)
PH BLDV: 7.31 [PH] (ref 7.3–7.4)
PH UR STRIP.AUTO: 6.5 [PH]
PLATELET # BLD AUTO: 184 THOUSANDS/UL (ref 149–390)
PMV BLD AUTO: 9.9 FL (ref 8.9–12.7)
PO2 BLDV: 35 MM HG (ref 35–45)
POTASSIUM SERPL-SCNC: 4.3 MMOL/L (ref 3.5–5.3)
PROT UR STRIP-MCNC: ABNORMAL MG/DL
RBC # BLD AUTO: 4.66 MILLION/UL (ref 3.81–5.12)
RBC #/AREA URNS AUTO: ABNORMAL /HPF
SODIUM SERPL-SCNC: 140 MMOL/L (ref 135–147)
SP GR UR STRIP.AUTO: 1.01 (ref 1–1.04)
UROBILINOGEN UA: NEGATIVE MG/DL
WBC # BLD AUTO: 7.85 THOUSAND/UL (ref 4.31–10.16)
WBC #/AREA URNS AUTO: ABNORMAL /HPF

## 2024-08-14 PROCEDURE — 71045 X-RAY EXAM CHEST 1 VIEW: CPT

## 2024-08-14 PROCEDURE — 80048 BASIC METABOLIC PNL TOTAL CA: CPT | Performed by: EMERGENCY MEDICINE

## 2024-08-14 PROCEDURE — 83880 ASSAY OF NATRIURETIC PEPTIDE: CPT | Performed by: EMERGENCY MEDICINE

## 2024-08-14 PROCEDURE — 96360 HYDRATION IV INFUSION INIT: CPT

## 2024-08-14 PROCEDURE — 99285 EMERGENCY DEPT VISIT HI MDM: CPT

## 2024-08-14 PROCEDURE — 93005 ELECTROCARDIOGRAM TRACING: CPT

## 2024-08-14 PROCEDURE — 94640 AIRWAY INHALATION TREATMENT: CPT

## 2024-08-14 PROCEDURE — 84484 ASSAY OF TROPONIN QUANT: CPT | Performed by: EMERGENCY MEDICINE

## 2024-08-14 PROCEDURE — 99285 EMERGENCY DEPT VISIT HI MDM: CPT | Performed by: EMERGENCY MEDICINE

## 2024-08-14 PROCEDURE — 82805 BLOOD GASES W/O2 SATURATION: CPT | Performed by: EMERGENCY MEDICINE

## 2024-08-14 PROCEDURE — 94760 N-INVAS EAR/PLS OXIMETRY 1: CPT

## 2024-08-14 PROCEDURE — 85025 COMPLETE CBC W/AUTO DIFF WBC: CPT | Performed by: EMERGENCY MEDICINE

## 2024-08-14 PROCEDURE — 85379 FIBRIN DEGRADATION QUANT: CPT | Performed by: EMERGENCY MEDICINE

## 2024-08-14 PROCEDURE — 36415 COLL VENOUS BLD VENIPUNCTURE: CPT | Performed by: EMERGENCY MEDICINE

## 2024-08-14 PROCEDURE — 81003 URINALYSIS AUTO W/O SCOPE: CPT | Performed by: EMERGENCY MEDICINE

## 2024-08-14 PROCEDURE — 81001 URINALYSIS AUTO W/SCOPE: CPT | Performed by: EMERGENCY MEDICINE

## 2024-08-14 RX ORDER — IPRATROPIUM BROMIDE AND ALBUTEROL SULFATE 2.5; .5 MG/3ML; MG/3ML
3 SOLUTION RESPIRATORY (INHALATION)
Status: DISCONTINUED | OUTPATIENT
Start: 2024-08-14 | End: 2024-08-14

## 2024-08-14 RX ORDER — ALBUTEROL SULFATE 0.83 MG/ML
2.5 SOLUTION RESPIRATORY (INHALATION) EVERY 6 HOURS PRN
Status: DISCONTINUED | OUTPATIENT
Start: 2024-08-14 | End: 2024-08-26 | Stop reason: HOSPADM

## 2024-08-14 RX ORDER — PREDNISONE 20 MG/1
60 TABLET ORAL ONCE
Status: COMPLETED | OUTPATIENT
Start: 2024-08-14 | End: 2024-08-14

## 2024-08-14 RX ORDER — POLYETHYLENE GLYCOL 3350 17 G/17G
17 POWDER, FOR SOLUTION ORAL DAILY PRN
Status: DISCONTINUED | OUTPATIENT
Start: 2024-08-14 | End: 2024-08-26 | Stop reason: HOSPADM

## 2024-08-14 RX ORDER — ACETAMINOPHEN 325 MG/1
650 TABLET ORAL EVERY 6 HOURS PRN
Status: DISCONTINUED | OUTPATIENT
Start: 2024-08-14 | End: 2024-08-26 | Stop reason: HOSPADM

## 2024-08-14 RX ORDER — LEVALBUTEROL INHALATION SOLUTION 1.25 MG/3ML
1.25 SOLUTION RESPIRATORY (INHALATION)
Status: DISCONTINUED | OUTPATIENT
Start: 2024-08-15 | End: 2024-08-20

## 2024-08-14 RX ORDER — PREDNISONE 20 MG/1
40 TABLET ORAL DAILY
Status: DISCONTINUED | OUTPATIENT
Start: 2024-08-15 | End: 2024-08-15

## 2024-08-14 RX ORDER — IPRATROPIUM BROMIDE AND ALBUTEROL SULFATE 2.5; .5 MG/3ML; MG/3ML
3 SOLUTION RESPIRATORY (INHALATION) ONCE
Status: COMPLETED | OUTPATIENT
Start: 2024-08-14 | End: 2024-08-14

## 2024-08-14 RX ORDER — BUDESONIDE AND FORMOTEROL FUMARATE DIHYDRATE 160; 4.5 UG/1; UG/1
2 AEROSOL RESPIRATORY (INHALATION) 2 TIMES DAILY
Status: DISCONTINUED | OUTPATIENT
Start: 2024-08-15 | End: 2024-08-15

## 2024-08-14 RX ORDER — ENOXAPARIN SODIUM 100 MG/ML
40 INJECTION SUBCUTANEOUS DAILY
Status: DISCONTINUED | OUTPATIENT
Start: 2024-08-15 | End: 2024-08-26 | Stop reason: HOSPADM

## 2024-08-14 RX ORDER — GUAIFENESIN 600 MG/1
600 TABLET, EXTENDED RELEASE ORAL 2 TIMES DAILY
Status: DISCONTINUED | OUTPATIENT
Start: 2024-08-14 | End: 2024-08-26 | Stop reason: HOSPADM

## 2024-08-14 RX ADMIN — PREDNISONE 60 MG: 20 TABLET ORAL at 20:31

## 2024-08-14 RX ADMIN — IPRATROPIUM BROMIDE AND ALBUTEROL SULFATE 3 ML: 2.5; .5 SOLUTION RESPIRATORY (INHALATION) at 20:32

## 2024-08-14 RX ADMIN — SODIUM CHLORIDE 500 MG: 0.9 INJECTION, SOLUTION INTRAVENOUS at 23:59

## 2024-08-14 RX ADMIN — IPRATROPIUM BROMIDE AND ALBUTEROL SULFATE 3 ML: 2.5; .5 SOLUTION RESPIRATORY (INHALATION) at 23:33

## 2024-08-14 RX ADMIN — SODIUM CHLORIDE 500 ML: 0.9 INJECTION, SOLUTION INTRAVENOUS at 20:40

## 2024-08-14 RX ADMIN — GUAIFENESIN 600 MG: 600 TABLET ORAL at 23:59

## 2024-08-15 ENCOUNTER — PATIENT OUTREACH (OUTPATIENT)
Dept: CASE MANAGEMENT | Facility: OTHER | Age: 79
End: 2024-08-15

## 2024-08-15 LAB
2HR DELTA HS TROPONIN: 2 NG/L
4HR DELTA HS TROPONIN: 2 NG/L
ALBUMIN SERPL BCG-MCNC: 3.4 G/DL (ref 3.5–5)
ALP SERPL-CCNC: 40 U/L (ref 34–104)
ALT SERPL W P-5'-P-CCNC: 29 U/L (ref 7–52)
ANION GAP SERPL CALCULATED.3IONS-SCNC: 7 MMOL/L (ref 4–13)
AST SERPL W P-5'-P-CCNC: 22 U/L (ref 13–39)
ATRIAL RATE: 93 BPM
ATRIAL RATE: 97 BPM
ATRIAL RATE: 98 BPM
BASOPHILS # BLD MANUAL: 0 THOUSAND/UL (ref 0–0.1)
BASOPHILS NFR MAR MANUAL: 0 % (ref 0–1)
BILIRUB SERPL-MCNC: 0.36 MG/DL (ref 0.2–1)
BUN SERPL-MCNC: 28 MG/DL (ref 5–25)
CALCIUM ALBUM COR SERPL-MCNC: 8.9 MG/DL (ref 8.3–10.1)
CALCIUM SERPL-MCNC: 8.4 MG/DL (ref 8.4–10.2)
CARDIAC TROPONIN I PNL SERPL HS: 7 NG/L
CARDIAC TROPONIN I PNL SERPL HS: 7 NG/L
CHLORIDE SERPL-SCNC: 102 MMOL/L (ref 96–108)
CO2 SERPL-SCNC: 31 MMOL/L (ref 21–32)
CREAT SERPL-MCNC: 0.54 MG/DL (ref 0.6–1.3)
EOSINOPHIL # BLD MANUAL: 0 THOUSAND/UL (ref 0–0.4)
EOSINOPHIL NFR BLD MANUAL: 0 % (ref 0–6)
ERYTHROCYTE [DISTWIDTH] IN BLOOD BY AUTOMATED COUNT: 13.6 % (ref 11.6–15.1)
GFR SERPL CREATININE-BSD FRML MDRD: 90 ML/MIN/1.73SQ M
GLUCOSE P FAST SERPL-MCNC: 136 MG/DL (ref 65–99)
GLUCOSE SERPL-MCNC: 136 MG/DL (ref 65–140)
HCT VFR BLD AUTO: 37.5 % (ref 34.8–46.1)
HGB BLD-MCNC: 11.5 G/DL (ref 11.5–15.4)
LG PLATELETS BLD QL SMEAR: PRESENT
LYMPHOCYTES # BLD AUTO: 0.27 THOUSAND/UL (ref 0.6–4.47)
LYMPHOCYTES # BLD AUTO: 4 % (ref 14–44)
MCH RBC QN AUTO: 28.6 PG (ref 26.8–34.3)
MCHC RBC AUTO-ENTMCNC: 30.7 G/DL (ref 31.4–37.4)
MCV RBC AUTO: 93 FL (ref 82–98)
MONOCYTES # BLD AUTO: 0.14 THOUSAND/UL (ref 0–1.22)
MONOCYTES NFR BLD: 2 % (ref 4–12)
NEUTROPHILS # BLD MANUAL: 6.37 THOUSAND/UL (ref 1.85–7.62)
NEUTS SEG NFR BLD AUTO: 94 % (ref 43–75)
P AXIS: 12 DEGREES
P AXIS: 97 DEGREES
PLATELET # BLD AUTO: 146 THOUSANDS/UL (ref 149–390)
PLATELET BLD QL SMEAR: ABNORMAL
PMV BLD AUTO: 10.5 FL (ref 8.9–12.7)
POTASSIUM SERPL-SCNC: 4.4 MMOL/L (ref 3.5–5.3)
PR INTERVAL: 104 MS
PR INTERVAL: 90 MS
PROCALCITONIN SERPL-MCNC: 0.19 NG/ML
PROT SERPL-MCNC: 5.8 G/DL (ref 6.4–8.4)
QRS AXIS: 38 DEGREES
QRS AXIS: 39 DEGREES
QRS AXIS: 51 DEGREES
QRSD INTERVAL: 62 MS
QRSD INTERVAL: 66 MS
QRSD INTERVAL: 66 MS
QT INTERVAL: 328 MS
QT INTERVAL: 338 MS
QT INTERVAL: 344 MS
QTC INTERVAL: 418 MS
QTC INTERVAL: 427 MS
QTC INTERVAL: 429 MS
RBC # BLD AUTO: 4.02 MILLION/UL (ref 3.81–5.12)
RBC MORPH BLD: NORMAL
SODIUM SERPL-SCNC: 140 MMOL/L (ref 135–147)
T WAVE AXIS: 53 DEGREES
T WAVE AXIS: 55 DEGREES
T WAVE AXIS: 58 DEGREES
VENTRICULAR RATE: 93 BPM
VENTRICULAR RATE: 97 BPM
VENTRICULAR RATE: 98 BPM
WBC # BLD AUTO: 6.78 THOUSAND/UL (ref 4.31–10.16)

## 2024-08-15 PROCEDURE — 85027 COMPLETE CBC AUTOMATED: CPT | Performed by: STUDENT IN AN ORGANIZED HEALTH CARE EDUCATION/TRAINING PROGRAM

## 2024-08-15 PROCEDURE — 80053 COMPREHEN METABOLIC PANEL: CPT | Performed by: STUDENT IN AN ORGANIZED HEALTH CARE EDUCATION/TRAINING PROGRAM

## 2024-08-15 PROCEDURE — 94760 N-INVAS EAR/PLS OXIMETRY 1: CPT

## 2024-08-15 PROCEDURE — 99223 1ST HOSP IP/OBS HIGH 75: CPT | Performed by: INTERNAL MEDICINE

## 2024-08-15 PROCEDURE — 85007 BL SMEAR W/DIFF WBC COUNT: CPT | Performed by: STUDENT IN AN ORGANIZED HEALTH CARE EDUCATION/TRAINING PROGRAM

## 2024-08-15 PROCEDURE — 94640 AIRWAY INHALATION TREATMENT: CPT

## 2024-08-15 PROCEDURE — 84145 PROCALCITONIN (PCT): CPT | Performed by: INTERNAL MEDICINE

## 2024-08-15 PROCEDURE — 84484 ASSAY OF TROPONIN QUANT: CPT | Performed by: EMERGENCY MEDICINE

## 2024-08-15 PROCEDURE — 93010 ELECTROCARDIOGRAM REPORT: CPT | Performed by: STUDENT IN AN ORGANIZED HEALTH CARE EDUCATION/TRAINING PROGRAM

## 2024-08-15 RX ORDER — CALCIUM CARBONATE 500 MG/1
1000 TABLET, CHEWABLE ORAL DAILY PRN
Status: DISCONTINUED | OUTPATIENT
Start: 2024-08-15 | End: 2024-08-26 | Stop reason: HOSPADM

## 2024-08-15 RX ORDER — LOSARTAN POTASSIUM 50 MG/1
100 TABLET ORAL DAILY
Status: DISCONTINUED | OUTPATIENT
Start: 2024-08-15 | End: 2024-08-26 | Stop reason: HOSPADM

## 2024-08-15 RX ORDER — PANTOPRAZOLE SODIUM 40 MG/1
40 TABLET, DELAYED RELEASE ORAL DAILY
Status: DISCONTINUED | OUTPATIENT
Start: 2024-08-15 | End: 2024-08-26 | Stop reason: HOSPADM

## 2024-08-15 RX ORDER — BUDESONIDE 0.5 MG/2ML
0.5 INHALANT ORAL 2 TIMES DAILY
Status: DISCONTINUED | OUTPATIENT
Start: 2024-08-15 | End: 2024-08-26 | Stop reason: HOSPADM

## 2024-08-15 RX ORDER — POLYETHYLENE GLYCOL 3350 17 G/17G
17 POWDER, FOR SOLUTION ORAL DAILY
Status: DISCONTINUED | OUTPATIENT
Start: 2024-08-15 | End: 2024-08-26 | Stop reason: HOSPADM

## 2024-08-15 RX ORDER — PROPRANOLOL HYDROCHLORIDE 10 MG/1
10 TABLET ORAL 2 TIMES DAILY
Status: DISCONTINUED | OUTPATIENT
Start: 2024-08-15 | End: 2024-08-26 | Stop reason: HOSPADM

## 2024-08-15 RX ORDER — AZITHROMYCIN 250 MG/1
500 TABLET, FILM COATED ORAL EVERY 24 HOURS
Status: COMPLETED | OUTPATIENT
Start: 2024-08-15 | End: 2024-08-16

## 2024-08-15 RX ORDER — METHYLPREDNISOLONE SODIUM SUCCINATE 40 MG/ML
20 INJECTION, POWDER, LYOPHILIZED, FOR SOLUTION INTRAMUSCULAR; INTRAVENOUS EVERY 12 HOURS SCHEDULED
Status: DISCONTINUED | OUTPATIENT
Start: 2024-08-15 | End: 2024-08-16

## 2024-08-15 RX ORDER — DOCUSATE SODIUM 100 MG/1
100 CAPSULE, LIQUID FILLED ORAL 2 TIMES DAILY
Status: DISCONTINUED | OUTPATIENT
Start: 2024-08-15 | End: 2024-08-26 | Stop reason: HOSPADM

## 2024-08-15 RX ORDER — ALPRAZOLAM 0.5 MG
0.25 TABLET ORAL
Status: DISCONTINUED | OUTPATIENT
Start: 2024-08-15 | End: 2024-08-26 | Stop reason: HOSPADM

## 2024-08-15 RX ORDER — LEVOTHYROXINE SODIUM 50 UG/1
50 TABLET ORAL DAILY
Status: DISCONTINUED | OUTPATIENT
Start: 2024-08-15 | End: 2024-08-26 | Stop reason: HOSPADM

## 2024-08-15 RX ADMIN — PROPRANOLOL HYDROCHLORIDE 10 MG: 10 TABLET ORAL at 11:43

## 2024-08-15 RX ADMIN — CYANOCOBALAMIN TAB 1000 MCG 1000 MCG: 1000 TAB at 11:44

## 2024-08-15 RX ADMIN — PANTOPRAZOLE SODIUM 40 MG: 40 TABLET, DELAYED RELEASE ORAL at 11:43

## 2024-08-15 RX ADMIN — LEVALBUTEROL HYDROCHLORIDE 1.25 MG: 1.25 SOLUTION RESPIRATORY (INHALATION) at 19:05

## 2024-08-15 RX ADMIN — BUDESONIDE AND FORMOTEROL FUMARATE DIHYDRATE 2 PUFF: 160; 4.5 AEROSOL RESPIRATORY (INHALATION) at 08:30

## 2024-08-15 RX ADMIN — LOSARTAN POTASSIUM 100 MG: 50 TABLET, FILM COATED ORAL at 11:43

## 2024-08-15 RX ADMIN — ASPIRIN 81 MG: 81 TABLET, COATED ORAL at 11:43

## 2024-08-15 RX ADMIN — METHYLPREDNISOLONE SODIUM SUCCINATE 20 MG: 40 INJECTION, POWDER, FOR SOLUTION INTRAMUSCULAR; INTRAVENOUS at 21:43

## 2024-08-15 RX ADMIN — IPRATROPIUM BROMIDE 0.5 MG: 0.5 SOLUTION RESPIRATORY (INHALATION) at 13:28

## 2024-08-15 RX ADMIN — PREDNISONE 40 MG: 20 TABLET ORAL at 08:32

## 2024-08-15 RX ADMIN — IPRATROPIUM BROMIDE 0.5 MG: 0.5 SOLUTION RESPIRATORY (INHALATION) at 19:05

## 2024-08-15 RX ADMIN — BUDESONIDE 0.5 MG: 0.5 INHALANT ORAL at 19:05

## 2024-08-15 RX ADMIN — ENOXAPARIN SODIUM 40 MG: 40 INJECTION SUBCUTANEOUS at 08:31

## 2024-08-15 RX ADMIN — LEVALBUTEROL HYDROCHLORIDE 1.25 MG: 1.25 SOLUTION RESPIRATORY (INHALATION) at 07:41

## 2024-08-15 RX ADMIN — LEVALBUTEROL HYDROCHLORIDE 1.25 MG: 1.25 SOLUTION RESPIRATORY (INHALATION) at 13:28

## 2024-08-15 RX ADMIN — GUAIFENESIN 600 MG: 600 TABLET ORAL at 17:04

## 2024-08-15 RX ADMIN — IPRATROPIUM BROMIDE 0.5 MG: 0.5 SOLUTION RESPIRATORY (INHALATION) at 07:41

## 2024-08-15 RX ADMIN — ALPRAZOLAM 0.25 MG: 0.5 TABLET ORAL at 21:54

## 2024-08-15 RX ADMIN — AZITHROMYCIN DIHYDRATE 500 MG: 250 TABLET ORAL at 21:54

## 2024-08-15 RX ADMIN — GUAIFENESIN 600 MG: 600 TABLET ORAL at 08:32

## 2024-08-15 RX ADMIN — BUDESONIDE 0.5 MG: 0.5 INHALANT ORAL at 13:28

## 2024-08-15 NOTE — CASE MANAGEMENT
Case Management Assessment & Discharge Planning Note    Patient name Sarah Zayas  Location 7T [facility] 702/7T [facility] 702-01 MRN [id number]  : 1945 Date 8/15/2024       Current Admission Date: 2024  Current Admission Diagnosis:Acute exacerbation of chronic obstructive pulmonary disease (COPD) ([facility])  Patient Active Problem List    Diagnosis Date Noted Date Diagnosed    Severe protein-calorie malnutrition (HCC) 2024     COPD, severe (HCC) 2024     COVID-19 2023     Abnormal CT scan 2023     Infectious sialoadenitis of major salivary gland 2023     Cellulitis of neck 2023     Left upper lobe pulmonary nodule 2023     Postnasal drip 2023     Raynaud's phenomenon without gangrene 2022     Temporal arteritis ([facility]) 2020     Hypertension 10/11/2018     Rectus sheath hematoma, initial encounter 10/10/2018     Other specified hypothyroidism 2018     Gastroesophageal reflux disease without esophagitis 2018     Acute exacerbation of chronic obstructive pulmonary disease (COPD) ([facility]) 2012     Vitamin D deficiency 2012     Osteoarthritis 2012       LOS (days): 0  Geometric Mean LOS (GMLOS) (days):   Days to GMLOS:     OBJECTIVE:              Current admission status: Observation  Referral Reason: VNA    Preferred Pharmacy:   [facility] [address]  [address]  [address]  [facility] [address]  Phone: [phone] Fax: [phone]    Primary Care Provider: Nicolas Nix MD    Primary Insurance: [employer] REP  Secondary Insurance:     ASSESSMENT:  Active Health Care Proxies    There are no active Health Care Proxies on file.       Readmission Root Cause  30 Day Readmission:  (OBS)    Patient Information  Admitted from:: Home  Mental Status: Alert  During Assessment patient was accompanied by: Not accompanied during assessment  Assessment information provided by:: Patient  Primary Caregiver:  Self  Support Systems: Self  [facility] of Residence: [place]  What city do you live in?: [place]  Home entry access options. Select all that apply.: Stairs  Number of steps to enter home.: 1  Do the steps have railings?: No  Type of Current Residence: [address] [place] home  Upon entering residence, is there a bedroom on the main floor (no further steps)?: No  A bedroom is located on the following floor levels of residence (select all that apply):: 2nd Floor  Number of steps to 2nd floor from main floor: One Flight  Living Arrangements: Lives w/ Extended Family  Is patient a ?: No    Activities of Daily Living Prior to Admission  Functional Status: Independent  Completes ADLs independently?: Yes  Ambulates independently?: Yes  Does patient use assisted devices?: Yes  Assisted Devices (DME) used: Straight Cane  Does patient currently own DME?: Yes  What DME does the patient currently own?: Straight Cane, Walker  Does patient have a history of Outpatient Therapy (PT/OT)?: No  Does the patient have a history of Short-Term Rehab?: No  Does patient have a history of HHC?: Yes ([facility])  Does patient currently have HHC?: Yes    Current Home Health Care  Type of Current Home Care Services: Nurse visit, Home PT,   Current Home Health Agency:: [facility]  Current Home Health Follow-Up Provider:: PCP    Patient Information Continued  Income Source: Pension/intermediate  Does patient have prescription coverage?: Yes  Does patient receive dialysis treatments?: No  Does patient have a history of substance abuse?: No  Does patient have a history of Mental Health Diagnosis?: No    Means of Transportation  Means of Transport to [facility]:: Family transport      Social Determinants of Health (SDOH)      Flowsheet Row Most Recent Value   Housing Stability    In the last 12 months, was there a time when you were not able to pay the mortgage or rent on time? N   In the past 12 months, how many times have you moved where you were  living? 0   At any time in the past 12 months, were you homeless or living in a shelter (including now)? N   Transportation Needs    In the past 12 months, has lack of transportation kept you from medical appointments or from getting medications? no   In the past 12 months, has lack of transportation kept you from meetings, work, or from getting things needed for daily living? No   Food Insecurity    Within the past 12 months, you worried that your food would run out before you got the money to buy more. Never true   Within the past 12 months, the food you bought just didn't last and you didn't have money to get more. Never true   Utilities    In the past 12 months has the electric, gas, oil, or water company threatened to shut off services in your home? No            DISCHARGE DETAILS:    Discharge planning discussed with:: Patient  Freedom of Choice: Yes     CM contacted family/caregiver?: No- see comments (Declines)  Were Treatment Team discharge recommendations reviewed with patient/caregiver?: Yes  Did patient/caregiver verbalize understanding of patient care needs?: Yes    Requested Home Health Care         Is the patient interested in HHC at discharge?: Yes  Home Health Discipline requested:: Medical Social Work, Nursing, Occupational Therapy, Physical Therapy  Home Health Agency Name:: [facility]  Home Health Follow-Up Provider:: PCP  Home Health Services Needed:: COPD Management, Evaluate Functional Status and Safety, Gait/ADL Training, Strengthening/Theraputic Exercises to Improve Function  Homebound Criteria Met:: Uses an Assist Device (i.e. cane, walker, etc)  Supporting Clincal Findings:: Fatigues Easliy in Short Distances, Limited Endurance    Other Referral/Resources/Interventions Provided:  Interventions: HHC       Additional Comments: Met with patient at bedside.  Patient known to this CM from previous admission.  Current with [facility].  Referral made back for [facility].  CM department followingthru  discharge

## 2024-08-15 NOTE — PLAN OF CARE
Problem: RESPIRATORY - ADULT  Goal: Achieves optimal ventilation and oxygenation  Description: INTERVENTIONS:  - Assess for changes in respiratory status  - Assess for changes in mentation and behavior  - Position to facilitate oxygenation and minimize respiratory effort  - Oxygen administered by appropriate delivery if ordered  - Initiate smoking cessation education as indicated  - Encourage broncho-pulmonary hygiene including cough, deep breathe, Incentive Spirometry  - Assess the need for suctioning and aspirate as needed  - Assess and instruct to report SOB or any respiratory difficulty  - Respiratory Therapy support as indicated  Outcome: Progressing     Problem: Potential for Falls  Goal: Patient will remain free of falls  Description: INTERVENTIONS:  - Educate patient/family on patient safety including physical limitations  - Instruct patient to call for assistance with activity   - Consult OT/PT to assist with strengthening/mobility   - Keep Call bell within reach  - Keep bed low and locked with side rails adjusted as appropriate  - Keep care items and personal belongings within reach  - Initiate and maintain comfort rounds  - Make Fall Risk Sign visible to staff  - Apply yellow socks and bracelet for high fall risk patients  - Consider moving patient to room near nurses station  Outcome: Progressing     Problem: PAIN - ADULT  Goal: Verbalizes/displays adequate comfort level or baseline comfort level  Description: Interventions:  - Encourage patient to monitor pain and request assistance  - Assess pain using appropriate pain scale  - Administer analgesics based on type and severity of pain and evaluate response  - Implement non-pharmacological measures as appropriate and evaluate response  - Consider cultural and social influences on pain and pain management  - Notify physician/advanced practitioner if interventions unsuccessful or patient reports new pain  Outcome: Progressing     Problem: Knowledge  Deficit  Goal: Patient/family/caregiver demonstrates understanding of disease process, treatment plan, medications, and discharge instructions  Description: Complete learning assessment and assess knowledge base.  Interventions:  - Provide teaching at level of understanding  - Provide teaching via preferred learning methods  Outcome: Progressing     Problem: DISCHARGE PLANNING  Goal: Discharge to home or other facility with appropriate resources  Description: INTERVENTIONS:  - Identify barriers to discharge w/patient and caregiver  - Arrange for needed discharge resources and transportation as appropriate  - Identify discharge learning needs (meds, wound care, etc.)  - Arrange for interpretive services to assist at discharge as needed  - Refer to Case Management Department for coordinating discharge planning if the patient needs post-hospital services based on physician/advanced practitioner order or complex needs related to functional status, cognitive ability, or social support system  Outcome: Progressing

## 2024-08-15 NOTE — ED PROVIDER NOTES
"History  Chief Complaint   Patient presents with    Shortness of Breath     C/o of SOB since yesterday. Denies chest pain, no meds pta. States \"my oxygen was in the 80s yesterday at home\" Denies using home oxygen.      Patient with history of COPD not on oxygen hospitalized a month ago for COPD exacerbation saying she has had increasing shortness of breath since yesterday very minimal cough no fevers no chest pain no vomiting diarrhea presented she had a sats of apparently 76%  on ra patient says she has had a very mild wheeze at home      Shortness of Breath  Associated symptoms: cough    Associated symptoms: no abdominal pain, no chest pain, no fever, no rash, no sore throat and no vomiting        Prior to Admission Medications   Prescriptions Last Dose Informant Patient Reported? Taking?   ALPRAZolam (XANAX) 0.25 mg tablet  Self No No   Sig: Take 1 tablet (0.25 mg total) by mouth daily at bedtime as needed for anxiety or sleep   Biotin 10 MG TABS  Self Yes No   Sig: Take by mouth in the morning   Calcium Carb-Cholecalciferol (Caltrate 600+D3) 600-20 MG-MCG TABS  Self No No   Sig: Take 1 tablet by mouth 2 (two) times a day   Diclofenac Sodium (VOLTAREN) 1 %  Self No No   Sig: Apply 2 g topically 4 (four) times a day   Patient taking differently: Apply 2 g topically 4 (four) times a day back   Trelegy Ellipta 100-62.5-25 MCG/ACT inhaler  Self Yes No   acetaminophen (TYLENOL) 500 mg tablet  Self No No   Sig: Take 1 tablet (500 mg total) by mouth every 6 (six) hours as needed (pain fevers)   albuterol (2.5 mg/3 mL) 0.083 % nebulizer solution  Self No No   Sig: Take 3 mL (2.5 mg total) by nebulization 2 (two) times a day   albuterol (PROVENTIL HFA,VENTOLIN HFA) 90 mcg/act inhaler  Self No No   Sig: Inhale 2 puffs every 6 (six) hours as needed for wheezing or shortness of breath   aspirin (ECOTRIN LOW STRENGTH) 81 mg EC tablet  Self No No   Sig: Take 1 tablet (81 mg total) by mouth daily   budesonide (Pulmicort) 0.5 " mg/2 mL nebulizer solution   No No   Sig: Take 2 mL (0.5 mg total) by nebulization 2 (two) times a day Rinse mouth after use.   Patient taking differently: Take 2 mL by nebulization 2 (two) times a day. Rinse mouth after use.   May take this right after taking levabuterol/ipratropium.   Indications: Chronic Obstructive Lung Disease   cetirizine (ZyrTEC) 10 mg tablet  Self No No   Sig: Take 1 tablet (10 mg total) by mouth daily With food/in the evening   denosumab (PROLIA) 60 mg/mL   No No   Sig: Inject 1 mL (60 mg total) under the skin once for 1 dose   ipratropium (ATROVENT) 0.02 % nebulizer solution   No No   Sig: Take 2.5 mL (0.5 mg total) by nebulization 2 (two) times a day   ipratropium (ATROVENT) 0.02 % nebulizer solution  Self No No   Sig: USE 1 VIAL BY NEBULIZATION THREE TIMES A DAY   levalbuterol (XOPENEX) 1.25 mg/3 mL nebulizer solution  Self Yes No   levothyroxine 50 mcg tablet  Self No No   Sig: TAKE ONE TABLET BY MOUTH ONCE DAILY   lidocaine (Lidoderm) 5 %  Self No No   Sig: Apply 1 patch topically over 12 hours daily Remove & Discard patch within 12 hours or as directed by MD   pantoprazole (PROTONIX) 40 mg tablet  Self No No   Sig: TAKE ONE TABLET BY MOUTH ONCE DAILY   predniSONE 10 mg tablet  Self No No   Si tabs x 3 days, 3 tabs x 3tabs, 2 tabs x 3 days, 1 tab x 3 days, then stop   propranolol (INDERAL) 10 mg tablet  Self No No   Sig: TAKE ONE TABLET BY MOUTH TWICE A DAY   saccharomyces boulardii (FLORASTOR) 250 mg capsule  Self No No   Sig: Take 1 capsule (250 mg total) by mouth 2 (two) times a day   valsartan (DIOVAN) 160 mg tablet  Self No No   Sig: Take 1 tablet (160 mg total) by mouth daily Please STOP Lisinopril,..   vitamin B-12 (VITAMIN B-12) 1,000 mcg tablet  Self No No   Sig: Take 1 tablet (1,000 mcg total) by mouth daily      Facility-Administered Medications: None       Past Medical History:   Diagnosis Date    Asthma     Chronic obstructive pulmonary disease (HCC) 2012    GERD  (gastroesophageal reflux disease)     Hypertension 10/11/2018    Hypothyroid     Osteoarthritis 2012    Temporal arteritis (HCC)     Tubular adenoma     Type 2 diabetes mellitus with complication, without long-term current use of insulin (HCC) 2020       Past Surgical History:   Procedure Laterality Date    EYE SURGERY Right 2019    cataract LVCFS    HYSTERECTOMY      NO PAST SURGERIES      ALSO NO RELEVANT PAST SURRGICAL HX AS PER [facility]       Family History   Problem Relation Age of Onset    Breast cancer Mother     Emphysema Father      I have reviewed and agree with the history as documented.    E-Cigarette/Vaping    E-Cigarette Use Never User      E-Cigarette/Vaping Substances    Nicotine No     THC No     CBD No     Flavoring No     Other No     Unknown No      Social History     Tobacco Use    Smoking status: Former     Current packs/day: 0.00     Average packs/day: 1 pack/day for 54.0 years (54.0 ttl pk-yrs)     Types: Cigarettes     Start date:      Quit date:      Years since quittin.6    Smokeless tobacco: Never    Tobacco comments:     TOBACCO USE, NO PASSIVE SMOKE EXPOSURE   Vaping Use    Vaping status: Never Used   Substance Use Topics    Alcohol use: Never    Drug use: No       Review of Systems   Constitutional:  Negative for chills and fever.   HENT:  Negative for sore throat.    Respiratory:  Positive for cough and shortness of breath.    Cardiovascular:  Negative for chest pain and palpitations.   Gastrointestinal:  Negative for abdominal pain and vomiting.   Genitourinary:  Negative for dysuria.   Musculoskeletal:  Negative for arthralgias and back pain.   Skin:  Negative for color change and rash.   Neurological:  Negative for seizures and syncope.   Psychiatric/Behavioral:  Negative for confusion.    All other systems reviewed and are negative.      Physical Exam  Physical Exam  Vitals and nursing note reviewed.   Constitutional:       General: She is not in acute  distress.     Appearance: She is well-developed.   HENT:      Head: Normocephalic and atraumatic.   Eyes:      Extraocular Movements: Extraocular movements intact.      Conjunctiva/sclera: Conjunctivae normal.   Neck:      Vascular: No hepatojugular reflux.   Cardiovascular:      Rate and Rhythm: Normal rate and regular rhythm.      Heart sounds: No murmur heard.  Pulmonary:      Effort: Pulmonary effort is normal.      Breath sounds: Normal breath sounds.      Comments: Patient is in no respiratory distress she has a mask on currently with very faint bilateral wheezes with no retractions moderate air exchange  Abdominal:      Palpations: Abdomen is soft.      Tenderness: There is no abdominal tenderness.   Musculoskeletal:         General: No swelling.      Cervical back: Normal range of motion and neck supple.      Right lower leg: No edema.      Left lower leg: No edema.   Skin:     General: Skin is warm and dry.      Capillary Refill: Capillary refill takes less than 2 seconds.   Neurological:      General: No focal deficit present.      Mental Status: She is alert.   Psychiatric:         Mood and Affect: Mood normal.         Vital Signs  ED Triage Vitals [08/14/24 2002]   Temperature Pulse Respirations Blood Pressure SpO2   97.5 °F (36.4 °C) 104 (!) 40 (!) 183/99 (!) 76 %      Temp Source Heart Rate Source Patient Position - Orthostatic VS BP Location FiO2 (%)   Tympanic Monitor Lying Left arm --      Pain Score       --           Vitals:    08/14/24 2002   BP: (!) 183/99   Pulse: 104   Patient Position - Orthostatic VS: Lying         Visual Acuity  Visual Acuity      Flowsheet Row Most Recent Value   L Pupil Size (mm) 3   R Pupil Size (mm) 3            ED Medications  Medications   sodium chloride 0.9 % bolus 500 mL (500 mL Intravenous New Bag 8/14/24 2040)   ipratropium-albuterol (DUO-NEB) 0.5-2.5 mg/3 mL inhalation solution 3 mL (3 mL Nebulization Given 8/14/24 2032)   predniSONE tablet 60 mg (60 mg Oral  Given 8/14/24 2031)       Diagnostic Studies  Results Reviewed       Procedure Component Value Units Date/Time    D-Dimer [[id number]]  (Abnormal) Collected: 08/14/24 2024    Lab Status: Final result Specimen: Blood from Arm, Right Updated: 08/14/24 2137     D-Dimer, Quant 0.53 ug/ml FEU     Narrative:      In the evaluation for possible pulmonary embolism, in the appropriate (Well's Score of 4 or less) patient, the age adjusted d-dimer cutoff for this patient can be calculated as:    Age x 0.01 (in ug/mL) for Age-adjusted D-dimer exclusion threshold for a patient over 50 years.    Blood gas, venous [[id number]]  (Abnormal) Collected: 08/14/24 2115    Lab Status: Final result Specimen: Blood from Arm, Right Updated: 08/14/24 2131     pH, Julian 7.313     pCO2, Julian 48.2 mm Hg      pO2, Julian 35.0 mm Hg      HCO3, Julian 23.9 mmol/L      Base Excess, Julian -2.5 mmol/L      O2 Content, Julian 9.6 ml/dL      O2 HGB, VENOUS 60.0 %     UA w Reflex to Microscopic w Reflex to Culture [[id number]]     Lab Status: No result Specimen: Urine     HS Troponin 0hr (reflex protocol) [[id number]]  (Normal) Collected: 08/14/24 2024    Lab Status: Final result Specimen: Blood from Arm, Right Updated: 08/14/24 2054     hs TnI 0hr 5 ng/L     HS Troponin I 2hr [[id number]]     Lab Status: No result Specimen: Blood     B-Type Natriuretic Peptide(BNP) [[id number]]  (Normal) Collected: 08/14/24 2024    Lab Status: Final result Specimen: Blood from Arm, Right Updated: 08/14/24 2053     BNP 62 pg/mL     Basic metabolic panel [[id number]]  (Abnormal) Collected: 08/14/24 2024    Lab Status: Final result Specimen: Blood from Arm, Right Updated: 08/14/24 2047     Sodium 140 mmol/L      Potassium 4.3 mmol/L      Chloride 97 mmol/L      CO2 35 mmol/L      ANION GAP 8 mmol/L      BUN 33 mg/dL      Creatinine 0.64 mg/dL      Glucose 151 mg/dL      Calcium 9.8 mg/dL      eGFR 85 ml/min/1.73sq m     Narrative:      National Kidney Disease Foundation guidelines for Chronic  Kidney Disease (CKD):     Stage 1 with normal or high GFR (GFR > 90 mL/min/1.73 square meters)    Stage 2 Mild CKD (GFR = 60-89 mL/min/1.73 square meters)    Stage 3A Moderate CKD (GFR = 45-59 mL/min/1.73 square meters)    Stage 3B Moderate CKD (GFR = 30-44 mL/min/1.73 square meters)    Stage 4 Severe CKD (GFR = 15-29 mL/min/1.73 square meters)    Stage 5 End Stage CKD (GFR <15 mL/min/1.73 square meters)  Note: GFR calculation is accurate only with a steady state creatinine    CBC and differential [[id number]]  (Abnormal) Collected: 08/14/24 2024    Lab Status: Final result Specimen: Blood from Arm, Right Updated: 08/14/24 2031     WBC 7.85 Thousand/uL      RBC 4.66 Million/uL      Hemoglobin 13.2 g/dL      Hematocrit 43.1 %      MCV 93 fL      MCH 28.3 pg      MCHC 30.6 g/dL      RDW 13.8 %      MPV 9.9 fL      Platelets 184 Thousands/uL      nRBC 0 /100 WBCs      Segmented % 83 %      Immature Grans % 0 %      Lymphocytes % 7 %      Monocytes % 8 %      Eosinophils Relative 2 %      Basophils Relative 0 %      Absolute Neutrophils 6.48 Thousands/µL      Absolute Immature Grans 0.03 Thousand/uL      Absolute Lymphocytes 0.53 Thousands/µL      Absolute Monocytes 0.63 Thousand/µL      Eosinophils Absolute 0.16 Thousand/µL      Basophils Absolute 0.02 Thousands/µL                    XR chest 1 view portable   ED Interpretation by Deven Atkinson MD (08/14 2046)   Nad unchanged                 Procedures  ECG 12 Lead Documentation Only    Date/Time: 8/14/2024 8:25 PM    Performed by: Deven Atkinson MD  Authorized by: Deven Atkinson MD    Indications / Diagnosis:  Sob  Patient location:  ED  Interpretation:     Interpretation: normal    Rate:     ECG rate:  98  Rhythm:     Rhythm: sinus rhythm    Ectopy:     Ectopy: none    QRS:     QRS intervals:  Normal  ST segments:     ST segments:  Normal  Comments:      Qt nml           ED Course                                 SBIRT 22yo+      Flowsheet Row Most Recent Value   Initial  Alcohol Screen: US AUDIT-C     1. How often do you have a drink containing alcohol? 0 Filed at: 08/14/2024 2118   2. How many drinks containing alcohol do you have on a typical day you are drinking?  0 Filed at: 08/14/2024 2118   3b. FEMALE Any Age, or MALE 65+: How often do you have 4 or more drinks on one occassion? 0 Filed at: 08/14/2024 2118   Audit-C Score 0 Filed at: 08/14/2024 2118   MARIMAR: How many times in the past year have you...    Used an illegal drug or used a prescription medication for non-medical reasons? Never Filed at: 08/14/2024 2118                      Medical Decision Making  Reported with sats 76% however she was noted on the oxygen mask to be at 99% 100% she was quickly weaned down to 1 L over time and satting 93% lungs are clear at this point after breathing treatment EKG showed no ischemia or ectopy chest x-ray showed no pneumothorax no infiltrate unchanged patient felt uncomfortable going home she feels like she needs home oxygen therapy NP was normal here venous blood gas showed a mild respiratory acidosis discussed the case with hospitalist will admit for observation further care and possible to go home on home oxygen if needed clinically do not feel the patient has a PE age-adjusted D-dimer being normal in this patient 0.53    Amount and/or Complexity of Data Reviewed  Labs: ordered.  Radiology: ordered and independent interpretation performed.    Risk  Prescription drug management.  Decision regarding hospitalization.                 Disposition  Final diagnoses:   COPD exacerbation (HCC)   Hypoxia     Time reflects when diagnosis was documented in both MDM as applicable and the Disposition within this note       Time User Action Codes Description Comment    8/14/2024  9:53 PM Deven Atkinson Add [J44.1] COPD exacerbation (HCC)     8/14/2024  9:53 PM Deven Atkinson Add [R09.02] Hypoxia           ED Disposition       ED Disposition   Admit    Condition   Stable    Date/Time   Wed Aug 14,  2024 2153    Comment   Case was discussed with curtis and the patient's admission status was agreed to be Admission Status: observation status to the service of Dr. Chow .               Follow-up Information    None         Patient's Medications   Discharge Prescriptions    No medications on file       No discharge procedures on file.    PDMP Review         Value Time User    PDMP Reviewed  Yes 3/11/2024  2:01 PM Nicolas Nix MD            ED Provider  Electronically Signed by             Deven Atkinson MD  08/14/24 2032

## 2024-08-15 NOTE — UTILIZATION REVIEW
"Initial Clinical Review    Pt initially admitted as Observation on 8/14. Changed to Inpatient on 8/15. Pt requiring continued stay d/t ongoing management of COPD.    Admission: Date/Time/Statement:   Admission Orders (From admission, onward)       Ordered        08/15/24 1137  INPATIENT ADMISSION  Once            08/14/24 2154  Place in Observation  Once                          Orders Placed This Encounter   Procedures    INPATIENT ADMISSION     Standing Status:   Standing     Number of Occurrences:   1     Order Specific Question:   Level of Care     Answer:   Med Surg [16]     Order Specific Question:   Estimated length of stay     Answer:   More than 2 Midnights     Order Specific Question:   Certification     Answer:   I certify that inpatient services are medically necessary for this patient for a duration of greater than two midnights. See H&P and MD Progress Notes for additional information about the patient's course of treatment.     ED Arrival Information       Expected   -    Arrival   8/14/2024 20:00    Acuity   Urgent              Means of arrival   Walk-In    Escorted by   Self    Service   Hospitalist    Admission type   Emergency              Arrival complaint   difficulty breathing             Chief Complaint   Patient presents with    Shortness of Breath     C/o of SOB since yesterday. Denies chest pain, no meds pta. States \"my oxygen was in the 80s yesterday at home\" Denies using home oxygen.        Initial Presentation: 78 y.o. female who presented self from home to [facility] ED. Admitted as Observation for evaluation and treatment of COPD. PMHx: COPD, GERD, HTN, hypothyroid, T2DM. Presented w/ increased SOB. SpO2 76% on room air on arrival; placed on O2 mask and then weaned to 1L O2 NC. On exam, b/l wheezing, tachypnea. Plan: Supplemental O2, weaned as tolerated; PO prednisone, nebs, IV ABX, continue PTA meds, Trend labs, replete electrolytes as needed; supportive care.     Date: " Triage Chief Complaint:   Pharyngitis    [place]:  Irma Rod is a 56 y.o. female that presents presents to the ED for second visit.  She has been having dysphonia since January she is a smoker recently seen 23 days ago in the ED started on an empiric antibiotic and Medrol Dosepak prior to this was at urgent care and treated with a Medrol Dosepak with some improvement denies any weight gain or weight loss she has a chronic cough with no sputum production.  No underlying noted lung cancer.  Patient has no health insurance        History reviewed. No pertinent past medical history.  History reviewed. No pertinent surgical history.  History reviewed. No pertinent family history.  Social History     Socioeconomic History    Marital status:      Spouse name: Not on file    Number of children: Not on file    Years of education: Not on file    Highest education level: Not on file   Occupational History    Not on file   Tobacco Use    Smoking status: Not on file    Smokeless tobacco: Not on file   Substance and Sexual Activity    Alcohol use: Not on file    Drug use: Not on file    Sexual activity: Not on file   Other Topics Concern    Not on file   Social History Narrative    Not on file     Social Determinants of Health     Financial Resource Strain: Not on file   Food Insecurity: Not on file   Transportation Needs: Not on file   Physical Activity: Not on file   Stress: Not on file   Social Connections: Not on file   Intimate Partner Violence: Not on file   Housing Stability: Not on file     No current facility-administered medications for this encounter.     Current Outpatient Medications   Medication Sig Dispense Refill    ibuprofen (ADVIL;MOTRIN) 200 MG tablet Take 1 tablet by mouth every 6 hours as needed for Pain or Fever      albuterol sulfate HFA (PROVENTIL;VENTOLIN;PROAIR) 108 (90 Base) MCG/ACT inhaler Inhale 2 puffs into the lungs every 4 hours as needed for Wheezing Pharmacist may substitute 18 g 0     No  08/15/24   Changed to Inpatient Status: Reports feeling improved, but not back to baseline. RIVAS, chest congestion, phlegm. Exam: decreased air movement, cyanotic fingertips. Plan: IV solu-medrol, nebs, check procal, Trend labs, replete electrolytes as needed; continue current meds, Supplemental O2, weaned as tolerated; supportive care.     Date: 08/16/24  Day 3: Has surpassed a 2nd midnight with active treatments and services. Notes breathing is a little better. Exam: dyspnea w/ exertion, diminished breath sounds. Plan: nutrition supplements w/ meals, Supplemental O2, weaned as tolerated; continue current meds, will need home O2 eval, PO azithromycin & prednisone. Trend labs, replete electrolytes as needed.       ED Triage Vitals   Temperature Pulse Respirations Blood Pressure SpO2 Pain Score   08/14/24 2002 08/14/24 2002 08/14/24 2002 08/14/24 2002 08/14/24 2002 08/14/24 2303   97.5 °F (36.4 °C) 104 (!) 40 (!) 183/99 (!) 76 % No Pain     Weight (last 2 days)       Date/Time Weight    08/14/24 2303 45 (99.3)    08/14/24 2002 44.9 (98.99)            Vital Signs (last 3 days)       Date/Time Temp Pulse Resp BP MAP (mmHg) SpO2 Calculated FIO2 (%) - Nasal Cannula O2 Flow Rate (L/min) Nasal Cannula O2 Flow Rate (L/min) O2 Device Patient Position - Orthostatic VS Annette Coma Scale Score Pain    08/16/24 0800 -- 78 18 -- -- 93 % 28 -- 2 L/min Nasal cannula -- -- --    08/16/24 0725 97.7 °F (36.5 °C) 80 18 101/69 79 92 % 28 -- 2 L/min Nasal cannula Lying -- --    08/16/24 0702 -- -- -- -- -- -- -- -- -- -- -- -- No Pain    08/16/24 0300 -- -- -- -- -- 98 % 28 -- 2 L/min Nasal cannula -- -- --    08/15/24 2100 97.8 °F (36.6 °C) 93 18 104/69 83 96 % 32 -- 3 L/min Nasal cannula Lying 15 No Pain    08/15/24 1906 -- -- -- -- -- 94 % 28 -- 2 L/min Nasal cannula -- -- --    08/15/24 1510 97.6 °F (36.4 °C) 84 20 100/59 71 98 % 28 -- 2 L/min Nasal cannula Lying -- --    08/15/24 1328 -- -- -- -- -- 96 % -- -- -- -- -- -- --     08/15/24 1130 -- -- -- -- -- 93 % -- 1 L/min -- Nasal cannula -- -- --    08/15/24 1100 -- 105 20 144/64 -- -- -- -- -- -- -- -- --    08/15/24 0741 -- -- -- -- -- 96 % 24 -- 1 L/min -- -- -- --    08/15/24 0703 96.9 °F (36.1 °C) 94 18 106/54 59 96 % 30 2.5 L/min 2.5 L/min Nasal cannula Lying -- No Pain    08/15/24 0353 -- -- -- -- -- 94 % 28 -- 2 L/min Nasal cannula -- -- --    08/14/24 2334 -- -- -- -- -- 93 % 28 -- 2 L/min Nasal cannula -- -- --    08/14/24 2330 -- -- -- -- -- -- -- -- -- -- -- 15 No Pain    08/14/24 2317 -- -- -- -- -- 96 % -- 2 L/min -- Nasal cannula -- -- --    08/14/24 2303 98.1 °F (36.7 °C) 102 20 176/83 -- -- -- -- -- -- Sitting -- No Pain    08/14/24 2020 -- -- -- -- -- -- -- -- -- None (Room air) -- 15 --    08/14/24 2012 -- -- -- -- -- 93 % -- 8 L/min -- Simple mask -- -- --    08/14/24 2008 -- -- -- -- -- 85 % 44 -- 6 L/min Nasal cannula -- -- --    08/14/24 2002 97.5 °F (36.4 °C) 104 40 183/99 -- 76 % -- -- -- None (Room air) Lying -- --              Pertinent Labs/Diagnostic Test Results:   Radiology:  XR chest 1 view portable   ED Interpretation by Deven Atkinson MD (08/14 2046)   Nad unchanged      Final Interpretation by Antonio Newton MD (08/15 0743)   Stable chest without acute cardiopulmonary disease.      Workstation performed: [id number]           Cardiology:  ECG 12 lead   Final Result by Jonnathan Pardo MD (08/15 0010)   Sinus rhythm with sinus arrhythmia with short PA   Otherwise normal ECG   When compared with ECG of 14-AUG-2024 23:03, (unconfirmed)   Sinus rhythm has replaced Junctional rhythm   Confirmed by Jonnathan Pardo ([id number]) on 8/15/2024 12:10:23 AM      ECG 12 lead   Final Result by Jonnathan Pardo MD (08/15 0010)   Sinus rhythm      Confirmed by Jonnathan Pardo ([id number]) on 8/15/2024 12:10:38 AM      ECG 12 lead   Final Result by Jonnathan Pardo MD (08/15 0011)   Sinus rhythm   Baseline artifact   Nonspecific ST abnormality   Abnormal ECG   When  compared with ECG of 23-JUL-2024 14:34,   Previous ECG has undetermined rhythm, needs review   QRS axis Shifted left   Criteria for Lateral infarct are no longer Present   Nonspecific T wave abnormality no longer evident in Lateral leads   Confirmed by Jonnathan Pardo ([id number]) on 8/15/2024 12:11:33 AM            Results from last 7 days   Lab Units 08/16/24  0433 08/15/24  0432 08/14/24  2024   WBC Thousand/uL 7.11 6.78 7.85   HEMOGLOBIN g/dL 10.8* 11.5 13.2   HEMATOCRIT % 35.7 37.5 43.1   PLATELETS Thousands/uL 164 146* 184   TOTAL NEUT ABS Thousands/µL  --   --  6.48         Results from last 7 days   Lab Units 08/16/24  0433 08/15/24  0432 08/14/24  2024   SODIUM mmol/L 138 140 140   POTASSIUM mmol/L 5.1 4.4 4.3   CHLORIDE mmol/L 101 102 97   CO2 mmol/L 30 31 35*   ANION GAP mmol/L 7 7 8   BUN mg/dL 37* 28* 33*   CREATININE mg/dL 0.61 0.54* 0.64   EGFR ml/min/1.73sq m 87 90 85   CALCIUM mg/dL 8.7 8.4 9.8     Results from last 7 days   Lab Units 08/15/24  0432   AST U/L 22   ALT U/L 29   ALK PHOS U/L 40   TOTAL PROTEIN g/dL 5.8*   ALBUMIN g/dL 3.4*   TOTAL BILIRUBIN mg/dL 0.36         Results from last 7 days   Lab Units 08/16/24  0433 08/15/24  0432 08/14/24  2024   GLUCOSE RANDOM mg/dL 122 136 151*     Results from last 7 days   Lab Units 08/14/24 2115   PH YINKA  7.313   PCO2 YINKA mm Hg 48.2   PO2 YINKA mm Hg 35.0   HCO3 YINKA mmol/L 23.9*   BASE EXC YINKA mmol/L -2.5   O2 CONTENT YINKA ml/dL 9.6   O2 HGB, VENOUS % 60.0     Results from last 7 days   Lab Units 08/15/24  0017 08/14/24  2315 08/14/24 2024   HS TNI 0HR ng/L  --   --  5   HS TNI 2HR ng/L  --  7  --    HSTNI D2 ng/L  --  2  --    HS TNI 4HR ng/L 7  --   --    HSTNI D4 ng/L 2  --   --      Results from last 7 days   Lab Units 08/14/24  2024   D-DIMER QUANTITATIVE ug/ml FEU 0.53*     Results from last 7 days   Lab Units 08/16/24  0433 08/15/24  0432   PROCALCITONIN ng/ml 0.13 0.19     Results from last 7 days   Lab Units 08/14/24  2024   BNP pg/mL 62      Results from last 7 days   Lab Units 08/14/24 2227   CLARITY UA  Clear   COLOR UA  Yellow   SPEC GRAV UA  1.015   PH UA  6.5   GLUCOSE UA mg/dl Negative   KETONES UA mg/dl Negative   BLOOD UA  25.0*   PROTEIN UA mg/dl 30 (1+)*   NITRITE UA  Negative   BILIRUBIN UA  Negative   UROBILINOGEN UA mg/dL Negative   LEUKOCYTES UA  100.0*   WBC UA /hpf 4-10*   RBC UA /hpf 2-4   BACTERIA UA /hpf Occasional   EPITHELIAL CELLS WET PREP /hpf Occasional         ED Treatment-Medication Administration from 08/14/2024 2000 to 08/14/2024 2241         Date/Time Order Dose Route Action     08/14/2024 2040 sodium chloride 0.9 % bolus 500 mL 500 mL Intravenous New Bag     08/14/2024 2032 ipratropium-albuterol (DUO-NEB) 0.5-2.5 mg/3 mL inhalation solution 3 mL 3 mL Nebulization Given     08/14/2024 2031 predniSONE tablet 60 mg 60 mg Oral Given            Past Medical History:   Diagnosis Date    Asthma     Chronic obstructive pulmonary disease (HCC) 09/26/2012    COPD (chronic obstructive pulmonary disease) ([facility])     Disease of thyroid gland     GERD (gastroesophageal reflux disease)     Hypertension 10/11/2018    Hypothyroid     Osteoarthritis 09/26/2012    Temporal arteritis ([facility])     Tubular adenoma     Type 2 diabetes mellitus with complication, without long-term current use of insulin ([facility]) 01/14/2020     Present on Admission:   Acute exacerbation of chronic obstructive pulmonary disease (COPD) ([facility])   Acute respiratory failure with hypoxia ([facility])   Other specified hypothyroidism   Gastroesophageal reflux disease without esophagitis   Hypertension   Raynaud's phenomenon without gangrene   Severe protein-calorie malnutrition ([facility])      Admitting Diagnosis: SOB (shortness of breath) [R06.02]  Hypoxia [R09.02]  COPD exacerbation ([facility]) [J44.1]  Age/Sex: 78 y.o. female  Admission Orders:  Regular Diet.  Fall precautions.  Ambulate, Up & OOB as tolerated.  Continuous Pulse Ox.  I&O. SCDs.    Scheduled Medications:  aspirin, 81 mg,  Oral, Daily  azithromycin, 500 mg, Oral, Q24H  budesonide, 0.5 mg, Nebulization, BID  vitamin B-12, 1,000 mcg, Oral, Daily  docusate sodium, 100 mg, Oral, BID  enoxaparin, 40 mg, Subcutaneous, Daily  guaiFENesin, 600 mg, Oral, BID  ipratropium, 0.5 mg, Nebulization, TID  levalbuterol, 1.25 mg, Nebulization, TID  levothyroxine, 50 mcg, Oral, Daily  losartan, 100 mg, Oral, Daily  methylPREDNISolone sodium succinate, 20 mg, Intravenous, Q12H RACHEL  pantoprazole, 40 mg, Oral, Daily  polyethylene glycol, 17 g, Oral, Daily  propranolol, 10 mg, Oral, BID    Continuous IV Infusions: none    PRN Meds:  acetaminophen, 650 mg, Oral, Q6H PRN  albuterol, 2.5 mg, Nebulization, Q6H PRN  ALPRAZolam, 0.25 mg, Oral, HS PRN; 8/15 x1  calcium carbonate, 1,000 mg, Oral, Daily PRN  polyethylene glycol, 17 g, Oral, Daily PRN    azithromycin (ZITHROMAX) 500 mg in sodium chloride 0.9 % 250 mL IVPB  Dose: 500 mg  Freq: Every 24 hours Route: IV  Last Dose: Stopped (08/15/24 1139)  Start: 08/14/24 2215 End: 08/15/24 0724  budesonide-formoterol (SYMBICORT) 160-4.5 mcg/act inhaler 2 puff  Dose: 2 puff  Freq: 2 times daily Route: IN  Start: 08/15/24 0900 End: 08/15/24 1130  ipratropium-albuterol (DUO-NEB) 0.5-2.5 mg/3 mL inhalation solution 3 mL  Dose: 3 mL  Freq: Every 6 hours (RESP) Route: NEBULIZATION  Start: 08/14/24 2215 End: 08/14/24 2337  predniSONE tablet 40 mg  Dose: 40 mg  Freq: Daily Route: PO  Start: 08/15/24 0900 End: 08/15/24 1122      IP CONSULT TO NUTRITION SERVICES    Network Utilization Review Department  ATTENTION: Please call with any questions or concerns to [phone] and carefully listen to the prompts so that you are directed to the right person. All voicemails are confidential.   For Discharge needs, contact Care Management DC Support Team at [phone] opt. [phone]  Send all requests for admission clinical reviews, approved or denied determinations and any other requests to dedicated fax number below belonging to the campus  where the patient is receiving treatment. List of dedicated fax numbers for the Facilities:  FACILITY NAME UR FAX NUMBER   ADMISSION DENIALS (Administrative/Medical Necessity) [phone]   DISCHARGE SUPPORT TEAM (NETWORK) [phone]   PARENT CHILD HEALTH (Maternity/NICU/Pediatrics) [phone]   [facility] [phone]   [facility] [phone]   [facility] [phone]   [facility] [phone]   [facility] [phone]   [facility] [phone]   [facility] [phone]   [facility] [phone]   [facility] [phone]   [facility] [phone]   General acute hospital [phone]   [facility] [phone]

## 2024-08-15 NOTE — H&P
[facility]  H&P  Name: Sarah Zayas 78 y.o. female I MRN: [id number]  Unit/Bed#: 7T [facility] 702-01 I Date of Admission: 8/14/2024   Date of Service: 8/15/2024 I Hospital Day: 0      Assessment & Plan   * Acute exacerbation of chronic obstructive pulmonary disease (COPD) ([facility])  Assessment & Plan  Patient is a 78-year-old female with past medical history significant for COPD, and hypertension who presented to the ER complaining of shortness of breath and hypoxia on her home pulse ox.    She of severe COPD and follows with a pulmonologist Dr. Nicholson: Most recent PFTs with FEV1 of 0.41, (22% predicted)  Patient was recently discharged 7/29 after being hospitalized for COPD exacerbation.  She was discharged on a steroid taper and did not require oxygen at the time of discharge.  She has seen both her PCP and her pulmonologist after discharge and had been improving back to her baseline  Patient relates the day of admission she had the acute onset of dyspnea.  Did not improve with her home nebulizers or inhalers.  She notes her home pulse ox showed her saturations were in the 70s so she came to the ER.  Upon presentation to the ER saturations were 74% on room air.  Patient was given DuoNeb, IV azithromycin, and prednisone 60 mg with improvement and was admitted for further evaluation  Patient notes her breathing has improved but not yet at baseline  Will continue Xopenex/Atrovent tid and home Pulmicort for her COPD exacerbation  Patient has already received p.o. prednisone this morning overdue for her acute exacerbation should be changed to IV Solu-Medrol  Will continue p.o. azithromycin for COPD exacerbation, today is day 2/3  Chest x-ray without focal infiltrate  Will order procalcitonin for further evaluation of bacterial lower respiratory tract infection  Patient follows with pulmonologist Dr. Nicholson as an outpatient  Differential diagnosis for acute onset of dyspnea also includes PE however her  age-adjusted D-dimer is unremarkable.  No evidence of flash pulmonary edema.  No evidence of acute ischemia based on EKG or cardiac enzymes.  Patient denies any aspiration triggering this episode  Continue current treatment regimen and monitor for response    Acute respiratory failure with hypoxia (HCC)  Assessment & Plan  Patient presented with acute respiratory failure with hypoxia in the setting with COPD exacerbation  At home her saturations were 70s percent on her pulse ox, she was 74% on room air in the ER  Patient had 2 L nasal cannula improved with normalization of her saturations, currently 100% on 2 L  Age-adjusted D-dimer noted to be normal  Her hypoxia secondary to her acute COPD exacerbation  Continue to titrate oxygen as needed  Will need home O2 eval prior to discharge  Will also check nocturnal pulse ox evaluate for nocturnal hypoxia    Severe protein-calorie malnutrition (HCC)  Assessment & Plan  Malnutrition Findings:    BMI Findings:   Body mass index is 17.59 kg/m².   Nutritional supplements, nutrition consult, encourage p.o. 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    Raynaud's phenomenon without gangrene  Assessment & Plan  Noted clinically on exam as well as an old records  Continue supportive care    Hypertension  Assessment & Plan  Will continue home Diovan 160 mg daily and propranolol 10 mg daily  Monitor blood pressure, titrate as needed    Gastroesophageal reflux disease without esophagitis  Assessment & Plan  Continue proton pump inhibitor    Other specified hypothyroidism  Assessment & Plan  Continue Synthroid       VTE Pharmacologic Prophylaxis: VTE Score: 6 High Risk (Score >/= 5) - Pharmacological DVT Prophylaxis Ordered: enoxaparin (Lovenox). Sequential Compression Devices Ordered.  Code Status: Level 1 - Full Code   Discussion with family: called sister Isabel Butt and LM to call me for update   D/w pt med-surg nurse    Anticipated Length of Stay: Due to  patient's acute COPD exacerbation, and hypoxia requiring oxygen she will be admitted to the hospital.  Anticipated length of stay greater than 2 midnights.  Inpatient status    Total Time Spent on Date of Encounter in care of patient: 60 mins. This time was spent on one or more of the following: performing physical exam; counseling and coordination of care; obtaining or reviewing history; documenting in the medical record; reviewing/ordering tests, medications or procedures; communicating with other healthcare professionals and discussing with patient's family/caregivers.  Reviewed patient's discharge summary 7/2024 for COPD exacerbation and cellulitis.  Reviewed her PCP office follow-up where she had improved and was also diagnosed with UTI.  Reviewed her pulmonary office follow-up with Dr. Nicholson for her COPD returning back to her baseline.  Reviewed her discharge medication list from 7/2024.  Reviewed her chest x-ray report    Chief Complaint: Shortness of breath    History of Present Illness:  Sarah Zayas is a 78 y.o. female with a PMH of severe COPD, and hypertension who presented to the ER yesterday for evaluation of shortness of breath.  Patient relates since her discharge at the end of July she had been improving.  She completed her prednisone taper.  She was using her inhalers and nebulizers as directed.    Patient notes yesterday she had the acute onset of worsening shortness of breath.  It did not improve with her nebulizers or inhalers so she came to the ER for further evaluation.  Patient notes in the ER she was given breathing treatments and improved.  She notes at home she checks her pulse ox and had dropped down to the 70s.    Patient notes she has a cough, chest congestion and phlegm.  Denies any fevers or chills.  Denies any sick contacts.    Notes at home she had 1 episode of chest pain that was located on the right chest.  She notes it lasted only seconds, and immediately resolved.  She denied any  additional chest pain or chest pressure or discomfort.  She notes that few seconds duration of chest pain has not recurred.    Currently notes she feels better, but not back to her baseline.  She notes dyspnea with exertion.  She notes chest congestion and phlegm.  She denies any chest pain.  Nuys any chest pressure, chest tightness or chest heaviness.  She denies any pleuritic chest pain.    Patient denies any any pain anywhere else.  She denies any headache.  Denies any back pain.  Denies any abdominal pain.  She denies any nausea, vomiting, diarrhea or constipation.  She denies any dizziness, lightheadedness, weakness, or balance problems.  She notes she is tolerating p.o. without any difficulty.    Review of Systems:  Review of Systems    Past Medical and Surgical History:   Past Medical History:   Diagnosis Date    Asthma     Chronic obstructive pulmonary disease (HCC) 09/26/2012    COPD (chronic obstructive pulmonary disease) ([facility])     Disease of thyroid gland     GERD (gastroesophageal reflux disease)     Hypertension 10/11/2018    Hypothyroid     Osteoarthritis 09/26/2012    Temporal arteritis ([facility])     Tubular adenoma     Type 2 diabetes mellitus with complication, without long-term current use of insulin ([facility]) 01/14/2020       Past Surgical History:   Procedure Laterality Date    EYE SURGERY Right 12/06/2019    cataract LVCFS    HYSTERECTOMY      NO PAST SURGERIES      ALSO NO RELEVANT PAST SURRGICAL HX AS PER [facility]       Meds/Allergies:  Prior to Admission medications    Medication Sig Start Date End Date Taking? Authorizing Provider   acetaminophen (TYLENOL) 500 mg tablet Take 1 tablet (500 mg total) by mouth every 6 (six) hours as needed (pain fevers) 12/27/19  Yes Betsy Wynn PA-C   albuterol (2.5 mg/3 mL) 0.083 % nebulizer solution Take 3 mL (2.5 mg total) by nebulization 2 (two) times a day 6/16/24  Yes Linsey Navarro,    albuterol (PROVENTIL HFA,VENTOLIN HFA) 90 mcg/act  inhaler Inhale 2 puffs every 6 (six) hours as needed for wheezing or shortness of breath 8/15/22  Yes Nicolas Nix MD   ALPRAZolam (XANAX) 0.25 mg tablet Take 1 tablet (0.25 mg total) by mouth daily at bedtime as needed for anxiety or sleep 6/25/24  Yes Sally Schwarz MD   Calcium Carb-Cholecalciferol (Caltrate 600+D3) 600-20 MG-MCG TABS Take 1 tablet by mouth 2 (two) times a day 6/5/23  Yes Nicolas Nix MD   levothyroxine 50 mcg tablet TAKE ONE TABLET BY MOUTH ONCE DAILY 8/1/24  Yes Nicolas Nix MD   pantoprazole (PROTONIX) 40 mg tablet TAKE ONE TABLET BY MOUTH ONCE DAILY 5/13/24  Yes Nicolas Nix MD   propranolol (INDERAL) 10 mg tablet TAKE ONE TABLET BY MOUTH TWICE A DAY 6/22/24  Yes Nicolas Nix MD   valsartan (DIOVAN) 160 mg tablet Take 1 tablet (160 mg total) by mouth daily Please STOP Lisinopril,.. 3/11/24  Yes Nicolas Nix MD   vitamin B-12 (VITAMIN B-12) 1,000 mcg tablet Take 1 tablet (1,000 mcg total) by mouth daily 6/5/23  Yes Nicolas Nix MD   aspirin (ECOTRIN LOW STRENGTH) 81 mg EC tablet Take 1 tablet (81 mg total) by mouth daily 6/5/23   Nicolas Nix MD   Biotin 10 MG TABS Take by mouth in the morning  Patient not taking: Reported on 8/15/2024    Historical Provider, MD   budesonide (Pulmicort) 0.5 mg/2 mL nebulizer solution Take 2 mL (0.5 mg total) by nebulization 2 (two) times a day Rinse mouth after use.  Patient taking differently: Take 2 mL by nebulization 2 (two) times a day. Rinse mouth after use.   May take this right after taking levabuterol/ipratropium.   Indications: Chronic Obstructive Lung Disease 5/6/24 6/5/24  Mike Nicholson MD   cetirizine (ZyrTEC) 10 mg tablet Take 1 tablet (10 mg total) by mouth daily With food/in the evening  Patient not taking: Reported on 8/15/2024 6/5/23   Nicolas Nix MD   denosumab (PROLIA) 60 mg/mL Inject 1 mL (60 mg total) under the skin once for 1 dose 4/15/24 4/15/24  Nicolas Nix MD   Diclofenac Sodium (VOLTAREN) 1 % Apply 2 g topically 4  (four) times a day  Patient not taking: Reported on 8/15/2024 7/22/24   Karl Arita MD   ipratropium (ATROVENT) 0.02 % nebulizer solution Take 2.5 mL (0.5 mg total) by nebulization 2 (two) times a day 24  Linsey Navarro,    ipratropium (ATROVENT) 0.02 % nebulizer solution USE 1 VIAL BY NEBULIZATION THREE TIMES A DAY 7/3/24   Mike Nicholson MD   levalbuterol (XOPENEX) 1.25 mg/3 mL nebulizer solution  24   Historical Provider, MD   lidocaine (Lidoderm) 5 % Apply 1 patch topically over 12 hours daily Remove & Discard patch within 12 hours or as directed by MD  Patient not taking: Reported on 8/15/2024 7/22/24   Karl Arita MD   predniSONE 10 mg tablet 4 tabs x 3 days, 3 tabs x 3tabs, 2 tabs x 3 days, 1 tab x 3 days, then stop 24   Saad Mckeon,    saccharomyces boulardii (FLORASTOR) 250 mg capsule Take 1 capsule (250 mg total) by mouth 2 (two) times a day  Patient not taking: Reported on 8/15/2024 12/6/23   Nicolas Nix MD   Trelegy Ellipta 100-62.5-25 MCG/ACT inhaler  6/3/24   Historical Provider, MD     I have reviewed home medications with patient personally.    Allergies: No Known Allergies    Social History:  Marital Status: Legally      Social History     Substance and Sexual Activity   Alcohol Use Never     Social History     Tobacco Use   Smoking Status Former    Current packs/day: 0.00    Average packs/day: 1 pack/day for 54.0 years (54.0 ttl pk-yrs)    Types: Cigarettes    Start date:     Quit date: 2013    Years since quittin.6   Smokeless Tobacco Never   Tobacco Comments    TOBACCO USE, NO PASSIVE SMOKE EXPOSURE     Social History     Substance and Sexual Activity   Drug Use No       Family History:  Family History   Problem Relation Age of Onset    Breast cancer Mother     Emphysema Father        Physical Exam:     Vitals:   Blood Pressure: 144/64 (08/15/24 1100)  Pulse: 105 (08/15/24 1100)  Temperature: (!) 96.9 °F (36.1 °C)  "(08/15/24 0703)  Temp Source: Temporal (08/15/24 0703)  Respirations: 20 (08/15/24 1100)  Height: 5' 3\" (160 cm) (08/14/24 2303)  Weight - Scale: 45 kg (99 lb 4.8 oz) (08/14/24 2303)  SpO2: 96 % (08/15/24 0741)    Physical Exam   General: Very pleasant female.  No acute distress.  Able to speak in complete sentences with having to stop for dyspnea  Heart: Regular rate and rhythm.  S1-S2 present.  No murmur, rub, gallop  Lungs: Decreased air movement with decreased breath sounds.  However currently no wheezes, crackles, rhonchi.  No accessory muscle use or respiratory distress  Abdo: Soft, nontender, nondistended, normoactive bowel sounds present.  No guarding or rebound.  No peritoneal signs or mass  Extremities: No clubbing.  No edema.  2+ radial and pedal pulses.  Cyanotic fingertips noted that omar with palpation.  Toes not cyanotic.  Skin: Warm and dry.  No petechia, purpura, rash.  Cyanosis noted on fingertips bilaterally that omar to the touch.  No toe lesions, foot lesions, or cyanosis noted on feet  Neurologic: Awake and alert.  Fluent speech.  Interactive.  Moving all 4 extremities    Additional Data:     Lab Results:  Results from last 7 days   Lab Units 08/15/24  0432 08/14/24  2024   WBC Thousand/uL 6.78 7.85   HEMOGLOBIN g/dL 11.5 13.2   HEMATOCRIT % 37.5 43.1   PLATELETS Thousands/uL 146* 184   SEGS PCT %  --  83*   LYMPHO PCT % 4* 7*   MONO PCT % 2* 8   EOS PCT % 0 2     Results from last 7 days   Lab Units 08/15/24  0432   SODIUM mmol/L 140   POTASSIUM mmol/L 4.4   CHLORIDE mmol/L 102   CO2 mmol/L 31   BUN mg/dL 28*   CREATININE mg/dL 0.54*   ANION GAP mmol/L 7   CALCIUM mg/dL 8.4   ALBUMIN g/dL 3.4*   TOTAL BILIRUBIN mg/dL 0.36   ALK PHOS U/L 40   ALT U/L 29   AST U/L 22   GLUCOSE RANDOM mg/dL 136             Lab Results   Component Value Date    HGBA1C 5.8 11/20/2023    HGBA1C 6.1 09/18/2023    HGBA1C 5.8 06/05/2023           Lines/Drains:  Invasive Devices       Peripheral Intravenous Line  " Duration             Peripheral IV 08/14/24 Right Antecubital <1 day                        Imaging:   XR chest 1 view portable   ED Interpretation by Deven Atkinson MD (08/14 2046)   Nad unchanged      Final Result by Antonio Newton MD (08/15 0743)   Stable chest without acute cardiopulmonary disease.      Workstation performed: [id number]             EKG and Other Studies Reviewed on Admission:   EKG: Reviewed EKG strips personally.  Sinus rhythm.  No junctional rhythm.  No acute ST or T wave changes.  QTc within normal limits    ** Please Note: This note has been constructed using a voice recognition system. **

## 2024-08-15 NOTE — ASSESSMENT & PLAN NOTE
Will continue home Diovan 160 mg daily and propranolol 10 mg daily  Monitor blood pressure, titrate as needed

## 2024-08-15 NOTE — ASSESSMENT & PLAN NOTE
Patient is a 78-year-old female with past medical history significant for COPD, and hypertension who presented to the ER complaining of shortness of breath and hypoxia on her home pulse ox.    She of severe COPD and follows with a pulmonologist Dr. Nicholson: Most recent PFTs with FEV1 of 0.41, (22% predicted)  Patient was recently discharged 7/29 after being hospitalized for COPD exacerbation.  She was discharged on a steroid taper and did not require oxygen at the time of discharge.  She has seen both her PCP and her pulmonologist after discharge and had been improving back to her baseline  Patient relates the day of admission she had the acute onset of dyspnea.  Did not improve with her home nebulizers or inhalers.  She notes her home pulse ox showed her saturations were in the 70s so she came to the ER.  Upon presentation to the ER saturations were 74% on room air.  Patient was given DuoNeb, IV azithromycin, and prednisone 60 mg with improvement and was admitted for further evaluation  Patient notes her breathing has improved but not yet at baseline  Will continue Xopenex/Atrovent tid and home Pulmicort for her COPD exacerbation  Patient has already received p.o. prednisone this morning overdue for her acute exacerbation should be changed to IV Solu-Medrol  Will continue p.o. azithromycin for COPD exacerbation, today is day 2/3  Chest x-ray without focal infiltrate  Will order procalcitonin for further evaluation of bacterial lower respiratory tract infection  Patient follows with pulmonologist Dr. Nicholson as an outpatient  Differential diagnosis for acute onset of dyspnea also includes PE however her age-adjusted D-dimer is unremarkable.  No evidence of flash pulmonary edema.  No evidence of acute ischemia based on EKG or cardiac enzymes.  Patient denies any aspiration triggering this episode  Continue current treatment regimen and monitor for response

## 2024-08-15 NOTE — H&P
[facility]  H&P  Name: Sarah Zayas 78 y.o. female I MRN: [id number]  Unit/Bed#: 7T [facility] 702-01 I Date of Admission: 8/14/2024   Date of Service: 8/15/2024 I Hospital Day: 0      This is not a complete H&P, Patient was admitted remotely, preliminary admission orders placed and patient will be seen formally by day time team for formal H&P.     Assessment & Plan   No new Assessment & Plan notes have been filed under this hospital service since the last note was generated.  Service: Hospitalist           VTE Prophylaxis: {VTE Prophylaxis Meds:27697}  / {Mechanical VTE Prophylaxis:81336}   Code Status: ***  POLST: {POLST on Admission:71139}  Discussion with family: ***    Anticipated Length of Stay:  Patient will be admitted on an Observation basis with an anticipated length of stay of  *** 2 midnights.   Justification for Hospital Stay: ***    Total Time for Visit, including Counseling / Coordination of Care: {Note Time:54661}.  Greater than 50% of this total time spent on direct patient counseling and coordination of care.    Chief Complaint:   ***    History of Present Illness:    Sarah Zayas is a 78 y.o. female who presents with ***.    Review of Systems:    Review of Systems    Past Medical and Surgical History:     Past Medical History:   Diagnosis Date    Asthma     Chronic obstructive pulmonary disease (HCC) 9/26/2012    GERD (gastroesophageal reflux disease)     Hypertension 10/11/2018    Hypothyroid     Osteoarthritis 9/26/2012    Temporal arteritis (HCC)     Tubular adenoma     Type 2 diabetes mellitus with complication, without long-term current use of insulin (formerly [facility]) 1/14/2020       Past Surgical History:   Procedure Laterality Date    EYE SURGERY Right 12/06/2019    cataract LVCFS    HYSTERECTOMY      NO PAST SURGERIES      ALSO NO RELEVANT PAST SURRGICAL HX AS PER [facility]       Meds/Allergies:    Prior to Admission medications    Medication Sig Start Date End Date Taking?  Authorizing Provider   acetaminophen (TYLENOL) 500 mg tablet Take 1 tablet (500 mg total) by mouth every 6 (six) hours as needed (pain fevers) 12/27/19   Betsy Wynn PA-C   albuterol (2.5 mg/3 mL) 0.083 % nebulizer solution Take 3 mL (2.5 mg total) by nebulization 2 (two) times a day 6/16/24   Linsey Navarro DO   albuterol (PROVENTIL HFA,VENTOLIN HFA) 90 mcg/act inhaler Inhale 2 puffs every 6 (six) hours as needed for wheezing or shortness of breath 8/15/22   Nicolas Nix MD   ALPRAZolam (XANAX) 0.25 mg tablet Take 1 tablet (0.25 mg total) by mouth daily at bedtime as needed for anxiety or sleep 6/25/24   Sally Schwarz MD   aspirin (ECOTRIN LOW STRENGTH) 81 mg EC tablet Take 1 tablet (81 mg total) by mouth daily 6/5/23   Nicolas Nix MD   Biotin 10 MG TABS Take by mouth in the morning    Historical Provider, MD   budesonide (Pulmicort) 0.5 mg/2 mL nebulizer solution Take 2 mL (0.5 mg total) by nebulization 2 (two) times a day Rinse mouth after use.  Patient taking differently: Take 2 mL by nebulization 2 (two) times a day. Rinse mouth after use.   May take this right after taking levabuterol/ipratropium.   Indications: Chronic Obstructive Lung Disease 5/6/24 6/5/24  Mike Nicholson MD   Calcium Carb-Cholecalciferol (Caltrate 600+D3) 600-20 MG-MCG TABS Take 1 tablet by mouth 2 (two) times a day 6/5/23   Nicolas Nix MD   cetirizine (ZyrTEC) 10 mg tablet Take 1 tablet (10 mg total) by mouth daily With food/in the evening 6/5/23   Nicolas Nix MD   denosumab (PROLIA) 60 mg/mL Inject 1 mL (60 mg total) under the skin once for 1 dose 4/15/24 4/15/24  Nicolas Nix MD   Diclofenac Sodium (VOLTAREN) 1 % Apply 2 g topically 4 (four) times a day  Patient taking differently: Apply 2 g topically 4 (four) times a day back 7/22/24   Karl Arita MD   ipratropium (ATROVENT) 0.02 % nebulizer solution Take 2.5 mL (0.5 mg total) by nebulization 2 (two) times a day 6/16/24 7/16/24  Linsey Michele  DO Ramon   ipratropium (ATROVENT) 0.02 % nebulizer solution USE 1 VIAL BY NEBULIZATION THREE TIMES A DAY 7/3/24   Mike Nicholson MD   levalbuterol (XOPENEX) 1.25 mg/3 mL nebulizer solution  24   Historical Provider, MD   levothyroxine 50 mcg tablet TAKE ONE TABLET BY MOUTH ONCE DAILY 24   Nicolas Nix MD   lidocaine (Lidoderm) 5 % Apply 1 patch topically over 12 hours daily Remove & Discard patch within 12 hours or as directed by MD 24   Karl Arita MD   pantoprazole (PROTONIX) 40 mg tablet TAKE ONE TABLET BY MOUTH ONCE DAILY 24   Nicolas Nix MD   predniSONE 10 mg tablet 4 tabs x 3 days, 3 tabs x 3tabs, 2 tabs x 3 days, 1 tab x 3 days, then stop 24   Saad Mckeon DO   propranolol (INDERAL) 10 mg tablet TAKE ONE TABLET BY MOUTH TWICE A DAY 24   Nicolas Nix MD   saccharomyces boulardii (FLORASTOR) 250 mg capsule Take 1 capsule (250 mg total) by mouth 2 (two) times a day 23   Nicolas Nix MD   Trelegy Ellipta 100-62.5-25 MCG/ACT inhaler  6/3/24   Historical Provider, MD   valsartan (DIOVAN) 160 mg tablet Take 1 tablet (160 mg total) by mouth daily Please STOP Lisinopril,.. 3/11/24   Nicolas Nix MD   vitamin B-12 (VITAMIN B-12) 1,000 mcg tablet Take 1 tablet (1,000 mcg total) by mouth daily 23   Nicolas Nix MD     {Home Meds Review:70583}    Allergies: No Known Allergies    Social History:     Marital Status: Legally    Occupation: Not applicable  Patient Pre-hospital Living Situation: ***  Patient Pre-hospital Level of Mobility: ***  Patient Pre-hospital Diet Restrictions: ***  Substance Use History:   Social History     Substance and Sexual Activity   Alcohol Use Never     Social History     Tobacco Use   Smoking Status Former    Current packs/day: 0.00    Average packs/day: 1 pack/day for 54.0 years (54.0 ttl pk-yrs)    Types: Cigarettes    Start date:     Quit date:     Years since quittin.6   Smokeless Tobacco Never   Tobacco  "Comments    TOBACCO USE, NO PASSIVE SMOKE EXPOSURE     Social History     Substance and Sexual Activity   Drug Use No       Family History:    {SL IP FAM HISTORY SMARTLIST:775624210}      Vitals:   Blood Pressure: (!) 176/83 (08/14/24 2303)  Pulse: 58 (08/15/24 0353)  Temperature: 98.1 °F (36.7 °C) (08/14/24 2303)  Temp Source: Temporal (08/14/24 2303)  Respirations: 20 (08/15/24 0353)  Height: 5' 3\" (160 cm) (08/14/24 2303)  Weight - Scale: 45 kg (99 lb 4.8 oz) (08/14/24 2303)  SpO2: 93 % (08/14/24 2334)    Physical Exam    Physical Exam  Vitals and nursing note reviewed.   HENT:      Head: Normocephalic and atraumatic.      Nose: Nose normal.   Eyes:      Pupils: Pupils are equal, round, and reactive to light.   Cardiovascular:      Rate and Rhythm: Normal rate and regular rhythm.   Pulmonary:      Effort: Pulmonary effort is normal. No respiratory distress.      Breath sounds: Normal breath sounds. No stridor. No wheezing.   Abdominal:      General: Abdomen is flat. Bowel sounds are normal. There is no distension.      Palpations: There is no mass.      Tenderness: There is no guarding.   Musculoskeletal:         General: No swelling or tenderness. Normal range of motion.   Skin:     General: Skin is warm and dry.   Neurological:      General: No focal deficit present.      Mental Status: He is alert and oriented to person, place, and time. Mental status is at baseline.       Additional Data:     Lab Results: {Results Review Statement:02480}    Results from last 7 days   Lab Units 08/14/24 2024   WBC Thousand/uL 7.85   HEMOGLOBIN g/dL 13.2   HEMATOCRIT % 43.1   PLATELETS Thousands/uL 184   SEGS PCT % 83*   LYMPHO PCT % 7*   MONO PCT % 8   EOS PCT % 2     Results from last 7 days   Lab Units 08/14/24 2024   SODIUM mmol/L 140   POTASSIUM mmol/L 4.3   CHLORIDE mmol/L 97   CO2 mmol/L 35*   BUN mg/dL 33*   CREATININE mg/dL 0.64   ANION GAP mmol/L 8   CALCIUM mg/dL 9.8   GLUCOSE RANDOM mg/dL 151*                   "     Imaging: {Results Review Statement:73713}    XR chest 1 view portable   ED Interpretation by Deven Atkinson MD (08/14 2046)   Nad unchanged          EKG, Pathology, and Other Studies Reviewed on Admission:   EKG: ***    Allscripts / Epic Records Reviewed: {Yes or No:43049}     ** Please Note: This note has been constructed using a voice recognition system. **

## 2024-08-15 NOTE — ASSESSMENT & PLAN NOTE
Patient presented with acute respiratory failure with hypoxia in the setting with COPD exacerbation  At home her saturations were 70s percent on her pulse ox, she was 74% on room air in the ER  Patient had 2 L nasal cannula improved with normalization of her saturations, currently 100% on 2 L  Age-adjusted D-dimer noted to be normal  Her hypoxia secondary to her acute COPD exacerbation  Continue to titrate oxygen as needed  Will need home O2 eval prior to discharge  Will also check nocturnal pulse ox evaluate for nocturnal hypoxia

## 2024-08-15 NOTE — ASSESSMENT & PLAN NOTE
Malnutrition Findings:    BMI Findings:   Body mass index is 17.59 kg/m².   Nutritional supplements, nutrition consult, encourage p.o. 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

## 2024-08-15 NOTE — RESPIRATORY THERAPY NOTE
RT Protocol Note  Sarah Zayas 78 y.o. female MRN: [id number]  Unit/Bed#: 7T [facility] 702-01 Encounter: [id number]    Assessment    Active Problems:  There are no active Hospital Problems.      Home Pulmonary Medications:  Xopenex/Atrovent  TID  Pulmicrot BID  Albuterol HFA q6prn       Past Medical History:   Diagnosis Date    Asthma     Chronic obstructive pulmonary disease (HCC) 2012    GERD (gastroesophageal reflux disease)     Hypertension 10/11/2018    Hypothyroid     Osteoarthritis 2012    Temporal arteritis (HCC)     Tubular adenoma     Type 2 diabetes mellitus with complication, without long-term current use of insulin (HCC) 2020     Social History     Socioeconomic History    Marital status: Legally      Spouse name: None    Number of children: None    Years of education: None    Highest education level: None   Occupational History    Occupation: retired   Tobacco Use    Smoking status: Former     Current packs/day: 0.00     Average packs/day: 1 pack/day for 54.0 years (54.0 ttl pk-yrs)     Types: Cigarettes     Start date:      Quit date:      Years since quittin.6    Smokeless tobacco: Never    Tobacco comments:     TOBACCO USE, NO PASSIVE SMOKE EXPOSURE   Vaping Use    Vaping status: Never Used   Substance and Sexual Activity    Alcohol use: Never    Drug use: No    Sexual activity: Not Currently   Other Topics Concern    None   Social History Narrative    None     Social Determinants of Health     Financial Resource Strain: Low Risk  (2023)    Overall Financial Resource Strain (CARDIA)     Difficulty of Paying Living Expenses: Not hard at all   Food Insecurity: No Food Insecurity (2024)    Hunger Vital Sign     Worried About Running Out of Food in the Last Year: Never true     Ran Out of Food in the Last Year: Never true   Transportation Needs: No Transportation Needs (2024)    PRAPARE - Transportation     Lack of Transportation (Medical): No     Lack of  Transportation (Non-Medical): No   Physical Activity: Unknown (1/7/2020)    Exercise Vital Sign     Days of Exercise per Week: Patient declined     Minutes of Exercise per Session: Patient declined   Stress: No Stress Concern Present (1/7/2020)    [employer] [place] [employer] - Occupational Stress Questionnaire     Feeling of Stress : Not at all   Social Connections: Unknown (1/7/2020)    Social Connection and Isolation Panel [NHANES]     Frequency of Communication with Friends and Family: Patient declined     Frequency of Social Gatherings with Friends and Family: Patient declined     Attends Mu-ism Services: Patient declined     Active Member of Clubs or Organizations: Patient declined     Attends Club or Organization Meetings: Patient declined     Marital Status: Patient declined   Intimate Partner Violence: Not At Risk (1/7/2020)    Humiliation, Afraid, Rape, and Kick questionnaire     Fear of Current or Ex-Partner: No     Emotionally Abused: No     Physically Abused: No     Sexually Abused: No   Housing Stability: Low Risk  (7/24/2024)    Housing Stability Vital Sign     Unable to Pay for Housing in the Last Year: No     Number of Times Moved in the Last Year: 0     Homeless in the Last Year: No       Subjective         Objective    Physical Exam:   Assessment Type: Pre-treatment  General Appearance: Alert, Awake  Respiratory Pattern: Dyspnea with exertion, Normal  Bilateral Breath Sounds: Diminished    Vitals:  Blood pressure (!) 183/99, pulse 104, temperature 97.5 °F (36.4 °C), temperature source Tympanic, resp. rate (!) 40, weight 44.9 kg (98 lb 15.8 oz), SpO2 93%, not currently breastfeeding.          Imaging and other studies:           Plan    Respiratory Plan: No distress/Pulmonary history      Patient assessed per Respiratory Protocol.  Pt came to ER low Spo2 stated by patient upper 70-low 80.  With fingers blue per pt.  Per assessment.  BS Diminished but clear,Sp02 93 on 1.5 lpm.  RR 20  no distress.  Pt ordered on Xopenex/Atrovent TID, Symbicort BID.

## 2024-08-16 ENCOUNTER — HOME CARE VISIT (OUTPATIENT)
Dept: HOME HEALTH SERVICES | Facility: HOME HEALTHCARE | Age: 79
End: 2024-08-16
Payer: COMMERCIAL

## 2024-08-16 LAB
ANION GAP SERPL CALCULATED.3IONS-SCNC: 7 MMOL/L (ref 4–13)
BUN SERPL-MCNC: 37 MG/DL (ref 5–25)
CALCIUM SERPL-MCNC: 8.7 MG/DL (ref 8.4–10.2)
CHLORIDE SERPL-SCNC: 101 MMOL/L (ref 96–108)
CO2 SERPL-SCNC: 30 MMOL/L (ref 21–32)
CREAT SERPL-MCNC: 0.61 MG/DL (ref 0.6–1.3)
ERYTHROCYTE [DISTWIDTH] IN BLOOD BY AUTOMATED COUNT: 13.7 % (ref 11.6–15.1)
GFR SERPL CREATININE-BSD FRML MDRD: 87 ML/MIN/1.73SQ M
GLUCOSE SERPL-MCNC: 122 MG/DL (ref 65–140)
HCT VFR BLD AUTO: 35.7 % (ref 34.8–46.1)
HGB BLD-MCNC: 10.8 G/DL (ref 11.5–15.4)
MCH RBC QN AUTO: 28.5 PG (ref 26.8–34.3)
MCHC RBC AUTO-ENTMCNC: 30.3 G/DL (ref 31.4–37.4)
MCV RBC AUTO: 94 FL (ref 82–98)
PLATELET # BLD AUTO: 164 THOUSANDS/UL (ref 149–390)
PMV BLD AUTO: 10.7 FL (ref 8.9–12.7)
POTASSIUM SERPL-SCNC: 5.1 MMOL/L (ref 3.5–5.3)
PROCALCITONIN SERPL-MCNC: 0.13 NG/ML
RBC # BLD AUTO: 3.79 MILLION/UL (ref 3.81–5.12)
SODIUM SERPL-SCNC: 138 MMOL/L (ref 135–147)
WBC # BLD AUTO: 7.11 THOUSAND/UL (ref 4.31–10.16)

## 2024-08-16 PROCEDURE — 84145 PROCALCITONIN (PCT): CPT | Performed by: INTERNAL MEDICINE

## 2024-08-16 PROCEDURE — 94640 AIRWAY INHALATION TREATMENT: CPT

## 2024-08-16 PROCEDURE — 99232 SBSQ HOSP IP/OBS MODERATE 35: CPT | Performed by: INTERNAL MEDICINE

## 2024-08-16 PROCEDURE — 94760 N-INVAS EAR/PLS OXIMETRY 1: CPT

## 2024-08-16 PROCEDURE — 97167 OT EVAL HIGH COMPLEX 60 MIN: CPT

## 2024-08-16 PROCEDURE — 97163 PT EVAL HIGH COMPLEX 45 MIN: CPT

## 2024-08-16 PROCEDURE — 85027 COMPLETE CBC AUTOMATED: CPT | Performed by: INTERNAL MEDICINE

## 2024-08-16 PROCEDURE — 94761 N-INVAS EAR/PLS OXIMETRY MLT: CPT

## 2024-08-16 PROCEDURE — 80048 BASIC METABOLIC PNL TOTAL CA: CPT | Performed by: INTERNAL MEDICINE

## 2024-08-16 RX ORDER — PREDNISONE 20 MG/1
40 TABLET ORAL DAILY
Status: DISCONTINUED | OUTPATIENT
Start: 2024-08-17 | End: 2024-08-20

## 2024-08-16 RX ADMIN — PANTOPRAZOLE SODIUM 40 MG: 40 TABLET, DELAYED RELEASE ORAL at 08:31

## 2024-08-16 RX ADMIN — GUAIFENESIN 600 MG: 600 TABLET ORAL at 17:14

## 2024-08-16 RX ADMIN — BUDESONIDE 0.5 MG: 0.5 INHALANT ORAL at 19:13

## 2024-08-16 RX ADMIN — LEVOTHYROXINE SODIUM 50 MCG: 50 TABLET ORAL at 08:31

## 2024-08-16 RX ADMIN — METHYLPREDNISOLONE SODIUM SUCCINATE 20 MG: 40 INJECTION, POWDER, FOR SOLUTION INTRAMUSCULAR; INTRAVENOUS at 08:29

## 2024-08-16 RX ADMIN — ASPIRIN 81 MG: 81 TABLET, COATED ORAL at 08:45

## 2024-08-16 RX ADMIN — AZITHROMYCIN DIHYDRATE 500 MG: 250 TABLET ORAL at 22:25

## 2024-08-16 RX ADMIN — LEVALBUTEROL HYDROCHLORIDE 1.25 MG: 1.25 SOLUTION RESPIRATORY (INHALATION) at 14:15

## 2024-08-16 RX ADMIN — ALPRAZOLAM 0.25 MG: 0.5 TABLET ORAL at 22:25

## 2024-08-16 RX ADMIN — BUDESONIDE 0.5 MG: 0.5 INHALANT ORAL at 08:00

## 2024-08-16 RX ADMIN — LEVALBUTEROL HYDROCHLORIDE 1.25 MG: 1.25 SOLUTION RESPIRATORY (INHALATION) at 19:13

## 2024-08-16 RX ADMIN — IPRATROPIUM BROMIDE 0.5 MG: 0.5 SOLUTION RESPIRATORY (INHALATION) at 14:15

## 2024-08-16 RX ADMIN — ENOXAPARIN SODIUM 40 MG: 40 INJECTION SUBCUTANEOUS at 08:33

## 2024-08-16 RX ADMIN — IPRATROPIUM BROMIDE 0.5 MG: 0.5 SOLUTION RESPIRATORY (INHALATION) at 08:00

## 2024-08-16 RX ADMIN — LEVALBUTEROL HYDROCHLORIDE 1.25 MG: 1.25 SOLUTION RESPIRATORY (INHALATION) at 08:00

## 2024-08-16 RX ADMIN — CYANOCOBALAMIN TAB 1000 MCG 1000 MCG: 1000 TAB at 08:32

## 2024-08-16 RX ADMIN — GUAIFENESIN 600 MG: 600 TABLET ORAL at 08:32

## 2024-08-16 RX ADMIN — IPRATROPIUM BROMIDE 0.5 MG: 0.5 SOLUTION RESPIRATORY (INHALATION) at 19:13

## 2024-08-16 NOTE — PLAN OF CARE
Problem: RESPIRATORY - ADULT  Goal: Achieves optimal ventilation and oxygenation  Description: INTERVENTIONS:  - Assess for changes in respiratory status  - Assess for changes in mentation and behavior  - Position to facilitate oxygenation and minimize respiratory effort  - Oxygen administered by appropriate delivery if ordered  - Initiate smoking cessation education as indicated  - Encourage broncho-pulmonary hygiene including cough, deep breathe, Incentive Spirometry  - Assess the need for suctioning and aspirate as needed  - Assess and instruct to report SOB or any respiratory difficulty  - Respiratory Therapy support as indicated  Outcome: Progressing     Problem: INFECTION - ADULT  Goal: Absence or prevention of progression during hospitalization  Description: INTERVENTIONS:  - Assess and monitor for signs and symptoms of infection  - Monitor lab/diagnostic results  - Monitor all insertion sites, i.e. indwelling lines, tubes, and drains  - Monitor endotracheal if appropriate and nasal secretions for changes in amount and color  - [place] appropriate cooling/warming therapies per order  - Administer medications as ordered  - Instruct and encourage patient and family to use good hand hygiene technique  - Identify and instruct in appropriate isolation precautions for identified infection/condition  Outcome: Progressing     Problem: SAFETY ADULT  Goal: Patient will remain free of falls  Description: INTERVENTIONS:  - Educate patient/family on patient safety including physical limitations  - Instruct patient to call for assistance with activity   - Consult OT/PT to assist with strengthening/mobility   - Keep Call bell within reach  - Keep bed low and locked with side rails adjusted as appropriate  - Keep care items and personal belongings within reach  - Initiate and maintain comfort rounds  - Make Fall Risk Sign visible to staff  - Apply yellow socks and bracelet for high fall risk patients  - Consider moving  patient to room near nurses station  Outcome: Progressing  Goal: Maintain or return to baseline ADL function  Description: INTERVENTIONS:  -  Assess patient's ability to carry out ADLs; assess patient's baseline for ADL function and identify physical deficits which impact ability to perform ADLs (bathing, care of mouth/teeth, toileting, grooming, dressing, etc.)  - Assess/evaluate cause of self-care deficits   - Assess range of motion  - Assess patient's mobility; develop plan if impaired  - Assess patient's need for assistive devices and provide as appropriate  - Encourage maximum independence but intervene and supervise when necessary  - Involve family in performance of ADLs  - Assess for home care needs following discharge   - Consider OT consult to assist with ADL evaluation and planning for discharge  - Provide patient education as appropriate  Outcome: Progressing  Goal: Maintains/Returns to pre admission functional level  Description: INTERVENTIONS:  - Perform AM-PAC 6 Click Basic Mobility/ Daily Activity assessment daily.  - Set and communicate daily mobility goal to care team and patient/family/caregiver.   - Collaborate with rehabilitation services on mobility goals if consulted  - Out of bed for toileting  - Record patient progress and toleration of activity level   Outcome: Progressing     Problem: Knowledge Deficit  Goal: Patient/family/caregiver demonstrates understanding of disease process, treatment plan, medications, and discharge instructions  Description: Complete learning assessment and assess knowledge base.  Interventions:  - Provide teaching at level of understanding  - Provide teaching via preferred learning methods  Outcome: Progressing     Problem: DISCHARGE PLANNING  Goal: Discharge to home or other facility with appropriate resources  Description: INTERVENTIONS:  - Identify barriers to discharge w/patient and caregiver  - Arrange for needed discharge resources and transportation as  appropriate  - Identify discharge learning needs (meds, wound care, etc.)  - Arrange for interpretive services to assist at discharge as needed  - Refer to Case Management Department for coordinating discharge planning if the patient needs post-hospital services based on physician/advanced practitioner order or complex needs related to functional status, cognitive ability, or social support system  Outcome: Progressing

## 2024-08-16 NOTE — CASE MANAGEMENT
Case Management Discharge Planning Note    Patient name Sarah Zayas  Location 7T U 702/7T [facility] 702-01 MRN [id number]  : 1945 Date 2024       Current Admission Date: 2024  Current Admission Diagnosis:Acute exacerbation of chronic obstructive pulmonary disease (COPD) (HCC)   Patient Active Problem List    Diagnosis Date Noted Date Diagnosed    Severe protein-calorie malnutrition (HCC) 2024     COPD, severe (HCC) 2024     Acute respiratory failure with hypoxia ([facility]) 2023     COVID-19 2023     Abnormal CT scan 2023     Infectious sialoadenitis of major salivary gland 2023     Cellulitis of neck 2023     Left upper lobe pulmonary nodule 2023     Postnasal drip 2023     Raynaud's phenomenon without gangrene 2022     Temporal arteritis ([facility]) 2020     Hypertension 10/11/2018     Rectus sheath hematoma, initial encounter 10/10/2018     Other specified hypothyroidism 2018     Gastroesophageal reflux disease without esophagitis 2018     Acute exacerbation of chronic obstructive pulmonary disease (COPD) (HCC) 2012     Vitamin D deficiency 2012     Osteoarthritis 2012       LOS (days): 1  Geometric Mean LOS (GMLOS) (days): 3.6  Days to GMLOS:2.5     OBJECTIVE:  Risk of Unplanned Readmission Score: 24.05         Current admission status: Inpatient   Preferred Pharmacy:   [facility] - [place] [id number]  [address]  [facility] [address]  Phone: [phone] Fax: [phone]    Primary Care Provider: Nicolas Nix MD    Primary Insurance: [employer]  Secondary Insurance:     DISCHARGE DETAILS:    Discharge planning discussed with:: Patient, son Steven and son on phone face time  Freedom of Choice: Yes     CM contacted family/caregiver?: Yes  Were Treatment Team discharge recommendations reviewed with patient/caregiver?: Yes  Did patient/caregiver verbalize understanding  of patient care needs?: Yes  Were patient/caregiver advised of the risks associated with not following Treatment Team discharge recommendations?: Yes    Contacts  Patient Contacts: 2 sons  Relationship to Patient:: Family  Contact Method: In Person  Reason/Outcome: Emergency Contact, Discharge Planning          Additional Comments: Met with patient and son Steven at bedside other son on phone via [employer].  Discussed home with C.  Explained possible need for additional help in home.  Provided sons with private [facility] agencies and info for a [facility].  Home O2 eval and desat study ordered.  Patient and family asking about home O2.  CM department following thru discharge

## 2024-08-16 NOTE — ASSESSMENT & PLAN NOTE
Malnutrition Findings:   Adult Malnutrition type: Chronic illnessBMI Findings:   Body mass index is 17.59 kg/m².   Nutritional supplements, nutrition consult, encourage p.o. 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

## 2024-08-16 NOTE — MALNUTRITION/BMI
This medical record reflects one or more clinical indicators suggestive of malnutrition and/or morbid obesity.    Malnutrition Findings:   Adult Malnutrition type: Chronic illness  Adult Degree of Malnutrition: Other severe protein calorie malnutrition  Malnutrition Characteristics: Fat loss, Muscle loss, Inadequate energy, Weight loss                  360 Statement: related to inadequate energy intake due to decreased appetite , as evidenced by, 7.5% loss x 2-3 months-significant loss, hollow supraclavicular space, sunken orbital, wasted interosseous.Treatment: Regular diet, oral nutrition supplements TID.    BMI Findings:           Body mass index is 17.59 kg/m².     See Nutrition note dated 8/16/2024 for additional details.  Completed nutrition assessment is viewable in the nutrition documentation.

## 2024-08-16 NOTE — RESPIRATORY THERAPY NOTE
Home Oxygen Qualifying Test     Patient name: Sarah Zayas        : 1945   Date of Test:  2024  Diagnosis:    Home Oxygen Test:    **Medicare Guidelines require item(s) 1-5 on all ambulatory patients or 1 and 2 on non-ambulatory patients.    1. Baseline SPO2 on Room Air at rest 92 %     2. SPO2 during exertion on Room Air 86  %  During exertion monitor SPO2. If SPO2 increases >=89%, do not add supplemental oxygen    3. SPO2 on Oxygen at Rest 94 % at 1 LPM    4. SPO2 during exertion on Oxygen 88 % at 1 LPM    5.  Test performed during exertion activity.      [x]  Supplemental Home Oxygen is indicated.    []  Client does not qualify for home oxygen.    Respiratory Additional Notes- Patient ambulated in the hallway for a short period. RT a bit concerned due to patient being SOB. 02 added to improve sp02 and facilitate/relief SOB    Mina Rothman, RT

## 2024-08-16 NOTE — ASSESSMENT & PLAN NOTE
Patient presented with acute respiratory failure with hypoxia in the setting with COPD exacerbation  At home her saturations were 70s percent on her pulse ox, she was 74% on room air in the ER  Patient had 2 L nasal cannula improved with normalization of her saturations, currently 100% on 2 L  Age-adjusted D-dimer noted to be normal  Her hypoxia secondary to her acute COPD exacerbation  Continue to titrate oxygen as needed  Check home oxygen evalu

## 2024-08-16 NOTE — PROGRESS NOTES
[facility]  Progress Note  Name: Sarah Zayas I  MRN: [id number]  Unit/Bed#: 7T [facility] 702-01 I Date of Admission: 8/14/2024   Date of Service: 8/16/2024 I Hospital Day: 1    Assessment & Plan   Severe protein-calorie malnutrition (HCC)  Assessment & Plan  Malnutrition Findings:   Adult Malnutrition type: Chronic illnessBMI Findings:   Body mass index is 17.59 kg/m².   Nutritional supplements, nutrition consult, encourage p.o. 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    Acute respiratory failure with hypoxia (HCC)  Assessment & Plan  Patient presented with acute respiratory failure with hypoxia in the setting with COPD exacerbation  At home her saturations were 70s percent on her pulse ox, she was 74% on room air in the ER  Patient had 2 L nasal cannula improved with normalization of her saturations, currently 100% on 2 L  Age-adjusted D-dimer noted to be normal  Her hypoxia secondary to her acute COPD exacerbation  Continue to titrate oxygen as needed  Check home oxygen evalu     Raynaud's phenomenon without gangrene  Assessment & Plan  Noted clinically on exam as well as an old records  Continue supportive care    Hypertension  Assessment & Plan  Will continue home Diovan 160 mg daily and propranolol 10 mg daily  Monitor blood pressure, titrate as needed    Gastroesophageal reflux disease without esophagitis  Assessment & Plan  Continue proton pump inhibitor    Other specified hypothyroidism  Assessment & Plan  Continue Synthroid    * Acute exacerbation of chronic obstructive pulmonary disease (COPD) (HCC)  Assessment & Plan  Patient is a 78-year-old female with past medical history significant for COPD, and hypertension who presented to the ER complaining of shortness of breath and hypoxia on her home pulse ox.    She of severe COPD and follows with a pulmonologist Dr. Nicholson: Most recent PFTs with FEV1 of 0.41, (22% predicted)  Patient was recently discharged 7/29  after being hospitalized for COPD exacerbation.  She was discharged on a steroid taper and did not require oxygen at the time of discharge.  She has seen both her PCP and her pulmonologist after discharge and had been improving back to her baseline  Patient relates the day of admission she had the acute onset of dyspnea.  Did not improve with her home nebulizers or inhalers.  She notes her home pulse ox showed her saturations were in the 70s so she came to the ER.  Upon presentation to the ER saturations were 74% on room air.  Patient was given DuoNeb, IV azithromycin, and prednisone 60 mg with improvement and was admitted for further evaluation  Will continue Xopenex/Atrovent tid and home Pulmicort for her COPD exacerbation  Change solumedrol to prednisone 40mg daily to start tomorrow   Will continue p.o. azithromycin for COPD exacerbation, today is day 3/3  Chest x-ray without focal infiltrate  Procal negative   Patient follows with pulmonologist Dr. Nicholson as an outpatient  Continue current treatment regimen and monitor for response               VTE Pharmacologic Prophylaxis: VTE Score: 6 lovenox     Mobility:   Basic Mobility Inpatient Raw Score: 22  JH-HLM Goal: 7: Walk 25 feet or more  JH-HLM Achieved: 7: Walk 25 feet or more      Patient Centered Rounds:  discussed with her nurse       Education and Discussions with Family / Patient: attempting to call her sister. No answer     Current Length of Stay: 1 day(s)  Current Patient Status: Inpatient   Discharge Plan: Anticipate discharge tomorrow to home with home services.    Code Status: Level 1 - Full Code    Subjective:   Pt seen and examined. She states her breathing a little better right now. No f/c no cp no n/v/d no abd pain     Objective:     Vitals:   Temp (24hrs), Av.7 °F (36.5 °C), Min:97.6 °F (36.4 °C), Max:97.8 °F (36.6 °C)    Temp:  [97.6 °F (36.4 °C)-97.8 °F (36.6 °C)] 97.7 °F (36.5 °C)  HR:  [78-93] 78  Resp:  [18-20] 18  BP: (100-115)/(59-69)  115/62  SpO2:  [92 %-98 %] 97 %  Body mass index is 17.59 kg/m².     Input and Output Summary (last 24 hours):     Intake/Output Summary (Last 24 hours) at 8/16/2024 1447  Last data filed at 8/16/2024 0900  Gross per 24 hour   Intake 480 ml   Output --   Net 480 ml       Physical Exam:   Physical Exam  Constitutional:       Appearance: Normal appearance.   HENT:      Head: Normocephalic and atraumatic.   Eyes:      Extraocular Movements: Extraocular movements intact.      Pupils: Pupils are equal, round, and reactive to light.   Cardiovascular:      Rate and Rhythm: Normal rate and regular rhythm.      Heart sounds: No murmur heard.     No friction rub. No gallop.   Pulmonary:      Effort: Pulmonary effort is normal. No respiratory distress.      Breath sounds: Normal breath sounds. No wheezing, rhonchi or rales.   Abdominal:      General: Bowel sounds are normal. There is no distension.      Palpations: Abdomen is soft.      Tenderness: There is no abdominal tenderness. There is no guarding or rebound.   Neurological:      Mental Status: She is alert and oriented to person, place, and time.          Additional Data:     Labs:  Results from last 7 days   Lab Units 08/16/24  0433 08/15/24  0432 08/14/24  2024   WBC Thousand/uL 7.11 6.78 7.85   HEMOGLOBIN g/dL 10.8* 11.5 13.2   HEMATOCRIT % 35.7 37.5 43.1   PLATELETS Thousands/uL 164 146* 184   SEGS PCT %  --   --  83*   LYMPHO PCT %  --  4* 7*   MONO PCT %  --  2* 8   EOS PCT %  --  0 2     Results from last 7 days   Lab Units 08/16/24  0433 08/15/24  0432   SODIUM mmol/L 138 140   POTASSIUM mmol/L 5.1 4.4   CHLORIDE mmol/L 101 102   CO2 mmol/L 30 31   BUN mg/dL 37* 28*   CREATININE mg/dL 0.61 0.54*   ANION GAP mmol/L 7 7   CALCIUM mg/dL 8.7 8.4   ALBUMIN g/dL  --  3.4*   TOTAL BILIRUBIN mg/dL  --  0.36   ALK PHOS U/L  --  40   ALT U/L  --  29   AST U/L  --  22   GLUCOSE RANDOM mg/dL 122 136                 Results from last 7 days   Lab Units 08/16/24 0433  08/15/24  0432   PROCALCITONIN ng/ml 0.13 0.19       Lines/Drains:  Invasive Devices       Peripheral Intravenous Line  Duration             Peripheral IV 08/14/24 Right Antecubital 1 day                          Imaging: No pertinent imaging reviewed.    Recent Cultures (last 7 days):         Last 24 Hours Medication List:   Current Facility-Administered Medications   Medication Dose Route Frequency Provider Last Rate    acetaminophen  650 mg Oral Q6H PRN Gwyn Phelan MD      albuterol  2.5 mg Nebulization Q6H PRN Gwyn Phelan MD      ALPRAZolam  0.25 mg Oral HS PRN Judy Londono MD      aspirin  81 mg Oral Daily Jduy Londono MD      azithromycin  500 mg Oral Q24H Gwyn Phelan MD      budesonide  0.5 mg Nebulization BID Judy Londono MD      calcium carbonate  1,000 mg Oral Daily PRN Judy Londono MD      vitamin B-12  1,000 mcg Oral Daily Judy Londono MD      docusate sodium  100 mg Oral BID Judy Londono MD      enoxaparin  40 mg Subcutaneous Daily Gwyn Phelan MD      guaiFENesin  600 mg Oral BID Gwyn Phelan MD      ipratropium  0.5 mg Nebulization TID Gwyn Phelan MD      levalbuterol  1.25 mg Nebulization TID Gwyn Phelan MD      levothyroxine  50 mcg Oral Daily Judy Londono MD      losartan  100 mg Oral Daily Judy Londono MD      pantoprazole  40 mg Oral Daily Judy Londono MD      polyethylene glycol  17 g Oral Daily PRN Gwyn Phelan MD      polyethylene glycol  17 g Oral Daily Judy Londono MD      [START ON 8/17/2024] predniSONE  40 mg Oral Daily Janeth Fuentes DO      propranolol  10 mg Oral BID Judy Londono MD          Today, Patient Was Seen By: Janeth Fuentes DO

## 2024-08-16 NOTE — ASSESSMENT & PLAN NOTE
Patient is a 78-year-old female with past medical history significant for COPD, and hypertension who presented to the ER complaining of shortness of breath and hypoxia on her home pulse ox.    She of severe COPD and follows with a pulmonologist Dr. Nicholson: Most recent PFTs with FEV1 of 0.41, (22% predicted)  Patient was recently discharged 7/29 after being hospitalized for COPD exacerbation.  She was discharged on a steroid taper and did not require oxygen at the time of discharge.  She has seen both her PCP and her pulmonologist after discharge and had been improving back to her baseline  Patient relates the day of admission she had the acute onset of dyspnea.  Did not improve with her home nebulizers or inhalers.  She notes her home pulse ox showed her saturations were in the 70s so she came to the ER.  Upon presentation to the ER saturations were 74% on room air.  Patient was given DuoNeb, IV azithromycin, and prednisone 60 mg with improvement and was admitted for further evaluation  Will continue Xopenex/Atrovent tid and home Pulmicort for her COPD exacerbation  Change solumedrol to prednisone 40mg daily to start tomorrow   Will continue p.o. azithromycin for COPD exacerbation, today is day 3/3  Chest x-ray without focal infiltrate  Procal negative   Patient follows with pulmonologist Dr. Nicholson as an outpatient  Continue current treatment regimen and monitor for response

## 2024-08-16 NOTE — RESPIRATORY THERAPY NOTE
"   08/16/24 1415   Respiratory Protocol   Protocol Initiated? Yes   Protocol Selection Respiratory   Language Barrier? No   Medical & Social History Reviewed? Yes   Diagnostic Studies Reviewed? Yes   Physical Assessment Performed? Yes   Respiratory Plan Mild Distress pathway  (Patient states \" some SOB\" comes and goes. Continue bronchodilator therapy as ordered.)   Respiratory Assessment   Assessment Type During-treatment   General Appearance Alert;Awake   Respiratory Pattern Normal;Dyspnea with exertion   Chest Assessment Chest expansion symmetrical   Bilateral Breath Sounds Diminished  (BS clear overall, post bronchodilator therapy)   Location Specific No   Cough None   O2 Device N/C 1   Subjective Data Home 02 eval. pending.       "

## 2024-08-17 PROCEDURE — 94640 AIRWAY INHALATION TREATMENT: CPT

## 2024-08-17 PROCEDURE — 94760 N-INVAS EAR/PLS OXIMETRY 1: CPT

## 2024-08-17 PROCEDURE — 94664 DEMO&/EVAL PT USE INHALER: CPT

## 2024-08-17 PROCEDURE — 99232 SBSQ HOSP IP/OBS MODERATE 35: CPT | Performed by: INTERNAL MEDICINE

## 2024-08-17 RX ORDER — ALPRAZOLAM 0.25 MG
0.12 TABLET ORAL DAILY PRN
Status: DISCONTINUED | OUTPATIENT
Start: 2024-08-17 | End: 2024-08-26 | Stop reason: HOSPADM

## 2024-08-17 RX ADMIN — DOCUSATE SODIUM 100 MG: 100 CAPSULE, LIQUID FILLED ORAL at 17:22

## 2024-08-17 RX ADMIN — ALPRAZOLAM 0.25 MG: 0.5 TABLET ORAL at 14:08

## 2024-08-17 RX ADMIN — ENOXAPARIN SODIUM 40 MG: 40 INJECTION SUBCUTANEOUS at 08:47

## 2024-08-17 RX ADMIN — LEVOTHYROXINE SODIUM 50 MCG: 50 TABLET ORAL at 07:48

## 2024-08-17 RX ADMIN — PROPRANOLOL HYDROCHLORIDE 10 MG: 10 TABLET ORAL at 17:34

## 2024-08-17 RX ADMIN — CYANOCOBALAMIN TAB 1000 MCG 1000 MCG: 1000 TAB at 08:44

## 2024-08-17 RX ADMIN — PANTOPRAZOLE SODIUM 40 MG: 40 TABLET, DELAYED RELEASE ORAL at 08:44

## 2024-08-17 RX ADMIN — PREDNISONE 40 MG: 20 TABLET ORAL at 08:44

## 2024-08-17 RX ADMIN — GUAIFENESIN 600 MG: 600 TABLET ORAL at 17:22

## 2024-08-17 RX ADMIN — IPRATROPIUM BROMIDE 0.5 MG: 0.5 SOLUTION RESPIRATORY (INHALATION) at 19:21

## 2024-08-17 RX ADMIN — DOCUSATE SODIUM 100 MG: 100 CAPSULE, LIQUID FILLED ORAL at 08:44

## 2024-08-17 RX ADMIN — LEVALBUTEROL HYDROCHLORIDE 1.25 MG: 1.25 SOLUTION RESPIRATORY (INHALATION) at 07:30

## 2024-08-17 RX ADMIN — LOSARTAN POTASSIUM 100 MG: 50 TABLET, FILM COATED ORAL at 08:44

## 2024-08-17 RX ADMIN — IPRATROPIUM BROMIDE 0.5 MG: 0.5 SOLUTION RESPIRATORY (INHALATION) at 13:44

## 2024-08-17 RX ADMIN — PROPRANOLOL HYDROCHLORIDE 10 MG: 10 TABLET ORAL at 08:44

## 2024-08-17 RX ADMIN — GUAIFENESIN 600 MG: 600 TABLET ORAL at 08:45

## 2024-08-17 RX ADMIN — BUDESONIDE 0.5 MG: 0.5 INHALANT ORAL at 07:30

## 2024-08-17 RX ADMIN — LEVALBUTEROL HYDROCHLORIDE 1.25 MG: 1.25 SOLUTION RESPIRATORY (INHALATION) at 19:21

## 2024-08-17 RX ADMIN — BUDESONIDE 0.5 MG: 0.5 INHALANT ORAL at 19:21

## 2024-08-17 RX ADMIN — IPRATROPIUM BROMIDE 0.5 MG: 0.5 SOLUTION RESPIRATORY (INHALATION) at 07:30

## 2024-08-17 RX ADMIN — LEVALBUTEROL HYDROCHLORIDE 1.25 MG: 1.25 SOLUTION RESPIRATORY (INHALATION) at 13:44

## 2024-08-17 RX ADMIN — ACETAMINOPHEN 650 MG: 325 TABLET ORAL at 07:54

## 2024-08-17 RX ADMIN — ASPIRIN 81 MG: 81 TABLET, COATED ORAL at 08:44

## 2024-08-17 NOTE — PHYSICAL THERAPY NOTE
Physical Therapy Evaluation    Patient's Name: Sarah Zayas    Admitting Diagnosis  SOB (shortness of breath) [R06.02]  Hypoxia [R09.02]  COPD exacerbation (HCC) [J44.1]    Problem List  Patient Active Problem List   Diagnosis    Acute exacerbation of chronic obstructive pulmonary disease (COPD) (HCC)    Vitamin D deficiency    Other specified hypothyroidism    Gastroesophageal reflux disease without esophagitis    Rectus sheath hematoma, initial encounter    Hypertension    Osteoarthritis    Temporal arteritis (HCC)    Raynaud's phenomenon without gangrene    Postnasal drip    Left upper lobe pulmonary nodule    Infectious sialoadenitis of major salivary gland    Cellulitis of neck    Acute respiratory failure with hypoxia (HCC)    COVID-19    Abnormal CT scan    COPD, severe (HCC)    Severe protein-calorie malnutrition (HCC)       Past Medical History  Past Medical History:   Diagnosis Date    Asthma     Chronic obstructive pulmonary disease (HCC) 09/26/2012    COPD (chronic obstructive pulmonary disease) (HCC)     Disease of thyroid gland     GERD (gastroesophageal reflux disease)     Hypertension 10/11/2018    Hypothyroid     Osteoarthritis 09/26/2012    Temporal arteritis (HCC)     Tubular adenoma     Type 2 diabetes mellitus with complication, without long-term current use of insulin ([facility]) 01/14/2020       Past Surgical History  Past Surgical History:   Procedure Laterality Date    EYE SURGERY Right 12/06/2019    cataract LVCFS    HYSTERECTOMY      NO PAST SURGERIES      ALSO NO RELEVANT PAST SURRGICAL HX AS PER [facility]       Recent Imaging  XR chest 1 view portable   ED Interpretation by Deven Atkinson MD (08/14 2046)   Nad unchanged      Final Result by Antonio Newton MD (08/15 0743)   Stable chest without acute cardiopulmonary disease.      Workstation performed: [id number]             Recent Vital Signs  Vitals:    08/16/24 0800 08/16/24 1100 08/16/24 1510 08/16/24 1914   BP:  115/62 107/53    BP Location:  Left  arm Left arm    Pulse: 78 78 85    Resp: 18 18 18    Temp:  97.7 °F (36.5 °C) 97.7 °F (36.5 °C)    TempSrc:  Temporal Temporal    SpO2: 93% 97% 93% 92%   Weight:       Height:            08/16/24 0945   PT Last Visit   PT Visit Date 08/16/24   Note Type   Note type Evaluation   Pain Assessment   Pain Assessment Tool 0-10   Pain Score No Pain   Restrictions/Precautions   Weight Bearing Precautions Per Order No   Other Precautions O2   Home Living   Type of Home House   Home Layout One level   Bathroom Shower/Tub Tub/shower unit   Bathroom Toilet Standard   Bathroom Accessibility Accessible via walker   Home Equipment Walker;Cane   Prior Function   Level of [place] Independent with ADLs;Independent with functional mobility   Lives With Spouse   Receives Help From Family   Falls in the last 6 months 0   Cognition   Overall Cognitive Status WFL   Arousal/Participation Alert   Attention Within functional limits   Orientation Level Oriented X4   Memory Within functional limits   Following Commands Follows all commands and directions without difficulty   RLE Assessment   RLE Assessment   (3+/5)   LLE Assessment   LLE Assessment   (3+/5)   Coordination   Movements are Fluid and Coordinated 0   Coordination and Movement Description decreased gross motor coordination   Light Touch   RLE Light Touch Impaired   LLE Light Touch Impaired   Transfers   Sit to Stand 5  Supervision   Additional items Increased time required   Stand to Sit 5  Supervision   Additional items Increased time required   Ambulation/Elevation   Gait pattern Step through pattern;Decreased toe off;Decreased heel strike;Excessively slow;Short stride;Decreased foot clearance   Gait Assistance 5  Supervision   Additional items Assist x 1;Verbal cues;Tactile cues   Assistive Device Rolling walker   Distance 60ft   Ambulation/Elevation Additional Comments not able to tolerate stairs at this time   Balance   Static Sitting Fair +   Dynamic Sitting Fair +    Static Standing Fair   Dynamic Standing Fair -   Ambulatory Fair -   Endurance Deficit   Endurance Deficit Yes   Endurance Deficit Description SOB, on O2 not at baseline   Activity Tolerance   Activity Tolerance Patient limited by fatigue   Medical Staff Made Aware spoke to CM   Nurse Made Aware spoke to RN   Assessment   Prognosis Good   Problem List Decreased strength;Decreased range of motion;Decreased endurance;Impaired balance;Decreased mobility;Decreased coordination;Impaired sensation   Barriers to Discharge Inaccessible home environment;Decreased caregiver support   Goals   Patient Goals to get stronger   STG Expiration Date 08/26/24   Short Term Goal #1 see eval note   PT Treatment Day 0   Plan   Treatment/Interventions ADL retraining;Functional transfer training;LE strengthening/ROM;Elevations;Therapeutic exercise;Endurance training;Patient/family training;Equipment eval/education;Bed mobility;Gait training;Spoke to nursing;Spoke to case management;OT   PT Frequency 2-3x/wk   Discharge Recommendation   Rehab Resource Intensity Level, PT II (Moderate Resource Intensity)   Equipment Recommended Walker   Walker Package Recommended Wheeled walker   AM-PAC Basic Mobility Inpatient   Turning in Flat Bed Without Bedrails 4   Lying on Back to Sitting on Edge of Flat Bed Without Bedrails 4   Moving Bed to Chair 3   Standing Up From Chair Using Arms 3   Walk in Room 3   Climb 3-5 Stairs With Railing 3   Basic Mobility Inpatient Raw Score 20   Basic Mobility Standardized Score 43.99   [facility] Highest Level Of Mobility   -HLM Goal 6: Walk 10 steps or more   -HLM Achieved 7: Walk 25 feet or more   End of Consult   Patient Position at End of Consult Bedside chair;All needs within reach         ASSESSMENT                                                                                                                     Sarah Zayas is a 78 y.o. female admitted to [facility] on 8/14/2024 for Acute exacerbation of  chronic obstructive pulmonary disease (COPD) ([facility]). Pt  has a past medical history of Asthma, Chronic obstructive pulmonary disease (HCC) (09/26/2012), COPD (chronic obstructive pulmonary disease) ([facility]), Disease of thyroid gland, GERD (gastroesophageal reflux disease), Hypertension (10/11/2018), Hypothyroid, Osteoarthritis (09/26/2012), Temporal arteritis ([facility]), Tubular adenoma, and Type 2 diabetes mellitus with complication, without long-term current use of insulin ([facility]) (01/14/2020).. PT was consulted and pt was seen on 8/16/2024 for mobility assessment and d/c planning.  Impairments limiting pt at this time include decreased ROM, impaired balance, decreased endurance, decreased coordination, increased fall risk, new onset of impairment of functional mobility, decreased ADLS, decreased IADLS, decreased activity tolerance, decreased sensation, and decreased strength. Pt is currently functioning at a supervision assistance x1 level for bed mobility, supervision assistance x1 level for transfers, supervision assistance x1 level for ambulation with Rolling Walker, . The patient's -[facility] Basic Mobility Inpatient Short Form Raw Score is 20. A Raw score of greater than 16 suggests the patient may benefit from discharge to home. Please also refer to the recommendation of the Physical Therapist for safe discharge planning.    Goals                                                                                                                                    1) Bed mobility skills with modified independent assistance to facilitate safe return to previous living environment 2) Functional transfers with modified independent assistance to facilitate safe return to previous living environment  3) Ambulation with least restrictive AD modified independent assistance without LOB and stable vitals for safe ambulation home/ community distances. 4) Stair training up/down flight 12 step/s with appropriate rail/s  and modified  independent assistance for safe access to previous living environment. 5) Improve balance grades to fair + to reduce risk of falls. 6)Improve LE strength grades by 1 to increase independence w/ transfers and gait.  7) PT for ongoing pt and family education; DME needs and D/C planning to promote highest level of function in least restrictive environment.     Recommendations                                                                                                              Pt will benefit from continued skilled IP PT to address the above mentioned impairments in order to maximize recovery and increase functional independence when completing mobility and ADLs. See flow sheet for goals and POC.     DME: Rolling Walker    Discharge Disposition:  Post Acute Rehab Services      Florencio Kang PT, DPT

## 2024-08-17 NOTE — PLAN OF CARE
Problem: PHYSICAL THERAPY ADULT  Goal: Performs mobility at highest level of function for planned discharge setting.  See evaluation for individualized goals.  Description: Treatment/Interventions: ADL retraining, Functional transfer training, LE strengthening/ROM, Elevations, Therapeutic exercise, Endurance training, Patient/family training, Equipment eval/education, Bed mobility, Gait training, Spoke to nursing, Spoke to case management, OT  Equipment Recommended: Walker       See flowsheet documentation for full assessment, interventions and recommendations.  Outcome: Progressing  Note: Prognosis: Good  Problem List: Decreased strength, Decreased range of motion, Decreased endurance, Impaired balance, Decreased mobility, Decreased coordination, Impaired sensation     Barriers to Discharge: Inaccessible home environment, Decreased caregiver support     Rehab Resource Intensity Level, PT: II (Moderate Resource Intensity)    See flowsheet documentation for full assessment.

## 2024-08-17 NOTE — PROGRESS NOTES
[facility]  Progress Note  Name: Sarah Zayas I  MRN: [id number]  Unit/Bed#: 7T [facility] 702-01 I Date of Admission: 8/14/2024   Date of Service: 8/17/2024 I Hospital Day: 2    Assessment & Plan   Severe protein-calorie malnutrition (HCC)  Assessment & Plan  Malnutrition Findings:   Adult Malnutrition type: Chronic illnessBMI Findings:   Body mass index is 17.59 kg/m².   Nutritional supplements, nutrition consult, encourage p.o. 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    Acute respiratory failure with hypoxia (HCC)  Assessment & Plan  Patient presented with acute respiratory failure with hypoxia in the setting with COPD exacerbation  At home her saturations were 70s percent on her pulse ox, she was 74% on room air in the ER  Patient had 2 L nasal cannula improved with normalization of her saturations, currently 100% on 2 L  Age-adjusted D-dimer noted to be normal  Her hypoxia secondary to her acute COPD exacerbation  Continue to titrate oxygen as needed  Check home oxygen evalu     Raynaud's phenomenon without gangrene  Assessment & Plan  Noted clinically on exam as well as an old records  Continue supportive care    Hypertension  Assessment & Plan  Will continue home Diovan 160 mg daily and propranolol 10 mg daily  Monitor blood pressure, titrate as needed    Gastroesophageal reflux disease without esophagitis  Assessment & Plan  Continue proton pump inhibitor    Other specified hypothyroidism  Assessment & Plan  Continue Synthroid    * Acute exacerbation of chronic obstructive pulmonary disease (COPD) (HCC)  Assessment & Plan  Patient is a 78-year-old female with past medical history significant for COPD, and hypertension who presented to the ER complaining of shortness of breath and hypoxia on her home pulse ox.    She of severe COPD and follows with a pulmonologist Dr. Nicholson: Most recent PFTs with FEV1 of 0.41, (22% predicted)  Patient was recently discharged 7/29  after being hospitalized for COPD exacerbation.  She was discharged on a steroid taper and did not require oxygen at the time of discharge.  She has seen both her PCP and her pulmonologist after discharge and had been improving back to her baseline  Patient relates the day of admission she had the acute onset of dyspnea.  Did not improve with her home nebulizers or inhalers.  She notes her home pulse ox showed her saturations were in the 70s so she came to the ER.  Upon presentation to the ER saturations were 74% on room air.  Patient was given DuoNeb, IV azithromycin, and prednisone 60 mg with improvement and was admitted for further evaluation  Will continue Xopenex/Atrovent tid and home Pulmicort for her COPD exacerbation  Change solumedrol to prednisone 40mg daily- 1st dose was today. Taper q3 days by 10mg  Completed azithromycin course   Chest x-ray without focal infiltrate  Procal negative   Patient follows with pulmonologist Dr. Nicholson as an outpatient  Continue current treatment regimen and monitor for response               VTE Pharmacologic Prophylaxis: VTE Score: 6 lovenox    Mobility:   Basic Mobility Inpatient Raw Score: 21  JH-HLM Goal: 6: Walk 10 steps or more  JH-HLM Achieved: 6: Walk 10 steps or more    Patient Centered Rounds:  discussed with her nurse       Education and Discussions with Family / Patient:  attempted to call sister but no answer .     Current Length of Stay: 2 day(s)  Current Patient Status: Inpatient     Discharge Plan: medically cleared for str. Awaiting str     Code Status: Level 1 - Full Code    Subjective:   Pt seen and examined. She states she slept horribly last night as she didn't take her xanax. She feels tired today but no other problems.     Objective:     Vitals:   Temp (24hrs), Av.4 °F (36.3 °C), Min:97 °F (36.1 °C), Max:97.7 °F (36.5 °C)    Temp:  [97 °F (36.1 °C)-97.7 °F (36.5 °C)] 97.4 °F (36.3 °C)  HR:  [] 100  Resp:  [18] 18  BP: (100-110)/(53-60)  110/60  SpO2:  [92 %-97 %] 96 %  Body mass index is 17.59 kg/m².     Input and Output Summary (last 24 hours):     Intake/Output Summary (Last 24 hours) at 8/17/2024 1239  Last data filed at 8/17/2024 1000  Gross per 24 hour   Intake 240 ml   Output --   Net 240 ml       Physical Exam:   Physical Exam  Constitutional:       Appearance: Normal appearance.   HENT:      Head: Normocephalic and atraumatic.   Eyes:      Extraocular Movements: Extraocular movements intact.      Pupils: Pupils are equal, round, and reactive to light.   Cardiovascular:      Rate and Rhythm: Normal rate and regular rhythm.      Heart sounds: No murmur heard.     No friction rub. No gallop.   Pulmonary:      Effort: Pulmonary effort is normal. No respiratory distress.      Breath sounds: Normal breath sounds. No wheezing or rhonchi.   Abdominal:      General: Bowel sounds are normal. There is no distension.      Palpations: Abdomen is soft.      Tenderness: There is no abdominal tenderness. There is no right CVA tenderness, guarding or rebound.   Musculoskeletal:      Right lower leg: No edema.      Left lower leg: No edema.   Neurological:      Mental Status: She is alert and oriented to person, place, and time.          Additional Data:     Labs:  Results from last 7 days   Lab Units 08/16/24  0433 08/15/24  0432 08/14/24  2024   WBC Thousand/uL 7.11 6.78 7.85   HEMOGLOBIN g/dL 10.8* 11.5 13.2   HEMATOCRIT % 35.7 37.5 43.1   PLATELETS Thousands/uL 164 146* 184   SEGS PCT %  --   --  83*   LYMPHO PCT %  --  4* 7*   MONO PCT %  --  2* 8   EOS PCT %  --  0 2     Results from last 7 days   Lab Units 08/16/24  0433 08/15/24  0432   SODIUM mmol/L 138 140   POTASSIUM mmol/L 5.1 4.4   CHLORIDE mmol/L 101 102   CO2 mmol/L 30 31   BUN mg/dL 37* 28*   CREATININE mg/dL 0.61 0.54*   ANION GAP mmol/L 7 7   CALCIUM mg/dL 8.7 8.4   ALBUMIN g/dL  --  3.4*   TOTAL BILIRUBIN mg/dL  --  0.36   ALK PHOS U/L  --  40   ALT U/L  --  29   AST U/L  --  22   GLUCOSE  RANDOM mg/dL 122 136                 Results from last 7 days   Lab Units 08/16/24  0433 08/15/24  0432   PROCALCITONIN ng/ml 0.13 0.19       Lines/Drains:  Invasive Devices       Peripheral Intravenous Line  Duration             Peripheral IV 08/14/24 Right Antecubital 2 days                    Imaging: No pertinent imaging reviewed.    Recent Cultures (last 7 days):         Last 24 Hours Medication List:   Current Facility-Administered Medications   Medication Dose Route Frequency Provider Last Rate    acetaminophen  650 mg Oral Q6H PRN Gwyn Phelan MD      albuterol  2.5 mg Nebulization Q6H PRN Gwyn Phelan MD      ALPRAZolam  0.25 mg Oral HS PRN Judy Londono MD      aspirin  81 mg Oral Daily Judy Londono MD      budesonide  0.5 mg Nebulization BID Judy Londono MD      calcium carbonate  1,000 mg Oral Daily PRN Judy Londono MD      vitamin B-12  1,000 mcg Oral Daily Judy Londono MD      docusate sodium  100 mg Oral BID Judy Londono MD      enoxaparin  40 mg Subcutaneous Daily Gwyn Phelan MD      guaiFENesin  600 mg Oral BID Gwyn Phelan MD      ipratropium  0.5 mg Nebulization TID Gwyn Phelan MD      levalbuterol  1.25 mg Nebulization TID Gwyn Phelan MD      levothyroxine  50 mcg Oral Daily Judy Londono MD      losartan  100 mg Oral Daily Judy Londono MD      pantoprazole  40 mg Oral Daily Judy Londono MD      polyethylene glycol  17 g Oral Daily PRN Gwyn Phelan MD      polyethylene glycol  17 g Oral Daily Judy Londono MD      predniSONE  40 mg Oral Daily Janeth Fuentes DO      propranolol  10 mg Oral BID Judy Londono MD          Today, Patient Was Seen By: Janeth Fuentes DO

## 2024-08-17 NOTE — ASSESSMENT & PLAN NOTE
Patient is a 78-year-old female with past medical history significant for COPD, and hypertension who presented to the ER complaining of shortness of breath and hypoxia on her home pulse ox.    She of severe COPD and follows with a pulmonologist Dr. Nicholson: Most recent PFTs with FEV1 of 0.41, (22% predicted)  Patient was recently discharged 7/29 after being hospitalized for COPD exacerbation.  She was discharged on a steroid taper and did not require oxygen at the time of discharge.  She has seen both her PCP and her pulmonologist after discharge and had been improving back to her baseline  Patient relates the day of admission she had the acute onset of dyspnea.  Did not improve with her home nebulizers or inhalers.  She notes her home pulse ox showed her saturations were in the 70s so she came to the ER.  Upon presentation to the ER saturations were 74% on room air.  Patient was given DuoNeb, IV azithromycin, and prednisone 60 mg with improvement and was admitted for further evaluation  Will continue Xopenex/Atrovent tid and home Pulmicort for her COPD exacerbation  Change solumedrol to prednisone 40mg daily- 1st dose was today. Taper q3 days by 10mg  Completed azithromycin course   Chest x-ray without focal infiltrate  Procal negative   Patient follows with pulmonologist Dr. Nicholson as an outpatient  Continue current treatment regimen and monitor for response

## 2024-08-18 ENCOUNTER — APPOINTMENT (INPATIENT)
Dept: CT IMAGING | Facility: HOSPITAL | Age: 79
DRG: 190 | End: 2024-08-18
Payer: COMMERCIAL

## 2024-08-18 PROBLEM — F41.9 ANXIETY: Status: ACTIVE | Noted: 2024-08-18

## 2024-08-18 PROCEDURE — 94664 DEMO&/EVAL PT USE INHALER: CPT

## 2024-08-18 PROCEDURE — 94760 N-INVAS EAR/PLS OXIMETRY 1: CPT

## 2024-08-18 PROCEDURE — 70491 CT SOFT TISSUE NECK W/DYE: CPT

## 2024-08-18 PROCEDURE — 99232 SBSQ HOSP IP/OBS MODERATE 35: CPT | Performed by: INTERNAL MEDICINE

## 2024-08-18 PROCEDURE — 94640 AIRWAY INHALATION TREATMENT: CPT

## 2024-08-18 RX ORDER — LORAZEPAM 2 MG/ML
0.25 INJECTION INTRAMUSCULAR ONCE
Status: COMPLETED | OUTPATIENT
Start: 2024-08-18 | End: 2024-08-18

## 2024-08-18 RX ADMIN — IPRATROPIUM BROMIDE 0.5 MG: 0.5 SOLUTION RESPIRATORY (INHALATION) at 19:19

## 2024-08-18 RX ADMIN — LEVALBUTEROL HYDROCHLORIDE 1.25 MG: 1.25 SOLUTION RESPIRATORY (INHALATION) at 19:19

## 2024-08-18 RX ADMIN — LEVALBUTEROL HYDROCHLORIDE 1.25 MG: 1.25 SOLUTION RESPIRATORY (INHALATION) at 13:49

## 2024-08-18 RX ADMIN — CYANOCOBALAMIN TAB 1000 MCG 1000 MCG: 1000 TAB at 08:22

## 2024-08-18 RX ADMIN — PREDNISONE 40 MG: 20 TABLET ORAL at 08:21

## 2024-08-18 RX ADMIN — ENOXAPARIN SODIUM 40 MG: 40 INJECTION SUBCUTANEOUS at 08:22

## 2024-08-18 RX ADMIN — GUAIFENESIN 600 MG: 600 TABLET ORAL at 08:22

## 2024-08-18 RX ADMIN — IPRATROPIUM BROMIDE 0.5 MG: 0.5 SOLUTION RESPIRATORY (INHALATION) at 13:49

## 2024-08-18 RX ADMIN — GUAIFENESIN 600 MG: 600 TABLET ORAL at 17:26

## 2024-08-18 RX ADMIN — LORAZEPAM 0.25 MG: 2 INJECTION INTRAMUSCULAR; INTRAVENOUS at 14:48

## 2024-08-18 RX ADMIN — BUDESONIDE 0.5 MG: 0.5 INHALANT ORAL at 07:32

## 2024-08-18 RX ADMIN — IOHEXOL 90 ML: 350 INJECTION, SOLUTION INTRAVENOUS at 15:17

## 2024-08-18 RX ADMIN — ASPIRIN 81 MG: 81 TABLET, COATED ORAL at 08:22

## 2024-08-18 RX ADMIN — LEVOTHYROXINE SODIUM 50 MCG: 50 TABLET ORAL at 08:21

## 2024-08-18 RX ADMIN — PROPRANOLOL HYDROCHLORIDE 10 MG: 10 TABLET ORAL at 08:21

## 2024-08-18 RX ADMIN — ACETAMINOPHEN 650 MG: 325 TABLET ORAL at 14:49

## 2024-08-18 RX ADMIN — IPRATROPIUM BROMIDE 0.5 MG: 0.5 SOLUTION RESPIRATORY (INHALATION) at 07:15

## 2024-08-18 RX ADMIN — BUDESONIDE 0.5 MG: 0.5 INHALANT ORAL at 19:19

## 2024-08-18 RX ADMIN — PANTOPRAZOLE SODIUM 40 MG: 40 TABLET, DELAYED RELEASE ORAL at 08:22

## 2024-08-18 RX ADMIN — LEVALBUTEROL HYDROCHLORIDE 1.25 MG: 1.25 SOLUTION RESPIRATORY (INHALATION) at 07:15

## 2024-08-18 RX ADMIN — LOSARTAN POTASSIUM 100 MG: 50 TABLET, FILM COATED ORAL at 08:21

## 2024-08-18 NOTE — PLAN OF CARE
Problem: Potential for Falls  Goal: Patient will remain free of falls  Description: INTERVENTIONS:  - Educate patient/family on patient safety including physical limitations  - Instruct patient to call for assistance with activity   - Consult OT/PT to assist with strengthening/mobility   - Keep Call bell within reach  - Keep bed low and locked with side rails adjusted as appropriate  - Keep care items and personal belongings within reach  - Initiate and maintain comfort rounds  - Make Fall Risk Sign visible to staff  - Apply yellow socks and bracelet for high fall risk patients  - Consider moving patient to room near nurses station  Outcome: Progressing     Problem: PAIN - ADULT  Goal: Verbalizes/displays adequate comfort level or baseline comfort level  Description: Interventions:  - Encourage patient to monitor pain and request assistance  - Assess pain using appropriate pain scale  - Administer analgesics based on type and severity of pain and evaluate response  - Implement non-pharmacological measures as appropriate and evaluate response  - Consider cultural and social influences on pain and pain management  - Notify physician/advanced practitioner if interventions unsuccessful or patient reports new pain  Outcome: Progressing      Problem: Knowledge Deficit  Goal: Patient/family/caregiver demonstrates understanding of disease process, treatment plan, medications, and discharge instructions  Description: Complete learning assessment and assess knowledge base.  Interventions:  - Provide teaching at level of understanding  - Provide teaching via preferred learning methods  Outcome: Progressing     Problem: DISCHARGE PLANNING  Goal: Discharge to home or other facility with appropriate resources  Description: INTERVENTIONS:  - Identify barriers to discharge w/patient and caregiver  - Arrange for needed discharge resources and transportation as appropriate  - Identify discharge learning needs (meds, wound care,  etc.)  - Arrange for interpretive services to assist at discharge as needed  - Refer to Case Management Department for coordinating discharge planning if the patient needs post-hospital services based on physician/advanced practitioner order or complex needs related to functional status, cognitive ability, or social support system  Outcome: Progressing

## 2024-08-18 NOTE — ASSESSMENT & PLAN NOTE
Patient is a 78-year-old female with past medical history significant for COPD, and hypertension who presented to the ER complaining of shortness of breath and hypoxia on her home pulse ox.    She of severe COPD and follows with a pulmonologist Dr. Nicholson: Most recent PFTs with FEV1 of 0.41, (22% predicted)  Patient was recently discharged 7/29 after being hospitalized for COPD exacerbation.  She was discharged on a steroid taper and did not require oxygen at the time of discharge.  She has seen both her PCP and her pulmonologist after discharge and had been improving back to her baseline  Patient relates the day of admission she had the acute onset of dyspnea.  Did not improve with her home nebulizers or inhalers.  She notes her home pulse ox showed her saturations were in the 70s so she came to the ER.  Upon presentation to the ER saturations were 74% on room air.  Patient was given DuoNeb, IV azithromycin, and prednisone 60 mg with improvement and was admitted for further evaluation  Will continue Xopenex/Atrovent tid and home Pulmicort for her COPD exacerbation  Change solumedrol to prednisone 40mg daily-  day 2/3 Taper q3 days by 10mg  Completed azithromycin course   Chest x-ray without focal infiltrate  Procal negative   Patient follows with pulmonologist Dr. Nicholson as an outpatient  Continue current treatment regimen and monitor for response

## 2024-08-18 NOTE — CASE MANAGEMENT
Case Management Discharge Planning Note    Patient name Sarah Zayas  Location 7T U 702/7T [facility] 702-01 MRN [id number]  : 1945 Date 2024       Current Admission Date: 2024  Current Admission Diagnosis:Acute exacerbation of chronic obstructive pulmonary disease (COPD) ([facility])   Patient Active Problem List    Diagnosis Date Noted Date Diagnosed    Severe protein-calorie malnutrition (HCC) 2024     COPD, severe ([facility]) 2024     Acute respiratory failure with hypoxia ([facility]) 2023     COVID-19 2023     Abnormal CT scan 2023     Infectious sialoadenitis of major salivary gland 2023     Cellulitis of neck 2023     Left upper lobe pulmonary nodule 2023     Postnasal drip 2023     Raynaud's phenomenon without gangrene 2022     Temporal arteritis ([facility]) 2020     Hypertension 10/11/2018     Rectus sheath hematoma, initial encounter 10/10/2018     Other specified hypothyroidism 2018     Gastroesophageal reflux disease without esophagitis 2018     Acute exacerbation of chronic obstructive pulmonary disease (COPD) (HCC) 2012     Vitamin D deficiency 2012     Osteoarthritis 2012       LOS (days): 3  Geometric Mean LOS (GMLOS) (days): 3.6  Days to GMLOS:0.7     OBJECTIVE:  Risk of Unplanned Readmission Score: 24.64         Current admission status: Inpatient   Preferred Pharmacy:   [facility] - [address]  [address]  [facility] [address]  Phone: [phone] Fax: [phone]    Primary Care Provider: Nicolas Nix MD    Primary Insurance: [employer] RE[employer]  Secondary Insurance:     DISCHARGE DETAILS:      Additional Comments: Pt and Dtr requesting STR prior to discharge home. Therapy recommends this LOC. CM sent referral to Pt's and Dtr's preferred facilities. CM continues to follow.

## 2024-08-18 NOTE — ASSESSMENT & PLAN NOTE
Patient presented with acute respiratory failure with hypoxia in the setting with COPD exacerbation  At home her saturations were 70s percent on her pulse ox, she was 74% on room air in the ER  Patient had 2 L nasal cannula improved with normalization of her saturations, currently 100% on 2 L  Age-adjusted D-dimer noted to be normal  Her hypoxia secondary to her acute COPD exacerbation  Continue to titrate oxygen as needed  Home oxygen eval reported 1 liter at all times

## 2024-08-18 NOTE — QUICK NOTE
Called to see pt due to neck swelling. Pt states that her neck is starting to swell up. She is not having any difficulty breath then her usual. Her swallowing is not worsened. She denies pain. She does have a history of b/l inflammation of salivary glands and possible cellulitis of neck. This occurred at the end of her July. It was discussed with ent who stated she could follow up outpt and recommended 10 day course of augmentin. Will obtain ct neck. The area that is of concern is located right at the split of her oxygen tubing. It is possible that the tubing is causing some irritation also. Will await the ct results. Will check labs in am.

## 2024-08-18 NOTE — PROGRESS NOTES
[facility]  Progress Note  Name: Sarah Zayas I  MRN: [id number]  Unit/Bed#: 7T [facility] 702-01 I Date of Admission: 8/14/2024   Date of Service: 8/18/2024 I Hospital Day: 3    Assessment & Plan   Anxiety  Assessment & Plan  Continue xanax     Severe protein-calorie malnutrition (HCC)  Assessment & Plan  Malnutrition Findings:   Adult Malnutrition type: Chronic illnessBMI Findings:   Body mass index is 17.59 kg/m².   Nutritional supplements, nutrition consult, encourage p.o. 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    Acute respiratory failure with hypoxia (HCC)  Assessment & Plan  Patient presented with acute respiratory failure with hypoxia in the setting with COPD exacerbation  At home her saturations were 70s percent on her pulse ox, she was 74% on room air in the ER  Patient had 2 L nasal cannula improved with normalization of her saturations, currently 100% on 2 L  Age-adjusted D-dimer noted to be normal  Her hypoxia secondary to her acute COPD exacerbation  Continue to titrate oxygen as needed  Home oxygen eval reported 1 liter at all times      Raynaud's phenomenon without gangrene  Assessment & Plan  Noted clinically on exam as well as an old records  Continue supportive care    Hypertension  Assessment & Plan  Will continue home Diovan 160 mg daily and propranolol 10 mg daily  Monitor blood pressure, titrate as needed    Gastroesophageal reflux disease without esophagitis  Assessment & Plan  Continue proton pump inhibitor    Other specified hypothyroidism  Assessment & Plan  Continue Synthroid    * Acute exacerbation of chronic obstructive pulmonary disease (COPD) (HCC)  Assessment & Plan  Patient is a 78-year-old female with past medical history significant for COPD, and hypertension who presented to the ER complaining of shortness of breath and hypoxia on her home pulse ox.    She of severe COPD and follows with a pulmonologist Dr. Nicholson: Most recent PFTs with  FEV1 of 0.41, (22% predicted)  Patient was recently discharged 7/29 after being hospitalized for COPD exacerbation.  She was discharged on a steroid taper and did not require oxygen at the time of discharge.  She has seen both her PCP and her pulmonologist after discharge and had been improving back to her baseline  Patient relates the day of admission she had the acute onset of dyspnea.  Did not improve with her home nebulizers or inhalers.  She notes her home pulse ox showed her saturations were in the 70s so she came to the ER.  Upon presentation to the ER saturations were 74% on room air.  Patient was given DuoNeb, IV azithromycin, and prednisone 60 mg with improvement and was admitted for further evaluation  Will continue Xopenex/Atrovent tid and home Pulmicort for her COPD exacerbation  Change solumedrol to prednisone 40mg daily-  day 2/3 Taper q3 days by 10mg  Completed azithromycin course   Chest x-ray without focal infiltrate  Procal negative   Patient follows with pulmonologist Dr. Nicholson as an outpatient  Continue current treatment regimen and monitor for response               VTE Pharmacologic Prophylaxis: VTE Score: 6 lovenox     Mobility:   Basic Mobility Inpatient Raw Score: 21  JH-HLM Goal: 6: Walk 10 steps or more  JH-HLM Achieved: 6: Walk 10 steps or more    Patient Centered Rounds:  discussed with her nurse       Education and Discussions with Family / Patient: attempted to call her sister but no answer     Current Length of Stay: 3 day(s)  Current Patient Status: Inpatient     Discharge Plan:  pt was scared about going home even after oxygen was set up. Looking to see if she qualifies for str. Also pt is considering moving to [place] in which she would live with her daughter but she is hesitant to do this as her sister still lives in the area     Code Status: Level 1 - Full Code    Subjective:   Pt seen and examined. No f/c no cp no worsening sob. No n/v/d no abd pain. She still feels that she is  weaker then where she was before. She still wants to go to [facility] if she can. She is currently deciding if she should move to [place] to live her with her daughter but she doesn't want to be too far from her sister     Objective:     Vitals:   Temp (24hrs), Av.8 °F (36.6 °C), Min:97.6 °F (36.4 °C), Max:97.9 °F (36.6 °C)    Temp:  [97.6 °F (36.4 °C)-97.9 °F (36.6 °C)] 97.6 °F (36.4 °C)  HR:  [75-92] 75  Resp:  [18] 18  BP: ()/(48-73) 121/73  SpO2:  [92 %-96 %] 95 %  Body mass index is 17.59 kg/m².     Input and Output Summary (last 24 hours):     Intake/Output Summary (Last 24 hours) at 2024 1211  Last data filed at 2024 0854  Gross per 24 hour   Intake 620 ml   Output --   Net 620 ml       Physical Exam:   Physical Exam  Constitutional:       Appearance: Normal appearance.   HENT:      Head: Normocephalic and atraumatic.   Eyes:      Extraocular Movements: Extraocular movements intact.      Pupils: Pupils are equal, round, and reactive to light.   Cardiovascular:      Rate and Rhythm: Normal rate and regular rhythm.      Heart sounds: No murmur heard.     No friction rub. No gallop.   Pulmonary:      Effort: Pulmonary effort is normal. No respiratory distress.      Breath sounds: Normal breath sounds. No wheezing, rhonchi or rales.   Abdominal:      General: Bowel sounds are normal. There is no distension.      Palpations: Abdomen is soft.      Tenderness: There is no abdominal tenderness. There is no guarding or rebound.   Musculoskeletal:      Right lower leg: No edema.      Left lower leg: No edema.   Neurological:      Mental Status: She is alert and oriented to person, place, and time.          Additional Data:     Labs:  Results from last 7 days   Lab Units 24  0433 08/15/24  0432 24   WBC Thousand/uL 7.11 6.78 7.85   HEMOGLOBIN g/dL 10.8* 11.5 13.2   HEMATOCRIT % 35.7 37.5 43.1   PLATELETS Thousands/uL 164 146* 184   SEGS PCT %  --   --  83*   LYMPHO PCT %  --  4* 7*   MONO  PCT %  --  2* 8   EOS PCT %  --  0 2     Results from last 7 days   Lab Units 08/16/24  0433 08/15/24  0432   SODIUM mmol/L 138 140   POTASSIUM mmol/L 5.1 4.4   CHLORIDE mmol/L 101 102   CO2 mmol/L 30 31   BUN mg/dL 37* 28*   CREATININE mg/dL 0.61 0.54*   ANION GAP mmol/L 7 7   CALCIUM mg/dL 8.7 8.4   ALBUMIN g/dL  --  3.4*   TOTAL BILIRUBIN mg/dL  --  0.36   ALK PHOS U/L  --  40   ALT U/L  --  29   AST U/L  --  22   GLUCOSE RANDOM mg/dL 122 136                 Results from last 7 days   Lab Units 08/16/24  0433 08/15/24  0432   PROCALCITONIN ng/ml 0.13 0.19       Lines/Drains:  Invasive Devices       Peripheral Intravenous Line  Duration             Peripheral IV 08/14/24 Right Antecubital 3 days                  Imaging: No pertinent imaging reviewed.    Recent Cultures (last 7 days):         Last 24 Hours Medication List:   Current Facility-Administered Medications   Medication Dose Route Frequency Provider Last Rate    acetaminophen  650 mg Oral Q6H PRN Gwyn Phelan MD      albuterol  2.5 mg Nebulization Q6H PRN Gwyn Phelan MD      ALPRAZolam  0.125 mg Oral Daily PRN Janeth Fuentes DO      ALPRAZolam  0.25 mg Oral HS PRN Judy Londono MD      aspirin  81 mg Oral Daily Judy Londono MD      budesonide  0.5 mg Nebulization BID Judy Londono MD      calcium carbonate  1,000 mg Oral Daily PRN Judy Londono MD      vitamin B-12  1,000 mcg Oral Daily Judy Londono MD      docusate sodium  100 mg Oral BID Judy Londono MD      enoxaparin  40 mg Subcutaneous Daily Gwyn Phelan MD      guaiFENesin  600 mg Oral BID Gwyn Phelan MD      ipratropium  0.5 mg Nebulization TID Gwyn Phelan MD      levalbuterol  1.25 mg Nebulization TID Gwyn Phelan MD      levothyroxine  50 mcg Oral Daily Judy Londono MD      losartan  100 mg Oral Daily Judy Londono MD      pantoprazole  40 mg Oral Daily Judy Londono MD      polyethylene glycol  17 g Oral Daily PRN Gwyn Phelan MD      polyethylene glycol  17 g  Oral Daily Judy Londono MD      predniSONE  40 mg Oral Daily Janeth Fuentes DO      propranolol  10 mg Oral BID Judy Londono MD          Today, Patient Was Seen By: Janeth Fuentes DO

## 2024-08-19 PROBLEM — R22.1 NECK SWELLING: Status: ACTIVE | Noted: 2024-08-19

## 2024-08-19 LAB
ANION GAP SERPL CALCULATED.3IONS-SCNC: 2 MMOL/L (ref 4–13)
BUN SERPL-MCNC: 32 MG/DL (ref 5–25)
CALCIUM SERPL-MCNC: 8.9 MG/DL (ref 8.4–10.2)
CHLORIDE SERPL-SCNC: 97 MMOL/L (ref 96–108)
CO2 SERPL-SCNC: 41 MMOL/L (ref 21–32)
CREAT SERPL-MCNC: 0.55 MG/DL (ref 0.6–1.3)
ERYTHROCYTE [DISTWIDTH] IN BLOOD BY AUTOMATED COUNT: 13.8 % (ref 11.6–15.1)
GFR SERPL CREATININE-BSD FRML MDRD: 90 ML/MIN/1.73SQ M
GLUCOSE SERPL-MCNC: 79 MG/DL (ref 65–140)
HCT VFR BLD AUTO: 35.5 % (ref 34.8–46.1)
HGB BLD-MCNC: 11.5 G/DL (ref 11.5–15.4)
MCH RBC QN AUTO: 28.7 PG (ref 26.8–34.3)
MCHC RBC AUTO-ENTMCNC: 32.4 G/DL (ref 31.4–37.4)
MCV RBC AUTO: 89 FL (ref 82–98)
PLATELET # BLD AUTO: 196 THOUSANDS/UL (ref 149–390)
PMV BLD AUTO: 10.1 FL (ref 8.9–12.7)
POTASSIUM SERPL-SCNC: 4.2 MMOL/L (ref 3.5–5.3)
RBC # BLD AUTO: 4.01 MILLION/UL (ref 3.81–5.12)
SODIUM SERPL-SCNC: 140 MMOL/L (ref 135–147)
WBC # BLD AUTO: 4.89 THOUSAND/UL (ref 4.31–10.16)

## 2024-08-19 PROCEDURE — 97535 SELF CARE MNGMENT TRAINING: CPT

## 2024-08-19 PROCEDURE — 94640 AIRWAY INHALATION TREATMENT: CPT

## 2024-08-19 PROCEDURE — 99232 SBSQ HOSP IP/OBS MODERATE 35: CPT | Performed by: STUDENT IN AN ORGANIZED HEALTH CARE EDUCATION/TRAINING PROGRAM

## 2024-08-19 PROCEDURE — 97530 THERAPEUTIC ACTIVITIES: CPT

## 2024-08-19 PROCEDURE — 97116 GAIT TRAINING THERAPY: CPT

## 2024-08-19 PROCEDURE — 80048 BASIC METABOLIC PNL TOTAL CA: CPT | Performed by: INTERNAL MEDICINE

## 2024-08-19 PROCEDURE — 85027 COMPLETE CBC AUTOMATED: CPT | Performed by: INTERNAL MEDICINE

## 2024-08-19 PROCEDURE — 94760 N-INVAS EAR/PLS OXIMETRY 1: CPT

## 2024-08-19 RX ADMIN — PROPRANOLOL HYDROCHLORIDE 10 MG: 10 TABLET ORAL at 08:24

## 2024-08-19 RX ADMIN — LEVALBUTEROL HYDROCHLORIDE 1.25 MG: 1.25 SOLUTION RESPIRATORY (INHALATION) at 20:15

## 2024-08-19 RX ADMIN — DOCUSATE SODIUM 100 MG: 100 CAPSULE, LIQUID FILLED ORAL at 18:08

## 2024-08-19 RX ADMIN — PANTOPRAZOLE SODIUM 40 MG: 40 TABLET, DELAYED RELEASE ORAL at 08:23

## 2024-08-19 RX ADMIN — IPRATROPIUM BROMIDE 0.5 MG: 0.5 SOLUTION RESPIRATORY (INHALATION) at 14:00

## 2024-08-19 RX ADMIN — LOSARTAN POTASSIUM 100 MG: 50 TABLET, FILM COATED ORAL at 08:23

## 2024-08-19 RX ADMIN — GUAIFENESIN 600 MG: 600 TABLET ORAL at 18:08

## 2024-08-19 RX ADMIN — IPRATROPIUM BROMIDE 0.5 MG: 0.5 SOLUTION RESPIRATORY (INHALATION) at 20:15

## 2024-08-19 RX ADMIN — PREDNISONE 40 MG: 20 TABLET ORAL at 08:24

## 2024-08-19 RX ADMIN — BUDESONIDE 0.5 MG: 0.5 INHALANT ORAL at 08:00

## 2024-08-19 RX ADMIN — DOCUSATE SODIUM 100 MG: 100 CAPSULE, LIQUID FILLED ORAL at 08:24

## 2024-08-19 RX ADMIN — PROPRANOLOL HYDROCHLORIDE 10 MG: 10 TABLET ORAL at 18:08

## 2024-08-19 RX ADMIN — GUAIFENESIN 600 MG: 600 TABLET ORAL at 08:24

## 2024-08-19 RX ADMIN — LEVALBUTEROL HYDROCHLORIDE 1.25 MG: 1.25 SOLUTION RESPIRATORY (INHALATION) at 14:00

## 2024-08-19 RX ADMIN — CYANOCOBALAMIN TAB 1000 MCG 1000 MCG: 1000 TAB at 08:24

## 2024-08-19 RX ADMIN — ASPIRIN 81 MG: 81 TABLET, COATED ORAL at 08:24

## 2024-08-19 RX ADMIN — ALPRAZOLAM 0.12 MG: 0.25 TABLET ORAL at 10:44

## 2024-08-19 RX ADMIN — ENOXAPARIN SODIUM 40 MG: 40 INJECTION SUBCUTANEOUS at 08:25

## 2024-08-19 RX ADMIN — BUDESONIDE 0.5 MG: 0.5 INHALANT ORAL at 20:15

## 2024-08-19 RX ADMIN — LEVALBUTEROL HYDROCHLORIDE 1.25 MG: 1.25 SOLUTION RESPIRATORY (INHALATION) at 08:00

## 2024-08-19 RX ADMIN — IPRATROPIUM BROMIDE 0.5 MG: 0.5 SOLUTION RESPIRATORY (INHALATION) at 08:00

## 2024-08-19 NOTE — ASSESSMENT & PLAN NOTE
Also an issue during her previous visit. Previously received antibiotics for this. CT currently without acute abnormalities. Will have ENT chime in.

## 2024-08-19 NOTE — PLAN OF CARE
Problem: OCCUPATIONAL THERAPY ADULT  Goal: Performs self-care activities at highest level of function for planned discharge setting.  See evaluation for individualized goals.  Description: Treatment Interventions: ADL retraining, Visual perceptual retraining, Functional transfer training, Continued evaluation, Activityengagement, Energy conservation          See flowsheet documentation for full assessment, interventions and recommendations.   Outcome: Progressing  Note: Limitation: Decreased high-level ADLs, Decreased endurance, Decreased ADL status, Decreased UE strength  Prognosis: Good  Assessment: Pt is a 78 y.o. female seen for OT evaluation s/p admit to [facility] on 8/14/2024 w/ Acute exacerbation of chronic obstructive pulmonary disease (COPD) (HCC).  See medical history above for extensive list of comorbidities affecting pt's functional performance at time of assessment. Personal factors affecting Pt at time of IE include:difficulty performing IADLS  and health management . Upon evaluation: Pt requires supervision for functional ambulation including bathroom mobility and negotiation of small spaces, supervision for ADL transfers.  Pt requires supervision for ADLs in standing. Pt's primary barrier(s) at this time: decreased activity tolerance and need for frequent rest breaks. The following deficits impact occupational performance: decreased strength, decreased balance, decreased tolerance, and impaired problem solving. Pt to benefit from continued skilled OT services while in the hospital to address deficits as defined above and maximize level of functional independence w ADL's and functional mobility. Occupational performance areas to address include: grooming, health maintenance, functional mobility, community mobility, and clothing management. From OT standpoint, recommendation at time of d/c would be moderate resource intensity.       Rehab Resource Intensity Level, OT: II (Moderate Resource Intensity)

## 2024-08-19 NOTE — CASE MANAGEMENT
[facility] received request for authorization from Care Manager.  Authorization request submitted for: [facility]  Facility Name:  [facility]  NPI:[id number]  Facility MD:  Dr. Jacob Salas NPI: [id number].  Authorization initiated by contacting insurance:  hema   Via: [id number]   Clinicals submitted via QVPN attachment   Pending Reference #:[id number]     Care Manager notified: humberto fang     Updates to authorization status will be noted in chart. Please reach out to CM for updates on any clinical information.

## 2024-08-19 NOTE — PLAN OF CARE
Problem: PHYSICAL THERAPY ADULT  Goal: Performs mobility at highest level of function for planned discharge setting.  See evaluation for individualized goals.  Description: Treatment/Interventions: ADL retraining, Functional transfer training, LE strengthening/ROM, Therapeutic exercise, Elevations, Endurance training, Patient/family training, Equipment eval/education, Bed mobility, Gait training, Spoke to nursing, Spoke to case management, OT  Equipment Recommended: Walker       See flowsheet documentation for full assessment, interventions and recommendations.  Outcome: Progressing  Note: Prognosis: Good  Problem List: Decreased strength, Decreased range of motion, Decreased endurance, Impaired balance, Decreased mobility, Decreased coordination, Impaired sensation  Assessment: Pt is demonstrating some improvement in activity tolerance today. Continues to require RW for balance assist during ambulation and continues to require supplemental O2 at all times, reporting SOB after ambulation and activity. Continue to recommend intp rehab at this time.  Barriers to Discharge: Inaccessible home environment, Decreased caregiver support     Rehab Resource Intensity Level, PT: II (Moderate Resource Intensity)    See flowsheet documentation for full assessment.

## 2024-08-19 NOTE — RESPIRATORY THERAPY NOTE
08/19/24 1020   Respiratory Protocol   Protocol Initiated? Yes   Protocol Selection Respiratory   Language Barrier? No   Medical & Social History Reviewed? Yes   Diagnostic Studies Reviewed? Yes   Home Devices/Therapy Home O2   Respiratory Plan Mild Distress pathway  (Continue bronchodilatoer therapy as ordered.)   Respiratory Assessment   Assessment Type Assess only   General Appearance Alert;Awake   Respiratory Pattern Normal;Dyspnea with exertion   Chest Assessment Chest expansion symmetrical   Bilateral Breath Sounds Diminished;Clear   Location Specific No   Cough Non-productive   O2 Device N/C 1

## 2024-08-19 NOTE — PHYSICAL THERAPY NOTE
Physical TherapyTreatment Note    Patient's Name: Sarah Zayas    Admitting Diagnosis  SOB (shortness of breath) [R06.02]  Hypoxia [R09.02]  COPD exacerbation (HCC) [J44.1]    Problem List  Patient Active Problem List   Diagnosis    Acute exacerbation of chronic obstructive pulmonary disease (COPD) (HCC)    Vitamin D deficiency    Other specified hypothyroidism    Gastroesophageal reflux disease without esophagitis    Rectus sheath hematoma, initial encounter    Hypertension    Osteoarthritis    Temporal arteritis (HCC)    Raynaud's phenomenon without gangrene    Postnasal drip    Left upper lobe pulmonary nodule    Infectious sialoadenitis of major salivary gland    Cellulitis of neck    Acute respiratory failure with hypoxia (HCC)    COVID-19    Abnormal CT scan    COPD, severe (HCC)    Severe protein-calorie malnutrition (HCC)    Anxiety       Past Medical History  Past Medical History:   Diagnosis Date    Anxiety 8/18/2024    Asthma     Chronic obstructive pulmonary disease (HCC) 09/26/2012    COPD (chronic obstructive pulmonary disease) (HCC)     Disease of thyroid gland     GERD (gastroesophageal reflux disease)     Hypertension 10/11/2018    Hypothyroid     Osteoarthritis 09/26/2012    Temporal arteritis (HCC)     Tubular adenoma     Type 2 diabetes mellitus with complication, without long-term current use of insulin (HCC) 01/14/2020       Past Surgical History  Past Surgical History:   Procedure Laterality Date    EYE SURGERY Right 12/06/2019    cataract LVCFS    HYSTERECTOMY      NO PAST SURGERIES      ALSO NO RELEVANT PAST SURRGICAL HX AS PER [facility]       Recent Imaging  CT soft tissue neck w contrast   Final Result by Liam Galvez MD (08/18 1728)      No acute findings on neck CT.            Workstation performed: [id number]         XR chest 1 view portable   ED Interpretation by Deven Atkinson MD (08/14 2046)   Nad unchanged      Final Result by Antonio Newton MD (08/15 0743)   Stable chest without  acute cardiopulmonary disease.      Workstation performed: [id number]             Recent Vital Signs  Vitals:    08/18/24 1920 08/18/24 2200 08/19/24 0732 08/19/24 0800   BP:  123/65 151/93    BP Location:  Right arm Right arm    Pulse:  90 70    Resp:  18 18    Temp:  98.1 °F (36.7 °C) 97.8 °F (36.6 °C)    TempSrc:  Temporal Temporal    SpO2: 96% 92% 96% 95%   Weight:       Height:            08/19/24 0900   PT Last Visit   PT Visit Date 08/19/24   Note Type   Note Type Treatment for insurance authorization   Pain Assessment   Pain Assessment Tool 0-10   Pain Score No Pain   Restrictions/Precautions   Weight Bearing Precautions Per Order No   Other Precautions Fall Risk;O2   General   Chart Reviewed Yes   Response to Previous Treatment Patient with no complaints from previous session.   Family/Caregiver Present No   Cognition   Overall Cognitive Status WFL   Arousal/Participation Alert;Cooperative   Attention Within functional limits   Orientation Level Oriented X4   Memory Within functional limits   Following Commands Follows all commands and directions without difficulty   Transfers   Sit to Stand 5  Supervision   Additional items Increased time required   Stand to Sit 5  Supervision   Additional items Increased time required   Ambulation/Elevation   Gait pattern Step through pattern;Decreased toe off;Decreased heel strike;Excessively slow;Short stride;Decreased foot clearance;Narrow DEVONTE;Improper Weight shift   Gait Assistance 5  Supervision   Additional items Assist x 1;Verbal cues;Tactile cues   Assistive Device Rolling walker   Distance 60ft x 3 with seated rest breaks   Balance   Static Sitting Good   Dynamic Sitting Fair +   Static Standing Fair   Dynamic Standing Fair -   Ambulatory Fair -   Endurance Deficit   Endurance Deficit Yes   Endurance Deficit Description SOB, fatigue, needing O2   Activity Tolerance   Activity Tolerance Patient limited by fatigue   Medical Staff Made Aware spoke to CM   Nurse Made  Aware spoke to RN   Assessment   Prognosis Good   Problem List Decreased strength;Decreased range of motion;Decreased endurance;Impaired balance;Decreased mobility;Decreased coordination;Impaired sensation   Assessment Pt is demonstrating some improvement in activity tolerance today. Continues to require RW for balance assist during ambulation and continues to require supplemental O2 at all times, reporting SOB after ambulation and activity. Continue to recommend intp rehab at this time.   Barriers to Discharge Inaccessible home environment;Decreased caregiver support   Goals   Patient Goals to be less SOB   STG Expiration Date 08/26/24   Short Term Goal #1 see eval note   PT Treatment Day 1   Plan   Treatment/Interventions ADL retraining;Functional transfer training;LE strengthening/ROM;Therapeutic exercise;Elevations;Endurance training;Patient/family training;Equipment eval/education;Bed mobility;Gait training;Spoke to nursing;Spoke to case management;OT   Progress Progressing toward goals   PT Frequency 2-3x/wk   Discharge Recommendation   Rehab Resource Intensity Level, PT II (Moderate Resource Intensity)   Equipment Recommended Walker   Walker Package Recommended Wheeled walker   AM-PAC Basic Mobility Inpatient   Turning in Flat Bed Without Bedrails 4   Lying on Back to Sitting on Edge of Flat Bed Without Bedrails 4   Moving Bed to Chair 4   Standing Up From Chair Using Arms 3   Walk in Room 3   Climb 3-5 Stairs With Railing 3   Basic Mobility Inpatient Raw Score 21   Basic Mobility Standardized Score 45.55   [facility] Highest Level Of Mobility   -HLM Goal 6: Walk 10 steps or more   -HLM Achieved 7: Walk 25 feet or more   Education   Education Provided Mobility training;Home exercise program;Assistive device   End of Consult   Patient Position at End of Consult Bedside chair;All needs within reach         Florencio Kang PT, DPT

## 2024-08-19 NOTE — PLAN OF CARE
Problem: OCCUPATIONAL THERAPY ADULT  Goal: Performs self-care activities at highest level of function for planned discharge setting.  See evaluation for individualized goals.  Description: Treatment Interventions: ADL retraining, Visual perceptual retraining, Functional transfer training, Continued evaluation, Activityengagement, Energy conservation          See flowsheet documentation for full assessment, interventions and recommendations.   8/19/2024 1232 by Karen Carrizales OT  Outcome: Progressing  Note: Limitation: Decreased high-level ADLs, Decreased endurance, Decreased ADL status, Decreased UE strength  Prognosis: Good  Assessment: Pt seen for OT txt. Pt on 1 L O2. Pt with noted decreased endurance vs initial eval. Pt with drop in O2 sat and c/o SOB after ambulation to the bathroom with supervision and RW. Pt requires assist to manage standing and LB ADLs due to decreased endurance requiring frequent seated rest breaks. O2 cord management Agus, for both long distances and navigating small/tight spaces.  Pt is below functional baseline, would benefit from continued OT services to further address deficits with strength, balance, and tolerance.      Rehab Resource Intensity Level, OT: II (Moderate Resource Intensity)       8/19/2024 0924 by Karen Carrizales OT  Outcome: Progressing  Note: Limitation: Decreased high-level ADLs, Decreased endurance, Decreased ADL status, Decreased UE strength  Prognosis: Good  Assessment: Pt is a 78 y.o. female seen for OT evaluation s/p admit to [facility] on 8/14/2024 w/ Acute exacerbation of chronic obstructive pulmonary disease (COPD) (HCC).  See medical history above for extensive list of comorbidities affecting pt's functional performance at time of assessment. Personal factors affecting Pt at time of IE include:difficulty performing IADLS  and health management . Upon evaluation: Pt requires supervision for functional ambulation including bathroom mobility and negotiation of small  spaces, supervision for ADL transfers.  Pt requires supervision for ADLs in standing. Pt's primary barrier(s) at this time: decreased activity tolerance and need for frequent rest breaks. The following deficits impact occupational performance: decreased strength, decreased balance, decreased tolerance, and impaired problem solving. Pt to benefit from continued skilled OT services while in the hospital to address deficits as defined above and maximize level of functional independence w ADL's and functional mobility. Occupational performance areas to address include: grooming, health maintenance, functional mobility, community mobility, and clothing management. From OT standpoint, recommendation at time of d/c would be moderate resource intensity.       Rehab Resource Intensity Level, OT: II (Moderate Resource Intensity)

## 2024-08-19 NOTE — PROGRESS NOTES
Patient:  SHAHNAZ SOLOMON    MRN:  [id number]    Aidin Request ID:  [id number]    Level of care reserved:    Partner Reserved:    Clinical needs requested:    Geography searched:  10 miles around [id number]    Start of Service:    Request sent:  10:13am EDT on 8/18/2024 by Luisa Rico    Partner reserved:  by Luna Kothari    Choice list shared:  10:54am EDT on 8/19/2024 by Luna Kothari

## 2024-08-19 NOTE — ASSESSMENT & PLAN NOTE
Due to COPD exacerbation  Patient had 2 L nasal cannula improved with normalization of her saturations, currently 100% on 2 L  Age-adjusted D-dimer noted to be normal  Home oxygen eval reported 1 liter at all times

## 2024-08-19 NOTE — CASE MANAGEMENT
Case Management Discharge Planning Note    Patient name Sarah Zayas  Location 7T U 702/7T U 702-01 MRN [id number]  : 1945 Date 2024       Current Admission Date: 2024  Current Admission Diagnosis:Acute exacerbation of chronic obstructive pulmonary disease (COPD) (HCC)   Patient Active Problem List    Diagnosis Date Noted Date Diagnosed    Neck swelling 2024     Anxiety 2024     Severe protein-calorie malnutrition (HCC) 2024     COPD, severe (HCC) 2024     Acute respiratory failure with hypoxia (HCC) 2023     COVID-19 2023     Abnormal CT scan 2023     Infectious sialoadenitis of major salivary gland 2023     Cellulitis of neck 2023     Left upper lobe pulmonary nodule 2023     Postnasal drip 2023     Raynaud's phenomenon without gangrene 2022     Temporal arteritis ([facility]) 2020     Hypertension 10/11/2018     Rectus sheath hematoma, initial encounter 10/10/2018     Other specified hypothyroidism 2018     Gastroesophageal reflux disease without esophagitis 2018     Acute exacerbation of chronic obstructive pulmonary disease (COPD) ([facility]) 2012     Vitamin D deficiency 2012     Osteoarthritis 2012       LOS (days): 4  Geometric Mean LOS (GMLOS) (days): 3.6  Days to GMLOS:-0.5     OBJECTIVE:  Risk of Unplanned Readmission Score: 23.48         Current admission status: Inpatient   Preferred Pharmacy:   [facility] [place] [id number]  [address]  [facility] [address]  Phone: [phone] Fax: [phone]    Primary Care Provider: Nicolas Nix MD    Primary Insurance: [employer]  REP  Secondary Insurance:     DISCHARGE DETAILS:    Discharge planning discussed with:: Patient, son Steven and friend Suzanne  Freedom of Choice: Yes  Comments - Freedom of Choice: Provided STR choice list.  Choice is hospitals TCU  CM contacted family/caregiver?: Yes  Were Treatment  Team discharge recommendations reviewed with patient/caregiver?: Yes  Did patient/caregiver verbalize understanding of patient care needs?: N/A- going to facility  Were patient/caregiver advised of the risks associated with not following Treatment Team discharge recommendations?: Yes    Treatment Team Recommendation: Short Term Rehab        Additional Comments: Met with patient and provided with choice list for rehab.  [facility]  Insurance auth request tasked to  discharge support staff.  Call to son Steven and Friend Suzanne and updated on discharge planning.    Patient son concerned about neck swelling and requesting ENT consult.  Message to SAUD Monroy same

## 2024-08-19 NOTE — OCCUPATIONAL THERAPY NOTE
Occupational Therapy Treatment Note     Patient Name: Sarah Zayas  Today's Date: 8/19/2024  Problem List  Principal Problem:    Acute exacerbation of chronic obstructive pulmonary disease (COPD) (HCC)  Active Problems:    Other specified hypothyroidism    Gastroesophageal reflux disease without esophagitis    Hypertension    Raynaud's phenomenon without gangrene    Acute respiratory failure with hypoxia (HCC)    Severe protein-calorie malnutrition (HCC)    Anxiety    Neck swelling            08/19/24 1025   OT Last Visit   OT Visit Date 08/19/24   Note Type   Note Type Treatment for insurance authorization   Pain Assessment   Pain Assessment Tool 0-10   Pain Score No Pain   Restrictions/Precautions   Weight Bearing Precautions Per Order No   Other Precautions Fall Risk;O2  (1 L O2)   ADL   Grooming Assistance 5  Supervision/Setup   UB Bathing Assistance 5  Supervision/Setup   LB Bathing Assistance 4  Minimal Assistance   UB Dressing Assistance 4  Minimal Assistance   LB Dressing Assistance 4  Minimal Assistance   Transfers   Sit to Stand 5  Supervision   Stand to Sit 5  Supervision   Functional Mobility   Functional Mobility 5  Supervision   Additional items Rolling walker   Cognition   Arousal/Participation Alert;Cooperative   Attention Within functional limits   Orientation Level Oriented X4   Memory Within functional limits   Following Commands Follows all commands and directions without difficulty   Assessment   Assessment Pt seen for OT txt. Pt on 1 L O2. Pt with noted decreased endurance vs initial eval. Pt with drop in O2 sat and c/o SOB after ambulation to the bathroom with supervision and RW. Pt requires assist to manage standing and LB ADLs due to decreased endurance requiring frequent seated rest breaks. O2 cord management Agus, for both long distances and navigating small/tight spaces.  Pt is below functional baseline, would benefit from continued OT services to further address deficits with strength,  balance, and tolerance.    Plan   Treatment Interventions ADL retraining;Functional transfer training;UE strengthening/ROM;Endurance training;Continued evaluation;Activityengagement;Energy conservation   Goal Expiration Date 08/30/24   OT Treatment Day 1   OT Frequency 2-3x/wk   Discharge Recommendation   Rehab Resource Intensity Level, OT II (Moderate Resource Intensity)   AM-PAC Daily Activity Inpatient   Lower Body Dressing 3   Bathing 3   Toileting 3   Upper Body Dressing 4   Grooming 4   Eating 4   Daily Activity Raw Score 21   Daily Activity Standardized Score (Calc for Raw Score >=11) 44.27

## 2024-08-19 NOTE — OCCUPATIONAL THERAPY NOTE
Occupational Therapy Evaluation     Patient Name: Sarah Zayas  Today's Date: 8/19/2024  Problem List  Principal Problem:    Acute exacerbation of chronic obstructive pulmonary disease (COPD) (HCC)  Active Problems:    Other specified hypothyroidism    Gastroesophageal reflux disease without esophagitis    Hypertension    Raynaud's phenomenon without gangrene    Acute respiratory failure with hypoxia (HCC)    Severe protein-calorie malnutrition (HCC)    Anxiety    Past Medical History  Past Medical History:   Diagnosis Date    Anxiety 8/18/2024    Asthma     Chronic obstructive pulmonary disease (HCC) 09/26/2012    COPD (chronic obstructive pulmonary disease) ([facility])     Disease of thyroid gland     GERD (gastroesophageal reflux disease)     Hypertension 10/11/2018    Hypothyroid     Osteoarthritis 09/26/2012    Temporal arteritis ([facility])     Tubular adenoma     Type 2 diabetes mellitus with complication, without long-term current use of insulin ([facility]) 01/14/2020     Past Surgical History  Past Surgical History:   Procedure Laterality Date    EYE SURGERY Right 12/06/2019    cataract LVCFS    HYSTERECTOMY      NO PAST SURGERIES      ALSO NO RELEVANT PAST SURRGICAL HX AS PER [facility]             08/16/24 1335   OT Last Visit   OT Visit Date 08/16/24   Note Type   Note type Evaluation   Pain Assessment   Pain Assessment Tool 0-10   Pain Score No Pain   Restrictions/Precautions   Weight Bearing Precautions Per Order No   Other Precautions Fall Risk;O2   Home Living   Type of Home House   Home Layout One level   Bathroom Shower/Tub Tub/shower unit   Bathroom Toilet Standard   Bathroom Accessibility Accessible via walker   Home Equipment Walker;Cane   Prior Function   Level of [place] Independent with ADLs;Independent with IADLS   Lives With Spouse   Receives Help From Family   ADL   Grooming Assistance 5  Supervision/Setup   UB Bathing Assistance 5  Supervision/Setup   LB Bathing Assistance 5  Supervision/Setup   UB  Dressing Assistance 5  Supervision/Setup   LB Dressing Assistance 5  Supervision/Setup   Toileting Assistance  5  Supervision/Setup   Transfers   Sit to Stand 5  Supervision   Stand to Sit 5  Supervision   Toilet transfer 5  Supervision   Functional Mobility   Functional Mobility 5  Supervision   Additional items Rolling walker   Balance   Static Sitting Good   Dynamic Sitting Fair +   Static Standing Fair   Dynamic Standing Fair   Activity Tolerance   Activity Tolerance Patient tolerated treatment well   RUE Assessment   RUE Assessment WFL   LUE Assessment   LUE Assessment WFL   Hand Function   Gross Motor Coordination Functional   Cognition   Overall Cognitive Status WFL   Arousal/Participation Alert;Cooperative   Attention Within functional limits   Orientation Level Oriented X4   Assessment   Limitation Decreased high-level ADLs;Decreased endurance;Decreased ADL status;Decreased UE strength   Prognosis Good   Assessment   Pt is a 78 y.o. female seen for OT evaluation s/p admit to [facility] on 8/14/2024 w/ Acute exacerbation of chronic obstructive pulmonary disease (COPD) (HCC).  See medical history above for extensive list of comorbidities affecting pt's functional performance at time of assessment. Personal factors affecting Pt at time of IE include:difficulty performing IADLS  and health management . Upon evaluation: Pt requires supervision for functional ambulation including bathroom mobility and negotiation of small spaces, supervision for ADL transfers.  Pt requires supervision for ADLs in standing. Pt's primary barrier(s) at this time: decreased activity tolerance and need for frequent rest breaks. The following deficits impact occupational performance: decreased strength, decreased balance, decreased tolerance, and impaired problem solving. Pt to benefit from continued skilled OT services while in the hospital to address deficits as defined above and maximize level of functional independence w ADL's and  functional mobility. Occupational performance areas to address include: grooming, health maintenance, functional mobility, community mobility, and clothing management. From OT standpoint, recommendation at time of d/c would be moderate resource intensity.       Goals   STG Time Frame   (2 weeks)   Short Term Goals Pt will complete functional ambulation Berna/I.   Pt will complete ADL transfers Berna/I.   Pt will complete IADL Berna/I.    Plan   Treatment Interventions ADL retraining;Visual perceptual retraining;Functional transfer training;Continued evaluation;Activityengagement;Energy conservation   Goal Expiration Date 08/30/24   OT Frequency 2-3x/wk   Discharge Recommendation   Rehab Resource Intensity Level, OT II (Moderate Resource Intensity)   AM-PAC Daily Activity Inpatient   Lower Body Dressing 3   Bathing 3   Toileting 3   Upper Body Dressing 4   Grooming 4   Eating 4   Daily Activity Raw Score 21   Daily Activity Standardized Score (Calc for Raw Score >=11) 44.27

## 2024-08-19 NOTE — PROGRESS NOTES
[facility]  Progress Note  Name: Sarah Zayas I  MRN: [id number]  Unit/Bed#: 7T [facility] 702-01 I Date of Admission: 8/14/2024   Date of Service: 8/19/2024 I Hospital Day: 4    Assessment & Plan   * Acute exacerbation of chronic obstructive pulmonary disease (COPD) (HCC)  Assessment & Plan  78-year-old female with past medical history significant for COPD, and hypertension who presented to the ER complaining of shortness of breath and hypoxia on her home pulse ox.    Background:  She of severe COPD and follows with a pulmonologist Dr. Nicholson: Most recent PFTs with FEV1 of 0.41, (22% predicted)  Patient was recently discharged 7/29 after being hospitalized for COPD exacerbation.  She was discharged on a steroid taper and did not require oxygen at the time of discharge.  She has seen both her PCP and her pulmonologist after discharge and had been improving back to her baseline  Patient relates the day of admission she had the acute onset of dyspnea.  Did not improve with her home nebulizers or inhalers.  She notes her home pulse ox showed her saturations were in the 70s so she came to the ER.  Upon presentation to the ER saturations were 74% on room air.  Patient was given DuoNeb, IV azithromycin, and prednisone 60 mg with improvement and was admitted for further evaluation    Plan:  Continue Xopenex/Atrovent tid and home Pulmicort for her COPD exacerbation  Continue Prednisone taper  Completed azithromycin course   Chest x-ray without focal infiltrate  Procal negative   Patient follows with pulmonologist Dr. Nicholson as an outpatient  Await safe discharge plan from case management    Neck swelling  Assessment & Plan  Also an issue during her previous visit. Previously received antibiotics for this. CT currently without acute abnormalities. Will have ENT chime in.    Anxiety  Assessment & Plan  Continue xanax     Severe protein-calorie malnutrition (HCC)  Assessment & Plan  Malnutrition Findings:    Adult Malnutrition type: Chronic illnessBMI Findings:   Body mass index is 17.59 kg/m².   Nutritional supplements, nutrition consult, encourage p.o. 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    Acute respiratory failure with hypoxia (HCC)  Assessment & Plan  Due to COPD exacerbation  Patient had 2 L nasal cannula improved with normalization of her saturations, currently 100% on 2 L  Age-adjusted D-dimer noted to be normal  Home oxygen eval reported 1 liter at all times      Hypertension  Assessment & Plan  Controlled  Continue propanolol and ARB    Gastroesophageal reflux disease without esophagitis  Assessment & Plan  Continue PPI    Other specified hypothyroidism  Assessment & Plan  Continue Levothyroxine           VTE Pharmacologic Prophylaxis:   Pharmacologic: Enoxaparin (Lovenox)  Mechanical VTE Prophylaxis in Place: Yes    Discussions with Specialists or Other Care Team Provider: ENT    Education and Discussions with Family / Patient: patient, called son    Current Length of Stay: 4 day(s)    Current Patient Status: Inpatient   Certification Statement: The patient will continue to require additional inpatient hospital stay due to copd, ENT eval     Discharge Plan: 24 hours    Code Status: Level 1 - Full Code      Subjective:   Patient seen and examined at bedside. Complaining of neck swelling.    Objective:     Vitals:   Temp (24hrs), Av.1 °F (36.7 °C), Min:97.8 °F (36.6 °C), Max:98.3 °F (36.8 °C)    Temp:  [97.8 °F (36.6 °C)-98.3 °F (36.8 °C)] 97.8 °F (36.6 °C)  HR:  [70-90] 70  Resp:  [18] 18  BP: (105-151)/(60-93) 151/93  SpO2:  [92 %-96 %] 95 %  Body mass index is 17.59 kg/m².     Input and Output Summary (last 24 hours):       Intake/Output Summary (Last 24 hours) at 2024 1054  Last data filed at 2024 1700  Gross per 24 hour   Intake 480 ml   Output --   Net 480 ml       Physical Exam:     Physical Exam  Vitals reviewed.   Constitutional:       General: She is not in  acute distress.  HENT:      Head: Normocephalic.      Nose: Nose normal.      Mouth/Throat:      Mouth: Mucous membranes are moist.   Eyes:      General: No scleral icterus.  Neck:      Comments: Neck swelling  Cardiovascular:      Rate and Rhythm: Normal rate and regular rhythm.   Pulmonary:      Effort: Pulmonary effort is normal. No respiratory distress.   Abdominal:      General: There is no distension.      Palpations: Abdomen is soft.      Tenderness: There is no abdominal tenderness.   Skin:     General: Skin is warm.   Neurological:      Mental Status: She is alert.   Psychiatric:         Mood and Affect: Mood normal.         Behavior: Behavior normal.       Additional Data:     Labs:    Results from last 7 days   Lab Units 08/19/24  0503 08/16/24  0433 08/15/24  0432 08/14/24  2024   WBC Thousand/uL 4.89   < > 6.78 7.85   HEMOGLOBIN g/dL 11.5   < > 11.5 13.2   HEMATOCRIT % 35.5   < > 37.5 43.1   PLATELETS Thousands/uL 196   < > 146* 184   SEGS PCT %  --   --   --  83*   LYMPHO PCT %  --   --  4* 7*   MONO PCT %  --   --  2* 8   EOS PCT %  --   --  0 2    < > = values in this interval not displayed.     Results from last 7 days   Lab Units 08/19/24  0503 08/16/24  0433 08/15/24  0432   SODIUM mmol/L 140   < > 140   POTASSIUM mmol/L 4.2   < > 4.4   CHLORIDE mmol/L 97   < > 102   CO2 mmol/L 41*   < > 31   BUN mg/dL 32*   < > 28*   CREATININE mg/dL 0.55*   < > 0.54*   ANION GAP mmol/L 2*   < > 7   CALCIUM mg/dL 8.9   < > 8.4   ALBUMIN g/dL  --   --  3.4*   TOTAL BILIRUBIN mg/dL  --   --  0.36   ALK PHOS U/L  --   --  40   ALT U/L  --   --  29   AST U/L  --   --  22   GLUCOSE RANDOM mg/dL 79   < > 136    < > = values in this interval not displayed.                 Results from last 7 days   Lab Units 08/16/24 0433 08/15/24  0432   PROCALCITONIN ng/ml 0.13 0.19           * I Have Reviewed All Lab Data Listed Above.  * Additional Pertinent Lab Tests Reviewed: All Labs For Current Hospital Admission  Reviewed    Mobility:  Basic Mobility Inpatient Raw Score: 21  -[facility] Goal: 6: Walk 10 steps or more  -[facility] Achieved: 7: Walk 25 feet or more    Lines:   Invasive Devices       Peripheral Intravenous Line  Duration             Peripheral IV 08/14/24 Right Antecubital 4 days                       Imaging:    Imaging Reports Reviewed Today Include: ct soft tissue neck, xr chest    Recent Cultures (last 7 days):           Last 24 Hours Medication List:   Current Facility-Administered Medications   Medication Dose Route Frequency Provider Last Rate    acetaminophen  650 mg Oral Q6H PRN Gwyn Phelan MD      albuterol  2.5 mg Nebulization Q6H PRN Gwyn Phelan MD      ALPRAZolam  0.125 mg Oral Daily PRN Janeth Fuentes DO      ALPRAZolam  0.25 mg Oral HS PRN Judy Londono MD      aspirin  81 mg Oral Daily Judy Londono MD      budesonide  0.5 mg Nebulization BID Judy Londono MD      calcium carbonate  1,000 mg Oral Daily PRN Judy Londono MD      vitamin B-12  1,000 mcg Oral Daily Judy Londono MD      docusate sodium  100 mg Oral BID Judy Londono MD      enoxaparin  40 mg Subcutaneous Daily Gwyn Phelan MD      guaiFENesin  600 mg Oral BID Gwyn Phelan MD      ipratropium  0.5 mg Nebulization TID Gwyn Phelan MD      levalbuterol  1.25 mg Nebulization TID Gwyn Phelan MD      levothyroxine  50 mcg Oral Daily Judy Londono MD      losartan  100 mg Oral Daily Judy Londono MD      pantoprazole  40 mg Oral Daily Judy Londono MD      polyethylene glycol  17 g Oral Daily PRN Gwyn Phelan MD      polyethylene glycol  17 g Oral Daily Judy Londono MD      predniSONE  40 mg Oral Daily Janeth Fuentes DO      propranolol  10 mg Oral BID Judy Londono MD          Today, Patient Was Seen By: Renzo Monroy MD    ** Please Note: Dictation voice to text software may have been used in the creation of this document. **

## 2024-08-19 NOTE — ASSESSMENT & PLAN NOTE
78-year-old female with past medical history significant for COPD, and hypertension who presented to the ER complaining of shortness of breath and hypoxia on her home pulse ox.    Background:  She of severe COPD and follows with a pulmonologist Dr. Nicholson: Most recent PFTs with FEV1 of 0.41, (22% predicted)  Patient was recently discharged 7/29 after being hospitalized for COPD exacerbation.  She was discharged on a steroid taper and did not require oxygen at the time of discharge.  She has seen both her PCP and her pulmonologist after discharge and had been improving back to her baseline  Patient relates the day of admission she had the acute onset of dyspnea.  Did not improve with her home nebulizers or inhalers.  She notes her home pulse ox showed her saturations were in the 70s so she came to the ER.  Upon presentation to the ER saturations were 74% on room air.  Patient was given DuoNeb, IV azithromycin, and prednisone 60 mg with improvement and was admitted for further evaluation    Plan:  Continue Xopenex/Atrovent tid and home Pulmicort for her COPD exacerbation  Continue Prednisone taper  Completed azithromycin course   Chest x-ray without focal infiltrate  Procal negative   Patient follows with pulmonologist Dr. Nicholson as an outpatient  Await safe discharge plan from case management

## 2024-08-20 LAB
ANION GAP SERPL CALCULATED.3IONS-SCNC: 4 MMOL/L (ref 4–13)
BUN SERPL-MCNC: 23 MG/DL (ref 5–25)
CALCIUM SERPL-MCNC: 9.5 MG/DL (ref 8.4–10.2)
CHLORIDE SERPL-SCNC: 94 MMOL/L (ref 96–108)
CO2 SERPL-SCNC: 42 MMOL/L (ref 21–32)
CREAT SERPL-MCNC: 0.54 MG/DL (ref 0.6–1.3)
GFR SERPL CREATININE-BSD FRML MDRD: 90 ML/MIN/1.73SQ M
GLUCOSE SERPL-MCNC: 86 MG/DL (ref 65–140)
MAGNESIUM SERPL-MCNC: 2.1 MG/DL (ref 1.9–2.7)
POTASSIUM SERPL-SCNC: 5.1 MMOL/L (ref 3.5–5.3)
SODIUM SERPL-SCNC: 140 MMOL/L (ref 135–147)

## 2024-08-20 PROCEDURE — 99232 SBSQ HOSP IP/OBS MODERATE 35: CPT | Performed by: STUDENT IN AN ORGANIZED HEALTH CARE EDUCATION/TRAINING PROGRAM

## 2024-08-20 PROCEDURE — 94760 N-INVAS EAR/PLS OXIMETRY 1: CPT

## 2024-08-20 PROCEDURE — 94640 AIRWAY INHALATION TREATMENT: CPT

## 2024-08-20 PROCEDURE — 94664 DEMO&/EVAL PT USE INHALER: CPT

## 2024-08-20 PROCEDURE — 83735 ASSAY OF MAGNESIUM: CPT | Performed by: STUDENT IN AN ORGANIZED HEALTH CARE EDUCATION/TRAINING PROGRAM

## 2024-08-20 PROCEDURE — 80048 BASIC METABOLIC PNL TOTAL CA: CPT | Performed by: STUDENT IN AN ORGANIZED HEALTH CARE EDUCATION/TRAINING PROGRAM

## 2024-08-20 RX ORDER — PREDNISONE 10 MG/1
10 TABLET ORAL DAILY
Status: DISCONTINUED | OUTPATIENT
Start: 2024-08-27 | End: 2024-08-26 | Stop reason: HOSPADM

## 2024-08-20 RX ORDER — LEVALBUTEROL INHALATION SOLUTION 1.25 MG/3ML
1.25 SOLUTION RESPIRATORY (INHALATION)
Status: DISCONTINUED | OUTPATIENT
Start: 2024-08-21 | End: 2024-08-26 | Stop reason: HOSPADM

## 2024-08-20 RX ORDER — PREDNISONE 20 MG/1
20 TABLET ORAL DAILY
Status: COMPLETED | OUTPATIENT
Start: 2024-08-24 | End: 2024-08-26

## 2024-08-20 RX ORDER — ANTIOX #8/OM3/DHA/EPA/LUT/ZEAX 250-2.5 MG
1 CAPSULE ORAL DAILY
Status: DISCONTINUED | OUTPATIENT
Start: 2024-08-20 | End: 2024-08-26 | Stop reason: HOSPADM

## 2024-08-20 RX ORDER — PREDNISONE 20 MG/1
20 TABLET ORAL DAILY
Status: DISCONTINUED | OUTPATIENT
Start: 2024-08-23 | End: 2024-08-20

## 2024-08-20 RX ORDER — PREDNISONE 10 MG/1
10 TABLET ORAL DAILY
Status: DISCONTINUED | OUTPATIENT
Start: 2024-08-26 | End: 2024-08-20

## 2024-08-20 RX ADMIN — IPRATROPIUM BROMIDE 0.5 MG: 0.5 SOLUTION RESPIRATORY (INHALATION) at 14:18

## 2024-08-20 RX ADMIN — LEVALBUTEROL HYDROCHLORIDE 1.25 MG: 1.25 SOLUTION RESPIRATORY (INHALATION) at 14:18

## 2024-08-20 RX ADMIN — PROPRANOLOL HYDROCHLORIDE 10 MG: 10 TABLET ORAL at 08:57

## 2024-08-20 RX ADMIN — PREDNISONE 40 MG: 20 TABLET ORAL at 08:57

## 2024-08-20 RX ADMIN — PROPRANOLOL HYDROCHLORIDE 10 MG: 10 TABLET ORAL at 17:35

## 2024-08-20 RX ADMIN — IPRATROPIUM BROMIDE 0.5 MG: 0.5 SOLUTION RESPIRATORY (INHALATION) at 19:10

## 2024-08-20 RX ADMIN — BUDESONIDE 0.5 MG: 0.5 INHALANT ORAL at 07:32

## 2024-08-20 RX ADMIN — LOSARTAN POTASSIUM 100 MG: 50 TABLET, FILM COATED ORAL at 08:57

## 2024-08-20 RX ADMIN — LEVALBUTEROL HYDROCHLORIDE 1.25 MG: 1.25 SOLUTION RESPIRATORY (INHALATION) at 07:33

## 2024-08-20 RX ADMIN — CYANOCOBALAMIN TAB 1000 MCG 1000 MCG: 1000 TAB at 08:57

## 2024-08-20 RX ADMIN — DOCUSATE SODIUM 100 MG: 100 CAPSULE, LIQUID FILLED ORAL at 17:35

## 2024-08-20 RX ADMIN — LEVALBUTEROL HYDROCHLORIDE 1.25 MG: 1.25 SOLUTION RESPIRATORY (INHALATION) at 19:10

## 2024-08-20 RX ADMIN — ALPRAZOLAM 0.25 MG: 0.5 TABLET ORAL at 20:59

## 2024-08-20 RX ADMIN — GUAIFENESIN 600 MG: 600 TABLET ORAL at 08:57

## 2024-08-20 RX ADMIN — BUDESONIDE 0.5 MG: 0.5 INHALANT ORAL at 19:10

## 2024-08-20 RX ADMIN — PANTOPRAZOLE SODIUM 40 MG: 40 TABLET, DELAYED RELEASE ORAL at 08:57

## 2024-08-20 RX ADMIN — GUAIFENESIN 600 MG: 600 TABLET ORAL at 17:35

## 2024-08-20 RX ADMIN — ENOXAPARIN SODIUM 40 MG: 40 INJECTION SUBCUTANEOUS at 08:59

## 2024-08-20 RX ADMIN — IPRATROPIUM BROMIDE 0.5 MG: 0.5 SOLUTION RESPIRATORY (INHALATION) at 07:33

## 2024-08-20 RX ADMIN — LEVOTHYROXINE SODIUM 50 MCG: 50 TABLET ORAL at 05:04

## 2024-08-20 RX ADMIN — DOCUSATE SODIUM 100 MG: 100 CAPSULE, LIQUID FILLED ORAL at 08:57

## 2024-08-20 RX ADMIN — ASPIRIN 81 MG: 81 TABLET, COATED ORAL at 08:57

## 2024-08-20 NOTE — PLAN OF CARE
Problem: SAFETY ADULT  Goal: Maintain or return to baseline ADL function  Description: INTERVENTIONS:  -  Assess patient's ability to carry out ADLs; assess patient's baseline for ADL function and identify physical deficits which impact ability to perform ADLs (bathing, care of mouth/teeth, toileting, grooming, dressing, etc.)  - Assess/evaluate cause of self-care deficits   - Assess range of motion  - Assess patient's mobility; develop plan if impaired  - Assess patient's need for assistive devices and provide as appropriate  - Encourage maximum independence but intervene and supervise when necessary  - Involve family in performance of ADLs  - Assess for home care needs following discharge   - Consider OT consult to assist with ADL evaluation and planning for discharge  - Provide patient education as appropriate  Outcome: Progressing     Problem: RESPIRATORY - ADULT  Goal: Achieves optimal ventilation and oxygenation  Description: INTERVENTIONS:  - Assess for changes in respiratory status  - Assess for changes in mentation and behavior  - Position to facilitate oxygenation and minimize respiratory effort  - Oxygen administered by appropriate delivery if ordered  - Initiate smoking cessation education as indicated  - Encourage broncho-pulmonary hygiene including cough, deep breathe, Incentive Spirometry  - Assess the need for suctioning and aspirate as needed  - Assess and instruct to report SOB or any respiratory difficulty  - Respiratory Therapy support as indicated  Outcome: Progressing     Problem: INFECTION - ADULT  Goal: Absence or prevention of progression during hospitalization  Description: INTERVENTIONS:  - Assess and monitor for signs and symptoms of infection  - Monitor lab/diagnostic results  - Monitor all insertion sites, i.e. indwelling lines, tubes, and drains  - Monitor endotracheal if appropriate and nasal secretions for changes in amount and color  - [place] appropriate cooling/warming  therapies per order  - Administer medications as ordered  - Instruct and encourage patient and family to use good hand hygiene technique  - Identify and instruct in appropriate isolation precautions for identified infection/condition  Outcome: Progressing

## 2024-08-20 NOTE — PROGRESS NOTES
[facility]  Progress Note  Name: Sarah Zayas I  MRN: [id number]  Unit/Bed#: 7T [facility] 702-01 I Date of Admission: 8/14/2024   Date of Service: 8/20/2024 I Hospital Day: 5    Assessment & Plan   * Acute exacerbation of chronic obstructive pulmonary disease (COPD) (HCC)  Assessment & Plan  78-year-old female with past medical history significant for COPD, and hypertension who presented to the ER complaining of shortness of breath and hypoxia on her home pulse ox.    Background:  She of severe COPD and follows with a pulmonologist Dr. Nicholson: Most recent PFTs with FEV1 of 0.41, (22% predicted)  Patient was recently discharged 7/29 after being hospitalized for COPD exacerbation.  She was discharged on a steroid taper and did not require oxygen at the time of discharge.  She has seen both her PCP and her pulmonologist after discharge and had been improving back to her baseline  Patient relates the day of admission she had the acute onset of dyspnea.  Did not improve with her home nebulizers or inhalers.  She notes her home pulse ox showed her saturations were in the 70s so she came to the ER.  Upon presentation to the ER saturations were 74% on room air.  Patient was given DuoNeb, IV azithromycin, and prednisone 60 mg with improvement and was admitted for further evaluation    Plan:  Continue Xopenex/Atrovent tid and home Pulmicort for her COPD exacerbation  Continue Prednisone taper  Completed azithromycin course   Chest x-ray without focal infiltrate  Procal negative   Patient follows with pulmonologist Dr. Nicholson as an outpatient  Await safe discharge plan from case management. Authorization pending.    Neck swelling  Assessment & Plan  Also an issue during her previous visit. Previously received antibiotics for this. CT currently without acute abnormalities.   Awaiting ENT recommendations.    Severe protein-calorie malnutrition (HCC)  Assessment & Plan  Malnutrition Findings:   Adult  Malnutrition type: Chronic illnessBMI Findings:   Body mass index is 17.59 kg/m².   Nutritional supplements, nutrition consult, encourage p.o. 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    Acute respiratory failure with hypoxia (HCC)  Assessment & Plan  Due to COPD exacerbation  Patient had 2 L nasal cannula improved with normalization of her saturations, currently 100% on 2 L  Age-adjusted D-dimer noted to be normal  Home oxygen eval reported 1 liter at all times      Hypertension  Assessment & Plan  Controlled  Continue propanolol and ARB    Gastroesophageal reflux disease without esophagitis  Assessment & Plan  Continue PPI    Other specified hypothyroidism  Assessment & Plan  Continue Levothyroxine           VTE Pharmacologic Prophylaxis:   Pharmacologic: Enoxaparin (Lovenox)  Mechanical VTE Prophylaxis in Place: Yes    Discussions with Specialists or Other Care Team Provider: nursing, ENT, case management    Education and Discussions with Family / Patient: patient    Current Length of Stay: 5 day(s)    Current Patient Status: Inpatient   Certification Statement: The patient will continue to require additional inpatient hospital stay due to ENT evaluation, awaiting safe discharge plan    Discharge Plan: 24 hours    Code Status: Level 1 - Full Code      Subjective:   Patient seen and examined at bedside. States that she did not sleep well last night.     Objective:     Vitals:   Temp (24hrs), Av.3 °F (36.3 °C), Min:96.5 °F (35.8 °C), Max:97.7 °F (36.5 °C)    Temp:  [96.5 °F (35.8 °C)-97.7 °F (36.5 °C)] 96.5 °F (35.8 °C)  HR:  [74-87] 80  Resp:  [18-20] 18  BP: (135-157)/(75-85) 157/85  SpO2:  [94 %-97 %] 94 %  Body mass index is 17.59 kg/m².     Input and Output Summary (last 24 hours):       Intake/Output Summary (Last 24 hours) at 2024 1053  Last data filed at 2024 0909  Gross per 24 hour   Intake 810 ml   Output --   Net 810 ml       Physical Exam:     Physical Exam  Vitals  reviewed.   Constitutional:       General: She is not in acute distress.  HENT:      Head: Normocephalic.      Nose: Nose normal.      Mouth/Throat:      Mouth: Mucous membranes are moist.   Eyes:      General: No scleral icterus.  Neck:      Comments: Prominent neck folds  Cardiovascular:      Rate and Rhythm: Normal rate.   Pulmonary:      Effort: Pulmonary effort is normal. No respiratory distress.   Abdominal:      General: There is no distension.      Palpations: Abdomen is soft.      Tenderness: There is no abdominal tenderness.   Skin:     General: Skin is warm.   Neurological:      Mental Status: She is alert and oriented to person, place, and time.   Psychiatric:         Mood and Affect: Mood normal.         Behavior: Behavior normal.       Additional Data:     Labs:    Results from last 7 days   Lab Units 08/19/24  0503 08/16/24  0433 08/15/24  0432 08/14/24  2024   WBC Thousand/uL 4.89   < > 6.78 7.85   HEMOGLOBIN g/dL 11.5   < > 11.5 13.2   HEMATOCRIT % 35.5   < > 37.5 43.1   PLATELETS Thousands/uL 196   < > 146* 184   SEGS PCT %  --   --   --  83*   LYMPHO PCT %  --   --  4* 7*   MONO PCT %  --   --  2* 8   EOS PCT %  --   --  0 2    < > = values in this interval not displayed.     Results from last 7 days   Lab Units 08/20/24  0515 08/16/24  0433 08/15/24  0432   SODIUM mmol/L 140   < > 140   POTASSIUM mmol/L 5.1   < > 4.4   CHLORIDE mmol/L 94*   < > 102   CO2 mmol/L 42*   < > 31   BUN mg/dL 23   < > 28*   CREATININE mg/dL 0.54*   < > 0.54*   ANION GAP mmol/L 4   < > 7   CALCIUM mg/dL 9.5   < > 8.4   ALBUMIN g/dL  --   --  3.4*   TOTAL BILIRUBIN mg/dL  --   --  0.36   ALK PHOS U/L  --   --  40   ALT U/L  --   --  29   AST U/L  --   --  22   GLUCOSE RANDOM mg/dL 86   < > 136    < > = values in this interval not displayed.                 Results from last 7 days   Lab Units 08/16/24  0433 08/15/24  0432   PROCALCITONIN ng/ml 0.13 0.19           * I Have Reviewed All Lab Data Listed Above.  * Additional  Pertinent Lab Tests Reviewed: All Labs For Current Hospital Admission Reviewed    Mobility:  Basic Mobility Inpatient Raw Score: 21  -[facility] Goal: 6: Walk 10 steps or more  -HL Achieved: 6: Walk 10 steps or more    Lines:   Invasive Devices       Peripheral Intravenous Line  Duration             Peripheral IV 08/20/24 Right;Ventral (anterior) Forearm <1 day                       Imaging:    Imaging Reports Reviewed Today Include: ct soft tissue neck    Recent Cultures (last 7 days):           Last 24 Hours Medication List:   Current Facility-Administered Medications   Medication Dose Route Frequency Provider Last Rate    acetaminophen  650 mg Oral Q6H PRN Gwyn Phelan MD      albuterol  2.5 mg Nebulization Q6H PRN Gwyn Phelan MD      ALPRAZolam  0.125 mg Oral Daily PRN Janeth Fuentes DO      ALPRAZolam  0.25 mg Oral HS PRN Judy Londono MD      aspirin  81 mg Oral Daily Judy Londono MD      budesonide  0.5 mg Nebulization BID Judy Londono MD      calcium carbonate  1,000 mg Oral Daily PRN Judy Londono MD      vitamin B-12  1,000 mcg Oral Daily Judy Londono MD      docusate sodium  100 mg Oral BID Judy Londono MD      enoxaparin  40 mg Subcutaneous Daily Gwyn Phelan MD      guaiFENesin  600 mg Oral BID Gwyn Phelan MD      ipratropium  0.5 mg Nebulization TID Gwyn Phelan MD      levalbuterol  1.25 mg Nebulization TID Gwyn Phelan MD      levothyroxine  50 mcg Oral Daily Judy Londono MD      losartan  100 mg Oral Daily Judy Londono MD      pantoprazole  40 mg Oral Daily Judy Londono MD      polyethylene glycol  17 g Oral Daily PRN Gwyn Phelan MD      polyethylene glycol  17 g Oral Daily Judy Londono MD      predniSONE  30 mg Oral Daily Renzo Monroy MD      Followed by    [START ON 8/23/2024] predniSONE  20 mg Oral Daily Renzo Monroy MD      Followed by    [START ON 8/26/2024] predniSONE  10 mg Oral Daily Renzo Monroy MD      propranolol  10 mg Oral BID Judy Londono MD           Today, Patient Was Seen By: Renzo Monroy MD    ** Please Note: Dictation voice to text software may have been used in the creation of this document. **

## 2024-08-20 NOTE — ASSESSMENT & PLAN NOTE
78-year-old female with past medical history significant for COPD, and hypertension who presented to the ER complaining of shortness of breath and hypoxia on her home pulse ox.    Background:  She of severe COPD and follows with a pulmonologist Dr. Nicholson: Most recent PFTs with FEV1 of 0.41, (22% predicted)  Patient was recently discharged 7/29 after being hospitalized for COPD exacerbation.  She was discharged on a steroid taper and did not require oxygen at the time of discharge.  She has seen both her PCP and her pulmonologist after discharge and had been improving back to her baseline  Patient relates the day of admission she had the acute onset of dyspnea.  Did not improve with her home nebulizers or inhalers.  She notes her home pulse ox showed her saturations were in the 70s so she came to the ER.  Upon presentation to the ER saturations were 74% on room air.  Patient was given DuoNeb, IV azithromycin, and prednisone 60 mg with improvement and was admitted for further evaluation    Plan:  Continue Xopenex/Atrovent tid and home Pulmicort for her COPD exacerbation  Continue Prednisone taper  Completed azithromycin course   Chest x-ray without focal infiltrate  Procal negative   Patient follows with pulmonologist Dr. Nicholson as an outpatient  Await safe discharge plan from case management. Authorization pending.

## 2024-08-20 NOTE — CASE MANAGEMENT
Per [employer], SNF auth still pending.     Escalation email sent to [employer] Team requesting expedite of determination.     CM notified:  young daniels

## 2024-08-20 NOTE — PLAN OF CARE
Problem: Potential for Falls  Goal: Patient will remain free of falls  Description: INTERVENTIONS:  - Educate patient/family on patient safety including physical limitations  - Instruct patient to call for assistance with activity   - Consult OT/PT to assist with strengthening/mobility   - Keep Call bell within reach  - Keep bed low and locked with side rails adjusted as appropriate  - Keep care items and personal belongings within reach  - Initiate and maintain comfort rounds  - Make Fall Risk Sign visible to staff  Problem: PAIN - ADULT  Goal: Verbalizes/displays adequate comfort level or baseline comfort level  Description: Interventions:  - Encourage patient to monitor pain and request assistance  - Assess pain using appropriate pain scale  - Administer analgesics based on type and severity of pain and evaluate response  - Implement non-pharmacological measures as appropriate and evaluate response  - Consider cultural and social influences on pain and pain management  - Notify physician/advanced practitioner if interventions unsuccessful or patient reports new pain  Outcome: Progressing     Problem: INFECTION - ADULT  Goal: Absence or prevention of progression during hospitalization  Description: INTERVENTIONS:  - Assess and monitor for signs and symptoms of infection  - Monitor lab/diagnostic results  - Monitor all insertion sites, i.e. indwelling lines, tubes, and drains  - Monitor endotracheal if appropriate and nasal secretions for changes in amount and color  - Spencer appropriate cooling/warming therapies per order  - Administer medications as ordered  - Instruct and encourage patient and family to use good hand hygiene technique  - Identify and instruct in appropriate isolation precautions for identified infection/condition  Outcome: Progressing  Goal: Absence of fever/infection during neutropenic period  Description: INTERVENTIONS:  - Monitor WBC    Outcome: Progressing     Problem: SAFETY  ADULT  Goal: Patient will remain free of falls  Description: INTERVENTIONS:  - Educate patient/family on patient safety including physical limitations  - Instruct patient to call for assistance with activity   - Consult OT/PT to assist with strengthening/mobility   - Keep Call bell within reach  - Keep bed low and locked with side rails adjusted as appropriate  - Keep care items and personal belongings within reach  - Initiate and maintain comfort rounds  - Make Fall Risk Sign visible to staff  -- Apply yellow socks and bracelet for high fall risk patients  - Consider moving patient to room near nurses station  Outcome: Progressing  Goal: Maintain or return to baseline ADL function  Description: INTERVENTIONS:  -  Assess patient's ability to carry out ADLs; assess patient's baseline for ADL function and identify physical deficits which impact ability to perform ADLs (bathing, care of mouth/teeth, toileting, grooming, dressing, etc.)  - Assess/evaluate cause of self-care deficits   - Assess range of motion  - Assess patient's mobility; develop plan if impaired  - Assess patient's need for assistive devices and provide as appropriate  - Encourage maximum independence but intervene and supervise when necessary  - Involve family in performance of ADLs  - Assess for home care needs following discharge   - Consider OT consult to assist with ADL evaluation and planning for discharge  - Provide patient education as appropriate  Outcome: Progressing  Goal: Maintains/Returns to pre admission functional level  Description: INTERVENTIONS:  - Perform AM-PAC 6 Click Basic Mobility/ Daily Activity assessment daily.  - Set and communicate daily mobility goal to care team and patient/family/caregiver.   - Collaborate with rehabilitation services on mobility goals if consulted  Problem: SAFETY ADULT  Goal: Patient will remain free of falls  Description: INTERVENTIONS:  - Educate patient/family on patient safety including physical  limitations  - Instruct patient to call for assistance with activity   - Consult OT/PT to assist with strengthening/mobility   - Keep Call bell within reach  - Keep bed low and locked with side rails adjusted as appropriate  - Keep care items and personal belongings within reach  - Initiate and maintain comfort rounds  - Make Fall Risk Sign visible to staff  - Apply yellow socks and bracelet for high fall risk patients  - Consider moving patient to room near nurses station  Outcome: Progressing  Goal: Maintain or return to baseline ADL function  Description: INTERVENTIONS:  -  Assess patient's ability to carry out ADLs; assess patient's baseline for ADL function and identify physical deficits which impact ability to perform ADLs (bathing, care of mouth/teeth, toileting, grooming, dressing, etc.)  - Assess/evaluate cause of self-care deficits   - Assess range of motion  - Assess patient's mobility; develop plan if impaired  - Assess patient's need for assistive devices and provide as appropriate  - Encourage maximum independence but intervene and supervise when necessary  - Involve family in performance of ADLs  - Assess for home care needs following discharge   - Consider OT consult to assist with ADL evaluation and planning for discharge  - Provide patient education as appropriate  Outcome: Progressing  Goal: Maintains/Returns to pre admission functional level  Description: INTERVENTIONS:  - Perform AM-PAC 6 Click Basic Mobility/ Daily Activity assessment daily.  - Set and communicate daily mobility goal to care team and patient/family/caregiver.   - Collaborate with rehabilitation services on mobility goals if consulted    Problem: DISCHARGE PLANNING  Goal: Discharge to home or other facility with appropriate resources  Description: INTERVENTIONS:  - Identify barriers to discharge w/patient and caregiver  - Arrange for needed discharge resources and transportation as appropriate  - Identify discharge learning  needs (meds, wound care, etc.)  - Arrange for interpretive services to assist at discharge as needed  - Refer to Case Management Department for coordinating discharge planning if the patient needs post-hospital services based on physician/advanced practitioner order or complex needs related to functional status, cognitive ability, or social support system  Outcome: Progressing     Problem: Knowledge Deficit  Goal: Patient/family/caregiver demonstrates understanding of disease process, treatment plan, medications, and discharge instructions  Description: Complete learning assessment and assess knowledge base.  Interventions:  - Provide teaching at level of understanding  - Provide teaching via preferred learning methods  Outcome: Progressing   - Out of bed for toileting  - Record patient progress and toleration of activity level   Outcome: Progressing     - Out of bed for toileting  - Record patient progress and toleration of activity level   Outcome: Progressing     - Apply yellow socks and bracelet for high fall risk patients  - Consider moving patient to room near nurses station  Outcome: Progressing

## 2024-08-20 NOTE — ASSESSMENT & PLAN NOTE
Also an issue during her previous visit. Previously received antibiotics for this. CT currently without acute abnormalities.   Awaiting ENT recommendations.

## 2024-08-21 LAB
ANION GAP SERPL CALCULATED.3IONS-SCNC: 6 MMOL/L (ref 4–13)
BUN SERPL-MCNC: 30 MG/DL (ref 5–25)
CALCIUM SERPL-MCNC: 10.1 MG/DL (ref 8.4–10.2)
CHLORIDE SERPL-SCNC: 94 MMOL/L (ref 96–108)
CO2 SERPL-SCNC: 42 MMOL/L (ref 21–32)
CREAT SERPL-MCNC: 0.59 MG/DL (ref 0.6–1.3)
GFR SERPL CREATININE-BSD FRML MDRD: 88 ML/MIN/1.73SQ M
GLUCOSE SERPL-MCNC: 79 MG/DL (ref 65–140)
MAGNESIUM SERPL-MCNC: 2.4 MG/DL (ref 1.9–2.7)
POTASSIUM SERPL-SCNC: 5.4 MMOL/L (ref 3.5–5.3)
SODIUM SERPL-SCNC: 142 MMOL/L (ref 135–147)

## 2024-08-21 PROCEDURE — 80048 BASIC METABOLIC PNL TOTAL CA: CPT | Performed by: STUDENT IN AN ORGANIZED HEALTH CARE EDUCATION/TRAINING PROGRAM

## 2024-08-21 PROCEDURE — 94640 AIRWAY INHALATION TREATMENT: CPT

## 2024-08-21 PROCEDURE — 94760 N-INVAS EAR/PLS OXIMETRY 1: CPT

## 2024-08-21 PROCEDURE — 83735 ASSAY OF MAGNESIUM: CPT | Performed by: STUDENT IN AN ORGANIZED HEALTH CARE EDUCATION/TRAINING PROGRAM

## 2024-08-21 PROCEDURE — 99232 SBSQ HOSP IP/OBS MODERATE 35: CPT | Performed by: STUDENT IN AN ORGANIZED HEALTH CARE EDUCATION/TRAINING PROGRAM

## 2024-08-21 RX ADMIN — DOCUSATE SODIUM 100 MG: 100 CAPSULE, LIQUID FILLED ORAL at 17:20

## 2024-08-21 RX ADMIN — PANTOPRAZOLE SODIUM 40 MG: 40 TABLET, DELAYED RELEASE ORAL at 08:01

## 2024-08-21 RX ADMIN — Medication 1 CAPSULE: at 08:02

## 2024-08-21 RX ADMIN — PREDNISONE 30 MG: 20 TABLET ORAL at 08:01

## 2024-08-21 RX ADMIN — ASPIRIN 81 MG: 81 TABLET, COATED ORAL at 08:01

## 2024-08-21 RX ADMIN — BUDESONIDE 0.5 MG: 0.5 INHALANT ORAL at 19:45

## 2024-08-21 RX ADMIN — IPRATROPIUM BROMIDE 0.5 MG: 0.5 SOLUTION RESPIRATORY (INHALATION) at 07:17

## 2024-08-21 RX ADMIN — BUDESONIDE 0.5 MG: 0.5 INHALANT ORAL at 07:17

## 2024-08-21 RX ADMIN — PROPRANOLOL HYDROCHLORIDE 10 MG: 10 TABLET ORAL at 17:20

## 2024-08-21 RX ADMIN — CYANOCOBALAMIN TAB 1000 MCG 1000 MCG: 1000 TAB at 08:01

## 2024-08-21 RX ADMIN — LEVALBUTEROL HYDROCHLORIDE 1.25 MG: 1.25 SOLUTION RESPIRATORY (INHALATION) at 19:45

## 2024-08-21 RX ADMIN — PROPRANOLOL HYDROCHLORIDE 10 MG: 10 TABLET ORAL at 08:01

## 2024-08-21 RX ADMIN — ENOXAPARIN SODIUM 40 MG: 40 INJECTION SUBCUTANEOUS at 08:05

## 2024-08-21 RX ADMIN — GUAIFENESIN 600 MG: 600 TABLET ORAL at 08:01

## 2024-08-21 RX ADMIN — IPRATROPIUM BROMIDE 0.5 MG: 0.5 SOLUTION RESPIRATORY (INHALATION) at 19:45

## 2024-08-21 RX ADMIN — LEVALBUTEROL HYDROCHLORIDE 1.25 MG: 1.25 SOLUTION RESPIRATORY (INHALATION) at 07:17

## 2024-08-21 RX ADMIN — GUAIFENESIN 600 MG: 600 TABLET ORAL at 17:20

## 2024-08-21 RX ADMIN — LEVOTHYROXINE SODIUM 50 MCG: 50 TABLET ORAL at 05:15

## 2024-08-21 RX ADMIN — LOSARTAN POTASSIUM 100 MG: 50 TABLET, FILM COATED ORAL at 08:01

## 2024-08-21 RX ADMIN — ALPRAZOLAM 0.25 MG: 0.5 TABLET ORAL at 20:37

## 2024-08-21 RX ADMIN — DOCUSATE SODIUM 100 MG: 100 CAPSULE, LIQUID FILLED ORAL at 08:01

## 2024-08-21 NOTE — ASSESSMENT & PLAN NOTE
78-year-old female with past medical history significant for COPD, and hypertension who presented to the ER complaining of shortness of breath and hypoxia on her home pulse ox.    Background:  She of severe COPD and follows with a pulmonologist Dr. Nicholson: Most recent PFTs with FEV1 of 0.41, (22% predicted)  Patient was recently discharged 7/29 after being hospitalized for COPD exacerbation.  She was discharged on a steroid taper and did not require oxygen at the time of discharge.  She has seen both her PCP and her pulmonologist after discharge and had been improving back to her baseline  Patient relates the day of admission she had the acute onset of dyspnea.  Did not improve with her home nebulizers or inhalers.  She notes her home pulse ox showed her saturations were in the 70s so she came to the ER.  Upon presentation to the ER saturations were 74% on room air.  Patient was given DuoNeb, IV azithromycin, and prednisone 60 mg with improvement and was admitted for further evaluation    Plan:  Continue Xopenex/Atrovent tid and home Pulmicort for her COPD exacerbation  Continue Prednisone taper  Completed azithromycin course   Chest x-ray without focal infiltrate  Procal negative   Patient follows with pulmonologist Dr. Nicholson as an outpatient  Await safe discharge plan from case management.   Authorization denied. Peer to peer initiated. Awaiting callback from insurance.   Pharmacist chart review completed for refill of Imbruvica indicates no medication or significant health changes since last pharmacist counseling. No questions for the pharmacist. Communicated with patient over phone. Patient's prescription was billed through commercial insurance plan. Medication shipped on Tuesday, January 10th via [employer] to [id number] for delivery in 1-2 business days.     Lidia Herrera   [facility]  Phone: [phone]  [email or web address]

## 2024-08-21 NOTE — PLAN OF CARE
Problem: Potential for Falls  Goal: Patient will remain free of falls  Description: INTERVENTIONS:  - Educate patient/family on patient safety including physical limitations  - Instruct patient to call for assistance with activity   - Consult OT/PT to assist with strengthening/mobility   - Keep Call bell within reach  - Keep bed low and locked with side rails adjusted as appropriate  - Keep care items and personal belongings within reach  - Initiate and maintain comfort rounds  - Make Fall Risk Sign visible to staff  -- Apply yellow socks and bracelet for high fall risk patients  - Consider moving patient to room near nurses station  Outcome: Progressing     Problem: PAIN - ADULT  Goal: Verbalizes/displays adequate comfort level or baseline comfort level  Description: Interventions:  - Encourage patient to monitor pain and request assistance  - Assess pain using appropriate pain scale  - Administer analgesics based on type and severity of pain and evaluate response  - Implement non-pharmacological measures as appropriate and evaluate response  - Consider cultural and social influences on pain and pain management  - Notify physician/advanced practitioner if interventions unsuccessful or patient reports new pain  Outcome: Progressing     Problem: INFECTION - ADULT  Goal: Absence or prevention of progression during hospitalization  Description: INTERVENTIONS:  - Assess and monitor for signs and symptoms of infection  - Monitor lab/diagnostic results  - Monitor all insertion sites, i.e. indwelling lines, tubes, and drains  - Monitor endotracheal if appropriate and nasal secretions for changes in amount and color  - [place] appropriate cooling/warming therapies per order  - Administer medications as ordered  - Instruct and encourage patient and family to use good hand hygiene technique  - Identify and instruct in appropriate isolation precautions for identified infection/condition  Outcome: Progressing  Goal: Absence  of fever/infection during neutropenic period  Description: INTERVENTIONS:  - Monitor WBC    Outcome: Progressing     Problem: SAFETY ADULT  Goal: Patient will remain free of falls  Description: INTERVENTIONS:  - Educate patient/family on patient safety including physical limitations  - Instruct patient to call for assistance with activity   - Consult OT/PT to assist with strengthening/mobility   - Keep Call bell within reach  - Keep bed low and locked with side rails adjusted as appropriate  - Keep care items and personal belongings within reach  - Initiate and maintain comfort rounds  - Make Fall Risk Sign visible to staff  -- Apply yellow socks and bracelet for high fall risk patients  - Consider moving patient to room near nurses station  Outcome: Progressing  Goal: Maintain or return to baseline ADL function  Description: INTERVENTIONS:  -  Assess patient's ability to carry out ADLs; assess patient's baseline for ADL function and identify physical deficits which impact ability to perform ADLs (bathing, care of mouth/teeth, toileting, grooming, dressing, etc.)  - Assess/evaluate cause of self-care deficits   - Assess range of motion  - Assess patient's mobility; develop plan if impaired  - Assess patient's need for assistive devices and provide as appropriate  - Encourage maximum independence but intervene and supervise when necessary  - Involve family in performance of ADLs  - Assess for home care needs following discharge   - Consider OT consult to assist with ADL evaluation and planning for discharge  - Provide patient education as appropriate  Outcome: Progressing  Goal: Maintains/Returns to pre admission functional level  Description: INTERVENTIONS:  - Perform AM-PAC 6 Click Basic Mobility/ Daily Activity assessment daily.  - Set and communicate daily mobility goal to care team and patient/family/caregiver.   - Collaborate with rehabilitation services on mobility goals if consulted  - - Out of bed for  toileting  - Record patient progress and toleration of activity level   Outcome: Progressing     Problem: DISCHARGE PLANNING  Goal: Discharge to home or other facility with appropriate resources  Description: INTERVENTIONS:  - Identify barriers to discharge w/patient and caregiver  - Arrange for needed discharge resources and transportation as appropriate  - Identify discharge learning needs (meds, wound care, etc.)  - Arrange for interpretive services to assist at discharge as needed  - Refer to Case Management Department for coordinating discharge planning if the patient needs post-hospital services based on physician/advanced practitioner order or complex needs related to functional status, cognitive ability, or social support system  Outcome: Progressing     Problem: Knowledge Deficit  Goal: Patient/family/caregiver demonstrates understanding of disease process, treatment plan, medications, and discharge instructions  Description: Complete learning assessment and assess knowledge base.  Interventions:  - Provide teaching at level of understanding  - Provide teaching via preferred learning methods  Outcome: Progressing     Problem: RESPIRATORY - ADULT  Goal: Achieves optimal ventilation and oxygenation  Description: INTERVENTIONS:  - Assess for changes in respiratory status  - Assess for changes in mentation and behavior  - Position to facilitate oxygenation and minimize respiratory effort  - Oxygen administered by appropriate delivery if ordered  - Initiate smoking cessation education as indicated  - Encourage broncho-pulmonary hygiene including cough, deep breathe, Incentive Spirometry  - Assess the need for suctioning and aspirate as needed  - Assess and instruct to report SOB or any respiratory difficulty  - Respiratory Therapy support as indicated  Outcome: Progressing     Problem: Nutrition/Hydration-ADULT  Goal: Nutrient/Hydration intake appropriate for improving, restoring or maintaining nutritional  needs  Description: Monitor and assess patient's nutrition/hydration status for malnutrition. Collaborate with interdisciplinary team and initiate plan and interventions as ordered.  Monitor patient's weight and dietary intake as ordered or per policy. Utilize nutrition screening tool and intervene as necessary. Determine patient's food preferences and provide high-protein, high-caloric foods as appropriate.     INTERVENTIONS:  - Monitor oral intake, urinary output, labs, and treatment plans  - Assess nutrition and hydration status and recommend course of action  - Evaluate amount of meals eaten  - Assist patient with eating if necessary   - Allow adequate time for meals  - Recommend/ encourage appropriate diets, oral nutritional supplements, and vitamin/mineral supplements  - Order, calculate, and assess calorie counts as needed  - Recommend, monitor, and adjust tube feedings and TPN/PPN based on assessed needs  - Assess need for intravenous fluids  - Provide specific nutrition/hydration education as appropriate  - Include patient/family/caregiver in decisions related to nutrition  Outcome: Progressing

## 2024-08-21 NOTE — CASE MANAGEMENT
Per [employer], SNF auth still pending.     SECOND Escalation email sent to [employer] Escalation Team requesting expedite of determination.     CM notified:  humberto fang

## 2024-08-21 NOTE — PLAN OF CARE
Problem: Potential for Falls  Goal: Patient will remain free of falls  Description: INTERVENTIONS:  - Educate patient/family on patient safety including physical limitations  - Instruct patient to call for assistance with activity   - Consult OT/PT to assist with strengthening/mobility   - Keep Call bell within reach  - Keep bed low and locked with side rails adjusted as appropriate  - Keep care items and personal belongings within reach  - Initiate and maintain comfort rounds  - Make Fall Risk Sign visible to staff  - Offer Toileting every  Hours, in advance of need  - Initiate/Maintain alarm  - Obtain necessary fall risk management equipment  - Apply yellow socks and bracelet for high fall risk patients  - Consider moving patient to room near nurses station  Outcome: Progressing     Problem: INFECTION - ADULT  Goal: Absence or prevention of progression during hospitalization  Description: INTERVENTIONS:  - Assess and monitor for signs and symptoms of infection  - Monitor lab/diagnostic results  - Monitor all insertion sites, i.e. indwelling lines, tubes, and drains  - Monitor endotracheal if appropriate and nasal secretions for changes in amount and color  - [place] appropriate cooling/warming therapies per order  - Administer medications as ordered  - Instruct and encourage patient and family to use good hand hygiene technique  - Identify and instruct in appropriate isolation precautions for identified infection/condition  Outcome: Progressing     Problem: RESPIRATORY - ADULT  Goal: Achieves optimal ventilation and oxygenation  Description: INTERVENTIONS:  - Assess for changes in respiratory status  - Assess for changes in mentation and behavior  - Position to facilitate oxygenation and minimize respiratory effort  - Oxygen administered by appropriate delivery if ordered  - Initiate smoking cessation education as indicated  - Encourage broncho-pulmonary hygiene including cough, deep breathe, Incentive  Spirometry  - Assess the need for suctioning and aspirate as needed  - Assess and instruct to report SOB or any respiratory difficulty  - Respiratory Therapy support as indicated  Outcome: Progressing

## 2024-08-21 NOTE — PROGRESS NOTES
[facility]  Progress Note  Name: Sarah Zayas I  MRN: [id number]  Unit/Bed#: 7T [facility] 702-01 I Date of Admission: 8/14/2024   Date of Service: 8/21/2024 I Hospital Day: 6    Assessment & Plan   * Acute exacerbation of chronic obstructive pulmonary disease (COPD) ([facility])  Assessment & Plan  78-year-old female with past medical history significant for COPD, and hypertension who presented to the ER complaining of shortness of breath and hypoxia on her home pulse ox.    Background:  She of severe COPD and follows with a pulmonologist Dr. Nicholson: Most recent PFTs with FEV1 of 0.41, (22% predicted)  Patient was recently discharged 7/29 after being hospitalized for COPD exacerbation.  She was discharged on a steroid taper and did not require oxygen at the time of discharge.  She has seen both her PCP and her pulmonologist after discharge and had been improving back to her baseline  Patient relates the day of admission she had the acute onset of dyspnea.  Did not improve with her home nebulizers or inhalers.  She notes her home pulse ox showed her saturations were in the 70s so she came to the ER.  Upon presentation to the ER saturations were 74% on room air.  Patient was given DuoNeb, IV azithromycin, and prednisone 60 mg with improvement and was admitted for further evaluation    Plan:  Continue Xopenex/Atrovent tid and home Pulmicort for her COPD exacerbation  Continue Prednisone taper  Completed azithromycin course   Chest x-ray without focal infiltrate  Procal negative   Patient follows with pulmonologist Dr. Nicholson as an outpatient  Await safe discharge plan from case management.   Authorization denied. Peer to peer initiated. Awaiting callback from insurance.    Neck swelling  Assessment & Plan  Also an issue during her previous visit. Previously received antibiotics for this. CT currently without acute abnormalities.   Awaiting ENT recommendations.    Anxiety  Assessment & Plan  Continue  xanax     Severe protein-calorie malnutrition (HCC)  Assessment & Plan  Malnutrition Findings:   Adult Malnutrition type: Chronic illnessBMI Findings:   Body mass index is 17.59 kg/m².     Acute respiratory failure with hypoxia (HCC)  Assessment & Plan  Due to COPD exacerbation  Patient had 2 L nasal cannula improved with normalization of her saturations, currently 100% on 2 L  Age-adjusted D-dimer noted to be normal  Home oxygen eval reported 1 liter at all times      Hypertension  Assessment & Plan  Controlled  Continue propanolol and ARB    Gastroesophageal reflux disease without esophagitis  Assessment & Plan  Continue PPI    Other specified hypothyroidism  Assessment & Plan  Continue Levothyroxine           VTE Pharmacologic Prophylaxis:   Pharmacologic: Enoxaparin (Lovenox)  Mechanical VTE Prophylaxis in Place: Yes    Discussions with Specialists or Other Care Team Provider: case management    Education and Discussions with Family / Patient: patient, son    Current Length of Stay: 6 day(s)    Current Patient Status: Inpatient   Certification Statement: The patient will continue to require additional inpatient hospital stay due to awaiting safe discharge plan, peer to peer    Discharge Plan: active    Code Status: Level 1 - Full Code      Subjective:   Patient seen and examined at bedside. She is disappointed that the authorization got denied. They would like us to appeal. They plan to appeal also if we get denied.    Objective:     Vitals:   Temp (24hrs), Av.4 °F (36.3 °C), Min:97 °F (36.1 °C), Max:98 °F (36.7 °C)    Temp:  [97 °F (36.1 °C)-98 °F (36.7 °C)] 97.1 °F (36.2 °C)  HR:  [68-88] 83  Resp:  [18] 18  BP: (106-136)/(72-84) 120/75  SpO2:  [96 %-98 %] 97 %  Body mass index is 17.59 kg/m².     Input and Output Summary (last 24 hours):       Intake/Output Summary (Last 24 hours) at 2024 1626  Last data filed at 2024 1312  Gross per 24 hour   Intake 1080 ml   Output 0 ml   Net 1080 ml        Physical Exam:     Physical Exam  Vitals reviewed.   Constitutional:       General: She is not in acute distress.  HENT:      Head: Normocephalic.      Nose: Nose normal.      Mouth/Throat:      Mouth: Mucous membranes are moist.   Eyes:      General: No scleral icterus.  Cardiovascular:      Rate and Rhythm: Normal rate.   Pulmonary:      Effort: Pulmonary effort is normal. No respiratory distress.   Abdominal:      Palpations: Abdomen is soft.      Tenderness: There is no abdominal tenderness.   Skin:     General: Skin is warm.   Neurological:      Mental Status: She is alert. Mental status is at baseline.   Psychiatric:         Mood and Affect: Mood normal.         Behavior: Behavior normal.       Additional Data:     Labs:    Results from last 7 days   Lab Units 08/19/24  0503 08/16/24  0433 08/15/24  0432 08/14/24  2024   WBC Thousand/uL 4.89   < > 6.78 7.85   HEMOGLOBIN g/dL 11.5   < > 11.5 13.2   HEMATOCRIT % 35.5   < > 37.5 43.1   PLATELETS Thousands/uL 196   < > 146* 184   SEGS PCT %  --   --   --  83*   LYMPHO PCT %  --   --  4* 7*   MONO PCT %  --   --  2* 8   EOS PCT %  --   --  0 2    < > = values in this interval not displayed.     Results from last 7 days   Lab Units 08/21/24  0452 08/16/24  0433 08/15/24  0432   SODIUM mmol/L 142   < > 140   POTASSIUM mmol/L 5.4*   < > 4.4   CHLORIDE mmol/L 94*   < > 102   CO2 mmol/L 42*   < > 31   BUN mg/dL 30*   < > 28*   CREATININE mg/dL 0.59*   < > 0.54*   ANION GAP mmol/L 6   < > 7   CALCIUM mg/dL 10.1   < > 8.4   ALBUMIN g/dL  --   --  3.4*   TOTAL BILIRUBIN mg/dL  --   --  0.36   ALK PHOS U/L  --   --  40   ALT U/L  --   --  29   AST U/L  --   --  22   GLUCOSE RANDOM mg/dL 79   < > 136    < > = values in this interval not displayed.                 Results from last 7 days   Lab Units 08/16/24  0433 08/15/24  0432   PROCALCITONIN ng/ml 0.13 0.19           * I Have Reviewed All Lab Data Listed Above.  * Additional Pertinent Lab Tests Reviewed: All Labs  For Current Hospital Admission Reviewed    Mobility:  Basic Mobility Inpatient Raw Score: 21  -[facility] Goal: 6: Walk 10 steps or more  -HL Achieved: 6: Walk 10 steps or more    Lines:   Invasive Devices       Peripheral Intravenous Line  Duration             Peripheral IV 08/20/24 Right;Ventral (anterior) Forearm 1 day                       Imaging:    Imaging Reports Reviewed Today Include: no new imaging    Recent Cultures (last 7 days):           Last 24 Hours Medication List:   Current Facility-Administered Medications   Medication Dose Route Frequency Provider Last Rate    acetaminophen  650 mg Oral Q6H PRN Gwyn Phelan MD      albuterol  2.5 mg Nebulization Q6H PRN Gwyn Phelan MD      ALPRAZolam  0.125 mg Oral Daily PRN Janeth Fuentes DO      ALPRAZolam  0.25 mg Oral HS PRN Judy Londono MD      aspirin  81 mg Oral Daily Judy Londono MD      budesonide  0.5 mg Nebulization BID Judy Londono MD      calcium carbonate  1,000 mg Oral Daily PRN Judy Londono MD      vitamin B-12  1,000 mcg Oral Daily Judy Londono MD      docusate sodium  100 mg Oral BID Judy Londono MD      enoxaparin  40 mg Subcutaneous Daily Gwyn Phelan MD      guaiFENesin  600 mg Oral BID Gwyn Phelan MD      ipratropium  0.5 mg Nebulization Q12H Renzo Monroy MD      levalbuterol  1.25 mg Nebulization Q12H Renzo Monroy MD      levothyroxine  50 mcg Oral Daily Judy Londono MD      losartan  100 mg Oral Daily Judy Londono MD      pantoprazole  40 mg Oral Daily Judy Londono MD      polyethylene glycol  17 g Oral Daily PRN Gwyn Phelan MD      polyethylene glycol  17 g Oral Daily Judy Londono MD      predniSONE  30 mg Oral Daily Renzo Monroy MD      Followed by    [START ON 8/24/2024] predniSONE  20 mg Oral Daily Renzo Monroy MD      Followed by    [START ON 8/27/2024] predniSONE  10 mg Oral Daily Renzo Monroy MD      PreserVision AREDS 2  1 capsule Oral Daily Renzo Monroy MD      propranolol  10 mg Oral  BULL Londono MD          Today, Patient Was Seen By: Renzo Monroy MD    ** Please Note: Dictation voice to text software may have been used in the creation of this document. **

## 2024-08-21 NOTE — CASE MANAGEMENT
Case Management Progress Note    Patient name Sarah Zayas  Location 7T /7T -01 MRN [id number]  : 1945 Date 2024       LOS (days): 6  Geometric Mean LOS (GMLOS) (days): 3.6  Days to GMLOS:-2.5        OBJECTIVE:        Current admission status: Inpatient  Preferred Pharmacy:   [facility] - [address]  [address]  [address]  [facility] [address]  Phone: [phone] Fax: [phone]    Primary Care Provider: Nicolas Nix MD    Primary Insurance: [employer]  REP  Secondary Insurance:     PROGRESS NOTE:    Message from  discharge support staff that [employer]  denied need for STR.  Information for MD P2P received and sent to SAUD Monroy.  MD will do P2P.  Met with patient and son at bedside and updated on same.  Explained if denial up held then patient and family can do a family appeal.

## 2024-08-21 NOTE — CASE MANAGEMENT
Support Center has received DENIAL for SNF Authorization.   Insurance: [employer]   Called in by Rep: saurav Galdamez Reason: no skilled needs   Facility: [facility]   Denial #: [id number]   Peer to Peer Phone#: [phone] option 2      Deadline:8/22 @ 12pm       Care Manager notified:humberto fang     Please reach out to CM for updates on any clinical information.

## 2024-08-21 NOTE — ASSESSMENT & PLAN NOTE
Malnutrition Findings:   Adult Malnutrition type: Chronic illnessBMI Findings:   Body mass index is 17.59 kg/m².

## 2024-08-22 ENCOUNTER — DOCUMENTATION (OUTPATIENT)
Dept: OTOLARYNGOLOGY | Facility: CLINIC | Age: 79
End: 2024-08-22

## 2024-08-22 LAB
ANION GAP SERPL CALCULATED.3IONS-SCNC: 7 MMOL/L (ref 4–13)
BUN SERPL-MCNC: 28 MG/DL (ref 5–25)
CALCIUM SERPL-MCNC: 9.7 MG/DL (ref 8.4–10.2)
CHLORIDE SERPL-SCNC: 93 MMOL/L (ref 96–108)
CO2 SERPL-SCNC: 41 MMOL/L (ref 21–32)
CREAT SERPL-MCNC: 0.76 MG/DL (ref 0.6–1.3)
ERYTHROCYTE [DISTWIDTH] IN BLOOD BY AUTOMATED COUNT: 13.8 % (ref 11.6–15.1)
GFR SERPL CREATININE-BSD FRML MDRD: 75 ML/MIN/1.73SQ M
GLUCOSE SERPL-MCNC: 127 MG/DL (ref 65–140)
HCT VFR BLD AUTO: 38.4 % (ref 34.8–46.1)
HGB BLD-MCNC: 12.2 G/DL (ref 11.5–15.4)
MAGNESIUM SERPL-MCNC: 2.2 MG/DL (ref 1.9–2.7)
MCH RBC QN AUTO: 28.5 PG (ref 26.8–34.3)
MCHC RBC AUTO-ENTMCNC: 31.8 G/DL (ref 31.4–37.4)
MCV RBC AUTO: 90 FL (ref 82–98)
PLATELET # BLD AUTO: 263 THOUSANDS/UL (ref 149–390)
PMV BLD AUTO: 10.4 FL (ref 8.9–12.7)
POTASSIUM SERPL-SCNC: 4.4 MMOL/L (ref 3.5–5.3)
RBC # BLD AUTO: 4.28 MILLION/UL (ref 3.81–5.12)
SODIUM SERPL-SCNC: 141 MMOL/L (ref 135–147)
WBC # BLD AUTO: 6.44 THOUSAND/UL (ref 4.31–10.16)

## 2024-08-22 PROCEDURE — 99232 SBSQ HOSP IP/OBS MODERATE 35: CPT | Performed by: STUDENT IN AN ORGANIZED HEALTH CARE EDUCATION/TRAINING PROGRAM

## 2024-08-22 PROCEDURE — 85027 COMPLETE CBC AUTOMATED: CPT | Performed by: STUDENT IN AN ORGANIZED HEALTH CARE EDUCATION/TRAINING PROGRAM

## 2024-08-22 PROCEDURE — 97530 THERAPEUTIC ACTIVITIES: CPT

## 2024-08-22 PROCEDURE — 97535 SELF CARE MNGMENT TRAINING: CPT

## 2024-08-22 PROCEDURE — 94640 AIRWAY INHALATION TREATMENT: CPT

## 2024-08-22 PROCEDURE — 94760 N-INVAS EAR/PLS OXIMETRY 1: CPT

## 2024-08-22 PROCEDURE — 83735 ASSAY OF MAGNESIUM: CPT | Performed by: STUDENT IN AN ORGANIZED HEALTH CARE EDUCATION/TRAINING PROGRAM

## 2024-08-22 PROCEDURE — 80048 BASIC METABOLIC PNL TOTAL CA: CPT | Performed by: STUDENT IN AN ORGANIZED HEALTH CARE EDUCATION/TRAINING PROGRAM

## 2024-08-22 PROCEDURE — 97116 GAIT TRAINING THERAPY: CPT

## 2024-08-22 PROCEDURE — 99222 1ST HOSP IP/OBS MODERATE 55: CPT | Performed by: OTOLARYNGOLOGY

## 2024-08-22 RX ADMIN — GUAIFENESIN 600 MG: 600 TABLET ORAL at 08:05

## 2024-08-22 RX ADMIN — PANTOPRAZOLE SODIUM 40 MG: 40 TABLET, DELAYED RELEASE ORAL at 08:05

## 2024-08-22 RX ADMIN — IPRATROPIUM BROMIDE 0.5 MG: 0.5 SOLUTION RESPIRATORY (INHALATION) at 20:09

## 2024-08-22 RX ADMIN — PROPRANOLOL HYDROCHLORIDE 10 MG: 10 TABLET ORAL at 08:05

## 2024-08-22 RX ADMIN — PREDNISONE 30 MG: 20 TABLET ORAL at 08:05

## 2024-08-22 RX ADMIN — BUDESONIDE 0.5 MG: 0.5 INHALANT ORAL at 08:08

## 2024-08-22 RX ADMIN — LOSARTAN POTASSIUM 100 MG: 50 TABLET, FILM COATED ORAL at 08:05

## 2024-08-22 RX ADMIN — CYANOCOBALAMIN TAB 1000 MCG 1000 MCG: 1000 TAB at 08:05

## 2024-08-22 RX ADMIN — BUDESONIDE 0.5 MG: 0.5 INHALANT ORAL at 20:09

## 2024-08-22 RX ADMIN — LEVALBUTEROL HYDROCHLORIDE 1.25 MG: 1.25 SOLUTION RESPIRATORY (INHALATION) at 20:09

## 2024-08-22 RX ADMIN — LEVALBUTEROL HYDROCHLORIDE 1.25 MG: 1.25 SOLUTION RESPIRATORY (INHALATION) at 08:08

## 2024-08-22 RX ADMIN — Medication 1 CAPSULE: at 08:05

## 2024-08-22 RX ADMIN — PROPRANOLOL HYDROCHLORIDE 10 MG: 10 TABLET ORAL at 17:18

## 2024-08-22 RX ADMIN — IPRATROPIUM BROMIDE 0.5 MG: 0.5 SOLUTION RESPIRATORY (INHALATION) at 08:08

## 2024-08-22 RX ADMIN — ENOXAPARIN SODIUM 40 MG: 40 INJECTION SUBCUTANEOUS at 08:06

## 2024-08-22 RX ADMIN — LEVOTHYROXINE SODIUM 50 MCG: 50 TABLET ORAL at 05:02

## 2024-08-22 RX ADMIN — GUAIFENESIN 600 MG: 600 TABLET ORAL at 17:18

## 2024-08-22 RX ADMIN — ALPRAZOLAM 0.25 MG: 0.5 TABLET ORAL at 20:40

## 2024-08-22 RX ADMIN — ASPIRIN 81 MG: 81 TABLET, COATED ORAL at 08:06

## 2024-08-22 NOTE — CASE MANAGEMENT
Case Management Discharge Planning Note    Patient name Sarah Zayas  Location 7T U 702/7T [facility] 702-01 MRN [id number]  : 1945 Date 2024       Current Admission Date: 2024  Current Admission Diagnosis:Acute exacerbation of chronic obstructive pulmonary disease (COPD) (HCC)   Patient Active Problem List    Diagnosis Date Noted Date Diagnosed    Neck swelling 2024     Anxiety 2024     Severe protein-calorie malnutrition (HCC) 2024     COPD, severe (HCC) 2024     Acute respiratory failure with hypoxia (HCC) 2023     COVID-19 2023     Abnormal CT scan 2023     Infectious sialoadenitis of major salivary gland 2023     Cellulitis of neck 2023     Left upper lobe pulmonary nodule 2023     Postnasal drip 2023     Raynaud's phenomenon without gangrene 2022     Temporal arteritis ([facility]) 2020     Hypertension 10/11/2018     Rectus sheath hematoma, initial encounter 10/10/2018     Other specified hypothyroidism 2018     Gastroesophageal reflux disease without esophagitis 2018     Acute exacerbation of chronic obstructive pulmonary disease (COPD) ([facility]) 2012     Vitamin D deficiency 2012     Osteoarthritis 2012       LOS (days): 7  Geometric Mean LOS (GMLOS) (days): 3.6  Days to GMLOS:-3.4     OBJECTIVE:  Risk of Unplanned Readmission Score: 24.62         Current admission status: Inpatient   Preferred Pharmacy:   [facility] - [place], [place] [address]  [address]  [place] [address]  Phone: [phone] Fax: [phone]    Primary Care Provider: Nicolas Nix MD    Primary Insurance: [employer] SINHA  Secondary Insurance:     DISCHARGE DETAILS:    Discharge planning discussed with:: Patient, son Steven and daughter Melissa  Freedom of Choice: Yes     CM contacted family/caregiver?: Yes  Were Treatment Team discharge recommendations reviewed with patient/caregiver?: Yes           Contacts  Patient Contacts: Son Steven and daughter Melissa  Relationship to Patient:: Family  Contact Method: Phone  Reason/Outcome: Emergency Contact, Discharge Planning     IMM Given (Date):: 08/22/24 (Copy provided to patient and media)  IMM Given to:: Patient     Additional Comments: Dr Monroy did MD P2P and denial was upheld.  Met with patient at bedside and explained denial.  Provided patient with copy of insurance card and provided with insurance phone number for family to call and start appeal.    Patient called son Steven from room but he is not able to assist he is at work.  Call from daughter Melissa and explained denials and process.  Assisted patient with texting daughter insurance card pic and phone number.  Dtr and patient are starting family appeal.

## 2024-08-22 NOTE — CASE MANAGEMENT
Case Management Progress Note    Patient name Sarah Zayas  Location 7T /7T -01 MRN [id number]  : 1945 Date 2024       LOS (days): 7  Geometric Mean LOS (GMLOS) (days): 3.6  Days to GMLOS:-3.4        OBJECTIVE:        Current admission status: Inpatient  Preferred Pharmacy:   [facility] - [address]  [address]  [address]  [place] [address]  Phone: [phone] Fax: [phone]    Primary Care Provider: Nicolas Nix MD    Primary Insurance: [facility]  Secondary Insurance:     PROGRESS NOTE:    Per patient and dtr Insurance was called and Insurance is requesting another Doc P2P appeal.  Update to Dr Monroy and he will call [facility] for P[facility]P

## 2024-08-22 NOTE — CASE MANAGEMENT
Received request from  to assist with obtaining correct number for family to appeal SNF denial. Per , family called appeal P# [phone] and was told there is no denial on file and could take 30 days to get POA paperwork prior to them being able to speak to family.     Called into [employer] main line P# [phone]. Spoke to [facility] who pulled up auth  # [id number]. States auth is still pending but sees notes that auth is denied. Said P2P can be done. DCS notified [facility] that P2P was already done, asked if another one is being offered. [facility] then stated that P2P was completed by Dr. Monroy. DCS asked whether or not auth is denied or still pending, as the information being received is inconsistent. DCS notified rep that family is trying to appeal and asked if case can be marked as denied on [employer]'s end so when family calls to appeal they won't be told there is no denial on file. [facility] put DSC back into call que unexpectedly and DCS had to re-follow prompts to speak to another rep. Call disconnected in middle of prompts.     Call made back into same [employer] P#, spoke to rep Rey HERRERA who stated who stated shows as still pending, and shows that p2p was offered and done. Stated there's no update from medical director. Upon checking notes, rep is able to see that auth is denied after P2P and said that next steps would be to initiate an appeal. DCS stated that family is attempting to request an appeal and because [employer] still states pending in their system despite auth being denied, family is being told there is no denial on file. DCS stated that [employer] needs to change auth over to denied in their system before family can appeal. Per Rey HERRERA, he is going to send a message to the case owner to give DSC a call back for this case. No phone number on file for nurse left in this case. Name of nurse is Oliva PARTIDA Can expect call back ASAP. Call ref# [id number]    [employer] escalation email request sent to multiple [employer] contacts  requesting assistance with this issue. CM aware: Luna DOMINGO

## 2024-08-22 NOTE — OCCUPATIONAL THERAPY NOTE
Occupational Therapy Treatment Note     Patient Name: Sarah Zayas  Today's Date: 8/22/2024  Problem List  Principal Problem:    Acute exacerbation of chronic obstructive pulmonary disease (COPD) (HCC)  Active Problems:    Other specified hypothyroidism    Gastroesophageal reflux disease without esophagitis    Hypertension    Raynaud's phenomenon without gangrene    Acute respiratory failure with hypoxia (HCC)    Severe protein-calorie malnutrition (HCC)    Anxiety    Neck swelling              08/22/24 1305   OT Last Visit   OT Visit Date 08/22/24   Note Type   Note Type Treatment for insurance authorization   Pain Assessment   Pain Assessment Tool 0-10   Pain Score No Pain   Restrictions/Precautions   Weight Bearing Precautions Per Order No   Other Precautions O2   ADL   Grooming Assistance 5  Supervision/Setup   UB Bathing Assistance 5  Supervision/Setup   LB Bathing Assistance 5  Supervision/Setup   UB Dressing Assistance 5  Supervision/Setup   LB Dressing Assistance 4  Minimal Assistance   LB Dressing Deficit Thread RLE into underwear;Thread LLE into underwear;Pull up over hips   Functional Standing Tolerance   Time ~ 10 minutes   Transfers   Sit to Stand 5  Supervision   Additional items Increased time required   Stand to Sit 5  Supervision   Additional items Increased time required   Toilet transfer 5  Supervision   Additional items Increased time required   Functional Mobility   Functional Mobility 5  Supervision   Additional items Rolling walker   Cognition   Arousal/Participation Alert;Cooperative   Attention Attends with cues to redirect   Orientation Level Oriented X4   Memory Within functional limits   Following Commands Follows all commands and directions without difficulty   Activity Tolerance   Activity Tolerance Patient tolerated treatment well   Assessment   Assessment Pt seen for OT txt. Pt continues to required increased time to complete tasks. Pt with noted decreased endurance and tolerance for  standing tasks.  Pt was able to complete dressing tasks with supervision, toileting with Agus due to O2 tubing management with clothing management. Bathroom mobility supervision with RW. Pt would benefit from continued OT services to further address deficits with mobility and ADLs in order to maximize functional independence.    Plan   Treatment Interventions ADL retraining;Functional transfer training;UE strengthening/ROM;Continued evaluation;Energy conservation;Activityengagement   Goal Expiration Date 08/30/24   OT Treatment Day 2   OT Frequency 2-3x/wk   Discharge Recommendation   Rehab Resource Intensity Level, OT II (Moderate Resource Intensity)   AM-PAC Daily Activity Inpatient   Lower Body Dressing 3   Bathing 3   Toileting 3   Upper Body Dressing 3   Grooming 4   Eating 4   Daily Activity Raw Score 20   Daily Activity Standardized Score (Calc for Raw Score >=11) 42.03

## 2024-08-22 NOTE — PHYSICAL THERAPY NOTE
Physical TherapyTreatment Note    Patient's Name: Sarah Zayas    Admitting Diagnosis  SOB (shortness of breath) [R06.02]  Hypoxia [R09.02]  COPD exacerbation (HCC) [J44.1]    Problem List  Patient Active Problem List   Diagnosis    Acute exacerbation of chronic obstructive pulmonary disease (COPD) (HCC)    Vitamin D deficiency    Other specified hypothyroidism    Gastroesophageal reflux disease without esophagitis    Rectus sheath hematoma, initial encounter    Hypertension    Osteoarthritis    Temporal arteritis (HCC)    Raynaud's phenomenon without gangrene    Postnasal drip    Left upper lobe pulmonary nodule    Infectious sialoadenitis of major salivary gland    Cellulitis of neck    Acute respiratory failure with hypoxia (HCC)    COVID-19    Abnormal CT scan    COPD, severe (HCC)    Severe protein-calorie malnutrition (HCC)    Anxiety    Neck swelling       Past Medical History  Past Medical History:   Diagnosis Date    Anxiety 8/18/2024    Asthma     Chronic obstructive pulmonary disease (HCC) 09/26/2012    COPD (chronic obstructive pulmonary disease) (HCC)     Disease of thyroid gland     GERD (gastroesophageal reflux disease)     Hypertension 10/11/2018    Hypothyroid     Osteoarthritis 09/26/2012    Temporal arteritis (HCC)     Tubular adenoma     Type 2 diabetes mellitus with complication, without long-term current use of insulin (HCC) 01/14/2020       Past Surgical History  Past Surgical History:   Procedure Laterality Date    EYE SURGERY Right 12/06/2019    cataract LVCFS    HYSTERECTOMY      NO PAST SURGERIES      ALSO NO RELEVANT PAST SURRGICAL HX AS PER [facility]       Recent Imaging  CT soft tissue neck w contrast   Final Result by Liam Galvez MD (08/18 1728)      No acute findings on neck CT.            Workstation performed: [id number]         XR chest 1 view portable   ED Interpretation by Deven Atkinson MD (08/14 2046)   Nad unchanged      Final Result by Antnoio Newton MD (08/15 3728)   Stable  chest without acute cardiopulmonary disease.      Workstation performed: [id number]             Recent Vital Signs  Vitals:    08/21/24 1950 08/21/24 2300 08/22/24 0703 08/22/24 0808   BP:  118/76 131/72    BP Location:  Right arm Right arm    Pulse:  85 80    Resp:  18 18    Temp:  98 °F (36.7 °C) 97.7 °F (36.5 °C)    TempSrc:  Temporal Temporal    SpO2: 96% 97% 93% 92%   Weight:       Height:            08/22/24 1100   PT Last Visit   PT Visit Date 08/22/24   Note Type   Note Type Treatment for insurance authorization   Pain Assessment   Pain Assessment Tool 0-10   Pain Score No Pain   Restrictions/Precautions   Weight Bearing Precautions Per Order No   Other Precautions O2  (high anxiety)   General   Chart Reviewed Yes   Response to Previous Treatment Patient with no complaints from previous session.   Family/Caregiver Present No   Cognition   Overall Cognitive Status Impaired   Arousal/Participation Alert   Attention Attends with cues to redirect   Orientation Level Oriented X4   Memory Decreased recall of recent events;Decreased recall of precautions   Following Commands Follows one step commands without difficulty   Transfers   Sit to Stand 5  Supervision   Additional items Increased time required   Stand to Sit 5  Supervision   Additional items Increased time required   Toilet transfer 5  Supervision   Additional items Increased time required;Standard toilet   Additional Comments pt reports using bathroom in hospital room without assistance at times   Ambulation/Elevation   Gait pattern Step through pattern;Decreased heel strike;Decreased toe off;Excessively slow;Decreased foot clearance;Improper Weight shift   Gait Assistance 5  Supervision   Additional items Verbal cues   Assistive Device Rolling walker   Distance 165ft, 100ft   Stair Management Assistance 5  Supervision   Additional items Verbal cues;Increased time required   Stair Management Technique One rail R;Step to pattern;Foreward   Number of Stairs  12   Ambulation/Elevation Additional Comments assisted with management of O2 tank up and down stairs; many verbal cues given due to pt high anxiety   Balance   Static Sitting Good   Dynamic Sitting Fair +   Static Standing Fair   Dynamic Standing Fair -   Ambulatory Fair -   Endurance Deficit   Endurance Deficit Yes   Endurance Deficit Description pt reports SOB with activity, O2 sats above 93% on 2L, pt also report LE weakness with stairs   Activity Tolerance   Activity Tolerance Patient tolerated treatment well   Medical Staff Made Aware spoke to CM   Nurse Made Aware spoke to RN   Assessment   Prognosis Good   Problem List Decreased strength;Decreased range of motion;Decreased endurance;Impaired balance;Decreased mobility;Decreased coordination;Impaired sensation   Assessment Pt is demonstrating improved activity tolerance and ambulation endurance. Able to ambulate household distances with supervision due to decreased awareness and management of new O2. She reports feeling SOB however able to maintain conversation during ambulation and O2 saturation WNL on 2L. Very anxious regarding completion of stair training however with VC and encouragement able to complete with supervision and assistance to manage O2 tank. Pt would likely have O2 concentrator at home and not have to manage tank in the home. She and family maintain that pt is not well enought to D/C home at this time.   Barriers to Discharge Inaccessible home environment;Decreased caregiver support   Goals   Patient Goals to get stronger   STG Expiration Date 08/26/24   Short Term Goal #1 see eval note   PT Treatment Day 2   Plan   Treatment/Interventions ADL retraining;Functional transfer training;LE strengthening/ROM;Elevations;Therapeutic exercise;Endurance training;Patient/family training;Equipment eval/education;Bed mobility;Gait training;Spoke to nursing;Spoke to case management;OT   Progress Progressing toward goals   PT Frequency 2-3x/wk   Discharge  Recommendation   Rehab Resource Intensity Level, PT II (Moderate Resource Intensity)   Equipment Recommended Walker   Walker Package Recommended Wheeled walker   AM-PAC Basic Mobility Inpatient   Turning in Flat Bed Without Bedrails 4   Lying on Back to Sitting on Edge of Flat Bed Without Bedrails 4   Moving Bed to Chair 4   Standing Up From Chair Using Arms 3   Walk in Room 3   Climb 3-5 Stairs With Railing 3   Basic Mobility Inpatient Raw Score 21   Basic Mobility Standardized Score 45.55   [facility] Highest Level Of Mobility   -HLM Goal 6: Walk 10 steps or more   -HLM Achieved 8: Walk 250 feet ot more   Education   Education Provided Mobility training;Home exercise program;Assistive device   Patient Explanation/teachback used;Demonstrates verbal understanding   End of Consult   Patient Position at End of Consult Bedside chair;All needs within reach       Florencio Kang PT, DPT

## 2024-08-22 NOTE — CASE MANAGEMENT
Case Management Progress Note    Patient name Sarah Zayas  Location 7T /7T -01 MRN [id number]  : 1945 Date 2024       LOS (days): 7  Geometric Mean LOS (GMLOS) (days): 3.6  Days to GMLOS:-3.6        OBJECTIVE:        Current admission status: Inpatient  Preferred Pharmacy:   [facility] - [address]  [address]  [facility] [address]  Phone: [phone] Fax: [phone]    Primary Care Provider: Nicolas Nix MD    [facility]  Secondary Insurance:     PROGRESS NOTE:    Per CM discharge support staff once [employer] marks auth denied in system patient can appeal.  Met with patient at bedside and updated same.

## 2024-08-22 NOTE — ASSESSMENT & PLAN NOTE
78-year-old female with past medical history significant for COPD, and hypertension who presented to the ER complaining of shortness of breath and hypoxia on her home pulse ox.    Background:  She of severe COPD and follows with a pulmonologist Dr. Nicholson: Most recent PFTs with FEV1 of 0.41, (22% predicted)  Patient was recently discharged 7/29 after being hospitalized for COPD exacerbation.  She was discharged on a steroid taper and did not require oxygen at the time of discharge.  She has seen both her PCP and her pulmonologist after discharge and had been improving back to her baseline  Patient relates the day of admission she had the acute onset of dyspnea.  Did not improve with her home nebulizers or inhalers.  She notes her home pulse ox showed her saturations were in the 70s so she came to the ER.  Upon presentation to the ER saturations were 74% on room air.  Patient was given DuoNeb, IV azithromycin, and prednisone 60 mg with improvement and was admitted for further evaluation    Plan:  Continue Xopenex/Atrovent tid and home Pulmicort for her COPD exacerbation  Continue Prednisone taper  Completed azithromycin course   Chest x-ray without focal infiltrate  Procal negative   Patient follows with pulmonologist Dr. Nicholson as an outpatient  Await safe discharge plan from case management.   Authorization denied. Peer to peer Denied. Family appealing

## 2024-08-22 NOTE — ASSESSMENT & PLAN NOTE
Due to COPD exacerbation  Patient had 2 L nasal cannula improved with normalization of her saturations, currently 100% on 2 L  Age-adjusted D-dimer noted to be normal  Home oxygen evaluation needed again

## 2024-08-22 NOTE — PLAN OF CARE
Problem: RESPIRATORY - ADULT  Goal: Achieves optimal ventilation and oxygenation  Description: INTERVENTIONS:  - Assess for changes in respiratory status  - Assess for changes in mentation and behavior  - Position to facilitate oxygenation and minimize respiratory effort  - Oxygen administered by appropriate delivery if ordered  - Initiate smoking cessation education as indicated  - Encourage broncho-pulmonary hygiene including cough, deep breathe, Incentive Spirometry  - Assess the need for suctioning and aspirate as needed  - Assess and instruct to report SOB or any respiratory difficulty  - Respiratory Therapy support as indicated  Outcome: Progressing     Problem: Nutrition/Hydration-ADULT  Goal: Nutrient/Hydration intake appropriate for improving, restoring or maintaining nutritional needs  Description: Monitor and assess patient's nutrition/hydration status for malnutrition. Collaborate with interdisciplinary team and initiate plan and interventions as ordered.  Monitor patient's weight and dietary intake as ordered or per policy. Utilize nutrition screening tool and intervene as necessary. Determine patient's food preferences and provide high-protein, high-caloric foods as appropriate.     INTERVENTIONS:  - Monitor oral intake, urinary output, labs, and treatment plans  - Assess nutrition and hydration status and recommend course of action  - Evaluate amount of meals eaten  - Assist patient with eating if necessary   - Allow adequate time for meals  - Recommend/ encourage appropriate diets, oral nutritional supplements, and vitamin/mineral supplements  - Order, calculate, and assess calorie counts as needed  - Recommend, monitor, and adjust tube feedings and TPN/PPN based on assessed needs  - Assess need for intravenous fluids  - Provide specific nutrition/hydration education as appropriate  - Include patient/family/caregiver in decisions related to nutrition  Outcome: Progressing     Problem: PAIN -  ADULT  Goal: Verbalizes/displays adequate comfort level or baseline comfort level  Description: Interventions:  - Encourage patient to monitor pain and request assistance  - Assess pain using appropriate pain scale  - Administer analgesics based on type and severity of pain and evaluate response  - Implement non-pharmacological measures as appropriate and evaluate response  - Consider cultural and social influences on pain and pain management  - Notify physician/advanced practitioner if interventions unsuccessful or patient reports new pain  Outcome: Progressing     Problem: SAFETY ADULT  Goal: Maintain or return to baseline ADL function  Description: INTERVENTIONS:  -  Assess patient's ability to carry out ADLs; assess patient's baseline for ADL function and identify physical deficits which impact ability to perform ADLs (bathing, care of mouth/teeth, toileting, grooming, dressing, etc.)  - Assess/evaluate cause of self-care deficits   - Assess range of motion  - Assess patient's mobility; develop plan if impaired  - Assess patient's need for assistive devices and provide as appropriate  - Encourage maximum independence but intervene and supervise when necessary  - Involve family in performance of ADLs  - Assess for home care needs following discharge   - Consider OT consult to assist with ADL evaluation and planning for discharge  - Provide patient education as appropriate  Outcome: Progressing     Problem: Knowledge Deficit  Goal: Patient/family/caregiver demonstrates understanding of disease process, treatment plan, medications, and discharge instructions  Description: Complete learning assessment and assess knowledge base.  Interventions:  - Provide teaching at level of understanding  - Provide teaching via preferred learning methods  Outcome: Progressing     Problem: DISCHARGE PLANNING  Goal: Discharge to home or other facility with appropriate resources  Description: INTERVENTIONS:  - Identify barriers to  discharge w/patient and caregiver  - Arrange for needed discharge resources and transportation as appropriate  - Identify discharge learning needs (meds, wound care, etc.)  - Arrange for interpretive services to assist at discharge as needed  - Refer to Case Management Department for coordinating discharge planning if the patient needs post-hospital services based on physician/advanced practitioner order or complex needs related to functional status, cognitive ability, or social support system  Outcome: Progressing

## 2024-08-22 NOTE — PLAN OF CARE
Problem: OCCUPATIONAL THERAPY ADULT  Goal: Performs self-care activities at highest level of function for planned discharge setting.  See evaluation for individualized goals.  Description: Treatment Interventions: ADL retraining, Visual perceptual retraining, Functional transfer training, Continued evaluation, Activityengagement, Energy conservation          See flowsheet documentation for full assessment, interventions and recommendations.   Outcome: Progressing  Note: Limitation: Decreased high-level ADLs, Decreased endurance, Decreased ADL status, Decreased UE strength  Prognosis: Good  Assessment:    Pt seen for OT txt. Pt continues to required increased time to complete tasks. Pt with noted decreased endurance and tolerance for standing tasks.  Pt was able to complete dressing tasks with supervision, toileting with Agus due to O2 tubing management with clothing management. Bathroom mobility supervision with RW. Pt would benefit from continued OT services to further address deficits with mobility and ADLs in order to maximize functional independence.   Rehab Resource Intensity Level, OT: II (Moderate Resource Intensity)

## 2024-08-22 NOTE — PROGRESS NOTES
[facility]  Progress Note  Name: Sarah Zayas I  MRN: [id number]  Unit/Bed#: 7T [facility] 702-01 I Date of Admission: 8/14/2024   Date of Service: 8/22/2024 I Hospital Day: 7    Assessment & Plan   * Acute exacerbation of chronic obstructive pulmonary disease (COPD) ([facility])  Assessment & Plan  78-year-old female with past medical history significant for COPD, and hypertension who presented to the ER complaining of shortness of breath and hypoxia on her home pulse ox.    Background:  She of severe COPD and follows with a pulmonologist Dr. Nicholson: Most recent PFTs with FEV1 of 0.41, (22% predicted)  Patient was recently discharged 7/29 after being hospitalized for COPD exacerbation.  She was discharged on a steroid taper and did not require oxygen at the time of discharge.  She has seen both her PCP and her pulmonologist after discharge and had been improving back to her baseline  Patient relates the day of admission she had the acute onset of dyspnea.  Did not improve with her home nebulizers or inhalers.  She notes her home pulse ox showed her saturations were in the 70s so she came to the ER.  Upon presentation to the ER saturations were 74% on room air.  Patient was given DuoNeb, IV azithromycin, and prednisone 60 mg with improvement and was admitted for further evaluation    Plan:  Continue Xopenex/Atrovent tid and home Pulmicort for her COPD exacerbation  Continue Prednisone taper  Completed azithromycin course   Chest x-ray without focal infiltrate  Procal negative   Patient follows with pulmonologist Dr. Nicholson as an outpatient  Await safe discharge plan from case management.   Authorization denied. Peer to peer Denied. Family appealing    Neck swelling  Assessment & Plan  Also an issue during her previous visit. Previously received antibiotics for this. CT currently without acute abnormalities.   ENT recommendations appreciated:  Acute sialoadenitis and associated neck edema.   Sialoadenitis completely resolved.  Mild residual edema still present.    To avoid recurrent episodes of sialoadenitis hydration and use of sialagogues (lemon drops, sour candy) is recommended.    Incidental finding on physical examination of focal periodontitis and a tooth that may need to be extracted.  Recommend a follow-up with dentist.  This can be done as an outpatient.    Acute respiratory failure with hypoxia (HCC)  Assessment & Plan  Due to COPD exacerbation  Patient had 2 L nasal cannula improved with normalization of her saturations, currently 100% on 2 L  Age-adjusted D-dimer noted to be normal  Home oxygen evaluation needed again      Hypertension  Assessment & Plan  Controlled  Continue propanolol and ARB    Gastroesophageal reflux disease without esophagitis  Assessment & Plan  Continue PPI    Other specified hypothyroidism  Assessment & Plan  Continue Levothyroxine           VTE Pharmacologic Prophylaxis:   Pharmacologic: Enoxaparin (Lovenox)  Mechanical VTE Prophylaxis in Place: Yes    Discussions with Specialists or Other Care Team Provider: nursing    Education and Discussions with Family / Patient: patient, called daughter no answer    Current Length of Stay: 7 day(s)    Current Patient Status: Inpatient   Certification Statement: The patient will continue to require additional inpatient hospital stay due to appealing    Discharge Plan: 24 hours    Code Status: Level 1 - Full Code      Subjective:   Patient seen and examined at bedside. No new issues overnight.    Objective:     Vitals:   Temp (24hrs), Av.6 °F (36.4 °C), Min:97.1 °F (36.2 °C), Max:98 °F (36.7 °C)    Temp:  [97.1 °F (36.2 °C)-98 °F (36.7 °C)] 97.7 °F (36.5 °C)  HR:  [80-91] 80  Resp:  [18] 18  BP: (118-131)/(72-76) 131/72  SpO2:  [92 %-97 %] 92 %  Body mass index is 17.59 kg/m².     Input and Output Summary (last 24 hours):       Intake/Output Summary (Last 24 hours) at 2024 1309  Last data filed at 2024 1235  Gross  per 24 hour   Intake 1320 ml   Output --   Net 1320 ml       Physical Exam:     Physical Exam  Vitals reviewed.   Constitutional:       General: She is not in acute distress.  HENT:      Head: Normocephalic.      Nose: Nose normal.      Mouth/Throat:      Mouth: Mucous membranes are moist.   Eyes:      General: No scleral icterus.  Cardiovascular:      Rate and Rhythm: Normal rate.   Pulmonary:      Effort: Pulmonary effort is normal. No respiratory distress.   Abdominal:      Palpations: Abdomen is soft.      Tenderness: There is no abdominal tenderness.   Skin:     General: Skin is warm.   Neurological:      Mental Status: She is alert. Mental status is at baseline.   Psychiatric:         Mood and Affect: Mood normal.         Behavior: Behavior normal.       Additional Data:     Labs:    Results from last 7 days   Lab Units 08/22/24  0443   WBC Thousand/uL 6.44   HEMOGLOBIN g/dL 12.2   HEMATOCRIT % 38.4   PLATELETS Thousands/uL 263     Results from last 7 days   Lab Units 08/22/24  0936   SODIUM mmol/L 141   POTASSIUM mmol/L 4.4   CHLORIDE mmol/L 93*   CO2 mmol/L 41*   BUN mg/dL 28*   CREATININE mg/dL 0.76   ANION GAP mmol/L 7   CALCIUM mg/dL 9.7   GLUCOSE RANDOM mg/dL 127                 Results from last 7 days   Lab Units 08/16/24  0433   PROCALCITONIN ng/ml 0.13           * I Have Reviewed All Lab Data Listed Above.  * Additional Pertinent Lab Tests Reviewed: All Labs For Current Hospital Admission Reviewed    Mobility:  Basic Mobility Inpatient Raw Score: 21  -[facility] Goal: 6: Walk 10 steps or more  -[facility] Achieved: 8: Walk 250 feet ot more    Lines:   Invasive Devices       Peripheral Intravenous Line  Duration             Peripheral IV 08/20/24 Right;Ventral (anterior) Forearm 2 days                       Imaging:    Imaging Reports Reviewed Today Include: ct soft tissue neck    Recent Cultures (last 7 days):           Last 24 Hours Medication List:   Current Facility-Administered Medications   Medication  Dose Route Frequency Provider Last Rate    acetaminophen  650 mg Oral Q6H PRN Gwyn Phelan MD      albuterol  2.5 mg Nebulization Q6H PRN Gwyn Phelan MD      ALPRAZolam  0.125 mg Oral Daily PRN Janeth Fuentes DO      ALPRAZolam  0.25 mg Oral HS PRN Judy Londono MD      aspirin  81 mg Oral Daily Judy Londono MD      budesonide  0.5 mg Nebulization BID Judy Londono MD      calcium carbonate  1,000 mg Oral Daily PRN Judy Londono MD      vitamin B-12  1,000 mcg Oral Daily Judy Londono MD      docusate sodium  100 mg Oral BID Judy Londono MD      enoxaparin  40 mg Subcutaneous Daily Gwyn Phelan MD      guaiFENesin  600 mg Oral BID Gwyn Phelan MD      ipratropium  0.5 mg Nebulization Q12H Renzo Monroy MD      levalbuterol  1.25 mg Nebulization Q12H Renzo Monroy MD      levothyroxine  50 mcg Oral Daily Judy Londono MD      losartan  100 mg Oral Daily Judy Londono MD      pantoprazole  40 mg Oral Daily Judy Londono MD      polyethylene glycol  17 g Oral Daily PRN Gwyn Phelan MD      polyethylene glycol  17 g Oral Daily Judy Londono MD      predniSONE  30 mg Oral Daily Renzo Monroy MD      Followed by    [START ON 8/24/2024] predniSONE  20 mg Oral Daily Renzo Monroy MD      Followed by    [START ON 8/27/2024] predniSONE  10 mg Oral Daily Renzo Monroy MD      PreserVision AREDS 2  1 capsule Oral Daily Renzo Monroy MD      propranolol  10 mg Oral BID Judy Londono MD          Today, Patient Was Seen By: Renzo Monroy MD    ** Please Note: Dictation voice to text software may have been used in the creation of this document. **

## 2024-08-22 NOTE — CASE MANAGEMENT
Case Management Progress Note    Patient name Sarah Zayas  Location 7T /7T -01 MRN [id number]  : 1945 Date 2024       LOS (days): 7  Geometric Mean LOS (GMLOS) (days): 3.6  Days to GMLOS:-3.4        OBJECTIVE:        Current admission status: Inpatient  Preferred Pharmacy:   [facility] [address]  [address]  [address]  [facility] [address]  Phone: [phone] Fax: [phone]    Primary Care Provider: Nicolas Nix MD    Primary Insurance: [employer]  REP  Secondary Insurance:     PROGRESS NOTE:      Dr Monroy called [facility] and P2P was completed yesterday Denied Level I appeal.  Family can now start family patient appeal.  Provided with differed number.  Met with patient at bedside who called daughter on phone.  Spoke with Dtr and son-in-law Lyndon and explained process.  Explained and discussed what services are in the home.  Has [facility] SN[facility]PT/MSW with [facility].  [facility].  Requesting dtr Melissa be made emergency contact 1.  Discussed long-term planning re possible FUAD vs living with family.  CLAY wants patient to move to [place] however patient has been resistant

## 2024-08-22 NOTE — PLAN OF CARE
Problem: PHYSICAL THERAPY ADULT  Goal: Performs mobility at highest level of function for planned discharge setting.  See evaluation for individualized goals.  Description: Treatment/Interventions: ADL retraining, Functional transfer training, LE strengthening/ROM, Elevations, Therapeutic exercise, Endurance training, Patient/family training, Equipment eval/education, Bed mobility, Gait training, Spoke to nursing, Spoke to case management, OT  Equipment Recommended: Walker       See flowsheet documentation for full assessment, interventions and recommendations.  Outcome: Progressing  Note: Prognosis: Good  Problem List: Decreased strength, Decreased range of motion, Decreased endurance, Impaired balance, Decreased mobility, Decreased coordination, Impaired sensation  Assessment: Pt is demonstrating improved activity tolerance and ambulation endurance. Able to ambulate household distances with supervision due to decreased awareness and management of new O2. She reports feeling SOB however able to maintain conversation during ambulation and O2 saturation WNL on 2L. Very anxious regarding completion of stair training however with VC and encouragement able to complete with supervision and assistance to manage O2 tank. Pt would likely have O2 concentrator at home and not have to manage tank in the home. She and family maintain that pt is not well enought to D/C home at this time.  Barriers to Discharge: Inaccessible home environment, Decreased caregiver support     Rehab Resource Intensity Level, PT: II (Moderate Resource Intensity)    See flowsheet documentation for full assessment.

## 2024-08-22 NOTE — CONSULTS
[facility]  Consult  Name: Sarah Zayas 78 y.o. female I MRN: [id number]  Unit/Bed#: 7T [facility] 702-01 I Date of Admission: 8/14/2024   Date of Service: 8/22/2024 I Hospital Day: 7    Inpatient consult to ENT  Consult performed by: Trey Amado MD  Consult ordered by: Renzo Monroy MD        Assessment & Plan     Patient with prior acute sialoadenitis and associated neck edema.  Sialoadenitis completely resolved.  Mild residual edema still present.    To avoid recurrent episodes of sialoadenitis hydration and use of sialagogues (lemon drops, sour candy) is recommended.    Incidental finding on physical examination of focal periodontitis and a tooth that may need to be extracted.  Recommend a follow-up with dentist.  This can be done as an outpatient.  Follow-up with ENT as needed    Physician Requesting Consult: Renzo Monroy MD  Reason for Consult / Principal Problem: History of neck swelling    HPI: Sarah Zayas is a 78 y.o. year old female who presents with history of a neck swelling on prior admission.  Briefly patient was last admitted on 7/23/2024 and discharged on 7/29/2024 with a exacerbation of a COPD.  During that hospital stay she was noticed to have inflammation of the neck.  A CT scan of the neck was performed on admission that revealed bilateral submandibular glands with edema and subcutaneous soft tissue edema/cellulitis on the anterior neck extending to the level of the cricoid cartilage.  No abscess and no sialolith was noticed.  No significant leukocytosis was noticed but she was still treated with a 10 days course of Augmentin.  Patient was readmitted on 8/14/2024 with exacerbation of COPD again.  On this admission there is no evidence swelling on the neck but she did have a follow-up CT scan done in the ER that revealed no acute findings on the neck according to the radiologist report.  Patient still complains of some swelling on her neck.  In addition she reports having  some difficulty swallowing big pills that she has to cut in half.    We interrogated she does not have any sore throat, no associated reflux type symptoms.  No additional upper respiratory tract symptoms.      Consults    Review of Systems    Revision of Systems:    Complete review done, only positive for the symptoms described in the H&P section above    Historical Information   Past Medical History:   Diagnosis Date    Anxiety 2024    Asthma     Chronic obstructive pulmonary disease (HCC) 2012    COPD (chronic obstructive pulmonary disease) (HCC)     Disease of thyroid gland     GERD (gastroesophageal reflux disease)     Hypertension 10/11/2018    Hypothyroid     Osteoarthritis 2012    Temporal arteritis ([facility])     Tubular adenoma     Type 2 diabetes mellitus with complication, without long-term current use of insulin ([facility]) 2020     Past Surgical History:   Procedure Laterality Date    EYE SURGERY Right 2019    cataract LVCFS    HYSTERECTOMY      NO PAST SURGERIES      ALSO NO RELEVANT PAST SURRGICAL HX AS PER [facility]     Social History   Social History     Substance and Sexual Activity   Alcohol Use Never     Social History     Substance and Sexual Activity   Drug Use No     Social History     Tobacco Use   Smoking Status Former    Current packs/day: 0.00    Average packs/day: 1 pack/day for 54.0 years (54.0 ttl pk-yrs)    Types: Cigarettes    Start date:     Quit date:     Years since quittin.6   Smokeless Tobacco Never   Tobacco Comments    TOBACCO USE, NO PASSIVE SMOKE EXPOSURE     Family History:   Family History   Problem Relation Age of Onset    Breast cancer Mother     Emphysema Father        Meds/Allergies   Current Facility-Administered Medications   Medication Dose Route Frequency    acetaminophen (TYLENOL) tablet 650 mg  650 mg Oral Q6H PRN    albuterol inhalation solution 2.5 mg  2.5 mg Nebulization Q6H PRN    ALPRAZolam (XANAX) tablet 0.125 mg  0.125 mg Oral  "Daily PRN    ALPRAZolam (XANAX) tablet 0.25 mg  0.25 mg Oral HS PRN    aspirin (ECOTRIN LOW STRENGTH) EC tablet 81 mg  81 mg Oral Daily    budesonide (PULMICORT) inhalation solution 0.5 mg  0.5 mg Nebulization BID    calcium carbonate (TUMS) chewable tablet 1,000 mg  1,000 mg Oral Daily PRN    cyanocobalamin (VITAMIN B-12) tablet 1,000 mcg  1,000 mcg Oral Daily    docusate sodium (COLACE) capsule 100 mg  100 mg Oral BID    enoxaparin (LOVENOX) subcutaneous injection 40 mg  40 mg Subcutaneous Daily    guaiFENesin (MUCINEX) 12 hr tablet 600 mg  600 mg Oral BID    ipratropium (ATROVENT) 0.02 % inhalation solution 0.5 mg  0.5 mg Nebulization Q12H    levalbuterol (XOPENEX) inhalation solution 1.25 mg  1.25 mg Nebulization Q12H    levothyroxine tablet 50 mcg  50 mcg Oral Daily    losartan (COZAAR) tablet 100 mg  100 mg Oral Daily    pantoprazole (PROTONIX) EC tablet 40 mg  40 mg Oral Daily    polyethylene glycol (MIRALAX) packet 17 g  17 g Oral Daily PRN    polyethylene glycol (MIRALAX) packet 17 g  17 g Oral Daily    predniSONE tablet 30 mg  30 mg Oral Daily    Followed by    START ON 8/24/2024 predniSONE tablet 20 mg  20 mg Oral Daily    Followed by    START ON 8/27/2024 predniSONE tablet 10 mg  10 mg Oral Daily    PreserVision AREDS 2 CAPS 1 capsule  1 capsule Oral Daily    propranolol (INDERAL) tablet 10 mg  10 mg Oral BID         No Known Allergies    Objective       Physical Exam   Blood pressure 131/72, pulse 80, temperature 97.7 °F (36.5 °C), temperature source Temporal, resp. rate 18, height 5' 3\" (1.6 m), weight 45 kg (99 lb 4.8 oz), SpO2 92%, not currently breastfeeding.  Constitutional: Oriented to person, place, and time. Well-developed and well-nourished, no apparent distress, non-toxic appearance. Cooperative, able to hear and answer questions without difficulty.      Voice: Normal voice quality.  Head: Normocephalic, atraumatic.  No scars, masses or lesions.  Face: Symmetric, no edema, no sinus " tenderness.  Eyes: Vision grossly intact, extra-ocular movement intact.  Right Ear: External ear normal.  Auditory canal with minimal dry partial impaction of wax.  Tympanic membrane can be visualized and well-appearing, without retraction or scarring.  No fluid present. No post-auricular erythema or tenderness  Left Ear: External ear normal.  Auditory canal clear.  Tympanic membrane well-appearing, without retraction or scarring.  No fluid present.  No post-auricular erythema or tenderness.  Nose: Septum midline, Mucosa moist, turbinates normal size, no edema. No polyps or masses, no discharge evident.  Oral cavity:  Lips normal, no mucosal lesions.  Partially edentulous, does have some focal areas of periodontal disease on some of the molars.  There is also an broken molar with abandoned root on left maxilla. Mucosa moist, clear saliva is elicited on massage of the submandibular glands.  Tongue mobile, floor of mouth normal.  Hard palate unremarkable.  No masses or lesions.   Oropharynx: Uvula is midline, sot palate normal.  Unremarkable oropharyngeal inlet.  Tonsils atrophic (tonsillectomy?).  Posterior pharyngeal wall clear. No masses or lesions.  Salivary glands:  Parotid glands and submandibular glands symmetric, no enlargement or tenderness.  Submandibular glands are more firm than usual, but no mass or tumor on bimanual palpation.  Neck: Normal laryngeal elevation with swallow.  Trachea midline.  No masses or lesions. No palpable adenopathy.  No residual edema on skin of anterior neck with no erythema and no tenderness.  Thyroid: normal in size, and consistency, unremarkable without tenderness or palpable nodules.  Pulmonary/Chest: Normal effort and rate. No respiratory distress.   Musculoskeletal: Normal range of motion.   Neurological: Cranial nerves 2-12 intact.  Skin: Skin is warm and dry.   Psychiatric: Normal mood and affect.      Lab Results: CBC:   Lab Results   Component Value Date    WBC 6.44  08/22/2024    HGB 12.2 08/22/2024    HCT 38.4 08/22/2024    MCV 90 08/22/2024     08/22/2024    RBC 4.28 08/22/2024    MCH 28.5 08/22/2024    MCHC 31.8 08/22/2024    RDW 13.8 08/22/2024    MPV 10.4 08/22/2024   , CMP:   Lab Results   Component Value Date    K 4.4 08/22/2024    CL 93 (L) 08/22/2024    CO2 41 (H) 08/22/2024    BUN 28 (H) 08/22/2024    CREATININE 0.76 08/22/2024    CALCIUM 9.7 08/22/2024    EGFR 75 08/22/2024     Imaging Studies: I have personally reviewed images on the PACS system and : Edema noticed on the prior CT scan dated 7/23/2024 as described on the H&P portion.  There is no radiological edema or swelling on the submandibular glands on the follow-up CT scan from 8/18/2024.  No sialoliths are noticed on either CT scan of the neck.   No evident periodontal abscess, sinuses are well aerated and have no evidence of any active disease.

## 2024-08-23 PROCEDURE — 94640 AIRWAY INHALATION TREATMENT: CPT

## 2024-08-23 PROCEDURE — 94664 DEMO&/EVAL PT USE INHALER: CPT

## 2024-08-23 PROCEDURE — 99232 SBSQ HOSP IP/OBS MODERATE 35: CPT | Performed by: STUDENT IN AN ORGANIZED HEALTH CARE EDUCATION/TRAINING PROGRAM

## 2024-08-23 PROCEDURE — 94760 N-INVAS EAR/PLS OXIMETRY 1: CPT

## 2024-08-23 RX ADMIN — GUAIFENESIN 600 MG: 600 TABLET ORAL at 08:27

## 2024-08-23 RX ADMIN — LEVALBUTEROL HYDROCHLORIDE 1.25 MG: 1.25 SOLUTION RESPIRATORY (INHALATION) at 07:21

## 2024-08-23 RX ADMIN — Medication 1 CAPSULE: at 08:26

## 2024-08-23 RX ADMIN — ALPRAZOLAM 0.25 MG: 0.5 TABLET ORAL at 22:07

## 2024-08-23 RX ADMIN — DOCUSATE SODIUM 100 MG: 100 CAPSULE, LIQUID FILLED ORAL at 08:28

## 2024-08-23 RX ADMIN — LEVALBUTEROL HYDROCHLORIDE 1.25 MG: 1.25 SOLUTION RESPIRATORY (INHALATION) at 20:15

## 2024-08-23 RX ADMIN — BUDESONIDE 0.5 MG: 0.5 INHALANT ORAL at 20:15

## 2024-08-23 RX ADMIN — LOSARTAN POTASSIUM 100 MG: 50 TABLET, FILM COATED ORAL at 08:26

## 2024-08-23 RX ADMIN — PROPRANOLOL HYDROCHLORIDE 10 MG: 10 TABLET ORAL at 08:28

## 2024-08-23 RX ADMIN — IPRATROPIUM BROMIDE 0.5 MG: 0.5 SOLUTION RESPIRATORY (INHALATION) at 07:21

## 2024-08-23 RX ADMIN — BUDESONIDE 0.5 MG: 0.5 INHALANT ORAL at 07:21

## 2024-08-23 RX ADMIN — ENOXAPARIN SODIUM 40 MG: 40 INJECTION SUBCUTANEOUS at 08:27

## 2024-08-23 RX ADMIN — IPRATROPIUM BROMIDE 0.5 MG: 0.5 SOLUTION RESPIRATORY (INHALATION) at 20:15

## 2024-08-23 RX ADMIN — PANTOPRAZOLE SODIUM 40 MG: 40 TABLET, DELAYED RELEASE ORAL at 08:28

## 2024-08-23 RX ADMIN — ASPIRIN 81 MG: 81 TABLET, COATED ORAL at 08:27

## 2024-08-23 RX ADMIN — GUAIFENESIN 600 MG: 600 TABLET ORAL at 17:17

## 2024-08-23 RX ADMIN — DOCUSATE SODIUM 100 MG: 100 CAPSULE, LIQUID FILLED ORAL at 17:16

## 2024-08-23 RX ADMIN — PREDNISONE 30 MG: 20 TABLET ORAL at 08:27

## 2024-08-23 RX ADMIN — LEVOTHYROXINE SODIUM 50 MCG: 50 TABLET ORAL at 05:30

## 2024-08-23 RX ADMIN — CYANOCOBALAMIN TAB 1000 MCG 1000 MCG: 1000 TAB at 08:27

## 2024-08-23 NOTE — CASE MANAGEMENT
Case Management Progress Note    Patient name Sarah Zayas  Location 7T /7T -01 MRN [id number]  : 1945 Date 2024       LOS (days): 8  Geometric Mean LOS (GMLOS) (days): 3.6  Days to GMLOS:-4.6        OBJECTIVE:        Current admission status: Inpatient  Preferred Pharmacy:   [facility] - [facility] [address]  [address]  [address]  [facility] [address]  Phone: [phone] Fax: [phone]    Primary Care Provider: Nicolas Nix MD    [facility]  Secondary Insurance:     PROGRESS NOTE:    Notification from  discharge support staff this am that auth is denied in [employer].  Patient needs to call [phone] [employer] for a fast appeal.  Met with patient at bedside in am and provided with information and phone number.  Call to son Steven while in room.  Steven requesting medical records provided with information my chart and Medical Records number to request records.  Patient placed call to [employer] in am and started Patient appeal with daughter Melissa on the line.  Per patient [employer] reports upto possible 72 hr wait for final decision.

## 2024-08-23 NOTE — PLAN OF CARE
Problem: INFECTION - ADULT  Goal: Absence or prevention of progression during hospitalization  Description: INTERVENTIONS:  - Assess and monitor for signs and symptoms of infection  - Monitor lab/diagnostic results  - Monitor all insertion sites, i.e. indwelling lines, tubes, and drains  - Monitor endotracheal if appropriate and nasal secretions for changes in amount and color  - [place] appropriate cooling/warming therapies per order  - Administer medications as ordered  - Instruct and encourage patient and family to use good hand hygiene technique  - Identify and instruct in appropriate isolation precautions for identified infection/condition  Outcome: Progressing     Problem: SAFETY ADULT  Goal: Maintain or return to baseline ADL function  Description: INTERVENTIONS:  -  Assess patient's ability to carry out ADLs; assess patient's baseline for ADL function and identify physical deficits which impact ability to perform ADLs (bathing, care of mouth/teeth, toileting, grooming, dressing, etc.)  - Assess/evaluate cause of self-care deficits   - Assess range of motion  - Assess patient's mobility; develop plan if impaired  - Assess patient's need for assistive devices and provide as appropriate  - Encourage maximum independence but intervene and supervise when necessary  - Involve family in performance of ADLs  - Assess for home care needs following discharge   - Consider OT consult to assist with ADL evaluation and planning for discharge  - Provide patient education as appropriate  Outcome: Progressing     Problem: RESPIRATORY - ADULT  Goal: Achieves optimal ventilation and oxygenation  Description: INTERVENTIONS:  - Assess for changes in respiratory status  - Assess for changes in mentation and behavior  - Position to facilitate oxygenation and minimize respiratory effort  - Oxygen administered by appropriate delivery if ordered  - Initiate smoking cessation education as indicated  - Encourage broncho-pulmonary  hygiene including cough, deep breathe, Incentive Spirometry  - Assess the need for suctioning and aspirate as needed  - Assess and instruct to report SOB or any respiratory difficulty  - Respiratory Therapy support as indicated  Outcome: Progressing

## 2024-08-23 NOTE — PROGRESS NOTES
[facility]  Progress Note  Name: Sarah Zayas I  MRN: [id number]  Unit/Bed#: 7T [facility] 702-01 I Date of Admission: 8/14/2024   Date of Service: 8/23/2024 I Hospital Day: 8    Assessment & Plan   * Acute exacerbation of chronic obstructive pulmonary disease (COPD) ([facility])  Assessment & Plan  78-year-old female with past medical history significant for COPD, and hypertension who presented to the ER complaining of shortness of breath and hypoxia on her home pulse ox.    Background:  She of severe COPD and follows with a pulmonologist Dr. Nicholson: Most recent PFTs with FEV1 of 0.41, (22% predicted)  Patient was recently discharged 7/29 after being hospitalized for COPD exacerbation.  She was discharged on a steroid taper and did not require oxygen at the time of discharge.  She has seen both her PCP and her pulmonologist after discharge and had been improving back to her baseline  Patient relates the day of admission she had the acute onset of dyspnea.  Did not improve with her home nebulizers or inhalers.  She notes her home pulse ox showed her saturations were in the 70s so she came to the ER.  Upon presentation to the ER saturations were 74% on room air.  Patient was given DuoNeb, IV azithromycin, and prednisone 60 mg with improvement and was admitted for further evaluation    Plan:  Continue Xopenex/Atrovent tid and home Pulmicort for her COPD exacerbation  Continue Prednisone taper  Completed azithromycin course   Chest x-ray without focal infiltrate  Procal negative   Patient follows with pulmonologist Dr. Nicholson as an outpatient  Await safe discharge plan from case management.   Authorization denied. Peer to peer Denied. Family appealing. MEDICALLY STABLE FOR DISCHARGE.    Neck swelling  Assessment & Plan  Also an issue during her previous visit. Previously received antibiotics for this. CT currently without acute abnormalities.   ENT recommendations appreciated:  Acute sialoadenitis and  associated neck edema.  Sialoadenitis completely resolved.  Mild residual edema still present.    To avoid recurrent episodes of sialoadenitis hydration and use of sialagogues (lemon drops, sour candy) is recommended.    Incidental finding on physical examination of focal periodontitis and a tooth that may need to be extracted.  Recommend a follow-up with dentist.  This can be done as an outpatient.    Acute respiratory failure with hypoxia (HCC)  Assessment & Plan  Due to COPD exacerbation  Patient had 2 L nasal cannula improved with normalization of her saturations, currently 100% on 2 L  Age-adjusted D-dimer noted to be normal  Home oxygen evaluation needed again             VTE Pharmacologic Prophylaxis:   Pharmacologic: Enoxaparin (Lovenox)  Mechanical VTE Prophylaxis in Place: Yes    Discussions with Specialists or Other Care Team Provider: nursing    Education and Discussions with Family / Patient: patient    Current Length of Stay: 8 day(s)    Current Patient Status: Inpatient   Certification Statement: The patient will continue to require additional inpatient hospital stay due to awaiting safe discharge plan , patient appealing    Discharge Plan: awaiting safe discharge plan    Code Status: Level 1 - Full Code      Subjective:   Patient seen and examined at bedside. No new issues overnight.    Objective:     Vitals:   Temp (24hrs), Av.5 °F (36.4 °C), Min:97.2 °F (36.2 °C), Max:97.7 °F (36.5 °C)    Temp:  [97.2 °F (36.2 °C)-97.7 °F (36.5 °C)] 97.7 °F (36.5 °C)  HR:  [66-80] 79  Resp:  [18] 18  BP: (114-141)/(65-74) 121/70  SpO2:  [90 %-99 %] 92 %  Body mass index is 17.59 kg/m².     Input and Output Summary (last 24 hours):       Intake/Output Summary (Last 24 hours) at 2024 1259  Last data filed at 2024 0900  Gross per 24 hour   Intake 720 ml   Output --   Net 720 ml       Physical Exam:     Physical Exam  Vitals reviewed.   Constitutional:       General: She is not in acute distress.  HENT:       Head: Normocephalic.      Nose: Nose normal.      Mouth/Throat:      Mouth: Mucous membranes are moist.   Eyes:      General: No scleral icterus.  Cardiovascular:      Rate and Rhythm: Normal rate.   Pulmonary:      Effort: Pulmonary effort is normal. No respiratory distress.   Abdominal:      Palpations: Abdomen is soft.      Tenderness: There is no abdominal tenderness.   Skin:     General: Skin is warm.   Neurological:      Mental Status: She is alert. Mental status is at baseline.   Psychiatric:         Mood and Affect: Mood normal.         Behavior: Behavior normal.       Additional Data:     Labs:    Results from last 7 days   Lab Units 08/22/24  0443   WBC Thousand/uL 6.44   HEMOGLOBIN g/dL 12.2   HEMATOCRIT % 38.4   PLATELETS Thousands/uL 263     Results from last 7 days   Lab Units 08/22/24  0936   SODIUM mmol/L 141   POTASSIUM mmol/L 4.4   CHLORIDE mmol/L 93*   CO2 mmol/L 41*   BUN mg/dL 28*   CREATININE mg/dL 0.76   ANION GAP mmol/L 7   CALCIUM mg/dL 9.7   GLUCOSE RANDOM mg/dL 127                           * I Have Reviewed All Lab Data Listed Above.  * Additional Pertinent Lab Tests Reviewed: All Labs For Current Hospital Admission Reviewed    Mobility:  Basic Mobility Inpatient Raw Score: 23  -[facility] Goal: 7: Walk 25 feet or more  -[facility] Achieved: 7: Walk 25 feet or more    Lines:   Invasive Devices       Peripheral Intravenous Line  Duration             Peripheral IV 08/20/24 Right;Ventral (anterior) Forearm 3 days                       Imaging:    Imaging Reports Reviewed Today Include: no new imaging    Recent Cultures (last 7 days):           Last 24 Hours Medication List:   Current Facility-Administered Medications   Medication Dose Route Frequency Provider Last Rate    acetaminophen  650 mg Oral Q6H PRN Gwyn Phelan MD      albuterol  2.5 mg Nebulization Q6H PRN Gwyn Phelan MD      ALPRAZolam  0.125 mg Oral Daily PRN Janeth Fuentes DO      ALPRAZolam  0.25 mg Oral HS PRN Judy Londono MD       aspirin  81 mg Oral Daily Judy Londono MD      budesonide  0.5 mg Nebulization BID Judy Londono MD      calcium carbonate  1,000 mg Oral Daily PRN Judy Londono MD      vitamin B-12  1,000 mcg Oral Daily Judy Londono MD      docusate sodium  100 mg Oral BID Judy Londono MD      enoxaparin  40 mg Subcutaneous Daily Gwyn Phelan MD      guaiFENesin  600 mg Oral BID Gwyn Phelan MD      ipratropium  0.5 mg Nebulization Q12H Renzo Monroy MD      levalbuterol  1.25 mg Nebulization Q12H Renzo Monroy MD      levothyroxine  50 mcg Oral Daily Judy Londono MD      losartan  100 mg Oral Daily Judy Londono MD      pantoprazole  40 mg Oral Daily Judy Londono MD      polyethylene glycol  17 g Oral Daily PRN Gwyn Phelan MD      polyethylene glycol  17 g Oral Daily Judy Londono MD      [START ON 8/24/2024] predniSONE  20 mg Oral Daily Renzo Monroy MD      Followed by    [START ON 8/27/2024] predniSONE  10 mg Oral Daily Renzo Monroy MD      PreserVision AREDS 2  1 capsule Oral Daily Renzo Monroy MD      propranolol  10 mg Oral BID Judy Londono MD          Today, Patient Was Seen By: Renzo Monroy MD    ** Please Note: Dictation voice to text software may have been used in the creation of this document. **

## 2024-08-23 NOTE — ASSESSMENT & PLAN NOTE
Also an issue during her previous visit. Previously received antibiotics for this. CT currently without acute abnormalities.   ENT recommendations appreciated:  Acute sialoadenitis and associated neck edema.  Sialoadenitis completely resolved.  Mild residual edema still present.    To avoid recurrent episodes of sialoadenitis hydration and use of sialagogues (lemon drops, sour candy) is recommended.    Incidental finding on physical examination of focal periodontitis and a tooth that may need to be extracted.  Recommend a follow-up with dentist.  This can be done as an outpatient.

## 2024-08-23 NOTE — ASSESSMENT & PLAN NOTE
78-year-old female with past medical history significant for COPD, and hypertension who presented to the ER complaining of shortness of breath and hypoxia on her home pulse ox.    Background:  She of severe COPD and follows with a pulmonologist Dr. Nicholson: Most recent PFTs with FEV1 of 0.41, (22% predicted)  Patient was recently discharged 7/29 after being hospitalized for COPD exacerbation.  She was discharged on a steroid taper and did not require oxygen at the time of discharge.  She has seen both her PCP and her pulmonologist after discharge and had been improving back to her baseline  Patient relates the day of admission she had the acute onset of dyspnea.  Did not improve with her home nebulizers or inhalers.  She notes her home pulse ox showed her saturations were in the 70s so she came to the ER.  Upon presentation to the ER saturations were 74% on room air.  Patient was given DuoNeb, IV azithromycin, and prednisone 60 mg with improvement and was admitted for further evaluation    Plan:  Continue Xopenex/Atrovent tid and home Pulmicort for her COPD exacerbation  Continue Prednisone taper  Completed azithromycin course   Chest x-ray without focal infiltrate  Procal negative   Patient follows with pulmonologist Dr. Nicholson as an outpatient  Await safe discharge plan from case management.   Authorization denied. Peer to peer Denied. Family appealing. MEDICALLY STABLE FOR DISCHARGE.

## 2024-08-24 PROCEDURE — 94640 AIRWAY INHALATION TREATMENT: CPT

## 2024-08-24 PROCEDURE — 99232 SBSQ HOSP IP/OBS MODERATE 35: CPT | Performed by: STUDENT IN AN ORGANIZED HEALTH CARE EDUCATION/TRAINING PROGRAM

## 2024-08-24 PROCEDURE — 94760 N-INVAS EAR/PLS OXIMETRY 1: CPT

## 2024-08-24 RX ADMIN — GUAIFENESIN 600 MG: 600 TABLET ORAL at 17:06

## 2024-08-24 RX ADMIN — LOSARTAN POTASSIUM 100 MG: 50 TABLET, FILM COATED ORAL at 08:31

## 2024-08-24 RX ADMIN — LEVALBUTEROL HYDROCHLORIDE 1.25 MG: 1.25 SOLUTION RESPIRATORY (INHALATION) at 07:10

## 2024-08-24 RX ADMIN — BUDESONIDE 0.5 MG: 0.5 INHALANT ORAL at 20:27

## 2024-08-24 RX ADMIN — IPRATROPIUM BROMIDE 0.5 MG: 0.5 SOLUTION RESPIRATORY (INHALATION) at 07:10

## 2024-08-24 RX ADMIN — ASPIRIN 81 MG: 81 TABLET, COATED ORAL at 08:31

## 2024-08-24 RX ADMIN — DOCUSATE SODIUM 100 MG: 100 CAPSULE, LIQUID FILLED ORAL at 17:06

## 2024-08-24 RX ADMIN — DOCUSATE SODIUM 100 MG: 100 CAPSULE, LIQUID FILLED ORAL at 08:32

## 2024-08-24 RX ADMIN — PROPRANOLOL HYDROCHLORIDE 10 MG: 10 TABLET ORAL at 08:32

## 2024-08-24 RX ADMIN — PANTOPRAZOLE SODIUM 40 MG: 40 TABLET, DELAYED RELEASE ORAL at 08:32

## 2024-08-24 RX ADMIN — ENOXAPARIN SODIUM 40 MG: 40 INJECTION SUBCUTANEOUS at 08:34

## 2024-08-24 RX ADMIN — Medication 1 CAPSULE: at 08:34

## 2024-08-24 RX ADMIN — CYANOCOBALAMIN TAB 1000 MCG 1000 MCG: 1000 TAB at 08:32

## 2024-08-24 RX ADMIN — LEVOTHYROXINE SODIUM 50 MCG: 50 TABLET ORAL at 05:30

## 2024-08-24 RX ADMIN — LEVALBUTEROL HYDROCHLORIDE 1.25 MG: 1.25 SOLUTION RESPIRATORY (INHALATION) at 20:27

## 2024-08-24 RX ADMIN — BUDESONIDE 0.5 MG: 0.5 INHALANT ORAL at 07:10

## 2024-08-24 RX ADMIN — ALPRAZOLAM 0.25 MG: 0.5 TABLET ORAL at 22:16

## 2024-08-24 RX ADMIN — PREDNISONE 20 MG: 20 TABLET ORAL at 08:32

## 2024-08-24 RX ADMIN — IPRATROPIUM BROMIDE 0.5 MG: 0.5 SOLUTION RESPIRATORY (INHALATION) at 20:27

## 2024-08-24 RX ADMIN — GUAIFENESIN 600 MG: 600 TABLET ORAL at 08:32

## 2024-08-24 NOTE — ASSESSMENT & PLAN NOTE
Due to COPD exacerbation  Patient had 2 L nasal cannula improved with normalization of her saturations, currently 100% on 2 L  Age-adjusted D-dimer noted to be normal  Home oxygen evaluation needed again prior to discharge

## 2024-08-24 NOTE — ASSESSMENT & PLAN NOTE
78-year-old female with past medical history significant for COPD, and hypertension who presented to the ER complaining of shortness of breath and hypoxia on her home pulse ox.    Background:  She of severe COPD and follows with a pulmonologist Dr. Nicholson: Most recent PFTs with FEV1 of 0.41, (22% predicted)  Patient was recently discharged 7/29 after being hospitalized for COPD exacerbation.  She was discharged on a steroid taper and did not require oxygen at the time of discharge.  She has seen both her PCP and her pulmonologist after discharge and had been improving back to her baseline  Patient relates the day of admission she had the acute onset of dyspnea.  Did not improve with her home nebulizers or inhalers.  She notes her home pulse ox showed her saturations were in the 70s so she came to the ER.  Upon presentation to the ER saturations were 74% on room air.  Patient was given DuoNeb, IV azithromycin, and prednisone 60 mg with improvement and was admitted for further evaluation    Plan:  Continue Xopenex/Atrovent tid and home Pulmicort for her COPD exacerbation  Continue Prednisone taper  Completed azithromycin course   Chest x-ray without focal infiltrate  Procal negative   Patient follows with pulmonologist Dr. Nicholson as an outpatient  Await safe discharge plan from case management.   Authorization denied. I did a peer to peer which is also denied  Family currently appealing  MEDICALLY STABLE FOR DISCHARGE.

## 2024-08-24 NOTE — PROGRESS NOTES
[facility]  Progress Note  Name: Sarah Zayas I  MRN: [id number]  Unit/Bed#: 7T [facility] 702-01 I Date of Admission: 8/14/2024   Date of Service: 8/24/2024 I Hospital Day: 9    Assessment & Plan   * Acute exacerbation of chronic obstructive pulmonary disease (COPD) ([facility])  Assessment & Plan  78-year-old female with past medical history significant for COPD, and hypertension who presented to the ER complaining of shortness of breath and hypoxia on her home pulse ox.    Background:  She of severe COPD and follows with a pulmonologist Dr. Nicholson: Most recent PFTs with FEV1 of 0.41, (22% predicted)  Patient was recently discharged 7/29 after being hospitalized for COPD exacerbation.  She was discharged on a steroid taper and did not require oxygen at the time of discharge.  She has seen both her PCP and her pulmonologist after discharge and had been improving back to her baseline  Patient relates the day of admission she had the acute onset of dyspnea.  Did not improve with her home nebulizers or inhalers.  She notes her home pulse ox showed her saturations were in the 70s so she came to the ER.  Upon presentation to the ER saturations were 74% on room air.  Patient was given DuoNeb, IV azithromycin, and prednisone 60 mg with improvement and was admitted for further evaluation    Plan:  Continue Xopenex/Atrovent tid and home Pulmicort for her COPD exacerbation  Continue Prednisone taper  Completed azithromycin course   Chest x-ray without focal infiltrate  Procal negative   Patient follows with pulmonologist Dr. Nicholson as an outpatient  Await safe discharge plan from case management.   Authorization denied. I did a peer to peer which is also denied  Family currently appealing  MEDICALLY STABLE FOR DISCHARGE.    Neck swelling  Assessment & Plan  Also an issue during her previous visit. Previously received antibiotics for this. CT currently without acute abnormalities.   ENT recommendations  appreciated:  Acute sialoadenitis and associated neck edema.  Sialoadenitis completely resolved.  Mild residual edema still present.    To avoid recurrent episodes of sialoadenitis hydration and use of sialagogues (lemon drops, sour candy) is recommended.    Incidental finding on physical examination of focal periodontitis and a tooth that may need to be extracted.  Recommend a follow-up with dentist.  This can be done as an outpatient.    Acute respiratory failure with hypoxia (HCC)  Assessment & Plan  Due to COPD exacerbation  Patient had 2 L nasal cannula improved with normalization of her saturations, currently 100% on 2 L  Age-adjusted D-dimer noted to be normal  Home oxygen evaluation needed again prior to discharge      Severe protein-calorie malnutrition (HCC)  Assessment & Plan  Malnutrition Findings:   Adult Malnutrition type: Chronic illnessBMI Findings:   Body mass index is 17.59 kg/m².     Hypertension  Assessment & Plan  Controlled  Continue propanolol and ARB    Gastroesophageal reflux disease without esophagitis  Assessment & Plan  Continue PPI    Other specified hypothyroidism  Assessment & Plan  Continue Levothyroxine           VTE Pharmacologic Prophylaxis:   Pharmacologic: Enoxaparin (Lovenox)  Mechanical VTE Prophylaxis in Place: Yes    Discussions with Specialists or Other Care Team Provider: cm    Education and Discussions with Family / Patient: No new imaging    Current Length of Stay: 9 day(s)    Current Patient Status: Inpatient   Certification Statement: The patient will continue to require additional inpatient hospital stay due to awaiting safe discharge plan / appeal    Discharge Plan: once appeal has been completed    Code Status: Level 1 - Full Code      Subjective:   Patient seen and examined at bedside. She is torn and anxious about having to choose between staying here but having to live in a facility and moving to [place] and leaving her sister. A lot of her issues currently seemed  to also be exacerbated by her anxiety. She is finally amenable to psych evaluation.     Objective:     Vitals:   Temp (24hrs), Av.2 °F (36.2 °C), Min:96.5 °F (35.8 °C), Max:97.6 °F (36.4 °C)    Temp:  [96.5 °F (35.8 °C)-97.6 °F (36.4 °C)] 96.5 °F (35.8 °C)  HR:  [80-81] 80  Resp:  [18-20] 20  BP: (106-143)/(54-75) 133/72  SpO2:  [92 %-96 %] 92 %  Body mass index is 17.59 kg/m².     Input and Output Summary (last 24 hours):       Intake/Output Summary (Last 24 hours) at 2024 0918  Last data filed at 2024 0854  Gross per 24 hour   Intake 660 ml   Output --   Net 660 ml       Physical Exam:     Physical Exam  Vitals reviewed.   Constitutional:       General: She is not in acute distress.  HENT:      Head: Normocephalic.      Nose: Nose normal.      Mouth/Throat:      Mouth: Mucous membranes are moist.   Eyes:      General: No scleral icterus.  Cardiovascular:      Rate and Rhythm: Normal rate.   Pulmonary:      Effort: Pulmonary effort is normal. No respiratory distress.      Breath sounds: No wheezing.   Abdominal:      General: There is no distension.      Palpations: Abdomen is soft.      Tenderness: There is no abdominal tenderness.   Skin:     General: Skin is warm.   Neurological:      Mental Status: She is alert.   Psychiatric:      Comments: Very anxiious       Additional Data:     Labs:    Results from last 7 days   Lab Units 24  0443   WBC Thousand/uL 6.44   HEMOGLOBIN g/dL 12.2   HEMATOCRIT % 38.4   PLATELETS Thousands/uL 263     Results from last 7 days   Lab Units 24  0936   SODIUM mmol/L 141   POTASSIUM mmol/L 4.4   CHLORIDE mmol/L 93*   CO2 mmol/L 41*   BUN mg/dL 28*   CREATININE mg/dL 0.76   ANION GAP mmol/L 7   CALCIUM mg/dL 9.7   GLUCOSE RANDOM mg/dL 127                           * I Have Reviewed All Lab Data Listed Above.  * Additional Pertinent Lab Tests Reviewed: All Labs For Current Hospital Admission Reviewed    Mobility:  Basic Mobility Inpatient Raw Score: 23  -[facility]  Goal: 7: Walk 25 feet or more  [facility] Achieved: 7: Walk 25 feet or more    Lines:   Invasive Devices       Peripheral Intravenous Line  Duration             Peripheral IV 08/20/24 Right;Ventral (anterior) Forearm 4 days                       Imaging:    Imaging Reports Reviewed Today Include:  no new imaging    Recent Cultures (last 7 days):           Last 24 Hours Medication List:   Current Facility-Administered Medications   Medication Dose Route Frequency Provider Last Rate    acetaminophen  650 mg Oral Q6H PRN Gwyn Phelan MD      albuterol  2.5 mg Nebulization Q6H PRN Gwyn Phelan MD      ALPRAZolam  0.125 mg Oral Daily PRN Janeth Fuentes DO      ALPRAZolam  0.25 mg Oral HS PRN Judy Londono MD      aspirin  81 mg Oral Daily Judy Londono MD      budesonide  0.5 mg Nebulization BID Judy Londono MD      calcium carbonate  1,000 mg Oral Daily PRN Judy Londono MD      vitamin B-12  1,000 mcg Oral Daily Judy Londono MD      docusate sodium  100 mg Oral BID Judy Londono MD      enoxaparin  40 mg Subcutaneous Daily Gwyn Phelan MD      guaiFENesin  600 mg Oral BID Gwyn Phelan MD      ipratropium  0.5 mg Nebulization Q12H Renzo Monroy MD      levalbuterol  1.25 mg Nebulization Q12H Renzo Monroy MD      levothyroxine  50 mcg Oral Daily Judy Londono MD      losartan  100 mg Oral Daily Judy Londono MD      pantoprazole  40 mg Oral Daily Judy Londono MD      polyethylene glycol  17 g Oral Daily PRN Gwyn Phelan MD      polyethylene glycol  17 g Oral Daily Judy Londono MD      predniSONE  20 mg Oral Daily Renzo Monroy MD      Followed by    [START ON 8/27/2024] predniSONE  10 mg Oral Daily Renzo Monroy MD      PreserVision AREDS 2  1 capsule Oral Daily Renzo Monroy MD      propranolol  10 mg Oral BID Judy Londono MD          Today, Patient Was Seen By: Renzo Monroy MD    ** Please Note: Dictation voice to text software may have been used in the creation of this document.  **

## 2024-08-24 NOTE — PLAN OF CARE
Problem: Potential for Falls  Goal: Patient will remain free of falls  Description: INTERVENTIONS:  - Educate patient/family on patient safety including physical limitations  - Instruct patient to call for assistance with activity   - Consult OT/PT to assist with strengthening/mobility   - Keep Call bell within reach  - Keep bed low and locked with side rails adjusted as appropriate  - Keep care items and personal belongings within reach  - Initiate and maintain comfort rounds  - Make Fall Risk Sign visible to staff  - Offer Toileting every 2 Hours, in advance of need  - Initiate/Maintain bed/chair alarm  - Obtain necessary fall risk management equipment: Rolling walker  - Apply yellow socks and bracelet for high fall risk patients  - Consider moving patient to room near nurses station  Outcome: Progressing     Problem: PAIN - ADULT  Goal: Verbalizes/displays adequate comfort level or baseline comfort level  Description: Interventions:  - Encourage patient to monitor pain and request assistance  - Assess pain every shift, using appropriate pain scale  - Administer analgesics based on type and severity of pain and evaluate response  - Implement non-pharmacological measures as appropriate and evaluate response  - Consider cultural and social influences on pain and pain management  - Notify physician/advanced practitioner if interventions unsuccessful or patient reports new pain  Outcome: Progressing     Problem: INFECTION - ADULT  Goal: Absence or prevention of progression during hospitalization  Description: INTERVENTIONS:  - Assess and monitor for signs and symptoms of infection  - Monitor lab/diagnostic results  - Monitor endotracheal if appropriate and nasal secretions for changes in amount and color  - Administer medications as ordered  - Instruct and encourage patient and family to use good hand hygiene technique  - Identify and instruct in appropriate isolation precautions for identified  infection/condition  Outcome: Progressing  Goal: Absence of fever/infection during neutropenic period  Description: INTERVENTIONS:  - Monitor WBC    Outcome: Progressing     Problem: SAFETY ADULT  Goal: Patient will remain free of falls  Description: INTERVENTIONS:  - Educate patient/family on patient safety including physical limitations  - Instruct patient to call for assistance with activity   - Consult OT/PT to assist with strengthening/mobility   - Keep Call bell within reach  - Keep bed low and locked with side rails adjusted as appropriate  - Keep care items and personal belongings within reach  - Initiate and maintain comfort rounds  - Make Fall Risk Sign visible to staff  - Offer Toileting every 2 Hours, in advance of need  - Initiate/Maintain bed/chair alarm  - Obtain necessary fall risk management equipment: Rolling walker  - Apply yellow socks and bracelet for high fall risk patients  - Consider moving patient to room near nurses station  Outcome: Progressing  Goal: Maintain or return to baseline ADL function  Description: INTERVENTIONS:  - Educate patient/family on patient safety including physical limitations  - Instruct patient to call for assistance with activity   - Consult OT/PT to assist with strengthening/mobility   - Keep Call bell within reach  - Keep bed low and locked with side rails adjusted as appropriate  - Keep care items and personal belongings within reach  - Initiate and maintain comfort rounds  - Make Fall Risk Sign visible to staff  - Offer Toileting every 2 Hours, in advance of need  - Initiate/Maintain bed/chair alarm  - Obtain necessary fall risk management equipment: Rolling walker, Oxygen tubing extension.   - Apply yellow socks and bracelet for high fall risk patients  - Consider moving patient to room near nurses station  Outcome: Progressing  Goal: Maintains/Returns to pre admission functional level  Description: INTERVENTIONS:  - Perform AM-PAC 6 Click Basic Mobility/ Daily  Activity assessment daily.  - Set and communicate daily mobility goal to care team and patient/family/caregiver.   - Collaborate with rehabilitation services on mobility goals if consulted  - Perform Range of Motion 2 times a day.  - Reposition patient every 2 hours.  - Dangle patient 2 times a day  - Stand patient 2 times a day  - Ambulate patient 2 times a day  - Out of bed to chair 2 times a day   - Out of bed for meals 2 times a day  - Out of bed for toileting  - Record patient progress and toleration of activity level   Outcome: Progressing     Problem: DISCHARGE PLANNING  Goal: Discharge to home or other facility with appropriate resources  Description: INTERVENTIONS:  - Identify barriers to discharge w/patient and caregiver  - Arrange for needed discharge resources and transportation as appropriate  - Identify discharge learning needs (meds, wound care, etc.)  - Arrange for interpretive services to assist at discharge as needed  - Refer to Case Management Department for coordinating discharge planning if the patient needs post-hospital services based on physician/advanced practitioner order or complex needs related to functional status, cognitive ability, or social support system,  - Patient requests home oxygen upon discharge.   Outcome: Progressing     Problem: Knowledge Deficit  Goal: Patient/family/caregiver demonstrates understanding of disease process, treatment plan, medications, and discharge instructions  Description: Complete learning assessment and assess knowledge base.  Interventions:  - Provide teaching at level of understanding  - Provide teaching via preferred learning methods  Outcome: Progressing     Problem: RESPIRATORY - ADULT  Goal: Achieves optimal ventilation and oxygenation  Description: INTERVENTIONS:  - Assess for changes in respiratory status  - Assess for changes in mentation and behavior  - Position to facilitate oxygenation and minimize respiratory effort  - Oxygen administered  by appropriate delivery if ordered  - Encourage broncho-pulmonary hygiene including cough, deep breathe, Incentive Spirometry  - Assess and instruct to report SOB or any respiratory difficulty  - Respiratory Therapy support as indicated  Outcome: Progressing     Problem: Prexisting or High Potential for Compromised Skin Integrity  Goal: Skin integrity is maintained or improved  Description: INTERVENTIONS:  - Identify patients at risk for skin breakdown  - Assess and monitor skin integrity every shift.   - Assess and monitor nutrition and hydration status  - Monitor labs   - Assess for incontinence   - Turn and reposition patient remind patient to shift weight.  - Assist with mobility/ambulation  - Relieve pressure over bony prominences  - Avoid friction and shearing  - Provide appropriate hygiene as needed including keeping skin clean and dry  - Evaluate need for skin moisturizer/barrier cream  - Collaborate with interdisciplinary team   - Patient/family teaching  - Consider wound care consult   Outcome: Progressing     Problem: Nutrition/Hydration-ADULT  Goal: Nutrient/Hydration intake appropriate for improving, restoring or maintaining nutritional needs  Description: Monitor and assess patient's nutrition/hydration status for malnutrition. Collaborate with interdisciplinary team and initiate plan and interventions as ordered.  Monitor patient's weight and dietary intake as ordered or per policy. Utilize nutrition screening tool and intervene as necessary. Determine patient's food preferences and provide high-protein, high-caloric foods as appropriate.     INTERVENTIONS:  - Monitor oral intake, urinary output, labs, and treatment plans  - Assess nutrition and hydration status and recommend course of action  - Evaluate amount of meals eaten  - Assist patient with eating if necessary   - Allow adequate time for meals  - Recommend/ encourage appropriate diets, oral nutritional supplements, and vitamin/mineral  supplements  - Order, calculate, and assess calorie counts as needed  - Provide specific nutrition/hydration education as appropriate  - Include patient/family/caregiver in decisions related to nutrition, especially in selection meals.   Outcome: Progressing

## 2024-08-25 PROCEDURE — 99232 SBSQ HOSP IP/OBS MODERATE 35: CPT | Performed by: STUDENT IN AN ORGANIZED HEALTH CARE EDUCATION/TRAINING PROGRAM

## 2024-08-25 PROCEDURE — 94760 N-INVAS EAR/PLS OXIMETRY 1: CPT

## 2024-08-25 PROCEDURE — 94640 AIRWAY INHALATION TREATMENT: CPT

## 2024-08-25 PROCEDURE — 94664 DEMO&/EVAL PT USE INHALER: CPT

## 2024-08-25 PROCEDURE — 94761 N-INVAS EAR/PLS OXIMETRY MLT: CPT

## 2024-08-25 RX ADMIN — DOCUSATE SODIUM 100 MG: 100 CAPSULE, LIQUID FILLED ORAL at 08:14

## 2024-08-25 RX ADMIN — BUDESONIDE 0.5 MG: 0.5 INHALANT ORAL at 20:00

## 2024-08-25 RX ADMIN — IPRATROPIUM BROMIDE 0.5 MG: 0.5 SOLUTION RESPIRATORY (INHALATION) at 08:19

## 2024-08-25 RX ADMIN — LOSARTAN POTASSIUM 100 MG: 50 TABLET, FILM COATED ORAL at 08:13

## 2024-08-25 RX ADMIN — BUDESONIDE 0.5 MG: 0.5 INHALANT ORAL at 08:19

## 2024-08-25 RX ADMIN — PROPRANOLOL HYDROCHLORIDE 10 MG: 10 TABLET ORAL at 08:19

## 2024-08-25 RX ADMIN — POLYETHYLENE GLYCOL 3350 17 G: 17 POWDER, FOR SOLUTION ORAL at 08:14

## 2024-08-25 RX ADMIN — PANTOPRAZOLE SODIUM 40 MG: 40 TABLET, DELAYED RELEASE ORAL at 08:14

## 2024-08-25 RX ADMIN — LEVALBUTEROL HYDROCHLORIDE 1.25 MG: 1.25 SOLUTION RESPIRATORY (INHALATION) at 19:50

## 2024-08-25 RX ADMIN — LEVOTHYROXINE SODIUM 50 MCG: 50 TABLET ORAL at 06:00

## 2024-08-25 RX ADMIN — CYANOCOBALAMIN TAB 1000 MCG 1000 MCG: 1000 TAB at 08:17

## 2024-08-25 RX ADMIN — IPRATROPIUM BROMIDE 0.5 MG: 0.5 SOLUTION RESPIRATORY (INHALATION) at 19:50

## 2024-08-25 RX ADMIN — ALPRAZOLAM 0.25 MG: 0.5 TABLET ORAL at 21:29

## 2024-08-25 RX ADMIN — LEVALBUTEROL HYDROCHLORIDE 1.25 MG: 1.25 SOLUTION RESPIRATORY (INHALATION) at 08:19

## 2024-08-25 RX ADMIN — ASPIRIN 81 MG: 81 TABLET, COATED ORAL at 08:16

## 2024-08-25 RX ADMIN — Medication 1 CAPSULE: at 08:14

## 2024-08-25 RX ADMIN — DOCUSATE SODIUM 100 MG: 100 CAPSULE, LIQUID FILLED ORAL at 17:01

## 2024-08-25 RX ADMIN — PREDNISONE 20 MG: 20 TABLET ORAL at 08:18

## 2024-08-25 RX ADMIN — GUAIFENESIN 600 MG: 600 TABLET ORAL at 17:02

## 2024-08-25 RX ADMIN — PROPRANOLOL HYDROCHLORIDE 10 MG: 10 TABLET ORAL at 17:02

## 2024-08-25 RX ADMIN — ENOXAPARIN SODIUM 40 MG: 40 INJECTION SUBCUTANEOUS at 08:15

## 2024-08-25 RX ADMIN — GUAIFENESIN 600 MG: 600 TABLET ORAL at 08:14

## 2024-08-25 NOTE — ASSESSMENT & PLAN NOTE
78-year-old female with past medical history significant for COPD, and hypertension who presented to the ER complaining of shortness of breath and hypoxia on her home pulse ox.    Background:  She of severe COPD and follows with a pulmonologist Dr. Nicholson: Most recent PFTs with FEV1 of 0.41, (22% predicted)  Patient was recently discharged 7/29 after being hospitalized for COPD exacerbation.  She was discharged on a steroid taper and did not require oxygen at the time of discharge.  She has seen both her PCP and her pulmonologist after discharge and had been improving back to her baseline  Patient relates the day of admission she had the acute onset of dyspnea.  Did not improve with her home nebulizers or inhalers.  She notes her home pulse ox showed her saturations were in the 70s so she came to the ER.  Upon presentation to the ER saturations were 74% on room air.  Patient was given DuoNeb, IV azithromycin, and prednisone 60 mg with improvement and was admitted for further evaluation    Plan:  Continue Xopenex/Atrovent tid and home Pulmicort for her COPD exacerbation  Continue Prednisone taper  Completed azithromycin course   Chest x-ray without focal infiltrate  Procal negative   Patient follows with pulmonologist Dr. Nicholson as an outpatient  Await safe discharge plan from case management.   Authorization denied. I did a peer to peer which was also denied  Family currently appealing (Up to 72 hours before response). Monday / Tuesday.  MEDICALLY STABLE FOR DISCHARGE.

## 2024-08-25 NOTE — RESPIRATORY THERAPY NOTE
Home Oxygen Qualifying Test     Patient name: Sarah Zayas        : 1945   Date of Test:  2024  Diagnosis:    Home Oxygen Test:    **Medicare Guidelines require item(s) 1-5 on all ambulatory patients or 1 and 2 on non-ambulatory patients.    1. Baseline SPO2 on Room Air at rest 93 %   If <= 88% on Room Air add O2 via NC to obtain SpO2 >=88%. If LPM needed, document 0 LPM  needed to reach =>88%    SPO2 during exertion on Room Air 87  %  During exertion monitor SPO2. If SPO2 increases >=89%, do not add supplemental oxygen    SPO2 on Oxygen at Rest 93 % at 0 LPM    SPO2 during exertion on Oxygen 93 % at 2 LPM    Test performed during exertion activity.      [x]  Supplemental Home Oxygen is indicated.    []  Client does not qualify for home oxygen.    Respiratory Additional Notes- SpO2 on RA while at rest was >88%. Pt ambulated without incident on RA and SpO2 dropped to 87%, placed on 2L of O2. SpO2 maintained above 88%. Pt qualifies for 2L during exertion.  Randy Gonzalez, RT      Oxybutynin Counseling:  I discussed with the patient the risks of oxybutynin including but not limited to skin rash, drowsiness, dry mouth, difficulty urinating, and blurred vision.

## 2024-08-25 NOTE — PROGRESS NOTES
[facility]  Progress Note  Name: Sarah Zayas I  MRN: [id number]  Unit/Bed#: 7T [facility] 702-01 I Date of Admission: 8/14/2024   Date of Service: 8/25/2024 I Hospital Day: 10    Assessment & Plan   * Acute exacerbation of chronic obstructive pulmonary disease (COPD) ([facility])  Assessment & Plan  78-year-old female with past medical history significant for COPD, and hypertension who presented to the ER complaining of shortness of breath and hypoxia on her home pulse ox.    Background:  She of severe COPD and follows with a pulmonologist Dr. Nicholson: Most recent PFTs with FEV1 of 0.41, (22% predicted)  Patient was recently discharged 7/29 after being hospitalized for COPD exacerbation.  She was discharged on a steroid taper and did not require oxygen at the time of discharge.  She has seen both her PCP and her pulmonologist after discharge and had been improving back to her baseline  Patient relates the day of admission she had the acute onset of dyspnea.  Did not improve with her home nebulizers or inhalers.  She notes her home pulse ox showed her saturations were in the 70s so she came to the ER.  Upon presentation to the ER saturations were 74% on room air.  Patient was given DuoNeb, IV azithromycin, and prednisone 60 mg with improvement and was admitted for further evaluation    Plan:  Continue Xopenex/Atrovent tid and home Pulmicort for her COPD exacerbation  Continue Prednisone taper  Completed azithromycin course   Chest x-ray without focal infiltrate  Procal negative   Patient follows with pulmonologist Dr. Nicholson as an outpatient  Await safe discharge plan from case management.   Authorization denied. I did a peer to peer which was also denied  Family currently appealing (Up to 72 hours before response). Monday / Tuesday.  MEDICALLY STABLE FOR DISCHARGE.    Neck swelling  Assessment & Plan  Also an issue during her previous visit. Previously received antibiotics for this. CT currently  without acute abnormalities.   ENT recommendations appreciated:  Acute sialoadenitis and associated neck edema.  Sialoadenitis completely resolved.  Mild residual edema still present.    To avoid recurrent episodes of sialoadenitis hydration and use of sialagogues (lemon drops, sour candy) is recommended.    Incidental finding on physical examination of focal periodontitis and a tooth that may need to be extracted.  Recommend a follow-up with dentist.  This can be done as an outpatient.    Acute respiratory failure with hypoxia (HCC)  Assessment & Plan  Due to COPD exacerbation  Patient had 2 L nasal cannula improved with normalization of her saturations, currently 100% on 2 L  Age-adjusted D-dimer noted to be normal  Home oxygen evaluation needed again prior to discharge. Home O2 eval ordered today 2024      Severe protein-calorie malnutrition (HCC)  Assessment & Plan  Malnutrition Findings:   Adult Malnutrition type: Chronic illnessBMI Findings:   Body mass index is 17.59 kg/m².     Hypertension  Assessment & Plan  Controlled  Continue propanolol and ARB    Gastroesophageal reflux disease without esophagitis  Assessment & Plan  Continue PPI    Other specified hypothyroidism  Assessment & Plan  Continue Levothyroxine           VTE Pharmacologic Prophylaxis:   Pharmacologic: Enoxaparin (Lovenox)  Mechanical VTE Prophylaxis in Place: Yes    Discussions with Specialists or Other Care Team Provider: nursing    Education and Discussions with Family / Patient: patient    Current Length of Stay: 10 day(s)    Current Patient Status: Inpatient   Certification Statement: The patient will continue to require additional inpatient hospital stay due to awaiting safe discharge plan    Discharge Plan: 24 hours    Code Status: Level 1 - Full Code      Subjective:   Patient seen and examined at bedside. Comfortable, no new complaints overnight.       Objective:     Vitals:   Temp (24hrs), Av.5 °F (36.4 °C), Min:97.2 °F  (36.2 °C), Max:97.9 °F (36.6 °C)    Temp:  [97.2 °F (36.2 °C)-97.9 °F (36.6 °C)] 97.5 °F (36.4 °C)  HR:  [80-86] 80  Resp:  [18-20] 20  BP: (103-137)/(65-84) 135/65  SpO2:  [93 %-97 %] 97 %  Body mass index is 17.59 kg/m².     Input and Output Summary (last 24 hours):       Intake/Output Summary (Last 24 hours) at 8/25/2024 0945  Last data filed at 8/25/2024 0901  Gross per 24 hour   Intake 840 ml   Output --   Net 840 ml       Physical Exam:     Physical Exam  Vitals reviewed.   Constitutional:       General: She is not in acute distress.  HENT:      Head: Normocephalic.      Nose: Nose normal.      Mouth/Throat:      Mouth: Mucous membranes are moist.   Eyes:      General: No scleral icterus.  Cardiovascular:      Rate and Rhythm: Normal rate.   Pulmonary:      Effort: Pulmonary effort is normal. No respiratory distress.   Abdominal:      General: There is no distension.      Palpations: Abdomen is soft.      Tenderness: There is no abdominal tenderness.   Skin:     General: Skin is warm.   Neurological:      Mental Status: She is alert. Mental status is at baseline.   Psychiatric:         Mood and Affect: Mood normal.         Behavior: Behavior normal.         Additional Data:     Labs:    Results from last 7 days   Lab Units 08/22/24  0443   WBC Thousand/uL 6.44   HEMOGLOBIN g/dL 12.2   HEMATOCRIT % 38.4   PLATELETS Thousands/uL 263     Results from last 7 days   Lab Units 08/22/24  0936   SODIUM mmol/L 141   POTASSIUM mmol/L 4.4   CHLORIDE mmol/L 93*   CO2 mmol/L 41*   BUN mg/dL 28*   CREATININE mg/dL 0.76   ANION GAP mmol/L 7   CALCIUM mg/dL 9.7   GLUCOSE RANDOM mg/dL 127                           * I Have Reviewed All Lab Data Listed Above.  * Additional Pertinent Lab Tests Reviewed: All Labs For Current Hospital Admission Reviewed    Mobility:  Basic Mobility Inpatient Raw Score: 23  -HL Goal: 7: Walk 25 feet or more  JH-HLM Achieved: 7: Walk 25 feet or more    Lines:   Invasive Devices       Peripheral  Intravenous Line  Duration             Peripheral IV 08/25/24 Right;Ventral (anterior) Wrist <1 day                       Imaging:    Imaging Reports Reviewed Today Include: no new imaging    Recent Cultures (last 7 days):           Last 24 Hours Medication List:   Current Facility-Administered Medications   Medication Dose Route Frequency Provider Last Rate    acetaminophen  650 mg Oral Q6H PRN Gwyn Phelan MD      albuterol  2.5 mg Nebulization Q6H PRN Gwyn Phelan MD      ALPRAZolam  0.125 mg Oral Daily PRN Janeth Fuentes DO      ALPRAZolam  0.25 mg Oral HS PRN Judy Londono MD      aspirin  81 mg Oral Daily Judy Londono MD      budesonide  0.5 mg Nebulization BID Judy Londono MD      calcium carbonate  1,000 mg Oral Daily PRN Judy Londono MD      vitamin B-12  1,000 mcg Oral Daily Judy Londono MD      docusate sodium  100 mg Oral BID Judy Londono MD      enoxaparin  40 mg Subcutaneous Daily Gwyn Phelan MD      guaiFENesin  600 mg Oral BID Gwyn Phelan MD      ipratropium  0.5 mg Nebulization Q12H Renzo Monroy MD      levalbuterol  1.25 mg Nebulization Q12H Renzo Monroy MD      levothyroxine  50 mcg Oral Daily Judy Londono MD      losartan  100 mg Oral Daily Judy Londono MD      pantoprazole  40 mg Oral Daily Judy Londono MD      polyethylene glycol  17 g Oral Daily PRN Gwyn Phelan MD      polyethylene glycol  17 g Oral Daily Judy Londono MD      predniSONE  20 mg Oral Daily Renzo Monroy MD      Followed by    [START ON 8/27/2024] predniSONE  10 mg Oral Daily Renzo Monroy MD      PreserVision AREDS 2  1 capsule Oral Daily Renzo Monroy MD      propranolol  10 mg Oral BID Judy Londono MD          Today, Patient Was Seen By: Renzo Monroy MD    ** Please Note: Dictation voice to text software may have been used in the creation of this document. **

## 2024-08-25 NOTE — PLAN OF CARE
Problem: Potential for Falls  Goal: Patient will remain free of falls  Description: INTERVENTIONS:  - Educate patient/family on patient safety including physical limitations  - Instruct patient to call for assistance with activity   - Consult OT/PT to assist with strengthening/mobility   - Keep Call bell within reach  - Keep bed low and locked with side rails adjusted as appropriate  - Keep care items and personal belongings within reach  - Initiate and maintain comfort rounds  - Make Fall Risk Sign visible to staff  - Offer Toileting every 2 Hours, in advance of need  - Initiate/Maintain bed/chair alarm  - Obtain necessary fall risk management equipment: Rolling walker  - Apply yellow socks and bracelet for high fall risk patients  - Consider moving patient to room near nurses station  Outcome: Progressing     Problem: PAIN - ADULT  Goal: Verbalizes/displays adequate comfort level or baseline comfort level  Description: Interventions:  - Encourage patient to monitor pain and request assistance  - Assess pain every shift, using appropriate pain scale  - Administer analgesics based on type and severity of pain and evaluate response  - Implement non-pharmacological measures as appropriate and evaluate response  - Consider cultural and social influences on pain and pain management  - Notify physician/advanced practitioner if interventions unsuccessful or patient reports new pain  Outcome: Progressing     Problem: INFECTION - ADULT  Goal: Absence or prevention of progression during hospitalization  Description: INTERVENTIONS:  - Assess and monitor for signs and symptoms of infection  - Monitor lab/diagnostic results  - Monitor endotracheal if appropriate and nasal secretions for changes in amount and color  - Administer medications as ordered  - Instruct and encourage patient and family to use good hand hygiene technique  - Identify and instruct in appropriate isolation precautions for identified  Problem: PAIN - ADULT  Goal: Verbalizes/displays adequate comfort level or baseline comfort level  Description: Interventions:  - Encourage patient to monitor pain and request assistance  - Assess pain using appropriate pain scale  - Administer analgesics based on type and severity of pain and evaluate response  - Implement non-pharmacological measures as appropriate and evaluate response  - Consider cultural and social influences on pain and pain management  - Notify physician/advanced practitioner if interventions unsuccessful or patient reports new pain  12/9/2022 0719 by Phyllis Waggoner RN  Outcome: Progressing  12/9/2022 0041 by Phyllis Waggoner RN  Outcome: Progressing infection/condition  Outcome: Progressing  Goal: Absence of fever/infection during neutropenic period  Description: INTERVENTIONS:  - Monitor WBC    Outcome: Progressing     Problem: SAFETY ADULT  Goal: Patient will remain free of falls  Description: INTERVENTIONS:  - Educate patient/family on patient safety including physical limitations  - Instruct patient to call for assistance with activity   - Consult OT/PT to assist with strengthening/mobility   - Keep Call bell within reach  - Keep bed low and locked with side rails adjusted as appropriate  - Keep care items and personal belongings within reach  - Initiate and maintain comfort rounds  - Make Fall Risk Sign visible to staff  - Offer Toileting every 2 Hours, in advance of need  - Initiate/Maintain bed/chair alarm  - Obtain necessary fall risk management equipment: Rolling walker  - Apply yellow socks and bracelet for high fall risk patients  - Consider moving patient to room near nurses station  Outcome: Progressing  Goal: Maintain or return to baseline ADL function  Description: INTERVENTIONS:  - Educate patient/family on patient safety including physical limitations  - Instruct patient to call for assistance with activity   - Consult OT/PT to assist with strengthening/mobility   - Keep Call bell within reach  - Keep bed low and locked with side rails adjusted as appropriate  - Keep care items and personal belongings within reach  - Initiate and maintain comfort rounds  - Make Fall Risk Sign visible to staff  - Offer Toileting every 2 Hours, in advance of need  - Initiate/Maintain bed/chair alarm  - Obtain necessary fall risk management equipment: Rolling walker, Oxygen tubing extension.   - Apply yellow socks and bracelet for high fall risk patients  - Consider moving patient to room near nurses station  Outcome: Progressing  Goal: Maintains/Returns to pre admission functional level  Description: INTERVENTIONS:  - Perform AM-PAC 6 Click Basic Mobility/ Daily  Activity assessment daily.  - Set and communicate daily mobility goal to care team and patient/family/caregiver.   - Collaborate with rehabilitation services on mobility goals if consulted  - Perform Range of Motion 2 times a day.  - Reposition patient every 2 hours.  - Dangle patient 2 times a day  - Stand patient 2 times a day  - Ambulate patient 2 times a day  - Out of bed to chair 2 times a day   - Out of bed for meals 2 times a day  - Out of bed for toileting  - Record patient progress and toleration of activity level   Outcome: Progressing     Problem: DISCHARGE PLANNING  Goal: Discharge to home or other facility with appropriate resources  Description: INTERVENTIONS:  - Identify barriers to discharge w/patient and caregiver  - Arrange for needed discharge resources and transportation as appropriate  - Identify discharge learning needs (meds, wound care, etc.)  - Arrange for interpretive services to assist at discharge as needed  - Refer to Case Management Department for coordinating discharge planning if the patient needs post-hospital services based on physician/advanced practitioner order or complex needs related to functional status, cognitive ability, or social support system,  - Patient requests home oxygen upon discharge.   Outcome: Progressing     Problem: Knowledge Deficit  Goal: Patient/family/caregiver demonstrates understanding of disease process, treatment plan, medications, and discharge instructions  Description: Complete learning assessment and assess knowledge base.  Interventions:  - Provide teaching at level of understanding  - Provide teaching via preferred learning methods  Outcome: Progressing     Problem: RESPIRATORY - ADULT  Goal: Achieves optimal ventilation and oxygenation  Description: INTERVENTIONS:  - Assess for changes in respiratory status  - Assess for changes in mentation and behavior  - Position to facilitate oxygenation and minimize respiratory effort  - Oxygen administered  by appropriate delivery if ordered  - Encourage broncho-pulmonary hygiene including cough, deep breathe, Incentive Spirometry  - Assess and instruct to report SOB or any respiratory difficulty  - Respiratory Therapy support as indicated  Outcome: Progressing     Problem: Prexisting or High Potential for Compromised Skin Integrity  Goal: Skin integrity is maintained or improved  Description: INTERVENTIONS:  - Identify patients at risk for skin breakdown  - Assess and monitor skin integrity every shift.   - Assess and monitor nutrition and hydration status  - Monitor labs   - Assess for incontinence   - Turn and reposition patient remind patient to shift weight.  - Assist with mobility/ambulation  - Relieve pressure over bony prominences  - Avoid friction and shearing  - Provide appropriate hygiene as needed including keeping skin clean and dry  - Evaluate need for skin moisturizer/barrier cream  - Collaborate with interdisciplinary team   - Patient/family teaching  - Consider wound care consult   Outcome: Progressing     Problem: Nutrition/Hydration-ADULT  Goal: Nutrient/Hydration intake appropriate for improving, restoring or maintaining nutritional needs  Description: Monitor and assess patient's nutrition/hydration status for malnutrition. Collaborate with interdisciplinary team and initiate plan and interventions as ordered.  Monitor patient's weight and dietary intake as ordered or per policy. Utilize nutrition screening tool and intervene as necessary. Determine patient's food preferences and provide high-protein, high-caloric foods as appropriate.     INTERVENTIONS:  - Monitor oral intake, urinary output, labs, and treatment plans  - Assess nutrition and hydration status and recommend course of action  - Evaluate amount of meals eaten  - Assist patient with eating if necessary   - Allow adequate time for meals  - Recommend/ encourage appropriate diets, oral nutritional supplements, and vitamin/mineral  supplements  - Order, calculate, and assess calorie counts as needed  - Provide specific nutrition/hydration education as appropriate  - Include patient/family/caregiver in decisions related to nutrition, especially in selection meals.   Outcome: Progressing

## 2024-08-25 NOTE — ASSESSMENT & PLAN NOTE
Due to COPD exacerbation  Patient had 2 L nasal cannula improved with normalization of her saturations, currently 100% on 2 L  Age-adjusted D-dimer noted to be normal  Home oxygen evaluation needed again prior to discharge. Home O2 eval ordered today 8/25/2024

## 2024-08-26 ENCOUNTER — PATIENT OUTREACH (OUTPATIENT)
Dept: CASE MANAGEMENT | Facility: OTHER | Age: 79
End: 2024-08-26

## 2024-08-26 ENCOUNTER — HOSPITAL ENCOUNTER (INPATIENT)
Facility: HOSPITAL | Age: 79
LOS: 14 days | Discharge: HOME HEALTH | End: 2024-09-09
Attending: STUDENT IN AN ORGANIZED HEALTH CARE EDUCATION/TRAINING PROGRAM | Admitting: STUDENT IN AN ORGANIZED HEALTH CARE EDUCATION/TRAINING PROGRAM
Payer: COMMERCIAL

## 2024-08-26 ENCOUNTER — TELEPHONE (OUTPATIENT)
Age: 79
End: 2024-08-26

## 2024-08-26 VITALS
OXYGEN SATURATION: 97 % | DIASTOLIC BLOOD PRESSURE: 71 MMHG | SYSTOLIC BLOOD PRESSURE: 120 MMHG | WEIGHT: 99.3 LBS | HEART RATE: 72 BPM | HEIGHT: 63 IN | BODY MASS INDEX: 17.59 KG/M2 | TEMPERATURE: 97.2 F | RESPIRATION RATE: 18 BRPM

## 2024-08-26 LAB — SARS-COV-2 RNA RESP QL NAA+PROBE: NEGATIVE

## 2024-08-26 PROCEDURE — 99239 HOSP IP/OBS DSCHRG MGMT >30: CPT | Performed by: INTERNAL MEDICINE

## 2024-08-26 PROCEDURE — 94664 DEMO&/EVAL PT USE INHALER: CPT

## 2024-08-26 PROCEDURE — 94640 AIRWAY INHALATION TREATMENT: CPT

## 2024-08-26 PROCEDURE — 87635 SARS-COV-2 COVID-19 AMP PRB: CPT | Performed by: INTERNAL MEDICINE

## 2024-08-26 PROCEDURE — 94760 N-INVAS EAR/PLS OXIMETRY 1: CPT

## 2024-08-26 RX ORDER — ANTIOX #8/OM3/DHA/EPA/LUT/ZEAX 250-2.5 MG
1 CAPSULE ORAL DAILY
Qty: 30 CAPSULE | Refills: 0 | Status: ON HOLD | OUTPATIENT
Start: 2024-08-26 | End: 2024-09-25

## 2024-08-26 RX ORDER — PREDNISONE 10 MG/1
10 TABLET ORAL DAILY
Qty: 3 TABLET | Refills: 0 | Status: ON HOLD | OUTPATIENT
Start: 2024-08-27 | End: 2024-08-30

## 2024-08-26 RX ORDER — GUAIFENESIN 600 MG/1
600 TABLET, EXTENDED RELEASE ORAL 2 TIMES DAILY
Qty: 20 TABLET | Refills: 0 | Status: ON HOLD | OUTPATIENT
Start: 2024-08-26 | End: 2024-09-05

## 2024-08-26 RX ADMIN — Medication 1 CAPSULE: at 09:21

## 2024-08-26 RX ADMIN — IPRATROPIUM BROMIDE 0.5 MG: 0.5 SOLUTION RESPIRATORY (INHALATION) at 07:45

## 2024-08-26 RX ADMIN — ALPRAZOLAM 0.12 MG: 0.25 TABLET ORAL at 12:33

## 2024-08-26 RX ADMIN — GUAIFENESIN 600 MG: 600 TABLET ORAL at 08:40

## 2024-08-26 RX ADMIN — LEVALBUTEROL HYDROCHLORIDE 1.25 MG: 1.25 SOLUTION RESPIRATORY (INHALATION) at 07:45

## 2024-08-26 RX ADMIN — LOSARTAN POTASSIUM 100 MG: 50 TABLET, FILM COATED ORAL at 08:40

## 2024-08-26 RX ADMIN — PANTOPRAZOLE SODIUM 40 MG: 40 TABLET, DELAYED RELEASE ORAL at 08:40

## 2024-08-26 RX ADMIN — BUDESONIDE 0.5 MG: 0.5 INHALANT ORAL at 07:45

## 2024-08-26 RX ADMIN — PROPRANOLOL HYDROCHLORIDE 10 MG: 10 TABLET ORAL at 08:40

## 2024-08-26 RX ADMIN — ASPIRIN 81 MG: 81 TABLET, COATED ORAL at 08:41

## 2024-08-26 RX ADMIN — PREDNISONE 20 MG: 20 TABLET ORAL at 08:40

## 2024-08-26 RX ADMIN — LEVOTHYROXINE SODIUM 50 MCG: 50 TABLET ORAL at 05:59

## 2024-08-26 RX ADMIN — ENOXAPARIN SODIUM 40 MG: 40 INJECTION SUBCUTANEOUS at 08:40

## 2024-08-26 RX ADMIN — DOCUSATE SODIUM 100 MG: 100 CAPSULE, LIQUID FILLED ORAL at 08:44

## 2024-08-26 RX ADMIN — CYANOCOBALAMIN TAB 1000 MCG 1000 MCG: 1000 TAB at 08:40

## 2024-08-26 NOTE — DISCHARGE SUMMARY
[facility]  Discharge- Sarah Zayas 1945, 78 y.o. female MRN: [id number]  Unit/Bed#: 7T [facility] 702-01 Encounter: [id number]  Primary Care Provider: Nicolas Nix MD   Date and time admitted to hospital: 8/14/2024  8:02 PM    * Acute exacerbation of chronic obstructive pulmonary disease (COPD) ([facility])  Assessment & Plan  78-year-old female with past medical history significant for COPD, and hypertension who presented to the ER complaining of shortness of breath and hypoxia on her home pulse ox.    Background:  She of severe COPD and follows with a pulmonologist Dr. Nicholson: Most recent PFTs with FEV1 of 0.41, (22% predicted)  Patient was recently discharged 7/29 after being hospitalized for COPD exacerbation.  She was discharged on a steroid taper and did not require oxygen at the time of discharge.  She has seen both her PCP and her pulmonologist after discharge and had been improving back to her baseline  Patient relates the day of admission she had the acute onset of dyspnea.  Did not improve with her home nebulizers or inhalers.  She notes her home pulse ox showed her saturations were in the 70s so she came to the ER.  Upon presentation to the ER saturations were 74% on room air.  Patient was given DuoNeb, IV azithromycin, and prednisone 60 mg with improvement and was admitted for further evaluation    Plan:  Continue Xopenex/Atrovent tid and home Pulmicort for her COPD exacerbation  Continue Prednisone taper  Completed azithromycin course   Chest x-ray without focal infiltrate  Procal negative   Patient follows with pulmonologist Dr. Nicholson as an outpatient  Await safe discharge plan from case management.   Authorization denied. I did a peer to peer which was also denied  Family currently appealing -overturned.  MEDICALLY STABLE FOR DISCHARGE.    Neck swelling  Assessment & Plan  Also an issue during her previous visit. Previously received antibiotics for this. CT currently without  acute abnormalities.   ENT recommendations appreciated:  Acute sialoadenitis and associated neck edema.  Sialoadenitis completely resolved.  Mild residual edema still present.    To avoid recurrent episodes of sialoadenitis hydration and use of sialagogues (lemon drops, sour candy) is recommended.    Incidental finding on physical examination of focal periodontitis and a tooth that may need to be extracted.  Recommend a follow-up with dentist.  This can be done as an outpatient.    Anxiety  Assessment & Plan  Continue xanax       Severe protein-calorie malnutrition (HCC)  Assessment & Plan  Malnutrition Findings:   Adult Malnutrition type: Chronic illnessBMI Findings:   Body mass index is 17.59 kg/m².     Acute respiratory failure with hypoxia (HCC)  Assessment & Plan  Due to COPD exacerbation  Patient had 2 L nasal cannula improved with normalization of her saturations, currently 100% on 2 L  Age-adjusted D-dimer noted to be normal  Home oxygen evaluation -patient qualified for home oxygen.      Raynaud's phenomenon without gangrene  Assessment & Plan  Noted clinically on exam as well as an old records  Continue supportive care    Hypertension  Assessment & Plan  Controlled  Continue propanolol and ARB    Gastroesophageal reflux disease without esophagitis  Assessment & Plan  Continue PPI    Other specified hypothyroidism  Assessment & Plan  Continue Levothyroxine        Discharging Physician / Practitioner: Naty Sheriff MD  PCP: Nicolas Nix MD  Admission Date:   Admission Orders (From admission, onward)       Ordered        08/15/24 1137  INPATIENT ADMISSION  Once            08/14/24 2154  Place in Observation  Once                          Discharge Date: 08/26/24    Medical Problems       Resolved Problems  Date Reviewed: 8/26/2024   None         Consultations During Hospital Stay:  ENT    Procedures Performed:   N/A    Significant Findings / Test Results:   CT soft tissue neck w contrast  Result Date:  "8/18/2024  Impression: No acute findings on neck CT. Workstation performed: [id number]     XR chest 1 view portable  Result Date: 8/15/2024  Impression: Stable chest without acute cardiopulmonary disease. Workstation performed: [id number]        Incidental Findings:   See above    Test Results Pending at Discharge (will require follow up):   N/A     Outpatient Tests Requested:  N/A    Complications: N/A    Reason for Admission: Shortness of breath    Hospital Course:     Sarah Zayas is a 78 y.o. female patient with PMH of severe COPD, HTN, anxiety who originally presented to the hospital on 8/14/2024 due to shortness of breath and admitted for COPD exacerbation.  Patient was initiated on Xopenex and Atrovent 3 times daily, home Pulmicort.  Patient had clinical improvement with steroid and she completed azithromycin course.  Chest x-ray was negative for focal infiltrates.  Patient follows with Dr. Nicholson as an outpatient.  There was incidental finding of focal periodontitis and tooth that may need to be extracted.  Patient was recommended to follow-up with dentist as an outpatient.  Patient qualified for 2 L oxygen on home oxygen study.  Patient is recommended to follow-up with PCP, pulmonologist and dentist after discharge.      Patient is clinically hemodynamically stable to be discharged today.    Please see above list of diagnoses and related plan for additional information.     Condition at Discharge: stable     Discharge Day Visit / Exam:     Subjective:  Patient was seen and examined at bedside. The patient denies any chest pain, palpitation, shortness of breath, N/V, abd pain.    Vitals: Blood Pressure: 120/71 (08/26/24 0709)  Pulse: 72 (08/26/24 0709)  Temperature: (!) 97.2 °F (36.2 °C) (08/26/24 0709)  Temp Source: Temporal (08/26/24 0709)  Respirations: 18 (08/26/24 0709)  Height: 5' 3\" (160 cm) (08/14/24 2303)  Weight - Scale: 45 kg (99 lb 4.8 oz) (08/14/24 2303)  SpO2: 97 % (08/26/24 0745)  Exam:   Physical " Exam  General: no acute distress, currently on 2 L oxygen via NC  HEENT: NC/AT, PERRL, EOM - normal  Neck: Supple  Pulm/Chest: Normal chest wall expansion, clear breath sounds on both side  CVS: normal S1&S2, capillary refill <2s  Abd: soft, non tender, non distended, bowel sounds +  MSK: move all 4 extremities spontaneously  Skin: warm  CNS: no acute focal neuro deficit    Discussion with Family: Discussed with patient and patient called her family.    Discharge instructions/Information to patient and family:   See after visit summary for information provided to patient and family.      Provisions for Follow-Up Care:  See after visit summary for information related to follow-up care and any pertinent home health orders.      Disposition:     Other Skilled Nursing Facility at [facility]    For Discharges to [facility]:   Not Applicable to this Patient - Not Applicable to this Patient    Planned Readmission: No     Discharge Statement:  I spent 45 minutes discharging the patient. This time was spent on the day of discharge. I had direct contact with the patient on the day of discharge. Greater than 50% of the total time was spent examining patient, answering all patient questions, arranging and discussing plan of care with patient as well as directly providing post-discharge instructions.  Additional time then spent on discharge activities.    Discharge Medications:  See after visit summary for reconciled discharge medications provided to patient and family.      ** Please Note: This note has been constructed using a voice recognition system **

## 2024-08-26 NOTE — DISCHARGE INSTR - AVS FIRST PAGE
Discharge instructions from hospitalist  1. Follow-up with your primary care physician in 1 week in regards to recent hospitalization.    Follow-up with dentist in 1 to 2 weeks.    2. Take medications regularly    3. Come back to the ER if symptoms recur or worsen  4. Activity as tolerated  5. Diet :  Heart healthy diet

## 2024-08-26 NOTE — PROGRESS NOTES
Received ADT alert patient was discharged to [facility] for rehab.  Will monitor via chart review during rehab stay and outreach patient upon discharge.

## 2024-08-26 NOTE — CASE MANAGEMENT
Case Management Discharge Planning Note    Patient name Sarah Zayas  Location 7T U 702/7T [facility] 702-01 MRN [id number]  : 1945 Date 2024       Current Admission Date: 2024  Current Admission Diagnosis:Acute exacerbation of chronic obstructive pulmonary disease (COPD) (HCC)   Patient Active Problem List    Diagnosis Date Noted Date Diagnosed    Neck swelling 2024     Anxiety 2024     Severe protein-calorie malnutrition (HCC) 2024     COPD, severe (HCC) 2024     Acute respiratory failure with hypoxia (HCC) 2023     COVID-19 2023     Abnormal CT scan 2023     Infectious sialoadenitis of major salivary gland 2023     Cellulitis of neck 2023     Left upper lobe pulmonary nodule 2023     Postnasal drip 2023     Raynaud's phenomenon without gangrene 2022     Temporal arteritis ([facility]) 2020     Hypertension 10/11/2018     Rectus sheath hematoma, initial encounter 10/10/2018     Other specified hypothyroidism 2018     Gastroesophageal reflux disease without esophagitis 2018     Acute exacerbation of chronic obstructive pulmonary disease (COPD) ([facility]) 2012     Vitamin D deficiency 2012     Osteoarthritis 2012       LOS (days): 11  Geometric Mean LOS (GMLOS) (days): 3.6  Days to GMLOS:-7.4     OBJECTIVE:  Risk of Unplanned Readmission Score: 23.78         Current admission status: Inpatient   Preferred Pharmacy:   [facility] [place] - [address] [place], [place] [address] [facility]  [address]  [facility] [address]  Phone: [phone] Fax: [phone]    [facility] [place] - [address] [facility]  [address] [facility] [address]  Phone: [phone] Fax: [phone]    Primary Care Provider: Nicolas Nix MD    Primary Insurance: [employer]HA  Secondary Insurance:     DISCHARGE DETAILS:    Discharge planning discussed with:: Patient  Freedom of Choice: Yes     CM  contacted family/caregiver?: Yes     Did patient/caregiver verbalize understanding of patient care needs?: N/A- going to facility  Were patient/caregiver advised of the risks associated with not following Treatment Team discharge recommendations?: Yes    Contacts  Patient Contacts: Melissa Cunningham (Daughter)  [phone] (Mobile)  Relationship to Patient:: Family  Contact Method: Phone  Phone Number: Melissa Cunningham (Daughter)  [phone] (Mobile)  Reason/Outcome: Emergency Contact, Discharge Planning    Requested Home Health Care         Is the patient interested in HHC at discharge?: No      Other Referral/Resources/Interventions Provided:  Interventions: Short Term Rehab    Treatment Team Recommendation: Short Term Rehab  Discharge Destination Plan:: Short Term Rehab      Accepting Facility Name, City & State : [facility]  Receiving Facility/Agency Phone Number: [phone],  Facility/Agency Fax Number: [phone].       IMM reviewed with patient, patient agrees with discharge determination.     Notification this am that family over turned insurance denial and insurance auth approved for TCU.  Confirmed same with CM discharge support staff    Message to TCU they can admit today before 1400.  Needs a COVID test.    Met with patient at bedside and updated same.  Patient to update son Steven.  Call to dtr Melissa and left  requesting CB.  Updated SAUD Sheriff and Nurse Nan sams

## 2024-08-26 NOTE — LETTER
FAX      To:       Company: [employer]  Phone:       Fax:      [phone]  Email:        From:   Roxana Zheng RN  Phone:             Fax:  [phone]  Email:      ________________________________________________    Important  - Utilization Review from [facility]  - Contains CONFIDENTIAL clinical data for:                   Sarah Zayas      Auth # [id number]    Use fax number above to fax authorization number for approval for above patient.  Please contact the Utilization Review Department with any questions.      NOTICE:  This communication, including attachments, may contain information that is confidential and protected by the -client or other privilege(s).

## 2024-08-26 NOTE — ASSESSMENT & PLAN NOTE
78-year-old female with past medical history significant for COPD, and hypertension who presented to the ER complaining of shortness of breath and hypoxia on her home pulse ox.    Background:  She of severe COPD and follows with a pulmonologist Dr. Nicholson: Most recent PFTs with FEV1 of 0.41, (22% predicted)  Patient was recently discharged 7/29 after being hospitalized for COPD exacerbation.  She was discharged on a steroid taper and did not require oxygen at the time of discharge.  She has seen both her PCP and her pulmonologist after discharge and had been improving back to her baseline  Patient relates the day of admission she had the acute onset of dyspnea.  Did not improve with her home nebulizers or inhalers.  She notes her home pulse ox showed her saturations were in the 70s so she came to the ER.  Upon presentation to the ER saturations were 74% on room air.  Patient was given DuoNeb, IV azithromycin, and prednisone 60 mg with improvement and was admitted for further evaluation    Plan:  Continue Xopenex/Atrovent tid and home Pulmicort for her COPD exacerbation  Continue Prednisone taper  Completed azithromycin course   Chest x-ray without focal infiltrate  Procal negative   Patient follows with pulmonologist Dr. Nicholson as an outpatient  Await safe discharge plan from case management.   Authorization denied. I did a peer to peer which was also denied  Family currently appealing -overturned.  MEDICALLY STABLE FOR DISCHARGE.

## 2024-08-26 NOTE — NURSING NOTE
IV removed with tip intact. Pressure held to control bleeding. Discharge instructions discussed with Sarah and she verbalizes understanding. Report called to Yumiko on [facility]. Patient lTransfered via w/c to [facility] with all her belongings and discharge instructions.

## 2024-08-26 NOTE — CASE MANAGEMENT
Case Management Discharge Planning Note    Patient name Sarah Zayas  Location 7T U 702/7T [facility] 702-01 MRN [id number]  : 1945 Date 2024       Current Admission Date: 2024  Current Admission Diagnosis:Acute exacerbation of chronic obstructive pulmonary disease (COPD) (HCC)   Patient Active Problem List    Diagnosis Date Noted Date Diagnosed    Neck swelling 2024     Anxiety 2024     Severe protein-calorie malnutrition (HCC) 2024     COPD, severe (HCC) 2024     Acute respiratory failure with hypoxia ([facility]) 2023     COVID-19 2023     Abnormal CT scan 2023     Infectious sialoadenitis of major salivary gland 2023     Cellulitis of neck 2023     Left upper lobe pulmonary nodule 2023     Postnasal drip 2023     Raynaud's phenomenon without gangrene 2022     Temporal arteritis ([facility]) 2020     Hypertension 10/11/2018     Rectus sheath hematoma, initial encounter 10/10/2018     Other specified hypothyroidism 2018     Gastroesophageal reflux disease without esophagitis 2018     Acute exacerbation of chronic obstructive pulmonary disease (COPD) ([facility]) 2012     Vitamin D deficiency 2012     Osteoarthritis 2012       LOS (days): 11  Geometric Mean LOS (GMLOS) (days): 3.6  Days to GMLOS:-7.3     OBJECTIVE:  Risk of Unplanned Readmission Score: 23.06         Current admission status: Inpatient   Preferred Pharmacy:   [facility] [place] [address]  [address]  [address]  [facility] [address]  Phone: [phone] Fax: [phone]    Primary Care Provider: Nicolas Nix MD    Primary Insurance: [employer] SINHA  Secondary Insurance:     DISCHARGE DETAILS:                                                                                                               Facility Insurance Auth Number: [id number]

## 2024-08-26 NOTE — ASSESSMENT & PLAN NOTE
Due to COPD exacerbation  Patient had 2 L nasal cannula improved with normalization of her saturations, currently 100% on 2 L  Age-adjusted D-dimer noted to be normal  Home oxygen evaluation -patient qualified for home oxygen.

## 2024-08-26 NOTE — CASE MANAGEMENT
Per CM, SNF auth denial was overturned to approval after family appeal. Auth approval details obtained from [facility] Support [place] has received APPROVED authorization.  Insurance: [employer]   Received from [facility]  Authorization received for: SNF  Facility: [facility]   Authorization #: [id number]  Start of Care: 8/24  Next Review Date: 9/5  Continued Stay Care Coordinator: not provided  Submit next review to: [facility] or fax# [phone]    Care Manager notified: Luna DOMINGO    Please reach out to  for updates on any clinical information.

## 2024-08-27 ENCOUNTER — TRANSITIONAL CARE MANAGEMENT (OUTPATIENT)
Dept: FAMILY MEDICINE CLINIC | Facility: CLINIC | Age: 79
End: 2024-08-27

## 2024-08-27 NOTE — UTILIZATION REVIEW
NOTIFICATION OF ADMISSION DISCHARGE   This is a Notification of Discharge from [facility]. Please be advised that this patient has been discharge from our facility. Below you will find the admission and discharge date and time including the patient’s disposition.   UTILIZATION REVIEW CONTACT:  Bethany Willams MA  Utilization   Network Utilization Review Department  Phone: [phone] x carefully listen to the prompts. All voicemails are confidential.  Email: [email or web address]     ADMISSION INFORMATION  PRESENTATION DATE: 8/14/2024  8:02 PM  OBERVATION ADMISSION DATE: 08/14/2024 2154  INPATIENT ADMISSION DATE: 8/15/24 11:37 AM   DISCHARGE DATE: 8/26/2024 12:24 PM   DISPOSITION:Released to SNF/TCU/SNU Facility    Network Utilization Review Department  ATTENTION: Please call with any questions or concerns to [phone] and carefully listen to the prompts so that you are directed to the right person. All voicemails are confidential.   For Discharge needs, contact Care Management DC Support Team at [phone] opt. 2  Send all requests for admission clinical reviews, approved or denied determinations and any other requests to dedicated fax number below belonging to the campus where the patient is receiving treatment. List of dedicated fax numbers for the Facilities:  FACILITY NAME UR FAX NUMBER   ADMISSION DENIALS (Administrative/Medical Necessity) [phone]   DISCHARGE SUPPORT TEAM ([facility]) [phone]   PARENT CHILD HEALTH (Maternity/NICU/Pediatrics) [phone]   [facility] [phone]   [facility] [phone]   [facility] [phone]   [facility] [phone]   [facility] [phone]   [facility] [phone]   [facility] [phone]   [facility]  [facility] [phone]   [facility] [phone]   [facility] [phone]   [facility] [phone]   [facility] [phone]

## 2024-08-28 ENCOUNTER — NURSING HOME VISIT (OUTPATIENT)
Dept: GERIATRICS | Facility: OTHER | Age: 79
End: 2024-08-28
Payer: COMMERCIAL

## 2024-08-28 DIAGNOSIS — I10 PRIMARY HYPERTENSION: ICD-10-CM

## 2024-08-28 DIAGNOSIS — M31.6 TEMPORAL ARTERITIS (HCC): ICD-10-CM

## 2024-08-28 DIAGNOSIS — J44.1 ACUTE EXACERBATION OF CHRONIC OBSTRUCTIVE PULMONARY DISEASE (COPD) (HCC): Primary | ICD-10-CM

## 2024-08-28 DIAGNOSIS — Z79.899 POLYPHARMACY: ICD-10-CM

## 2024-08-28 DIAGNOSIS — I73.00 RAYNAUD'S PHENOMENON WITHOUT GANGRENE: ICD-10-CM

## 2024-08-28 DIAGNOSIS — K05.30 PERIODONTITIS: ICD-10-CM

## 2024-08-28 DIAGNOSIS — K21.9 GASTROESOPHAGEAL REFLUX DISEASE WITHOUT ESOPHAGITIS: ICD-10-CM

## 2024-08-28 DIAGNOSIS — Z71.89 ADVANCE CARE PLANNING: ICD-10-CM

## 2024-08-28 DIAGNOSIS — D64.9 NORMOCYTIC ANEMIA: ICD-10-CM

## 2024-08-28 DIAGNOSIS — Z91.89 AT RISK FOR CONSTIPATION: ICD-10-CM

## 2024-08-28 DIAGNOSIS — F41.9 ANXIETY: ICD-10-CM

## 2024-08-28 DIAGNOSIS — E03.8 OTHER SPECIFIED HYPOTHYROIDISM: ICD-10-CM

## 2024-08-28 DIAGNOSIS — R74.8 ELEVATED VITAMIN B12 LEVEL: ICD-10-CM

## 2024-08-28 DIAGNOSIS — Z91.89 AT RISK FOR DELIRIUM: ICD-10-CM

## 2024-08-28 DIAGNOSIS — I51.89 DIASTOLIC DYSFUNCTION: ICD-10-CM

## 2024-08-28 DIAGNOSIS — R53.81 PHYSICAL DECONDITIONING: ICD-10-CM

## 2024-08-28 PROBLEM — R79.89 ELEVATED VITAMIN B12 LEVEL: Status: ACTIVE | Noted: 2024-08-28

## 2024-08-28 PROCEDURE — 99306 1ST NF CARE HIGH MDM 50: CPT | Performed by: STUDENT IN AN ORGANIZED HEALTH CARE EDUCATION/TRAINING PROGRAM

## 2024-08-28 NOTE — ASSESSMENT & PLAN NOTE
Hx of temporal arteritis  No current active/associated acute symptoms  Maintained on prednisone 2.5mg daily at baseline, will resume this dose once COPD-related prednisone taper completed  Follow up with PCP, Rheumatology as appropriate

## 2024-08-28 NOTE — ASSESSMENT & PLAN NOTE
Echo Oct 2023: EF 75, grade 1 diastolic dysfunction, mild AR, mild TR  Monitor routine weight  Monitor volume status clinically - mild peripheral edema, no acute orthopnea  Continue beta blocker  Monitor for need for diuretics  Encourage elevation, compression of legs as appropriate  Follow up with PCP, Cardiology as appropriate

## 2024-08-28 NOTE — ASSESSMENT & PLAN NOTE
Historically elevated B12 level  Patient takes daily supplement  Will recheck level and adjust supplement as appropriate  Follow up with PCP

## 2024-08-28 NOTE — TELEPHONE ENCOUNTER
Patient calling in to clarify if she needs appointment with Dr Lamar on 8/30. Advised her that this apppointment had been cancelled previously on 8/26.  States she is inpatient at the moment and was unsure if she needed to follow up with Dr Amado after having seen him on 8/22 in the hospital.    Please advise  #[phone]  
Pt calling in to cancel appointment scheduled for 8/30 with Dr. Back.   Pt states she was cleared while in hospital by Dr. Amado. Denies any symptoms at this time.   Advised pt to call back if any new symptoms arise or any concerns at all. No other questions at this time.     Called Dr. Back's office and notified of cancel appointment request.   
no fever and no chills.

## 2024-08-28 NOTE — ASSESSMENT & PLAN NOTE
Extensive medication review with patient at her request  Discussed each of her meds  Discussed wean of alprazolam as above  Follow up closely with PCP

## 2024-08-28 NOTE — ASSESSMENT & PLAN NOTE
Mild recent anemia noted on labs, possibly multifactorial including deconditioning, hospitalization  -monitor on routine labs - fairly stable  -monitor for acute bleed - no present signs  -consider further workup, Heme consult if persistent/worsening  -transfuse PRN Hb <7

## 2024-08-28 NOTE — PROGRESS NOTES
[facility]  Facility: [facility]    HISTORY AND PHYSICAL  Nursing Home Place of Service: nursing home place of service: POS 31 Skilled Care-Part A Coverage    NAME: Sarah Zayas  : 1945 AGE: 78 y.o. SEX: female MRN: [id number]  DATE OF ENCOUNTER: 2024    Records Reviewed include: Hospital records    Chief Complaint/ Reason for Admission:   COPD exacerbation    History of Present Illness:     Sarah Zayas is a 78 y.o. female with PMH including COPD, hypothyroidism, GERD, HTN, OA, Raynauds, anxiety  Patient was hospitalized at [facility] from  to 24  For details of hospitalization, see hospital records including discharge documentation  Briefly, patient hospitalized due to SOB, considered to have COPD exacerbation, treated with breathing regimen, steroid, azithromycin with improvement; evaluated by ENT for hx of sialoadenitis (resolved) and had incidental finding of periodontitis and tooth that may need to be extracted, to f/u with dentist outpatient    Patient seen and examined in room  Others present for encounter: none  Patient seated in chair  Appears comfortable, awake, alert, oriented to situation, able to converse appropriately  Patient polite, appears in good spirits, Aox3, appears to be a reasonable historian. Notes she is feeling OK overall, main concern being ongoing anxiety, chronic issue but acutely worse in hospital and in setting of her COPD. No acute pain anywhere. Feels her breathing is steady presently on 2L by NC; denies acute orthopnea however at baseline does sleep with head elevated. Appetite stable recently, no acute swallowing concerns or sore throat at present. Urinating without acute issue. Last BM today normal, no abd pain/N/V. No acute orthostatic lightheadedness. No acute cardiac, abdominal, or urinary symptoms; see ROS for more details.    No further questions or acute concerns identified.    Lab Review:  : BMP generally  stable/non-actionable, GFR 85, some recent intermittent hyperkalemia; CBC generally stable/non-actionable, Hb 11.3 (normocytic, baseline 13-14)      Lab Results   Component Value Date    HGBA1C 5.8 11/20/2023     Lab Results   Component Value Date    GSS6TPSCOEMX 2.894 03/04/2024     Lab Results   Component Value Date    GSDEZUZV60 >6,000 (H) 03/22/2021     Lab Results   Component Value Date    FERRITIN 34 03/04/2024     Lab Results   Component Value Date    CHOLESTEROL 139 03/04/2024     Lab Results   Component Value Date    HDL 79 03/04/2024     Lab Results   Component Value Date    TRIG 74 03/04/2024     Lab Results   Component Value Date    NONHDLC 74 06/03/2023     Lab Results   Component Value Date    LDLCALC 45 03/04/2024           CT soft tissue neck w contrast  Result Date: 8/18/2024  No acute findings on neck CT.    XR chest 1 view portable  Result Date: 8/15/2024  Stable chest without acute cardiopulmonary disease.           Encounter Date: 08/14/24   ECG 12 lead   Result Value    Ventricular Rate 98    Atrial Rate 98    NJ Interval 104    QRSD Interval 62    QT Interval 328    QTC Interval 418    P Axis 97    QRS Axis 51    T Wave Axis 58    Narrative    Sinus rhythm  Baseline artifact  Nonspecific ST abnormality  Abnormal ECG  When compared with ECG of 23-JUL-2024 14:34,  Previous ECG has undetermined rhythm, needs review  QRS axis Shifted left  Criteria for Lateral infarct are no longer Present  Nonspecific T wave abnormality no longer evident in Lateral leads  Confirmed by Jonnathan Pardo ([id number]) on 8/15/2024 12:11:33 AM       Echo Oct 2023: EF 75, grade 1 diastolic dysfunction, mild AR, mild TR      [place] reviewed 8/28/2024 08/26/2024 08/26/2024 2 Alprazolam 0.25 Mg Tablet 14.00 7 Sy Vahe [id number] Hea (2855) 0 1.00 [place]   07/24/2024 06/25/2024 1 Alprazolam 0.25 Mg Tablet 30.00 30 Wa Abo [id number] Exp (2950) 0 0.50 [place]   06/26/2024 06/25/2024 1 Alprazolam 0.25 Mg Tablet 30.00 30  Wa Abo [id number] Exp (0860) 0 0.50 [place]   04/27/2024 03/11/2024 1 Alprazolam 0.25 Mg Tablet 30.00 30 Sa Jackie [id number] Exp (0860) 0 0.50 [place]   03/11/2024 03/11/2024 1 Alprazolam 0.25 Mg Tablet 30.00 30 Sa Jackie [id number] Exp (0860) 0 0.50 [place]   11/28/2023 10/30/2023 1 Alprazolam 0.25 Mg Tablet 30.00 30 Sa Jackie [id number] Exp (0860) 0 0.50 [place]   10/30/2023 10/30/2023 1 Alprazolam 0.25 Mg Tablet 30.00 30 Sa Jackie [id number] Exp (0860) 0 0.50 [place]         Social: Patient lives in a 3 story home, effectively alone but reports sister visits daily. Reports at baseline generally independent with ADLs, medications, finances; sister helps with meals; does not drive. At baseline uses cane PRN due to some unsteadiness, denies falls in the past year.    Lives: Home,  Social Support: sister, children  Fall in the past 12 months: denies  Use of assistance Device: None and Cane    Allergies  No Known Allergies    Past Medical History  Past Medical History:   Diagnosis Date    Anxiety 8/18/2024    Asthma     Chronic obstructive pulmonary disease (HCC) 09/26/2012    COPD (chronic obstructive pulmonary disease) (HCC)     Disease of thyroid gland     GERD (gastroesophageal reflux disease)     Hypertension 10/11/2018    Hypothyroid     Normocytic anemia 8/28/2024    Osteoarthritis 09/26/2012    Temporal arteritis (HCC)     Tubular adenoma     Type 2 diabetes mellitus with complication, without long-term current use of insulin (HCC) 01/14/2020        Past Surgical History:   Procedure Laterality Date    EYE SURGERY Right 12/06/2019    cataract LVCFS    HYSTERECTOMY      NO PAST SURGERIES      ALSO NO RELEVANT PAST SURRGICAL HX AS PER [facility]       Family History  Family History   Problem Relation Age of Onset    Breast cancer Mother     Emphysema Father        Social History  Social History     Tobacco Use   Smoking Status Former    Current packs/day: 0.00    Average packs/day: 1 pack/day for 54.0  years (54.0 ttl pk-yrs)    Types: Cigarettes    Start date:     Quit date: 2013    Years since quittin.6   Smokeless Tobacco Never   Tobacco Comments    TOBACCO USE, NO PASSIVE SMOKE EXPOSURE      Social History     Substance and Sexual Activity   Alcohol Use Never      Social History     Substance and Sexual Activity   Drug Use No            Physical Exam    Vital Signs  There were no vitals filed for this visit.  BP: 137/63 mmHg  2024 08:20   Temp:97.6 °F  2024 08:20 Pulse:96 bpm  2024 08:20 Weight:100.6 Lbs  2024 05:42   Resp:20 Breaths/min  2024 08:20 BS: O2:92 %  2024 08:20 Pain:4  2024 09:41         Physical Exam  Vitals reviewed.   Constitutional:       General: She is not in acute distress.     Appearance: She is not toxic-appearing or diaphoretic.   HENT:      Head: Normocephalic and atraumatic.      Right Ear: External ear normal.      Left Ear: External ear normal.      Nose: Nose normal. No rhinorrhea.      Mouth/Throat:      Mouth: Mucous membranes are dry.      Pharynx: Oropharynx is clear. No posterior oropharyngeal erythema.   Eyes:      General: No scleral icterus.        Right eye: No discharge.         Left eye: No discharge.      Extraocular Movements: Extraocular movements intact.      Conjunctiva/sclera: Conjunctivae normal.      Pupils: Pupils are equal, round, and reactive to light.   Cardiovascular:      Rate and Rhythm: Normal rate and regular rhythm.   Pulmonary:      Effort: Pulmonary effort is normal. No respiratory distress.      Breath sounds: No wheezing or rales.   Abdominal:      General: Bowel sounds are normal.      Palpations: Abdomen is soft.      Tenderness: There is no abdominal tenderness. There is no guarding.   Musculoskeletal:         General: No tenderness.      Cervical back: No rigidity.      Comments: Trace bilateral lower extremity edema  No calf tenderness   Skin:     General: Skin is warm and dry.      Coloration: Skin is  not jaundiced.   Neurological:      General: No focal deficit present.      Mental Status: She is alert and oriented to person, place, and time. Mental status is at baseline.   Psychiatric:         Mood and Affect: Mood normal.         Behavior: Behavior normal.         Thought Content: Thought content normal.         Judgment: Judgment normal.         Review of Systems:  Review of Systems   Constitutional:  Negative for appetite change (generally low but stable), chills, diaphoresis and fever.   HENT:  Negative for drooling, ear pain, hearing loss, rhinorrhea, sore throat and trouble swallowing.    Eyes:  Negative for pain, discharge, redness, itching and visual disturbance.   Respiratory:  Positive for shortness of breath (chronic RIVAS). Negative for cough, chest tightness and wheezing.    Cardiovascular:  Negative for chest pain, palpitations and leg swelling.   Gastrointestinal:  Negative for abdominal pain, blood in stool, constipation, diarrhea, nausea and vomiting.   Genitourinary:  Negative for difficulty urinating, dysuria and hematuria.   Musculoskeletal:  Positive for gait problem. Negative for arthralgias, back pain and neck pain.   Neurological:  Positive for weakness. Negative for dizziness, facial asymmetry, speech difficulty and headaches.   Psychiatric/Behavioral:  Negative for agitation, behavioral problems and confusion. The patient is not nervous/anxious and is not hyperactive.    All other systems reviewed and are negative.      List of Current Medications:  Current Outpatient Medications   Medication Instructions    acetaminophen (TYLENOL) 500 mg, Oral, Every 6 hours PRN    albuterol (PROVENTIL HFA,VENTOLIN HFA) 90 mcg/act inhaler 2 puffs, Inhalation, Every 6 hours PRN    albuterol 2.5 mg, Nebulization, 2 times daily    ALPRAZolam (XANAX) 0.25 mg, Oral, Daily at bedtime PRN    aspirin (ECOTRIN LOW STRENGTH) 81 mg, Oral, Daily    budesonide (PULMICORT) 0.5 mg, Nebulization, 2 times daily, Rinse  mouth after use.    guaiFENesin (MUCINEX) 600 mg, Oral, 2 times daily    ipratropium (ATROVENT) 0.02 % nebulizer solution USE 1 VIAL BY NEBULIZATION THREE TIMES A DAY    ipratropium (ATROVENT) 0.5 mg, Nebulization, 2 times daily    levalbuterol (XOPENEX) 1.25 mg/3 mL nebulizer solution     levothyroxine 50 mcg, Oral, Daily    Multiple Vitamins-Minerals (PreserVision AREDS 2) CAPS 1 capsule, Oral, Daily    pantoprazole (PROTONIX) 40 mg, Oral, Daily    predniSONE 10 mg, Oral, Daily    propranolol (INDERAL) 10 mg, Oral, 2 times daily    valsartan (DIOVAN) 160 mg, Oral, Daily, Please STOP Lisinopril,..    vitamin B-12 (VITAMIN B-12) 1,000 mcg, Oral, Daily         Medication reviewed. All orders signed. Complete list is in the paper chart.     Allergies  No Known Allergies    Labs/Diagnostics (reviewed by this provider):     I personally reviewed lab results and imaging studies. Full reports are in the paper chart.     Assessment/Plan:    Advance care planning  HCP and code status discussion as per note      At risk for constipation  At risk due to hospitalization, relative immobility, comorbidities  Monitor stool output  Bowel regimen at facility: miralax and senna as appropriate; consider bisacodyl suppository PRN if no BM in 2-3 days  Encourage mobility as tolerated, PO hydration as appropriate, high fiber diet/prune juice (in outpatient setting as appropriate)  Goal is for 1 easy BM every 1-2 days      At risk for delirium  Delirium precautions  -Patient is high risk of delirium due to age, comorbidities, hospitalization/unfamiliar environment  -delirium precautions  -maintain normal sleep/wake cycle  -minimize overnight interruptions, group overnight vitals/labs/nursing checks as possible  -dim lights, close blinds and turn off tv to minimize stimulation and encourage sleep environment in evenings  -ensure that pain is well controlled  -monitor for fecal and urinary retention which may precipitate  delirium  -encourage early mobilization and ambulation  -provide frequent reorientation and redirection  -encourage family and friends at the bedside to help help calm patient if anxious  -avoid medications which may precipitate or worsen delirium such as tramadol, benzodiazepine, anticholinergics, and benadryl  -encourage hydration and nutrition   -redirect unwanted behaviors as first line, avoid physical restraints, use chemical restraint only if all other attempts have been unsuccessful      Physical deconditioning  In setting of COPD, hospitalization  -PT/OT  -fall precautions  -encourage appropriate DME use  -SW to follow for safe discharge planning/homecare services as appropriate      Hypertension  Monitor BP - generally around 110s-130s systolic  Avoid hypotension  No acute cardiac complaints  Continue regimen including propranolol 10mg BID, valsartan 160mg daily with hold parameters  Follow up with PCP, Cardiology as appropriate      Temporal arteritis ([facility])  Hx of temporal arteritis  No current active/associated acute symptoms  Maintained on prednisone 2.5mg daily at baseline, will resume this dose once COPD-related prednisone taper completed  Follow up with PCP, Rheumatology as appropriate    Acute exacerbation of chronic obstructive pulmonary disease (COPD) (HCC)  Hx of severe COPD  Patient feels symptoms have been gradually progressive  Follows pulmonology Dr. Nicholson outpatient  Hospitalized for acute exacerbation  Treated inpatient including with breathing treatments/nebs, steroid taper, azithromycin with improvement  Continue breathing regimen  Presently on supplemental O2 2L. Not on supplemental O2 at baseline. Wean as appropriate, maintain O2 sat >= 88. Note that pulse ox readings challenging in her case due to her Raynauds. If needed, consider oximetry study to qualify for home O2.  Respiratory consulted to follow at rehab  Follow up with PCP, Pulmonology    Gastroesophageal reflux disease without  esophagitis  -stable  -recommend OOB with meals, sit upright for at least 30 minutes afterwards, avoid trigger foods  -continue to monitor  -follow up with PCP, GI as appropriate  -continue PPI    Periodontitis  Incidental finding noted by inpatient ENT evaluation for hx of sialoadenitis (resolved)  No apparent acute/associated pain/symptoms  Supportive care  Continue to monitor  Follow up with dentist outpatient    Other specified hypothyroidism  Last TSH WNL  Continue levothyroxine    Anxiety  Patient notes chronic hx of generalized anxiety with panic attacks at times  Feels issue is generally worse in setting of hospitalization and COPD which she feels is gradually worsening  Has been maintained on alprazolam 0.25mg BID PRN, reports at home she takes it typically at most once a day  Patient feels alprazolam has not generally been effective for her anxiety symptoms  We had extensive discussion regarding safer options for her anxiety  With shared decision-making, will start trial of buspirone 5mg BID as this should be effective sooner than SSRIs  Could consider SSRI for long-term maintenance but will avoid making too many med changes at once for now  Will keep the PRN alprazolam available for now but patient very clear that she would be happy to discontinue it. If she is doing OK without it at rehab would consider discontinuing by discharge.  Supportive care  Follow up with PCP, therapy/psych services outpatient as appropriate      Raynaud's phenomenon without gangrene  Chronic hx  Maintained on aspirin per records  Makes accurate pulse ox challenging  Supportive care  Follow up with PCP, Rheum as appropriate    Polypharmacy  Extensive medication review with patient at her request  Discussed each of her meds  Discussed wean of alprazolam as above  Follow up closely with PCP    Diastolic dysfunction  Echo Oct 2023: EF 75, grade 1 diastolic dysfunction, mild AR, mild TR  Monitor routine weight  Monitor volume status  clinically - mild peripheral edema, no acute orthopnea  Continue beta blocker  Monitor for need for diuretics  Encourage elevation, compression of legs as appropriate  Follow up with PCP, Cardiology as appropriate      Elevated vitamin B12 level  Historically elevated B12 level  Patient takes daily supplement  Will recheck level and adjust supplement as appropriate  Follow up with PCP    Normocytic anemia  Mild recent anemia noted on labs, possibly multifactorial including deconditioning, hospitalization  -monitor on routine labs - fairly stable  -monitor for acute bleed - no present signs  -consider further workup, Heme consult if persistent/worsening  -transfuse PRN Hb <7       Pain: none acute  Rehab Potential:Fair  Patient Informed of Medical Condition: yes   Patient is Capable of Understanding Their Right: yes   Discharge Plan: home pending clinical course  Vaccination:   Immunization History   Administered Date(s) Administered    COVID-19 MODERNA VACC 0.5 ML IM 02/08/2021, 03/08/2021, 12/11/2021    INFLUENZA 10/17/2007, 10/13/2010, 10/07/2015, 10/13/2016, 10/02/2017, 11/01/2018, 09/18/2023    Influenza Split 10/04/2012, 09/23/2013    Influenza Split High Dose Preservative Free IM 10/13/2016, 10/02/2017    Influenza, high dose seasonal 0.7 mL 11/01/2018, 10/23/2019, 09/28/2020, 11/15/2021, 11/21/2022    Influenza, injectable, quadrivalent, preservative free 0.5 mL 09/18/2023    Influenza, seasonal, injectable 10/16/2014, 10/07/2015    Pneumococcal Conjugate 13-Valent 10/02/2017    Pneumococcal Conjugate Vaccine 20-valent (Pcv20), Polysace 09/18/2023    Pneumococcal Polysaccharide PPV23 09/01/2011    Respiratory Syncytial Virus Vaccine (Recombinant) 03/11/2024    TD (adult) Preservative Free 11/13/2017    Td (adult), adsorbed 11/13/2017       DVT ppx: lovenox    Advanced Directives: patient verbalizes primary HCP as daughter Melissa, alternate as son Ton  Code status:Full Code Extensive discussion/education  with patient today regarding their wishes with respect to resuscitative measures; discussed as appropriate potential risks vs benefits of resuscitative measures; patient verbalizes understanding, appears to have capacity to make this decision presently, and clearly verbalizes a desire for Full Code, though notes she would not wish to persist in vegetative state via artificial means if no meaningful hope of recovery  PCP: Boyd      -Total time spent on this encounter today including documentation and workup review, face to face time, history and exam, and documentation/orders was approximately 65 minutes.  -This note will be copied to [facility] EMR where vitals and medication orders are placed.    Jacob Salas D.O.  Geriatric Medicine  8/28/2024 3:25 PM

## 2024-08-28 NOTE — ASSESSMENT & PLAN NOTE
Incidental finding noted by inpatient ENT evaluation for hx of sialoadenitis (resolved)  No apparent acute/associated pain/symptoms  Supportive care  Continue to monitor  Follow up with dentist outpatient

## 2024-08-28 NOTE — ASSESSMENT & PLAN NOTE
Monitor BP - generally around 110s-130s systolic  Avoid hypotension  No acute cardiac complaints  Continue regimen including propranolol 10mg BID, valsartan 160mg daily with hold parameters  Follow up with PCP, Cardiology as appropriate

## 2024-08-28 NOTE — ASSESSMENT & PLAN NOTE
In setting of COPD, hospitalization  -PT/OT  -fall precautions  -encourage appropriate DME use  -SW to follow for safe discharge planning/homecare services as appropriate

## 2024-08-28 NOTE — ASSESSMENT & PLAN NOTE
Patient notes chronic hx of generalized anxiety with panic attacks at times  Feels issue is generally worse in setting of hospitalization and COPD which she feels is gradually worsening  Has been maintained on alprazolam 0.25mg BID PRN, reports at home she takes it typically at most once a day  Patient feels alprazolam has not generally been effective for her anxiety symptoms  We had extensive discussion regarding safer options for her anxiety  With shared decision-making, will start trial of buspirone 5mg BID as this should be effective sooner than SSRIs  Could consider SSRI for long-term maintenance but will avoid making too many med changes at once for now  Will keep the PRN alprazolam available for now but patient very clear that she would be happy to discontinue it. If she is doing OK without it at rehab would consider discontinuing by discharge.  Supportive care  Follow up with PCP, therapy/psych services outpatient as appropriate

## 2024-08-28 NOTE — ASSESSMENT & PLAN NOTE
Chronic hx  Maintained on aspirin per records  Makes accurate pulse ox challenging  Supportive care  Follow up with PCP, Rheum as appropriate

## 2024-08-29 DIAGNOSIS — M31.6 TEMPORAL ARTERITIS (HCC): ICD-10-CM

## 2024-08-30 ENCOUNTER — NURSING HOME VISIT (OUTPATIENT)
Dept: GERIATRICS | Facility: OTHER | Age: 79
End: 2024-08-30
Payer: COMMERCIAL

## 2024-08-30 VITALS
RESPIRATION RATE: 19 BRPM | OXYGEN SATURATION: 96 % | HEART RATE: 92 BPM | TEMPERATURE: 97 F | BODY MASS INDEX: 17.82 KG/M2 | DIASTOLIC BLOOD PRESSURE: 72 MMHG | WEIGHT: 100.6 LBS | SYSTOLIC BLOOD PRESSURE: 112 MMHG

## 2024-08-30 DIAGNOSIS — K05.30 PERIODONTITIS: ICD-10-CM

## 2024-08-30 DIAGNOSIS — D64.9 NORMOCYTIC ANEMIA: ICD-10-CM

## 2024-08-30 DIAGNOSIS — F41.9 ANXIETY: ICD-10-CM

## 2024-08-30 DIAGNOSIS — R74.8 ELEVATED VITAMIN B12 LEVEL: ICD-10-CM

## 2024-08-30 DIAGNOSIS — K21.9 GASTROESOPHAGEAL REFLUX DISEASE WITHOUT ESOPHAGITIS: ICD-10-CM

## 2024-08-30 DIAGNOSIS — I10 PRIMARY HYPERTENSION: ICD-10-CM

## 2024-08-30 DIAGNOSIS — J44.1 ACUTE EXACERBATION OF CHRONIC OBSTRUCTIVE PULMONARY DISEASE (COPD) (HCC): Primary | ICD-10-CM

## 2024-08-30 DIAGNOSIS — I51.89 DIASTOLIC DYSFUNCTION: ICD-10-CM

## 2024-08-30 DIAGNOSIS — M31.6 TEMPORAL ARTERITIS (HCC): ICD-10-CM

## 2024-08-30 PROCEDURE — 99310 SBSQ NF CARE HIGH MDM 45: CPT

## 2024-08-30 RX ORDER — BUSPIRONE HYDROCHLORIDE 5 MG/1
5 TABLET ORAL 2 TIMES DAILY
Status: ON HOLD | COMMUNITY
End: 2024-09-09 | Stop reason: SDUPTHER

## 2024-08-30 RX ORDER — ALPRAZOLAM 0.25 MG
0.25 TABLET ORAL 2 TIMES DAILY PRN
Start: 2024-08-30 | End: 2024-09-02 | Stop reason: ALTCHOICE

## 2024-08-30 RX ORDER — PREDNISONE 2.5 MG/1
2.5 TABLET ORAL
Qty: 30 TABLET | Refills: 1 | Status: SHIPPED | OUTPATIENT
Start: 2024-08-30
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
## 2024-09-02 NOTE — ASSESSMENT & PLAN NOTE
History of temporal arteritis  No current active/associated acute symptoms  Maintained on prednisone 2.5mg daily at baseline, will resume this dose once COPD-related prednisone taper completed  Follow up with PCP, Rheumatology as appropriate

## 2024-09-02 NOTE — ASSESSMENT & PLAN NOTE
BP stable  No acute cardiac complaints  Avoid hypotension  Continue to monitor BP   Continue propranolol 10mg BID, valsartan 160mg daily with hold parameters  Follow up with PCP, Cardiology as appropriate

## 2024-09-02 NOTE — ASSESSMENT & PLAN NOTE
History of severe COPD  Patient feels symptoms have been gradually progressive  Follows pulmonology Dr. Nicholson outpatient  Hospitalized for acute exacerbation  Treated inpatient including with breathing treatments/nebs, steroid taper, azithromycin with improvement  Continue breathing regimen  Presently on supplemental O2 1L. Not on supplemental O2 at baseline. Wean as appropriate, maintain O2 sat >= 88. Note that pulse ox readings challenging in her case due to her Raynauds. Pulse oximetry study to qualify for home O2 24 hours prior to discharge   Respiratory consulted to follow at rehab  Follow up with PCP, Pulmonology

## 2024-09-02 NOTE — ASSESSMENT & PLAN NOTE
Mild recent anemia noted on labs, possibly multifactorial including deconditioning, hospitalization  Hgb now WNL  Continue to monitor on routine labs  Monitor for acute bleed - no present signs  Consider further workup, for persistent/worsening  Transfuse PRN Hgb <7  Continue to monitor for acute changes  Follow up with PCP as appropriate

## 2024-09-02 NOTE — ASSESSMENT & PLAN NOTE
Per patient stable  Avoid citrus, spicy, caffeine, and chocolate  Avoid eating/drinking 1 hour prior to laying down  OOB with meals for at least 30 min   Continue Pantoprazole daily  Follow up with PCP, GI as appropriate

## 2024-09-02 NOTE — ASSESSMENT & PLAN NOTE
Echo Oct 2023: EF 75, grade 1 diastolic dysfunction, mild AR, mild TR  Monitor routine weight; weight trending down  Monitor volume status clinically - trace BLE edema, lungs clear, appears dry, denies orthopnea  Continue beta blocker  Monitor for need for diuretics  Encourage elevation, compression of legs as appropriate  Follow up with PCP, Cardiology as appropriate

## 2024-09-02 NOTE — ASSESSMENT & PLAN NOTE
Incidental finding noted by inpatient ENT evaluation for history of sialoadenitis (resolved)  No apparent acute/associated pain/symptoms  Continue supportive care  Continue to monitor  Follow up with dentist outpatient

## 2024-09-02 NOTE — ASSESSMENT & PLAN NOTE
Historically elevated B12 level  Patient takes daily supplement  B12 elevated at 1,918  Discontinued B12 supplements  Follow up with PCP as appropriate

## 2024-09-03 ENCOUNTER — NURSING HOME VISIT (OUTPATIENT)
Dept: GERIATRICS | Facility: OTHER | Age: 79
End: 2024-09-03
Payer: COMMERCIAL

## 2024-09-03 VITALS
HEART RATE: 78 BPM | TEMPERATURE: 97.6 F | BODY MASS INDEX: 17.61 KG/M2 | SYSTOLIC BLOOD PRESSURE: 120 MMHG | WEIGHT: 99.4 LBS | OXYGEN SATURATION: 91 % | DIASTOLIC BLOOD PRESSURE: 56 MMHG | RESPIRATION RATE: 16 BRPM

## 2024-09-03 DIAGNOSIS — D64.9 NORMOCYTIC ANEMIA: ICD-10-CM

## 2024-09-03 DIAGNOSIS — M31.6 TEMPORAL ARTERITIS (HCC): ICD-10-CM

## 2024-09-03 DIAGNOSIS — K21.9 GASTROESOPHAGEAL REFLUX DISEASE WITHOUT ESOPHAGITIS: ICD-10-CM

## 2024-09-03 DIAGNOSIS — I73.00 RAYNAUD'S PHENOMENON WITHOUT GANGRENE: ICD-10-CM

## 2024-09-03 DIAGNOSIS — R53.81 PHYSICAL DECONDITIONING: ICD-10-CM

## 2024-09-03 DIAGNOSIS — J44.1 ACUTE EXACERBATION OF CHRONIC OBSTRUCTIVE PULMONARY DISEASE (COPD) (HCC): Primary | ICD-10-CM

## 2024-09-03 DIAGNOSIS — I10 PRIMARY HYPERTENSION: ICD-10-CM

## 2024-09-03 DIAGNOSIS — I51.89 DIASTOLIC DYSFUNCTION: ICD-10-CM

## 2024-09-03 DIAGNOSIS — F41.9 ANXIETY: ICD-10-CM

## 2024-09-03 PROCEDURE — 99309 SBSQ NF CARE MODERATE MDM 30: CPT

## 2024-09-04 NOTE — ASSESSMENT & PLAN NOTE
History of severe COPD  Patient feels symptoms have been gradually progressive  Follows pulmonology Dr. Nicholson outpatient  Hospitalized for acute exacerbation  Treated inpatient including with breathing treatments/nebs, steroid taper, azithromycin with improvement  Continue breathing regimen  Presently on supplemental O2 2L. Not on supplemental O2 at baseline. Wean as appropriate, maintain O2 sat >= 88. Note that pulse ox readings challenging in her case due to her Raynauds. Pulse oximetry study to qualify for home O2 24 hours prior to discharge   Respiratory consulted to follow at rehab  Follow up with PCP, Pulmonology

## 2024-09-04 NOTE — ASSESSMENT & PLAN NOTE
In setting of COPD, hospitalization  Continue PT/OT  Maintain fall and safety precautions  Encourage appropriate DME use  SW to follow for safe discharge planning/homecare services as appropriate  Follow up with PCP as appropriate
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
## 2024-09-04 NOTE — ASSESSMENT & PLAN NOTE
Chronic history  Maintained on aspirin per records  Makes accurate pulse ox challenging  Supportive care  Follow up with PCP, Rheum as appropriate

## 2024-09-05 ENCOUNTER — NURSING HOME VISIT (OUTPATIENT)
Dept: GERIATRICS | Facility: OTHER | Age: 79
End: 2024-09-05
Payer: COMMERCIAL

## 2024-09-05 VITALS
DIASTOLIC BLOOD PRESSURE: 60 MMHG | OXYGEN SATURATION: 92 % | RESPIRATION RATE: 20 BRPM | HEART RATE: 92 BPM | BODY MASS INDEX: 17.61 KG/M2 | TEMPERATURE: 96.9 F | WEIGHT: 99.4 LBS | SYSTOLIC BLOOD PRESSURE: 127 MMHG

## 2024-09-05 DIAGNOSIS — R10.13 EPIGASTRIC PAIN: ICD-10-CM

## 2024-09-05 DIAGNOSIS — I51.89 DIASTOLIC DYSFUNCTION: ICD-10-CM

## 2024-09-05 DIAGNOSIS — K21.9 GASTROESOPHAGEAL REFLUX DISEASE WITHOUT ESOPHAGITIS: ICD-10-CM

## 2024-09-05 DIAGNOSIS — F41.9 ANXIETY: ICD-10-CM

## 2024-09-05 DIAGNOSIS — I10 PRIMARY HYPERTENSION: ICD-10-CM

## 2024-09-05 DIAGNOSIS — J44.1 ACUTE EXACERBATION OF CHRONIC OBSTRUCTIVE PULMONARY DISEASE (COPD) (HCC): Primary | ICD-10-CM

## 2024-09-05 PROBLEM — R53.81 PHYSICAL DECONDITIONING: Status: RESOLVED | Noted: 2024-08-28 | Resolved: 2024-09-05

## 2024-09-05 PROCEDURE — 99310 SBSQ NF CARE HIGH MDM 45: CPT

## 2024-09-05 NOTE — ASSESSMENT & PLAN NOTE
Patient complains of epigastric pressure that has been going on for approximately 6 months.   Patient denies change in character or associated symptoms. Food, movement does not increase or decrease pain.  Non-tender to palpation, no notable lumps or abnormality.   Patient expresses her worry that pain may be due to breast cancer (her mom had breast cancer).   Patient has severe anxiety and endorses getting worried about her health and fixates on her conditions.   Patient reports she has intentions to get mammogram post discharge.   Follow up with PCP, GI as appropriate
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
## 2024-09-06 LAB
DME PARACHUTE DELIVERY DATE REQUESTED: NORMAL
DME PARACHUTE ITEM DESCRIPTION: NORMAL
DME PARACHUTE ITEM DESCRIPTION: NORMAL
DME PARACHUTE ORDER STATUS: NORMAL
DME PARACHUTE SUPPLIER NAME: NORMAL
DME PARACHUTE SUPPLIER PHONE: NORMAL

## 2024-09-07 NOTE — RESPIRATORY THERAPY NOTE
Home Oxygen Qualifying Test     Patient name: Sarah Zayas        : 1945   Date of Test:  2024  Diagnosis:    Home Oxygen Test:    **Medicare Guidelines require item(s) 1-5 on all ambulatory patients or 1 and 2 on non-ambulatory patients.    1. Baseline SPO2 on Room Air at rest 95 %   If <= 88% on Room Air add O2 via NC to obtain SpO2 >=88%. If LPM needed, document 0LPM  needed to reach =>88%    SPO2 during exertion on Room Air 87  %  During exertion monitor SPO2. If SPO2 increases >=89%, do not add supplemental oxygen    SPO2 on Oxygen at Rest 95 % at 0 LPM    SPO2 during exertion on Oxygen 94 % at 2 LPM    Test performed during exertion activity.      [x]  Supplemental Home Oxygen is indicated.    []  Client does not qualify for home oxygen.    Respiratory Additional Notes- Pt SpO2 on room air at rest was 95%, no oxygen indicated while at rest. Pt ambulated on room air and SpO2 dropped to 87%, will require 2L of oxygen during exertion.  Randy Gonzalez, RT

## 2024-09-09 ENCOUNTER — NURSING HOME VISIT (OUTPATIENT)
Dept: GERIATRICS | Facility: OTHER | Age: 79
End: 2024-09-09
Payer: COMMERCIAL

## 2024-09-09 ENCOUNTER — HOME HEALTH ADMISSION (OUTPATIENT)
Dept: HOME HEALTH SERVICES | Facility: HOME HEALTHCARE | Age: 79
End: 2024-09-09
Payer: COMMERCIAL

## 2024-09-09 VITALS
WEIGHT: 98.2 LBS | OXYGEN SATURATION: 94 % | HEART RATE: 89 BPM | BODY MASS INDEX: 17.4 KG/M2 | RESPIRATION RATE: 19 BRPM | DIASTOLIC BLOOD PRESSURE: 58 MMHG | TEMPERATURE: 97 F | SYSTOLIC BLOOD PRESSURE: 132 MMHG

## 2024-09-09 DIAGNOSIS — R10.13 EPIGASTRIC PAIN: ICD-10-CM

## 2024-09-09 DIAGNOSIS — M31.6 TEMPORAL ARTERITIS (HCC): ICD-10-CM

## 2024-09-09 DIAGNOSIS — I10 PRIMARY HYPERTENSION: ICD-10-CM

## 2024-09-09 DIAGNOSIS — J44.1 ACUTE EXACERBATION OF CHRONIC OBSTRUCTIVE PULMONARY DISEASE (COPD) (HCC): Primary | ICD-10-CM

## 2024-09-09 DIAGNOSIS — K21.9 GASTROESOPHAGEAL REFLUX DISEASE WITHOUT ESOPHAGITIS: ICD-10-CM

## 2024-09-09 DIAGNOSIS — I73.00 RAYNAUD'S PHENOMENON WITHOUT GANGRENE: ICD-10-CM

## 2024-09-09 DIAGNOSIS — I51.89 DIASTOLIC DYSFUNCTION: ICD-10-CM

## 2024-09-09 DIAGNOSIS — Z79.899 POLYPHARMACY: ICD-10-CM

## 2024-09-09 DIAGNOSIS — F41.9 ANXIETY: ICD-10-CM

## 2024-09-09 DIAGNOSIS — R74.8 ELEVATED VITAMIN B12 LEVEL: ICD-10-CM

## 2024-09-09 DIAGNOSIS — K05.30 PERIODONTITIS: ICD-10-CM

## 2024-09-09 DIAGNOSIS — E03.8 OTHER SPECIFIED HYPOTHYROIDISM: ICD-10-CM

## 2024-09-09 DIAGNOSIS — D64.9 NORMOCYTIC ANEMIA: ICD-10-CM

## 2024-09-09 LAB
DME PARACHUTE DELIVERY DATE ACTUAL: NORMAL
DME PARACHUTE DELIVERY DATE ACTUAL: NORMAL
DME PARACHUTE DELIVERY DATE EXPECTED: NORMAL
DME PARACHUTE DELIVERY DATE REQUESTED: NORMAL
DME PARACHUTE DELIVERY DATE REQUESTED: NORMAL
DME PARACHUTE DELIVERY NOTE: NORMAL
DME PARACHUTE ITEM DESCRIPTION: NORMAL
DME PARACHUTE ORDER STATUS: NORMAL
DME PARACHUTE ORDER STATUS: NORMAL
DME PARACHUTE SUPPLIER NAME: NORMAL
DME PARACHUTE SUPPLIER NAME: NORMAL
DME PARACHUTE SUPPLIER PHONE: NORMAL
DME PARACHUTE SUPPLIER PHONE: NORMAL

## 2024-09-09 PROCEDURE — 94762 N-INVAS EAR/PLS OXIMTRY CONT: CPT

## 2024-09-09 PROCEDURE — 99316 NF DSCHRG MGMT 30 MIN+: CPT

## 2024-09-09 RX ORDER — BUSPIRONE HYDROCHLORIDE 5 MG/1
5 TABLET ORAL 3 TIMES DAILY
Qty: 90 TABLET | Refills: 0 | Status: SHIPPED | OUTPATIENT
Start: 2024-09-09 | End: 2024-09-17

## 2024-09-09 NOTE — ASSESSMENT & PLAN NOTE
Patient complains of epigastric pressure that has been going on for approximately 6 months.   Patient denies change in character or associated symptoms. Food, movement does not increase/decrease pain.  Non-tender to palpation, no notable lumps or abnormality.   Patient expresses her worry that pain may be due to breast cancer (her mom had breast cancer).   Patient has severe anxiety and endorses getting worried about her health and fixates on her conditions.   Patient reports she has intentions to get mammogram post discharge.   Follow up with PCP, GI as appropriate
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
## 2024-09-09 NOTE — ASSESSMENT & PLAN NOTE
History of severe COPD  Patient feels symptoms have been gradually progressive  Follows pulmonology Dr. Nicholson outpatient  Hospitalized for acute exacerbation  Treated inpatient including with breathing treatments/nebs, steroid taper, azithromycin with improvement  Continue breathing regimen  Presently on supplemental O2 2L. Not on supplemental O2 at baseline.   Pulse oximetry and walk study done 9/8/24, patient qualified for supplemental o2  Home o2 orders sent to DME distributor   Respiratory following while at rehab  Follow up with PCP, Pulmonology in the next 2 weeks
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
## 2024-09-09 NOTE — ASSESSMENT & PLAN NOTE
Extensive medication review with patient at her request  Discussed each of her meds  Discontinued Alprazolam as mentioned above   Follow up closely with PCP

## 2024-09-10 ENCOUNTER — HOME CARE VISIT (OUTPATIENT)
Dept: HOME HEALTH SERVICES | Facility: HOME HEALTHCARE | Age: 79
End: 2024-09-10
Payer: COMMERCIAL

## 2024-09-10 ENCOUNTER — PATIENT OUTREACH (OUTPATIENT)
Dept: CASE MANAGEMENT | Facility: OTHER | Age: 79
End: 2024-09-10

## 2024-09-10 ENCOUNTER — TELEPHONE (OUTPATIENT)
Dept: GERIATRICS | Facility: OTHER | Age: 79
End: 2024-09-10

## 2024-09-10 VITALS
SYSTOLIC BLOOD PRESSURE: 90 MMHG | TEMPERATURE: 97.2 F | HEART RATE: 106 BPM | RESPIRATION RATE: 20 BRPM | OXYGEN SATURATION: 97 % | DIASTOLIC BLOOD PRESSURE: 48 MMHG

## 2024-09-10 DIAGNOSIS — Z78.9 NEEDS ASSISTANCE WITH COMMUNITY RESOURCES: Primary | ICD-10-CM

## 2024-09-10 DIAGNOSIS — J44.9 COPD, SEVERE (HCC): ICD-10-CM

## 2024-09-10 PROCEDURE — G0299 HHS/HOSPICE OF RN EA 15 MIN: HCPCS

## 2024-09-10 PROCEDURE — 400013 VN SOC

## 2024-09-10 RX ORDER — BUDESONIDE 0.5 MG/2ML
0.5 INHALANT ORAL 2 TIMES DAILY
Qty: 120 ML | Refills: 0 | Status: SHIPPED | OUTPATIENT
Start: 2024-09-10 | End: 2024-10-10

## 2024-09-10 NOTE — PROGRESS NOTES
Contacted patient for f/u post hospitalization and rehab stay.  Patient is receiving home health services of SN, PT/OT.  She lives alone but her sister is with her frequently and her children assist as needed.  She uses oxygen at 2 L, SpO2 92%.  She denies wheezing or sob but endorse productive cough for clear sputum.  She is using nebulizer treatments.  She denies chest pain or LE edema.  She does not monitor weight at home.  She ambulates with walker.  She reports a diminished appetite and is supplementing with ensure shakes.  She is interested in applying for meals on wheels. Referral placed for [facility] to assist with application.  She is taking all medications as prescribed.  She will be obtaining a pill box to better help her manage her medications.  She is also planning on having her medications bubble packed by pharmacy when she is due for refills.  She has f/u scheduled with PCP for 9/16.  Son transports to appointments.  She is agreeable to continued outreach.

## 2024-09-10 NOTE — CASE COMMUNICATION
Medication discrepancies or Major drug interactions pt's budesonide is  on medication list. If pt is to continue take medicarion can you please re order. there is a major drug interaction between albuterol, levalbuterol and propranolol  Abnormal clinical findings   Response needed, please respond via Secure Chat, in basket, or   [facility] has Admitted your patient to Home Health service with the following discipl vero: SN, PT, OT and HHA  Patient stated goals of care to breathe better  Potential barriers to goal achievement pt lives alone, has fatigue, anxiety  Primary focus of home health care:Respiratory  Anticipated visit pattern and next visit date 2w3 next visit on 24  Thank you for allowing us to participate in the care of your patient.      Mechelle Najera RN

## 2024-09-11 ENCOUNTER — HOME CARE VISIT (OUTPATIENT)
Dept: HOME HEALTH SERVICES | Facility: HOME HEALTHCARE | Age: 79
End: 2024-09-11
Payer: COMMERCIAL

## 2024-09-11 VITALS — SYSTOLIC BLOOD PRESSURE: 108 MMHG | HEART RATE: 98 BPM | OXYGEN SATURATION: 92 % | DIASTOLIC BLOOD PRESSURE: 58 MMHG

## 2024-09-11 PROCEDURE — G0151 HHCP-SERV OF PT,EA 15 MIN: HCPCS

## 2024-09-12 ENCOUNTER — HOME CARE VISIT (OUTPATIENT)
Dept: HOME HEALTH SERVICES | Facility: HOME HEALTHCARE | Age: 79
End: 2024-09-12
Payer: COMMERCIAL

## 2024-09-12 ENCOUNTER — PATIENT OUTREACH (OUTPATIENT)
Dept: CASE MANAGEMENT | Facility: OTHER | Age: 79
End: 2024-09-12

## 2024-09-12 VITALS — HEART RATE: 74 BPM | DIASTOLIC BLOOD PRESSURE: 60 MMHG | SYSTOLIC BLOOD PRESSURE: 98 MMHG | OXYGEN SATURATION: 97 %

## 2024-09-12 PROCEDURE — G0152 HHCP-SERV OF OT,EA 15 MIN: HCPCS

## 2024-09-12 NOTE — PROGRESS NOTES
received referral from [employer] to assist with pt with referral to meals on wheels.    OC made outreach call to pt and introduced myself and stated the reason for my call.  OC discussed assisting with applying for wheels on meals.  Pt did ask if the service is free and OC stated that the service is not free but I can still put in the referral and she can discuss the program with them and at that time she can decline, pt agreed.    OC discussed if there are any other needs or resources that I can assist with but pt stated no.  OC will close case and no further outreach.      Patient was referred on [employer] to Meals on Wheels (program) for food

## 2024-09-13 ENCOUNTER — HOME CARE VISIT (OUTPATIENT)
Dept: HOME HEALTH SERVICES | Facility: HOME HEALTHCARE | Age: 79
End: 2024-09-13
Payer: COMMERCIAL

## 2024-09-13 PROCEDURE — G0299 HHS/HOSPICE OF RN EA 15 MIN: HCPCS

## 2024-09-15 VITALS
DIASTOLIC BLOOD PRESSURE: 60 MMHG | SYSTOLIC BLOOD PRESSURE: 110 MMHG | OXYGEN SATURATION: 95 % | HEART RATE: 78 BPM | TEMPERATURE: 97.9 F

## 2024-09-16 ENCOUNTER — TELEPHONE (OUTPATIENT)
Age: 79
End: 2024-09-16

## 2024-09-16 ENCOUNTER — HOME CARE VISIT (OUTPATIENT)
Dept: HOME HEALTH SERVICES | Facility: HOME HEALTHCARE | Age: 79
End: 2024-09-16
Payer: COMMERCIAL

## 2024-09-16 ENCOUNTER — OFFICE VISIT (OUTPATIENT)
Dept: FAMILY MEDICINE CLINIC | Facility: CLINIC | Age: 79
End: 2024-09-16
Payer: COMMERCIAL

## 2024-09-16 VITALS
DIASTOLIC BLOOD PRESSURE: 80 MMHG | BODY MASS INDEX: 17.68 KG/M2 | HEART RATE: 68 BPM | RESPIRATION RATE: 14 BRPM | HEIGHT: 63 IN | SYSTOLIC BLOOD PRESSURE: 126 MMHG | OXYGEN SATURATION: 96 % | WEIGHT: 99.8 LBS | TEMPERATURE: 98.1 F

## 2024-09-16 DIAGNOSIS — J44.9 COPD, SEVERE (HCC): Primary | ICD-10-CM

## 2024-09-16 DIAGNOSIS — R04.0 BLEEDING NOSE: ICD-10-CM

## 2024-09-16 DIAGNOSIS — H60.8X2 CHRONIC ECZEMATOUS OTITIS EXTERNA OF LEFT EAR: ICD-10-CM

## 2024-09-16 DIAGNOSIS — I15.1: ICD-10-CM

## 2024-09-16 DIAGNOSIS — Z23 ENCOUNTER FOR IMMUNIZATION: ICD-10-CM

## 2024-09-16 DIAGNOSIS — M31.6 TEMPORAL ARTERITIS (HCC): ICD-10-CM

## 2024-09-16 DIAGNOSIS — E87.5: ICD-10-CM

## 2024-09-16 DIAGNOSIS — I87.2 VENOUS INSUFFICIENCY OF LOWER EXTREMITY: ICD-10-CM

## 2024-09-16 DIAGNOSIS — E03.9 HYPOTHYROIDISM, ADULT: ICD-10-CM

## 2024-09-16 DIAGNOSIS — E03.9 PRIMARY HYPOTHYROIDISM: ICD-10-CM

## 2024-09-16 PROBLEM — E43 SEVERE PROTEIN-CALORIE MALNUTRITION (HCC): Status: RESOLVED | Noted: 2024-07-24 | Resolved: 2024-09-16

## 2024-09-16 PROBLEM — J96.01 ACUTE RESPIRATORY FAILURE WITH HYPOXIA (HCC): Status: RESOLVED | Noted: 2023-12-07 | Resolved: 2024-09-16

## 2024-09-16 PROCEDURE — G0008 ADMIN INFLUENZA VIRUS VAC: HCPCS

## 2024-09-16 PROCEDURE — 99214 OFFICE O/P EST MOD 30 MIN: CPT | Performed by: INTERNAL MEDICINE

## 2024-09-16 PROCEDURE — 90662 IIV NO PRSV INCREASED AG IM: CPT

## 2024-09-16 PROCEDURE — G0155 HHCP-SVS OF CSW,EA 15 MIN: HCPCS

## 2024-09-16 RX ORDER — TRIAMCINOLONE ACETONIDE 1 MG/G
CREAM TOPICAL
Qty: 15 G | Refills: 1 | Status: SHIPPED | OUTPATIENT
Start: 2024-09-16

## 2024-09-16 NOTE — ASSESSMENT & PLAN NOTE
Orders:    UA (URINE) with reflex to Scope; Future    Magnesium; Future    TSH, 3rd generation; Future    T4, free; Future    CBC and differential; Future    Comprehensive metabolic panel; Future    Lipid panel; Future

## 2024-09-16 NOTE — TELEPHONE ENCOUNTER
Patient returning call about referral for ENT - she does not know why this was placed and does not think she needs it but is checking with Dr. Nix.

## 2024-09-16 NOTE — TELEPHONE ENCOUNTER
Patient called to ask if there was a different entrance that she can go into. The patient is new to oxygen and is concerned about carrying the oxygen. Patient was instructed to have the person bringing her to assist the patient into the waiting room. Then we will have the MA assist the patient to the exam room. Patient verbalizes understanding.

## 2024-09-16 NOTE — TELEPHONE ENCOUNTER
Patient stated she prefers not to go to ENT due to this is her first nose bleed ever.    Patient prefers not to see all these doctors and it was an unusual thing she was picking her nose that caused the bleed.

## 2024-09-16 NOTE — PROGRESS NOTES
Ambulatory Visit  Name: Sarah Zayas      : 1945      MRN: [id number]  Encounter Provider: Nicolas Nix MD  Encounter Date: 2024   Encounter department: [facility] C[facility]    Assessment & Plan  Encounter for immunization    Orders:    influenza vaccine, high-dose, PF 0.5 mL (Fluzone High Dose)    Bleeding nose    Orders:    sodium chloride (OCEAN) 0.65 % nasal spray; 1 spray into each nostril 3 (three) times a day    Ambulatory Referral to Otolaryngology; Future    Venous insufficiency of lower extremity    Orders:    Jobst Stockings    Chronic eczematous otitis externa of left ear    Orders:    triamcinolone (KENALOG) 0.1 % cream; Use on Q Tip in left ear, twice daily    COPD, severe (HCC)    Orders:    UA (URINE) with reflex to Scope; Future    Magnesium; Future    TSH, 3rd generation; Future    T4, free; Future    CBC and differential; Future    Comprehensive metabolic panel; Future    Lipid panel; Future    Temporal arteritis (HCC)    RTC in 3 mos w Blood work            History of Present Illness     79 Y O Lady is here for regular check up, she feels Better, she needs Home O2, med list reviewed,....          Review of Systems   Constitutional:  Positive for fatigue. Negative for chills and fever.   HENT:  Positive for postnasal drip. Negative for congestion, facial swelling, sore throat, trouble swallowing and voice change.    Eyes:  Negative for pain, discharge and visual disturbance.   Respiratory:  Positive for shortness of breath. Negative for cough and wheezing.    Cardiovascular:  Negative for chest pain, palpitations and leg swelling.   Gastrointestinal:  Negative for abdominal pain, blood in stool, constipation, diarrhea and nausea.   Endocrine: Negative for polydipsia, polyphagia and polyuria.   Genitourinary:  Negative for difficulty urinating, hematuria and urgency.   Musculoskeletal:  Negative for arthralgias and myalgias.   Skin:  Negative for rash.  "  Neurological:  Positive for dizziness. Negative for tremors, weakness and headaches.   Hematological:  Negative for adenopathy. Does not bruise/bleed easily.   Psychiatric/Behavioral:  Negative for dysphoric mood, sleep disturbance and suicidal ideas.            Objective     /80 (BP Location: Left arm, Patient Position: Sitting, Cuff Size: Standard)   Pulse 68   Temp 98.1 °F (36.7 °C) (Tympanic)   Resp 14   Ht 5' 3\" (1.6 m)   Wt 45.3 kg (99 lb 12.8 oz)   SpO2 96%   BMI 17.68 kg/m²     Physical Exam  Vitals and nursing note reviewed.   Constitutional:       General: She is not in acute distress.     Appearance: She is well-developed. She is not diaphoretic.   HENT:      Head: Normocephalic and atraumatic.      Right Ear: External ear normal.      Left Ear: External ear normal.      Nose: Nose normal.   Eyes:      General:         Right eye: No discharge.         Left eye: No discharge.      Extraocular Movements: EOM normal.      Conjunctiva/sclera: Conjunctivae normal.      Pupils: Pupils are equal, round, and reactive to light.   Neck:      Thyroid: No thyromegaly.      Trachea: No tracheal deviation.   Cardiovascular:      Rate and Rhythm: Normal rate and regular rhythm.      Heart sounds: Normal heart sounds. No murmur heard.     No friction rub.   Pulmonary:      Effort: Pulmonary effort is normal. No respiratory distress.      Breath sounds: Normal breath sounds. No stridor. No wheezing or rales.      Comments: Decrease Breathing sounds Bases, Bilt  Abdominal:      General: Bowel sounds are normal. There is no distension.      Palpations: Abdomen is soft.      Tenderness: There is no abdominal tenderness. There is no guarding.   Musculoskeletal:         General: No swelling, tenderness, deformity or edema. Normal range of motion.      Cervical back: Normal range of motion and neck supple.   Lymphadenopathy:      Cervical: No cervical adenopathy.   Skin:     General: Skin is warm and dry.      " Capillary Refill: Capillary refill takes less than 2 seconds.      Coloration: Skin is not pale.      Findings: No erythema or rash.   Neurological:      Mental Status: She is alert and oriented to person, place, and time.      Cranial Nerves: No cranial nerve deficit.      Coordination: Coordination normal.   Psychiatric:         Mood and Affect: Mood and affect and mood normal.         Behavior: Behavior normal.

## 2024-09-17 ENCOUNTER — HOME CARE VISIT (OUTPATIENT)
Dept: HOME HEALTH SERVICES | Facility: HOME HEALTHCARE | Age: 79
End: 2024-09-17
Payer: COMMERCIAL

## 2024-09-17 ENCOUNTER — TELEPHONE (OUTPATIENT)
Age: 79
End: 2024-09-17

## 2024-09-17 ENCOUNTER — TELEPHONE (OUTPATIENT)
Dept: LAB | Facility: HOSPITAL | Age: 79
End: 2024-09-17

## 2024-09-17 VITALS
OXYGEN SATURATION: 99 % | SYSTOLIC BLOOD PRESSURE: 102 MMHG | DIASTOLIC BLOOD PRESSURE: 52 MMHG | TEMPERATURE: 97.3 F | RESPIRATION RATE: 26 BRPM | HEART RATE: 92 BPM

## 2024-09-17 DIAGNOSIS — F41.9 ANXIETY: ICD-10-CM

## 2024-09-17 PROCEDURE — G0300 HHS/HOSPICE OF LPN EA 15 MIN: HCPCS

## 2024-09-17 RX ORDER — BUSPIRONE HYDROCHLORIDE 5 MG/1
5 TABLET ORAL 3 TIMES DAILY
Qty: 90 TABLET | Refills: 0 | Status: SHIPPED | OUTPATIENT
Start: 2024-09-17 | End: 2024-09-26

## 2024-09-17 NOTE — TELEPHONE ENCOUNTER
L/m on patient's phone.    For Kenalog cream, per Dr Nix, use a Q-tip and put it in the inside of her ear where she has the problem.     The blood work dates were corrected, she can go a week before her next appointment.    The buspar was added while she was hospitalized and Dr Nix wants her to continue this.     If she has any other problem, call the office and ask for Janey

## 2024-09-17 NOTE — TELEPHONE ENCOUNTER
Received call from Blanca from Visiting Nurses at [facility] she is asking for clarification on a few things.     1) Medication triamcinolone (KENALOG) 0.1 % cream   Does she put this inside the ear canal? Or earlobe?    2) Blood work shoes expected date 9/24 Patient was told to have it done prior to next apt 12/9    3) Patient recently told visiting nurse that she is feeling overwhelmed with new diagnosis and may possibly be depressed. Denies having suicidal thoughts and denies wanting to harm herself.     Patient does have Buspar on med list anxiety.     Please advise.   Call back number for Blanca is [phone]

## 2024-09-18 ENCOUNTER — HOME CARE VISIT (OUTPATIENT)
Dept: HOME HEALTH SERVICES | Facility: HOME HEALTHCARE | Age: 79
End: 2024-09-18
Payer: COMMERCIAL

## 2024-09-18 NOTE — CASE COMMUNICATION
Patient is to receive blister packs on 9/26. T.c. to pharmacy and they are going to set up interim med planners with the prescription pills she has in the home until it is time to start the blister packs. They were to  the pills in the afternoon of 9/17 and drop off med planners on 9/18. Pt was instructed to continue taking her meds as they were set up by previous SN (enough meds thru Sunday) until the next SN visit on 9/20. Pt i s anxious and gets confused easily with meds, please make visit to educate and clarify med planner use on 9/20.

## 2024-09-19 ENCOUNTER — HOME CARE VISIT (OUTPATIENT)
Dept: HOME HEALTH SERVICES | Facility: HOME HEALTHCARE | Age: 79
End: 2024-09-19
Payer: COMMERCIAL

## 2024-09-19 VITALS — SYSTOLIC BLOOD PRESSURE: 120 MMHG | OXYGEN SATURATION: 99 % | DIASTOLIC BLOOD PRESSURE: 78 MMHG | HEART RATE: 86 BPM

## 2024-09-19 PROCEDURE — G0151 HHCP-SERV OF PT,EA 15 MIN: HCPCS

## 2024-09-20 ENCOUNTER — TELEPHONE (OUTPATIENT)
Age: 79
End: 2024-09-20

## 2024-09-20 ENCOUNTER — HOME CARE VISIT (OUTPATIENT)
Dept: HOME HEALTH SERVICES | Facility: HOME HEALTHCARE | Age: 79
End: 2024-09-20
Payer: COMMERCIAL

## 2024-09-20 ENCOUNTER — TELEPHONE (OUTPATIENT)
Dept: BARIATRICS | Facility: CLINIC | Age: 79
End: 2024-09-20

## 2024-09-20 ENCOUNTER — NURSE TRIAGE (OUTPATIENT)
Age: 79
End: 2024-09-20

## 2024-09-20 VITALS
TEMPERATURE: 97.9 F | SYSTOLIC BLOOD PRESSURE: 120 MMHG | OXYGEN SATURATION: 97 % | DIASTOLIC BLOOD PRESSURE: 64 MMHG | HEART RATE: 90 BPM | RESPIRATION RATE: 18 BRPM

## 2024-09-20 VITALS — OXYGEN SATURATION: 97 % | DIASTOLIC BLOOD PRESSURE: 70 MMHG | SYSTOLIC BLOOD PRESSURE: 120 MMHG | HEART RATE: 96 BPM

## 2024-09-20 DIAGNOSIS — R04.0 BLOODY NOSE: Primary | ICD-10-CM

## 2024-09-20 PROCEDURE — G0299 HHS/HOSPICE OF RN EA 15 MIN: HCPCS

## 2024-09-20 PROCEDURE — G0152 HHCP-SERV OF OT,EA 15 MIN: HCPCS

## 2024-09-20 NOTE — TELEPHONE ENCOUNTER
"Incoming call:    Patient with \"mesh portable nebulizer\". Concerned about filter change as she is not sure about how to do so if needed. States that the nurse will be visiting soon and will give us a call.       "

## 2024-09-20 NOTE — TELEPHONE ENCOUNTER
Received call from Carly from  [facility] asking for an order/script for a humidifier to attach to oxygen concentrator due to dry/bloody nose.     Please advise.

## 2024-09-20 NOTE — TELEPHONE ENCOUNTER
New patient scheduled to see Dr.Jose Amado on 11/14/24 for nosebleed. Asking for sooner appointment. Unfortunately, nothing available at this time. Was offered [facility] office. Patient declined. Was placed on wait list. Recommenced if she has another nosebleed, she should go to the ER for evaluation.

## 2024-09-20 NOTE — TELEPHONE ENCOUNTER
"Pt called in. Pt asking what \"Ambulatory Referral to Otolaryngology\" ordered by Dr. Nix means. Updated Pt, Pt verbalized understanding.     Updated Pt on upcoming appt with Dr. Amado for throat pain. Pt reports confusion because she saw Dr. Amado when she was admitted in the hospital and he evaluated her throat and said everything was fine. Pt asking if he can see her for nosebleeds.     Pt reports that she has been having a constant nosebleed from L nostril, endorsing \"a dot\" of blood, since end of hospital stay. Pt wears nasal cannula @ 2LPM at baseline, unhumidified. Pt also takes daily 81mg ASA. Pt asking if she can see Dr. Amado sooner.     Reviewed ENT consult order and Dr. Amado's schedule. Updated Pt that she has soonest appt. Pt asking if she can reschedule for a Monday appt. Updated Pt that office is not open on Mondays. Pt asking if she can be scheduled at an office that offers Monday appts. Other [facility] ENT offices provided to Pt; Pt reports the other offices are too distant. [facility] offices offered to Pt, Pt thankful and will call [facility] to schedule.   Answer Assessment - Initial Assessment Questions  1. AMOUNT OF BLEEDING: \"How bad is the bleeding?\" \"How much blood was lost?\" \"Has the bleeding stopped?\"    - MILD: needed a couple tissues    - MODERATE: needed many tissues    - SEVERE: large blood clots, soaked many tissues, lasted more than 30 minutes       \"A dot\"  2. ONSET: \"When did the nosebleed start?\"       Weeks ago  3. FREQUENCY: \"How many nosebleeds have you had in the last 24 hours?\"       constant  4. RECURRENT SYMPTOMS: \"Have there been other recent nosebleeds?\" If Yes, ask: \"How long did it take you to stop the bleeding?\" \"What worked best?\"       denies  5. CAUSE: \"What do you think caused this nosebleed?\"      unsure  6. LOCAL FACTORS: \"Do you have any cold symptoms?\", \"Have you been rubbing or picking at your nose?\"      denies  7. SYSTEMIC FACTORS: \"Do you have high blood pressure or any " "bleeding problems?\"      unsure  8. BLOOD THINNERS: \"Do you take any blood thinners?\" (e.g., coumadin, heparin, aspirin, Plavix)      aspirin  9. OTHER SYMPTOMS: \"Do you have any other symptoms?\" (e.g., lightheadedness)      denies  10. PREGNANCY: \"Is there any chance you are pregnant?\" \"When was your last menstrual period?\"        N/a    Protocols used: Nosebleed-ADULT-[place]    "

## 2024-09-21 ENCOUNTER — HOME CARE VISIT (OUTPATIENT)
Dept: HOME HEALTH SERVICES | Facility: HOME HEALTHCARE | Age: 79
End: 2024-09-21
Payer: COMMERCIAL

## 2024-09-23 ENCOUNTER — HOME CARE VISIT (OUTPATIENT)
Dept: HOME HEALTH SERVICES | Facility: HOME HEALTHCARE | Age: 79
End: 2024-09-23
Payer: COMMERCIAL

## 2024-09-23 ENCOUNTER — PATIENT OUTREACH (OUTPATIENT)
Dept: CASE MANAGEMENT | Facility: OTHER | Age: 79
End: 2024-09-23

## 2024-09-23 DIAGNOSIS — K21.9 GASTROESOPHAGEAL REFLUX DISEASE: ICD-10-CM

## 2024-09-23 NOTE — CASE COMMUNICATION
Agency requires notification of missed visits.  1 of 2 PT visits made this week. Pt unable to tolerate mutliple disciplines on same day. Pt agreeable to resume normal visit freq next week

## 2024-09-23 NOTE — PROGRESS NOTES
Received vm message from patient.  Returned call.  Patient states she starting taking buspar for anxiety while inpatient.  She doesn't feel it's working and asked how long does the medication take to achieve the desired affect.  Instructed her it takes 3-4 weeks.  Informed her it's been 4 weeks since her discharge from rehab.  She states her anxiety is so high and she also is experiencing claustrophobia which she did not have before.  Instructed her to contact her PCP to set up an appointment to discuss her issues, she was agreeable.

## 2024-09-24 ENCOUNTER — HOME CARE VISIT (OUTPATIENT)
Dept: HOME HEALTH SERVICES | Facility: HOME HEALTHCARE | Age: 79
End: 2024-09-24
Payer: COMMERCIAL

## 2024-09-24 VITALS
OXYGEN SATURATION: 93 % | RESPIRATION RATE: 18 BRPM | DIASTOLIC BLOOD PRESSURE: 70 MMHG | SYSTOLIC BLOOD PRESSURE: 122 MMHG | TEMPERATURE: 97.7 F | HEART RATE: 97 BPM

## 2024-09-24 PROCEDURE — G0300 HHS/HOSPICE OF LPN EA 15 MIN: HCPCS

## 2024-09-24 RX ORDER — PANTOPRAZOLE SODIUM 40 MG/1
40 TABLET, DELAYED RELEASE ORAL DAILY
Qty: 90 TABLET | Refills: 1 | Status: SHIPPED | OUTPATIENT
Start: 2024-09-24

## 2024-09-25 ENCOUNTER — TELEPHONE (OUTPATIENT)
Age: 79
End: 2024-09-25

## 2024-09-25 ENCOUNTER — HOME CARE VISIT (OUTPATIENT)
Dept: HOME HEALTH SERVICES | Facility: HOME HEALTHCARE | Age: 79
End: 2024-09-25
Payer: COMMERCIAL

## 2024-09-25 DIAGNOSIS — N39.0 ACUTE UTI: Primary | ICD-10-CM

## 2024-09-25 PROCEDURE — G0151 HHCP-SERV OF PT,EA 15 MIN: HCPCS

## 2024-09-25 RX ORDER — CIPROFLOXACIN 250 MG/1
250 TABLET, FILM COATED ORAL EVERY 12 HOURS SCHEDULED
Qty: 10 TABLET | Refills: 0 | Status: SHIPPED | OUTPATIENT
Start: 2024-09-25 | End: 2024-09-30

## 2024-09-25 RX ORDER — FLUCONAZOLE 150 MG/1
150 TABLET ORAL ONCE
Qty: 1 TABLET | Refills: 0 | Status: SHIPPED | OUTPATIENT
Start: 2024-09-25 | End: 2024-09-25

## 2024-09-25 NOTE — TELEPHONE ENCOUNTER
Patient calling for uti symptoms. Offered patient an appt but she states she cannot come in. Asking patient if she could leave a urine sample at the lab for us and she declined. She would just like an antibiotic sent to the pharmacy for her.     She states she has frequency, cloudy urine and burning.

## 2024-09-26 ENCOUNTER — TELEPHONE (OUTPATIENT)
Age: 79
End: 2024-09-26

## 2024-09-26 VITALS — OXYGEN SATURATION: 90 % | HEART RATE: 98 BPM | SYSTOLIC BLOOD PRESSURE: 116 MMHG | DIASTOLIC BLOOD PRESSURE: 54 MMHG

## 2024-09-26 DIAGNOSIS — F41.9 ANXIETY: Primary | ICD-10-CM

## 2024-09-26 RX ORDER — BUSPIRONE HYDROCHLORIDE 7.5 MG/1
7.5 TABLET ORAL 3 TIMES DAILY
Qty: 90 TABLET | Refills: 3 | Status: SHIPPED | OUTPATIENT
Start: 2024-09-26

## 2024-09-26 NOTE — TELEPHONE ENCOUNTER
Pt called to see if Dr Amado had an appt sooner than her current appt 11/14 preferably on a Monday.  I told pt it appears her appt was cancelled with on 11/14 and changed to Tia on 11/7.  She was upset since she only wanted to see Dr Amado and didn't cancel her appt with him.  I apologized and told her it was a misunderstanding, that the person she previously spoke with was probably just trying to get her a sooner appt.  I called [facility] with the patient to help her get an appt with Dr Amado sooner since they except her ins. Pt was able to get an appt with him for Oct 7th at 12 in the [place] office, [phone].  I cancelled her appt with Tia

## 2024-09-26 NOTE — TELEPHONE ENCOUNTER
Patient called in and states that she is having severe anxiety and feels like her skin is crawling and feels like she is gonna jump out of her skin and she is miserable and doesn't know what to do. Please advise

## 2024-09-27 ENCOUNTER — HOME CARE VISIT (OUTPATIENT)
Dept: HOME HEALTH SERVICES | Facility: HOME HEALTHCARE | Age: 79
End: 2024-09-27
Payer: COMMERCIAL

## 2024-09-27 PROCEDURE — G0299 HHS/HOSPICE OF RN EA 15 MIN: HCPCS

## 2024-09-27 PROCEDURE — G0151 HHCP-SERV OF PT,EA 15 MIN: HCPCS

## 2024-09-27 PROCEDURE — G0156 HHCP-SVS OF AIDE,EA 15 MIN: HCPCS

## 2024-09-27 NOTE — TELEPHONE ENCOUNTER
Pt returning call.  She didn't understand the message and had questions.    Warm transferred to Janey

## 2024-09-29 VITALS
SYSTOLIC BLOOD PRESSURE: 118 MMHG | DIASTOLIC BLOOD PRESSURE: 62 MMHG | HEART RATE: 90 BPM | TEMPERATURE: 98.9 F | OXYGEN SATURATION: 97 %

## 2024-09-29 VITALS — DIASTOLIC BLOOD PRESSURE: 70 MMHG | OXYGEN SATURATION: 98 % | HEART RATE: 68 BPM | SYSTOLIC BLOOD PRESSURE: 128 MMHG

## 2024-09-30 ENCOUNTER — HOME CARE VISIT (OUTPATIENT)
Dept: HOME HEALTH SERVICES | Facility: HOME HEALTHCARE | Age: 79
End: 2024-09-30
Payer: COMMERCIAL

## 2024-09-30 DIAGNOSIS — F41.9 ANXIETY: Primary | ICD-10-CM

## 2024-09-30 PROCEDURE — G0299 HHS/HOSPICE OF RN EA 15 MIN: HCPCS

## 2024-09-30 NOTE — TELEPHONE ENCOUNTER
Patient called again regarding her anxiety.  She states that her anxiety is not being controlled. She did increase her Buspar to 7.5 mg 3 times per day but it is not helping.  She states that she does not feel like herself, is only getting 1-2 hrs of sleep per night and feels like she is going to jump out of her skin.  She is asking if there is anything else that Dr. Nix would recommend.      Offered office appointment but she states that she is not able to come into the office.    Please follow up with patient to advise.

## 2024-10-01 ENCOUNTER — HOME CARE VISIT (OUTPATIENT)
Dept: HOME HEALTH SERVICES | Facility: HOME HEALTHCARE | Age: 79
End: 2024-10-01
Payer: COMMERCIAL

## 2024-10-01 ENCOUNTER — TELEPHONE (OUTPATIENT)
Age: 79
End: 2024-10-01

## 2024-10-01 ENCOUNTER — HOSPITAL ENCOUNTER (OUTPATIENT)
Dept: ULTRASOUND IMAGING | Facility: HOSPITAL | Age: 79
Discharge: HOME | End: 2024-10-01
Payer: COMMERCIAL

## 2024-10-01 VITALS
TEMPERATURE: 99 F | DIASTOLIC BLOOD PRESSURE: 60 MMHG | SYSTOLIC BLOOD PRESSURE: 118 MMHG | HEART RATE: 88 BPM | OXYGEN SATURATION: 96 %

## 2024-10-01 VITALS — HEART RATE: 87 BPM | SYSTOLIC BLOOD PRESSURE: 122 MMHG | OXYGEN SATURATION: 98 % | DIASTOLIC BLOOD PRESSURE: 78 MMHG

## 2024-10-01 DIAGNOSIS — R35.0 URINARY FREQUENCY: ICD-10-CM

## 2024-10-01 DIAGNOSIS — F41.9 ANXIETY: ICD-10-CM

## 2024-10-01 DIAGNOSIS — R35.0 URINARY FREQUENCY: Primary | ICD-10-CM

## 2024-10-01 PROCEDURE — G0152 HHCP-SERV OF OT,EA 15 MIN: HCPCS

## 2024-10-01 PROCEDURE — 76770 US EXAM ABDO BACK WALL COMP: CPT

## 2024-10-01 PROCEDURE — G0156 HHCP-SVS OF AIDE,EA 15 MIN: HCPCS

## 2024-10-01 RX ORDER — NITROFURANTOIN 25; 75 MG/1; MG/1
100 CAPSULE ORAL
Qty: 30 CAPSULE | Refills: 2 | Status: ON HOLD | OUTPATIENT
Start: 2024-10-01 | End: 2024-12-30

## 2024-10-01 RX ORDER — PROPRANOLOL HCL 10 MG
10 TABLET ORAL 2 TIMES DAILY
Qty: 60 TABLET | Refills: 5 | Status: ON HOLD | OUTPATIENT
Start: 2024-10-01

## 2024-10-01 NOTE — TELEPHONE ENCOUNTER
Patient took her last Cipro this morning but still having urinary frequency, no pain though.  She wants to know if there is anything else she can do?  She

## 2024-10-01 NOTE — TELEPHONE ENCOUNTER
She is over whelmed with paper work and people coming to her house, also she has no appetite. She states she loves you and please help her.

## 2024-10-01 NOTE — TELEPHONE ENCOUNTER
Patient aware of new orders and will call CS to schedule her US. VN will come do her labs and UA 10/4/24.

## 2024-10-01 NOTE — TELEPHONE ENCOUNTER
Patient returned phone call. I was about to give her an update and the call disconnected. I called her back 2 times and was unable to get her, no voicemail.

## 2024-10-02 ENCOUNTER — TELEPHONE (OUTPATIENT)
Dept: FAMILY MEDICINE CLINIC | Facility: CLINIC | Age: 79
End: 2024-10-02

## 2024-10-02 ENCOUNTER — TELEPHONE (OUTPATIENT)
Age: 79
End: 2024-10-02

## 2024-10-02 ENCOUNTER — HOME CARE VISIT (OUTPATIENT)
Dept: HOME HEALTH SERVICES | Facility: HOME HEALTHCARE | Age: 79
End: 2024-10-02
Payer: COMMERCIAL

## 2024-10-02 DIAGNOSIS — R35.0 URINARY FREQUENCY: Primary | ICD-10-CM

## 2024-10-02 NOTE — TELEPHONE ENCOUNTER
----- Message from Nicolas Nix MD sent at 10/2/2024  7:44 AM EDT -----  Urology consult  ----- Message -----  From: Interface, Radiology Results In  Sent: 10/1/2024   8:41 PM EDT  To: Nicolas Nix MD

## 2024-10-02 NOTE — TELEPHONE ENCOUNTER
"Patient called after being referred by PCP for urinary frequency. Pt is scheduled in [place] with FRANK Arelis on 10/23 at 3 PM.    Pt states that she is on oxygen and cannot lay flat. Pt also states that she is \"being seen by many specialists\", and is \"VERY anxious\".  Patient is questioning if visit can be virtual?     Patient does not have ID. But will bring Insurance cards to the visit.    Call back [phone]  "

## 2024-10-02 NOTE — TELEPHONE ENCOUNTER
Pt called. Pt was requesting the results of her US performed yesterday. Pt stated that she had a really bad night last night and has been up since 1 AM.    Pt was advised that Dr. Nix has not yet interpreted the results and as soon as he does a nurse would be reaching out.

## 2024-10-02 NOTE — TELEPHONE ENCOUNTER
Called and spoke with pt in regards to us results and referral to urology. Pt states she is still having anxiety and doesn't know how she is going to make all these specialty appointments. Referral information to dr hugo was given pt will call to schedule.

## 2024-10-02 NOTE — TELEPHONE ENCOUNTER
Contacted patient in regards to ASAP/STAT Referal in attempts to verify patient's needs of services and schedule soonest available appointment. Pt stated that she was receiving a call from her doctor and disconnected the call.     Attempt #1

## 2024-10-03 ENCOUNTER — HOME CARE VISIT (OUTPATIENT)
Dept: HOME HEALTH SERVICES | Facility: HOME HEALTHCARE | Age: 79
End: 2024-10-03
Payer: COMMERCIAL

## 2024-10-03 NOTE — TELEPHONE ENCOUNTER
Spoke to pt and advised that she come to office for this appointment.  She expressed concern for being laid flat and being unable to breathe.  She was reassured that her concerns will be addressed at her appointment and she would not be made to do anything she did not feel comfortable doing.  Asked if she could go for urine testing to rule out infection and she stated her PCP put her on an antibiotic.  Pt will come to appt as scheduled.

## 2024-10-04 ENCOUNTER — TELEPHONE (OUTPATIENT)
Age: 79
End: 2024-10-04

## 2024-10-04 ENCOUNTER — HOME CARE VISIT (OUTPATIENT)
Dept: HOME HEALTH SERVICES | Facility: HOME HEALTHCARE | Age: 79
End: 2024-10-04
Payer: COMMERCIAL

## 2024-10-04 ENCOUNTER — APPOINTMENT (OUTPATIENT)
Dept: LAB | Facility: HOSPITAL | Age: 79
End: 2024-10-04
Payer: COMMERCIAL

## 2024-10-04 DIAGNOSIS — E03.9 HYPOTHYROIDISM, ADULT: ICD-10-CM

## 2024-10-04 DIAGNOSIS — E87.5: ICD-10-CM

## 2024-10-04 DIAGNOSIS — J44.9 COPD, SEVERE (HCC): ICD-10-CM

## 2024-10-04 DIAGNOSIS — E03.9 PRIMARY HYPOTHYROIDISM: ICD-10-CM

## 2024-10-04 DIAGNOSIS — I15.1: ICD-10-CM

## 2024-10-04 LAB
ALBUMIN SERPL BCG-MCNC: 4.2 G/DL (ref 3.5–5)
ALP SERPL-CCNC: 44 U/L (ref 34–104)
ALT SERPL W P-5'-P-CCNC: 16 U/L (ref 7–52)
ANION GAP SERPL CALCULATED.3IONS-SCNC: 5 MMOL/L (ref 4–13)
AST SERPL W P-5'-P-CCNC: 24 U/L (ref 13–39)
BACTERIA UR QL AUTO: ABNORMAL /HPF
BILIRUB SERPL-MCNC: 0.44 MG/DL (ref 0.2–1)
BILIRUB UR QL STRIP: NEGATIVE
BUN SERPL-MCNC: 16 MG/DL (ref 5–25)
CALCIUM SERPL-MCNC: 9.3 MG/DL (ref 8.4–10.2)
CAOX CRY URNS QL MICRO: ABNORMAL /HPF
CHLORIDE SERPL-SCNC: 86 MMOL/L (ref 96–108)
CHOLEST SERPL-MCNC: 112 MG/DL
CLARITY UR: CLEAR
CO2 SERPL-SCNC: 39 MMOL/L (ref 21–32)
COLOR UR: ABNORMAL
CREAT SERPL-MCNC: 0.43 MG/DL (ref 0.6–1.3)
CRP SERPL QL: 2.4 MG/L
DME PARACHUTE DELIVERY DATE ACTUAL: NORMAL
DME PARACHUTE DELIVERY DATE REQUESTED: NORMAL
DME PARACHUTE ITEM DESCRIPTION: NORMAL
DME PARACHUTE ITEM DESCRIPTION: NORMAL
DME PARACHUTE ORDER STATUS: NORMAL
DME PARACHUTE SUPPLIER NAME: NORMAL
DME PARACHUTE SUPPLIER PHONE: NORMAL
ERYTHROCYTE [SEDIMENTATION RATE] IN BLOOD: 20 MM/HOUR (ref 0–29)
GFR SERPL CREATININE-BSD FRML MDRD: 97 ML/MIN/1.73SQ M
GLUCOSE P FAST SERPL-MCNC: 99 MG/DL (ref 65–99)
GLUCOSE UR STRIP-MCNC: NEGATIVE MG/DL
HDLC SERPL-MCNC: 60 MG/DL
HGB UR QL STRIP.AUTO: NEGATIVE
KETONES UR STRIP-MCNC: NEGATIVE MG/DL
LDLC SERPL CALC-MCNC: 41 MG/DL (ref 0–100)
LEUKOCYTE ESTERASE UR QL STRIP: ABNORMAL
MAGNESIUM SERPL-MCNC: 1.9 MG/DL (ref 1.9–2.7)
MUCOUS THREADS UR QL AUTO: ABNORMAL
NITRITE UR QL STRIP: NEGATIVE
NON-SQ EPI CELLS URNS QL MICRO: ABNORMAL /HPF
NONHDLC SERPL-MCNC: 52 MG/DL
PH UR STRIP.AUTO: 6.5 [PH]
POTASSIUM SERPL-SCNC: 4.4 MMOL/L (ref 3.5–5.3)
PROT SERPL-MCNC: 6.6 G/DL (ref 6.4–8.4)
PROT UR STRIP-MCNC: ABNORMAL MG/DL
RBC #/AREA URNS AUTO: ABNORMAL /HPF
SODIUM SERPL-SCNC: 130 MMOL/L (ref 135–147)
SP GR UR STRIP.AUTO: 1.02 (ref 1–1.03)
T4 FREE SERPL-MCNC: 1.26 NG/DL (ref 0.61–1.12)
TRANS CELLS #/AREA URNS HPF: PRESENT /[HPF]
TRIGL SERPL-MCNC: 54 MG/DL
TSH SERPL DL<=0.05 MIU/L-ACNC: 0.83 UIU/ML (ref 0.45–4.5)
UROBILINOGEN UR STRIP-ACNC: <2 MG/DL
WBC #/AREA URNS AUTO: ABNORMAL /HPF

## 2024-10-04 PROCEDURE — 86140 C-REACTIVE PROTEIN: CPT

## 2024-10-04 PROCEDURE — 83735 ASSAY OF MAGNESIUM: CPT

## 2024-10-04 PROCEDURE — G0156 HHCP-SVS OF AIDE,EA 15 MIN: HCPCS

## 2024-10-04 PROCEDURE — 80053 COMPREHEN METABOLIC PANEL: CPT

## 2024-10-04 PROCEDURE — 84443 ASSAY THYROID STIM HORMONE: CPT

## 2024-10-04 PROCEDURE — 81001 URINALYSIS AUTO W/SCOPE: CPT

## 2024-10-04 PROCEDURE — 80061 LIPID PANEL: CPT

## 2024-10-04 PROCEDURE — 85652 RBC SED RATE AUTOMATED: CPT

## 2024-10-04 PROCEDURE — 36415 COLL VENOUS BLD VENIPUNCTURE: CPT

## 2024-10-04 PROCEDURE — 84439 ASSAY OF FREE THYROXINE: CPT

## 2024-10-04 NOTE — TELEPHONE ENCOUNTER
Patient called and stated she had received a call from the office. Patient advised a detailed message had been eft discussing the above message. Patient stated she will look for the detailed voicemail. Patient stated she will return call if not found. I unfortunately was unable to connect with office clinical at this time.

## 2024-10-04 NOTE — TELEPHONE ENCOUNTER
Left pt voicemail and is aware we will send script on Monday when  signs off. Also aware about medication.

## 2024-10-04 NOTE — TELEPHONE ENCOUNTER
Pt has been experiencing anxiety lately and is worried that it could be due to taking the Lexothyroxine. She is also unsure exactly of when she should be taking the medication. Warm transferred to nurse to try and assist further

## 2024-10-04 NOTE — TELEPHONE ENCOUNTER
Patient contacted the office with a couple of questions. States that she had a script for compression socks that she got from the office a week or so ago but has misplaced the order and was looking to see if this could be mailed to her. I could not locate in chart. Also inquiring about the time of day she should be taking her levothyroxine 50 mcg tablet and when she is taking her other medications. She read that it should be taken 3 hours before medication or food and she has only been taking it about 30 minutes prior. Please contact patient back with an update, thank you.

## 2024-10-04 NOTE — CASE COMMUNICATION
Agency requires notification of missed visits. Pt declining visit due to family visiting. Pt agreeable to reschedule for next week.

## 2024-10-04 NOTE — TELEPHONE ENCOUNTER
"Received warm xfer    Patient with c.o of lack of sleep, anxiety and questions about her levothyroxine.     Patient states she hasn't been sleeping well and has been feeling anxious was started on oxygen about a month ago and feels that aids in her anxiety she does have Buspar listed and avail to help treat anxiety   Has not used any OTC sleep aid medications    Started she has been having feelings  of depression on and off  \"for awhile now\"     Advised she may take her levothyroxine with the medication on her med list, states she still feels anxious about it.    I advised she may take it in the morning and then wait to take the rest of meds is that makes her more comfortable.    She stated she would like a call from Dr. Nix to address her concerns   Advised it is late on a  Friday and she may not hear back from him till Monday - agreeable.   "

## 2024-10-07 ENCOUNTER — TELEPHONE (OUTPATIENT)
Dept: FAMILY MEDICINE CLINIC | Facility: CLINIC | Age: 79
End: 2024-10-07

## 2024-10-07 ENCOUNTER — PATIENT OUTREACH (OUTPATIENT)
Dept: CASE MANAGEMENT | Facility: OTHER | Age: 79
End: 2024-10-07

## 2024-10-07 NOTE — PROGRESS NOTES
Received vm message from patient .  Returned call.  She states she's not doing well as she has lots of things going on.  She is on oxygen at 2 L and is having nose bleeds.  She requested a humidifier.  Instructed her to contact the oxygen supply company and they should be able to provide humidifier.  She also relays she's having bad anxiety and was referred to psychiatry but does not feel it's related to mental health and more to her medical conditions.  Instructed her to schedule f/u with PCP to discuss her concerns.  She states her son can only transport her to appointments on a Monday and today she is seeing ENT.  She then stated she needed to cut the call short to get dressed.  Arranged to contact her back another time.

## 2024-10-07 NOTE — TELEPHONE ENCOUNTER
Spoke with the patient.  She will increase PO salt.  Informed her of urine results.  She stated that she currently has a UTI and is on Macrobid.

## 2024-10-07 NOTE — TELEPHONE ENCOUNTER
----- Message from Nicolas Nix MD sent at 10/5/2024 11:08 AM EDT -----  Increase po Salt, and repeat Clean catch UA  ----- Message -----  From: Lab, Background User  Sent: 10/4/2024   2:41 PM EDT  To: Nicolas Nix MD

## 2024-10-07 NOTE — TELEPHONE ENCOUNTER
Contacted patient in regards to ASAP/STAT Referral in attempts to verify patient's needs of services and schedule soonest available appointment. Writer left vm to call intake at [phone]    Please transfer to intake to offer soonest appt.     Attempt #2

## 2024-10-08 ENCOUNTER — HOME CARE VISIT (OUTPATIENT)
Dept: HOME HEALTH SERVICES | Facility: HOME HEALTHCARE | Age: 79
End: 2024-10-08
Payer: COMMERCIAL

## 2024-10-08 PROCEDURE — G0155 HHCP-SVS OF CSW,EA 15 MIN: HCPCS

## 2024-10-08 NOTE — TELEPHONE ENCOUNTER
Contacted patient in regards to ASAP/STAT Referral in attempts to verify patient's needs of services and schedule soonest available appointment.  Pt wants to call us back - wants to think about it. Pt made aware that referral would be closed at this time and pt verbalized agreement.     Attempt #3  Referral closed

## 2024-10-09 ENCOUNTER — APPOINTMENT (INPATIENT)
Dept: RADIOLOGY | Facility: HOSPITAL | Age: 79
DRG: 070 | End: 2024-10-09
Payer: COMMERCIAL

## 2024-10-09 ENCOUNTER — TELEPHONE (OUTPATIENT)
Age: 79
End: 2024-10-09

## 2024-10-09 ENCOUNTER — HOSPITAL ENCOUNTER (INPATIENT)
Facility: HOSPITAL | Age: 79
LOS: 1 days | DRG: 070 | End: 2024-10-10
Admitting: INTERNAL MEDICINE
Payer: COMMERCIAL

## 2024-10-09 ENCOUNTER — PATIENT OUTREACH (OUTPATIENT)
Dept: CASE MANAGEMENT | Facility: OTHER | Age: 79
End: 2024-10-09

## 2024-10-09 ENCOUNTER — APPOINTMENT (EMERGENCY)
Dept: CT IMAGING | Facility: HOSPITAL | Age: 79
DRG: 070 | End: 2024-10-09
Payer: COMMERCIAL

## 2024-10-09 DIAGNOSIS — R26.9 NEUROLOGIC GAIT DYSFUNCTION: Primary | ICD-10-CM

## 2024-10-09 LAB
ALBUMIN SERPL BCG-MCNC: 4.4 G/DL (ref 3.5–5)
ALP SERPL-CCNC: 44 U/L (ref 34–104)
ALT SERPL W P-5'-P-CCNC: 19 U/L (ref 7–52)
AMMONIA PLAS-SCNC: 11 UMOL/L (ref 18–72)
ANION GAP SERPL CALCULATED.3IONS-SCNC: 5 MMOL/L (ref 4–13)
AST SERPL W P-5'-P-CCNC: 24 U/L (ref 13–39)
ATRIAL RATE: 103 BPM
BACTERIA UR QL AUTO: ABNORMAL /HPF
BASOPHILS # BLD AUTO: 0.04 THOUSANDS/ΜL (ref 0–0.1)
BASOPHILS NFR BLD AUTO: 1 % (ref 0–1)
BILIRUB SERPL-MCNC: 0.46 MG/DL (ref 0.2–1)
BILIRUB UR QL STRIP: NEGATIVE
BUN SERPL-MCNC: 19 MG/DL (ref 5–25)
CALCIUM SERPL-MCNC: 9.6 MG/DL (ref 8.4–10.2)
CHLORIDE SERPL-SCNC: 88 MMOL/L (ref 96–108)
CLARITY UR: CLEAR
CO2 SERPL-SCNC: 39 MMOL/L (ref 21–32)
COLOR UR: YELLOW
CREAT SERPL-MCNC: 0.49 MG/DL (ref 0.6–1.3)
EOSINOPHIL # BLD AUTO: 0.34 THOUSAND/ΜL (ref 0–0.61)
EOSINOPHIL NFR BLD AUTO: 5 % (ref 0–6)
ERYTHROCYTE [DISTWIDTH] IN BLOOD BY AUTOMATED COUNT: 13.1 % (ref 11.6–15.1)
GFR SERPL CREATININE-BSD FRML MDRD: 92 ML/MIN/1.73SQ M
GLUCOSE SERPL-MCNC: 103 MG/DL (ref 65–140)
GLUCOSE SERPL-MCNC: 114 MG/DL (ref 65–140)
GLUCOSE UR STRIP-MCNC: NEGATIVE MG/DL
HCT VFR BLD AUTO: 41.1 % (ref 34.8–46.1)
HGB BLD-MCNC: 12.9 G/DL (ref 11.5–15.4)
HGB UR QL STRIP.AUTO: 25
HYALINE CASTS #/AREA URNS LPF: ABNORMAL /LPF
IMM GRANULOCYTES # BLD AUTO: 0.01 THOUSAND/UL (ref 0–0.2)
IMM GRANULOCYTES NFR BLD AUTO: 0 % (ref 0–2)
KETONES UR STRIP-MCNC: ABNORMAL MG/DL
LEUKOCYTE ESTERASE UR QL STRIP: 25
LYMPHOCYTES # BLD AUTO: 0.51 THOUSANDS/ΜL (ref 0.6–4.47)
LYMPHOCYTES NFR BLD AUTO: 8 % (ref 14–44)
MCH RBC QN AUTO: 29 PG (ref 26.8–34.3)
MCHC RBC AUTO-ENTMCNC: 31.4 G/DL (ref 31.4–37.4)
MCV RBC AUTO: 92 FL (ref 82–98)
MONOCYTES # BLD AUTO: 0.78 THOUSAND/ΜL (ref 0.17–1.22)
MONOCYTES NFR BLD AUTO: 12 % (ref 4–12)
MUCOUS THREADS UR QL AUTO: ABNORMAL
NEUTROPHILS # BLD AUTO: 4.73 THOUSANDS/ΜL (ref 1.85–7.62)
NEUTS SEG NFR BLD AUTO: 74 % (ref 43–75)
NITRITE UR QL STRIP: NEGATIVE
NON-SQ EPI CELLS URNS QL MICRO: ABNORMAL /HPF
NRBC BLD AUTO-RTO: 0 /100 WBCS
P AXIS: 56 DEGREES
PH UR STRIP.AUTO: 6 [PH]
PLATELET # BLD AUTO: 192 THOUSANDS/UL (ref 149–390)
PMV BLD AUTO: 9.3 FL (ref 8.9–12.7)
POTASSIUM SERPL-SCNC: 4.6 MMOL/L (ref 3.5–5.3)
PR INTERVAL: 126 MS
PROT SERPL-MCNC: 6.9 G/DL (ref 6.4–8.4)
PROT UR STRIP-MCNC: ABNORMAL MG/DL
QRS AXIS: 36 DEGREES
QRSD INTERVAL: 72 MS
QT INTERVAL: 346 MS
QTC INTERVAL: 453 MS
RBC # BLD AUTO: 4.45 MILLION/UL (ref 3.81–5.12)
RBC #/AREA URNS AUTO: ABNORMAL /HPF
SODIUM SERPL-SCNC: 132 MMOL/L (ref 135–147)
SP GR UR STRIP.AUTO: 1.02 (ref 1–1.04)
T WAVE AXIS: 62 DEGREES
TSH SERPL DL<=0.05 MIU/L-ACNC: 0.85 UIU/ML (ref 0.45–4.5)
UROBILINOGEN UA: NEGATIVE MG/DL
VENTRICULAR RATE: 103 BPM
WBC # BLD AUTO: 6.41 THOUSAND/UL (ref 4.31–10.16)
WBC #/AREA URNS AUTO: ABNORMAL /HPF

## 2024-10-09 PROCEDURE — 36415 COLL VENOUS BLD VENIPUNCTURE: CPT

## 2024-10-09 PROCEDURE — 99285 EMERGENCY DEPT VISIT HI MDM: CPT

## 2024-10-09 PROCEDURE — 73030 X-RAY EXAM OF SHOULDER: CPT

## 2024-10-09 PROCEDURE — 82948 REAGENT STRIP/BLOOD GLUCOSE: CPT

## 2024-10-09 PROCEDURE — 96361 HYDRATE IV INFUSION ADD-ON: CPT

## 2024-10-09 PROCEDURE — 96375 TX/PRO/DX INJ NEW DRUG ADDON: CPT

## 2024-10-09 PROCEDURE — 81003 URINALYSIS AUTO W/O SCOPE: CPT

## 2024-10-09 PROCEDURE — 71045 X-RAY EXAM CHEST 1 VIEW: CPT

## 2024-10-09 PROCEDURE — 82140 ASSAY OF AMMONIA: CPT

## 2024-10-09 PROCEDURE — 94640 AIRWAY INHALATION TREATMENT: CPT

## 2024-10-09 PROCEDURE — 84443 ASSAY THYROID STIM HORMONE: CPT

## 2024-10-09 PROCEDURE — 70496 CT ANGIOGRAPHY HEAD: CPT

## 2024-10-09 PROCEDURE — 93010 ELECTROCARDIOGRAM REPORT: CPT | Performed by: INTERNAL MEDICINE

## 2024-10-09 PROCEDURE — 70498 CT ANGIOGRAPHY NECK: CPT

## 2024-10-09 PROCEDURE — 80053 COMPREHEN METABOLIC PANEL: CPT

## 2024-10-09 PROCEDURE — 81001 URINALYSIS AUTO W/SCOPE: CPT

## 2024-10-09 PROCEDURE — 96374 THER/PROPH/DIAG INJ IV PUSH: CPT

## 2024-10-09 PROCEDURE — 99223 1ST HOSP IP/OBS HIGH 75: CPT | Performed by: INTERNAL MEDICINE

## 2024-10-09 PROCEDURE — 93005 ELECTROCARDIOGRAM TRACING: CPT

## 2024-10-09 PROCEDURE — 85025 COMPLETE CBC W/AUTO DIFF WBC: CPT

## 2024-10-09 RX ORDER — KETOROLAC TROMETHAMINE 30 MG/ML
15 INJECTION, SOLUTION INTRAMUSCULAR; INTRAVENOUS ONCE
Status: COMPLETED | OUTPATIENT
Start: 2024-10-09 | End: 2024-10-09

## 2024-10-09 RX ORDER — LOSARTAN POTASSIUM 50 MG/1
50 TABLET ORAL DAILY
Status: DISCONTINUED | OUTPATIENT
Start: 2024-10-09 | End: 2024-10-10 | Stop reason: HOSPADM

## 2024-10-09 RX ORDER — GABAPENTIN 100 MG/1
100 CAPSULE ORAL 3 TIMES DAILY
Status: DISCONTINUED | OUTPATIENT
Start: 2024-10-09 | End: 2024-10-10 | Stop reason: HOSPADM

## 2024-10-09 RX ORDER — BUDESONIDE 0.5 MG/2ML
0.5 INHALANT ORAL 2 TIMES DAILY
Status: DISCONTINUED | OUTPATIENT
Start: 2024-10-09 | End: 2024-10-10 | Stop reason: HOSPADM

## 2024-10-09 RX ORDER — ENOXAPARIN SODIUM 100 MG/ML
30 INJECTION SUBCUTANEOUS DAILY
Status: DISCONTINUED | OUTPATIENT
Start: 2024-10-09 | End: 2024-10-10 | Stop reason: HOSPADM

## 2024-10-09 RX ORDER — SODIUM CHLORIDE 9 MG/ML
75 INJECTION, SOLUTION INTRAVENOUS CONTINUOUS
Status: DISCONTINUED | OUTPATIENT
Start: 2024-10-09 | End: 2024-10-10 | Stop reason: HOSPADM

## 2024-10-09 RX ORDER — LEVOTHYROXINE SODIUM 50 UG/1
50 TABLET ORAL
Status: DISCONTINUED | OUTPATIENT
Start: 2024-10-09 | End: 2024-10-10 | Stop reason: HOSPADM

## 2024-10-09 RX ORDER — ONDANSETRON 2 MG/ML
4 INJECTION INTRAMUSCULAR; INTRAVENOUS EVERY 6 HOURS PRN
Status: DISCONTINUED | OUTPATIENT
Start: 2024-10-09 | End: 2024-10-10 | Stop reason: HOSPADM

## 2024-10-09 RX ORDER — ACETAMINOPHEN 325 MG/1
650 TABLET ORAL EVERY 4 HOURS PRN
Status: DISCONTINUED | OUTPATIENT
Start: 2024-10-09 | End: 2024-10-10 | Stop reason: HOSPADM

## 2024-10-09 RX ORDER — ACETAMINOPHEN 325 MG/1
650 TABLET ORAL EVERY 6 HOURS PRN
Status: DISCONTINUED | OUTPATIENT
Start: 2024-10-09 | End: 2024-10-09

## 2024-10-09 RX ORDER — MIRTAZAPINE 15 MG/1
15 TABLET, FILM COATED ORAL
Status: DISCONTINUED | OUTPATIENT
Start: 2024-10-09 | End: 2024-10-10 | Stop reason: HOSPADM

## 2024-10-09 RX ORDER — PREDNISONE 2.5 MG/1
2.5 TABLET ORAL
Status: DISCONTINUED | OUTPATIENT
Start: 2024-10-10 | End: 2024-10-10 | Stop reason: HOSPADM

## 2024-10-09 RX ORDER — IPRATROPIUM BROMIDE AND ALBUTEROL SULFATE 2.5; .5 MG/3ML; MG/3ML
3 SOLUTION RESPIRATORY (INHALATION) EVERY 6 HOURS PRN
Status: DISCONTINUED | OUTPATIENT
Start: 2024-10-09 | End: 2024-10-10 | Stop reason: HOSPADM

## 2024-10-09 RX ORDER — ALBUTEROL SULFATE 90 UG/1
2 INHALANT RESPIRATORY (INHALATION) EVERY 6 HOURS PRN
Status: DISCONTINUED | OUTPATIENT
Start: 2024-10-09 | End: 2024-10-10 | Stop reason: HOSPADM

## 2024-10-09 RX ORDER — LORAZEPAM 2 MG/ML
0.5 INJECTION INTRAMUSCULAR ONCE
Status: COMPLETED | OUTPATIENT
Start: 2024-10-09 | End: 2024-10-09

## 2024-10-09 RX ORDER — LEVALBUTEROL INHALATION SOLUTION 1.25 MG/3ML
1.25 SOLUTION RESPIRATORY (INHALATION) 2 TIMES DAILY
Status: DISCONTINUED | OUTPATIENT
Start: 2024-10-09 | End: 2024-10-10 | Stop reason: HOSPADM

## 2024-10-09 RX ORDER — PANTOPRAZOLE SODIUM 40 MG/1
40 TABLET, DELAYED RELEASE ORAL
Status: DISCONTINUED | OUTPATIENT
Start: 2024-10-09 | End: 2024-10-10 | Stop reason: HOSPADM

## 2024-10-09 RX ORDER — ECHINACEA PURPUREA EXTRACT 125 MG
2 TABLET ORAL
Status: DISCONTINUED | OUTPATIENT
Start: 2024-10-09 | End: 2024-10-10 | Stop reason: HOSPADM

## 2024-10-09 RX ADMIN — LEVOTHYROXINE SODIUM 50 MCG: 50 TABLET ORAL at 13:38

## 2024-10-09 RX ADMIN — GABAPENTIN 100 MG: 100 CAPSULE ORAL at 16:49

## 2024-10-09 RX ADMIN — LEVALBUTEROL HYDROCHLORIDE 1.25 MG: 1.25 SOLUTION RESPIRATORY (INHALATION) at 19:16

## 2024-10-09 RX ADMIN — SODIUM CHLORIDE 75 ML/HR: 0.9 INJECTION, SOLUTION INTRAVENOUS at 13:43

## 2024-10-09 RX ADMIN — IPRATROPIUM BROMIDE 0.5 MG: 0.5 SOLUTION RESPIRATORY (INHALATION) at 19:16

## 2024-10-09 RX ADMIN — CYANOCOBALAMIN TAB 1000 MCG 1000 MCG: 1000 TAB at 13:38

## 2024-10-09 RX ADMIN — SODIUM CHLORIDE 1000 ML: 0.9 INJECTION, SOLUTION INTRAVENOUS at 08:56

## 2024-10-09 RX ADMIN — KETOROLAC TROMETHAMINE 15 MG: 30 INJECTION, SOLUTION INTRAMUSCULAR; INTRAVENOUS at 07:49

## 2024-10-09 RX ADMIN — LORAZEPAM 0.5 MG: 2 INJECTION INTRAMUSCULAR; INTRAVENOUS at 07:48

## 2024-10-09 RX ADMIN — MIRTAZAPINE 15 MG: 15 TABLET, FILM COATED ORAL at 21:55

## 2024-10-09 RX ADMIN — ENOXAPARIN SODIUM 30 MG: 30 INJECTION SUBCUTANEOUS at 13:42

## 2024-10-09 RX ADMIN — ACETAMINOPHEN 650 MG: 325 TABLET ORAL at 14:07

## 2024-10-09 RX ADMIN — GABAPENTIN 100 MG: 100 CAPSULE ORAL at 21:55

## 2024-10-09 RX ADMIN — LOSARTAN POTASSIUM 50 MG: 50 TABLET, FILM COATED ORAL at 13:38

## 2024-10-09 RX ADMIN — PANTOPRAZOLE SODIUM 40 MG: 40 TABLET, DELAYED RELEASE ORAL at 13:38

## 2024-10-09 RX ADMIN — ACETAMINOPHEN 650 MG: 325 TABLET ORAL at 21:55

## 2024-10-09 RX ADMIN — IOHEXOL 85 ML: 350 INJECTION, SOLUTION INTRAVENOUS at 08:46

## 2024-10-09 RX ADMIN — BUDESONIDE 0.5 MG: 0.5 INHALANT RESPIRATORY (INHALATION) at 19:22

## 2024-10-09 NOTE — PROGRESS NOTES
Received ADT alert patient was admitted to [facility] with neurologic gait dysfunction.  Will monitor via chart review during hospitalization and outreach patient upon discharge.

## 2024-10-09 NOTE — TELEPHONE ENCOUNTER
Mother hospitalize, concerned medications causing interactions.  Wanted Dr. Nix to look over Hospital notes

## 2024-10-09 NOTE — PLAN OF CARE
Problem: Potential for Falls  Goal: Patient will remain free of falls  Description: INTERVENTIONS:  - Educate patient/family on patient safety including physical limitations  - Instruct patient to call for assistance with activity   - Consult OT/PT to assist with strengthening/mobility   - Keep Call bell within reach  - Keep bed low and locked with side rails adjusted as appropriate  - Keep care items and personal belongings within reach  - Initiate and maintain comfort rounds  - Make Fall Risk Sign visible to staff  -  - Apply yellow socks and bracelet for high fall risk patients  - Consider moving patient to room near nurses station  Outcome: Progressing     Problem: Prexisting or High Potential for Compromised Skin Integrity  Goal: Skin integrity is maintained or improved  Description: INTERVENTIONS:  - Identify patients at risk for skin breakdown  - Assess and monitor skin integrity  - Assess and monitor nutrition and hydration status  - Monitor labs   - Assess for incontinence   - Turn and reposition patient  - Assist with mobility/ambulation  - Relieve pressure over bony prominences  - Avoid friction and shearing  - Provide appropriate hygiene as needed including keeping skin clean and dry  - Evaluate need for skin moisturizer/barrier cream  - Collaborate with interdisciplinary team   - Patient/family teaching  - Consider wound care consult   Outcome: Progressing     Problem: Nutrition/Hydration-ADULT  Goal: Nutrient/Hydration intake appropriate for improving, restoring or maintaining nutritional needs  Description: Monitor and assess patient's nutrition/hydration status for malnutrition. Collaborate with interdisciplinary team and initiate plan and interventions as ordered.  Monitor patient's weight and dietary intake as ordered or per policy. Utilize nutrition screening tool and intervene as necessary. Determine patient's food preferences and provide high-protein, high-caloric foods as appropriate.      INTERVENTIONS:  - Monitor oral intake, urinary output, labs, and treatment plans  - Assess nutrition and hydration status and recommend course of action  - Evaluate amount of meals eaten  - Assist patient with eating if necessary   - Allow adequate time for meals  - Recommend/ encourage appropriate diets, oral nutritional supplements, and vitamin/mineral supplements  - Order, calculate, and assess calorie counts as needed  - Recommend, monitor, and adjust tube feedings and TPN/PPN based on assessed needs  - Assess need for intravenous fluids  - Provide specific nutrition/hydration education as appropriate  - Include patient/family/caregiver in decisions related to nutrition  Outcome: Progressing     Problem: PAIN - ADULT  Goal: Verbalizes/displays adequate comfort level or baseline comfort level  Description: Interventions:  - Encourage patient to monitor pain and request assistance  - Assess pain using appropriate pain scale  - Administer analgesics based on type and severity of pain and evaluate response  - Implement non-pharmacological measures as appropriate and evaluate response  - Consider cultural and social influences on pain and pain management  - Notify physician/advanced practitioner if interventions unsuccessful or patient reports new pain  Outcome: Not Progressing     Problem: INFECTION - ADULT  Goal: Absence or prevention of progression during hospitalization  Description: INTERVENTIONS:  - Assess and monitor for signs and symptoms of infection  - Monitor lab/diagnostic results  - Monitor all insertion sites, i.e. indwelling lines, tubes, and drains  - Monitor endotracheal if appropriate and nasal secretions for changes in amount and color  - [place] appropriate cooling/warming therapies per order  - Administer medications as ordered  - Instruct and encourage patient and family to use good hand hygiene technique  - Identify and instruct in appropriate isolation precautions for identified  infection/condition  Outcome: Progressing  Goal: Absence of fever/infection during neutropenic period  Description: INTERVENTIONS:  - Monitor WBC    Outcome: Progressing     Problem: SAFETY ADULT  Goal: Patient will remain free of falls  Description: INTERVENTIONS:  - Educate patient/family on patient safety including physical limitations  - Instruct patient to call for assistance with activity   - Consult OT/PT to assist with strengthening/mobility   - Keep Call bell within reach  - Keep bed low and locked with side rails adjusted as appropriate  - Keep care items and personal belongings within reach  - Initiate and maintain comfort rounds  - Make Fall Risk Sign visible to staff  - - Apply yellow socks and bracelet for high fall risk patients  - Consider moving patient to room near nurses station  Outcome: Progressing  Goal: Maintain or return to baseline ADL function  Description: INTERVENTIONS:  -  Assess patient's ability to carry out ADLs; assess patient's baseline for ADL function and identify physical deficits which impact ability to perform ADLs (bathing, care of mouth/teeth, toileting, grooming, dressing, etc.)  - Assess/evaluate cause of self-care deficits   - Assess range of motion  - Assess patient's mobility; develop plan if impaired  - Assess patient's need for assistive devices and provide as appropriate  - Encourage maximum independence but intervene and supervise when necessary  - Involve family in performance of ADLs  - Assess for home care needs following discharge   - Consider OT consult to assist with ADL evaluation and planning for discharge  - Provide patient education as appropriate  Outcome: Not Progressing  Goal: Maintains/Returns to pre admission functional level  Description: INTERVENTIONS:  - Perform AM-PAC 6 Click Basic Mobility/ Daily Activity assessment daily.  - Set and communicate daily mobility goal to care team and patient/family/caregiver.   - Collaborate with rehabilitation  services on mobility goals if consulted  -- Out of bed for toileting  - Record patient progress and toleration of activity level   Outcome: Progressing

## 2024-10-09 NOTE — NURSING NOTE
Sarah Zayas's nephew:  Richard Called and  requesting a call back at  [phone]  for an update. DR Corona was page.

## 2024-10-09 NOTE — ED PROVIDER NOTES
Final diagnoses:   Neurologic gait dysfunction     ED Disposition       ED Disposition   Admit    Condition   Stable    Date/Time   Wed Oct 9, 2024 10:31 AM    Comment   Case was discussed with curtis and the patient's admission status was agreed to be Admission Status: inpatient status to the service of Dr. Corona .               Assessment & Plan         Medical Decision Making  Patient presents with multiple complaints including insomnia, depression, anxiety, medication side effect, overmedication, thyroid disease, UTI, delirium, encephalopathy, hyperammonemia, dementia, TIA, CVA, or intracranial etiology.  UA negative for UTI.  Labs showed mild hyponatremia and hypochloremia, but were otherwise unremarkable.  CTA of the head and neck is pending.  Patient signed out to Dr. Atkinson for final disposition.    Amount and/or Complexity of Data Reviewed  Labs: ordered. Decision-making details documented in ED Course.  Radiology: ordered.    Risk  Prescription drug management.  Decision regarding hospitalization.        ED Course as of 10/13/24 0624   Wed Oct 09, 2024   0715 Ammonia(!): 11   0808 Sodium(!): 132   0808 Chloride(!): 88   0808 Carbon Dioxide(!): 39   0808 TSH 3RD GENERATON: 0.848   0808 WBC: 6.41   0808 Hemoglobin: 12.9   0808 Hematocrit: 41.1   0808 Platelet Count: 192       Medications   sodium chloride 0.9 % bolus 1,000 mL (has no administration in time range)   LORazepam (ATIVAN) injection 0.5 mg (0.5 mg Intravenous Given 10/9/24 0748)   ketorolac (TORADOL) injection 15 mg (15 mg Intravenous Given 10/9/24 0749)       ED Risk Strat Scores           SBIRT 22yo+      Flowsheet Row Most Recent Value   Initial Alcohol Screen: US AUDIT-C     1. How often do you have a drink containing alcohol? 0 Filed at: 10/09/2024 0658   2. How many drinks containing alcohol do you have on a typical day you are drinking?  0 Filed at: 10/09/2024 0658   3b. FEMALE Any Age, or MALE 65+: How often do you have 4 or more drinks on  "one occassion? 0 Filed at: 10/09/2024 0658   Audit-C Score 0 Filed at: 10/09/2024 0658   MARIMAR: How many times in the past year have you...    Used an illegal drug or used a prescription medication for non-medical reasons? Never Filed at: 10/09/2024 0658            History of Present Illness       Chief Complaint   Patient presents with    Medical Problem     Pt has several issues to report.  States she has not slept all night (the original reason for her call to [facility]), states she has a UTI and is having trouble holding her urine,  Reprots L arm pain, and is reporting her L leg \"feels like a thousand pounds\". Pt does not remember when any symptoms started.       Past Medical History:   Diagnosis Date    Anxiety 2024    Asthma     COPD (chronic obstructive pulmonary disease) (HCC)     Disease of thyroid gland     GERD (gastroesophageal reflux disease)     Hypertension 10/11/2018    Hypothyroid     Normocytic anemia 2024    Osteoarthritis 2012    Temporal arteritis (HCC)     Type 2 diabetes mellitus with complication, without long-term current use of insulin ([facility]) 2020      Past Surgical History:   Procedure Laterality Date    EYE SURGERY Right 2019    cataract LVCFS    HYSTERECTOMY      NO PAST SURGERIES      ALSO NO RELEVANT PAST SURRGICAL HX AS PER NEXTGEN      Family History   Problem Relation Age of Onset    Breast cancer Mother     Emphysema Father       Social History     Tobacco Use    Smoking status: Former     Current packs/day: 0.00     Average packs/day: 1 pack/day for 54.0 years (54.0 ttl pk-yrs)     Types: Cigarettes     Start date:      Quit date: 2013     Years since quittin.7    Smokeless tobacco: Never    Tobacco comments:     TOBACCO USE, NO PASSIVE SMOKE EXPOSURE   Vaping Use    Vaping status: Never Used   Substance Use Topics    Alcohol use: Never    Drug use: No      E-Cigarette/Vaping    E-Cigarette Use Never User       E-Cigarette/Vaping Substances    " "Nicotine No     THC No     CBD No     Flavoring No     Other No     Unknown No       I have reviewed and agree with the history as documented.       The patient is a 79 y.o. female with a past medical history of COPD, temporal arteritis, diabetes mellitus, hypothyroidism, and hypertension, who presents for evaluation of multiple complaints.  She initially called the ambulance due to insomnia, explaining that she has been awake for nearly 24 hours.  Patient explains this have been an ongoing issues for several months and attributes it to her antidepressant and nocturia.  She also endorses a persistent UTI and states that urine was \"gushing out\" of her this morning.  No associated dysuria, hematuria, urinary frequency, abdominal pain, flank pain, or F/C.  In addition to the aforementioned complaints she also reports several months of worsening left upper arm pain, but states that her left arm now has decreased sensation compared to the right.  The patient is unsure of exactly when she noticed the decreased sensation.  In addition to the sensory deficit in the left arm she reports a similar decreased sensation in the left leg which \"feels like it weighs 1000lbs\".  No pain, weakness, or decreased sensation in the right upper or lower extremity.          Review of Systems   Constitutional:  Negative for chills and fever.   HENT:  Negative for congestion, ear pain, rhinorrhea and sore throat.    Eyes:  Positive for discharge. Negative for pain and visual disturbance.   Respiratory:  Negative for cough and shortness of breath.    Cardiovascular:  Negative for chest pain and palpitations.   Gastrointestinal:  Negative for abdominal distention, abdominal pain, diarrhea, nausea and vomiting.   Genitourinary:  Negative for decreased urine volume, difficulty urinating, dysuria, flank pain, frequency, hematuria and urgency.   Musculoskeletal:  Positive for arthralgias. Negative for back pain and myalgias.   Skin:  Negative for " color change and rash.   Neurological:  Positive for weakness and numbness. Negative for dizziness, tremors, seizures, syncope, speech difficulty, light-headedness and headaches.   Psychiatric/Behavioral:  Positive for sleep disturbance.    All other systems reviewed and are negative.      Objective       ED Triage Vitals [10/09/24 0620]   Temperature Pulse Blood Pressure Respirations SpO2 Patient Position - Orthostatic VS   97.8 °F (36.6 °C) 101 (!) 181/95 20 92 % Sitting      Temp Source Heart Rate Source BP Location FiO2 (%) Pain Score    Oral Monitor Left arm -- 10 - Worst Possible Pain      Vitals      Date and Time Temp Pulse SpO2 Resp BP Pain Score FACES Pain Rating User   10/10/24 0800 -- -- -- -- -- No Pain -- NS   10/10/24 0735 98.2 °F (36.8 °C) 96 97 % 20 125/58 -- -- CB   10/10/24 0725 -- -- 93 % -- -- -- -- RF   10/10/24 0723 -- -- 98 % -- -- -- -- NS   10/10/24 0512 99.5 °F (37.5 °C) 110 93 % 19 141/69 -- -- SA   10/10/24 0512 -- -- -- -- -- 10 - Worst Possible Pain -- MB   10/10/24 0430 -- 109 -- -- -- -- -- RB   10/10/24 0415 -- 107 93 % 18 144/70 -- -- RB   10/10/24 0400 -- 106 -- -- 138/66 -- -- RB   10/10/24 0345 -- 108 -- -- 137/55 -- -- RB   10/10/24 0330 -- 101 -- -- 133/74 -- -- RB   10/10/24 0327 -- 97 97 % 20 143/68 -- -- RB   10/10/24 0230 -- 108 94 % 27 123/65 -- -- KR   10/10/24 0200 -- 116 92 % 30 117/66 -- -- KR   10/10/24 0125 -- 109 94 % 30 106/58 -- -- KR   10/10/24 0104 97.6 °F (36.4 °C) 109 90 % 35 105/54 -- -- MB   10/10/24 0050 -- -- 94 % -- -- -- -- CB   10/09/24 2331 99.1 °F (37.3 °C) 54 92 % 36 155/69 -- -- SR   10/09/24 2155 -- -- -- -- -- 10 - Worst Possible Pain -- MB   10/09/24 2045 -- -- -- -- -- 3 -- MB   10/09/24 1916 -- -- 96 % -- -- -- -- CB   10/09/24 1700 -- -- 99 % -- -- -- -- NM   10/09/24 1654 -- -- 92 % -- -- -- -- NM   10/09/24 1554 98.3 °F (36.8 °C) 106 90 % 22 127/62 -- -- CM   10/09/24 1407 -- -- -- -- -- 6 -- NM   10/09/24 1337 -- 64 93 % -- 124/81 -- -- NM    10/09/24 1100 97.2 °F (36.2 °C) 94 95 % 18 141/74 No Pain -- NM   10/09/24 1056 -- 84 100 % 18 112/53 No Pain sleeping -- LB   10/09/24 0858 -- -- -- -- -- No Pain sleeping -- LB   10/09/24 0857 -- 82 99 % 18 134/68 -- -- LB   10/09/24 0749 -- -- -- -- -- 10 - Worst Possible Pain -- LB   10/09/24 0620 97.8 °F (36.6 °C) 101 92 % 20 181/95 10 - Worst Possible Pain -- TR            Physical Exam  Vitals and nursing note reviewed.   Constitutional:       General: She is awake. She is not in acute distress.     Appearance: She is underweight. She is ill-appearing (chronically). She is not toxic-appearing or diaphoretic.   HENT:      Head: Normocephalic and atraumatic.      Jaw: There is normal jaw occlusion.      Right Ear: Tympanic membrane, ear canal and external ear normal.      Left Ear: Tympanic membrane, ear canal and external ear normal.      Nose: Nose normal.      Mouth/Throat:      Lips: Pink.      Mouth: Mucous membranes are dry. No oral lesions.      Tongue: Tongue does not deviate from midline.      Pharynx: Oropharynx is clear. Uvula midline. No pharyngeal swelling, oropharyngeal exudate or posterior oropharyngeal erythema.   Eyes:      General: Lids are normal. Vision grossly intact. Gaze aligned appropriately. No visual field deficit.     Extraocular Movements: Extraocular movements intact.      Right eye: No nystagmus.      Left eye: No nystagmus.      Conjunctiva/sclera: Conjunctivae normal.      Right eye: Right conjunctiva is not injected. Exudate present. No hemorrhage.     Left eye: Left conjunctiva is not injected. Exudate present. No hemorrhage.     Pupils: Pupils are equal, round, and reactive to light.      Comments: PERRLA and EOMI.  Purulent discharge noted in bilateral eyes.   Cardiovascular:      Rate and Rhythm: Normal rate and regular rhythm.      Heart sounds: Normal heart sounds, S1 normal and S2 normal. No murmur heard.     No friction rub. No gallop.   Pulmonary:      Effort:  Pulmonary effort is normal. No respiratory distress.      Breath sounds: Normal breath sounds and air entry. No wheezing, rhonchi or rales.   Abdominal:      General: Abdomen is flat.      Palpations: Abdomen is soft.      Tenderness: There is no abdominal tenderness. There is no guarding or rebound.   Musculoskeletal:      Cervical back: Normal, full passive range of motion without pain and neck supple. No rigidity or crepitus. No spinous process tenderness or muscular tenderness.      Thoracic back: Normal. No spasms, tenderness or bony tenderness.      Lumbar back: Normal. No spasms, tenderness or bony tenderness.      Comments: Tenderness to palpation of the left anterolateral shoulder and proximal upper arm.  No palpable bony deformity or crepitus.  Limited AROM of the shoulder secondary to pain.   Skin:     General: Skin is warm and dry.      Capillary Refill: Capillary refill takes less than 2 seconds.      Coloration: Skin is not pale.      Findings: No rash.   Neurological:      General: No focal deficit present.      Mental Status: She is alert and oriented to person, place, and time.      GCS: GCS eye subscore is 4. GCS verbal subscore is 5. GCS motor subscore is 6.      Cranial Nerves: Cranial nerves 2-12 are intact. No dysarthria or facial asymmetry.      Sensory: Sensory deficit present.      Motor: Weakness present. No pronator drift.      Coordination: Coordination is intact. Finger-Nose-Finger Test normal. Rapid alternating movements normal.      Comments:   -Mild left-sided facial droop, most notable around the mouth and the eyebrow.  Patient is unable to open the left eye as wide as the right.  -Near complete AROM of bilateral upper extremities; however, there is decreased ROM of the left shoulder.  4/5 strength in bilateral upper extremities.   -Decreased AROM of bilateral lower extremities with 2/5 strength bilaterally.  -Pin prick is felt less sharp in the LUE compared to the RUE, as well as  the LLE compared to the RLE.   Psychiatric:         Attention and Perception: Perception normal. She is inattentive.         Mood and Affect: Mood is anxious. Affect is tearful.         Speech: Speech is tangential. Speech is not rapid and pressured or slurred.         Behavior: Behavior is cooperative.         Results Reviewed       Procedure Component Value Units Date/Time    Urine Microscopic [[id number]]  (Abnormal) Collected: 10/09/24 0809    Lab Status: Final result Specimen: Urine, Clean Catch Updated: 10/09/24 0825     RBC, UA 4-10 /hpf      WBC, UA 1-2 /hpf      Epithelial Cells Occasional /hpf      Bacteria, UA Occasional /hpf      Hyaline Casts, UA 0-1 /lpf      MUCUS THREADS Occasional    UA w Reflex to Microscopic w Reflex to Culture [[id number]]  (Abnormal) Collected: 10/09/24 0809    Lab Status: Final result Specimen: Urine, Clean Catch Updated: 10/09/24 0821     Color, UA Yellow     Clarity, UA Clear     Specific Gravity, UA 1.020     pH, UA 6.0     Leukocytes, UA 25.0     Nitrite, UA Negative     Protein, UA 30 (1+) mg/dl      Glucose, UA Negative mg/dl      Ketones, UA 15 (1+) mg/dl      Bilirubin, UA Negative     Occult Blood, UA 25.0     UROBILINOGEN UA Negative mg/dL     TSH, 3rd generation [[id number]]  (Normal) Collected: 10/09/24 0640    Lab Status: Final result Specimen: Blood from Arm, Left Updated: 10/09/24 0718     TSH 3RD GENERATON 0.848 uIU/mL     Comprehensive metabolic panel [[id number]]  (Abnormal) Collected: 10/09/24 0640    Lab Status: Final result Specimen: Blood from Arm, Left Updated: 10/09/24 0707     Sodium 132 mmol/L      Potassium 4.6 mmol/L      Chloride 88 mmol/L      CO2 39 mmol/L      ANION GAP 5 mmol/L      BUN 19 mg/dL      Creatinine 0.49 mg/dL      Glucose 114 mg/dL      Calcium 9.6 mg/dL      AST 24 U/L      ALT 19 U/L      Alkaline Phosphatase 44 U/L      Total Protein 6.9 g/dL      Albumin 4.4 g/dL      Total Bilirubin 0.46 mg/dL      eGFR 92 ml/min/1.73sq m      Narrative:      [employer] guidelines for Chronic Kidney Disease (CKD):     Stage 1 with normal or high GFR (GFR > 90 mL/min/1.73 square meters)    Stage 2 Mild CKD (GFR = 60-89 mL/min/1.73 square meters)    Stage 3A Moderate CKD (GFR = 45-59 mL/min/1.73 square meters)    Stage 3B Moderate CKD (GFR = 30-44 mL/min/1.73 square meters)    Stage 4 Severe CKD (GFR = 15-29 mL/min/1.73 square meters)    Stage 5 End Stage CKD (GFR <15 mL/min/1.73 square meters)  Note: GFR calculation is accurate only with a steady state creatinine    Ammonia [[id number]]  (Abnormal) Collected: 10/09/24 0640    Lab Status: Final result Specimen: Blood from Arm, Left Updated: 10/09/24 0705     Ammonia 11 umol/L     CBC and differential [[id number]]  (Abnormal) Collected: 10/09/24 0640    Lab Status: Final result Specimen: Blood from Arm, Left Updated: 10/09/24 0649     WBC 6.41 Thousand/uL      RBC 4.45 Million/uL      Hemoglobin 12.9 g/dL      Hematocrit 41.1 %      MCV 92 fL      MCH 29.0 pg      MCHC 31.4 g/dL      RDW 13.1 %      MPV 9.3 fL      Platelets 192 Thousands/uL      nRBC 0 /100 WBCs      Segmented % 74 %      Immature Grans % 0 %      Lymphocytes % 8 %      Monocytes % 12 %      Eosinophils Relative 5 %      Basophils Relative 1 %      Absolute Neutrophils 4.73 Thousands/µL      Absolute Immature Grans 0.01 Thousand/uL      Absolute Lymphocytes 0.51 Thousands/µL      Absolute Monocytes 0.78 Thousand/µL      Eosinophils Absolute 0.34 Thousand/µL      Basophils Absolute 0.04 Thousands/µL     Fingerstick Glucose (POCT) [[id number]]  (Normal) Collected: 10/09/24 0625    Lab Status: Final result Specimen: Blood Updated: 10/09/24 0626     POC Glucose 103 mg/dl             CT chest without contrast   Final Interpretation by Bashir Baumann MD (10/10 0351)      1.  Possible airway debris, which would be consistent with aspiration, see discussion   2.  Trace pleural effusions   3.  Stable right breast lesion,  recommend follow-up diagnostic mammogram      The study was marked in EPIC for immediate notification.         Workstation performed: [id number]         CT head without contrast   Final Interpretation by Bashir Baumann MD (10/10 0333)      No acute intracranial abnormality.                  Workstation performed: [id number]         XR shoulder 2+ views LEFT   Final Interpretation by Bashir Baumann MD (10/10 0356)      No acute osseous abnormality               Workstation performed: [id number]         XR shoulder 2+ vw left   Final Interpretation by Bashir Baumann MD (10/10 0356)      No acute osseous abnormality               Workstation performed: [id number]         XR chest portable   Final Interpretation by Bashir Baumann MD (10/10 0353)      No acute cardiopulmonary disease.            Workstation performed: [id number]         CTA head and neck with and without contrast   Final Interpretation by Renzo Solorio MD (10/09 0916)      CT Brain:  No acute intracranial abnormality.      CT Angiography:   1. No acute process on CT angiogram of the head and neck.   2. No vessel stenosis seen.   3. 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 Recommend neurovascular follow-up.      The study was marked in EPIC for immediate notification.            Workstation performed: [id number]             ECG 12 Lead Documentation Only    Date/Time: 10/9/2024 6:29 AM    Performed by: Naz Francisco PA-C  Authorized by: Naz Francisco PA-C    ECG reviewed by me, the ED Provider: yes    Patient location:  ED  Quality:     Tracing quality:  Limited by artifact  Rate:     ECG rate:  103    ECG rate assessment: tachycardic    Rhythm:     Rhythm: sinus tachycardia    Ectopy:     Ectopy: none    QRS:     QRS axis:  Normal    QRS intervals:  Normal  Conduction:     Conduction: normal    ST segments:     ST segments:  Normal  T waves:     T waves: normal    Comments:      NY interval: 126ms  QRS duration: 72ms  QT/QTc: 346/453ms      ED  Medication and Procedure Management   Prior to Admission Medications   Prescriptions Last Dose Informant Patient Reported? Taking?   BLACK ELDERBERRY PO   Yes No   Sig: Take 2 tablets by mouth daily.   Calcium Carb-Cholecalciferol (CALCIUM 600/VITAMIN D3 PO)   Yes No   Sig: Take 1 tablet by mouth daily.   Humidifier MISC   No No   Sig: Use if needed (Attach to Oxygen concentrator)   Multiple Vitamins-Minerals (PreserVision AREDS 2) CAPS   No No   Sig: Take 1 capsule by mouth in the morning   acetaminophen (TYLENOL) 500 mg tablet  Self No No   Sig: Take 1 tablet (500 mg total) by mouth every 6 (six) hours as needed (pain fevers)   albuterol (PROVENTIL HFA,VENTOLIN HFA) 90 mcg/act inhaler  Self No No   Sig: Inhale 2 puffs every 6 (six) hours as needed for wheezing or shortness of breath   ascorbic acid (VITAMIN C) 500 mg tablet   Yes No   Sig: Take 500 mg by mouth daily.   aspirin (ECOTRIN LOW STRENGTH) 81 mg EC tablet  Self No No   Sig: Take 1 tablet (81 mg total) by mouth daily   budesonide (Pulmicort) 0.5 mg/2 mL nebulizer solution   No No   Sig: Take 2 mL (0.5 mg total) by nebulization 2 (two) times a day Rinse mouth after use. May take this right after taking levabuterol/ipratropium.   busPIRone (BUSPAR) 7.5 mg tablet   No No   Sig: Take 1 tablet (7.5 mg total) by mouth 3 (three) times a day   carboxymethylcellulose (Refresh Tears) 0.5 % SOLN   Yes No   Sig: Administer 2 drops to both eyes daily.   ipratropium (ATROVENT) 0.02 % nebulizer solution   No No   Sig: Take 2.5 mL (0.5 mg total) by nebulization 2 (two) times a day   levalbuterol (XOPENEX) 1.25 mg/3 mL nebulizer solution  Self Yes No   Sig: Take 1.25 mg by nebulization 2 (two) times a day   levothyroxine 50 mcg tablet  Self No No   Sig: TAKE ONE TABLET BY MOUTH ONCE DAILY   nitrofurantoin (MACROBID) 100 mg capsule   No No   Sig: Take 1 capsule (100 mg total) by mouth daily after lunch   oxygen gas   Yes No   Sig: Inhale 2 L/min continuous. via nasal  cannula.    pantoprazole (PROTONIX) 40 mg tablet   No No   Sig: TAKE ONE TABLET BY MOUTH ONCE DAILY   predniSONE 2.5 mg tablet   No No   Sig: TAKE ONE TABLET BY MOUTH ONCE DAILY WITH BREAKFAST   propranolol (INDERAL) 10 mg tablet   No No   Sig: Take 1 tablet (10 mg total) by mouth 2 (two) times a day   sodium chloride (OCEAN) 0.65 % nasal spray   No No   Si spray into each nostril 3 (three) times a day   triamcinolone (KENALOG) 0.1 % cream   No No   Sig: Use on Q Tip in left ear, twice daily   valsartan (DIOVAN) 160 mg tablet  Self No No   Sig: Take 1 tablet (160 mg total) by mouth daily Please STOP Lisinopril,..   vitamin B-12 (VITAMIN B-12) 1,000 mcg tablet   Yes No   Sig: Take 1,000 mcg by mouth daily.      Facility-Administered Medications: None     Discharge Medication List as of 10/10/2024  9:56 AM        CONTINUE these medications which have NOT CHANGED    Details   acetaminophen (TYLENOL) 500 mg tablet Take 1 tablet (500 mg total) by mouth every 6 (six) hours as needed (pain fevers), Starting 2019, Print      albuterol (PROVENTIL HFA,VENTOLIN HFA) 90 mcg/act inhaler Inhale 2 puffs every 6 (six) hours as needed for wheezing or shortness of breath, Starting Mon 8/15/2022, Normal      ascorbic acid (VITAMIN C) 500 mg tablet Take 500 mg by mouth daily., Historical Med      aspirin (ECOTRIN LOW STRENGTH) 81 mg EC tablet Take 1 tablet (81 mg total) by mouth daily, Starting 2023, Normal      BLACK ELDERBERRY PO Take 2 tablets by mouth daily., Historical Med      budesonide (Pulmicort) 0.5 mg/2 mL nebulizer solution Take 2 mL (0.5 mg total) by nebulization 2 (two) times a day Rinse mouth after use. May take this right after taking levabuterol/ipratropium., Starting Tue 9/10/2024, Until Thu 10/10/2024, Normal      busPIRone (BUSPAR) 7.5 mg tablet Take 1 tablet (7.5 mg total) by mouth 3 (three) times a day, Starting Thu 2024, Normal      Calcium Carb-Cholecalciferol (CALCIUM 600/VITAMIN D3  PO) Take 1 tablet by mouth daily., Historical Med      carboxymethylcellulose (Refresh Tears) 0.5 % SOLN Administer 2 drops to both eyes daily., Historical Med      Humidifier MISC Use if needed (Attach to Oxygen concentrator), Starting Mon 9/23/2024, Print      ipratropium (ATROVENT) 0.02 % nebulizer solution Take 2.5 mL (0.5 mg total) by nebulization 2 (two) times a day, Starting Mon 9/9/2024, Until Wed 10/9/2024, No Print      levalbuterol (XOPENEX) 1.25 mg/3 mL nebulizer solution Take 1.25 mg by nebulization 2 (two) times a day, Starting Wed 7/31/2024, Historical Med      levothyroxine 50 mcg tablet TAKE ONE TABLET BY MOUTH ONCE DAILY, Starting Thu 8/1/2024, Normal      Multiple Vitamins-Minerals (PreserVision AREDS 2) CAPS Take 1 capsule by mouth in the morning, Starting Mon 8/26/2024, Until Wed 9/25/2024, Normal      nitrofurantoin (MACROBID) 100 mg capsule Take 1 capsule (100 mg total) by mouth daily after lunch, Starting Tue 10/1/2024, Until Mon 12/30/2024, Normal      oxygen gas Inhale 2 L/min continuous. via nasal cannula. , Historical Med      pantoprazole (PROTONIX) 40 mg tablet TAKE ONE TABLET BY MOUTH ONCE DAILY, Starting Tue 9/24/2024, Normal      predniSONE 2.5 mg tablet TAKE ONE TABLET BY MOUTH ONCE DAILY WITH BREAKFAST, Starting Fri 8/30/2024, Normal      propranolol (INDERAL) 10 mg tablet Take 1 tablet (10 mg total) by mouth 2 (two) times a day, Starting Tue 10/1/2024, Normal      sodium chloride (OCEAN) 0.65 % nasal spray 1 spray into each nostril 3 (three) times a day, Starting Mon 9/16/2024, Normal      triamcinolone (KENALOG) 0.1 % cream Use on Q Tip in left ear, twice daily, Normal      valsartan (DIOVAN) 160 mg tablet Take 1 tablet (160 mg total) by mouth daily Please STOP Lisinopril,.., Starting Mon 3/11/2024, Normal      vitamin B-12 (VITAMIN B-12) 1,000 mcg tablet Take 1,000 mcg by mouth daily., Historical Med           No discharge procedures on file.  ED SEPSIS DOCUMENTATION   Time  reflects when diagnosis was documented in both MDM as applicable and the Disposition within this note       Time User Action Codes Description Comment    10/9/2024 10:32 AM Deven Atkinson Add [R26.9] Neurologic gait dysfunction     10/9/2024 10:32 AM Deven Atkinson Remove [R26.9] Neurologic gait dysfunction     10/9/2024 10:32 AM Deven Atkinson Add [R26.9] Neurologic gait dysfunction                  Naz Francisco PA-C  10/13/24 0653

## 2024-10-09 NOTE — ED CARE HANDOFF
Emergency Department Sign Out Note        Sign out and transfer of care from [facility]IL[facility] Francisco. See Separate Emergency Department note.     The patient, Sarah Zayas, was evaluated by the previous provider for mulriple medical complaints uti sxs arm pain .    Workup Completed:  As above   ED Course / Workup Pending (followup):  Await imaging and rest of labs .. Dispo accordingly                                     Procedures  Medical Decision Making  Amount and/or Complexity of Data Reviewed  Labs: ordered.  Radiology: ordered.    Risk  Prescription drug management.            Disposition  Final diagnoses:   None     ED Disposition       None          Follow-up Information    None       Patient's Medications   Discharge Prescriptions    No medications on file     No discharge procedures on file.       ED Provider  Electronically Signed by     Deven Atkinson MD  10/09/24 0743

## 2024-10-10 ENCOUNTER — APPOINTMENT (INPATIENT)
Dept: CT IMAGING | Facility: HOSPITAL | Age: 79
DRG: 070 | End: 2024-10-10
Payer: COMMERCIAL

## 2024-10-10 ENCOUNTER — HOSPITAL ENCOUNTER (INPATIENT)
Facility: HOSPITAL | Age: 79
LOS: 13 days | Discharge: NURSING, REHAB OR LONG-TERM CARE | DRG: 189 | End: 2024-10-23
Attending: INTERNAL MEDICINE | Admitting: INTERNAL MEDICINE
Payer: COMMERCIAL

## 2024-10-10 ENCOUNTER — APPOINTMENT (INPATIENT)
Dept: RADIOLOGY | Facility: HOSPITAL | Age: 79
DRG: 070 | End: 2024-10-10
Payer: COMMERCIAL

## 2024-10-10 ENCOUNTER — HOME CARE VISIT (OUTPATIENT)
Dept: HOME HEALTH SERVICES | Facility: HOME HEALTHCARE | Age: 79
End: 2024-10-10

## 2024-10-10 ENCOUNTER — PATIENT OUTREACH (OUTPATIENT)
Dept: CASE MANAGEMENT | Facility: OTHER | Age: 79
End: 2024-10-10

## 2024-10-10 ENCOUNTER — TRANSITIONAL CARE MANAGEMENT (OUTPATIENT)
Dept: FAMILY MEDICINE CLINIC | Facility: CLINIC | Age: 79
End: 2024-10-10

## 2024-10-10 VITALS
WEIGHT: 102.07 LBS | RESPIRATION RATE: 20 BRPM | SYSTOLIC BLOOD PRESSURE: 125 MMHG | BODY MASS INDEX: 18.78 KG/M2 | OXYGEN SATURATION: 97 % | TEMPERATURE: 98.2 F | HEART RATE: 96 BPM | HEIGHT: 62 IN | DIASTOLIC BLOOD PRESSURE: 58 MMHG

## 2024-10-10 DIAGNOSIS — E87.1 HYPONATREMIA: ICD-10-CM

## 2024-10-10 DIAGNOSIS — M19.90 OSTEOARTHRITIS: ICD-10-CM

## 2024-10-10 DIAGNOSIS — J96.12 CHRONIC RESPIRATORY FAILURE WITH HYPERCAPNIA (HCC): ICD-10-CM

## 2024-10-10 DIAGNOSIS — K59.00 CONSTIPATION: ICD-10-CM

## 2024-10-10 DIAGNOSIS — I10 HYPERTENSION: ICD-10-CM

## 2024-10-10 DIAGNOSIS — M25.519 SHOULDER PAIN: ICD-10-CM

## 2024-10-10 DIAGNOSIS — R56.9 SEIZURE (HCC): ICD-10-CM

## 2024-10-10 DIAGNOSIS — I72.9 ANEURYSM (HCC): ICD-10-CM

## 2024-10-10 DIAGNOSIS — J44.1 ACUTE EXACERBATION OF CHRONIC OBSTRUCTIVE PULMONARY DISEASE (COPD) (HCC): Primary | ICD-10-CM

## 2024-10-10 DIAGNOSIS — H35.30 MACULAR DEGENERATION: ICD-10-CM

## 2024-10-10 DIAGNOSIS — F41.9 ANXIETY: ICD-10-CM

## 2024-10-10 DIAGNOSIS — E87.4 METABOLIC ALKALOSIS WITH RESPIRATORY ACIDOSIS: ICD-10-CM

## 2024-10-10 DIAGNOSIS — Z91.89 AT RISK FOR DELIRIUM: ICD-10-CM

## 2024-10-10 DIAGNOSIS — E03.8 OTHER SPECIFIED HYPOTHYROIDISM: ICD-10-CM

## 2024-10-10 DIAGNOSIS — J44.9 COPD, SEVERE (HCC): ICD-10-CM

## 2024-10-10 DIAGNOSIS — J96.02 ACUTE RESPIRATORY FAILURE WITH HYPERCAPNIA (HCC): ICD-10-CM

## 2024-10-10 PROBLEM — J96.22 ACUTE ON CHRONIC RESPIRATORY FAILURE WITH HYPOXIA AND HYPERCAPNIA (HCC): Status: ACTIVE | Noted: 2024-10-10

## 2024-10-10 PROBLEM — J96.21 ACUTE ON CHRONIC RESPIRATORY FAILURE WITH HYPOXIA AND HYPERCAPNIA (HCC): Status: ACTIVE | Noted: 2024-10-10

## 2024-10-10 PROBLEM — M54.2 NECK PAIN: Status: RESOLVED | Noted: 2024-10-10 | Resolved: 2024-10-10

## 2024-10-10 PROBLEM — M54.2 NECK PAIN: Status: ACTIVE | Noted: 2024-10-10

## 2024-10-10 LAB
2HR DELTA HS TROPONIN: 1 NG/L
4HR DELTA HS TROPONIN: 1 NG/L
ALBUMIN SERPL BCG-MCNC: 3.7 G/DL (ref 3.5–5)
ALBUMIN SERPL BCG-MCNC: 3.7 G/DL (ref 3.5–5)
ALP SERPL-CCNC: 38 U/L (ref 34–104)
ALP SERPL-CCNC: 40 U/L (ref 34–104)
ALT SERPL W P-5'-P-CCNC: 16 U/L (ref 7–52)
ALT SERPL W P-5'-P-CCNC: 16 U/L (ref 7–52)
AMMONIA PLAS-SCNC: 21 UMOL/L (ref 18–72)
ANION GAP SERPL CALCULATED.3IONS-SCNC: 3 MMOL/L (ref 4–13)
ANION GAP SERPL CALCULATED.3IONS-SCNC: 5 MMOL/L (ref 4–13)
ANION GAP SERPL CALCULATED.3IONS-SCNC: 5 MMOL/L (ref 4–13)
ARTERIAL PATENCY WRIST A: YES
AST SERPL W P-5'-P-CCNC: 21 U/L (ref 13–39)
AST SERPL W P-5'-P-CCNC: 21 U/L (ref 13–39)
ATRIAL RATE: 104 BPM
BASE EX.OXY STD BLDV CALC-SCNC: 41.4 % (ref 60–80)
BASE EX.OXY STD BLDV CALC-SCNC: 72.2 % (ref 60–80)
BASE EX.OXY STD BLDV CALC-SCNC: 91 % (ref 60–80)
BASE EXCESS BLDA CALC-SCNC: 0 MMOL/L
BASE EXCESS BLDV CALC-SCNC: 2.8 MMOL/L
BASE EXCESS BLDV CALC-SCNC: 4.7 MMOL/L
BASE EXCESS BLDV CALC-SCNC: 4.8 MMOL/L
BASOPHILS # BLD AUTO: 0.02 THOUSANDS/ΜL (ref 0–0.1)
BASOPHILS # BLD AUTO: 0.02 THOUSANDS/ΜL (ref 0–0.1)
BASOPHILS # BLD AUTO: 0.03 THOUSANDS/ΜL (ref 0–0.1)
BASOPHILS NFR BLD AUTO: 0 % (ref 0–1)
BILIRUB DIRECT SERPL-MCNC: 0.08 MG/DL (ref 0–0.2)
BILIRUB SERPL-MCNC: 0.25 MG/DL (ref 0.2–1)
BILIRUB SERPL-MCNC: 0.37 MG/DL (ref 0.2–1)
BNP SERPL-MCNC: 66 PG/ML (ref 0–100)
BODY TEMPERATURE: 99.1 DEGREES FEHRENHEIT
BUN SERPL-MCNC: 15 MG/DL (ref 5–25)
BUN SERPL-MCNC: 15 MG/DL (ref 5–25)
BUN SERPL-MCNC: 16 MG/DL (ref 5–25)
CA-I BLD-SCNC: 1.14 MMOL/L (ref 1.12–1.32)
CALCIUM SERPL-MCNC: 8.5 MG/DL (ref 8.4–10.2)
CALCIUM SERPL-MCNC: 8.5 MG/DL (ref 8.4–10.2)
CALCIUM SERPL-MCNC: 9 MG/DL (ref 8.4–10.2)
CARDIAC TROPONIN I PNL SERPL HS: 7 NG/L
CARDIAC TROPONIN I PNL SERPL HS: 8 NG/L
CARDIAC TROPONIN I PNL SERPL HS: 8 NG/L
CHLORIDE SERPL-SCNC: 93 MMOL/L (ref 96–108)
CHLORIDE SERPL-SCNC: 94 MMOL/L (ref 96–108)
CHLORIDE SERPL-SCNC: 95 MMOL/L (ref 96–108)
CO2 SERPL-SCNC: 33 MMOL/L (ref 21–32)
CO2 SERPL-SCNC: 34 MMOL/L (ref 21–32)
CO2 SERPL-SCNC: 36 MMOL/L (ref 21–32)
CREAT SERPL-MCNC: 0.45 MG/DL (ref 0.6–1.3)
CREAT SERPL-MCNC: 0.46 MG/DL (ref 0.6–1.3)
CREAT SERPL-MCNC: 0.47 MG/DL (ref 0.6–1.3)
EOSINOPHIL # BLD AUTO: 0.2 THOUSAND/ΜL (ref 0–0.61)
EOSINOPHIL # BLD AUTO: 0.24 THOUSAND/ΜL (ref 0–0.61)
EOSINOPHIL # BLD AUTO: 0.27 THOUSAND/ΜL (ref 0–0.61)
EOSINOPHIL NFR BLD AUTO: 3 % (ref 0–6)
EOSINOPHIL NFR BLD AUTO: 3 % (ref 0–6)
EOSINOPHIL NFR BLD AUTO: 4 % (ref 0–6)
ERYTHROCYTE [DISTWIDTH] IN BLOOD BY AUTOMATED COUNT: 13.3 % (ref 11.6–15.1)
GFR SERPL CREATININE-BSD FRML MDRD: 94 ML/MIN/1.73SQ M
GFR SERPL CREATININE-BSD FRML MDRD: 94 ML/MIN/1.73SQ M
GFR SERPL CREATININE-BSD FRML MDRD: 95 ML/MIN/1.73SQ M
GLUCOSE SERPL-MCNC: 80 MG/DL (ref 65–140)
GLUCOSE SERPL-MCNC: 86 MG/DL (ref 65–140)
GLUCOSE SERPL-MCNC: 87 MG/DL (ref 65–140)
GLUCOSE SERPL-MCNC: 94 MG/DL (ref 65–140)
HCO3 BLDA-SCNC: 30.3 MMOL/L (ref 22–28)
HCO3 BLDV-SCNC: 26.6 MMOL/L (ref 24–30)
HCO3 BLDV-SCNC: 33.1 MMOL/L (ref 24–30)
HCO3 BLDV-SCNC: 33.9 MMOL/L (ref 24–30)
HCT VFR BLD AUTO: 34.1 % (ref 34.8–46.1)
HCT VFR BLD AUTO: 39.6 % (ref 34.8–46.1)
HCT VFR BLD AUTO: 39.7 % (ref 34.8–46.1)
HGB BLD-MCNC: 10.8 G/DL (ref 11.5–15.4)
HGB BLD-MCNC: 11.8 G/DL (ref 11.5–15.4)
HGB BLD-MCNC: 11.8 G/DL (ref 11.5–15.4)
IMM GRANULOCYTES # BLD AUTO: 0.02 THOUSAND/UL (ref 0–0.2)
IMM GRANULOCYTES # BLD AUTO: 0.03 THOUSAND/UL (ref 0–0.2)
IMM GRANULOCYTES # BLD AUTO: 0.04 THOUSAND/UL (ref 0–0.2)
IMM GRANULOCYTES NFR BLD AUTO: 0 % (ref 0–2)
IMM GRANULOCYTES NFR BLD AUTO: 0 % (ref 0–2)
IMM GRANULOCYTES NFR BLD AUTO: 1 % (ref 0–2)
LACTATE SERPL-SCNC: 0.6 MMOL/L (ref 0.5–2)
LYMPHOCYTES # BLD AUTO: 0.39 THOUSANDS/ΜL (ref 0.6–4.47)
LYMPHOCYTES # BLD AUTO: 0.39 THOUSANDS/ΜL (ref 0.6–4.47)
LYMPHOCYTES # BLD AUTO: 0.45 THOUSANDS/ΜL (ref 0.6–4.47)
LYMPHOCYTES NFR BLD AUTO: 5 % (ref 14–44)
LYMPHOCYTES NFR BLD AUTO: 6 % (ref 14–44)
LYMPHOCYTES NFR BLD AUTO: 6 % (ref 14–44)
MAGNESIUM SERPL-MCNC: 1.9 MG/DL (ref 1.9–2.7)
MCH RBC QN AUTO: 28.2 PG (ref 26.8–34.3)
MCH RBC QN AUTO: 28.8 PG (ref 26.8–34.3)
MCH RBC QN AUTO: 29.1 PG (ref 26.8–34.3)
MCHC RBC AUTO-ENTMCNC: 29.7 G/DL (ref 31.4–37.4)
MCHC RBC AUTO-ENTMCNC: 29.8 G/DL (ref 31.4–37.4)
MCHC RBC AUTO-ENTMCNC: 31.7 G/DL (ref 31.4–37.4)
MCV RBC AUTO: 92 FL (ref 82–98)
MCV RBC AUTO: 95 FL (ref 82–98)
MCV RBC AUTO: 97 FL (ref 82–98)
MONOCYTES # BLD AUTO: 0.65 THOUSAND/ΜL (ref 0.17–1.22)
MONOCYTES # BLD AUTO: 0.77 THOUSAND/ΜL (ref 0.17–1.22)
MONOCYTES # BLD AUTO: 0.79 THOUSAND/ΜL (ref 0.17–1.22)
MONOCYTES NFR BLD AUTO: 11 % (ref 4–12)
MONOCYTES NFR BLD AUTO: 11 % (ref 4–12)
MONOCYTES NFR BLD AUTO: 9 % (ref 4–12)
NASAL CANNULA: 2
NASAL CANNULA: 2
NEUTROPHILS # BLD AUTO: 5.6 THOUSANDS/ΜL (ref 1.85–7.62)
NEUTROPHILS # BLD AUTO: 5.71 THOUSANDS/ΜL (ref 1.85–7.62)
NEUTROPHILS # BLD AUTO: 5.72 THOUSANDS/ΜL (ref 1.85–7.62)
NEUTS SEG NFR BLD AUTO: 80 % (ref 43–75)
NEUTS SEG NFR BLD AUTO: 80 % (ref 43–75)
NEUTS SEG NFR BLD AUTO: 81 % (ref 43–75)
NRBC BLD AUTO-RTO: 0 /100 WBCS
O2 CT BLDA-SCNC: 16.5 ML/DL (ref 16–23)
O2 CT BLDV-SCNC: 13.5 ML/DL
O2 CT BLDV-SCNC: 16.6 ML/DL
O2 CT BLDV-SCNC: 6.9 ML/DL
OXYHGB MFR BLDA: 97.6 % (ref 94–97)
P AXIS: 54 DEGREES
PCO2 BLDA: 83.3 MM HG (ref 36–44)
PCO2 BLDV: 38.1 MM HG (ref 42–50)
PCO2 BLDV: 67.7 MM HG (ref 42–50)
PCO2 BLDV: 76.8 MM HG (ref 42–50)
PH BLDA: 7.18 [PH] (ref 7.35–7.45)
PH BLDV: 7.26 [PH] (ref 7.3–7.4)
PH BLDV: 7.31 [PH] (ref 7.3–7.4)
PH BLDV: 7.46 [PH] (ref 7.3–7.4)
PHOSPHATE SERPL-MCNC: 2.7 MG/DL (ref 2.3–4.1)
PLATELET # BLD AUTO: 159 THOUSANDS/UL (ref 149–390)
PLATELET # BLD AUTO: 159 THOUSANDS/UL (ref 149–390)
PLATELET # BLD AUTO: 181 THOUSANDS/UL (ref 149–390)
PMV BLD AUTO: 9.3 FL (ref 8.9–12.7)
PMV BLD AUTO: 9.3 FL (ref 8.9–12.7)
PMV BLD AUTO: 9.4 FL (ref 8.9–12.7)
PO2 BLDA: 128 MM HG (ref 75–129)
PO2 BLDV: 200.8 MM HG (ref 35–45)
PO2 BLDV: 22.3 MM HG (ref 35–45)
PO2 BLDV: 34.9 MM HG (ref 35–45)
POTASSIUM SERPL-SCNC: 4.3 MMOL/L (ref 3.5–5.3)
POTASSIUM SERPL-SCNC: 4.3 MMOL/L (ref 3.5–5.3)
POTASSIUM SERPL-SCNC: 4.7 MMOL/L (ref 3.5–5.3)
PR INTERVAL: 118 MS
PROCALCITONIN SERPL-MCNC: 0.2 NG/ML
PROT SERPL-MCNC: 5.7 G/DL (ref 6.4–8.4)
PROT SERPL-MCNC: 5.9 G/DL (ref 6.4–8.4)
QRS AXIS: 35 DEGREES
QRSD INTERVAL: 68 MS
QT INTERVAL: 326 MS
QTC INTERVAL: 428 MS
RBC # BLD AUTO: 3.71 MILLION/UL (ref 3.81–5.12)
RBC # BLD AUTO: 4.1 MILLION/UL (ref 3.81–5.12)
RBC # BLD AUTO: 4.19 MILLION/UL (ref 3.81–5.12)
SODIUM SERPL-SCNC: 132 MMOL/L (ref 135–147)
SODIUM SERPL-SCNC: 132 MMOL/L (ref 135–147)
SODIUM SERPL-SCNC: 134 MMOL/L (ref 135–147)
SPECIMEN SOURCE: ABNORMAL
T WAVE AXIS: 55 DEGREES
T4 FREE SERPL-MCNC: 1.24 NG/DL (ref 0.61–1.12)
TSH SERPL DL<=0.05 MIU/L-ACNC: 0.34 UIU/ML (ref 0.45–4.5)
VENTRICULAR RATE: 104 BPM
WBC # BLD AUTO: 6.96 THOUSAND/UL (ref 4.31–10.16)
WBC # BLD AUTO: 7.18 THOUSAND/UL (ref 4.31–10.16)
WBC # BLD AUTO: 7.2 THOUSAND/UL (ref 4.31–10.16)

## 2024-10-10 PROCEDURE — 85025 COMPLETE CBC W/AUTO DIFF WBC: CPT | Performed by: PHYSICIAN ASSISTANT

## 2024-10-10 PROCEDURE — 71250 CT THORAX DX C-: CPT

## 2024-10-10 PROCEDURE — 70450 CT HEAD/BRAIN W/O DYE: CPT

## 2024-10-10 PROCEDURE — 80053 COMPREHEN METABOLIC PANEL: CPT | Performed by: PHYSICIAN ASSISTANT

## 2024-10-10 PROCEDURE — 84100 ASSAY OF PHOSPHORUS: CPT

## 2024-10-10 PROCEDURE — 82330 ASSAY OF CALCIUM: CPT

## 2024-10-10 PROCEDURE — 94760 N-INVAS EAR/PLS OXIMETRY 1: CPT

## 2024-10-10 PROCEDURE — 93005 ELECTROCARDIOGRAM TRACING: CPT

## 2024-10-10 PROCEDURE — 80048 BASIC METABOLIC PNL TOTAL CA: CPT

## 2024-10-10 PROCEDURE — 82805 BLOOD GASES W/O2 SATURATION: CPT | Performed by: PHYSICIAN ASSISTANT

## 2024-10-10 PROCEDURE — 83880 ASSAY OF NATRIURETIC PEPTIDE: CPT | Performed by: PHYSICIAN ASSISTANT

## 2024-10-10 PROCEDURE — NC001 PR NO CHARGE: Performed by: INTERNAL MEDICINE

## 2024-10-10 PROCEDURE — 73030 X-RAY EXAM OF SHOULDER: CPT

## 2024-10-10 PROCEDURE — 85025 COMPLETE CBC W/AUTO DIFF WBC: CPT | Performed by: INTERNAL MEDICINE

## 2024-10-10 PROCEDURE — 84484 ASSAY OF TROPONIN QUANT: CPT | Performed by: PHYSICIAN ASSISTANT

## 2024-10-10 PROCEDURE — 82948 REAGENT STRIP/BLOOD GLUCOSE: CPT

## 2024-10-10 PROCEDURE — 84145 PROCALCITONIN (PCT): CPT | Performed by: PHYSICIAN ASSISTANT

## 2024-10-10 PROCEDURE — 94664 DEMO&/EVAL PT USE INHALER: CPT

## 2024-10-10 PROCEDURE — 99239 HOSP IP/OBS DSCHRG MGMT >30: CPT | Performed by: INTERNAL MEDICINE

## 2024-10-10 PROCEDURE — 80076 HEPATIC FUNCTION PANEL: CPT

## 2024-10-10 PROCEDURE — 93010 ELECTROCARDIOGRAM REPORT: CPT | Performed by: STUDENT IN AN ORGANIZED HEALTH CARE EDUCATION/TRAINING PROGRAM

## 2024-10-10 PROCEDURE — 99223 1ST HOSP IP/OBS HIGH 75: CPT | Performed by: INTERNAL MEDICINE

## 2024-10-10 PROCEDURE — 87040 BLOOD CULTURE FOR BACTERIA: CPT | Performed by: PHYSICIAN ASSISTANT

## 2024-10-10 PROCEDURE — 82805 BLOOD GASES W/O2 SATURATION: CPT | Performed by: INTERNAL MEDICINE

## 2024-10-10 PROCEDURE — 82805 BLOOD GASES W/O2 SATURATION: CPT

## 2024-10-10 PROCEDURE — 82140 ASSAY OF AMMONIA: CPT | Performed by: PHYSICIAN ASSISTANT

## 2024-10-10 PROCEDURE — 80048 BASIC METABOLIC PNL TOTAL CA: CPT | Performed by: INTERNAL MEDICINE

## 2024-10-10 PROCEDURE — 84439 ASSAY OF FREE THYROXINE: CPT | Performed by: INTERNAL MEDICINE

## 2024-10-10 PROCEDURE — 84443 ASSAY THYROID STIM HORMONE: CPT | Performed by: INTERNAL MEDICINE

## 2024-10-10 PROCEDURE — 99291 CRITICAL CARE FIRST HOUR: CPT

## 2024-10-10 PROCEDURE — 94640 AIRWAY INHALATION TREATMENT: CPT

## 2024-10-10 PROCEDURE — 85025 COMPLETE CBC W/AUTO DIFF WBC: CPT

## 2024-10-10 PROCEDURE — 94002 VENT MGMT INPAT INIT DAY: CPT

## 2024-10-10 PROCEDURE — 83605 ASSAY OF LACTIC ACID: CPT | Performed by: PHYSICIAN ASSISTANT

## 2024-10-10 PROCEDURE — 83735 ASSAY OF MAGNESIUM: CPT

## 2024-10-10 RX ORDER — ENOXAPARIN SODIUM 100 MG/ML
40 INJECTION SUBCUTANEOUS DAILY
Status: DISCONTINUED | OUTPATIENT
Start: 2024-10-10 | End: 2024-10-10

## 2024-10-10 RX ORDER — ACETAMINOPHEN 325 MG/1
650 TABLET ORAL EVERY 4 HOURS PRN
Status: DISCONTINUED | OUTPATIENT
Start: 2024-10-10 | End: 2024-10-10

## 2024-10-10 RX ORDER — LEVALBUTEROL INHALATION SOLUTION 1.25 MG/3ML
1.25 SOLUTION RESPIRATORY (INHALATION) 2 TIMES DAILY
Status: CANCELLED | OUTPATIENT
Start: 2024-10-10

## 2024-10-10 RX ORDER — ALBUTEROL SULFATE 90 UG/1
2 INHALANT RESPIRATORY (INHALATION) EVERY 6 HOURS PRN
Status: CANCELLED | OUTPATIENT
Start: 2024-10-10

## 2024-10-10 RX ORDER — LEVALBUTEROL INHALATION SOLUTION 1.25 MG/3ML
1.25 SOLUTION RESPIRATORY (INHALATION) 2 TIMES DAILY
Status: DISCONTINUED | OUTPATIENT
Start: 2024-10-10 | End: 2024-10-19

## 2024-10-10 RX ORDER — CHLORHEXIDINE GLUCONATE ORAL RINSE 1.2 MG/ML
15 SOLUTION DENTAL EVERY 12 HOURS SCHEDULED
Status: DISCONTINUED | OUTPATIENT
Start: 2024-10-10 | End: 2024-10-19

## 2024-10-10 RX ORDER — ECHINACEA PURPUREA EXTRACT 125 MG
2 TABLET ORAL
Status: CANCELLED | OUTPATIENT
Start: 2024-10-10

## 2024-10-10 RX ORDER — ECHINACEA PURPUREA EXTRACT 125 MG
2 TABLET ORAL
Status: DISCONTINUED | OUTPATIENT
Start: 2024-10-10 | End: 2024-10-19

## 2024-10-10 RX ORDER — SODIUM CHLORIDE 9 MG/ML
75 INJECTION, SOLUTION INTRAVENOUS CONTINUOUS
Status: CANCELLED | OUTPATIENT
Start: 2024-10-10

## 2024-10-10 RX ORDER — LIDOCAINE 50 MG/G
1 PATCH TOPICAL DAILY
Status: DISCONTINUED | OUTPATIENT
Start: 2024-10-10 | End: 2024-10-19

## 2024-10-10 RX ORDER — PANTOPRAZOLE SODIUM 40 MG/1
40 TABLET, DELAYED RELEASE ORAL
Status: DISCONTINUED | OUTPATIENT
Start: 2024-10-11 | End: 2024-10-19

## 2024-10-10 RX ORDER — PANTOPRAZOLE SODIUM 40 MG/1
40 TABLET, DELAYED RELEASE ORAL
Status: CANCELLED | OUTPATIENT
Start: 2024-10-11

## 2024-10-10 RX ORDER — GABAPENTIN 100 MG/1
100 CAPSULE ORAL 3 TIMES DAILY
Status: CANCELLED | OUTPATIENT
Start: 2024-10-10

## 2024-10-10 RX ORDER — IPRATROPIUM BROMIDE AND ALBUTEROL SULFATE 2.5; .5 MG/3ML; MG/3ML
3 SOLUTION RESPIRATORY (INHALATION) ONCE
Status: COMPLETED | OUTPATIENT
Start: 2024-10-10 | End: 2024-10-10

## 2024-10-10 RX ORDER — ENOXAPARIN SODIUM 100 MG/ML
30 INJECTION SUBCUTANEOUS DAILY
Status: DISCONTINUED | OUTPATIENT
Start: 2024-10-11 | End: 2024-10-19

## 2024-10-10 RX ORDER — SODIUM CHLORIDE 9 MG/ML
75 INJECTION, SOLUTION INTRAVENOUS CONTINUOUS
Status: DISCONTINUED | OUTPATIENT
Start: 2024-10-10 | End: 2024-10-10

## 2024-10-10 RX ORDER — ONDANSETRON 2 MG/ML
4 INJECTION INTRAMUSCULAR; INTRAVENOUS EVERY 6 HOURS PRN
Status: CANCELLED | OUTPATIENT
Start: 2024-10-10

## 2024-10-10 RX ORDER — BUDESONIDE 0.5 MG/2ML
0.5 INHALANT ORAL 2 TIMES DAILY
Status: DISCONTINUED | OUTPATIENT
Start: 2024-10-10 | End: 2024-10-19

## 2024-10-10 RX ORDER — LOSARTAN POTASSIUM 50 MG/1
50 TABLET ORAL DAILY
Status: DISCONTINUED | OUTPATIENT
Start: 2024-10-11 | End: 2024-10-14

## 2024-10-10 RX ORDER — ENOXAPARIN SODIUM 100 MG/ML
30 INJECTION SUBCUTANEOUS DAILY
Status: CANCELLED | OUTPATIENT
Start: 2024-10-11

## 2024-10-10 RX ORDER — LOSARTAN POTASSIUM 50 MG/1
50 TABLET ORAL DAILY
Status: CANCELLED | OUTPATIENT
Start: 2024-10-11

## 2024-10-10 RX ORDER — MIRTAZAPINE 7.5 MG/1
7.5 TABLET, FILM COATED ORAL
Status: DISCONTINUED | OUTPATIENT
Start: 2024-10-10 | End: 2024-10-13

## 2024-10-10 RX ORDER — ACETAMINOPHEN 325 MG/1
650 TABLET ORAL EVERY 4 HOURS PRN
Status: CANCELLED | OUTPATIENT
Start: 2024-10-10

## 2024-10-10 RX ORDER — HEPARIN SODIUM 5000 [USP'U]/ML
5000 INJECTION, SOLUTION INTRAVENOUS; SUBCUTANEOUS EVERY 8 HOURS SCHEDULED
Status: DISCONTINUED | OUTPATIENT
Start: 2024-10-10 | End: 2024-10-10

## 2024-10-10 RX ORDER — ACETAMINOPHEN 325 MG/1
650 TABLET ORAL EVERY 6 HOURS SCHEDULED
Status: DISCONTINUED | OUTPATIENT
Start: 2024-10-10 | End: 2024-10-10

## 2024-10-10 RX ORDER — ONDANSETRON 2 MG/ML
4 INJECTION INTRAMUSCULAR; INTRAVENOUS EVERY 6 HOURS PRN
Status: DISCONTINUED | OUTPATIENT
Start: 2024-10-10 | End: 2024-10-19

## 2024-10-10 RX ORDER — ACETAMINOPHEN 325 MG/1
650 TABLET ORAL EVERY 6 HOURS SCHEDULED
Status: DISCONTINUED | OUTPATIENT
Start: 2024-10-10 | End: 2024-10-11

## 2024-10-10 RX ORDER — PREDNISONE 2.5 MG/1
2.5 TABLET ORAL
Status: CANCELLED | OUTPATIENT
Start: 2024-10-11

## 2024-10-10 RX ORDER — BUDESONIDE 0.5 MG/2ML
0.5 INHALANT ORAL 2 TIMES DAILY
Status: CANCELLED | OUTPATIENT
Start: 2024-10-10

## 2024-10-10 RX ORDER — ALBUTEROL SULFATE 90 UG/1
2 INHALANT RESPIRATORY (INHALATION) EVERY 6 HOURS PRN
Status: DISCONTINUED | OUTPATIENT
Start: 2024-10-10 | End: 2024-10-19

## 2024-10-10 RX ORDER — PREDNISONE 2.5 MG/1
2.5 TABLET ORAL
Status: DISCONTINUED | OUTPATIENT
Start: 2024-10-11 | End: 2024-10-19

## 2024-10-10 RX ORDER — CHLORHEXIDINE GLUCONATE ORAL RINSE 1.2 MG/ML
15 SOLUTION DENTAL EVERY 12 HOURS SCHEDULED
Status: DISCONTINUED | OUTPATIENT
Start: 2024-10-10 | End: 2024-10-10

## 2024-10-10 RX ORDER — IPRATROPIUM BROMIDE AND ALBUTEROL SULFATE 2.5; .5 MG/3ML; MG/3ML
3 SOLUTION RESPIRATORY (INHALATION) EVERY 6 HOURS PRN
Status: DISCONTINUED | OUTPATIENT
Start: 2024-10-10 | End: 2024-10-19

## 2024-10-10 RX ORDER — LEVOTHYROXINE SODIUM 50 UG/1
50 TABLET ORAL
Status: CANCELLED | OUTPATIENT
Start: 2024-10-11

## 2024-10-10 RX ORDER — GABAPENTIN 100 MG/1
100 CAPSULE ORAL 3 TIMES DAILY
Status: DISCONTINUED | OUTPATIENT
Start: 2024-10-10 | End: 2024-10-13

## 2024-10-10 RX ORDER — IPRATROPIUM BROMIDE AND ALBUTEROL SULFATE 2.5; .5 MG/3ML; MG/3ML
3 SOLUTION RESPIRATORY (INHALATION) EVERY 6 HOURS PRN
Status: CANCELLED | OUTPATIENT
Start: 2024-10-10

## 2024-10-10 RX ORDER — LEVOTHYROXINE SODIUM 50 UG/1
50 TABLET ORAL
Status: DISCONTINUED | OUTPATIENT
Start: 2024-10-11 | End: 2024-10-13

## 2024-10-10 RX ADMIN — BUDESONIDE 0.5 MG: 0.5 INHALANT RESPIRATORY (INHALATION) at 07:25

## 2024-10-10 RX ADMIN — LEVALBUTEROL HYDROCHLORIDE 1.25 MG: 1.25 SOLUTION RESPIRATORY (INHALATION) at 07:25

## 2024-10-10 RX ADMIN — ACETAMINOPHEN 650 MG: 325 TABLET, FILM COATED ORAL at 12:46

## 2024-10-10 RX ADMIN — LOSARTAN POTASSIUM 50 MG: 50 TABLET, FILM COATED ORAL at 08:23

## 2024-10-10 RX ADMIN — Medication 2 G: at 02:53

## 2024-10-10 RX ADMIN — PANTOPRAZOLE SODIUM 40 MG: 40 TABLET, DELAYED RELEASE ORAL at 05:12

## 2024-10-10 RX ADMIN — GABAPENTIN 100 MG: 100 CAPSULE ORAL at 08:23

## 2024-10-10 RX ADMIN — ACETAMINOPHEN 650 MG: 325 TABLET, FILM COATED ORAL at 17:30

## 2024-10-10 RX ADMIN — CHLORHEXIDINE GLUCONATE 15 ML: 1.2 RINSE ORAL at 20:48

## 2024-10-10 RX ADMIN — MIRTAZAPINE 7.5 MG: 7.5 TABLET, FILM COATED ORAL at 20:48

## 2024-10-10 RX ADMIN — IPRATROPIUM BROMIDE 0.5 MG: 0.5 SOLUTION RESPIRATORY (INHALATION) at 07:25

## 2024-10-10 RX ADMIN — ACETAMINOPHEN 650 MG: 325 TABLET, FILM COATED ORAL at 23:46

## 2024-10-10 RX ADMIN — CHLORHEXIDINE GLUCONATE 15 ML: 1.2 RINSE ORAL at 12:32

## 2024-10-10 RX ADMIN — IPRATROPIUM BROMIDE AND ALBUTEROL SULFATE 3 ML: 2.5; .5 SOLUTION RESPIRATORY (INHALATION) at 03:23

## 2024-10-10 RX ADMIN — LEVOTHYROXINE SODIUM 50 MCG: 50 TABLET ORAL at 05:12

## 2024-10-10 RX ADMIN — PREDNISONE 2.5 MG: 2.5 TABLET ORAL at 08:23

## 2024-10-10 RX ADMIN — CYANOCOBALAMIN TAB 1000 MCG 1000 MCG: 1000 TAB at 08:23

## 2024-10-10 RX ADMIN — ENOXAPARIN SODIUM 30 MG: 30 INJECTION SUBCUTANEOUS at 08:23

## 2024-10-10 RX ADMIN — GABAPENTIN 100 MG: 100 CAPSULE ORAL at 20:48

## 2024-10-10 RX ADMIN — GLYCERIN 1 DROP: .002; .002; .01 SOLUTION/ DROPS OPHTHALMIC at 20:44

## 2024-10-10 RX ADMIN — ACETAMINOPHEN 650 MG: 325 TABLET ORAL at 05:12

## 2024-10-10 RX ADMIN — SODIUM CHLORIDE 75 ML/HR: 0.9 INJECTION, SOLUTION INTRAVENOUS at 05:22

## 2024-10-10 RX ADMIN — LIDOCAINE 1 PATCH: 700 PATCH TOPICAL at 12:32

## 2024-10-10 RX ADMIN — GABAPENTIN 100 MG: 100 CAPSULE ORAL at 17:30

## 2024-10-10 NOTE — NURSING NOTE
Pt transferred to [facility] ICU. Report called and given to accepting nurse Loyda. All belongings given to pt. Continues on Bipap - 98%. No s/s of resp distress or sob at this time.

## 2024-10-10 NOTE — ED PROVIDER NOTES
Final diagnoses:   Neurologic gait dysfunction     ED Disposition       ED Disposition   Admit    Condition   Stable    Date/Time   Wed Oct 9, 2024 10:31 AM    Comment   Case was discussed with curtis and the patient's admission status was agreed to be Admission Status: inpatient status to the service of Dr. Corona .               Assessment & Plan       Medical Decision Making  Patient was seen by myself and attending.  Upon arrival to rapid response patient appeared in respiratory distress with tachypnea and shallow respirations.  Per respiratory therapy patient appeared to improve with BiPAP.  Patient was brought down to the emergency department for evaluation of encephalopathy and shortness of breath.  Patient was evaluated in the emergency department and trialed off of BiPAP.  BiPAP was taken off and patient tolerated 2 L nasal cannula without difficulty patient was able to maintain saturations of 97% without distress.  Largely unremarkable laboratory evaluation.  CT head and chest unremarkable other than mild bilateral pleural effusions.  Unclear etiology of patient's symptoms today however after observation in the emergency department patient was able to follow commands and respond to orientation questions appropriately.  Patient was able to communicate appropriately with staff and did not appear to be in any acute respiratory distress.  Patient was readmitted to [address] [place] [address]    Amount and/or Complexity of Data Reviewed  Labs: ordered.  Radiology: ordered.    Risk  Prescription drug management.  Decision regarding hospitalization.             Medications   sodium chloride 0.9 % bolus 1,000 mL (has no administration in time range)   LORazepam (ATIVAN) injection 0.5 mg (0.5 mg Intravenous Given 10/9/24 0756)   ketorolac (TORADOL) injection 15 mg (15 mg Intravenous Given 10/9/24 0726)       ED Risk Strat Scores                           SBIRT 20yo+      Flowsheet Row Most Recent Value   Initial Alcohol Screen: US  "AUDIT-C     1. How often do you have a drink containing alcohol? 0 Filed at: 10/09/2024 0658   2. How many drinks containing alcohol do you have on a typical day you are drinking?  0 Filed at: 10/09/2024 0658   3b. FEMALE Any Age, or MALE 65+: How often do you have 4 or more drinks on one occassion? 0 Filed at: 10/09/2024 0658   Audit-C Score 0 Filed at: 10/09/2024 0658   MARIMAR: How many times in the past year have you...    Used an illegal drug or used a prescription medication for non-medical reasons? Never Filed at: 10/09/2024 0658                            History of Present Illness       Chief Complaint   Patient presents with    Medical Problem     Pt has several issues to report.  States she has not slept all night (the original reason for her call to [facility]), states she has a UTI and is having trouble holding her urine,  Reprots L arm pain, and is reporting her L leg \"feels like a thousand pounds\". Pt does not remember when any symptoms started.       Past Medical History:   Diagnosis Date    Anxiety 08/18/2024    Asthma     COPD (chronic obstructive pulmonary disease) (HCC)     Disease of thyroid gland     GERD (gastroesophageal reflux disease)     Hypertension 10/11/2018    Hypothyroid     Normocytic anemia 08/28/2024    Osteoarthritis 09/26/2012    Temporal arteritis (HCC)     Type 2 diabetes mellitus with complication, without long-term current use of insulin (HCC) 01/14/2020      Past Surgical History:   Procedure Laterality Date    EYE SURGERY Right 12/06/2019    cataract LVCFS    HYSTERECTOMY      NO PAST SURGERIES      ALSO NO RELEVANT PAST SURRGICAL HX AS PER [facility]      Family History   Problem Relation Age of Onset    Breast cancer Mother     Emphysema Father       Social History     Tobacco Use    Smoking status: Former     Current packs/day: 0.00     Average packs/day: 1 pack/day for 54.0 years (54.0 ttl pk-yrs)     Types: Cigarettes     Start date: 1959     Quit date: 2013     Years since " quittin.7    Smokeless tobacco: Never    Tobacco comments:     TOBACCO USE, NO PASSIVE SMOKE EXPOSURE   Vaping Use    Vaping status: Never Used   Substance Use Topics    Alcohol use: Never    Drug use: No      E-Cigarette/Vaping    E-Cigarette Use Never User       E-Cigarette/Vaping Substances    Nicotine No     THC No     CBD No     Flavoring No     Other No     Unknown No       I have reviewed and agree with the history as documented.     79-year-old female with history of hypertension, diabetes, COPD (on 2 L nasal cannula at home) currently admitted for neurologic dysfunction presents for evaluation after a rapid response for respiratory distress.  Patient was noted to be lethargic by nursing staff throughout the day today and had increased respiratory effort.  Patient was trialed on nasal cannula without relief and was ultimately placed on BiPAP.  I was contacted by nursing staff to evaluate the patient following patient being placed on BiPAP with acidosis noted on ABG      History provided by:  Patient   used: No        Review of Systems   Constitutional: Negative.  Negative for chills and fatigue.   HENT:  Negative for drooling, ear pain and sore throat.    Eyes:  Negative for photophobia and redness.   Respiratory:  Positive for shortness of breath. Negative for apnea and cough.    Cardiovascular:  Negative for chest pain.   Gastrointestinal:  Negative for abdominal pain, nausea and vomiting.   Genitourinary:  Negative for dysuria.   Musculoskeletal:  Positive for arthralgias (L shoulder pain). Negative for neck pain and neck stiffness.   Skin:  Negative for rash.   Neurological:  Negative for dizziness, tremors, syncope and weakness.   Psychiatric/Behavioral:  Negative for suicidal ideas.            Objective       ED Triage Vitals [10/09/24 0620]   Temperature Pulse Blood Pressure Respirations SpO2 Patient Position - Orthostatic VS   97.8 °F (36.6 °C) 101 (!) 181/95 20 92 % Sitting       Temp Source Heart Rate Source BP Location FiO2 (%) Pain Score    Oral Monitor Left arm -- 10 - Worst Possible Pain      Vitals      Date and Time Temp Pulse SpO2 Resp BP Pain Score FACES Pain Rating User   10/10/24 0430 -- 109 -- -- -- -- -- RB   10/10/24 0415 -- 107 93 % 18 144/70 -- -- RB   10/10/24 0400 -- 106 -- -- 138/66 -- -- RB   10/10/24 0345 -- 108 -- -- 137/55 -- -- RB   10/10/24 0330 -- 101 -- -- 133/74 -- -- RB   10/10/24 0327 -- 97 97 % 20 143/68 -- -- RB   10/10/24 0230 -- 108 94 % 27 123/65 -- -- KR   10/10/24 0200 -- 116 92 % 30 117/66 -- -- KR   10/10/24 0125 -- 109 94 % 30 106/58 -- -- KR   10/10/24 0104 97.6 °F (36.4 °C) 109 90 % 35 105/54 -- -- MB   10/10/24 0050 -- -- 94 % -- -- -- -- CB   10/09/24 2331 99.1 °F (37.3 °C) 54 92 % 36 155/69 -- -- SR   10/09/24 2155 -- -- -- -- -- 10 - Worst Possible Pain -- MB   10/09/24 2045 -- -- -- -- -- 3 -- MB   10/09/24 1916 -- -- 96 % -- -- -- -- CB   10/09/24 1700 -- -- 99 % -- -- -- -- NM   10/09/24 1654 -- -- 92 % -- -- -- -- NM   10/09/24 1554 98.3 °F (36.8 °C) 106 90 % 22 127/62 -- -- CM   10/09/24 1407 -- -- -- -- -- 6 -- NM   10/09/24 1337 -- 64 93 % -- 124/81 -- -- NM   10/09/24 1100 97.2 °F (36.2 °C) 94 95 % 18 141/74 No Pain -- NM   10/09/24 1056 -- 84 100 % 18 112/53 No Pain sleeping -- LB   10/09/24 0858 -- -- -- -- -- No Pain sleeping -- LB   10/09/24 0857 -- 82 99 % 18 134/68 -- -- LB   10/09/24 0749 -- -- -- -- -- 10 - Worst Possible Pain -- LB   10/09/24 0620 97.8 °F (36.6 °C) 101 92 % 20 181/95 10 - Worst Possible Pain -- TR            Physical Exam  Constitutional:       General: She is not in acute distress.     Appearance: She is well-developed. She is not diaphoretic.   Eyes:      Pupils: Pupils are equal, round, and reactive to light.   Cardiovascular:      Rate and Rhythm: Normal rate and regular rhythm.   Pulmonary:      Effort: Respiratory distress present.      Breath sounds: Normal breath sounds.      Comments: Tachypnea,  shallow respirations on bipap   Abdominal:      General: Bowel sounds are normal. There is no distension.      Palpations: Abdomen is soft.   Musculoskeletal:         General: Normal range of motion.      Cervical back: Normal range of motion and neck supple.   Skin:     General: Skin is warm and dry.   Neurological:      Mental Status: She is alert and oriented to person, place, and time.         Results Reviewed       Procedure Component Value Units Date/Time    Urine Microscopic [[id number]]  (Abnormal) Collected: 10/09/24 0809    Lab Status: Final result Specimen: Urine, Clean Catch Updated: 10/09/24 0825     RBC, UA 4-10 /hpf      WBC, UA 1-2 /hpf      Epithelial Cells Occasional /hpf      Bacteria, UA Occasional /hpf      Hyaline Casts, UA 0-1 /lpf      MUCUS THREADS Occasional    UA w Reflex to Microscopic w Reflex to Culture [[id number]]  (Abnormal) Collected: 10/09/24 0809    Lab Status: Final result Specimen: Urine, Clean Catch Updated: 10/09/24 0821     Color, UA Yellow     Clarity, UA Clear     Specific Gravity, UA 1.020     pH, UA 6.0     Leukocytes, UA 25.0     Nitrite, UA Negative     Protein, UA 30 (1+) mg/dl      Glucose, UA Negative mg/dl      Ketones, UA 15 (1+) mg/dl      Bilirubin, UA Negative     Occult Blood, UA 25.0     UROBILINOGEN UA Negative mg/dL     TSH, 3rd generation [[id number]]  (Normal) Collected: 10/09/24 0640    Lab Status: Final result Specimen: Blood from Arm, Left Updated: 10/09/24 0718     TSH 3RD GENERATON 0.848 uIU/mL     Comprehensive metabolic panel [[id number]]  (Abnormal) Collected: 10/09/24 0640    Lab Status: Final result Specimen: Blood from Arm, Left Updated: 10/09/24 0707     Sodium 132 mmol/L      Potassium 4.6 mmol/L      Chloride 88 mmol/L      CO2 39 mmol/L      ANION GAP 5 mmol/L      BUN 19 mg/dL      Creatinine 0.49 mg/dL      Glucose 114 mg/dL      Calcium 9.6 mg/dL      AST 24 U/L      ALT 19 U/L      Alkaline Phosphatase 44 U/L      Total Protein 6.9 g/dL       Albumin 4.4 g/dL      Total Bilirubin 0.46 mg/dL      eGFR 92 ml/min/1.73sq m     Narrative:      National Kid[employer] Foundation guidelines for Chronic Kidney Disease (CKD):     Stage 1 with normal or high GFR (GFR > 90 mL/min/1.73 square meters)    Stage 2 Mild CKD (GFR = 60-89 mL/min/1.73 square meters)    Stage 3A Moderate CKD (GFR = 45-59 mL/min/1.73 square meters)    Stage 3B Moderate CKD (GFR = 30-44 mL/min/1.73 square meters)    Stage 4 Severe CKD (GFR = 15-29 mL/min/1.73 square meters)    Stage 5 End Stage CKD (GFR <15 mL/min/1.73 square meters)  Note: GFR calculation is accurate only with a steady state creatinine    Ammonia [[id number]]  (Abnormal) Collected: 10/09/24 0640    Lab Status: Final result Specimen: Blood from Arm, Left Updated: 10/09/24 0705     Ammonia 11 umol/L     CBC and differential [[id number]]  (Abnormal) Collected: 10/09/24 0640    Lab Status: Final result Specimen: Blood from Arm, Left Updated: 10/09/24 0649     WBC 6.41 Thousand/uL      RBC 4.45 Million/uL      Hemoglobin 12.9 g/dL      Hematocrit 41.1 %      MCV 92 fL      MCH 29.0 pg      MCHC 31.4 g/dL      RDW 13.1 %      MPV 9.3 fL      Platelets 192 Thousands/uL      nRBC 0 /100 WBCs      Segmented % 74 %      Immature Grans % 0 %      Lymphocytes % 8 %      Monocytes % 12 %      Eosinophils Relative 5 %      Basophils Relative 1 %      Absolute Neutrophils 4.73 Thousands/µL      Absolute Immature Grans 0.01 Thousand/uL      Absolute Lymphocytes 0.51 Thousands/µL      Absolute Monocytes 0.78 Thousand/µL      Eosinophils Absolute 0.34 Thousand/µL      Basophils Absolute 0.04 Thousands/µL     Fingerstick Glucose (POCT) [[id number]]  (Normal) Collected: 10/09/24 0625    Lab Status: Final result Specimen: Blood Updated: 10/09/24 0626     POC Glucose 103 mg/dl             CT chest without contrast   Final Interpretation by Bashir Baumann MD (10/10 0351)      1.  Possible airway debris, which would be consistent with aspiration, see  discussion   2.  Trace pleural effusions   3.  Stable right breast lesion, recommend follow-up diagnostic mammogram      The study was marked in EPIC for immediate notification.         Workstation performed: [id number]         CT head without contrast   Final Interpretation by Bashir Baumann MD (10/10 0333)      No acute intracranial abnormality.                  Workstation performed: [id number]         XR shoulder 2+ views LEFT   Final Interpretation by Bashir Baumann MD (10/10 0356)      No acute osseous abnormality               Workstation performed: [id number]         XR shoulder 2+ vw left   Final Interpretation by Bashir Baumann MD (10/10 0356)      No acute osseous abnormality               Workstation performed: [id number]         XR chest portable   Final Interpretation by Bashir Baumann MD (10/10 0353)      No acute cardiopulmonary disease.            Workstation performed: [id number]         CTA head and neck with and without contrast   Final Interpretation by Renzo Solorio MD (10/09 0916)      CT Brain:  No acute intracranial abnormality.      CT Angiography:   1. No acute process on CT angiogram of the head and neck.   2. No vessel stenosis seen.   3. 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 Recommend neurovascular follow-up.      The study was marked in EPIC for immediate notification.            Workstation performed: [id number]             ECG 12 Lead Documentation Only    Date/Time: 10/10/2024 1:10 AM    Performed by: Angela Waller PA-C  Authorized by: Angela Waller PA-C    Indications / Diagnosis:  SOB  ECG reviewed by me, the ED Provider: yes    Patient location:  ED  Interpretation:     Interpretation: normal    Rate:     ECG rate:  104    ECG rate assessment: tachycardic    Rhythm:     Rhythm: sinus tachycardia    Ectopy:     Ectopy: none    QRS:     QRS axis:  Normal    QRS intervals:  Normal  Conduction:     Conduction: normal    ST segments:     ST segments:  Normal  T waves:     T  waves: normal        ED Medication and Procedure Management   Prior to Admission Medications   Prescriptions Last Dose Informant Patient Reported? Taking?   BLACK ELDERBERRY PO   Yes No   Sig: Take 2 tablets by mouth daily.   Calcium Carb-Cholecalciferol (CALCIUM 600/VITAMIN D3 PO)   Yes No   Sig: Take 1 tablet by mouth daily.   Humidifier MISC   No No   Sig: Use if needed (Attach to Oxygen concentrator)   Multiple Vitamins-Minerals (PreserVision AREDS 2) CAPS   No No   Sig: Take 1 capsule by mouth in the morning   acetaminophen (TYLENOL) 500 mg tablet  Self No No   Sig: Take 1 tablet (500 mg total) by mouth every 6 (six) hours as needed (pain fevers)   albuterol (PROVENTIL HFA,VENTOLIN HFA) 90 mcg/act inhaler  Self No No   Sig: Inhale 2 puffs every 6 (six) hours as needed for wheezing or shortness of breath   ascorbic acid (VITAMIN C) 500 mg tablet   Yes No   Sig: Take 500 mg by mouth daily.   aspirin (ECOTRIN LOW STRENGTH) 81 mg EC tablet  Self No No   Sig: Take 1 tablet (81 mg total) by mouth daily   budesonide (Pulmicort) 0.5 mg/2 mL nebulizer solution   No No   Sig: Take 2 mL (0.5 mg total) by nebulization 2 (two) times a day Rinse mouth after use. May take this right after taking levabuterol/ipratropium.   busPIRone (BUSPAR) 7.5 mg tablet   No No   Sig: Take 1 tablet (7.5 mg total) by mouth 3 (three) times a day   carboxymethylcellulose (Refresh Tears) 0.5 % SOLN   Yes No   Sig: Administer 2 drops to both eyes daily.   ipratropium (ATROVENT) 0.02 % nebulizer solution   No No   Sig: Take 2.5 mL (0.5 mg total) by nebulization 2 (two) times a day   levalbuterol (XOPENEX) 1.25 mg/3 mL nebulizer solution  Self Yes No   Sig: Take 1.25 mg by nebulization 2 (two) times a day   levothyroxine 50 mcg tablet  Self No No   Sig: TAKE ONE TABLET BY MOUTH ONCE DAILY   nitrofurantoin (MACROBID) 100 mg capsule   No No   Sig: Take 1 capsule (100 mg total) by mouth daily after lunch   oxygen gas   Yes No   Sig: Inhale 2 L/min  continuous. via nasal cannula.    pantoprazole (PROTONIX) 40 mg tablet   No No   Sig: TAKE ONE TABLET BY MOUTH ONCE DAILY   predniSONE 2.5 mg tablet   No No   Sig: TAKE ONE TABLET BY MOUTH ONCE DAILY WITH BREAKFAST   propranolol (INDERAL) 10 mg tablet   No No   Sig: Take 1 tablet (10 mg total) by mouth 2 (two) times a day   sodium chloride (OCEAN) 0.65 % nasal spray   No No   Si spray into each nostril 3 (three) times a day   triamcinolone (KENALOG) 0.1 % cream   No No   Sig: Use on Q Tip in left ear, twice daily   valsartan (DIOVAN) 160 mg tablet  Self No No   Sig: Take 1 tablet (160 mg total) by mouth daily Please STOP Lisinopril,..   vitamin B-12 (VITAMIN B-12) 1,000 mcg tablet   Yes No   Sig: Take 1,000 mcg by mouth daily.      Facility-Administered Medications: None     Current Discharge Medication List        CONTINUE these medications which have NOT CHANGED    Details   acetaminophen (TYLENOL) 500 mg tablet Take 1 tablet (500 mg total) by mouth every 6 (six) hours as needed (pain fevers)  Qty: 30 tablet, Refills: 0    Associated Diagnoses: Influenza A      albuterol (PROVENTIL HFA,VENTOLIN HFA) 90 mcg/act inhaler Inhale 2 puffs every 6 (six) hours as needed for wheezing or shortness of breath  Qty: 18 g, Refills: 2    Comments: Substitution to a formulary equivalent within the same pharmaceutical class is authorized.  Associated Diagnoses: Pulmonary emphysema, unspecified emphysema type (HCC)      ascorbic acid (VITAMIN C) 500 mg tablet Take 500 mg by mouth daily.      aspirin (ECOTRIN LOW STRENGTH) 81 mg EC tablet Take 1 tablet (81 mg total) by mouth daily  Qty: 90 tablet, Refills: 3    Associated Diagnoses: Acute exacerbation of chronic obstructive pulmonary disease (COPD) (HCC); Pulmonary emphysema, unspecified emphysema type (HCC)      BLACK ELDERBERRY PO Take 2 tablets by mouth daily.      budesonide (Pulmicort) 0.5 mg/2 mL nebulizer solution Take 2 mL (0.5 mg total) by nebulization 2 (two) times a  day Rinse mouth after use. May take this right after taking levabuterol/ipratropium.  Qty: 120 mL, Refills: 0    Associated Diagnoses: COPD, severe (HCC)      busPIRone (BUSPAR) 7.5 mg tablet Take 1 tablet (7.5 mg total) by mouth 3 (three) times a day  Qty: 90 tablet, Refills: 3    Associated Diagnoses: Anxiety      Calcium Carb-Cholecalciferol (CALCIUM 600/VITAMIN D3 PO) Take 1 tablet by mouth daily.      carboxymethylcellulose (Refresh Tears) 0.5 % SOLN Administer 2 drops to both eyes daily.      Humidifier MISC Use if needed (Attach to Oxygen concentrator)  Qty: 1 each, Refills: 0    Associated Diagnoses: Bloody nose      ipratropium (ATROVENT) 0.02 % nebulizer solution Take 2.5 mL (0.5 mg total) by nebulization 2 (two) times a day    Associated Diagnoses: Acute exacerbation of chronic obstructive pulmonary disease (COPD) (HCC)      levalbuterol (XOPENEX) 1.25 mg/3 mL nebulizer solution Take 1.25 mg by nebulization 2 (two) times a day      levothyroxine 50 mcg tablet TAKE ONE TABLET BY MOUTH ONCE DAILY  Qty: 30 tablet, Refills: 5    Associated Diagnoses: Hypothyroidism due to Hashimoto's thyroiditis      Multiple Vitamins-Minerals (PreserVision AREDS 2) CAPS Take 1 capsule by mouth in the morning  Qty: 30 capsule, Refills: 0    Associated Diagnoses: COPD exacerbation (HCC)      nitrofurantoin (MACROBID) 100 mg capsule Take 1 capsule (100 mg total) by mouth daily after lunch  Qty: 30 capsule, Refills: 2    Associated Diagnoses: Urinary frequency      oxygen gas Inhale 2 L/min continuous. via nasal cannula.       pantoprazole (PROTONIX) 40 mg tablet TAKE ONE TABLET BY MOUTH ONCE DAILY  Qty: 90 tablet, Refills: 1    Associated Diagnoses: Gastroesophageal reflux disease      predniSONE 2.5 mg tablet TAKE ONE TABLET BY MOUTH ONCE DAILY WITH BREAKFAST  Qty: 30 tablet, Refills: 1    Associated Diagnoses: Temporal arteritis (HCC)      propranolol (INDERAL) 10 mg tablet Take 1 tablet (10 mg total) by mouth 2 (two) times a  day  Qty: 60 tablet, Refills: 5    Associated Diagnoses: Anxiety      sodium chloride (OCEAN) 0.65 % nasal spray 1 spray into each nostril 3 (three) times a day  Qty: 15 mL, Refills: 3    Associated Diagnoses: Bleeding nose      triamcinolone (KENALOG) 0.1 % cream Use on Q Tip in left ear, twice daily  Qty: 15 g, Refills: 1    Associated Diagnoses: Chronic eczematous otitis externa of left ear      valsartan (DIOVAN) 160 mg tablet Take 1 tablet (160 mg total) by mouth daily Please STOP Lisinopril,..  Qty: 90 tablet, Refills: 3    Associated Diagnoses: Acute exacerbation of COPD with asthma (HCC)      vitamin B-12 (VITAMIN B-12) 1,000 mcg tablet Take 1,000 mcg by mouth daily.           No discharge procedures on file.  ED SEPSIS DOCUMENTATION   Time reflects when diagnosis was documented in both MDM as applicable and the Disposition within this note       Time User Action Codes Description Comment    10/9/2024 10:32 AM Deven Atkinson Add [R26.9] Neurologic gait dysfunction     10/9/2024 10:32 AM Deven Atkinson Remove [R26.9] Neurologic gait dysfunction     10/9/2024 10:32 AM Deven Atkinson Add [R26.9] Neurologic gait dysfunction                  Angela Waller PA-C  10/10/24 0581

## 2024-10-10 NOTE — PLAN OF CARE
Problem: RESPIRATORY - ADULT  Goal: Achieves optimal ventilation and oxygenation  Description: INTERVENTIONS:  - Assess for changes in respiratory status  - Assess for changes in mentation and behavior  - Position to facilitate oxygenation and minimize respiratory effort  - Oxygen administered by appropriate delivery if ordered  - Initiate smoking cessation education as indicated  - Encourage broncho-pulmonary hygiene including cough, deep breathe, Incentive Spirometry  - Assess the need for suctioning and aspirate as needed  - Assess and instruct to report SOB or any respiratory difficulty  - Respiratory Therapy support as indicated  Outcome: Progressing     Problem: SAFETY ADULT  Goal: Patient will remain free of falls  Description: INTERVENTIONS:  - Educate patient/family on patient safety including physical limitations  - Instruct patient to call for assistance with activity   - Consult OT/PT to assist with strengthening/mobility   - Keep Call bell within reach  - Keep bed low and locked with side rails adjusted as appropriate  - Keep care items and personal belongings within reach  - Initiate and maintain comfort rounds  - Make Fall Risk Sign visible to staff  - Offer Toileting every  Hours, in advance of need  - Initiate/Maintain alarm  - Obtain necessary fall risk management equipment:   - Apply yellow socks and bracelet for high fall risk patients  - Consider moving patient to room near nurses station  Outcome: Progressing     Problem: Nutrition/Hydration-ADULT  Goal: Nutrient/Hydration intake appropriate for improving, restoring or maintaining nutritional needs  Description: Monitor and assess patient's nutrition/hydration status for malnutrition. Collaborate with interdisciplinary team and initiate plan and interventions as ordered.  Monitor patient's weight and dietary intake as ordered or per policy. Utilize nutrition screening tool and intervene as necessary. Determine patient's food preferences and  provide high-protein, high-caloric foods as appropriate.     INTERVENTIONS:  - Monitor oral intake, urinary output, labs, and treatment plans  - Assess nutrition and hydration status and recommend course of action  - Evaluate amount of meals eaten  - Assist patient with eating if necessary   - Allow adequate time for meals  - Recommend/ encourage appropriate diets, oral nutritional supplements, and vitamin/mineral supplements  - Order, calculate, and assess calorie counts as needed  - Recommend, monitor, and adjust tube feedings and TPN/PPN based on assessed needs  - Assess need for intravenous fluids  - Provide specific nutrition/hydration education as appropriate  - Include patient/family/caregiver in decisions related to nutrition  Outcome: Progressing

## 2024-10-10 NOTE — UTILIZATION REVIEW
"Initial Clinical Review    Admission: Date/Time/Statement:   Admission Orders (From admission, onward)       Ordered        10/09/24 1033  INPATIENT ADMISSION  Once                          Orders Placed This Encounter   Procedures    INPATIENT ADMISSION     Standing Status:   Standing     Number of Occurrences:   1     Order Specific Question:   Level of Care     Answer:   Med Surg [16]     Order Specific Question:   Estimated length of stay     Answer:   More than 2 Midnights     Order Specific Question:   Certification     Answer:   I certify that inpatient services are medically necessary for this patient for a duration of greater than two midnights. See H&P and MD Progress Notes for additional information about the patient's course of treatment.     ED Arrival Information       Expected   -    Arrival   10/9/2024 06:07    Acuity   Emergent              Means of arrival   Ambulance    Escorted by   [facility] ([facility])    Service   Hospitalist    Admission type   Emergency              Arrival complaint   cold symptoms             Chief Complaint   Patient presents with    Medical Problem     Pt has several issues to report.  States she has not slept all night (the original reason for her call to [facility]), states she has a UTI and is having trouble holding her urine,  Reprots L arm pain, and is reporting her L leg \"feels like a thousand pounds\". Pt does not remember when any symptoms started.       Initial Presentation:  79 yof to ER from home for generalized weakness, poor appetite/intake, diarrhea, heavy feeling on L side. Recent increase in Buspirone dose due to anxiety & depression approx 3 weeks ago. Hx severe COPD with chronic hypoxic respiratory failure, HTN, hypothyroidism, temporal arteritis, DM2, Raynaud's phenomenon. Presents diffusely weak, alert, agitated, tenderness about the L shoulder.  Admission CT brain neg. L shoulder xray neg. Cxr neg. Labs: Na 132, Cl 88, CO2 39, Cr 0.49, ammonia " 11, u/a+ketones, blood, prot, leuk, RBC.   Admitted to inpatient status for acute on chronic respiratory failure with hypoxia & hypercapnia.     Date: 10/10/24   Day 2:   Rapid response called.   Pt tachypneic and lethargic, RR 36. ABG ordered and came back critical. Pt was placed on Bipap and RRT was called.   Pt transferred to [facility] ICU on BIPAP.        ED Treatment-Medication Administration from 10/09/2024 0607 to 10/09/2024 1125         Date/Time Order Dose Route Action     10/09/2024 0856 sodium chloride 0.9 % bolus 1,000 mL 1,000 mL Intravenous New Bag     10/09/2024 0748 LORazepam (ATIVAN) injection 0.5 mg 0.5 mg Intravenous Given     10/09/2024 0749 ketorolac (TORADOL) injection 15 mg 15 mg Intravenous Given     10/09/2024 0846 iohexol (OMNIPAQUE) 350 MG/ML injection (MULTI-DOSE) 85 mL 85 mL Intravenous Given            Scheduled Medications:  No current facility-administered medications for this encounter.    Continuous IV Infusions:  No current facility-administered medications for this encounter.    PRN Meds:  No current facility-administered medications for this encounter.    ED Triage Vitals [10/09/24 0620]   Temperature Pulse Respirations Blood Pressure SpO2 Pain Score   97.8 °F (36.6 °C) 101 20 (!) 181/95 92 % 10 - Worst Possible Pain     Weight (last 2 days) before discharge       Date/Time Weight    10/10/24 0512 46.3 (102.07)    10/10/24 0104 44 (97)    10/09/24 0620 44 (97)            Vital Signs (last 3 days) before discharge       Date/Time Temp Pulse Resp BP MAP (mmHg) SpO2 Calculated FIO2 (%) - Nasal Cannula Nasal Cannula O2 Flow Rate (L/min) O2 Device O2 Interface Device Patient Position - Orthostatic VS [place] Coma Scale Score Pain    10/10/24 0800 -- -- -- -- -- -- -- -- -- -- -- 14 No Pain    10/10/24 0735 98.2 °F (36.8 °C) 96 20 125/58 66 97 % 160 35 L/min BiPAP -- Lying -- --    10/10/24 0725 -- -- -- -- -- 93 % -- -- -- Full face mask -- -- --    10/10/24 0723 -- -- -- -- --  98 % -- -- BiPAP -- -- -- --    10/10/24 0515 -- -- -- -- -- -- -- -- -- -- -- 15 --    10/10/24 0512 99.5 °F (37.5 °C) 110 19 141/69 89 93 % 28 2 L/min Nasal cannula -- Lying -- 10 - Worst Possible Pain    10/10/24 0430 -- 109 -- -- -- -- -- -- -- -- -- -- --    10/10/24 0415 -- 107 18 144/70 -- 93 % -- -- Nasal cannula -- -- -- --    10/10/24 0400 -- 106 -- 138/66 -- -- -- -- Nasal cannula -- -- -- --    10/10/24 0345 -- 108 -- 137/55 -- -- -- -- Nasal cannula -- -- -- --    10/10/24 0330 -- 101 -- 133/74 -- -- -- -- -- -- -- -- --    10/10/24 03:27:03 -- 97 20 143/68 -- 97 % -- -- -- -- -- -- --    10/10/24 02:57:23 -- -- -- -- -- -- -- -- -- -- -- 15 --    10/10/24 0231 -- -- -- -- -- -- -- -- Nasal cannula -- -- -- --    10/10/24 0230 -- 108 27 123/65 -- 94 % -- -- Nasal cannula -- Lying -- --    10/10/24 0200 -- 116 30 117/66 -- 92 % -- -- BiPAP -- Lying -- --    10/10/24 0125 -- 109 30 106/58 -- 94 % -- -- BiPAP -- Lying -- --    10/10/24 0104 97.6 °F (36.4 °C) 109 35 105/54 -- 90 % -- -- BiPAP -- Lying -- --    10/10/24 0050 -- -- -- -- -- 94 % -- -- -- Face mask -- -- --    10/09/24 2331 99.1 °F (37.3 °C) 54 36 155/69 -- 92 % 32 3 L/min Nasal cannula -- Lying -- --    10/09/24 2155 -- -- -- -- -- -- -- -- -- -- -- -- 10 - Worst Possible Pain    10/09/24 2045 -- -- -- -- -- -- -- -- -- -- -- 15 3    10/09/24 1916 -- -- -- -- -- 96 % 32 3 L/min Nasal cannula -- -- -- --    10/09/24 1700 -- -- -- -- -- 99 % 32 3 L/min Nasal cannula -- -- -- --    10/09/24 1654 -- -- -- -- -- 92 % 36 4 L/min Nasal cannula -- -- -- --    10/09/24 1554 98.3 °F (36.8 °C) 106 22 127/62 -- 90 % 28 2 L/min Nasal cannula -- Lying -- --    10/09/24 1407 -- -- -- -- -- -- -- -- -- -- -- -- 6    10/09/24 1405 -- -- -- -- -- -- -- -- -- -- -- 15 --    10/09/24 1337 -- 64 -- 124/81 89 93 % 28 2 L/min None (Room air) -- Lying -- --    10/09/24 1100 97.2 °F (36.2 °C) 94 18 141/74 91 95 % 36 4 L/min Nasal cannula -- Lying -- No Pain     10/09/24 1056 -- 84 18 112/53 -- 100 % 28 2 L/min Nasal cannula -- -- -- No Pain    10/09/24 0858 -- -- -- -- -- -- -- -- -- -- -- -- No Pain    10/09/24 0857 -- 82 18 134/68 -- 99 % 28 2 L/min Nasal cannula -- -- -- --    10/09/24 0749 -- -- -- -- -- -- -- -- -- -- -- -- 10 - Worst Possible Pain    10/09/24 0702 -- -- -- -- -- -- -- -- -- -- -- 15 --    10/09/24 0659 -- -- -- -- -- -- -- -- Nasal cannula -- -- -- --    10/09/24 0620 97.8 °F (36.6 °C) 101 20 181/95 -- 92 % 28 2 L/min Nasal cannula -- Sitting -- 10 - Worst Possible Pain              Pertinent Labs/Diagnostic Test Results:   Radiology:  CT chest without contrast   Final Interpretation by Bashir Baumann MD (10/10 0351)      1.  Possible airway debris, which would be consistent with aspiration, see discussion   2.  Trace pleural effusions   3.  Stable right breast lesion, recommend follow-up diagnostic mammogram      The study was marked in EPIC for immediate notification.         Workstation performed: [id number]         CT head without contrast   Final Interpretation by Bashir Baumann MD (10/10 0333)      No acute intracranial abnormality.                  Workstation performed: [id number]         XR shoulder 2+ views LEFT   Final Interpretation by Bashir Baumann MD (10/10 0356)      No acute osseous abnormality               Workstation performed: [id number]         XR shoulder 2+ vw left   Final Interpretation by Bashir Baumann MD (10/10 0356)      No acute osseous abnormality               Workstation performed: [id number]         XR chest portable   Final Interpretation by Bashir Baumann MD (10/10 0353)      No acute cardiopulmonary disease.            Workstation performed: [id number]         CTA head and neck with and without contrast   Final Interpretation by Renzo Solorio MD (10/09 0916)      CT Brain:  No acute intracranial abnormality.      CT Angiography:   1. No acute process on CT angiogram of the head and neck.   2. No vessel stenosis  "seen.   3. 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 Recommend neurovascular follow-up.      The study was marked in EPIC for immediate notification.            Workstation performed: [id number]           Cardiology:  ECG 12 lead   Final Result by Jonnathan Pardo MD (10/10 0801)   Sinus tachycardia   Otherwise normal ECG   When compared with ECG of 09-OCT-2024 06:29,   No significant change was found   Confirmed by Jonnathan Pardo ([id number]) on 10/10/2024 8:01:22 AM        GI:  No orders to display           Results from last 7 days   Lab Units 10/10/24  1102 10/10/24  0556 10/10/24  0137 10/09/24  0640   WBC Thousand/uL 7.20 7.18 6.96 6.41   HEMOGLOBIN g/dL 11.8 10.8* 11.8 12.9   HEMATOCRIT % 39.7 34.1* 39.6 41.1   PLATELETS Thousands/uL 159 159 181 192   TOTAL NEUT ABS Thousands/µL 5.71 5.72 5.60 4.73         Results from last 7 days   Lab Units 10/10/24  1102 10/10/24  0556 10/10/24  0137 10/09/24  0640 10/04/24  1015   SODIUM mmol/L  --  132* 132* 132* 130*   POTASSIUM mmol/L  --  4.3 4.7 4.6 4.4   CHLORIDE mmol/L  --  94* 93* 88* 86*   CO2 mmol/L  --  33* 36* 39* 39*   ANION GAP mmol/L  --  5 3* 5 5   BUN mg/dL  --  15 16 19 16   CREATININE mg/dL  --  0.47* 0.46* 0.49* 0.43*   EGFR ml/min/1.73sq m  --  94 94 92 97   CALCIUM mg/dL  --  8.5 9.0 9.6 9.3   CALCIUM, IONIZED mmol/L 1.14  --   --   --   --    MAGNESIUM mg/dL  --   --   --   --  1.9     Results from last 7 days   Lab Units 10/10/24  0137 10/09/24  0640 10/04/24  1015   AST U/L 21 24 24   ALT U/L 16 19 16   ALK PHOS U/L 40 44 44   TOTAL PROTEIN g/dL 5.9* 6.9 6.6   ALBUMIN g/dL 3.7 4.4 4.2   TOTAL BILIRUBIN mg/dL 0.25 0.46 0.44   AMMONIA umol/L 21 11*  --      Results from last 7 days   Lab Units 10/10/24  0104 10/09/24  0625   POC GLUCOSE mg/dl 80 103     Results from last 7 days   Lab Units 10/10/24  0556 10/10/24  0137 10/09/24  0640   GLUCOSE RANDOM mg/dL 87 94 114             No results found for: \"BETA-HYDROXYBUTYRATE\"   Results from last 7 days   Lab " Units 10/10/24  0021   PH ART  7.178*   PCO2 ART mm Hg 83.3*   PO2 ART mm Hg 128.0   HCO3 ART mmol/L 30.3*   BASE EXC ART mmol/L 0.0   O2 CONTENT ART mL/dL 16.5   O2 HGB, ARTERIAL % 97.6*   ABG SOURCE  Radial, Right     Results from last 7 days   Lab Units 10/10/24  1102 10/10/24  0417 10/10/24  0137   PH YINKA  7.263* 7.462* 7.307   PCO2 YINKA mm Hg 76.8* 38.1* 67.7*   PO2 YINKA mm Hg 22.3* 200.8* 34.9*   HCO3 YINKA mmol/L 33.9* 26.6 33.1*   BASE EXC YINKA mmol/L 4.8 2.8 4.7   O2 CONTENT YINKA ml/dL 6.9 16.6 13.5   O2 HGB, VENOUS % 41.4* 91.0* 72.2             Results from last 7 days   Lab Units 10/10/24  0556 10/10/24  0344 10/10/24  0137   HS TNI 0HR ng/L  --   --  7   HS TNI 2HR ng/L  --  8  --    HSTNI D2 ng/L  --  1  --    HS TNI 4HR ng/L 8  --   --    HSTNI D4 ng/L 1  --   --              Results from last 7 days   Lab Units 10/09/24  0640 10/04/24  1015   TSH 3RD GENERATON uIU/mL 0.848 0.828     Results from last 7 days   Lab Units 10/10/24  0137   PROCALCITONIN ng/ml 0.20     Results from last 7 days   Lab Units 10/10/24  0137   LACTIC ACID mmol/L 0.6             Results from last 7 days   Lab Units 10/10/24  0137   BNP pg/mL 66                         Results from last 7 days   Lab Units 10/04/24  1015   CRP mg/L 2.4   SED RATE mm/hour 20             Results from last 7 days   Lab Units 10/09/24  0809 10/04/24  1015   CLARITY UA  Clear Clear   COLOR UA  Yellow Light Yellow   SPEC GRAV UA  1.020 1.016   PH UA  6.0 6.5   GLUCOSE UA mg/dl Negative Negative   KETONES UA mg/dl 15 (1+)* Negative   BLOOD UA  25.0* Negative   PROTEIN UA mg/dl 30 (1+)* Trace*   NITRITE UA  Negative Negative   BILIRUBIN UA  Negative Negative   UROBILINOGEN UA mg/dL Negative  --    UROBILINOGEN UA (BE) mg/dl  --  <2.0   LEUKOCYTES UA  25.0* Large*   WBC UA /hpf 1-2 10-20*   RBC UA /hpf 4-10* None Seen   BACTERIA UA /hpf Occasional None Seen   EPITHELIAL CELLS WET PREP /hpf Occasional Occasional   MUCUS THREADS  Occasional* Occasional*                                  Results from last 7 days   Lab Units 10/10/24  0137   BLOOD CULTURE  Received in Microbiology Lab. Culture in Progress.  Received in Microbiology Lab. Culture in Progress.                   Past Medical History:   Diagnosis Date    Anxiety 08/18/2024    Asthma     COPD (chronic obstructive pulmonary disease) (HCC)     Disease of thyroid gland     GERD (gastroesophageal reflux disease)     Hypertension 10/11/2018    Hypothyroid     Normocytic anemia 08/28/2024    Osteoarthritis 09/26/2012    Temporal arteritis ([facility])     Type 2 diabetes mellitus with complication, without long-term current use of insulin ([facility]) 01/14/2020     Present on Admission:   Hypertension   COPD, severe (HCC)   Gastroesophageal reflux disease without esophagitis   Other specified hypothyroidism   Vitamin D deficiency   Severe protein-calorie malnutrition (HCC)   Physical deconditioning   Anxiety/depression   Temporal arteritis ([facility])      Admitting Diagnosis: Unable to hold urine [R32]  Neurologic gait dysfunction [R26.9]  Left leg pain [M79.605]  Left arm pain [M79.602]  Unable to sleep [G47.00]  Age/Sex: 79 y.o. female    Network Utilization Review Department  ATTENTION: Please call with any questions or concerns to [phone] and carefully listen to the prompts so that you are directed to the right person. All voicemails are confidential.   For Discharge needs, contact Care Management DC Support Team at [phone] opt. [phone]  Send all requests for admission clinical reviews, approved or denied determinations and any other requests to dedicated fax number below belonging to the campus where the patient is receiving treatment. List of dedicated fax numbers for the Facilities:  FACILITY NAME UR FAX NUMBER   ADMISSION DENIALS (Administrative/Medical Necessity) [phone]   DISCHARGE SUPPORT TEAM (NETWORK) [phone]   PARENT CHILD HEALTH (Maternity/NICU/Pediatrics) [phone]   [facility]  [phone]   [facility] [phone]   [facility] [phone]   [facility] [phone]   [facility] [phone]   [facility] [phone]   [facility] [phone]   [facility] [phone]   [facility] [phone]   [facility] [phone]   [facility] [phone]   [facility] [phone]

## 2024-10-10 NOTE — CASE MANAGEMENT
Case Management Discharge Planning Note    Patient name Sarah Zayas  Location 7T [facility] 714/7T [facility] 714-01 MRN [id number]  : 1945 Date 10/10/2024       Current Admission Date: 10/9/2024  Current Admission Diagnosis:Acute on chronic respiratory failure with hypoxia and hypercapnia (HCC)   Patient Active Problem List    Diagnosis Date Noted Date Diagnosed    Acute on chronic respiratory failure with hypoxia and hypercapnia (HCC) 10/10/2024     Venous insufficiency of lower extremity 2024     Epigastric pain 2024     Advance care planning 2024     At risk for constipation 2024     At risk for delirium 2024     Physical deconditioning 2024     Periodontitis 2024     Polypharmacy 2024     Diastolic dysfunction 2024     Elevated vitamin B12 level 2024     Normocytic anemia 2024     Neck swelling 2024     Anxiety 2024     Severe protein-calorie malnutrition (HCC) 2024     COPD, severe (HCC) 2024     Abnormal CT scan 2023     Infectious sialoadenitis of major salivary gland 2023     Left upper lobe pulmonary nodule 2023     Postnasal drip 2023     Raynaud's phenomenon without gangrene 2022     Temporal arteritis (HCC) 2020     Hypertension 10/11/2018     Other specified hypothyroidism 2018     Gastroesophageal reflux disease without esophagitis 2018     Vitamin D deficiency 2012     Osteoarthritis 2012       LOS (days): 1  Geometric Mean LOS (GMLOS) (days): 2.2  Days to GMLOS:1.3     OBJECTIVE:  Risk of Unplanned Readmission Score: 30.82         Current admission status: Inpatient   Preferred Pharmacy:   [facility] - [place] - [address]  [address]  [address]  [place] [address]  Phone: [phone] Fax: [phone]    [facility] - [place] - [address]  [address] [address]  Phone:  [phone] Fax: [phone]    Primary Care Provider: Nicolas Nix MD    Primary Insurance: [facility] RE[facility]  Secondary Insurance:     DISCHARGE DETAILS:  Pt to be transferred to [facility].  CM completed Medical Necessity and provided it to pt's RN for transport.

## 2024-10-10 NOTE — ED ATTENDING ATTESTATION
10/9/2024  IKarl MD, saw and evaluated the patient. I have discussed the patient with the resident/non-physician practitioner and agree with the resident's/non-physician practitioner's findings, Plan of Care, and MDM as documented in the resident's/non-physician practitioner's note, except where noted. All available labs and Radiology studies were reviewed.  I was present for key portions of any procedure(s) performed by the resident/non-physician practitioner and I was immediately available to provide assistance.       At this point I agree with the current assessment done in the Emergency Department.  I have conducted an independent evaluation of this patient a history and physical is as follows:    This note is in regard to the patient's rapid response called 10/10/2024. Please see Naz Francisco' note and my attestation from 10/9/2024 for the initial hospital course leading to the admission.     HPI: 80 y/o F arriving to the ED from the floor after rapid response for tachypnea, fatigue. Pt admitted for generalized weakness. Pt has known COPD hx on 2L NC chronically. Pt currently is on bipap, follows commands, c/o left arm pain.       Gen: tired appearing, AA&Ox3  HEENT: PERRL, EOMI  Neck: supple  CV: tachycardic, regular  Lungs: globally decreased breath sounds, no wheezes, CTA B/L  Abdomen: soft, NT/ND  Ext: no swelling or deformity, no erythema or effusion to L shoulder or elbow.   Neuro: CN grossly intact, 5/5  strength b/l, pt has poor effort lifting L arm but is able to lift arm without drift to bed.   Skin: no rash        Critical Care Time  Procedures      MDM: 80 y/o F presenting with acute hypercapnic respiratory failure currently trialed on bipap. Normal fingerstick. Unclear exact etiology, possible 2/2 copd however no wheezing. Repeat VBG with improved acidosis. Trial off bipap. Pt is mentating normally although does appear very fatigued. Will repeat vbg off bipap, if acidosis  recurrent may require bipap vs intubation. CT head and chest. Sepsis w/u so far unremarkable. Pt doing well off bipap, stable for return to floor for ongoing monitoring.         ED Course         Critical Care Time  CriticalCare Time    Date/Time: 10/10/2024 2:44 AM    Performed by: Karl Arita MD  Authorized by: Karl Arita MD    Critical care provider statement:     Critical care time (minutes):  45    Critical care start time:  10/10/2024 2:00 AM    Critical care end time:  10/10/2024 2:45 AM    Critical care time was exclusive of:  Separately billable procedures and treating other patients    Critical care was necessary to treat or prevent imminent or life-threatening deterioration of the following conditions:  CNS failure or compromise and respiratory failure    Critical care was time spent personally by me on the following activities:  Obtaining history from patient or surrogate, evaluation of patient's response to treatment, examination of patient, ordering and performing treatments and interventions, ordering and review of laboratory studies, ordering and review of radiographic studies, re-evaluation of patient's condition and review of old charts    I assumed direction of critical care for this patient from another provider in my specialty: no           Alert/Awake

## 2024-10-10 NOTE — ASSESSMENT & PLAN NOTE
Patient also complaining of neck and shoulder pain-chronic pain ongoing for the past 1 year which has been worsening recently  Denies have any chest pain, shortness of breath  10/10/24 Xray L shoulder: normal  10/10 CT head: No acute intracranial abnormality.  No concerns for stroke at this time  Previous history of temporal arthritis - ESR/CRP normal  Also noted to have a previous history of sialoadenitis and associated neck edema and was seen by ENT in the past.  Patient is able to have purposeful movements in the bilateral upper extremities 5/5  strength bilaterally but has poor effort lifting the left arm without drift to bed.     Will trial lidocaine patch and Tylenol scheduled  Sling to stabilize   If pain does not improve and worsens would consider getting a CT soft tissue of the neck with contrast and MRI of the shoulder  Outpatient ENT recommendations- hydration and use of sialagogues (lemon drops, sour candy) is recommended.    Given the ongoing chronicity of the pain would consider outpatient orthopedics consult

## 2024-10-10 NOTE — CODE DOCUMENTATION
Pt was tachypneic and lethargic, RR 36. SLIM and Respiratory was notified. ABG ordered and came back critical. Pt was placed on Bipap and RRT was called.  Pt was then transferred to ED.

## 2024-10-10 NOTE — ASSESSMENT & PLAN NOTE
Patient notes chronic history of generalized anxiety with panic attacks at times.   Patient was previously maintained on alprazolam 0.25 mg twice daily as needed which was recently discontinued by her geriatrician 8/30/2024  Patient was started on buspirone 5 mg twice daily    Patient presents to the ED now with insomnia worsening  buspirone was stopped   Start patient on mirtazapine 7.5 mg daily at bedtime  Gabapentin 100 mg 3 times daily

## 2024-10-10 NOTE — PROGRESS NOTES
Critical Care Interval Transfer Note:    Brief Hospital Summary:    Sarah Zayas is a 79 y.o.  with PMHx of severe COPD with chronic hypoxic respiratory failure on 2 L at home, hypertension, hypothyroid, temporal arteritis, type 2 diabetes, neck swelling, sialoadenitis, anxiety/depression.  Patient initially presents to the [facility] ED due to insomnia and shortness of breath.  Also noted to have chronic left shoulder pain that has been ongoing for the past 1 year worsening recently. Patient lives with her sister and son is at bedside. The patient is a former smoker but quit 11 years ago.   Patient was transferred to [facility] ICU due to requiring continuous BiPAP for increased work of breathing.   Patient has been stabilized currently off BiPAP on 2 L nasal cannula saturating 92%.  Patient currently hemodynamically stable to be downgraded to MedSurg.        Consults: IP CONSULT TO CASE MANAGEMENT         Patient seen and evaluated by Critical Care today and deemed to be appropriate for transfer to . Spoke to Dr. Mckeon from [facility]  to accept transfer. Critical care can be contacted via SecureChat with any questions or concerns.

## 2024-10-10 NOTE — PROGRESS NOTES
ADT alert received and chart review completed. Pt was currently hospitalized for COPD exacerbation at [facility]. Pt was transferred and admitted to [facility] ICU for closed monitoring. Pt requiring bipap.     RN CM to follow up at discharge. Note routed to Anjelica Julian.

## 2024-10-10 NOTE — RESPIRATORY THERAPY NOTE
Resp care   10/10/24 0725   Respiratory Assessment   Assessment Type Pre-treatment   General Appearance Lethargic   Respiratory Pattern Shallow   Chest Assessment Chest expansion symmetrical   Bilateral Breath Sounds Diminished   Resp Comments Pt lethargic and hard to arouse. placed back on bipap with settings per flow sheet. Slim notified   Non-Invasive Information   O2 Interface Device Full face mask   Non-Invasive Ventilation Mode BiPAP   SpO2 93 %   $ Pulse Oximetry Spot Check Charge Completed   Non-Invasive Settings   IPAP (cm) 14 cm   EPAP (cm) 5 cm   Rate (Set) 14   FiO2 (%) 35   Rise Time 3   Inspiratory Time (Set) 1   Non-Invasive Readings   Skin Intervention Skin barrier applied   Total Rate 21   Vt (mL) (Mech) 307   MV (Mech) 5.7   Peak Pressure (Obs) 14   Spontaneous Vt (mL) 307   Leak (lpm) 21   Non-Invasive Alarms   Insp Pressure High (cm H20) 45   Insp Pressure Low (cm H20) 25   Low Insp Pressure Time (sec) 5 sec   MV Low (L/min) 3   Vt High (mL) 1200   Vt Low (mL) 100

## 2024-10-10 NOTE — H&P
H&P - Critical Care/ICU   Name: Sarah Zayas 79 y.o. female I MRN: [id number]  Unit/Bed#: ICU 07 I Date of Admission: 10/10/2024   Date of Service: 10/10/2024 I Hospital Day: 0       Assessment & Plan  Acute exacerbation of chronic obstructive pulmonary disease (COPD) (HCC)  Patient was recently discharged on 8/26/2024 after being hospitalized for COPD exacerbation.  Patient was given tapering dose of steroid, DuoNebs and 2 L O2.  Patient reports having increased fatigue and decreased appetite.  Patient scented to [facility] on 10/9 due to insomnia.  Overnight rapid response was called for tachypnea and fatigue.  Patient was placed on BiPAP for increased work of breathing and hypercarbic respiratory acidosis.  Sepsis workup thus far has remained unremarkable.  Acidemia is resolving.    VBG: Ph: 7.46, co2 38.1, Hc03 26.6   BNP 66, Pro-Randy, tropes, lactic acid, ammonia, TSH, lipids, ESR/CRP WNL  Primary team at [facility] requested transfer to ICU [place] for further monitoring while patient on BiPAP.    Plan:  Plan to wean off BiPAP as able  Continue Atrovent/Xopenex 3 times daily  On Pulmicort  Incentive spirometry  Pulmonary toilet  Maintain saturations> 88%  Neck pain/shoulder pain  Patient also complaining of neck and shoulder pain-chronic pain ongoing for the past 1 year which has been worsening recently  Denies have any chest pain, shortness of breath  10/10/24 Xray L shoulder: normal  10/10 CT head: No acute intracranial abnormality.  No concerns for stroke at this time  Previous history of temporal arthritis - ESR/CRP normal  Also noted to have a previous history of sialoadenitis and associated neck edema and was seen by ENT in the past.  Patient is able to have purposeful movements in the bilateral upper extremities 5/5  strength bilaterally but has poor effort lifting the left arm without drift to bed.     Will trial lidocaine patch and Tylenol scheduled  Sling to stabilize    If pain does not improve and worsens would consider getting a CT soft tissue of the neck with contrast and MRI of the shoulder  Outpatient ENT recommendations- hydration and use of sialagogues (lemon drops, sour candy) is recommended.    Given the ongoing chronicity of the pain would consider outpatient orthopedics consult  Anxiety/depression  Patient notes chronic history of generalized anxiety with panic attacks at times.   Patient was previously maintained on alprazolam 0.25 mg twice daily as needed which was recently discontinued by her geriatrician 8/30/2024  Patient was started on buspirone 5 mg twice daily    Patient presents to the ED now with insomnia worsening  buspirone was stopped   Start patient on mirtazapine 7.5 mg daily at bedtime  Gabapentin 100 mg 3 times daily  Gastroesophageal reflux disease without esophagitis  Continue Protonix 40 mg daily  Hypertension  PTA valsartan 160 mg daily, propranolol 10 mg twice daily  Temporal arteritis (HCC)  History of temporal arteritis  No current active/associated acute symptoms  Maintained on prednisone 2.5mg daily at baseline  Follow up with PCP, Rheumatology as appropriate   Disposition: Stepdown Level 1    History of Present Illness   Sarah Zayas is a 79 y.o.  with PMHx of severe COPD with chronic hypoxic respiratory failure on 2 L at home, hypertension, hypothyroid, temporal arteritis, type 2 diabetes, neck swelling, sialoadenitis .  Presents to the ED due to insomnia and shortness of breath.  Also noted to have chronic left shoulder pain that has been ongoing for the past 1 year worsening recently. Patient lives with her sister and son is at bedside.   The patient is a former smoker but quit 11 years ago.     History obtained from chart review and the patient.      Historical Information   Past Medical History:  08/18/2024: Anxiety  No date: Asthma  No date: COPD (chronic obstructive pulmonary disease) (HCC)  No date: Disease of thyroid gland  No date:  GERD (gastroesophageal reflux disease)  10/11/2018: Hypertension  No date: Hypothyroid  08/28/2024: Normocytic anemia  09/26/2012: Osteoarthritis  No date: Temporal arteritis (HCC)  01/14/2020: Type 2 diabetes mellitus with complication, without long-  term current use of insulin (HCC) Past Surgical History:  12/06/2019: EYE SURGERY; Right      Comment:  cataract LVCFS  No date: HYSTERECTOMY  No date: NO PAST SURGERIES      Comment:  ALSO NO RELEVANT PAST SURRGICAL HX AS PER [facility]   Current Outpatient Medications   Medication Instructions    acetaminophen (TYLENOL) 500 mg, Oral, Every 6 hours PRN    albuterol (PROVENTIL HFA,VENTOLIN HFA) 90 mcg/act inhaler 2 puffs, Inhalation, Every 6 hours PRN    ascorbic acid (VITAMIN C) 500 mg, Oral, Daily    aspirin (ECOTRIN LOW STRENGTH) 81 mg, Oral, Daily    BLACK ELDERBERRY PO 2 tablets, Oral, Daily    budesonide (PULMICORT) 0.5 mg, Nebulization, 2 times daily, Rinse mouth after use. May take this right after taking levabuterol/ipratropium.    busPIRone (BUSPAR) 7.5 mg, Oral, 3 times daily    Calcium Carb-Cholecalciferol (CALCIUM 600/VITAMIN D3 PO) 1 tablet, Oral, Daily    carboxymethylcellulose (Refresh Tears) 0.5 % SOLN 2 drops, Both Eyes, Daily    Humidifier MISC Does not apply, As needed    ipratropium (ATROVENT) 0.5 mg, Nebulization, 2 times daily    levalbuterol (XOPENEX) 1.25 mg, Nebulization, 2 times daily    levothyroxine 50 mcg, Oral, Daily    Multiple Vitamins-Minerals (PreserVision AREDS 2) CAPS 1 capsule, Oral, Daily    nitrofurantoin (MACROBID) 100 mg, Oral, Daily after lunch    oxygen 2 L/min, Inhalation, Continuous, via nasal cannula.     pantoprazole (PROTONIX) 40 mg, Oral, Daily    predniSONE 2.5 mg, Oral, Daily with breakfast    propranolol (INDERAL) 10 mg, Oral, 2 times daily    sodium chloride (OCEAN) 0.65 % nasal spray 1 spray, Nasal, 3 times daily    triamcinolone (KENALOG) 0.1 % cream Use on Q Tip in left ear, twice daily    valsartan (DIOVAN) 160  mg, Oral, Daily, Please STOP Lisinopril,..    vitamin B-12 (VITAMIN B-12) 1,000 mcg, Oral, Daily    No Known Allergies   Social History     Tobacco Use    Smoking status: Former     Current packs/day: 0.00     Average packs/day: 1 pack/day for 54.0 years (54.0 ttl pk-yrs)     Types: Cigarettes     Start date:      Quit date:      Years since quittin.7    Smokeless tobacco: Never    Tobacco comments:     TOBACCO USE, NO PASSIVE SMOKE EXPOSURE   Vaping Use    Vaping status: Never Used   Substance Use Topics    Alcohol use: Never    Drug use: No    Family History   Problem Relation Age of Onset    Breast cancer Mother     Emphysema Father           Objective :                   Vitals I/O      Most Recent Min/Max in 24hrs   Temp 98.7 °F (37.1 °C) Temp  Min: 97.6 °F (36.4 °C)  Max: 99.5 °F (37.5 °C)   Pulse (!) 109 Pulse  Min: 54  Max: 116   Resp (!) 38 Resp  Min: 18  Max: 38   /91 BP  Min: 100/56  Max: 160/91   O2 Sat 95 % SpO2  Min: 90 %  Max: 99 %    No intake or output data in the 24 hours ending 10/10/24 1227    Diet NPO    Invasive Monitoring           Physical Exam   Physical Exam  Vitals and nursing note reviewed.   Eyes:      Conjunctiva/sclera: Conjunctivae normal.   Skin:     General: Skin is warm and dry.      Capillary Refill: Capillary refill takes less than 2 seconds.   HENT:      Head: Normocephalic and atraumatic.   Cardiovascular:      Rate and Rhythm: Normal rate and regular rhythm.      Heart sounds: No murmur heard.  Musculoskeletal:         General: Tenderness (Left shoulder and neck) present. No swelling.      Cervical back: Neck supple.      Comments:  5/5  strength bilaterally but has poor effort lifting the left arm without drift to bed.   Abdominal:      Palpations: Abdomen is soft.      Tenderness: There is no abdominal tenderness.   Constitutional:       General: She is not in acute distress.     Appearance: She is well-developed.   Pulmonary:      Effort: Pulmonary  effort is normal. No respiratory distress.      Breath sounds: Normal breath sounds.   Psychiatric:         Mood and Affect: Mood normal.   Neurological:      Mental Status: She is alert.           Diagnostic Studies        Lab Results: I have reviewed the following results:     Medications:  Scheduled PRN   acetaminophen, 650 mg, Q6H RACHEL  budesonide, 0.5 mg, BID  chlorhexidine, 15 mL, Q12H RACHEL  [START ON 10/11/2024] vitamin B-12, 1,000 mcg, Daily  [START ON 10/11/2024] enoxaparin, 30 mg, Daily  gabapentin, 100 mg, TID  ipratropium, 0.5 mg, BID  levalbuterol, 1.25 mg, BID  [START ON 10/11/2024] levothyroxine, 50 mcg, Early Morning  lidocaine, 1 patch, Daily  [START ON 10/11/2024] losartan, 50 mg, Daily  [START ON 10/11/2024] pantoprazole, 40 mg, Early Morning  [START ON 10/11/2024] predniSONE, 2.5 mg, Daily With Breakfast      albuterol, 2 puff, Q6H PRN  Artificial Tears, 1 drop, Q4H PRN  ipratropium-albuterol, 3 mL, Q6H PRN  ondansetron, 4 mg, Q6H PRN  sodium chloride, 2 spray, Q1H PRN       Continuous            Labs:   CBC    Recent Labs     10/10/24  0556 10/10/24  1102   WBC 7.18 7.20   HGB 10.8* 11.8   HCT 34.1* 39.7    159     BMP    Recent Labs     10/10/24  0556 10/10/24  1102   SODIUM 132* 134*   K 4.3 4.3   CL 94* 95*   CO2 33* 34*   AGAP 5 5   BUN 15 15   CREATININE 0.47* 0.45*   CALCIUM 8.5 8.5       Coags    No recent results     Additional Electrolytes  Recent Labs     10/10/24  1102   MG 1.9   PHOS 2.7   CAIONIZED 1.14          Blood Gas    Recent Labs     10/10/24  0021   PHART 7.178*   EIE6MND 83.3*   PO2ART 128.0   SON4QWA 30.3*   BEART 0.0   SOURCE Radial, Right     Recent Labs     10/10/24  0021 10/10/24  0137 10/10/24  1102   PHVEN  --    < > 7.263*   YRY2VSX  --    < > 76.8*   PO2VEN  --    < > 22.3*   COD9JMM  --    < > 33.9*   BEVEN  --    < > 4.8   U5EVVBI  --    < > 41.4*   SOURCE Radial, Right  --   --     < > = values in this interval not displayed.    LFTs  Recent Labs      10/10/24  0137 10/10/24  1102   ALT 16 16   AST 21 21   ALKPHOS 40 38   ALB 3.7 3.7   TBILI 0.25 0.37       Infectious  Recent Labs     10/10/24  0137   PROCALCITONI 0.20     Glucose  Recent Labs     10/09/24  0640 10/10/24  0137 10/10/24  0556 10/10/24  1102   GLUC 114 94 87 86

## 2024-10-10 NOTE — ASSESSMENT & PLAN NOTE
History of temporal arteritis  No current active/associated acute symptoms  Maintained on prednisone 2.5mg daily at baseline  Follow up with PCP, Rheumatology as appropriate

## 2024-10-10 NOTE — ASSESSMENT & PLAN NOTE
Patient was recently discharged on 8/26/2024 after being hospitalized for COPD exacerbation.  Patient was given tapering dose of steroid, DuoNebs and 2 L O2.  Patient reports having increased fatigue and decreased appetite.  Patient scented to [facility] on 10/9 due to insomnia.  Overnight rapid response was called for tachypnea and fatigue.  Patient was placed on BiPAP for increased work of breathing and hypercarbic respiratory acidosis.  Sepsis workup thus far has remained unremarkable.  Acidemia is resolving.    VBG: Ph: 7.46, co2 38.1, Hc03 26.6   BNP 66, Pro-Randy, tropes, lactic acid, ammonia, TSH, lipids, ESR/CRP WNL  Primary team at [facility] requested transfer to ICU [place] for further monitoring while patient on BiPAP.    Plan:  Plan to wean off BiPAP as able  Continue Atrovent/Xopenex 3 times daily  On Pulmicort  Incentive spirometry  Pulmonary toilet  Maintain saturations> 88%

## 2024-10-11 ENCOUNTER — TELEPHONE (OUTPATIENT)
Dept: PSYCHIATRY | Facility: CLINIC | Age: 79
End: 2024-10-11

## 2024-10-11 LAB
ANION GAP SERPL CALCULATED.3IONS-SCNC: 4 MMOL/L (ref 4–13)
BUN SERPL-MCNC: 13 MG/DL (ref 5–25)
CALCIUM SERPL-MCNC: 8.5 MG/DL (ref 8.4–10.2)
CHLORIDE SERPL-SCNC: 95 MMOL/L (ref 96–108)
CO2 SERPL-SCNC: 35 MMOL/L (ref 21–32)
CREAT SERPL-MCNC: 0.4 MG/DL (ref 0.6–1.3)
ERYTHROCYTE [DISTWIDTH] IN BLOOD BY AUTOMATED COUNT: 13.2 % (ref 11.6–15.1)
GFR SERPL CREATININE-BSD FRML MDRD: 99 ML/MIN/1.73SQ M
GLUCOSE SERPL-MCNC: 82 MG/DL (ref 65–140)
HCT VFR BLD AUTO: 35.3 % (ref 34.8–46.1)
HGB BLD-MCNC: 10.7 G/DL (ref 11.5–15.4)
MCH RBC QN AUTO: 28.4 PG (ref 26.8–34.3)
MCHC RBC AUTO-ENTMCNC: 30.3 G/DL (ref 31.4–37.4)
MCV RBC AUTO: 94 FL (ref 82–98)
PLATELET # BLD AUTO: 180 THOUSANDS/UL (ref 149–390)
PMV BLD AUTO: 10.5 FL (ref 8.9–12.7)
POTASSIUM SERPL-SCNC: 5 MMOL/L (ref 3.5–5.3)
RBC # BLD AUTO: 3.77 MILLION/UL (ref 3.81–5.12)
SODIUM SERPL-SCNC: 134 MMOL/L (ref 135–147)
VIT B12 SERPL-MCNC: 1688 PG/ML (ref 180–914)
WBC # BLD AUTO: 8.81 THOUSAND/UL (ref 4.31–10.16)

## 2024-10-11 PROCEDURE — 97167 OT EVAL HIGH COMPLEX 60 MIN: CPT

## 2024-10-11 PROCEDURE — 99223 1ST HOSP IP/OBS HIGH 75: CPT

## 2024-10-11 PROCEDURE — 99222 1ST HOSP IP/OBS MODERATE 55: CPT | Performed by: PHYSICIAN ASSISTANT

## 2024-10-11 PROCEDURE — 92610 EVALUATE SWALLOWING FUNCTION: CPT

## 2024-10-11 PROCEDURE — 80048 BASIC METABOLIC PNL TOTAL CA: CPT

## 2024-10-11 PROCEDURE — 82607 VITAMIN B-12: CPT | Performed by: INTERNAL MEDICINE

## 2024-10-11 PROCEDURE — 94640 AIRWAY INHALATION TREATMENT: CPT

## 2024-10-11 PROCEDURE — 85027 COMPLETE CBC AUTOMATED: CPT

## 2024-10-11 PROCEDURE — 99233 SBSQ HOSP IP/OBS HIGH 50: CPT | Performed by: INTERNAL MEDICINE

## 2024-10-11 PROCEDURE — 94760 N-INVAS EAR/PLS OXIMETRY 1: CPT

## 2024-10-11 PROCEDURE — 94664 DEMO&/EVAL PT USE INHALER: CPT

## 2024-10-11 PROCEDURE — 99222 1ST HOSP IP/OBS MODERATE 55: CPT | Performed by: STUDENT IN AN ORGANIZED HEALTH CARE EDUCATION/TRAINING PROGRAM

## 2024-10-11 PROCEDURE — 97163 PT EVAL HIGH COMPLEX 45 MIN: CPT

## 2024-10-11 RX ORDER — LANOLIN ALCOHOL/MO/W.PET/CERES
3 CREAM (GRAM) TOPICAL
Status: DISCONTINUED | OUTPATIENT
Start: 2024-10-11 | End: 2024-10-19

## 2024-10-11 RX ORDER — ACETAMINOPHEN 325 MG/1
975 TABLET ORAL EVERY 8 HOURS SCHEDULED
Status: DISCONTINUED | OUTPATIENT
Start: 2024-10-11 | End: 2024-10-19

## 2024-10-11 RX ORDER — KETOROLAC TROMETHAMINE 30 MG/ML
15 INJECTION, SOLUTION INTRAMUSCULAR; INTRAVENOUS ONCE
Status: COMPLETED | OUTPATIENT
Start: 2024-10-11 | End: 2024-10-11

## 2024-10-11 RX ORDER — PROPRANOLOL HYDROCHLORIDE 10 MG/1
10 TABLET ORAL EVERY 12 HOURS SCHEDULED
Status: DISCONTINUED | OUTPATIENT
Start: 2024-10-11 | End: 2024-10-14

## 2024-10-11 RX ADMIN — DICLOFENAC SODIUM 2 G: 10 GEL TOPICAL at 21:11

## 2024-10-11 RX ADMIN — IPRATROPIUM BROMIDE 0.5 MG: 0.5 SOLUTION RESPIRATORY (INHALATION) at 19:52

## 2024-10-11 RX ADMIN — LIDOCAINE 1 PATCH: 700 PATCH TOPICAL at 09:15

## 2024-10-11 RX ADMIN — GABAPENTIN 100 MG: 100 CAPSULE ORAL at 17:02

## 2024-10-11 RX ADMIN — GABAPENTIN 100 MG: 100 CAPSULE ORAL at 09:16

## 2024-10-11 RX ADMIN — LEVALBUTEROL HYDROCHLORIDE 1.25 MG: 1.25 SOLUTION RESPIRATORY (INHALATION) at 08:23

## 2024-10-11 RX ADMIN — CHLORHEXIDINE GLUCONATE 15 ML: 1.2 RINSE ORAL at 09:19

## 2024-10-11 RX ADMIN — LOSARTAN POTASSIUM 50 MG: 50 TABLET, FILM COATED ORAL at 09:16

## 2024-10-11 RX ADMIN — MIRTAZAPINE 7.5 MG: 7.5 TABLET, FILM COATED ORAL at 21:11

## 2024-10-11 RX ADMIN — ACETAMINOPHEN 650 MG: 325 TABLET, FILM COATED ORAL at 11:00

## 2024-10-11 RX ADMIN — ACETAMINOPHEN 650 MG: 325 TABLET, FILM COATED ORAL at 05:50

## 2024-10-11 RX ADMIN — KETOROLAC TROMETHAMINE 15 MG: 30 INJECTION, SOLUTION INTRAMUSCULAR; INTRAVENOUS at 00:59

## 2024-10-11 RX ADMIN — GLYCERIN 1 DROP: .002; .002; .01 SOLUTION/ DROPS OPHTHALMIC at 21:11

## 2024-10-11 RX ADMIN — LEVOTHYROXINE SODIUM 50 MCG: 50 TABLET ORAL at 05:50

## 2024-10-11 RX ADMIN — PANTOPRAZOLE SODIUM 40 MG: 40 TABLET, DELAYED RELEASE ORAL at 05:50

## 2024-10-11 RX ADMIN — IPRATROPIUM BROMIDE 0.5 MG: 0.5 SOLUTION RESPIRATORY (INHALATION) at 08:23

## 2024-10-11 RX ADMIN — BUDESONIDE 0.5 MG: 0.5 INHALANT ORAL at 19:52

## 2024-10-11 RX ADMIN — BUDESONIDE 0.5 MG: 0.5 INHALANT ORAL at 08:23

## 2024-10-11 RX ADMIN — GABAPENTIN 100 MG: 100 CAPSULE ORAL at 21:11

## 2024-10-11 RX ADMIN — CHLORHEXIDINE GLUCONATE 15 ML: 1.2 RINSE ORAL at 21:12

## 2024-10-11 RX ADMIN — ENOXAPARIN SODIUM 30 MG: 100 INJECTION SUBCUTANEOUS at 09:16

## 2024-10-11 RX ADMIN — ACETAMINOPHEN 975 MG: 325 TABLET, FILM COATED ORAL at 21:11

## 2024-10-11 RX ADMIN — PREDNISONE 2.5 MG: 2.5 TABLET ORAL at 09:16

## 2024-10-11 RX ADMIN — PROPRANOLOL HYDROCHLORIDE 10 MG: 10 TABLET ORAL at 17:25

## 2024-10-11 RX ADMIN — DICLOFENAC SODIUM 2 G: 10 GEL TOPICAL at 17:02

## 2024-10-11 RX ADMIN — DICLOFENAC SODIUM 2 G: 10 GEL TOPICAL at 13:00

## 2024-10-11 RX ADMIN — LEVALBUTEROL HYDROCHLORIDE 1.25 MG: 1.25 SOLUTION RESPIRATORY (INHALATION) at 19:52

## 2024-10-11 RX ADMIN — CYANOCOBALAMIN TAB 500 MCG 1000 MCG: 500 TAB at 09:16

## 2024-10-11 NOTE — PROGRESS NOTES
Progress Note - Hospitalist   Name: Sarah Zayas 79 y.o. female I MRN: [id number]  Unit/Bed#: [place] [address] -01 I Date of Admission: 10/10/2024   Date of Service: 10/11/2024 I Hospital Day: 1    Assessment & Plan  Acute respiratory failure with hypercapnia (HCC)  Back to baseline oxygen  Acute exacerbation of chronic obstructive pulmonary disease (COPD) (HCC)  History of severe COPD  Initial concern for exacerbation but likely oversedation from benzodiazepine  Patient placed initially on BiPAP sepsis workup has been unremarkable  Patient will continue Atrovent and Xopenex  Continue pulmonary toilet  Improving  Gastroesophageal reflux disease without esophagitis  Continue PPI therapy  Hypertension  Resume losartan  To admission on Inderal, will continue patient is developing tachycardia  Temporal arteritis (HCC)  Remains on 2.5 mg of prednisone  Continue same dose for now    Anxiety/depression  Evaluated by psychiatry  Recently discontinued on alprazolam  Continue BuSpar 5 mg twice daily  Continue gabapentin for now, can consider transition to Lyrica    Neck pain/shoulder pain  Evidence of bicep tendinitis  Add Voltaren gel          Hospital Course:     79-year-old female patient presenting with acute respiratory failure secondary to aspiration pneumonia.  He required BiPAP therapy and was subsequently transferred out of the ICU    With significant deconditioning    Assessment:      Principal Problem:    Acute respiratory failure with hypercapnia (HCC)  Active Problems:    Acute exacerbation of chronic obstructive pulmonary disease (COPD) (HCC)    Gastroesophageal reflux disease without esophagitis    Hypertension    Temporal arteritis (HCC)    Anxiety/depression    Neck pain/shoulder pain      Plan:    Continue supportive care  PT OT consult         VTE Pharmacologic Prophylaxis:   Pharmacologic: Enoxaparin (Lovenox)  Mechanical VTE Prophylaxis in Place: Yes    AM-PAC Basic Mobility:  Basic Mobility Inpatient Raw  Score: 11    JH-HLM Achieved: 4: Move to chair/commode  JH-HLM Goal: 4: Move to chair/commode    HLM Goal listed above. Continue with multidisciplinary rounding and encourage appropriate mobility to improve upon HLM goals.         Patient Centered Rounds: Case discussed and reviewed with nursing    Discussions with Specialists or Other Care Team Provider: Case management, geriatrics and orthopedic    Education and Discussions with Family / Patient: Patient daughter at 5:25 PM, no answer, no VM left, Sister, at 5:27 PM    Time Spent for Care: 80 minutes.  More than 50% of total time spent on counseling and coordination of care as described above.    Current Length of Stay: 1 day(s)    Current Patient Status: Inpatient   Certification Statement: The patient will continue to require additional inpatient hospital stay due to rehab placement    Discharge Plan / Estimated Discharge Date: Monday or Tuesday depending on holiday availability    Code Status: Level 1 - Full Code      Subjective:   Seen and examined, no acute complaints other than right shoulder pain which she reports is chronic.  Significant anxiety    A complete and comprehensive 14 point organ system review has been performed and all other systems are negative other than stated above.    Objective:     Vitals:   Temp (24hrs), Av.5 °F (36.9 °C), Min:98.2 °F (36.8 °C), Max:99 °F (37.2 °C)    Temp:  [98.2 °F (36.8 °C)-99 °F (37.2 °C)] 98.2 °F (36.8 °C)  HR:  [110-122] 122  Resp:  [17-40] 18  BP: (119-152)/(60-76) 119/60  SpO2:  [89 %-98 %] 98 %  Body mass index is 18.51 kg/m².     Input and Output Summary (last 24 hours):       Intake/Output Summary (Last 24 hours) at 10/11/2024 1723  Last data filed at 10/11/2024 1333  Gross per 24 hour   Intake 840 ml   Output 1350 ml   Net -510 ml       Physical Exam:     General: well appearing, no acute distress  HEENT: atraumatic, PERRLA, moist mucosa, normal pharynx, normal tonsils and adenoids, normal tongue, no  fluid in sinuses  Neck: Trachea midline, no carotid bruit, no masses  Respiratory: normal chest wall expansion, CTA B, no r/r/w, no rubs  Cardiovascular: RRR, no m/r/g, Normal S1 and S2  Abdomen: Soft, non-tender, non-distended, normal bowel sounds in all quadrants, no hepatosplenomegaly, no tympany  Rectal: deferred  Musculoskeletal: normal ROM in upper and lower extremities  Integumentary: warm, dry, and pink, with no rash, purpura, or petechia  Heme/Lymph: no lymphadenopathy, no bruises  Neurological: Cranial Nerves II-XII grossly intact  Psychiatric: Very anxious  Additional Data:     Labs:    Results from last 7 days   Lab Units 10/11/24  0400 10/10/24  1102   WBC Thousand/uL 8.81 7.20   HEMOGLOBIN g/dL 10.7* 11.8   HEMATOCRIT % 35.3 39.7   PLATELETS Thousands/uL 180 159   SEGS PCT %  --  80*   LYMPHO PCT %  --  6*   MONO PCT %  --  11   EOS PCT %  --  3     Results from last 7 days   Lab Units 10/11/24  0400 10/10/24  1102   POTASSIUM mmol/L 5.0 4.3   CHLORIDE mmol/L 95* 95*   CO2 mmol/L 35* 34*   BUN mg/dL 13 15   CREATININE mg/dL 0.40* 0.45*   CALCIUM mg/dL 8.5 8.5   ALK PHOS U/L  --  38   ALT U/L  --  16   AST U/L  --  21           * I Have Reviewed All Lab Data Listed Above.  * Additional Pertinent Lab Tests Reviewed: All Labs For Current Hospital Admission Reviewed    Imaging:    Imaging Reports Reviewed Today Include: No new imaging  Imaging Personally Reviewed by Myself Includes: No new imaging    Recent Cultures (last 7 days):     Results from last 7 days   Lab Units 10/10/24  0137   BLOOD CULTURE  No Growth at 24 hrs.  No Growth at 24 hrs.       Last 24 Hours Medication List:   Current Facility-Administered Medications   Medication Dose Route Frequency Provider Last Rate    acetaminophen  975 mg Oral Q8H TITO Puri      albuterol  2 puff Inhalation Q6H PRN Luz Mukherjee MD      Artificial Tears  1 drop Both Eyes Q4H PRN Luz Mukherjee MD      budesonide  0.5 mg Nebulization BID Luz Mukherjee MD       chlorhexidine  15 mL Mouth/Throat Q12H [facility] Luz Mukherjee MD      vitamin B-12  1,000 mcg Oral Daily Luz Mukherjee MD      Diclofenac Sodium  2 g Topical 4x Daily Saad Mckeon DO      enoxaparin  30 mg Subcutaneous Daily Luz Mukherjee MD      gabapentin  100 mg Oral TID Luz Mukherjee MD      ipratropium  0.5 mg Nebulization BID Luz Mukherjee MD      ipratropium-albuterol  3 mL Nebulization Q6H PRN Luz Mukherjee MD      levalbuterol  1.25 mg Nebulization BID Luz Mukherjee MD      levothyroxine  50 mcg Oral Early Morning Luz Mukherjee MD      lidocaine  1 patch Topical Daily Luz Mukherjee MD      losartan  50 mg Oral Daily Luz Mukherjee MD      melatonin  3 mg Oral HS PRN TITO Kwong      mirtazapine  7.5 mg Oral HS Luz Mukherjee MD      ondansetron  4 mg Intravenous Q6H PRN Luz Mukherjee MD      pantoprazole  40 mg Oral Early Morning Luz Mukherjee MD      predniSONE  2.5 mg Oral Daily With Breakfast Luz Mukherjee MD      propranolol  10 mg Oral Q12H [facility] Saad Mckeon DO      sodium chloride  2 spray Each Nare Q1H PRN Luz Mukherjee MD         AM-PAC Basic Mobility:  Basic Mobility Inpatient Raw Score: 11    JH-HLM Achieved: 4: Move to chair/commode  JH-HLM Goal: 4: Move to chair/commode    HLM Goal listed above. Continue with multidisciplinary rounding and encourage appropriate mobility to improve upon HLM goals.     Today, Patient Was Seen By: Saad Mckeon DO    ** Please Note: This note was completed in part utilizing [employer] software dictation.  Grammatical errors, random word insertions, spelling mistakes, and incomplete sentences may be an occasional consequence of this system secondary to software limitations, ambient noise, and hardware issues.  If you have any questions or concerns about the content, text, or information contained within the body of this dictation, please contact the provider for clarification. **

## 2024-10-11 NOTE — DISCHARGE SUMMARY
Discharge Summary - Sarah Zayas, 1945, [id number]        Admission Date: 10/9/2024  Discharge Date: 10/10/2024    Discharge Diagnosis:   1.  Acute on chronic hypoxic and hypercarbic respiratory failure  2.  Metabolic encephalopathy secondary to #1.  3.  Severe COPD  4.  Temporal arteritis  5.  Hypertension  6.  Anxiety depression  7.  Left shoulder pain likely related to rotator cuff issue  8.  Severe protein calorie malnutrition  9.  History of type 2 diabetes  10.  Hypothyroidism  11.  Raynaud's phenomenon  12.  History of recurrent urinary tract infections    Consulting Physicians:  None    Procedures Performed:   None    HPI: The patient is a 79-year-old woman with severe COPD and chronic hypoxic respiratory failure.  She has been in very frail health of [facility].  She was hospitalized in August of this year and then spent time in [facility] rehabilitating.  According to her sister, when she left [facility] she was doing reasonably well.  She was able to walk.  She was eating better.  Her respiratory status was stable.  Unfortunately, over the past several weeks the patient has deteriorated.  She is seemed anxious and depressed.  Her medications were adjusted which seemed to make the situation worse.  She has not been eating.  Her strength has been diminishing.  For these reason she came to the emergency room.  In the emergency room the possibility of stroke was raised.  She underwent cranial CT scanning and CTA.  She was given lorazepam for the procedure.  She became overly sedated with this.  She was admitted for further care.    Hospital Course: The patient was admitted to the hospital and monitored carefully.  Buspirone was stopped.  She was started on low-dose of gabapentin plus mirtazapine.  On the evening of her first hospital day she became more hypoxic.  She was lethargic.  She required BiPAP.  Arterial blood gas revealed hypoxia and hypercapnia.  Because of this, it was felt that she needed a higher level of care.   Arrangements were made for her transfer to [facility] under the critical care service.    I discussed the above information with the patient's son at the time of her transfer.  He said that his mother had never discussed resuscitative measures with him.  She has always been on CODE BLUE level 1 past hospitalizations.  This was therefore continued.    During the patient's brief hospitalization no alterations were made in her usual care for her myriad of chronic medical issues.    At the time of transfer, the patient was lethargic.  Vital signs were stable.  Lungs reveal diminished breath sounds.  Cardiac exam revealed a regular rhythm.  The abdomen was soft and nontender.  There was no edema.    Disposition: The patient was transferred to [facility] on October 10.  She will be n.p.o. for the moment because of her mental status.  Activity will be as tolerated though at the moment is quite limited.  She will be under the care of the critical care service while in [facility].  When she returns home, she will continue primary care with Dr. Nicolas Nix.    Discharge instructions/Information to patient and family:   See after visit summary for information provided to patient and family.      Provisions for Follow-Up Care:  See after visit summary for information related to follow-up care and any pertinent home health orders.      Planned Readmission: Yes to [facility]    Discharge Statement   I spent 40 minutes discharging the patient. This time was spent on the day of discharge. I had direct contact with the patient on the day of discharge.     Discharge Medications:  See after visit summary for reconciled discharge medications provided to patient and family.

## 2024-10-11 NOTE — PLAN OF CARE
Problem: OCCUPATIONAL THERAPY ADULT  Goal: Performs self-care activities at highest level of function for planned discharge setting.  See evaluation for individualized goals.  Description: Treatment Interventions: ADL retraining, Functional transfer training, UE strengthening/ROM, Endurance training, Patient/family training, Equipment evaluation/education, Compensatory technique education, Continued evaluation, Activityengagement          See flowsheet documentation for full assessment, interventions and recommendations.   Note: Limitation: Decreased ADL status, Decreased UE ROM, Decreased Safe judgement during ADL, Decreased UE strength, Decreased cognition, Decreased endurance, Decreased self-care trans, Decreased high-level ADLs  Prognosis: Fair  Assessment: Pt is a 79 y.o. female seen for OT evaluation s/p adm to [facility] on 10/10/2024 w/ Acute respiratory failure with hypercapnia. Comorbidities affecting pt’s functional performance include a significant PMH of severe COPD with chronic hypoxic respiratory failure on 2 L at home, hypertension, hypothyroid, temporal arteritis, type 2 diabetes, neck swelling, sialoadenitis, anxiety/depression. Pt with active OT orders and activity orders for OOB to chair. Pt lives alone in a two level house with 2 ARCADIO and 1st floor setup with 1/2 bath. Pt reports her sister stays with her PRN, son lives an hour away. Pt has HHA 2 days/wk for 2 hrs/day to assist w/ bathing. At baseline, pt was primarily I w/ ADLs (assist w/ bathing only) and Mod I for functional transfers/mobility w/ use of RW. (-) . Denies falls PTA. Upon evaluation, pt currently requires Mod-Min A for UB ADLs, Max A for LB ADLs, Max A for toileting, Mod A for bed mobility, and Min A of 2 for functional mobility/transfers 2* the following deficits impacting occupational performance: decreased UE ROM, decreased strength , decreased balance, decreased activity tolerance, limited functional reach,  impaired memory, increased pain, visual deficits, and decreased postural control. These impairments, as well at pt’s personal factors of: ARCADIO home environment, limited home support, difficulty performing ADLs, difficulty performing transfers/mobility, limited insight into deficits, fall risk , functional decline , increase in O2 requirements , and advanced age limit pt’s ability to safely engage in all baseline areas of occupation. Pt to continue to benefit from continued acute OT services during hospital stay to address defined deficits and to maximize level of functional independence in the following Occupational Performance areas: eating, grooming, bathing/shower, toilet hygiene, dressing, health maintenance, functional mobility, community mobility, clothing management, and social participation. The patient's raw score on the -PAC Daily Activity Inpatient Short Form is 14. A raw score of less than 19 suggests the patient may benefit from discharge to post-acute rehabilitation services. Please refer to the recommendation of the Occupational Therapist for safe discharge planning. OT will continue to follow pt 3-5x/wk to address the following goals to  w/in 10-14 days:     Rehab Resource Intensity Level, OT: II (Moderate Resource Intensity)

## 2024-10-11 NOTE — ASSESSMENT & PLAN NOTE
Evaluated by psychiatry  Recently discontinued on alprazolam  Continue BuSpar 5 mg twice daily  Continue gabapentin for now, can consider transition to Lyrica

## 2024-10-11 NOTE — CASE MANAGEMENT
Case Management Assessment & Discharge Planning Note    Patient name Sarah Zayas  Location [facility]* MRN [id number]  : 1945 Date 10/11/2024       Current Admission Date: 10/10/2024  Current Admission Diagnosis:Acute respiratory failure with hypercapnia (HCC)   Patient Active Problem List    Diagnosis Date Noted Date Diagnosed    Acute on chronic respiratory failure with hypoxia and hypercapnia (HCC) 10/10/2024     Neck pain/shoulder pain 10/10/2024     Acute respiratory failure with hypercapnia (HCC) 10/10/2024     Venous insufficiency of lower extremity 2024     Epigastric pain 2024     Advance care planning 2024     At risk for constipation 2024     At risk for delirium 2024     Physical deconditioning 2024     Periodontitis 2024     Polypharmacy 2024     Diastolic dysfunction 2024     Elevated vitamin B12 level 2024     Normocytic anemia 2024     Neck swelling 2024     Anxiety/depression 2024     Severe protein-calorie malnutrition (HCC) 2024     COPD, severe ([facility]) 2024     Abnormal CT scan 2023     Infectious sialoadenitis of major salivary gland 2023     Left upper lobe pulmonary nodule 2023     Postnasal drip 2023     Raynaud's phenomenon without gangrene 2022     Temporal arteritis ([facility]) 2020     Hypertension 10/11/2018     Other specified hypothyroidism 2018     Gastroesophageal reflux disease without esophagitis 2018     Acute exacerbation of chronic obstructive pulmonary disease (COPD) (HCC) 2012     Vitamin D deficiency 2012     Osteoarthritis 2012       LOS (days): 1  Geometric Mean LOS (GMLOS) (days): 3.4  Days to GMLOS:2.2     OBJECTIVE:    Risk of Unplanned Readmission Score: 27.14         Current admission status: Inpatient       Preferred Pharmacy:   [facility] - [place] - [address]  [address]  [address]  [facility] [address]  Phone: [phone] Fax: [phone]    [facility] - [place]IL[place] Doty - [address]  [address] [address]  Phone: [phone] Fax: [phone]    Primary Care Provider: Nicolas Nix MD    Primary Insurance: [facility]  Secondary Insurance:     ASSESSMENT:  Active Health Care Proxies       TeodorakalaMelissa Health Care Representative - Daughter   Primary Phone: [phone] (Mobile)  Work Phone: [phone]                           Readmission Root Cause  30 Day Readmission: No    Patient Information  Admitted from:: Home  Mental Status: Alert  During Assessment patient was accompanied by: Son (via Facetime)  Assessment information provided by:: Patient, Son  Primary Caregiver: Other (Comment)  Caregiver's Name:: \"Elyssa Love\"  2 days/week for 2 hours/day  Support Systems: Children  County of Residence: [place]  What city do you live in?: [place]  Home entry access options. Select all that apply.: Stairs  Number of steps to enter home.: 1  Type of Current Residence: [address] [place] [address] home (first floor set-up)  Living Arrangements: Lives Alone  Is patient a ?: No    Activities of Daily Living Prior to Admission  Functional Status: Assistance  Completes ADLs independently?: No  Level of ADL dependence: Assistance  Ambulates independently?: Yes  Does patient use assisted devices?: Yes  Assisted Devices (DME) used: Home Oxygen concentrator, Portable Oxygen concentrator  DME Company Name (respiratory supplies): [employer]  O2 Rate(s): 2L  Does patient currently own DME?: Yes  What DME does the patient currently own?: Hospital Bed, Straight Cane, Walker  Does the patient have a history of Short-Term Rehab?: Yes ([facility])  Does patient have a history of HHC?: Yes ([facility])  Does patient currently have HHC?: Yes    Current Home Health Care  Type of Current Home Care Services: Nurse visit, Home PT  [facility] Health Agency Name:: [facility]  Current Home Health Follow-Up Provider:: PCP    Patient Information Continued  Income Source: Pension/snf  Does patient have prescription coverage?: Yes  Does patient receive dialysis treatments?: No  Does patient have a history of substance abuse?: No  Does patient have a history of Mental Health Diagnosis?: Yes (anxiety, depression)  Is patient receiving treatment for mental health?: Yes (medication management)  Has patient received inpatient treatment related to mental health in the last 2 years?: No         Means of Transportation  Means of Transport to [facility]:: Family transport      Social Determinants of Health (SDOH)      Flowsheet Row Most Recent Value   Housing Stability    In the last 12 months, was there a time when you were not able to pay the mortgage or rent on time? N   At any time in the past 12 months, were you homeless or living in a shelter (including now)? N   Transportation Needs    In the past 12 months, has lack of transportation kept you from medical appointments or from getting medications? no   In the past 12 months, has lack of transportation kept you from meetings, work, or from getting things needed for daily living? No   Food Insecurity    Within the past 12 months, you worried that your food would run out before you got the money to buy more. Never true   Within the past 12 months, the food you bought just didn't last and you didn't have money to get more. Never true   Utilities    In the past 12 months has the electric, gas, oil, or water company threatened to shut off services in your home? No            DISCHARGE DETAILS:    Discharge planning discussed with:: patient at bedside, son via phone  Freedom of Choice: Yes  Comments - Freedom of Choice: agreeable to blanket referral for STR  CM contacted family/caregiver?: Yes  Were Treatment Team discharge recommendations reviewed with patient/caregiver?: Yes  Did patient/caregiver verbalize understanding of patient  care needs?: Yes  Were patient/caregiver advised of the risks associated with not following Treatment Team discharge recommendations?: Yes    Contacts  Patient Contacts: Melissa Cunningham (Daughter)  [phone] (Mobile)  Relationship to Patient:: Family  Contact Method: Phone  Phone Number: Melissa Cunningham (Daughter)  [phone] (Mobile)  Reason/Outcome: Emergency Contact    Requested Home Health Care         [facility]                   Treatment Team Recommendation: Short Term Rehab  Discharge Destination Plan:: Short Term Rehab  Transport at Discharge : [facility] Ambulance                  Additional Comments: Introduced self and role to patient at bedside, monse Camilo participated via Facetime.  Patient was recently discharged from [facility], current with [facility].  Agreeable to rehab, agreeable to blanket referral for STR.  Referrals placed in [facility], after choice made will submit for insurance authorization.

## 2024-10-11 NOTE — UTILIZATION REVIEW
Initial Clinical Review    Admission: Date/Time/Statement:   Admission Orders (From admission, onward)       Ordered        10/10/24 1016  Inpatient Admission  Once                          Orders Placed This Encounter   Procedures    Inpatient Admission     Standing Status:   Standing     Number of Occurrences:   1     Order Specific Question:   Level of Care     Answer:   Level 1 Stepdown [13]     Order Specific Question:   Estimated length of stay     Answer:   More than 2 Midnights     Order Specific Question:   Certification     Answer:   I certify that inpatient services are medically necessary for this patient for a duration of greater than two midnights. See H&P and MD Progress Notes for additional information about the patient's course of treatment.     Initial Presentation: 79 y.o. female presents as a transfer from [facility] to [address] post Rapid Response for tachypnea and lethargy, RR 36, pt was on BIPAP, hypercarbic resp acidosis.  Initial presentation was for insomnia and SOB.  PMH: severe COPD with chronic hypoxic respiratory failure on 2 L at home, HTN, hypothyroid, temporal arteritis, DM2, neck swelling, sialoadenitis.  Labs - low NA, elevated CO2, acidotic gasses, initial blood cultures negative. Pre-transfer imaging - CT chest - poss aspiration, trace pleural effusions, stable R breast lesion.  CT head - WNL.  On exam not in acute distress, normal breath sounds, L shoulder and neck tenderness, poor effort listing L arm w/o drift, 5/5  bilat.  Admitted to INPATIENT status to [address] with Acute on chronic hypoxemic/hypercapnic respiratory failure- in setting of benzodiazepine administration, Metabolic encephalopathy d/t benzo use, Polypharmacy, Temporal arteritis on chronic low-dose prednisone, HTN, Multiple complaints- dysuria (UA negative), back, shoulder pain - wean oxygen keep sats > 88%, continue nebs, Home Prednisone 2.5 mg daily,  hold Benzos, Geriatric  "consult.  Post admit: Pt was stabilized and is off BIPAP, hemodynamically stable and at Med Surg level of care.      Date: 10/11   Day 2:   Acute resp failure d/t benzo use, HTN, dysuria, shoulder pain - was seen by Ortho today, Psych and SLP - Worked with therapy today will likely need skilled post d/c rehab.  Does not need IP Psych.  Has OA and bicep tendonitis and poss rotator cuff pathology.  Will get OT eval.      10/11 Psych Consult - Major Depressive d/o, -Adjustment d/o with anxious distress vs Anxiety d/o due to another medical condition (pain) - Consider switching from Gabapentin to Pregabalin 25mg BID, can be increased to TID based on the patient's response and tolerability. Monitor for sedation/falls d/t increased potency.  Consider switching from Remeron to low dose Duloxetine 30mg daily and titrating up as OP.  Address shoulder pain, as it is a major contributor to poor sleep and increased anxiety.   Optimize her Thyroid and COPD regimen (Synthroid and xopenex) as these can further contribute to anxiety symptoms.  No indication for acute inpatient psychiatric hospitalization.  Avoid benzos.      10/11 SLP Eval - Pt presented w/ mild/mod oropharyngeal dysphagia. Pt currently on regular/thin liquid diet and stating she is having a new onset of dysphagia with regular solids and with anxiety with intake. Reported difficulty with taking medicine whole with liquids. Suspected aspiration per recent chest CT. Pt stating dumbness/tingling on left side of lips. Trials of soft solid, puree, and thin liquids initiated. Pt stating she feels as if she will \"choke\" with bread and required encouragement for breakdown of bolus.  dysphagia 2 diet, thin liquids, whole or crushed in puree for meds.     10/11 Ortho Consult - Left Shoulder pain and weakness - exam  experiencing tenderness in the biceps tendon. Able to ROM the shoulder with abduction and FF to 90. Able to ROM the elbow and the wrist. Able to make a fist. " Generally weak in the LUE.  Consistent with osteoarthritis and tendonitis (biceps) with potential rotator cuff pathology.  WBAT to the LUE.  Recommend OT evaluation.        Scheduled Medications:  acetaminophen, 650 mg, Oral, Q6H RACHEL  budesonide, 0.5 mg, Nebulization, BID  chlorhexidine, 15 mL, Mouth/Throat, Q12H RACHEL  vitamin B-12, 1,000 mcg, Oral, Daily  Diclofenac Sodium, 2 g, Topical, 4x Daily  enoxaparin, 30 mg, Subcutaneous, Daily  gabapentin, 100 mg, Oral, TID  ipratropium, 0.5 mg, Nebulization, BID  levalbuterol, 1.25 mg, Nebulization, BID  levothyroxine, 50 mcg, Oral, Early Morning  lidocaine, 1 patch, Topical, Daily  losartan, 50 mg, Oral, Daily  mirtazapine, 7.5 mg, Oral, HS  pantoprazole, 40 mg, Oral, Early Morning  predniSONE, 2.5 mg, Oral, Daily With Breakfast      Continuous IV Infusions:     PRN Meds:  albuterol, 2 puff, Inhalation, Q6H PRN  Artificial Tears, 1 drop, Both Eyes, Q4H PRN - x 1 10/10  ipratropium-albuterol, 3 mL, Nebulization, Q6H PRN  ondansetron, 4 mg, Intravenous, Q6H PRN  sodium chloride, 2 spray, Each Nare, Q1H PRN    Weight (last 2 days)       Date/Time Weight    10/11/24 0530 45.9 (101.19)    10/10/24 1017 45.6 (100.53)            Vital Signs (last 3 days)       Date/Time Temp Pulse Resp BP MAP (mmHg) SpO2 Calculated FIO2 (%) - Nasal Cannula O2 Flow Rate (L/min) Nasal Cannula O2 Flow Rate (L/min) O2 Device Patient Position - Orthostatic VS [place] Coma Scale Score Pain    10/11/24 1100 -- -- -- -- -- -- -- -- -- -- -- -- 7    10/11/24 1057 -- -- -- -- -- -- -- -- -- -- -- -- 7    10/11/24 1028 -- -- -- -- -- -- -- -- -- -- -- -- 10 - Worst Possible Pain    10/11/24 0930 -- -- -- -- -- -- -- -- -- -- -- 15 8    10/11/24 0853 -- -- -- -- -- 98 % -- -- -- -- -- -- --    10/11/24 0824 -- -- -- -- -- 94 % 36 -- 4 L/min Nasal cannula -- -- --    10/11/24 07:21:27 98.3 °F (36.8 °C) 111 17 124/60 81 96 % -- -- -- -- -- -- --    10/11/24 0550 -- -- -- -- -- -- -- -- -- -- -- -- 8     10/11/24 0059 -- -- -- -- -- -- -- -- -- -- -- -- 10 - Worst Possible Pain    10/10/24 2346 -- -- -- -- -- -- -- -- -- -- -- -- 10 - Worst Possible Pain    10/10/24 21:04:28 99 °F (37.2 °C) 110 20 152/76 101 91 % 32 -- 3 L/min Nasal cannula -- -- --    10/10/24 2030 -- -- -- -- -- -- -- -- -- -- -- 15 No Pain    10/10/24 2013 -- -- -- -- -- 90 % 28 -- 2 L/min Nasal cannula -- -- --    10/10/24 20:03:57 98.4 °F (36.9 °C) 113 18 131/65 87 89 % 28 -- 2 L/min -- Lying -- --    10/10/24 1900 -- 114 40 134/67 94 93 % -- -- -- -- -- -- --    10/10/24 1730 -- -- -- -- -- -- -- -- -- -- -- -- 2    10/10/24 1700 98.8 °F (37.1 °C) 100 26 128/62 88 98 % -- -- -- -- -- -- --    10/10/24 1500 -- 114 43 156/71 102 89 % -- 4 L/min -- Nasal cannula -- -- --    10/10/24 1400 -- 106 32 138/64 92 94 % -- -- -- -- -- -- --    10/10/24 1315 -- 106 34 143/67 97 97 % -- -- -- -- -- -- 3    10/10/24 1200 -- 106 36 140/63 90 93 % -- -- -- -- -- -- --    10/10/24 1100 -- 109 38 160/91 130 95 % -- -- -- -- -- 15 --    10/10/24 1017 98.7 °F (37.1 °C) 108 23 100/56 73 94 % -- 2 L/min -- Nasal cannula Lying -- 10 - Worst Possible Pain            Pertinent Labs/Diagnostic Test Results:   Radiology:  No orders to display     Cardiology:  No orders to display     GI:  No orders to display           Results from last 7 days   Lab Units 10/11/24  0400 10/10/24  1102 10/10/24  0556 10/10/24  0137 10/09/24  0640   WBC Thousand/uL 8.81 7.20 7.18 6.96 6.41   HEMOGLOBIN g/dL 10.7* 11.8 10.8* 11.8 12.9   HEMATOCRIT % 35.3 39.7 34.1* 39.6 41.1   PLATELETS Thousands/uL 180 159 159 181 192   TOTAL NEUT ABS Thousands/µL  --  5.71 5.72 5.60 4.73         Results from last 7 days   Lab Units 10/11/24  0400 10/10/24  1102 10/10/24  0556 10/10/24  0137 10/09/24  0640   SODIUM mmol/L 134* 134* 132* 132* 132*   POTASSIUM mmol/L 5.0 4.3 4.3 4.7 4.6   CHLORIDE mmol/L 95* 95* 94* 93* 88*   CO2 mmol/L 35* 34* 33* 36* 39*   ANION GAP mmol/L 4 5 5 3* 5   BUN mg/dL 13 15 15  16 19   CREATININE mg/dL 0.40* 0.45* 0.47* 0.46* 0.49*   EGFR ml/min/1.73sq m 99 95 94 94 92   CALCIUM mg/dL 8.5 8.5 8.5 9.0 9.6   CALCIUM, IONIZED mmol/L  --  1.14  --   --   --    MAGNESIUM mg/dL  --  1.9  --   --   --    PHOSPHORUS mg/dL  --  2.7  --   --   --      Results from last 7 days   Lab Units 10/10/24  1102 10/10/24  0137 10/09/24  0640   AST U/L 21 21 24   ALT U/L 16 16 19   ALK PHOS U/L 38 40 44   TOTAL PROTEIN g/dL 5.7* 5.9* 6.9   ALBUMIN g/dL 3.7 3.7 4.4   TOTAL BILIRUBIN mg/dL 0.37 0.25 0.46   BILIRUBIN DIRECT mg/dL 0.08  --   --    AMMONIA umol/L  --  21 11*     Results from last 7 days   Lab Units 10/10/24  0104 10/09/24  0625   POC GLUCOSE mg/dl 80 103     Results from last 7 days   Lab Units 10/11/24  0400 10/10/24  1102 10/10/24  0556 10/10/24  0137 10/09/24  0640   GLUCOSE RANDOM mg/dL 82 86 87 94 114     Results from last 7 days   Lab Units 10/10/24  0021   PH ART  7.178*   PCO2 ART mm Hg 83.3*   PO2 ART mm Hg 128.0   HCO3 ART mmol/L 30.3*   BASE EXC ART mmol/L 0.0   O2 CONTENT ART mL/dL 16.5   O2 HGB, ARTERIAL % 97.6*   ABG SOURCE  Radial, Right     Results from last 7 days   Lab Units 10/10/24  1102 10/10/24  0417 10/10/24  0137   PH YINKA  7.263* 7.462* 7.307   PCO2 YINKA mm Hg 76.8* 38.1* 67.7*   PO2 YINKA mm Hg 22.3* 200.8* 34.9*   HCO3 YINKA mmol/L 33.9* 26.6 33.1*   BASE EXC YINKA mmol/L 4.8 2.8 4.7   O2 CONTENT YINKA ml/dL 6.9 16.6 13.5   O2 HGB, VENOUS % 41.4* 91.0* 72.2             Results from last 7 days   Lab Units 10/10/24  0556 10/10/24  0344 10/10/24  0137   HS TNI 0HR ng/L  --   --  7   HS TNI 2HR ng/L  --  8  --    HSTNI D2 ng/L  --  1  --    HS TNI 4HR ng/L 8  --   --    HSTNI D4 ng/L 1  --   --              Results from last 7 days   Lab Units 10/10/24  1102 10/09/24  0640   TSH 3RD GENERATON uIU/mL 0.342* 0.848     Results from last 7 days   Lab Units 10/10/24  0137   PROCALCITONIN ng/ml 0.20     Results from last 7 days   Lab Units 10/10/24  0137   LACTIC ACID mmol/L 0.6     Results  from last 7 days   Lab Units 10/10/24  0137   BNP pg/mL 66     Results from last 7 days   Lab Units 10/09/24  0809   CLARITY UA  Clear   COLOR UA  Yellow   SPEC GRAV UA  1.020   PH UA  6.0   GLUCOSE UA mg/dl Negative   KETONES UA mg/dl 15 (1+)*   BLOOD UA  25.0*   PROTEIN UA mg/dl 30 (1+)*   NITRITE UA  Negative   BILIRUBIN UA  Negative   UROBILINOGEN UA mg/dL Negative   LEUKOCYTES UA  25.0*   WBC UA /hpf 1-2   RBC UA /hpf 4-10*   BACTERIA UA /hpf Occasional   EPITHELIAL CELLS WET PREP /hpf Occasional   MUCUS THREADS  Occasional*       Results from last 7 days   Lab Units 10/10/24  0137   BLOOD CULTURE  No Growth at 24 hrs.  No Growth at 24 hrs.         Past Medical History:   Diagnosis Date    Anxiety 08/18/2024    Asthma     COPD (chronic obstructive pulmonary disease) ([facility])     Disease of thyroid gland     GERD (gastroesophageal reflux disease)     Hypertension 10/11/2018    Hypothyroid     Normocytic anemia 08/28/2024    Osteoarthritis 09/26/2012    Temporal arteritis ([facility])     Type 2 diabetes mellitus with complication, without long-term current use of insulin ([facility]) 01/14/2020     Present on Admission:   Acute exacerbation of chronic obstructive pulmonary disease (COPD) ([facility])   Hypertension   Gastroesophageal reflux disease without esophagitis   Temporal arteritis ([facility])   Anxiety/depression      Admitting Diagnosis: COPD (chronic obstructive pulmonary disease) ([facility]) [J44.9]  Age/Sex: 79 y.o. female    Network Utilization Review Department  ATTENTION: Please call with any questions or concerns to [phone] and carefully listen to the prompts so that you are directed to the right person. All voicemails are confidential.   For Discharge needs, contact Care Management DC Support Team at [phone] opt. [phone]  Send all requests for admission clinical reviews, approved or denied determinations and any other requests to dedicated fax number below belonging to the campus where the patient is receiving treatment. List  of dedicated fax numbers for the Facilities:  FACILITY NAME UR FAX NUMBER   ADMISSION DENIALS (Administrative/Medical Necessity) [phone]   DISCHARGE SUPPORT TEAM (NETWORK) [phone]   PARENT CHILD HEALTH (Maternity/NICU/Pediatrics) [phone]   [facility] [phone]   [facility] [phone]   [facility] [phone]   [facility] [phone]   On license of [facility] [phone]   [facility] [phone]   General acute hospital [phone]   [facility] [phone]   [facility] [phone]   Critical access hospital [phone]   [facility] [phone]   [facility] [phone]

## 2024-10-11 NOTE — ASSESSMENT & PLAN NOTE
History of severe COPD  Initial concern for exacerbation but likely oversedation from benzodiazepine  Patient placed initially on BiPAP sepsis workup has been unremarkable  Patient will continue Atrovent and Xopenex  Continue pulmonary toilet  Improving

## 2024-10-11 NOTE — OCCUPATIONAL THERAPY NOTE
"    Occupational Therapy Evaluation     Patient Name: Sarah Zayas  Today's Date: 10/11/2024  Problem List  Principal Problem:    Acute respiratory failure with hypercapnia (HCC)  Active Problems:    Acute exacerbation of chronic obstructive pulmonary disease (COPD) (HCC)    Gastroesophageal reflux disease without esophagitis    Hypertension    Temporal arteritis (HCC)    Anxiety/depression    Neck pain/shoulder pain    Past Medical History  Past Medical History:   Diagnosis Date    Anxiety 08/18/2024    Asthma     COPD (chronic obstructive pulmonary disease) (HCC)     Disease of thyroid gland     GERD (gastroesophageal reflux disease)     Hypertension 10/11/2018    Hypothyroid     Normocytic anemia 08/28/2024    Osteoarthritis 09/26/2012    Temporal arteritis (HCC)     Type 2 diabetes mellitus with complication, without long-term current use of insulin (HCC) 01/14/2020     Past Surgical History  Past Surgical History:   Procedure Laterality Date    EYE SURGERY Right 12/06/2019    cataract LVCFS    HYSTERECTOMY      NO PAST SURGERIES      ALSO NO RELEVANT PAST SURRGICAL HX AS PER [facility]           10/11/24 1028   OT Last Visit   OT Visit Date 10/11/24   Note Type   Note type Evaluation   Pain Assessment   Pain Assessment Tool 0-10   Pain Score 10 - Worst Possible Pain  (\"15\")   Pain Location/Orientation Orientation: Left;Location: Shoulder   Patient's Stated Pain Goal No pain   Hospital Pain Intervention(s) Repositioned;Ambulation/increased activity;Emotional support;Rest   Multiple Pain Sites No   Restrictions/Precautions   Weight Bearing Precautions Per Order No   Braces or Orthoses Other (Comment)  (Spoke to Dr. Mckeon regarding order for shldr abduction sling placed by Luz Mukherjee MD on 10/10/24- Dr. Mckeon reports no need for brace at this time, imaging (-) for fracture.)   Other Precautions Cognitive;Chair Alarm;Bed Alarm;Fall Risk;Pain;O2;Multiple lines;Visual impairment  (4L O2)   Home Living   Type of Home House "   Home Layout Two level;1/2 bath on main level;Performs ADLs on one level;Able to live on main level with bedroom/bathroom  (2 ARCADIO, 1st floor setup)   Bathroom Shower/Tub Tub/shower unit  (Sponge bathes at baseline)   Bathroom Toilet Standard   Home Equipment Walker;Cane;Other (Comment)  (Home O2- 2L)   Additional Comments Pt lives alone in a two level house with 2 ARCADIO and 1st floor setup with 1/2 bath. Pt reports her sister stays with her PRN, son lives an hour away. Pt has HHA 2 days/wk for 2 hrs/day to assist w/ bathing.   Prior Function   Level of [place] Independent with ADLs;Independent with functional mobility;Needs assistance with ADLs  (I w/ dressing and toileting. Assist w/ bathing)   Lives With Alone   Receives Help From Home health;Family  (Pt reports sister stays with her PRN, son lives an hour away)   Falls in the last 6 months 0   Vocational Retired   Comments At baseline, pt was primarily I w/ ADLs (assist w/ bathing only) and Mod I for functional transfers/mobility w/ use of RW. (-) . Denies falls PTA   Lifestyle   Autonomy At baseline, pt was primarily I w/ ADLs (assist w/ bathing only) and Mod I for functional transfers/mobility w/ use of RW. (-) . Denies falls PTA   Reciprocal Relationships Son, sister   Service to Others Retired   ADL   Where Assessed Chair   Eating Assistance 5  Supervision/Setup   Grooming Assistance 4  Minimal Assistance   UB Bathing Assistance 4  Minimal Assistance   LB Bathing Assistance 2  Maximal Assistance   UB Dressing Assistance 3  Moderate Assistance   LB Dressing Assistance 2  Maximal Assistance   Toileting Assistance  2  Maximal Assistance   Bed Mobility   Supine to Sit 3  Moderate assistance   Additional items Assist x 1;HOB elevated;Bedrails;Increased time required;LE management;Verbal cues   Sit to Supine Unable to assess   Additional Comments Pt seated OOB in chair with chair alarm activated at end of session. Call bell and phone within reach.  All needs met and pt reports no further questions for OT at this time.   Transfers   Sit to Stand 4  Minimal assistance   Additional items Assist x 2;Increased time required;Verbal cues   Stand to Sit 4  Minimal assistance   Additional items Assist x 2;Increased time required;Verbal cues   Additional Comments Cues for safe technique and hand placement   Functional Mobility   Functional Mobility 4  Minimal assistance   Additional Comments Assist x2; EOB>bedside chair   Additional items Rolling walker   Balance   Static Sitting Fair   Dynamic Sitting Fair -   Static Standing Poor +   Dynamic Standing Poor   Ambulatory Poor   Activity Tolerance   Activity Tolerance Patient limited by fatigue;Patient limited by pain;Treatment limited secondary to medical complications (Comment)   Medical Staff Made Aware Beth[place], PT; Dr. Mckeon, DO   Nurse Made Aware yes; Lin, RN   RUE Assessment   RUE Assessment WFL  (4-/5 throughout)   LUE Assessment   LUE Assessment X  (limited shldr ROM 2* pain (~70-80* flex), elbow-distal=WFL)   Hand Function   Gross Motor Coordination Functional   Fine Motor Coordination Functional   Sensation   Light Touch No apparent deficits   Proprioception   Proprioception No apparent deficits   Vision - Complex Assessment   Additional Comments Pt reports blurred vision in B/L eyes. Unable to read employee name badge or identify digits held up by therapist   Psychosocial   Psychosocial (WDL) X   Patient Behaviors/Mood Anxious   Perception   Inattention/Neglect Appears intact   Cognition   Overall Cognitive Status Impaired   Arousal/Participation Alert;Cooperative   Attention Attends with cues to redirect   Orientation Level Oriented to person;Oriented to place;Oriented to time   Memory Decreased recall of precautions;Decreased recall of recent events   Following Commands Follows one step commands with increased time or repetition   Comments Limited insight into deficits   Assessment   Limitation Decreased  ADL status;Decreased UE ROM;Decreased Safe judgement during ADL;Decreased UE strength;Decreased cognition;Decreased endurance;Decreased self-care trans;Decreased high-level ADLs   Prognosis Fair   Assessment Pt is a 79 y.o. female seen for OT evaluation s/p [facility] w/ Acute respiratory failure with hypercapnia. Comorbidities affecting pt’s functional performance include a significant PMH of severe COPD with chronic hypoxic respiratory failure on 2 L at home, hypertension, hypothyroid, temporal arteritis, type 2 diabetes, neck swelling, sialoadenitis, anxiety/depression. Pt with active OT orders and activity orders for OOB to chair. Pt lives alone in a two level house with 2 ARCADIO and 1st floor setup with 1/2 bath. Pt reports her sister stays with her PRN, son lives an hour away. Pt has HHA 2 days/wk for 2 hrs/day to assist w/ bathing. At baseline, pt was primarily I w/ ADLs (assist w/ bathing only) and Mod I for functional transfers/mobility w/ use of RW. (-) . Denies falls PTA. Upon evaluation, pt currently requires Mod-Min A for UB ADLs, Max A for LB ADLs, Max A for toileting, Mod A for bed mobility, and Min A of 2 for functional mobility/transfers 2* the following deficits impacting occupational performance: decreased UE ROM, decreased strength , decreased balance, decreased activity tolerance, limited functional reach, impaired memory, increased pain, visual deficits, and decreased postural control. These impairments, as well at pt’s personal factors of: ARCADIO home environment, limited home support, difficulty performing ADLs, difficulty performing transfers/mobility, limited insight into deficits, fall risk , functional decline , increase in O2 requirements , and advanced age limit pt’s ability to safely engage in all baseline areas of occupation. Pt to continue to benefit from continued acute OT services during hospital stay to address defined deficits and to maximize level  of functional independence in the following Occupational Performance areas: eating, grooming, bathing/shower, toilet hygiene, dressing, health maintenance, functional mobility, community mobility, clothing management, and social participation. The patient's raw score on the AM-PAC Daily Activity Inpatient Short Form is 14. A raw score of less than 19 suggests the patient may benefit from discharge to post-acute rehabilitation services. Please refer to the recommendation of the Occupational Therapist for safe discharge planning. OT will continue to follow pt 3-5x/wk to address the following goals to  w/in 10-14 days:   Goals   Patient Goals To have less pain in L UE   LTG Time Frame 10-   Long Term Goal Please refer to LTGs listed below   Plan   Treatment Interventions ADL retraining;Functional transfer training;UE strengthening/ROM;Endurance training;Patient/family training;Equipment evaluation/education;Compensatory technique education;Continued evaluation;Activityengagement   Goal Expiration Date 10/25/24   OT Treatment Day 0   OT Frequency 3-5x/wk   Discharge Recommendation   Rehab Resource Intensity Level, OT II (Moderate Resource Intensity)   AM-PAC Daily Activity Inpatient   Lower Body Dressing 2   Bathing 2   Toileting 2   Upper Body Dressing 2   Grooming 3   Eating 3   Daily Activity Raw Score 14   Daily Activity Standardized Score (Calc for Raw Score >=11) 33.39   AM-PAC Applied Cognition Inpatient   Following a Speech/Presentation 3   Understanding Ordinary Conversation 3   Taking Medications 2   Remembering Where Things Are Placed or Put Away 2   Remembering List of 4-5 Errands 2   Taking Care of Complicated Tasks 2   Applied Cognition Raw Score 14   Applied Cognition Standardized Score 32.02          GOALS    Pt will improve activity tolerance to G for min 30 min txment sessions for increase engagement in functional tasks    Pt will complete bed mobility at a Mod I level w/ G balance/safety  demonstrated to decrease caregiver assistance required     Pt will complete UB dressing/self care w/ mod I using adaptive device and DME as needed     Pt will complete LB dressing/self care w/ mod I using adaptive device and DME as needed    Pt will complete toileting w/ mod I w/ G hygiene/thoroughness using DME as needed    Pt will improve functional transfers to Mod I on/off all surfaces using DME as needed w/ G balance/safety     Pt will improve functional mobility during ADL/IADL/leisure tasks to Mod I using DME as needed w/ G balance/safety     Pt will be attentive 100% of the time during ongoing cognitive assessment w/ G participation to assist w/ safe d/c planning/recommendations    Pt will demonstrate G carryover of pt/caregiver education and training as appropriate w/o cues w/ good tolerance to increase safety during functional tasks    Pt will demonstrate 100% carryover of energy conservation techniques t/o functional I/ADL/leisure tasks w/o cues s/p skilled education to increase endurance during functional tasks    Pt will verbalize 3 potential fall hazards and identify appropriate compensatory techniques to decrease fall risk in home environment     Pt will increase standing tolerance to 3-5 mins with Fair+ dynamic standing balance to increase safety during participation in ADLs       Patricia Moser, OTR/L

## 2024-10-11 NOTE — CONSULTS
Pt left message on voice mail  Dr. Duque spoke with her yesterday, discussed options due to CT   Wants to cancel trial drug and start chemo       TeleConsultation - Behavioral Health   Sarah Zayas 79 y.o. female MRN: [id number]  Unit/Bed#: [place] [address] -[id number] Encounter: [id number]        REQUIRED DOCUMENTATION:     1. This service was provided via Telemedicine.  2. Provider located off-site in private office  3. TeleMed provider: Alfonso Caballero MD.  4. Identify all parties in room with patient during tele consult:  Patient  5.Patient was then informed that this was a Telemedicine visit and that the exam was being conducted confidentially over secure lines. My office door was closed. No one else was in the room.  Patient acknowledged consent and understanding of privacy and security of the Telemedicine visit, and gave us permission to have the assistant stay in the room in order to assist with the history and to conduct the exam.  I informed the patient that I have reviewed their record in Epic and presented the opportunity for them to ask any questions regarding the visit today.  The patient agreed to participate.       Assessment & Plan     Principal Problem:    Acute respiratory failure with hypercapnia (HCC)  Active Problems:    Acute exacerbation of chronic obstructive pulmonary disease (COPD) (HCC)    Gastroesophageal reflux disease without esophagitis    Hypertension    Temporal arteritis (HCC)    Anxiety/depression    Neck pain/shoulder pain        Diagnosis:  -Major Depressive d/o  -Adjustment d/o with anxious distress vs Anxiety d/o due to another medical condition (pain)    Recommendations & Treatment Plan:  -Consider switching from Gabapentin to Pregabalin 25mg BID, dosing can be increased to TID based on the patient's response and tolerability. This may address pain and anxiety. Monitor for sedation/falls as pregabalin is more potent than gabapentin.  -Consider switching from Remeron (which patient states did not help) to low dose Duloxetine 30mg daily and titrating up based on response and tolerability, on outpatient basis.   -Patient would benefit  from addressing her shoulder pain, as it is a major contributor to poor sleep and increased anxiety. Defer to primary team for appropriate management and consultation as needed.  -Also recommend to optimize her Thyroid and COPD regimen (Synthroid and xopenex) as these can further contribute to anxiety symptoms.  -No indication at this time for acute inpatient psychiatric hospitalization  -Avoid benzodiazepines  -Re-consult psychiatry for follow up      Findings and recommendations communicated to primary team/staff.    Current Medications:   Current Facility-Administered Medications   Medication Dose Route Frequency Provider Last Rate    acetaminophen  650 mg Oral Q6H RACHEL Luz Mukherjee MD      albuterol  2 puff Inhalation Q6H PRN Luz Mukherjee MD      Artificial Tears  1 drop Both Eyes Q4H PRN Luz Mukherjee MD      budesonide  0.5 mg Nebulization BID Luz Mukherjee MD      chlorhexidine  15 mL Mouth/Throat Q12H [facility] Luz Mukherjee MD      vitamin B-12  1,000 mcg Oral Daily Luz Mukherjee MD      enoxaparin  30 mg Subcutaneous Daily Luz Mukherjee MD      gabapentin  100 mg Oral TID Luz Mukherjee MD      ipratropium  0.5 mg Nebulization BID Luz Mukherjee MD      ipratropium-albuterol  3 mL Nebulization Q6H PRN Luz Mukherjee MD      levalbuterol  1.25 mg Nebulization BID Luz Mukherjee MD      levothyroxine  50 mcg Oral Early Morning Luz Mukherjee MD      lidocaine  1 patch Topical Daily Luz Mukherjee MD      losartan  50 mg Oral Daily Luz Mukherjee MD      mirtazapine  7.5 mg Oral HS Luz Mukherjee MD      ondansetron  4 mg Intravenous Q6H PRN Luz Mukherjee MD      pantoprazole  40 mg Oral Early Morning Luz Mukherjee MD      predniSONE  2.5 mg Oral Daily With Breakfast Luz Mukherjee MD      sodium chloride  2 spray Each Nare Q1H PRN Luz Mukherjee MD         Risks / Benefits of Treatment:  Risks, benefits, and possible side effects of medications explained to patient and patient verbalizes understanding.      Inpatient consult to Psychiatry  Consult performed  by: Alfonso Caballero MD  Consult ordered by: Saad Mckeon DO      Physician Requesting Consult: Saad Mckeon DO  Principal Problem:Acute respiratory failure with hypercapnia (HCC)    Reason for Consult: anxiety      History of Present Illness:  79-year-old female with history of anxiety disorder, and medical history significant for severe COPD with chronic hypoxic respiratory failure, hypertension, hypothyroidism, possible type 2 diabetes.  Patient was admitted to medical service with acute on chronic respiratory failure, and reported severe anxiety and insomnia.  Chart was reviewed.  Patient reportedly started BuSpar a few months ago which made her insomnia and anxiety worse.  Patient had episode of rapid response due to respiratory distress and encephalopathy, was improved after BiPAP.  Patient was awake and alert during interview.  She was notably constricted in reported feeling anxious and depressed.  She states that her mood has been sad and anxious lately, worrying constantly, having poor sleep and poor appetite, feeling helpless, with low energy.  She reports that the contributors to this are her severe shoulder pain, which results in a lot of insomnia and anxiety.  Also reports having poor vision and difficulty performing her ADLs which causes her to feel helpless at times.  Patient denies feeling suicidal or having any passive death wishes.  Denied any manic or psychotic symptoms.  Reported that she has a home health aid who helps sort her medications in the pillbox for better compliance.  Also has a good support system through her sister who lives with her at times.  She has tried Remeron for past 2 nights in the hospital but feels it did not help much.  She states that she is  hopeful to improve her health and her functionality.  We discussed medication options and patient was agreeable to medication changes (listed in plan).     Psychiatric Review Of Systems:  Negative except as reported or  "endorsed in HPI    Historical Information     Past Psychiatric History:     Psychiatric Hospitalizations:   Patient denies  Outpatient Treatment History:   Patient denies. Sees PCP  Suicide Attempts:   Patient denies  History of self-harm:   Patient denies  Current Psychotropic regimen:Buspar (discontinued). Currently on Remeron 7.5mg in hospital.   Past Psychiatric medication trials: Lorazepam - caused respiratory dysfunction    Substance Abuse History:  Patient denies  Former smoker    Family Psychiatric History:    None reported    Social History:  Marital status:   Children: 3. Her son lives in [place].  Her daughter lives in [place] and is currently dealing with the hurricane.    Living arrangement:lives alone, with sister \"stays with me a lot\"  Support system: Good. Also has home health aide that comes in  Occupational History: None  Functioning Relationships: good support system.    Traumatic History:   None rpeorted      Past Medical History:   Diagnosis Date    Anxiety 08/18/2024    Asthma     COPD (chronic obstructive pulmonary disease) (HCC)     Disease of thyroid gland     GERD (gastroesophageal reflux disease)     Hypertension 10/11/2018    Hypothyroid     Normocytic anemia 08/28/2024    Osteoarthritis 09/26/2012    Temporal arteritis ([facility])     Type 2 diabetes mellitus with complication, without long-term current use of insulin ([facility]) 01/14/2020       Medical Review Of Systems:    Review of Systems    Meds/Allergies     all current active meds have been reviewed  No Known Allergies    Objective     Vital signs in last 24 hours:  Temp:  [98.3 °F (36.8 °C)-99 °F (37.2 °C)] 98.3 °F (36.8 °C)  HR:  [100-114] 111  BP: (124-160)/(60-91) 124/60  Resp:  [17-43] 17  SpO2:  [89 %-98 %] 98 %  O2 Device: Nasal cannula  Nasal Cannula O2 Flow Rate (L/min):  [2 L/min-4 L/min] 4 L/min      Intake/Output Summary (Last 24 hours) at 10/11/2024 1025  Last data filed at 10/11/2024 1001  Gross per 24 hour   Intake " 720 ml   Output 1350 ml   Net -630 ml       Mental Status Evaluation:    Appearance:  age appropriate   Behavior:  normal   Speech:  normal pitch and normal volume   Mood:  anxious and depressed   Affect:  constricted   Thought Process:  normal   Associations intact associations   Thought Content:  normal   Perceptual Disturbances: None   Risk Potential: Suicidal Ideations none  Homicidal Ideations none   Sensorium:  person, place, situation, month of year, and year   Cognition:  Awake and alert   Attention: attention span and concentration were age appropriate   Insight:  fair   Judgment: fair       Lab Results: I have personally reviewed all pertinent laboratory/tests results.     Most Recent Labs: @RESUFAST(WBC,RBC,HGB,HCT,PLT, RBC,RDW,NEUTROABS,SODIUM,K,CL,CO2,BUN,CREATININE,GLUC,GLUF,CALCIUM,AST,ALT,ALKPHOS,TP,ALB,TBILI,CHOLESTEROL,HDL,TRIG,LDLCALC,NONHDLC,VALPROICTOT,CARBAMAZEPIN,LITHIUM,AMMONIA,IOT3TFYLGCKR,FREET4,T3FREE,EXTPREGUR,PREGSERUM,HCG,HCGQUANT,RPR,HGBA1C,EAG)@    Imaging Studies: XR shoulder 2+ vw left    Result Date: 10/10/2024  Narrative: EXAMINATION: XR SHOULDER 2+ VW LEFT INDICATION:   shoulder pain. COMPARISON: Humerus x-ray 3/8/2023 VIEWS: XR SHOULDER 2+ VW LEFT FINDINGS: Examination 10/9/2024 at 6:47 p.m.: No fracture or dislocation. Moderate acromioclavicular osteoarthritis. No osseous lesion. No significant soft tissue abnormality. Examination 10/10/2024 at 2:00 a.m.: No significant interval change     Impression: No acute osseous abnormality Workstation performed: [id number]     XR shoulder 2+ views LEFT    Result Date: 10/10/2024  Narrative: EXAMINATION: XR SHOULDER 2+ VW LEFT INDICATION:   shoulder pain. COMPARISON: Humerus x-ray 3/8/2023 VIEWS: XR SHOULDER 2+ VW LEFT FINDINGS: Examination 10/9/2024 at 6:47 p.m.: No fracture or dislocation. Moderate acromioclavicular osteoarthritis. No osseous lesion. No significant soft tissue abnormality. Examination 10/10/2024 at 2:00 a.m.: No  significant interval change     Impression: No acute osseous abnormality Workstation performed: [id number]     XR chest portable    Result Date: 10/10/2024  Narrative: XR CHEST PORTABLE INDICATION: copd. COMPARISON: X-ray 8/14/2024 FINDINGS: Patient rotated rightward Right perihilar opacity from middle lobe collapse Hyperinflated lungs . No pneumothorax or pleural effusion. Normal cardiomediastinal silhouette. Bones are unremarkable for age. Normal upper abdomen.     Impression: No acute cardiopulmonary disease. Workstation performed: [id number]     CT chest without contrast    Result Date: 10/10/2024  Narrative: CT CHEST WITHOUT IV CONTRAST INDICATION: ams. Rapid response COMPARISON: X-ray yesterday, CT 12/7/2023 TECHNIQUE: CT examination of the chest was performed without intravenous contrast. Multiplanar 2D reformatted images were created from the source data. This examination, like all CT scans performed in the [facility], was performed utilizing techniques to minimize radiation dose exposure, including the use of iterative reconstruction and automated exposure control. Radiation dose length product (DLP) for this visit: 312 mGy-cm FINDINGS: LUNGS: Moderate emphysema Stable middle lobe collapse/scarring No focal infiltrate 5 mm left lower lobe superior segment nodule, series 2/22, stable There is mild respiratory motion artifact. Density noted dependently within the dylon and left mainstem bronchus likely represents debris, alternatively this appears to be secondary to volume averaging from motion artifact PLEURA: Trace bilateral pleural effusions HEART/GREAT VESSELS: Heart is unremarkable for patient's age. No thoracic aortic aneurysm. MEDIASTINUM AND JACKI: Unremarkable. CHEST WALL AND LOWER NECK: Soft tissue lesion in the right breast is again noted VISUALIZED STRUCTURES IN THE UPPER ABDOMEN: Renal cysts, partially visualized. Residual contrast present within the renal parenchyma from recent  contrast study. OSSEOUS STRUCTURES: DISH (diffuse idiopathic skeletal hyperostosis) of the lower thoracic spine     Impression: 1.  Possible airway debris, which would be consistent with aspiration, see discussion 2.  Trace pleural effusions 3.  Stable right breast lesion, recommend follow-up diagnostic mammogram The study was marked in EPIC for immediate notification. Workstation performed: [id number]     CT head without contrast    Result Date: 10/10/2024  Narrative: CT BRAIN - WITHOUT CONTRAST INDICATION:   ams. COMPARISON: CT yesterday and 9/6/2022 TECHNIQUE:  CT examination of the brain was performed.  Multiplanar 2D reformatted images were created from the source data. Radiation dose length product (DLP) for this visit:  886 mGy-cm .  This examination, like all CT scans performed in the [facility], was performed utilizing techniques to minimize radiation dose exposure, including the use of iterative reconstruction and automated exposure control. IMAGE QUALITY:  Diagnostic. FINDINGS: PARENCHYMA: Decreased attenuation is noted in periventricular and subcortical white matter demonstrating an appearance that is statistically most likely to represent mild microangiopathic change. No CT signs of acute infarction.  No intracranial mass, mass effect or midline shift.  No acute parenchymal hemorrhage. VENTRICLES AND EXTRA-AXIAL SPACES:  Normal for the patient's age. VISUALIZED ORBITS: Normal visualized orbits. PARANASAL SINUSES: Normal visualized paranasal sinuses. CALVARIUM AND EXTRACRANIAL SOFT TISSUES:  Normal.     Impression: No acute intracranial abnormality. Workstation performed: [id number]     CTA head and neck with and without contrast    Result Date: 10/9/2024  Narrative: CTA NECK AND BRAIN WITH AND WITHOUT CONTRAST INDICATION: Left-sided weakness and decreased sensation; left facial asymmetry COMPARISON:   None. TECHNIQUE:  Routine CT imaging of the Brain without contrast.Post contrast imaging  was performed after administration of iodinated contrast through the neck and brain. Post contrast axial 0.625 mm images timed to opacify the arterial system.  3D rendering was performed on an independent workstation.   MIP reconstructions performed. Coronal and sagittal reconstructions were performed of the non contrast portion of the brain. Radiation dose length product (DLP) for this visit:  1133 mGy-cm .  This examination, like all CT scans performed in the [facility], was performed utilizing techniques to minimize radiation dose exposure, including the use of iterative reconstruction and automated exposure control. IV Contrast:  85 mL of iohexol (OMNIPAQUE) IMAGE QUALITY:   Diagnostic FINDINGS: NONCONTRAST BRAIN PARENCHYMA:No intracranial mass, mass effect or midline shift. No CT signs of acute infarction.  No acute parenchymal hemorrhage. VENTRICLES AND EXTRA-AXIAL SPACES:Normal for the patient's age. VISUALIZED ORBITS: Normal. PARANASAL SINUSES: Normal. CTA NECK ARCH AND GREAT VESSELS: Visualized arch and great vessels are normal. VERTEBRAL ARTERIES: Patent extracranial segments. FMD is suspected. RIGHT CAROTID: Mild vessel irregularity suggesting FMD. No stenosis.    No dissection. LEFT CAROTID: Mild vessel irregularity suggesting FMD. No stenosis.    No dissection. NASCET criteria was used to determine the degree of internal carotid artery diameter stenosis. CTA BRAIN: INTERNAL CAROTID ARTERIES: No stenosis or occlusion. ANTERIOR CEREBRAL ARTERY CIRCULATION:  No stenosis or occlusion. There is a 2 mm aneurysm projecting anteriorly at the left A2 A3 junction, best seen on image 63 of series 11. MIDDLE CEREBRAL ARTERY CIRCULATION:  No stenosis or occlusion. DISTAL VERTEBRAL ARTERIES:  No stenosis or occlusion. BASILAR ARTERY:  No stenosis or occlusion. POSTERIOR CEREBRAL ARTERIES: No stenosis or occlusion. VENOUS STRUCTURES:  Normal. NON VASCULAR ANATOMY BONY STRUCTURES:  No acute osseous  abnormality. SOFT TISSUES OF THE NECK:  Normal. THORACIC INLET:  Unremarkable.     Impression: CT Brain:  No acute intracranial abnormality. CT Angiography: 1. No acute process on CT angiogram of the head and neck. 2. No vessel stenosis seen. 3. 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 Recommend neurovascular follow-up. The study was marked in EPIC for immediate notification. Workstation performed: [id number]     US kidney and bladder with pvr    Result Date: 10/1/2024  Narrative: RENAL ULTRASOUND WITH PVR INDICATION: R35.0: Frequency of micturition. COMPARISON: Multiple priors most recently 7/22/2024 TECHNIQUE: Ultrasound of the retroperitoneum was performed with a curvilinear transducer utilizing volumetric sweeps and still imaging techniques. FINDINGS: KIDNEYS: Symmetric and normal size. Right kidney: 8.9 x 5.1 x 5.1 cm. Volume 123.7 mL Left kidney: 11.7 x 5.5 x 4.7 cm. Volume 157.2 mL Right kidney Normal echogenicity and contour. No mass is identified. Benign-appearing cysts present. No hydronephrosis. No shadowing calculi. No perinephric fluid collections. Left kidney Normal echogenicity and contour. No mass is identified. Benign-appearing cysts present, the largest measuring up to 8.8 cm. No hydronephrosis. No shadowing calculi. No perinephric fluid collections. URETERS: Nonvisualized. BLADDER: Normally distended. No focal thickening or mass lesions. Bilateral ureteral jets detected. Prevoid: 152.8 Moderate post void residual volume. Measured post void volume in mL: 146.0     Impression: No hydronephrosis. Moderate post void residual volume. Measured post void volume in mL: 146.0. Correlate for bladder dysfunction. Workstation performed: [id number]     EKG/Pathology/Other Studies:   Lab Results   Component Value Date    VENTRATE 104 10/10/2024    ATRIALRATE 104 10/10/2024    PRINT 118 10/10/2024    QRSDINT 68 10/10/2024    QTINT 326 10/10/2024    QTCINT 428 10/10/2024    PAXIS 54 10/10/2024    QRSAXIS 35 10/10/2024     GLENNA 55 10/10/2024        Code Status: Level 1 - Full Code  Advance Directive and Living Will:      Power of :    POLST:      Screenings:    1. Nutrition Screening  Nutrition Assessment (completed by Staff): Nutrition  Feeding: Able to feed self  Diet Type: Regular/House  Nutrition Screen: Nutrition Screen  Have you lost weight recently without trying?: (!) Yes  How much weight (pounds) have you lost?: (!) 2 to 13 lbs  Have you been eating poorly because of a decreased appetite?: (!) Yes  MST SCORE: 2  Stage III-IV pressure ulcer or non-healing wound?: No  Home tube feeding or total parenteral nutrition (TPN)?: No  Appearance of muscle wasting or fat loss?: No  Patient currently on hemodialysis or peritoneal dialysis? : No   Nutrition Assessment:  Nursing Nutrition Screen  Have you lost weight recently without trying?: (!) Yes  How much weight (pounds) have you lost?: (!) 2 to 13 lbs  Have you been eating poorly because of a decreased appetite?: (!) Yes  MST SCORE: 2  Stage III-IV pressure ulcer or non-healing wound?: No  Home tube feeding or total parenteral nutrition (TPN)?: No  Appearance of muscle wasting or fat loss?: No    2. Pain Screening  Pain Screening: Pain Assessment  Pain Assessment Tool: 0-10  Pain Score: 7  Pain Location/Orientation: Orientation: Left, Location: Shoulder    3. Suicide Screening  Suicide Risk Assessment: Not available on chart                                                         [place]-SUICIDE SEVERITY RATING SCALE (C-SSRS)                            1. In the last month have you wished you were dead or wished you could go to sleep and not wake up? No  2. In the last month, have you actually had thoughts about killing yourself? No  6. Have you done anything, started to do anything, or prepared to do anything to end your life in the last 3 months? No  Suicide Risk Level : Low    Counseling / Coordination of Care:    Total floor / unit time spent today 50 minutes.  Greater than 50% of total time was spent with the patient and / or family counseling and / or coordination of care. A description of the counseling / coordination of care: Direct Patient Care, Chart Review, and Documentation     Disclaimer: Portions of this note may have been generated by a front end voice activated word recognition program. There may be typographical grammar or word substitution errors generated by the program or my typing skills in this note that may have been escaped my editorial review.

## 2024-10-11 NOTE — PHYSICAL THERAPY NOTE
Physical Therapy Evaluation:    2 forms of pt ID verified:name,birthdate and pt ID monica    Patient's Name: Sarah Zayas    Admitting Diagnosis  COPD (chronic obstructive pulmonary disease) ([facility]) [J44.9]    Problem List  Patient Active Problem List   Diagnosis    Acute exacerbation of chronic obstructive pulmonary disease (COPD) (HCC)    Vitamin D deficiency    Other specified hypothyroidism    Gastroesophageal reflux disease without esophagitis    Hypertension    Osteoarthritis    Temporal arteritis (HCC)    Raynaud's phenomenon without gangrene    Postnasal drip    Left upper lobe pulmonary nodule    Infectious sialoadenitis of major salivary gland    Abnormal CT scan    COPD, severe (HCC)    Severe protein-calorie malnutrition (HCC)    Anxiety/depression    Neck swelling    Advance care planning    At risk for constipation    At risk for delirium    Physical deconditioning    Periodontitis    Polypharmacy    Diastolic dysfunction    Elevated vitamin B12 level    Normocytic anemia    Epigastric pain    Venous insufficiency of lower extremity    Acute on chronic respiratory failure with hypoxia and hypercapnia (HCC)    Neck pain/shoulder pain    Acute respiratory failure with hypercapnia (HCC)       Past Medical History  Past Medical History:   Diagnosis Date    Anxiety 08/18/2024    Asthma     COPD (chronic obstructive pulmonary disease) (HCC)     Disease of thyroid gland     GERD (gastroesophageal reflux disease)     Hypertension 10/11/2018    Hypothyroid     Normocytic anemia 08/28/2024    Osteoarthritis 09/26/2012    Temporal arteritis ([facility])     Type 2 diabetes mellitus with complication, without long-term current use of insulin ([facility]) 01/14/2020       Past Surgical History  Past Surgical History:   Procedure Laterality Date    EYE SURGERY Right 12/06/2019    cataract LVCFS    HYSTERECTOMY      NO PAST SURGERIES      ALSO NO RELEVANT PAST SURRGICAL HX AS PER [facility]          10/11/24 1057   PT Last Visit  "  PT Visit Date 10/11/24   Note Type   Note type Evaluation   Pain Assessment   Pain Assessment Tool 0-10   Pain Score 7   Pain Location/Orientation Orientation: Left;Location: Shoulder   Hospital Pain Intervention(s) Repositioned;Elevated;Emotional support;Rest;Relaxation technique   Restrictions/Precautions   Braces or Orthoses Other (Comment)  (Per OTR, \"Spoke to Dr. Mckeon regarding order for shldr abduction sling placed by Luz Mukherjee MD on 10/10/24- Dr. Mckeon reports no need for brace at this time, imaging (-) for fracture.\")   Other Precautions Visual impairment;Pain;Fall Risk;O2;Telemetry;Multiple lines;Cognitive;Chair Alarm;Bed Alarm  (very anxious and nervous behavior)   Home Living   Type of Home House   Home Layout Two level;1/2 bath on main level;Performs ADLs on one level;Able to live on main level with bedroom/bathroom  (2 ARCADIO, first floor setup)   Home Equipment Cane;Walker  (home O2- 2L)   Additional Comments pt lives alone with occasional A from sister. Per pt, son lives 1 hour away and pt reports having HHA 2 days per week for 2 hours each day   Prior Function   Level of Bates Independent with functional mobility;Needs assistance with ADLs   Lives With Alone   Receives Help From Personal care attendant;Family   Falls in the last 6 months 0   General   Additional Pertinent History COPD, SOB, insomnia   Family/Caregiver Present No   Cognition   Overall Cognitive Status Impaired   Arousal/Participation Cooperative   Attention Attends with cues to redirect   Orientation Level Oriented to person;Oriented to place;Oriented to time   Following Commands Follows one step commands with increased time or repetition   Subjective   Subjective Pt supine in bed resting comfortably; tearful and anxious at times about L shoulder and getting OOB. Pt willing and agreeable to work with PT and to participate in therapy intervention; \"I just want someone to help me. This is hard these days\".   RLE Assessment   RLE " Assessment   (at least 3/5 grossly throughout)   LLE Assessment   LLE Assessment   (at least 3/5 grossly throughout)   Vision-Basic Assessment   Current Vision Wears glasses all the time   Coordination   Movements are Fluid and Coordinated 0   Coordination and Movement Description guarding of L shoulder, forward flexed posture, slow mobility and gait   Sensation WFL   Light Touch   RLE Light Touch Grossly intact   LLE Light Touch Grossly intact   Bed Mobility   Supine to Sit 3  Moderate assistance   Additional items Assist x 1;HOB elevated;Bedrails;Increased time required;Verbal cues;LE management   Transfers   Sit to Stand 4  Minimal assistance   Additional items Assist x 2;Bedrails;Increased time required;Verbal cues   Stand to Sit 4  Minimal assistance   Additional items Assist x 2;Armrests;Increased time required;Verbal cues   Ambulation/Elevation   Gait pattern Poor UE support;Improper Weight shift;Antalgic;Narrow DEVONTE;Forward Flexion;Inconsistent tarun;Foward flexed;Short stride;Ataxia;Step to;Excessively slow   Gait Assistance 4  Minimal assist   Additional items Assist x 2;Verbal cues;Tactile cues   Assistive Device Rolling walker   Distance 4 steps bed->chair with use of RW;limited ambulation distance 2* fatigue, SOB and pain   Balance   Static Sitting Fair  (chair alarm intact prior to leaving pts room)   Dynamic Sitting   (zero)   Static Standing Zero   Dynamic Standing   (zero)   Ambulatory Zero   Endurance Deficit   Endurance Deficit Yes   Endurance Deficit Description use of O2, dec activity tolerance   Activity Tolerance   Activity Tolerance Patient limited by fatigue;Patient limited by pain  (poor->fair)   Medical Staff Made Aware Dr Linda Perez   Nurse Made Aware yes   Assessment   Prognosis Guarded   Problem List Decreased strength;Decreased endurance;Impaired balance;Decreased mobility;Decreased cognition;Impaired judgement;Decreased safety awareness;Impaired hearing;Impaired vision;Decreased  skin integrity;Pain   Assessment Pt is a 78 yo female admitted to [facility] 2* COPD, insomnia, and SOB. Pt lives alone in 2 , first floor setup available,use of personal DME PTA,reports no recent falls, reports being (I) with mobility and needs A for ADLs and IADLs. Pt currently is not at functional mobility baseline, needs A for mobility with use of RW, pain L shoulder, ataxic and unsteady gait and movement pattern, anxious and nervous behavior and personality, fear of falling,multiple lines, masimo monitoring and use of O2. Pt demonstrates moderate deficits during functional mobility and gait including dec endurance, dec balance, dec BLE strength, ataxic and unsteady gait and movement pattern, L shoulder pain , dec cognition (baseline?), nervous and axnious behavior and personality and needs modAx1 for bed mobility, min Ax2 for TT and GT with use of RW. Pt would cont to benefit from skilled inpt PT services to maximize functional independence and to dec caregiver burden upon beign DC from the hospital.   Barriers to Discharge Decreased caregiver support;Inaccessible home environment   Goals   Patient Goals to get through all of this   STG Expiration Date 10/21/24   Short Term Goal #1 in 7-10 days:  (1) Pt will be able to ambulate greater than 50 feet with use of RW on various surfaces needing min level of A in order to A pt to return to PLOF, (2) activity tolerance:45 mins/45mins, (3) pt will be able to perform sit to stand transfers needing min level of A to and from various surfaces consistently in order to return to PLOF, (4) pt will be able to perform BM needing min level of A to A pt to return to PLOF, (5) (I) with BLE therapeutic ex HEP in various positions to A pt to inc balance,strength,mobility,endurance and to A to dec pain, (6) inc balance 1/2 grade in order to dec fall risk, (7) pt will be able to go up and down 2 steps needing min level of A in order to navigate ARCADIO as able and as needed prior to D/C,  (8) cont to provide pt and pt family education for safe D/C planning, (9) inc BLE strength 1/2 to 1 full grade in order to A pt to inc balance,strength,mobility,endurance and to A to dec pain   PT Treatment Day 0   Plan   Treatment/Interventions Functional transfer training;LE strengthening/ROM;Elevations;Therapeutic exercise;Endurance training;Patient/family training;Equipment eval/education;Bed mobility;Gait training;Continued evaluation;Spoke to nursing;OT   PT Frequency 3-5x/wk   Discharge Recommendation   Rehab Resource Intensity Level, PT II (Moderate Resource Intensity)   Equipment Recommended Walker  (pt reports owning RW at home for use)   AM-PAC Basic Mobility Inpatient   Turning in Flat Bed Without Bedrails 2   Lying on Back to Sitting on Edge of Flat Bed Without Bedrails 2   Moving Bed to Chair 2   Standing Up From Chair Using Arms 2   Walk in Room 2   Climb 3-5 Stairs With Railing 1   Basic Mobility Inpatient Raw Score 11   Basic Mobility Standardized Score 30.25   [facility] Highest Level Of Mobility   -[facility] Goal 4: Move to chair/commode   -[facility] Achieved 4: Move to chair/commode         @Bethany Hernandez, PT, DPT@

## 2024-10-11 NOTE — UTILIZATION REVIEW
NOTIFICATION OF ADMISSION DISCHARGE   This is a Notification of Discharge from [facility]. Please be advised that this patient has been discharge from our facility. Below you will find the admission and discharge date and time including the patient’s disposition.   UTILIZATION REVIEW CONTACT:  Bethany Willams MA  Utilization   Network Utilization Review Department  Phone: [phone] x carefully listen to the prompts. All voicemails are confidential.  Email: [email or web address]     ADMISSION INFORMATION  PRESENTATION DATE: 10/9/2024  6:11 AM  OBERVATION ADMISSION DATE: N/A  INPATIENT ADMISSION DATE: 10/9/24 10:33 AM   DISCHARGE DATE: 10/10/2024 10:01 AM   DISPOSITION:[facility] Utilization Review Department  ATTENTION: Please call with any questions or concerns to [phone] and carefully listen to the prompts so that you are directed to the right person. All voicemails are confidential.   For Discharge needs, contact Care Management DC Support Team at [phone] opt. 2  Send all requests for admission clinical reviews, approved or denied determinations and any other requests to dedicated fax number below belonging to the campus where the patient is receiving treatment. List of dedicated fax numbers for the Facilities:  FACILITY NAME UR FAX NUMBER   ADMISSION DENIALS (Administrative/Medical Necessity) [phone]   DISCHARGE SUPPORT TEAM ([facility]) [phone]   PARENT CHILD HEALTH (Maternity/NICU/Pediatrics) [phone]   [facility] [phone]   [facility] [phone]   [facility] [phone]   [facility] [phone]   [facility] [phone]   [facility] [phone]   [facility] [phone]   [facility]  [phone]   [facility] [phone]   [facility] [phone]   [facility] [phone]   [facility] [phone]

## 2024-10-11 NOTE — PLAN OF CARE
Problem: PAIN - ADULT  Goal: Verbalizes/displays adequate comfort level or baseline comfort level  Description: Interventions:  - Encourage patient to monitor pain and request assistance  - Assess pain using appropriate pain scale  - Administer analgesics based on type and severity of pain and evaluate response  - Implement non-pharmacological measures as appropriate and evaluate response  - Consider cultural and social influences on pain and pain management  - Notify physician/advanced practitioner if interventions unsuccessful or patient reports new pain  Outcome: Progressing     Problem: INFECTION - ADULT  Goal: Absence or prevention of progression during hospitalization  Description: INTERVENTIONS:  - Assess and monitor for signs and symptoms of infection  - Monitor lab/diagnostic results  - Monitor all insertion sites, i.e. indwelling lines, tubes, and drains  - Monitor endotracheal if appropriate and nasal secretions for changes in amount and color  - Harrison appropriate cooling/warming therapies per order  - Administer medications as ordered  - Instruct and encourage patient and family to use good hand hygiene technique  - Identify and instruct in appropriate isolation precautions for identified infection/condition  Outcome: Progressing  Goal: Absence of fever/infection during neutropenic period  Description: INTERVENTIONS:  - Monitor WBC    Outcome: Progressing     Problem: SAFETY ADULT  Goal: Patient will remain free of falls  Description: INTERVENTIONS:  - Educate patient/family on patient safety including physical limitations  - Instruct patient to call for assistance with activity   - Consult OT/PT to assist with strengthening/mobility   - Keep Call bell within reach  - Keep bed low and locked with side rails adjusted as appropriate  - Keep care items and personal belongings within reach  - Initiate and maintain comfort rounds  - Make Fall Risk Sign visible to staff  - Offer Toileting every 2 Hours,  in advance of need  - Initiate/Maintain bed alarm  - Obtain necessary fall risk management equipment:   - Apply yellow socks and bracelet for high fall risk patients  - Consider moving patient to room near nurses station  Outcome: Progressing  Goal: Maintain or return to baseline ADL function  Description: INTERVENTIONS:  -  Assess patient's ability to carry out ADLs; assess patient's baseline for ADL function and identify physical deficits which impact ability to perform ADLs (bathing, care of mouth/teeth, toileting, grooming, dressing, etc.)  - Assess/evaluate cause of self-care deficits   - Assess range of motion  - Assess patient's mobility; develop plan if impaired  - Assess patient's need for assistive devices and provide as appropriate  - Encourage maximum independence but intervene and supervise when necessary  - Involve family in performance of ADLs  - Assess for home care needs following discharge   - Consider OT consult to assist with ADL evaluation and planning for discharge  - Provide patient education as appropriate  Outcome: Progressing  Goal: Maintains/Returns to pre admission functional level  Description: INTERVENTIONS:  - Perform AM-PAC 6 Click Basic Mobility/ Daily Activity assessment daily.  - Set and communicate daily mobility goal to care team and patient/family/caregiver.   - Collaborate with rehabilitation services on mobility goals if consulted  - Perform Range of Motion 4 times a day.  - Reposition patient every 2 hours.  - Dangle patient 3 times a day  - Stand patient 3 times a day  - Ambulate patient 3 times a day  - Out of bed to chair 3 times a day   - Out of bed for meals 3 times a day  - Out of bed for toileting  - Record patient progress and toleration of activity level   Outcome: Progressing     Problem: DISCHARGE PLANNING  Goal: Discharge to home or other facility with appropriate resources  Description: INTERVENTIONS:  - Identify barriers to discharge w/patient and caregiver  -  Arrange for needed discharge resources and transportation as appropriate  - Identify discharge learning needs (meds, wound care, etc.)  - Arrange for interpretive services to assist at discharge as needed  - Refer to Case Management Department for coordinating discharge planning if the patient needs post-hospital services based on physician/advanced practitioner order or complex needs related to functional status, cognitive ability, or social support system  Outcome: Progressing     Problem: Knowledge Deficit  Goal: Patient/family/caregiver demonstrates understanding of disease process, treatment plan, medications, and discharge instructions  Description: Complete learning assessment and assess knowledge base.  Interventions:  - Provide teaching at level of understanding  - Provide teaching via preferred learning methods  Outcome: Progressing     Problem: RESPIRATORY - ADULT  Goal: Achieves optimal ventilation and oxygenation  Description: INTERVENTIONS:  - Assess for changes in respiratory status  - Assess for changes in mentation and behavior  - Position to facilitate oxygenation and minimize respiratory effort  - Oxygen administered by appropriate delivery if ordered  - Initiate smoking cessation education as indicated  - Encourage broncho-pulmonary hygiene including cough, deep breathe, Incentive Spirometry  - Assess the need for suctioning and aspirate as needed  - Assess and instruct to report SOB or any respiratory difficulty  - Respiratory Therapy support as indicated  Outcome: Progressing     Problem: Nutrition/Hydration-ADULT  Goal: Nutrient/Hydration intake appropriate for improving, restoring or maintaining nutritional needs  Description: Monitor and assess patient's nutrition/hydration status for malnutrition. Collaborate with interdisciplinary team and initiate plan and interventions as ordered.  Monitor patient's weight and dietary intake as ordered or per policy. Utilize nutrition screening tool and  intervene as necessary. Determine patient's food preferences and provide high-protein, high-caloric foods as appropriate.     INTERVENTIONS:  - Monitor oral intake, urinary output, labs, and treatment plans  - Assess nutrition and hydration status and recommend course of action  - Evaluate amount of meals eaten  - Assist patient with eating if necessary   - Allow adequate time for meals  - Recommend/ encourage appropriate diets, oral nutritional supplements, and vitamin/mineral supplements  - Order, calculate, and assess calorie counts as needed  - Recommend, monitor, and adjust tube feedings and TPN/PPN based on assessed needs  - Assess need for intravenous fluids  - Provide specific nutrition/hydration education as appropriate  - Include patient/family/caregiver in decisions related to nutrition  Outcome: Progressing     Problem: Prexisting or High Potential for Compromised Skin Integrity  Goal: Skin integrity is maintained or improved  Description: INTERVENTIONS:  - Identify patients at risk for skin breakdown  - Assess and monitor skin integrity  - Assess and monitor nutrition and hydration status  - Monitor labs   - Assess for incontinence   - Turn and reposition patient  - Assist with mobility/ambulation  - Relieve pressure over bony prominences  - Avoid friction and shearing  - Provide appropriate hygiene as needed including keeping skin clean and dry  - Evaluate need for skin moisturizer/barrier cream  - Collaborate with interdisciplinary team   - Patient/family teaching  - Consider wound care consult   Outcome: Progressing     Problem: SKIN/TISSUE INTEGRITY - ADULT  Goal: Skin Integrity remains intact(Skin Breakdown Prevention)  Description: Assess:  -Perform Lonnie assessment every   -Clean and moisturize skin every   -Inspect skin when repositioning, toileting, and assisting with ADLS  -Assess under medical devices such as  every   -Assess extremities for adequate circulation and sensation     Bed  Management:  -Have minimal linens on bed & keep smooth, unwrinkled  -Change linens as needed when moist or perspiring  -Avoid sitting or lying in one position for more than  hours while in bed  -Keep HOB at degrees     Toileting:  -Offer bedside commode  -Assess for incontinence every   -Use incontinent care products after each incontinent episode such as     Activity:  -Mobilize patient  times a day  -Encourage activity and walks on unit  -Encourage or provide ROM exercises   -Turn and reposition patient every  Hours  -Use appropriate equipment to lift or move patient in bed  -Instruct/ Assist with weight shifting every  when out of bed in chair  -Consider limitation of chair time  hour intervals    Skin Care:  -Avoid use of baby powder, tape, friction and shearing, hot water or constrictive clothing  -Relieve pressure over bony prominences using   -Do not massage red bony areas    Next Steps:  -Teach patient strategies to minimize risks such as    -Consider consults to  interdisciplinary teams such as   Outcome: Progressing     Problem: MUSCULOSKELETAL - ADULT  Goal: Maintain or return mobility to safest level of function  Description: INTERVENTIONS:  - Assess patient's ability to carry out ADLs; assess patient's baseline for ADL function and identify physical deficits which impact ability to perform ADLs (bathing, care of mouth/teeth, toileting, grooming, dressing, etc.)  - Assess/evaluate cause of self-care deficits   - Assess range of motion  - Assess patient's mobility  - Assess patient's need for assistive devices and provide as appropriate  - Encourage maximum independence but intervene and supervise when necessary  - Involve family in performance of ADLs  - Assess for home care needs following discharge   - Consider OT consult to assist with ADL evaluation and planning for discharge  - Provide patient education as appropriate  Outcome: Progressing  Goal: Maintain proper alignment of affected body  part  Description: INTERVENTIONS:  - Support, maintain and protect limb and body alignment  - Provide patient/ family with appropriate education  Outcome: Progressing

## 2024-10-11 NOTE — MALNUTRITION/BMI
This medical record reflects one or more clinical indicators suggestive of malnutrition and/or morbid obesity.    Malnutrition Findings:   Adult Malnutrition type: Chronic illness  Adult Degree of Malnutrition: Other severe protein calorie malnutrition  Malnutrition Characteristics: Fat loss, Muscle loss, Inadequate energy                360 Statement: Severe protein calorie malnutrition in context of chronic illness r/t poor appetite, inadequate PO intake as evidance by energy intake less than 75% compared to estimated needs>1 month, severe body fat ( triceps, ribcage area) and muscle mass depletions (temporal wasting, protruding clavicles); Treated with oral supplements    BMI Findings:           Body mass index is 18.51 kg/m².     See Nutrition note dated 10/11/24 for additional details.  Completed nutrition assessment is viewable in the nutrition documentation.

## 2024-10-11 NOTE — CONSULTS
Consultation - Orthopedics   Name: Sarah Zayas 79 y.o. female I MRN: [id number]  Unit/Bed#: [place] [address] -01 I Date of Admission: 10/10/2024   Date of Service: 10/11/2024 I Hospital Day: 1   Inpatient consult to Orthopedic Surgery  Consult performed by: Hugo Ny PA-C  Consult ordered by: Saad Mckeon DO        Physician Requesting Evaluation: Saad Mckeon DO   Reason for Evaluation / Principal Problem: Shoulder Pain (LEFT)    Assessment & Plan  Acute respiratory failure with hypercapnia (HCC)    Acute exacerbation of chronic obstructive pulmonary disease (COPD) (HCC)    Gastroesophageal reflux disease without esophagitis    Hypertension    Temporal arteritis (HCC)    Anxiety/depression    Neck pain/shoulder pain  Left Shoulder pain and weakness  - exam consistent with osteoarthritis and tendonitis (biceps) with potential rotator cuff pathology  - WBAT to the LUE  - recommend OT evaluation    Orthopedics service signing off.    History of Present Illness   HPI: Sarah Zayas is a 79 y.o. year old female who presents with generalized weakness and a long standing history of anxiety, COPD, malnutrition, generalized weakness. Patient has had long standing history of left upper extremity pain, which has been ongoing for years. She denies any falls or injuries to that shoulder recently. She notes that the pain is in the entire arm and feels that it is generally weak. She has pain when she uses her arm. She denies any numbness or tingling.     Review of Systems   Constitutional:  Positive for activity change, appetite change and fatigue.   Respiratory:  Positive for shortness of breath.    Gastrointestinal:  Positive for constipation, diarrhea and nausea.   Musculoskeletal:  Positive for arthralgias.   Neurological:  Positive for dizziness.    significant for findings described in the HPI.  I have reviewed the patient's PMH, PSH, Social History, Family History, Meds, and Allergies    Objective  ":  Temp:  [98.3 °F (36.8 °C)-99 °F (37.2 °C)] 98.3 °F (36.8 °C)  HR:  [100-114] 111  BP: (124-156)/(60-76) 124/60  Resp:  [17-43] 17  SpO2:  [89 %-98 %] 98 %  O2 Device: Nasal cannula  Nasal Cannula O2 Flow Rate (L/min):  [2 L/min-4 L/min] 4 L/min  Physical Exam  Constitutional:       Appearance: She is ill-appearing.   HENT:      Head: Atraumatic.   Pulmonary:      Effort: Pulmonary effort is normal.   Skin:     General: Skin is warm and dry.   Neurological:      General: No focal deficit present.   Psychiatric:         Mood and Affect: Mood normal.         Behavior: Behavior normal.     Left Shoulder Exam     Tenderness   The patient is experiencing tenderness in the biceps tendon.    Other   Erythema: absent  Sensation: normal  Pulse: present     Comments:  Patient able to ROM the shoulder with abduction and FF to 90. Able to ROM the elbow and the wrist. Able to make a fist. Generally weak in the LUE               Lab Results: I have reviewed the following results:   Recent Labs     10/10/24  0556 10/10/24  1102 10/11/24  0400   WBC 7.18 7.20 8.81   HGB 10.8* 11.8 10.7*   HCT 34.1* 39.7 35.3    159 180   BUN 15 15 13   CREATININE 0.47* 0.45* 0.40*     Blood Culture:   Lab Results   Component Value Date    BLOODCX No Growth at 24 hrs. 10/10/2024    BLOODCX No Growth at 24 hrs. 10/10/2024     Wound Culture: No results found for: \"WOUNDCULT\"    Imaging Results Review: I personally reviewed the following image studies/reports in PACS and discussed pertinent findings with Radiology: xray(s). My interpretation of the radiology images/reports is: degenerative changes. No acute fractures noted.    "

## 2024-10-11 NOTE — ASSESSMENT & PLAN NOTE
Left Shoulder pain and weakness  - exam consistent with osteoarthritis and tendonitis (biceps) with potential rotator cuff pathology  - WBAT to the LUE  - recommend OT evaluation

## 2024-10-11 NOTE — PLAN OF CARE
Problem: PAIN - ADULT  Goal: Verbalizes/displays adequate comfort level or baseline comfort level  Description: Interventions:  - Encourage patient to monitor pain and request assistance  - Assess pain using appropriate pain scale  - Administer analgesics based on type and severity of pain and evaluate response  - Implement non-pharmacological measures as appropriate and evaluate response  - Consider cultural and social influences on pain and pain management  - Notify physician/advanced practitioner if interventions unsuccessful or patient reports new pain  Outcome: Progressing     Problem: INFECTION - ADULT  Goal: Absence or prevention of progression during hospitalization  Description: INTERVENTIONS:  - Assess and monitor for signs and symptoms of infection  - Monitor lab/diagnostic results  - Monitor all insertion sites, i.e. indwelling lines, tubes, and drains  - Monitor endotracheal if appropriate and nasal secretions for changes in amount and color  - Dougherty appropriate cooling/warming therapies per order  - Administer medications as ordered  - Instruct and encourage patient and family to use good hand hygiene technique  - Identify and instruct in appropriate isolation precautions for identified infection/condition  Outcome: Progressing  Goal: Absence of fever/infection during neutropenic period  Description: INTERVENTIONS:  - Monitor WBC    Outcome: Progressing     Problem: SAFETY ADULT  Goal: Patient will remain free of falls  Description: INTERVENTIONS:  - Educate patient/family on patient safety including physical limitations  - Instruct patient to call for assistance with activity   - Consult OT/PT to assist with strengthening/mobility   - Keep Call bell within reach  - Keep bed low and locked with side rails adjusted as appropriate  - Keep care items and personal belongings within reach  - Initiate and maintain comfort rounds  - Make Fall Risk Sign visible to staff  - Offer Toileting every  Hours,  in advance of need  - Initiate/Maintain alarm  - Obtain necessary fall risk management equipment:   - Apply yellow socks and bracelet for high fall risk patients  - Consider moving patient to room near nurses station  Outcome: Progressing  Goal: Maintain or return to baseline ADL function  Description: INTERVENTIONS:  -  Assess patient's ability to carry out ADLs; assess patient's baseline for ADL function and identify physical deficits which impact ability to perform ADLs (bathing, care of mouth/teeth, toileting, grooming, dressing, etc.)  - Assess/evaluate cause of self-care deficits   - Assess range of motion  - Assess patient's mobility; develop plan if impaired  - Assess patient's need for assistive devices and provide as appropriate  - Encourage maximum independence but intervene and supervise when necessary  - Involve family in performance of ADLs  - Assess for home care needs following discharge   - Consider OT consult to assist with ADL evaluation and planning for discharge  - Provide patient education as appropriate  Outcome: Progressing  Goal: Maintains/Returns to pre admission functional level  Description: INTERVENTIONS:  - Perform AM-PAC 6 Click Basic Mobility/ Daily Activity assessment daily.  - Set and communicate daily mobility goal to care team and patient/family/caregiver.   - Collaborate with rehabilitation services on mobility goals if consulted  - Perform Range of Motion  times a day.  - Reposition patient every  hours.  - Dangle patient  times a day  - Stand patient  times a day  - Ambulate patient  times a day  - Out of bed to chair  times a day   - Out of bed for meals  times a day  - Out of bed for toileting  - Record patient progress and toleration of activity level   Outcome: Progressing     Problem: DISCHARGE PLANNING  Goal: Discharge to home or other facility with appropriate resources  Description: INTERVENTIONS:  - Identify barriers to discharge w/patient and caregiver  - Arrange for  needed discharge resources and transportation as appropriate  - Identify discharge learning needs (meds, wound care, etc.)  - Arrange for interpretive services to assist at discharge as needed  - Refer to Case Management Department for coordinating discharge planning if the patient needs post-hospital services based on physician/advanced practitioner order or complex needs related to functional status, cognitive ability, or social support system  Outcome: Progressing     Problem: Knowledge Deficit  Goal: Patient/family/caregiver demonstrates understanding of disease process, treatment plan, medications, and discharge instructions  Description: Complete learning assessment and assess knowledge base.  Interventions:  - Provide teaching at level of understanding  - Provide teaching via preferred learning methods  Outcome: Progressing     Problem: RESPIRATORY - ADULT  Goal: Achieves optimal ventilation and oxygenation  Description: INTERVENTIONS:  - Assess for changes in respiratory status  - Assess for changes in mentation and behavior  - Position to facilitate oxygenation and minimize respiratory effort  - Oxygen administered by appropriate delivery if ordered  - Initiate smoking cessation education as indicated  - Encourage broncho-pulmonary hygiene including cough, deep breathe, Incentive Spirometry  - Assess the need for suctioning and aspirate as needed  - Assess and instruct to report SOB or any respiratory difficulty  - Respiratory Therapy support as indicated  Outcome: Progressing     Problem: Nutrition/Hydration-ADULT  Goal: Nutrient/Hydration intake appropriate for improving, restoring or maintaining nutritional needs  Description: Monitor and assess patient's nutrition/hydration status for malnutrition. Collaborate with interdisciplinary team and initiate plan and interventions as ordered.  Monitor patient's weight and dietary intake as ordered or per policy. Utilize nutrition screening tool and intervene as  necessary. Determine patient's food preferences and provide high-protein, high-caloric foods as appropriate.     INTERVENTIONS:  - Monitor oral intake, urinary output, labs, and treatment plans  - Assess nutrition and hydration status and recommend course of action  - Evaluate amount of meals eaten  - Assist patient with eating if necessary   - Allow adequate time for meals  - Recommend/ encourage appropriate diets, oral nutritional supplements, and vitamin/mineral supplements  - Order, calculate, and assess calorie counts as needed  - Recommend, monitor, and adjust tube feedings and TPN/PPN based on assessed needs  - Assess need for intravenous fluids  - Provide specific nutrition/hydration education as appropriate  - Include patient/family/caregiver in decisions related to nutrition  Outcome: Progressing     Problem: Prexisting or High Potential for Compromised Skin Integrity  Goal: Skin integrity is maintained or improved  Description: INTERVENTIONS:  - Identify patients at risk for skin breakdown  - Assess and monitor skin integrity  - Assess and monitor nutrition and hydration status  - Monitor labs   - Assess for incontinence   - Turn and reposition patient  - Assist with mobility/ambulation  - Relieve pressure over bony prominences  - Avoid friction and shearing  - Provide appropriate hygiene as needed including keeping skin clean and dry  - Evaluate need for skin moisturizer/barrier cream  - Collaborate with interdisciplinary team   - Patient/family teaching  - Consider wound care consult   Outcome: Progressing     Problem: SKIN/TISSUE INTEGRITY - ADULT  Goal: Skin Integrity remains intact(Skin Breakdown Prevention)  Description: Assess:  -Perform Lonnie assessment every   -Clean and moisturize skin every   -Inspect skin when repositioning, toileting, and assisting with ADLS  -Assess under medical devices such as  every   -Assess extremities for adequate circulation and sensation     Bed Management:  -Have  minimal linens on bed & keep smooth, unwrinkled  -Change linens as needed when moist or perspiring  -Avoid sitting or lying in one position for more than  hours while in bed  -Keep HOB at degrees     Toileting:  -Offer bedside commode  -Assess for incontinence every   -Use incontinent care products after each incontinent episode such as     Activity:  -Mobilize patient  times a day  -Encourage activity and walks on unit  -Encourage or provide ROM exercises   -Turn and reposition patient every  Hours  -Use appropriate equipment to lift or move patient in bed  -Instruct/ Assist with weight shifting every  when out of bed in chair  -Consider limitation of chair time  hour intervals    Skin Care:  -Avoid use of baby powder, tape, friction and shearing, hot water or constrictive clothing  -Relieve pressure over bony prominences using   -Do not massage red bony areas    Next Steps:  -Teach patient strategies to minimize risks such as    -Consider consults to  interdisciplinary teams such as   Outcome: Progressing     Problem: MUSCULOSKELETAL - ADULT  Goal: Maintain or return mobility to safest level of function  Description: INTERVENTIONS:  - Assess patient's ability to carry out ADLs; assess patient's baseline for ADL function and identify physical deficits which impact ability to perform ADLs (bathing, care of mouth/teeth, toileting, grooming, dressing, etc.)  - Assess/evaluate cause of self-care deficits   - Assess range of motion  - Assess patient's mobility  - Assess patient's need for assistive devices and provide as appropriate  - Encourage maximum independence but intervene and supervise when necessary  - Involve family in performance of ADLs  - Assess for home care needs following discharge   - Consider OT consult to assist with ADL evaluation and planning for discharge  - Provide patient education as appropriate  Outcome: Progressing  Goal: Maintain proper alignment of affected body part  Description:  INTERVENTIONS:  - Support, maintain and protect limb and body alignment  - Provide patient/ family with appropriate education  Outcome: Progressing

## 2024-10-11 NOTE — TELEPHONE ENCOUNTER
Consult placed on [facility] queue at 0853 to be seen by an [facility] psychiatrist.

## 2024-10-11 NOTE — SPEECH THERAPY NOTE
"Speech Language/Pathology  Speech/Language Pathology  Assessment    Patient Name: Sarah Zayas  Today's Date: 10/11/2024     Problem List  Principal Problem:    Acute respiratory failure with hypercapnia (HCC)  Active Problems:    Acute exacerbation of chronic obstructive pulmonary disease (COPD) (HCC)    Gastroesophageal reflux disease without esophagitis    Hypertension    Temporal arteritis (HCC)    Anxiety/depression    Neck pain/shoulder pain    Past Medical History  Past Medical History:   Diagnosis Date    Anxiety 08/18/2024    Asthma     COPD (chronic obstructive pulmonary disease) (HCC)     Disease of thyroid gland     GERD (gastroesophageal reflux disease)     Hypertension 10/11/2018    Hypothyroid     Normocytic anemia 08/28/2024    Osteoarthritis 09/26/2012    Temporal arteritis (HCC)     Type 2 diabetes mellitus with complication, without long-term current use of insulin ([facility]) 01/14/2020     Past Surgical History  Past Surgical History:   Procedure Laterality Date    EYE SURGERY Right 12/06/2019    cataract LVCFS    HYSTERECTOMY      NO PAST SURGERIES      ALSO NO RELEVANT PAST SURRGICAL HX AS PER [facility]      Bedside Swallow Evaluation:    Summary:  Pt presented w/ mild/mod oropharyngeal dysphagia. Pt currently on regular/thin liquid diet and stating she is having a new onset of dysphagia with regular solids and with anxiety with intake. Reported difficulty with taking medicine whole with liquids. Suspected aspiration per recent chest CT. Pt stating dumbness/tingling on left side of lips. Trials of soft solid, puree, and thin liquids initiated. Pt stating she feels as if she will \"choke\" with bread and required encouragement for breakdown of bolus. Prolonged mastication with soft solid. No oral residue. Oral stage grossly WNL with puree. Delayed but functional swallow. No overt s/s of aspiration with soft solid, puree, and thin liquids.     Recommendations:  Diet: Dysphagia 2 (mechanical soft)  Liquid: " Thins  Meds: Whole or crushed in puree   Supervision: Intermittent   Positioning:Upright  Strategies: Small bites/sips, slow rate of pace, alternating solids/liquids.   Pt to take PO/Meds only when fully alert and upright.   Oral care: Frequent   Aspiration precautions  Reflux precautions  Therapy Prognosis: Guarded   Prognosis considerations: anxiety with intake   Frequency: 3-5x/wk as able/appropriate.     Consider consult w/:  Rehab  Neurology  Nutrition    Goal(s):  Pt will tolerate least restrictive diet w/out s/s aspiration or oral/pharyngeal difficulties.     Dysphagia LTG  -Patient will demonstrate safe and effective oral intake (without overt s/s significant oral/pharyngeal dysphagia including s/s penetration or aspiration) for the highest appropriate diet level.     Short Term Goals:    -Pt will tolerate Dysphagia 2/mechanical soft diet and thin liquid with no significant s/s oral or pharyngeal dysphagia across 1-3 diagnostic session/s.    -Patient will tolerate trials of upgraded food and/or liquid texture with no significant s/s of oral or pharyngeal dysphagia including aspiration across 1-3 diagnostic sessions     -Patient will comply with a Video/Modified Barium Swallow if indicated study for more complete assessment of swallowing anatomy/physiology/aspiration risk and to assess efficacy of treatment techniques    Re: Mastication  -Patient will demonstrate adequate mastication to safely consume the  least restrictive diet with no verbal, visual or tactile cues needed.    Re: Compensatory Strategies    -Patient will demonstrate independent use of recommended safe swallowing strategies during a clinician assessed meal across 1-3 diagnostic sessions.    H&P/Admit info/ pertinent provider notes: (PMH noted above)  Brief Hospital Summary:    Sarah Zayas is a 79 y.o.  with PMHx of severe COPD with chronic hypoxic respiratory failure on 2 L at home, hypertension, hypothyroid, temporal arteritis, type 2  "diabetes, neck swelling, sialoadenitis, anxiety/depression.  Patient initially presents to the [facility] ED due to insomnia and shortness of breath.  Also noted to have chronic left shoulder pain that has been ongoing for the past 1 year worsening recently. Patient lives with her sister and son is at bedside. The patient is a former smoker but quit 11 years ago.   Patient was transferred to [facility] ICU due to requiring continuous BiPAP for increased work of breathing.   Patient has been stabilized currently off BiPAP on 2 L nasal cannula saturating 92%.  Patient currently hemodynamically stable to be downgraded to MedSurg.       Special Studies:  Chest CT 10/10/24:  IMPRESSION:     1.  Possible airway debris, which would be consistent with aspiration, see discussion  2.  Trace pleural effusions  3.  Stable right breast lesion, recommend follow-up diagnostic mammogram    CT Head 10/10/24:  IMPRESSION:     No acute intracranial abnormality      Previous MBS:  None found on Epic    Patient's goal: \"I can't chew\"    Did the pt report pain? No  If yes, was nursing notified/was it addressed?    Reason for consult:  R/o aspiration  respiratory compromise    Precautions:  Aspiration  Fall    Food Allergies: NKFA   Current Diet: Regular/thin   Premorbid diet: Regular/thin   O2 requirement: NC   Social/Prior living Home   Voice/Speech: WNL   Follows commands: Yes   Cognitive status: WNL     Oral [facility] exam:  Dentition: Natural   Lower left sided facial weakness   Reported left sided facial tingly- spoke with medical team  Oral care     Items administered: Puree, soft solid, thin liquids     Oral stage:  Lip closure: +  Mastication: -  Bolus formation: -  Bolus control: -  Transfer: -  Oral residue: -  Pocketing: -    Pharyngeal stage:  Swallow promptness:-  Laryngeal rise: -  Wet voice: -  Throat clear: -  Cough: -  Secondary swallows: -  Audible swallows: +  No overt s/s aspiration    Esophageal " stage:  H/o GERD    Results d/w:  Pt, nursing, physician

## 2024-10-11 NOTE — CONSULTS
Consultation - Geriatrics   Sarah Zayas 79 y.o. female MRN: [id number]  Unit/Bed#: [place] [address] -[id number] Encounter: [id number]      Assessment/Plan    Cognitive Screening  Patient has no documented history of memory loss or cognitive impairment   Patient states she does have difficulty with her short term memory at baseline   Per discussion with patient and her son the patient did recently just start with [facility] (they are paying out-of-pocket) but she only had one visit prior to coming in  Family is willing to increase days and hours if needed to assist patient   She is alert and oriented x 4 on my exam today   Family at the bedside notes she is much improved today   Most recent TSH on 10/10/2024 noted to be 0.342  Most recent vitamin B 12 level on 8/30/2024 noted to be 1918  Patient appears to be on vitamin B 12 supplementation   Can consider dose reduction or discontinuation   CT of the head on 10/10/2024 revealed mild microangiopathic changes   No MoCA or cognitive testing noted in epic  Can consider having OT complete MoCA evaluation once patient is more medically stable   Patient can follow-up with geriatrics in the outpatient setting for concerns of memory loss   Patients son is interested in this so will place outpatient referral  Maintain delirium precautions as discussed below   Redirect and reorient as needed  Keep physically, mentally, and socially active     Delirium Precautions   Baseline mentation: alert and oriented x 4 but forgetful at times  Current mentation: alert and oriented x 4 but forgetful at times   Patient is at high risk secondary to age, possible underlying cognitive impairment, COPD exacerbation, steroid use (though this appears chronic), acute/chronic pain, sleep disturbances, anxiety, depression, vision impairment, hearing impariment, and hospitalization   Maintain delirium precautions   Provide redirection, reorientation, and distraction techniques  Maintain fall and safety precautions    Assist with ADLs/IADLs  Avoid deliriogenic medications such as tramadol, benzodiazepines, anticholinergics, benadryl  Treat pain using geriatric pain protocol   Encourage oral hydration and nutrition   Monitor for constipation and urinary retention   Implement sleep hygiene and limit night time interuptions   Maintain sleep-wake cycle   Encourage early and frequent mobilization   Most recent EKG on 10/10/2024 revealed a QTc interval of 428  If all other interventions are unsuccessful for acute agitation and behaviors, can consider Zyprexa 2.5 mg IM Q 8 hours prn   Would avoid benzodiazepines such as Ativan as these can worsen delirium     Deconditioning   Baseline function: independent with ADLs and some IADLs (though it does appear she would benefit from additional assistance at home with both)  Patient is at increased risk for deconditioning secondary to possible underlying cognitive impairment, COPD exacerbation, steroid use, chronic pain, sleep disturbances, anxiety, depression, vision impairment, hearing impairment, weakness, gait dysfunction, and hospitalization    Continue to optimize diet, hydration, and mobility for healing   GFR 99 on labs today   Patient has no documented history of CKD   Keep hydrated   Normocytic anemia   Baseline hemoglobin appears to be 11-13  Hemoglobin on labs today stable at 10.7  Continue to monitor CBC   Transfuse for hemoglobin < 7   Monitor for signs and symptoms of infection, dehydration, DVT, and skin breakdown    Frailty   Clinical Frail Scale: 5- Mildly Frail  More evident slowing, needs help high order IADLs (transport, bills, medications)  Progressively impairs shopping and walking outside alone, meal prep and housework  Most recent albumin on labs yesterday noted to be 3.7  Consider nutrition consult  Encourage protein supplementation     Ambulatory Dysfunction/Falls  Patient notes no recent falls at home   She does not typically ambulate with any assistive devices at  baseline but she does have a walker available if needed  PT/OT consulted to assist with strengthening/mobility and assist with discharge planning to appropriate level of care  Assess patient frequently for physical needs, encourage use of assistant devices as needed and directed by PT/OT  Identify cognitive and physical deficits and behaviors that affect risk of falls  Consider moving patient closer to nursing station to monitor more closely for impulsive behavior which may increase risk of falls  Vidal fall and safety precautions   Educate patient/family on patient safety including physical limitations and importance of using call bell for assistance   Modify environment to reduce risk of injury including disconnecting from pole when not in use, ensuring adequate lighting in room and restroom, ensuring that path to restroom is clear and free of trip hazards  Out of bed as tolerated    Impaired Vision   Patient states she has macular degeneration and notes that she is having more difficulty with her vision   She notes she is having trouble with some of her bills as she has difficulty seeing to write out her checks   She admits she does have an ophthalmologist appointment scheduled in the near future   She is currently wearing eyeglasses but notes that with her worsening vision she does not feel as though they help   Recommend use of corrective lenses at all appropriate times  Encourage adequate lighting and encourage use of assistance with ambulation  Keep personal belongings close to avoid reaching  Encourage appropriate footwear at all times  Recommend large font for printed materials provided to patient  Recommend continued outpatient follow-up with ophthalmology     Impaired Hearing   Patient admits to some hearing impairment   She does not wear hearing aids at baseline  Hearing impairment strongly correlated with depression, cognitive impairment, delirium and falls in the older adult  Use hearing aids or  sound amplifier  Speak face to face  Use clear dictation and enunciation of words    Dentition/Appetite   Patient denies denture use  She admits that she has a poor appetite at baseline   Per chart review, patient has been consuming 25-50% of meals here  Family did bring her soup for lunch and she is eating that   Family at the bedside notes that this is the best they have seen her eat in the recent past   She is currently on mirtazapine which can help stimulate appetite   Ensure meal consistency is appropriate for all abilities   Consider nutrition consult   Continue aspiration precautions     Elimination   Patient is continent of bowel and sometimes incontinent of bladder at baseline  She does wear briefs in case she has accidents   She appears to be voiding appropriately here   Consider urinary retention protocol/bladder scans for change in mental status   Last documented bowel movement was today (documented as small and hard)  Patient is not currently on a bowel regimen   Consider starting the following   Senna-S 8.6-50 mg BID  MiraLAX 17 g daily prn   Monitor for constipation and urinary retention     Insomnia   Patient has been having insomnia at home   Appears to be related to pain and anxiety   She is not taking any medication for sleep at baseline   Patient notes she is still not sleeping well due to pain   Orthopedics consulted and voltaren gel now ordered   Will increase tylenol as discussed below  Will also order melatonin at bedtime as needed   First line is behavioral therapy   Avoid sedative hypnotics including benzodiazepines and benadryl  Encourage staying awake during the day   Encourage daytime activities and morning exercise   Decrease or eliminate daytime naps   Avoid caffeine especially during late afternoon and evening hours  Establish a nighttime routine  Implement sleep hygiene and limit nighttime interruptions    Anxiety/Depression  Patient has a documented history of anxiety and depression    She had been on buspirone for anxiety which was just recently increased due to increasing anxiety   Patient was noted to have worsening anxiety and depression so this was discontinued   Patient was started on mirtazapine and gabapentin by primary team at    Patient noted little improvement in anxiety after medication adjustments   Psychiatry was consulted today and recommended switching gabapentin to lyrica and mirtazapine to duloxetine   Patients mood seems stable on my exam today and family notes improvement in her mood today   At this time would recommend continuing current medication regimen and monitoring   Continue supportive care     Acute Exacerbation of Chronic Obstructive Pulmonary Disease   Patient was noted to have a recent COPD exacerbation requiring hospitalization in August 2024  She was given tapering dose of steroids, DuoNebs, and supplemental oxygen   She presented back to the hospital with increased fatigue, insomnia, and decreased appetite   While at  she was a rapid response for tachypnea and fatigue  She was placed on BiPAP for increased work of breathing and hypercarbic respiratory failure   She was transferred to [facility] for ICU monitoring on BiPAP  BiPAP was discontinued yesterday   She is now on 4 L of supplemental oxygen and appears to be stable on this   Continue to monitor oxygen saturation and attempt to wean if able   Management per primary team     Neck/Shoulder Pain  Patient has been complaining of chronic neck and shoulder pain that has been worsening recently   Imaging revealed no acute abnormalities   She denies taking any medication for pain at home   She describes the pain as sharp shooting   Orthopedics was consulted and noted her exam was consistent with bicep tendonitis and osteoarthritis with potential rotator cuff pathology   She is WBAT to the LUE and OT evaluation recommended   Primary team has added voltaren gel to current regimen   She is on scheduled tylenol 650 mg Q  6 hours   Consider increasing to 975 mg Q 8 hours   If patient is requiring stronger medication would recommend using the geriatric pain protocol as discussed below  Oxycodone 2.5 mg po Q 4 prn moderate pain  Oxycodone 5 mg po Q 4 prn severe pain   Continue nonpharmacological methods of pain management     Home Safety  Patient resides at home with her sister (though she notes her sister sometimes goes to her sons house to stay)  She is starting with [facility] (just had one visit PTA)  She is independent with ADLs and some IADLs (though it appears she would benefit from increased assistance)     Advanced Care Planning  Level 1 Full Code    Home Medication Review   [facility] ([phone])  Buspirone 7.5 mg TID   Macrobid daily   Prednisone 2.5 mg daily with breakfast  Levothryoxine 50 mcg daily   Propranolol 10 mg BID   Panto 40 mg daily   Valsartan 160 mg daily   Prolia injection Q 6 months   Levalbuterol nebulizer TID 1.25 (last filled August)  Alprazolam 0.25 mg daily at bedtime as needed (no refills but last filled in July for 30 days)    I have personally reviewed this medication list with the patients pharmacy listed above.       History of Present Illness   Physician Requesting Consult: Saad Mckeon DO  Reason for Consult / Principal Problem: Anxiety   Hx and PE limited by: Forgetfulness    HPI: Sarah Zayas is a 79 y.o. year old female who has COPD, hypertension, GERD, temporal arteritis, anxiety, and depression and presented to [facility] for generalized weakness.  She was recently admitted to the hospital in August 2024 for COPD exacerbation.  She did require rehab posthospitalization and was discharged home.  Her sister noted that she had been doing better and her breathing had somewhat improved however, she has been having difficulties with anxiety and depression.  She was on buspirone which was recently increased.  She recently underwent CT imaging and CTA as she had been complaining  of left-sided symptoms.  She did receive lorazepam prior to the imaging.  She had been admitted for further monitoring, however, overnight she was a rapid response for tachypnea and fatigue.  She was placed on BiPAP for increased work of breathing and hypercarbic respiratory acidosis.  A sepsis workup was initiated and it was recommended that she transfer to [facility] for ICU monitoring on BiPAP.  She has since been weaned off BiPAP and is now on 4 L of supplemental oxygen.  She continues complaining of left shoulder and neck pain which has been ongoing for 1 year.  Imaging was negative for any acute abnormalities.  Orthopedics consulted and suspects this to be related to osteoarthritis and bicep tendinitis.  She has now on Voltaren gel.  She has continued to have difficulties with anxiety and depression.  Her buspirone was discontinued over at [facility].  She was trialed on mirtazapine and gabapentin.  She noted that this was not helping and psychiarty was subsequently consulted.  They are recommending discontinue gabapentin and starting Lyrica as well as discontinuing mirtazapine and starting duloxetine.  Geriatrics is being consulted for anxiety.    The patient states she lives at home with her sister.  She states her sister manages the cooking and cleaning.  She notes that she manages her finances and medication but believes she would benefit from additional assistance in managing her finances.  She notes that she does not drive.  She states she does have some difficulty with her short-term memory at baseline.  She notes no recent falls at home.  She states she does not typically ambulate with any assist devices at baseline but does have a walker available if needed.  She notes that she does have a history of macular degeneration and has difficulty with her vision.  She also notes some difficulty with her hearing but denies use of hearing aids.  She states that she does not wear dentures  and has been having a poor appetite at home recently.  She states she can be incontinent of bladder at times and wears a brief at baseline.  She states she has not been sleeping well secondary to pain in her shoulder.  She anxiety at baseline.    The patient was seen evaluated today at the bedside for geriatric consult.  She has noted to be sitting up in her recliner chair in no acute distress.  She is alert and oriented x 4 and exam today but is forgetful at times.  She states that she continues to have pain in her left arm that is excruciating.  She describes this pain as a sharp shooting pain.  She notes she does not have much pain when she is not moving her arm.  She denies chest pain and states that her breathing is stable.  She notes no issues going to the bathroom.  She states she is not sleeping very well at night.    Care was coordinated with the patient's nurse Lin.  She states the patient did not sleep very well overnight last night.  She states that the patient is anxious but does appear to calm down with reassurance and family at the bedside is helping.    Care was coordinated with the patient's son Ton and sister Isabel at the bedside.  They confirmed that patient's history.  Patient son is interested in finding a new PCP for the patient though the patient does not seem interested in this.  I did discuss with him our memory clinic as the patient has been complaining of short-term memory deficits.  He is interested in an outpatient referral to our memory clinic.  I discussed with him that I would place the outpatient referral and I did provide him with a list of providers including 1 provider at our office who has his own PCP clinic and he additionally works in our memory clinic.  He did inquire about additional home health support.  I did discuss with him that the patient expressed difficulty with ADLs and IADLs at home.  I informed him that it is likely up to the patient if she needs additional  assistance than what would be provided.  Currently her home health aides are supposed to come 2 days a week for 2 hours/day.  They are paying for the home health aides out-of-pocket.  I did discuss with them if she does have insurance that covers he will at home that she would be a good candidate.  If not I discussed with them that she would also benefit from outpatient case management which I will speak to our case management about.  All questions and concerns were addressed at the time of the visit and the patient son was very thankful for the updates.    Care was also coordinated with Heidi Clark.  She notes the patient does not qualify for the heel at home program.  I did inquire about getting the patient an outpatient  to follow in the community.  We are still waiting for PT/OT evaluations.    Care was also coordinated with Dr. Mckeon with internal medicine.    Inpatient consult to Gerontology  Consult performed by: TITO Kwong  Consult ordered by: Luz Mukherjee MD        Review of Systems   Constitutional:  Positive for activity change, appetite change (poor appetite but she is eating lunch today) and fatigue.   HENT:  Positive for hearing loss. Negative for dental problem.    Eyes:  Positive for visual disturbance (hx of macular degeneration).   Respiratory:  Positive for shortness of breath (at times). Negative for cough.    Cardiovascular:  Negative for chest pain and leg swelling.   Gastrointestinal:  Negative for abdominal distention and abdominal pain.   Genitourinary:  Negative for difficulty urinating and dysuria.   Musculoskeletal:  Positive for arthralgias (left shoulder/arm) and gait problem.   Skin:  Negative for color change and pallor.   Neurological:  Positive for weakness. Negative for speech difficulty.   Psychiatric/Behavioral:  Positive for sleep disturbance. Negative for agitation and confusion (forgetful at times). The patient is not nervous/anxious (patient does not  "appear anxious and she admits she does not feel anxious).        Historical Information   Past Medical History:   Diagnosis Date    Anxiety 2024    Asthma     COPD (chronic obstructive pulmonary disease) (HCC)     Disease of thyroid gland     GERD (gastroesophageal reflux disease)     Hypertension 10/11/2018    Hypothyroid     Normocytic anemia 2024    Osteoarthritis 2012    Temporal arteritis (HCC)     Type 2 diabetes mellitus with complication, without long-term current use of insulin (HCC) 2020     Past Surgical History:   Procedure Laterality Date    EYE SURGERY Right 2019    cataract LVCFS    HYSTERECTOMY      NO PAST SURGERIES      ALSO NO RELEVANT PAST SURRGICAL HX AS PER [facility]     Social History   Social History     Substance and Sexual Activity   Alcohol Use Never     Social History     Substance and Sexual Activity   Drug Use No     Social History     Tobacco Use   Smoking Status Former    Current packs/day: 0.00    Average packs/day: 1 pack/day for 54.0 years (54.0 ttl pk-yrs)    Types: Cigarettes    Start date:     Quit date:     Years since quittin.7   Smokeless Tobacco Never   Tobacco Comments    TOBACCO USE, NO PASSIVE SMOKE EXPOSURE       Family History:   Family History   Problem Relation Age of Onset    Breast cancer Mother     Emphysema Father          No Known Allergies    Objective   Vitals: Blood pressure 124/60, pulse (!) 111, temperature 98.3 °F (36.8 °C), resp. rate 17, height 5' 2\" (1.575 m), weight 45.9 kg (101 lb 3.1 oz), SpO2 98%, not currently breastfeeding.,Body mass index is 18.51 kg/m².      Physical Exam  Vitals and nursing note reviewed.   Constitutional:       General: She is not in acute distress.     Comments: Weak, frail, and chronicially appearing elderly female   HENT:      Head: Normocephalic.      Mouth/Throat:      Mouth: Mucous membranes are moist.   Eyes:      General: No scleral icterus.     Conjunctiva/sclera: Conjunctivae " "normal.   Cardiovascular:      Rate and Rhythm: Normal rate and regular rhythm.   Pulmonary:      Effort: Pulmonary effort is normal. No respiratory distress.   Abdominal:      General: Bowel sounds are normal. There is no distension.      Palpations: Abdomen is soft.   Musculoskeletal:         General: No swelling or tenderness (she does note left shoulder pain with movement).   Skin:     General: Skin is warm and dry.   Neurological:      Mental Status: She is alert and oriented to person, place, and time.      Motor: Weakness present.      Gait: Gait abnormal.      Comments: Forgetful at times   Psychiatric:         Mood and Affect: Mood is not anxious or depressed.         Lab Results:   Results from last 7 days   Lab Units 10/11/24  0400   WBC Thousand/uL 8.81   HEMOGLOBIN g/dL 10.7*   HEMATOCRIT % 35.3   PLATELETS Thousands/uL 180        Results from last 7 days   Lab Units 10/11/24  0400 10/10/24  1102   POTASSIUM mmol/L 5.0 4.3   CHLORIDE mmol/L 95* 95*   CO2 mmol/L 35* 34*   BUN mg/dL 13 15   CREATININE mg/dL 0.40* 0.45*   CALCIUM mg/dL 8.5 8.5   ALK PHOS U/L  --  38   ALT U/L  --  16   AST U/L  --  21       Imaging Studies: Results Review Statement: I reviewed radiology reports from this admission including: CT chest, CT head, and xray(s).  EKG, Pathology, and Other Studies: Results Review Statement: I reviewed AM labs and EKG.  VTE Prophylaxis: VTE covered by:  enoxaparin, Subcutaneous, 30 mg at 10/11/24 0916       Code Status: Level 1 - Full Code      Please note:  Voice-recognition software may have been used in the preparation of this document.  Occasional wrong word or \"sound-alike\" substitutions may have occurred due to the inherent limitations of voice recognition software.  Interpretation should be guided by context.    "

## 2024-10-11 NOTE — PLAN OF CARE
Problem: PHYSICAL THERAPY ADULT  Goal: Performs mobility at highest level of function for planned discharge setting.  See evaluation for individualized goals.  Description: Treatment/Interventions: Functional transfer training, LE strengthening/ROM, Elevations, Therapeutic exercise, Endurance training, Patient/family training, Equipment eval/education, Bed mobility, Gait training, Continued evaluation, Spoke to nursing, OT  Equipment Recommended: Walker (pt reports owning RW at home for use)       See flowsheet documentation for full assessment, interventions and recommendations.  Note: Prognosis: Guarded  Problem List: Decreased strength, Decreased endurance, Impaired balance, Decreased mobility, Decreased cognition, Impaired judgement, Decreased safety awareness, Impaired hearing, Impaired vision, Decreased skin integrity, Pain  Assessment: Pt is a 78 yo female admitted to [facility] 2* COPD, insomnia, and SOB. Pt lives alone in 2 , first floor setup available,use of personal DME PTA,reports no recent falls, reports being (I) with mobility and needs A for ADLs and IADLs. Pt currently is not at functional mobility baseline, needs A for mobility with use of RW, pain L shoulder, ataxic and unsteady gait and movement pattern, anxious and nervous behavior and personality, fear of falling,multiple lines, masimo monitoring and use of O2. Pt demonstrates moderate deficits during functional mobility and gait including dec endurance, dec balance, dec BLE strength, ataxic and unsteady gait and movement pattern, L shoulder pain , dec cognition (baseline?), nervous and axnious behavior and personality and needs modAx1 for bed mobility, min Ax2 for TT and GT with use of RW. Pt would cont to benefit from skilled inpt PT services to maximize functional independence and to dec caregiver burden upon beign DC from the hospital.  Barriers to Discharge: Decreased caregiver support, Inaccessible home environment     Rehab Resource  Intensity Level, PT: II (Moderate Resource Intensity)    See flowsheet documentation for full assessment.

## 2024-10-12 LAB
ANION GAP SERPL CALCULATED.3IONS-SCNC: 2 MMOL/L (ref 4–13)
BUN SERPL-MCNC: 12 MG/DL (ref 5–25)
CALCIUM SERPL-MCNC: 9.1 MG/DL (ref 8.4–10.2)
CHLORIDE SERPL-SCNC: 94 MMOL/L (ref 96–108)
CO2 SERPL-SCNC: 41 MMOL/L (ref 21–32)
CREAT SERPL-MCNC: 0.38 MG/DL (ref 0.6–1.3)
ERYTHROCYTE [DISTWIDTH] IN BLOOD BY AUTOMATED COUNT: 13.2 % (ref 11.6–15.1)
GFR SERPL CREATININE-BSD FRML MDRD: 101 ML/MIN/1.73SQ M
GLUCOSE SERPL-MCNC: 93 MG/DL (ref 65–140)
HCT VFR BLD AUTO: 36.5 % (ref 34.8–46.1)
HGB BLD-MCNC: 11.1 G/DL (ref 11.5–15.4)
MCH RBC QN AUTO: 28.5 PG (ref 26.8–34.3)
MCHC RBC AUTO-ENTMCNC: 30.4 G/DL (ref 31.4–37.4)
MCV RBC AUTO: 94 FL (ref 82–98)
PLATELET # BLD AUTO: 161 THOUSANDS/UL (ref 149–390)
PMV BLD AUTO: 10 FL (ref 8.9–12.7)
POTASSIUM SERPL-SCNC: 4.5 MMOL/L (ref 3.5–5.3)
RBC # BLD AUTO: 3.9 MILLION/UL (ref 3.81–5.12)
SODIUM SERPL-SCNC: 137 MMOL/L (ref 135–147)
WBC # BLD AUTO: 6.19 THOUSAND/UL (ref 4.31–10.16)

## 2024-10-12 PROCEDURE — 85027 COMPLETE CBC AUTOMATED: CPT

## 2024-10-12 PROCEDURE — 94760 N-INVAS EAR/PLS OXIMETRY 1: CPT

## 2024-10-12 PROCEDURE — 80048 BASIC METABOLIC PNL TOTAL CA: CPT | Performed by: INTERNAL MEDICINE

## 2024-10-12 PROCEDURE — 99233 SBSQ HOSP IP/OBS HIGH 50: CPT | Performed by: INTERNAL MEDICINE

## 2024-10-12 PROCEDURE — 94640 AIRWAY INHALATION TREATMENT: CPT

## 2024-10-12 RX ADMIN — GABAPENTIN 100 MG: 100 CAPSULE ORAL at 21:52

## 2024-10-12 RX ADMIN — ACETAMINOPHEN 975 MG: 325 TABLET, FILM COATED ORAL at 21:52

## 2024-10-12 RX ADMIN — DICLOFENAC SODIUM 2 G: 10 GEL TOPICAL at 17:01

## 2024-10-12 RX ADMIN — IPRATROPIUM BROMIDE 0.5 MG: 0.5 SOLUTION RESPIRATORY (INHALATION) at 20:00

## 2024-10-12 RX ADMIN — PANTOPRAZOLE SODIUM 40 MG: 40 TABLET, DELAYED RELEASE ORAL at 05:26

## 2024-10-12 RX ADMIN — PROPRANOLOL HYDROCHLORIDE 10 MG: 10 TABLET ORAL at 07:53

## 2024-10-12 RX ADMIN — GABAPENTIN 100 MG: 100 CAPSULE ORAL at 07:53

## 2024-10-12 RX ADMIN — GABAPENTIN 100 MG: 100 CAPSULE ORAL at 17:01

## 2024-10-12 RX ADMIN — GLYCERIN 1 DROP: .002; .002; .01 SOLUTION/ DROPS OPHTHALMIC at 21:52

## 2024-10-12 RX ADMIN — LEVALBUTEROL HYDROCHLORIDE 1.25 MG: 1.25 SOLUTION RESPIRATORY (INHALATION) at 20:00

## 2024-10-12 RX ADMIN — PROPRANOLOL HYDROCHLORIDE 10 MG: 10 TABLET ORAL at 21:52

## 2024-10-12 RX ADMIN — LOSARTAN POTASSIUM 50 MG: 50 TABLET, FILM COATED ORAL at 07:53

## 2024-10-12 RX ADMIN — GLYCERIN 1 DROP: .002; .002; .01 SOLUTION/ DROPS OPHTHALMIC at 09:17

## 2024-10-12 RX ADMIN — BUDESONIDE 0.5 MG: 0.5 INHALANT ORAL at 07:26

## 2024-10-12 RX ADMIN — DICLOFENAC SODIUM 2 G: 10 GEL TOPICAL at 08:01

## 2024-10-12 RX ADMIN — ACETAMINOPHEN 975 MG: 325 TABLET, FILM COATED ORAL at 05:27

## 2024-10-12 RX ADMIN — ONDANSETRON 4 MG: 2 INJECTION INTRAMUSCULAR; INTRAVENOUS at 11:44

## 2024-10-12 RX ADMIN — ACETAMINOPHEN 975 MG: 325 TABLET, FILM COATED ORAL at 13:09

## 2024-10-12 RX ADMIN — PREDNISONE 2.5 MG: 2.5 TABLET ORAL at 07:53

## 2024-10-12 RX ADMIN — DICLOFENAC SODIUM 2 G: 10 GEL TOPICAL at 11:51

## 2024-10-12 RX ADMIN — LEVALBUTEROL HYDROCHLORIDE 1.25 MG: 1.25 SOLUTION RESPIRATORY (INHALATION) at 07:26

## 2024-10-12 RX ADMIN — BUDESONIDE 0.5 MG: 0.5 INHALANT ORAL at 20:00

## 2024-10-12 RX ADMIN — LIDOCAINE 1 PATCH: 700 PATCH TOPICAL at 13:07

## 2024-10-12 RX ADMIN — LEVOTHYROXINE SODIUM 50 MCG: 50 TABLET ORAL at 05:27

## 2024-10-12 RX ADMIN — CYANOCOBALAMIN TAB 500 MCG 1000 MCG: 500 TAB at 07:53

## 2024-10-12 RX ADMIN — IPRATROPIUM BROMIDE 0.5 MG: 0.5 SOLUTION RESPIRATORY (INHALATION) at 07:26

## 2024-10-12 RX ADMIN — DICLOFENAC SODIUM 2 G: 10 GEL TOPICAL at 21:52

## 2024-10-12 RX ADMIN — ENOXAPARIN SODIUM 30 MG: 100 INJECTION SUBCUTANEOUS at 07:53

## 2024-10-12 RX ADMIN — CHLORHEXIDINE GLUCONATE 15 ML: 1.2 RINSE ORAL at 21:52

## 2024-10-12 RX ADMIN — MIRTAZAPINE 7.5 MG: 7.5 TABLET, FILM COATED ORAL at 21:52

## 2024-10-12 NOTE — PLAN OF CARE
Problem: PAIN - ADULT  Goal: Verbalizes/displays adequate comfort level or baseline comfort level  Description: Interventions:  - Encourage patient to monitor pain and request assistance  - Assess pain using appropriate pain scale  - Administer analgesics based on type and severity of pain and evaluate response  - Implement non-pharmacological measures as appropriate and evaluate response  - Consider cultural and social influences on pain and pain management  - Notify physician/advanced practitioner if interventions unsuccessful or patient reports new pain  Outcome: Progressing     Problem: INFECTION - ADULT  Goal: Absence or prevention of progression during hospitalization  Description: INTERVENTIONS:  - Assess and monitor for signs and symptoms of infection  - Monitor lab/diagnostic results  - Monitor all insertion sites, i.e. indwelling lines, tubes, and drains  - Monitor endotracheal if appropriate and nasal secretions for changes in amount and color  - [place] appropriate cooling/warming therapies per order  - Administer medications as ordered  - Instruct and encourage patient and family to use good hand hygiene technique  - Identify and instruct in appropriate isolation precautions for identified infection/condition  Outcome: Progressing  Goal: Absence of fever/infection during neutropenic period  Description: INTERVENTIONS:  - Monitor WBC    Outcome: Progressing     Problem: SAFETY ADULT  Goal: Patient will remain free of falls  Description: INTERVENTIONS:  - Educate patient/family on patient safety including physical limitations  - Instruct patient to call for assistance with activity   - Consult OT/PT to assist with strengthening/mobility   - Keep Call bell within reach  - Keep bed low and locked with side rails adjusted as appropriate  - Keep care items and personal belongings within reach  - Initiate and maintain comfort rounds  - Make Fall Risk Sign visible to staff  - Offer Toileting every  Hours,  in advance of need  - Initiate/Maintain alarm  - Obtain necessary fall risk management equipment:   - Apply yellow socks and bracelet for high fall risk patients  - Consider moving patient to room near nurses station  Outcome: Progressing  Goal: Maintain or return to baseline ADL function  Description: INTERVENTIONS:  -  Assess patient's ability to carry out ADLs; assess patient's baseline for ADL function and identify physical deficits which impact ability to perform ADLs (bathing, care of mouth/teeth, toileting, grooming, dressing, etc.)  - Assess/evaluate cause of self-care deficits   - Assess range of motion  - Assess patient's mobility; develop plan if impaired  - Assess patient's need for assistive devices and provide as appropriate  - Encourage maximum independence but intervene and supervise when necessary  - Involve family in performance of ADLs  - Assess for home care needs following discharge   - Consider OT consult to assist with ADL evaluation and planning for discharge  - Provide patient education as appropriate  Outcome: Progressing  Goal: Maintains/Returns to pre admission functional level  Description: INTERVENTIONS:  - Perform AM-PAC 6 Click Basic Mobility/ Daily Activity assessment daily.  - Set and communicate daily mobility goal to care team and patient/family/caregiver.   - Collaborate with rehabilitation services on mobility goals if consulted  - Perform Range of Motion  times a day.  - Reposition patient every  hours.  - Dangle patient  times a day  - Stand patient  times a day  - Ambulate patient  times a day  - Out of bed to chair  times a day   - Out of bed for meals  times a day  - Out of bed for toileting  - Record patient progress and toleration of activity level   Outcome: Progressing     Problem: DISCHARGE PLANNING  Goal: Discharge to home or other facility with appropriate resources  Description: INTERVENTIONS:  - Identify barriers to discharge w/patient and caregiver  - Arrange for  needed discharge resources and transportation as appropriate  - Identify discharge learning needs (meds, wound care, etc.)  - Arrange for interpretive services to assist at discharge as needed  - Refer to Case Management Department for coordinating discharge planning if the patient needs post-hospital services based on physician/advanced practitioner order or complex needs related to functional status, cognitive ability, or social support system  Outcome: Progressing     Problem: Knowledge Deficit  Goal: Patient/family/caregiver demonstrates understanding of disease process, treatment plan, medications, and discharge instructions  Description: Complete learning assessment and assess knowledge base.  Interventions:  - Provide teaching at level of understanding  - Provide teaching via preferred learning methods  Outcome: Progressing

## 2024-10-12 NOTE — PLAN OF CARE
Problem: PAIN - ADULT  Goal: Verbalizes/displays adequate comfort level or baseline comfort level  Description: Interventions:  - Encourage patient to monitor pain and request assistance  - Assess pain using appropriate pain scale  - Administer analgesics based on type and severity of pain and evaluate response  - Implement non-pharmacological measures as appropriate and evaluate response  - Consider cultural and social influences on pain and pain management  - Notify physician/advanced practitioner if interventions unsuccessful or patient reports new pain  Outcome: Progressing     Problem: INFECTION - ADULT  Goal: Absence or prevention of progression during hospitalization  Description: INTERVENTIONS:  - Assess and monitor for signs and symptoms of infection  - Monitor lab/diagnostic results  - Monitor all insertion sites, i.e. indwelling lines, tubes, and drains  - Monitor endotracheal if appropriate and nasal secretions for changes in amount and color  - [place] appropriate cooling/warming therapies per order  - Administer medications as ordered  - Instruct and encourage patient and family to use good hand hygiene technique  - Identify and instruct in appropriate isolation precautions for identified infection/condition  Outcome: Progressing  Goal: Absence of fever/infection during neutropenic period  Description: INTERVENTIONS:  - Monitor WBC    Outcome: Progressing     Problem: SAFETY ADULT  Goal: Patient will remain free of falls  Description: INTERVENTIONS:  - Educate patient/family on patient safety including physical limitations  - Instruct patient to call for assistance with activity   - Consult OT/PT to assist with strengthening/mobility   - Keep Call bell within reach  - Keep bed low and locked with side rails adjusted as appropriate  - Keep care items and personal belongings within reach  - Initiate and maintain comfort rounds  - Make Fall Risk Sign visible to staff  - Offer Toileting every 2 Hours,  in advance of need  - Initiate/Maintain bed alarm  - Obtain necessary fall risk management equipment:   - Apply yellow socks and bracelet for high fall risk patients  - Consider moving patient to room near nurses station  Outcome: Progressing  Goal: Maintain or return to baseline ADL function  Description: INTERVENTIONS:  -  Assess patient's ability to carry out ADLs; assess patient's baseline for ADL function and identify physical deficits which impact ability to perform ADLs (bathing, care of mouth/teeth, toileting, grooming, dressing, etc.)  - Assess/evaluate cause of self-care deficits   - Assess range of motion  - Assess patient's mobility; develop plan if impaired  - Assess patient's need for assistive devices and provide as appropriate  - Encourage maximum independence but intervene and supervise when necessary  - Involve family in performance of ADLs  - Assess for home care needs following discharge   - Consider OT consult to assist with ADL evaluation and planning for discharge  - Provide patient education as appropriate  Outcome: Progressing  Goal: Maintains/Returns to pre admission functional level  Description: INTERVENTIONS:  - Perform AM-PAC 6 Click Basic Mobility/ Daily Activity assessment daily.  - Set and communicate daily mobility goal to care team and patient/family/caregiver.   - Collaborate with rehabilitation services on mobility goals if consulted  - Perform Range of Motion 4 times a day.  - Reposition patient every 2 hours.  - Dangle patient 3 times a day  - Stand patient 3 times a day  - Ambulate patient 3 times a day  - Out of bed to chair 3 times a day   - Out of bed for meals 3 times a day  - Out of bed for toileting  - Record patient progress and toleration of activity level   Outcome: Progressing     Problem: DISCHARGE PLANNING  Goal: Discharge to home or other facility with appropriate resources  Description: INTERVENTIONS:  - Identify barriers to discharge w/patient and caregiver  -  Arrange for needed discharge resources and transportation as appropriate  - Identify discharge learning needs (meds, wound care, etc.)  - Arrange for interpretive services to assist at discharge as needed  - Refer to Case Management Department for coordinating discharge planning if the patient needs post-hospital services based on physician/advanced practitioner order or complex needs related to functional status, cognitive ability, or social support system  Outcome: Progressing     Problem: Knowledge Deficit  Goal: Patient/family/caregiver demonstrates understanding of disease process, treatment plan, medications, and discharge instructions  Description: Complete learning assessment and assess knowledge base.  Interventions:  - Provide teaching at level of understanding  - Provide teaching via preferred learning methods  Outcome: Progressing     Problem: RESPIRATORY - ADULT  Goal: Achieves optimal ventilation and oxygenation  Description: INTERVENTIONS:  - Assess for changes in respiratory status  - Assess for changes in mentation and behavior  - Position to facilitate oxygenation and minimize respiratory effort  - Oxygen administered by appropriate delivery if ordered  - Initiate smoking cessation education as indicated  - Encourage broncho-pulmonary hygiene including cough, deep breathe, Incentive Spirometry  - Assess the need for suctioning and aspirate as needed  - Assess and instruct to report SOB or any respiratory difficulty  - Respiratory Therapy support as indicated  Outcome: Progressing     Problem: Nutrition/Hydration-ADULT  Goal: Nutrient/Hydration intake appropriate for improving, restoring or maintaining nutritional needs  Description: Monitor and assess patient's nutrition/hydration status for malnutrition. Collaborate with interdisciplinary team and initiate plan and interventions as ordered.  Monitor patient's weight and dietary intake as ordered or per policy. Utilize nutrition screening tool and  intervene as necessary. Determine patient's food preferences and provide high-protein, high-caloric foods as appropriate.     INTERVENTIONS:  - Monitor oral intake, urinary output, labs, and treatment plans  - Assess nutrition and hydration status and recommend course of action  - Evaluate amount of meals eaten  - Assist patient with eating if necessary   - Allow adequate time for meals  - Recommend/ encourage appropriate diets, oral nutritional supplements, and vitamin/mineral supplements  - Order, calculate, and assess calorie counts as needed  - Recommend, monitor, and adjust tube feedings and TPN/PPN based on assessed needs  - Assess need for intravenous fluids  - Provide specific nutrition/hydration education as appropriate  - Include patient/family/caregiver in decisions related to nutrition  Outcome: Progressing     Problem: Prexisting or High Potential for Compromised Skin Integrity  Goal: Skin integrity is maintained or improved  Description: INTERVENTIONS:  - Identify patients at risk for skin breakdown  - Assess and monitor skin integrity  - Assess and monitor nutrition and hydration status  - Monitor labs   - Assess for incontinence   - Turn and reposition patient  - Assist with mobility/ambulation  - Relieve pressure over bony prominences  - Avoid friction and shearing  - Provide appropriate hygiene as needed including keeping skin clean and dry  - Evaluate need for skin moisturizer/barrier cream  - Collaborate with interdisciplinary team   - Patient/family teaching  - Consider wound care consult   Outcome: Progressing     Problem: SKIN/TISSUE INTEGRITY - ADULT  Goal: Skin Integrity remains intact(Skin Breakdown Prevention)  Description: Assess:  -Perform [place] assessment every   -Clean and moisturize skin every   -Inspect skin when repositioning, toileting, and assisting with ADLS  -Assess under medical devices such as  every  -Assess extremities for adequate circulation and sensation     Bed  Management:  -Have minimal linens on bed & keep smooth, unwrinkled  -Change linens as needed when moist or perspiring  -Avoid sitting or lying in one position for more than  hours while in bed  -Keep HOB at degrees     Toileting:  -Offer bedside commode  -Assess for incontinence every   -Use incontinent care products after each incontinent episode such as     Activity:  -Mobilize patient  times a day  -Encourage activity and walks on unit  -Encourage or provide ROM exercises   -Turn and reposition patient every  Hours  -Use appropriate equipment to lift or move patient in bed  -Instruct/ Assist with weight shifting every  when out of bed in chair  -Consider limitation of chair time  hour intervals    Skin Care:  -Avoid use of baby powder, tape, friction and shearing, hot water or constrictive clothing  -Relieve pressure over bony prominences using   -Do not massage red bony areas    Next Steps:  -Teach patient strategies to minimize risks such as    -Consider consults to  interdisciplinary teams such as   Outcome: Progressing     Problem: MUSCULOSKELETAL - ADULT  Goal: Maintain or return mobility to safest level of function  Description: INTERVENTIONS:  - Assess patient's ability to carry out ADLs; assess patient's baseline for ADL function and identify physical deficits which impact ability to perform ADLs (bathing, care of mouth/teeth, toileting, grooming, dressing, etc.)  - Assess/evaluate cause of self-care deficits   - Assess range of motion  - Assess patient's mobility  - Assess patient's need for assistive devices and provide as appropriate  - Encourage maximum independence but intervene and supervise when necessary  - Involve family in performance of ADLs  - Assess for home care needs following discharge   - Consider OT consult to assist with ADL evaluation and planning for discharge  - Provide patient education as appropriate  Outcome: Progressing  Goal: Maintain proper alignment of affected body  part  Description: INTERVENTIONS:  - Support, maintain and protect limb and body alignment  - Provide patient/ family with appropriate education  Outcome: Progressing

## 2024-10-12 NOTE — PROGRESS NOTES
Progress Note - Hospitalist   Name: Sarah Zayas 79 y.o. female I MRN: [id number]  Unit/Bed#: [place] [address] -01 I Date of Admission: 10/10/2024   Date of Service: 10/12/2024 I Hospital Day: 2    Assessment & Plan  Acute respiratory failure with hypercapnia (HCC)  Back to baseline oxygen  Acute exacerbation of chronic obstructive pulmonary disease (COPD) (HCC)  History of severe COPD  Initial concern for exacerbation but likely oversedation from benzodiazepine  Patient placed initially on BiPAP sepsis workup has been unremarkable  Patient will continue Atrovent and Xopenex  Continue pulmonary toilet  Improving  Gastroesophageal reflux disease without esophagitis  Continue PPI therapy  Hypertension  Resume losartan  To admission on Inderal, will continue patient is developing tachycardia  Temporal arteritis (HCC)  Remains on 2.5 mg of prednisone  Continue same dose for now    Anxiety/depression  Evaluated by psychiatry  Recently discontinued on alprazolam  Continue BuSpar 5 mg twice daily  Continue gabapentin for now, can consider transition to Lyrica    Neck pain/shoulder pain  Evidence of bicep tendinitis  Add Voltaren gel  PT OT recommending rehab            Hospital Course:     79-year-old female patient presenting with ambulatory dysfunction.  Initially transferred from [facility] due to acute hypoxemic respiratory failure felt to be secondary to benzodiazepines and aspiration pneumonia    Patient is now pending rehab place    Assessment:      Principal Problem:    Acute respiratory failure with hypercapnia (HCC)  Active Problems:    Acute exacerbation of chronic obstructive pulmonary disease (COPD) (HCC)    Gastroesophageal reflux disease without esophagitis    Hypertension    Temporal arteritis (HCC)    Anxiety/depression    Neck pain/shoulder pain      Plan:    Continue pain control  Supportive care by nursing  Medically stable for discharge to rehab once bed can be found       VTE Pharmacologic  Prophylaxis:   Pharmacologic: Enoxaparin (Lovenox)  Mechanical VTE Prophylaxis in Place: Yes    AM-PAC Basic Mobility:  Basic Mobility Inpatient Raw Score: 17    JH-HLM Achieved: 6: Walk 10 steps or more  JH-HLM Goal: 5: Stand one or more mins    HLM Goal listed above. Continue with multidisciplinary rounding and encourage appropriate mobility to improve upon HLM goals.         Patient Centered Rounds: Case discussed and reviewed with nursing    Discussions with Specialists or Other Care Team Provider: Case management    Education and Discussions with Family / Patient:  with patient sons at bedside    Time Spent for Care: 80 minutes.  More than 50% of total time spent on counseling and coordination of care as described above.    Current Length of Stay: 2 day(s)    Current Patient Status: Inpatient   Certification Statement: The patient will continue to require additional inpatient hospital stay due to need for rehab placement    Discharge Plan / Estimated Discharge Date: To be determined but likely here through the weekend    Code Status: Level 1 - Full Code      Subjective:   Seen and examined, patient reports right arm pain.  Denies any shortness of breath or difficulty breathing.  No fevers or chills    A complete and comprehensive 14 point organ system review has been performed and all other systems are negative other than stated above.    Objective:     Vitals:   Temp (24hrs), Av.2 °F (36.8 °C), Min:97.1 °F (36.2 °C), Max:99.2 °F (37.3 °C)    Temp:  [97.1 °F (36.2 °C)-99.2 °F (37.3 °C)] 98.4 °F (36.9 °C)  HR:  [60-98] 97  Resp:  [16-22] 16  BP: (113-164)/(55-74) 113/55  SpO2:  [96 %-100 %] 96 %  Body mass index is 18.43 kg/m².     Input and Output Summary (last 24 hours):       Intake/Output Summary (Last 24 hours) at 10/12/2024 1642  Last data filed at 10/12/2024 0601  Gross per 24 hour   Intake 480 ml   Output 400 ml   Net 80 ml       Physical Exam:     General: ill appearing, no acute distress  HEENT:  atraumatic, PERRLA, moist mucosa, normal pharynx, normal tonsils and adenoids, normal tongue, no fluid in sinuses  Neck: Trachea midline, no carotid bruit, no masses  Respiratory: normal chest wall expansion, CTA B, no r/r/w, no rubs  Cardiovascular: RRR, no m/r/g, Normal S1 and S2  Abdomen: Soft, non-tender, non-distended, normal bowel sounds in all quadrants, no hepatosplenomegaly, no tympany  Rectal: deferred  Musculoskeletal: Left arm limited by mobility and slight   integumentary: warm, dry, and pink, with no rash, purpura, or petechia  Heme/Lymph: no lymphadenopathy, no bruises  Neurological: Cranial Nerves II-XII grossly intact  Psychiatric: Anxious    Additional Data:     Labs:    Results from last 7 days   Lab Units 10/12/24  0633 10/11/24  0400 10/10/24  1102   WBC Thousand/uL 6.19   < > 7.20   HEMOGLOBIN g/dL 11.1*   < > 11.8   HEMATOCRIT % 36.5   < > 39.7   PLATELETS Thousands/uL 161   < > 159   SEGS PCT %  --   --  80*   LYMPHO PCT %  --   --  6*   MONO PCT %  --   --  11   EOS PCT %  --   --  3    < > = values in this interval not displayed.     Results from last 7 days   Lab Units 10/12/24  0633 10/11/24  0400 10/10/24  1102   POTASSIUM mmol/L 4.5   < > 4.3   CHLORIDE mmol/L 94*   < > 95*   CO2 mmol/L 41*   < > 34*   BUN mg/dL 12   < > 15   CREATININE mg/dL 0.38*   < > 0.45*   CALCIUM mg/dL 9.1   < > 8.5   ALK PHOS U/L  --   --  38   ALT U/L  --   --  16   AST U/L  --   --  21    < > = values in this interval not displayed.           * I Have Reviewed All Lab Data Listed Above.  * Additional Pertinent Lab Tests Reviewed: All Labs For Current Hospital Admission Reviewed    Imaging:    Imaging Reports Reviewed Today Include: No new imaging  Imaging Personally Reviewed by Myself Includes: No new imaging    Recent Cultures (last 7 days):     Results from last 7 days   Lab Units 10/10/24  0137   BLOOD CULTURE  No Growth at 48 hrs.  No Growth at 48 hrs.       Last 24 Hours Medication List:   Current  Facility-Administered Medications   Medication Dose Route Frequency Provider Last Rate    acetaminophen  975 mg Oral Q8H [facility] TITO Kwong      albuterol  2 puff Inhalation Q6H PRN Luz Mukherjee MD      Artificial Tears  1 drop Both Eyes Q4H PRN Luz Mukherjee MD      budesonide  0.5 mg Nebulization BID Luz Mukherjee MD      chlorhexidine  15 mL Mouth/Throat Q12H [facility] Luz Mukherjee MD      vitamin B-12  1,000 mcg Oral Daily Luz Mukherjee MD      Diclofenac Sodium  2 g Topical 4x Daily Saad Mckeon DO      enoxaparin  30 mg Subcutaneous Daily Luz Mukherjee MD      gabapentin  100 mg Oral TID Luz Mukherjee MD      ipratropium  0.5 mg Nebulization BID Luz Mukherjee MD      ipratropium-albuterol  3 mL Nebulization Q6H PRN Luz Mukherjee MD      levalbuterol  1.25 mg Nebulization BID Luz Mukherjee MD      levothyroxine  50 mcg Oral Early Morning Luz Mukherjee MD      lidocaine  1 patch Topical Daily Luz Mukherjee MD      losartan  50 mg Oral Daily Luz Mukherjee MD      melatonin  3 mg Oral HS PRN TITO Kwong      mirtazapine  7.5 mg Oral HS Luz Mukherjee MD      ondansetron  4 mg Intravenous Q6H PRN Luz Mukherjee MD      pantoprazole  40 mg Oral Early Morning Luz Mukherjee MD      predniSONE  2.5 mg Oral Daily With Breakfast Luz Mukherjee MD      propranolol  10 mg Oral Q12H [facility] Saad Mckeon DO      sodium chloride  2 spray Each Nare Q1H PRN Luz Mukherjee MD         AM-PAC Basic Mobility:  Basic Mobility Inpatient Raw Score: 17    -HLM Achieved: 6: Walk 10 steps or more  JH-HLM Goal: 5: Stand one or more mins    HLM Goal listed above. Continue with multidisciplinary rounding and encourage appropriate mobility to improve upon HLM goals.     Today, Patient Was Seen By: Saad Mckeon DO    ** Please Note: This note was completed in part utilizing [employer] software dictation.  Grammatical errors, random word insertions, spelling mistakes, and incomplete sentences may be an occasional consequence of this system  secondary to software limitations, ambient noise, and hardware issues.  If you have any questions or concerns about the content, text, or information contained within the body of this dictation, please contact the provider for clarification. **

## 2024-10-12 NOTE — ASSESSMENT & PLAN NOTE
Evidence of bicep tendinitis  Add Voltaren gel  PT OT recommending rehab            Hospital Course:     79-year-old female patient presenting with ambulatory dysfunction.  Initially transferred from [facility] due to acute hypoxemic respiratory failure felt to be secondary to benzodiazepines and aspiration pneumonia    Patient is now pending rehab place    Assessment:      Principal Problem:    Acute respiratory failure with hypercapnia (HCC)  Active Problems:    Acute exacerbation of chronic obstructive pulmonary disease (COPD) (HCC)    Gastroesophageal reflux disease without esophagitis    Hypertension    Temporal arteritis (HCC)    Anxiety/depression    Neck pain/shoulder pain      Plan:    Continue pain control  Supportive care by nursing  Medically stable for discharge to rehab once bed can be found       VTE Pharmacologic Prophylaxis:   Pharmacologic: Enoxaparin (Lovenox)  Mechanical VTE Prophylaxis in Place: Yes    AM-PAC Basic Mobility:  Basic Mobility Inpatient Raw Score: 17    JH-HLM Achieved: 6: Walk 10 steps or more  JH-HLM Goal: 5: Stand one or more mins    HLM Goal listed above. Continue with multidisciplinary rounding and encourage appropriate mobility to improve upon HLM goals.         Patient Centered Rounds: Case discussed and reviewed with nursing    Discussions with Specialists or Other Care Team Provider: Case management    Education and Discussions with Family / Patient:  with patient sons at bedside    Time Spent for Care: 80 minutes.  More than 50% of total time spent on counseling and coordination of care as described above.    Current Length of Stay: 2 day(s)    Current Patient Status: Inpatient   Certification Statement: The patient will continue to require additional inpatient hospital stay due to need for rehab placement    Discharge Plan / Estimated Discharge Date: To be determined but likely here through the weekend    Code Status: Level 1 - Full Code      Subjective:    Seen and examined, patient reports right arm pain.  Denies any shortness of breath or difficulty breathing.  No fevers or chills    A complete and comprehensive 14 point organ system review has been performed and all other systems are negative other than stated above.    Objective:     Vitals:   Temp (24hrs), Av.2 °F (36.8 °C), Min:97.1 °F (36.2 °C), Max:99.2 °F (37.3 °C)    Temp:  [97.1 °F (36.2 °C)-99.2 °F (37.3 °C)] 98.4 °F (36.9 °C)  HR:  [60-98] 97  Resp:  [16-22] 16  BP: (113-164)/(55-74) 113/55  SpO2:  [96 %-100 %] 96 %  Body mass index is 18.43 kg/m².     Input and Output Summary (last 24 hours):       Intake/Output Summary (Last 24 hours) at 10/12/2024 1642  Last data filed at 10/12/2024 0601  Gross per 24 hour   Intake 480 ml   Output 400 ml   Net 80 ml       Physical Exam:     General: ill appearing, no acute distress  HEENT: atraumatic, PERRLA, moist mucosa, normal pharynx, normal tonsils and adenoids, normal tongue, no fluid in sinuses  Neck: Trachea midline, no carotid bruit, no masses  Respiratory: normal chest wall expansion, CTA B, no r/r/w, no rubs  Cardiovascular: RRR, no m/r/g, Normal S1 and S2  Abdomen: Soft, non-tender, non-distended, normal bowel sounds in all quadrants, no hepatosplenomegaly, no tympany  Rectal: deferred  Musculoskeletal: Left arm limited by mobility and slight   integumentary: warm, dry, and pink, with no rash, purpura, or petechia  Heme/Lymph: no lymphadenopathy, no bruises  Neurological: Cranial Nerves II-XII grossly intact  Psychiatric: Anxious    Additional Data:     Labs:    Results from last 7 days   Lab Units 10/12/24  0633 10/11/24  0400 10/10/24  1102   WBC Thousand/uL 6.19   < > 7.20   HEMOGLOBIN g/dL 11.1*   < > 11.8   HEMATOCRIT % 36.5   < > 39.7   PLATELETS Thousands/uL 161   < > 159   SEGS PCT %  --   --  80*   LYMPHO PCT %  --   --  6*   MONO PCT %  --   --  11   EOS PCT %  --   --  3    < > = values in this interval not displayed.     Results from last  7 days   Lab Units 10/12/24  0633 10/11/24  0400 10/10/24  1102   POTASSIUM mmol/L 4.5   < > 4.3   CHLORIDE mmol/L 94*   < > 95*   CO2 mmol/L 41*   < > 34*   BUN mg/dL 12   < > 15   CREATININE mg/dL 0.38*   < > 0.45*   CALCIUM mg/dL 9.1   < > 8.5   ALK PHOS U/L  --   --  38   ALT U/L  --   --  16   AST U/L  --   --  21    < > = values in this interval not displayed.           * I Have Reviewed All Lab Data Listed Above.  * Additional Pertinent Lab Tests Reviewed: All Labs For Current Hospital Admission Reviewed    Imaging:    Imaging Reports Reviewed Today Include: No new imaging  Imaging Personally Reviewed by Myself Includes: No new imaging    Recent Cultures (last 7 days):     Results from last 7 days   Lab Units 10/10/24  0137   BLOOD CULTURE  No Growth at 48 hrs.  No Growth at 48 hrs.       Last 24 Hours Medication List:   Current Facility-Administered Medications   Medication Dose Route Frequency Provider Last Rate    acetaminophen  975 mg Oral Q8H TITO Puri      albuterol  2 puff Inhalation Q6H PRN Luz Mukherjee MD      Artificial Tears  1 drop Both Eyes Q4H PRN Luz Mukherjee MD      budesonide  0.5 mg Nebulization BID Luz Mukherjee MD      chlorhexidine  15 mL Mouth/Throat Q12H [facility] Luz Mukherjee MD      vitamin B-12  1,000 mcg Oral Daily Luz Mukherjee MD      Diclofenac Sodium  2 g Topical 4x Daily Saad Mckeon DO      enoxaparin  30 mg Subcutaneous Daily Luz Mukherjee MD      gabapentin  100 mg Oral TID Luz Mukherjee MD      ipratropium  0.5 mg Nebulization BID Luz Mukherjee MD      ipratropium-albuterol  3 mL Nebulization Q6H PRN Luz Mukherjee MD      levalbuterol  1.25 mg Nebulization BID Luz Mukherjee MD      levothyroxine  50 mcg Oral Early Morning Luz Mukherjee MD      lidocaine  1 patch Topical Daily Luz Mukherjee MD      losartan  50 mg Oral Daily Luz Mukherjee MD      melatonin  3 mg Oral HS PRN TITO Kwong      mirtazapine  7.5 mg Oral HS Luz Mukherjee MD      ondansetron  4 mg Intravenous Q6H PRN  Luz Mukherjee MD      pantoprazole  40 mg Oral Early Morning Luz Mukherjee MD      predniSONE  2.5 mg Oral Daily With Breakfast Luz Mukherjee MD      propranolol  10 mg Oral Q12H [facility] Saad Mckeon DO      sodium chloride  2 spray Each Nare Q1H PRN Luz Mukherjee MD         AM-PAC Basic Mobility:  Basic Mobility Inpatient Raw Score: 17    JH-HLM Achieved: 6: Walk 10 steps or more  JH-HLM Goal: 5: Stand one or more mins    HLM Goal listed above. Continue with multidisciplinary rounding and encourage appropriate mobility to improve upon HLM goals.     Today, Patient Was Seen By: Saad Mckeon DO    ** Please Note: This note was completed in part utilizing [employer] software dictation.  Grammatical errors, random word insertions, spelling mistakes, and incomplete sentences may be an occasional consequence of this system secondary to software limitations, ambient noise, and hardware issues.  If you have any questions or concerns about the content, text, or information contained within the body of this dictation, please contact the provider for clarification. **

## 2024-10-13 ENCOUNTER — APPOINTMENT (INPATIENT)
Dept: CT IMAGING | Facility: HOSPITAL | Age: 79
DRG: 189 | End: 2024-10-13
Payer: COMMERCIAL

## 2024-10-13 PROBLEM — E03.9 HYPOTHYROIDISM: Status: ACTIVE | Noted: 2024-10-13

## 2024-10-13 LAB
ANION GAP SERPL CALCULATED.3IONS-SCNC: 2 MMOL/L (ref 4–13)
BACTERIA BLD CULT: NORMAL
BACTERIA BLD CULT: NORMAL
BUN SERPL-MCNC: 13 MG/DL (ref 5–25)
CALCIUM SERPL-MCNC: 8.8 MG/DL (ref 8.4–10.2)
CHLORIDE SERPL-SCNC: 91 MMOL/L (ref 96–108)
CO2 SERPL-SCNC: 45 MMOL/L (ref 21–32)
CREAT SERPL-MCNC: 0.46 MG/DL (ref 0.6–1.3)
ERYTHROCYTE [DISTWIDTH] IN BLOOD BY AUTOMATED COUNT: 13.1 % (ref 11.6–15.1)
GFR SERPL CREATININE-BSD FRML MDRD: 94 ML/MIN/1.73SQ M
GLUCOSE SERPL-MCNC: 85 MG/DL (ref 65–140)
HCT VFR BLD AUTO: 42.7 % (ref 34.8–46.1)
HGB BLD-MCNC: 12.5 G/DL (ref 11.5–15.4)
MCH RBC QN AUTO: 28.6 PG (ref 26.8–34.3)
MCHC RBC AUTO-ENTMCNC: 29.3 G/DL (ref 31.4–37.4)
MCV RBC AUTO: 98 FL (ref 82–98)
PLATELET # BLD AUTO: 197 THOUSANDS/UL (ref 149–390)
PMV BLD AUTO: 10.3 FL (ref 8.9–12.7)
POTASSIUM SERPL-SCNC: 5.3 MMOL/L (ref 3.5–5.3)
RBC # BLD AUTO: 4.37 MILLION/UL (ref 3.81–5.12)
SODIUM SERPL-SCNC: 138 MMOL/L (ref 135–147)
WBC # BLD AUTO: 6.19 THOUSAND/UL (ref 4.31–10.16)

## 2024-10-13 PROCEDURE — 99233 SBSQ HOSP IP/OBS HIGH 50: CPT | Performed by: INTERNAL MEDICINE

## 2024-10-13 PROCEDURE — 70450 CT HEAD/BRAIN W/O DYE: CPT

## 2024-10-13 PROCEDURE — 85027 COMPLETE CBC AUTOMATED: CPT

## 2024-10-13 PROCEDURE — 80048 BASIC METABOLIC PNL TOTAL CA: CPT | Performed by: INTERNAL MEDICINE

## 2024-10-13 PROCEDURE — 94760 N-INVAS EAR/PLS OXIMETRY 1: CPT

## 2024-10-13 PROCEDURE — 94640 AIRWAY INHALATION TREATMENT: CPT

## 2024-10-13 RX ORDER — LEVOTHYROXINE SODIUM 25 UG/1
25 TABLET ORAL
Status: DISCONTINUED | OUTPATIENT
Start: 2024-10-14 | End: 2024-10-19

## 2024-10-13 RX ORDER — DULOXETIN HYDROCHLORIDE 30 MG/1
30 CAPSULE, DELAYED RELEASE ORAL
Status: DISCONTINUED | OUTPATIENT
Start: 2024-10-13 | End: 2024-10-17

## 2024-10-13 RX ORDER — MUSCLE RUB CREAM 100; 150 MG/G; MG/G
CREAM TOPICAL 3 TIMES DAILY
Status: DISCONTINUED | OUTPATIENT
Start: 2024-10-13 | End: 2024-10-19

## 2024-10-13 RX ORDER — PREGABALIN 25 MG/1
25 CAPSULE ORAL 2 TIMES DAILY
Status: DISCONTINUED | OUTPATIENT
Start: 2024-10-13 | End: 2024-10-16

## 2024-10-13 RX ADMIN — ENOXAPARIN SODIUM 30 MG: 100 INJECTION SUBCUTANEOUS at 09:45

## 2024-10-13 RX ADMIN — LEVALBUTEROL HYDROCHLORIDE 1.25 MG: 1.25 SOLUTION RESPIRATORY (INHALATION) at 19:26

## 2024-10-13 RX ADMIN — DULOXETINE HYDROCHLORIDE 30 MG: 30 CAPSULE, DELAYED RELEASE ORAL at 21:45

## 2024-10-13 RX ADMIN — CYANOCOBALAMIN TAB 500 MCG 1000 MCG: 500 TAB at 09:44

## 2024-10-13 RX ADMIN — ACETAMINOPHEN 975 MG: 325 TABLET, FILM COATED ORAL at 21:45

## 2024-10-13 RX ADMIN — LEVALBUTEROL HYDROCHLORIDE 1.25 MG: 1.25 SOLUTION RESPIRATORY (INHALATION) at 07:28

## 2024-10-13 RX ADMIN — ACETAMINOPHEN 975 MG: 325 TABLET, FILM COATED ORAL at 05:38

## 2024-10-13 RX ADMIN — CHLORHEXIDINE GLUCONATE 15 ML: 1.2 RINSE ORAL at 21:45

## 2024-10-13 RX ADMIN — DICLOFENAC SODIUM 2 G: 10 GEL TOPICAL at 16:58

## 2024-10-13 RX ADMIN — DICLOFENAC SODIUM 2 G: 10 GEL TOPICAL at 09:45

## 2024-10-13 RX ADMIN — PREDNISONE 2.5 MG: 2.5 TABLET ORAL at 09:47

## 2024-10-13 RX ADMIN — LIDOCAINE 1 PATCH: 700 PATCH TOPICAL at 09:44

## 2024-10-13 RX ADMIN — GABAPENTIN 100 MG: 100 CAPSULE ORAL at 09:44

## 2024-10-13 RX ADMIN — BUDESONIDE 0.5 MG: 0.5 INHALANT ORAL at 19:26

## 2024-10-13 RX ADMIN — IPRATROPIUM BROMIDE 0.5 MG: 0.5 SOLUTION RESPIRATORY (INHALATION) at 19:26

## 2024-10-13 RX ADMIN — PREGABALIN 25 MG: 25 CAPSULE ORAL at 16:53

## 2024-10-13 RX ADMIN — LEVOTHYROXINE SODIUM 50 MCG: 50 TABLET ORAL at 05:38

## 2024-10-13 RX ADMIN — PROPRANOLOL HYDROCHLORIDE 10 MG: 10 TABLET ORAL at 21:45

## 2024-10-13 RX ADMIN — PROPRANOLOL HYDROCHLORIDE 10 MG: 10 TABLET ORAL at 09:44

## 2024-10-13 RX ADMIN — IPRATROPIUM BROMIDE 0.5 MG: 0.5 SOLUTION RESPIRATORY (INHALATION) at 07:28

## 2024-10-13 RX ADMIN — BUDESONIDE 0.5 MG: 0.5 INHALANT ORAL at 07:28

## 2024-10-13 RX ADMIN — MENTHOL 10%, METHYL SALICYLATE 15%: 10; 15 CREAM TOPICAL at 21:45

## 2024-10-13 RX ADMIN — DICLOFENAC SODIUM 2 G: 10 GEL TOPICAL at 21:45

## 2024-10-13 RX ADMIN — PANTOPRAZOLE SODIUM 40 MG: 40 TABLET, DELAYED RELEASE ORAL at 05:37

## 2024-10-13 NOTE — PLAN OF CARE
Problem: PAIN - ADULT  Goal: Verbalizes/displays adequate comfort level or baseline comfort level  Description: Interventions:  - Encourage patient to monitor pain and request assistance  - Assess pain using appropriate pain scale  - Administer analgesics based on type and severity of pain and evaluate response  - Implement non-pharmacological measures as appropriate and evaluate response  - Consider cultural and social influences on pain and pain management  - Notify physician/advanced practitioner if interventions unsuccessful or patient reports new pain  Outcome: Progressing     Problem: INFECTION - ADULT  Goal: Absence or prevention of progression during hospitalization  Description: INTERVENTIONS:  - Assess and monitor for signs and symptoms of infection  - Monitor lab/diagnostic results  - Monitor all insertion sites, i.e. indwelling lines, tubes, and drains  - Monitor endotracheal if appropriate and nasal secretions for changes in amount and color  - [place] appropriate cooling/warming therapies per order  - Administer medications as ordered  - Instruct and encourage patient and family to use good hand hygiene technique  - Identify and instruct in appropriate isolation precautions for identified infection/condition  Outcome: Progressing  Goal: Absence of fever/infection during neutropenic period  Description: INTERVENTIONS:  - Monitor WBC    Outcome: Progressing     Problem: SAFETY ADULT  Goal: Patient will remain free of falls  Description: INTERVENTIONS:  - Educate patient/family on patient safety including physical limitations  - Instruct patient to call for assistance with activity   - Consult OT/PT to assist with strengthening/mobility   - Keep Call bell within reach  - Keep bed low and locked with side rails adjusted as appropriate  - Keep care items and personal belongings within reach  - Initiate and maintain comfort rounds  - Make Fall Risk Sign visible to staff  - Offer Toileting every  Hours,  in advance of need  - Initiate/Maintain alarm  - Obtain necessary fall risk management equipment:   - Apply yellow socks and bracelet for high fall risk patients  - Consider moving patient to room near nurses station  Outcome: Progressing  Goal: Maintain or return to baseline ADL function  Description: INTERVENTIONS:  -  Assess patient's ability to carry out ADLs; assess patient's baseline for ADL function and identify physical deficits which impact ability to perform ADLs (bathing, care of mouth/teeth, toileting, grooming, dressing, etc.)  - Assess/evaluate cause of self-care deficits   - Assess range of motion  - Assess patient's mobility; develop plan if impaired  - Assess patient's need for assistive devices and provide as appropriate  - Encourage maximum independence but intervene and supervise when necessary  - Involve family in performance of ADLs  - Assess for home care needs following discharge   - Consider OT consult to assist with ADL evaluation and planning for discharge  - Provide patient education as appropriate  Outcome: Progressing  Goal: Maintains/Returns to pre admission functional level  Description: INTERVENTIONS:  - Perform AM-PAC 6 Click Basic Mobility/ Daily Activity assessment daily.  - Set and communicate daily mobility goal to care team and patient/family/caregiver.   - Collaborate with rehabilitation services on mobility goals if consulted  - Perform Range of Motion  times a day.  - Reposition patient every  hours.  - Dangle patient  times a day  - Stand patient times a day  - Ambulate patient  times a day  - Out of bed to chair  times a day   - Out of bed for meals  times a day  - Out of bed for toileting  - Record patient progress and toleration of activity level   Outcome: Progressing

## 2024-10-13 NOTE — ASSESSMENT & PLAN NOTE
Levothyroxine reduced to 25 mcg  TSH suppressed and free T4 elevated-suspect some iatrogenic hyperthyroidism  Given patient's symptoms and agitation possibly related

## 2024-10-13 NOTE — UTILIZATION REVIEW
Kori Spann, RN   Registered Nurse  Specialty: Utilization Review     Utilization Review     Addendum     Date of Service: 10/11/2024 11:50 AM     Expand All Collapse All    Initial Clinical Review     Admission: Date/Time/Statement:   Admission Orders (From admission, onward)          Ordered         10/10/24 1016   Inpatient Admission  Once                                     Orders Placed This Encounter   Procedures    Inpatient Admission       Standing Status:   Standing       Number of Occurrences:   1       Order Specific Question:   Level of Care       Answer:   Level 1 Stepdown [13]       Order Specific Question:   Estimated length of stay       Answer:   More than 2 Midnights       Order Specific Question:   Certification       Answer:   I certify that inpatient services are medically necessary for this patient for a duration of greater than two midnights. See H&P and MD Progress Notes for additional information about the patient's course of treatment.      Initial Presentation: 79 y.o. female presents as a transfer from [facility] to [address] post Rapid Response for tachypnea and lethargy, RR 36, pt was on BIPAP, hypercarbic resp acidosis.  Initial presentation was for insomnia and SOB.  PMH: severe COPD with chronic hypoxic respiratory failure on 2 L at home, HTN, hypothyroid, temporal arteritis, DM2, neck swelling, sialoadenitis.  Labs - low NA, elevated CO2, acidotic gasses, initial blood cultures negative. Pre-transfer imaging - CT chest - poss aspiration, trace pleural effusions, stable R breast lesion.  CT head - WNL.  On exam not in acute distress, normal breath sounds, L shoulder and neck tenderness, poor effort listing L arm w/o drift, 5/5  bilat.  Admitted to INPATIENT status to [address] with Acute on chronic hypoxemic/hypercapnic respiratory failure- in setting of benzodiazepine administration, Metabolic encephalopathy d/t benzo use, Polypharmacy, Temporal  "arteritis on chronic low-dose prednisone, HTN, Multiple complaints- dysuria (UA negative), back, shoulder pain - wean oxygen keep sats > 88%, continue nebs, Home Prednisone 2.5 mg daily,  hold Benzos, Geriatric consult.  Post admit: Pt was stabilized and is off BIPAP, hemodynamically stable and at Med Surg level of care.       Date: 10/11   Day 2:   Acute resp failure d/t benzo use, HTN, dysuria, shoulder pain - was seen by Ortho today, Psych and SLP - Worked with therapy today will likely need skilled post d/c rehab.  Does not need IP Psych.  Has OA and bicep tendonitis and poss rotator cuff pathology.  Will get OT eval.       10/11 Psych Consult - Major Depressive d/o, -Adjustment d/o with anxious distress vs Anxiety d/o due to another medical condition (pain) - Consider switching from Gabapentin to Pregabalin 25mg BID, can be increased to TID based on the patient's response and tolerability. Monitor for sedation/falls d/t increased potency.  Consider switching from Remeron to low dose Duloxetine 30mg daily and titrating up as OP.  Address shoulder pain, as it is a major contributor to poor sleep and increased anxiety.   Optimize her Thyroid and COPD regimen (Synthroid and xopenex) as these can further contribute to anxiety symptoms.  No indication for acute inpatient psychiatric hospitalization.  Avoid benzos.       10/11 SLP Eval - Pt presented w/ mild/mod oropharyngeal dysphagia. Pt currently on regular/thin liquid diet and stating she is having a new onset of dysphagia with regular solids and with anxiety with intake. Reported difficulty with taking medicine whole with liquids. Suspected aspiration per recent chest CT. Pt stating dumbness/tingling on left side of lips. Trials of soft solid, puree, and thin liquids initiated. Pt stating she feels as if she will \"choke\" with bread and required encouragement for breakdown of bolus.  dysphagia 2 diet, thin liquids, whole or crushed in puree for meds.      10/11 " Ortho Consult - Left Shoulder pain and weakness - exam  experiencing tenderness in the biceps tendon. Able to ROM the shoulder with abduction and FF to 90. Able to ROM the elbow and the wrist. Able to make a fist. Generally weak in the LUE.  Consistent with osteoarthritis and tendonitis (biceps) with potential rotator cuff pathology.  WBAT to the LUE.  Recommend OT evaluation.          Scheduled Medications:    Scheduled Medications ONLY (does not pull in infusions nor PRN medications order   acetaminophen, 650 mg, Oral, Q6H RACHEL  budesonide, 0.5 mg, Nebulization, BID  chlorhexidine, 15 mL, Mouth/Throat, Q12H RACHEL  vitamin B-12, 1,000 mcg, Oral, Daily  Diclofenac Sodium, 2 g, Topical, 4x Daily  enoxaparin, 30 mg, Subcutaneous, Daily  gabapentin, 100 mg, Oral, TID  ipratropium, 0.5 mg, Nebulization, BID  levalbuterol, 1.25 mg, Nebulization, BID  levothyroxine, 50 mcg, Oral, Early Morning  lidocaine, 1 patch, Topical, Daily  losartan, 50 mg, Oral, Daily  mirtazapine, 7.5 mg, Oral, HS  pantoprazole, 40 mg, Oral, Early Morning  predniSONE, 2.5 mg, Oral, Daily With Breakfast         Continuous IV Infusions:  Infusions Meds - Displays dose, route, & frequency only         PRN Meds:  albuterol, 2 puff, Inhalation, Q6H PRN  Artificial Tears, 1 drop, Both Eyes, Q4H PRN - x 1 10/10  ipratropium-albuterol, 3 mL, Nebulization, Q6H PRN  ondansetron, 4 mg, Intravenous, Q6H PRN  sodium chloride, 2 spray, Each Nare, Q1H PRN     Weight (last 2 days)         Date/Time Weight     10/11/24 0530 45.9 (101.19)     10/10/24 1017 45.6 (100.53)                Vital Signs (last 3 days)         Date/Time Temp Pulse Resp BP MAP (mmHg) SpO2 Calculated FIO2 (%) - Nasal Cannula O2 Flow Rate (L/min) Nasal Cannula O2 Flow Rate (L/min) O2 Device Patient Position - Orthostatic VS Annette Coma Scale Score Pain     10/11/24 1100 -- -- -- -- -- -- -- -- -- -- -- -- 7     10/11/24 1057 -- -- -- -- -- -- -- -- -- -- -- -- 7     10/11/24 1028 -- -- -- -- --  -- -- -- -- -- -- -- 10 - Worst Possible Pain     10/11/24 0930 -- -- -- -- -- -- -- -- -- -- -- 15 8     10/11/24 0853 -- -- -- -- -- 98 % -- -- -- -- -- -- --     10/11/24 0824 -- -- -- -- -- 94 % 36 -- 4 L/min Nasal cannula -- -- --     10/11/24 07:21:27 98.3 °F (36.8 °C) 111 17 124/60 81 96 % -- -- -- -- -- -- --     10/11/24 0550 -- -- -- -- -- -- -- -- -- -- -- -- 8     10/11/24 0059 -- -- -- -- -- -- -- -- -- -- -- -- 10 - Worst Possible Pain     10/10/24 2346 -- -- -- -- -- -- -- -- -- -- -- -- 10 - Worst Possible Pain     10/10/24 21:04:28 99 °F (37.2 °C) 110 20 152/76 101 91 % 32 -- 3 L/min Nasal cannula -- -- --     10/10/24 2030 -- -- -- -- -- -- -- -- -- -- -- 15 No Pain     10/10/24 2013 -- -- -- -- -- 90 % 28 -- 2 L/min Nasal cannula -- -- --     10/10/24 20:03:57 98.4 °F (36.9 °C) 113 18 131/65 87 89 % 28 -- 2 L/min -- Lying -- --     10/10/24 1900 -- 114 40 134/67 94 93 % -- -- -- -- -- -- --     10/10/24 1730 -- -- -- -- -- -- -- -- -- -- -- -- 2     10/10/24 1700 98.8 °F (37.1 °C) 100 26 128/62 88 98 % -- -- -- -- -- -- --     10/10/24 1500 -- 114 43 156/71 102 89 % -- 4 L/min -- Nasal cannula -- -- --     10/10/24 1400 -- 106 32 138/64 92 94 % -- -- -- -- -- -- --     10/10/24 1315 -- 106 34 143/67 97 97 % -- -- -- -- -- -- 3     10/10/24 1200 -- 106 36 140/63 90 93 % -- -- -- -- -- -- --     10/10/24 1100 -- 109 38 160/91 130 95 % -- -- -- -- -- 15 --     10/10/24 1017 98.7 °F (37.1 °C) 108 23 100/56 73 94 % -- 2 L/min -- Nasal cannula Lying -- 10 - Worst Possible Pain                Pertinent Labs/Diagnostic Test Results:   Radiology:  No orders to display      Cardiology:  No orders to display      GI:  No orders to display                      Results from last 7 days   Lab Units 10/11/24  0400 10/10/24  1102 10/10/24  0556 10/10/24  0137 10/09/24  0640   WBC Thousand/uL 8.81 7.20 7.18 6.96 6.41   HEMOGLOBIN g/dL 10.7* 11.8 10.8* 11.8 12.9   HEMATOCRIT % 35.3 39.7 34.1* 39.6 41.1   PLATELETS  Thousands/uL 180 159 159 181 192   TOTAL NEUT ABS Thousands/µL  --  5.71 5.72 5.60 4.73                   Results from last 7 days   Lab Units 10/11/24  0400 10/10/24  1102 10/10/24  0556 10/10/24  0137 10/09/24  0640   SODIUM mmol/L 134* 134* 132* 132* 132*   POTASSIUM mmol/L 5.0 4.3 4.3 4.7 4.6   CHLORIDE mmol/L 95* 95* 94* 93* 88*   CO2 mmol/L 35* 34* 33* 36* 39*   ANION GAP mmol/L 4 5 5 3* 5   BUN mg/dL 13 15 15 16 19   CREATININE mg/dL 0.40* 0.45* 0.47* 0.46* 0.49*   EGFR ml/min/1.73sq m 99 95 94 94 92   CALCIUM mg/dL 8.5 8.5 8.5 9.0 9.6   CALCIUM, IONIZED mmol/L  --  1.14  --   --   --    MAGNESIUM mg/dL  --  1.9  --   --   --    PHOSPHORUS mg/dL  --  2.7  --   --   --              Results from last 7 days   Lab Units 10/10/24  1102 10/10/24  0137 10/09/24  0640   AST U/L 21 21 24   ALT U/L 16 16 19   ALK PHOS U/L 38 40 44   TOTAL PROTEIN g/dL 5.7* 5.9* 6.9   ALBUMIN g/dL 3.7 3.7 4.4   TOTAL BILIRUBIN mg/dL 0.37 0.25 0.46   BILIRUBIN DIRECT mg/dL 0.08  --   --    AMMONIA umol/L  --  21 11*            Results from last 7 days   Lab Units 10/10/24  0104 10/09/24  0625   POC GLUCOSE mg/dl 80 103               Results from last 7 days   Lab Units 10/11/24  0400 10/10/24  1102 10/10/24  0556 10/10/24  0137 10/09/24  0640   GLUCOSE RANDOM mg/dL 82 86 87 94 114           Results from last 7 days   Lab Units 10/10/24  0021   PH ART   7.178*   PCO2 ART mm Hg 83.3*   PO2 ART mm Hg 128.0   HCO3 ART mmol/L 30.3*   BASE EXC ART mmol/L 0.0   O2 CONTENT ART mL/dL 16.5   O2 HGB, ARTERIAL % 97.6*   ABG SOURCE   Radial, Right             Results from last 7 days   Lab Units 10/10/24  1102 10/10/24  0417 10/10/24  0137   PH YINKA   7.263* 7.462* 7.307   PCO2 YINKA mm Hg 76.8* 38.1* 67.7*   PO2 YNIKA mm Hg 22.3* 200.8* 34.9*   HCO3 YINKA mmol/L 33.9* 26.6 33.1*   BASE EXC YINKA mmol/L 4.8 2.8 4.7   O2 CONTENT YINKA ml/dL 6.9 16.6 13.5   O2 HGB, VENOUS % 41.4* 91.0* 72.2                     Results from last 7 days   Lab Units 10/10/24  6580  10/10/24  0344 10/10/24  0137   HS TNI 0HR ng/L  --   --  7   HS TNI 2HR ng/L  --  8  --    HSTNI D2 ng/L  --  1  --    HS TNI 4HR ng/L 8  --   --    HSTNI D4 ng/L 1  --   --                     Results from last 7 days   Lab Units 10/10/24  1102 10/09/24  0640   TSH 3RD GENERATON uIU/mL 0.342* 0.848           Results from last 7 days   Lab Units 10/10/24  0137   PROCALCITONIN ng/ml 0.20           Results from last 7 days   Lab Units 10/10/24  0137   LACTIC ACID mmol/L 0.6           Results from last 7 days   Lab Units 10/10/24  0137   BNP pg/mL 66           Results from last 7 days   Lab Units 10/09/24  0809   CLARITY UA   Clear   COLOR UA   Yellow   SPEC GRAV UA   1.020   PH UA   6.0   GLUCOSE UA mg/dl Negative   KETONES UA mg/dl 15 (1+)*   BLOOD UA   25.0*   PROTEIN UA mg/dl 30 (1+)*   NITRITE UA   Negative   BILIRUBIN UA   Negative   UROBILINOGEN UA mg/dL Negative   LEUKOCYTES UA   25.0*   WBC UA /hpf 1-2   RBC UA /hpf 4-10*   BACTERIA UA /hpf Occasional   EPITHELIAL CELLS WET PREP /hpf Occasional   MUCUS THREADS   Occasional*            Results from last 7 days   Lab Units 10/10/24  0137   BLOOD CULTURE   No Growth at 24 hrs.  No Growth at 24 hrs.          Medical History        Past Medical History:   Diagnosis Date    Anxiety 08/18/2024    Asthma      COPD (chronic obstructive pulmonary disease) (Formerly [facility])      Disease of thyroid gland      GERD (gastroesophageal reflux disease)      Hypertension 10/11/2018    Hypothyroid      Normocytic anemia 08/28/2024    Osteoarthritis 09/26/2012    Temporal arteritis ([facility]ly [facility])      Type 2 diabetes mellitus with complication, without long-term current use of insulin (Formerly [facility]) 01/14/2020         Present on Admission:   Acute exacerbation of chronic obstructive pulmonary disease (COPD) (Formerly [facility])   Hypertension   Gastroesophageal reflux disease without esophagitis   Temporal arteritis ([facility]ly [facility])   Anxiety/depression        Admitting Diagnosis: COPD (chronic obstructive pulmonary disease) (Formerly [facility])  [J44.9]  Age/Sex: 79 y.o. female     Network Utilization Review Department  ATTENTION: Please call with any questions or concerns to [phone] and carefully listen to the prompts so that you are directed to the right person. All voicemails are confidential.   For Discharge needs, contact Care Management DC Support Team at [phone] opt. [phone]  Send all requests for admission clinical reviews, approved or denied determinations and any other requests to dedicated fax number below belonging to the campus where the patient is receiving treatment. List of dedicated fax numbers for the Facilities:  FACILITY NAME UR FAX NUMBER   ADMISSION DENIALS (Administrative/Medical Necessity) [phone]   DISCHARGE SUPPORT TEAM (NETWORK) [phone]   PARENT CHILD HEALTH (Maternity/NICU/Pediatrics) [phone]   [facility] [phone]   [facility] [phone]   [facility] [phone]   [facility] [phone]   [facility] [phone]   [facility] [phone]   [facility] [phone]   [facility] [phone]   [facility] [phone]   [facility] [phone]   [facility] [phone]   [facility] [phone]                  Revision History

## 2024-10-13 NOTE — ASSESSMENT & PLAN NOTE
Evaluated by psychiatry  Recently discontinued on alprazolam  Continue BuSpar 5 mg twice daily  Will change to Lyrica 25 mg twice a day, discontinue gabapentin  Discontinue Remeron and start Cymbalta 30 mg  Anxiety largely driving patient's symptoms

## 2024-10-13 NOTE — PLAN OF CARE
Problem: PAIN - ADULT  Goal: Verbalizes/displays adequate comfort level or baseline comfort level  Description: Interventions:  - Encourage patient to monitor pain and request assistance  - Assess pain using appropriate pain scale  - Administer analgesics based on type and severity of pain and evaluate response  - Implement non-pharmacological measures as appropriate and evaluate response  - Consider cultural and social influences on pain and pain management  - Notify physician/advanced practitioner if interventions unsuccessful or patient reports new pain  Outcome: Progressing     Problem: INFECTION - ADULT  Goal: Absence or prevention of progression during hospitalization  Description: INTERVENTIONS:  - Assess and monitor for signs and symptoms of infection  - Monitor lab/diagnostic results  - Monitor all insertion sites, i.e. indwelling lines, tubes, and drains  - Monitor endotracheal if appropriate and nasal secretions for changes in amount and color  - [place] appropriate cooling/warming therapies per order  - Administer medications as ordered  - Instruct and encourage patient and family to use good hand hygiene technique  - Identify and instruct in appropriate isolation precautions for identified infection/condition  Outcome: Progressing  Goal: Absence of fever/infection during neutropenic period  Description: INTERVENTIONS:  - Monitor WBC    Outcome: Progressing     Problem: SAFETY ADULT  Goal: Patient will remain free of falls  Description: INTERVENTIONS:  - Educate patient/family on patient safety including physical limitations  - Instruct patient to call for assistance with activity   - Consult OT/PT to assist with strengthening/mobility   - Keep Call bell within reach  - Keep bed low and locked with side rails adjusted as appropriate  - Keep care items and personal belongings within reach  - Initiate and maintain comfort rounds  - Make Fall Risk Sign visible to staff  - Offer Toileting every 2 Hours,  in advance of need  - Initiate/Maintain bed alarm  - Obtain necessary fall risk management equipment:   - Apply yellow socks and bracelet for high fall risk patients  - Consider moving patient to room near nurses station  Outcome: Progressing  Goal: Maintain or return to baseline ADL function  Description: INTERVENTIONS:  -  Assess patient's ability to carry out ADLs; assess patient's baseline for ADL function and identify physical deficits which impact ability to perform ADLs (bathing, care of mouth/teeth, toileting, grooming, dressing, etc.)  - Assess/evaluate cause of self-care deficits   - Assess range of motion  - Assess patient's mobility; develop plan if impaired  - Assess patient's need for assistive devices and provide as appropriate  - Encourage maximum independence but intervene and supervise when necessary  - Involve family in performance of ADLs  - Assess for home care needs following discharge   - Consider OT consult to assist with ADL evaluation and planning for discharge  - Provide patient education as appropriate  Outcome: Progressing  Goal: Maintains/Returns to pre admission functional level  Description: INTERVENTIONS:  - Perform AM-PAC 6 Click Basic Mobility/ Daily Activity assessment daily.  - Set and communicate daily mobility goal to care team and patient/family/caregiver.   - Collaborate with rehabilitation services on mobility goals if consulted  - Perform Range of Motion 4 times a day.  - Reposition patient every 2 hours.  - Dangle patient 3 times a day  - Stand patient 3 times a day  - Ambulate patient 3 times a day  - Out of bed to chair 3 times a day   - Out of bed for meals 3 times a day  - Out of bed for toileting  - Record patient progress and toleration of activity level   Outcome: Progressing     Problem: DISCHARGE PLANNING  Goal: Discharge to home or other facility with appropriate resources  Description: INTERVENTIONS:  - Identify barriers to discharge w/patient and caregiver  -  Arrange for needed discharge resources and transportation as appropriate  - Identify discharge learning needs (meds, wound care, etc.)  - Arrange for interpretive services to assist at discharge as needed  - Refer to Case Management Department for coordinating discharge planning if the patient needs post-hospital services based on physician/advanced practitioner order or complex needs related to functional status, cognitive ability, or social support system  Outcome: Progressing     Problem: Knowledge Deficit  Goal: Patient/family/caregiver demonstrates understanding of disease process, treatment plan, medications, and discharge instructions  Description: Complete learning assessment and assess knowledge base.  Interventions:  - Provide teaching at level of understanding  - Provide teaching via preferred learning methods  Outcome: Progressing     Problem: RESPIRATORY - ADULT  Goal: Achieves optimal ventilation and oxygenation  Description: INTERVENTIONS:  - Assess for changes in respiratory status  - Assess for changes in mentation and behavior  - Position to facilitate oxygenation and minimize respiratory effort  - Oxygen administered by appropriate delivery if ordered  - Initiate smoking cessation education as indicated  - Encourage broncho-pulmonary hygiene including cough, deep breathe, Incentive Spirometry  - Assess the need for suctioning and aspirate as needed  - Assess and instruct to report SOB or any respiratory difficulty  - Respiratory Therapy support as indicated  Outcome: Progressing     Problem: Nutrition/Hydration-ADULT  Goal: Nutrient/Hydration intake appropriate for improving, restoring or maintaining nutritional needs  Description: Monitor and assess patient's nutrition/hydration status for malnutrition. Collaborate with interdisciplinary team and initiate plan and interventions as ordered.  Monitor patient's weight and dietary intake as ordered or per policy. Utilize nutrition screening tool and  intervene as necessary. Determine patient's food preferences and provide high-protein, high-caloric foods as appropriate.     INTERVENTIONS:  - Monitor oral intake, urinary output, labs, and treatment plans  - Assess nutrition and hydration status and recommend course of action  - Evaluate amount of meals eaten  - Assist patient with eating if necessary   - Allow adequate time for meals  - Recommend/ encourage appropriate diets, oral nutritional supplements, and vitamin/mineral supplements  - Order, calculate, and assess calorie counts as needed  - Recommend, monitor, and adjust tube feedings and TPN/PPN based on assessed needs  - Assess need for intravenous fluids  - Provide specific nutrition/hydration education as appropriate  - Include patient/family/caregiver in decisions related to nutrition  Outcome: Progressing     Problem: Prexisting or High Potential for Compromised Skin Integrity  Goal: Skin integrity is maintained or improved  Description: INTERVENTIONS:  - Identify patients at risk for skin breakdown  - Assess and monitor skin integrity  - Assess and monitor nutrition and hydration status  - Monitor labs   - Assess for incontinence   - Turn and reposition patient  - Assist with mobility/ambulation  - Relieve pressure over bony prominences  - Avoid friction and shearing  - Provide appropriate hygiene as needed including keeping skin clean and dry  - Evaluate need for skin moisturizer/barrier cream  - Collaborate with interdisciplinary team   - Patient/family teaching  - Consider wound care consult   Outcome: Progressing     Problem: SKIN/TISSUE INTEGRITY - ADULT  Goal: Skin Integrity remains intact(Skin Breakdown Prevention)  Description: Assess:  -Perform Lonnie assessment every shift  -Clean and moisturize skin every day  -Inspect skin when repositioning, toileting, and assisting with ADLS  -Assess under medical devices such as masimo every shift  -Assess extremities for adequate circulation and  sensation     Bed Management:  -Have minimal linens on bed & keep smooth, unwrinkled  -Change linens as needed when moist or perspiring  -Avoid sitting or lying in one position for more than 2 hours while in bed  -Keep HOB at 30 degrees     Toileting:  -Offer bedside commode  -Assess for incontinence every 2 hours  -Use incontinent care products after each incontinent episode such as hydroguard    Activity:  -Mobilize patient 4 times a day  -Encourage activity and walks on unit  -Encourage or provide ROM exercises   -Turn and reposition patient every 2 Hours  -Use appropriate equipment to lift or move patient in bed  -Instruct/ Assist with weight shifting every 2 hours when out of bed in chair  -Consider limitation of chair time 2 hour intervals    Skin Care:  -Avoid use of baby powder, tape, friction and shearing, hot water or constrictive clothing  -Relieve pressure over bony prominences using allevyn  -Do not massage red bony areas    Next Steps:  -Teach patient strategies to minimize risks such as    -Consider consults to  interdisciplinary teams such as   Outcome: Progressing     Problem: MUSCULOSKELETAL - ADULT  Goal: Maintain or return mobility to safest level of function  Description: INTERVENTIONS:  - Assess patient's ability to carry out ADLs; assess patient's baseline for ADL function and identify physical deficits which impact ability to perform ADLs (bathing, care of mouth/teeth, toileting, grooming, dressing, etc.)  - Assess/evaluate cause of self-care deficits   - Assess range of motion  - Assess patient's mobility  - Assess patient's need for assistive devices and provide as appropriate  - Encourage maximum independence but intervene and supervise when necessary  - Involve family in performance of ADLs  - Assess for home care needs following discharge   - Consider OT consult to assist with ADL evaluation and planning for discharge  - Provide patient education as appropriate  Outcome:  Progressing  Goal: Maintain proper alignment of affected body part  Description: INTERVENTIONS:  - Support, maintain and protect limb and body alignment  - Provide patient/ family with appropriate education  Outcome: Progressing

## 2024-10-13 NOTE — PROGRESS NOTES
Progress Note - Hospitalist   Name: Sarah Zayas 79 y.o. female I MRN: [id number]  Unit/Bed#: [place] [address] -01 I Date of Admission: 10/10/2024   Date of Service: 10/13/2024 I Hospital Day: 3    Assessment & Plan  Acute respiratory failure with hypercapnia (HCC)  Back to baseline oxygen  Acute exacerbation of chronic obstructive pulmonary disease (COPD) (HCC)  History of severe COPD  Initial concern for exacerbation but likely oversedation from benzodiazepine  Patient placed initially on BiPAP sepsis workup has been unremarkable  Patient will continue Atrovent and Xopenex  Continue pulmonary toilet  Improved  Gastroesophageal reflux disease without esophagitis  Continue PPI therapy  Hypertension  Resume losartan, propranolol    Temporal arteritis (HCC)  Remains on 2.5 mg of prednisone  Continue same dose for now    Anxiety/depression  Evaluated by psychiatry  Recently discontinued on alprazolam  Continue BuSpar 5 mg twice daily  Will change to Lyrica 25 mg twice a day, discontinue gabapentin  Discontinue Remeron and start Cymbalta 30 mg  Anxiety largely driving patient's symptoms      Neck pain/shoulder pain  Evidence of bicep tendinitis  Add Voltaren gel and Bengay  PT OT recommending rehab      Hypothyroidism  Levothyroxine reduced to 25 mcg  TSH suppressed and free T4 elevated-suspect some iatrogenic hyperthyroidism  Given patient's symptoms and agitation possibly related          Hospital Course:     79-year-old female patient presenting with ambulatory dysfunction and generalized weakness to the [facility].  She was subsequently transferred after acute hypoxemic respiratory failure after aspiration event.    Patient now pending rehab.    Assessment:      Principal Problem:    Anxiety/depression  Active Problems:    Acute respiratory failure with hypercapnia (HCC)    Acute exacerbation of chronic obstructive pulmonary disease (COPD) (HCC)    Gastroesophageal reflux disease without esophagitis     Hypertension    Temporal arteritis (HCC)    Neck pain/shoulder pain    Hypothyroidism      Plan:    Supportive care by nursing  Continue pain control of right arm  Appreciate geriatric recommendations  Reduce levothyroxine to 25 mcg given possible iatrogenic hyperthyroidism       VTE Pharmacologic Prophylaxis:   Pharmacologic: Enoxaparin (Lovenox)  Mechanical VTE Prophylaxis in Place: Yes    AM-PAC Basic Mobility:  Basic Mobility Inpatient Raw Score: 17    JH-HLM Achieved: 6: Walk 10 steps or more  JH-HLM Goal: 5: Stand one or more mins    HLM Goal listed above. Continue with multidisciplinary rounding and encourage appropriate mobility to improve upon HLM goals.         Patient Centered Rounds: Case discussed and reviewed with nursing    Discussions with Specialists or Other Care Team Provider: Case management    Education and Discussions with Family / Patient: Discussed with patient, patient family aware    Time Spent for Care: 80 minutes.  More than 50% of total time spent on counseling and coordination of care as described above.    Current Length of Stay: 3 day(s)    Current Patient Status: Inpatient   Certification Statement: The patient will continue to require additional inpatient hospital stay due to rehab placement    Discharge Plan / Estimated Discharge Date: Hopefully tomorrow    Code Status: Level 1 - Full Code      Subjective:   In and examined, patient with significant anxiety.  Has impending sense of doom.  Today reported to nursing that she could not see.  Head CT without any evidence of any acute pathology.  Patient denies any nausea or vomiting,    A complete and comprehensive 14 point organ system review has been performed and all other systems are negative other than stated above.    Objective:     Vitals:   Temp (24hrs), Av °F (36.7 °C), Min:97.8 °F (36.6 °C), Max:98.1 °F (36.7 °C)    Temp:  [97.8 °F (36.6 °C)-98.1 °F (36.7 °C)] 98 °F (36.7 °C)  HR:  [] 117  Resp:  [17-21] 17  BP:  ()/(55-84) 120/84  SpO2:  [97 %-100 %] 97 %  Body mass index is 19.27 kg/m².     Input and Output Summary (last 24 hours):       Intake/Output Summary (Last 24 hours) at 10/13/2024 1800  Last data filed at 10/13/2024 0933  Gross per 24 hour   Intake 200 ml   Output 200 ml   Net 0 ml       Physical Exam:     General:ill appearing, no acute distress  HEENT: atraumatic, PERRLA, moist mucosa, normal pharynx, normal tonsils and adenoids, normal tongue, no fluid in sinuses  Neck: Trachea midline, no carotid bruit, no masses  Respiratory: normal chest wall expansion, CTA B, no r/r/w, no rubs  Cardiovascular: RRR, no m/r/g, Normal S1 and S2  Abdomen: Soft, non-tender, non-distended, normal bowel sounds in all quadrants, no hepatosplenomegaly, no tympany  Rectal: deferred  Musculoskeletal: Moves all   integumentary: warm, dry, and pink, with no rash, purpura, or petechia  Heme/Lymph: no lymphadenopathy, no bruises  Neurological: Cranial Nerves II-XII grossly intact  Psychiatric: cooperative with normal mood, affect, and cognition      Additional Data:     Labs:    Results from last 7 days   Lab Units 10/13/24  0456 10/11/24  0400 10/10/24  1102   WBC Thousand/uL 6.19   < > 7.20   HEMOGLOBIN g/dL 12.5   < > 11.8   HEMATOCRIT % 42.7   < > 39.7   PLATELETS Thousands/uL 197   < > 159   SEGS PCT %  --   --  80*   LYMPHO PCT %  --   --  6*   MONO PCT %  --   --  11   EOS PCT %  --   --  3    < > = values in this interval not displayed.     Results from last 7 days   Lab Units 10/13/24  0456 10/11/24  0400 10/10/24  1102   POTASSIUM mmol/L 5.3   < > 4.3   CHLORIDE mmol/L 91*   < > 95*   CO2 mmol/L 45*   < > 34*   BUN mg/dL 13   < > 15   CREATININE mg/dL 0.46*   < > 0.45*   CALCIUM mg/dL 8.8   < > 8.5   ALK PHOS U/L  --   --  38   ALT U/L  --   --  16   AST U/L  --   --  21    < > = values in this interval not displayed.           * I Have Reviewed All Lab Data Listed Above.  * Additional Pertinent Lab Tests Reviewed: All Labs  For Current Hospital Admission Reviewed    Imaging:    Imaging Reports Reviewed Today Include: No new imaging  Imaging Personally Reviewed by Myself Includes: No new imaging    Recent Cultures (last 7 days):     Results from last 7 days   Lab Units 10/10/24  0137   BLOOD CULTURE  No Growth at 72 hrs.  No Growth at 72 hrs.       Last 24 Hours Medication List:   Current Facility-Administered Medications   Medication Dose Route Frequency Provider Last Rate    acetaminophen  975 mg Oral Q8H [facility] TITO Kwong      albuterol  2 puff Inhalation Q6H PRN Luz Mukherjee MD      Artificial Tears  1 drop Both Eyes Q4H PRN Luz Mukherjee MD      budesonide  0.5 mg Nebulization BID Luz Mukherjee MD      chlorhexidine  15 mL Mouth/Throat Q12H [facility] Luz Mukherjee MD      vitamin B-12  1,000 mcg Oral Daily Luz Mukherjee MD      Diclofenac Sodium  2 g Topical 4x Daily Saad Mckeon DO      DULoxetine  30 mg Oral HS Saad Mckeon DO      enoxaparin  30 mg Subcutaneous Daily Luz Mukherjee MD      ipratropium  0.5 mg Nebulization BID Luz Mukherjee MD      ipratropium-albuterol  3 mL Nebulization Q6H PRN Luz Mukherjee MD      levalbuterol  1.25 mg Nebulization BID Luz Mukherjee MD      [START ON 10/14/2024] levothyroxine  25 mcg Oral Early Morning Saad Mckeon DO      lidocaine  1 patch Topical Daily Luz Mukherjee MD      losartan  50 mg Oral Daily Luz Mukherjee MD      melatonin  3 mg Oral HS PRN TITO Kwong      menthol-methyl salicylate   Apply externally TID Saad Mckeon DO      ondansetron  4 mg Intravenous Q6H PRN Luz Mukherjee MD      pantoprazole  40 mg Oral Early Morning Luz Mukherjee MD      predniSONE  2.5 mg Oral Daily With Breakfast Luz Mukherjee MD      pregabalin  25 mg Oral BID Saad Mckeon DO      propranolol  10 mg Oral Q12H [facility] Saad Mckeon DO      sodium chloride  2 spray Each Nare Q1H PRN Luz Mukherjee MD         AM-PAC Basic Mobility:  Basic Mobility Inpatient Raw Score: 17    JH-HLM Achieved: 6: Walk 10  steps or more  JH-HLM Goal: 5: Stand one or more mins    HLM Goal listed above. Continue with multidisciplinary rounding and encourage appropriate mobility to improve upon HLM goals.     Today, Patient Was Seen By: Saad Mckeon DO    ** Please Note: This note was completed in part utilizing [employer] One [employer] software dictation.  Grammatical errors, random word insertions, spelling mistakes, and incomplete sentences may be an occasional consequence of this system secondary to software limitations, ambient noise, and hardware issues.  If you have any questions or concerns about the content, text, or information contained within the body of this dictation, please contact the provider for clarification. **

## 2024-10-13 NOTE — ASSESSMENT & PLAN NOTE
History of severe COPD  Initial concern for exacerbation but likely oversedation from benzodiazepine  Patient placed initially on BiPAP sepsis workup has been unremarkable  Patient will continue Atrovent and Xopenex  Continue pulmonary toilet  Improved

## 2024-10-13 NOTE — UTILIZATION REVIEW
Date: 10/12  Day 3: Has surpassed a 2nd midnight with active treatments and services.   See Initial Review completed 10/11     by NORIS Spann RN                     Current Patient Class: Inpatient  Current Level of Care: med surg     HPI:79 y.o. female initially admitted on  10/10    Assessment/Plan:  CBC , BMP 10/12 stable,  Pt required Zofran 4 mg IV x 1 10/12 for nausea. sepsis workup unremarkable Improving respiratory status , continue PPI, resume losartan, continue Inderal for tachycardia,  continue 2.5 mg prednisone for temporal arteritis,  Continue gabapentin, buspar Evidence of biceps tendinitis. Add Voltaren gel. PT/OT recommending rehab Plan continue pain control, supportive care medically stable for DC to rehab once bed can be found.      Last data filed at 10/12/2024 0601      Gross per 24 hour   Intake 480 ml   Output 400 ml   Net 80 ml         Medications:   Scheduled Medications:  acetaminophen, 975 mg, Oral, Q8H RACHEL  budesonide, 0.5 mg, Nebulization, BID  chlorhexidine, 15 mL, Mouth/Throat, Q12H RACHEL  vitamin B-12, 1,000 mcg, Oral, Daily  Diclofenac Sodium, 2 g, Topical, 4x Daily  enoxaparin, 30 mg, Subcutaneous, Daily  gabapentin, 100 mg, Oral, TID  ipratropium, 0.5 mg, Nebulization, BID  levalbuterol, 1.25 mg, Nebulization, BID  levothyroxine, 50 mcg, Oral, Early Morning  lidocaine, 1 patch, Topical, Daily  losartan, 50 mg, Oral, Daily  mirtazapine, 7.5 mg, Oral, HS  pantoprazole, 40 mg, Oral, Early Morning  predniSONE, 2.5 mg, Oral, Daily With Breakfast  propranolol, 10 mg, Oral, Q12H RACHEL      Continuous IV Infusions:     PRN Meds:  albuterol, 2 puff, Inhalation, Q6H PRN  Artificial Tears, 1 drop, Both Eyes, Q4H PRN  ipratropium-albuterol, 3 mL, Nebulization, Q6H PRN  melatonin, 3 mg, Oral, HS PRN  ondansetron, 4 mg, Intravenous, Q6H PRN  x 1 10/12   sodium chloride, 2 spray, Each Nare, Q1H PRN      Discharge Plan: TBD     Vital Signs (last 3 days)       Date/Time Temp Pulse Resp BP MAP (mmHg) SpO2  Calculated FIO2 (%) - Nasal Cannula O2 Flow Rate (L/min) Nasal Cannula O2 Flow Rate (L/min) O2 Device Patient Position - Orthostatic VS Annette Coma Scale Score Pain    10/13/24 07:28:16 97.8 °F (36.6 °C) 90 18 96/55 69 100 % -- -- -- -- Lying -- --    10/13/24 0721 -- -- -- -- -- -- -- -- -- -- -- 15 10 - Worst Possible Pain    10/12/24 21:42:57 98.1 °F (36.7 °C) 97 -- 123/66 85 100 % -- -- -- -- -- -- --    10/12/24 21:42:01 98.1 °F (36.7 °C) -- 21 123/66 85 -- -- -- -- -- -- -- --    10/12/24 2000 -- -- -- -- -- 100 % -- 4 L/min -- Nasal cannula -- 15 No Pain    10/12/24 17:01:36 -- 93 -- 130/68 89 99 % -- -- -- -- -- -- --    10/12/24 1309 -- -- -- -- -- -- -- -- -- -- -- -- 7    10/12/24 0753 -- -- -- -- -- -- -- -- -- -- -- 15 --    10/12/24 07:45:27 98.4 °F (36.9 °C) 97 16 113/55 74 96 % -- -- -- -- -- -- --    10/12/24 0726 -- -- -- -- -- 98 % 32 -- 3 L/min Nasal cannula -- -- --    10/12/24 06:30:11 99.2 °F (37.3 °C) 98 -- -- -- 100 % -- -- -- -- -- -- --    10/11/24 22:51:34 97.1 °F (36.2 °C) -- 22 164/74 104 -- -- -- -- -- -- -- --    10/11/24 2000 -- -- -- -- -- -- 32 -- 3 L/min Nasal cannula -- 15 5    10/11/24 1952 -- -- -- -- -- 98 % 34 -- 3.5 L/min Nasal cannula -- -- --    10/11/24 1725 -- 60 -- -- -- -- -- -- -- -- -- -- --    10/11/24 15:38:54 98.2 °F (36.8 °C) 122 18 119/60 80 98 % -- -- -- -- -- -- --    10/11/24 1100 -- -- -- -- -- -- -- -- -- -- -- -- 7    10/11/24 1057 -- -- -- -- -- -- -- -- -- -- -- -- 7    10/11/24 1028 -- -- -- -- -- -- -- -- -- -- -- -- 10 - Worst Possible Pain    10/11/24 0930 -- -- -- -- -- -- -- -- -- -- -- 15 8    10/11/24 0853 -- -- -- -- -- 98 % -- -- -- -- -- -- --    10/11/24 0824 -- -- -- -- -- 94 % 36 -- 4 L/min Nasal cannula -- -- --    10/11/24 07:21:27 98.3 °F (36.8 °C) 111 17 124/60 81 96 % -- -- -- -- -- -- --    10/11/24 0550 -- -- -- -- -- -- -- -- -- -- -- -- 8    10/11/24 0059 -- -- -- -- -- -- -- -- -- -- -- -- 10 - Worst Possible Pain    10/10/24  2346 -- -- -- -- -- -- -- -- -- -- -- -- 10 - Worst Possible Pain    10/10/24 21:04:28 99 °F (37.2 °C) 110 20 152/76 101 91 % 32 -- 3 L/min Nasal cannula -- -- --    10/10/24 2030 -- -- -- -- -- -- -- -- -- -- -- 15 No Pain    10/10/24 2013 -- -- -- -- -- 90 % 28 -- 2 L/min Nasal cannula -- -- --    10/10/24 20:03:57 98.4 °F (36.9 °C) 113 18 131/65 87 89 % 28 -- 2 L/min -- Lying -- --    10/10/24 1900 -- 114 40 134/67 94 93 % -- -- -- -- -- -- --    10/10/24 1730 -- -- -- -- -- -- -- -- -- -- -- -- 2    10/10/24 1700 98.8 °F (37.1 °C) 100 26 128/62 88 98 % -- -- -- -- -- -- --    10/10/24 1500 -- 114 43 156/71 102 89 % -- 4 L/min -- Nasal cannula -- -- --    10/10/24 1400 -- 106 32 138/64 92 94 % -- -- -- -- -- -- --    10/10/24 1315 -- 106 34 143/67 97 97 % -- -- -- -- -- -- 3    10/10/24 1200 -- 106 36 140/63 90 93 % -- -- -- -- -- -- --    10/10/24 1100 -- 109 38 160/91 130 95 % -- -- -- -- -- 15 --    10/10/24 1017 98.7 °F (37.1 °C) 108 23 100/56 73 94 % -- 2 L/min -- Nasal cannula Lying -- 10 - Worst Possible Pain          Weight (last 2 days)       Date/Time Weight    10/13/24 0600 47.8 (105.38)    10/12/24 0600 45.7 (100.75)    10/11/24 0530 45.9 (101.19)            Pertinent Labs/Diagnostic Results:   Radiology:  CT head wo contrast   Final Interpretation by Byron Hess MD (10/13 1112)      No acute intracranial abnormality.  Stable chronic microangiopathic changes within the brain.                  Workstation performed: [id number]           Cardiology:  No orders to display     GI:  No orders to display           Results from last 7 days   Lab Units 10/13/24  0456 10/12/24  0633 10/11/24  0400 10/10/24  1102 10/10/24  0556 10/10/24  0137   WBC Thousand/uL 6.19 6.19 8.81 7.20 7.18 6.96   HEMOGLOBIN g/dL 12.5 11.1* 10.7* 11.8 10.8* 11.8   HEMATOCRIT % 42.7 36.5 35.3 39.7 34.1* 39.6   PLATELETS Thousands/uL 197 161 180 159 159 181   TOTAL NEUT ABS Thousands/µL  --   --   --  5.71 5.72 5.60         Results  "from last 7 days   Lab Units 10/13/24  0456 10/12/24  0633 10/11/24  0400 10/10/24  1102 10/10/24  0556   SODIUM mmol/L 138 137 134* 134* 132*   POTASSIUM mmol/L 5.3 4.5 5.0 4.3 4.3   CHLORIDE mmol/L 91* 94* 95* 95* 94*   CO2 mmol/L 45* 41* 35* 34* 33*   ANION GAP mmol/L 2* 2* 4 5 5   BUN mg/dL 13 12 13 15 15   CREATININE mg/dL 0.46* 0.38* 0.40* 0.45* 0.47*   EGFR ml/min/1.73sq m 94 101 99 95 94   CALCIUM mg/dL 8.8 9.1 8.5 8.5 8.5   CALCIUM, IONIZED mmol/L  --   --   --  1.14  --    MAGNESIUM mg/dL  --   --   --  1.9  --    PHOSPHORUS mg/dL  --   --   --  2.7  --      Results from last 7 days   Lab Units 10/10/24  1102 10/10/24  0137 10/09/24  0640   AST U/L 21 21 24   ALT U/L 16 16 19   ALK PHOS U/L 38 40 44   TOTAL PROTEIN g/dL 5.7* 5.9* 6.9   ALBUMIN g/dL 3.7 3.7 4.4   TOTAL BILIRUBIN mg/dL 0.37 0.25 0.46   BILIRUBIN DIRECT mg/dL 0.08  --   --    AMMONIA umol/L  --  21 11*     Results from last 7 days   Lab Units 10/10/24  0104 10/09/24  0625   POC GLUCOSE mg/dl 80 103     Results from last 7 days   Lab Units 10/13/24  0456 10/12/24  0633 10/11/24  0400 10/10/24  1102 10/10/24  0556 10/10/24  0137 10/09/24  0640   GLUCOSE RANDOM mg/dL 85 93 82 86 87 94 114             No results found for: \"BETA-HYDROXYBUTYRATE\"   Results from last 7 days   Lab Units 10/10/24  0021   PH ART  7.178*   PCO2 ART mm Hg 83.3*   PO2 ART mm Hg 128.0   HCO3 ART mmol/L 30.3*   BASE EXC ART mmol/L 0.0   O2 CONTENT ART mL/dL 16.5   O2 HGB, ARTERIAL % 97.6*   ABG SOURCE  Radial, Right     Results from last 7 days   Lab Units 10/10/24  1102 10/10/24  0417 10/10/24  0137   PH YINKA  7.263* 7.462* 7.307   PCO2 YINKA mm Hg 76.8* 38.1* 67.7*   PO2 YINKA mm Hg 22.3* 200.8* 34.9*   HCO3 YINKA mmol/L 33.9* 26.6 33.1*   BASE EXC YINKA mmol/L 4.8 2.8 4.7   O2 CONTENT YINKA ml/dL 6.9 16.6 13.5   O2 HGB, VENOUS % 41.4* 91.0* 72.2             Results from last 7 days   Lab Units 10/10/24  0556 10/10/24  0344 10/10/24  0137   HS TNI 0HR ng/L  --   --  7   HS TNI 2HR " ng/L  --  8  --    HSTNI D2 ng/L  --  1  --    HS TNI 4HR ng/L 8  --   --    HSTNI D4 ng/L 1  --   --              Results from last 7 days   Lab Units 10/10/24  1102 10/09/24  0640   TSH 3RD GENERATON uIU/mL 0.342* 0.848     Results from last 7 days   Lab Units 10/10/24  0137   PROCALCITONIN ng/ml 0.20     Results from last 7 days   Lab Units 10/10/24  0137   LACTIC ACID mmol/L 0.6             Results from last 7 days   Lab Units 10/10/24  0137   BNP pg/mL 66                                     Results from last 7 days   Lab Units 10/09/24  0809   CLARITY UA  Clear   COLOR UA  Yellow   SPEC GRAV UA  1.020   PH UA  6.0   GLUCOSE UA mg/dl Negative   KETONES UA mg/dl 15 (1+)*   BLOOD UA  25.0*   PROTEIN UA mg/dl 30 (1+)*   NITRITE UA  Negative   BILIRUBIN UA  Negative   UROBILINOGEN UA mg/dL Negative   LEUKOCYTES UA  25.0*   WBC UA /hpf 1-2   RBC UA /hpf 4-10*   BACTERIA UA /hpf Occasional   EPITHELIAL CELLS WET PREP /hpf Occasional   MUCUS THREADS  Occasional*                                 Results from last 7 days   Lab Units 10/10/24  0137   BLOOD CULTURE  No Growth at 72 hrs.  No Growth at 72 hrs.                   Network Utilization Review Department  ATTENTION: Please call with any questions or concerns to [phone] and carefully listen to the prompts so that you are directed to the right person. All voicemails are confidential.   For Discharge needs, contact Care Management DC Support Team at [phone] opt. [phone]  Send all requests for admission clinical reviews, approved or denied determinations and any other requests to dedicated fax number below belonging to the campus where the patient is receiving treatment. List of dedicated fax numbers for the Facilities:  FACILITY NAME UR FAX NUMBER   ADMISSION DENIALS (Administrative/Medical Necessity) [phone]   DISCHARGE SUPPORT TEAM (NETWORK) [phone]   PARENT CHILD HEALTH (Maternity/NICU/Pediatrics) [phone]   [facility]  [phone]   [facility] [phone]   [facility] [phone]   [facility] [phone]   [facility] [phone]   [facility] [phone]   [facility] [phone]   [facility] [phone]   [facility] [phone]   [facility] [phone]   [facility] [phone]   [facility] [phone]

## 2024-10-13 NOTE — RESTORATIVE TECHNICIAN NOTE
Restorative Technician Note      Patient Name: Sarah Zayas     Restorative Tech Visit Date: 10/13/24  Note Type: Bracing, Follow-up fitting  Patient Position Upon Consult: Seated edge of bed  Activity Performed: Range of motion; Repositioned  Brace Applied: Other (Comment) (Shoulder Immobilizer Sling)  Additional Brace Ordered: No  Patient Position When Brace Applied: Supine  Bracing Recommendations: None  Patient Position at End of Consult: All needs within reach; Supine

## 2024-10-14 LAB
ANION GAP SERPL CALCULATED.3IONS-SCNC: 9 MMOL/L (ref 4–13)
BUN SERPL-MCNC: 17 MG/DL (ref 5–25)
CALCIUM SERPL-MCNC: 8.7 MG/DL (ref 8.4–10.2)
CHLORIDE SERPL-SCNC: 88 MMOL/L (ref 96–108)
CO2 SERPL-SCNC: 39 MMOL/L (ref 21–32)
CREAT SERPL-MCNC: 0.42 MG/DL (ref 0.6–1.3)
GFR SERPL CREATININE-BSD FRML MDRD: 97 ML/MIN/1.73SQ M
GLUCOSE SERPL-MCNC: 93 MG/DL (ref 65–140)
POTASSIUM SERPL-SCNC: 4.8 MMOL/L (ref 3.5–5.3)
SODIUM SERPL-SCNC: 136 MMOL/L (ref 135–147)

## 2024-10-14 PROCEDURE — 80048 BASIC METABOLIC PNL TOTAL CA: CPT | Performed by: INTERNAL MEDICINE

## 2024-10-14 PROCEDURE — 97530 THERAPEUTIC ACTIVITIES: CPT

## 2024-10-14 PROCEDURE — 97535 SELF CARE MNGMENT TRAINING: CPT

## 2024-10-14 PROCEDURE — 94640 AIRWAY INHALATION TREATMENT: CPT

## 2024-10-14 PROCEDURE — 97116 GAIT TRAINING THERAPY: CPT

## 2024-10-14 PROCEDURE — 99232 SBSQ HOSP IP/OBS MODERATE 35: CPT | Performed by: STUDENT IN AN ORGANIZED HEALTH CARE EDUCATION/TRAINING PROGRAM

## 2024-10-14 PROCEDURE — 92526 ORAL FUNCTION THERAPY: CPT

## 2024-10-14 PROCEDURE — 94760 N-INVAS EAR/PLS OXIMETRY 1: CPT

## 2024-10-14 RX ORDER — AMOXICILLIN 250 MG
1 CAPSULE ORAL 2 TIMES DAILY
Status: DISCONTINUED | OUTPATIENT
Start: 2024-10-14 | End: 2024-10-19

## 2024-10-14 RX ORDER — PROPRANOLOL HCL 20 MG
40 TABLET ORAL EVERY 12 HOURS SCHEDULED
Status: DISCONTINUED | OUTPATIENT
Start: 2024-10-14 | End: 2024-10-19

## 2024-10-14 RX ORDER — LOSARTAN POTASSIUM 50 MG/1
100 TABLET ORAL DAILY
Status: DISCONTINUED | OUTPATIENT
Start: 2024-10-15 | End: 2024-10-18

## 2024-10-14 RX ORDER — LOSARTAN POTASSIUM 50 MG/1
50 TABLET ORAL ONCE
Status: COMPLETED | OUTPATIENT
Start: 2024-10-14 | End: 2024-10-14

## 2024-10-14 RX ORDER — POLYETHYLENE GLYCOL 3350 17 G/17G
17 POWDER, FOR SOLUTION ORAL DAILY
Status: DISCONTINUED | OUTPATIENT
Start: 2024-10-14 | End: 2024-10-19

## 2024-10-14 RX ADMIN — CHLORHEXIDINE GLUCONATE 15 ML: 1.2 RINSE ORAL at 22:43

## 2024-10-14 RX ADMIN — DICLOFENAC SODIUM 2 G: 10 GEL TOPICAL at 17:00

## 2024-10-14 RX ADMIN — DICLOFENAC SODIUM 2 G: 10 GEL TOPICAL at 08:02

## 2024-10-14 RX ADMIN — LEVOTHYROXINE SODIUM 25 MCG: 25 TABLET ORAL at 05:16

## 2024-10-14 RX ADMIN — IPRATROPIUM BROMIDE 0.5 MG: 0.5 SOLUTION RESPIRATORY (INHALATION) at 07:26

## 2024-10-14 RX ADMIN — DULOXETINE HYDROCHLORIDE 30 MG: 30 CAPSULE, DELAYED RELEASE ORAL at 22:42

## 2024-10-14 RX ADMIN — POLYETHYLENE GLYCOL 3350 17 G: 17 POWDER, FOR SOLUTION ORAL at 11:54

## 2024-10-14 RX ADMIN — CYANOCOBALAMIN TAB 500 MCG 1000 MCG: 500 TAB at 08:02

## 2024-10-14 RX ADMIN — DICLOFENAC SODIUM 2 G: 10 GEL TOPICAL at 11:54

## 2024-10-14 RX ADMIN — BUDESONIDE 0.5 MG: 0.5 INHALANT ORAL at 19:22

## 2024-10-14 RX ADMIN — SENNOSIDES AND DOCUSATE SODIUM 1 TABLET: 8.6; 5 TABLET ORAL at 11:54

## 2024-10-14 RX ADMIN — ENOXAPARIN SODIUM 30 MG: 100 INJECTION SUBCUTANEOUS at 08:02

## 2024-10-14 RX ADMIN — PREGABALIN 25 MG: 25 CAPSULE ORAL at 08:02

## 2024-10-14 RX ADMIN — PREDNISONE 2.5 MG: 2.5 TABLET ORAL at 07:55

## 2024-10-14 RX ADMIN — PANTOPRAZOLE SODIUM 40 MG: 40 TABLET, DELAYED RELEASE ORAL at 05:16

## 2024-10-14 RX ADMIN — CHLORHEXIDINE GLUCONATE 15 ML: 1.2 RINSE ORAL at 08:03

## 2024-10-14 RX ADMIN — LOSARTAN POTASSIUM 50 MG: 50 TABLET, FILM COATED ORAL at 08:02

## 2024-10-14 RX ADMIN — LIDOCAINE 1 PATCH: 700 PATCH TOPICAL at 08:02

## 2024-10-14 RX ADMIN — PROPRANOLOL HYDROCHLORIDE 10 MG: 10 TABLET ORAL at 08:02

## 2024-10-14 RX ADMIN — MENTHOL 10%, METHYL SALICYLATE 15%: 10; 15 CREAM TOPICAL at 22:34

## 2024-10-14 RX ADMIN — SENNOSIDES AND DOCUSATE SODIUM 1 TABLET: 8.6; 5 TABLET ORAL at 17:00

## 2024-10-14 RX ADMIN — LEVALBUTEROL HYDROCHLORIDE 1.25 MG: 1.25 SOLUTION RESPIRATORY (INHALATION) at 19:22

## 2024-10-14 RX ADMIN — BUDESONIDE 0.5 MG: 0.5 INHALANT ORAL at 07:26

## 2024-10-14 RX ADMIN — ACETAMINOPHEN 975 MG: 325 TABLET, FILM COATED ORAL at 22:42

## 2024-10-14 RX ADMIN — MENTHOL 10%, METHYL SALICYLATE 15%: 10; 15 CREAM TOPICAL at 08:03

## 2024-10-14 RX ADMIN — PROPRANOLOL HYDROCHLORIDE 40 MG: 20 TABLET ORAL at 22:42

## 2024-10-14 RX ADMIN — ACETAMINOPHEN 975 MG: 325 TABLET, FILM COATED ORAL at 05:16

## 2024-10-14 RX ADMIN — DICLOFENAC SODIUM 2 G: 10 GEL TOPICAL at 22:35

## 2024-10-14 RX ADMIN — PREGABALIN 25 MG: 25 CAPSULE ORAL at 17:00

## 2024-10-14 RX ADMIN — IPRATROPIUM BROMIDE 0.5 MG: 0.5 SOLUTION RESPIRATORY (INHALATION) at 19:22

## 2024-10-14 RX ADMIN — ACETAMINOPHEN 975 MG: 325 TABLET, FILM COATED ORAL at 13:03

## 2024-10-14 RX ADMIN — LEVALBUTEROL HYDROCHLORIDE 1.25 MG: 1.25 SOLUTION RESPIRATORY (INHALATION) at 07:26

## 2024-10-14 RX ADMIN — LOSARTAN POTASSIUM 50 MG: 50 TABLET, FILM COATED ORAL at 16:54

## 2024-10-14 NOTE — SPEECH THERAPY NOTE
"Speech Language/Pathology    Speech/Language Pathology Progress Note    Patient Name: Sarah Zayas  Today's Date: 10/14/2024     Problem List  Principal Problem:    Anxiety/depression  Active Problems:    Acute exacerbation of chronic obstructive pulmonary disease (COPD) (HCC)    Gastroesophageal reflux disease without esophagitis    Hypertension    Temporal arteritis (HCC)    Neck pain/shoulder pain    Acute respiratory failure with hypercapnia (HCC)    Hypothyroidism       Past Medical History  Past Medical History:   Diagnosis Date    Anxiety 08/18/2024    Asthma     COPD (chronic obstructive pulmonary disease) (HCC)     Disease of thyroid gland     GERD (gastroesophageal reflux disease)     Hypertension 10/11/2018    Hypothyroid     Normocytic anemia 08/28/2024    Osteoarthritis 09/26/2012    Temporal arteritis (HCC)     Type 2 diabetes mellitus with complication, without long-term current use of insulin (HCC) 01/14/2020        Past Surgical History  Past Surgical History:   Procedure Laterality Date    EYE SURGERY Right 12/06/2019    cataract LVCFS    HYSTERECTOMY      NO PAST SURGERIES      ALSO NO RELEVANT PAST SURRGICAL HX AS PER NEXTGEN         Subjective:  \"It's not time for breakfast it's night time\". I told her it was morning. She disagreed. I opened her blinds to show her it was day time. She insisted I was reymundo (her night shift nurse). I told her my name and that I was not. She didn't believe me.\"I recognize your face\". She looked beyond her bed x 1 and stated \"there he is popping his head up\". I told her her cell phone was ringing. She stated \"no it's not\". Then told me I took something off of it and now it's heavy.   Objective:  Pt new to me. Seen for f/u for po tolerance. Refused all items on tray. I told her I would get her whatever she wanted. She stated applesauce. Able to feed self 2-3 tsps w/ prompt transfer and swallow. No s/s distress.Handed it back to me and stated \"that's enough\". Refused " all liquids offered and all food items on tray. Declined supplement. Distracted by her cell phone, which was not ringing at the time. Stated it was ruined.   Assessment:  Poor intake. Confusion.   Plan/Recommendations:  Continue diet as tolerated, NDD2 mech soft and thin. Question if she may eat more for family.

## 2024-10-14 NOTE — CASE MANAGEMENT
Case Management Discharge Planning Note    Patient name Sarah Zayas  Location [place] [address] /South 2 M* MRN [id number]  : 1945 Date 10/14/2024       Current Admission Date: 10/10/2024  Current Admission Diagnosis:Anxiety/depression   Patient Active Problem List    Diagnosis Date Noted Date Diagnosed    Hypothyroidism 10/13/2024     Acute on chronic respiratory failure with hypoxia and hypercapnia (HCC) 10/10/2024     Neck pain/shoulder pain 10/10/2024     Acute respiratory failure with hypercapnia (HCC) 10/10/2024     Venous insufficiency of lower extremity 2024     Epigastric pain 2024     Advance care planning 2024     At risk for constipation 2024     At risk for delirium 2024     Physical deconditioning 2024     Periodontitis 2024     Polypharmacy 2024     Diastolic dysfunction 2024     Elevated vitamin B12 level 2024     Normocytic anemia 2024     Neck swelling 2024     Anxiety/depression 2024     Severe protein-calorie malnutrition (HCC) 2024     COPD, severe ([facility]) 2024     Abnormal CT scan 2023     Infectious sialoadenitis of major salivary gland 2023     Left upper lobe pulmonary nodule 2023     Postnasal drip 2023     Raynaud's phenomenon without gangrene 2022     Temporal arteritis ([facility]) 2020     Hypertension 10/11/2018     Other specified hypothyroidism 2018     Gastroesophageal reflux disease without esophagitis 2018     Acute exacerbation of chronic obstructive pulmonary disease (COPD) (HCC) 2012     Vitamin D deficiency 2012     Osteoarthritis 2012       LOS (days): 4  Geometric Mean LOS (GMLOS) (days): 3.4  Days to GMLOS:-0.8     OBJECTIVE:  Risk of Unplanned Readmission Score: 32.39         Current admission status: Inpatient   Preferred Pharmacy:   [facility] - [facility] - [address]  [address]  [address]  [place] [address]  Phone: [phone] Fax: [phone]    [facility] [place] - [address] [place] [address]  [address] [facility] [address]  Phone: [phone] Fax: [phone]    Primary Care Provider: Nicolas Nix MD    Primary Insurance: [employer]  Secondary Insurance:     DISCHARGE DETAILS:                                   Additional Comments: Met with patient, son Ton and sister at bedside.  Reviewed rehab/referrals list, patient/son chose [facility].  Sent request for insurance authorization via  Discharge Support.  Ton will be in the [place] until Thursday, then will return to his home in [place], until then he can be reached at [phone] (his aunt's home).  CM to follow.

## 2024-10-14 NOTE — CASE MANAGEMENT
[facility] received request for authorization from Care Manager.  Authorization request submitted for: SNF  Facility Name:[facility]   NPI:[id number]  Facility MD:Dr. Ahuja      NPI: [id number]   Authorization initiated by contacting insurance:  [employer]  Via: [employer]   Clinicals submitted via Portal attachment   Pending Reference #:[id number]     Care Manager notified: Heidi Clark    Updates to authorization status will be noted in chart. Please reach out to CM for updates on any clinical information.

## 2024-10-14 NOTE — PROGRESS NOTES
Progress Note - Hospitalist   Name: Sarah Zayas 79 y.o. female I MRN: [id number]  Unit/Bed#: [place] [address] -01 I Date of Admission: 10/10/2024   Date of Service: 10/14/2024 I Hospital Day: 4     Assessment & Plan  Acute respiratory failure with hypercapnia (HCC)  Back to baseline oxygen, 3-4L NC  Acute exacerbation of chronic obstructive pulmonary disease (COPD) (HCC)  History of severe COPD  Initial concern for exacerbation but likely oversedation from benzodiazepine  Patient placed initially on BiPAP sepsis workup has been unremarkable  Patient will continue Atrovent and Xopenex  Continue pulmonary toilet  Improved  Gastroesophageal reflux disease without esophagitis  Continue PPI therapy  Hypertension  Resume losartan, propranolol  Elevated blood pressure, continue to monitor at this time and may require further titration    Temporal arteritis (HCC)  Remains on 2.5 mg of prednisone  Continue same dose for now    Anxiety/depression  Evaluated by psychiatry  Recently discontinued on alprazolam  Continue BuSpar 5 mg twice daily  Will change to Lyrica 25 mg twice a day, discontinue gabapentin  Discontinue Remeron and start Cymbalta 30 mg  Anxiety largely driving patient's symptoms    Neck pain/shoulder pain  Evidence of bicep tendinitis  Add Voltaren gel and Bengay  PT OT recommending rehab  Case management following, insurance authorization pending    Hypothyroidism  Levothyroxine reduced to 25 mcg  TSH suppressed and free T4 elevated-suspect some iatrogenic hyperthyroidism  Given patient's symptoms and agitation possibly related    VTE Pharmacologic Prophylaxis:   Pharmacologic: Enoxaparin (Lovenox)  Mechanical VTE Prophylaxis in Place: Yes    Current Length of Stay: 4 day(s)    Current Patient Status: Inpatient   Certification Statement: The patient will continue to require additional inpatient hospital stay due to pending rehab placement    Discharge Plan: Pending    Code Status: Level 1 - Full  Code      Subjective:   No events overnight, no complaints.  Denies any fevers, chills, cough, nausea or vomiting.    Objective:     Vitals:   Temp (24hrs), Av.1 °F (36.7 °C), Min:97.5 °F (36.4 °C), Max:99 °F (37.2 °C)    Temp:  [97.5 °F (36.4 °C)-99 °F (37.2 °C)] 97.5 °F (36.4 °C)  HR:  [] 101  Resp:  [16-20] 18  BP: (120-188)/() 174/99  SpO2:  [94 %-98 %] 94 %  Body mass index is 19.52 kg/m².     Input and Output Summary (last 24 hours):       Intake/Output Summary (Last 24 hours) at 10/14/2024 1618  Last data filed at 10/14/2024 1341  Gross per 24 hour   Intake 880 ml   Output 550 ml   Net 330 ml       Physical Exam:     Physical Exam  Vitals and nursing note reviewed.   Constitutional:       General: She is not in acute distress.     Comments: Thin appearing   HENT:      Head: Normocephalic.   Eyes:      Conjunctiva/sclera: Conjunctivae normal.   Cardiovascular:      Rate and Rhythm: Normal rate.   Pulmonary:      Effort: Pulmonary effort is normal.      Breath sounds: No wheezing.   Abdominal:      General: Bowel sounds are normal. There is no distension.      Palpations: Abdomen is soft.   Musculoskeletal:         General: No swelling. Normal range of motion.   Skin:     General: Skin is warm and dry.   Neurological:      General: No focal deficit present.      Mental Status: She is alert. Mental status is at baseline.         Additional Data:     Labs:    Results from last 7 days   Lab Units 10/13/24  0456 10/11/24  0400 10/10/24  1102   WBC Thousand/uL 6.19   < > 7.20   HEMOGLOBIN g/dL 12.5   < > 11.8   HEMATOCRIT % 42.7   < > 39.7   PLATELETS Thousands/uL 197   < > 159   SEGS PCT %  --   --  80*   LYMPHO PCT %  --   --  6*   MONO PCT %  --   --  11   EOS PCT %  --   --  3    < > = values in this interval not displayed.     Results from last 7 days   Lab Units 10/14/24  0513 10/11/24  0400 10/10/24  1102   SODIUM mmol/L 136   < > 134*   POTASSIUM mmol/L 4.8   < > 4.3   CHLORIDE mmol/L 88*   <  > 95*   CO2 mmol/L 39*   < > 34*   BUN mg/dL 17   < > 15   CREATININE mg/dL 0.42*   < > 0.45*   ANION GAP mmol/L 9   < > 5   CALCIUM mg/dL 8.7   < > 8.5   ALBUMIN g/dL  --   --  3.7   TOTAL BILIRUBIN mg/dL  --   --  0.37   ALK PHOS U/L  --   --  38   ALT U/L  --   --  16   AST U/L  --   --  21   GLUCOSE RANDOM mg/dL 93   < > 86    < > = values in this interval not displayed.         Results from last 7 days   Lab Units 10/10/24  0104 10/09/24  0625   POC GLUCOSE mg/dl 80 103         Results from last 7 days   Lab Units 10/10/24  0137   LACTIC ACID mmol/L 0.6   PROCALCITONIN ng/ml 0.20           * I Have Reviewed All Lab Data Listed Above.  * Additional Pertinent Lab Tests Reviewed: All Labs For Current Hospital Admission Reviewed    Mobility:  Basic Mobility Inpatient Raw Score: 15  [facility] Goal: 4: Move to chair/commode  [facility] Achieved: 5: Stand (1 or more minutes)    Lines:     Invasive Devices       Peripheral Intravenous Line  Duration             Peripheral IV 10/14/24 Left;Ventral (anterior) Forearm <1 day                       Imaging:    Imaging Reports Reviewed Today Include:     CT head wo contrast    Result Date: 10/13/2024  Impression: No acute intracranial abnormality.  Stable chronic microangiopathic changes within the brain. Workstation performed: [id number]     XR shoulder 2+ views LEFT    Result Date: 10/10/2024  Impression: No acute osseous abnormality Workstation performed: [id number]     CT chest without contrast    Result Date: 10/10/2024  Impression: 1.  Possible airway debris, which would be consistent with aspiration, see discussion 2.  Trace pleural effusions 3.  Stable right breast lesion, recommend follow-up diagnostic mammogram The study was marked in EPIC for immediate notification. Workstation performed: [id number]     CT head without contrast    Result Date: 10/10/2024  Impression: No acute intracranial abnormality. Workstation performed: [id number]        Recent Cultures (last 7 days):      Results from last 7 days   Lab Units 10/10/24  0137   BLOOD CULTURE  No Growth After 4 Days.  No Growth After 4 Days.       Last 24 Hours Medication List:   Current Facility-Administered Medications   Medication Dose Route Frequency Provider Last Rate    acetaminophen  975 mg Oral Q8H [facility] TITO Kwong      albuterol  2 puff Inhalation Q6H PRN Luz Mukherjee MD      Artificial Tears  1 drop Both Eyes Q4H PRN Luz Mukherjee MD      budesonide  0.5 mg Nebulization BID Luz Mukherjee MD      chlorhexidine  15 mL Mouth/Throat Q12H [facility] Luz Mukherjee MD      vitamin B-12  1,000 mcg Oral Daily Luz Mukherjee MD      Diclofenac Sodium  2 g Topical 4x Daily Saad Mckeon DO      DULoxetine  30 mg Oral HS Saad Mckeon, DO      enoxaparin  30 mg Subcutaneous Daily Luz Mukherjee MD      ipratropium  0.5 mg Nebulization BID Luz Mukherjee MD      ipratropium-albuterol  3 mL Nebulization Q6H PRN Luz Mukherjee MD      levalbuterol  1.25 mg Nebulization BID Luz Mukherjee MD      levothyroxine  25 mcg Oral Early Morning Saad Mckeon DO      lidocaine  1 patch Topical Daily Luz Mukherjee MD      losartan  50 mg Oral Daily Luz Mukherjee MD      magnesium hydroxide  30 mL Oral Daily PRN Bhupendra Medley MD      melatonin  3 mg Oral HS PRN TITO Kwong      menthol-methyl salicylate   Apply externally TID Saad Mckeon DO      ondansetron  4 mg Intravenous Q6H PRN Luz Mukherjee MD      pantoprazole  40 mg Oral Early Morning Lzu Mukherjee MD      polyethylene glycol  17 g Oral Daily Bhupendra Medley MD      predniSONE  2.5 mg Oral Daily With Breakfast Luz Mukherjee MD      pregabalin  25 mg Oral BID Saad Mckeon DO      propranolol  10 mg Oral Q12H [facility] Saad Mckeon DO      senna-docusate sodium  1 tablet Oral BID Bhupnedra Medley MD      sodium chloride  2 spray Each Nare Q1H PRN Luz Mukherjee MD          Today, Patient Was Seen By: Bhupendra Medley MD    ** Please Note: Dictation voice to text software may have been used in the  creation of this document. **

## 2024-10-14 NOTE — PLAN OF CARE
Problem: PAIN - ADULT  Goal: Verbalizes/displays adequate comfort level or baseline comfort level  Description: Interventions:  - Encourage patient to monitor pain and request assistance  - Assess pain using appropriate pain scale  - Administer analgesics based on type and severity of pain and evaluate response  - Implement non-pharmacological measures as appropriate and evaluate response  - Consider cultural and social influences on pain and pain management  - Notify physician/advanced practitioner if interventions unsuccessful or patient reports new pain  Outcome: Progressing     Problem: INFECTION - ADULT  Goal: Absence or prevention of progression during hospitalization  Description: INTERVENTIONS:  - Assess and monitor for signs and symptoms of infection  - Monitor lab/diagnostic results  - Monitor all insertion sites, i.e. indwelling lines, tubes, and drains  - Monitor endotracheal if appropriate and nasal secretions for changes in amount and color  - [facility] appropriate cooling/warming therapies per order  - Administer medications as ordered  - Instruct and encourage patient and family to use good hand hygiene technique  - Identify and instruct in appropriate isolation precautions for identified infection/condition  Outcome: Progressing  Goal: Absence of fever/infection during neutropenic period  Description: INTERVENTIONS:  - Monitor WBC    Outcome: Progressing     Problem: SAFETY ADULT  Goal: Patient will remain free of falls  Description: INTERVENTIONS:  - Educate patient/family on patient safety including physical limitations  - Instruct patient to call for assistance with activity   - Consult OT/PT to assist with strengthening/mobility   - Keep Call bell within reach  - Keep bed low and locked with side rails adjusted as appropriate  - Keep care items and personal belongings within reach  - Initiate and maintain comfort rounds  - Make Fall Risk Sign visible to staff  - Offer Toileting every  Hours,  in advance of need  - Initiate/Maintain alarm  - Obtain necessary fall risk management equipment:   - Apply yellow socks and bracelet for high fall risk patients  - Consider moving patient to room near nurses station  Outcome: Progressing  Goal: Maintain or return to baseline ADL function  Description: INTERVENTIONS:  -  Assess patient's ability to carry out ADLs; assess patient's baseline for ADL function and identify physical deficits which impact ability to perform ADLs (bathing, care of mouth/teeth, toileting, grooming, dressing, etc.)  - Assess/evaluate cause of self-care deficits   - Assess range of motion  - Assess patient's mobility; develop plan if impaired  - Assess patient's need for assistive devices and provide as appropriate  - Encourage maximum independence but intervene and supervise when necessary  - Involve family in performance of ADLs  - Assess for home care needs following discharge   - Consider OT consult to assist with ADL evaluation and planning for discharge  - Provide patient education as appropriate  Outcome: Progressing  Goal: Maintains/Returns to pre admission functional level  Description: INTERVENTIONS:  - Perform AM-PAC 6 Click Basic Mobility/ Daily Activity assessment daily.  - Set and communicate daily mobility goal to care team and patient/family/caregiver.   - Collaborate with rehabilitation services on mobility goals if consulted  - Perform Range of Motion  times a day.  - Reposition patient every  hours.  - Dangle patient  times a day  - Stand patient  times a day  - Ambulate patient  times a day  - Out of bed to chair  times a day   - Out of bed for meals   Problem: Knowledge Deficit  Goal: Patient/family/caregiver demonstrates understanding of disease process, treatment plan, medications, and discharge instructions  Description: Complete learning assessment and assess knowledge base.  Interventions:  - Provide teaching at level of understanding  - Provide teaching via  preferred learning methods  Outcome: Progressing    times a day  - Out of bed for toileting  - Record patient progress and toleration of activity level   Outcome: Progressing

## 2024-10-14 NOTE — PHYSICAL THERAPY NOTE
PHYSICAL THERAPY NOTE          Patient Name: Sarah Zayas  Today's Date: 10/14/2024   10/14/24 1120   Note Type   Note Type Treatment for insurance authorization   Pain Assessment   Pain Assessment Tool FLACC   Pain Location/Orientation Location: Generalized   Pain Rating: FLACC (Rest) - Face 0   Pain Rating: FLACC (Rest) - Legs 0   Pain Rating: FLACC (Rest) - Activity 0   Pain Rating: FLACC (Rest) - Cry 0   Pain Rating: FLACC (Rest) - Consolability 0   Score: FLACC (Rest) 0   Pain Rating: FLACC (Activity) - Face 2   Pain Rating: FLACC (Activity) - Legs 1   Pain Rating: FLACC (Activity) - Activity 1   Pain Rating: FLACC (Activity) - Cry 1   Pain Rating: FLACC (Activity) - Consolability 1   Score: FLACC (Activity) 6   Restrictions/Precautions   Other Precautions Cognitive;Chair Alarm;Bed Alarm;Multiple lines;O2;Fall Risk;Pain;Visual impairment  (3l O2 NC)   General   Chart Reviewed Yes   Family/Caregiver Present Yes  (FAMILY PRESENT BEGINNING OF SESSION.)   Cognition   Overall Cognitive Status Impaired   Arousal/Participation Alert   Attention Attends with cues to redirect   Orientation Level Oriented to person;Oriented to place;Oriented to time   Memory Decreased recall of precautions;Decreased recall of recent events;Decreased short term memory   Following Commands Follows one step commands with increased time or repetition   Comments redirection to tasks.   Subjective   Subjective NOt so great. it's my breathing.   Bed Mobility   Supine to Sit 3  Moderate assistance   Additional items Assist x 1;HOB elevated;Bedrails;Increased time required;LE management;Verbal cues   Additional Comments pt  remained seated out of bed in chair post PT session.   Transfers   Sit to Stand 4  Minimal assistance   Additional items Assist x 2;Armrests;Bedrails;Increased time required;Verbal cues   Stand to Sit 4  Minimal assistance   Additional items  Assist x 2;Armrests;Increased time required;Verbal cues   Stand pivot 4  Minimal assistance   Additional items Assist x 2;Increased time required;Verbal cues   Toilet transfer 4  Minimal assistance   Additional items Assist x 2;Armrests;Increased time required;Verbal cues;Commode  (pt  transfers on and off BSC  x 2.)   Additional Comments verbal cues for hand placement and safe technique, directional cues for side stepping and body positioning with stand to sit transfers.   Ambulation/Elevation   Gait pattern Forward Flexion;Short stride;Excessively slow;Redundant gait at times;Steppage;Narrow DEVONTE;Ataxia;Step to   Gait Assistance 4  Minimal assist   Additional items Assist x 2;Verbal cues   Assistive Device Other (Comment)  (HHA x2)   Distance 2' x1, 3' x1   Balance   Static Sitting Fair   Dynamic Sitting Fair   Static Standing Fair -   Dynamic Standing Poor +   Ambulatory Poor +   Endurance Deficit   Endurance Deficit Yes   Endurance Deficit Description use of o2 3L nc, spo2 94%-95%  hr  95- 105 bpm   Activity Tolerance   Activity Tolerance Patient limited by pain;Patient limited by fatigue   Nurse Made Aware yes, jerilyn   Exercises   Balance training  static and dynamic standing activities with min assist x1-2 for support 2 to decreased balance, safety awareness, distractible,  anxious behaviors   Assessment   Prognosis Guarded   Problem List Decreased strength;Decreased endurance;Impaired balance;Decreased mobility;Decreased cognition;Impaired judgement;Decreased safety awareness;Impaired vision;Impaired hearing;Decreased skin integrity;Pain   Assessment Pt seen for PT treatment session this date with interventions consisting of bed mobility, transfer training, and gait training, and education provided as needed for safety and direction to improve functional mobility, safety awareness, and activity tolerance. Pt agreeable to PT treatment session upon arrival, pt found supine in bed. At end of session, pt left  seated out of bed in chair with all needs in reach. In comparison to previous session, pt with improvement in activity tolerance, endurance, AM- pac score, and functional mobility. Pt  continues to exhibits anxious behaviors and requires frequent redirection to tasks.  Pt  is functioning at mod assist x1 for supine to sit with head of bed elevated and use of bed rails,  min assist x2 for sit <> stand transfers and ambulation.  Pt  ambulates short distances to 2' and 3' x1 with HHA x2.  Pt  demonstrates moderate deficits during functional mobility including decreased endurance, balance, ataxic and unsteady gait, and decreased overall strength, mobility  cognition and anxious/ paranoid behaviors.    Please refer to endurance deficit section of flowsheet for vitals. Continue to recommend  level II moderate rehab resource intensity  at time of d/c in order to maximize pt's functional independence and safety w/ mobility. Pt continues to be functioning below baseline level. PT will continue to see pt while here in order to address the deficits listed above and provide interventions consistent w/ POC in effort to achieve STGs.    The patient's -[facility] Basic Mobility Inpatient Short Form Raw Score is 15. A raw score less than 16 suggests the patient may benefit from discharge to post-acute rehabilitation services. Please also refer to the recommendation of the Physical Therapist for safe discharge planning.   Goals   Patient Goals To be able to walk.   STG Expiration Date 10/21/24   PT Treatment Day 1   Plan   Treatment/Interventions Functional transfer training;Therapeutic exercise;Endurance training;Cognitive reorientation;Patient/family training;Equipment eval/education;Bed mobility;Gait training;Spoke to nursing;OT;Family   Progress Slow progress, decreased activity tolerance   PT Frequency 3-5x/wk   Discharge Recommendation   Rehab Resource Intensity Level, PT II (Moderate Resource Intensity)   AM-PAC Basic Mobility  Inpatient   Turning in Flat Bed Without Bedrails 3   Lying on Back to Sitting on Edge of Flat Bed Without Bedrails 3   Moving Bed to Chair 3   Standing Up From Chair Using Arms 3   Walk in Room 2   Climb 3-5 Stairs With Railing 1   Basic Mobility Inpatient Raw Score 15   Basic Mobility Standardized Score 36.97   [facility] Highest Level Of Mobility   [facility] Goal 4: Move to chair/commode   -[facility] Achieved 5: Stand (1 or more minutes)   Education   Education Provided Mobility training;Assistive device   Patient Reinforcement needed   End of Consult   Patient Position at End of Consult Bedside chair;Bed/Chair alarm activated;All needs within reach   Giselle Branch, PTA

## 2024-10-14 NOTE — ASSESSMENT & PLAN NOTE
Resume losartan, propranolol  Elevated blood pressure, continue to monitor at this time and may require further titration

## 2024-10-14 NOTE — ASSESSMENT & PLAN NOTE
Evidence of bicep tendinitis  Add Voltaren gel and Bengay  PT OT recommending rehab  Case management following, insurance authorization pending

## 2024-10-14 NOTE — OCCUPATIONAL THERAPY NOTE
Occupational Therapy Progress Note     Patient Name: Sarah Zayas  Today's Date: 10/14/2024  Problem List  Principal Problem:    Anxiety/depression  Active Problems:    Acute exacerbation of chronic obstructive pulmonary disease (COPD) (HCC)    Gastroesophageal reflux disease without esophagitis    Hypertension    Temporal arteritis (HCC)    Neck pain/shoulder pain    Acute respiratory failure with hypercapnia (HCC)    Hypothyroidism       10/14/24 1040   OT Last Visit   OT Visit Date 10/14/24   Note Type   Note Type Treatment for insurance authorization   Pain Assessment   Pain Assessment Tool FLACC   Pain Location/Orientation Location: Generalized   Pain Rating: FLACC (Rest) - Face 0   Pain Rating: FLACC (Rest) - Legs 0   Pain Rating: FLACC (Rest) - Activity 0   Pain Rating: FLACC (Rest) - Cry 0   Pain Rating: FLACC (Rest) - Consolability 0   Score: FLACC (Rest) 0   Pain Rating: FLACC (Activity) - Face 2   Pain Rating: FLACC (Activity) - Legs 1   Pain Rating: FLACC (Activity) - Activity 1   Pain Rating: FLACC (Activity) - Cry 1   Pain Rating: FLACC (Activity) - Consolability 1   Score: FLACC (Activity) 6   Restrictions/Precautions   Weight Bearing Precautions Per Order No   Other Precautions Cognitive;Chair Alarm;Bed Alarm;Pain;O2;Fall Risk;Visual impairment  (3 L O2)   ADL   Where Assessed Commode   LB Dressing Assistance 2  Maximal Assistance   LB Dressing Deficit Don/doff R sock;Don/doff L sock   Toileting Assistance  1  Total Assistance   Toileting Deficit Verbal cueing;Supervison/safety;Increased time to complete;Bedside commode;Clothing management up;Clothing management down;Perineal hygiene   Bed Mobility   Supine to Sit 3  Moderate assistance   Additional items Assist x 1;HOB elevated;Bedrails;Increased time required;Verbal cues;LE management;Other  (trunk)   Transfers   Sit to Stand 4  Minimal assistance   Additional items Assist x 2;Bedrails;Increased time required;Verbal cues;Other  (HHA)   Stand to  "Sit 4  Minimal assistance   Additional items Assist x 2;Bedrails;Increased time required;Verbal cues;Other  (HHA)   Stand pivot 4  Minimal assistance   Additional items Assist x 2;Bedrails;Increased time required;Verbal cues;Other  (HHA)   Functional Mobility   Functional Mobility 4  Minimal assistance   Additional Comments Ax1; EOB>BSC>chair. HHA   Additional items Hand hold assistance   Subjective   Subjective \"I can't breathe, I can't breathe!\"   Cognition   Overall Cognitive Status Impaired   Arousal/Participation Alert   Attention Attends with cues to redirect   Orientation Level Oriented to person;Oriented to place;Oriented to time   Memory Decreased short term memory;Decreased recall of precautions;Decreased recall of recent events   Following Commands Follows one step commands with increased time or repetition   Comments Somewhat paranoid t/o session. Decreased insight, poor safety awareness.   Activity Tolerance   Activity Tolerance Patient tolerated treatment well   Medical Staff Made Aware Yes   Assessment   Assessment Pt participated in skilled OT f/u tx session per POC. Overall, pt w slow improvement towards POC. Completes supine>sit w modAx1 and functional transfers w minAx2. Pt requires emotional support / encouragement for participation, limited by anxiety related to mobility and cognition/paranoia. Pt completes transfer from EOB>BSC w minAx2. Additional STS from BSC w minAx2 as pt thought she was finished but then reported she needed to use the bathroom again. Due to poor balance and endurance, pt requires extensive (A) w toileting tasks and LB ADLs. Provided educ to pt on safe transfer techniques w poor carryover demonstrated. Pt standing tolerance ~1 min. Pt reports feeling SOB frequently although O2 on 3 L 89-94%. Pt reports feeling constipated therefore notified pt's RN. Pt left seated in chair w needs met, call bell within reach and alarm armed.   Plan   Treatment Interventions ADL " retraining;Functional transfer training;UE strengthening/ROM;Endurance training;Patient/family training;Equipment evaluation/education;Compensatory technique education;Continued evaluation;Activityengagement   Goal Expiration Date 10/25/24   OT Treatment Day 1   OT Frequency 3-5x/wk   Discharge Recommendation   Rehab Resource Intensity Level, OT II (Moderate Resource Intensity)   AM-PAC Daily Activity Inpatient   Lower Body Dressing 2   Bathing 3   Toileting 2   Upper Body Dressing 2   Grooming 3   Eating 3   Daily Activity Raw Score 15   Daily Activity Standardized Score (Calc for Raw Score >=11) 34.69   AM-PAC Applied Cognition Inpatient   Following a Speech/Presentation 2   Understanding Ordinary Conversation 3   Taking Medications 2   Remembering Where Things Are Placed or Put Away 2   Remembering List of 4-5 Errands 2   Taking Care of Complicated Tasks 2   Applied Cognition Raw Score 13   Applied Cognition Standardized Score 30.46     Argelia Vicente

## 2024-10-14 NOTE — PLAN OF CARE
Problem: PHYSICAL THERAPY ADULT  Goal: Performs mobility at highest level of function for planned discharge setting.  See evaluation for individualized goals.  Description: Treatment/Interventions: Functional transfer training, LE strengthening/ROM, Elevations, Therapeutic exercise, Endurance training, Patient/family training, Equipment eval/education, Bed mobility, Gait training, Continued evaluation, Spoke to nursing, OT  Equipment Recommended: Walker (pt reports owning RW at home for use)       See flowsheet documentation for full assessment, interventions and recommendations.  Outcome: Progressing  Note: Prognosis: Guarded  Problem List: Decreased strength, Decreased endurance, Impaired balance, Decreased mobility, Decreased cognition, Impaired judgement, Decreased safety awareness, Impaired vision, Impaired hearing, Decreased skin integrity, Pain  Assessment: Pt seen for PT treatment session this date with interventions consisting of bed mobility, transfer training, and gait training, and education provided as needed for safety and direction to improve functional mobility, safety awareness, and activity tolerance. Pt agreeable to PT treatment session upon arrival, pt found supine in bed. At end of session, pt left seated out of bed in chair with all needs in reach. In comparison to previous session, pt with improvement in activity tolerance, endurance, AM- pac score, and functional mobility. Pt  continues to exhibits anxious behaviors and requires frequent redirection to tasks.  Pt  is functioning at mod assist x1 for supine to sit with head of bed elevated and use of bed rails,  min assist x2 for sit <> stand transfers and ambulation.  Pt  ambulates short distances to 2' and 3' x1 with HHA x2.  Pt  demonstrates moderate deficits during functional mobility including decreased endurance, balance, ataxic and unsteady gait, and decreased overall strength, mobility  cognition and anxious/ paranoid behaviors.     Please refer to endurance deficit section of flowsheet for vitals. Continue to recommend  level II moderate rehab resource intensity  at time of d/c in order to maximize pt's functional independence and safety w/ mobility. Pt continues to be functioning below baseline level. PT will continue to see pt while here in order to address the deficits listed above and provide interventions consistent w/ POC in effort to achieve STGs.    The patient's AM-PAC Basic Mobility Inpatient Short Form Raw Score is 15. A raw score less than 16 suggests the patient may benefit from discharge to post-acute rehabilitation services. Please also refer to the recommendation of the Physical Therapist for safe discharge planning.  Barriers to Discharge: Decreased caregiver support, Inaccessible home environment     Rehab Resource Intensity Level, PT: II (Moderate Resource Intensity)    See flowsheet documentation for full assessment.

## 2024-10-14 NOTE — OCCUPATIONAL THERAPY NOTE
Occupational Therapy Progress Note     Patient Name: Sarah Zayas  Today's Date: 10/14/2024  Problem List  Principal Problem:    Anxiety/depression  Active Problems:    Acute exacerbation of chronic obstructive pulmonary disease (COPD) (HCC)    Gastroesophageal reflux disease without esophagitis    Hypertension    Temporal arteritis (HCC)    Neck pain/shoulder pain    Acute respiratory failure with hypercapnia (HCC)    Hypothyroidism       10/14/24 1040   OT Last Visit   OT Visit Date 10/14/24   Note Type   Note Type Treatment for insurance authorization   Pain Assessment   Pain Assessment Tool FLACC   Pain Location/Orientation Location: Generalized   Pain Rating: FLACC (Rest) - Face 0   Pain Rating: FLACC (Rest) - Legs 0   Pain Rating: FLACC (Rest) - Activity 0   Pain Rating: FLACC (Rest) - Cry 0   Pain Rating: FLACC (Rest) - Consolability 0   Score: FLACC (Rest) 0   Pain Rating: FLACC (Activity) - Face 2   Pain Rating: FLACC (Activity) - Legs 1   Pain Rating: FLACC (Activity) - Activity 1   Pain Rating: FLACC (Activity) - Cry 1   Pain Rating: FLACC (Activity) - Consolability 1   Score: FLACC (Activity) 6   Restrictions/Precautions   Weight Bearing Precautions Per Order No   Other Precautions Cognitive;Chair Alarm;Bed Alarm;Pain;O2;Fall Risk;Visual impairment  (3 L O2)   ADL   Where Assessed Commode   LB Dressing Assistance 2  Maximal Assistance   LB Dressing Deficit Don/doff R sock;Don/doff L sock   Toileting Assistance  1  Total Assistance   Toileting Deficit Verbal cueing;Supervison/safety;Increased time to complete;Bedside commode;Clothing management up;Clothing management down;Perineal hygiene   Bed Mobility   Supine to Sit 3  Moderate assistance   Additional items Assist x 1;HOB elevated;Bedrails;Increased time required;Verbal cues;LE management;Other  (trunk)   Transfers   Sit to Stand 4  Minimal assistance   Additional items Assist x 2;Bedrails;Increased time required;Verbal cues;Other  (HHA)   Stand to  "Sit 4  Minimal assistance   Additional items Assist x 2;Bedrails;Increased time required;Verbal cues;Other  (HHA)   Stand pivot 4  Minimal assistance   Additional items Assist x 2;Bedrails;Increased time required;Verbal cues;Other  (HHA)   Functional Mobility   Functional Mobility 4  Minimal assistance   Additional Comments Ax1; EOB>BSC>chair. HHA   Additional items Hand hold assistance   Subjective   Subjective \"I can't breathe, I can't breathe!\"   Cognition   Overall Cognitive Status Impaired   Arousal/Participation Alert   Attention Attends with cues to redirect   Orientation Level Oriented to person;Oriented to place;Oriented to time   Memory Decreased short term memory;Decreased recall of precautions;Decreased recall of recent events   Following Commands Follows one step commands with increased time or repetition   Comments Somewhat paranoid t/o session. Decreased insight, poor safety awareness.   Activity Tolerance   Activity Tolerance Patient tolerated treatment well   Medical Staff Made Aware Yes   Assessment   Assessment Pt participated in skilled OT f/u tx session per POC. Overall, pt w slow improvement towards POC. Completes supine>sit w modAx1 and functional transfers w minAx2. Pt requires emotional support / encouragement for participation, limited by anxiety related to mobility and cognition/paranoia. Pt completes transfer from EOB>BSC w minAx2. Additional STS from BSC w minAx2 as pt thought she was finished but then reported she needed to use the bathroom again. Due to poor balance and endurance, pt requires extensive (A) w toileting tasks and LB ADLs. Provided educ to pt on safe transfer techniques w poor carryover demonstrated. Pt standing tolerance ~1 min. Pt reports feeling SOB frequently although O2 on 3 L 89-94%. Pt reports feeling constipated therefore notified pt's RN. Pt left seated in chair w needs met, call bell within reach and alarm armed. Pt would continue to benefit from level 2 " resource intensity at time of d/c to facilitate recovery.   Plan   Treatment Interventions ADL retraining;Functional transfer training;UE strengthening/ROM;Endurance training;Patient/family training;Equipment evaluation/education;Compensatory technique education;Continued evaluation;Activityengagement   Goal Expiration Date 10/25/24   OT Treatment Day 1   OT Frequency 3-5x/wk   Discharge Recommendation   Rehab Resource Intensity Level, OT II (Moderate Resource Intensity)   AM-PAC Daily Activity Inpatient   Lower Body Dressing 2   Bathing 3   Toileting 2   Upper Body Dressing 2   Grooming 3   Eating 3   Daily Activity Raw Score 15   Daily Activity Standardized Score (Calc for Raw Score >=11) 34.69   AM-PAC Applied Cognition Inpatient   Following a Speech/Presentation 2   Understanding Ordinary Conversation 3   Taking Medications 2   Remembering Where Things Are Placed or Put Away 2   Remembering List of 4-5 Errands 2   Taking Care of Complicated Tasks 2   Applied Cognition Raw Score 13   Applied Cognition Standardized Score 30.46     Argelia Vicente

## 2024-10-14 NOTE — PROGRESS NOTES
Patient:  SHAHNAZ SOLOMON    MRN:  [id number]    Aidin Request ID:  [id number]    Level of care reserved:  Skilled Nursing Facility    Partner Reserved:  [facility] [place], [place] [address] [phone]    Clinical needs requested:    Geography searched:  10 miles around [id number]    Start of Service:    Request sent:  3:32pm EDT on 10/11/2024 by Heidi Clark    Partner reserved:  12:49pm EDT on 10/14/2024 by Hiedi Clark    Choice list shared:  9:20am EDT on 10/14/2024 by Heidi Clark

## 2024-10-14 NOTE — PLAN OF CARE
Problem: PAIN - ADULT  Goal: Verbalizes/displays adequate comfort level or baseline comfort level  Description: Interventions:  - Encourage patient to monitor pain and request assistance  - Assess pain using appropriate pain scale  - Administer analgesics based on type and severity of pain and evaluate response  - Implement non-pharmacological measures as appropriate and evaluate response  - Consider cultural and social influences on pain and pain management  - Notify physician/advanced practitioner if interventions unsuccessful or patient reports new pain  Outcome: Progressing     Problem: INFECTION - ADULT  Goal: Absence or prevention of progression during hospitalization  Description: INTERVENTIONS:  - Assess and monitor for signs and symptoms of infection  - Monitor lab/diagnostic results  - Monitor all insertion sites, i.e. indwelling lines, tubes, and drains  - Monitor endotracheal if appropriate and nasal secretions for changes in amount and color  - [place] appropriate cooling/warming therapies per order  - Administer medications as ordered  - Instruct and encourage patient and family to use good hand hygiene technique  - Identify and instruct in appropriate isolation precautions for identified infection/condition  Outcome: Progressing  Goal: Absence of fever/infection during neutropenic period  Description: INTERVENTIONS:  - Monitor WBC    Outcome: Progressing     Problem: SAFETY ADULT  Goal: Patient will remain free of falls  Description: INTERVENTIONS:  - Educate patient/family on patient safety including physical limitations  - Instruct patient to call for assistance with activity   - Consult OT/PT to assist with strengthening/mobility   - Keep Call bell within reach  - Keep bed low and locked with side rails adjusted as appropriate  - Keep care items and personal belongings within reach  - Initiate and maintain comfort rounds  - Make Fall Risk Sign visible to staff  - Offer Toileting every 2 Hours,  in advance of need  - Initiate/Maintain bed/chair alarm  - Obtain necessary fall risk management equipment: walker  - Apply yellow socks and bracelet for high fall risk patients  - Consider moving patient to room near nurses station  Outcome: Progressing  Goal: Maintain or return to baseline ADL function  Description: INTERVENTIONS:  -  Assess patient's ability to carry out ADLs; assess patient's baseline for ADL function and identify physical deficits which impact ability to perform ADLs (bathing, care of mouth/teeth, toileting, grooming, dressing, etc.)  - Assess/evaluate cause of self-care deficits   - Assess range of motion  - Assess patient's mobility; develop plan if impaired  - Assess patient's need for assistive devices and provide as appropriate  - Encourage maximum independence but intervene and supervise when necessary  - Involve family in performance of ADLs  - Assess for home care needs following discharge   - Consider OT consult to assist with ADL evaluation and planning for discharge  - Provide patient education as appropriate  Outcome: Progressing  Goal: Maintains/Returns to pre admission functional level  Description: INTERVENTIONS:  - Perform AM-PAC 6 Click Basic Mobility/ Daily Activity assessment daily.  - Set and communicate daily mobility goal to care team and patient/family/caregiver.   - Collaborate with rehabilitation services on mobility goals if consulted  - Perform Range of Motion 3 times a day.  - Reposition patient every 2 hours.  - Dangle patient 3 times a day  - Stand patient 3 times a day  - Ambulate patient 3 times a day  - Out of bed to chair 3 times a day   - Out of bed for meals 3 times a day  - Out of bed for toileting  - Record patient progress and toleration of activity level   Outcome: Progressing     Problem: DISCHARGE PLANNING  Goal: Discharge to home or other facility with appropriate resources  Description: INTERVENTIONS:  - Identify barriers to discharge w/patient and  caregiver  - Arrange for needed discharge resources and transportation as appropriate  - Identify discharge learning needs (meds, wound care, etc.)  - Arrange for interpretive services to assist at discharge as needed  - Refer to Case Management Department for coordinating discharge planning if the patient needs post-hospital services based on physician/advanced practitioner order or complex needs related to functional status, cognitive ability, or social support system  Outcome: Progressing     Problem: Knowledge Deficit  Goal: Patient/family/caregiver demonstrates understanding of disease process, treatment plan, medications, and discharge instructions  Description: Complete learning assessment and assess knowledge base.  Interventions:  - Provide teaching at level of understanding  - Provide teaching via preferred learning methods  Outcome: Progressing     Problem: RESPIRATORY - ADULT  Goal: Achieves optimal ventilation and oxygenation  Description: INTERVENTIONS:  - Assess for changes in respiratory status  - Assess for changes in mentation and behavior  - Position to facilitate oxygenation and minimize respiratory effort  - Oxygen administered by appropriate delivery if ordered  - Initiate smoking cessation education as indicated  - Encourage broncho-pulmonary hygiene including cough, deep breathe, Incentive Spirometry  - Assess the need for suctioning and aspirate as needed  - Assess and instruct to report SOB or any respiratory difficulty  - Respiratory Therapy support as indicated  Outcome: Progressing     Problem: Nutrition/Hydration-ADULT  Goal: Nutrient/Hydration intake appropriate for improving, restoring or maintaining nutritional needs  Description: Monitor and assess patient's nutrition/hydration status for malnutrition. Collaborate with interdisciplinary team and initiate plan and interventions as ordered.  Monitor patient's weight and dietary intake as ordered or per policy. Utilize nutrition  screening tool and intervene as necessary. Determine patient's food preferences and provide high-protein, high-caloric foods as appropriate.     INTERVENTIONS:  - Monitor oral intake, urinary output, labs, and treatment plans  - Assess nutrition and hydration status and recommend course of action  - Evaluate amount of meals eaten  - Assist patient with eating if necessary   - Allow adequate time for meals  - Recommend/ encourage appropriate diets, oral nutritional supplements, and vitamin/mineral supplements  - Order, calculate, and assess calorie counts as needed  - Recommend, monitor, and adjust tube feedings and TPN/PPN based on assessed needs  - Assess need for intravenous fluids  - Provide specific nutrition/hydration education as appropriate  - Include patient/family/caregiver in decisions related to nutrition  Outcome: Progressing     Problem: Prexisting or High Potential for Compromised Skin Integrity  Goal: Skin integrity is maintained or improved  Description: INTERVENTIONS:  - Identify patients at risk for skin breakdown  - Assess and monitor skin integrity  - Assess and monitor nutrition and hydration status  - Monitor labs   - Assess for incontinence   - Turn and reposition patient  - Assist with mobility/ambulation  - Relieve pressure over bony prominences  - Avoid friction and shearing  - Provide appropriate hygiene as needed including keeping skin clean and dry  - Evaluate need for skin moisturizer/barrier cream  - Collaborate with interdisciplinary team   - Patient/family teaching  - Consider wound care consult   Outcome: Progressing     Problem: SKIN/TISSUE INTEGRITY - ADULT  Goal: Skin Integrity remains intact(Skin Breakdown Prevention)  Description: Assess:  -Perform Lonnie assessment every shift  -Clean and moisturize skin every shift  -Inspect skin when repositioning, toileting, and assisting with ADLS  -Assess extremities for adequate circulation and sensation     Bed Management:  -Have  minimal linens on bed & keep smooth, unwrinkled  -Change linens as needed when moist or perspiring  -Avoid sitting or lying in one position for more than 2 hours while in bed  -Keep HOB at 30 degrees     Toileting:  -Offer bedside commode  -Assess for incontinence every 2 hours    Activity:  -Mobilize patient 3 times a day  -Encourage activity and walks on unit  -Encourage or provide ROM exercises   -Turn and reposition patient every 2 Hours  -Use appropriate equipment to lift or move patient in bed  -Instruct/ Assist with weight shifting every 2 hours when out of bed in chair    Skin Care:  -Avoid use of baby powder, tape, friction and shearing, hot water or constrictive clothing  -Relieve pressure over bony prominences using Allevyn  -Do not massage red bony areas    Outcome: Progressing     Problem: MUSCULOSKELETAL - ADULT  Goal: Maintain or return mobility to safest level of function  Description: INTERVENTIONS:  - Assess patient's ability to carry out ADLs; assess patient's baseline for ADL function and identify physical deficits which impact ability to perform ADLs (bathing, care of mouth/teeth, toileting, grooming, dressing, etc.)  - Assess/evaluate cause of self-care deficits   - Assess range of motion  - Assess patient's mobility  - Assess patient's need for assistive devices and provide as appropriate  - Encourage maximum independence but intervene and supervise when necessary  - Involve family in performance of ADLs  - Assess for home care needs following discharge   - Consider OT consult to assist with ADL evaluation and planning for discharge  - Provide patient education as appropriate  Outcome: Progressing  Goal: Maintain proper alignment of affected body part  Description: INTERVENTIONS:  - Support, maintain and protect limb and body alignment  - Provide patient/ family with appropriate education  Outcome: Progressing

## 2024-10-15 PROBLEM — H35.30 MACULAR DEGENERATION: Status: ACTIVE | Noted: 2024-10-15

## 2024-10-15 LAB
BACTERIA BLD CULT: NORMAL
BACTERIA BLD CULT: NORMAL

## 2024-10-15 PROCEDURE — 94760 N-INVAS EAR/PLS OXIMETRY 1: CPT

## 2024-10-15 PROCEDURE — 97116 GAIT TRAINING THERAPY: CPT

## 2024-10-15 PROCEDURE — 94640 AIRWAY INHALATION TREATMENT: CPT

## 2024-10-15 PROCEDURE — 97530 THERAPEUTIC ACTIVITIES: CPT

## 2024-10-15 PROCEDURE — 97110 THERAPEUTIC EXERCISES: CPT

## 2024-10-15 PROCEDURE — 99232 SBSQ HOSP IP/OBS MODERATE 35: CPT | Performed by: STUDENT IN AN ORGANIZED HEALTH CARE EDUCATION/TRAINING PROGRAM

## 2024-10-15 RX ORDER — OFLOXACIN 3 MG/ML
1 SOLUTION/ DROPS OPHTHALMIC 4 TIMES DAILY
Status: DISCONTINUED | OUTPATIENT
Start: 2024-10-15 | End: 2024-10-19

## 2024-10-15 RX ADMIN — SALINE NASAL SPRAY 2 SPRAY: 1.5 SOLUTION NASAL at 20:17

## 2024-10-15 RX ADMIN — LOSARTAN POTASSIUM 100 MG: 50 TABLET, FILM COATED ORAL at 08:54

## 2024-10-15 RX ADMIN — DICLOFENAC SODIUM 2 G: 10 GEL TOPICAL at 08:54

## 2024-10-15 RX ADMIN — IPRATROPIUM BROMIDE 0.5 MG: 0.5 SOLUTION RESPIRATORY (INHALATION) at 19:30

## 2024-10-15 RX ADMIN — ACETAMINOPHEN 975 MG: 325 TABLET, FILM COATED ORAL at 22:46

## 2024-10-15 RX ADMIN — ACETAMINOPHEN 975 MG: 325 TABLET, FILM COATED ORAL at 05:44

## 2024-10-15 RX ADMIN — MENTHOL 10%, METHYL SALICYLATE 15%: 10; 15 CREAM TOPICAL at 16:03

## 2024-10-15 RX ADMIN — CYANOCOBALAMIN TAB 500 MCG 1000 MCG: 500 TAB at 08:54

## 2024-10-15 RX ADMIN — ACETAMINOPHEN 975 MG: 325 TABLET, FILM COATED ORAL at 14:20

## 2024-10-15 RX ADMIN — ENOXAPARIN SODIUM 30 MG: 100 INJECTION SUBCUTANEOUS at 08:54

## 2024-10-15 RX ADMIN — DICLOFENAC SODIUM 2 G: 10 GEL TOPICAL at 17:26

## 2024-10-15 RX ADMIN — PREGABALIN 25 MG: 25 CAPSULE ORAL at 08:54

## 2024-10-15 RX ADMIN — DICLOFENAC SODIUM 2 G: 10 GEL TOPICAL at 22:40

## 2024-10-15 RX ADMIN — LEVALBUTEROL HYDROCHLORIDE 1.25 MG: 1.25 SOLUTION RESPIRATORY (INHALATION) at 07:29

## 2024-10-15 RX ADMIN — ONDANSETRON 4 MG: 2 INJECTION INTRAMUSCULAR; INTRAVENOUS at 08:28

## 2024-10-15 RX ADMIN — LEVOTHYROXINE SODIUM 25 MCG: 25 TABLET ORAL at 05:44

## 2024-10-15 RX ADMIN — BUDESONIDE 0.5 MG: 0.5 INHALANT ORAL at 07:29

## 2024-10-15 RX ADMIN — SENNOSIDES AND DOCUSATE SODIUM 1 TABLET: 8.6; 5 TABLET ORAL at 17:26

## 2024-10-15 RX ADMIN — PANTOPRAZOLE SODIUM 40 MG: 40 TABLET, DELAYED RELEASE ORAL at 05:44

## 2024-10-15 RX ADMIN — CHLORHEXIDINE GLUCONATE 15 ML: 1.2 RINSE ORAL at 08:54

## 2024-10-15 RX ADMIN — DULOXETINE HYDROCHLORIDE 30 MG: 30 CAPSULE, DELAYED RELEASE ORAL at 22:46

## 2024-10-15 RX ADMIN — BUDESONIDE 0.5 MG: 0.5 INHALANT ORAL at 19:30

## 2024-10-15 RX ADMIN — OFLOXACIN 1 DROP: 3 SOLUTION/ DROPS OPHTHALMIC at 22:41

## 2024-10-15 RX ADMIN — PROPRANOLOL HYDROCHLORIDE 40 MG: 20 TABLET ORAL at 22:47

## 2024-10-15 RX ADMIN — PREGABALIN 25 MG: 25 CAPSULE ORAL at 17:26

## 2024-10-15 RX ADMIN — MENTHOL 10%, METHYL SALICYLATE 15%: 10; 15 CREAM TOPICAL at 08:31

## 2024-10-15 RX ADMIN — DICLOFENAC SODIUM 2 G: 10 GEL TOPICAL at 12:38

## 2024-10-15 RX ADMIN — LIDOCAINE 1 PATCH: 700 PATCH TOPICAL at 08:54

## 2024-10-15 RX ADMIN — PROPRANOLOL HYDROCHLORIDE 40 MG: 20 TABLET ORAL at 08:54

## 2024-10-15 RX ADMIN — SENNOSIDES AND DOCUSATE SODIUM 1 TABLET: 8.6; 5 TABLET ORAL at 08:54

## 2024-10-15 RX ADMIN — OFLOXACIN 1 DROP: 3 SOLUTION/ DROPS OPHTHALMIC at 17:26

## 2024-10-15 RX ADMIN — MENTHOL 10%, METHYL SALICYLATE 15%: 10; 15 CREAM TOPICAL at 22:40

## 2024-10-15 RX ADMIN — LEVALBUTEROL HYDROCHLORIDE 1.25 MG: 1.25 SOLUTION RESPIRATORY (INHALATION) at 19:30

## 2024-10-15 RX ADMIN — Medication 1 SPRAY: at 20:17

## 2024-10-15 RX ADMIN — IPRATROPIUM BROMIDE 0.5 MG: 0.5 SOLUTION RESPIRATORY (INHALATION) at 07:29

## 2024-10-15 RX ADMIN — CHLORHEXIDINE GLUCONATE 15 ML: 1.2 RINSE ORAL at 22:46

## 2024-10-15 RX ADMIN — PREDNISONE 2.5 MG: 2.5 TABLET ORAL at 08:54

## 2024-10-15 NOTE — PLAN OF CARE
Problem: PHYSICAL THERAPY ADULT  Goal: Performs mobility at highest level of function for planned discharge setting.  See evaluation for individualized goals.  Description: Treatment/Interventions: Functional transfer training, LE strengthening/ROM, Elevations, Therapeutic exercise, Endurance training, Patient/family training, Equipment eval/education, Bed mobility, Gait training, Continued evaluation, Spoke to nursing, OT  Equipment Recommended: Walker (pt reports owning RW at home for use)       See flowsheet documentation for full assessment, interventions and recommendations.  Outcome: Progressing  Note: Prognosis: Fair  Problem List: Decreased mobility, Impaired vision, Impaired judgement, Decreased range of motion, Decreased strength  Assessment: Pt. needed redirection and distraction to complete tasks. When pt. particpated actively in performing transfers, LE ROM and amb. minimal physical assistance is all needed. No LOB or knee buckling noted t/o session. Pt. seated on chair post session with alarm engaged and all needs within reach and family present in room. RN was informed of patient's status. Will continue to follow per PT POC. Pt. answered appropriately to all questions asked and engaged in conversations.  Barriers to Discharge: None     Rehab Resource Intensity Level, PT: II (Moderate Resource Intensity)    See flowsheet documentation for full assessment.

## 2024-10-15 NOTE — PLAN OF CARE
Problem: PAIN - ADULT  Goal: Verbalizes/displays adequate comfort level or baseline comfort level  Description: Interventions:  - Encourage patient to monitor pain and request assistance  - Assess pain using appropriate pain scale  - Administer analgesics based on type and severity of pain and evaluate response  - Implement non-pharmacological measures as appropriate and evaluate response  - Consider cultural and social influences on pain and pain management  - Notify physician/advanced practitioner if interventions unsuccessful or patient reports new pain  Outcome: Progressing     Problem: INFECTION - ADULT  Goal: Absence or prevention of progression during hospitalization  Description: INTERVENTIONS:  - Assess and monitor for signs and symptoms of infection  - Monitor lab/diagnostic results  - Monitor all insertion sites, i.e. indwelling lines, tubes, and drains  - Monitor endotracheal if appropriate and nasal secretions for changes in amount and color  - Spruce appropriate cooling/warming therapies per order  - Administer medications as ordered  - Instruct and encourage patient and family to use good hand hygiene technique  - Identify and instruct in appropriate isolation precautions for identified infection/condition  Outcome: Progressing  Goal: Absence of fever/infection during neutropenic period  Description: INTERVENTIONS:  - Monitor WBC    Outcome: Progressing     Problem: SAFETY ADULT  Goal: Patient will remain free of falls  Description: INTERVENTIONS:  - Educate patient/family on patient safety including physical limitations  - Instruct patient to call for assistance with activity   - Consult OT/PT to assist with strengthening/mobility   - Keep Call bell within reach  - Keep bed low and locked with side rails adjusted as appropriate  - Keep care items and personal belongings within reach  - Initiate and maintain comfort rounds  - Make Fall Risk Sign visible to staff  - Offer Toileting every 2 Hours,  in advance of need  - Initiate/Maintain bed alarm  - Obtain necessary fall risk management equipment: alarm   - Apply yellow socks and bracelet for high fall risk patients  - Consider moving patient to room near nurses station  Outcome: Progressing  Goal: Maintain or return to baseline ADL function  Description: INTERVENTIONS:  -  Assess patient's ability to carry out ADLs; assess patient's baseline for ADL function and identify physical deficits which impact ability to perform ADLs (bathing, care of mouth/teeth, toileting, grooming, dressing, etc.)  - Assess/evaluate cause of self-care deficits   - Assess range of motion  - Assess patient's mobility; develop plan if impaired  - Assess patient's need for assistive devices and provide as appropriate  - Encourage maximum independence but intervene and supervise when necessary  - Involve family in performance of ADLs  - Assess for home care needs following discharge   - Consider OT consult to assist with ADL evaluation and planning for discharge  - Provide patient education as appropriate  Outcome: Progressing  Goal: Maintains/Returns to pre admission functional level  Description: INTERVENTIONS:  - Perform AM-PAC 6 Click Basic Mobility/ Daily Activity assessment daily.  - Set and communicate daily mobility goal to care team and patient/family/caregiver.   - Collaborate with rehabilitation services on mobility goals if consulted  - Perform Range of Motion 4 times a day.  - Reposition patient every 2 hours.  - Dangle patient 3 times a day  - Stand patient 3 times a day  - Ambulate patient 3 times a day  - Out of bed to chair 3 times a day   - Out of bed for meals 3 times a day  - Out of bed for toileting  - Record patient progress and toleration of activity level   Outcome: Progressing     Problem: DISCHARGE PLANNING  Goal: Discharge to home or other facility with appropriate resources  Description: INTERVENTIONS:  - Identify barriers to discharge w/patient and  caregiver  - Arrange for needed discharge resources and transportation as appropriate  - Identify discharge learning needs (meds, wound care, etc.)  - Arrange for interpretive services to assist at discharge as needed  - Refer to Case Management Department for coordinating discharge planning if the patient needs post-hospital services based on physician/advanced practitioner order or complex needs related to functional status, cognitive ability, or social support system  Outcome: Progressing     Problem: Knowledge Deficit  Goal: Patient/family/caregiver demonstrates understanding of disease process, treatment plan, medications, and discharge instructions  Description: Complete learning assessment and assess knowledge base.  Interventions:  - Provide teaching at level of understanding  - Provide teaching via preferred learning methods  Outcome: Progressing     Problem: RESPIRATORY - ADULT  Goal: Achieves optimal ventilation and oxygenation  Description: INTERVENTIONS:  - Assess for changes in respiratory status  - Assess for changes in mentation and behavior  - Position to facilitate oxygenation and minimize respiratory effort  - Oxygen administered by appropriate delivery if ordered  - Initiate smoking cessation education as indicated  - Encourage broncho-pulmonary hygiene including cough, deep breathe, Incentive Spirometry  - Assess the need for suctioning and aspirate as needed  - Assess and instruct to report SOB or any respiratory difficulty  - Respiratory Therapy support as indicated  Outcome: Progressing     Problem: Nutrition/Hydration-ADULT  Goal: Nutrient/Hydration intake appropriate for improving, restoring or maintaining nutritional needs  Description: Monitor and assess patient's nutrition/hydration status for malnutrition. Collaborate with interdisciplinary team and initiate plan and interventions as ordered.  Monitor patient's weight and dietary intake as ordered or per policy. Utilize nutrition  screening tool and intervene as necessary. Determine patient's food preferences and provide high-protein, high-caloric foods as appropriate.     INTERVENTIONS:  - Monitor oral intake, urinary output, labs, and treatment plans  - Assess nutrition and hydration status and recommend course of action  - Evaluate amount of meals eaten  - Assist patient with eating if necessary   - Allow adequate time for meals  - Recommend/ encourage appropriate diets, oral nutritional supplements, and vitamin/mineral supplements  - Order, calculate, and assess calorie counts as needed  - Recommend, monitor, and adjust tube feedings and TPN/PPN based on assessed needs  - Assess need for intravenous fluids  - Provide specific nutrition/hydration education as appropriate  - Include patient/family/caregiver in decisions related to nutrition  Outcome: Progressing     Problem: Prexisting or High Potential for Compromised Skin Integrity  Goal: Skin integrity is maintained or improved  Description: INTERVENTIONS:  - Identify patients at risk for skin breakdown  - Assess and monitor skin integrity  - Assess and monitor nutrition and hydration status  - Monitor labs   - Assess for incontinence   - Turn and reposition patient  - Assist with mobility/ambulation  - Relieve pressure over bony prominences  - Avoid friction and shearing  - Provide appropriate hygiene as needed including keeping skin clean and dry  - Evaluate need for skin moisturizer/barrier cream  - Collaborate with interdisciplinary team   - Patient/family teaching  - Consider wound care consult   Outcome: Progressing     Problem: SKIN/TISSUE INTEGRITY - ADULT  Goal: Skin Integrity remains intact(Skin Breakdown Prevention)  Description: Assess:  -Perform Lonnie assessment every shift  -Clean and moisturize skin every shift  -Inspect skin when repositioning, toileting, and assisting with ADLS  -Assess under medical devices such as iv every shift   -Assess extremities for adequate  circulation and sensation     Bed Management:  -Have minimal linens on bed & keep smooth, unwrinkled  -Change linens as needed when moist or perspiring  -Avoid sitting or lying in one position for more than 2 hours while in bed  -Keep HOB at 30 degrees     Toileting:  -Offer bedside commode  -Assess for incontinence every shift   -Use incontinent care products after each incontinent episode such as foam      Activity:  -Mobilize patient 3 times a day  -Encourage activity and walks on unit  -Encourage or provide ROM exercises   -Turn and reposition patient every 2 Hours  -Use appropriate equipment to lift or move patient in bed  -Instruct/ Assist with weight shifting every shift  when out of bed in chair  -Consider limitation of chair time 2 hour intervals    Skin Care:  -Avoid use of baby powder, tape, friction and shearing, hot water or constrictive clothing  -Relieve pressure over bony prominences using pillows   -Do not massage red bony areas    Next Steps:  -Teach patient strategies to minimize risks such as wound    -Consider consults to  interdisciplinary teams such as wound care   Outcome: Progressing     Problem: MUSCULOSKELETAL - ADULT  Goal: Maintain or return mobility to safest level of function  Description: INTERVENTIONS:  - Assess patient's ability to carry out ADLs; assess patient's baseline for ADL function and identify physical deficits which impact ability to perform ADLs (bathing, care of mouth/teeth, toileting, grooming, dressing, etc.)  - Assess/evaluate cause of self-care deficits   - Assess range of motion  - Assess patient's mobility  - Assess patient's need for assistive devices and provide as appropriate  - Encourage maximum independence but intervene and supervise when necessary  - Involve family in performance of ADLs  - Assess for home care needs following discharge   - Consider OT consult to assist with ADL evaluation and planning for discharge  - Provide patient education as  appropriate  Outcome: Progressing  Goal: Maintain proper alignment of affected body part  Description: INTERVENTIONS:  - Support, maintain and protect limb and body alignment  - Provide patient/ family with appropriate education  Outcome: Progressing

## 2024-10-15 NOTE — ASSESSMENT & PLAN NOTE
Follows with [facility]  Continue artifical tears  Reports decrease sight in her left eye with discharge  Recommend trial with ofloxacin

## 2024-10-15 NOTE — CASE MANAGEMENT
Case Management Discharge Planning Note    Patient name Sarah Zayas  Location [place] [address] /[address] 2 M* MRN [id number]  : 1945 Date 10/15/2024       Current Admission Date: 10/10/2024  Current Admission Diagnosis:Anxiety/depression   Patient Active Problem List    Diagnosis Date Noted Date Diagnosed    Hypothyroidism 10/13/2024     Acute on chronic respiratory failure with hypoxia and hypercapnia (HCC) 10/10/2024     Neck pain/shoulder pain 10/10/2024     Acute respiratory failure with hypercapnia (HCC) 10/10/2024     Venous insufficiency of lower extremity 2024     Epigastric pain 2024     Advance care planning 2024     At risk for constipation 2024     At risk for delirium 2024     Physical deconditioning 2024     Periodontitis 2024     Polypharmacy 2024     Diastolic dysfunction 2024     Elevated vitamin B12 level 2024     Normocytic anemia 2024     Neck swelling 2024     Anxiety/depression 2024     Severe protein-calorie malnutrition (HCC) 2024     COPD, severe ([facility]) 2024     Abnormal CT scan 2023     Infectious sialoadenitis of major salivary gland 2023     Left upper lobe pulmonary nodule 2023     Postnasal drip 2023     Raynaud's phenomenon without gangrene 2022     Temporal arteritis ([facility]) 2020     Hypertension 10/11/2018     Other specified hypothyroidism 2018     Gastroesophageal reflux disease without esophagitis 2018     Acute exacerbation of chronic obstructive pulmonary disease (COPD) (HCC) 2012     Vitamin D deficiency 2012     Osteoarthritis 2012       LOS (days): 5  Geometric Mean LOS (GMLOS) (days): 3.4  Days to GMLOS:-1.8     OBJECTIVE:  Risk of Unplanned Readmission Score: 33.05         Current admission status: Inpatient   Preferred Pharmacy:   [facility] - [place] - [address]  [address]  [address]  [place] [address]  Phone: [phone] Fax: [phone]    [facility] - [place] - [address] [place] [id number]  Phone: [phone] Fax: [phone]    Primary Care Provider: Nicolas Nix MD    Primary Insurance: ALVA SINHA  Secondary Insurance:     DISCHARGE DETAILS:                                                                                                               Facility Insurance Auth Number: [id number]

## 2024-10-15 NOTE — ASSESSMENT & PLAN NOTE
History of severe COPD  Initial concern for exacerbation but likely oversedation from benzodiazepine  Patient placed initially on BiPAP sepsis workup has been unremarkable  Continue Atrovent and Xopenex  improved

## 2024-10-15 NOTE — CASE MANAGEMENT
[place] [facility] has received APPROVED authorization.  Insurance:   [employer]  Called in by Rep: Danna  Authorization received for: [facility]  Facility: [facility]   Authorization #: [id number]   Start of Care: 10/15  Next Review Date: 10/22  Continued Stay Care Coordinator:   TINO#: [phone]  Submit next review to: [phone]     Care Manager notified: Kavitha Cameron    Please reach out to  for updates on any clinical information.

## 2024-10-15 NOTE — ASSESSMENT & PLAN NOTE
Evidence of bicep tendinitis  Add Voltaren gel and Bengay  PT OT recommending rehab  Case management following, insurance authorization approved  Plan for discharge to [facility] tomorrow

## 2024-10-15 NOTE — PROGRESS NOTES
Progress Note - Hospitalist   Name: Sarah Zayas 79 y.o. female I MRN: [id number]  Unit/Bed#: [place] [address] -01 I Date of Admission: 10/10/2024   Date of Service: 10/15/2024 I Hospital Day: 5    Assessment & Plan  Acute respiratory failure with hypercapnia (HCC)  Back to baseline oxygen, 3-4L NC  Acute exacerbation of chronic obstructive pulmonary disease (COPD) (HCC)  History of severe COPD  Initial concern for exacerbation but likely oversedation from benzodiazepine  Patient placed initially on BiPAP sepsis workup has been unremarkable  Continue Atrovent and Xopenex  improved  Gastroesophageal reflux disease without esophagitis  Continue PPI therapy  Hypertension  Resume losartan, propranolol  Elevated blood pressure, continue to monitor at this time and may require further titration    Temporal arteritis (HCC)  Continue prednisone 2.5 mg  Anxiety/depression  Evaluated by psychiatry  Recently discontinued on alprazolam  Continue BuSpar 5 mg twice daily  Will change to Lyrica 25 mg twice a day, discontinue gabapentin  Discontinue Remeron and start Cymbalta 30 mg  Anxiety largely driving patient's symptoms    Neck pain/shoulder pain  Evidence of bicep tendinitis  Add Voltaren gel and Bengay  PT OT recommending rehab  Case management following, insurance authorization approved  Plan for discharge to [facility] tomorrow    Hypothyroidism  Levothyroxine reduced to 25 mcg  TSH suppressed and free T4 elevated-suspect some iatrogenic hyperthyroidism  Given patient's symptoms and agitation possibly related  Macular degeneration  Follows with [facility]  Continue artifical tears  Reports decrease sight in her left eye with discharge  Recommend trial with ofloxacin    VTE Pharmacologic Prophylaxis:   Moderate Risk (Score 3-4) - Pharmacological DVT Prophylaxis Ordered: enoxaparin (Lovenox).    Mobility:   Basic Mobility Inpatient Raw Score: 18  -HLM Goal: 6: Walk 10 steps or more  -HLM Achieved: 7: Walk 25  feet or more  JH-HLM Goal achieved. Continue to encourage appropriate mobility.    Patient Centered Rounds: I performed bedside rounds with nursing staff today.     Current Length of Stay: 5 day(s)  Current Patient Status: Inpatient   Certification Statement: The patient will continue to require additional inpatient hospital stay due to pending discharge tmr  Discharge Plan: Anticipate discharge tomorrow to rehab facility.    Code Status: Level 1 - Full Code    Subjective   Patient reports doing well today.  Had difficulty seeing through her left eye, reporting discharge this morning. Denies any pain. Family at bedside, they were updated.    Objective :  Temp:  [97.4 °F (36.3 °C)-98.2 °F (36.8 °C)] 97.4 °F (36.3 °C)  HR:  [] 74  BP: (129-166)/(64-92) 136/64  Resp:  [16] 16  SpO2:  [95 %-100 %] 98 %  O2 Device: Nasal cannula  Nasal Cannula O2 Flow Rate (L/min):  [4 L/min] 4 L/min    Body mass index is 19.52 kg/m².     Input and Output Summary (last 24 hours):     Intake/Output Summary (Last 24 hours) at 10/15/2024 1632  Last data filed at 10/15/2024 1400  Gross per 24 hour   Intake 660 ml   Output 675 ml   Net -15 ml       Physical Exam  Vitals and nursing note reviewed.   Constitutional:       General: She is not in acute distress.     Appearance: Normal appearance.      Comments: Thin appearing   HENT:      Head: Normocephalic.   Eyes:      Conjunctiva/sclera: Conjunctivae normal.   Cardiovascular:      Rate and Rhythm: Normal rate.   Pulmonary:      Effort: Pulmonary effort is normal.      Breath sounds: Normal breath sounds. No wheezing or rhonchi.   Abdominal:      General: Bowel sounds are normal. There is no distension.      Palpations: Abdomen is soft.      Tenderness: There is no abdominal tenderness.   Musculoskeletal:         General: No swelling. Normal range of motion.   Skin:     General: Skin is warm and dry.   Neurological:      General: No focal deficit present.      Mental Status: She is alert  and oriented to person, place, and time. Mental status is at baseline.       Lines/Drains:              Lab Results: I have reviewed the following results:   Results from last 7 days   Lab Units 10/13/24  0456 10/11/24  0400 10/10/24  1102   WBC Thousand/uL 6.19   < > 7.20   HEMOGLOBIN g/dL 12.5   < > 11.8   HEMATOCRIT % 42.7   < > 39.7   PLATELETS Thousands/uL 197   < > 159   SEGS PCT %  --   --  80*   LYMPHO PCT %  --   --  6*   MONO PCT %  --   --  11   EOS PCT %  --   --  3    < > = values in this interval not displayed.     Results from last 7 days   Lab Units 10/14/24  0513 10/11/24  0400 10/10/24  1102   SODIUM mmol/L 136   < > 134*   POTASSIUM mmol/L 4.8   < > 4.3   CHLORIDE mmol/L 88*   < > 95*   CO2 mmol/L 39*   < > 34*   BUN mg/dL 17   < > 15   CREATININE mg/dL 0.42*   < > 0.45*   ANION GAP mmol/L 9   < > 5   CALCIUM mg/dL 8.7   < > 8.5   ALBUMIN g/dL  --   --  3.7   TOTAL BILIRUBIN mg/dL  --   --  0.37   ALK PHOS U/L  --   --  38   ALT U/L  --   --  16   AST U/L  --   --  21   GLUCOSE RANDOM mg/dL 93   < > 86    < > = values in this interval not displayed.         Results from last 7 days   Lab Units 10/10/24  0104 10/09/24  0625   POC GLUCOSE mg/dl 80 103         Results from last 7 days   Lab Units 10/10/24  0137   LACTIC ACID mmol/L 0.6   PROCALCITONIN ng/ml 0.20       Recent Cultures (last 7 days):   Results from last 7 days   Lab Units 10/10/24  0137   BLOOD CULTURE  No Growth After 5 Days.  No Growth After 5 Days.       Imaging Results Review: I personally reviewed the following image studies/reports in PACS and discussed pertinent findings with Radiology:  . My interpretation of the radiology images/reports is:  .  Other Study Results Review: No additional pertinent studies reviewed.    Last 24 Hours Medication List:     Current Facility-Administered Medications:     acetaminophen (TYLENOL) tablet 975 mg, Q8H RACHEL    albuterol (PROVENTIL HFA,VENTOLIN HFA) inhaler 2 puff, Q6H PRN    Artificial  Tears Op Soln 1 drop, TID    budesonide (PULMICORT) inhalation solution 0.5 mg, BID    chlorhexidine (PERIDEX) 0.12 % oral rinse 15 mL, Q12H RACHEL    cyanocobalamin (VITAMIN B-12) tablet 1,000 mcg, Daily    Diclofenac Sodium (VOLTAREN) 1 % topical gel 2 g, 4x Daily    DULoxetine (CYMBALTA) delayed release capsule 30 mg, HS    enoxaparin (LOVENOX) subcutaneous injection 30 mg, Daily    ipratropium (ATROVENT) 0.02 % inhalation solution 0.5 mg, BID    ipratropium-albuterol (DUO-NEB) 0.5-2.5 mg/3 mL inhalation solution 3 mL, Q6H PRN    levalbuterol (XOPENEX) inhalation solution 1.25 mg, BID    levothyroxine tablet 25 mcg, Early Morning    lidocaine (LIDODERM) 5 % patch 1 patch, Daily    losartan (COZAAR) tablet 100 mg, Daily    magnesium hydroxide (MILK OF MAGNESIA) oral suspension 30 mL, Daily PRN    melatonin tablet 3 mg, HS PRN    menthol-methyl salicylate (BENGAY) 10-15 % cream, TID    ofloxacin (OCUFLOX) 0.3 % ophthalmic solution 1 drop, 4x Daily    ondansetron (ZOFRAN) injection 4 mg, Q6H PRN    pantoprazole (PROTONIX) EC tablet 40 mg, Early Morning    polyethylene glycol (MIRALAX) packet 17 g, Daily    predniSONE tablet 2.5 mg, Daily With Breakfast    pregabalin (LYRICA) capsule 25 mg, BID    propranolol (INDERAL) tablet 40 mg, Q12H RACHEL    senna-docusate sodium (SENOKOT S) 8.6-50 mg per tablet 1 tablet, BID    sodium chloride (OCEAN) 0.65 % nasal spray 2 spray, Q1H PRN    Administrative Statements   Today, Patient Was Seen By: Bhupendra Medley MD  I have spent a total time of 45 minutes in caring for this patient on the day of the visit/encounter including Patient and family education and Counseling / Coordination of care.    **Please Note: This note may have been constructed using a voice recognition system.**

## 2024-10-15 NOTE — PLAN OF CARE
Problem: PAIN - ADULT  Goal: Verbalizes/displays adequate comfort level or baseline comfort level  Description: Interventions:  - Encourage patient to monitor pain and request assistance  - Assess pain using appropriate pain scale  - Administer analgesics based on type and severity of pain and evaluate response  - Implement non-pharmacological measures as appropriate and evaluate response  - Consider cultural and social influences on pain and pain management  - Notify physician/advanced practitioner if interventions unsuccessful or patient reports new pain  Outcome: Progressing     Problem: INFECTION - ADULT  Goal: Absence or prevention of progression during hospitalization  Description: INTERVENTIONS:  - Assess and monitor for signs and symptoms of infection  - Monitor lab/diagnostic results  - Monitor all insertion sites, i.e. indwelling lines, tubes, and drains  - Monitor endotracheal if appropriate and nasal secretions for changes in amount and color  - [place] appropriate cooling/warming therapies per order  - Administer medications as ordered  - Instruct and encourage patient and family to use good hand hygiene technique  - Identify and instruct in appropriate isolation precautions for identified infection/condition  Outcome: Progressing  Goal: Absence of fever/infection during neutropenic period  Description: INTERVENTIONS:  - Monitor WBC    Outcome: Progressing     Problem: SAFETY ADULT  Goal: Patient will remain free of falls  Description: INTERVENTIONS:  - Educate patient/family on patient safety including physical limitations  - Instruct patient to call for assistance with activity   - Consult OT/PT to assist with strengthening/mobility   - Keep Call bell within reach  - Keep bed low and locked with side rails adjusted as appropriate  - Keep care items and personal belongings within reach  - Initiate and maintain comfort rounds  - Make Fall Risk Sign visible to staff  - Offer Toileting every 2 Hours,  in advance of need  - Initiate/Maintain bed/chair alarm  - Obtain necessary fall risk management equipment: walker  - Apply yellow socks and bracelet for high fall risk patients  - Consider moving patient to room near nurses station  Outcome: Progressing  Goal: Maintain or return to baseline ADL function  Description: INTERVENTIONS:  -  Assess patient's ability to carry out ADLs; assess patient's baseline for ADL function and identify physical deficits which impact ability to perform ADLs (bathing, care of mouth/teeth, toileting, grooming, dressing, etc.)  - Assess/evaluate cause of self-care deficits   - Assess range of motion  - Assess patient's mobility; develop plan if impaired  - Assess patient's need for assistive devices and provide as appropriate  - Encourage maximum independence but intervene and supervise when necessary  - Involve family in performance of ADLs  - Assess for home care needs following discharge   - Consider OT consult to assist with ADL evaluation and planning for discharge  - Provide patient education as appropriate  Outcome: Progressing  Goal: Maintains/Returns to pre admission functional level  Description: INTERVENTIONS:  - Perform AM-PAC 6 Click Basic Mobility/ Daily Activity assessment daily.  - Set and communicate daily mobility goal to care team and patient/family/caregiver.   - Collaborate with rehabilitation services on mobility goals if consulted  - Perform Range of Motion 3 times a day.  - Reposition patient every 2 hours.  - Dangle patient 3 times a day  - Stand patient 3 times a day  - Ambulate patient 3 times a day  - Out of bed to chair 3 times a day   - Out of bed for meals 3 times a day  - Out of bed for toileting  - Record patient progress and toleration of activity level   Outcome: Progressing     Problem: DISCHARGE PLANNING  Goal: Discharge to home or other facility with appropriate resources  Description: INTERVENTIONS:  - Identify barriers to discharge w/patient and  caregiver  - Arrange for needed discharge resources and transportation as appropriate  - Identify discharge learning needs (meds, wound care, etc.)  - Arrange for interpretive services to assist at discharge as needed  - Refer to Case Management Department for coordinating discharge planning if the patient needs post-hospital services based on physician/advanced practitioner order or complex needs related to functional status, cognitive ability, or social support system  Outcome: Progressing     Problem: Knowledge Deficit  Goal: Patient/family/caregiver demonstrates understanding of disease process, treatment plan, medications, and discharge instructions  Description: Complete learning assessment and assess knowledge base.  Interventions:  - Provide teaching at level of understanding  - Provide teaching via preferred learning methods  Outcome: Progressing     Problem: RESPIRATORY - ADULT  Goal: Achieves optimal ventilation and oxygenation  Description: INTERVENTIONS:  - Assess for changes in respiratory status  - Assess for changes in mentation and behavior  - Position to facilitate oxygenation and minimize respiratory effort  - Oxygen administered by appropriate delivery if ordered  - Initiate smoking cessation education as indicated  - Encourage broncho-pulmonary hygiene including cough, deep breathe, Incentive Spirometry  - Assess the need for suctioning and aspirate as needed  - Assess and instruct to report SOB or any respiratory difficulty  - Respiratory Therapy support as indicated  Outcome: Progressing     Problem: Nutrition/Hydration-ADULT  Goal: Nutrient/Hydration intake appropriate for improving, restoring or maintaining nutritional needs  Description: Monitor and assess patient's nutrition/hydration status for malnutrition. Collaborate with interdisciplinary team and initiate plan and interventions as ordered.  Monitor patient's weight and dietary intake as ordered or per policy. Utilize nutrition  screening tool and intervene as necessary. Determine patient's food preferences and provide high-protein, high-caloric foods as appropriate.     INTERVENTIONS:  - Monitor oral intake, urinary output, labs, and treatment plans  - Assess nutrition and hydration status and recommend course of action  - Evaluate amount of meals eaten  - Assist patient with eating if necessary   - Allow adequate time for meals  - Recommend/ encourage appropriate diets, oral nutritional supplements, and vitamin/mineral supplements  - Order, calculate, and assess calorie counts as needed  - Recommend, monitor, and adjust tube feedings and TPN/PPN based on assessed needs  - Assess need for intravenous fluids  - Provide specific nutrition/hydration education as appropriate  - Include patient/family/caregiver in decisions related to nutrition  Outcome: Progressing     Problem: Prexisting or High Potential for Compromised Skin Integrity  Goal: Skin integrity is maintained or improved  Description: INTERVENTIONS:  - Identify patients at risk for skin breakdown  - Assess and monitor skin integrity  - Assess and monitor nutrition and hydration status  - Monitor labs   - Assess for incontinence   - Turn and reposition patient  - Assist with mobility/ambulation  - Relieve pressure over bony prominences  - Avoid friction and shearing  - Provide appropriate hygiene as needed including keeping skin clean and dry  - Evaluate need for skin moisturizer/barrier cream  - Collaborate with interdisciplinary team   - Patient/family teaching  - Consider wound care consult   Outcome: Progressing     Problem: SKIN/TISSUE INTEGRITY - ADULT  Goal: Skin Integrity remains intact(Skin Breakdown Prevention)  Description: Assess:  -Perform Lonnie assessment every shift  -Clean and moisturize skin every shift  -Inspect skin when repositioning, toileting, and assisting with ADLS  -Assess extremities for adequate circulation and sensation     Bed Management:  -Have  minimal linens on bed & keep smooth, unwrinkled  -Change linens as needed when moist or perspiring  -Avoid sitting or lying in one position for more than 2 hours while in bed  -Keep HOB at 30 degrees     Toileting:  -Offer bedside commode    Activity:  -Mobilize patient 3 times a day  -Encourage activity and walks on unit  -Encourage or provide ROM exercises   -Turn and reposition patient every 2 Hours  -Use appropriate equipment to lift or move patient in bed  -Instruct/ Assist with weight shifting every 3 when out of bed in chair  -Consider limitation of chair time 2 hour intervals    Skin Care:  -Avoid use of baby powder, tape, friction and shearing, hot water or constrictive clothing  -Relieve pressure over bony prominences using Allevyns  -Do not massage red bony areas    Outcome: Progressing     Problem: MUSCULOSKELETAL - ADULT  Goal: Maintain or return mobility to safest level of function  Description: INTERVENTIONS:  - Assess patient's ability to carry out ADLs; assess patient's baseline for ADL function and identify physical deficits which impact ability to perform ADLs (bathing, care of mouth/teeth, toileting, grooming, dressing, etc.)  - Assess/evaluate cause of self-care deficits   - Assess range of motion  - Assess patient's mobility  - Assess patient's need for assistive devices and provide as appropriate  - Encourage maximum independence but intervene and supervise when necessary  - Involve family in performance of ADLs  - Assess for home care needs following discharge   - Consider OT consult to assist with ADL evaluation and planning for discharge  - Provide patient education as appropriate  Outcome: Progressing  Goal: Maintain proper alignment of affected body part  Description: INTERVENTIONS:  - Support, maintain and protect limb and body alignment  - Provide patient/ family with appropriate education  Outcome: Progressing

## 2024-10-15 NOTE — CASE MANAGEMENT
Case Management Discharge Planning Note    Patient name Sarah Zayas  Location [place] [address] /South 2 M* MRN [id number]  : 1945 Date 10/15/2024       Current Admission Date: 10/10/2024  Current Admission Diagnosis:Anxiety/depression   Patient Active Problem List    Diagnosis Date Noted Date Diagnosed    Macular degeneration 10/15/2024     Hypothyroidism 10/13/2024     Acute on chronic respiratory failure with hypoxia and hypercapnia (HCC) 10/10/2024     Neck pain/shoulder pain 10/10/2024     Acute respiratory failure with hypercapnia (HCC) 10/10/2024     Venous insufficiency of lower extremity 2024     Epigastric pain 2024     Advance care planning 2024     At risk for constipation 2024     At risk for delirium 2024     Physical deconditioning 2024     Periodontitis 2024     Polypharmacy 2024     Diastolic dysfunction 2024     Elevated vitamin B12 level 2024     Normocytic anemia 2024     Neck swelling 2024     Anxiety/depression 2024     Severe protein-calorie malnutrition (HCC) 2024     COPD, severe ([facility]) 2024     Abnormal CT scan 2023     Infectious sialoadenitis of major salivary gland 2023     Left upper lobe pulmonary nodule 2023     Postnasal drip 2023     Raynaud's phenomenon without gangrene 2022     Temporal arteritis ([facility]) 2020     Hypertension 10/11/2018     Other specified hypothyroidism 2018     Gastroesophageal reflux disease without esophagitis 2018     Acute exacerbation of chronic obstructive pulmonary disease (COPD) (HCC) 2012     Vitamin D deficiency 2012     Osteoarthritis 2012       LOS (days): 5  Geometric Mean LOS (GMLOS) (days): 3.4  Days to GMLOS:-1.9     OBJECTIVE:  Risk of Unplanned Readmission Score: 33.39         Current admission status: Inpatient   Preferred Pharmacy:   [facility] - [place] [facility] - [address]  [address]  [address]  [place] [address]  Phone: [phone] Fax: [phone]    [facility] [place] - [facility]  [address] [address]  Phone: [phone] Fax: [phone]    Primary Care Provider: Nicolas Nix MD    Primary Insurance: [employer]  REP  Secondary Insurance:     DISCHARGE DETAILS:     Transport at Discharge : [facility]     Number/Name of Dispatcher: [facility]  Transported by (Company and Unit #): TBD  ETA of Transport (Date): 10/16/24  ETA of Transport (Time): 1000     Additional Comments: Auth received for [facility], Dr. Medley requesting pt be set up with transportation for tomorrow morning. Per [facility] communcation, [facility] is able to accept pt tomorrow.  sent [facility] transport request via [facility] due to pt being on 4L 02, awaiting confirmation of transportation time. Dr. Medley, pt's son, and pt's sister aware of requested transportation time. CM department to follow.    Accepting Facility Name, City & State : [facility]  Receiving Facility/Agency Phone Number: [phone] ext [phone]  Facility/Agency Fax Number: [phone]

## 2024-10-15 NOTE — PHYSICAL THERAPY NOTE
Physical Therapy Treatment Note     10/15/24 1510   PT Last Visit   PT Visit Date 10/15/24   Note Type   Note Type Treatment   Pain Assessment   Pain Assessment Tool 0-10   Pain Location/Orientation Location: Generalized   Restrictions/Precautions   Weight Bearing Precautions Per Order No   Other Precautions Bed Alarm;Chair Alarm;Cognitive;O2;Fall Risk;Telemetry;Visual impairment   General   Chart Reviewed Yes   Family/Caregiver Present Yes   Subjective   Subjective Pt. agreeable to PT with encouragement. Family reported pt. reported she cannot see from her L eye.   Bed Mobility   Supine to Sit 4  Minimal assistance   Additional items Assist x 1;HOB elevated;Increased time required;LE management;Verbal cues   Transfers   Sit to Stand   (CGA)   Additional items Assist x 1;Increased time required;Verbal cues   Stand to Sit   (CGA)   Additional items Assist x 1;Increased time required;Verbal cues   Stand pivot   (CGA)   Additional items Increased time required;Verbal cues   Ambulation/Elevation   Gait pattern Narrow DEVONTE;Decreased foot clearance;Excessively slow;Short stride   Gait Assistance   (CGA)   Additional items Assist x 1;Verbal cues   Assistive Device Rolling walker   Distance 30ft   Balance   Static Sitting Good   Dynamic Sitting Fair   Static Standing Fair   Dynamic Standing Fair -   Ambulatory Fair -   Endurance Deficit   Endurance Deficit Yes   Endurance Deficit Description Nausea   Activity Tolerance   Activity Tolerance Patient tolerated treatment well   Nurse Made Aware yes   Exercises   THR Sitting;Supine;Bilateral;10 reps;20 reps;AAROM;AROM   Assessment   Prognosis Fair   Problem List Decreased mobility;Impaired vision;Impaired judgement;Decreased range of motion;Decreased strength   Assessment Pt. needed redirection and distraction to complete tasks. When pt. particpated actively in performing transfers, LE ROM and amb. minimal physical assistance is all needed. No LOB or knee buckling noted t/o  session. Pt. seated on chair post session with alarm engaged and all needs within reach and family present in room. RN was informed of patient's status. Will continue to follow per PT POC. Pt. answered appropriately to all questions asked and engaged in conversations.   Barriers to Discharge None   Goals   Patient Goals None reproted   STG Expiration Date 10/21/24   PT Treatment Day 2   Plan   Treatment/Interventions Functional transfer training;LE strengthening/ROM;Therapeutic exercise;Spoke to nursing;Family;Gait training;Bed mobility;Equipment eval/education;Patient/family training;Cognitive reorientation   Progress Progressing toward goals   PT Frequency 3-5x/wk   Discharge Recommendation   Rehab Resource Intensity Level, PT II (Moderate Resource Intensity)   Equipment Recommended Walker   AM-PAC Basic Mobility Inpatient   Turning in Flat Bed Without Bedrails 3   Lying on Back to Sitting on Edge of Flat Bed Without Bedrails 3   Moving Bed to Chair 3   Standing Up From Chair Using Arms 3   Walk in Room 3   Climb 3-5 Stairs With Railing 3   Basic Mobility Inpatient Raw Score 18   Basic Mobility Standardized Score 41.05   [facility] Highest Level Of Mobility   -HLM Goal 6: Walk 10 steps or more   -HLM Achieved 7: Walk 25 feet or more   End of Consult   Patient Position at End of Consult All needs within reach;Bed/Chair alarm activated;Bedside chair           Maurilio Murray PTA    An AM-PAC basic mobility standardized score less than 42.9 suggest the patient may benefit from discharge to post-acute rehab services.

## 2024-10-15 NOTE — CASE MANAGEMENT
Delfin Shay is an OT from [facility] at [facility]. She states that the pt has been weighing herself since 4/3 like she was told to do after her heart failure. Delfin Shay states that the pt has lost 12-14lbs in this time frame. Pt was on furosemide, but wasn't talking it until 5 days ago. Now, pt states she is using the bathroom every half hour. Delfin Shay is asking for a call to the pt to talk to her, but if there are any questions, can call Delfin Shay. Per [employer], SNF auth still pending.     [employer] email sent to [employer] Team requesting expedite of determination.     REY notified:  Loyda Cameron

## 2024-10-16 ENCOUNTER — APPOINTMENT (INPATIENT)
Dept: MRI IMAGING | Facility: HOSPITAL | Age: 79
DRG: 189 | End: 2024-10-16
Payer: COMMERCIAL

## 2024-10-16 PROBLEM — G93.40 ENCEPHALOPATHY: Status: ACTIVE | Noted: 2024-10-16

## 2024-10-16 PROBLEM — R33.9 URINARY RETENTION: Status: ACTIVE | Noted: 2024-10-16

## 2024-10-16 LAB
ALBUMIN SERPL BCG-MCNC: 3.6 G/DL (ref 3.5–5)
ALP SERPL-CCNC: 41 U/L (ref 34–104)
ALT SERPL W P-5'-P-CCNC: 25 U/L (ref 7–52)
AMMONIA PLAS-SCNC: 37 UMOL/L (ref 18–72)
ANION GAP SERPL CALCULATED.3IONS-SCNC: 1 MMOL/L (ref 4–13)
AST SERPL W P-5'-P-CCNC: 30 U/L (ref 13–39)
BACTERIA UR QL AUTO: ABNORMAL /HPF
BASE EX.OXY STD BLDV CALC-SCNC: 86.6 % (ref 60–80)
BASE EX.OXY STD BLDV CALC-SCNC: 89.8 % (ref 60–80)
BASE EXCESS BLDV CALC-SCNC: 11.8 MMOL/L
BASE EXCESS BLDV CALC-SCNC: 12 MMOL/L
BASOPHILS # BLD AUTO: 0.02 THOUSANDS/ΜL (ref 0–0.1)
BASOPHILS NFR BLD AUTO: 0 % (ref 0–1)
BILIRUB SERPL-MCNC: 0.26 MG/DL (ref 0.2–1)
BILIRUB UR QL STRIP: NEGATIVE
BUN SERPL-MCNC: 29 MG/DL (ref 5–25)
CALCIUM SERPL-MCNC: 8.8 MG/DL (ref 8.4–10.2)
CHLORIDE SERPL-SCNC: 78 MMOL/L (ref 96–108)
CLARITY UR: ABNORMAL
CO2 SERPL-SCNC: 45 MMOL/L (ref 21–32)
COLOR UR: YELLOW
CREAT SERPL-MCNC: 0.43 MG/DL (ref 0.6–1.3)
EOSINOPHIL # BLD AUTO: 0.07 THOUSAND/ΜL (ref 0–0.61)
EOSINOPHIL NFR BLD AUTO: 1 % (ref 0–6)
ERYTHROCYTE [DISTWIDTH] IN BLOOD BY AUTOMATED COUNT: 13 % (ref 11.6–15.1)
GFR SERPL CREATININE-BSD FRML MDRD: 97 ML/MIN/1.73SQ M
GLUCOSE SERPL-MCNC: 159 MG/DL (ref 65–140)
GLUCOSE UR STRIP-MCNC: ABNORMAL MG/DL
HCO3 BLDV-SCNC: 40.8 MMOL/L (ref 24–30)
HCO3 BLDV-SCNC: 41.7 MMOL/L (ref 24–30)
HCT VFR BLD AUTO: 35.9 % (ref 34.8–46.1)
HGB BLD-MCNC: 11.1 G/DL (ref 11.5–15.4)
HGB UR QL STRIP.AUTO: NEGATIVE
HYALINE CASTS #/AREA URNS LPF: ABNORMAL /LPF
IMM GRANULOCYTES # BLD AUTO: 0.02 THOUSAND/UL (ref 0–0.2)
IMM GRANULOCYTES NFR BLD AUTO: 0 % (ref 0–2)
KETONES UR STRIP-MCNC: NEGATIVE MG/DL
LEUKOCYTE ESTERASE UR QL STRIP: ABNORMAL
LYMPHOCYTES # BLD AUTO: 0.51 THOUSANDS/ΜL (ref 0.6–4.47)
LYMPHOCYTES NFR BLD AUTO: 8 % (ref 14–44)
MCH RBC QN AUTO: 28.8 PG (ref 26.8–34.3)
MCHC RBC AUTO-ENTMCNC: 30.9 G/DL (ref 31.4–37.4)
MCV RBC AUTO: 93 FL (ref 82–98)
MONOCYTES # BLD AUTO: 0.39 THOUSAND/ΜL (ref 0.17–1.22)
MONOCYTES NFR BLD AUTO: 6 % (ref 4–12)
MUCOUS THREADS UR QL AUTO: ABNORMAL
NEUTROPHILS # BLD AUTO: 5.06 THOUSANDS/ΜL (ref 1.85–7.62)
NEUTS SEG NFR BLD AUTO: 85 % (ref 43–75)
NITRITE UR QL STRIP: NEGATIVE
NON-SQ EPI CELLS URNS QL MICRO: ABNORMAL /HPF
NRBC BLD AUTO-RTO: 0 /100 WBCS
O2 CT BLDV-SCNC: 14.4 ML/DL
O2 CT BLDV-SCNC: 15.3 ML/DL
PCO2 BLDV: 78.6 MM HG (ref 42–50)
PCO2 BLDV: 88.1 MM HG (ref 42–50)
PH BLDV: 7.29 [PH] (ref 7.3–7.4)
PH BLDV: 7.33 [PH] (ref 7.3–7.4)
PH UR STRIP.AUTO: 6.5 [PH]
PLATELET # BLD AUTO: 212 THOUSANDS/UL (ref 149–390)
PMV BLD AUTO: 9.8 FL (ref 8.9–12.7)
PO2 BLDV: 54.6 MM HG (ref 35–45)
PO2 BLDV: 63.4 MM HG (ref 35–45)
POTASSIUM SERPL-SCNC: 4.8 MMOL/L (ref 3.5–5.3)
PROT SERPL-MCNC: 5.8 G/DL (ref 6.4–8.4)
PROT UR STRIP-MCNC: ABNORMAL MG/DL
RBC # BLD AUTO: 3.86 MILLION/UL (ref 3.81–5.12)
RBC #/AREA URNS AUTO: ABNORMAL /HPF
SODIUM SERPL-SCNC: 124 MMOL/L (ref 135–147)
SP GR UR STRIP.AUTO: 1.03 (ref 1–1.03)
UROBILINOGEN UR STRIP-ACNC: <2 MG/DL
WBC # BLD AUTO: 6.07 THOUSAND/UL (ref 4.31–10.16)
WBC #/AREA URNS AUTO: ABNORMAL /HPF

## 2024-10-16 PROCEDURE — 81001 URINALYSIS AUTO W/SCOPE: CPT | Performed by: STUDENT IN AN ORGANIZED HEALTH CARE EDUCATION/TRAINING PROGRAM

## 2024-10-16 PROCEDURE — 94002 VENT MGMT INPAT INIT DAY: CPT

## 2024-10-16 PROCEDURE — 87086 URINE CULTURE/COLONY COUNT: CPT | Performed by: STUDENT IN AN ORGANIZED HEALTH CARE EDUCATION/TRAINING PROGRAM

## 2024-10-16 PROCEDURE — 70551 MRI BRAIN STEM W/O DYE: CPT

## 2024-10-16 PROCEDURE — 82140 ASSAY OF AMMONIA: CPT | Performed by: STUDENT IN AN ORGANIZED HEALTH CARE EDUCATION/TRAINING PROGRAM

## 2024-10-16 PROCEDURE — 82805 BLOOD GASES W/O2 SATURATION: CPT | Performed by: STUDENT IN AN ORGANIZED HEALTH CARE EDUCATION/TRAINING PROGRAM

## 2024-10-16 PROCEDURE — 94760 N-INVAS EAR/PLS OXIMETRY 1: CPT

## 2024-10-16 PROCEDURE — 87077 CULTURE AEROBIC IDENTIFY: CPT | Performed by: STUDENT IN AN ORGANIZED HEALTH CARE EDUCATION/TRAINING PROGRAM

## 2024-10-16 PROCEDURE — 80053 COMPREHEN METABOLIC PANEL: CPT | Performed by: STUDENT IN AN ORGANIZED HEALTH CARE EDUCATION/TRAINING PROGRAM

## 2024-10-16 PROCEDURE — 85025 COMPLETE CBC W/AUTO DIFF WBC: CPT | Performed by: STUDENT IN AN ORGANIZED HEALTH CARE EDUCATION/TRAINING PROGRAM

## 2024-10-16 PROCEDURE — 97535 SELF CARE MNGMENT TRAINING: CPT

## 2024-10-16 PROCEDURE — 92526 ORAL FUNCTION THERAPY: CPT

## 2024-10-16 PROCEDURE — 97530 THERAPEUTIC ACTIVITIES: CPT

## 2024-10-16 PROCEDURE — 94640 AIRWAY INHALATION TREATMENT: CPT

## 2024-10-16 PROCEDURE — 99232 SBSQ HOSP IP/OBS MODERATE 35: CPT | Performed by: STUDENT IN AN ORGANIZED HEALTH CARE EDUCATION/TRAINING PROGRAM

## 2024-10-16 RX ORDER — OFLOXACIN 3 MG/ML
1 SOLUTION/ DROPS OPHTHALMIC 4 TIMES DAILY
Qty: 5 ML | Refills: 0
Start: 2024-10-16 | End: 2024-10-23

## 2024-10-16 RX ORDER — DULOXETIN HYDROCHLORIDE 30 MG/1
30 CAPSULE, DELAYED RELEASE ORAL
Status: ON HOLD
Start: 2024-10-16

## 2024-10-16 RX ORDER — LORAZEPAM 0.5 MG/1
0.5 TABLET ORAL ONCE AS NEEDED
Status: COMPLETED | OUTPATIENT
Start: 2024-10-16 | End: 2024-10-16

## 2024-10-16 RX ORDER — PREGABALIN 25 MG/1
25 CAPSULE ORAL 3 TIMES DAILY
Status: DISCONTINUED | OUTPATIENT
Start: 2024-10-16 | End: 2024-10-16

## 2024-10-16 RX ORDER — ANTIOX #8/OM3/DHA/EPA/LUT/ZEAX 250-2.5 MG
1 CAPSULE ORAL DAILY
Status: DISCONTINUED | OUTPATIENT
Start: 2024-10-17 | End: 2024-10-19

## 2024-10-16 RX ORDER — PREGABALIN 25 MG/1
25 CAPSULE ORAL 2 TIMES DAILY
Qty: 20 CAPSULE | Refills: 0 | Status: SHIPPED | OUTPATIENT
Start: 2024-10-16 | End: 2024-10-16

## 2024-10-16 RX ORDER — PREGABALIN 25 MG/1
25 CAPSULE ORAL 3 TIMES DAILY
Status: SHIPPED | OUTPATIENT
Start: 2024-10-16 | End: 2024-10-23

## 2024-10-16 RX ORDER — LEVOTHYROXINE SODIUM 25 UG/1
25 TABLET ORAL
Status: ON HOLD
Start: 2024-10-17

## 2024-10-16 RX ORDER — SODIUM CHLORIDE 9 MG/ML
75 INJECTION, SOLUTION INTRAVENOUS CONTINUOUS
Status: DISCONTINUED | OUTPATIENT
Start: 2024-10-16 | End: 2024-10-17

## 2024-10-16 RX ORDER — LORAZEPAM 0.5 MG/1
0.5 TABLET ORAL EVERY 12 HOURS PRN
Qty: 10 TABLET | Refills: 0 | Status: SHIPPED | OUTPATIENT
Start: 2024-10-16 | End: 2024-10-16

## 2024-10-16 RX ORDER — PROPRANOLOL HYDROCHLORIDE 40 MG/1
40 TABLET ORAL EVERY 12 HOURS SCHEDULED
Status: ON HOLD
Start: 2024-10-16

## 2024-10-16 RX ORDER — MV-MIN/FA/VIT K/LUTEIN/ZEAXANT 200MCG-5MG
1 CAPSULE ORAL EVERY MORNING
Status: ON HOLD | COMMUNITY

## 2024-10-16 RX ADMIN — LEVALBUTEROL HYDROCHLORIDE 1.25 MG: 1.25 SOLUTION RESPIRATORY (INHALATION) at 07:13

## 2024-10-16 RX ADMIN — CHLORHEXIDINE GLUCONATE 15 ML: 1.2 RINSE ORAL at 22:37

## 2024-10-16 RX ADMIN — IPRATROPIUM BROMIDE 0.5 MG: 0.5 SOLUTION RESPIRATORY (INHALATION) at 20:26

## 2024-10-16 RX ADMIN — PREGABALIN 25 MG: 25 CAPSULE ORAL at 09:12

## 2024-10-16 RX ADMIN — DICLOFENAC SODIUM 2 G: 10 GEL TOPICAL at 12:17

## 2024-10-16 RX ADMIN — SENNOSIDES AND DOCUSATE SODIUM 1 TABLET: 8.6; 5 TABLET ORAL at 09:12

## 2024-10-16 RX ADMIN — MENTHOL 10%, METHYL SALICYLATE 15%: 10; 15 CREAM TOPICAL at 22:41

## 2024-10-16 RX ADMIN — SODIUM CHLORIDE 75 ML/HR: 0.9 INJECTION, SOLUTION INTRAVENOUS at 17:09

## 2024-10-16 RX ADMIN — DICLOFENAC SODIUM 2 G: 10 GEL TOPICAL at 08:02

## 2024-10-16 RX ADMIN — LORAZEPAM 0.5 MG: 0.5 TABLET ORAL at 05:05

## 2024-10-16 RX ADMIN — OFLOXACIN 1 DROP: 3 SOLUTION/ DROPS OPHTHALMIC at 17:02

## 2024-10-16 RX ADMIN — DEXTROSE 1000 MG: 50 INJECTION, SOLUTION INTRAVENOUS at 17:18

## 2024-10-16 RX ADMIN — DICLOFENAC SODIUM 2 G: 10 GEL TOPICAL at 22:41

## 2024-10-16 RX ADMIN — ONDANSETRON 4 MG: 2 INJECTION INTRAMUSCULAR; INTRAVENOUS at 02:38

## 2024-10-16 RX ADMIN — DULOXETINE HYDROCHLORIDE 30 MG: 30 CAPSULE, DELAYED RELEASE ORAL at 22:50

## 2024-10-16 RX ADMIN — LEVALBUTEROL HYDROCHLORIDE 1.25 MG: 1.25 SOLUTION RESPIRATORY (INHALATION) at 20:26

## 2024-10-16 RX ADMIN — LEVOTHYROXINE SODIUM 25 MCG: 25 TABLET ORAL at 05:05

## 2024-10-16 RX ADMIN — PREGABALIN 25 MG: 25 CAPSULE ORAL at 16:59

## 2024-10-16 RX ADMIN — MENTHOL 10%, METHYL SALICYLATE 15%: 10; 15 CREAM TOPICAL at 08:02

## 2024-10-16 RX ADMIN — MENTHOL 10%, METHYL SALICYLATE 15%: 10; 15 CREAM TOPICAL at 16:59

## 2024-10-16 RX ADMIN — BUDESONIDE 0.5 MG: 0.5 INHALANT ORAL at 20:26

## 2024-10-16 RX ADMIN — LOSARTAN POTASSIUM 100 MG: 50 TABLET, FILM COATED ORAL at 09:12

## 2024-10-16 RX ADMIN — OFLOXACIN 1 DROP: 3 SOLUTION/ DROPS OPHTHALMIC at 22:50

## 2024-10-16 RX ADMIN — CHLORHEXIDINE GLUCONATE 15 ML: 1.2 RINSE ORAL at 09:12

## 2024-10-16 RX ADMIN — ACETAMINOPHEN 975 MG: 325 TABLET, FILM COATED ORAL at 05:05

## 2024-10-16 RX ADMIN — BUDESONIDE 0.5 MG: 0.5 INHALANT ORAL at 07:13

## 2024-10-16 RX ADMIN — LIDOCAINE 1 PATCH: 700 PATCH TOPICAL at 08:02

## 2024-10-16 RX ADMIN — IPRATROPIUM BROMIDE 0.5 MG: 0.5 SOLUTION RESPIRATORY (INHALATION) at 07:13

## 2024-10-16 RX ADMIN — PROPRANOLOL HYDROCHLORIDE 40 MG: 20 TABLET ORAL at 09:12

## 2024-10-16 RX ADMIN — PROPRANOLOL HYDROCHLORIDE 40 MG: 20 TABLET ORAL at 22:50

## 2024-10-16 RX ADMIN — DICLOFENAC SODIUM 2 G: 10 GEL TOPICAL at 17:02

## 2024-10-16 RX ADMIN — OFLOXACIN 1 DROP: 3 SOLUTION/ DROPS OPHTHALMIC at 12:17

## 2024-10-16 RX ADMIN — PREDNISONE 2.5 MG: 2.5 TABLET ORAL at 09:12

## 2024-10-16 RX ADMIN — PANTOPRAZOLE SODIUM 40 MG: 40 TABLET, DELAYED RELEASE ORAL at 05:05

## 2024-10-16 RX ADMIN — SENNOSIDES AND DOCUSATE SODIUM 1 TABLET: 8.6; 5 TABLET ORAL at 17:01

## 2024-10-16 RX ADMIN — OFLOXACIN 1 DROP: 3 SOLUTION/ DROPS OPHTHALMIC at 08:03

## 2024-10-16 RX ADMIN — CYANOCOBALAMIN TAB 500 MCG 1000 MCG: 500 TAB at 09:12

## 2024-10-16 RX ADMIN — ACETAMINOPHEN 975 MG: 325 TABLET, FILM COATED ORAL at 13:52

## 2024-10-16 RX ADMIN — ENOXAPARIN SODIUM 30 MG: 100 INJECTION SUBCUTANEOUS at 08:02

## 2024-10-16 RX ADMIN — ACETAMINOPHEN 975 MG: 325 TABLET, FILM COATED ORAL at 22:50

## 2024-10-16 NOTE — PROGRESS NOTES
Progress Note - Hospitalist   Name: Sarah Zayas 79 y.o. female I MRN: [id number]  Unit/Bed#: [place] [address] -01 I Date of Admission: 10/10/2024   Date of Service: 10/16/2024 I Hospital Day: 6     Assessment & Plan  Acute respiratory failure with hypercapnia (HCC)  Back to baseline oxygen, 3-4L NC  Acute exacerbation of chronic obstructive pulmonary disease (COPD) (HCC)  History of severe COPD  Initial concern for exacerbation but likely oversedation from benzodiazepine  Patient placed initially on BiPAP sepsis workup has been unremarkable  Continue Atrovent and Xopenex  Repeat VBG with excess lethargy  Avoid benzos  Pending results may require pulmonary evaluation  Gastroesophageal reflux disease without esophagitis  Continue PPI therapy  Hypertension  Continue losartan and propanolol  Monitor BP and titrate as needed  Temporal arteritis (HCC)  Continue prednisone 2.5 mg  Anxiety/depression  Evaluated by psychiatry  Continue Lyrica 25 mg 3 times daily, Cymbalta 30 mg twice daily  Discontinue BuSpar, gabapentin, Remeron and benzos  Anxiety largely driving patient's symptoms    Neck pain/shoulder pain  Evidence of bicep tendinitis  Add Voltaren gel and Bengay  PT OT recommending rehab  Awaiting further evaluation above prior to discharge to rehab    Hypothyroidism  Levothyroxine reduced to 25 mcg  TSH suppressed and free T4 elevated-suspect some iatrogenic hyperthyroidism  Repeat TSH and T4 in 8 to 12 weeks outpatient  Macular degeneration  Follows with [facility]  Continue artifical tears  Reports decrease sight in her left eye with some discharge concerning for conjunctivitis  Recommend trial with ofloxacin for 7 days  Encephalopathy  Today appears to be increasingly lethargic, was able to wake and have lunch  Previous CT imaging without any acute process, MRI brain ordered today without any evidence of stroke or acute intracranial process  Prior VBG's with elevated CO2 with known history of COPD  chronically on 2 L  Check VBG, ammonia levels, CBC and CMP  Suspect benzo may be playing a role with history of COPD, and likely CO2 retention  Recommend to maintain O2 above 88%, avoid O2 sats greater than 95% due to decreased respiratory drive    VTE Pharmacologic Prophylaxis:   Pharmacologic: Enoxaparin (Lovenox)  Mechanical VTE Prophylaxis in Place: Yes    Current Length of Stay: 6 day(s)    Current Patient Status: Inpatient   Certification Statement: The patient will continue to require additional inpatient hospital stay due to further evaluation, lethargy    Discharge Plan: pending    Code Status: Level 1 - Full Code      Subjective:   Examined today at bedside.  Has been increasing lethargic and initially difficult to arouse.  Was able to get up later this afternoon and have lunch with speech therapy.  Seen later this afternoon and was again sleeping and difficult to arouse.  Did require Ativan last night for reports of increased anxiety.    Objective:     Vitals:   Temp (24hrs), Av.7 °F (36.5 °C), Min:97.3 °F (36.3 °C), Max:98.6 °F (37 °C)    Temp:  [97.3 °F (36.3 °C)-98.6 °F (37 °C)] 97.3 °F (36.3 °C)  HR:  [65-89] 66  Resp:  [18] 18  BP: (105-136)/(48-64) 105/48  SpO2:  [87 %-99 %] 98 %  Body mass index is 19.8 kg/m².     Input and Output Summary (last 24 hours):       Intake/Output Summary (Last 24 hours) at 10/16/2024 1528  Last data filed at 10/16/2024 1411  Gross per 24 hour   Intake 1380 ml   Output 233 ml   Net 1147 ml       Physical Exam:     Physical Exam  Vitals and nursing note reviewed.   Constitutional:       General: She is not in acute distress.     Comments: Thin appearing, lethargic   HENT:      Head: Normocephalic.   Eyes:      Conjunctiva/sclera: Conjunctivae normal.   Cardiovascular:      Rate and Rhythm: Normal rate.   Pulmonary:      Effort: Pulmonary effort is normal.      Breath sounds: Normal breath sounds. No wheezing or rhonchi.   Abdominal:      General: Bowel sounds are  normal. There is no distension.      Palpations: Abdomen is soft.   Musculoskeletal:         General: No swelling. Normal range of motion.   Skin:     General: Skin is warm and dry.   Neurological:      General: No focal deficit present.      Mental Status: Mental status is at baseline.         Additional Data:     Labs:    Results from last 7 days   Lab Units 10/13/24  0456 10/11/24  0400 10/10/24  1102   WBC Thousand/uL 6.19   < > 7.20   HEMOGLOBIN g/dL 12.5   < > 11.8   HEMATOCRIT % 42.7   < > 39.7   PLATELETS Thousands/uL 197   < > 159   SEGS PCT %  --   --  80*   LYMPHO PCT %  --   --  6*   MONO PCT %  --   --  11   EOS PCT %  --   --  3    < > = values in this interval not displayed.     Results from last 7 days   Lab Units 10/14/24  0513 10/11/24  0400 10/10/24  1102   SODIUM mmol/L 136   < > 134*   POTASSIUM mmol/L 4.8   < > 4.3   CHLORIDE mmol/L 88*   < > 95*   CO2 mmol/L 39*   < > 34*   BUN mg/dL 17   < > 15   CREATININE mg/dL 0.42*   < > 0.45*   ANION GAP mmol/L 9   < > 5   CALCIUM mg/dL 8.7   < > 8.5   ALBUMIN g/dL  --   --  3.7   TOTAL BILIRUBIN mg/dL  --   --  0.37   ALK PHOS U/L  --   --  38   ALT U/L  --   --  16   AST U/L  --   --  21   GLUCOSE RANDOM mg/dL 93   < > 86    < > = values in this interval not displayed.         Results from last 7 days   Lab Units 10/10/24  0104   POC GLUCOSE mg/dl 80         Results from last 7 days   Lab Units 10/10/24  0137   LACTIC ACID mmol/L 0.6   PROCALCITONIN ng/ml 0.20           * I Have Reviewed All Lab Data Listed Above.  * Additional Pertinent Lab Tests Reviewed: All Labs For Current Hospital Admission Reviewed    Mobility:  Basic Mobility Inpatient Raw Score: 18  -[facility] Goal: 6: Walk 10 steps or more  -HL Achieved: 6: Walk 10 steps or more    Lines:     Invasive Devices       Peripheral Intravenous Line  Duration             Peripheral IV 10/14/24 Left;Ventral (anterior) Forearm 2 days                       Imaging:    Imaging Reports Reviewed Today  Include:     CT head wo contrast    Result Date: 10/13/2024  Impression: No acute intracranial abnormality.  Stable chronic microangiopathic changes within the brain. Workstation performed: [id number]     XR shoulder 2+ views LEFT    Result Date: 10/10/2024  Impression: No acute osseous abnormality Workstation performed: [id number]     CT chest without contrast    Result Date: 10/10/2024  Impression: 1.  Possible airway debris, which would be consistent with aspiration, see discussion 2.  Trace pleural effusions 3.  Stable right breast lesion, recommend follow-up diagnostic mammogram The study was marked in EPIC for immediate notification. Workstation performed: [id number]     CT head without contrast    Result Date: 10/10/2024  Impression: No acute intracranial abnormality. Workstation performed: [id number]        Recent Cultures (last 7 days):     Results from last 7 days   Lab Units 10/10/24  0137   BLOOD CULTURE  No Growth After 5 Days.  No Growth After 5 Days.       Last 24 Hours Medication List:   Current Facility-Administered Medications   Medication Dose Route Frequency Provider Last Rate    acetaminophen  975 mg Oral Q8H [facility] TITO Kwong      albuterol  2 puff Inhalation Q6H PRN Luz Mukherjee MD      Artificial Tears  1 drop Left Eye TID Bhupendra Medley MD      budesonide  0.5 mg Nebulization BID Luz Mukherjee MD      chlorhexidine  15 mL Mouth/Throat Q12H [facility] Luz Mukherjee MD      vitamin B-12  1,000 mcg Oral Daily Luz Mukherjee MD      Diclofenac Sodium  2 g Topical 4x Daily Saad Mckeon DO      DULoxetine  30 mg Oral HS Saad Mckeon DO      enoxaparin  30 mg Subcutaneous Daily Luz Mukherjee MD      ipratropium  0.5 mg Nebulization BID Luz Mukherjee MD      ipratropium-albuterol  3 mL Nebulization Q6H PRN Luz Mukherjee MD      levalbuterol  1.25 mg Nebulization BID Luz Mukherjee MD      levothyroxine  25 mcg Oral Early Morning Saad Mckeon DO      lidocaine  1 patch Topical Daily Luz Mukherjee MD       losartan  100 mg Oral Daily Bhupendra Medley MD      magnesium hydroxide  30 mL Oral Daily PRN Bhupendra Medley MD      melatonin  3 mg Oral HS PRN TITO Kwong      menthol-methyl salicylate   Apply externally TID Saad Mckeon DO      ofloxacin  1 drop Left Eye 4x Daily Bhupendra Medley MD      ondansetron  4 mg Intravenous Q6H PRN Luz Mukherjee MD      pantoprazole  40 mg Oral Early Morning Luz Mukherjee MD      phenol  1 spray Mouth/Throat Q2H PRN Tyrone Wolfe PA-C      polyethylene glycol  17 g Oral Daily Bhupendra Medley MD      predniSONE  2.5 mg Oral Daily With Breakfast Luz Mukherjee MD      pregabalin  25 mg Oral TID Bhupendra Medley MD      propranolol  40 mg Oral Q12H RACHEL Bhupendra Medley MD      senna-docusate sodium  1 tablet Oral BID Bhupendra Medley MD      sodium chloride  2 spray Each Nare Q1H PRN Luz Mukherjee MD          Today, Patient Was Seen By: Bhupendra Medley MD    ** Please Note: Dictation voice to text software may have been used in the creation of this document. **

## 2024-10-16 NOTE — ASSESSMENT & PLAN NOTE
Evidence of bicep tendinitis  Add Voltaren gel and Bengay  PT OT recommending rehab  Awaiting further evaluation above prior to discharge to rehab

## 2024-10-16 NOTE — OCCUPATIONAL THERAPY NOTE
Occupational Therapy Progress Note     Patient Name: Sarah Zayas  Today's Date: 10/16/2024  Problem List  Principal Problem:    Anxiety/depression  Active Problems:    Acute exacerbation of chronic obstructive pulmonary disease (COPD) (HCC)    Gastroesophageal reflux disease without esophagitis    Hypertension    Temporal arteritis (HCC)    Neck pain/shoulder pain    Acute respiratory failure with hypercapnia (HCC)    Hypothyroidism    Macular degeneration            10/16/24 0853   OT Last Visit   OT Visit Date 10/16/24   Note Type   Note Type Treatment   Pain Assessment   Pain Assessment Tool 0-10   Pain Score 4   Pain Location/Orientation Location: Generalized   Restrictions/Precautions   Weight Bearing Precautions Per Order No   Other Precautions Bed Alarm;Chair Alarm;Cognitive;O2;Fall Risk;Telemetry;Visual impairment   ADL   Where Assessed Edge of bed   Eating Assistance 5  Supervision/Setup   Eating Deficit Setup;Verbal cueing;Supervision/safety   Grooming Assistance 4  Minimal Assistance   Grooming Deficit Setup;Supervision/safety;Increased time to complete;Brushing hair   UB Bathing Assistance 4  Minimal Assistance   UB Bathing Deficit Setup;Verbal cueing;Supervision/safety;Increased time to complete   LB Bathing Assistance 3  Moderate Assistance   LB Bathing Deficit Setup;Increased time to complete;Verbal cueing;Supervision/safety   UB Dressing Assistance 4  Minimal Assistance   UB Dressing Deficit Setup;Verbal cueing;Supervision/safety;Increased time to complete   LB Dressing Assistance 4  Minimal Assistance   LB Dressing Deficit Setup;Verbal cueing;Supervision/safety;Increased time to complete   Functional Standing Tolerance   Time 1 min x 3 trials.   Activity RW for support., self care tasks and transfers.   Bed Mobility   Supine to Sit 4  Minimal assistance   Additional items Assist x 1;HOB elevated;Bedrails;Increased time required;Verbal cues;LE management   Additional Comments left seated up in  "chair for breakfast following session. chair alarm activated.   Transfers   Sit to Stand 4  Minimal assistance  (Contact guard assist)   Additional items Assist x 1;Increased time required;Verbal cues   Stand to Sit 4  Minimal assistance  (Contact guard assist)   Additional items Assist x 1;Increased time required;Verbal cues   Additional Comments max cues for best tect and initiation   Functional Mobility   Functional Mobility 4  Minimal assistance   Additional Comments Ax1, RW. bed<> chair.   Additional items Rolling walker   Subjective   Subjective \" I'm tired\"   Cognition   Overall Cognitive Status Impaired   Arousal/Participation Alert;Responsive   Attention Attends with cues to redirect   Orientation Level Oriented to person;Oriented to place;Oriented to time;Disoriented to situation   Memory Decreased recall of precautions;Decreased recall of recent events;Decreased short term memory   Following Commands Follows one step commands with increased time or repetition   Activity Tolerance   Activity Tolerance Patient tolerated treatment well;Patient limited by fatigue   Medical Staff Made Aware RN   Assessment   Assessment Pt seen for skilled OT tx this date. Tx focused on improving strength, activity tolerance and balance, safety awareness to increase independence with self care tasks. Pt tolerated session well. Pt was limited by weakness and fatigue. Greeted in supine, agreeable.  Pt performed UB dressing/ bathing with Agus, LB dressing / bathing modA ,  bed mobility Agus, transfers CGA/ Agus, mobility with RW Agus. Pt demonstrated fair -standing balance during functional tasks , no LOB noted.  Pt demonstrated ability to safely and appropriately attend to all tasks during session. Pt required moderate verbal cuing during session to safely complete tasks. Vitals: on 3 L O2- sats 91-93% with activity. Current OT DC recommendations for pt is level 2 resources.   Plan   Treatment Interventions ADL " retraining;Functional transfer training;UE strengthening/ROM;Endurance training;Patient/family training;Equipment evaluation/education;Compensatory technique education;Continued evaluation;Activityengagement   Goal Expiration Date 10/25/24   OT Treatment Day 2   OT Frequency 3-5x/wk   Discharge Recommendation   Rehab Resource Intensity Level, OT II (Moderate Resource Intensity)   Additional Comments  The patient's raw score on the AM-PAC Daily Activity Inpatient Short Form is 16. A raw score of less than 19 suggests the patient may benefit from discharge to post-acute rehabilitation services. Please refer to the recommendation of the Occupational Therapist for safe discharge planning.   AM-PAC Daily Activity Inpatient   Lower Body Dressing 2   Bathing 3   Toileting 2   Upper Body Dressing 3   Grooming 3   Eating 3   Daily Activity Raw Score 16   Daily Activity Standardized Score (Calc for Raw Score >=11) 35.96   AM-PAC Applied Cognition Inpatient   Following a Speech/Presentation 3   Understanding Ordinary Conversation 3   Taking Medications 2   Remembering Where Things Are Placed or Put Away 2   Remembering List of 4-5 Errands 2   Taking Care of Complicated Tasks 2   Applied Cognition Raw Score 14   Applied Cognition Standardized Score 32.02   Michelle Davila, OT

## 2024-10-16 NOTE — ASSESSMENT & PLAN NOTE
Levothyroxine reduced to 25 mcg  TSH suppressed and free T4 elevated-suspect some iatrogenic hyperthyroidism  Repeat TSH and T4 in 8 to 12 weeks outpatient

## 2024-10-16 NOTE — SPEECH THERAPY NOTE
Speech Language/Pathology    Speech/Language Pathology Progress Note    Patient Name: Sarah Zayas  Today's Date: 10/16/2024     Problem List  Principal Problem:    Anxiety/depression  Active Problems:    Acute exacerbation of chronic obstructive pulmonary disease (COPD) (HCC)    Gastroesophageal reflux disease without esophagitis    Hypertension    Temporal arteritis (HCC)    Neck pain/shoulder pain    Acute respiratory failure with hypercapnia (HCC)    Hypothyroidism    Macular degeneration    Encephalopathy    Urinary retention       Past Medical History  Past Medical History:   Diagnosis Date    Anxiety 08/18/2024    Asthma     COPD (chronic obstructive pulmonary disease) (HCC)     Disease of thyroid gland     GERD (gastroesophageal reflux disease)     Hypertension 10/11/2018    Hypothyroid     Normocytic anemia 08/28/2024    Osteoarthritis 09/26/2012    Temporal arteritis ([facility])     Type 2 diabetes mellitus with complication, without long-term current use of insulin ([facility]) 01/14/2020        Past Surgical History  Past Surgical History:   Procedure Laterality Date    EYE SURGERY Right 12/06/2019    cataract LVCFS    HYSTERECTOMY      NO PAST SURGERIES      ALSO NO RELEVANT PAST SURRGICAL HX AS PER [facility]         Subjective:  Pt alert in chair and responding to questions. Family visiting. Noted d/c was cancelled for today.   Objective:  Family c/o food texture in am, all purees on the [facility] soft diet. They were ok w/ lunch meal which was mac and cheese and ground meat. Family initially fed, then the pt fed herself, more so the mac and cheese. Mastication was slow and somewhat munch like but functional. Transfer and swallow appeared prompt. No overt s/s difficulty.   Assessment:  Much improved since seen 2 days ago by me.   Plan/Recommendations:  Upgrade diet to dysphagia 3 dental soft w/ thin liquids. Allow family to bring in food. They are very attentive and will stay w/ her the entire time, not bring in foods that  are too hard for her. They would like to bring in things they know she likes, e.g. viral giraldo.  Will f/u as able and indicated.

## 2024-10-16 NOTE — PLAN OF CARE
Problem: PAIN - ADULT  Goal: Verbalizes/displays adequate comfort level or baseline comfort level  Description: Interventions:  - Encourage patient to monitor pain and request assistance  - Assess pain using appropriate pain scale  - Administer analgesics based on type and severity of pain and evaluate response  - Implement non-pharmacological measures as appropriate and evaluate response  - Consider cultural and social influences on pain and pain management  - Notify physician/advanced practitioner if interventions unsuccessful or patient reports new pain  Outcome: Progressing     Problem: INFECTION - ADULT  Goal: Absence or prevention of progression during hospitalization  Description: INTERVENTIONS:  - Assess and monitor for signs and symptoms of infection  - Monitor lab/diagnostic results  - Monitor all insertion sites, i.e. indwelling lines, tubes, and drains  - Monitor endotracheal if appropriate and nasal secretions for changes in amount and color  - [place] appropriate cooling/warming therapies per order  - Administer medications as ordered  - Instruct and encourage patient and family to use good hand hygiene technique  - Identify and instruct in appropriate isolation precautions for identified infection/condition  Outcome: Progressing  Goal: Absence of fever/infection during neutropenic period  Description: INTERVENTIONS:  - Monitor WBC    Outcome: Progressing     Problem: SAFETY ADULT  Goal: Patient will remain free of falls  Description: INTERVENTIONS:  - Educate patient/family on patient safety including physical limitations  - Instruct patient to call for assistance with activity   - Consult OT/PT to assist with strengthening/mobility   - Keep Call bell within reach  - Keep bed low and locked with side rails adjusted as appropriate  - Keep care items and personal belongings within reach  - Initiate and maintain comfort rounds  - Make Fall Risk Sign visible to staff  - Offer Toileting every 2 Hours,  in advance of need  - Initiate/Maintain bed/chair alarm  - Obtain necessary fall risk management equipment: walker  - Apply yellow socks and bracelet for high fall risk patients  - Consider moving patient to room near nurses station  Outcome: Progressing  Goal: Maintain or return to baseline ADL function  Description: INTERVENTIONS:  -  Assess patient's ability to carry out ADLs; assess patient's baseline for ADL function and identify physical deficits which impact ability to perform ADLs (bathing, care of mouth/teeth, toileting, grooming, dressing, etc.)  - Assess/evaluate cause of self-care deficits   - Assess range of motion  - Assess patient's mobility; develop plan if impaired  - Assess patient's need for assistive devices and provide as appropriate  - Encourage maximum independence but intervene and supervise when necessary  - Involve family in performance of ADLs  - Assess for home care needs following discharge   - Consider OT consult to assist with ADL evaluation and planning for discharge  - Provide patient education as appropriate  Outcome: Progressing  Goal: Maintains/Returns to pre admission functional level  Description: INTERVENTIONS:  - Perform AM-PAC 6 Click Basic Mobility/ Daily Activity assessment daily.  - Set and communicate daily mobility goal to care team and patient/family/caregiver.   - Collaborate with rehabilitation services on mobility goals if consulted  - Perform Range of Motion 3 times a day.  - Reposition patient every 2 hours.  - Dangle patient 3 times a day  - Stand patient 3 times a day  - Ambulate patient 3 times a day  - Out of bed to chair 3 times a day   - Out of bed for meals 3 times a day  - Out of bed for toileting  - Record patient progress and toleration of activity level   Outcome: Progressing     Problem: DISCHARGE PLANNING  Goal: Discharge to home or other facility with appropriate resources  Description: INTERVENTIONS:  - Identify barriers to discharge w/patient and  caregiver  - Arrange for needed discharge resources and transportation as appropriate  - Identify discharge learning needs (meds, wound care, etc.)  - Arrange for interpretive services to assist at discharge as needed  - Refer to Case Management Department for coordinating discharge planning if the patient needs post-hospital services based on physician/advanced practitioner order or complex needs related to functional status, cognitive ability, or social support system  Outcome: Progressing     Problem: Knowledge Deficit  Goal: Patient/family/caregiver demonstrates understanding of disease process, treatment plan, medications, and discharge instructions  Description: Complete learning assessment and assess knowledge base.  Interventions:  - Provide teaching at level of understanding  - Provide teaching via preferred learning methods  Outcome: Progressing     Problem: RESPIRATORY - ADULT  Goal: Achieves optimal ventilation and oxygenation  Description: INTERVENTIONS:  - Assess for changes in respiratory status  - Assess for changes in mentation and behavior  - Position to facilitate oxygenation and minimize respiratory effort  - Oxygen administered by appropriate delivery if ordered  - Initiate smoking cessation education as indicated  - Encourage broncho-pulmonary hygiene including cough, deep breathe, Incentive Spirometry  - Assess the need for suctioning and aspirate as needed  - Assess and instruct to report SOB or any respiratory difficulty  - Respiratory Therapy support as indicated  Outcome: Progressing     Problem: Nutrition/Hydration-ADULT  Goal: Nutrient/Hydration intake appropriate for improving, restoring or maintaining nutritional needs  Description: Monitor and assess patient's nutrition/hydration status for malnutrition. Collaborate with interdisciplinary team and initiate plan and interventions as ordered.  Monitor patient's weight and dietary intake as ordered or per policy. Utilize nutrition  screening tool and intervene as necessary. Determine patient's food preferences and provide high-protein, high-caloric foods as appropriate.     INTERVENTIONS:  - Monitor oral intake, urinary output, labs, and treatment plans  - Assess nutrition and hydration status and recommend course of action  - Evaluate amount of meals eaten  - Assist patient with eating if necessary   - Allow adequate time for meals  - Recommend/ encourage appropriate diets, oral nutritional supplements, and vitamin/mineral supplements  - Order, calculate, and assess calorie counts as needed  - Recommend, monitor, and adjust tube feedings and TPN/PPN based on assessed needs  - Assess need for intravenous fluids  - Provide specific nutrition/hydration education as appropriate  - Include patient/family/caregiver in decisions related to nutrition  Outcome: Progressing     Problem: Prexisting or High Potential for Compromised Skin Integrity  Goal: Skin integrity is maintained or improved  Description: INTERVENTIONS:  - Identify patients at risk for skin breakdown  - Assess and monitor skin integrity  - Assess and monitor nutrition and hydration status  - Monitor labs   - Assess for incontinence   - Turn and reposition patient  - Assist with mobility/ambulation  - Relieve pressure over bony prominences  - Avoid friction and shearing  - Provide appropriate hygiene as needed including keeping skin clean and dry  - Evaluate need for skin moisturizer/barrier cream  - Collaborate with interdisciplinary team   - Patient/family teaching  - Consider wound care consult   Outcome: Progressing     Problem: SKIN/TISSUE INTEGRITY - ADULT  Goal: Skin Integrity remains intact(Skin Breakdown Prevention)  Description: Assess:  -Perform Lonnie assessment every shift  -Clean and moisturize skin every shift  -Inspect skin when repositioning, toileting, and assisting with ADLS  -Assess under medical devices such as BiPAP every 2 hours  -Assess extremities for adequate  circulation and sensation    Bed Management:  -Have minimal linens on bed & keep smooth, unwrinkled  -Change linens as needed when moist or perspiring  -Avoid sitting or lying in one position for more than 2 hours while in bed    Toileting:  -Offer bedside commode  -Assess for incontinence every 2 hours      Activity:  -Mobilize patient 3 times a day  -Encourage activity and walks on unit  -Encourage or provide ROM exercises   -Turn and reposition patient every 2 Hours  -Use appropriate equipment to lift or move patient in bed      Skin Care:  -Avoid use of baby powder, tape, friction and shearing, hot water or constrictive clothing  -Relieve pressure over bony prominences using Allevyn  -Do not massage red bony areas    Outcome: Progressing     Problem: MUSCULOSKELETAL - ADULT  Goal: Maintain or return mobility to safest level of function  Description: INTERVENTIONS:  - Assess patient's ability to carry out ADLs; assess patient's baseline for ADL function and identify physical deficits which impact ability to perform ADLs (bathing, care of mouth/teeth, toileting, grooming, dressing, etc.)  - Assess/evaluate cause of self-care deficits   - Assess range of motion  - Assess patient's mobility  - Assess patient's need for assistive devices and provide as appropriate  - Encourage maximum independence but intervene and supervise when necessary  - Involve family in performance of ADLs  - Assess for home care needs following discharge   - Consider OT consult to assist with ADL evaluation and planning for discharge  - Provide patient education as appropriate  Outcome: Progressing  Goal: Maintain proper alignment of affected body part  Description: INTERVENTIONS:  - Support, maintain and protect limb and body alignment  - Provide patient/ family with appropriate education  Outcome: Progressing

## 2024-10-16 NOTE — ASSESSMENT & PLAN NOTE
History of severe COPD  Initial concern for exacerbation but likely oversedation from benzodiazepine  Patient placed initially on BiPAP sepsis workup has been unremarkable  Continue Atrovent and Xopenex  Repeat VBG with excess lethargy  Avoid benzos  Pending results may require pulmonary evaluation

## 2024-10-16 NOTE — PLAN OF CARE
Problem: OCCUPATIONAL THERAPY ADULT  Goal: Performs self-care activities at highest level of function for planned discharge setting.  See evaluation for individualized goals.  Description: Treatment Interventions: ADL retraining, Functional transfer training, UE strengthening/ROM, Endurance training, Patient/family training, Equipment evaluation/education, Compensatory technique education, Continued evaluation, Activityengagement          See flowsheet documentation for full assessment, interventions and recommendations.   Outcome: Progressing  Note: Limitation: Decreased ADL status, Decreased UE ROM, Decreased Safe judgement during ADL, Decreased UE strength, Decreased cognition, Decreased endurance, Decreased self-care trans, Decreased high-level ADLs  Prognosis: Fair  Assessment: Pt seen for skilled OT tx this date. Tx focused on improving strength, activity tolerance and balance, safety awareness to increase independence with self care tasks. Pt tolerated session well. Pt was limited by weakness and fatigue. Greeted in supine, agreeable.  Pt performed UB dressing/ bathing with Agus, LB dressing / bathing modA ,  bed mobility Agus, transfers CGA/ Agus, mobility with RW Agus. Pt demonstrated fair -standing balance during functional tasks , no LOB noted.  Pt demonstrated ability to safely and appropriately attend to all tasks during session. Pt required moderate verbal cuing during session to safely complete tasks. Vitals: on 3 L O2- sats 91-93% with activity. Current OT DC recommendations for pt is level 2 resources.     Rehab Resource Intensity Level, OT: II (Moderate Resource Intensity)

## 2024-10-16 NOTE — ASSESSMENT & PLAN NOTE
Evaluated by psychiatry  Continue Lyrica 25 mg 3 times daily, Cymbalta 30 mg twice daily  Discontinue BuSpar, gabapentin, Remeron and benzos  Anxiety largely driving patient's symptoms

## 2024-10-16 NOTE — ASSESSMENT & PLAN NOTE
Follows with [facility]  Continue artifical tears  Reports decrease sight in her left eye with some discharge concerning for conjunctivitis  Recommend trial with ofloxacin for 7 days

## 2024-10-16 NOTE — PHYSICAL THERAPY NOTE
Physical Therapy Cancellation Note    PT session cancelled as pt. Declined reporting fatigue. Pt. Was very hard to be woken up from sleep. RN reported pt.'s DC was cancelled due to increased fatigue. Will continue to follow per PT POC and as able.

## 2024-10-16 NOTE — ASSESSMENT & PLAN NOTE
Today appears to be increasingly lethargic, was able to wake and have lunch  Previous CT imaging without any acute process, MRI brain ordered today without any evidence of stroke or acute intracranial process  Prior VBG's with elevated CO2 with known history of COPD chronically on 2 L  Check VBG, ammonia levels, CBC and CMP  Suspect benzo may be playing a role with history of COPD, and likely CO2 retention  Recommend to maintain O2 above 88%, avoid O2 sats greater than 95% due to decreased respiratory drive

## 2024-10-16 NOTE — PLAN OF CARE
Problem: PAIN - ADULT  Goal: Verbalizes/displays adequate comfort level or baseline comfort level  Description: Interventions:  - Encourage patient to monitor pain and request assistance  - Assess pain using appropriate pain scale  - Administer analgesics based on type and severity of pain and evaluate response  - Implement non-pharmacological measures as appropriate and evaluate response  - Consider cultural and social influences on pain and pain management  - Notify physician/advanced practitioner if interventions unsuccessful or patient reports new pain  10/15/2024 2027 by Janet Duran RN  Outcome: Progressing  10/15/2024 2027 by Janet Duran RN  Outcome: Progressing     Problem: INFECTION - ADULT  Goal: Absence or prevention of progression during hospitalization  Description: INTERVENTIONS:  - Assess and monitor for signs and symptoms of infection  - Monitor lab/diagnostic results  - Monitor all insertion sites, i.e. indwelling lines, tubes, and drains  - Monitor endotracheal if appropriate and nasal secretions for changes in amount and color  - Phelps appropriate cooling/warming therapies per order  - Administer medications as ordered  - Instruct and encourage patient and family to use good hand hygiene technique  - Identify and instruct in appropriate isolation precautions for identified infection/condition  10/15/2024 2027 by Janet Duran RN  Outcome: Progressing  10/15/2024 2027 by Janet Duran RN  Outcome: Progressing  Goal: Absence of fever/infection during neutropenic period  Description: INTERVENTIONS:  - Monitor WBC    10/15/2024 2027 by Janet Duran RN  Outcome: Progressing  10/15/2024 2027 by Janet Duran RN  Outcome: Progressing     Problem: SAFETY ADULT  Goal: Patient will remain free of falls  Description: INTERVENTIONS:  - Educate patient/family on patient safety including physical limitations  - Instruct patient to call for assistance with activity   - Consult OT/PT  to assist with strengthening/mobility   - Keep Call bell within reach  - Keep bed low and locked with side rails adjusted as appropriate  - Keep care items and personal belongings within reach  - Initiate and maintain comfort rounds  - Make Fall Risk Sign visible to staff  - Offer Toileting every 2 Hours, in advance of need  - Initiate/Maintain bed alarm  - Obtain necessary fall risk management equipment: alarm   - Apply yellow socks and bracelet for high fall risk patients  - Consider moving patient to room near nurses station  10/15/2024 2027 by Janet Duran RN  Outcome: Progressing  10/15/2024 2027 by Janet Duran RN  Outcome: Progressing  Goal: Maintain or return to baseline ADL function  Description: INTERVENTIONS:  -  Assess patient's ability to carry out ADLs; assess patient's baseline for ADL function and identify physical deficits which impact ability to perform ADLs (bathing, care of mouth/teeth, toileting, grooming, dressing, etc.)  - Assess/evaluate cause of self-care deficits   - Assess range of motion  - Assess patient's mobility; develop plan if impaired  - Assess patient's need for assistive devices and provide as appropriate  - Encourage maximum independence but intervene and supervise when necessary  - Involve family in performance of ADLs  - Assess for home care needs following discharge   - Consider OT consult to assist with ADL evaluation and planning for discharge  - Provide patient education as appropriate  10/15/2024 2027 by Janet Duran RN  Outcome: Progressing  10/15/2024 2027 by Janet Duran RN  Outcome: Progressing  Goal: Maintains/Returns to pre admission functional level  Description: INTERVENTIONS:  - Perform AM-PAC 6 Click Basic Mobility/ Daily Activity assessment daily.  - Set and communicate daily mobility goal to care team and patient/family/caregiver.   - Collaborate with rehabilitation services on mobility goals if consulted  - Perform Range of Motion 4 times a  day.  - Reposition patient every 2 hours.  - Dangle patient 3 times a day  - Stand patient 3 times a day  - Ambulate patient 3 times a day  - Out of bed to chair 3 times a day   - Out of bed for meals 3 times a day  - Out of bed for toileting  - Record patient progress and toleration of activity level   10/15/2024 2027 by Janet Duran RN  Outcome: Progressing  10/15/2024 2027 by Janet Duran RN  Outcome: Progressing     Problem: DISCHARGE PLANNING  Goal: Discharge to home or other facility with appropriate resources  Description: INTERVENTIONS:  - Identify barriers to discharge w/patient and caregiver  - Arrange for needed discharge resources and transportation as appropriate  - Identify discharge learning needs (meds, wound care, etc.)  - Arrange for interpretive services to assist at discharge as needed  - Refer to Case Management Department for coordinating discharge planning if the patient needs post-hospital services based on physician/advanced practitioner order or complex needs related to functional status, cognitive ability, or social support system  10/15/2024 2027 by Janet Duran RN  Outcome: Progressing  10/15/2024 2027 by Janet Duran RN  Outcome: Progressing     Problem: Knowledge Deficit  Goal: Patient/family/caregiver demonstrates understanding of disease process, treatment plan, medications, and discharge instructions  Description: Complete learning assessment and assess knowledge base.  Interventions:  - Provide teaching at level of understanding  - Provide teaching via preferred learning methods  10/15/2024 2027 by Janet Duran RN  Outcome: Progressing  10/15/2024 2027 by Janet Duran RN  Outcome: Progressing     Problem: RESPIRATORY - ADULT  Goal: Achieves optimal ventilation and oxygenation  Description: INTERVENTIONS:  - Assess for changes in respiratory status  - Assess for changes in mentation and behavior  - Position to facilitate oxygenation and minimize respiratory  effort  - Oxygen administered by appropriate delivery if ordered  - Initiate smoking cessation education as indicated  - Encourage broncho-pulmonary hygiene including cough, deep breathe, Incentive Spirometry  - Assess the need for suctioning and aspirate as needed  - Assess and instruct to report SOB or any respiratory difficulty  - Respiratory Therapy support as indicated  10/15/2024 2027 by Janet Duran RN  Outcome: Progressing  10/15/2024 2027 by Janet Duran RN  Outcome: Progressing     Problem: Nutrition/Hydration-ADULT  Goal: Nutrient/Hydration intake appropriate for improving, restoring or maintaining nutritional needs  Description: Monitor and assess patient's nutrition/hydration status for malnutrition. Collaborate with interdisciplinary team and initiate plan and interventions as ordered.  Monitor patient's weight and dietary intake as ordered or per policy. Utilize nutrition screening tool and intervene as necessary. Determine patient's food preferences and provide high-protein, high-caloric foods as appropriate.     INTERVENTIONS:  - Monitor oral intake, urinary output, labs, and treatment plans  - Assess nutrition and hydration status and recommend course of action  - Evaluate amount of meals eaten  - Assist patient with eating if necessary   - Allow adequate time for meals  - Recommend/ encourage appropriate diets, oral nutritional supplements, and vitamin/mineral supplements  - Order, calculate, and assess calorie counts as needed  - Recommend, monitor, and adjust tube feedings and TPN/PPN based on assessed needs  - Assess need for intravenous fluids  - Provide specific nutrition/hydration education as appropriate  - Include patient/family/caregiver in decisions related to nutrition  10/15/2024 2027 by Janet Duran RN  Outcome: Progressing  10/15/2024 2027 by Janet Duran RN  Outcome: Progressing     Problem: Prexisting or High Potential for Compromised Skin Integrity  Goal: Skin  integrity is maintained or improved  Description: INTERVENTIONS:  - Identify patients at risk for skin breakdown  - Assess and monitor skin integrity  - Assess and monitor nutrition and hydration status  - Monitor labs   - Assess for incontinence   - Turn and reposition patient  - Assist with mobility/ambulation  - Relieve pressure over bony prominences  - Avoid friction and shearing  - Provide appropriate hygiene as needed including keeping skin clean and dry  - Evaluate need for skin moisturizer/barrier cream  - Collaborate with interdisciplinary team   - Patient/family teaching  - Consider wound care consult   10/15/2024 2027 by Janet Duran RN  Outcome: Progressing  10/15/2024 2027 by Janet Duran RN  Outcome: Progressing     Problem: SKIN/TISSUE INTEGRITY - ADULT  Goal: Skin Integrity remains intact(Skin Breakdown Prevention)  Description: Assess:  -Perform Lonnie assessment every shift   -Clean and moisturize skin every shift   -Inspect skin when repositioning, toileting, and assisting with ADLS  -Assess under medical devices such as iv every shift   -Assess extremities for adequate circulation and sensation     Bed Management:  -Have minimal linens on bed & keep smooth, unwrinkled  -Change linens as needed when moist or perspiring  -Avoid sitting or lying in one position for more than 2 hours while in bed  -Keep HOB at 30 degrees     Toileting:  -Offer bedside commode  -Assess for incontinence every shift   -Use incontinent care products after each incontinent episode such as 3    Activity:  -Mobilize patient 2 times a day  -Encourage activity and walks on unit  -Encourage or provide ROM exercises   -Turn and reposition patient every 2 Hours  -Use appropriate equipment to lift or move patient in bed  -Instruct/ Assist with weight shifting every shift  when out of bed in chair  -Consider limitation of chair time 2 hour intervals    Skin Care:  -Avoid use of baby powder, tape, friction and shearing, hot  water or constrictive clothing  -Relieve pressure over bony prominences using pillow   -Do not massage red bony areas    Next Steps:  -Teach patient strategies to minimize risks such as wounds    -Consider consults to  interdisciplinary teams such as wound care   10/15/2024 2027 by Janet Duran RN  Outcome: Progressing  10/15/2024 2027 by Janet Duran RN  Outcome: Progressing     Problem: MUSCULOSKELETAL - ADULT  Goal: Maintain or return mobility to safest level of function  Description: INTERVENTIONS:  - Assess patient's ability to carry out ADLs; assess patient's baseline for ADL function and identify physical deficits which impact ability to perform ADLs (bathing, care of mouth/teeth, toileting, grooming, dressing, etc.)  - Assess/evaluate cause of self-care deficits   - Assess range of motion  - Assess patient's mobility  - Assess patient's need for assistive devices and provide as appropriate  - Encourage maximum independence but intervene and supervise when necessary  - Involve family in performance of ADLs  - Assess for home care needs following discharge   - Consider OT consult to assist with ADL evaluation and planning for discharge  - Provide patient education as appropriate  10/15/2024 2027 by Janet Duran RN  Outcome: Progressing  10/15/2024 2027 by Janet Duran RN  Outcome: Progressing  Goal: Maintain proper alignment of affected body part  Description: INTERVENTIONS:  - Support, maintain and protect limb and body alignment  - Provide patient/ family with appropriate education  10/15/2024 2027 by Janet Duran RN  Outcome: Progressing  10/15/2024 2027 by Janet Duran RN  Outcome: Progressing

## 2024-10-16 NOTE — CASE MANAGEMENT
Case Management Discharge Planning Note    Patient name Sarah Zayas  Location [place] [address] /[place] 2 M* MRN [id number]  : 1945 Date 10/16/2024       Current Admission Date: 10/10/2024  Current Admission Diagnosis:Anxiety/depression   Patient Active Problem List    Diagnosis Date Noted Date Diagnosed    Macular degeneration 10/15/2024     Hypothyroidism 10/13/2024     Acute on chronic respiratory failure with hypoxia and hypercapnia (HCC) 10/10/2024     Neck pain/shoulder pain 10/10/2024     Acute respiratory failure with hypercapnia (HCC) 10/10/2024     Venous insufficiency of lower extremity 2024     Epigastric pain 2024     Advance care planning 2024     At risk for constipation 2024     At risk for delirium 2024     Physical deconditioning 2024     Periodontitis 2024     Polypharmacy 2024     Diastolic dysfunction 2024     Elevated vitamin B12 level 2024     Normocytic anemia 2024     Neck swelling 2024     Anxiety/depression 2024     Severe protein-calorie malnutrition (HCC) 2024     COPD, severe ([facility]) 2024     Abnormal CT scan 2023     Infectious sialoadenitis of major salivary gland 2023     Left upper lobe pulmonary nodule 2023     Postnasal drip 2023     Raynaud's phenomenon without gangrene 2022     Temporal arteritis ([facility]) 2020     Hypertension 10/11/2018     Other specified hypothyroidism 2018     Gastroesophageal reflux disease without esophagitis 2018     Acute exacerbation of chronic obstructive pulmonary disease (COPD) (HCC) 2012     Vitamin D deficiency 2012     Osteoarthritis 2012       LOS (days): 6  Geometric Mean LOS (GMLOS) (days): 3.4  Days to GMLOS:-2.6     OBJECTIVE:  Risk of Unplanned Readmission Score: 33.93         Current admission status: Inpatient   Preferred Pharmacy:   [facility] - [place] - [address]  [address]  [address]  [place] [address]  Phone: [phone] Fax: [phone]    [facility] [place] - [facility] [facility]  [address] [address]  Phone: [phone] Fax: [phone]    Primary Care Provider: Nicolas Nix MD    Primary Insurance: [employer]  REP  Secondary Insurance:     DISCHARGE DETAILS:       IMM Given (Date):: 10/16/24  IMM Given to:: Family (Spoke to son Steven on the phone:[phone])  Family notified:: Reviewed with son, Steven [phone] over the phone     IMM reviewed with patient's caregiver, patient's caregiver agrees with discharge determination.    Called patient's son, Steven [phone] to review IMM and confirm 10am D/C to [facility]. Son was in agreement. Son requested that the provider follow up with him. Reached out to the attending via Epic Chat.

## 2024-10-17 PROBLEM — J96.12 CHRONIC RESPIRATORY FAILURE WITH HYPERCAPNIA (HCC): Status: ACTIVE | Noted: 2024-10-17

## 2024-10-17 PROBLEM — G93.41 METABOLIC ENCEPHALOPATHY: Status: ACTIVE | Noted: 2024-10-16

## 2024-10-17 PROBLEM — E87.1 HYPONATREMIA: Status: ACTIVE | Noted: 2024-10-17

## 2024-10-17 LAB
ANION GAP SERPL CALCULATED.3IONS-SCNC: 2 MMOL/L (ref 4–13)
ANION GAP SERPL CALCULATED.3IONS-SCNC: 2 MMOL/L (ref 4–13)
ANION GAP SERPL CALCULATED.3IONS-SCNC: 4 MMOL/L (ref 4–13)
ANION GAP SERPL CALCULATED.3IONS-SCNC: 4 MMOL/L (ref 4–13)
ATRIAL RATE: 72 BPM
BACTERIA UR CULT: ABNORMAL
BACTERIA UR CULT: ABNORMAL
BUN SERPL-MCNC: 20 MG/DL (ref 5–25)
BUN SERPL-MCNC: 21 MG/DL (ref 5–25)
BUN SERPL-MCNC: 24 MG/DL (ref 5–25)
BUN SERPL-MCNC: 26 MG/DL (ref 5–25)
CALCIUM SERPL-MCNC: 8 MG/DL (ref 8.4–10.2)
CALCIUM SERPL-MCNC: 8.5 MG/DL (ref 8.4–10.2)
CALCIUM SERPL-MCNC: 8.8 MG/DL (ref 8.4–10.2)
CALCIUM SERPL-MCNC: 9.1 MG/DL (ref 8.4–10.2)
CHLORIDE SERPL-SCNC: 74 MMOL/L (ref 96–108)
CHLORIDE SERPL-SCNC: 79 MMOL/L (ref 96–108)
CHLORIDE SERPL-SCNC: 80 MMOL/L (ref 96–108)
CHLORIDE SERPL-SCNC: 81 MMOL/L (ref 96–108)
CO2 SERPL-SCNC: 37 MMOL/L (ref 21–32)
CO2 SERPL-SCNC: 41 MMOL/L (ref 21–32)
CO2 SERPL-SCNC: 42 MMOL/L (ref 21–32)
CO2 SERPL-SCNC: 43 MMOL/L (ref 21–32)
CREAT SERPL-MCNC: 0.42 MG/DL (ref 0.6–1.3)
CREAT SERPL-MCNC: 0.42 MG/DL (ref 0.6–1.3)
CREAT SERPL-MCNC: 0.46 MG/DL (ref 0.6–1.3)
CREAT SERPL-MCNC: 0.49 MG/DL (ref 0.6–1.3)
ERYTHROCYTE [DISTWIDTH] IN BLOOD BY AUTOMATED COUNT: 12.9 % (ref 11.6–15.1)
GFR SERPL CREATININE-BSD FRML MDRD: 92 ML/MIN/1.73SQ M
GFR SERPL CREATININE-BSD FRML MDRD: 94 ML/MIN/1.73SQ M
GFR SERPL CREATININE-BSD FRML MDRD: 97 ML/MIN/1.73SQ M
GFR SERPL CREATININE-BSD FRML MDRD: 97 ML/MIN/1.73SQ M
GLUCOSE SERPL-MCNC: 109 MG/DL (ref 65–140)
GLUCOSE SERPL-MCNC: 137 MG/DL (ref 65–140)
GLUCOSE SERPL-MCNC: 152 MG/DL (ref 65–140)
GLUCOSE SERPL-MCNC: 98 MG/DL (ref 65–140)
HCT VFR BLD AUTO: 34.7 % (ref 34.8–46.1)
HGB BLD-MCNC: 11 G/DL (ref 11.5–15.4)
MCH RBC QN AUTO: 28.3 PG (ref 26.8–34.3)
MCHC RBC AUTO-ENTMCNC: 31.7 G/DL (ref 31.4–37.4)
MCV RBC AUTO: 89 FL (ref 82–98)
P AXIS: 70 DEGREES
PLATELET # BLD AUTO: 214 THOUSANDS/UL (ref 149–390)
PMV BLD AUTO: 10.1 FL (ref 8.9–12.7)
POTASSIUM SERPL-SCNC: 4.7 MMOL/L (ref 3.5–5.3)
POTASSIUM SERPL-SCNC: 4.9 MMOL/L (ref 3.5–5.3)
POTASSIUM SERPL-SCNC: 5 MMOL/L (ref 3.5–5.3)
POTASSIUM SERPL-SCNC: 5.9 MMOL/L (ref 3.5–5.3)
PR INTERVAL: 118 MS
PROCALCITONIN SERPL-MCNC: 0.15 NG/ML
QRS AXIS: 52 DEGREES
QRSD INTERVAL: 74 MS
QT INTERVAL: 368 MS
QTC INTERVAL: 402 MS
RBC # BLD AUTO: 3.89 MILLION/UL (ref 3.81–5.12)
SODIUM 24H UR-SCNC: 105 MOL/L
SODIUM SERPL-SCNC: 120 MMOL/L (ref 135–147)
SODIUM SERPL-SCNC: 121 MMOL/L (ref 135–147)
SODIUM SERPL-SCNC: 124 MMOL/L (ref 135–147)
SODIUM SERPL-SCNC: 124 MMOL/L (ref 135–147)
T WAVE AXIS: 64 DEGREES
TSH SERPL DL<=0.05 MIU/L-ACNC: 3.18 UIU/ML (ref 0.45–4.5)
VENTRICULAR RATE: 72 BPM
WBC # BLD AUTO: 4.73 THOUSAND/UL (ref 4.31–10.16)

## 2024-10-17 PROCEDURE — 94760 N-INVAS EAR/PLS OXIMETRY 1: CPT

## 2024-10-17 PROCEDURE — 99232 SBSQ HOSP IP/OBS MODERATE 35: CPT | Performed by: STUDENT IN AN ORGANIZED HEALTH CARE EDUCATION/TRAINING PROGRAM

## 2024-10-17 PROCEDURE — 94003 VENT MGMT INPAT SUBQ DAY: CPT

## 2024-10-17 PROCEDURE — 94640 AIRWAY INHALATION TREATMENT: CPT

## 2024-10-17 PROCEDURE — 92526 ORAL FUNCTION THERAPY: CPT

## 2024-10-17 PROCEDURE — 84300 ASSAY OF URINE SODIUM: CPT | Performed by: INTERNAL MEDICINE

## 2024-10-17 PROCEDURE — 80048 BASIC METABOLIC PNL TOTAL CA: CPT | Performed by: STUDENT IN AN ORGANIZED HEALTH CARE EDUCATION/TRAINING PROGRAM

## 2024-10-17 PROCEDURE — 93010 ELECTROCARDIOGRAM REPORT: CPT

## 2024-10-17 PROCEDURE — 97110 THERAPEUTIC EXERCISES: CPT

## 2024-10-17 PROCEDURE — 99223 1ST HOSP IP/OBS HIGH 75: CPT | Performed by: INTERNAL MEDICINE

## 2024-10-17 PROCEDURE — 84145 PROCALCITONIN (PCT): CPT | Performed by: STUDENT IN AN ORGANIZED HEALTH CARE EDUCATION/TRAINING PROGRAM

## 2024-10-17 PROCEDURE — 80048 BASIC METABOLIC PNL TOTAL CA: CPT | Performed by: INTERNAL MEDICINE

## 2024-10-17 PROCEDURE — 83935 ASSAY OF URINE OSMOLALITY: CPT | Performed by: INTERNAL MEDICINE

## 2024-10-17 PROCEDURE — 97116 GAIT TRAINING THERAPY: CPT

## 2024-10-17 PROCEDURE — 97530 THERAPEUTIC ACTIVITIES: CPT

## 2024-10-17 PROCEDURE — 99232 SBSQ HOSP IP/OBS MODERATE 35: CPT | Performed by: INTERNAL MEDICINE

## 2024-10-17 PROCEDURE — 84443 ASSAY THYROID STIM HORMONE: CPT | Performed by: STUDENT IN AN ORGANIZED HEALTH CARE EDUCATION/TRAINING PROGRAM

## 2024-10-17 PROCEDURE — 85027 COMPLETE CBC AUTOMATED: CPT | Performed by: STUDENT IN AN ORGANIZED HEALTH CARE EDUCATION/TRAINING PROGRAM

## 2024-10-17 PROCEDURE — 83930 ASSAY OF BLOOD OSMOLALITY: CPT | Performed by: INTERNAL MEDICINE

## 2024-10-17 PROCEDURE — 93005 ELECTROCARDIOGRAM TRACING: CPT

## 2024-10-17 RX ORDER — SODIUM CHLORIDE 1 G/1
1 TABLET ORAL
Status: DISCONTINUED | OUTPATIENT
Start: 2024-10-17 | End: 2024-10-18

## 2024-10-17 RX ORDER — FUROSEMIDE 10 MG/ML
40 INJECTION INTRAMUSCULAR; INTRAVENOUS ONCE
Status: COMPLETED | OUTPATIENT
Start: 2024-10-17 | End: 2024-10-17

## 2024-10-17 RX ADMIN — ACETAMINOPHEN 975 MG: 325 TABLET, FILM COATED ORAL at 05:14

## 2024-10-17 RX ADMIN — DEXTROSE 1000 MG: 50 INJECTION, SOLUTION INTRAVENOUS at 17:15

## 2024-10-17 RX ADMIN — LEVOTHYROXINE SODIUM 25 MCG: 25 TABLET ORAL at 05:14

## 2024-10-17 RX ADMIN — PROPRANOLOL HYDROCHLORIDE 40 MG: 20 TABLET ORAL at 08:09

## 2024-10-17 RX ADMIN — ACETAMINOPHEN 975 MG: 325 TABLET, FILM COATED ORAL at 13:26

## 2024-10-17 RX ADMIN — ENOXAPARIN SODIUM 30 MG: 100 INJECTION SUBCUTANEOUS at 08:19

## 2024-10-17 RX ADMIN — SENNOSIDES AND DOCUSATE SODIUM 1 TABLET: 8.6; 5 TABLET ORAL at 08:20

## 2024-10-17 RX ADMIN — OFLOXACIN 1 DROP: 3 SOLUTION/ DROPS OPHTHALMIC at 12:48

## 2024-10-17 RX ADMIN — LEVALBUTEROL HYDROCHLORIDE 1.25 MG: 1.25 SOLUTION RESPIRATORY (INHALATION) at 07:10

## 2024-10-17 RX ADMIN — OFLOXACIN 1 DROP: 3 SOLUTION/ DROPS OPHTHALMIC at 21:00

## 2024-10-17 RX ADMIN — LOSARTAN POTASSIUM 100 MG: 50 TABLET, FILM COATED ORAL at 08:09

## 2024-10-17 RX ADMIN — Medication 1 TABLET: at 08:09

## 2024-10-17 RX ADMIN — OFLOXACIN 1 DROP: 3 SOLUTION/ DROPS OPHTHALMIC at 17:13

## 2024-10-17 RX ADMIN — DICLOFENAC SODIUM 2 G: 10 GEL TOPICAL at 21:00

## 2024-10-17 RX ADMIN — DICLOFENAC SODIUM 2 G: 10 GEL TOPICAL at 17:13

## 2024-10-17 RX ADMIN — ACETAMINOPHEN 975 MG: 325 TABLET, FILM COATED ORAL at 21:00

## 2024-10-17 RX ADMIN — SODIUM CHLORIDE 1 G: 1 TABLET ORAL at 19:42

## 2024-10-17 RX ADMIN — BUDESONIDE 0.5 MG: 0.5 INHALANT ORAL at 19:48

## 2024-10-17 RX ADMIN — OFLOXACIN 1 DROP: 3 SOLUTION/ DROPS OPHTHALMIC at 08:07

## 2024-10-17 RX ADMIN — LEVALBUTEROL HYDROCHLORIDE 1.25 MG: 1.25 SOLUTION RESPIRATORY (INHALATION) at 19:48

## 2024-10-17 RX ADMIN — SODIUM CHLORIDE 75 ML/HR: 0.9 INJECTION, SOLUTION INTRAVENOUS at 07:25

## 2024-10-17 RX ADMIN — MENTHOL 10%, METHYL SALICYLATE 15%: 10; 15 CREAM TOPICAL at 20:39

## 2024-10-17 RX ADMIN — MENTHOL 10%, METHYL SALICYLATE 15%: 10; 15 CREAM TOPICAL at 08:21

## 2024-10-17 RX ADMIN — FUROSEMIDE 40 MG: 10 INJECTION, SOLUTION INTRAVENOUS at 19:36

## 2024-10-17 RX ADMIN — CHLORHEXIDINE GLUCONATE 15 ML: 1.2 RINSE ORAL at 20:38

## 2024-10-17 RX ADMIN — LIDOCAINE 1 PATCH: 700 PATCH TOPICAL at 08:22

## 2024-10-17 RX ADMIN — SENNOSIDES AND DOCUSATE SODIUM 1 TABLET: 8.6; 5 TABLET ORAL at 17:12

## 2024-10-17 RX ADMIN — Medication 1 CAPSULE: at 08:10

## 2024-10-17 RX ADMIN — IPRATROPIUM BROMIDE 0.5 MG: 0.5 SOLUTION RESPIRATORY (INHALATION) at 07:10

## 2024-10-17 RX ADMIN — MENTHOL 10%, METHYL SALICYLATE 15%: 10; 15 CREAM TOPICAL at 17:17

## 2024-10-17 RX ADMIN — DICLOFENAC SODIUM 2 G: 10 GEL TOPICAL at 12:48

## 2024-10-17 RX ADMIN — CHLORHEXIDINE GLUCONATE 15 ML: 1.2 RINSE ORAL at 08:15

## 2024-10-17 RX ADMIN — PROPRANOLOL HYDROCHLORIDE 40 MG: 20 TABLET ORAL at 20:38

## 2024-10-17 RX ADMIN — MELATONIN 3 MG: 3 TAB ORAL at 03:35

## 2024-10-17 RX ADMIN — PANTOPRAZOLE SODIUM 40 MG: 40 TABLET, DELAYED RELEASE ORAL at 05:14

## 2024-10-17 RX ADMIN — PREDNISONE 2.5 MG: 2.5 TABLET ORAL at 08:09

## 2024-10-17 RX ADMIN — CYANOCOBALAMIN TAB 500 MCG 1000 MCG: 500 TAB at 08:09

## 2024-10-17 RX ADMIN — POLYETHYLENE GLYCOL 3350 17 G: 17 POWDER, FOR SOLUTION ORAL at 08:18

## 2024-10-17 RX ADMIN — BUDESONIDE 0.5 MG: 0.5 INHALANT ORAL at 07:10

## 2024-10-17 RX ADMIN — DICLOFENAC SODIUM 2 G: 10 GEL TOPICAL at 08:16

## 2024-10-17 NOTE — ASSESSMENT & PLAN NOTE
Had episodes of increasing lethargy, difficult to arouse  Previous CT imaging without any acute process  MRI brain without any evidence of stroke or acute intracranial process  VBG noted for elevated CO2, concerning for CO2 narcosis  Suspect benzo may be playing a role with history of COPD, and likely CO2 retention  Recommend to maintain O2 above 88%, avoid O2 sats greater than 95% due to decreased respiratory drive  Some concerns for possible UTI, empirically started on ceftriaxone awaiting cultures

## 2024-10-17 NOTE — ASSESSMENT & PLAN NOTE
Evidence of bicep tendinitis  Continue Voltaren gel and Bengay  PT OT recommending rehab  Awaiting further evaluation above prior to discharge to rehab

## 2024-10-17 NOTE — PROGRESS NOTES
Progress Note - Hospitalist   Name: Sarah Zayas 79 y.o. female I MRN: [id number]  Unit/Bed#: [place] [address] -01 I Date of Admission: 10/10/2024   Date of Service: 10/17/2024 I Hospital Day: 7     Assessment & Plan  Acute respiratory failure with hypercapnia (HCC)  Back to baseline oxygen, recommend 2L NC  Acute exacerbation of chronic obstructive pulmonary disease (COPD) (HCC)  History of severe COPD  Initial concern for exacerbation but likely oversedation from benzodiazepine  Transfer from  to ICU on 10/10, downgraded to MS on 10/10  Continue budesonide, Xopenex  Repeat VBG with noted CO2 retention  Avoid benzos, discontinue lyrica  Pulmonary consulted, recommending Bipap at bedtime and repeat ABG in am  Gastroesophageal reflux disease without esophagitis  Continue PPI therapy  Hypertension  Continue losartan and propanolol  Monitor BP and titrate as needed  Temporal arteritis (HCC)  Continue prednisone 2.5 mg  Anxiety/depression  Evaluated by psychiatry  Continue Cymbalta 30 mg twice daily  Discontinue Lyrica, BuSpar, gabapentin, Remeron and benzos  Anxiety largely driving patient's symptoms  Consider repeat psych evaluations given concerns for Cymbalta induced hyponatremia    Neck pain/shoulder pain  Evidence of bicep tendinitis  Continue Voltaren gel and Bengay  PT OT recommending rehab  Awaiting further evaluation above prior to discharge to rehab    Hypothyroidism  Levothyroxine reduced to 25 mcg  TSH suppressed and free T4 elevated-suspect some iatrogenic hyperthyroidism  Repeat TSH and T4 in 8 to 12 weeks outpatient  Macular degeneration  Follows with [facility]  Continue artifical tears  Reports decrease sight in her left eye with some discharge concerning for conjunctivitis  Recommend trial with ofloxacin for 7 days  Metabolic encephalopathy  Had episodes of increasing lethargy, difficult to arouse  Previous CT imaging without any acute process  MRI brain without any evidence of  stroke or acute intracranial process  VBG noted for elevated CO2, concerning for CO2 narcosis  Suspect benzo may be playing a role with history of COPD, and likely CO2 retention  Recommend to maintain O2 above 88%, avoid O2 sats greater than 95% due to decreased respiratory drive  Some concerns for possible UTI, empirically started on ceftriaxone awaiting cultures  Urinary retention  Did have episode of urinary retention, now resolved with incontinence  UA concerning for infection, currently on ceftriaxone awaiting cultures  Hyponatremia  Sodium of 124, unclear of etiology  Urine labs ordered  Nephrology consulted, concern for possible Cymbalta induced hyponatremia given recent addition on 10/13  Pending labs, may need to discontinue Cymbalta  Chronic respiratory failure with hypercapnia (HCC)      VTE Pharmacologic Prophylaxis:   Pharmacologic: Enoxaparin (Lovenox)  Mechanical VTE Prophylaxis in Place: Yes    Current Length of Stay: 7 day(s)    Current Patient Status: Inpatient   Certification Statement: The patient will continue to require additional inpatient hospital stay due to hyponatremia    Discharge Plan: pending    Code Status: Level 1 - Full Code      Subjective:   Patient is more awake and alert today.  Had difficulty wearing BiPAP for prolonged period of time last night.  Denies any chest pain, fevers, chills, nausea or vomiting.  Sons at bedside and were updated.    Objective:     Vitals:   Temp (24hrs), Av.2 °F (36.8 °C), Min:98.1 °F (36.7 °C), Max:98.4 °F (36.9 °C)    Temp:  [98.1 °F (36.7 °C)-98.4 °F (36.9 °C)] 98.1 °F (36.7 °C)  HR:  [71-85] 71  Resp:  [16-18] 16  BP: (163-167)/(76-92) 163/77  SpO2:  [87 %-99 %] 91 %  Body mass index is 19.76 kg/m².     Input and Output Summary (last 24 hours):       Intake/Output Summary (Last 24 hours) at 10/17/2024 1555  Last data filed at 10/16/2024 2300  Gross per 24 hour   Intake 440 ml   Output 76 ml   Net 364 ml       Physical Exam:     Physical  Exam  Vitals and nursing note reviewed.   Constitutional:       General: She is not in acute distress.     Comments: Thin appearing, lethargic   HENT:      Head: Normocephalic.   Eyes:      Conjunctiva/sclera: Conjunctivae normal.   Cardiovascular:      Rate and Rhythm: Normal rate.   Pulmonary:      Effort: Pulmonary effort is normal.      Breath sounds: Normal breath sounds. No wheezing or rhonchi.   Abdominal:      General: Bowel sounds are normal. There is no distension.      Palpations: Abdomen is soft.   Musculoskeletal:         General: No swelling. Normal range of motion.   Skin:     General: Skin is warm and dry.   Neurological:      General: No focal deficit present.      Mental Status: Mental status is at baseline.         Additional Data:     Labs:    Results from last 7 days   Lab Units 10/17/24  0544 10/16/24  1601   WBC Thousand/uL 4.73 6.07   HEMOGLOBIN g/dL 11.0* 11.1*   HEMATOCRIT % 34.7* 35.9   PLATELETS Thousands/uL 214 212   SEGS PCT %  --  85*   LYMPHO PCT %  --  8*   MONO PCT %  --  6   EOS PCT %  --  1     Results from last 7 days   Lab Units 10/17/24  0620 10/17/24  0005 10/16/24  1601   SODIUM mmol/L 124*   < > 124*   POTASSIUM mmol/L 4.9   < > 4.8   CHLORIDE mmol/L 81*   < > 78*   CO2 mmol/L 41*   < > 45*   BUN mg/dL 24   < > 29*   CREATININE mg/dL 0.42*   < > 0.43*   ANION GAP mmol/L 2*   < > 1*   CALCIUM mg/dL 8.0*   < > 8.8   ALBUMIN g/dL  --   --  3.6   TOTAL BILIRUBIN mg/dL  --   --  0.26   ALK PHOS U/L  --   --  41   ALT U/L  --   --  25   AST U/L  --   --  30   GLUCOSE RANDOM mg/dL 98   < > 159*    < > = values in this interval not displayed.                 Results from last 7 days   Lab Units 10/17/24  0636   PROCALCITONIN ng/ml 0.15           * I Have Reviewed All Lab Data Listed Above.  * Additional Pertinent Lab Tests Reviewed: All Labs For Current Hospital Admission Reviewed    Mobility:  Basic Mobility Inpatient Raw Score: 18  [facility] Goal: 6: Walk 10 steps or more  [facility]  Achieved: 7: Walk 25 feet or more    Lines:     Invasive Devices       Peripheral Intravenous Line  Duration             Peripheral IV 10/14/24 Left;Ventral (anterior) Forearm 3 days                       Imaging:    Imaging Reports Reviewed Today Include:     CT head wo contrast    Result Date: 10/13/2024  Impression: No acute intracranial abnormality.  Stable chronic microangiopathic changes within the brain. Workstation performed: [id number]     XR shoulder 2+ views LEFT    Result Date: 10/10/2024  Impression: No acute osseous abnormality Workstation performed: [id number]     CT chest without contrast    Result Date: 10/10/2024  Impression: 1.  Possible airway debris, which would be consistent with aspiration, see discussion 2.  Trace pleural effusions 3.  Stable right breast lesion, recommend follow-up diagnostic mammogram The study was marked in EPIC for immediate notification. Workstation performed: [id number]     CT head without contrast    Result Date: 10/10/2024  Impression: No acute intracranial abnormality. Workstation performed: [id number]        Recent Cultures (last 7 days):           Last 24 Hours Medication List:   Current Facility-Administered Medications   Medication Dose Route Frequency Provider Last Rate    acetaminophen  975 mg Oral Q8H RACHEL TITO Kwong      albuterol  2 puff Inhalation Q6H PRN Luz Mukherjee MD      Artificial Tears  1 drop Left Eye TID Bhupendra Medley MD      budesonide  0.5 mg Nebulization BID Luz Mukherjee MD      cefTRIAXone  1,000 mg Intravenous Q24H Bhupendra Medley MD 1,000 mg (10/16/24 1718)    chlorhexidine  15 mL Mouth/Throat Q12H [facility] Luz Mukherjee MD      vitamin B-12  1,000 mcg Oral Daily Luz Mukherjee MD      Diclofenac Sodium  2 g Topical 4x Daily Saad Mckeon DO      DULoxetine  30 mg Oral HS Saad Mckeon DO      enoxaparin  30 mg Subcutaneous Daily Luz Mukherjee MD      ipratropium-albuterol  3 mL Nebulization Q6H PRN Luz Mukherjee MD      levalbuterol  1.25 mg  Nebulization BID Luz Mukherjee MD      levothyroxine  25 mcg Oral Early Morning Saad Mckeon DO      lidocaine  1 patch Topical Daily Luz Mukherjee MD      losartan  100 mg Oral Daily Bhupendra Medley MD      magnesium hydroxide  30 mL Oral Daily PRN Bhupendra Medley MD      melatonin  3 mg Oral HS PRN TITO Kwong      menthol-methyl salicylate   Apply externally TID Saad Mckeon DO      multivitamin-minerals  1 tablet Oral Daily Tyrone Wolfe PA-C      ofloxacin  1 drop Left Eye 4x Daily Bhupendra Medley MD      ondansetron  4 mg Intravenous Q6H PRN Luz Mukherjee MD      pantoprazole  40 mg Oral Early Morning Luz Mukherjee MD      phenol  1 spray Mouth/Throat Q2H PRN Tyrone Wolfe PA-C      polyethylene glycol  17 g Oral Daily Bhupendra Medley MD      predniSONE  2.5 mg Oral Daily With Breakfast Luz Mukherjee MD      PreserVision AREDS 2  1 capsule Oral Daily Bhupendra Medley MD      propranolol  40 mg Oral Q12H RACHEL Bhupendra Medley MD      senna-docusate sodium  1 tablet Oral BID Bhupendra Medley MD      sodium chloride  2 spray Each Nare Q1H PRN Luz Mukherjee MD          Today, Patient Was Seen By: Bhupendra Medley MD    ** Please Note: Dictation voice to text software may have been used in the creation of this document. **

## 2024-10-17 NOTE — SPEECH THERAPY NOTE
Speech Language/Pathology    Speech/Language Pathology Progress Note    Patient Name: Sarah Zayas  Today's Date: 10/17/2024     Problem List  Principal Problem:    Anxiety/depression  Active Problems:    Acute exacerbation of chronic obstructive pulmonary disease (COPD) (HCC)    Gastroesophageal reflux disease without esophagitis    Hypertension    Temporal arteritis (HCC)    Neck pain/shoulder pain    Acute respiratory failure with hypercapnia (HCC)    Hypothyroidism    Macular degeneration    Encephalopathy    Urinary retention    Hyponatremia    Chronic respiratory failure with hypercapnia (HCC)       Past Medical History  Past Medical History:   Diagnosis Date    Anxiety 08/18/2024    Asthma     COPD (chronic obstructive pulmonary disease) (HCC)     Disease of thyroid gland     GERD (gastroesophageal reflux disease)     Hypertension 10/11/2018    Hypothyroid     Normocytic anemia 08/28/2024    Osteoarthritis 09/26/2012    Temporal arteritis (HCC)     Type 2 diabetes mellitus with complication, without long-term current use of insulin (HCC) 01/14/2020        Past Surgical History  Past Surgical History:   Procedure Laterality Date    EYE SURGERY Right 12/06/2019    cataract LVCFS    HYSTERECTOMY      NO PAST SURGERIES      ALSO NO RELEVANT PAST SURRGICAL HX AS PER [facility]         Subjective:  Pt alert, feeding self lunch in chair. Son at chairside and pt's sister visiting.   Objective:  Pt seen for f/u. Noted mental status was fluctuating. Pt was more alert and verbal today that I have seen this far. Tolerating chicken noodle soup w/ prolonged but functional appearing mastication. Did not like the tuna Took multiple sips of thin liquids w/ prompt transfer and swallow. No cough. States she likes the chocolate supplements bit keeps getting strawberry. Requested watermwlon which we do not have. Family will bring in.   Per pulmonary:  -Please discharge patient on: Budesonide twice daily, Xopenex 3 times daily,  prednisone 2.5 mg daily  -Patient reports significant jitteriness and anxiety from albuterol we will trial this new home regimen  Assessment:  Progress noted again from yesterdays session.   Plan/Recommendations:  Alert and tolerating diet. Intake improving. Likes chocolate supplements. Currently on a ndd3 dental and thin. Allow regular diet as tolerated.

## 2024-10-17 NOTE — QUICK NOTE
"Nursing reached out that patient with increased anxiety this morning and reporting that she \"doesn't want to do this anymore and wants to die\", denies plan. Scoring low risk per nursing suicide assessment.  Pt also reporting chest pain associated with this episode, EKG obtained without acute ischemic changes, suspect chest pain related to anxiety  Of note, pt was on BiPAP overnight and requesting to take this off    PLAN  Virtual obs ordered for low suicidal risk  Will hold BiPAP for now as may be contributing to anxiety  Pt with increased sedation secondary to receiving benzo's yesterday, so will avoid   If no improvement in anxiety following break from BiPAP, would consider possible additional anxiolytic therapy   "

## 2024-10-17 NOTE — ASSESSMENT & PLAN NOTE
Evaluated by psychiatry  Continue Cymbalta 30 mg twice daily  Discontinue Lyrica, BuSpar, gabapentin, Remeron and benzos  Anxiety largely driving patient's symptoms  Consider repeat psych evaluations given concerns for Cymbalta induced hyponatremia

## 2024-10-17 NOTE — ASSESSMENT & PLAN NOTE
Follows with [facility]  Continue artifical tears  Reports decrease sight in her left eye with some discharge concerning for conjunctivitis  Recommend trial with ofloxacin for 7 days

## 2024-10-17 NOTE — ASSESSMENT & PLAN NOTE
-No formal PFTs recorded  -No noted acute exacerbation  -Inpatient: Budesonide, Xopenex 3 times daily  -Please discharge patient on: Budesonide twice daily, Xopenex 3 times daily, prednisone 2.5 mg daily  -Patient reports significant jitteriness and anxiety from albuterol we will trial this new home regimen

## 2024-10-17 NOTE — ASSESSMENT & PLAN NOTE
History of severe COPD  Initial concern for exacerbation but likely oversedation from benzodiazepine  Transfer from  to ICU on 10/10, downgraded to MS on 10/10  Continue budesonide, Xopenex  Repeat VBG with noted CO2 retention  Avoid benzos, discontinue lyrica  Pulmonary consulted, recommending Bipap at bedtime and repeat ABG in am

## 2024-10-17 NOTE — ASSESSMENT & PLAN NOTE
Had episodes of increasing lethargy, difficult to arouse  Likely due to hyponatremia, possible UTI and hypercapnia  Previous CT imaging without any acute process  MRI brain without any evidence of stroke or acute intracranial process  Urine cultures growing Aerococcus, completed 5-day course of antibiotics  VBG with elevated CO2, recommending BiPAP at bedtime  Appreciate neurology evaluation, suspect seizure-like activity due to hyponatremia not recommending AED  Mental status has improved though patient continues to deal with ongoing anxiety  Psychiatry consulted

## 2024-10-17 NOTE — ASSESSMENT & PLAN NOTE
Acute hyponatremia in the setting of lung issues with acute exacerbation of severe COPD, also noted patient was recently started on Cymbalta on 10/13.  Agree with fluid restriction.  Check serum osmolality, repeat BMP, urine osmolarity and urine sodium.  Recommendation based on results.  Noted most recent TSH improved.

## 2024-10-17 NOTE — PLAN OF CARE
Problem: PAIN - ADULT  Goal: Verbalizes/displays adequate comfort level or baseline comfort level  Description: Interventions:  - Encourage patient to monitor pain and request assistance  - Assess pain using appropriate pain scale  - Administer analgesics based on type and severity of pain and evaluate response  - Implement non-pharmacological measures as appropriate and evaluate response  - Consider cultural and social influences on pain and pain management  - Notify physician/advanced practitioner if interventions unsuccessful or patient reports new pain  Outcome: Progressing     Problem: INFECTION - ADULT  Goal: Absence or prevention of progression during hospitalization  Description: INTERVENTIONS:  - Assess and monitor for signs and symptoms of infection  - Monitor lab/diagnostic results  - Monitor all insertion sites, i.e. indwelling lines, tubes, and drains  - Monitor endotracheal if appropriate and nasal secretions for changes in amount and color  - [place] appropriate cooling/warming therapies per order  - Administer medications as ordered  - Instruct and encourage patient and family to use good hand hygiene technique  - Identify and instruct in appropriate isolation precautions for identified infection/condition  Outcome: Progressing  Goal: Absence of fever/infection during neutropenic period  Description: INTERVENTIONS:  - Monitor WBC    Outcome: Progressing     Problem: SAFETY ADULT  Goal: Patient will remain free of falls  Description: INTERVENTIONS:  - Educate patient/family on patient safety including physical limitations  - Instruct patient to call for assistance with activity   - Consult OT/PT to assist with strengthening/mobility   - Keep Call bell within reach  - Keep bed low and locked with side rails adjusted as appropriate  - Keep care items and personal belongings within reach  - Initiate and maintain comfort rounds  - Make Fall Risk Sign visible to staff  - Offer Toileting every 2 Hours,  in advance of need  - Initiate/Maintain bed alarm  - Obtain necessary fall risk management equipment: alarm   - Apply yellow socks and bracelet for high fall risk patients  - Consider moving patient to room near nurses station  Outcome: Progressing  Goal: Maintain or return to baseline ADL function  Description: INTERVENTIONS:  -  Assess patient's ability to carry out ADLs; assess patient's baseline for ADL function and identify physical deficits which impact ability to perform ADLs (bathing, care of mouth/teeth, toileting, grooming, dressing, etc.)  - Assess/evaluate cause of self-care deficits   - Assess range of motion  - Assess patient's mobility; develop plan if impaired  - Assess patient's need for assistive devices and provide as appropriate  - Encourage maximum independence but intervene and supervise when necessary  - Involve family in performance of ADLs  - Assess for home care needs following discharge   - Consider OT consult to assist with ADL evaluation and planning for discharge  - Provide patient education as appropriate  Outcome: Progressing  Goal: Maintains/Returns to pre admission functional level  Description: INTERVENTIONS:  - Perform AM-PAC 6 Click Basic Mobility/ Daily Activity assessment daily.  - Set and communicate daily mobility goal to care team and patient/family/caregiver.   - Collaborate with rehabilitation services on mobility goals if consulted  - Perform Range of Motion 4 times a day.  - Reposition patient every 2 hours.  - Dangle patient 3 times a day  - Stand patient 3 times a day  - Ambulate patient 3 times a day  - Out of bed to chair 3 times a day   - Out of bed for meals 3 times a day  - Out of bed for toileting  - Record patient progress and toleration of activity level   Outcome: Progressing     Problem: DISCHARGE PLANNING  Goal: Discharge to home or other facility with appropriate resources  Description: INTERVENTIONS:  - Identify barriers to discharge w/patient and  caregiver  - Arrange for needed discharge resources and transportation as appropriate  - Identify discharge learning needs (meds, wound care, etc.)  - Arrange for interpretive services to assist at discharge as needed  - Refer to Case Management Department for coordinating discharge planning if the patient needs post-hospital services based on physician/advanced practitioner order or complex needs related to functional status, cognitive ability, or social support system  Outcome: Progressing     Problem: Knowledge Deficit  Goal: Patient/family/caregiver demonstrates understanding of disease process, treatment plan, medications, and discharge instructions  Description: Complete learning assessment and assess knowledge base.  Interventions:  - Provide teaching at level of understanding  - Provide teaching via preferred learning methods  Outcome: Progressing     Problem: RESPIRATORY - ADULT  Goal: Achieves optimal ventilation and oxygenation  Description: INTERVENTIONS:  - Assess for changes in respiratory status  - Assess for changes in mentation and behavior  - Position to facilitate oxygenation and minimize respiratory effort  - Oxygen administered by appropriate delivery if ordered  - Initiate smoking cessation education as indicated  - Encourage broncho-pulmonary hygiene including cough, deep breathe, Incentive Spirometry  - Assess the need for suctioning and aspirate as needed  - Assess and instruct to report SOB or any respiratory difficulty  - Respiratory Therapy support as indicated  Outcome: Progressing     Problem: Nutrition/Hydration-ADULT  Goal: Nutrient/Hydration intake appropriate for improving, restoring or maintaining nutritional needs  Description: Monitor and assess patient's nutrition/hydration status for malnutrition. Collaborate with interdisciplinary team and initiate plan and interventions as ordered.  Monitor patient's weight and dietary intake as ordered or per policy. Utilize nutrition  screening tool and intervene as necessary. Determine patient's food preferences and provide high-protein, high-caloric foods as appropriate.     INTERVENTIONS:  - Monitor oral intake, urinary output, labs, and treatment plans  - Assess nutrition and hydration status and recommend course of action  - Evaluate amount of meals eaten  - Assist patient with eating if necessary   - Allow adequate time for meals  - Recommend/ encourage appropriate diets, oral nutritional supplements, and vitamin/mineral supplements  - Order, calculate, and assess calorie counts as needed  - Recommend, monitor, and adjust tube feedings and TPN/PPN based on assessed needs  - Assess need for intravenous fluids  - Provide specific nutrition/hydration education as appropriate  - Include patient/family/caregiver in decisions related to nutrition  Outcome: Progressing     Problem: Prexisting or High Potential for Compromised Skin Integrity  Goal: Skin integrity is maintained or improved  Description: INTERVENTIONS:  - Identify patients at risk for skin breakdown  - Assess and monitor skin integrity  - Assess and monitor nutrition and hydration status  - Monitor labs   - Assess for incontinence   - Turn and reposition patient  - Assist with mobility/ambulation  - Relieve pressure over bony prominences  - Avoid friction and shearing  - Provide appropriate hygiene as needed including keeping skin clean and dry  - Evaluate need for skin moisturizer/barrier cream  - Collaborate with interdisciplinary team   - Patient/family teaching  - Consider wound care consult   Outcome: Progressing     Problem: SKIN/TISSUE INTEGRITY - ADULT  Goal: Skin Integrity remains intact(Skin Breakdown Prevention)  Description: Assess:  -Perform Lonnie assessment every shift  -Clean and moisturize skin every shift  -Inspect skin when repositioning, toileting, and assisting with ADLS  -Assess under medical devices such as iv every shift  -Assess extremities for adequate  circulation and sensation     Bed Management:  -Have minimal linens on bed & keep smooth, unwrinkled  -Change linens as needed when moist or perspiring  -Avoid sitting or lying in one position for more than 2 hours while in bed  -Keep HOB at 30 degrees     Toileting:  -Offer bedside commode  -Assess for incontinence every shift  -Use incontinent care products after each incontinent episode such as foam cleanser     Activity:  -Mobilize patient 3 times a day  -Encourage activity and walks on unit  -Encourage or provide ROM exercises   -Turn and reposition patient every 2 Hours  -Use appropriate equipment to lift or move patient in bed  -Instruct/ Assist with weight shifting every shift when out of bed in chair  -Consider limitation of chair time 2 hour intervals    Skin Care:  -Avoid use of baby powder, tape, friction and shearing, hot water or constrictive clothing  -Relieve pressure over bony prominences using pillows   -Do not massage red bony areas    Next Steps:  -Teach patient strategies to minimize risks such as wound    -Consider consults to  interdisciplinary teams such as wound care   Outcome: Progressing     Problem: MUSCULOSKELETAL - ADULT  Goal: Maintain or return mobility to safest level of function  Description: INTERVENTIONS:  - Assess patient's ability to carry out ADLs; assess patient's baseline for ADL function and identify physical deficits which impact ability to perform ADLs (bathing, care of mouth/teeth, toileting, grooming, dressing, etc.)  - Assess/evaluate cause of self-care deficits   - Assess range of motion  - Assess patient's mobility  - Assess patient's need for assistive devices and provide as appropriate  - Encourage maximum independence but intervene and supervise when necessary  - Involve family in performance of ADLs  - Assess for home care needs following discharge   - Consider OT consult to assist with ADL evaluation and planning for discharge  - Provide patient education as  appropriate  Outcome: Progressing  Goal: Maintain proper alignment of affected body part  Description: INTERVENTIONS:  - Support, maintain and protect limb and body alignment  - Provide patient/ family with appropriate education  Outcome: Progressing

## 2024-10-17 NOTE — CONSULTS
Consultation - Nephrology   Sarah Zayas 79 y.o. female MRN: [id number]  Unit/Bed#: [place] [address] -[id number] Encounter: [id number]    ASSESSMENT and PLAN:  Assessment & Plan  Hyponatremia  Acute hyponatremia in the setting of lung issues with acute exacerbation of severe COPD, also noted patient was recently started on Cymbalta on 10/13.  Agree with fluid restriction.  Check serum osmolality, repeat BMP, urine osmolarity and urine sodium.  Recommendation based on results.  Noted most recent TSH improved.    Acute exacerbation of chronic obstructive pulmonary disease (COPD) (HCC)  History of severe COPD, continue management as per primary team  Hypertension  Blood pressure fluctuating and labile, will follow for now  Anxiety/depression  Noted was recently started on Cymbalta that can exacerbate hyponatremia  Acute respiratory failure with hypercapnia (HCC)    Hypothyroidism  Levothyroxine dose was reduced, most recent TSH improved  Encephalopathy  Management as per per primary team        SUMMARY OF RECOMMENDATIONS:  Agree with fluid restriction 1.5 L daily.  Check serum nodularity, repeat BMP, urine, routine urine sodium.  Noted patient was recently started on Cymbalta on 10/13 that can exacerbate hyponatremia.  Further recommendation based on results.  Recommendations as above were discussed with internal medicine attending Dr. Medley, he agreed with the plan      HISTORY OF PRESENT ILLNESS:  Requesting Physician: Bhupendra Medley MD  Reason for Consult: Hyponatremia    Sarha Zayas is a 79 y.o. female who was transferred to [facility] from [facility] after presenting with acute on chronic hypoxemic/hypercapnic respiratory failure, encephalopathy. A renal consultation is requested today for assistance in the management of hyponatremia.  Patient with history of severe COPD, GERD, anxiety, diabetes, hypothyroidism, hypertension, noted initially was admitted to [facility],  transferred to [facility] due to worsening respiratory status on 10/10.    On admission on 10/4 sodium 130, that initially improved up to 138 on 10/13.  Noted patient was started on Cymbalta on 10/13.  Sodium decreased to 124 on 10/16, sodium today remains low at 124 for that reason nephrology was consulted.    During my evaluation patient feeling lousy, weak, reports chronic shortness of breath, denies significant chest pain, reports some nausea but no vomiting, no diarrhea, no significant abdominal pain.  .    PAST MEDICAL HISTORY:  Past Medical History:   Diagnosis Date    Anxiety 2024    Asthma     COPD (chronic obstructive pulmonary disease) ([facility])     Disease of thyroid gland     GERD (gastroesophageal reflux disease)     Hypertension 10/11/2018    Hypothyroid     Normocytic anemia 2024    Osteoarthritis 2012    Temporal arteritis (HCC)     Type 2 diabetes mellitus with complication, without long-term current use of insulin ([facility]) 2020       PAST SURGICAL HISTORY:  Past Surgical History:   Procedure Laterality Date    EYE SURGERY Right 2019    cataract LVCFS    HYSTERECTOMY      NO PAST SURGERIES      ALSO NO RELEVANT PAST SURRGICAL HX AS PER [facility]       SOCIAL HISTORY:  Social History     Substance and Sexual Activity   Alcohol Use Never     Social History     Substance and Sexual Activity   Drug Use No     Social History     Tobacco Use   Smoking Status Former    Current packs/day: 0.00    Average packs/day: 1 pack/day for 54.0 years (54.0 ttl pk-yrs)    Types: Cigarettes    Start date:     Quit date:     Years since quittin.8   Smokeless Tobacco Never   Tobacco Comments    TOBACCO USE, NO PASSIVE SMOKE EXPOSURE       FAMILY HISTORY:  Family History   Problem Relation Age of Onset    Breast cancer Mother     Emphysema Father        ALLERGIES:  No Known Allergies    MEDICATIONS:    Current Facility-Administered Medications:     acetaminophen  (TYLENOL) tablet 975 mg, 975 mg, Oral, Q8H RACHEL, Yasminejuanita Biswas, VIKYNP, 975 mg at 10/17/24 0514    albuterol (PROVENTIL HFA,VENTOLIN HFA) inhaler 2 puff, 2 puff, Inhalation, Q6H PRN, Luz Mukherjee MD    Artificial Tears Op Soln 1 drop, 1 drop, Left Eye, TID, Bhupendra Medley MD, 1 drop at 10/17/24 0818    budesonide (PULMICORT) inhalation solution 0.5 mg, 0.5 mg, Nebulization, BID, Luz Mukherjee MD, 0.5 mg at 10/17/24 0710    cefTRIAXone (ROCEPHIN) 1,000 mg in dextrose 5 % 50 mL IVPB, 1,000 mg, Intravenous, Q24H, Bhupendra Medley MD, Last Rate: 100 mL/hr at 10/16/24 1718, 1,000 mg at 10/16/24 1718    chlorhexidine (PERIDEX) 0.12 % oral rinse 15 mL, 15 mL, Mouth/Throat, Q12H RACHEL, Luz Mukherjee MD, 15 mL at 10/17/24 0815    cyanocobalamin (VITAMIN B-12) tablet 1,000 mcg, 1,000 mcg, Oral, Daily, Luz Mukherjee MD, 1,000 mcg at 10/17/24 0809    Diclofenac Sodium (VOLTAREN) 1 % topical gel 2 g, 2 g, Topical, 4x Daily, Saad Mckeon DO, 2 g at 10/17/24 0816    DULoxetine (CYMBALTA) delayed release capsule 30 mg, 30 mg, Oral, HS, Saad Mckeon DO, 30 mg at 10/16/24 2250    enoxaparin (LOVENOX) subcutaneous injection 30 mg, 30 mg, Subcutaneous, Daily, Luz Mukherjee MD, 30 mg at 10/17/24 0819    ipratropium (ATROVENT) 0.02 % inhalation solution 0.5 mg, 0.5 mg, Nebulization, BID, Luz Mukherjee MD, 0.5 mg at 10/17/24 0710    ipratropium-albuterol (DUO-NEB) 0.5-2.5 mg/3 mL inhalation solution 3 mL, 3 mL, Nebulization, Q6H PRN, Luz Mukherjee MD    levalbuterol (XOPENEX) inhalation solution 1.25 mg, 1.25 mg, Nebulization, BID, Luz Mukherjee MD, 1.25 mg at 10/17/24 0710    levothyroxine tablet 25 mcg, 25 mcg, Oral, Early Morning, Saad Mckeon, , 25 mcg at 10/17/24 0514    lidocaine (LIDODERM) 5 % patch 1 patch, 1 patch, Topical, Daily, Luz Mukherjee MD, 1 patch at 10/17/24 0822    losartan (COZAAR) tablet 100 mg, 100 mg, Oral, Daily, Bhupendra Medley MD, 100 mg at 10/17/24 0809    magnesium hydroxide (MILK OF MAGNESIA) oral suspension 30 mL,  30 mL, Oral, Daily PRN, Bhupendra Medley MD    melatonin tablet 3 mg, 3 mg, Oral, HS PRN, TITO Kwong, 3 mg at 10/17/24 0335    menthol-methyl salicylate (BENGAY) 10-15 % cream, , Apply externally, TID, Saad Mckeon DO, Given at 10/17/24 0821    multivitamin-minerals (CENTRUM) tablet 1 tablet, 1 tablet, Oral, Daily, Tyrone Wolfe PA-C, 1 tablet at 10/17/24 0809    ofloxacin (OCUFLOX) 0.3 % ophthalmic solution 1 drop, 1 drop, Left Eye, 4x Daily, Bhupendra Medley MD, 1 drop at 10/17/24 0807    ondansetron (ZOFRAN) injection 4 mg, 4 mg, Intravenous, Q6H PRN, Luz Mukherjee MD, 4 mg at 10/16/24 0238    pantoprazole (PROTONIX) EC tablet 40 mg, 40 mg, Oral, Early Morning, Luz Mukherjee MD, 40 mg at 10/17/24 0514    phenol (CHLORASEPTIC) 1.4 % mucosal liquid 1 spray, 1 spray, Mouth/Throat, Q2H PRN, Tyrone Wolfe PA-C, 1 spray at 10/15/24 2017    polyethylene glycol (MIRALAX) packet 17 g, 17 g, Oral, Daily, Bhupendra Medley MD, 17 g at 10/17/24 0818    predniSONE tablet 2.5 mg, 2.5 mg, Oral, Daily With Breakfast, Luz Mukherjee MD, 2.5 mg at 10/17/24 0809    PreserVision AREDS 2 CAPS 1 capsule, 1 capsule, Oral, Daily, Bhupendra Medley MD, 1 capsule at 10/17/24 0810    propranolol (INDERAL) tablet 40 mg, 40 mg, Oral, Q12H RACHEL, Bhupendra Medley MD, 40 mg at 10/17/24 0809    senna-docusate sodium (SENOKOT S) 8.6-50 mg per tablet 1 tablet, 1 tablet, Oral, BID, Bhupendra Medley MD, 1 tablet at 10/17/24 0820    sodium chloride (OCEAN) 0.65 % nasal spray 2 spray, 2 spray, Each Nare, Q1H PRN, Luz Mukherjee MD, 2 spray at 10/15/24 2017    REVIEW OF SYSTEMS:  All the systems were reviewed and were negative except as documented on the HPI.    PHYSICAL EXAM:  Current Weight: Weight - Scale: 49 kg (108 lb 0.4 oz)  First Weight: Weight - Scale: 45.6 kg (100 lb 8.5 oz)  Vitals:    10/17/24 0445 10/17/24 0539 10/17/24 0604 10/17/24 0807   BP:   166/90 167/76   BP Location:       Pulse:    72   Resp:    17   Temp:   98.2 °F (36.8 °C)  "98.4 °F (36.9 °C)   TempSrc:       SpO2: 95%   93%   Weight:  49 kg (108 lb 0.4 oz)     Height:           Intake/Output Summary (Last 24 hours) at 10/17/2024 1121  Last data filed at 10/16/2024 2300  Gross per 24 hour   Intake 1160 ml   Output 309 ml   Net 851 ml       General: Chronically ill-appearing, fragile, cooperative, in not acute distress  Eyes: conjunctivae pink, anicteric sclerae  ENT: lips and mucous membranes moist  Neck: supple, no JVD  Chest: Prolonged expiration, no crackles, ronchus or wheezings  CVS: distinct S1 & S2, normal rate, regular rhythm  Abdomen: soft, non-tender, non-distended, normoactive bowel sounds  Extremities: no edema of both legs  Skin: no rash  Neuro: awake, alert, oriented      Invasive Devices:        Lab Results:   Results from last 7 days   Lab Units 10/17/24  0620 10/17/24  0544 10/17/24  0005 10/16/24  1601 10/14/24  0513 10/13/24  0456   WBC Thousand/uL  --  4.73  --  6.07  --  6.19   HEMOGLOBIN g/dL  --  11.0*  --  11.1*  --  12.5   HEMATOCRIT %  --  34.7*  --  35.9  --  42.7   PLATELETS Thousands/uL  --  214  --  212  --  197   SODIUM mmol/L 124*  --  124* 124*   < > 138   POTASSIUM mmol/L 4.9  --  5.0 4.8   < > 5.3   CHLORIDE mmol/L 81*  --  80* 78*   < > 91*   CO2 mmol/L 41*  --  42* 45*   < > 45*   BUN mg/dL 24  --  26* 29*   < > 13   CREATININE mg/dL 0.42*  --  0.49* 0.43*   < > 0.46*   CALCIUM mg/dL 8.0*  --  8.5 8.8   < > 8.8   ALK PHOS U/L  --   --   --  41  --   --    ALT U/L  --   --   --  25  --   --    AST U/L  --   --   --  30  --   --     < > = values in this interval not displayed.             Portions of the record may have been created with voice recognition software. Occasional wrong word or \"sound a like\" substitutions may have occurred due to the inherent limitations of voice recognition software. Read the chart carefully and recognize, using context, where substitutions have occurred.If you have any questions, please contact the dictating provider.  "

## 2024-10-17 NOTE — QUICK NOTE
Hyponatremia at 120 and hyperkalemia at 5.9 on repeat BMP  Possible lab error  Repeat BMP at 2200, give IV lasix, lokelma and start salt tabs 1gm TID

## 2024-10-17 NOTE — PLAN OF CARE
Problem: PAIN - ADULT  Goal: Verbalizes/displays adequate comfort level or baseline comfort level  Description: Interventions:  - Encourage patient to monitor pain and request assistance  - Assess pain using appropriate pain scale  - Administer analgesics based on type and severity of pain and evaluate response  - Implement non-pharmacological measures as appropriate and evaluate response  - Consider cultural and social influences on pain and pain management  - Notify physician/advanced practitioner if interventions unsuccessful or patient reports new pain  Outcome: Progressing     Problem: INFECTION - ADULT  Goal: Absence or prevention of progression during hospitalization  Description: INTERVENTIONS:  - Assess and monitor for signs and symptoms of infection  - Monitor lab/diagnostic results  - Monitor all insertion sites, i.e. indwelling lines, tubes, and drains  - Monitor endotracheal if appropriate and nasal secretions for changes in amount and color  - [place] appropriate cooling/warming therapies per order  - Administer medications as ordered  - Instruct and encourage patient and family to use good hand hygiene technique  - Identify and instruct in appropriate isolation precautions for identified infection/condition  Outcome: Progressing  Goal: Absence of fever/infection during neutropenic period  Description: INTERVENTIONS:  - Monitor WBC    Outcome: Progressing     Problem: SAFETY ADULT  Goal: Patient will remain free of falls  Description: INTERVENTIONS:  - Educate patient/family on patient safety including physical limitations  - Instruct patient to call for assistance with activity   - Consult OT/PT to assist with strengthening/mobility   - Keep Call bell within reach  - Keep bed low and locked with side rails adjusted as appropriate  - Keep care items and personal belongings within reach  - Initiate and maintain comfort rounds  - Make Fall Risk Sign visible to staff  - Offer Toileting every 2 Hours,  in advance of need  - Initiate/Maintain bed alarm  - Obtain necessary fall risk management equipment  - Apply yellow socks and bracelet for high fall risk patients  - Consider moving patient to room near nurses station  Outcome: Progressing  Goal: Maintain or return to baseline ADL function  Description: INTERVENTIONS:  -  Assess patient's ability to carry out ADLs; assess patient's baseline for ADL function and identify physical deficits which impact ability to perform ADLs (bathing, care of mouth/teeth, toileting, grooming, dressing, etc.)  - Assess/evaluate cause of self-care deficits   - Assess range of motion  - Assess patient's mobility; develop plan if impaired  - Assess patient's need for assistive devices and provide as appropriate  - Encourage maximum independence but intervene and supervise when necessary  - Involve family in performance of ADLs  - Assess for home care needs following discharge   - Consider OT consult to assist with ADL evaluation and planning for discharge  - Provide patient education as appropriate  Outcome: Progressing  Goal: Maintains/Returns to pre admission functional level  Description: INTERVENTIONS:  - Perform AM-PAC 6 Click Basic Mobility/ Daily Activity assessment daily.  - Set and communicate daily mobility goal to care team and patient/family/caregiver.   - Collaborate with rehabilitation services on mobility goals if consulted  - Perform Range of Motion 3 times a day.  - Reposition patient every 2 hours.  - Dangle patient 3 times a day  - Stand patient 3 times a day  - Ambulate patient 3 times a day  - Out of bed to chair 3 times a day   - Out of bed for meals 3 times a day  - Out of bed for toileting  - Record patient progress and toleration of activity level   Outcome: Progressing     Problem: DISCHARGE PLANNING  Goal: Discharge to home or other facility with appropriate resources  Description: INTERVENTIONS:  - Identify barriers to discharge w/patient and caregiver  -  Arrange for needed discharge resources and transportation as appropriate  - Identify discharge learning needs (meds, wound care, etc.)  - Arrange for interpretive services to assist at discharge as needed  - Refer to Case Management Department for coordinating discharge planning if the patient needs post-hospital services based on physician/advanced practitioner order or complex needs related to functional status, cognitive ability, or social support system  Outcome: Progressing     Problem: Knowledge Deficit  Goal: Patient/family/caregiver demonstrates understanding of disease process, treatment plan, medications, and discharge instructions  Description: Complete learning assessment and assess knowledge base.  Interventions:  - Provide teaching at level of understanding  - Provide teaching via preferred learning methods  Outcome: Progressing     Problem: RESPIRATORY - ADULT  Goal: Achieves optimal ventilation and oxygenation  Description: INTERVENTIONS:  - Assess for changes in respiratory status  - Assess for changes in mentation and behavior  - Position to facilitate oxygenation and minimize respiratory effort  - Oxygen administered by appropriate delivery if ordered  - Initiate smoking cessation education as indicated  - Encourage broncho-pulmonary hygiene including cough, deep breathe, Incentive Spirometry  - Assess the need for suctioning and aspirate as needed  - Assess and instruct to report SOB or any respiratory difficulty  - Respiratory Therapy support as indicated  Outcome: Progressing     Problem: Nutrition/Hydration-ADULT  Goal: Nutrient/Hydration intake appropriate for improving, restoring or maintaining nutritional needs  Description: Monitor and assess patient's nutrition/hydration status for malnutrition. Collaborate with interdisciplinary team and initiate plan and interventions as ordered.  Monitor patient's weight and dietary intake as ordered or per policy. Utilize nutrition screening tool and  intervene as necessary. Determine patient's food preferences and provide high-protein, high-caloric foods as appropriate.     INTERVENTIONS:  - Monitor oral intake, urinary output, labs, and treatment plans  - Assess nutrition and hydration status and recommend course of action  - Evaluate amount of meals eaten  - Assist patient with eating if necessary   - Allow adequate time for meals  - Recommend/ encourage appropriate diets, oral nutritional supplements, and vitamin/mineral supplements  - Order, calculate, and assess calorie counts as needed  - Recommend, monitor, and adjust tube feedings and TPN/PPN based on assessed needs  - Assess need for intravenous fluids  - Provide specific nutrition/hydration education as appropriate  - Include patient/family/caregiver in decisions related to nutrition  Outcome: Progressing     Problem: Prexisting or High Potential for Compromised Skin Integrity  Goal: Skin integrity is maintained or improved  Description: INTERVENTIONS:  - Identify patients at risk for skin breakdown  - Assess and monitor skin integrity  - Assess and monitor nutrition and hydration status  - Monitor labs   - Assess for incontinence   - Turn and reposition patient  - Assist with mobility/ambulation  - Relieve pressure over bony prominences  - Avoid friction and shearing  - Provide appropriate hygiene as needed including keeping skin clean and dry  - Evaluate need for skin moisturizer/barrier cream  - Collaborate with interdisciplinary team   - Patient/family teaching  - Consider wound care consult   Outcome: Progressing     Problem: SKIN/TISSUE INTEGRITY - ADULT  Goal: Skin Integrity remains intact(Skin Breakdown Prevention)  Description: Assess:  -Perform Lonnie assessment  -Clean and moisturize skin  -Inspect skin when repositioning, toileting, and assisting with ADLS  -Assess under medical devices  -Assess extremities for adequate circulation and sensation     Bed Management:  -Have minimal linens on  bed & keep smooth, unwrinkled  -Change linens as needed when moist or perspiring  -Avoid sitting or lying in one position for more than 2 hours while in bed  -Keep HOB at 30 degrees     Toileting:  -Offer bedside commode  -Assess for incontinence  -Use incontinent care products after each incontinent episode     Activity:  -Mobilize patient 3 times a day  -Encourage activity and walks on unit  -Encourage or provide ROM exercises   -Turn and reposition patient every 2 Hours  -Use appropriate equipment to lift or move patient in bed  -Instruct/ Assist with weight shifting when out of bed in chair  -Consider limitation of chair time 2 hour intervals    Skin Care:  -Avoid use of baby powder, tape, friction and shearing, hot water or constrictive clothing  -Relieve pressure over bony prominences   -Do not massage red bony areas    Next Steps:  -Teach patient strategies to minimize risks   -Consider consults to  interdisciplinary teams  Outcome: Progressing     Problem: MUSCULOSKELETAL - ADULT  Goal: Maintain or return mobility to safest level of function  Description: INTERVENTIONS:  - Assess patient's ability to carry out ADLs; assess patient's baseline for ADL function and identify physical deficits which impact ability to perform ADLs (bathing, care of mouth/teeth, toileting, grooming, dressing, etc.)  - Assess/evaluate cause of self-care deficits   - Assess range of motion  - Assess patient's mobility  - Assess patient's need for assistive devices and provide as appropriate  - Encourage maximum independence but intervene and supervise when necessary  - Involve family in performance of ADLs  - Assess for home care needs following discharge   - Consider OT consult to assist with ADL evaluation and planning for discharge  - Provide patient education as appropriate  Outcome: Progressing  Goal: Maintain proper alignment of affected body part  Description: INTERVENTIONS:  - Support, maintain and protect limb and body  alignment  - Provide patient/ family with appropriate education  Outcome: Progressing

## 2024-10-17 NOTE — ASSESSMENT & PLAN NOTE
Sodium of 124, unclear of etiology  Urine labs ordered  Nephrology consulted, concern for possible Cymbalta induced hyponatremia given recent addition on 10/13  Pending labs, may need to discontinue Cymbalta

## 2024-10-17 NOTE — ASSESSMENT & PLAN NOTE
Did have episode of urinary retention, now resolved with incontinence  UA concerning for infection, currently on ceftriaxone awaiting cultures

## 2024-10-17 NOTE — PROGRESS NOTES
"Progress Note - Pulmonology   Name: Sarah Zayas 79 y.o. female I MRN: [id number]  Unit/Bed#: [place] [address] -01 I Date of Admission: 10/10/2024   Date of Service: 10/17/2024 I Hospital Day: 7     Assessment & Plan  Anxiety/depression  -Currently maintained on Cymbalta  -Would likely benefit from minimizing benzodiazepine    Acute exacerbation of chronic obstructive pulmonary disease (COPD) (HCC)  -No formal PFTs recorded  -No noted acute exacerbation  -Inpatient: Budesonide, Xopenex 3 times daily  -Please discharge patient on: Budesonide twice daily, Xopenex 3 times daily, prednisone 2.5 mg daily  -Patient reports significant jitteriness and anxiety from albuterol we will trial this new home regimen    Acute respiratory failure with hypercapnia (HCC)  -Currently on home O2 requirements of 2 L 99%  -Sats greater than 88%  -Pulmonary toileting: Deep breathing cough out of bed as tolerated incentive spirometry  -Hypercapnia is likely secondary to the benzodiazepine in the setting of severe lung disease  -Given that she is going to an acute rehab will order BiPAP nightly upon discharge  -When she leaves rehab I recommend obtaining an ABG to evaluate her CO2 and schedule immediate follow-up with pulmonary so we can see if she will need to qualify for home BiPAP      -Pulmonary will sign off at this time  -Outpatient follow-up per discharge instructions      24 Hour Events : na  Subjective : \" I am nervous about my nebulizer\"    Objective :  Temp:  [97.3 °F (36.3 °C)-98.4 °F (36.9 °C)] 98.4 °F (36.9 °C)  HR:  [66-85] 72  BP: (105-167)/(48-92) 167/76  Resp:  [17-18] 17  SpO2:  [87 %-99 %] 93 %  O2 Device: Nasal cannula  Nasal Cannula O2 Flow Rate (L/min):  [3 L/min] 3 L/min    Physical Exam  Constitutional:       General: She is not in acute distress.     Appearance: Normal appearance. She is normal weight. She is not ill-appearing.   HENT:      Head: Normocephalic and atraumatic.      Nose: Nose normal. No congestion or " rhinorrhea.      Mouth/Throat:      Mouth: Mucous membranes are moist.      Pharynx: Oropharynx is clear. No oropharyngeal exudate or posterior oropharyngeal erythema.   Cardiovascular:      Rate and Rhythm: Normal rate and regular rhythm.      Pulses: Normal pulses.      Heart sounds: Normal heart sounds. No murmur heard.     No friction rub. No gallop.   Pulmonary:      Effort: Pulmonary effort is normal. No respiratory distress.      Breath sounds: No stridor. No wheezing, rhonchi or rales.      Comments: Significantly diminished aeration throughout lung fields  Chest:      Chest wall: No tenderness.   Abdominal:      General: Abdomen is flat. Bowel sounds are normal.      Palpations: Abdomen is soft.   Musculoskeletal:         General: No swelling or tenderness. Normal range of motion.      Cervical back: Normal range of motion. No rigidity or tenderness.   Skin:     General: Skin is warm and dry.   Neurological:      General: No focal deficit present.      Mental Status: She is alert and oriented to person, place, and time. Mental status is at baseline.   Psychiatric:         Mood and Affect: Mood normal.         Behavior: Behavior normal.             Lab Results: I have reviewed the following results:   .     10/16/24  1601 10/17/24  0005 10/17/24  0544 10/17/24  0620   WBC 6.07  --  4.73  --    HGB 11.1*  --  11.0*  --    HCT 35.9  --  34.7*  --      --  214  --    SODIUM 124*   < >  --  124*   K 4.8   < >  --  4.9   CL 78*   < >  --  81*   CO2 45*   < >  --  41*   BUN 29*   < >  --  24   CREATININE 0.43*   < >  --  0.42*   GLUC 159*   < >  --  98   AST 30  --   --   --    ALT 25  --   --   --    ALB 3.6  --   --   --    TBILI 0.26  --   --   --    ALKPHOS 41  --   --   --     < > = values in this interval not displayed.     ABG: No new results in last 24 hours.    Imaging Results Review: I personally reviewed the following image studies/reports in PACS and discussed pertinent findings with Radiology:  chest xray. My interpretation of the radiology images/reports is:  .  Other Study Results Review: EKG was reviewed.   PFT Results Reviewed: reviewed

## 2024-10-17 NOTE — PHYSICAL THERAPY NOTE
Physical Therapy Treatment Note     10/17/24 1128   PT Last Visit   PT Visit Date 10/17/24   Note Type   Note Type Treatment   Pain Assessment   Pain Assessment Tool 0-10   Pain Location/Orientation Location: Abdomen;Orientation: Lower   Restrictions/Precautions   Weight Bearing Precautions Per Order No   Other Precautions Bed Alarm;Cognitive;Fall Risk;O2;Telemetry;Pain;Visual impairment   General   Chart Reviewed Yes   Family/Caregiver Present Yes   Cognition   Overall Cognitive Status WFL   Subjective   Subjective Pt. agreeable to PT   Transfers   Sit to Stand 4  Minimal assistance   Additional items Assist x 1;Armrests;Increased time required;Verbal cues   Stand to Sit 4  Minimal assistance   Additional items Assist x 1;Increased time required;Armrests;Verbal cues   Stand pivot 4  Minimal assistance   Additional items Assist x 1;Increased time required;Verbal cues   Ambulation/Elevation   Gait pattern Improper Weight shift;Forward Flexion;Narrow DEVONTE;Decreased foot clearance;Foward flexed;Inconsistent tarun;Short stride;Excessively slow;Decreased toe off;Decreased heel strike   Gait Assistance 4  Minimal assist   Additional items Assist x 1;Verbal cues   Assistive Device Rolling walker   Distance 30ft x 2   Balance   Static Sitting Good   Dynamic Sitting Fair   Static Standing Fair   Dynamic Standing Fair -   Ambulatory Fair -   Endurance Deficit   Endurance Deficit Yes   Endurance Deficit Description fatigue   Activity Tolerance   Activity Tolerance Patient tolerated treatment well   Nurse Made Aware Yes   Exercises   THR Sitting;Bilateral;AROM;20 reps   Assessment   Prognosis Fair   Problem List Decreased range of motion;Decreased strength;Decreased mobility;Decreased cognition;Impaired judgement;Pain;Impaired vision   Assessment Pt. progressing with her mobility however she was reporting fatigue, RIVAS, and LE weakness though no visible distress noted. Cues for breathing given as pt. is a shallow and mouth  breather. Pt. able to follow cues inconsistently. Pt. SpO2 stats noted to be 85-86% on 2L post ambulation and improved to 90-93% with cues for breathing. Seated rest between ambulation. Pt. needed redirection to complete activities. Pt. seated on chair post session with all needs within reach. Will continue to follow per PT POC.   Barriers to Discharge None   Goals   Patient Goals None reported   STG Expiration Date 10/21/24   PT Treatment Day 3   Plan   Treatment/Interventions Functional transfer training;LE strengthening/ROM;Therapeutic exercise;Spoke to nursing;Family;Gait training;Equipment eval/education;Patient/family training;Cognitive reorientation   Progress Progressing toward goals   PT Frequency 3-5x/wk   Discharge Recommendation   Rehab Resource Intensity Level, PT II (Moderate Resource Intensity)   Equipment Recommended Walker   AM-PAC Basic Mobility Inpatient   Turning in Flat Bed Without Bedrails 3   Lying on Back to Sitting on Edge of Flat Bed Without Bedrails 3   Moving Bed to Chair 3   Standing Up From Chair Using Arms 3   Walk in Room 3   Climb 3-5 Stairs With Railing 3   Basic Mobility Inpatient Raw Score 18   Basic Mobility Standardized Score 41.05   [facility] Highest Level Of Mobility   -HLM Goal 6: Walk 10 steps or more   -HLM Achieved 7: Walk 25 feet or more           Maurilio Murray PTA    An AM-PAC basic mobility standardized score less than 42.9 suggest the patient may benefit from discharge to post-acute rehab services.

## 2024-10-17 NOTE — ASSESSMENT & PLAN NOTE
-Currently on home O2 requirements of 2 L 99%  -Sats greater than 88%  -Pulmonary toileting: Deep breathing cough out of bed as tolerated incentive spirometry  -Hypercapnia is likely secondary to the benzodiazepine in the setting of severe lung disease  -Given that she is going to an acute rehab will order BiPAP nightly upon discharge  -When she leaves rehab I recommend obtaining an ABG to evaluate her CO2 and schedule immediate follow-up with pulmonary so we can see if she will need to qualify for home BiPAP      -Pulmonary will sign off at this time  -Outpatient follow-up per discharge instructions

## 2024-10-17 NOTE — PLAN OF CARE
Problem: PHYSICAL THERAPY ADULT  Goal: Performs mobility at highest level of function for planned discharge setting.  See evaluation for individualized goals.  Description: Treatment/Interventions: Functional transfer training, LE strengthening/ROM, Elevations, Therapeutic exercise, Endurance training, Patient/family training, Equipment eval/education, Bed mobility, Gait training, Continued evaluation, Spoke to nursing, OT  Equipment Recommended: Walker (pt reports owning RW at home for use)       See flowsheet documentation for full assessment, interventions and recommendations.  Outcome: Progressing  Note: Prognosis: Fair  Problem List: Decreased range of motion, Decreased strength, Decreased mobility, Decreased cognition, Impaired judgement, Pain, Impaired vision  Assessment: Pt. progressing with her mobility however she was reporting fatigue, RIVAS, and LE weakness though no visible distress noted. Cues for breathing given as pt. is a shallow and mouth breather. Pt. able to follow cues inconsistently. Pt. SpO2 stats noted to be 85-86% on 2L post ambulation and improved to 90-93% with cues for breathing. Seated rest between ambulation. Pt. needed redirection to complete activities. Pt. seated on chair post session with all needs within reach. Will continue to follow per PT POC.  Barriers to Discharge: None     Rehab Resource Intensity Level, PT: II (Moderate Resource Intensity)    See flowsheet documentation for full assessment.

## 2024-10-18 ENCOUNTER — APPOINTMENT (INPATIENT)
Dept: CT IMAGING | Facility: HOSPITAL | Age: 79
DRG: 189 | End: 2024-10-18
Payer: COMMERCIAL

## 2024-10-18 ENCOUNTER — APPOINTMENT (INPATIENT)
Dept: NEUROLOGY | Facility: HOSPITAL | Age: 79
DRG: 189 | End: 2024-10-18
Payer: COMMERCIAL

## 2024-10-18 PROBLEM — E87.5 HYPERKALEMIA: Status: ACTIVE | Noted: 2024-10-18

## 2024-10-18 PROBLEM — R56.9 SEIZURE-LIKE ACTIVITY (HCC): Status: ACTIVE | Noted: 2024-10-18

## 2024-10-18 PROBLEM — E87.4 METABOLIC ALKALOSIS WITH RESPIRATORY ACIDOSIS: Status: ACTIVE | Noted: 2024-10-18

## 2024-10-18 LAB
ANION GAP SERPL CALCULATED.3IONS-SCNC: 0 MMOL/L (ref 4–13)
ANION GAP SERPL CALCULATED.3IONS-SCNC: 2 MMOL/L (ref 4–13)
ANION GAP SERPL CALCULATED.3IONS-SCNC: 3 MMOL/L (ref 4–13)
APTT PPP: 27 SECONDS (ref 23–34)
BASE EX.OXY STD BLDV CALC-SCNC: 94.3 % (ref 60–80)
BASE EXCESS BLDV CALC-SCNC: 9.5 MMOL/L
BUN SERPL-MCNC: 15 MG/DL (ref 5–25)
BUN SERPL-MCNC: 16 MG/DL (ref 5–25)
BUN SERPL-MCNC: 17 MG/DL (ref 5–25)
BUN SERPL-MCNC: 18 MG/DL (ref 5–25)
BUN SERPL-MCNC: 21 MG/DL (ref 5–25)
CA-I BLD-SCNC: 1.14 MMOL/L (ref 1.12–1.32)
CALCIUM SERPL-MCNC: 7.8 MG/DL (ref 8.4–10.2)
CALCIUM SERPL-MCNC: 7.8 MG/DL (ref 8.4–10.2)
CALCIUM SERPL-MCNC: 8 MG/DL (ref 8.4–10.2)
CALCIUM SERPL-MCNC: 8 MG/DL (ref 8.4–10.2)
CALCIUM SERPL-MCNC: 8.4 MG/DL (ref 8.4–10.2)
CARDIAC TROPONIN I PNL SERPL HS: 17 NG/L (ref 8–18)
CHLORIDE SERPL-SCNC: 75 MMOL/L (ref 96–108)
CHLORIDE SERPL-SCNC: 79 MMOL/L (ref 96–108)
CHLORIDE SERPL-SCNC: 80 MMOL/L (ref 96–108)
CO2 SERPL-SCNC: 40 MMOL/L (ref 21–32)
CO2 SERPL-SCNC: 42 MMOL/L (ref 21–32)
CO2 SERPL-SCNC: 42 MMOL/L (ref 21–32)
CO2 SERPL-SCNC: 43 MMOL/L (ref 21–32)
CO2 SERPL-SCNC: 43 MMOL/L (ref 21–32)
CREAT SERPL-MCNC: 0.29 MG/DL (ref 0.6–1.3)
CREAT SERPL-MCNC: 0.3 MG/DL (ref 0.6–1.3)
CREAT SERPL-MCNC: 0.32 MG/DL (ref 0.6–1.3)
CREAT SERPL-MCNC: 0.34 MG/DL (ref 0.6–1.3)
CREAT SERPL-MCNC: 0.35 MG/DL (ref 0.6–1.3)
ERYTHROCYTE [DISTWIDTH] IN BLOOD BY AUTOMATED COUNT: 12.7 % (ref 11.6–15.1)
GFR SERPL CREATININE-BSD FRML MDRD: 103 ML/MIN/1.73SQ M
GFR SERPL CREATININE-BSD FRML MDRD: 104 ML/MIN/1.73SQ M
GFR SERPL CREATININE-BSD FRML MDRD: 106 ML/MIN/1.73SQ M
GFR SERPL CREATININE-BSD FRML MDRD: 109 ML/MIN/1.73SQ M
GFR SERPL CREATININE-BSD FRML MDRD: 110 ML/MIN/1.73SQ M
GLUCOSE SERPL-MCNC: 120 MG/DL (ref 65–140)
GLUCOSE SERPL-MCNC: 133 MG/DL (ref 65–140)
GLUCOSE SERPL-MCNC: 150 MG/DL (ref 65–140)
GLUCOSE SERPL-MCNC: 165 MG/DL (ref 65–140)
GLUCOSE SERPL-MCNC: 87 MG/DL (ref 65–140)
GLUCOSE SERPL-MCNC: 92 MG/DL (ref 65–140)
GLUCOSE SERPL-MCNC: 93 MG/DL (ref 65–140)
GLUCOSE SERPL-MCNC: 95 MG/DL (ref 65–140)
HCO3 BLDV-SCNC: 37 MMOL/L (ref 24–30)
HCT VFR BLD AUTO: 35.9 % (ref 34.8–46.1)
HCT VFR BLD CALC: 33 % (ref 34.8–46.1)
HGB BLD-MCNC: 11.6 G/DL (ref 11.5–15.4)
HGB BLDA-MCNC: 11.2 G/DL (ref 11.5–15.4)
INR PPP: 0.92 (ref 0.85–1.19)
MAGNESIUM SERPL-MCNC: 1.8 MG/DL (ref 1.9–2.7)
MCH RBC QN AUTO: 28.4 PG (ref 26.8–34.3)
MCHC RBC AUTO-ENTMCNC: 32.3 G/DL (ref 31.4–37.4)
MCV RBC AUTO: 88 FL (ref 82–98)
O2 CT BLDV-SCNC: 17.1 ML/DL
OSMOLALITY UR/SERPL-RTO: 262 MMOL/KG (ref 282–298)
OSMOLALITY UR: 665 MMOL/KG
PCO2 BLD: >103 MM HG (ref 42–50)
PCO2 BLDV: 64.7 MM HG (ref 42–50)
PH BLD: 7.2 [PH] (ref 7.3–7.4)
PH BLDV: 7.38 [PH] (ref 7.3–7.4)
PHOSPHATE SERPL-MCNC: 3.2 MG/DL (ref 2.3–4.1)
PLATELET # BLD AUTO: 318 THOUSANDS/UL (ref 149–390)
PMV BLD AUTO: 10.7 FL (ref 8.9–12.7)
PO2 BLD: 34 MM HG (ref 35–45)
PO2 BLDV: 155.6 MM HG (ref 35–45)
POTASSIUM BLD-SCNC: 5.3 MMOL/L (ref 3.5–5.3)
POTASSIUM SERPL-SCNC: 4.2 MMOL/L (ref 3.5–5.3)
POTASSIUM SERPL-SCNC: 4.2 MMOL/L (ref 3.5–5.3)
POTASSIUM SERPL-SCNC: 4.3 MMOL/L (ref 3.5–5.3)
POTASSIUM SERPL-SCNC: 4.4 MMOL/L (ref 3.5–5.3)
POTASSIUM SERPL-SCNC: 5.3 MMOL/L (ref 3.5–5.3)
PROCALCITONIN SERPL-MCNC: 0.13 NG/ML
PROTHROMBIN TIME: 12.6 SECONDS (ref 12.3–15)
RBC # BLD AUTO: 4.08 MILLION/UL (ref 3.81–5.12)
SODIUM BLD-SCNC: 116 MMOL/L (ref 136–145)
SODIUM SERPL-SCNC: 118 MMOL/L (ref 135–147)
SODIUM SERPL-SCNC: 123 MMOL/L (ref 135–147)
SODIUM SERPL-SCNC: 124 MMOL/L (ref 135–147)
SPECIMEN SOURCE: ABNORMAL
WBC # BLD AUTO: 14 THOUSAND/UL (ref 4.31–10.16)

## 2024-10-18 PROCEDURE — 84295 ASSAY OF SERUM SODIUM: CPT

## 2024-10-18 PROCEDURE — 85730 THROMBOPLASTIN TIME PARTIAL: CPT | Performed by: NURSE PRACTITIONER

## 2024-10-18 PROCEDURE — 84132 ASSAY OF SERUM POTASSIUM: CPT

## 2024-10-18 PROCEDURE — 82947 ASSAY GLUCOSE BLOOD QUANT: CPT

## 2024-10-18 PROCEDURE — 70498 CT ANGIOGRAPHY NECK: CPT

## 2024-10-18 PROCEDURE — 99233 SBSQ HOSP IP/OBS HIGH 50: CPT | Performed by: INTERNAL MEDICINE

## 2024-10-18 PROCEDURE — 80048 BASIC METABOLIC PNL TOTAL CA: CPT | Performed by: STUDENT IN AN ORGANIZED HEALTH CARE EDUCATION/TRAINING PROGRAM

## 2024-10-18 PROCEDURE — 84145 PROCALCITONIN (PCT): CPT | Performed by: NURSE PRACTITIONER

## 2024-10-18 PROCEDURE — 85610 PROTHROMBIN TIME: CPT | Performed by: NURSE PRACTITIONER

## 2024-10-18 PROCEDURE — 85027 COMPLETE CBC AUTOMATED: CPT | Performed by: STUDENT IN AN ORGANIZED HEALTH CARE EDUCATION/TRAINING PROGRAM

## 2024-10-18 PROCEDURE — 84100 ASSAY OF PHOSPHORUS: CPT | Performed by: NURSE PRACTITIONER

## 2024-10-18 PROCEDURE — 95816 EEG AWAKE AND DROWSY: CPT | Performed by: PSYCHIATRY & NEUROLOGY

## 2024-10-18 PROCEDURE — 82803 BLOOD GASES ANY COMBINATION: CPT

## 2024-10-18 PROCEDURE — 99233 SBSQ HOSP IP/OBS HIGH 50: CPT

## 2024-10-18 PROCEDURE — 85014 HEMATOCRIT: CPT

## 2024-10-18 PROCEDURE — 70496 CT ANGIOGRAPHY HEAD: CPT

## 2024-10-18 PROCEDURE — 82948 REAGENT STRIP/BLOOD GLUCOSE: CPT

## 2024-10-18 PROCEDURE — 82805 BLOOD GASES W/O2 SATURATION: CPT | Performed by: NURSE PRACTITIONER

## 2024-10-18 PROCEDURE — 99223 1ST HOSP IP/OBS HIGH 75: CPT | Performed by: PSYCHIATRY & NEUROLOGY

## 2024-10-18 PROCEDURE — 84484 ASSAY OF TROPONIN QUANT: CPT | Performed by: NURSE PRACTITIONER

## 2024-10-18 PROCEDURE — 95816 EEG AWAKE AND DROWSY: CPT

## 2024-10-18 PROCEDURE — 83735 ASSAY OF MAGNESIUM: CPT | Performed by: NURSE PRACTITIONER

## 2024-10-18 PROCEDURE — 94640 AIRWAY INHALATION TREATMENT: CPT

## 2024-10-18 PROCEDURE — 99232 SBSQ HOSP IP/OBS MODERATE 35: CPT | Performed by: INTERNAL MEDICINE

## 2024-10-18 PROCEDURE — 82330 ASSAY OF CALCIUM: CPT

## 2024-10-18 PROCEDURE — 80048 BASIC METABOLIC PNL TOTAL CA: CPT

## 2024-10-18 PROCEDURE — 94760 N-INVAS EAR/PLS OXIMETRY 1: CPT

## 2024-10-18 RX ORDER — LEVETIRACETAM 500 MG/5ML
2000 INJECTION, SOLUTION, CONCENTRATE INTRAVENOUS ONCE
Status: COMPLETED | OUTPATIENT
Start: 2024-10-18 | End: 2024-10-18

## 2024-10-18 RX ORDER — TORSEMIDE 10 MG/1
5 TABLET ORAL DAILY
Status: DISCONTINUED | OUTPATIENT
Start: 2024-10-18 | End: 2024-10-19

## 2024-10-18 RX ORDER — SODIUM CHLORIDE 1 G/1
2 TABLET ORAL
Status: DISCONTINUED | OUTPATIENT
Start: 2024-10-18 | End: 2024-10-19

## 2024-10-18 RX ORDER — SODIUM CHLORIDE 9 MG/ML
100 INJECTION, SOLUTION INTRAVENOUS ONCE
Status: COMPLETED | OUTPATIENT
Start: 2024-10-18 | End: 2024-10-18

## 2024-10-18 RX ORDER — SODIUM CHLORIDE 9 MG/ML
100 INJECTION, SOLUTION INTRAVENOUS CONTINUOUS
Status: DISCONTINUED | OUTPATIENT
Start: 2024-10-18 | End: 2024-10-18

## 2024-10-18 RX ORDER — LORAZEPAM 2 MG/ML
2 INJECTION INTRAMUSCULAR ONCE
Status: DISCONTINUED | OUTPATIENT
Start: 2024-10-18 | End: 2024-10-19

## 2024-10-18 RX ORDER — AMLODIPINE BESYLATE 5 MG/1
5 TABLET ORAL DAILY
Status: DISCONTINUED | OUTPATIENT
Start: 2024-10-18 | End: 2024-10-19

## 2024-10-18 RX ORDER — LOSARTAN POTASSIUM 50 MG/1
50 TABLET ORAL DAILY
Status: DISCONTINUED | OUTPATIENT
Start: 2024-10-18 | End: 2024-10-18

## 2024-10-18 RX ORDER — SODIUM CHLORIDE 3 G/100ML
100 INJECTION, SOLUTION INTRAVENOUS ONCE
Status: COMPLETED | OUTPATIENT
Start: 2024-10-18 | End: 2024-10-18

## 2024-10-18 RX ADMIN — BUDESONIDE 0.5 MG: 0.5 INHALANT ORAL at 09:15

## 2024-10-18 RX ADMIN — DICLOFENAC SODIUM 2 G: 10 GEL TOPICAL at 08:43

## 2024-10-18 RX ADMIN — BUDESONIDE 0.5 MG: 0.5 INHALANT ORAL at 19:31

## 2024-10-18 RX ADMIN — OFLOXACIN 1 DROP: 3 SOLUTION/ DROPS OPHTHALMIC at 13:52

## 2024-10-18 RX ADMIN — LEVETIRACETAM 2000 MG: 100 INJECTION, SOLUTION INTRAVENOUS at 07:00

## 2024-10-18 RX ADMIN — ALBUTEROL SULFATE 2 PUFF: 90 AEROSOL, METERED RESPIRATORY (INHALATION) at 17:52

## 2024-10-18 RX ADMIN — ENOXAPARIN SODIUM 30 MG: 100 INJECTION SUBCUTANEOUS at 08:44

## 2024-10-18 RX ADMIN — MENTHOL 10%, METHYL SALICYLATE 15%: 10; 15 CREAM TOPICAL at 21:27

## 2024-10-18 RX ADMIN — DICLOFENAC SODIUM 2 G: 10 GEL TOPICAL at 21:28

## 2024-10-18 RX ADMIN — CHLORHEXIDINE GLUCONATE 15 ML: 1.2 RINSE ORAL at 08:43

## 2024-10-18 RX ADMIN — OFLOXACIN 1 DROP: 3 SOLUTION/ DROPS OPHTHALMIC at 08:44

## 2024-10-18 RX ADMIN — CHLORHEXIDINE GLUCONATE 15 ML: 1.2 RINSE ORAL at 21:31

## 2024-10-18 RX ADMIN — LIDOCAINE 1 PATCH: 700 PATCH TOPICAL at 08:50

## 2024-10-18 RX ADMIN — DICLOFENAC SODIUM 2 G: 10 GEL TOPICAL at 17:52

## 2024-10-18 RX ADMIN — MENTHOL 10%, METHYL SALICYLATE 15%: 10; 15 CREAM TOPICAL at 08:44

## 2024-10-18 RX ADMIN — DICLOFENAC SODIUM 2 G: 10 GEL TOPICAL at 13:53

## 2024-10-18 RX ADMIN — OFLOXACIN 1 DROP: 3 SOLUTION/ DROPS OPHTHALMIC at 17:52

## 2024-10-18 RX ADMIN — SODIUM CHLORIDE 100 ML/HR: 0.9 INJECTION, SOLUTION INTRAVENOUS at 21:25

## 2024-10-18 RX ADMIN — LEVALBUTEROL HYDROCHLORIDE 1.25 MG: 1.25 SOLUTION RESPIRATORY (INHALATION) at 19:31

## 2024-10-18 RX ADMIN — SODIUM CHLORIDE 100 ML/HR: 0.9 INJECTION, SOLUTION INTRAVENOUS at 17:53

## 2024-10-18 RX ADMIN — MENTHOL 10%, METHYL SALICYLATE 15%: 10; 15 CREAM TOPICAL at 17:52

## 2024-10-18 RX ADMIN — DEXTROSE 1000 MG: 50 INJECTION, SOLUTION INTRAVENOUS at 17:51

## 2024-10-18 RX ADMIN — LEVALBUTEROL HYDROCHLORIDE 1.25 MG: 1.25 SOLUTION RESPIRATORY (INHALATION) at 09:15

## 2024-10-18 RX ADMIN — IOHEXOL 75 ML: 350 INJECTION, SOLUTION INTRAVENOUS at 06:42

## 2024-10-18 RX ADMIN — SODIUM CHLORIDE 100 ML: 3 INJECTION, SOLUTION INTRAVENOUS at 08:51

## 2024-10-18 RX ADMIN — OFLOXACIN 1 DROP: 3 SOLUTION/ DROPS OPHTHALMIC at 21:28

## 2024-10-18 NOTE — SPEECH THERAPY NOTE
Speech Language/Pathology    Speech/Language Pathology Progress Note    Patient Name: Sarah Zayas  Today's Date: 10/18/2024     Problem List  Principal Problem:    Anxiety/depression  Active Problems:    Acute exacerbation of chronic obstructive pulmonary disease (COPD) (HCC)    Gastroesophageal reflux disease without esophagitis    Hypertension    Temporal arteritis (HCC)    COPD, severe (HCC)    Neck pain/shoulder pain    Acute respiratory failure with hypercapnia (HCC)    Hypothyroidism    Macular degeneration    Metabolic encephalopathy    Urinary retention    Hyponatremia    Chronic respiratory failure with hypercapnia (HCC)    Metabolic alkalosis with respiratory acidosis    Hyperkalemia    Seizure-like activity (HCC)       Past Medical History  Past Medical History:   Diagnosis Date    Anxiety 08/18/2024    Asthma     COPD (chronic obstructive pulmonary disease) (HCC)     Disease of thyroid gland     GERD (gastroesophageal reflux disease)     Hypertension 10/11/2018    Hypothyroid     Normocytic anemia 08/28/2024    Osteoarthritis 09/26/2012    Seizure-like activity ([facility]) 10/18/2024    Temporal arteritis (HCC)     Type 2 diabetes mellitus with complication, without long-term current use of insulin ([facility]) 01/14/2020        Past Surgical History  Past Surgical History:   Procedure Laterality Date    EYE SURGERY Right 12/06/2019    cataract LVCFS    HYSTERECTOMY      NO PAST SURGERIES      ALSO NO RELEVANT PAST SURRGICAL HX AS PER [facility]     Pt lethargic. Spoke w/ son at bedside. Pt NPO. Not appropriate for po at this time given mental status. Tolerated  chicken noodle soup and was feeding self lunch yesterday and conversing. NDD3 and thin. Spke w/ CRNP and physician.  CO2 also elevated. Will check status and f/u as able.     Per CC:   Assessment:  Acute metabolic encephalopathy  Seizure-like activity  Hyponatremia  Acute exacerbation of COPD  Acute on chronic respiratory failure with hypoxia and  hypercapnia  Aerococcus UTI  Anxiety/depression.  Plan:  Correct the hyponatremia  On 3% saline, trend sodium closely  Appreciate nephrology input and discussed with Dr. Hanks in detail  Neurology to evaluate  For EEG  Possibly eventual MRI however doubt patient will be able to remain quiet for the MRI.  Benzodiazepines or other sedatives not appropriate to be given given her chronic hypercapnia  Continue BiPAP at at bedtime and for comfort.  Pleat course of ceftriaxone for the Aerococcus UTI  Avoid sedatives  Follow mental status closely.  Possibly postictal.

## 2024-10-18 NOTE — ASSESSMENT & PLAN NOTE
-Metabolic alkalosis important compensation for respiratory acidosis pH 7.2 would certainly not treat with Diamox but treat the underlying pCO2 retention per critical care medicine

## 2024-10-18 NOTE — ASSESSMENT & PLAN NOTE
Patient also complaining of neck and shoulder pain-chronic pain ongoing for the past 1 year which has been worsening recently  Denies have any chest pain, shortness of breath  10/10/24 Xray L shoulder: normal  10/10 CT head: No acute intracranial abnormality.  No concerns for stroke at this time  Previous history of temporal arthritis - ESR/CRP normal  Also noted to have a previous history of sialoadenitis and associated neck edema and was seen by ENT in the past.  Patient is able to have purposeful movements in the bilateral upper extremities 5/5  strength bilaterally but has poor effort lifting the left arm without drift to bed.     Evidence of bicep tendinitis  Add Voltaren gel and Bengay  PT OT recommending rehab  Awaiting further evaluation above prior to discharge to rehab

## 2024-10-18 NOTE — ASSESSMENT & PLAN NOTE
Hyperkalemia being treated with Lokelma will monitor and stop losartan and to maintain low potassium diet

## 2024-10-18 NOTE — ASSESSMENT & PLAN NOTE
Blood pressure somewhat labile would add torsemide/to the propranolol and losartan  I would stop the losartan given the hyperkalemia add amlodipine 5 mg daily along with torsemide 5 mg daily will discuss with critical care medicine

## 2024-10-18 NOTE — ASSESSMENT & PLAN NOTE
PTA valsartan 160 mg daily, propranolol 10 mg twice shwetha  Continue losartan and propanolol  Monitor BP and titrate as needed

## 2024-10-18 NOTE — ASSESSMENT & PLAN NOTE
Follows with [facility]  Continue artifical tears  Reports decrease sight in her left eye with some discharge concerning for conjunctivitis  Recommend trial with ofloxacin for 7 days

## 2024-10-18 NOTE — PROGRESS NOTES
Progress Note - Geriatric Medicine   Sarah Zayas 79 y.o. female MRN: [id number]  Unit/Bed#: ICU 06 Encounter: [id number]      Assessment/Plan:    Cognitive Screening  Patient has no documented history of memory loss or cognitive impairment   Patient states she does have difficulty with her short term memory at baseline   Per discussion with patient and her son the patient did recently just start with [facility] (they are paying out-of-pocket) but she only had one visit prior to coming in  Family is willing to increase days and hours if needed to assist patient   Patient was a stroke alert this morning as patient was unresponsive and had spastic/tonic activity of the upper extremity   She was transferred from the medical surgical unit to the ICU for closer monitoring   Patient is lethargic and minimally responsive on my exam today   Most recent TSH on 10/10/2024 noted to be 0.342  Most recent vitamin B 12 level on 8/30/2024 noted to be 1918  Patient appears to be on vitamin B 12 supplementation   Can consider dose reduction or discontinuation   CT of the head on 10/10/2024 revealed mild microangiopathic changes   No MoCA or cognitive testing noted in epic  Can consider having OT complete MoCA evaluation once patient is more medically stable   Patient can follow-up with geriatrics in the outpatient setting for concerns of memory loss   Patients son is interested in this so will place outpatient referral  Maintain delirium precautions as discussed below   Redirect and reorient as needed  Keep physically, mentally, and socially active     Delirium Precautions   Current mentation: unable to assess (lethargic)  Patient is at high risk secondary to age, possible underlying cognitive impairment, COPD exacerbation, steroid use (though this appears chronic), acute/chronic pain, hyponatremia, sleep disturbances, anxiety, depression, vision impairment, hearing impariment, and hospitalization   Maintain delirium precautions   Provide  redirection, reorientation, and distraction techniques  Maintain fall and safety precautions   Assist with ADLs/IADLs  Avoid deliriogenic medications such as tramadol, benzodiazepines, anticholinergics, benadryl  Treat pain using geriatric pain protocol   Encourage oral hydration and nutrition   Monitor for constipation and urinary retention   Implement sleep hygiene and limit night time interuptions   Maintain sleep-wake cycle   Encourage early and frequent mobilization   Most recent EKG on 10/10/2024 revealed a QTc interval of 428  If all other interventions are unsuccessful for acute agitation and behaviors, can consider Zyprexa 2.5 mg IM Q 8 hours prn   Would avoid benzodiazepines such as Ativan as these can worsen delirium     Deconditioning   Patient is at increased risk for deconditioning secondary to possible underlying cognitive impairment, COPD exacerbation, steroid use, hyponatremia, chronic pain, sleep disturbances, anxiety, depression, vision impairment, hearing impairment, weakness, gait dysfunction, and hospitalization    Continue to optimize diet, hydration, and mobility for healing    on labs this morning    Patient has no documented history of CKD   Keep hydrated   Normocytic anemia   Baseline hemoglobin appears to be 11-13  Hemoglobin on labs today stable at 11.6  Continue to monitor CBC   Transfuse for hemoglobin < 7   Monitor for signs and symptoms of infection, dehydration, DVT, and skin breakdown    Frailty   Clinical Frail Scale: 5- Mildly Frail  More evident slowing, needs help high order IADLs (transport, bills, medications)  Progressively impairs shopping and walking outside alone, meal prep and housework  Most recent albumin on 10/16/2024 noted to be 3.6  Consider nutrition consult  Encourage protein supplementation     Ambulatory Dysfunction/Falls  Patient notes no recent falls at home   She does not typically ambulate with any assistive devices at baseline but she does have a  walker available if needed  PT/OT consulted to assist with strengthening/mobility and assist with discharge planning to appropriate level of care  Assess patient frequently for physical needs, encourage use of assistant devices as needed and directed by PT/OT  Identify cognitive and physical deficits and behaviors that affect risk of falls  Consider moving patient closer to nursing station to monitor more closely for impulsive behavior which may increase risk of falls  [place] fall and safety precautions   Educate patient/family on patient safety including physical limitations and importance of using call bell for assistance   Modify environment to reduce risk of injury including disconnecting from pole when not in use, ensuring adequate lighting in room and restroom, ensuring that path to restroom is clear and free of trip hazards  Out of bed as tolerated    Impaired Vision   Patient has a history of macular degeneration and she noted that she has been having more difficulty with her vision   She notes she is having trouble with some of her bills as she has difficulty seeing to write out her checks   She admits she does have an ophthalmologist appointment scheduled in the near future   She is currently wearing eyeglasses but notes that with her worsening vision she does not feel as though they help   Recommend use of corrective lenses at all appropriate times  Encourage adequate lighting and encourage use of assistance with ambulation  Keep personal belongings close to avoid reaching  Encourage appropriate footwear at all times  Recommend large font for printed materials provided to patient  Recommend continued outpatient follow-up with ophthalmology     Impaired Hearing   Patient admits to some hearing impairment   She does not wear hearing aids at baseline  Hearing impairment strongly correlated with depression, cognitive impairment, delirium and falls in the older adult  Use hearing aids or sound  amplifier  Speak face to face  Use clear dictation and enunciation of words    Dentition/Appetite   Patient denies denture use  She admits that she has a poor appetite at baseline   Very few meals documented since admission   Patient is very lethargic on exam today   Would recommend NPO in current state   Speech therapy is consulted and following   She had been on mirtazapine but this has since been discontinued   Ensure meal consistency is appropriate for all abilities   Consider nutrition consult   Continue aspiration precautions     Elimination   Patient is continent of bowel and sometimes incontinent of bladder at baseline  She does wear briefs in case she has accidents   She appears to be voiding appropriately here   Consider urinary retention protocol/bladder scans for change in mental status   Last documented bowel movement was on 10/16  Patient is not currently on a bowel regimen   Consider starting the following   Senna-S 8.6-50 mg BID  MiraLAX 17 g daily prn   Monitor for constipation and urinary retention     Insomnia   Patient has been having insomnia at home   Appears to be related to pain and anxiety   She is not taking any medication for sleep at baseline   Patient admitted to not sleeping well last week secondary to pain   Orthopedics had been consulted and voltaren gel was ordered   Tylenol was also increased   She was started on melatonin as well   Patient was noted to be unresponsive this morning and is now very lethargic   First line is behavioral therapy   Avoid sedative hypnotics including benzodiazepines and benadryl  Encourage staying awake during the day   Encourage daytime activities and morning exercise   Decrease or eliminate daytime naps   Avoid caffeine especially during late afternoon and evening hours  Establish a nighttime routine  Implement sleep hygiene and limit nighttime interruptions    Anxiety/Depression  Patient has a documented history of anxiety and depression   She had been  on buspirone for anxiety which was just recently increased due to increasing anxiety   Patient was noted to have worsening anxiety and depression so this was discontinued   Patient was started on mirtazapine and gabapentin by primary team at    Patient noted little improvement in anxiety after medication adjustments   Psychiatry was consulted on 10/11 and recommended switching gabapentin to lyrica and mirtazapine to duloxetine   Patients mood seemed stable/improved so I recommended holding off on medication adjustments   Over the weekend the patients regimen was adjusted as recommended by psychiatry   Patient was noted to be unresponsive this morning with a sodium of 118  Nephrology considering duloxetine to be contributing factor so this has been discontinued   Primary team inquiring about medication for anxiety since current regimen has been discontinued   At this time would not recommend administering medication for anxiety secondary to lethargy and rule out stroke/seizure   SSRIs do have side effects of causing hyponatremia so would avoid if possible   If considering starting this would discuss with nephrology once sodium level has improved (escitalopram can be successful in treating anxiety but again can cause hyponatremia so would discuss with nephrology before initiating)   Mirtazapine does have the lowest risk of hyponatremia, however, this is usually more effective for depression as opposed to anxiety   Unfortunately if patient is having anxiety and is not taking oral medication we are limited to benzodiazepines   There is some concern that lorazepam contributed to altered mental status   Would only give this if absolutely necessary and would administer lowest dose (lorazepam 0.25 mg)  Patient has been on alprazolam in the past so if she is taking oral can consider reintroducing this medication again at lowest possible dose (alprazolam 0.125 mg)   Would avoid hydroxyzine if possible secondary to  anticholinergic side effects   Discussed this information with patients son and daughter at the bedside in addition to the primary team   Continue supportive care     Acute Exacerbation of Chronic Obstructive Pulmonary Disease   Patient was noted to have a recent COPD exacerbation requiring hospitalization in August 2024  She was given tapering dose of steroids, DuoNebs, and supplemental oxygen   She presented back to the hospital with increased fatigue, insomnia, and decreased appetite   While at  she was a rapid response for tachypnea and fatigue  She was placed on BiPAP for increased work of breathing and hypercarbic respiratory failure   She was transferred to [facility] for ICU monitoring on BiPAP  BiPAP was discontinued on 10/17   She had been back on supplemental oxygen   She was a rapid response this morning and was noted to be hypercarpnic requiring BiPAP again   She is again on supplemental oxygen on my exam today but remains lethargic   Continue to monitor oxygen saturation and attempt to wean if able   Management per primary team     Neck/Shoulder Pain  Patient has been complaining of chronic neck and shoulder pain that has been worsening recently   Imaging revealed no acute abnormalities   She denies taking any medication for pain at home   She described the pain as sharp shooting on exam last week  Orthopedics was consulted and noted her exam was consistent with bicep tendonitis and osteoarthritis with potential rotator cuff pathology   She is WBAT to the LUE and OT evaluation recommended   Primary team has added voltaren gel to current regimen   She scheduled tylenol increased to 975 mg Q 8 hours   Would not recommend increased medication at this time due to altered mental status/lethargy but if mentation improves and patient is in significant pain would recommend using the geriatric pain protocol as discussed below  Oxycodone 2.5 mg po Q 4 prn moderate pain  Oxycodone 5 mg po Q 4 prn severe pain  "  Continue nonpharmacological methods of pain management     Hyponatremia   Patient with sodium level of 118 on labs this morning   She was recently started on cymbalta which could be contributing   Cymbalta has since been discontinued   Nephrology consulted and is recommended 3% saline and a fluid restriction   Sodium level being closely monitored   Management per nephrology and primary team         Subjective:   The patient is being seen and evaluated today at the bedside for geriatric follow-up. She is noted to be lying in bed in no acute distress. She is very lethargic and minimally responsive for me. Son at the bedside notes she was just speaking a few words to him.     Care was coordinated with patients nurse Javier and Dr. Mukherjee with critical care.       Review of Systems   Unable to perform ROS: Mental status change (lethargic)       Objective:     Vitals: Blood pressure 115/62, pulse 76, temperature (!) 97.3 °F (36.3 °C), temperature source Axillary, resp. rate 20, height 5' 2\" (1.575 m), weight 49 kg (108 lb 0.4 oz), SpO2 93%, not currently breastfeeding.,Body mass index is 19.76 kg/m².      Intake/Output Summary (Last 24 hours) at 10/18/2024 1240  Last data filed at 10/17/2024 2100  Gross per 24 hour   Intake 180 ml   Output 480 ml   Net -300 ml       Current Medications: Reviewed    Physical Exam:   Physical Exam  Vitals and nursing note reviewed.   Constitutional:       General: She is not in acute distress.     Appearance: She is ill-appearing.   HENT:      Head: Normocephalic.   Cardiovascular:      Rate and Rhythm: Normal rate and regular rhythm.   Pulmonary:      Effort: Pulmonary effort is normal. No respiratory distress.   Abdominal:      General: Bowel sounds are normal. There is no distension.   Musculoskeletal:      Right lower leg: No edema.      Left lower leg: No edema.   Skin:     General: Skin is warm and dry.   Neurological:      Mental Status: She is lethargic.          Invasive Devices  " "     Peripheral Intravenous Line  Duration             Long-Dwell Peripheral IV (Midline) 10/18/24 Right Basilic <1 day    Peripheral IV 10/18/24 Dorsal (posterior);Left Hand <1 day    Peripheral IV 10/18/24 Right Antecubital <1 day                    Lab, Imaging and other studies: Results Review Statement: I reviewed AM labs.    Please note:  Voice-recognition software may have been used in the preparation of this document.  Occasional wrong word or \"sound-alike\" substitutions may have occurred due to the inherent limitations of voice recognition software.  Interpretation should be guided by context.    "

## 2024-10-18 NOTE — QUICK NOTE
Brief stroke alert note    Stroke alert activated 10/18/2024 0614 hrs.  Neurology response: 10/18/2024 0618 hrs.  Last known well: 10/18/2024 at 0530 hrs.    The patient is a 79-year-old woman with some vascular risk factors currently admitted for respiratory failure with hypercapnia and being treated for anxiety and depression.  She was in her usual state and oriented this morning at approximately 0530 hrs.  Nursing returned at approximately 0600 hrs. and found the patient to be unresponsive to noxious stimulus with spastic/tonic activity of the upper extremity.  In particular initial nursing report was for tonic extension of bilateral upper extremities however subsequent reports suggested that this was only in the right upper extremity.    Rapid response was appropriately activated and a stroke alert was called.    I personally reviewed the noncontrast head CT and CT angiogram which revealed no evidence of large vessel occlusion or large territorial infarction, no evidence of intracerebral hemorrhage.  I spoke with radiology who confirmed the above findings.    Reportedly the patient became more responsive initially and CT with some mumbling speech however at the time of her return to the ICU she was once again unresponsive potentially with tonic activity of the upper extremity.    She received 2 g of IV levetiracetam and although 2 mg of IV lorazepam was recommended this was not required as her mental status improved to a degree.  Given that lorazepam would potentially cause her to need intubation I agreed it was reasonable to defer with the improvement in her mental status    She is not a candidate for tenecteplase at this point in time given that ischemic stroke is not her clinical diagnosis, current presentation is more consistent with provoked seizure.    The patient had previously been taking gabapentin which was discontinued and then pregabalin which was discontinued 1 to 2 days prior to her current  change.  She has also experienced hyponatremia with a sodium yesterday of 120-121 and sodium this morning of 118.

## 2024-10-18 NOTE — ASSESSMENT & PLAN NOTE
Patient notes chronic history of generalized anxiety with panic attacks at times.   Patient was previously maintained on alprazolam 0.25 mg twice daily as needed which was recently discontinued by her geriatrician 8/30/2024  Patient was started on buspirone 5 mg twice daily    Patient presents to the ED now with insomnia worsening  buspirone was stopped, patient was started on mirtazapine and was later discontinued  On Cymbalta but concerns for hyponatremia send currently discontinued  Geriatrics consulted-recommendation appreciated

## 2024-10-18 NOTE — ASSESSMENT & PLAN NOTE
History of severe COPD  Initial concern for exacerbation but likely oversedation from benzodiazepine  Transfer from  to ICU on 10/10, downgraded to MS on 10/10  Continue budesonide, Xopenex  Repeat VBG with noted CO2 retention  Avoid benzos, discontinue lyrica  On BiPAP

## 2024-10-18 NOTE — ASSESSMENT & PLAN NOTE
Had episodes of increasing lethargy, difficult to arouse  Likely due to hyponatremia, possible UTI and hypercapnia  Previous CT imaging without any acute process  MRI brain without any evidence of stroke or acute intracranial process  VBG noted for elevated CO2, concerning for CO2 narcosis  Suspect benzo may be playing a role with history of COPD, and likely CO2 retention  Recommend to maintain O2 above 88%, avoid O2 sats greater than 95% due to decreased respiratory drive  Some concerns for possible UTI, empirically started on ceftriaxone D3 awaiting culture sensitivities

## 2024-10-18 NOTE — PLAN OF CARE
Problem: PAIN - ADULT  Goal: Verbalizes/displays adequate comfort level or baseline comfort level  Description: Interventions:  - Encourage patient to monitor pain and request assistance  - Assess pain using appropriate pain scale  - Administer analgesics based on type and severity of pain and evaluate response  - Implement non-pharmacological measures as appropriate and evaluate response  - Consider cultural and social influences on pain and pain management  - Notify physician/advanced practitioner if interventions unsuccessful or patient reports new pain  Outcome: Progressing     Problem: INFECTION - ADULT  Goal: Absence or prevention of progression during hospitalization  Description: INTERVENTIONS:  - Assess and monitor for signs and symptoms of infection  - Monitor lab/diagnostic results  - Monitor all insertion sites, i.e. indwelling lines, tubes, and drains  - Monitor endotracheal if appropriate and nasal secretions for changes in amount and color  - [place] appropriate cooling/warming therapies per order  - Administer medications as ordered  - Instruct and encourage patient and family to use good hand hygiene technique  - Identify and instruct in appropriate isolation precautions for identified infection/condition  Outcome: Progressing  Goal: Absence of fever/infection during neutropenic period  Description: INTERVENTIONS:  - Monitor WBC    Outcome: Progressing     Problem: SAFETY ADULT  Goal: Patient will remain free of falls  Description: INTERVENTIONS:  - Educate patient/family on patient safety including physical limitations  - Instruct patient to call for assistance with activity   - Consult OT/PT to assist with strengthening/mobility   - Keep Call bell within reach  - Keep bed low and locked with side rails adjusted as appropriate  - Keep care items and personal belongings within reach  - Initiate and maintain comfort rounds  - Make Fall Risk Sign visible to staff  - Offer Toileting every 2 Hours,  in advance of need  - Initiate/Maintain bed alarm  - Obtain necessary fall risk management equipment: bed alarm  - Apply yellow socks and bracelet for high fall risk patients  - Consider moving patient to room near nurses station  Outcome: Progressing  Goal: Maintain or return to baseline ADL function  Description: INTERVENTIONS:  -  Assess patient's ability to carry out ADLs; assess patient's baseline for ADL function and identify physical deficits which impact ability to perform ADLs (bathing, care of mouth/teeth, toileting, grooming, dressing, etc.)  - Assess/evaluate cause of self-care deficits   - Assess range of motion  - Assess patient's mobility; develop plan if impaired  - Assess patient's need for assistive devices and provide as appropriate  - Encourage maximum independence but intervene and supervise when necessary  - Involve family in performance of ADLs  - Assess for home care needs following discharge   - Consider OT consult to assist with ADL evaluation and planning for discharge  - Provide patient education as appropriate  Outcome: Progressing  Goal: Maintains/Returns to pre admission functional level  Description: INTERVENTIONS:  - Perform AM-PAC 6 Click Basic Mobility/ Daily Activity assessment daily.  - Set and communicate daily mobility goal to care team and patient/family/caregiver.   - Collaborate with rehabilitation services on mobility goals if consulted  - Perform Range of Motion 3 times a day.  - Reposition patient every 2 hours.  - Dangle patient 3 times a day  - Stand patient 3 times a day  - Ambulate patient 3 times a day  - Out of bed to chair 3 times a day   - Out of bed for meals 3 times a day  - Out of bed for toileting  - Record patient progress and toleration of activity level   Outcome: Progressing     Problem: DISCHARGE PLANNING  Goal: Discharge to home or other facility with appropriate resources  Description: INTERVENTIONS:  - Identify barriers to discharge w/patient and  caregiver  - Arrange for needed discharge resources and transportation as appropriate  - Identify discharge learning needs (meds, wound care, etc.)  - Arrange for interpretive services to assist at discharge as needed  - Refer to Case Management Department for coordinating discharge planning if the patient needs post-hospital services based on physician/advanced practitioner order or complex needs related to functional status, cognitive ability, or social support system  Outcome: Progressing     Problem: Knowledge Deficit  Goal: Patient/family/caregiver demonstrates understanding of disease process, treatment plan, medications, and discharge instructions  Description: Complete learning assessment and assess knowledge base.  Interventions:  - Provide teaching at level of understanding  - Provide teaching via preferred learning methods  Outcome: Progressing     Problem: RESPIRATORY - ADULT  Goal: Achieves optimal ventilation and oxygenation  Description: INTERVENTIONS:  - Assess for changes in respiratory status  - Assess for changes in mentation and behavior  - Position to facilitate oxygenation and minimize respiratory effort  - Oxygen administered by appropriate delivery if ordered  - Initiate smoking cessation education as indicated  - Encourage broncho-pulmonary hygiene including cough, deep breathe, Incentive Spirometry  - Assess the need for suctioning and aspirate as needed  - Assess and instruct to report SOB or any respiratory difficulty  - Respiratory Therapy support as indicated  Outcome: Progressing     Problem: Nutrition/Hydration-ADULT  Goal: Nutrient/Hydration intake appropriate for improving, restoring or maintaining nutritional needs  Description: Monitor and assess patient's nutrition/hydration status for malnutrition. Collaborate with interdisciplinary team and initiate plan and interventions as ordered.  Monitor patient's weight and dietary intake as ordered or per policy. Utilize nutrition  screening tool and intervene as necessary. Determine patient's food preferences and provide high-protein, high-caloric foods as appropriate.     INTERVENTIONS:  - Monitor oral intake, urinary output, labs, and treatment plans  - Assess nutrition and hydration status and recommend course of action  - Evaluate amount of meals eaten  - Assist patient with eating if necessary   - Allow adequate time for meals  - Recommend/ encourage appropriate diets, oral nutritional supplements, and vitamin/mineral supplements  - Order, calculate, and assess calorie counts as needed  - Recommend, monitor, and adjust tube feedings and TPN/PPN based on assessed needs  - Assess need for intravenous fluids  - Provide specific nutrition/hydration education as appropriate  - Include patient/family/caregiver in decisions related to nutrition  Outcome: Progressing     Problem: Prexisting or High Potential for Compromised Skin Integrity  Goal: Skin integrity is maintained or improved  Description: INTERVENTIONS:  - Identify patients at risk for skin breakdown  - Assess and monitor skin integrity  - Assess and monitor nutrition and hydration status  - Monitor labs   - Assess for incontinence   - Turn and reposition patient  - Assist with mobility/ambulation  - Relieve pressure over bony prominences  - Avoid friction and shearing  - Provide appropriate hygiene as needed including keeping skin clean and dry  - Evaluate need for skin moisturizer/barrier cream  - Collaborate with interdisciplinary team   - Patient/family teaching  - Consider wound care consult   Outcome: Progressing     Problem: SKIN/TISSUE INTEGRITY - ADULT  Goal: Skin Integrity remains intact(Skin Breakdown Prevention)  Description: Assess:  -Perform Lonnie assessment every shift  -Clean and moisturize skin every day  -Inspect skin when repositioning, toileting, and assisting with ADLS  -Assess under medical devices such as Masimo every shift  -Assess extremities for adequate  circulation and sensation     Bed Management:  -Have minimal linens on bed & keep smooth, unwrinkled  -Change linens as needed when moist or perspiring  -Avoid sitting or lying in one position for more than 2 hours while in bed  -Keep HOB at 30 degrees     Toileting:  -Offer bedside commode  -Assess for incontinence every 2 hours  -Use incontinent care products after each incontinent episode such as foam cleanser    Activity:  -Mobilize patient 3 times a day  -Encourage activity and walks on unit  -Encourage or provide ROM exercises   -Turn and reposition patient every 2 Hours  -Use appropriate equipment to lift or move patient in bed  -Instruct/ Assist with weight shifting every day when out of bed in chair  -Consider limitation of chair time 2 hour intervals    Skin Care:  -Avoid use of baby powder, tape, friction and shearing, hot water or constrictive clothing  -Relieve pressure over bony prominences using Allevyn  -Do not massage red bony areas    Next Steps:  -Teach patient strategies to minimize risks such as weight shifting   -Consider consults to  interdisciplinary teams such as PT/OT  Outcome: Progressing     Problem: MUSCULOSKELETAL - ADULT  Goal: Maintain or return mobility to safest level of function  Description: INTERVENTIONS:  - Assess patient's ability to carry out ADLs; assess patient's baseline for ADL function and identify physical deficits which impact ability to perform ADLs (bathing, care of mouth/teeth, toileting, grooming, dressing, etc.)  - Assess/evaluate cause of self-care deficits   - Assess range of motion  - Assess patient's mobility  - Assess patient's need for assistive devices and provide as appropriate  - Encourage maximum independence but intervene and supervise when necessary  - Involve family in performance of ADLs  - Assess for home care needs following discharge   - Consider OT consult to assist with ADL evaluation and planning for discharge  - Provide patient education as  appropriate  Outcome: Progressing  Goal: Maintain proper alignment of affected body part  Description: INTERVENTIONS:  - Support, maintain and protect limb and body alignment  - Provide patient/ family with appropriate education  Outcome: Progressing

## 2024-10-18 NOTE — RESTORATIVE TECHNICIAN NOTE
Restorative Technician Note      Patient Name: Sarah Zayas     Restorative Tech Visit Date: 10/18/24  Note Type: Mobility  Patient Position Upon Consult: Seated edge of bed  Mobility / Activity Provided: Assisted PTA with chair follow  Activity Performed: Ambulated  Assistive Device: Roller walker  Brace Applied: Other (Comment) (Sling)  Additional Brace Ordered: No  Patient Position When Brace Applied: Seated  Bracing Recommendations: None  Patient Position at End of Consult: All needs within reach; Bedside chair

## 2024-10-18 NOTE — ASSESSMENT & PLAN NOTE
79 y.o.  female with temporal arteritis (on low-dose prednisone), COPD, GERD, HTN, anxiety, depression, hypothyroidism, macular degeneration, and chronic respiratory failure with hypercapnia who initially presented to  for deterioration with poor PO intake and weakness.  She was initiated on BiPAP for hypoxia/hypercapnia and transferred to [facility] for critical care service.    Hospital course complicated by: Oversedation secondary to benzos, anxiety/depression for which psych was consulted for (see below), possible Cymbalta induced hyponatremia (nephrology following), iatrogenic hyperthyroidism (Synthroid reduced), bicep tendinitis (PT/OT recommending rehab), conjunctivitis of left eye (placed on ophthalmic antibiotic drops), urine culture positive for Aerococcus urinae (currently on ceftriaxone).    Rapid response initiated around 6 AM on 10/18 for unresponsiveness and fluctuating tonic (? clonic, ICU staff reports some possible shaking) activity. Stroke alert was then called after RRT noticed anisocoria (R >L) during neuroexam.  There were times where patient improved described as relaxation in RUE, some verbal output and more responsive, however was not answering questions appropriately or following commands.  LKW 5 - 5:30 AM. Initial CT imaging negative for acute intracranial changes, LVO or significant stenosis.  Out of concern for seizure rather than stroke, she was loaded on IV Keppra 2 g.    Pertinent neurological workup:  -CTA head/neck (10/9): 2 mm left A2/A3 junction aneurysm, negative for acute intracranial abnormalities or significant stenosis.  -CTH x 2 (10/10 and 10/13) unremarkable for acute changes  -MRI brain wo (10/16) chronic microangiopathic, negative for acute intracranial abnormality  -CTH and CTA head/neck (10/18, stroke alert)  - Negative for significant stenosis or acute intracranial changes  - Redemonstrated intracranial aneurysms - R P-Comm (2.5 mm) and left LISA A2/A3  junction (2 mm).   - Labs 10/18:   - Hyponatremia 118-> 123   - Leukocytosis 14   - Magnesium 1.8   - WNL: phosphorus, procal, troponin, INR/PTT, glucose    Focal motor seizure could be provoked in the setting of significant hyponatremia, however cannot fully rule out structural abnormality.    Plan:  - s/p Keppra 2 g, no need to initiate AED maintenance at this time however we will can reconsider if patient were to have additional seizures  - Would try to avoid ativan given current respiratory status, however if needed can give 2 mg for seizure like activity >2 minutes  - Routine EEG and MRI brain with contrast seizure protocol pending (okay to defer the wo contrast portions given recent MRI brain wo completed 10/16)  - If there is concern for subclinical seizures, recommend transfer to [facility] for vEEG  - Telemetry  - Frequent neuro checks  - Seizure precautions  - Medical management and supportive care per primary team. Correction of any metabolic or infectious disturbances.     Plan discussed with Attending Neurologist, please see attestation for further input/recommendations.

## 2024-10-18 NOTE — PROGRESS NOTES
Progress Note - Nephrology   Name: Sarah Zayas 79 y.o. female I MRN: [id number]  Unit/Bed#: ICU 06 I Date of Admission: 10/10/2024   Date of Service: 10/18/2024 I Hospital Day: 8     Assessment & Plan  Hyponatremia  Acute hyponatremia in the setting of lung issues with acute exacerbation of severe COPD, also noted patient was recently started on Cymbalta on 10/13 which can cause SIADH.  Rule out other causes including other causes of SIADH    Workup:  - Serum osmolality true hypotonic hyponatremia 262/TSH 3.182  - Urine sodium 105 compatible with SIADH/urine osmolality 665  - CT of the head: No acute finding/MRI negative  - CT the chest: Possible airway debris consistent with aspiration trace pleural effusions stable right breast lesion    Recommend obtaining: Uric acid/a.m. cortisol with the patient on prednisone may not be reliable    Treatment: Given acute symptoms I would recommend bolus of 100 mL of 3% saline Central line not required  - Fluid restriction 1000 mL  - If able to take sodium chloride I would recommend 2 g 3 times a day  - I would also add torsemide 5 mg daily both to help blood pressure as well as hyponatremia  - Nutritional supplement once able to eat    Follow sodium closely 2 hours after the 100 mL 3% bolus recommended increase about 4 mEq over the next few hours to make sure that symptoms are not related to hyponatremia    Goal to increase sodium no faster than 130-131 in 24 hours by tomorrow morning    Hyperkalemia  -Hyperkalemia being treated with Lokelma will monitor and stop losartan and to maintain low potassium diet  Metabolic alkalosis with respiratory acidosis  -Metabolic alkalosis important compensation for respiratory acidosis pH 7.2 would certainly not treat with Diamox but treat the underlying pCO2 retention per critical care medicine  Acute exacerbation of chronic obstructive pulmonary disease (COPD) (HCC)  History of severe COPD, continue management as per primary  team  Hypertension  Blood pressure somewhat labile would add torsemide/to the propranolol and losartan  I would stop the losartan given the hyperkalemia add amlodipine 5 mg daily along with torsemide 5 mg daily will discuss with critical care medicine  Anxiety/depression  Noted was recently started on Cymbalta that can exacerbate hyponatremia  Acute respiratory failure with hypercapnia (HCC)  Per primary service  Hypothyroidism  Levothyroxine dose was reduced, most recent TSH improved and normal  Metabolic encephalopathy  Management as per per primary team  Chronic respiratory failure with hypercapnia (HCC)      I have reviewed the nephrology recommendations including treatment for acute hyponatremia with symptoms, with critical care medicine, and we are in agreement with renal plan including the information outlined above.     Subjective   Brief History of Admission -79-year-old female with a history of severe COPD/GERD/anxiety/diabetes mellitus type 2/hypothyroidism/hypertension admitted with worsening respiratory status acute on chronic hypoxic hypercapnic respiratory failure encephalopathy.  We are seeing her for hyponatremia    Patient may have had a seizure-like activity change in mentation no other history obtainable    Objective :  Temp:  [97.3 °F (36.3 °C)-98.4 °F (36.9 °C)] 97.3 °F (36.3 °C)  HR:  [68-82] 69  BP: (130-173)/(60-80) 154/76  Resp:  [16-17] 16  SpO2:  [91 %-100 %] 92 %  O2 Device: Nasal cannula  Nasal Cannula O2 Flow Rate (L/min):  [2.5 L/min-5 L/min] 5 L/min    Current Weight: Weight - Scale: 49 kg (108 lb 0.4 oz)  First Weight: Weight - Scale: 45.6 kg (100 lb 8.5 oz)  I/O         10/16 0701  10/17 0700 10/17 0701  10/18 0700 10/18 0701  10/19 0700    P.O. 1020 180     I.V. (mL/kg) 150 (3.1)      IV Piggyback 50      Total Intake(mL/kg) 1220 (24.9) 180 (3.7)     Urine (mL/kg/hr) 309 (0.3) 480 (0.4)     Total Output 309 480     Net +911 -300            Unmeasured Urine Occurrence 2 x 2 x            Physical Exam  Physical Exam: General: Lethargic mildly contractured  Skin:  No acute rash except for some mild cyanosis of the distal lower extremity  Eyes:  No scleral icterus and noninjected  ENT:  Moist mucous membranes  Neck:  Supple, no jugular venous distention, trachea midline, overall appearance is normal  Chest: Decreased breath sounds not very cooperative  CVS:  Regular rate and rhythm, without a rub or gallops  Abdomen:  Normal bowel sounds, soft and nontender and nondistended  Extremities:  No edema, and mild distal cyanosis, moderate arthritic changes  Neuro: Cannot assessed not cooperative  Psych: Cannot assess not able to cooperate    Medications:    Current Facility-Administered Medications:     acetaminophen (TYLENOL) tablet 975 mg, 975 mg, Oral, Q8H RACHEL, Bhupendra Medley MD, 975 mg at 10/17/24 2100    albuterol (PROVENTIL HFA,VENTOLIN HFA) inhaler 2 puff, 2 puff, Inhalation, Q6H PRN, Bhupendra Medley MD    Artificial Tears Op Soln 1 drop, 1 drop, Left Eye, TID, Bhupendra Medley MD, 1 drop at 10/17/24 2041    budesonide (PULMICORT) inhalation solution 0.5 mg, 0.5 mg, Nebulization, BID, Bhupendra Medley MD, 0.5 mg at 10/17/24 1948    cefTRIAXone (ROCEPHIN) 1,000 mg in dextrose 5 % 50 mL IVPB, 1,000 mg, Intravenous, Q24H, Bhupendra Medley MD, Last Rate: 100 mL/hr at 10/17/24 1715, 1,000 mg at 10/17/24 1715    chlorhexidine (PERIDEX) 0.12 % oral rinse 15 mL, 15 mL, Mouth/Throat, Q12H RACHEL, Bhupendra Medley MD, 15 mL at 10/17/24 2038    cyanocobalamin (VITAMIN B-12) tablet 1,000 mcg, 1,000 mcg, Oral, Daily, Bhupendra Medley MD, 1,000 mcg at 10/17/24 0809    Diclofenac Sodium (VOLTAREN) 1 % topical gel 2 g, 2 g, Topical, 4x Daily, Bhupendra Medley MD, 2 g at 10/17/24 2100    enoxaparin (LOVENOX) subcutaneous injection 30 mg, 30 mg, Subcutaneous, Daily, Bhupendra Medley MD, 30 mg at 10/17/24 0819    ipratropium-albuterol (DUO-NEB) 0.5-2.5 mg/3 mL inhalation solution 3 mL, 3 mL, Nebulization, Q6H PRN, Bhupendra  Carly Medley MD    levalbuterol (XOPENEX) inhalation solution 1.25 mg, 1.25 mg, Nebulization, BID, Bhupendra Medley MD, 1.25 mg at 10/17/24 1948    levothyroxine tablet 25 mcg, 25 mcg, Oral, Early Morning, Bhupendra Medley MD, 25 mcg at 10/17/24 0514    lidocaine (LIDODERM) 5 % patch 1 patch, 1 patch, Topical, Daily, Bhupendra Medley MD, 1 patch at 10/17/24 0822    LORazepam (ATIVAN) injection 2 mg, 2 mg, Intravenous, Once, Bhupendra Medley MD    losartan (COZAAR) tablet 100 mg, 100 mg, Oral, Daily, Bhupendra Medley MD, 100 mg at 10/17/24 0809    magnesium hydroxide (MILK OF MAGNESIA) oral suspension 30 mL, 30 mL, Oral, Daily PRN, Bhupendra Medley MD    melatonin tablet 3 mg, 3 mg, Oral, HS PRN, Bhupendra Medley MD, 3 mg at 10/17/24 0335    menthol-methyl salicylate (BENGAY) 10-15 % cream, , Apply externally, TID, Bhupendra Medley MD, Given at 10/17/24 2039    multivitamin-minerals (CENTRUM) tablet 1 tablet, 1 tablet, Oral, Daily, Bhupendra Medley MD, 1 tablet at 10/17/24 0809    ofloxacin (OCUFLOX) 0.3 % ophthalmic solution 1 drop, 1 drop, Left Eye, 4x Daily, Bhupendra Medley MD, 1 drop at 10/17/24 2100    ondansetron (ZOFRAN) injection 4 mg, 4 mg, Intravenous, Q6H PRN, Bhupendra Medley MD, 4 mg at 10/16/24 0238    pantoprazole (PROTONIX) EC tablet 40 mg, 40 mg, Oral, Early Morning, Bhupendra Medley MD, 40 mg at 10/17/24 0514    phenol (CHLORASEPTIC) 1.4 % mucosal liquid 1 spray, 1 spray, Mouth/Throat, Q2H PRN, Bhuepndra Medley MD, 1 spray at 10/15/24 2017    polyethylene glycol (MIRALAX) packet 17 g, 17 g, Oral, Daily, Bhupendra Mdeley MD, 17 g at 10/17/24 0818    predniSONE tablet 2.5 mg, 2.5 mg, Oral, Daily With Breakfast, Bhupendra Medley MD, 2.5 mg at 10/17/24 0809    PreserVision AREDS 2 CAPS 1 capsule, 1 capsule, Oral, Daily, Bhupendra Medley MD, 1 capsule at 10/17/24 0810    propranolol (INDERAL) tablet 40 mg, 40 mg, Oral, Q12H RACHEL, Bhupendra Medley MD, 40 mg at 10/17/24 2038    senna-docusate sodium (SENOKOT S) 8.6-50 mg per  tablet 1 tablet, 1 tablet, Oral, BID, Bhupendra Medley MD, 1 tablet at 10/17/24 1712    sodium chloride (HYPERTONIC) 3 % bolus 100 mL, 100 mL, Intravenous, Once, Luz Mukherjee MD    sodium chloride (OCEAN) 0.65 % nasal spray 2 spray, 2 spray, Each Nare, Q1H PRN, Bhupendra Medley MD, 2 spray at 10/15/24 2017    sodium chloride tablet 1 g, 1 g, Oral, TID With Meals, Bhupendra Medley MD, 1 g at 10/17/24 1942    Sodium Zirconium Cyclosilicate (Lokelma) 10 g, 10 g, Oral, Daily, Bhupendra Medley MD      Lab Results: I have reviewed the following results:  Results from last 7 days   Lab Units 10/18/24  0724 10/18/24  0642 10/18/24  0445 10/17/24  2154 10/17/24  1800 10/17/24  0620 10/17/24  0544 10/17/24  0005 10/16/24  1601 10/14/24  0513 10/13/24  0456 10/12/24  0633   WBC Thousand/uL  --   --  14.00*  --   --   --  4.73  --  6.07  --  6.19 6.19   HEMOGLOBIN g/dL  --   --  11.6  --   --   --  11.0*  --  11.1*  --  12.5 11.1*   I STAT HEMOGLOBIN g/dl  --  11.2*  --   --   --   --   --   --   --   --   --   --    HEMATOCRIT %  --   --  35.9  --   --   --  34.7*  --  35.9  --  42.7 36.5   HEMATOCRIT, ISTAT %  --  33*  --   --   --   --   --   --   --   --   --   --    PLATELETS Thousands/uL  --   --  318  --   --   --  214  --  212  --  197 161   POTASSIUM mmol/L  --   --  5.3 4.7 5.9* 4.9  --  5.0 4.8 4.8 5.3 4.5   CHLORIDE mmol/L  --   --  75* 74* 79* 81*  --  80* 78* 88* 91* 94*   CO2 mmol/L  --   --  40* 43* 37* 41*  --  42* 45* 39* 45* 41*   BUN mg/dL  --   --  21 21 20 24  --  26* 29* 17 13 12   CREATININE mg/dL  --   --  0.34* 0.46* 0.42* 0.42*  --  0.49* 0.43* 0.42* 0.46* 0.38*   CALCIUM mg/dL  --   --  8.4 9.1 8.8 8.0*  --  8.5 8.8 8.7 8.8 9.1   MAGNESIUM mg/dL 1.8*  --   --   --   --   --   --   --   --   --   --   --    PHOSPHORUS mg/dL 3.2  --   --   --   --   --   --   --   --   --   --   --    ALBUMIN g/dL  --   --   --   --   --   --   --   --  3.6  --   --   --    GLUCOSE, ISTAT mg/dl  --  120  --   --   --   --    "--   --   --   --   --   --        Administrative Statements     Portions of the record may have been created with voice recognition software. Occasional wrong word or \"sound a like\" substitutions may have occurred due to the inherent limitations of voice recognition software. Read the chart carefully and recognize, using context, where substitutions have occurred.If you have any questions, please contact the dictating provider.  "

## 2024-10-18 NOTE — PROCEDURES
Insert Complex Venous Access Line    Date/Time: 10/18/2024 8:42 AM    Performed by: Natalie Whitmore RN  Authorized by: TITO Hercules    Patient location:  Bedside  Consent:     Consent obtained: per protocol no consent required for midline.  Universal protocol:     Procedure explained and questions answered to patient or proxy's satisfaction: yes      Immediately prior to procedure, a time out was called: yes      Relevant documents present and verified: yes      Test results available and properly labeled: yes      Radiology Images displayed and confirmed.  If images not available, report reviewed: yes      Required blood products, implants, devices, and special equipment available: yes      Site/side marked: yes      Patient identity confirmed:  Arm band, hospital-assigned identification number and anonymous protocol, patient vented/unresponsive  Pre-procedure details:     Hand hygiene: Hand hygiene performed prior to insertion      Sterile barrier technique: All elements of maximal sterile technique followed      Skin preparation:  ChloraPrep    Skin preparation agent: Skin preparation agent completely dried prior to procedure    Procedure details:     Complex Venous Access Line Type: Midline      Complex Venous Access Line Indications: no peripheral vascular access      Orientation:  Right    Location:  Basilic    Catheter size:  18 gauge    Total catheter length (cm):  10    Catheter out on skin (cm):  0    Max flow rate:  999ml/hr    Arm circumference:  25    Patient evaluated for contraindications to access (i.e. fistula, thrombosis, etc): Yes      Approach: percutaneous technique used      Patient position:  Flat    Ultrasound image availability:  Not saved    Sterile ultrasound techniques: Sterile gel and sterile probe covers were used      Number of attempts:  1    Successful placement: yes      Landmarks identified: yes      Cath access vessel: midline.  Anesthesia (see MAR for exact dosages):      Anesthesia method:  None  Post-procedure details:     Post-procedure:  Dressing applied and securement device placed    Assessment:  Blood return through all ports and free fluid flow    Post-procedure complications: none      Patient tolerance of procedure:  Tolerated well, no immediate complications    Lot#[id number] 2025-08-31  Covered with spandex sleeve

## 2024-10-18 NOTE — ASSESSMENT & PLAN NOTE
-Hyperkalemia being treated with Lokelma will monitor and stop losartan and to maintain low potassium diet

## 2024-10-18 NOTE — ASSESSMENT & PLAN NOTE
10/18 stroke alert initiated for unresponsiveness and fluctuating tonic activity in RUE noted by bedside RN around 5:45 AM on 10/18.   LKW 5 - 5:30 AM.    Initial CT imaging negative for acute intracranial changes, LVO or significant stenosis.     -CTA head/neck (10/9): 2 mm left A2/A3 junction aneurysm, negative for acute intracranial abnormalities or significant stenosis.  -CTH x 2 (10/10 and 10/13) unremarkable for acute changes  -MRI brain wo (10/16) chronic microangiopathic, negative for acute intracranial abnormality  -CTH and CTA head/neck (10/18, stroke alert)  - Negative for significant stenosis or acute intracranial changes  - Redemonstrated intracranial aneurysms - R P-Comm (2.5 mm) and left LISA A2/A3 junction (2 mm).     10/18 loaded on IV Keppra 2 g per neurology.   Seizure likely in setting of hyponatremia.     Plan:  s/p Keppra 2 g  EEG  Frequent neuro checks  Seizure precautions  Correct hyponatremia with 3% saline per nephrology and follow-up with repeat labs  Neurology consulted recommendations appreciated

## 2024-10-18 NOTE — ASSESSMENT & PLAN NOTE
Chronic anxiety/depression    - Evaluated by psychiatry during admission, patient underwent multiple changes to medications:  - Gabapentin was switched to pregabalin 25 mg BID on 10/13, this was later discontinued on 10/16.  - Patient was previously taking Buspar x few months PTA after discontinuation of ativan 0.25 mg BID PRN in August.   - Buspar was discontinued due to worsening insomnia on admission.  - Remeron was initiated, however this was switched to Cymbalta 30 mg qHS on 10/13, later discontinued on 10/16.

## 2024-10-18 NOTE — CASE MANAGEMENT
Case Management Discharge Planning Note    Patient name Sarah Zayas  Location ICU 06/ICU 06 MRN [id number]  : 1945 Date 10/18/2024       Current Admission Date: 10/10/2024  Current Admission Diagnosis:Anxiety/depression   Patient Active Problem List    Diagnosis Date Noted Date Diagnosed    Metabolic alkalosis with respiratory acidosis 10/18/2024     Hyperkalemia 10/18/2024     Seizure-like activity (HCC) 10/18/2024     Hyponatremia 10/17/2024     Chronic respiratory failure with hypercapnia (HCC) 10/17/2024     Metabolic encephalopathy 10/16/2024     Urinary retention 10/16/2024     Macular degeneration 10/15/2024     Hypothyroidism 10/13/2024     Acute on chronic respiratory failure with hypoxia and hypercapnia (HCC) 10/10/2024     Neck pain/shoulder pain 10/10/2024     Acute respiratory failure with hypercapnia (HCC) 10/10/2024     Venous insufficiency of lower extremity 2024     Epigastric pain 2024     Advance care planning 2024     At risk for constipation 2024     At risk for delirium 2024     Physical deconditioning 2024     Periodontitis 2024     Polypharmacy 2024     Diastolic dysfunction 2024     Elevated vitamin B12 level 2024     Normocytic anemia 2024     Neck swelling 2024     Anxiety/depression 2024     Severe protein-calorie malnutrition (HCC) 2024     COPD, severe ([facility]) 2024     Abnormal CT scan 2023     Infectious sialoadenitis of major salivary gland 2023     Left upper lobe pulmonary nodule 2023     Postnasal drip 2023     Raynaud's phenomenon without gangrene 2022     Temporal arteritis ([facility]) 2020     Hypertension 10/11/2018     Other specified hypothyroidism 2018     Gastroesophageal reflux disease without esophagitis 2018     Acute exacerbation of chronic obstructive pulmonary disease (COPD) ([facility]) 2012     Vitamin D deficiency 2012      Osteoarthritis 09/26/2012       LOS (days): 8  Geometric Mean LOS (GMLOS) (days): 3.4  Days to GMLOS:-4.8     OBJECTIVE:  Risk of Unplanned Readmission Score: 48.03         Current admission status: Inpatient   Preferred Pharmacy:   [facility] - [place] - [address]  [address]  [address]  [place] [address]  Phone: [phone] Fax: [phone]    [facility] - [place] - [address]  [address] [address]  Phone: [phone] Fax: [phone]    Primary Care Provider: Nicolas Nix MD    [facility]  Secondary Insurance:     DISCHARGE DETAILS:       Additional Comments: Patient was brought to the ICU as a rapid response and therefore is not stable for facility transfer as scheduled. CM provided an update to [place] included updated clinicals for review. CM department will continue to follow patient through hospital discharge.

## 2024-10-18 NOTE — ASSESSMENT & PLAN NOTE
Acute hyponatremia in the setting of lung issues with acute exacerbation of severe COPD, also noted patient was recently started on Cymbalta on 10/13 which can cause SIADH.  Rule out other causes including other causes of SIADH    Workup:  - Serum osmolality true hypotonic hyponatremia 262/TSH 3.182  - Urine sodium 105 compatible with SIADH/urine osmolality 665  - CT of the head: No acute finding/MRI negative  - CT the chest: Possible airway debris consistent with aspiration trace pleural effusions stable right breast lesion    Recommend obtaining: Uric acid/a.m. cortisol with the patient on prednisone may not be reliable    Treatment: Given acute symptoms I would recommend bolus of 100 mL of 3% saline Central line not required  - Fluid restriction 1000 mL  - If able to take sodium chloride I would recommend 2 g 3 times a day  - I would also add torsemide 5 mg daily both to help blood pressure as well as hyponatremia  - Nutritional supplement once able to eat    Follow sodium closely 2 hours after the 100 mL 3% bolus recommended increase about 4 mEq over the next few hours to make sure that symptoms are not related to hyponatremia    Goal to increase sodium no faster than 130-131 in 24 hours by tomorrow morning

## 2024-10-18 NOTE — PROGRESS NOTES
Progress Note - Critical Care/ICU   Name: Sarah Zayas 79 y.o. female I MRN: [id number]  Unit/Bed#: ICU 06 I Date of Admission: 10/10/2024   Date of Service: 10/18/2024 I Hospital Day: 8    Assessment & Plan  Metabolic encephalopathy  Had episodes of increasing lethargy, difficult to arouse  Likely due to hyponatremia, possible UTI and hypercapnia  Previous CT imaging without any acute process  MRI brain without any evidence of stroke or acute intracranial process  VBG noted for elevated CO2, concerning for CO2 narcosis  Suspect benzo may be playing a role with history of COPD, and likely CO2 retention  Recommend to maintain O2 above 88%, avoid O2 sats greater than 95% due to decreased respiratory drive  Some concerns for possible UTI, empirically started on ceftriaxone D3 awaiting culture sensitivities  Seizure-like activity (HCC)  10/18 stroke alert initiated for unresponsiveness and fluctuating tonic activity in RUE noted by bedside RN around 5:45 AM on 10/18.   LKW 5 - 5:30 AM.    Initial CT imaging negative for acute intracranial changes, LVO or significant stenosis.     -CTA head/neck (10/9): 2 mm left A2/A3 junction aneurysm, negative for acute intracranial abnormalities or significant stenosis.  -CTH x 2 (10/10 and 10/13) unremarkable for acute changes  -MRI brain wo (10/16) chronic microangiopathic, negative for acute intracranial abnormality  -CTH and CTA head/neck (10/18, stroke alert)  - Negative for significant stenosis or acute intracranial changes  - Redemonstrated intracranial aneurysms - R P-Comm (2.5 mm) and left LISA A2/A3 junction (2 mm).     10/18 loaded on IV Keppra 2 g per neurology.   Seizure likely in setting of hyponatremia.     Plan:  s/p Keppra 2 g  EEG  Frequent neuro checks  Seizure precautions  Correct hyponatremia with 3% saline per nephrology and follow-up with repeat labs  Neurology consulted recommendations appreciated  Hyponatremia  Acute hyponatremia in the setting of lung issues  with acute exacerbation of severe COPD, also noted patient was recently started on Cymbalta on 10/13 which can cause SIADH.    Serum osmolality true hypotonic hyponatremia 262/TSH 3.182  Urine sodium 105 compatible with SIADH/urine osmolality 665  CT of the head: No acute finding/MRI negative  CT the chest: Possible airway debris consistent with aspiration trace pleural effusions stable right breast lesion    10/18 bolus of 100 mL of 3% saline Central line not required-Per nephrology    Plan:  Fluid restriction 1000 mL  10/18 torsemide 5 mg was added to help with blood pressure and hyponatremia  Open to tolerate p.o. then will start salt tabs 2 g 3 times daily  Follow-up on repeat BMP 2 hours post 3% saline  Goal to increase sodium no faster than 130-131 in 24 hours by tomorrow morning  Acute exacerbation of chronic obstructive pulmonary disease (COPD) (HCC)  History of severe COPD  Initial concern for exacerbation but likely oversedation from benzodiazepine  Transfer from  to ICU on 10/10, downgraded to MS on 10/10  Continue budesonide, Xopenex  Repeat VBG with noted CO2 retention  Avoid benzos, discontinue lyrica  On BiPAP  Anxiety/depression  Patient notes chronic history of generalized anxiety with panic attacks at times.   Patient was previously maintained on alprazolam 0.25 mg twice daily as needed which was recently discontinued by her geriatrician 8/30/2024  Patient was started on buspirone 5 mg twice daily    Patient presents to the ED now with insomnia worsening  buspirone was stopped, patient was started on mirtazapine and was later discontinued  On Cymbalta but concerns for hyponatremia send currently discontinued  Geriatrics consulted-recommendation appreciated  Neck pain/shoulder pain  Patient also complaining of neck and shoulder pain-chronic pain ongoing for the past 1 year which has been worsening recently  Denies have any chest pain, shortness of breath  10/10/24 Xray L shoulder: normal  10/10 CT  head: No acute intracranial abnormality.  No concerns for stroke at this time  Previous history of temporal arthritis - ESR/CRP normal  Also noted to have a previous history of sialoadenitis and associated neck edema and was seen by ENT in the past.  Patient is able to have purposeful movements in the bilateral upper extremities 5/5  strength bilaterally but has poor effort lifting the left arm without drift to bed.     Evidence of bicep tendinitis  Add Voltaren gel and Bengay  PT OT recommending rehab  Awaiting further evaluation above prior to discharge to rehab  Gastroesophageal reflux disease without esophagitis  Continue Protonix 40 mg daily  Hypertension  PTA valsartan 160 mg daily, propranolol 10 mg twice shwetha  Continue losartan and propanolol  Monitor BP and titrate as needed  Temporal arteritis (HCC)  History of temporal arteritis  No current active/associated acute symptoms  Maintained on prednisone 2.5mg daily at baseline  Follow up with PCP, Rheumatology as appropriate   Acute respiratory failure with hypercapnia (HCC)  Back to baseline oxygen, recommend 2L NC  Hypothyroidism  Levothyroxine reduced to 25 mcg  TSH suppressed and free T4 elevated-suspect some iatrogenic hyperthyroidism  Repeat TSH and T4 in 8 to 12 weeks outpatient  Macular degeneration  Follows with [facility]  Continue artifical tears  Reports decrease sight in her left eye with some discharge concerning for conjunctivitis  Recommend trial with ofloxacin for 7 days  Urinary retention  Did have episode of urinary retention, now resolved with incontinence  UA concerning for infection, currently on ceftriaxone awaiting cultures  Chronic respiratory failure with hypercapnia (HCC)    Metabolic alkalosis with respiratory acidosis  -Metabolic alkalosis important compensation for respiratory acidosis pH 7.2   Hyperkalemia  Hyperkalemia being treated with Lokelma will monitor and stop losartan and to maintain low potassium  diet    Disposition:      Team:     Subjective   Rapid response was called at 5:30 AM this a.m..  For concerns for stroke versus seizure.  Patient was already responding and had peripheral contracture of her right upper extremity.  CT head was done.  No concerns for acute stroke out of abundance IV Keppra was ordered 2g per neurology.  And transferred to ICU for further monitoring    Objective :  Temp:  [97.3 °F (36.3 °C)-98.2 °F (36.8 °C)] 97.3 °F (36.3 °C)  HR:  [68-82] 69  BP: (130-173)/(60-80) 154/76  Resp:  [16] 16  SpO2:  [90 %-100 %] 90 %  O2 Device: Nasal cannula  Nasal Cannula O2 Flow Rate (L/min):  [2.5 L/min-5 L/min] 5 L/min    I/O         10/16 0701  10/17 0700 10/17 0701  10/18 0700 10/18 0701  10/19 0700    P.O. 1020 180     I.V. (mL/kg) 150 (3.1)      IV Piggyback 50      Total Intake(mL/kg) 1220 (24.9) 180 (3.7)     Urine (mL/kg/hr) 309 (0.3) 480 (0.4)     Total Output 309 480     Net +911 -300            Unmeasured Urine Occurrence 2 x 2 x           Weights:   IBW (Ideal Body Weight): 50.1 kg    Body mass index is 19.76 kg/m².  Weight (last 2 days)       Date/Time Weight    10/17/24 0539 49 (108.03)    10/16/24 0600 49.1 (108.25)            Physical Exam  Vitals and nursing note reviewed.   Constitutional:       General: She is not in acute distress.     Appearance: Normal appearance.      Comments: Thin appearing   HENT:      Head: Normocephalic.   Eyes:      Conjunctiva/sclera: Conjunctivae normal.   Cardiovascular:      Rate and Rhythm: Normal rate.   Pulmonary:      Effort: Pulmonary effort is normal.      Breath sounds: Normal breath sounds. No wheezing or rhonchi.   Abdominal:      General: Bowel sounds are normal. There is no distension.      Palpations: Abdomen is soft.      Tenderness: There is no abdominal tenderness.   Musculoskeletal:         General: No swelling. Normal range of motion.   Skin:     General: Skin is warm and dry.   Neurological:      Mental Status: She is alert.       Comments: Episode of seizure-like activity on the floor will transfer to ICU.  Currently answering questions with minimal yes or no           Lab Results: I have reviewed the following results:  Recent Labs     10/16/24  1601 10/17/24  0005 10/18/24  0445 10/18/24  0642 10/18/24  0724 10/18/24  0746 10/18/24  0954   WBC 6.07   < > 14.00*  --   --   --   --    HGB 11.1*   < > 11.6 11.2*  --   --   --    HCT 35.9   < > 35.9 33*  --   --   --       < > 318  --   --   --   --    SODIUM 124*   < > 118*  --   --   --  123*   K 4.8   < > 5.3  --   --   --  4.2   CL 78*   < > 75*  --   --   --  79*   CO2 45*   < > 40*  --   --   --  42*   BUN 29*   < > 21  --   --   --  18   CREATININE 0.43*   < > 0.34*  --   --   --  0.32*   GLUC 159*   < > 165*  --   --   --  93   CAIONIZED  --   --   --  1.14  --   --   --    MG  --   --   --   --  1.8*  --   --    PHOS  --   --   --   --  3.2  --   --    AST 30  --   --   --   --   --   --    ALT 25  --   --   --   --   --   --    ALB 3.6  --   --   --   --   --   --    TBILI 0.26  --   --   --   --   --   --    ALKPHOS 41  --   --   --   --   --   --    PTT  --   --   --   --   --  27  --    INR  --   --   --   --   --  0.92  --     < > = values in this interval not displayed.             Currently Ordered Meds:   Current Facility-Administered Medications:     acetaminophen (TYLENOL) tablet 975 mg, Q8H RACHEL    albuterol (PROVENTIL HFA,VENTOLIN HFA) inhaler 2 puff, Q6H PRN    amLODIPine (NORVASC) tablet 5 mg, Daily    Artificial Tears Op Soln 1 drop, TID    budesonide (PULMICORT) inhalation solution 0.5 mg, BID    cefTRIAXone (ROCEPHIN) 1,000 mg in dextrose 5 % 50 mL IVPB, Q24H, Last Rate: 1,000 mg (10/17/24 1715)    chlorhexidine (PERIDEX) 0.12 % oral rinse 15 mL, Q12H RACHEL    cyanocobalamin (VITAMIN B-12) tablet 1,000 mcg, Daily    Diclofenac Sodium (VOLTAREN) 1 % topical gel 2 g, 4x Daily    enoxaparin (LOVENOX) subcutaneous injection 30 mg, Daily    ipratropium-albuterol  (DUO-NEB) 0.5-2.5 mg/3 mL inhalation solution 3 mL, Q6H PRN    levalbuterol (XOPENEX) inhalation solution 1.25 mg, BID    levothyroxine tablet 25 mcg, Early Morning    lidocaine (LIDODERM) 5 % patch 1 patch, Daily    LORazepam (ATIVAN) injection 2 mg, Once    magnesium hydroxide (MILK OF MAGNESIA) oral suspension 30 mL, Daily PRN    melatonin tablet 3 mg, HS PRN    menthol-methyl salicylate (BENGAY) 10-15 % cream, TID    multivitamin-minerals (CENTRUM) tablet 1 tablet, Daily    ofloxacin (OCUFLOX) 0.3 % ophthalmic solution 1 drop, 4x Daily    ondansetron (ZOFRAN) injection 4 mg, Q6H PRN    pantoprazole (PROTONIX) EC tablet 40 mg, Early Morning    phenol (CHLORASEPTIC) 1.4 % mucosal liquid 1 spray, Q2H PRN    polyethylene glycol (MIRALAX) packet 17 g, Daily    predniSONE tablet 2.5 mg, Daily With Breakfast    PreserVision AREDS 2 CAPS 1 capsule, Daily    propranolol (INDERAL) tablet 40 mg, Q12H RACHEL    senna-docusate sodium (SENOKOT S) 8.6-50 mg per tablet 1 tablet, BID    sodium chloride (OCEAN) 0.65 % nasal spray 2 spray, Q1H PRN    sodium chloride tablet 2 g, TID With Meals    Sodium Zirconium Cyclosilicate (Lokelma) 10 g, Daily    torsemide (DEMADEX) tablet 5 mg, Daily  VTE Pharmacologic Prophylaxis: Lovenox  VTE Mechanical Prophylaxis:     Administrative Statements     Portions of the record may have been created with voice recognition software.

## 2024-10-18 NOTE — ASSESSMENT & PLAN NOTE
Acute hyponatremia in the setting of lung issues with acute exacerbation of severe COPD, also noted patient was recently started on Cymbalta on 10/13 which can cause SIADH.    Serum osmolality true hypotonic hyponatremia 262/TSH 3.182  Urine sodium 105 compatible with SIADH/urine osmolality 665  CT of the head: No acute finding/MRI negative  CT the chest: Possible airway debris consistent with aspiration trace pleural effusions stable right breast lesion    10/18 bolus of 100 mL of 3% saline Central line not required-Per nephrology    Plan:  Fluid restriction 1000 mL  10/18 torsemide 5 mg was added to help with blood pressure and hyponatremia  Open to tolerate p.o. then will start salt tabs 2 g 3 times daily  Follow-up on repeat BMP 2 hours post 3% saline  Goal to increase sodium no faster than 130-131 in 24 hours by tomorrow morning

## 2024-10-18 NOTE — PLAN OF CARE
Problem: PAIN - ADULT  Goal: Verbalizes/displays adequate comfort level or baseline comfort level  Description: Interventions:  - Encourage patient to monitor pain and request assistance  - Assess pain using appropriate pain scale  - Administer analgesics based on type and severity of pain and evaluate response  - Implement non-pharmacological measures as appropriate and evaluate response  - Consider cultural and social influences on pain and pain management  - Notify physician/advanced practitioner if interventions unsuccessful or patient reports new pain  Outcome: Progressing     Problem: INFECTION - ADULT  Goal: Absence or prevention of progression during hospitalization  Description: INTERVENTIONS:  - Assess and monitor for signs and symptoms of infection  - Monitor lab/diagnostic results  - Monitor all insertion sites, i.e. indwelling lines, tubes, and drains  - Monitor endotracheal if appropriate and nasal secretions for changes in amount and color  - [place] appropriate cooling/warming therapies per order  - Administer medications as ordered  - Instruct and encourage patient and family to use good hand hygiene technique  - Identify and instruct in appropriate isolation precautions for identified infection/condition  Outcome: Progressing  Goal: Absence of fever/infection during neutropenic period  Description: INTERVENTIONS:  - Monitor WBC    Outcome: Progressing     Problem: SAFETY ADULT  Goal: Patient will remain free of falls  Description: INTERVENTIONS:  - Educate patient/family on patient safety including physical limitations  - Instruct patient to call for assistance with activity   - Consult OT/PT to assist with strengthening/mobility   - Keep Call bell within reach  - Keep bed low and locked with side rails adjusted as appropriate  - Keep care items and personal belongings within reach  - Initiate and maintain comfort rounds  - Make Fall Risk Sign visible to staff  - Offer Toileting every 2 Hours,  in advance of need  - Initiate/Maintain bed alarm  - Obtain necessary fall risk management equipment  - Apply yellow socks and bracelet for high fall risk patients  - Consider moving patient to room near nurses station  Outcome: Progressing  Goal: Maintain or return to baseline ADL function  Description: INTERVENTIONS:  -  Assess patient's ability to carry out ADLs; assess patient's baseline for ADL function and identify physical deficits which impact ability to perform ADLs (bathing, care of mouth/teeth, toileting, grooming, dressing, etc.)  - Assess/evaluate cause of self-care deficits   - Assess range of motion  - Assess patient's mobility; develop plan if impaired  - Assess patient's need for assistive devices and provide as appropriate  - Encourage maximum independence but intervene and supervise when necessary  - Involve family in performance of ADLs  - Assess for home care needs following discharge   - Consider OT consult to assist with ADL evaluation and planning for discharge  - Provide patient education as appropriate  Outcome: Progressing  Goal: Maintains/Returns to pre admission functional level  Description: INTERVENTIONS:  - Perform AM-PAC 6 Click Basic Mobility/ Daily Activity assessment daily.  - Set and communicate daily mobility goal to care team and patient/family/caregiver.   - Collaborate with rehabilitation services on mobility goals if consulted  - Perform Range of Motion 3 times a day.  - Reposition patient every 2 hours.  - Dangle patient 3 times a day  - Stand patient 3 times a day  - Ambulate patient 3 times a day  - Out of bed to chair 3 times a day   - Out of bed for meals 3 times a day  - Out of bed for toileting  - Record patient progress and toleration of activity level   Outcome: Progressing     Problem: DISCHARGE PLANNING  Goal: Discharge to home or other facility with appropriate resources  Description: INTERVENTIONS:  - Identify barriers to discharge w/patient and caregiver  -  Arrange for needed discharge resources and transportation as appropriate  - Identify discharge learning needs (meds, wound care, etc.)  - Arrange for interpretive services to assist at discharge as needed  - Refer to Case Management Department for coordinating discharge planning if the patient needs post-hospital services based on physician/advanced practitioner order or complex needs related to functional status, cognitive ability, or social support system  Outcome: Progressing     Problem: Knowledge Deficit  Goal: Patient/family/caregiver demonstrates understanding of disease process, treatment plan, medications, and discharge instructions  Description: Complete learning assessment and assess knowledge base.  Interventions:  - Provide teaching at level of understanding  - Provide teaching via preferred learning methods  Outcome: Progressing     Problem: RESPIRATORY - ADULT  Goal: Achieves optimal ventilation and oxygenation  Description: INTERVENTIONS:  - Assess for changes in respiratory status  - Assess for changes in mentation and behavior  - Position to facilitate oxygenation and minimize respiratory effort  - Oxygen administered by appropriate delivery if ordered  - Initiate smoking cessation education as indicated  - Encourage broncho-pulmonary hygiene including cough, deep breathe, Incentive Spirometry  - Assess the need for suctioning and aspirate as needed  - Assess and instruct to report SOB or any respiratory difficulty  - Respiratory Therapy support as indicated  Outcome: Progressing     Problem: Nutrition/Hydration-ADULT  Goal: Nutrient/Hydration intake appropriate for improving, restoring or maintaining nutritional needs  Description: Monitor and assess patient's nutrition/hydration status for malnutrition. Collaborate with interdisciplinary team and initiate plan and interventions as ordered.  Monitor patient's weight and dietary intake as ordered or per policy. Utilize nutrition screening tool and  intervene as necessary. Determine patient's food preferences and provide high-protein, high-caloric foods as appropriate.     INTERVENTIONS:  - Monitor oral intake, urinary output, labs, and treatment plans  - Assess nutrition and hydration status and recommend course of action  - Evaluate amount of meals eaten  - Assist patient with eating if necessary   - Allow adequate time for meals  - Recommend/ encourage appropriate diets, oral nutritional supplements, and vitamin/mineral supplements  - Order, calculate, and assess calorie counts as needed  - Recommend, monitor, and adjust tube feedings and TPN/PPN based on assessed needs  - Assess need for intravenous fluids  - Provide specific nutrition/hydration education as appropriate  - Include patient/family/caregiver in decisions related to nutrition  Outcome: Progressing     Problem: Prexisting or High Potential for Compromised Skin Integrity  Goal: Skin integrity is maintained or improved  Description: INTERVENTIONS:  - Identify patients at risk for skin breakdown  - Assess and monitor skin integrity  - Assess and monitor nutrition and hydration status  - Monitor labs   - Assess for incontinence   - Turn and reposition patient  - Assist with mobility/ambulation  - Relieve pressure over bony prominences  - Avoid friction and shearing  - Provide appropriate hygiene as needed including keeping skin clean and dry  - Evaluate need for skin moisturizer/barrier cream  - Collaborate with interdisciplinary team   - Patient/family teaching  - Consider wound care consult   Outcome: Progressing      Problem: MUSCULOSKELETAL - ADULT  Goal: Maintain or return mobility to safest level of function  Description: INTERVENTIONS:  - Assess patient's ability to carry out ADLs; assess patient's baseline for ADL function and identify physical deficits which impact ability to perform ADLs (bathing, care of mouth/teeth, toileting, grooming, dressing, etc.)  - Assess/evaluate cause of  self-care deficits   - Assess range of motion  - Assess patient's mobility  - Assess patient's need for assistive devices and provide as appropriate  - Encourage maximum independence but intervene and supervise when necessary  - Involve family in performance of ADLs  - Assess for home care needs following discharge   - Consider OT consult to assist with ADL evaluation and planning for discharge  - Provide patient education as appropriate  Outcome: Progressing  Goal: Maintain proper alignment of affected body part  Description: INTERVENTIONS:  - Support, maintain and protect limb and body alignment  - Provide patient/ family with appropriate education  Outcome: Progressing

## 2024-10-19 LAB
ANION GAP SERPL CALCULATED.3IONS-SCNC: 2 MMOL/L (ref 4–13)
ANION GAP SERPL CALCULATED.3IONS-SCNC: 4 MMOL/L (ref 4–13)
ANION GAP SERPL CALCULATED.3IONS-SCNC: 6 MMOL/L (ref 4–13)
ANION GAP SERPL CALCULATED.3IONS-SCNC: 6 MMOL/L (ref 4–13)
BASOPHILS # BLD AUTO: 0.03 THOUSANDS/ΜL (ref 0–0.1)
BASOPHILS NFR BLD AUTO: 0 % (ref 0–1)
BUN SERPL-MCNC: 12 MG/DL (ref 5–25)
BUN SERPL-MCNC: 13 MG/DL (ref 5–25)
BUN SERPL-MCNC: 14 MG/DL (ref 5–25)
BUN SERPL-MCNC: 14 MG/DL (ref 5–25)
CA-I BLD-SCNC: 1.04 MMOL/L (ref 1.12–1.32)
CALCIUM SERPL-MCNC: 7.5 MG/DL (ref 8.4–10.2)
CALCIUM SERPL-MCNC: 7.5 MG/DL (ref 8.4–10.2)
CALCIUM SERPL-MCNC: 7.6 MG/DL (ref 8.4–10.2)
CALCIUM SERPL-MCNC: 8.9 MG/DL (ref 8.4–10.2)
CHLORIDE SERPL-SCNC: 81 MMOL/L (ref 96–108)
CHLORIDE SERPL-SCNC: 82 MMOL/L (ref 96–108)
CHLORIDE SERPL-SCNC: 84 MMOL/L (ref 96–108)
CHLORIDE SERPL-SCNC: 84 MMOL/L (ref 96–108)
CO2 SERPL-SCNC: 37 MMOL/L (ref 21–32)
CO2 SERPL-SCNC: 39 MMOL/L (ref 21–32)
CO2 SERPL-SCNC: 41 MMOL/L (ref 21–32)
CO2 SERPL-SCNC: 42 MMOL/L (ref 21–32)
CREAT SERPL-MCNC: 0.3 MG/DL (ref 0.6–1.3)
CREAT SERPL-MCNC: 0.33 MG/DL (ref 0.6–1.3)
CREAT SERPL-MCNC: 0.34 MG/DL (ref 0.6–1.3)
CREAT SERPL-MCNC: 0.41 MG/DL (ref 0.6–1.3)
EOSINOPHIL # BLD AUTO: 0.11 THOUSAND/ΜL (ref 0–0.61)
EOSINOPHIL NFR BLD AUTO: 1 % (ref 0–6)
ERYTHROCYTE [DISTWIDTH] IN BLOOD BY AUTOMATED COUNT: 12.8 % (ref 11.6–15.1)
GFR SERPL CREATININE-BSD FRML MDRD: 104 ML/MIN/1.73SQ M
GFR SERPL CREATININE-BSD FRML MDRD: 105 ML/MIN/1.73SQ M
GFR SERPL CREATININE-BSD FRML MDRD: 109 ML/MIN/1.73SQ M
GFR SERPL CREATININE-BSD FRML MDRD: 98 ML/MIN/1.73SQ M
GLUCOSE SERPL-MCNC: 124 MG/DL (ref 65–140)
GLUCOSE SERPL-MCNC: 81 MG/DL (ref 65–140)
GLUCOSE SERPL-MCNC: 82 MG/DL (ref 65–140)
GLUCOSE SERPL-MCNC: 89 MG/DL (ref 65–140)
HCT VFR BLD AUTO: 31.8 % (ref 34.8–46.1)
HGB BLD-MCNC: 10.3 G/DL (ref 11.5–15.4)
IMM GRANULOCYTES # BLD AUTO: 0.03 THOUSAND/UL (ref 0–0.2)
IMM GRANULOCYTES NFR BLD AUTO: 0 % (ref 0–2)
LYMPHOCYTES # BLD AUTO: 0.67 THOUSANDS/ΜL (ref 0.6–4.47)
LYMPHOCYTES NFR BLD AUTO: 8 % (ref 14–44)
MCH RBC QN AUTO: 28.5 PG (ref 26.8–34.3)
MCHC RBC AUTO-ENTMCNC: 32.4 G/DL (ref 31.4–37.4)
MCV RBC AUTO: 88 FL (ref 82–98)
MONOCYTES # BLD AUTO: 0.81 THOUSAND/ΜL (ref 0.17–1.22)
MONOCYTES NFR BLD AUTO: 9 % (ref 4–12)
NEUTROPHILS # BLD AUTO: 6.95 THOUSANDS/ΜL (ref 1.85–7.62)
NEUTS SEG NFR BLD AUTO: 82 % (ref 43–75)
NRBC BLD AUTO-RTO: 0 /100 WBCS
PLATELET # BLD AUTO: 205 THOUSANDS/UL (ref 149–390)
PMV BLD AUTO: 9.5 FL (ref 8.9–12.7)
POTASSIUM SERPL-SCNC: 4.1 MMOL/L (ref 3.5–5.3)
POTASSIUM SERPL-SCNC: 4.3 MMOL/L (ref 3.5–5.3)
RBC # BLD AUTO: 3.61 MILLION/UL (ref 3.81–5.12)
SODIUM SERPL-SCNC: 124 MMOL/L (ref 135–147)
SODIUM SERPL-SCNC: 125 MMOL/L (ref 135–147)
SODIUM SERPL-SCNC: 127 MMOL/L (ref 135–147)
SODIUM SERPL-SCNC: 132 MMOL/L (ref 135–147)
WBC # BLD AUTO: 8.6 THOUSAND/UL (ref 4.31–10.16)

## 2024-10-19 PROCEDURE — 99232 SBSQ HOSP IP/OBS MODERATE 35: CPT | Performed by: PSYCHIATRY & NEUROLOGY

## 2024-10-19 PROCEDURE — 94660 CPAP INITIATION&MGMT: CPT

## 2024-10-19 PROCEDURE — 99232 SBSQ HOSP IP/OBS MODERATE 35: CPT | Performed by: INTERNAL MEDICINE

## 2024-10-19 PROCEDURE — 94760 N-INVAS EAR/PLS OXIMETRY 1: CPT

## 2024-10-19 PROCEDURE — 94640 AIRWAY INHALATION TREATMENT: CPT

## 2024-10-19 PROCEDURE — 99233 SBSQ HOSP IP/OBS HIGH 50: CPT | Performed by: STUDENT IN AN ORGANIZED HEALTH CARE EDUCATION/TRAINING PROGRAM

## 2024-10-19 PROCEDURE — 80048 BASIC METABOLIC PNL TOTAL CA: CPT

## 2024-10-19 PROCEDURE — 85025 COMPLETE CBC W/AUTO DIFF WBC: CPT

## 2024-10-19 PROCEDURE — 82330 ASSAY OF CALCIUM: CPT | Performed by: NURSE PRACTITIONER

## 2024-10-19 RX ORDER — PROPRANOLOL HCL 20 MG
40 TABLET ORAL EVERY 12 HOURS SCHEDULED
Status: CANCELLED | OUTPATIENT
Start: 2024-10-19

## 2024-10-19 RX ORDER — LEVALBUTEROL INHALATION SOLUTION 1.25 MG/3ML
1.25 SOLUTION RESPIRATORY (INHALATION) EVERY 6 HOURS PRN
Status: DISCONTINUED | OUTPATIENT
Start: 2024-10-19 | End: 2024-10-23 | Stop reason: HOSPADM

## 2024-10-19 RX ORDER — ONDANSETRON 2 MG/ML
4 INJECTION INTRAMUSCULAR; INTRAVENOUS EVERY 6 HOURS PRN
Status: DISCONTINUED | OUTPATIENT
Start: 2024-10-19 | End: 2024-10-23 | Stop reason: HOSPADM

## 2024-10-19 RX ORDER — SENNOSIDES 8.6 MG
1 TABLET ORAL
Status: CANCELLED | OUTPATIENT
Start: 2024-10-19

## 2024-10-19 RX ORDER — LANOLIN ALCOHOL/MO/W.PET/CERES
3 CREAM (GRAM) TOPICAL
Status: DISCONTINUED | OUTPATIENT
Start: 2024-10-19 | End: 2024-10-23 | Stop reason: HOSPADM

## 2024-10-19 RX ORDER — ECHINACEA PURPUREA EXTRACT 125 MG
1 TABLET ORAL
Status: CANCELLED | OUTPATIENT
Start: 2024-10-19

## 2024-10-19 RX ORDER — LEVALBUTEROL INHALATION SOLUTION 1.25 MG/3ML
1.25 SOLUTION RESPIRATORY (INHALATION)
Status: DISCONTINUED | OUTPATIENT
Start: 2024-10-19 | End: 2024-10-19

## 2024-10-19 RX ORDER — AMOXICILLIN 250 MG
1 CAPSULE ORAL
Status: DISCONTINUED | OUTPATIENT
Start: 2024-10-19 | End: 2024-10-21

## 2024-10-19 RX ORDER — OFLOXACIN 3 MG/ML
1 SOLUTION/ DROPS OPHTHALMIC 4 TIMES DAILY
Status: DISCONTINUED | OUTPATIENT
Start: 2024-10-19 | End: 2024-10-23

## 2024-10-19 RX ORDER — PREDNISONE 2.5 MG/1
2.5 TABLET ORAL DAILY
Status: CANCELLED | OUTPATIENT
Start: 2024-10-20

## 2024-10-19 RX ORDER — TORSEMIDE 10 MG/1
5 TABLET ORAL DAILY
Status: DISCONTINUED | OUTPATIENT
Start: 2024-10-20 | End: 2024-10-23 | Stop reason: HOSPADM

## 2024-10-19 RX ORDER — LEVALBUTEROL INHALATION SOLUTION 1.25 MG/3ML
1.25 SOLUTION RESPIRATORY (INHALATION)
Status: CANCELLED | OUTPATIENT
Start: 2024-10-19

## 2024-10-19 RX ORDER — IPRATROPIUM BROMIDE AND ALBUTEROL SULFATE 2.5; .5 MG/3ML; MG/3ML
3 SOLUTION RESPIRATORY (INHALATION) EVERY 6 HOURS PRN
Status: DISCONTINUED | OUTPATIENT
Start: 2024-10-19 | End: 2024-10-21

## 2024-10-19 RX ORDER — ONDANSETRON 2 MG/ML
4 INJECTION INTRAMUSCULAR; INTRAVENOUS EVERY 6 HOURS PRN
Status: CANCELLED | OUTPATIENT
Start: 2024-10-19

## 2024-10-19 RX ORDER — LIDOCAINE 50 MG/G
1 PATCH TOPICAL DAILY
Status: CANCELLED | OUTPATIENT
Start: 2024-10-19

## 2024-10-19 RX ORDER — LEVOTHYROXINE SODIUM 25 UG/1
25 TABLET ORAL
Status: CANCELLED | OUTPATIENT
Start: 2024-10-20

## 2024-10-19 RX ORDER — SODIUM CHLORIDE 1 G/1
1 TABLET ORAL
Status: DISCONTINUED | OUTPATIENT
Start: 2024-10-20 | End: 2024-10-20

## 2024-10-19 RX ORDER — MUSCLE RUB CREAM 100; 150 MG/G; MG/G
CREAM TOPICAL 3 TIMES DAILY
Status: DISCONTINUED | OUTPATIENT
Start: 2024-10-19 | End: 2024-10-23 | Stop reason: HOSPADM

## 2024-10-19 RX ORDER — POLYETHYLENE GLYCOL 3350 17 G/17G
17 POWDER, FOR SOLUTION ORAL DAILY
Status: DISCONTINUED | OUTPATIENT
Start: 2024-10-20 | End: 2024-10-23 | Stop reason: HOSPADM

## 2024-10-19 RX ORDER — LEVOTHYROXINE SODIUM 25 UG/1
25 TABLET ORAL
Status: DISCONTINUED | OUTPATIENT
Start: 2024-10-20 | End: 2024-10-23 | Stop reason: HOSPADM

## 2024-10-19 RX ORDER — PROPRANOLOL HYDROCHLORIDE 60 MG/1
CAPSULE, EXTENDED RELEASE ORAL DAILY
Status: CANCELLED | OUTPATIENT
Start: 2024-10-20

## 2024-10-19 RX ORDER — AMOXICILLIN 250 MG
1 CAPSULE ORAL
Status: CANCELLED | OUTPATIENT
Start: 2024-10-19

## 2024-10-19 RX ORDER — MUSCLE RUB CREAM 100; 150 MG/G; MG/G
CREAM TOPICAL 4 TIMES DAILY PRN
Status: CANCELLED | OUTPATIENT
Start: 2024-10-19

## 2024-10-19 RX ORDER — ACETAMINOPHEN 325 MG/1
650 TABLET ORAL EVERY 6 HOURS PRN
Status: DISCONTINUED | OUTPATIENT
Start: 2024-10-19 | End: 2024-10-23 | Stop reason: HOSPADM

## 2024-10-19 RX ORDER — BUDESONIDE 0.5 MG/2ML
0.5 INHALANT ORAL
Status: DISCONTINUED | OUTPATIENT
Start: 2024-10-19 | End: 2024-10-23 | Stop reason: HOSPADM

## 2024-10-19 RX ORDER — SODIUM CHLORIDE 1 G/1
1 TABLET ORAL
Status: DISCONTINUED | OUTPATIENT
Start: 2024-10-19 | End: 2024-10-19

## 2024-10-19 RX ORDER — TORSEMIDE 10 MG/1
5 TABLET ORAL DAILY
Status: CANCELLED | OUTPATIENT
Start: 2024-10-20

## 2024-10-19 RX ORDER — AMLODIPINE BESYLATE 5 MG/1
5 TABLET ORAL DAILY
Status: DISCONTINUED | OUTPATIENT
Start: 2024-10-20 | End: 2024-10-23 | Stop reason: HOSPADM

## 2024-10-19 RX ORDER — PANTOPRAZOLE SODIUM 40 MG/1
40 TABLET, DELAYED RELEASE ORAL
Status: CANCELLED | OUTPATIENT
Start: 2024-10-20

## 2024-10-19 RX ORDER — LIDOCAINE 50 MG/G
1 PATCH TOPICAL DAILY
Status: DISCONTINUED | OUTPATIENT
Start: 2024-10-19 | End: 2024-10-23 | Stop reason: HOSPADM

## 2024-10-19 RX ORDER — PROPRANOLOL HCL 20 MG
40 TABLET ORAL EVERY 12 HOURS SCHEDULED
Status: DISCONTINUED | OUTPATIENT
Start: 2024-10-19 | End: 2024-10-23 | Stop reason: HOSPADM

## 2024-10-19 RX ORDER — PREDNISONE 2.5 MG/1
2.5 TABLET ORAL DAILY
Status: DISCONTINUED | OUTPATIENT
Start: 2024-10-20 | End: 2024-10-23 | Stop reason: HOSPADM

## 2024-10-19 RX ORDER — ENOXAPARIN SODIUM 100 MG/ML
30 INJECTION SUBCUTANEOUS
Status: CANCELLED | OUTPATIENT
Start: 2024-10-20

## 2024-10-19 RX ORDER — ECHINACEA PURPUREA EXTRACT 125 MG
1 TABLET ORAL
Status: DISCONTINUED | OUTPATIENT
Start: 2024-10-19 | End: 2024-10-23 | Stop reason: HOSPADM

## 2024-10-19 RX ORDER — SODIUM CHLORIDE 1 G/1
1 TABLET ORAL
Status: CANCELLED | OUTPATIENT
Start: 2024-10-20

## 2024-10-19 RX ORDER — POLYETHYLENE GLYCOL 3350 17 G/17G
17 POWDER, FOR SOLUTION ORAL DAILY
Status: CANCELLED | OUTPATIENT
Start: 2024-10-20

## 2024-10-19 RX ORDER — ENOXAPARIN SODIUM 100 MG/ML
30 INJECTION SUBCUTANEOUS
Status: DISCONTINUED | OUTPATIENT
Start: 2024-10-20 | End: 2024-10-23 | Stop reason: HOSPADM

## 2024-10-19 RX ORDER — PANTOPRAZOLE SODIUM 40 MG/1
40 TABLET, DELAYED RELEASE ORAL
Status: DISCONTINUED | OUTPATIENT
Start: 2024-10-20 | End: 2024-10-23 | Stop reason: HOSPADM

## 2024-10-19 RX ORDER — OFLOXACIN 3 MG/ML
1 SOLUTION/ DROPS OPHTHALMIC 4 TIMES DAILY
Status: CANCELLED | OUTPATIENT
Start: 2024-10-19

## 2024-10-19 RX ORDER — BUDESONIDE 0.5 MG/2ML
0.5 INHALANT ORAL
Status: CANCELLED | OUTPATIENT
Start: 2024-10-19

## 2024-10-19 RX ADMIN — DICLOFENAC SODIUM 2 G: 10 GEL TOPICAL at 18:21

## 2024-10-19 RX ADMIN — CHLORHEXIDINE GLUCONATE 15 ML: 1.2 RINSE ORAL at 08:01

## 2024-10-19 RX ADMIN — PROPRANOLOL HYDROCHLORIDE 40 MG: 20 TABLET ORAL at 08:03

## 2024-10-19 RX ADMIN — BUDESONIDE INHALATION 0.5 MG: 0.5 SUSPENSION RESPIRATORY (INHALATION) at 19:53

## 2024-10-19 RX ADMIN — MENTHOL 10%, METHYL SALICYLATE 15%: 10; 15 CREAM TOPICAL at 21:21

## 2024-10-19 RX ADMIN — SODIUM ZIRCONIUM CYCLOSILICATE 10 G: 10 POWDER, FOR SUSPENSION ORAL at 08:10

## 2024-10-19 RX ADMIN — TORSEMIDE 5 MG: 10 TABLET ORAL at 08:02

## 2024-10-19 RX ADMIN — Medication 7.5 MG: at 09:23

## 2024-10-19 RX ADMIN — OFLOXACIN 1 DROP: 3 SOLUTION/ DROPS OPHTHALMIC at 22:34

## 2024-10-19 RX ADMIN — ENOXAPARIN SODIUM 30 MG: 100 INJECTION SUBCUTANEOUS at 08:01

## 2024-10-19 RX ADMIN — SODIUM CHLORIDE 2 G: 1 TABLET ORAL at 12:31

## 2024-10-19 RX ADMIN — OFLOXACIN 1 DROP: 3 SOLUTION/ DROPS OPHTHALMIC at 18:20

## 2024-10-19 RX ADMIN — CYANOCOBALAMIN TAB 500 MCG 1000 MCG: 500 TAB at 08:09

## 2024-10-19 RX ADMIN — DICLOFENAC SODIUM 2 G: 10 GEL TOPICAL at 12:38

## 2024-10-19 RX ADMIN — PREDNISONE 2.5 MG: 2.5 TABLET ORAL at 07:57

## 2024-10-19 RX ADMIN — OFLOXACIN 1 DROP: 3 SOLUTION/ DROPS OPHTHALMIC at 08:01

## 2024-10-19 RX ADMIN — ACETAMINOPHEN 975 MG: 325 TABLET, FILM COATED ORAL at 14:29

## 2024-10-19 RX ADMIN — BUDESONIDE 0.5 MG: 0.5 INHALANT ORAL at 07:11

## 2024-10-19 RX ADMIN — SODIUM CHLORIDE 2 G: 1 TABLET ORAL at 07:57

## 2024-10-19 RX ADMIN — LIDOCAINE 1 PATCH: 700 PATCH TOPICAL at 08:01

## 2024-10-19 RX ADMIN — OFLOXACIN 1 DROP: 3 SOLUTION/ DROPS OPHTHALMIC at 12:38

## 2024-10-19 RX ADMIN — PROPRANOLOL HYDROCHLORIDE 40 MG: 20 TABLET ORAL at 21:16

## 2024-10-19 RX ADMIN — SODIUM CHLORIDE 1 G: 1 TABLET ORAL at 16:35

## 2024-10-19 RX ADMIN — LEVALBUTEROL HYDROCHLORIDE 1.25 MG: 1.25 SOLUTION RESPIRATORY (INHALATION) at 07:11

## 2024-10-19 RX ADMIN — MELATONIN 3 MG: 3 TAB ORAL at 21:22

## 2024-10-19 RX ADMIN — DICLOFENAC SODIUM 2 G: 10 GEL TOPICAL at 08:01

## 2024-10-19 RX ADMIN — POLYETHYLENE GLYCOL 3350 17 G: 17 POWDER, FOR SOLUTION ORAL at 14:16

## 2024-10-19 RX ADMIN — Medication 1 TABLET: at 08:09

## 2024-10-19 RX ADMIN — MENTHOL 10%, METHYL SALICYLATE 15%: 10; 15 CREAM TOPICAL at 16:33

## 2024-10-19 RX ADMIN — AMLODIPINE BESYLATE 5 MG: 5 TABLET ORAL at 08:09

## 2024-10-19 RX ADMIN — SENNOSIDES AND DOCUSATE SODIUM 1 TABLET: 8.6; 5 TABLET ORAL at 21:16

## 2024-10-19 RX ADMIN — GLYCERIN 1 DROP: .002; .002; .01 SOLUTION/ DROPS OPHTHALMIC at 21:23

## 2024-10-19 RX ADMIN — DICLOFENAC SODIUM 2 G: 10 GEL TOPICAL at 21:25

## 2024-10-19 RX ADMIN — MENTHOL 10%, METHYL SALICYLATE 15%: 10; 15 CREAM TOPICAL at 08:01

## 2024-10-19 NOTE — ASSESSMENT & PLAN NOTE
Patient notes chronic history of generalized anxiety with panic attacks at times.   Patient was previously maintained on alprazolam 0.25 mg twice daily as needed which was recently discontinued by her geriatrician 8/30/2024  Patient was started on buspirone 5 mg twice daily    Patient presents to the ED now with insomnia worsening  buspirone was stopped, patient was started on mirtazapine and was later discontinued  On Cymbalta but concerns for hyponatremia-currently discontinued  Geriatrics consulted-recommendation appreciated

## 2024-10-19 NOTE — PLAN OF CARE
Problem: PAIN - ADULT  Goal: Verbalizes/displays adequate comfort level or baseline comfort level  Description: Interventions:  - Encourage patient to monitor pain and request assistance  - Assess pain using appropriate pain scale  - Administer analgesics based on type and severity of pain and evaluate response  - Implement non-pharmacological measures as appropriate and evaluate response  - Consider cultural and social influences on pain and pain management  - Notify physician/advanced practitioner if interventions unsuccessful or patient reports new pain  Outcome: Progressing     Problem: INFECTION - ADULT  Goal: Absence or prevention of progression during hospitalization  Description: INTERVENTIONS:  - Assess and monitor for signs and symptoms of infection  - Monitor lab/diagnostic results  - Monitor all insertion sites, i.e. indwelling lines, tubes, and drains  - Monitor endotracheal if appropriate and nasal secretions for changes in amount and color  - [place] appropriate cooling/warming therapies per order  - Administer medications as ordered  - Instruct and encourage patient and family to use good hand hygiene technique  - Identify and instruct in appropriate isolation precautions for identified infection/condition  Outcome: Progressing  Goal: Absence of fever/infection during neutropenic period  Description: INTERVENTIONS:  - Monitor WBC    Outcome: Progressing     Problem: SAFETY ADULT  Goal: Patient will remain free of falls  Description: INTERVENTIONS:  - Educate patient/family on patient safety including physical limitations  - Instruct patient to call for assistance with activity   - Consult OT/PT to assist with strengthening/mobility   - Keep Call bell within reach  - Keep bed low and locked with side rails adjusted as appropriate  - Keep care items and personal belongings within reach  - Initiate and maintain comfort rounds  - Make Fall Risk Sign visible to staff  - Offer Toileting every 2 Hours,  in advance of need  - Initiate/Maintain bed alarm  - Obtain necessary fall risk management equipment  - Apply yellow socks and bracelet for high fall risk patients  - Consider moving patient to room near nurses station  Outcome: Progressing  Goal: Maintain or return to baseline ADL function  Description: INTERVENTIONS:  -  Assess patient's ability to carry out ADLs; assess patient's baseline for ADL function and identify physical deficits which impact ability to perform ADLs (bathing, care of mouth/teeth, toileting, grooming, dressing, etc.)  - Assess/evaluate cause of self-care deficits   - Assess range of motion  - Assess patient's mobility; develop plan if impaired  - Assess patient's need for assistive devices and provide as appropriate  - Encourage maximum independence but intervene and supervise when necessary  - Involve family in performance of ADLs  - Assess for home care needs following discharge   - Consider OT consult to assist with ADL evaluation and planning for discharge  - Provide patient education as appropriate  Outcome: Progressing  Goal: Maintains/Returns to pre admission functional level  Description: INTERVENTIONS:  - Perform AM-PAC 6 Click Basic Mobility/ Daily Activity assessment daily.  - Set and communicate daily mobility goal to care team and patient/family/caregiver.   - Collaborate with rehabilitation services on mobility goals if consulted  - Perform Range of Motion 3 times a day.  - Reposition patient every 2 hours.  - Dangle patient 3 times a day  - Stand patient 3 times a day  - Ambulate patient 3 times a day  - Out of bed to chair 3 times a day   - Out of bed for meals 3 times a day  - Out of bed for toileting  - Record patient progress and toleration of activity level   Outcome: Progressing     Problem: DISCHARGE PLANNING  Goal: Discharge to home or other facility with appropriate resources  Description: INTERVENTIONS:  - Identify barriers to discharge w/patient and caregiver  -  Arrange for needed discharge resources and transportation as appropriate  - Identify discharge learning needs (meds, wound care, etc.)  - Arrange for interpretive services to assist at discharge as needed  - Refer to Case Management Department for coordinating discharge planning if the patient needs post-hospital services based on physician/advanced practitioner order or complex needs related to functional status, cognitive ability, or social support system  Outcome: Progressing     Problem: Knowledge Deficit  Goal: Patient/family/caregiver demonstrates understanding of disease process, treatment plan, medications, and discharge instructions  Description: Complete learning assessment and assess knowledge base.  Interventions:  - Provide teaching at level of understanding  - Provide teaching via preferred learning methods  Outcome: Progressing     Problem: RESPIRATORY - ADULT  Goal: Achieves optimal ventilation and oxygenation  Description: INTERVENTIONS:  - Assess for changes in respiratory status  - Assess for changes in mentation and behavior  - Position to facilitate oxygenation and minimize respiratory effort  - Oxygen administered by appropriate delivery if ordered  - Initiate smoking cessation education as indicated  - Encourage broncho-pulmonary hygiene including cough, deep breathe, Incentive Spirometry  - Assess the need for suctioning and aspirate as needed  - Assess and instruct to report SOB or any respiratory difficulty  - Respiratory Therapy support as indicated  Outcome: Progressing     Problem: Nutrition/Hydration-ADULT  Goal: Nutrient/Hydration intake appropriate for improving, restoring or maintaining nutritional needs  Description: Monitor and assess patient's nutrition/hydration status for malnutrition. Collaborate with interdisciplinary team and initiate plan and interventions as ordered.  Monitor patient's weight and dietary intake as ordered or per policy. Utilize nutrition screening tool and  intervene as necessary. Determine patient's food preferences and provide high-protein, high-caloric foods as appropriate.     INTERVENTIONS:  - Monitor oral intake, urinary output, labs, and treatment plans  - Assess nutrition and hydration status and recommend course of action  - Evaluate amount of meals eaten  - Assist patient with eating if necessary   - Allow adequate time for meals  - Recommend/ encourage appropriate diets, oral nutritional supplements, and vitamin/mineral supplements  - Order, calculate, and assess calorie counts as needed  - Recommend, monitor, and adjust tube feedings and TPN/PPN based on assessed needs  - Assess need for intravenous fluids  - Provide specific nutrition/hydration education as appropriate  - Include patient/family/caregiver in decisions related to nutrition  Outcome: Progressing     Problem: Prexisting or High Potential for Compromised Skin Integrity  Goal: Skin integrity is maintained or improved  Description: INTERVENTIONS:  - Identify patients at risk for skin breakdown  - Assess and monitor skin integrity  - Assess and monitor nutrition and hydration status  - Monitor labs   - Assess for incontinence   - Turn and reposition patient  - Assist with mobility/ambulation  - Relieve pressure over bony prominences  - Avoid friction and shearing  - Provide appropriate hygiene as needed including keeping skin clean and dry  - Evaluate need for skin moisturizer/barrier cream  - Collaborate with interdisciplinary team   - Patient/family teaching  - Consider wound care consult   Outcome: Progressing     Problem: SKIN/TISSUE INTEGRITY - ADULT  Goal: Skin Integrity remains intact(Skin Breakdown Prevention)  Description: Assess:  -Perform [place] assessment every shift  -Clean and moisturize skin every day, or as necessary   -Inspect skin when repositioning, toileting, and assisting with ADLS  -Assess extremities for adequate circulation and sensation     Bed Management:  -Have minimal  linens on bed & keep smooth, unwrinkled  -Change linens as needed when moist or perspiring  -Avoid sitting or lying in one position for more than 2 hours while in bed  -Keep HOB at 30degrees     Toileting:  -Offer bedside commode  -Assess for incontinence every 2 hours   -Use incontinent care products after each incontinent episode such as protective barrier cream    Activity:  -Mobilize patient 3 times a day  -Encourage activity and walks on unit  -Encourage or provide ROM exercises   -Turn and reposition patient every 2 Hours  -Use appropriate equipment to lift or move patient in bed  -Instruct/ Assist with weight shifting every 30 minutes when out of bed in chair  -Consider limitation of chair time 2 hour intervals    Skin Care:  -Avoid use of baby powder, tape, friction and shearing, hot water or constrictive clothing  -Relieve pressure over bony prominences using allevyn cushion  -Do not massage red bony areas    Next Steps:  -Teach patient strategies to minimize risks such as immobility, diet, hygiene    -Consider consults to  interdisciplinary teams such as physical therapy, occupational therapy, nutrition  Outcome: Progressing     Problem: MUSCULOSKELETAL - ADULT  Goal: Maintain or return mobility to safest level of function  Description: INTERVENTIONS:  - Assess patient's ability to carry out ADLs; assess patient's baseline for ADL function and identify physical deficits which impact ability to perform ADLs (bathing, care of mouth/teeth, toileting, grooming, dressing, etc.)  - Assess/evaluate cause of self-care deficits   - Assess range of motion  - Assess patient's mobility  - Assess patient's need for assistive devices and provide as appropriate  - Encourage maximum independence but intervene and supervise when necessary  - Involve family in performance of ADLs  - Assess for home care needs following discharge   - Consider OT consult to assist with ADL evaluation and planning for discharge  - Provide  patient education as appropriate  Outcome: Progressing  Goal: Maintain proper alignment of affected body part  Description: INTERVENTIONS:  - Support, maintain and protect limb and body alignment  - Provide patient/ family with appropriate education  Outcome: Progressing

## 2024-10-19 NOTE — PROGRESS NOTES
Progress Note - Nephrology   Name: Sarah Zayas 79 y.o. female I MRN: [id number]  Unit/Bed#: ICU 06 I Date of Admission: 10/10/2024   Date of Service: 10/19/2024 I Hospital Day: 9     Assessment & Plan  Hyponatremia  Etiology: Acute hyponatremia in the setting of lung issues with acute exacerbation of severe COPD, also noted patient was recently started on Cymbalta on 10/13 which can cause SIADH.    Rule out other causes including other causes of SIADH  Most lowest sodium 118 on 10/18/2024  Status post 100 mL 3% saline and started on salt tablets 10/18/2024  Current sodium 125 this a.m.(increase of 7 points 24 hours)  Me on every 4 hours BMP  Workup:  Serum osmolality true hypotonic hyponatremia 262; TSH 3.182  Urine sodium 105 compatible with SIADH/urine osmolality 665   CT of the head: No acute finding/MRI negative   CT the chest: Possible airway debris consistent with aspiration trace pleural effusions stable right breast lesion  Recommend obtaining: Uric acid/a.m. cortisol with the patient on prednisone may not be reliable-will check in a.m. as per Dr. Hanks recommendation  Plan  Will give 7.5 mg of tolvaptan-discussed with Dr. Reyes  Will lift FR today with tolvapton  Continue with sodium chloride  2 g 3 times a day for now   Continue with torsemide 5 mg daily both to help blood pressure as well as hyponatremia   Nutritional supplement once able to eat  Goal to increase sodium no faster than 131-132  in 24 hours by tomorrow morning    Hypertension  Current blood pressure continues to be labile at time  Currently on amlodipine 5 mg daily, propranolol 40 mg every 12 hours and torsemide 5 mg daily  Losartan currently on hold given higher potassium  Need to monitor and treat as needed    Hyperkalemia  Improving and currently 4.3  Status post  Lokelma for hyperkalemia in the setting of losartan  Recommend low potassium diet when eating  Metabolic alkalosis with respiratory acidosis  Currently on BiPAP  Metabolic  alkalosis appears to be slowly improving  Recommend not to treat with Diamox but treat the underlying pCO2 retention   Management per critical care medicine  Acute exacerbation of chronic obstructive pulmonary disease (COPD) (HCC)  History of severe COPD, continue management as per primary team  Acute respiratory failure with hypercapnia (HCC)  Per primary service  Examined on BiPAP this a.m.  Chronic respiratory failure with hypercapnia (HCC)  Management per primary team  Anxiety/depression  Noted was recently started on Cymbalta that can exacerbate hyponatremia-the off  Hypothyroidism  Levothyroxine dose was reduced, most recent TSH improved and normal  Management per primary team  Metabolic encephalopathy  Management per primary team management as per per primary team    Overall plan and recommendations has been reviewed with primary team and I agree with the plan as stated below      Review of Systems  Patient seen and examined at bedside; patient on BiPAP and lethargic.  Not answering questions.  All information is obtained to the chart.  No acute events overnight  Review of Systems   Unable to perform ROS: Other         Physical Exam:  Current Weight: Weight - Scale: 50.3 kg (110 lb 14.3 oz)  Vitals:    10/19/24 0500 10/19/24 0600 10/19/24 0712 10/19/24 0803   BP: 126/61 100/53  156/75   BP Location:       Pulse: 85 86  88   Resp: (!) 28 (!) 23     Temp:   99 °F (37.2 °C)    TempSrc:   Axillary    SpO2: 98% 97% 97%    Weight:  50.3 kg (110 lb 14.3 oz)     Height:           Physical Exam  Vitals reviewed.   Constitutional:       Comments: Lethargic,   HENT:      Head: Normocephalic and atraumatic.      Nose: Nose normal.   Eyes:      Conjunctiva/sclera: Conjunctivae normal.   Cardiovascular:      Rate and Rhythm: Normal rate and regular rhythm.      Pulses: Normal pulses.      Heart sounds: Normal heart sounds.   Pulmonary:      Effort: Pulmonary effort is normal.      Breath sounds: Normal breath sounds.       Comments: Poor inspiratory effort,  Abdominal:      General: Bowel sounds are normal.      Palpations: Abdomen is soft.   Genitourinary:     Comments: External catheter in place for clear yellow urine  Musculoskeletal:         General: Normal range of motion.      Cervical back: Normal range of motion and neck supple.      Comments: Somewhat contracted   Skin:     General: Skin is warm and dry.   Neurological:      General: No focal deficit present.   Psychiatric:         Mood and Affect: Mood normal.          Medications:    Current Facility-Administered Medications:     acetaminophen (TYLENOL) tablet 975 mg, 975 mg, Oral, Q8H RACHEL, Bhupendra Medley MD, 975 mg at 10/17/24 2100    albuterol (PROVENTIL HFA,VENTOLIN HFA) inhaler 2 puff, 2 puff, Inhalation, Q6H PRN, Bhupendra Medley MD, 2 puff at 10/18/24 1752    amLODIPine (NORVASC) tablet 5 mg, 5 mg, Oral, Daily, Adrian Hanks MD, 5 mg at 10/19/24 0809    Artificial Tears Op Soln 1 drop, 1 drop, Left Eye, TID, Bhupendra Medley MD, 1 drop at 10/19/24 0801    budesonide (PULMICORT) inhalation solution 0.5 mg, 0.5 mg, Nebulization, BID, Bhupendra Medley MD, 0.5 mg at 10/19/24 0711    chlorhexidine (PERIDEX) 0.12 % oral rinse 15 mL, 15 mL, Mouth/Throat, Q12H RACHEL, Bhupendra Medley MD, 15 mL at 10/19/24 0801    cyanocobalamin (VITAMIN B-12) tablet 1,000 mcg, 1,000 mcg, Oral, Daily, Bhupendra Medley MD, 1,000 mcg at 10/19/24 0809    Diclofenac Sodium (VOLTAREN) 1 % topical gel 2 g, 2 g, Topical, 4x Daily, Bhupendra Medley MD, 2 g at 10/19/24 0801    enoxaparin (LOVENOX) subcutaneous injection 30 mg, 30 mg, Subcutaneous, Daily, Bhupendra Medley MD, 30 mg at 10/19/24 0801    ipratropium-albuterol (DUO-NEB) 0.5-2.5 mg/3 mL inhalation solution 3 mL, 3 mL, Nebulization, Q6H PRN, Bhupendra Medley MD    levalbuterol (XOPENEX) inhalation solution 1.25 mg, 1.25 mg, Nebulization, BID, Bhupendra Medley MD, 1.25 mg at 10/19/24 0711    levothyroxine tablet 25 mcg, 25 mcg, Oral, Early Morning,  Bhupendra Medley MD, 25 mcg at 10/17/24 0514    lidocaine (LIDODERM) 5 % patch 1 patch, 1 patch, Topical, Daily, Bhupendra Medley MD, 1 patch at 10/19/24 0801    LORazepam (ATIVAN) injection 2 mg, 2 mg, Intravenous, Once, Bhupendra Medley MD    magnesium hydroxide (MILK OF MAGNESIA) oral suspension 30 mL, 30 mL, Oral, Daily PRN, Bhupendra Medley MD    melatonin tablet 3 mg, 3 mg, Oral, HS PRN, Bhupendra Medley MD, 3 mg at 10/17/24 0335    menthol-methyl salicylate (BENGAY) 10-15 % cream, , Apply externally, TID, Bhupendra Medley MD, Given at 10/19/24 0801    multivitamin-minerals (CENTRUM) tablet 1 tablet, 1 tablet, Oral, Daily, Bhupendra Medley MD, 1 tablet at 10/19/24 0809    ofloxacin (OCUFLOX) 0.3 % ophthalmic solution 1 drop, 1 drop, Left Eye, 4x Daily, Bhupendra Medley MD, 1 drop at 10/19/24 0801    ondansetron (ZOFRAN) injection 4 mg, 4 mg, Intravenous, Q6H PRN, Bhupendra Medley MD, 4 mg at 10/16/24 0238    pantoprazole (PROTONIX) EC tablet 40 mg, 40 mg, Oral, Early Morning, Bhupendra Medley MD, 40 mg at 10/17/24 0514    phenol (CHLORASEPTIC) 1.4 % mucosal liquid 1 spray, 1 spray, Mouth/Throat, Q2H PRN, Bhupendra Medley MD, 1 spray at 10/15/24 2017    polyethylene glycol (MIRALAX) packet 17 g, 17 g, Oral, Daily, Bhupendra Medley MD, 17 g at 10/17/24 0818    predniSONE tablet 2.5 mg, 2.5 mg, Oral, Daily With Breakfast, Bhupendra Medley MD, 2.5 mg at 10/19/24 0757    PreserVision AREDS 2 CAPS 1 capsule, 1 capsule, Oral, Daily, Bhupendra Medley MD, 1 capsule at 10/17/24 0810    propranolol (INDERAL) tablet 40 mg, 40 mg, Oral, Q12H RACHEL, Bhupendra Medley MD, 40 mg at 10/19/24 0803    senna-docusate sodium (SENOKOT S) 8.6-50 mg per tablet 1 tablet, 1 tablet, Oral, BID, Bhupendra Medley MD, 1 tablet at 10/17/24 1712    sodium chloride (OCEAN) 0.65 % nasal spray 2 spray, 2 spray, Each Nare, Q1H PRN, Bhupendra Medley MD, 2 spray at 10/15/24 2017    sodium chloride tablet 2 g, 2 g, Oral, TID With Meals, Adrian Hanks MD, 2 g at  10/19/24 0757    Sodium Zirconium Cyclosilicate (Lokelma) 10 g, 10 g, Oral, Daily, Bhupendra Medley MD, 10 g at 10/19/24 0810    torsemide (DEMADEX) tablet 5 mg, 5 mg, Oral, Daily, Adrian Hanks MD, 5 mg at 10/19/24 0802    Laboratory Results:  Results from last 7 days   Lab Units 10/19/24  0454 10/18/24  2358 10/18/24  2015 10/18/24  1557 10/18/24  1306 10/18/24  0954 10/18/24  0724 10/18/24  0642 10/18/24  0445 10/17/24  0620 10/17/24  0544 10/17/24  0005 10/16/24  1601 10/14/24  0513 10/13/24  0456   WBC Thousand/uL 8.60  --   --   --   --   --   --   --  14.00*  --  4.73  --  6.07  --  6.19   HEMOGLOBIN g/dL 10.3*  --   --   --   --   --   --   --  11.6  --  11.0*  --  11.1*  --  12.5   I STAT HEMOGLOBIN g/dl  --   --   --   --   --   --   --  11.2*  --   --   --   --   --   --   --    HEMATOCRIT % 31.8*  --   --   --   --   --   --   --  35.9  --  34.7*  --  35.9  --  42.7   HEMATOCRIT, ISTAT %  --   --   --   --   --   --   --  33*  --   --   --   --   --   --   --    PLATELETS Thousands/uL 205  --   --   --   --   --   --   --  318  --  214  --  212  --  197   SODIUM mmol/L 125* 124* 124* 123* 123* 123*  --   --  118*   < >  --    < > 124*   < > 138   POTASSIUM mmol/L 4.3 4.3 4.3 4.4 4.2 4.2  --   --  5.3   < >  --    < > 4.8   < > 5.3   CHLORIDE mmol/L 82* 81* 79* 79* 80* 79*  --   --  75*   < >  --    < > 78*   < > 91*   CO2 mmol/L 39* 41* 43* 42* 43* 42*  --   --  40*   < >  --    < > 45*   < > 45*   BUN mg/dL 13 14 15 16 17 18  --   --  21   < >  --    < > 29*   < > 13   CREATININE mg/dL 0.33* 0.34* 0.35* 0.29* 0.30* 0.32*  --   --  0.34*   < >  --    < > 0.43*   < > 0.46*   CALCIUM mg/dL 7.5* 7.5* 7.8* 8.0* 7.8* 8.0*  --   --  8.4   < >  --    < > 8.8   < > 8.8   MAGNESIUM mg/dL  --   --   --   --   --   --  1.8*  --   --   --   --   --   --   --   --    PHOSPHORUS mg/dL  --   --   --   --   --   --  3.2  --   --   --   --   --   --   --   --    GLUCOSE, ISTAT mg/dl  --   --   --   --   --   --   --  120  " --   --   --   --   --   --   --     < > = values in this interval not displayed.       Medical records have been reviewed through [facility] and care everywhere for this patient encounter    Portions of the record may have been created with voice recognition software. Occasional wrong word or \"sound a like\" substitutions may have occurred due to the inherent limitations of voice recognition software. Read the chart carefully and recognize,   "

## 2024-10-19 NOTE — ASSESSMENT & PLAN NOTE
Improving and currently 4.3  Status post  Lokelma for hyperkalemia in the setting of losartan  Recommend low potassium diet when eating

## 2024-10-19 NOTE — PROGRESS NOTES
Progress Note - Neurology   Name: Sarah Zayas 79 y.o. female I MRN: [id number]  Unit/Bed#: ICU 06 I Date of Admission: 10/10/2024   Date of Service: 10/19/2024 I Hospital Day: 9     Assessment & Plan  Seizure-like activity (HCC)  79 y.o.  female with temporal arteritis (on low-dose prednisone), COPD, GERD, HTN, anxiety, depression, hypothyroidism, macular degeneration, and chronic respiratory failure with hypercapnia who initially presented to  for deterioration with poor PO intake and weakness.  She was initiated on BiPAP for hypoxia/hypercapnia and transferred to [facility] for critical care service.    Hospital course complicated by: Oversedation secondary to benzos, anxiety/depression for which psych was consulted for (see below), possible Cymbalta induced hyponatremia (nephrology following), iatrogenic hyperthyroidism (Synthroid reduced), bicep tendinitis (PT/OT recommending rehab), conjunctivitis of left eye (placed on ophthalmic antibiotic drops), urine culture positive for Aerococcus urinae (currently on ceftriaxone).    Rapid response initiated around 6 AM on 10/18 for unresponsiveness and fluctuating tonic (? clonic, ICU staff reports some possible shaking) activity. Stroke alert was then called after RRT noticed anisocoria (R >L) during neuroexam.  There were times where patient improved described as relaxation in RUE, some verbal output and more responsive, however was not answering questions appropriately or following commands.  LKW 5 - 5:30 AM. Initial CT imaging negative for acute intracranial changes, LVO or significant stenosis.  Out of concern for seizure rather than stroke, she was loaded on IV Keppra 2 g.    Pertinent neurological workup:  -CTA head/neck (10/9): 2 mm left A2/A3 junction aneurysm, negative for acute intracranial abnormalities or significant stenosis.  -CTH x 2 (10/10 and 10/13) unremarkable for acute changes  -MRI brain wo (10/16) chronic microangiopathic, negative for  acute intracranial abnormality  -CTH and CTA head/neck (10/18, stroke alert)  - Negative for significant stenosis or acute intracranial changes  - Redemonstrated intracranial aneurysms - R P-Comm (2.5 mm) and left LISA A2/A3 junction (2 mm).   - Labs 10/18:   - Hyponatremia 118-> 123   - Leukocytosis 14   - Magnesium 1.8   - WNL: phosphorus, procal, troponin, INR/PTT, glucose  -EEG 10/18: Disorganized, diffuse delta and theta activity and triphasic waves. Indicates moderate-severe cerebral dysfunction. Triphasic waves typically seen in the setting of toxic-metabolic encephalopathy.    Significant clinical improvement today with improvement in hyponatremia. Suspect clinical event represented either epileptic seizure provoked by hyponatremia or was TME related to the same. No clinical concern for ongoing seizure activity or structural brain disease.    Plan:  - s/p Keppra 2 g, no need to initiate AED maintenance at this time however we will can reconsider if patient were to have additional seizures  - Would try to avoid ativan given current respiratory status, however if needed can give 2 mg for seizure like activity >2 minutes  - No need for repeat MRI brain w/ contrast given her clinical improvement and lack of concern for focal, structural pathology especially given recent MRI brain being negative  - Telemetry  - Frequent neuro checks  - Seizure precautions  - Medical management and supportive care per primary team. Correction of any metabolic or infectious disturbances  - No further inpatient recs at this time; call with questions  Anxiety/depression   Chronic anxiety/depression    - Evaluated by psychiatry during admission, patient underwent multiple changes to medications:  - Gabapentin was switched to pregabalin 25 mg BID on 10/13, this was later discontinued on 10/16.  - Patient was previously taking Buspar x few months PTA after discontinuation of ativan 0.25 mg BID PRN in August.   - Buspar was discontinued  due to worsening insomnia on admission.  - Remeron was initiated, however this was switched to Cymbalta 30 mg qHS on 10/13, later discontinued on 10/16.    Sarah Zayas will not need outpatient follow up with Neurology. She will not require outpatient neurological testing.      Subjective   Clinically much improved today. Now awake/alert and interactive. Complaining of back and chest discomfort; notified staff. Na improved to 127. No further seizure-like activity.    Review of Systems   Cardiovascular:  Positive for chest pain.   Musculoskeletal:  Positive for back pain.   All other systems reviewed and are negative.        Objective :  Temp:  [98.2 °F (36.8 °C)-99.1 °F (37.3 °C)] 99 °F (37.2 °C)  HR:  [65-92] 78  BP: ()/(48-77) 163/71  Resp:  [14-35] 17  SpO2:  [90 %-99 %] 94 %  O2 Device: Nasal cannula  Nasal Cannula O2 Flow Rate (L/min):  [5 L/min] 5 L/min    Physical Exam  Constitutional:       Appearance: She is well-developed.   HENT:      Head: Normocephalic and atraumatic.   Eyes:      Extraocular Movements: Extraocular movements intact.      Pupils: Pupils are equal, round, and reactive to light.   Cardiovascular:      Rate and Rhythm: Normal rate and regular rhythm.      Heart sounds: Normal heart sounds.   Pulmonary:      Effort: Pulmonary effort is normal.      Breath sounds: Normal breath sounds.   Abdominal:      General: Bowel sounds are normal.      Palpations: Abdomen is soft.   Musculoskeletal:      Cervical back: Normal range of motion and neck supple.   Neurological:      Mental Status: She is alert and oriented to person, place, and time.   Psychiatric:         Speech: Speech normal.     Neurological Exam  Mental Status  Alert. Oriented to person, place and time. Oriented to person, place, and time. Speech is normal. Language is fluent with no aphasia.    Cranial Nerves  CN III, IV, VI: Extraocular movements intact bilaterally. Pupils equal round and reactive to light bilaterally.  CN VII:  Full and symmetric facial movement.  CN VIII: Hearing is normal.  CN XII: Tongue midline without atrophy or fasciculations.    Motor   Strength is 5/5 in all four extremities except as noted.  4/5 proximally in both uppers  2/5 proximally in both lowers, 4/5 distally bilaterally  No asymmetry noted in motor exam.    Sensory  Light touch is normal in upper and lower extremities.         Lab Results: I have reviewed the following results:  Imaging Results Review: No pertinent imaging studies reviewed.  Other Study Results Review: No additional pertinent studies reviewed.    VTE Pharmacologic Prophylaxis: Enoxaparin (Lovenox)

## 2024-10-19 NOTE — PLAN OF CARE
Problem: PAIN - ADULT  Goal: Verbalizes/displays adequate comfort level or baseline comfort level  Description: Interventions:  - Encourage patient to monitor pain and request assistance  - Assess pain using appropriate pain scale  - Administer analgesics based on type and severity of pain and evaluate response  - Implement non-pharmacological measures as appropriate and evaluate response  - Consider cultural and social influences on pain and pain management  - Notify physician/advanced practitioner if interventions unsuccessful or patient reports new pain  Outcome: Progressing     Problem: INFECTION - ADULT  Goal: Absence or prevention of progression during hospitalization  Description: INTERVENTIONS:  - Assess and monitor for signs and symptoms of infection  - Monitor lab/diagnostic results  - Monitor all insertion sites, i.e. indwelling lines, tubes, and drains  - Monitor endotracheal if appropriate and nasal secretions for changes in amount and color  - [place] appropriate cooling/warming therapies per order  - Administer medications as ordered  - Instruct and encourage patient and family to use good hand hygiene technique  - Identify and instruct in appropriate isolation precautions for identified infection/condition  Outcome: Progressing  Goal: Absence of fever/infection during neutropenic period  Description: INTERVENTIONS:  - Monitor WBC    Outcome: Progressing     Problem: SAFETY ADULT  Goal: Patient will remain free of falls  Description: INTERVENTIONS:  - Educate patient/family on patient safety including physical limitations  - Instruct patient to call for assistance with activity   - Consult OT/PT to assist with strengthening/mobility   - Keep Call bell within reach  - Keep bed low and locked with side rails adjusted as appropriate  - Keep care items and personal belongings within reach  - Initiate and maintain comfort rounds  - Make Fall Risk Sign visible to staff  - Offer Toileting every 2 Hours,  in advance of need  - Initiate/Maintain bed alarm  - Obtain necessary fall risk management equipment  - Apply yellow socks and bracelet for high fall risk patients  - Consider moving patient to room near nurses station  Outcome: Progressing  Goal: Maintain or return to baseline ADL function  Description: INTERVENTIONS:  -  Assess patient's ability to carry out ADLs; assess patient's baseline for ADL function and identify physical deficits which impact ability to perform ADLs (bathing, care of mouth/teeth, toileting, grooming, dressing, etc.)  - Assess/evaluate cause of self-care deficits   - Assess range of motion  - Assess patient's mobility; develop plan if impaired  - Assess patient's need for assistive devices and provide as appropriate  - Encourage maximum independence but intervene and supervise when necessary  - Involve family in performance of ADLs  - Assess for home care needs following discharge   - Consider OT consult to assist with ADL evaluation and planning for discharge  - Provide patient education as appropriate  Outcome: Progressing  Goal: Maintains/Returns to pre admission functional level  Description: INTERVENTIONS:  - Perform AM-PAC 6 Click Basic Mobility/ Daily Activity assessment daily.  - Set and communicate daily mobility goal to care team and patient/family/caregiver.   - Collaborate with rehabilitation services on mobility goals if consulted  - Perform Range of Motion 3 times a day.  - Reposition patient every 2 hours.  - Dangle patient 3 times a day  - Stand patient 3 times a day  - Ambulate patient 3 times a day  - Out of bed to chair 3 times a day   - Out of bed for meals 3 times a day  - Out of bed for toileting  - Record patient progress and toleration of activity level   Outcome: Progressing     Problem: DISCHARGE PLANNING  Goal: Discharge to home or other facility with appropriate resources  Description: INTERVENTIONS:  - Identify barriers to discharge w/patient and caregiver  -  Arrange for needed discharge resources and transportation as appropriate  - Identify discharge learning needs (meds, wound care, etc.)  - Arrange for interpretive services to assist at discharge as needed  - Refer to Case Management Department for coordinating discharge planning if the patient needs post-hospital services based on physician/advanced practitioner order or complex needs related to functional status, cognitive ability, or social support system  Outcome: Progressing     Problem: Knowledge Deficit  Goal: Patient/family/caregiver demonstrates understanding of disease process, treatment plan, medications, and discharge instructions  Description: Complete learning assessment and assess knowledge base.  Interventions:  - Provide teaching at level of understanding  - Provide teaching via preferred learning methods  Outcome: Progressing     Problem: RESPIRATORY - ADULT  Goal: Achieves optimal ventilation and oxygenation  Description: INTERVENTIONS:  - Assess for changes in respiratory status  - Assess for changes in mentation and behavior  - Position to facilitate oxygenation and minimize respiratory effort  - Oxygen administered by appropriate delivery if ordered  - Initiate smoking cessation education as indicated  - Encourage broncho-pulmonary hygiene including cough, deep breathe, Incentive Spirometry  - Assess the need for suctioning and aspirate as needed  - Assess and instruct to report SOB or any respiratory difficulty  - Respiratory Therapy support as indicated  Outcome: Progressing     Problem: Nutrition/Hydration-ADULT  Goal: Nutrient/Hydration intake appropriate for improving, restoring or maintaining nutritional needs  Description: Monitor and assess patient's nutrition/hydration status for malnutrition. Collaborate with interdisciplinary team and initiate plan and interventions as ordered.  Monitor patient's weight and dietary intake as ordered or per policy. Utilize nutrition screening tool and  intervene as necessary. Determine patient's food preferences and provide high-protein, high-caloric foods as appropriate.     INTERVENTIONS:  - Monitor oral intake, urinary output, labs, and treatment plans  - Assess nutrition and hydration status and recommend course of action  - Evaluate amount of meals eaten  - Assist patient with eating if necessary   - Allow adequate time for meals  - Recommend/ encourage appropriate diets, oral nutritional supplements, and vitamin/mineral supplements  - Order, calculate, and assess calorie counts as needed  - Recommend, monitor, and adjust tube feedings and TPN/PPN based on assessed needs  - Assess need for intravenous fluids  - Provide specific nutrition/hydration education as appropriate  - Include patient/family/caregiver in decisions related to nutrition  Outcome: Progressing     Problem: Prexisting or High Potential for Compromised Skin Integrity  Goal: Skin integrity is maintained or improved  Description: INTERVENTIONS:  - Identify patients at risk for skin breakdown  - Assess and monitor skin integrity  - Assess and monitor nutrition and hydration status  - Monitor labs   - Assess for incontinence   - Turn and reposition patient  - Assist with mobility/ambulation  - Relieve pressure over bony prominences  - Avoid friction and shearing  - Provide appropriate hygiene as needed including keeping skin clean and dry  - Evaluate need for skin moisturizer/barrier cream  - Collaborate with interdisciplinary team   - Patient/family teaching  - Consider wound care consult   Outcome: Progressing     Problem: MUSCULOSKELETAL - ADULT  Goal: Maintain or return mobility to safest level of function  Description: INTERVENTIONS:  - Assess patient's ability to carry out ADLs; assess patient's baseline for ADL function and identify physical deficits which impact ability to perform ADLs (bathing, care of mouth/teeth, toileting, grooming, dressing, etc.)  - Assess/evaluate cause of  self-care deficits   - Assess range of motion  - Assess patient's mobility  - Assess patient's need for assistive devices and provide as appropriate  - Encourage maximum independence but intervene and supervise when necessary  - Involve family in performance of ADLs  - Assess for home care needs following discharge   - Consider OT consult to assist with ADL evaluation and planning for discharge  - Provide patient education as appropriate  Outcome: Progressing  Goal: Maintain proper alignment of affected body part  Description: INTERVENTIONS:  - Support, maintain and protect limb and body alignment  - Provide patient/ family with appropriate education  Outcome: Progressing

## 2024-10-19 NOTE — ASSESSMENT & PLAN NOTE
Had episodes of increasing lethargy, difficult to arouse  Likely due to hyponatremia, possible UTI and hypercapnia  Previous CT imaging without any acute process  MRI brain without any evidence of stroke or acute intracranial process  VBG noted for elevated CO2, concerning for CO2 narcosis  Suspect benzo may be playing a role with history of COPD, and likely CO2 retention  Plan:  Recommend to maintain O2 above 88%, avoid O2 sats greater than 95% due to decreased respiratory drive  Some concerns for possible UTI, empirically started on ceftriaxone D4 awaiting culture sensitivities

## 2024-10-19 NOTE — ASSESSMENT & PLAN NOTE
79 y.o.  female with temporal arteritis (on low-dose prednisone), COPD, GERD, HTN, anxiety, depression, hypothyroidism, macular degeneration, and chronic respiratory failure with hypercapnia who initially presented to  for deterioration with poor PO intake and weakness.  She was initiated on BiPAP for hypoxia/hypercapnia and transferred to [facility] for critical care service.    Hospital course complicated by: Oversedation secondary to benzos, anxiety/depression for which psych was consulted for (see below), possible Cymbalta induced hyponatremia (nephrology following), iatrogenic hyperthyroidism (Synthroid reduced), bicep tendinitis (PT/OT recommending rehab), conjunctivitis of left eye (placed on ophthalmic antibiotic drops), urine culture positive for Aerococcus urinae (currently on ceftriaxone).    Rapid response initiated around 6 AM on 10/18 for unresponsiveness and fluctuating tonic (? clonic, ICU staff reports some possible shaking) activity. Stroke alert was then called after RRT noticed anisocoria (R >L) during neuroexam.  There were times where patient improved described as relaxation in RUE, some verbal output and more responsive, however was not answering questions appropriately or following commands.  LKW 5 - 5:30 AM. Initial CT imaging negative for acute intracranial changes, LVO or significant stenosis.  Out of concern for seizure rather than stroke, she was loaded on IV Keppra 2 g.    Pertinent neurological workup:  -CTA head/neck (10/9): 2 mm left A2/A3 junction aneurysm, negative for acute intracranial abnormalities or significant stenosis.  -CTH x 2 (10/10 and 10/13) unremarkable for acute changes  -MRI brain wo (10/16) chronic microangiopathic, negative for acute intracranial abnormality  -CTH and CTA head/neck (10/18, stroke alert)  - Negative for significant stenosis or acute intracranial changes  - Redemonstrated intracranial aneurysms - R P-Comm (2.5 mm) and left LISA A2/A3  junction (2 mm).   - Labs 10/18:   - Hyponatremia 118-> 123   - Leukocytosis 14   - Magnesium 1.8   - WNL: phosphorus, procal, troponin, INR/PTT, glucose  -EEG 10/18: Disorganized, diffuse delta and theta activity and triphasic waves. Indicates moderate-severe cerebral dysfunction. Triphasic waves typically seen in the setting of toxic-metabolic encephalopathy.    Significant clinical improvement today with improvement in hyponatremia. Suspect clinical event represented either epileptic seizure provoked by hyponatremia or was TME related to the same. No clinical concern for ongoing seizure activity or structural brain disease.    Plan:  - s/p Keppra 2 g, no need to initiate AED maintenance at this time however we will can reconsider if patient were to have additional seizures  - Would try to avoid ativan given current respiratory status, however if needed can give 2 mg for seizure like activity >2 minutes  - No need for repeat MRI brain w/ contrast given her clinical improvement and lack of concern for focal, structural pathology especially given recent MRI brain being negative  - Telemetry  - Frequent neuro checks  - Seizure precautions  - Medical management and supportive care per primary team. Correction of any metabolic or infectious disturbances  - No further inpatient recs at this time; call with questions

## 2024-10-19 NOTE — ASSESSMENT & PLAN NOTE
Etiology: Acute hyponatremia in the setting of lung issues with acute exacerbation of severe COPD, also noted patient was recently started on Cymbalta on 10/13 which can cause SIADH.    Rule out other causes including other causes of SIADH  Most lowest sodium 118 on 10/18/2024  Status post 100 mL 3% saline and started on salt tablets 10/18/2024  Current sodium 125 this a.m.(increase of 7 points 24 hours)  Me on every 4 hours BMP  Workup:  Serum osmolality true hypotonic hyponatremia 262; TSH 3.182  Urine sodium 105 compatible with SIADH/urine osmolality 665   CT of the head: No acute finding/MRI negative   CT the chest: Possible airway debris consistent with aspiration trace pleural effusions stable right breast lesion  Recommend obtaining: Uric acid/a.m. cortisol with the patient on prednisone may not be reliable-will check in a.m. as per Dr. Hanks recommendation  Plan  Will give 7.5 mg of tolvaptan-discussed with Dr. Reyes  Will lift FR today with rubens  Continue with sodium chloride  2 g 3 times a day for now   Continue with torsemide 5 mg daily both to help blood pressure as well as hyponatremia   Nutritional supplement once able to eat  Goal to increase sodium no faster than 131-132  in 24 hours by tomorrow morning

## 2024-10-19 NOTE — ASSESSMENT & PLAN NOTE
Acute hyponatremia in the setting of lung issues with acute exacerbation of severe COPD, also noted patient was recently started on Cymbalta on 10/13 which can cause SIADH.    Serum osmolality true hypotonic hyponatremia 262/TSH 3.182  Urine sodium 105 compatible with SIADH/urine osmolality 665  CT of the head: No acute finding/MRI negative  CT the chest: Possible airway debris consistent with aspiration trace pleural effusions stable right breast lesion    10/18 bolus of 100 mL of 3% saline Central line not required-Per nephrology    Plan:  Fluid restriction 1000 mL  Received NSS at 100 cc/hr overnight for 1 liter  10/18 torsemide 5 mg was added to help with blood pressure and hyponatremia  Open to tolerate p.o. then will start salt tabs 2 g 3 times daily  Follow-up on repeat BMP 2 hours post 3% saline  Goal to increase sodium no faster than 130-131 in 24 hours by tomorrow morning

## 2024-10-19 NOTE — ASSESSMENT & PLAN NOTE
Patient also complaining of neck and shoulder pain-chronic pain ongoing for the past 1 year which has been worsening recently  Denies have any chest pain, shortness of breath  10/10/24 Xray L shoulder: normal  10/10 CT head: No acute intracranial abnormality.  No concerns for stroke at this time  Previous history of temporal arthritis - ESR/CRP normal  Also noted to have a previous history of sialoadenitis and associated neck edema and was seen by ENT in the past.  Patient is able to have purposeful movements in the bilateral upper extremities 5/5  strength bilaterally but has poor effort lifting the left arm without drift to bed.   Plan:  Evidence of bicep tendinitis  Add Voltaren gel and Bengay  PT OT recommending rehab  Awaiting further evaluation above prior to discharge to rehab

## 2024-10-19 NOTE — ASSESSMENT & PLAN NOTE
Current blood pressure continues to be labile at time  Currently on amlodipine 5 mg daily, propranolol 40 mg every 12 hours and torsemide 5 mg daily  Losartan currently on hold given higher potassium  Need to monitor and treat as needed

## 2024-10-19 NOTE — ASSESSMENT & PLAN NOTE
Currently on BiPAP  Metabolic alkalosis appears to be slowly improving  Recommend not to treat with Diamox but treat the underlying pCO2 retention   Management per critical care medicine

## 2024-10-19 NOTE — ASSESSMENT & PLAN NOTE
History of severe COPD  Initial concern for exacerbation but likely oversedation from benzodiazepine  Transfer from  to ICU on 10/10, downgraded to MS on 10/10  Continue budesonide, Xopenex  Repeat VBG with noted CO2 retention    Plan:  Avoid benzos, discontinue lyrica  On BiPAP qhs and as needed with sleep

## 2024-10-20 ENCOUNTER — APPOINTMENT (INPATIENT)
Dept: RADIOLOGY | Facility: HOSPITAL | Age: 79
DRG: 189 | End: 2024-10-20
Payer: COMMERCIAL

## 2024-10-20 LAB
ANION GAP SERPL CALCULATED.3IONS-SCNC: 1 MMOL/L (ref 4–13)
ANION GAP SERPL CALCULATED.3IONS-SCNC: 3 MMOL/L (ref 4–13)
BUN SERPL-MCNC: 11 MG/DL (ref 5–25)
BUN SERPL-MCNC: 14 MG/DL (ref 5–25)
CALCIUM SERPL-MCNC: 6.9 MG/DL (ref 8.4–10.2)
CALCIUM SERPL-MCNC: 8.7 MG/DL (ref 8.4–10.2)
CHLORIDE SERPL-SCNC: 89 MMOL/L (ref 96–108)
CHLORIDE SERPL-SCNC: 99 MMOL/L (ref 96–108)
CO2 SERPL-SCNC: 38 MMOL/L (ref 21–32)
CO2 SERPL-SCNC: 44 MMOL/L (ref 21–32)
CORTIS AM PEAK SERPL-MCNC: 14 UG/DL (ref 6.7–22.6)
CREAT SERPL-MCNC: 0.38 MG/DL (ref 0.6–1.3)
CREAT SERPL-MCNC: 0.5 MG/DL (ref 0.6–1.3)
ERYTHROCYTE [DISTWIDTH] IN BLOOD BY AUTOMATED COUNT: 13.5 % (ref 11.6–15.1)
GFR SERPL CREATININE-BSD FRML MDRD: 101 ML/MIN/1.73SQ M
GFR SERPL CREATININE-BSD FRML MDRD: 92 ML/MIN/1.73SQ M
GLUCOSE SERPL-MCNC: 108 MG/DL (ref 65–140)
GLUCOSE SERPL-MCNC: 110 MG/DL (ref 65–140)
HCT VFR BLD AUTO: 33.5 % (ref 34.8–46.1)
HGB BLD-MCNC: 10.4 G/DL (ref 11.5–15.4)
MCH RBC QN AUTO: 28.8 PG (ref 26.8–34.3)
MCHC RBC AUTO-ENTMCNC: 31 G/DL (ref 31.4–37.4)
MCV RBC AUTO: 93 FL (ref 82–98)
PLATELET # BLD AUTO: 262 THOUSANDS/UL (ref 149–390)
PMV BLD AUTO: 10.2 FL (ref 8.9–12.7)
POTASSIUM SERPL-SCNC: 3.9 MMOL/L (ref 3.5–5.3)
POTASSIUM SERPL-SCNC: 4.6 MMOL/L (ref 3.5–5.3)
RBC # BLD AUTO: 3.61 MILLION/UL (ref 3.81–5.12)
SODIUM SERPL-SCNC: 136 MMOL/L (ref 135–147)
SODIUM SERPL-SCNC: 138 MMOL/L (ref 135–147)
URATE SERPL-MCNC: 2.9 MG/DL (ref 2–7.5)
WBC # BLD AUTO: 6.98 THOUSAND/UL (ref 4.31–10.16)

## 2024-10-20 PROCEDURE — 82533 TOTAL CORTISOL: CPT | Performed by: NURSE PRACTITIONER

## 2024-10-20 PROCEDURE — 80048 BASIC METABOLIC PNL TOTAL CA: CPT | Performed by: NURSE PRACTITIONER

## 2024-10-20 PROCEDURE — 99232 SBSQ HOSP IP/OBS MODERATE 35: CPT | Performed by: STUDENT IN AN ORGANIZED HEALTH CARE EDUCATION/TRAINING PROGRAM

## 2024-10-20 PROCEDURE — 84550 ASSAY OF BLOOD/URIC ACID: CPT | Performed by: NURSE PRACTITIONER

## 2024-10-20 PROCEDURE — 85027 COMPLETE CBC AUTOMATED: CPT | Performed by: STUDENT IN AN ORGANIZED HEALTH CARE EDUCATION/TRAINING PROGRAM

## 2024-10-20 PROCEDURE — 94640 AIRWAY INHALATION TREATMENT: CPT

## 2024-10-20 PROCEDURE — 94760 N-INVAS EAR/PLS OXIMETRY 1: CPT

## 2024-10-20 PROCEDURE — 71045 X-RAY EXAM CHEST 1 VIEW: CPT

## 2024-10-20 RX ORDER — LIDOCAINE HYDROCHLORIDE 20 MG/ML
15 SOLUTION OROPHARYNGEAL 4 TIMES DAILY PRN
Status: DISCONTINUED | OUTPATIENT
Start: 2024-10-20 | End: 2024-10-23 | Stop reason: HOSPADM

## 2024-10-20 RX ORDER — SODIUM CHLORIDE 1 G/1
1 TABLET ORAL 2 TIMES DAILY WITH MEALS
Status: DISCONTINUED | OUTPATIENT
Start: 2024-10-20 | End: 2024-10-23 | Stop reason: HOSPADM

## 2024-10-20 RX ADMIN — TORSEMIDE 5 MG: 10 TABLET ORAL at 09:52

## 2024-10-20 RX ADMIN — AMLODIPINE BESYLATE 5 MG: 5 TABLET ORAL at 09:52

## 2024-10-20 RX ADMIN — GLYCERIN 1 DROP: .002; .002; .01 SOLUTION/ DROPS OPHTHALMIC at 17:40

## 2024-10-20 RX ADMIN — LEVOTHYROXINE SODIUM 25 MCG: 25 TABLET ORAL at 05:11

## 2024-10-20 RX ADMIN — LIDOCAINE HYDROCHLORIDE 15 ML: 20 SOLUTION ORAL at 01:43

## 2024-10-20 RX ADMIN — SODIUM CHLORIDE 1 G: 1 TABLET ORAL at 17:37

## 2024-10-20 RX ADMIN — SENNOSIDES AND DOCUSATE SODIUM 1 TABLET: 8.6; 5 TABLET ORAL at 21:12

## 2024-10-20 RX ADMIN — CYANOCOBALAMIN TAB 500 MCG 1000 MCG: 500 TAB at 09:52

## 2024-10-20 RX ADMIN — OFLOXACIN 1 DROP: 3 SOLUTION/ DROPS OPHTHALMIC at 17:37

## 2024-10-20 RX ADMIN — GLYCERIN 1 DROP: .002; .002; .01 SOLUTION/ DROPS OPHTHALMIC at 09:51

## 2024-10-20 RX ADMIN — DICLOFENAC SODIUM 2 G: 10 GEL TOPICAL at 17:41

## 2024-10-20 RX ADMIN — DICLOFENAC SODIUM 2 G: 10 GEL TOPICAL at 21:19

## 2024-10-20 RX ADMIN — PROPRANOLOL HYDROCHLORIDE 40 MG: 20 TABLET ORAL at 09:52

## 2024-10-20 RX ADMIN — SODIUM CHLORIDE 1 G: 1 TABLET ORAL at 09:52

## 2024-10-20 RX ADMIN — MENTHOL 10%, METHYL SALICYLATE 15%: 10; 15 CREAM TOPICAL at 21:21

## 2024-10-20 RX ADMIN — OFLOXACIN 1 DROP: 3 SOLUTION/ DROPS OPHTHALMIC at 12:35

## 2024-10-20 RX ADMIN — BUDESONIDE INHALATION 0.5 MG: 0.5 SUSPENSION RESPIRATORY (INHALATION) at 07:31

## 2024-10-20 RX ADMIN — DICLOFENAC SODIUM 2 G: 10 GEL TOPICAL at 12:35

## 2024-10-20 RX ADMIN — DICLOFENAC SODIUM 2 G: 10 GEL TOPICAL at 09:52

## 2024-10-20 RX ADMIN — MELATONIN 3 MG: 3 TAB ORAL at 21:12

## 2024-10-20 RX ADMIN — ENOXAPARIN SODIUM 30 MG: 30 INJECTION SUBCUTANEOUS at 09:52

## 2024-10-20 RX ADMIN — POLYETHYLENE GLYCOL 3350 17 G: 17 POWDER, FOR SOLUTION ORAL at 09:52

## 2024-10-20 RX ADMIN — OFLOXACIN 1 DROP: 3 SOLUTION/ DROPS OPHTHALMIC at 21:18

## 2024-10-20 RX ADMIN — MENTHOL 10%, METHYL SALICYLATE 15%: 10; 15 CREAM TOPICAL at 17:41

## 2024-10-20 RX ADMIN — Medication 1 TABLET: at 09:52

## 2024-10-20 RX ADMIN — BUDESONIDE INHALATION 0.5 MG: 0.5 SUSPENSION RESPIRATORY (INHALATION) at 19:27

## 2024-10-20 RX ADMIN — OFLOXACIN 1 DROP: 3 SOLUTION/ DROPS OPHTHALMIC at 09:51

## 2024-10-20 RX ADMIN — GLYCERIN 1 DROP: .002; .002; .01 SOLUTION/ DROPS OPHTHALMIC at 21:17

## 2024-10-20 RX ADMIN — PREDNISONE 2.5 MG: 2.5 TABLET ORAL at 09:53

## 2024-10-20 RX ADMIN — MENTHOL 10%, METHYL SALICYLATE 15%: 10; 15 CREAM TOPICAL at 09:51

## 2024-10-20 RX ADMIN — PANTOPRAZOLE SODIUM 40 MG: 40 TABLET, DELAYED RELEASE ORAL at 05:11

## 2024-10-20 NOTE — PLAN OF CARE
Problem: PAIN - ADULT  Goal: Verbalizes/displays adequate comfort level or baseline comfort level  Description: Interventions:  - Encourage patient to monitor pain and request assistance  - Assess pain using appropriate pain scale  - Administer analgesics based on type and severity of pain and evaluate response  - Implement non-pharmacological measures as appropriate and evaluate response  - Consider cultural and social influences on pain and pain management  - Notify physician/advanced practitioner if interventions unsuccessful or patient reports new pain  Outcome: Progressing     Problem: INFECTION - ADULT  Goal: Absence or prevention of progression during hospitalization  Description: INTERVENTIONS:  - Assess and monitor for signs and symptoms of infection  - Monitor lab/diagnostic results  - Monitor all insertion sites, i.e. indwelling lines, tubes, and drains  - Monitor endotracheal if appropriate and nasal secretions for changes in amount and color  - [place] appropriate cooling/warming therapies per order  - Administer medications as ordered  - Instruct and encourage patient and family to use good hand hygiene technique  - Identify and instruct in appropriate isolation precautions for identified infection/condition  Outcome: Progressing  Goal: Absence of fever/infection during neutropenic period  Description: INTERVENTIONS:  - Monitor WBC    Outcome: Progressing     Problem: SAFETY ADULT  Goal: Patient will remain free of falls  Description: INTERVENTIONS:  - Educate patient/family on patient safety including physical limitations  - Instruct patient to call for assistance with activity   - Consult OT/PT to assist with strengthening/mobility   - Keep Call bell within reach  - Keep bed low and locked with side rails adjusted as appropriate  - Keep care items and personal belongings within reach  - Initiate and maintain comfort rounds  - Make Fall Risk Sign visible to staff  - Offer Toileting every  Hours,  in advance of need  - Initiate/Maintain alarm  - Obtain necessary fall risk management equipment:   - Apply yellow socks and bracelet for high fall risk patients  - Consider moving patient to room near nurses station  Outcome: Progressing  Goal: Maintain or return to baseline ADL function  Description: INTERVENTIONS:  -  Assess patient's ability to carry out ADLs; assess patient's baseline for ADL function and identify physical deficits which impact ability to perform ADLs (bathing, care of mouth/teeth, toileting, grooming, dressing, etc.)  - Assess/evaluate cause of self-care deficits   - Assess range of motion  - Assess patient's mobility; develop plan if impaired  - Assess patient's need for assistive devices and provide as appropriate  - Encourage maximum independence but intervene and supervise when necessary  - Involve family in performance of ADLs  - Assess for home care needs following discharge   - Consider OT consult to assist with ADL evaluation and planning for discharge  - Provide patient education as appropriate  Outcome: Progressing  Goal: Maintains/Returns to pre admission functional level  Description: INTERVENTIONS:  - Perform AM-PAC 6 Click Basic Mobility/ Daily Activity assessment daily.  - Set and communicate daily mobility goal to care team and patient/family/caregiver.   - Collaborate with rehabilitation services on mobility goals if consulted  - Perform Range of Motion  times a day.  - Reposition patient every  hours.  - Dangle patient  times a day  - Stand patient  times a day  - Ambulate patient  times a day  - Out of bed to chair  times a day   - Out of bed for meals  times a day  - Out of bed for toileting  - Record patient progress and toleration of activity level   Outcome: Progressing     Problem: DISCHARGE PLANNING  Goal: Discharge to home or other facility with appropriate resources  Description: INTERVENTIONS:  - Identify barriers to discharge w/patient and caregiver  - Arrange for  needed discharge resources and transportation as appropriate  - Identify discharge learning needs (meds, wound care, etc.)  - Arrange for interpretive services to assist at discharge as needed  - Refer to Case Management Department for coordinating discharge planning if the patient needs post-hospital services based on physician/advanced practitioner order or complex needs related to functional status, cognitive ability, or social support system  Outcome: Progressing     Problem: Knowledge Deficit  Goal: Patient/family/caregiver demonstrates understanding of disease process, treatment plan, medications, and discharge instructions  Description: Complete learning assessment and assess knowledge base.  Interventions:  - Provide teaching at level of understanding  - Provide teaching via preferred learning methods  Outcome: Progressing     Problem: RESPIRATORY - ADULT  Goal: Achieves optimal ventilation and oxygenation  Description: INTERVENTIONS:  - Assess for changes in respiratory status  - Assess for changes in mentation and behavior  - Position to facilitate oxygenation and minimize respiratory effort  - Oxygen administered by appropriate delivery if ordered  - Initiate smoking cessation education as indicated  - Encourage broncho-pulmonary hygiene including cough, deep breathe, Incentive Spirometry  - Assess the need for suctioning and aspirate as needed  - Assess and instruct to report SOB or any respiratory difficulty  - Respiratory Therapy support as indicated  Outcome: Progressing     Problem: Nutrition/Hydration-ADULT  Goal: Nutrient/Hydration intake appropriate for improving, restoring or maintaining nutritional needs  Description: Monitor and assess patient's nutrition/hydration status for malnutrition. Collaborate with interdisciplinary team and initiate plan and interventions as ordered.  Monitor patient's weight and dietary intake as ordered or per policy. Utilize nutrition screening tool and intervene as  necessary. Determine patient's food preferences and provide high-protein, high-caloric foods as appropriate.     INTERVENTIONS:  - Monitor oral intake, urinary output, labs, and treatment plans  - Assess nutrition and hydration status and recommend course of action  - Evaluate amount of meals eaten  - Assist patient with eating if necessary   - Allow adequate time for meals  - Recommend/ encourage appropriate diets, oral nutritional supplements, and vitamin/mineral supplements  - Order, calculate, and assess calorie counts as needed  - Recommend, monitor, and adjust tube feedings and TPN/PPN based on assessed needs  - Assess need for intravenous fluids  - Provide specific nutrition/hydration education as appropriate  - Include patient/family/caregiver in decisions related to nutrition  Outcome: Progressing     Problem: Prexisting or High Potential for Compromised Skin Integrity  Goal: Skin integrity is maintained or improved  Description: INTERVENTIONS:  - Identify patients at risk for skin breakdown  - Assess and monitor skin integrity  - Assess and monitor nutrition and hydration status  - Monitor labs   - Assess for incontinence   - Turn and reposition patient  - Assist with mobility/ambulation  - Relieve pressure over bony prominences  - Avoid friction and shearing  - Provide appropriate hygiene as needed including keeping skin clean and dry  - Evaluate need for skin moisturizer/barrier cream  - Collaborate with interdisciplinary team   - Patient/family teaching  - Consider wound care consult   Outcome: Progressing     Problem: SKIN/TISSUE INTEGRITY - ADULT  Goal: Skin Integrity remains intact(Skin Breakdown Prevention)  Description: Assess:  -Perform Lonnie assessment every   -Clean and moisturize skin every   -Inspect skin when repositioning, toileting, and assisting with ADLS  -Assess under medical devices such as  every   -Assess extremities for adequate circulation and sensation     Bed Management:  -Have  minimal linens on bed & keep smooth, unwrinkled  -Change linens as needed when moist or perspiring  -Avoid sitting or lying in one position for more than  hours while in bed  -Keep HOB at degrees     Toileting:  -Offer bedside commode  -Assess for incontinence every   -Use incontinent care products after each incontinent episode such as     Activity:  -Mobilize patient  times a day  -Encourage activity and walks on unit  -Encourage or provide ROM exercises   -Turn and reposition patient every  Hours  -Use appropriate equipment to lift or move patient in bed  -Instruct/ Assist with weight shifting every  when out of bed in chair  -Consider limitation of chair time  hour intervals    Skin Care:  -Avoid use of baby powder, tape, friction and shearing, hot water or constrictive clothing  -Relieve pressure over bony prominences using   -Do not massage red bony areas    Next Steps:  -Teach patient strategies to minimize risks such as    -Consider consults to  interdisciplinary teams such as   Outcome: Progressing     Problem: MUSCULOSKELETAL - ADULT  Goal: Maintain or return mobility to safest level of function  Description: INTERVENTIONS:  - Assess patient's ability to carry out ADLs; assess patient's baseline for ADL function and identify physical deficits which impact ability to perform ADLs (bathing, care of mouth/teeth, toileting, grooming, dressing, etc.)  - Assess/evaluate cause of self-care deficits   - Assess range of motion  - Assess patient's mobility  - Assess patient's need for assistive devices and provide as appropriate  - Encourage maximum independence but intervene and supervise when necessary  - Involve family in performance of ADLs  - Assess for home care needs following discharge   - Consider OT consult to assist with ADL evaluation and planning for discharge  - Provide patient education as appropriate  Outcome: Progressing  Goal: Maintain proper alignment of affected body part  Description:  INTERVENTIONS:  - Support, maintain and protect limb and body alignment  - Provide patient/ family with appropriate education  Outcome: Progressing

## 2024-10-20 NOTE — ASSESSMENT & PLAN NOTE
Improved and currently 3.9  Status post  Lokelma for hyperkalemia in the setting of losartan  Continue to monitor and treat as needed

## 2024-10-20 NOTE — PROGRESS NOTES
Progress Note - Hospitalist   Name: Sarah Zayas 79 y.o. female I MRN: [id number]  Unit/Bed#: [place] [address] -01 I Date of Admission: 10/10/2024   Date of Service: 10/20/2024 I Hospital Day: 10     Assessment & Plan  Acute respiratory failure with hypercapnia (HCC)  Back to baseline oxygen, recommend 2L NC  Acute exacerbation of chronic obstructive pulmonary disease (COPD) (HCC)  History of severe COPD  Transfer from  to ICU on 10/10, downgraded to MS on 10/10  Initially concern for exacerbation but likely oversedation from benzodiazepine  Continue budesonide, Xopenex prn  Repeat VBG with noted CO2 retention  Avoid benzos, discontinue lyrica  Pulmonary evaluated, recommending BiPAP at bedtime as tolerated when discharged to nursing home.  Gastroesophageal reflux disease without esophagitis  Continue PPI therapy  Hypertension  Continue propranolol, increase to 40 mg twice daily  Discontinue losartan in the setting of hyperkalemia  Blood pressure currently controlled  Temporal arteritis (HCC)  Continue prednisone 2.5 mg  Anxiety/depression  Evaluated by psychiatry  Discontinue Lyrica, BuSpar, gabapentin, Remeron and benzos  Anxiety largely driving patient's symptoms  Previously recommended Cymbalta, discontinued due to hyponatremia  Psychiatry consulted for recommendations    Hypothyroidism  Levothyroxine reduced to 25 mcg  TSH suppressed and free T4 elevated-suspect some iatrogenic hyperthyroidism  Repeat TSH and T4 in 8 to 12 weeks outpatient  Macular degeneration  Follows with [facility]  Continue artifical tears  Did have some discharge in her left eye concerning for conjunctivitis  Improvement with ofloxacin, to complete 7-day course  Metabolic encephalopathy  Had episodes of increasing lethargy, difficult to arouse  Likely due to hyponatremia, possible UTI and hypercapnia  Previous CT imaging without any acute process  MRI brain without any evidence of stroke or acute intracranial  process  Urine cultures growing Aerococcus, completed 5-day course of antibiotics  VBG with elevated CO2, recommending BiPAP at bedtime  Appreciate neurology evaluation, suspect seizure-like activity due to hyponatremia not recommending AED  Mental status has improved though patient continues to deal with ongoing anxiety  Psychiatry consulted  Urinary retention  Did have episode of urinary retention, now resolved with incontinence  Urine cultures growing Aerococcus, completed 5-day course of antibiotics  Hyponatremia  Suspect Cymbalta induced hyponatremia, recently started on 10/13  Transferred to ICU on 10/18-10/20 given concerns of seizure-like activity  Required tolvaptan with sodium now improving to 136  Continue torsemide 5 mg daily, discontinue losartan and fluid restriction 1500 mL  Nephrology recommending salt tabs 1 g 2 times daily  Chronic respiratory failure with hypercapnia (HCC)  Chronically elevated CO2 with VBG noting hypercapnia  Pulmonary recommending BiPAP at bedtime on discharge at [facility]  Will need to follow-up outpatient with pulmonology for formal BiPAP evaluation  Hyperkalemia  Resolved, discontinue losartan  Seizure-like activity (HCC)  Neurology suspects seizure-like activity as a result of hyponatremia now resolved  Did not recommend any further imaging, MRI canceled  Was given IV Keppra 2 g, no further AED recommended    VTE Pharmacologic Prophylaxis:   Pharmacologic: Enoxaparin (Lovenox)  Mechanical VTE Prophylaxis in Place: Yes    Current Length of Stay: 10 day(s)    Current Patient Status: Inpatient   Certification Statement: The patient will continue to require additional inpatient hospital stay due to psych evaluation, sodium monitoring    Discharge Plan: Will need short-term rehab, 24 to 48 hours    Code Status: Level 1 - Full Code      Subjective:   Patient reports doing well overall.  She had difficulty and issues overnight with staff.  Family is at bedside, states that she appears to  be at her normal mentation.  She was able to tolerate diet.  Denies any chest pain or shortness of breath.    Objective:     Vitals:   Temp (24hrs), Av.3 °F (36.3 °C), Min:97.2 °F (36.2 °C), Max:97.4 °F (36.3 °C)    Temp:  [97.2 °F (36.2 °C)-97.4 °F (36.3 °C)] 97.2 °F (36.2 °C)  HR:  [76-88] 80  Resp:  [30-35] 30  BP: (132-155)/(56-65) 132/56  SpO2:  [92 %-100 %] 92 %  Body mass index is 19.52 kg/m².     Input and Output Summary (last 24 hours):       Intake/Output Summary (Last 24 hours) at 10/20/2024 1458  Last data filed at 10/20/2024 0600  Gross per 24 hour   Intake --   Output 1300 ml   Net -1300 ml       Physical Exam:     Physical Exam  Vitals and nursing note reviewed.   Constitutional:       General: She is not in acute distress.     Comments: Thin appearing   HENT:      Head: Normocephalic.   Eyes:      Conjunctiva/sclera: Conjunctivae normal.   Cardiovascular:      Rate and Rhythm: Normal rate.   Pulmonary:      Effort: Pulmonary effort is normal.      Breath sounds: No wheezing.   Abdominal:      General: Bowel sounds are normal. There is no distension.      Palpations: Abdomen is soft.   Musculoskeletal:         General: No swelling. Normal range of motion.   Skin:     General: Skin is warm and dry.   Neurological:      General: No focal deficit present.      Mental Status: Mental status is at baseline.   Psychiatric:         Mood and Affect: Mood is anxious.       Additional Data:     Labs:    Results from last 7 days   Lab Units 10/20/24  0519 10/19/24  0454   WBC Thousand/uL 6.98 8.60   HEMOGLOBIN g/dL 10.4* 10.3*   HEMATOCRIT % 33.5* 31.8*   PLATELETS Thousands/uL 262 205   SEGS PCT %  --  82*   LYMPHO PCT %  --  8*   MONO PCT %  --  9   EOS PCT %  --  1     Results from last 7 days   Lab Units 10/20/24  0809 10/17/24  0005 10/16/24  1601   SODIUM mmol/L 136   < > 124*   POTASSIUM mmol/L 4.6   < > 4.8   CHLORIDE mmol/L 89*   < > 78*   CO2 mmol/L 44*   < > 45*   BUN mg/dL 14   < > 29*    CREATININE mg/dL 0.50*   < > 0.43*   ANION GAP mmol/L 3*   < > 1*   CALCIUM mg/dL 8.7   < > 8.8   ALBUMIN g/dL  --   --  3.6   TOTAL BILIRUBIN mg/dL  --   --  0.26   ALK PHOS U/L  --   --  41   ALT U/L  --   --  25   AST U/L  --   --  30   GLUCOSE RANDOM mg/dL 110   < > 159*    < > = values in this interval not displayed.     Results from last 7 days   Lab Units 10/18/24  0746   INR  0.92     Results from last 7 days   Lab Units 10/18/24  0602 10/18/24  0230   POC GLUCOSE mg/dl 133 150*         Results from last 7 days   Lab Units 10/18/24  0724 10/17/24  0636   PROCALCITONIN ng/ml 0.13 0.15           * I Have Reviewed All Lab Data Listed Above.  * Additional Pertinent Lab Tests Reviewed: All Labs For Current Hospital Admission Reviewed    Mobility:  Basic Mobility Inpatient Raw Score: 7  [facility] Goal: 2: Bed activities/Dependent transfer  [facility] Achieved: 4: Move to chair/commode    Lines:     Invasive Devices       Peripheral Intravenous Line  Duration             Long-Dwell Peripheral IV (Midline) 10/18/24 Right Basilic 2 days    Peripheral IV 10/18/24 Dorsal (posterior);Left Hand 2 days    Peripheral IV 10/18/24 Right Antecubital 2 days              Drain  Duration             External Urinary Catheter 1 day                       Imaging:    Imaging Reports Reviewed Today Include:     CT head wo contrast    Result Date: 10/13/2024  Impression: No acute intracranial abnormality.  Stable chronic microangiopathic changes within the brain. Workstation performed: [id number]     XR shoulder 2+ views LEFT    Result Date: 10/10/2024  Impression: No acute osseous abnormality Workstation performed: [id number]     CT chest without contrast    Result Date: 10/10/2024  Impression: 1.  Possible airway debris, which would be consistent with aspiration, see discussion 2.  Trace pleural effusions 3.  Stable right breast lesion, recommend follow-up diagnostic mammogram The study was marked in EPIC for immediate notification.  Workstation performed: [id number]     CT head without contrast    Result Date: 10/10/2024  Impression: No acute intracranial abnormality. Workstation performed: [id number]        Recent Cultures (last 7 days):     Results from last 7 days   Lab Units 10/16/24  1604   URINE CULTURE  >100,000 cfu/ml Aerococcus urinae*  <10,000 cfu/ml       Last 24 Hours Medication List:   Current Facility-Administered Medications   Medication Dose Route Frequency Provider Last Rate    acetaminophen  650 mg Oral Q6H PRN Alisa Pennington PA-C      amLODIPine  5 mg Oral Daily Alisa Pennington PA-C      Artificial Tears  1 drop Left Eye TID Alisa Pennington PA-C      budesonide  0.5 mg Nebulization Q12H Alisa Pennington PA-C      cyanocobalamin  1,000 mcg Oral Daily Alisa Pennington PA-C      Diclofenac Sodium  2 g Topical 4x Daily Alisa Pennington PA-C      enoxaparin  30 mg Subcutaneous Q24H RACHEL Alisa Pennington PA-C      ipratropium-albuterol  3 mL Nebulization Q6H PRN Alisa Pennington PA-C      levalbuterol  1.25 mg Nebulization Q6H PRN Bhupendra Medley MD      levothyroxine  25 mcg Oral Early Morning Alisa Pennington PA-C      lidocaine  1 patch Topical Daily Alisa Pennington PA-C      Lidocaine Viscous HCl  15 mL Swish & Spit 4x Daily PRN Alisa Pennington PA-C      magnesium hydroxide  15 mL Oral Daily PRN Alisa Pennington PA-C      melatonin  3 mg Oral HS Alisa Pennington PA-C      menthol-methyl salicylate   Apply externally TID Alisa Pennington PA-C      multivitamin-minerals  1 tablet Oral Daily Alisa Pennington PA-C      ofloxacin  1 drop Left Eye 4x Daily Bhupendra Medley MD      ondansetron  4 mg Intravenous Q6H PRN Alisa Pennington PA-C      pantoprazole  40 mg Oral Early Morning Alisa Pennington PA-C      phenol  1 spray Mouth/Throat Q2H PRN Alisa Pennington PA-C      polyethylene glycol  17 g Oral Daily Alisa Pennington PA-C      predniSONE  2.5 mg Oral Daily Alisa Pennington PA-C       propranolol  40 mg Oral Q12H RACHEL Alisa Pennington PA-C      senna-docusate sodium  1 tablet Oral HS Alisa Pennington PA-C      sodium chloride  1 spray Each Nare Q1H PRN Alisa Pennington PA-C      sodium chloride  1 g Oral BID With Meals Joselyn Reyes Bahamonde, MD      torsemide  5 mg Oral Daily Alisa Pennington PA-C          Today, Patient Was Seen By: Bhupendra Medley MD    ** Please Note: Dictation voice to text software may have been used in the creation of this document. **

## 2024-10-20 NOTE — ASSESSMENT & PLAN NOTE
Did have episode of urinary retention, now resolved with incontinence  Urine cultures growing Aerococcus, completed 5-day course of antibiotics

## 2024-10-20 NOTE — ASSESSMENT & PLAN NOTE
Current blood pressure -acceptable, less labile  Currently on amlodipine 5 mg daily, propranolol 40 mg every 12 hours and torsemide 5 mg daily  Losartan currently on hold given higher potassium  Need to monitor and treat as needed

## 2024-10-20 NOTE — ASSESSMENT & PLAN NOTE
Chronically elevated CO2 with VBG noting hypercapnia  Pulmonary recommending BiPAP at bedtime on discharge at [facility]  Will need to follow-up outpatient with pulmonology for formal BiPAP evaluation

## 2024-10-20 NOTE — ASSESSMENT & PLAN NOTE
Suspect Cymbalta induced hyponatremia, recently started on 10/13  Transferred to ICU on 10/18-10/20 given concerns of seizure-like activity  Required tolvaptan with sodium now improving to 136  Continue torsemide 5 mg daily, discontinue losartan and fluid restriction 1500 mL  Nephrology recommending salt tabs 1 g 2 times daily

## 2024-10-20 NOTE — ASSESSMENT & PLAN NOTE
Follows with [facility]  Continue artifical tears  Did have some discharge in her left eye concerning for conjunctivitis  Improvement with ofloxacin, to complete 7-day course

## 2024-10-20 NOTE — PLAN OF CARE
Problem: PAIN - ADULT  Goal: Verbalizes/displays adequate comfort level or baseline comfort level  Description: Interventions:  - Encourage patient to monitor pain and request assistance  - Assess pain using appropriate pain scale  - Administer analgesics based on type and severity of pain and evaluate response  - Implement non-pharmacological measures as appropriate and evaluate response  - Consider cultural and social influences on pain and pain management  - Notify physician/advanced practitioner if interventions unsuccessful or patient reports new pain  Outcome: Progressing     Problem: INFECTION - ADULT  Goal: Absence or prevention of progression during hospitalization  Description: INTERVENTIONS:  - Assess and monitor for signs and symptoms of infection  - Monitor lab/diagnostic results  - Monitor all insertion sites, i.e. indwelling lines, tubes, and drains  - Monitor endotracheal if appropriate and nasal secretions for changes in amount and color  - [place] appropriate cooling/warming therapies per order  - Administer medications as ordered  - Instruct and encourage patient and family to use good hand hygiene technique  - Identify and instruct in appropriate isolation precautions for identified infection/condition  Outcome: Progressing  Goal: Absence of fever/infection during neutropenic period  Description: INTERVENTIONS:  - Monitor WBC    Outcome: Progressing     Problem: SAFETY ADULT  Goal: Patient will remain free of falls  Description: INTERVENTIONS:  - Educate patient/family on patient safety including physical limitations  - Instruct patient to call for assistance with activity   - Consult OT/PT to assist with strengthening/mobility   - Keep Call bell within reach  - Keep bed low and locked with side rails adjusted as appropriate  - Keep care items and personal belongings within reach  - Initiate and maintain comfort rounds  - Make Fall Risk Sign visible to staff  - Offer Toileting every 2 Hours,  in advance of need  - Initiate/Maintain bed alarm  - Obtain necessary fall risk management equipment  - Apply yellow socks and bracelet for high fall risk patients  - Consider moving patient to room near nurses station  Outcome: Progressing  Goal: Maintain or return to baseline ADL function  Description: INTERVENTIONS:  -  Assess patient's ability to carry out ADLs; assess patient's baseline for ADL function and identify physical deficits which impact ability to perform ADLs (bathing, care of mouth/teeth, toileting, grooming, dressing, etc.)  - Assess/evaluate cause of self-care deficits   - Assess range of motion  - Assess patient's mobility; develop plan if impaired  - Assess patient's need for assistive devices and provide as appropriate  - Encourage maximum independence but intervene and supervise when necessary  - Involve family in performance of ADLs  - Assess for home care needs following discharge   - Consider OT consult to assist with ADL evaluation and planning for discharge  - Provide patient education as appropriate  Outcome: Progressing  Goal: Maintains/Returns to pre admission functional level  Description: INTERVENTIONS:  - Perform AM-PAC 6 Click Basic Mobility/ Daily Activity assessment daily.  - Set and communicate daily mobility goal to care team and patient/family/caregiver.   - Collaborate with rehabilitation services on mobility goals if consulted  - Perform Range of Motion 3 times a day.  - Reposition patient every 2 hours.  - Dangle patient 3 times a day  - Stand patient 3 times a day  - Ambulate patient 3 times a day  - Out of bed to chair 3 times a day   - Out of bed for meals 3 times a day  - Out of bed for toileting  - Record patient progress and toleration of activity level   Outcome: Progressing     Problem: DISCHARGE PLANNING  Goal: Discharge to home or other facility with appropriate resources  Description: INTERVENTIONS:  - Identify barriers to discharge w/patient and caregiver  -  Arrange for needed discharge resources and transportation as appropriate  - Identify discharge learning needs (meds, wound care, etc.)  - Arrange for interpretive services to assist at discharge as needed  - Refer to Case Management Department for coordinating discharge planning if the patient needs post-hospital services based on physician/advanced practitioner order or complex needs related to functional status, cognitive ability, or social support system  Outcome: Progressing     Problem: Knowledge Deficit  Goal: Patient/family/caregiver demonstrates understanding of disease process, treatment plan, medications, and discharge instructions  Description: Complete learning assessment and assess knowledge base.  Interventions:  - Provide teaching at level of understanding  - Provide teaching via preferred learning methods  Outcome: Progressing     Problem: RESPIRATORY - ADULT  Goal: Achieves optimal ventilation and oxygenation  Description: INTERVENTIONS:  - Assess for changes in respiratory status  - Assess for changes in mentation and behavior  - Position to facilitate oxygenation and minimize respiratory effort  - Oxygen administered by appropriate delivery if ordered  - Initiate smoking cessation education as indicated  - Encourage broncho-pulmonary hygiene including cough, deep breathe, Incentive Spirometry  - Assess the need for suctioning and aspirate as needed  - Assess and instruct to report SOB or any respiratory difficulty  - Respiratory Therapy support as indicated  Outcome: Progressing     Problem: Nutrition/Hydration-ADULT  Goal: Nutrient/Hydration intake appropriate for improving, restoring or maintaining nutritional needs  Description: Monitor and assess patient's nutrition/hydration status for malnutrition. Collaborate with interdisciplinary team and initiate plan and interventions as ordered.  Monitor patient's weight and dietary intake as ordered or per policy. Utilize nutrition screening tool and  intervene as necessary. Determine patient's food preferences and provide high-protein, high-caloric foods as appropriate.     INTERVENTIONS:  - Monitor oral intake, urinary output, labs, and treatment plans  - Assess nutrition and hydration status and recommend course of action  - Evaluate amount of meals eaten  - Assist patient with eating if necessary   - Allow adequate time for meals  - Recommend/ encourage appropriate diets, oral nutritional supplements, and vitamin/mineral supplements  - Order, calculate, and assess calorie counts as needed  - Recommend, monitor, and adjust tube feedings and TPN/PPN based on assessed needs  - Assess need for intravenous fluids  - Provide specific nutrition/hydration education as appropriate  - Include patient/family/caregiver in decisions related to nutrition  Outcome: Progressing     Problem: Prexisting or High Potential for Compromised Skin Integrity  Goal: Skin integrity is maintained or improved  Description: INTERVENTIONS:  - Identify patients at risk for skin breakdown  - Assess and monitor skin integrity  - Assess and monitor nutrition and hydration status  - Monitor labs   - Assess for incontinence   - Turn and reposition patient  - Assist with mobility/ambulation  - Relieve pressure over bony prominences  - Avoid friction and shearing  - Provide appropriate hygiene as needed including keeping skin clean and dry  - Evaluate need for skin moisturizer/barrier cream  - Collaborate with interdisciplinary team   - Patient/family teaching  - Consider wound care consult   Outcome: Progressing     Problem: SKIN/TISSUE INTEGRITY - ADULT  Goal: Skin Integrity remains intact(Skin Breakdown Prevention)  Description: Assess:  -Perform Lonnie assessment  -Clean and moisturize skin  -Inspect skin when repositioning, toileting, and assisting with ADLS  -Assess under medical devices  -Assess extremities for adequate circulation and sensation     Bed Management:  -Have minimal linens on  bed & keep smooth, unwrinkled  -Change linens as needed when moist or perspiring  -Avoid sitting or lying in one position for more than 2 hours while in bed  -Keep HOB at 30 degrees     Toileting:  -Offer bedside commode  -Assess for incontinence  -Use incontinent care products after each incontinent episode    Activity:  -Mobilize patient 3 times a day  -Encourage activity and walks on unit  -Encourage or provide ROM exercises   -Turn and reposition patient every 2 Hours  -Use appropriate equipment to lift or move patient in bed  -Instruct/ Assist with weight shifting  when out of bed in chair  -Consider limitation of chair time 2 hour intervals    Skin Care:  -Avoid use of baby powder, tape, friction and shearing, hot water or constrictive clothing  -Relieve pressure over bony prominences  -Do not massage red bony areas    Next Steps:  -Teach patient strategies to minimize risks   -Consider consults to  interdisciplinary teams  Outcome: Progressing     Problem: MUSCULOSKELETAL - ADULT  Goal: Maintain or return mobility to safest level of function  Description: INTERVENTIONS:  - Assess patient's ability to carry out ADLs; assess patient's baseline for ADL function and identify physical deficits which impact ability to perform ADLs (bathing, care of mouth/teeth, toileting, grooming, dressing, etc.)  - Assess/evaluate cause of self-care deficits   - Assess range of motion  - Assess patient's mobility  - Assess patient's need for assistive devices and provide as appropriate  - Encourage maximum independence but intervene and supervise when necessary  - Involve family in performance of ADLs  - Assess for home care needs following discharge   - Consider OT consult to assist with ADL evaluation and planning for discharge  - Provide patient education as appropriate  Outcome: Progressing  Goal: Maintain proper alignment of affected body part  Description: INTERVENTIONS:  - Support, maintain and protect limb and body  alignment  - Provide patient/ family with appropriate education  Outcome: Progressing

## 2024-10-20 NOTE — ASSESSMENT & PLAN NOTE
History of severe COPD  Transfer from  to ICU on 10/10, downgraded to MS on 10/10  Initially concern for exacerbation but likely oversedation from benzodiazepine  Continue budesonide, Xopenex prn  Repeat VBG with noted CO2 retention  Avoid benzos, discontinue lyrica  Pulmonary evaluated, recommending BiPAP at bedtime as tolerated when discharged to nursing home.

## 2024-10-20 NOTE — ASSESSMENT & PLAN NOTE
Current bicarb 38  Metabolic alkalosis appears to be slowly improving  Recommend not to treat with Diamox but treat the underlying pCO2 retention   Management per critical care medicine

## 2024-10-20 NOTE — ASSESSMENT & PLAN NOTE
Evaluated by psychiatry  Discontinue Lyrica, BuSpar, gabapentin, Remeron and benzos  Anxiety largely driving patient's symptoms  Previously recommended Cymbalta, discontinued due to hyponatremia  Psychiatry consulted for recommendations

## 2024-10-20 NOTE — ASSESSMENT & PLAN NOTE
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    Most lowest sodium 118 on 10/18/2024  Status post 100 mL 3% saline and started on salt tablets 10/18/2024  Status post tolvaptan 7.5 mg on 10/19/2024  Currently on sodium salt tablets 1 g 3 times daily and torsemide 5 mg daily  Current sodium 138  at 0100  BMP q 8 hours   A.m. BMP, cortisol-pending  Workup:  Serum osmolality true hypotonic hyponatremia 262; TSH 3.182  Urine sodium 105 compatible with SIADH/urine osmolality 665   CT of the head: No acute finding/MRI negative   CT the chest: Possible airway debris consistent with aspiration trace pleural effusions stable right breast lesion  Uric acid-2.9  A.m. cortisol level-pending  Plan  Continue with sodium salt tablets 1 g 3 times daily and torsemide 5 mg daily  Fluid restriction 1.5 L  Continue with nutritional supplement

## 2024-10-20 NOTE — ASSESSMENT & PLAN NOTE
Neurology suspects seizure-like activity as a result of hyponatremia now resolved  Did not recommend any further imaging, MRI canceled  Was given IV Keppra 2 g, no further AED recommended

## 2024-10-20 NOTE — PROGRESS NOTES
Progress Note - Nephrology   Name: Sarah Zayas 79 y.o. 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    Most lowest sodium 118 on 10/18/2024  Status post 100 mL 3% saline and started on salt tablets 10/18/2024  Status post tolvaptan 7.5 mg on 10/19/2024  Currently on sodium salt tablets 1 g 3 times daily and torsemide 5 mg daily  Current sodium 138  at 0100  BMP q 8 hours   A.m. BMP, cortisol-pending  Workup:  Serum osmolality true hypotonic hyponatremia 262; TSH 3.182  Urine sodium 105 compatible with SIADH/urine osmolality 665   CT of the head: No acute finding/MRI negative   CT the chest: Possible airway debris consistent with aspiration trace pleural effusions stable right breast lesion  Uric acid-2.9  A.m. cortisol level-pending  Plan  Continue with sodium salt tablets 1 g 3 times daily and torsemide 5 mg daily  Fluid restriction 1.5 L  Continue with nutritional supplement   Hypertension  Current blood pressure -acceptable, less labile  Currently on amlodipine 5 mg daily, propranolol 40 mg every 12 hours and torsemide 5 mg daily  Losartan currently on hold given higher potassium  Need to monitor and treat as needed    Hyperkalemia  Improved and currently 3.9  Status post  Lokelma for hyperkalemia in the setting of losartan  Continue to monitor and treat as needed  Metabolic alkalosis with respiratory acidosis  Current bicarb 38  Metabolic alkalosis appears to be slowly improving  Recommend not to treat with Diamox but treat the underlying pCO2 retention   Management per critical care medicine  Acute exacerbation of chronic obstructive pulmonary disease (COPD) (HCC)  History of severe COPD, continue management as per primary  team  BiPAP as needed  Acute respiratory failure with hypercapnia (HCC)  Per primary service  Chronic respiratory failure with hypercapnia (HCC)  Management per primary team  Anxiety/depression  Noted was recently started on Cymbalta that can exacerbate hyponatremia-currently off  Hypothyroidism  Levothyroxine dose was reduced, most recent TSH improved and normal  Management per primary team    Overall plan and recommendations has been reviewed with primary team and I agree with the plan as stated below  Continue with sodium salt tablet 1 g 3 times daily for now  Continue with torsemide 5 mg daily  Currently on every 8 hour BMP-a.m. once reviewed further recommendations will be forthcoming also if blood work reviewed if sodium remains stable okay to change to every 12    Review of Systems  Patient seen and examined at bedside.  Patient reports feels okay.  Tired  Review of Systems   Constitutional: Negative.  Negative for activity change, appetite change, chills, diaphoresis, fatigue and fever.   HENT: Negative.  Negative for congestion and facial swelling.    Respiratory: Negative.     Cardiovascular: Negative.    Gastrointestinal: Negative.    Endocrine: Negative.    Genitourinary: Negative.    Musculoskeletal: Negative.    Skin: Negative.    Allergic/Immunologic: Negative.    Neurological: Negative.    Hematological: Negative.    Psychiatric/Behavioral: Negative.           Physical Exam:  Current Weight: Weight - Scale: 48.4 kg (106 lb 11.2 oz)  Vitals:    10/19/24 1953 10/19/24 2115 10/20/24 0600 10/20/24 0808   BP:  137/59  132/56   BP Location:       Pulse: 86 88  80   Resp:       Temp:  (!) 97.4 °F (36.3 °C)  (!) 97.2 °F (36.2 °C)   TempSrc:       SpO2: 100% 98%  92%   Weight:   48.4 kg (106 lb 11.2 oz)    Height:           Physical Exam  Vitals reviewed.   Constitutional:       Comments: No acute distress, cachectic   HENT:      Head: Normocephalic and atraumatic.      Nose: Nose normal.      Mouth/Throat:       Mouth: Mucous membranes are moist.      Pharynx: Oropharynx is clear.   Eyes:      Extraocular Movements: Extraocular movements intact.      Conjunctiva/sclera: Conjunctivae normal.   Cardiovascular:      Rate and Rhythm: Normal rate and regular rhythm.      Pulses: Normal pulses.      Heart sounds: Normal heart sounds.   Pulmonary:      Effort: Pulmonary effort is normal.      Breath sounds: Normal breath sounds.      Comments: Decreased bases  Abdominal:      General: Bowel sounds are normal.      Palpations: Abdomen is soft.   Musculoskeletal:         General: Normal range of motion.      Cervical back: Normal range of motion and neck supple.   Skin:     General: Skin is warm and dry.      Coloration: Skin is pale.   Neurological:      General: No focal deficit present.      Mental Status: She is alert and oriented to person, place, and time.   Psychiatric:         Mood and Affect: Mood normal.         Behavior: Behavior normal.            Medications:    Current Facility-Administered Medications:     acetaminophen (TYLENOL) tablet 650 mg, 650 mg, Oral, Q6H PRN, Alisa Pennington PA-C    amLODIPine (NORVASC) tablet 5 mg, 5 mg, Oral, Daily, Alisa Pennington PA-C    Artificial Tears Op Soln 1 drop, 1 drop, Left Eye, TID, Alisa Pennington PA-C, 1 drop at 10/19/24 2123    budesonide (PULMICORT) inhalation solution 0.5 mg, 0.5 mg, Nebulization, Q12H, Alisa Pennington PA-C, 0.5 mg at 10/20/24 0731    cyanocobalamin (VITAMIN B-12) tablet 1,000 mcg, 1,000 mcg, Oral, Daily, Alisa Pennington PA-C    Diclofenac Sodium (VOLTAREN) 1 % topical gel 2 g, 2 g, Topical, 4x Daily, Alisa Pennington PA-C, 2 g at 10/19/24 2125    enoxaparin (LOVENOX) subcutaneous injection 30 mg, 30 mg, Subcutaneous, Q24H RACHEL, Alisa Pennington PA-C    ipratropium-albuterol (DUO-NEB) 0.5-2.5 mg/3 mL inhalation solution 3 mL, 3 mL, Nebulization, Q6H PRN, Alisa Pennington PA-C    levalbuterol (XOPENEX) inhalation solution 1.25 mg,  1.25 mg, Nebulization, Q6H PRN, Bhupendra Medley MD    levothyroxine tablet 25 mcg, 25 mcg, Oral, Early Morning, Alisa Pennington PA-C, 25 mcg at 10/20/24 0511    lidocaine (LIDODERM) 5 % patch 1 patch, 1 patch, Topical, Daily, Alisa Pennington PA-C    Lidocaine Viscous HCl (XYLOCAINE) 2 % mucosal solution 15 mL, 15 mL, Swish & Spit, 4x Daily PRN, Alisa Pennington PA-C, 15 mL at 10/20/24 0143    magnesium hydroxide (MILK OF MAGNESIA) oral suspension 15 mL, 15 mL, Oral, Daily PRN, Alisa Pennington PA-C    melatonin tablet 3 mg, 3 mg, Oral, HS, Alisa Pennington PA-C, 3 mg at 10/19/24 2122    menthol-methyl salicylate (BENGAY) 10-15 % cream, , Apply externally, TID, Alisa Pennington PA-C, Given at 10/19/24 2121    multivitamin-minerals (CENTRUM) tablet 1 tablet, 1 tablet, Oral, Daily, Alisa Pennington PA-C    ofloxacin (OCUFLOX) 0.3 % ophthalmic solution 1 drop, 1 drop, Left Eye, 4x Daily, Alisa Pennington PA-C, 1 drop at 10/19/24 2234    ondansetron (ZOFRAN) injection 4 mg, 4 mg, Intravenous, Q6H PRN, Alisa Pennington PA-C    pantoprazole (PROTONIX) EC tablet 40 mg, 40 mg, Oral, Early Morning, Alisa Pennington PA-C, 40 mg at 10/20/24 0511    phenol (CHLORASEPTIC) 1.4 % mucosal liquid 1 spray, 1 spray, Mouth/Throat, Q2H PRN, Alisa Pennington PA-C    polyethylene glycol (MIRALAX) packet 17 g, 17 g, Oral, Daily, Alisa Pennington PA-C    predniSONE tablet 2.5 mg, 2.5 mg, Oral, Daily, Alisa Pennington PA-C    propranolol (INDERAL) tablet 40 mg, 40 mg, Oral, Q12H RACHEL, Alisa Pennington PA-C, 40 mg at 10/19/24 2116    senna-docusate sodium (SENOKOT S) 8.6-50 mg per tablet 1 tablet, 1 tablet, Oral, HS, Alisa Pennington PA-C, 1 tablet at 10/19/24 2116    sodium chloride (OCEAN) 0.65 % nasal spray 1 spray, 1 spray, Each Nare, Q1H PRN, Alisa Pennington PA-C    sodium chloride tablet 1 g, 1 g, Oral, TID With Meals, Alisa Pennington PA-C    Sodium Zirconium Cyclosilicate (Lokelma) 10 g, 10 g, Oral,  Daily, Bhupendra Medley MD, 10 g at 10/19/24 0810    torsemide (DEMADEX) tablet 5 mg, 5 mg, Oral, Daily, Alisa Pennington PA-C    Laboratory Results:  Results from last 7 days   Lab Units 10/20/24  0109 10/19/24  1238 10/19/24  0821 10/19/24  0454 10/18/24  2358 10/18/24  2015 10/18/24  1557 10/18/24  0954 10/18/24  0724 10/18/24  0642 10/18/24  0445 10/17/24  0620 10/17/24  0544 10/17/24  0005 10/16/24  1601   WBC Thousand/uL  --   --   --  8.60  --   --   --   --   --   --  14.00*  --  4.73  --  6.07   HEMOGLOBIN g/dL  --   --   --  10.3*  --   --   --   --   --   --  11.6  --  11.0*  --  11.1*   I STAT HEMOGLOBIN g/dl  --   --   --   --   --   --   --   --   --  11.2*  --   --   --   --   --    HEMATOCRIT %  --   --   --  31.8*  --   --   --   --   --   --  35.9  --  34.7*  --  35.9   HEMATOCRIT, ISTAT %  --   --   --   --   --   --   --   --   --  33*  --   --   --   --   --    PLATELETS Thousands/uL  --   --   --  205  --   --   --   --   --   --  318  --  214  --  212   SODIUM mmol/L 138 132* 127* 125* 124* 124* 123*   < >  --   --  118*   < >  --    < > 124*   POTASSIUM mmol/L 3.9 4.3 4.1 4.3 4.3 4.3 4.4   < >  --   --  5.3   < >  --    < > 4.8   CHLORIDE mmol/L 99 84* 84* 82* 81* 79* 79*   < >  --   --  75*   < >  --    < > 78*   CO2 mmol/L 38* 42* 37* 39* 41* 43* 42*   < >  --   --  40*   < >  --    < > 45*   BUN mg/dL 11 14 12 13 14 15 16   < >  --   --  21   < >  --    < > 29*   CREATININE mg/dL 0.38* 0.41* 0.30* 0.33* 0.34* 0.35* 0.29*   < >  --   --  0.34*   < >  --    < > 0.43*   CALCIUM mg/dL 6.9* 8.9 7.6* 7.5* 7.5* 7.8* 8.0*   < >  --   --  8.4   < >  --    < > 8.8   MAGNESIUM mg/dL  --   --   --   --   --   --   --   --  1.8*  --   --   --   --   --   --    PHOSPHORUS mg/dL  --   --   --   --   --   --   --   --  3.2  --   --   --   --   --   --    GLUCOSE, ISTAT mg/dl  --   --   --   --   --   --   --   --   --  120  --   --   --   --   --     < > = values in this interval not displayed.  "      Medical records have been reviewed through [facility] and care everywhere for this patient encounter    Portions of the record may have been created with voice recognition software. Occasional wrong word or \"sound a like\" substitutions may have occurred due to the inherent limitations of voice recognition software. Read the chart carefully and recognize,   "

## 2024-10-20 NOTE — ASSESSMENT & PLAN NOTE
Continue propranolol, increase to 40 mg twice daily  Discontinue losartan in the setting of hyperkalemia  Blood pressure currently controlled

## 2024-10-21 ENCOUNTER — TELEPHONE (OUTPATIENT)
Dept: NEPHROLOGY | Facility: CLINIC | Age: 79
End: 2024-10-21

## 2024-10-21 DIAGNOSIS — E87.1 HYPONATREMIA: ICD-10-CM

## 2024-10-21 DIAGNOSIS — I10 PRIMARY HYPERTENSION: Primary | ICD-10-CM

## 2024-10-21 LAB
BUN SERPL-MCNC: 20 MG/DL (ref 5–25)
CALCIUM SERPL-MCNC: 9.5 MG/DL (ref 8.4–10.2)
CHLORIDE SERPL-SCNC: 88 MMOL/L (ref 96–108)
CO2 SERPL-SCNC: >45 MMOL/L (ref 21–32)
CREAT SERPL-MCNC: 0.47 MG/DL (ref 0.6–1.3)
ERYTHROCYTE [DISTWIDTH] IN BLOOD BY AUTOMATED COUNT: 13.7 % (ref 11.6–15.1)
GFR SERPL CREATININE-BSD FRML MDRD: 94 ML/MIN/1.73SQ M
GLUCOSE SERPL-MCNC: 101 MG/DL (ref 65–140)
HCT VFR BLD AUTO: 32.7 % (ref 34.8–46.1)
HGB BLD-MCNC: 9.8 G/DL (ref 11.5–15.4)
MAGNESIUM SERPL-MCNC: 1.8 MG/DL (ref 1.9–2.7)
MCH RBC QN AUTO: 28.8 PG (ref 26.8–34.3)
MCHC RBC AUTO-ENTMCNC: 30 G/DL (ref 31.4–37.4)
MCV RBC AUTO: 96 FL (ref 82–98)
PHOSPHATE SERPL-MCNC: 3.9 MG/DL (ref 2.3–4.1)
PLATELET # BLD AUTO: 251 THOUSANDS/UL (ref 149–390)
PMV BLD AUTO: 10 FL (ref 8.9–12.7)
POTASSIUM SERPL-SCNC: 5.1 MMOL/L (ref 3.5–5.3)
RBC # BLD AUTO: 3.4 MILLION/UL (ref 3.81–5.12)
SODIUM SERPL-SCNC: 137 MMOL/L (ref 135–147)
WBC # BLD AUTO: 8.7 THOUSAND/UL (ref 4.31–10.16)

## 2024-10-21 PROCEDURE — 97535 SELF CARE MNGMENT TRAINING: CPT

## 2024-10-21 PROCEDURE — 99233 SBSQ HOSP IP/OBS HIGH 50: CPT

## 2024-10-21 PROCEDURE — 99232 SBSQ HOSP IP/OBS MODERATE 35: CPT | Performed by: STUDENT IN AN ORGANIZED HEALTH CARE EDUCATION/TRAINING PROGRAM

## 2024-10-21 PROCEDURE — 80048 BASIC METABOLIC PNL TOTAL CA: CPT | Performed by: STUDENT IN AN ORGANIZED HEALTH CARE EDUCATION/TRAINING PROGRAM

## 2024-10-21 PROCEDURE — 94640 AIRWAY INHALATION TREATMENT: CPT

## 2024-10-21 PROCEDURE — 97116 GAIT TRAINING THERAPY: CPT

## 2024-10-21 PROCEDURE — 92526 ORAL FUNCTION THERAPY: CPT

## 2024-10-21 PROCEDURE — 97530 THERAPEUTIC ACTIVITIES: CPT

## 2024-10-21 PROCEDURE — 97110 THERAPEUTIC EXERCISES: CPT

## 2024-10-21 PROCEDURE — 85027 COMPLETE CBC AUTOMATED: CPT | Performed by: STUDENT IN AN ORGANIZED HEALTH CARE EDUCATION/TRAINING PROGRAM

## 2024-10-21 PROCEDURE — 99222 1ST HOSP IP/OBS MODERATE 55: CPT | Performed by: PSYCHIATRY & NEUROLOGY

## 2024-10-21 PROCEDURE — 84100 ASSAY OF PHOSPHORUS: CPT | Performed by: NURSE PRACTITIONER

## 2024-10-21 PROCEDURE — 83735 ASSAY OF MAGNESIUM: CPT | Performed by: NURSE PRACTITIONER

## 2024-10-21 PROCEDURE — 94760 N-INVAS EAR/PLS OXIMETRY 1: CPT

## 2024-10-21 RX ORDER — AMOXICILLIN 250 MG
1 CAPSULE ORAL 2 TIMES DAILY
Status: DISCONTINUED | OUTPATIENT
Start: 2024-10-21 | End: 2024-10-23 | Stop reason: HOSPADM

## 2024-10-21 RX ADMIN — PREDNISONE 2.5 MG: 2.5 TABLET ORAL at 08:58

## 2024-10-21 RX ADMIN — OFLOXACIN 1 DROP: 3 SOLUTION/ DROPS OPHTHALMIC at 22:30

## 2024-10-21 RX ADMIN — LEVOTHYROXINE SODIUM 25 MCG: 25 TABLET ORAL at 05:00

## 2024-10-21 RX ADMIN — OFLOXACIN 1 DROP: 3 SOLUTION/ DROPS OPHTHALMIC at 18:19

## 2024-10-21 RX ADMIN — PROPRANOLOL HYDROCHLORIDE 40 MG: 20 TABLET ORAL at 21:11

## 2024-10-21 RX ADMIN — PROPRANOLOL HYDROCHLORIDE 40 MG: 20 TABLET ORAL at 08:58

## 2024-10-21 RX ADMIN — GLYCERIN 1 DROP: .002; .002; .01 SOLUTION/ DROPS OPHTHALMIC at 22:27

## 2024-10-21 RX ADMIN — PANTOPRAZOLE SODIUM 40 MG: 40 TABLET, DELAYED RELEASE ORAL at 05:00

## 2024-10-21 RX ADMIN — DICLOFENAC SODIUM 2 G: 10 GEL TOPICAL at 13:00

## 2024-10-21 RX ADMIN — LIDOCAINE 1 PATCH: 50 PATCH TOPICAL at 21:16

## 2024-10-21 RX ADMIN — POLYETHYLENE GLYCOL 3350 17 G: 17 POWDER, FOR SOLUTION ORAL at 08:58

## 2024-10-21 RX ADMIN — CYANOCOBALAMIN TAB 500 MCG 1000 MCG: 500 TAB at 08:58

## 2024-10-21 RX ADMIN — DICLOFENAC SODIUM 2 G: 10 GEL TOPICAL at 18:19

## 2024-10-21 RX ADMIN — MELATONIN 3 MG: 3 TAB ORAL at 21:11

## 2024-10-21 RX ADMIN — AMLODIPINE BESYLATE 5 MG: 5 TABLET ORAL at 08:58

## 2024-10-21 RX ADMIN — MENTHOL 10%, METHYL SALICYLATE 15%: 10; 15 CREAM TOPICAL at 18:19

## 2024-10-21 RX ADMIN — SODIUM CHLORIDE 1 G: 1 TABLET ORAL at 09:02

## 2024-10-21 RX ADMIN — GLYCERIN 1 DROP: .002; .002; .01 SOLUTION/ DROPS OPHTHALMIC at 18:19

## 2024-10-21 RX ADMIN — ENOXAPARIN SODIUM 30 MG: 30 INJECTION SUBCUTANEOUS at 08:57

## 2024-10-21 RX ADMIN — GLYCERIN 1 DROP: .002; .002; .01 SOLUTION/ DROPS OPHTHALMIC at 08:57

## 2024-10-21 RX ADMIN — DICLOFENAC SODIUM 2 G: 10 GEL TOPICAL at 08:57

## 2024-10-21 RX ADMIN — TORSEMIDE 5 MG: 10 TABLET ORAL at 08:57

## 2024-10-21 RX ADMIN — SENNOSIDES AND DOCUSATE SODIUM 1 TABLET: 8.6; 5 TABLET ORAL at 21:11

## 2024-10-21 RX ADMIN — BUDESONIDE INHALATION 0.5 MG: 0.5 SUSPENSION RESPIRATORY (INHALATION) at 07:44

## 2024-10-21 RX ADMIN — BUDESONIDE INHALATION 0.5 MG: 0.5 SUSPENSION RESPIRATORY (INHALATION) at 19:51

## 2024-10-21 RX ADMIN — DICLOFENAC SODIUM 2 G: 10 GEL TOPICAL at 22:30

## 2024-10-21 RX ADMIN — Medication 1 TABLET: at 08:58

## 2024-10-21 RX ADMIN — OFLOXACIN 1 DROP: 3 SOLUTION/ DROPS OPHTHALMIC at 13:00

## 2024-10-21 RX ADMIN — MENTHOL 10%, METHYL SALICYLATE 15%: 10; 15 CREAM TOPICAL at 22:29

## 2024-10-21 RX ADMIN — SODIUM CHLORIDE 1 G: 1 TABLET ORAL at 18:19

## 2024-10-21 RX ADMIN — OFLOXACIN 1 DROP: 3 SOLUTION/ DROPS OPHTHALMIC at 08:57

## 2024-10-21 NOTE — PROGRESS NOTES
Progress Note - Hospitalist   Name: Sarah Zayas 79 y.o. female I MRN: [id number]  Unit/Bed#: [place] [address] -01 I Date of Admission: 10/10/2024   Date of Service: 10/21/2024 I Hospital Day: 11    Assessment & Plan  Acute respiratory failure with hypercapnia (HCC)  Back to baseline oxygen, recommend 2L NC  Acute exacerbation of chronic obstructive pulmonary disease (COPD) (HCC)  History of severe COPD  Transfer from  to ICU on 10/10, downgraded to MS on 10/10  Initially concerned for exacerbation but likely oversedation from benzodiazepine  Continue budesonide, Xopenex prn  Repeat VBG with noted CO2 retention  Avoid benzos, discontinue lyrica  Pulmonary evaluated, recommending BiPAP at bedtime as tolerated when discharged to nursing home.  Gastroesophageal reflux disease without esophagitis  Continue PPI therapy  Hypertension  Continue propranolol, increase to 40 mg twice daily  Discontinue losartan in the setting of hyperkalemia  Blood pressure currently controlled  Temporal arteritis (HCC)  Continue prednisone 2.5 mg  Anxiety/depression  Evaluated by psychiatry  Discontinue Lyrica, BuSpar, gabapentin, Remeron and benzos  Anxiety largely driving patient's symptoms  Previously recommended Cymbalta, discontinued due to hyponatremia  Psychiatry consulted for recommendations  at this time medication risks and side effects outweigh any potential benefit, will hold off on starting her on any anxiolytics    Hypothyroidism  Levothyroxine reduced to 25 mcg  TSH suppressed and free T4 elevated-suspect some iatrogenic hyperthyroidism  Repeat TSH and T4 in 8 to 12 weeks outpatient  Macular degeneration  Follows with [facility]  Continue artifical tears  Did have some discharge in her left eye concerning for conjunctivitis  Improvement with ofloxacin, to complete 7-day course  Metabolic encephalopathy  Had episodes of increasing lethargy, difficult to arouse  Likely due to hyponatremia, possible UTI and  hypercapnia  Previous CT imaging without any acute process  MRI brain without any evidence of stroke or acute intracranial process  Urine cultures growing Aerococcus, completed 5-day course of antibiotics  VBG with elevated CO2, recommending BiPAP at bedtime  Appreciate neurology evaluation, suspect seizure-like activity due to hyponatremia not recommending AED  Mental status has improved though patient continues to deal with ongoing anxiety  Psychiatry consulted  Urinary retention  Did have episode of urinary retention, now resolved with incontinence  Urine cultures growing Aerococcus, completed 5-day course of antibiotics  Hyponatremia  Suspect Cymbalta induced hyponatremia, recently started on 10/13  Transferred to ICU on 10/18-10/20 given concerns of seizure-like activity  Required tolvaptan with sodium now improving to 136  Continue torsemide 5 mg daily, discontinue losartan and fluid restriction 1500 mL  Nephrology recommending salt tabs 1 g 2 times daily  Chronic respiratory failure with hypercapnia (HCC)  Chronically elevated CO2 with VBG noting hypercapnia  Pulmonary recommending BiPAP at bedtime on discharge at [facility]  Will need to follow-up outpatient with pulmonology for formal BiPAP evaluation  Hyperkalemia  Resolved, discontinue losartan  Seizure-like activity (HCC)  Neurology suspects seizure-like activity as a result of hyponatremia now resolved  Did not recommend any further imaging, MRI canceled  Was given IV Keppra 2 g, no further AED recommended  Metabolic alkalosis with respiratory acidosis      VTE Pharmacologic Prophylaxis:   Moderate Risk (Score 3-4) - Pharmacological DVT Prophylaxis Ordered: enoxaparin (Lovenox).    Mobility:   Basic Mobility Inpatient Raw Score: 14  -HLM Goal: 4: Move to chair/commode  JH-HLM Achieved: 1: Laying in bed  JH-HLM Goal NOT achieved. Continue with multidisciplinary rounding and encourage appropriate mobility to improve upon JH-HLM goals.    Patient Centered  "Rounds: I performed bedside rounds with nursing staff today.   Discussions with Specialists or Other Care Team Provider: Psychiatry    Education and Discussions with Family / Patient:  left voicemail for daughter, spoke to son over the phone.     Current Length of Stay: 11 day(s)  Current Patient Status: Inpatient   Certification Statement: The patient will continue to require additional inpatient hospital stay due to ongoing management of hyponatremia, adjusting medications  Discharge Plan: Anticipate discharge in 24-48 hrs to rehab facility.    Code Status: Level 1 - Full Code    Subjective   Patient seen and examined at bedside. Very anxious about everything during the encounter. Stated that she felt so weak and that she could barely lift her sandwich, that she preferred finger foods coz they were easier to manage. Endorsed being scared of rehab because \"they will push me so hard\". Spent time reassuring the patient and redirecting her thoughts and she was better after the encounter.     Objective :  Temp:  [97.6 °F (36.4 °C)-98.3 °F (36.8 °C)] 98.3 °F (36.8 °C)  HR:  [74-94] 84  BP: (108-133)/(49-54) 133/50  Resp:  [18-20] 20  SpO2:  [94 %-99 %] 98 %  O2 Device: Nasal cannula  Nasal Cannula O2 Flow Rate (L/min):  [4 L/min] 4 L/min    Body mass index is 19.52 kg/m².     Input and Output Summary (last 24 hours):     Intake/Output Summary (Last 24 hours) at 10/21/2024 1332  Last data filed at 10/21/2024 1305  Gross per 24 hour   Intake 357 ml   Output 650 ml   Net -293 ml       Physical Exam  Vitals and nursing note reviewed.   Constitutional:       General: She is not in acute distress.     Appearance: She is well-developed.   HENT:      Head: Normocephalic and atraumatic.   Eyes:      Conjunctiva/sclera: Conjunctivae normal.   Cardiovascular:      Rate and Rhythm: Normal rate and regular rhythm.      Heart sounds: No murmur heard.  Pulmonary:      Effort: Pulmonary effort is normal. No respiratory distress.      " Breath sounds: Normal breath sounds.   Abdominal:      Palpations: Abdomen is soft.      Tenderness: There is no abdominal tenderness.   Musculoskeletal:         General: No swelling.      Cervical back: Neck supple.   Skin:     General: Skin is warm and dry.      Capillary Refill: Capillary refill takes less than 2 seconds.   Neurological:      Mental Status: She is alert and oriented to person, place, and time.   Psychiatric:      Comments: Very anxious but redirectible         Lines/Drains:        Lab Results: I have reviewed the following results:   Results from last 7 days   Lab Units 10/21/24  0449 10/20/24  0519 10/19/24  0454   WBC Thousand/uL 8.70   < > 8.60   HEMOGLOBIN g/dL 9.8*   < > 10.3*   HEMATOCRIT % 32.7*   < > 31.8*   PLATELETS Thousands/uL 251   < > 205   SEGS PCT %  --   --  82*   LYMPHO PCT %  --   --  8*   MONO PCT %  --   --  9   EOS PCT %  --   --  1    < > = values in this interval not displayed.     Results from last 7 days   Lab Units 10/21/24  0449 10/20/24  0809 10/17/24  0005 10/16/24  1601   SODIUM mmol/L 137 136   < > 124*   POTASSIUM mmol/L 5.1 4.6   < > 4.8   CHLORIDE mmol/L 88* 89*   < > 78*   CO2 mmol/L >45* 44*   < > 45*   BUN mg/dL 20 14   < > 29*   CREATININE mg/dL 0.47* 0.50*   < > 0.43*   ANION GAP mmol/L  --  3*   < > 1*   CALCIUM mg/dL 9.5 8.7   < > 8.8   ALBUMIN g/dL  --   --   --  3.6   TOTAL BILIRUBIN mg/dL  --   --   --  0.26   ALK PHOS U/L  --   --   --  41   ALT U/L  --   --   --  25   AST U/L  --   --   --  30   GLUCOSE RANDOM mg/dL 101 110   < > 159*    < > = values in this interval not displayed.     Results from last 7 days   Lab Units 10/18/24  0746   INR  0.92     Results from last 7 days   Lab Units 10/18/24  0602 10/18/24  0230   POC GLUCOSE mg/dl 133 150*         Results from last 7 days   Lab Units 10/18/24  0724 10/17/24  0636   PROCALCITONIN ng/ml 0.13 0.15       Recent Cultures (last 7 days):   Results from last 7 days   Lab Units 10/16/24  1604   URINE  CULTURE  >100,000 cfu/ml Aerococcus urinae*  <10,000 cfu/ml       Imaging Results Review: I reviewed radiology reports from this admission including: XR chest, CT head, CT chest, MRI brain, CT stroke, CTA stroke.  Other Study Results Review: Other studies reviewed include: EEG    Last 24 Hours Medication List:     Current Facility-Administered Medications:     acetaminophen (TYLENOL) tablet 650 mg, Q6H PRN    amLODIPine (NORVASC) tablet 5 mg, Daily    Artificial Tears Op Soln 1 drop, TID    budesonide (PULMICORT) inhalation solution 0.5 mg, Q12H    cyanocobalamin (VITAMIN B-12) tablet 1,000 mcg, Daily    Diclofenac Sodium (VOLTAREN) 1 % topical gel 2 g, 4x Daily    enoxaparin (LOVENOX) subcutaneous injection 30 mg, Q24H RACHEL    ipratropium-albuterol (DUO-NEB) 0.5-2.5 mg/3 mL inhalation solution 3 mL, Q6H PRN    levalbuterol (XOPENEX) inhalation solution 1.25 mg, Q6H PRN    levothyroxine tablet 25 mcg, Early Morning    lidocaine (LIDODERM) 5 % patch 1 patch, Daily    Lidocaine Viscous HCl (XYLOCAINE) 2 % mucosal solution 15 mL, 4x Daily PRN    magnesium hydroxide (MILK OF MAGNESIA) oral suspension 15 mL, Daily PRN    melatonin tablet 3 mg, HS    menthol-methyl salicylate (BENGAY) 10-15 % cream, TID    multivitamin-minerals (CENTRUM) tablet 1 tablet, Daily    ofloxacin (OCUFLOX) 0.3 % ophthalmic solution 1 drop, 4x Daily    ondansetron (ZOFRAN) injection 4 mg, Q6H PRN    pantoprazole (PROTONIX) EC tablet 40 mg, Early Morning    phenol (CHLORASEPTIC) 1.4 % mucosal liquid 1 spray, Q2H PRN    polyethylene glycol (MIRALAX) packet 17 g, Daily    predniSONE tablet 2.5 mg, Daily    propranolol (INDERAL) tablet 40 mg, Q12H RACHEL    senna-docusate sodium (SENOKOT S) 8.6-50 mg per tablet 1 tablet, HS    sodium chloride (OCEAN) 0.65 % nasal spray 1 spray, Q1H PRN    sodium chloride tablet 1 g, BID With Meals    torsemide (DEMADEX) tablet 5 mg, Daily    Administrative Statements   Today, Patient Was Seen By: Virginie Schwartz  MD Eyal    **Please Note: This note may have been constructed using a voice recognition system.**

## 2024-10-21 NOTE — PLAN OF CARE
Problem: OCCUPATIONAL THERAPY ADULT  Goal: Performs self-care activities at highest level of function for planned discharge setting.  See evaluation for individualized goals.  Description: Treatment Interventions: ADL retraining, Functional transfer training, UE strengthening/ROM, Endurance training, Patient/family training, Equipment evaluation/education, Compensatory technique education, Continued evaluation, Activityengagement          See flowsheet documentation for full assessment, interventions and recommendations.   Outcome: Progressing  Note: Limitation: Decreased ADL status, Decreased UE ROM, Decreased Safe judgement during ADL, Decreased UE strength, Decreased cognition, Decreased endurance, Decreased self-care trans, Decreased high-level ADLs  Prognosis: Fair  Assessment: Pt seen for skilled OT tx this date. Tx focused on improving strength, activity tolerance and balance, safety awareness to increase independence with self care tasks. Pt tolerated session fair. Pt performed UB dressing/ bathing with Agus, LB dressing / bathing modA , Toileting modA,  bed mobility Agus, transfers Agus, mobility with RW Agus. Pt demonstrated standing balance during functional tasks fair. Pt demonstrated ability to safely and appropriately attend to all tasks during session. Pt required moderate verbal cuing during session to safely complete tasks. Current OT DC recommendations for pt is level 2 resources.     Rehab Resource Intensity Level, OT: II (Moderate Resource Intensity)

## 2024-10-21 NOTE — SPEECH THERAPY NOTE
Speech Language/Pathology    Speech/Language Pathology Progress Note    Patient Name: Sarah Zayas  Today's Date: 10/21/2024     Problem List  Principal Problem:    Anxiety/depression  Active Problems:    Acute exacerbation of chronic obstructive pulmonary disease (COPD) (HCC)    Gastroesophageal reflux disease without esophagitis    Hypertension    Temporal arteritis (HCC)    Acute respiratory failure with hypercapnia (HCC)    Hypothyroidism    Macular degeneration    Metabolic encephalopathy    Urinary retention    Hyponatremia    Chronic respiratory failure with hypercapnia (HCC)    Metabolic alkalosis with respiratory acidosis    Hyperkalemia    Seizure-like activity (HCC)       Past Medical History  Past Medical History:   Diagnosis Date    Anxiety 08/18/2024    Asthma     COPD (chronic obstructive pulmonary disease) (HCC)     Disease of thyroid gland     GERD (gastroesophageal reflux disease)     Hypertension 10/11/2018    Hypothyroid     Normocytic anemia 08/28/2024    Osteoarthritis 09/26/2012    Seizure-like activity ([facility]) 10/18/2024    Temporal arteritis ([facility])     Type 2 diabetes mellitus with complication, without long-term current use of insulin ([facility]) 01/14/2020        Past Surgical History  Past Surgical History:   Procedure Laterality Date    EYE SURGERY Right 12/06/2019    cataract LVCFS    HYSTERECTOMY      NO PAST SURGERIES      ALSO NO RELEVANT PAST SURRGICAL HX AS PER [facility]         Subjective:  Pt alert, pleasant, seated in chair. Stated she is tired. Worried about her children.   Objective:  Pt seen for f/u on regular diet. Back to [place] [address]. Sees to do better w/ finger foods as opposed to using utensils. Pt had grapes, sliced apple, grilled cheese and tomato, and chicken noodle soup. Suggested trying to order tomato if she likes it as she can drink that from a cup or use a straw. She ate all of the grapes, had only a few slices of the apple sauce a few cut pieces of the grilled cheese.  Mastication, bolus formation and control wnl w/ food and lemonade. No cough or wet vocal quality.   Assessment:  Much improved, out of the ICU. Tolerating PO, regular diet. Better w/finger foods 2* difficulty w/ utensils at times.   Plan/Recommendations:  Regular diet. D/c ST. Assist w/ tray set up. Offer preferences.

## 2024-10-21 NOTE — TELEPHONE ENCOUNTER
----- Message from Joselyn Reyes Bahamonde, MD sent at 10/20/2024  1:24 PM EDT -----  Hi,     This pat will be dc and will need f/u for hyponatremia  within 3-4 week. Hao NOVAK    Thanks     JRB

## 2024-10-21 NOTE — ASSESSMENT & PLAN NOTE
Chronically elevated CO2 with VBG noting hypercapnia  Pulmonary recommending BiPAP at bedtime on discharge at [facility]  Will need to follow-up outpatient with pulmonology for formal BiPAP evaluation

## 2024-10-21 NOTE — ASSESSMENT & PLAN NOTE
Evaluated by psychiatry  Discontinue Lyrica, BuSpar, gabapentin, Remeron and benzos  Anxiety largely driving patient's symptoms  Previously recommended Cymbalta, discontinued due to hyponatremia  Psychiatry consulted for recommendations  at this time medication risks and side effects outweigh any potential benefit, will hold off on starting her on any anxiolytics

## 2024-10-21 NOTE — ASSESSMENT & PLAN NOTE
Follows with [facility]  Continue artifical tears  Did have some discharge in her left eye concerning for conjunctivitis  Improvement with ofloxacin, to complete 7-day course

## 2024-10-21 NOTE — CONSULTS
TELEConsultation - Behavioral Health   Sarah Zayas 79 y.o. female MRN: [id number]  Unit/Bed#: [place] [address] -01 Encounter: [id number]  Hospital Day: 11    REQUIRED DOCUMENTATION:     1. This service was provided via Telemedicine.  2. Provider located in [place].  3. TeleMed provider: Brennon Finley DO.  4. Identify all parties in room with patient during tele consult: none  5. After connecting through televideo, patient was identified by name and date of birth. Parent/patient was then informed that this was being conducted confidentially over secure lines. My office door was closed. Parties in the room listed above as per #4.  Patient acknowledged consent and understanding of privacy and security of the Telemedicine visit. The patient is aware this is a billable service and understands that she can discontinue the visit at any time. I informed the patient that I have reviewed their record in Epic and presented the opportunity for them to ask any questions regarding the visit today.  The patient agreed to participate.     Assessment & Plan     Assessment: Patient is a 79-year-old white female with a history of unspecified anxiety who is being evaluated by psychiatry for anxiety.  Patient was originally seen by psychiatry on 10/11/2024 and had several medication changes including starting Cymbalta.  However, the patient subsequently developed SIADH and so new medication recommendations are sought.  Patient's exam is consistent with mild anxiety although patient's anxiety is not subjectively rated as severe nor does it appear to me to be severe on exam and in my conversation with the patient.  Considering all of the patient's medical comorbidities and apparent susceptibility to side effects, I do not feel as though the benefit of anxiolytic treatment outweighs the potential risks at this time.  I therefore am not recommending any psychotropic changes right now.  However, I did discuss the option of restarting Remeron if the  "patients anxiety became severe in the future, which the patient was open to if her healthcare decision maker agreed at any point in the future.    Assessment & Plan  Anxiety/depression  Discontinue Cymbalta; avoid SSRIs and SNRIs due to hyponatremia  At the time of my exam, patients anxiety is not severe and I do not feel as though the benefit of treatment outweighs the risk of side effects or worsening of underlying medical comorbidities.  If the patient's anxiety becomes severe at any point in the future, would recommend restarting Remeron 7.5 mg p.o. at bedtime at that time assuming there are no medical contraindications at that point         Diagnoses, available treatment options, alternatives to treatment, and risks vs. benefits of current psychiatric treatment plan were discussed with the patient.  Prior records were reviewed in [employer].  The case was discussed with the primary team.      History of Present Illness   Physician Requesting Consult: Virginie Schwartz *    Chief Complaint: \"I don't remember any of this\"    Reason for Consult / Principal Problem: Anxiety    Patient is a 79-year-old white female with a history of unspecified anxiety, originally seen by psychiatry on 10/11/2024, now being seen by psychiatry again for anxiety.  Please see original psychiatry consult on 10/11/2024 for the full details of her original consult.  In short, the patient underwent several psychiatric medication changes including starting Cymbalta.  Unfortunately, the patient developed hyponatremia, and new medication recommendations are sought.    On my psychiatric evaluation today, the patient states that she has been dealing with some anxiety primarily due to being hospitalized and worrying about her medical problems and her family.  She describes her anxiety symptoms as \"claustrophobia\" but denies any palpitations, denies diaphoresis, admits to some shortness of breath but does not attribute her shortness of breath to " anxiety per se, and denies any panic symptoms.  She denies manic, psychotic, or other anxiety symptoms.  She denies suicidal or homicidal ideation.  I read through the medication changes which were made after her initial psychiatry consult and the patient states that she does not remember having a discussion about any prior psychiatric medications with a previous psychiatrist.  She subjectively rated her anxiety as mild.  We discussed the concept of risks versus benefits of treatment and I explained my concern that the benefits of treatment do not currently outweigh the risks of potential side effects of anxiolytic treatment and the patient appeared to understand.  We did discuss the possibility of starting Remeron if her anxiety becomes severe in the future.    Psychiatric Review Of Systems:  Medication side effects: none  Sleep: no change  Appetite: no change  Hygiene: able to tend to instrumental and basic ADLs  Anxiety Symptoms: +  Psychotic Symptoms: denies  Depression Symptoms: denies  Manic Symptoms: denies  PTSD Symptoms: denies  Suicidal Thoughts: denies  Homicidal Thoughts: denies    Historical Information   Psychiatric History:   Diagnoses: anxiety  Inpatient Hx: none  Outpatient Hx: none  Medications/Trials: Previously on Lyrica, gabapentin, Remeron, benzodiazepines    Substance Abuse History:    Social History     Substance and Sexual Activity   Alcohol Use Never     Social History     Substance and Sexual Activity   Drug Use No       I discussed substance abuse with the patient and, if pertinent, discussed risks vs benefits of decreasing frequency of use.    Family History:   Family History   Problem Relation Age of Onset    Breast cancer Mother     Emphysema Father        Social History  Rest of social history as per below:  Social History     Socioeconomic History    Marital status: Legally      Spouse name: Not on file    Number of children: Not on file    Years of education: Not on file     Highest education level: Not on file   Occupational History    Occupation: retired   Tobacco Use    Smoking status: Former     Current packs/day: 0.00     Average packs/day: 1 pack/day for 54.0 years (54.0 ttl pk-yrs)     Types: Cigarettes     Start date:      Quit date: 2013     Years since quittin.8     Passive exposure: Past    Smokeless tobacco: Never    Tobacco comments:     TOBACCO USE, NO PASSIVE SMOKE EXPOSURE   Vaping Use    Vaping status: Never Used   Substance and Sexual Activity    Alcohol use: Never    Drug use: No    Sexual activity: Not Currently   Other Topics Concern    Not on file   Social History Narrative    Not on file     Social Determinants of Health     Financial Resource Strain: Low Risk  (2023)    Overall Financial Resource Strain (CARDIA)     Difficulty of Paying Living Expenses: Not hard at all   Food Insecurity: No Food Insecurity (10/11/2024)    Hunger Vital Sign     Worried About Running Out of Food in the Last Year: Never true     Ran Out of Food in the Last Year: Never true   Transportation Needs: No Transportation Needs (10/11/2024)    PRAPARE - Transportation     Lack of Transportation (Medical): No     Lack of Transportation (Non-Medical): No   Physical Activity: Unknown (2020)    Exercise Vital Sign     Days of Exercise per Week: Patient declined     Minutes of Exercise per Session: Patient declined   Stress: No Stress Concern Present (2020)    [employer] - Occupational Stress Questionnaire     Feeling of Stress : Not at all   Social Connections: Unknown (2020)    Social Connection and Isolation Panel [NHANES]     Frequency of Communication with Friends and Family: Patient declined     Frequency of Social Gatherings with Friends and Family: Patient declined     Attends [employer] Services: Patient declined     Active Member of Clubs or Organizations: Patient declined     Attends Club or Organization Meetings: Patient declined      Marital Status: Patient declined   Intimate Partner Violence: Not At Risk (1/7/2020)    Humiliation, Afraid, Rape, and Kick questionnaire     Fear of Current or Ex-Partner: No     Emotionally Abused: No     Physically Abused: No     Sexually Abused: No   Housing Stability: Low Risk  (10/11/2024)    Housing Stability Vital Sign     Unable to Pay for Housing in the Last Year: No     Number of Times Moved in the Last Year: 1     Homeless in the Last Year: No       Past Medical History:   Diagnosis Date    Anxiety 08/18/2024    Asthma     COPD (chronic obstructive pulmonary disease) ([facility])     Disease of thyroid gland     GERD (gastroesophageal reflux disease)     Hypertension 10/11/2018    Hypothyroid     Normocytic anemia 08/28/2024    Osteoarthritis 09/26/2012    Seizure-like activity ([facility]) 10/18/2024    Temporal arteritis ([facility])     Type 2 diabetes mellitus with complication, without long-term current use of insulin ([facility]) 01/14/2020       Meds/Allergies   No Known Allergies    Current Facility-Administered Medications:     acetaminophen (TYLENOL) tablet 650 mg, Q6H PRN    amLODIPine (NORVASC) tablet 5 mg, Daily    Artificial Tears Op Soln 1 drop, TID    budesonide (PULMICORT) inhalation solution 0.5 mg, Q12H    cyanocobalamin (VITAMIN B-12) tablet 1,000 mcg, Daily    Diclofenac Sodium (VOLTAREN) 1 % topical gel 2 g, 4x Daily    enoxaparin (LOVENOX) subcutaneous injection 30 mg, Q24H RACHEL    ipratropium-albuterol (DUO-NEB) 0.5-2.5 mg/3 mL inhalation solution 3 mL, Q6H PRN    levalbuterol (XOPENEX) inhalation solution 1.25 mg, Q6H PRN    levothyroxine tablet 25 mcg, Early Morning    lidocaine (LIDODERM) 5 % patch 1 patch, Daily    Lidocaine Viscous HCl (XYLOCAINE) 2 % mucosal solution 15 mL, 4x Daily PRN    magnesium hydroxide (MILK OF MAGNESIA) oral suspension 15 mL, Daily PRN    melatonin tablet 3 mg, HS    menthol-methyl salicylate (BENGAY) 10-15 % cream, TID    multivitamin-minerals (CENTRUM) tablet 1 tablet,  "Daily    ofloxacin (OCUFLOX) 0.3 % ophthalmic solution 1 drop, 4x Daily    ondansetron (ZOFRAN) injection 4 mg, Q6H PRN    pantoprazole (PROTONIX) EC tablet 40 mg, Early Morning    phenol (CHLORASEPTIC) 1.4 % mucosal liquid 1 spray, Q2H PRN    polyethylene glycol (MIRALAX) packet 17 g, Daily    predniSONE tablet 2.5 mg, Daily    propranolol (INDERAL) tablet 40 mg, Q12H RACHEL    senna-docusate sodium (SENOKOT S) 8.6-50 mg per tablet 1 tablet, HS    sodium chloride (OCEAN) 0.65 % nasal spray 1 spray, Q1H PRN    sodium chloride tablet 1 g, BID With Meals    torsemide (DEMADEX) tablet 5 mg, Daily    Current Medications:  Current medications as per above. All medications have been reviewed.   Risks, benefits, alternatives, and possible side effects of patient's psychiatric medications were discussed with patient.     Objective   Vital signs in last 24 hours:  Temp:  [97.6 °F (36.4 °C)-98.3 °F (36.8 °C)] 98.3 °F (36.8 °C)  HR:  [74-94] 84  BP: (108-133)/(49-54) 133/50  Resp:  [18-20] 20  SpO2:  [94 %-99 %] 98 %  O2 Device: Nasal cannula  Nasal Cannula O2 Flow Rate (L/min):  [4 L/min] 4 L/min    Mental Status Exam:  Appearance: moderately kempt, lying in hospital bed  Motor: normal  Behavior: cooperative, pleasant  Speech: normal rate, rhythm, and volume  Mood: \"I worry about a lot\"  Affect: mood-congruent, mildly anxious appearing  Thought Process: organized and linear  Thought Content: denies auditory hallucinations, denies visual hallucinations, denies delusions  Risk Potential: denies suicidal ideation, plan, or intent. Denies homicidal ideation  Sensorium: Oriented to person, place, time, and situation  Cognition: cognitive ability appears intact but was not quantitatively tested  Consciousness: alert and awake  Attention: currently intact  Insight: fair  Judgement: fair      Laboratory results:  I have personally reviewed all pertinent laboratory/tests results.  Recent Results (from the past 48 hour(s))   Basic " metabolic panel    Collection Time: 10/20/24  1:09 AM   Result Value Ref Range    Sodium 138 135 - 147 mmol/L    Potassium 3.9 3.5 - 5.3 mmol/L    Chloride 99 96 - 108 mmol/L    CO2 38 (H) 21 - 32 mmol/L    ANION GAP 1 (L) 4 - 13 mmol/L    BUN 11 5 - 25 mg/dL    Creatinine 0.38 (L) 0.60 - 1.30 mg/dL    Glucose 108 65 - 140 mg/dL    Calcium 6.9 (L) 8.4 - 10.2 mg/dL    eGFR 101 ml/min/1.73sq m   Uric acid    Collection Time: 10/20/24  5:19 AM   Result Value Ref Range    Uric Acid 2.9 2.0 - 7.5 mg/dL   CBC    Collection Time: 10/20/24  5:19 AM   Result Value Ref Range    WBC 6.98 4.31 - 10.16 Thousand/uL    RBC 3.61 (L) 3.81 - 5.12 Million/uL    Hemoglobin 10.4 (L) 11.5 - 15.4 g/dL    Hematocrit 33.5 (L) 34.8 - 46.1 %    MCV 93 82 - 98 fL    MCH 28.8 26.8 - 34.3 pg    MCHC 31.0 (L) 31.4 - 37.4 g/dL    RDW 13.5 11.6 - 15.1 %    Platelets 262 149 - 390 Thousands/uL    MPV 10.2 8.9 - 12.7 fL   Cortisol Level, AM Specimen    Collection Time: 10/20/24  8:09 AM   Result Value Ref Range    Cortisol - AM 14.0 6.7 - 22.6 ug/dL   Basic metabolic panel    Collection Time: 10/20/24  8:09 AM   Result Value Ref Range    Sodium 136 135 - 147 mmol/L    Potassium 4.6 3.5 - 5.3 mmol/L    Chloride 89 (L) 96 - 108 mmol/L    CO2 44 (H) 21 - 32 mmol/L    ANION GAP 3 (L) 4 - 13 mmol/L    BUN 14 5 - 25 mg/dL    Creatinine 0.50 (L) 0.60 - 1.30 mg/dL    Glucose 110 65 - 140 mg/dL    Calcium 8.7 8.4 - 10.2 mg/dL    eGFR 92 ml/min/1.73sq m   Magnesium    Collection Time: 10/21/24  4:49 AM   Result Value Ref Range    Magnesium 1.8 (L) 1.9 - 2.7 mg/dL   Phosphorus    Collection Time: 10/21/24  4:49 AM   Result Value Ref Range    Phosphorus 3.9 2.3 - 4.1 mg/dL   CBC    Collection Time: 10/21/24  4:49 AM   Result Value Ref Range    WBC 8.70 4.31 - 10.16 Thousand/uL    RBC 3.40 (L) 3.81 - 5.12 Million/uL    Hemoglobin 9.8 (L) 11.5 - 15.4 g/dL    Hematocrit 32.7 (L) 34.8 - 46.1 %    MCV 96 82 - 98 fL    MCH 28.8 26.8 - 34.3 pg    MCHC 30.0 (L) 31.4 -  37.4 g/dL    RDW 13.7 11.6 - 15.1 %    Platelets 251 149 - 390 Thousands/uL    MPV 10.0 8.9 - 12.7 fL   Basic metabolic panel    Collection Time: 10/21/24  4:49 AM   Result Value Ref Range    Sodium 137 135 - 147 mmol/L    Potassium 5.1 3.5 - 5.3 mmol/L    Chloride 88 (L) 96 - 108 mmol/L    CO2 >45 (H) 21 - 32 mmol/L    ANION GAP      BUN 20 5 - 25 mg/dL    Creatinine 0.47 (L) 0.60 - 1.30 mg/dL    Glucose 101 65 - 140 mg/dL    Calcium 9.5 8.4 - 10.2 mg/dL    eGFR 94 ml/min/1.73sq edison Finley,     This note has been constructed using a voice recognition system. There may be translation, syntax, or grammatical errors. If you have any questions, please contact the dictating provider.

## 2024-10-21 NOTE — PHYSICAL THERAPY NOTE
Physical Therapy Treatment Note     10/21/24 1204   PT Last Visit   PT Visit Date 10/21/24   Note Type   Note Type Treatment   Pain Assessment   Pain Assessment Tool 0-10   Pain Location/Orientation Location: Generalized   Restrictions/Precautions   Weight Bearing Precautions Per Order No   Other Precautions Chair Alarm;Bed Alarm;Cognitive;Fall Risk;O2;Telemetry;Visual impairment   General   Chart Reviewed Yes   Family/Caregiver Present No   Subjective   Subjective Pt. agreeable to participate in therapy with encouragement   Bed Mobility   Supine to Sit 4  Minimal assistance   Additional items Assist x 1;Increased time required;HOB elevated;Verbal cues;LE management   Transfers   Sit to Stand 4  Minimal assistance   Additional items Assist x 1;Increased time required;Verbal cues;Armrests   Stand to Sit 4  Minimal assistance   Additional items Assist x 1;Armrests;Increased time required;Verbal cues   Stand pivot 4  Minimal assistance   Additional items Assist x 1;Increased time required;Verbal cues   Ambulation/Elevation   Gait pattern Improper Weight shift;Narrow DEVONTE;Decreased foot clearance;Forward Flexion;Inconsistent tarun;Foward flexed;Short stride;Excessively slow;Decreased toe off;Decreased heel strike   Gait Assistance 4  Minimal assist   Additional items Assist x 1;Verbal cues   Assistive Device Rolling walker   Distance 28ft   Balance   Static Sitting Good   Dynamic Sitting Fair +   Static Standing Fair   Dynamic Standing Fair -   Ambulatory Fair -   Endurance Deficit   Endurance Deficit Yes   Endurance Deficit Description reported fatigue though no visible distress noted   Activity Tolerance   Activity Tolerance Patient tolerated treatment well   Nurse Made Aware yes   Exercises   THR Supine;10 reps;Bilateral;AROM   Assessment   Prognosis Fair   Problem List Decreased mobility;Decreased cognition;Impaired judgement;Impaired vision   Assessment Pt. though reported fatigue, pain, SOB no visible distress.  Pt. on 3L O2 and Spo2 stats dropped to 83% with increased anxiety during mobility/activities. At rest SpO2 stats in 90s. Pt. given A for pericare after urinating and BM while ambulating. Though pt.reported she cannot do any activity pt. needed only very minimal assistance for all activity. Pt. noted with no LOB or LE instability during mobility and was seated on chair post session with alarm engaged and RN aware of pt. status. Will continue to follow per PT POC. Pt. oriented to place and situation   Barriers to Discharge None   Goals   Patient Goals None reported   STG Expiration Date 10/21/24   PT Treatment Day 4   Plan   Treatment/Interventions Functional transfer training;LE strengthening/ROM;Therapeutic exercise;Bed mobility;Gait training;Equipment eval/education;Patient/family training;Cognitive reorientation;Spoke to nursing;OT   Progress Progressing toward goals   PT Frequency 3-5x/wk   Discharge Recommendation   Rehab Resource Intensity Level, PT II (Moderate Resource Intensity)   Equipment Recommended Walker   AM-PAC Basic Mobility Inpatient   Turning in Flat Bed Without Bedrails 3   Lying on Back to Sitting on Edge of Flat Bed Without Bedrails 3   Moving Bed to Chair 3   Standing Up From Chair Using Arms 3   Walk in Room 3   Climb 3-5 Stairs With Railing 2   Basic Mobility Inpatient Raw Score 17   Basic Mobility Standardized Score 39.67   [facility] Highest Level Of Mobility   -HLM Goal 5: Stand one or more mins   -HLM Achieved 7: Walk 25 feet or more   End of Consult   Patient Position at End of Consult All needs within reach;Bed/Chair alarm activated;Bedside chair           Maurilio Murray PTA    An AM-PAC basic mobility standardized score less than 42.9 suggest the patient may benefit from discharge to post-acute rehab services.

## 2024-10-21 NOTE — ASSESSMENT & PLAN NOTE
History of severe COPD  Transfer from  to ICU on 10/10, downgraded to MS on 10/10  Initially concerned for exacerbation but likely oversedation from benzodiazepine  Continue budesonide, Xopenex prn  Repeat VBG with noted CO2 retention  Avoid benzos, discontinue lyrica  Pulmonary evaluated, recommending BiPAP at bedtime as tolerated when discharged to nursing home.

## 2024-10-21 NOTE — PLAN OF CARE
Problem: PHYSICAL THERAPY ADULT  Goal: Performs mobility at highest level of function for planned discharge setting.  See evaluation for individualized goals.  Description: Treatment/Interventions: Functional transfer training, LE strengthening/ROM, Elevations, Therapeutic exercise, Endurance training, Patient/family training, Equipment eval/education, Bed mobility, Gait training, Continued evaluation, Spoke to nursing, OT  Equipment Recommended: Walker (pt reports owning RW at home for use)       See flowsheet documentation for full assessment, interventions and recommendations.  Outcome: Progressing  Note: Prognosis: Fair  Problem List: Decreased mobility, Decreased cognition, Impaired judgement, Impaired vision  Assessment: Pt. though reported fatigue, pain, SOB no visible distress. Pt. on 3L O2 and Spo2 stats dropped to 83% with increased anxiety during mobility/activities. At rest SpO2 stats in 90s. Pt. given A for pericare after urinating and BM while ambulating. Though pt.reported she cannot do any activity pt. needed only very minimal assistance for all activity. Pt. noted with no LOB or LE instability during mobility and was seated on chair post session with alarm engaged and RN aware of pt. status. Will continue to follow per PT POC. Pt. oriented to place and situation  Barriers to Discharge: None     Rehab Resource Intensity Level, PT: II (Moderate Resource Intensity)    See flowsheet documentation for full assessment.

## 2024-10-21 NOTE — ASSESSMENT & PLAN NOTE
Discontinue Cymbalta; avoid SSRIs and SNRIs due to hyponatremia  At the time of my exam, patients anxiety is not severe and I do not feel as though the benefit of treatment outweighs the risk of side effects or worsening of underlying medical comorbidities.  If the patient's anxiety becomes severe at any point in the future, would recommend restarting Remeron 7.5 mg p.o. at bedtime at that time assuming there are no medical contraindications at that point

## 2024-10-21 NOTE — PROGRESS NOTES
Progress Note - Geriatric Medicine   Sarah Zayas 79 y.o. female MRN: [id number]  Unit/Bed#: [place] [address] -01 Encounter: [id number]      Assessment/Plan:    Cognitive Screening  Patient has no documented history of memory loss or cognitive impairment   Patient states she does have difficulty with her short term memory at baseline   Per discussion with patient and her son the patient did recently just start with [facility] (they are paying out-of-pocket) but she only had one visit prior to coming in  Family is willing to increase days and hours if needed to assist patient   Patient was a stroke alert on 10/18 as patient was unresponsive and had spastic/tonic activity of the upper extremity   She was transferred from the medical surgical unit to the ICU for closer monitoring   She was noted to be lethargic on Friday but is much more alert, awake, and interactive today   Patient is alert and oriented to person and place on my exam today   She notes she does not recall the events of Friday   Most recent TSH on 10/10/2024 noted to be 0.342  Most recent vitamin B 12 level on 8/30/2024 noted to be 1918  Patient appears to be on vitamin B 12 supplementation   Can consider dose reduction or discontinuation   CT of the head on 10/10/2024 revealed mild microangiopathic changes   No MoCA or cognitive testing noted in epic  Can consider having OT complete MoCA evaluation once patient is more medically stable   Patient can follow-up with geriatrics in the outpatient setting for concerns of memory loss   Patients son is interested in this so will place outpatient referral  Maintain delirium precautions as discussed below   Redirect and reorient as needed  Keep physically, mentally, and socially active     Delirium Precautions   Current mentation: alert and oriented to person and place  Patient is at high risk secondary to age, possible underlying cognitive impairment, COPD exacerbation, steroid use (though this appears chronic),  acute/chronic pain, hyponatremia, sleep disturbances, anxiety, depression, vision impairment, hearing impariment, and hospitalization   Maintain delirium precautions   Provide redirection, reorientation, and distraction techniques  Maintain fall and safety precautions   Assist with ADLs/IADLs  Avoid deliriogenic medications such as tramadol, benzodiazepines, anticholinergics, benadryl  Treat pain using geriatric pain protocol   Encourage oral hydration and nutrition   Monitor for constipation and urinary retention   Implement sleep hygiene and limit night time interuptions   Maintain sleep-wake cycle   Encourage early and frequent mobilization   Most recent EKG on 10/17/2024 revealed a QTc interval of 402  If all other interventions are unsuccessful for acute agitation and behaviors, can consider Zyprexa 2.5 mg IM Q 8 hours prn   Would avoid benzodiazepines such as Ativan as these can worsen delirium     Deconditioning   Patient is at increased risk for deconditioning secondary to possible underlying cognitive impairment, COPD exacerbation, steroid use, hyponatremia, chronic pain, sleep disturbances, anxiety, depression, vision impairment, hearing impairment, weakness, gait dysfunction, and hospitalization    Continue to optimize diet, hydration, and mobility for healing   GFR 94 on labs this morning    Patient has no documented history of CKD   Keep hydrated   Normocytic anemia   Baseline hemoglobin appears to be 11-13  Hemoglobin on labs today stable at 9.6  Continue to monitor CBC   Transfuse for hemoglobin < 7   Monitor for signs and symptoms of infection, dehydration, DVT, and skin breakdown    Frailty   Clinical Frail Scale: 5- Mildly Frail  More evident slowing, needs help high order IADLs (transport, bills, medications)  Progressively impairs shopping and walking outside alone, meal prep and housework  Most recent albumin on 10/16/2024 noted to be 3.6  Consider nutrition consult  Encourage protein  supplementation     Ambulatory Dysfunction/Falls  Patient notes no recent falls at home   She does not typically ambulate with any assistive devices at baseline but she does have a walker available if needed  PT/OT consulted to assist with strengthening/mobility and assist with discharge planning to appropriate level of care  Assess patient frequently for physical needs, encourage use of assistant devices as needed and directed by PT/OT  Identify cognitive and physical deficits and behaviors that affect risk of falls  Consider moving patient closer to nursing station to monitor more closely for impulsive behavior which may increase risk of falls  [place] fall and safety precautions   Educate patient/family on patient safety including physical limitations and importance of using call bell for assistance   Modify environment to reduce risk of injury including disconnecting from pole when not in use, ensuring adequate lighting in room and restroom, ensuring that path to restroom is clear and free of trip hazards  Out of bed as tolerated    Impaired Vision   Patient has a history of macular degeneration and she noted that she has been having more difficulty with her vision   She notes she is having trouble with some of her bills as she has difficulty seeing to write out her checks   She admits she does have an ophthalmologist appointment scheduled in the near future   She is currently wearing eyeglasses but notes that with her worsening vision she does not feel as though they help   Recommend use of corrective lenses at all appropriate times  Encourage adequate lighting and encourage use of assistance with ambulation  Keep personal belongings close to avoid reaching  Encourage appropriate footwear at all times  Recommend large font for printed materials provided to patient  Recommend continued outpatient follow-up with ophthalmology     Impaired Hearing   Patient admits to some hearing impairment   She does not wear  hearing aids at baseline  Hearing impairment strongly correlated with depression, cognitive impairment, delirium and falls in the older adult  Use hearing aids or sound amplifier  Speak face to face  Use clear dictation and enunciation of words    Dentition/Appetite   Patient denies denture use  She admits that she has a poor appetite at baseline   Very few meals documented since admission   She consumed 25% of lunch today but no breakfast documented   Speech therapy is consulted and following   She had been on mirtazapine but this has since been discontinued   Ensure meal consistency is appropriate for all abilities   Consider nutrition consult   Continue aspiration precautions     Elimination   Patient is continent of bowel and sometimes incontinent of bladder at baseline  She does wear briefs in case she has accidents   She appears to be voiding appropriately here   Consider urinary retention protocol/bladder scans for change in mental status   Last documented bowel movement was yesterday but she notes it was very hard and she feels constipated   Current bowel regimen includes  Senna-S 8.6-50 mg daily   MiraLAX 17 g daily  Will increase Senna-S to BID dosing   Monitor for constipation and urinary retention     Insomnia   Patient has been having insomnia at home   Appears to be related to pain and anxiety   She is not taking any medication for sleep at baseline   Patient admitted to not sleeping well last week secondary to pain   Orthopedics had been consulted and voltaren gel was ordered   Tylenol was also increased   She was started on melatonin as well   She notes she was told she did sleep a bit last night   She remains on melatonin 3 mg daily at bedtime   Can consider increasing to 6 mg daily if needed  First line is behavioral therapy   Avoid sedative hypnotics including benzodiazepines and benadryl  Encourage staying awake during the day   Encourage daytime activities and morning exercise   Decrease or  eliminate daytime naps   Avoid caffeine especially during late afternoon and evening hours  Establish a nighttime routine  Implement sleep hygiene and limit nighttime interruptions    Anxiety/Depression  Patient has a documented history of anxiety and depression   She had been on buspirone for anxiety which was just recently increased due to increasing anxiety   Patient was noted to have worsening anxiety and depression so this was discontinued   Patient was started on mirtazapine and gabapentin by primary team at    Patient noted little improvement in anxiety after medication adjustments   Psychiatry was consulted on 10/11 and recommended switching gabapentin to lyrica and mirtazapine to duloxetine   Patients mood seemed stable/improved so I recommended holding off on medication adjustments   Patients regimen was adjusted as recommended by psychiatry on 10/12  Patient was noted to be unresponsive on 10/18 with a sodium of 118  Nephrology considering duloxetine to be contributing factor so this has been discontinued   Primary team had been inquiring about medication for anxiety since current regimen has been discontinued   SSRIs do have side effects of causing hyponatremia so would avoid if possible   If considering starting this would discuss with nephrology once sodium level has improved (escitalopram can be successful in treating anxiety but again can cause hyponatremia so would discuss with nephrology before initiating)   Mirtazapine does have the lowest risk of hyponatremia, however, this is usually more effective for depression as opposed to anxiety   If patient is requiring medication for acute anxiety can consider alprazolam as patient has taken this in the past but would recommend reintroducing this medication at lowest possible dose (alprazolam 0.125 mg)  Would avoid hydroxyzine if possible secondary to anticholinergic side effects   Psychiatry reconsulted today and recommended trialing patient off  medication but considering mirtazapine if needed  Mood appears stable on exam today   She appears to do better with family present who is able to calm and redirect her   Continue supportive care     Acute Exacerbation of Chronic Obstructive Pulmonary Disease   Patient was noted to have a recent COPD exacerbation requiring hospitalization in August 2024  She was given tapering dose of steroids, DuoNebs, and supplemental oxygen   She presented back to the hospital with increased fatigue, insomnia, and decreased appetite   While at  she was a rapid response for tachypnea and fatigue  She was placed on BiPAP for increased work of breathing and hypercarbic respiratory failure   She was transferred to [facility] for ICU monitoring on BiPAP  BiPAP was discontinued on 10/17   She had been back on supplemental oxygen   She was a rapid response on 10/18 and was noted to be hypercarpnic requiring BiPAP again   She is currently stable on supplemental oxygen today   She does wear BiPAP at bedtime and the mask does cause some anxiety as she is claustrophobic   Continue to monitor oxygen saturation and attempt to wean if able   Management per primary team     Neck/Shoulder Pain  Patient has been complaining of chronic neck and shoulder pain that has been worsening recently   Imaging revealed no acute abnormalities   She denies taking any medication for pain at home   She described the pain as sharp shooting on exam last week  Orthopedics was consulted and noted her exam was consistent with bicep tendonitis and osteoarthritis with potential rotator cuff pathology   She is WBAT to the LUE and OT evaluation recommended   Primary team has added voltaren gel to current regimen   She scheduled tylenol increased to 975 mg Q 8 hours   If patient requires stronger medication for pain would recommend treating pain using the geriatric pain protocol as discussed below  Oxycodone 2.5 mg po Q 4 prn moderate pain  Oxycodone 5 mg po Q 4 prn severe  pain   Continue nonpharmacological methods of pain management     Hyponatremia   Patient with sodium level of 118 on labs this morning   She was recently started on cymbalta which could be contributing   Cymbalta has since been discontinued   Nephrology consulted and is recommended 3% saline and a fluid restriction   Sodium level is improved to 137 on labs today   Management per nephrology and primary team         Subjective:   The patient is being seen and evaluated today at the bedside for geriatric follow-up. She is noted to be lying in bed in no acute distress. She is alert and oriented to person place on exam today.  She is complaining of pain in her left shoulder.  She denies chest pain.  She states she is feeling short of breath.  Her oxygen saturation is stable between 90% and 92%.  She notes that she did have a bowel movement recently but still feels constipated.  She states she thinks she slept okay last night.    Care was coordinated with patients nurse Delaney.  She notes no acute issues or events overnight.      Review of Systems   Constitutional:  Positive for activity change and appetite change (generally poor appetite). Negative for fatigue.   HENT:  Positive for hearing loss. Negative for dental problem.    Eyes:  Positive for visual disturbance (hx of macular degeneration (glasses)).   Respiratory:  Positive for shortness of breath. Negative for cough.    Cardiovascular:  Negative for chest pain and leg swelling.   Gastrointestinal:  Positive for constipation. Negative for abdominal distention and abdominal pain.   Genitourinary:  Negative for difficulty urinating and dysuria.   Musculoskeletal:  Positive for arthralgias and gait problem. Back pain: left shoulder.  Skin:  Negative for color change and pallor.   Neurological:  Positive for weakness. Negative for speech difficulty.   Psychiatric/Behavioral:  Positive for confusion. Negative for agitation and sleep disturbance. The patient is not  "nervous/anxious.        Objective:     Vitals: Blood pressure 133/50, pulse 84, temperature 98.3 °F (36.8 °C), temperature source Oral, resp. rate 20, height 5' 2\" (1.575 m), weight 48.4 kg (106 lb 11.2 oz), SpO2 98%, not currently breastfeeding.,Body mass index is 19.52 kg/m².      Intake/Output Summary (Last 24 hours) at 10/21/2024 1440  Last data filed at 10/21/2024 1305  Gross per 24 hour   Intake 357 ml   Output 650 ml   Net -293 ml       Current Medications: Reviewed    Physical Exam:   Physical Exam  Vitals and nursing note reviewed.   Constitutional:       General: She is not in acute distress.     Appearance: She is not ill-appearing.   HENT:      Head: Normocephalic.      Mouth/Throat:      Mouth: Mucous membranes are dry.   Eyes:      General: No scleral icterus.     Conjunctiva/sclera: Conjunctivae normal.   Cardiovascular:      Rate and Rhythm: Normal rate and regular rhythm.   Pulmonary:      Effort: Pulmonary effort is normal. No respiratory distress.   Abdominal:      General: Bowel sounds are normal. There is no distension.      Palpations: Abdomen is soft.      Tenderness: There is no abdominal tenderness.   Musculoskeletal:         General: Tenderness (left shoulder) present.      Right lower leg: No edema.      Left lower leg: No edema.   Skin:     General: Skin is warm and dry.   Neurological:      Mental Status: She is alert. She is disoriented.      Motor: Weakness present.      Gait: Gait abnormal.      Comments: Oriented to person and place  Forgetful          Invasive Devices       Peripheral Intravenous Line  Duration             Long-[place]well Peripheral IV (Midline) 10/18/24 Right Basilic 3 days    Peripheral IV 10/18/24 Dorsal (posterior);Left Hand 3 days    Peripheral IV 10/18/24 Right Antecubital 3 days                    Lab, Imaging and other studies: Results Review Statement: I reviewed AM labs.    Please note:  Voice-recognition software may have been used in the preparation of this " "document.  Occasional wrong word or \"sound-alike\" substitutions may have occurred due to the inherent limitations of voice recognition software.  Interpretation should be guided by context.    "

## 2024-10-21 NOTE — PLAN OF CARE
Problem: PAIN - ADULT  Goal: Verbalizes/displays adequate comfort level or baseline comfort level  Description: Interventions:  - Encourage patient to monitor pain and request assistance  - Assess pain using appropriate pain scale  - Administer analgesics based on type and severity of pain and evaluate response  - Implement non-pharmacological measures as appropriate and evaluate response  - Consider cultural and social influences on pain and pain management  - Notify physician/advanced practitioner if interventions unsuccessful or patient reports new pain  Outcome: Progressing     Problem: INFECTION - ADULT  Goal: Absence or prevention of progression during hospitalization  Description: INTERVENTIONS:  - Assess and monitor for signs and symptoms of infection  - Monitor lab/diagnostic results  - Monitor all insertion sites, i.e. indwelling lines, tubes, and drains  - Monitor endotracheal if appropriate and nasal secretions for changes in amount and color  - [place] appropriate cooling/warming therapies per order  - Administer medications as ordered  - Instruct and encourage patient and family to use good hand hygiene technique  - Identify and instruct in appropriate isolation precautions for identified infection/condition  Outcome: Progressing  Goal: Absence of fever/infection during neutropenic period  Description: INTERVENTIONS:  - Monitor WBC    Outcome: Progressing     Problem: SAFETY ADULT  Goal: Patient will remain free of falls  Description: INTERVENTIONS:  - Educate patient/family on patient safety including physical limitations  - Instruct patient to call for assistance with activity   - Consult OT/PT to assist with strengthening/mobility   - Keep Call bell within reach  - Keep bed low and locked with side rails adjusted as appropriate  - Keep care items and personal belongings within reach  - Initiate and maintain comfort rounds  - Make Fall Risk Sign visible to staff  - Offer Toileting every  Hours,  in advance of need  - Initiate/Maintain alarm  - Obtain necessary fall risk management equipment:   - Apply yellow socks and bracelet for high fall risk patients  - Consider moving patient to room near nurses station  Outcome: Progressing  Goal: Maintain or return to baseline ADL function  Description: INTERVENTIONS:  -  Assess patient's ability to carry out ADLs; assess patient's baseline for ADL function and identify physical deficits which impact ability to perform ADLs (bathing, care of mouth/teeth, toileting, grooming, dressing, etc.)  - Assess/evaluate cause of self-care deficits   - Assess range of motion  - Assess patient's mobility; develop plan if impaired  - Assess patient's need for assistive devices and provide as appropriate  - Encourage maximum independence but intervene and supervise when necessary  - Involve family in performance of ADLs  - Assess for home care needs following discharge   - Consider OT consult to assist with ADL evaluation and planning for discharge  - Provide patient education as appropriate  Outcome: Progressing  Goal: Maintains/Returns to pre admission functional level  Description: INTERVENTIONS:  - Perform AM-PAC 6 Click Basic Mobility/ Daily Activity assessment daily.  - Set and communicate daily mobility goal to care team and patient/family/caregiver.   - Collaborate with rehabilitation services on mobility goals if consulted  - Perform Range of Motion  times a day.  - Reposition patient every  hours.  - Dangle patient  times a day  - Stand patient  times a day  - Ambulate patient  times a day  - Out of bed to chair times a day   - Out of bed for meals  times a day  - Out of bed for toileting  - Record patient progress and toleration of activity level   Outcome: Progressing     Problem: DISCHARGE PLANNING  Goal: Discharge to home or other facility with appropriate resources  Description: INTERVENTIONS:  - Identify barriers to discharge w/patient and caregiver  - Arrange for  needed discharge resources and transportation as appropriate  - Identify discharge learning needs (meds, wound care, etc.)  - Arrange for interpretive services to assist at discharge as needed  - Refer to Case Management Department for coordinating discharge planning if the patient needs post-hospital services based on physician/advanced practitioner order or complex needs related to functional status, cognitive ability, or social support system  Outcome: Progressing     Problem: Knowledge Deficit  Goal: Patient/family/caregiver demonstrates understanding of disease process, treatment plan, medications, and discharge instructions  Description: Complete learning assessment and assess knowledge base.  Interventions:  - Provide teaching at level of understanding  - Provide teaching via preferred learning methods  Outcome: Progressing     Problem: RESPIRATORY - ADULT  Goal: Achieves optimal ventilation and oxygenation  Description: INTERVENTIONS:  - Assess for changes in respiratory status  - Assess for changes in mentation and behavior  - Position to facilitate oxygenation and minimize respiratory effort  - Oxygen administered by appropriate delivery if ordered  - Initiate smoking cessation education as indicated  - Encourage broncho-pulmonary hygiene including cough, deep breathe, Incentive Spirometry  - Assess the need for suctioning and aspirate as needed  - Assess and instruct to report SOB or any respiratory difficulty  - Respiratory Therapy support as indicated  Outcome: Progressing     Problem: Nutrition/Hydration-ADULT  Goal: Nutrient/Hydration intake appropriate for improving, restoring or maintaining nutritional needs  Description: Monitor and assess patient's nutrition/hydration status for malnutrition. Collaborate with interdisciplinary team and initiate plan and interventions as ordered.  Monitor patient's weight and dietary intake as ordered or per policy. Utilize nutrition screening tool and intervene as  necessary. Determine patient's food preferences and provide high-protein, high-caloric foods as appropriate.     INTERVENTIONS:  - Monitor oral intake, urinary output, labs, and treatment plans  - Assess nutrition and hydration status and recommend course of action  - Evaluate amount of meals eaten  - Assist patient with eating if necessary   - Allow adequate time for meals  - Recommend/ encourage appropriate diets, oral nutritional supplements, and vitamin/mineral supplements  - Order, calculate, and assess calorie counts as needed  - Recommend, monitor, and adjust tube feedings and TPN/PPN based on assessed needs  - Assess need for intravenous fluids  - Provide specific nutrition/hydration education as appropriate  - Include patient/family/caregiver in decisions related to nutrition  Outcome: Progressing     Problem: Prexisting or High Potential for Compromised Skin Integrity  Goal: Skin integrity is maintained or improved  Description: INTERVENTIONS:  - Identify patients at risk for skin breakdown  - Assess and monitor skin integrity  - Assess and monitor nutrition and hydration status  - Monitor labs   - Assess for incontinence   - Turn and reposition patient  - Assist with mobility/ambulation  - Relieve pressure over bony prominences  - Avoid friction and shearing  - Provide appropriate hygiene as needed including keeping skin clean and dry  - Evaluate need for skin moisturizer/barrier cream  - Collaborate with interdisciplinary team   - Patient/family teaching  - Consider wound care consult   Outcome: Progressing     Problem: SKIN/TISSUE INTEGRITY - ADULT  Goal: Skin Integrity remains intact(Skin Breakdown Prevention)  Description: Assess:  -Perform Lonnie assessment every   -Clean and moisturize skin every   -Inspect skin when repositioning, toileting, and assisting with ADLS  -Assess under medical devices such as  every   -Assess extremities for adequate circulation and sensation     Bed Management:  -Have  minimal linens on bed & keep smooth, unwrinkled  -Change linens as needed when moist or perspiring  -Avoid sitting or lying in one position for more than  hours while in bed  -Keep HOB at degrees     Toileting:  -Offer bedside commode  -Assess for incontinence every   -Use incontinent care products after each incontinent episode such as     Activity:  -Mobilize patient  times a day  -Encourage activity and walks on unit  -Encourage or provide ROM exercises   -Turn and reposition patient every  Hours  -Use appropriate equipment to lift or move patient in bed  -Instruct/ Assist with weight shifting every  when out of bed in chair  -Consider limitation of chair time  hour intervals    Skin Care:  -Avoid use of baby powder, tape, friction and shearing, hot water or constrictive clothing  -Relieve pressure over bony prominences using   -Do not massage red bony areas    Next Steps:  -Teach patient strategies to minimize risks such as    -Consider consults to  interdisciplinary teams such as   Outcome: Progressing     Problem: MUSCULOSKELETAL - ADULT  Goal: Maintain or return mobility to safest level of function  Description: INTERVENTIONS:  - Assess patient's ability to carry out ADLs; assess patient's baseline for ADL function and identify physical deficits which impact ability to perform ADLs (bathing, care of mouth/teeth, toileting, grooming, dressing, etc.)  - Assess/evaluate cause of self-care deficits   - Assess range of motion  - Assess patient's mobility  - Assess patient's need for assistive devices and provide as appropriate  - Encourage maximum independence but intervene and supervise when necessary  - Involve family in performance of ADLs  - Assess for home care needs following discharge   - Consider OT consult to assist with ADL evaluation and planning for discharge  - Provide patient education as appropriate  Outcome: Progressing  Goal: Maintain proper alignment of affected body part  Description:  INTERVENTIONS:  - Support, maintain and protect limb and body alignment  - Provide patient/ family with appropriate education  Outcome: Progressing

## 2024-10-21 NOTE — PLAN OF CARE
Problem: PAIN - ADULT  Goal: Verbalizes/displays adequate comfort level or baseline comfort level  Description: Interventions:  - Encourage patient to monitor pain and request assistance  - Assess pain using appropriate pain scale  - Administer analgesics based on type and severity of pain and evaluate response  - Implement non-pharmacological measures as appropriate and evaluate response  - Consider cultural and social influences on pain and pain management  - Notify physician/advanced practitioner if interventions unsuccessful or patient reports new pain  Outcome: Progressing     Problem: INFECTION - ADULT  Goal: Absence or prevention of progression during hospitalization  Description: INTERVENTIONS:  - Assess and monitor for signs and symptoms of infection  - Monitor lab/diagnostic results  - Monitor all insertion sites, i.e. indwelling lines, tubes, and drains  - Monitor endotracheal if appropriate and nasal secretions for changes in amount and color  - [place] appropriate cooling/warming therapies per order  - Administer medications as ordered  - Instruct and encourage patient and family to use good hand hygiene technique  - Identify and instruct in appropriate isolation precautions for identified infection/condition  Outcome: Progressing     Problem: SAFETY ADULT  Goal: Patient will remain free of falls  Description: INTERVENTIONS:  - Educate patient/family on patient safety including physical limitations  - Instruct patient to call for assistance with activity   - Consult OT/PT to assist with strengthening/mobility   - Keep Call bell within reach  - Keep bed low and locked with side rails adjusted as appropriate  - Keep care items and personal belongings within reach  - Initiate and maintain comfort rounds  - Make Fall Risk Sign visible to staff  - Offer Toileting every 2 Hours, in advance of need  - Initiate/Maintain alarm  - Obtain necessary fall risk management equipment  - Apply yellow socks and  bracelet for high fall risk patients  - Consider moving patient to room near nurses station  Outcome: Progressing  Goal: Maintain or return to baseline ADL function  Description: INTERVENTIONS:  -  Assess patient's ability to carry out ADLs; assess patient's baseline for ADL function and identify physical deficits which impact ability to perform ADLs (bathing, care of mouth/teeth, toileting, grooming, dressing, etc.)  - Assess/evaluate cause of self-care deficits   - Assess range of motion  - Assess patient's mobility; develop plan if impaired  - Assess patient's need for assistive devices and provide as appropriate  - Encourage maximum independence but intervene and supervise when necessary  - Involve family in performance of ADLs  - Assess for home care needs following discharge   - Consider OT consult to assist with ADL evaluation and planning for discharge  - Provide patient education as appropriate  Outcome: Progressing  Goal: Maintains/Returns to pre admission functional level  Description: INTERVENTIONS:  - Perform AM-PAC 6 Click Basic Mobility/ Daily Activity assessment daily.  - Set and communicate daily mobility goal to care team and patient/family/caregiver.   - Collaborate with rehabilitation services on mobility goals if consulted  - Perform Range of Motion 3 times a day.  - Reposition patient every 2 hours.  - Dangle patient 3 times a day  - Stand patient 3 times a day  - Ambulate patient 3 times a day  - Out of bed to chair 3 times a day   - Out of bed for meals 3 times a day  - Out of bed for toileting  - Record patient progress and toleration of activity level   Outcome: Progressing     Problem: DISCHARGE PLANNING  Goal: Discharge to home or other facility with appropriate resources  Description: INTERVENTIONS:  - Identify barriers to discharge w/patient and caregiver  - Arrange for needed discharge resources and transportation as appropriate  - Identify discharge learning needs (meds, wound care,  etc.)  - Arrange for interpretive services to assist at discharge as needed  - Refer to Case Management Department for coordinating discharge planning if the patient needs post-hospital services based on physician/advanced practitioner order or complex needs related to functional status, cognitive ability, or social support system  Outcome: Progressing     Problem: Knowledge Deficit  Goal: Patient/family/caregiver demonstrates understanding of disease process, treatment plan, medications, and discharge instructions  Description: Complete learning assessment and assess knowledge base.  Interventions:  - Provide teaching at level of understanding  - Provide teaching via preferred learning methods  Outcome: Progressing     Problem: RESPIRATORY - ADULT  Goal: Achieves optimal ventilation and oxygenation  Description: INTERVENTIONS:  - Assess for changes in respiratory status  - Assess for changes in mentation and behavior  - Position to facilitate oxygenation and minimize respiratory effort  - Oxygen administered by appropriate delivery if ordered  - Initiate smoking cessation education as indicated  - Encourage broncho-pulmonary hygiene including cough, deep breathe, Incentive Spirometry  - Assess the need for suctioning and aspirate as needed  - Assess and instruct to report SOB or any respiratory difficulty  - Respiratory Therapy support as indicated  Outcome: Progressing     Problem: Nutrition/Hydration-ADULT  Goal: Nutrient/Hydration intake appropriate for improving, restoring or maintaining nutritional needs  Description: Monitor and assess patient's nutrition/hydration status for malnutrition. Collaborate with interdisciplinary team and initiate plan and interventions as ordered.  Monitor patient's weight and dietary intake as ordered or per policy. Utilize nutrition screening tool and intervene as necessary. Determine patient's food preferences and provide high-protein, high-caloric foods as appropriate.      INTERVENTIONS:  - Monitor oral intake, urinary output, labs, and treatment plans  - Assess nutrition and hydration status and recommend course of action  - Evaluate amount of meals eaten  - Assist patient with eating if necessary   - Allow adequate time for meals  - Recommend/ encourage appropriate diets, oral nutritional supplements, and vitamin/mineral supplements  - Order, calculate, and assess calorie counts as needed  - Recommend, monitor, and adjust tube feedings and TPN/PPN based on assessed needs  - Assess need for intravenous fluids  - Provide specific nutrition/hydration education as appropriate  - Include patient/family/caregiver in decisions related to nutrition  Outcome: Progressing     Problem: Prexisting or High Potential for Compromised Skin Integrity  Goal: Skin integrity is maintained or improved  Description: INTERVENTIONS:  - Identify patients at risk for skin breakdown  - Assess and monitor skin integrity  - Assess and monitor nutrition and hydration status  - Monitor labs   - Assess for incontinence   - Turn and reposition patient  - Assist with mobility/ambulation  - Relieve pressure over bony prominences  - Avoid friction and shearing  - Provide appropriate hygiene as needed including keeping skin clean and dry  - Evaluate need for skin moisturizer/barrier cream  - Collaborate with interdisciplinary team   - Patient/family teaching  - Consider wound care consult   Outcome: Progressing     Problem: SKIN/TISSUE INTEGRITY - ADULT  Goal: Skin Integrity remains intact(Skin Breakdown Prevention)  Description: Assess:  -Perform Lonnie assessment  -Clean and moisturize skin  -Inspect skin when repositioning, toileting, and assisting with ADLS  -Assess under medical devices   -Assess extremities for adequate circulation and sensation     Bed Management:  -Have minimal linens on bed & keep smooth, unwrinkled  -Change linens as needed when moist or perspiring  -Avoid sitting or lying in one position  for more than 2 hours while in bed  -Keep HOB at 45 degrees     Toileting:  -Offer bedside commode  -Assess for incontinence  -Use incontinent care products after each incontinent episode     Activity:  -Mobilize patient 3 times a day  -Encourage activity and walks on unit  -Encourage or provide ROM exercises   -Turn and reposition patient every 2 Hours  -Use appropriate equipment to lift or move patient in bed  -Instruct/ Assist with weight shifting every  when out of bed in chair  -Consider limitation of chair time 2 hour intervals    Skin Care:  -Avoid use of baby powder, tape, friction and shearing, hot water or constrictive clothing  -Relieve pressure over bony prominences   -Do not massage red bony areas    Next Steps:  -Teach patient strategies to minimize risks    -Consider consults to  interdisciplinary teams  Outcome: Progressing     Problem: MUSCULOSKELETAL - ADULT  Goal: Maintain or return mobility to safest level of function  Description: INTERVENTIONS:  - Assess patient's ability to carry out ADLs; assess patient's baseline for ADL function and identify physical deficits which impact ability to perform ADLs (bathing, care of mouth/teeth, toileting, grooming, dressing, etc.)  - Assess/evaluate cause of self-care deficits   - Assess range of motion  - Assess patient's mobility  - Assess patient's need for assistive devices and provide as appropriate  - Encourage maximum independence but intervene and supervise when necessary  - Involve family in performance of ADLs  - Assess for home care needs following discharge   - Consider OT consult to assist with ADL evaluation and planning for discharge  - Provide patient education as appropriate  Outcome: Progressing  Goal: Maintain proper alignment of affected body part  Description: INTERVENTIONS:  - Support, maintain and protect limb and body alignment  - Provide patient/ family with appropriate education  Outcome: Progressing     Problem: COPING  Goal:  Pt/Family able to verbalize concerns and demonstrate effective coping strategies  Description: INTERVENTIONS:  - Assist patient/family to identify coping skills, available support systems and cultural and spiritual values  - Provide emotional support, including active listening and acknowledgement of concerns of patient and caregivers  - Reduce environmental stimuli, as able  - Provide patient education  - Assess for spiritual pain/suffering and initiate spiritual care, including notification of Pastoral Care or [facility] [facility] community as needed  - Assess effectiveness of coping strategies  Outcome: Progressing  Goal: Will report anxiety at manageable levels  Description: INTERVENTIONS:  - Administer medication as ordered  - Teach and encourage coping skills  - Provide emotional support  - Assess patient/family for anxiety and ability to cope  Outcome: Progressing     Problem: INFECTION - ADULT  Goal: Absence of fever/infection during neutropenic period  Description: INTERVENTIONS:  - Monitor WBC    Outcome: Completed

## 2024-10-21 NOTE — OCCUPATIONAL THERAPY NOTE
Occupational Therapy Progress Note     Patient Name: Sarha Zayas  Today's Date: 10/21/2024  Problem List  Principal Problem:    Anxiety/depression  Active Problems:    Acute exacerbation of chronic obstructive pulmonary disease (COPD) (HCC)    Gastroesophageal reflux disease without esophagitis    Hypertension    Temporal arteritis (HCC)    Acute respiratory failure with hypercapnia (HCC)    Hypothyroidism    Macular degeneration    Metabolic encephalopathy    Urinary retention    Hyponatremia    Chronic respiratory failure with hypercapnia (HCC)    Metabolic alkalosis with respiratory acidosis    Hyperkalemia    Seizure-like activity (HCC)          10/21/24 1125   OT Last Visit   OT Visit Date 10/21/24   Note Type   Note Type Treatment   Pain Assessment   Pain Assessment Tool 0-10   Pain Score 8   Pain Location/Orientation Location: Generalized   Restrictions/Precautions   Weight Bearing Precautions Per Order No   Other Precautions Chair Alarm;Bed Alarm;Cognitive;Fall Risk;O2;Telemetry;Visual impairment   ADL   Where Assessed Edge of bed   Grooming Assistance 4  Minimal Assistance   UB Bathing Assistance 4  Minimal Assistance   LB Bathing Assistance 3  Moderate Assistance   UB Dressing Assistance 4  Minimal Assistance   LB Dressing Assistance 3  Moderate Assistance   Toileting Assistance  2  Maximal Assistance   Functional Standing Tolerance   Time 1- 2 min   Activity RW for support.   Bed Mobility   Supine to Sit 4  Minimal assistance   Additional items Assist x 1;Increased time required   Transfers   Sit to Stand 4  Minimal assistance   Additional items Assist x 1;Increased time required;Verbal cues   Stand to Sit 4  Minimal assistance   Additional items Assist x 1;Increased time required   Stand pivot 4  Minimal assistance   Additional items Assist x 1;Increased time required   Functional Mobility   Functional Mobility 4  Minimal assistance   Additional Comments Ax1, functional distances.   Additional items  Rolling walker   Cognition   Overall Cognitive Status WFL   Arousal/Participation Alert;Responsive   Attention Attends with cues to redirect   Orientation Level Oriented to person;Disoriented to time;Oriented to place;Oriented to situation   Memory Decreased recall of precautions;Decreased recall of recent events;Decreased short term memory   Following Commands Follows one step commands with increased time or repetition   Activity Tolerance   Activity Tolerance Patient tolerated treatment well   Medical Staff Made Aware RN   Assessment   Assessment Pt seen for skilled OT tx this date. Tx focused on improving strength, activity tolerance and balance, safety awareness to increase independence with self care tasks. Pt tolerated session fair. Pt performed UB dressing/ bathing with Agus, LB dressing / bathing modA , Toileting modA,  bed mobility Agus, transfers Agus, mobility with RW Agus. Pt demonstrated standing balance during functional tasks fair. Pt demonstrated ability to safely and appropriately attend to all tasks during session. Pt required moderate verbal cuing during session to safely complete tasks. Current OT DC recommendations for pt is level 2 resources.   Plan   Treatment Interventions ADL retraining;Functional transfer training;UE strengthening/ROM;Endurance training;Patient/family training;Equipment evaluation/education;Compensatory technique education;Continued evaluation;Activityengagement   Goal Expiration Date 10/25/24   OT Treatment Day 3   OT Frequency 3-5x/wk   Discharge Recommendation   Rehab Resource Intensity Level, OT II (Moderate Resource Intensity)   Additional Comments  The patient's raw score on the -PAC Daily Activity Inpatient Short Form is 16. A raw score of less than 19 suggests the patient may benefit from discharge to post-acute rehabilitation services. Please refer to the recommendation of the Occupational Therapist for safe discharge planning.   -PAC Daily Activity Inpatient    Lower Body Dressing 2   Bathing 2   Toileting 3   Upper Body Dressing 3   Grooming 3   Eating 4   Daily Activity Raw Score 17   Daily Activity Standardized Score (Calc for Raw Score >=11) 37.26   AM-PAC Applied Cognition Inpatient   Following a Speech/Presentation 3   Understanding Ordinary Conversation 3   Taking Medications 2   Remembering Where Things Are Placed or Put Away 2   Remembering List of 4-5 Errands 2   Taking Care of Complicated Tasks 2   Applied Cognition Raw Score 14   Applied Cognition Standardized Score 32.02     Michelle Davila, OT

## 2024-10-22 LAB
BUN SERPL-MCNC: 19 MG/DL (ref 5–25)
CALCIUM SERPL-MCNC: 8.7 MG/DL (ref 8.4–10.2)
CHLORIDE SERPL-SCNC: 88 MMOL/L (ref 96–108)
CO2 SERPL-SCNC: >45 MMOL/L (ref 21–32)
CREAT SERPL-MCNC: 0.34 MG/DL (ref 0.6–1.3)
GFR SERPL CREATININE-BSD FRML MDRD: 104 ML/MIN/1.73SQ M
GLUCOSE SERPL-MCNC: 86 MG/DL (ref 65–140)
MAGNESIUM SERPL-MCNC: 1.8 MG/DL (ref 1.9–2.7)
POTASSIUM SERPL-SCNC: 4.5 MMOL/L (ref 3.5–5.3)
SODIUM SERPL-SCNC: 136 MMOL/L (ref 135–147)

## 2024-10-22 PROCEDURE — 94640 AIRWAY INHALATION TREATMENT: CPT

## 2024-10-22 PROCEDURE — 94760 N-INVAS EAR/PLS OXIMETRY 1: CPT

## 2024-10-22 PROCEDURE — 80048 BASIC METABOLIC PNL TOTAL CA: CPT | Performed by: STUDENT IN AN ORGANIZED HEALTH CARE EDUCATION/TRAINING PROGRAM

## 2024-10-22 PROCEDURE — 99233 SBSQ HOSP IP/OBS HIGH 50: CPT

## 2024-10-22 PROCEDURE — 99232 SBSQ HOSP IP/OBS MODERATE 35: CPT | Performed by: STUDENT IN AN ORGANIZED HEALTH CARE EDUCATION/TRAINING PROGRAM

## 2024-10-22 PROCEDURE — 83735 ASSAY OF MAGNESIUM: CPT | Performed by: STUDENT IN AN ORGANIZED HEALTH CARE EDUCATION/TRAINING PROGRAM

## 2024-10-22 RX ORDER — MAGNESIUM SULFATE HEPTAHYDRATE 40 MG/ML
2 INJECTION, SOLUTION INTRAVENOUS ONCE
Status: COMPLETED | OUTPATIENT
Start: 2024-10-22 | End: 2024-10-23

## 2024-10-22 RX ORDER — ANTIOX #8/OM3/DHA/EPA/LUT/ZEAX 250-2.5 MG
1 CAPSULE ORAL DAILY
Status: DISCONTINUED | OUTPATIENT
Start: 2024-10-22 | End: 2024-10-23 | Stop reason: HOSPADM

## 2024-10-22 RX ADMIN — DICLOFENAC SODIUM 2 G: 10 GEL TOPICAL at 17:37

## 2024-10-22 RX ADMIN — LIDOCAINE 1 PATCH: 50 PATCH TOPICAL at 20:35

## 2024-10-22 RX ADMIN — Medication 1 TABLET: at 09:41

## 2024-10-22 RX ADMIN — BUDESONIDE INHALATION 0.5 MG: 0.5 SUSPENSION RESPIRATORY (INHALATION) at 07:30

## 2024-10-22 RX ADMIN — SENNOSIDES AND DOCUSATE SODIUM 1 TABLET: 8.6; 5 TABLET ORAL at 17:32

## 2024-10-22 RX ADMIN — PANTOPRAZOLE SODIUM 40 MG: 40 TABLET, DELAYED RELEASE ORAL at 05:48

## 2024-10-22 RX ADMIN — SENNOSIDES AND DOCUSATE SODIUM 1 TABLET: 8.6; 5 TABLET ORAL at 09:41

## 2024-10-22 RX ADMIN — LEVOTHYROXINE SODIUM 25 MCG: 25 TABLET ORAL at 05:48

## 2024-10-22 RX ADMIN — BUDESONIDE INHALATION 0.5 MG: 0.5 SUSPENSION RESPIRATORY (INHALATION) at 19:49

## 2024-10-22 RX ADMIN — GLYCERIN 1 DROP: .002; .002; .01 SOLUTION/ DROPS OPHTHALMIC at 09:53

## 2024-10-22 RX ADMIN — SODIUM CHLORIDE 1 G: 1 TABLET ORAL at 09:42

## 2024-10-22 RX ADMIN — PROPRANOLOL HYDROCHLORIDE 40 MG: 20 TABLET ORAL at 21:51

## 2024-10-22 RX ADMIN — PREDNISONE 2.5 MG: 2.5 TABLET ORAL at 09:42

## 2024-10-22 RX ADMIN — GLYCERIN 1 DROP: .002; .002; .01 SOLUTION/ DROPS OPHTHALMIC at 21:53

## 2024-10-22 RX ADMIN — POLYETHYLENE GLYCOL 3350 17 G: 17 POWDER, FOR SOLUTION ORAL at 09:38

## 2024-10-22 RX ADMIN — MAGNESIUM HYDROXIDE 15 ML: 2400 SUSPENSION ORAL at 22:04

## 2024-10-22 RX ADMIN — ACETAMINOPHEN 650 MG: 325 TABLET, FILM COATED ORAL at 09:41

## 2024-10-22 RX ADMIN — CYANOCOBALAMIN TAB 500 MCG 1000 MCG: 500 TAB at 09:41

## 2024-10-22 RX ADMIN — MAGNESIUM SULFATE HEPTAHYDRATE 2 G: 40 INJECTION, SOLUTION INTRAVENOUS at 09:51

## 2024-10-22 RX ADMIN — GLYCERIN 1 DROP: .002; .002; .01 SOLUTION/ DROPS OPHTHALMIC at 17:31

## 2024-10-22 RX ADMIN — SODIUM CHLORIDE 1 G: 1 TABLET ORAL at 17:33

## 2024-10-22 RX ADMIN — OFLOXACIN 1 DROP: 3 SOLUTION/ DROPS OPHTHALMIC at 12:44

## 2024-10-22 RX ADMIN — MENTHOL 10%, METHYL SALICYLATE 15%: 10; 15 CREAM TOPICAL at 21:54

## 2024-10-22 RX ADMIN — OFLOXACIN 1 DROP: 3 SOLUTION/ DROPS OPHTHALMIC at 17:36

## 2024-10-22 RX ADMIN — MELATONIN 3 MG: 3 TAB ORAL at 21:52

## 2024-10-22 RX ADMIN — DICLOFENAC SODIUM 2 G: 10 GEL TOPICAL at 09:43

## 2024-10-22 RX ADMIN — DICLOFENAC SODIUM 2 G: 10 GEL TOPICAL at 21:53

## 2024-10-22 RX ADMIN — Medication 1 CAPSULE: at 17:32

## 2024-10-22 RX ADMIN — DICLOFENAC SODIUM 2 G: 10 GEL TOPICAL at 12:44

## 2024-10-22 RX ADMIN — ENOXAPARIN SODIUM 30 MG: 30 INJECTION SUBCUTANEOUS at 09:39

## 2024-10-22 RX ADMIN — OFLOXACIN 1 DROP: 3 SOLUTION/ DROPS OPHTHALMIC at 21:53

## 2024-10-22 RX ADMIN — OFLOXACIN 1 DROP: 3 SOLUTION/ DROPS OPHTHALMIC at 09:42

## 2024-10-22 NOTE — PROGRESS NOTES
Progress Note - Geriatric Medicine   Sarah Zayas 79 y.o. female MRN: [id number]  Unit/Bed#: [place] [id number] -[id number] Encounter: [id number]      Assessment/Plan:    Cognitive Screening  Patient has no documented history of memory loss or cognitive impairment   Patient states she does have difficulty with her short term memory at baseline   Per discussion with patient and her son the patient did recently just start with [facility] (they are paying out-of-pocket) but she only had one visit prior to coming in  Family is willing to increase days and hours if needed to assist patient   Patient was a stroke alert on 10/18 as patient was unresponsive and had spastic/tonic activity of the upper extremity   She was transferred from the medical surgical unit to the ICU for closer monitoring   Her mentation has since improved   Patient is alert and oriented x 3 on my exam today   She is noted to be quite anxious and forgetful at times   Most recent TSH on 10/10/2024 noted to be 0.342  Most recent vitamin B 12 level on 8/30/2024 noted to be 1918  Patient appears to be on vitamin B 12 supplementation   Can consider dose reduction or discontinuation   CT of the head on 10/10/2024 revealed mild microangiopathic changes   No MoCA or cognitive testing noted in epic  Can consider having OT complete MoCA evaluation once patient is more medically stable   Patient can follow-up with geriatrics in the outpatient setting for concerns of memory loss   Patients son is interested in this so will place outpatient referral  Maintain delirium precautions as discussed below   Redirect and reorient as needed  Keep physically, mentally, and socially active     Delirium Precautions   Current mentation: alert and oriented x 3   Patient is at high risk secondary to age, possible underlying cognitive impairment, COPD exacerbation, steroid use (though this appears chronic), acute/chronic pain, hyponatremia, sleep disturbances, anxiety, depression, vision impairment,  hearing impariment, and hospitalization   Maintain delirium precautions   Provide redirection, reorientation, and distraction techniques  Maintain fall and safety precautions   Assist with ADLs/IADLs  Avoid deliriogenic medications such as tramadol, benzodiazepines, anticholinergics, benadryl  Treat pain using geriatric pain protocol   Encourage oral hydration and nutrition   Monitor for constipation and urinary retention   Implement sleep hygiene and limit night time interuptions   Maintain sleep-wake cycle   Encourage early and frequent mobilization   Most recent EKG on 10/17/2024 revealed a QTc interval of 402  If all other interventions are unsuccessful for acute agitation and behaviors, can consider Zyprexa 2.5 mg IM Q 8 hours prn   Would avoid benzodiazepines such as Ativan as these can worsen delirium     Deconditioning   Patient is at increased risk for deconditioning secondary to possible underlying cognitive impairment, COPD exacerbation, steroid use, hyponatremia, chronic pain, sleep disturbances, anxiety, depression, vision impairment, hearing impairment, weakness, gait dysfunction, and hospitalization    Continue to optimize diet, hydration, and mobility for healing    on labs today     Patient has no documented history of CKD   Keep hydrated   Normocytic anemia   Baseline hemoglobin appears to be 11-13  Hemoglobin on labs yesterday stable at 9.8  Continue to monitor CBC   Transfuse for hemoglobin < 7   Monitor for signs and symptoms of infection, dehydration, DVT, and skin breakdown    Frailty   Clinical Frail Scale: 5- Mildly Frail  More evident slowing, needs help high order IADLs (transport, bills, medications)  Progressively impairs shopping and walking outside alone, meal prep and housework  Most recent albumin on 10/16/2024 noted to be 3.6  Consider nutrition consult  Encourage protein supplementation     Ambulatory Dysfunction/Falls  Patient notes no recent falls at home   She does not  typically ambulate with any assistive devices at baseline but she does have a walker available if needed  PT/OT consulted to assist with strengthening/mobility and assist with discharge planning to appropriate level of care  Assess patient frequently for physical needs, encourage use of assistant devices as needed and directed by PT/OT  Identify cognitive and physical deficits and behaviors that affect risk of falls  Consider moving patient closer to nursing station to monitor more closely for impulsive behavior which may increase risk of falls  [place] fall and safety precautions   Educate patient/family on patient safety including physical limitations and importance of using call bell for assistance   Modify environment to reduce risk of injury including disconnecting from pole when not in use, ensuring adequate lighting in room and restroom, ensuring that path to restroom is clear and free of trip hazards  Out of bed as tolerated    Impaired Vision   Patient has a history of macular degeneration and she noted that she has been having more difficulty with her vision   She notes she is having trouble with some of her bills as she has difficulty seeing to write out her checks   She admits she does have an ophthalmologist appointment scheduled in the near future   She is currently wearing eyeglasses but notes that with her worsening vision she does not feel as though they help   Recommend use of corrective lenses at all appropriate times  Encourage adequate lighting and encourage use of assistance with ambulation  Keep personal belongings close to avoid reaching  Encourage appropriate footwear at all times  Recommend large font for printed materials provided to patient  Recommend continued outpatient follow-up with ophthalmology     Impaired Hearing   Patient admits to some hearing impairment   She does not wear hearing aids at baseline  Hearing impairment strongly correlated with depression, cognitive impairment,  delirium and falls in the older adult  Use hearing aids or sound amplifier  Speak face to face  Use clear dictation and enunciation of words    Dentition/Appetite   Patient denies denture use  She admits that she has a poor appetite at baseline   Very few meals documented since admission   She consumed 25% of breakfast today  Speech therapy is consulted and following   She had been on mirtazapine but this has since been discontinued   Ensure meal consistency is appropriate for all abilities   Consider nutrition consult   Continue aspiration precautions     Elimination   Patient is continent of bowel and sometimes incontinent of bladder at baseline  She does wear briefs in case she has accidents   She appears to be voiding appropriately here   Consider urinary retention protocol/bladder scans for change in mental status   Last documented bowel movement was on 10/20  Current bowel regimen includes  Senna-S 8.6-50 mg BID  MiraLAX 17 g daily  Monitor for constipation and urinary retention     Insomnia   Patient has been having insomnia at home   Appears to be related to pain and anxiety   She is not taking any medication for sleep at baseline   Patient admitted to not sleeping well last week secondary to pain   Orthopedics had been consulted and voltaren gel was ordered   Tylenol was also increased   She was started on melatonin as well   She is not sure if she slept last night   She remains on melatonin 3 mg daily at bedtime   Can consider increasing to 6 mg daily if needed  First line is behavioral therapy   Avoid sedative hypnotics including benzodiazepines and benadryl  Encourage staying awake during the day   Encourage daytime activities and morning exercise   Decrease or eliminate daytime naps   Avoid caffeine especially during late afternoon and evening hours  Establish a nighttime routine  Implement sleep hygiene and limit nighttime interruptions    Anxiety/Depression  Patient has a documented history of anxiety  and depression   She had been on buspirone for anxiety which was just recently increased due to increasing anxiety   Patient was noted to have worsening anxiety and depression so this was discontinued   Patient was started on mirtazapine and gabapentin by primary team at    Patient noted little improvement in anxiety after medication adjustments   Psychiatry was consulted on 10/11 and recommended switching gabapentin to lyrica and mirtazapine to duloxetine   Patients mood seemed stable/improved so I recommended holding off on medication adjustments   Patients regimen was adjusted as recommended by psychiatry on 10/12  Patient was noted to be unresponsive on 10/18 with a sodium of 118  Nephrology considering duloxetine to be contributing factor so this has been discontinued   Primary team had been inquiring about medication for anxiety since current regimen has been discontinued   SSRIs do have side effects of causing hyponatremia so would avoid if possible   If considering starting this would discuss with nephrology once sodium level has improved (escitalopram can be successful in treating anxiety but again can cause hyponatremia so would discuss with nephrology before initiating)   Mirtazapine does have the lowest risk of hyponatremia, however, this is usually more effective for depression as opposed to anxiety   If patient is requiring medication for acute anxiety can consider alprazolam as patient has taken this in the past but would recommend reintroducing this medication at lowest possible dose (alprazolam 0.125 mg)  Would avoid hydroxyzine if possible secondary to anticholinergic side effects   Psychiatry reconsulted yesterday and recommended trialing patient off medication but considering mirtazapine if needed  Patient is noted to be anxious on exam today as she is getting ready to go to rehab   She is redirectable when conversing with her   Continue supportive care     Acute Exacerbation of Chronic  Obstructive Pulmonary Disease   Patient was noted to have a recent COPD exacerbation requiring hospitalization in August 2024  She was given tapering dose of steroids, DuoNebs, and supplemental oxygen   She presented back to the hospital with increased fatigue, insomnia, and decreased appetite   While at  she was a rapid response for tachypnea and fatigue  She was placed on BiPAP for increased work of breathing and hypercarbic respiratory failure   She was transferred to [facility] for ICU monitoring on BiPAP  BiPAP was discontinued on 10/17   She had been back on supplemental oxygen   She was a rapid response on 10/18 and was noted to be hypercarpnic requiring BiPAP again   She is currently stable on supplemental oxygen today   She does wear BiPAP at bedtime and the mask does cause some anxiety as she is claustrophobic   Continue to monitor oxygen saturation and attempt to wean if able   Management per primary team     Neck/Shoulder Pain  Patient has been complaining of chronic neck and shoulder pain that has been worsening recently   Imaging revealed no acute abnormalities   She denies taking any medication for pain at home   She described the pain as sharp shooting on exam last week  Orthopedics was consulted and noted her exam was consistent with bicep tendonitis and osteoarthritis with potential rotator cuff pathology   She is WBAT to the LUE and OT evaluation recommended   Primary team has added voltaren gel to current regimen   She scheduled tylenol increased to 975 mg Q 8 hours   If patient requires stronger medication for pain would recommend treating pain using the geriatric pain protocol as discussed below  Oxycodone 2.5 mg po Q 4 prn moderate pain  Oxycodone 5 mg po Q 4 prn severe pain   Continue nonpharmacological methods of pain management     Hyponatremia   Patient with sodium level of 118 on labs this morning   She was recently started on cymbalta which could be contributing   Cymbalta has since been  "discontinued   Nephrology consulted and is recommended 3% saline and a fluid restriction   Sodium level is improved to 136 on labs today   Management per nephrology and primary team         Subjective:   The patient is being seen and evaluated today at the bedside for geriatric follow-up. She is noted to be lying in bed in no acute distress. She is alert and oriented x 3 on exam today.  She is complaining of pain in her left shoulder.  She denies chest pain and shortness of breath.  Her oxygen saturation is stable between 92% and 94% on 2 L.  She notes that she is not sure if she slept last night.     Care was coordinated with patients nurse Angelia.  She notes no acute issues or events overnight.      Review of Systems   Constitutional:  Positive for activity change and appetite change (generally poor appetite). Negative for fatigue.   HENT:  Positive for hearing loss. Negative for dental problem.    Eyes:  Positive for visual disturbance (hx of macular degeneration (glasses)).   Respiratory:  Negative for cough and shortness of breath.         Currently stable on chronic 2 L of supplemental oxygen   Cardiovascular:  Negative for chest pain and leg swelling.   Gastrointestinal:  Positive for constipation. Negative for abdominal distention and abdominal pain.   Genitourinary:  Negative for difficulty urinating and dysuria.   Musculoskeletal:  Positive for arthralgias and gait problem. Back pain: left shoulder.  Skin:  Negative for color change and pallor.   Neurological:  Positive for weakness. Negative for speech difficulty.   Psychiatric/Behavioral:  Negative for agitation, confusion and sleep disturbance. The patient is nervous/anxious (patinet extremely anxious about discharge).        Objective:     Vitals: Blood pressure (!) 109/44, pulse 80, temperature 98 °F (36.7 °C), resp. rate 18, height 5' 2\" (1.575 m), weight 49 kg (108 lb 0.4 oz), SpO2 94%, not currently breastfeeding.,Body mass index is 19.76 " "kg/m².      Intake/Output Summary (Last 24 hours) at 10/22/2024 1256  Last data filed at 10/22/2024 0930  Gross per 24 hour   Intake 720 ml   Output 550 ml   Net 170 ml       Current Medications: Reviewed    Physical Exam:   Physical Exam  Vitals and nursing note reviewed.   Constitutional:       General: She is not in acute distress.     Appearance: She is not ill-appearing.   HENT:      Head: Normocephalic.      Mouth/Throat:      Mouth: Mucous membranes are dry.   Eyes:      General: No scleral icterus.     Conjunctiva/sclera: Conjunctivae normal.   Cardiovascular:      Rate and Rhythm: Normal rate and regular rhythm.   Pulmonary:      Effort: Pulmonary effort is normal. No respiratory distress.   Abdominal:      General: Bowel sounds are normal. There is no distension.      Palpations: Abdomen is soft.      Tenderness: There is no abdominal tenderness.   Musculoskeletal:         General: Tenderness (left shoulder) present.      Right lower leg: No edema.      Left lower leg: No edema.   Skin:     General: Skin is warm and dry.   Neurological:      Mental Status: She is alert and oriented to person, place, and time.      Motor: Weakness present.      Gait: Gait abnormal.      Comments: Forgetful at times   Psychiatric:         Mood and Affect: Mood is anxious.          Invasive Devices       Peripheral Intravenous Line  Duration             Long-Dwell Peripheral IV (Midline) 10/18/24 Right Basilic 4 days    Peripheral IV 10/22/24 Dorsal (posterior);Left Forearm <1 day              Drain  Duration             External Urinary Catheter 1 day                    Lab, Imaging and other studies: Results Review Statement: I reviewed AM labs.    Please note:  Voice-recognition software may have been used in the preparation of this document.  Occasional wrong word or \"sound-alike\" substitutions may have occurred due to the inherent limitations of voice recognition software.  Interpretation should be guided by context.    "

## 2024-10-22 NOTE — PROGRESS NOTES
Progress Note - Hospitalist   Name: Sarah Zayas 79 y.o. female I MRN: [id number]  Unit/Bed#: [place] [address] -01 I Date of Admission: 10/10/2024   Date of Service: 10/22/2024 I Hospital Day: 12    Assessment & Plan  Acute respiratory failure with hypercapnia (HCC)  Back to baseline oxygen, recommend 2L NC  Acute exacerbation of chronic obstructive pulmonary disease (COPD) (HCC)  History of severe COPD  Transfer from  to ICU on 10/10, downgraded to MS on 10/10  Initially concerned for exacerbation but likely oversedation from benzodiazepine  Continue budesonide, Xopenex prn  Repeat VBG with noted CO2 retention  Avoid benzos, discontinue lyrica  Pulmonary evaluated, recommending BiPAP at bedtime as tolerated when discharged to nursing home.  Gastroesophageal reflux disease without esophagitis  Continue PPI therapy  Hypertension  Continue propranolol, increase to 40 mg twice daily  Discontinue losartan in the setting of hyperkalemia  Blood pressure currently controlled  Temporal arteritis (HCC)  Continue prednisone 2.5 mg  Anxiety/depression  Evaluated by psychiatry  Discontinue Lyrica, BuSpar, gabapentin, Remeron and benzos  Anxiety largely driving patient's symptoms  Previously recommended Cymbalta, discontinued due to hyponatremia  Psychiatry consulted for recommendations  at this time medication risks and side effects outweigh any potential benefit, will hold off on starting her on any anxiolytics    Hypothyroidism  Levothyroxine reduced to 25 mcg  TSH suppressed and free T4 elevated-suspect some iatrogenic hyperthyroidism  Repeat TSH and T4 in 8 to 12 weeks outpatient  Macular degeneration  Follows with [facility]  Continue artifical tears  Did have some discharge in her left eye concerning for conjunctivitis  Improvement with ofloxacin, to complete 7-day course  Metabolic encephalopathy  Had episodes of increasing lethargy, difficult to arouse  Likely due to hyponatremia, possible UTI and  hypercapnia  Previous CT imaging without any acute process  MRI brain without any evidence of stroke or acute intracranial process  Urine cultures growing Aerococcus, completed 5-day course of antibiotics  VBG with elevated CO2, recommending BiPAP at bedtime  Appreciate neurology evaluation, suspect seizure-like activity due to hyponatremia not recommending AED  Mental status has improved though patient continues to deal with ongoing anxiety  Psychiatry consulted  Urinary retention  Did have episode of urinary retention, now resolved with incontinence  Urine cultures growing Aerococcus, completed 5-day course of antibiotics  Hyponatremia  Suspect Cymbalta induced hyponatremia, recently started on 10/13  Transferred to ICU on 10/18-10/20 given concerns of seizure-like activity  Required tolvaptan with sodium now improving to 136  Continue torsemide 5 mg daily, discontinue losartan and fluid restriction 1500 mL  Nephrology recommending salt tabs 1 g 2 times daily  Chronic respiratory failure with hypercapnia (HCC)  Chronically elevated CO2 with VBG noting hypercapnia  Pulmonary recommending BiPAP at bedtime on [facility] at [facility]  Will need to follow-up outpatient with pulmonology for formal BiPAP evaluation  Hyperkalemia  Resolved, discontinue losartan  Seizure-like activity (HCC)  Neurology suspects seizure-like activity as a result of hyponatremia now resolved  Did not recommend any further imaging, MRI canceled  Was given IV Keppra 2 g, no further AED recommended  Metabolic alkalosis with respiratory acidosis      VTE Pharmacologic Prophylaxis:   Moderate Risk (Score 3-4) - Pharmacological DVT Prophylaxis Ordered: enoxaparin (Lovenox).    Mobility:   Basic Mobility Inpatient Raw Score: 18  JH-HLM Goal: 6: Walk 10 steps or more  JH-HLM Achieved: 3: Sit at edge of bed  JH-HLM Goal NOT achieved. Continue with multidisciplinary rounding and encourage appropriate mobility to improve upon JH-HLM goals.    Patient Centered  Rounds: I performed bedside rounds with nursing staff today.   Discussions with Specialists or Other Care Team Provider: gerontology, Psych    Education and Discussions with Family / Patient:  son.     Current Length of Stay: 12 day(s)  Current Patient Status: Inpatient   Certification Statement: The patient will continue to require additional inpatient hospital stay due to pending placement  Discharge Plan: Anticipate discharge in 24-48 hrs to rehab facility.    Code Status: Level 1 - Full Code    Subjective   Patient seen and examined at bedside. No physical complaints however states she is very anxious.     Objective :  Temp:  [96.9 °F (36.1 °C)-98 °F (36.7 °C)] 98 °F (36.7 °C)  HR:  [67-80] 80  BP: ()/(44-55) 109/44  Resp:  [18-20] 18  SpO2:  [94 %-99 %] 94 %  O2 Device: Nasal cannula  Nasal Cannula O2 Flow Rate (L/min):  [2 L/min-3 L/min] 2 L/min    Body mass index is 19.76 kg/m².     Input and Output Summary (last 24 hours):     Intake/Output Summary (Last 24 hours) at 10/22/2024 1608  Last data filed at 10/22/2024 0930  Gross per 24 hour   Intake 480 ml   Output 300 ml   Net 180 ml       Physical Exam  Vitals and nursing note reviewed.   Constitutional:       General: She is not in acute distress.     Appearance: She is well-developed.   HENT:      Head: Normocephalic and atraumatic.   Eyes:      Conjunctiva/sclera: Conjunctivae normal.   Cardiovascular:      Rate and Rhythm: Normal rate and regular rhythm.      Heart sounds: No murmur heard.  Pulmonary:      Effort: Pulmonary effort is normal. No respiratory distress.      Breath sounds: Normal breath sounds.   Abdominal:      Palpations: Abdomen is soft.      Tenderness: There is no abdominal tenderness.   Musculoskeletal:         General: No swelling.      Cervical back: Neck supple.   Skin:     General: Skin is warm and dry.      Capillary Refill: Capillary refill takes less than 2 seconds.   Neurological:      Mental Status: She is alert.    Psychiatric:      Comments: Very anxious         Lines/Drains:  Lines/Drains/Airways       Active Status       Name Placement date Placement time Site Days    External Urinary Catheter 10/21/24  0900  -- 1                            Lab Results: I have reviewed the following results:   Results from last 7 days   Lab Units 10/21/24  0449 10/20/24  0519 10/19/24  0454   WBC Thousand/uL 8.70   < > 8.60   HEMOGLOBIN g/dL 9.8*   < > 10.3*   HEMATOCRIT % 32.7*   < > 31.8*   PLATELETS Thousands/uL 251   < > 205   SEGS PCT %  --   --  82*   LYMPHO PCT %  --   --  8*   MONO PCT %  --   --  9   EOS PCT %  --   --  1    < > = values in this interval not displayed.     Results from last 7 days   Lab Units 10/22/24  0535 10/21/24  0449 10/20/24  0809 10/17/24  0005 10/16/24  1601   SODIUM mmol/L 136   < > 136   < > 124*   POTASSIUM mmol/L 4.5   < > 4.6   < > 4.8   CHLORIDE mmol/L 88*   < > 89*   < > 78*   CO2 mmol/L >45*   < > 44*   < > 45*   BUN mg/dL 19   < > 14   < > 29*   CREATININE mg/dL 0.34*   < > 0.50*   < > 0.43*   ANION GAP mmol/L  --   --  3*   < > 1*   CALCIUM mg/dL 8.7   < > 8.7   < > 8.8   ALBUMIN g/dL  --   --   --   --  3.6   TOTAL BILIRUBIN mg/dL  --   --   --   --  0.26   ALK PHOS U/L  --   --   --   --  41   ALT U/L  --   --   --   --  25   AST U/L  --   --   --   --  30   GLUCOSE RANDOM mg/dL 86   < > 110   < > 159*    < > = values in this interval not displayed.     Results from last 7 days   Lab Units 10/18/24  0746   INR  0.92     Results from last 7 days   Lab Units 10/18/24  0602 10/18/24  0230   POC GLUCOSE mg/dl 133 150*         Results from last 7 days   Lab Units 10/18/24  0724 10/17/24  0636   PROCALCITONIN ng/ml 0.13 0.15       Recent Cultures (last 7 days):   Results from last 7 days   Lab Units 10/16/24  1604   URINE CULTURE  >100,000 cfu/ml Aerococcus urinae*  <10,000 cfu/ml       Imaging Results Review: No pertinent imaging studies reviewed.  Other Study Results Review: No additional  pertinent studies reviewed.    Last 24 Hours Medication List:     Current Facility-Administered Medications:     acetaminophen (TYLENOL) tablet 650 mg, Q6H PRN    amLODIPine (NORVASC) tablet 5 mg, Daily    Artificial Tears Op Soln 1 drop, TID    budesonide (PULMICORT) inhalation solution 0.5 mg, Q12H    cyanocobalamin (VITAMIN B-12) tablet 1,000 mcg, Daily    Diclofenac Sodium (VOLTAREN) 1 % topical gel 2 g, 4x Daily    enoxaparin (LOVENOX) subcutaneous injection 30 mg, Q24H RACHEL    ipratropium (ATROVENT) 0.02 % inhalation solution 0.5 mg, Q6H PRN    levalbuterol (XOPENEX) inhalation solution 1.25 mg, Q6H PRN    levothyroxine tablet 25 mcg, Early Morning    lidocaine (LIDODERM) 5 % patch 1 patch, Daily    Lidocaine Viscous HCl (XYLOCAINE) 2 % mucosal solution 15 mL, 4x Daily PRN    magnesium hydroxide (MILK OF MAGNESIA) oral suspension 15 mL, Daily PRN    melatonin tablet 3 mg, HS    menthol-methyl salicylate (BENGAY) 10-15 % cream, TID    multivitamin-minerals (CENTRUM) tablet 1 tablet, Daily    ofloxacin (OCUFLOX) 0.3 % ophthalmic solution 1 drop, 4x Daily    ondansetron (ZOFRAN) injection 4 mg, Q6H PRN    pantoprazole (PROTONIX) EC tablet 40 mg, Early Morning    phenol (CHLORASEPTIC) 1.4 % mucosal liquid 1 spray, Q2H PRN    polyethylene glycol (MIRALAX) packet 17 g, Daily    predniSONE tablet 2.5 mg, Daily    PreserVision AREDS 2 CAPS 1 capsule, Daily    propranolol (INDERAL) tablet 40 mg, Q12H RACHEL    senna-docusate sodium (SENOKOT S) 8.6-50 mg per tablet 1 tablet, BID    sodium chloride (OCEAN) 0.65 % nasal spray 1 spray, Q1H PRN    sodium chloride tablet 1 g, BID With Meals    torsemide (DEMADEX) tablet 5 mg, Daily    Administrative Statements   Today, Patient Was Seen By: Vigrinie Birch MD      **Please Note: This note may have been constructed using a voice recognition system.**

## 2024-10-22 NOTE — PLAN OF CARE
Problem: PAIN - ADULT  Goal: Verbalizes/displays adequate comfort level or baseline comfort level  Description: Interventions:  - Encourage patient to monitor pain and request assistance  - Assess pain using appropriate pain scale  - Administer analgesics based on type and severity of pain and evaluate response  - Implement non-pharmacological measures as appropriate and evaluate response  - Consider cultural and social influences on pain and pain management  - Notify physician/advanced practitioner if interventions unsuccessful or patient reports new pain  Outcome: Progressing     Problem: INFECTION - ADULT  Goal: Absence or prevention of progression during hospitalization  Description: INTERVENTIONS:  - Assess and monitor for signs and symptoms of infection  - Monitor lab/diagnostic results  - Monitor all insertion sites, i.e. indwelling lines, tubes, and drains  - Monitor endotracheal if appropriate and nasal secretions for changes in amount and color  - [place] appropriate cooling/warming therapies per order  - Administer medications as ordered  - Instruct and encourage patient and family to use good hand hygiene technique  - Identify and instruct in appropriate isolation precautions for identified infection/condition  Outcome: Progressing     Problem: DISCHARGE PLANNING  Goal: Discharge to home or other facility with appropriate resources  Description: INTERVENTIONS:  - Identify barriers to discharge w/patient and caregiver  - Arrange for needed discharge resources and transportation as appropriate  - Identify discharge learning needs (meds, wound care, etc.)  - Arrange for interpretive services to assist at discharge as needed  - Refer to Case Management Department for coordinating discharge planning if the patient needs post-hospital services based on physician/advanced practitioner order or complex needs related to functional status, cognitive ability, or social support system  Outcome: Progressing      Problem: Knowledge Deficit  Goal: Patient/family/caregiver demonstrates understanding of disease process, treatment plan, medications, and discharge instructions  Description: Complete learning assessment and assess knowledge base.  Interventions:  - Provide teaching at level of understanding  - Provide teaching via preferred learning methods  Outcome: Progressing     Problem: RESPIRATORY - ADULT  Goal: Achieves optimal ventilation and oxygenation  Description: INTERVENTIONS:  - Assess for changes in respiratory status  - Assess for changes in mentation and behavior  - Position to facilitate oxygenation and minimize respiratory effort  - Oxygen administered by appropriate delivery if ordered  - Initiate smoking cessation education as indicated  - Encourage broncho-pulmonary hygiene including cough, deep breathe, Incentive Spirometry  - Assess the need for suctioning and aspirate as needed  - Assess and instruct to report SOB or any respiratory difficulty  - Respiratory Therapy support as indicated  Outcome: Progressing

## 2024-10-22 NOTE — CASE MANAGEMENT
Case Management Discharge Planning Note    Patient name Sarah Zayas  Location [facility] 2 /South 2 M* MRN [id number]  : 1945 Date 10/22/2024       Current Admission Date: 10/10/2024  Current Admission Diagnosis:Anxiety/depression   Patient Active Problem List    Diagnosis Date Noted Date Diagnosed    Metabolic alkalosis with respiratory acidosis 10/18/2024     Hyperkalemia 10/18/2024     Seizure-like activity (HCC) 10/18/2024     Hyponatremia 10/17/2024     Chronic respiratory failure with hypercapnia (HCC) 10/17/2024     Metabolic encephalopathy 10/16/2024     Urinary retention 10/16/2024     Macular degeneration 10/15/2024     Hypothyroidism 10/13/2024     Acute on chronic respiratory failure with hypoxia and hypercapnia (HCC) 10/10/2024     Acute respiratory failure with hypercapnia (HCC) 10/10/2024     Venous insufficiency of lower extremity 2024     Epigastric pain 2024     Advance care planning 2024     At risk for constipation 2024     At risk for delirium 2024     Physical deconditioning 2024     Periodontitis 2024     Polypharmacy 2024     Diastolic dysfunction 2024     Elevated vitamin B12 level 2024     Normocytic anemia 2024     Neck swelling 2024     Anxiety/depression 2024     Severe protein-calorie malnutrition (HCC) 2024     COPD, severe ([facility]) 2024     Abnormal CT scan 2023     Infectious sialoadenitis of major salivary gland 2023     Left upper lobe pulmonary nodule 2023     Postnasal drip 2023     Raynaud's phenomenon without gangrene 2022     Temporal arteritis ([facility]) 2020     Hypertension 10/11/2018     Other specified hypothyroidism 2018     Gastroesophageal reflux disease without esophagitis 2018     Acute exacerbation of chronic obstructive pulmonary disease (COPD) (HCC) 2012     Vitamin D deficiency 2012     Osteoarthritis 2012        LOS (days): 12  Geometric Mean LOS (GMLOS) (days): 3.4  Days to GMLOS:-8.8     OBJECTIVE:  Risk of Unplanned Readmission Score: 45.84         Current admission status: Inpatient   Preferred Pharmacy:   [facility] - [place] - [address]  [address]  [address]  [place] [address]  Phone: [phone] Fax: [phone]    [facility] - [place] - [address]  [address] [address]  Phone: [phone] Fax: [phone]    Primary Care Provider: Nicolas Nix MD    Primary Insurance: [employer] REP  Secondary Insurance:     DISCHARGE DETAILS:    Discharge planning discussed with:: Patient's sonDebora Martinez  Freedom of Choice: Yes  Comments - Freedom of Choice: Patient and family agreeable to [facility].  CM contacted family/caregiver?: Yes (CM left message for patient's daughterMelissa; CM spoke with patient's sonDebora Martinez)  Were Treatment Team discharge recommendations reviewed with patient/caregiver?: Yes  Did patient/caregiver verbalize understanding of patient care needs?: N/A- going to facility       Contacts  Patient Contacts: Melissa Cunningham (Daughter)  [phone] (Mobile)  Relationship to Patient:: Family  Contact Method: Phone  Phone Number: Melissa Cunningham (Daughter)  [phone] (Mobile)  Reason/Outcome: Emergency Contact, Discharge Planning    Requested Home Health Care         Is the patient interested in C at discharge?: No    Other Referral/Resources/Interventions Provided:  Interventions: Short Term Rehab  Referral Comments: [facility]    Treatment Team Recommendation: Short Term Rehab  Discharge Destination Plan:: Short Term Rehab  Transport at Discharge : BLS Ambulance        IMM Given (Date):: 10/22/24  IMM Given to:: Family  Family notified:: Patient's sonJuan PH: [phone]  Additional Comments: CM submitted for new authorization for [facility] with CM discharge  support, in [place]; new authorization was submitted due to outside three day request of initial authorization request. CM spoke with patient's son, Juan via phone (PH: [phone]). CM provided patient's son with IMM confirming patient's family ability to appeal if feels patient is not medically clear at discharge. CM to follow for further discharge planning needs.

## 2024-10-22 NOTE — ASSESSMENT & PLAN NOTE
Chronically elevated CO2 with VBG noting hypercapnia  Pulmonary recommending BiPAP at bedtime on discharge at [facility]  Will need to follow-up outpatient with pulmonology for formal BiPAP evaluation

## 2024-10-22 NOTE — CASE MANAGEMENT
Case Management Discharge Planning Note    Patient name Sarah Zayas  Location [facility] 2 /South 2 M* MRN [id number]  : 1945 Date 10/22/2024       Current Admission Date: 10/10/2024  Current Admission Diagnosis:Anxiety/depression   Patient Active Problem List    Diagnosis Date Noted Date Diagnosed    Metabolic alkalosis with respiratory acidosis 10/18/2024     Hyperkalemia 10/18/2024     Seizure-like activity (HCC) 10/18/2024     Hyponatremia 10/17/2024     Chronic respiratory failure with hypercapnia (HCC) 10/17/2024     Metabolic encephalopathy 10/16/2024     Urinary retention 10/16/2024     Macular degeneration 10/15/2024     Hypothyroidism 10/13/2024     Acute on chronic respiratory failure with hypoxia and hypercapnia (HCC) 10/10/2024     Acute respiratory failure with hypercapnia (HCC) 10/10/2024     Venous insufficiency of lower extremity 2024     Epigastric pain 2024     Advance care planning 2024     At risk for constipation 2024     At risk for delirium 2024     Physical deconditioning 2024     Periodontitis 2024     Polypharmacy 2024     Diastolic dysfunction 2024     Elevated vitamin B12 level 2024     Normocytic anemia 2024     Neck swelling 2024     Anxiety/depression 2024     Severe protein-calorie malnutrition (HCC) 2024     COPD, severe ([facility]) 2024     Abnormal CT scan 2023     Infectious sialoadenitis of major salivary gland 2023     Left upper lobe pulmonary nodule 2023     Postnasal drip 2023     Raynaud's phenomenon without gangrene 2022     Temporal arteritis ([facility]) 2020     Hypertension 10/11/2018     Other specified hypothyroidism 2018     Gastroesophageal reflux disease without esophagitis 2018     Acute exacerbation of chronic obstructive pulmonary disease (COPD) (HCC) 2012     Vitamin D deficiency 2012     Osteoarthritis 2012        LOS (days): 12  Geometric Mean LOS (GMLOS) (days): 3.4  Days to GMLOS:-8.6     OBJECTIVE:  Risk of Unplanned Readmission Score: 45.46         Current admission status: Inpatient   Preferred Pharmacy:   [facility] - [place] - [address]  [address]  [address]  [place] [address]  Phone: [phone] Fax: [phone]    [facility] [place] - [address]  [address] [address]  Phone: [phone] Fax: [phone]    Primary Care Provider: Nicolas Nix MD    Primary Insurance: [employer] REP  Secondary Insurance:     DISCHARGE DETAILS:     Additional Comments: CM spoke with patient and SLIM attending to provide IMM, CM left message with [facility] attending for patient's daughter, Melissa (PH: [phone]). Cm to follow for further discharge planning.        CM followed up with DC support team; if start of care is out of 3 day timeline, patient needs new authorization. CM added DC support team to [place] referral to request. CM updated [facility].

## 2024-10-22 NOTE — PLAN OF CARE
Problem: PAIN - ADULT  Goal: Verbalizes/displays adequate comfort level or baseline comfort level  Description: Interventions:  - Encourage patient to monitor pain and request assistance  - Assess pain using appropriate pain scale  - Administer analgesics based on type and severity of pain and evaluate response  - Implement non-pharmacological measures as appropriate and evaluate response  - Consider cultural and social influences on pain and pain management  - Notify physician/advanced practitioner if interventions unsuccessful or patient reports new pain  Outcome: Progressing     Problem: INFECTION - ADULT  Goal: Absence or prevention of progression during hospitalization  Description: INTERVENTIONS:  - Assess and monitor for signs and symptoms of infection  - Monitor lab/diagnostic results  - Monitor all insertion sites, i.e. indwelling lines, tubes, and drains  - Monitor endotracheal if appropriate and nasal secretions for changes in amount and color  - Brimson appropriate cooling/warming therapies per order  - Administer medications as ordered  - Instruct and encourage patient and family to use good hand hygiene technique  - Identify and instruct in appropriate isolation precautions for identified infection/condition  Outcome: Progressing     Problem: SAFETY ADULT  Goal: Patient will remain free of falls  Description: INTERVENTIONS:  - Educate patient/family on patient safety including physical limitations  - Instruct patient to call for assistance with activity   - Consult OT/PT to assist with strengthening/mobility   - Keep Call bell within reach  - Keep bed low and locked with side rails adjusted as appropriate  - Keep care items and personal belongings within reach  - Initiate and maintain comfort rounds  - Make Fall Risk Sign visible to staff  - Offer Toileting every 2 Hours, in advance of need  - Initiate/Maintain bed alarm  - Obtain necessary fall risk management equipment  - Apply yellow socks and  bracelet for high fall risk patients  - Consider moving patient to room near nurses station  Outcome: Progressing  Goal: Maintain or return to baseline ADL function  Description: INTERVENTIONS:  -  Assess patient's ability to carry out ADLs; assess patient's baseline for ADL function and identify physical deficits which impact ability to perform ADLs (bathing, care of mouth/teeth, toileting, grooming, dressing, etc.)  - Assess/evaluate cause of self-care deficits   - Assess range of motion  - Assess patient's mobility; develop plan if impaired  - Assess patient's need for assistive devices and provide as appropriate  - Encourage maximum independence but intervene and supervise when necessary  - Involve family in performance of ADLs  - Assess for home care needs following discharge   - Consider OT consult to assist with ADL evaluation and planning for discharge  - Provide patient education as appropriate  Outcome: Progressing  Goal: Maintains/Returns to pre admission functional level  Description: INTERVENTIONS:  - Perform AM-PAC 6 Click Basic Mobility/ Daily Activity assessment daily.  - Set and communicate daily mobility goal to care team and patient/family/caregiver.   - Collaborate with rehabilitation services on mobility goals if consulted  - Perform Range of Motion 3 times a day.  - Reposition patient every 2 hours.  - Dangle patient 3 times a day  - Stand patient 3 times a day  - Ambulate patient 3 times a day  - Out of bed to chair 3 times a day   - Out of bed for meals 3 times a day  - Out of bed for toileting  - Record patient progress and toleration of activity level   Outcome: Progressing     Problem: DISCHARGE PLANNING  Goal: Discharge to home or other facility with appropriate resources  Description: INTERVENTIONS:  - Identify barriers to discharge w/patient and caregiver  - Arrange for needed discharge resources and transportation as appropriate  - Identify discharge learning needs (meds, wound care,  etc.)  - Arrange for interpretive services to assist at discharge as needed  - Refer to Case Management Department for coordinating discharge planning if the patient needs post-hospital services based on physician/advanced practitioner order or complex needs related to functional status, cognitive ability, or social support system  Outcome: Progressing     Problem: Knowledge Deficit  Goal: Patient/family/caregiver demonstrates understanding of disease process, treatment plan, medications, and discharge instructions  Description: Complete learning assessment and assess knowledge base.  Interventions:  - Provide teaching at level of understanding  - Provide teaching via preferred learning methods  Outcome: Progressing     Problem: RESPIRATORY - ADULT  Goal: Achieves optimal ventilation and oxygenation  Description: INTERVENTIONS:  - Assess for changes in respiratory status  - Assess for changes in mentation and behavior  - Position to facilitate oxygenation and minimize respiratory effort  - Oxygen administered by appropriate delivery if ordered  - Initiate smoking cessation education as indicated  - Encourage broncho-pulmonary hygiene including cough, deep breathe, Incentive Spirometry  - Assess the need for suctioning and aspirate as needed  - Assess and instruct to report SOB or any respiratory difficulty  - Respiratory Therapy support as indicated  Outcome: Progressing     Problem: Nutrition/Hydration-ADULT  Goal: Nutrient/Hydration intake appropriate for improving, restoring or maintaining nutritional needs  Description: Monitor and assess patient's nutrition/hydration status for malnutrition. Collaborate with interdisciplinary team and initiate plan and interventions as ordered.  Monitor patient's weight and dietary intake as ordered or per policy. Utilize nutrition screening tool and intervene as necessary. Determine patient's food preferences and provide high-protein, high-caloric foods as appropriate.      INTERVENTIONS:  - Monitor oral intake, urinary output, labs, and treatment plans  - Assess nutrition and hydration status and recommend course of action  - Evaluate amount of meals eaten  - Assist patient with eating if necessary   - Allow adequate time for meals  - Recommend/ encourage appropriate diets, oral nutritional supplements, and vitamin/mineral supplements  - Order, calculate, and assess calorie counts as needed  - Recommend, monitor, and adjust tube feedings and TPN/PPN based on assessed needs  - Assess need for intravenous fluids  - Provide specific nutrition/hydration education as appropriate  - Include patient/family/caregiver in decisions related to nutrition  Outcome: Progressing     Problem: Prexisting or High Potential for Compromised Skin Integrity  Goal: Skin integrity is maintained or improved  Description: INTERVENTIONS:  - Identify patients at risk for skin breakdown  - Assess and monitor skin integrity  - Assess and monitor nutrition and hydration status  - Monitor labs   - Assess for incontinence   - Turn and reposition patient  - Assist with mobility/ambulation  - Relieve pressure over bony prominences  - Avoid friction and shearing  - Provide appropriate hygiene as needed including keeping skin clean and dry  - Evaluate need for skin moisturizer/barrier cream  - Collaborate with interdisciplinary team   - Patient/family teaching  - Consider wound care consult   Outcome: Progressing     Problem: SKIN/TISSUE INTEGRITY - ADULT  Goal: Skin Integrity remains intact(Skin Breakdown Prevention)  Description: Assess:  -Perform Lonnie assessment   -Clean and moisturize skin  -Inspect skin when repositioning, toileting, and assisting with ADLS  -Assess under medical devices  -Assess extremities for adequate circulation and sensation     Bed Management:  -Have minimal linens on bed & keep smooth, unwrinkled  -Change linens as needed when moist or perspiring  -Avoid sitting or lying in one position  for more than 2 hours while in bed  -Keep HOB at 30 degrees     Toileting:  -Offer bedside commode  -Assess for incontinence  -Use incontinent care products after each incontinent episode     Activity:  -Mobilize patient 3 times a day  -Encourage activity and walks on unit  -Encourage or provide ROM exercises   -Turn and reposition patient every 2 Hours  -Use appropriate equipment to lift or move patient in bed  -Instruct/ Assist with weight shifting when out of bed in chair  -Consider limitation of chair time 2 hour intervals    Skin Care:  -Avoid use of baby powder, tape, friction and shearing, hot water or constrictive clothing  -Relieve pressure over bony prominences  -Do not massage red bony areas    Next Steps:  -Teach patient strategies to minimize risks   -Consider consults to  interdisciplinary teams  Outcome: Progressing     Problem: MUSCULOSKELETAL - ADULT  Goal: Maintain or return mobility to safest level of function  Description: INTERVENTIONS:  - Assess patient's ability to carry out ADLs; assess patient's baseline for ADL function and identify physical deficits which impact ability to perform ADLs (bathing, care of mouth/teeth, toileting, grooming, dressing, etc.)  - Assess/evaluate cause of self-care deficits   - Assess range of motion  - Assess patient's mobility  - Assess patient's need for assistive devices and provide as appropriate  - Encourage maximum independence but intervene and supervise when necessary  - Involve family in performance of ADLs  - Assess for home care needs following discharge   - Consider OT consult to assist with ADL evaluation and planning for discharge  - Provide patient education as appropriate  Outcome: Progressing  Goal: Maintain proper alignment of affected body part  Description: INTERVENTIONS:  - Support, maintain and protect limb and body alignment  - Provide patient/ family with appropriate education  Outcome: Progressing     Problem: COPING  Goal: Pt/Family able  to verbalize concerns and demonstrate effective coping strategies  Description: INTERVENTIONS:  - Assist patient/family to identify coping skills, available support systems and cultural and spiritual values  - Provide emotional support, including active listening and acknowledgement of concerns of patient and caregivers  - Reduce environmental stimuli, as able  - Provide patient education  - Assess for spiritual pain/suffering and initiate spiritual care, including notification of Pastoral Care or felicitas based community as needed  - Assess effectiveness of coping strategies  Outcome: Progressing  Goal: Will report anxiety at manageable levels  Description: INTERVENTIONS:  - Administer medication as ordered  - Teach and encourage coping skills  - Provide emotional support  - Assess patient/family for anxiety and ability to cope  Outcome: Progressing

## 2024-10-22 NOTE — ASSESSMENT & PLAN NOTE
Follows with [facility]  Continue artifical tears  Did have some discharge in her left eye concerning for conjunctivitis  Improvement with ofloxacin, to complete 7-day course

## 2024-10-22 NOTE — CASE MANAGEMENT
Case Management Discharge Planning Note    Patient name Sarah Zayas  Location [facility] M* MRN [id number]  : 1945 Date 10/22/2024       Current Admission Date: 10/10/2024  Current Admission Diagnosis:Anxiety/depression   Patient Active Problem List    Diagnosis Date Noted Date Diagnosed    Metabolic alkalosis with respiratory acidosis 10/18/2024     Hyperkalemia 10/18/2024     Seizure-like activity (HCC) 10/18/2024     Hyponatremia 10/17/2024     Chronic respiratory failure with hypercapnia (HCC) 10/17/2024     Metabolic encephalopathy 10/16/2024     Urinary retention 10/16/2024     Macular degeneration 10/15/2024     Hypothyroidism 10/13/2024     Acute on chronic respiratory failure with hypoxia and hypercapnia (HCC) 10/10/2024     Acute respiratory failure with hypercapnia (HCC) 10/10/2024     Venous insufficiency of lower extremity 2024     Epigastric pain 2024     Advance care planning 2024     At risk for constipation 2024     At risk for delirium 2024     Physical deconditioning 2024     Periodontitis 2024     Polypharmacy 2024     Diastolic dysfunction 2024     Elevated vitamin B12 level 2024     Normocytic anemia 2024     Neck swelling 2024     Anxiety/depression 2024     Severe protein-calorie malnutrition (HCC) 2024     COPD, 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[facility]) 2024     Abnormal CT scan 2023     Infectious sialoadenitis of major salivary gland 2023     Left upper lobe pulmonary nodule 2023     Postnasal drip 2023     Raynaud's phenomenon without gangrene 2022     Temporal arteritis ([facility]) 2020     Hypertension 10/11/2018     Other specified hypothyroidism 2018     Gastroesophageal reflux disease without esophagitis 2018     Acute exacerbation of chronic obstructive pulmonary disease (COPD) (HCC) 2012     Vitamin D deficiency 2012     Osteoarthritis 2012        LOS (days): 12  Geometric Mean LOS (GMLOS) (days): 3.4  Days to GMLOS:-8.9     OBJECTIVE:  Risk of Unplanned Readmission Score: 46.23         Current admission status: Inpatient   Preferred Pharmacy:   [facility] - [place] - [address]  [address]  [address]  [place] [address]  Phone: [phone] Fax: [phone]    [facility] [place] - [address]  [address] [address]  Phone: [phone] Fax: [phone]    Primary Care Provider: Nicolas Nix MD    Primary Insurance: [employer]  Secondary Insurance:     DISCHARGE DETAILS:     Additional Comments: CM spoke with patient's daughter, Melissa via phone; CM updated patient's daugther's phone number as it was incorrect. CM provided update on discharge plan and provided IMM; patient's daughter understood. CM followed up with patient's [facility] attending with corrected phone number ([phone]) for medical update; CM informed patient's daughter awaiting return of insurance authorization update. Patient's daughter agreeable and understood. CM to follow.

## 2024-10-22 NOTE — CASE MANAGEMENT
[facility] received request for authorization from Care Manager.  Authorization request submitted for: [facility]  Facility Name: [facility]  NPI:[id number]  Facility MD: Dr. Ahuja    NPI: [id number]   Authorization initiated by contacting insurance:  hema   Via: [employer] Portal   Clinicals submitted via [employer] attachment   Pending Reference #:[id number]     Care Manager notified: delma bruner     Updates to authorization status will be noted in chart. Please reach out to  for updates on any clinical information.

## 2024-10-23 ENCOUNTER — TRANSITIONAL CARE MANAGEMENT (OUTPATIENT)
Dept: FAMILY MEDICINE CLINIC | Facility: CLINIC | Age: 79
End: 2024-10-23

## 2024-10-23 ENCOUNTER — PATIENT OUTREACH (OUTPATIENT)
Dept: CASE MANAGEMENT | Facility: OTHER | Age: 79
End: 2024-10-23

## 2024-10-23 VITALS
HEART RATE: 67 BPM | OXYGEN SATURATION: 97 % | WEIGHT: 110.23 LBS | DIASTOLIC BLOOD PRESSURE: 51 MMHG | SYSTOLIC BLOOD PRESSURE: 115 MMHG | RESPIRATION RATE: 17 BRPM | BODY MASS INDEX: 20.28 KG/M2 | TEMPERATURE: 97.6 F | HEIGHT: 62 IN

## 2024-10-23 PROCEDURE — 99239 HOSP IP/OBS DSCHRG MGMT >30: CPT | Performed by: STUDENT IN AN ORGANIZED HEALTH CARE EDUCATION/TRAINING PROGRAM

## 2024-10-23 PROCEDURE — 94640 AIRWAY INHALATION TREATMENT: CPT

## 2024-10-23 PROCEDURE — 94760 N-INVAS EAR/PLS OXIMETRY 1: CPT

## 2024-10-23 RX ORDER — LEVOTHYROXINE SODIUM 25 UG/1
25 TABLET ORAL
Status: ON HOLD
Start: 2024-10-24

## 2024-10-23 RX ORDER — LANOLIN ALCOHOL/MO/W.PET/CERES
3 CREAM (GRAM) TOPICAL
Status: ON HOLD
Start: 2024-10-23

## 2024-10-23 RX ORDER — OFLOXACIN 3 MG/ML
1 SOLUTION/ DROPS OPHTHALMIC 4 TIMES DAILY
Qty: 5 ML | Refills: 0
Start: 2024-10-23 | End: 2024-10-23

## 2024-10-23 RX ORDER — AMLODIPINE BESYLATE 5 MG/1
5 TABLET ORAL DAILY
Status: ON HOLD
Start: 2024-10-24

## 2024-10-23 RX ORDER — SODIUM CHLORIDE 1 G/1
1 TABLET ORAL 2 TIMES DAILY WITH MEALS
Status: ON HOLD
Start: 2024-10-23

## 2024-10-23 RX ORDER — LIDOCAINE 50 MG/G
1 PATCH TOPICAL DAILY
Status: ON HOLD
Start: 2024-10-23

## 2024-10-23 RX ORDER — AMOXICILLIN 250 MG
1 CAPSULE ORAL 2 TIMES DAILY
Status: ON HOLD
Start: 2024-10-23

## 2024-10-23 RX ORDER — BISACODYL 10 MG
10 SUPPOSITORY, RECTAL RECTAL ONCE
Status: COMPLETED | OUTPATIENT
Start: 2024-10-23 | End: 2024-10-23

## 2024-10-23 RX ORDER — TORSEMIDE 5 MG/1
5 TABLET ORAL DAILY
Status: ON HOLD
Start: 2024-10-24

## 2024-10-23 RX ORDER — MUSCLE RUB CREAM 100; 150 MG/G; MG/G
CREAM TOPICAL 3 TIMES DAILY
Status: ON HOLD
Start: 2024-10-23

## 2024-10-23 RX ORDER — POLYETHYLENE GLYCOL 3350 17 G/17G
17 POWDER, FOR SOLUTION ORAL DAILY
Status: ON HOLD
Start: 2024-10-24

## 2024-10-23 RX ADMIN — PROPRANOLOL HYDROCHLORIDE 40 MG: 20 TABLET ORAL at 08:17

## 2024-10-23 RX ADMIN — DICLOFENAC SODIUM 2 G: 10 GEL TOPICAL at 12:59

## 2024-10-23 RX ADMIN — ENOXAPARIN SODIUM 30 MG: 30 INJECTION SUBCUTANEOUS at 08:17

## 2024-10-23 RX ADMIN — Medication 1 CAPSULE: at 08:21

## 2024-10-23 RX ADMIN — OFLOXACIN 1 DROP: 3 SOLUTION/ DROPS OPHTHALMIC at 08:21

## 2024-10-23 RX ADMIN — LEVOTHYROXINE SODIUM 25 MCG: 25 TABLET ORAL at 06:04

## 2024-10-23 RX ADMIN — PREDNISONE 2.5 MG: 2.5 TABLET ORAL at 08:17

## 2024-10-23 RX ADMIN — ACETAMINOPHEN 650 MG: 325 TABLET, FILM COATED ORAL at 13:11

## 2024-10-23 RX ADMIN — Medication 1 TABLET: at 08:17

## 2024-10-23 RX ADMIN — TORSEMIDE 5 MG: 10 TABLET ORAL at 08:17

## 2024-10-23 RX ADMIN — CYANOCOBALAMIN TAB 500 MCG 1000 MCG: 500 TAB at 08:17

## 2024-10-23 RX ADMIN — SENNOSIDES AND DOCUSATE SODIUM 1 TABLET: 8.6; 5 TABLET ORAL at 08:17

## 2024-10-23 RX ADMIN — POLYETHYLENE GLYCOL 3350 17 G: 17 POWDER, FOR SOLUTION ORAL at 08:17

## 2024-10-23 RX ADMIN — DICLOFENAC SODIUM 2 G: 10 GEL TOPICAL at 08:33

## 2024-10-23 RX ADMIN — ACETAMINOPHEN 650 MG: 325 TABLET, FILM COATED ORAL at 01:43

## 2024-10-23 RX ADMIN — BISACODYL 10 MG: 10 SUPPOSITORY RECTAL at 09:45

## 2024-10-23 RX ADMIN — SODIUM CHLORIDE 1 G: 1 TABLET ORAL at 08:17

## 2024-10-23 RX ADMIN — GLYCERIN 1 DROP: .002; .002; .01 SOLUTION/ DROPS OPHTHALMIC at 08:21

## 2024-10-23 RX ADMIN — BUDESONIDE INHALATION 0.5 MG: 0.5 SUSPENSION RESPIRATORY (INHALATION) at 07:34

## 2024-10-23 RX ADMIN — AMLODIPINE BESYLATE 5 MG: 5 TABLET ORAL at 08:17

## 2024-10-23 RX ADMIN — PANTOPRAZOLE SODIUM 40 MG: 40 TABLET, DELAYED RELEASE ORAL at 06:04

## 2024-10-23 NOTE — DISCHARGE INSTR - AVS FIRST PAGE
Patient will need repeat TSH 8 weeks from discharge  Referral to neurovascular specialist has been made for f/u of intracranial aneurysms  Patient will need to follow-up with Pulmonology following discharge from rehab for BIPAP qualification

## 2024-10-23 NOTE — NURSING NOTE
Patient discharged to [facility]. Attempted to call report, no answer. Voicemail and call back number provided. No return call.  IV and midline removed.  AVS reviewed with patient and transport staff.  Report given to transport staff.  All patient's belongings packed and sent with patient upon discharge.  
This nurse noticed change in mental status of patient @ [id number].  Patient unresponsive to staff.  Vitals and glucose were obtained.  Contacted charge nurse and rapid response was called.  Rapid Response team called a stroke alert and patient was taken to CT.  After CT, patient taken to ICU.  Report given to BRANDEN Herrera @ 7113.  
22-Oct-2023 19:04

## 2024-10-23 NOTE — RESTORATIVE TECHNICIAN NOTE
Restorative Technician Note      Patient Name: Sarah Zayas     Restorative Tech Visit Date: 10/23/24  Note Type: Mobility  Patient Position Upon Consult: Supine  Activity Performed: Ambulated; Range of motion  Assistive Device: Roller walker  Brace Applied: Other (Comment) (Sling)  Additional Brace Ordered: No  Patient Position When Brace Applied: Supine  Patient Position at End of Consult: All needs within reach; Bedside chair

## 2024-10-23 NOTE — DISCHARGE SUMMARY
Discharge Summary - Hospitalist   Name: Sarah Zayas 79 y.o. female I MRN: [id number]  Unit/Bed#: [place] [address] -01 I Date of Admission: 10/10/2024   Date of Service: 10/23/2024 I Hospital Day: 13     Assessment & Plan  Acute respiratory failure with hypercapnia (HCC)  Back to baseline oxygen, recommend 2L NC  Acute exacerbation of chronic obstructive pulmonary disease (COPD) (HCC)  History of severe COPD  Transfer from  to ICU on 10/10, downgraded to MS on 10/10  Initially concerned for exacerbation but likely oversedation from benzodiazepine  Continue budesonide, Xopenex prn  Repeat VBG with noted CO2 retention  Avoid benzos, discontinue lyrica  Pulmonary evaluated, recommending BiPAP at bedtime as tolerated when discharged to nursing home.  RESOLVED  Gastroesophageal reflux disease without esophagitis  Continue PPI therapy  Hypertension  Continue propranolol, increase to 40 mg twice daily  Discontinue losartan in the setting of hyperkalemia  Blood pressure currently controlled  Temporal arteritis (HCC)  Continue prednisone 2.5 mg  Anxiety/depression  Evaluated by psychiatry  Discontinue Lyrica, BuSpar, gabapentin, Remeron and benzos  Anxiety largely driving patient's symptoms  Previously recommended Cymbalta, discontinued due to hyponatremia  Psychiatry consulted for recommendations  at this time medication risks and side effects outweigh any potential benefit, will hold off on starting her on any anxiolytics    Hypothyroidism  Levothyroxine reduced to 25 mcg  TSH suppressed and free T4 elevated-suspect some iatrogenic hyperthyroidism  Repeat TSH and T4 in 8 to 12 weeks outpatient  Macular degeneration  Follows with [facility]  Continue artifical tears  Did have some discharge in her left eye concerning for conjunctivitis  Improvement with ofloxacin, to complete 7-day course  Metabolic encephalopathy  Had episodes of increasing lethargy, difficult to arouse  Likely due to hyponatremia,  possible UTI and hypercapnia  Previous CT imaging without any acute process  MRI brain without any evidence of stroke or acute intracranial process  Urine cultures growing Aerococcus, completed 5-day course of antibiotics  VBG with elevated CO2, recommending BiPAP at bedtime  Appreciate neurology evaluation, suspect seizure-like activity due to hyponatremia not recommending AED  Mental status has improved though patient continues to deal with ongoing anxiety  Psychiatry consulted - risks of anxiolytics outweigh benefits at this time  Urinary retention  Did have episode of urinary retention, now resolved with incontinence  Urine cultures growing Aerococcus, completed 5-day course of antibiotics  Hyponatremia  Suspect Cymbalta induced hyponatremia, recently started on 10/13  Transferred to ICU on 10/18-10/20 given concerns of seizure-like activity  Required tolvaptan with sodium now improving to 136  Continue torsemide 5 mg daily, discontinue losartan and fluid restriction 1500 mL  Nephrology recommending salt tabs 1 g 2 times daily  Chronic respiratory failure with hypercapnia (HCC)  Chronically elevated CO2 with VBG noting hypercapnia  Pulmonary recommending BiPAP at bedtime on discharge at [facility]  Will need to follow-up outpatient with pulmonology for formal BiPAP evaluation  Hyperkalemia  Resolved, discontinue losartan  Seizure-like activity (HCC)  Neurology suspects seizure-like activity as a result of hyponatremia now resolved  Did not recommend any further imaging, MRI canceled  Was given IV Keppra 2 g, no further AED recommended  Metabolic alkalosis with respiratory acidosis       Medical Problems       Resolved Problems  Date Reviewed: 10/13/2024   None       Discharging Physician / Practitioner: Virginie Birch MD  PCP: Nicolas Nix MD  Admission Date:   Admission Orders (From admission, onward)       Ordered        10/10/24 1016  Inpatient Admission  Once                           Discharge Date: 10/23/24    Consultations During Hospital Stay:  Behavioral health  Gerontology  Neurology  Nephrology  Orthopedics  ICU    Procedures Performed:   none    Significant Findings / Test Results:   XR chest portable   Final Result by Daron Cohen MD (10/21 0921)      No acute cardiopulmonary disease.            Resident: EVANGELINA Wilson I, the attending radiologist, have reviewed the images and agree with the final report above.      Workstation performed: [id number]         CTA stroke alert (head/neck)   Final Result by Roberto Carlos Aldana MD (10/18 0700)         1.  Patent major vessels of the [place] of [place] without high-grade stenosis.   2.  No significant stenosis in the cervical carotid or vertebral arteries.   3.  Redemonstrated intracranial aneurysms including 2.5 mm inferiorly directed right P-comm origin aneurysm and 2 mm anteriorly directed left LISA A2/A3 junction aneurysm. Recommend follow-up with neurovascular service.   4.  Mild interlobular septal thickening in the partially imaged lungs suggesting mild interstitial edema.                     I personally discussed this study with DANIA PALM on 10/18/2024 6:54 AM.                           Workstation performed: [id number]         CT stroke alert brain   Final Result by Roberto Carlos Aldana MD (10/18 0657)      No acute intracranial CT abnormality.            Findings were directly discussed with Leighton Bob at approximately 6:48 a.m.      Workstation performed: [id number]         MRI brain wo contrast   Final Result by Alejandro Crow MD (10/16 0454)      No acute intracranial abnormality.      Mild chronic microangiopathy.      Workstation performed: [id number]         CT head wo contrast   Final Result by Byron Hess MD (10/13 1112)      No acute intracranial abnormality.  Stable chronic microangiopathic changes within the brain.                  Workstation performed: [id number]               Incidental Findings:    Intracranial aneurysms   I reviewed the above mentioned incidental findings with the patient and/or family and they expressed understanding.    Test Results Pending at Discharge (will require follow up):   none     Outpatient Tests Requested:  Repeat thyroid function tests    Complications:  none    Reason for Admission: generalized weakness, acute encephalopathy    Hospital Course:   Sarah Zayas is a 79 y.o. female patient who originally presented to the hospital on 10/10/2024 due to generalized weakness. This was following a recent discharge from [facility] where she was sent after having been admitted for COPD exacerbation. This was thought to be secondary to polypharmacy. The day following her admission, the patient developed acute on chronic hypoxemic/hypercapnic respiratory failure following benzodiazepine administration and was then transferred to the ICU where she was placed on BIPAP with subsequent improvement in her symptoms. Geriatrics and Psychiatry were consulted and medications were adjusted. During the course of her hospitalization, the patient also developed hyponatremia which was thought to be multifactorial with medications contributing to this. Psychiatry had recommended holding all her anxioltyics and anti-depressants and patient was started on salt tablets by Nephrology with subsequent improvement in her sodium levels. Further discussion with Psychiatry was had and it was determined that at this time patient is redirectable and risks of resuming patient's anxiolytics outweigh the benefits so it was decided that these be held.     Given patient's hypercapnea, pulmonology had recommended for patient to use BIPAP at bedtime. She will need to follow-up with Pulmonology for formal BIPAP qualification once she is discharged from rehab.    Please see above list of diagnoses and related plan for additional information.     Condition at Discharge: stable    Discharge Day Visit / Exam:   Subjective:  Patient  "seen and examined at bedside. Very anxious at this time. Endorses fear of the ambulance. Denies any pain or shortness of breath.     Vitals: Blood Pressure: 115/51 (10/23/24 0748)  Pulse: 67 (10/23/24 0748)  Temperature: 97.6 °F (36.4 °C) (10/23/24 0748)  Temp Source: Oral (10/22/24 2127)  Respirations: 17 (10/23/24 0748)  Height: 5' 2\" (157.5 cm) (10/10/24 1017)  Weight - Scale: 50 kg (110 lb 3.7 oz) (10/23/24 0600)  SpO2: 97 % (10/23/24 0748)    Physical Exam  Vitals and nursing note reviewed.   Constitutional:       General: She is not in acute distress.     Appearance: She is well-developed.   HENT:      Head: Normocephalic and atraumatic.   Eyes:      Conjunctiva/sclera: Conjunctivae normal.   Cardiovascular:      Rate and Rhythm: Normal rate and regular rhythm.      Heart sounds: No murmur heard.  Pulmonary:      Effort: Pulmonary effort is normal. No respiratory distress.      Breath sounds: Normal breath sounds.   Abdominal:      Palpations: Abdomen is soft.      Tenderness: There is no abdominal tenderness.   Musculoskeletal:         General: No swelling.      Cervical back: Neck supple.   Skin:     General: Skin is warm and dry.      Capillary Refill: Capillary refill takes less than 2 seconds.   Neurological:      Mental Status: She is alert and oriented to person, place, and time. Mental status is at baseline.   Psychiatric:         Mood and Affect: Mood normal.            Discussion with Family: Updated  (daughter) via phone.    Discharge instructions/Information to patient and family:   See after visit summary for information provided to patient and family.      Provisions for Follow-Up Care:  See after visit summary for information related to follow-up care and any pertinent home health orders.      Mobility at time of Discharge:   Basic Mobility Inpatient Raw Score: 16  JH-HLM Goal: 5: Stand one or more mins  JH-HLM Achieved: 1: Laying in bed       Disposition:   Other Skilled Nursing " Facility at [facility]    Planned Readmission: none    Discharge Medications:  See after visit summary for reconciled discharge medications provided to patient and/or family.      Administrative Statements   Discharge Statement:  I have spent a total time of 65 minutes in caring for this patient on the day of the visit/encounter. >30 minutes of time was spent on: Diagnostic results, Impressions, Counseling / Coordination of care, Documenting in the medical record, Reviewing / ordering tests, medicine, procedures  , and Communicating with other healthcare professionals .    **Please Note: This note may have been constructed using a voice recognition system**

## 2024-10-23 NOTE — PROGRESS NOTES
Received ADT alert patient was discharged to [facility] for STR.  Will send patient to CM who will follow patient during rehab stay.

## 2024-10-23 NOTE — ASSESSMENT & PLAN NOTE
Follows with [facility]  Continue artifical tears  Did have some discharge in her left eye concerning for conjunctivitis  Improvement with ofloxacin, to complete 7-day course

## 2024-10-23 NOTE — ASSESSMENT & PLAN NOTE
History of severe COPD  Transfer from  to ICU on 10/10, downgraded to MS on 10/10  Initially concerned for exacerbation but likely oversedation from benzodiazepine  Continue budesonide, Xopenex prn  Repeat VBG with noted CO2 retention  Avoid benzos, discontinue lyrica  Pulmonary evaluated, recommending BiPAP at bedtime as tolerated when discharged to nursing home.  RESOLVED

## 2024-10-23 NOTE — INCIDENTAL FINDINGS
The following findings require follow up:  Radiographic finding   Finding:   Redemonstrated intracranial aneurysms including 2.5 mm inferiorly directed right P-comm origin aneurysm and 2 mm anteriorly directed left LISA A2/A3 junction aneurysm.    Follow up required: Recommend follow-up with neurovascular service.    Follow up should be done within 2 week(s)    Please notify the following clinician to assist with the follow up:   Dr. Nicolas Nix    Incidental finding results were discussed with the Patient's POA by Virginie Birch MD on 10/23/24.   They expressed understanding and all questions answered.

## 2024-10-23 NOTE — ASSESSMENT & PLAN NOTE
Had episodes of increasing lethargy, difficult to arouse  Likely due to hyponatremia, possible UTI and hypercapnia  Previous CT imaging without any acute process  MRI brain without any evidence of stroke or acute intracranial process  Urine cultures growing Aerococcus, completed 5-day course of antibiotics  VBG with elevated CO2, recommending BiPAP at bedtime  Appreciate neurology evaluation, suspect seizure-like activity due to hyponatremia not recommending AED  Mental status has improved though patient continues to deal with ongoing anxiety  Psychiatry consulted - risks of anxiolytics outweigh benefits at this time

## 2024-10-23 NOTE — PLAN OF CARE
Problem: PAIN - ADULT  Goal: Verbalizes/displays adequate comfort level or baseline comfort level  Description: Interventions:  - Encourage patient to monitor pain and request assistance  - Assess pain using appropriate pain scale  - Administer analgesics based on type and severity of pain and evaluate response  - Implement non-pharmacological measures as appropriate and evaluate response  - Consider cultural and social influences on pain and pain management  - Notify physician/advanced practitioner if interventions unsuccessful or patient reports new pain  Outcome: Progressing     Problem: INFECTION - ADULT  Goal: Absence or prevention of progression during hospitalization  Description: INTERVENTIONS:  - Assess and monitor for signs and symptoms of infection  - Monitor lab/diagnostic results  - Monitor all insertion sites, i.e. indwelling lines, tubes, and drains  - Monitor endotracheal if appropriate and nasal secretions for changes in amount and color  - [place] appropriate cooling/warming therapies per order  - Administer medications as ordered  - Instruct and encourage patient and family to use good hand hygiene technique  - Identify and instruct in appropriate isolation precautions for identified infection/condition  Outcome: Progressing     Problem: SAFETY ADULT  Goal: Patient will remain free of falls  Description: INTERVENTIONS:  - Educate patient/family on patient safety including physical limitations  - Instruct patient to call for assistance with activity   - Consult OT/PT to assist with strengthening/mobility   - Keep Call bell within reach  - Keep bed low and locked with side rails adjusted as appropriate  - Keep care items and personal belongings within reach  - Initiate and maintain comfort rounds  - Make Fall Risk Sign visible to staff  - Offer Toileting every 2 Hours, in advance of need  - Initiate/Maintain bed alarm  - Obtain necessary fall risk management equipment: walker  - Apply yellow  socks and bracelet for high fall risk patients  - Consider moving patient to room near nurses station  Outcome: Progressing  Goal: Maintain or return to baseline ADL function  Description: INTERVENTIONS:  -  Assess patient's ability to carry out ADLs; assess patient's baseline for ADL function and identify physical deficits which impact ability to perform ADLs (bathing, care of mouth/teeth, toileting, grooming, dressing, etc.)  - Assess/evaluate cause of self-care deficits   - Assess range of motion  - Assess patient's mobility; develop plan if impaired  - Assess patient's need for assistive devices and provide as appropriate  - Encourage maximum independence but intervene and supervise when necessary  - Involve family in performance of ADLs  - Assess for home care needs following discharge   - Consider OT consult to assist with ADL evaluation and planning for discharge  - Provide patient education as appropriate  Outcome: Progressing  Goal: Maintains/Returns to pre admission functional level  Description: INTERVENTIONS:  - Perform AM-PAC 6 Click Basic Mobility/ Daily Activity assessment daily.  - Set and communicate daily mobility goal to care team and patient/family/caregiver.   - Collaborate with rehabilitation services on mobility goals if consulted  - Perform Range of Motion 3 times a day.  - Reposition patient every 2 hours.  - Dangle patient 3 times a day  - Stand patient 3 times a day  - Ambulate patient 2 times a day  - Out of bed to chair 2 times a day   - Out of bed for meals 2 times a day  - Out of bed for toileting  - Record patient progress and toleration of activity level   Outcome: Progressing     Problem: DISCHARGE PLANNING  Goal: Discharge to home or other facility with appropriate resources  Description: INTERVENTIONS:  - Identify barriers to discharge w/patient and caregiver  - Arrange for needed discharge resources and transportation as appropriate  - Identify discharge learning needs (meds,  wound care, etc.)  - Arrange for interpretive services to assist at discharge as needed  - Refer to Case Management Department for coordinating discharge planning if the patient needs post-hospital services based on physician/advanced practitioner order or complex needs related to functional status, cognitive ability, or social support system  Outcome: Progressing

## 2024-10-23 NOTE — CASE MANAGEMENT
Case Management Discharge Planning Note    Patient name Sarah Zayas  Location [facility]* MRN [id number]  : 1945 Date 10/23/2024       Current Admission Date: 10/10/2024  Current Admission Diagnosis:Anxiety/depression   Patient Active Problem List    Diagnosis Date Noted Date Diagnosed    Metabolic alkalosis with respiratory acidosis 10/18/2024     Hyperkalemia 10/18/2024     Seizure-like activity (HCC) 10/18/2024     Hyponatremia 10/17/2024     Chronic respiratory failure with hypercapnia (HCC) 10/17/2024     Metabolic encephalopathy 10/16/2024     Urinary retention 10/16/2024     Macular degeneration 10/15/2024     Hypothyroidism 10/13/2024     Acute on chronic respiratory failure with hypoxia and hypercapnia (HCC) 10/10/2024     Acute respiratory failure with hypercapnia (HCC) 10/10/2024     Venous insufficiency of lower extremity 2024     Epigastric pain 2024     Advance care planning 2024     At risk for constipation 2024     At risk for delirium 2024     Physical deconditioning 2024     Periodontitis 2024     Polypharmacy 2024     Diastolic dysfunction 2024     Elevated vitamin B12 level 2024     Normocytic anemia 2024     Neck swelling 2024     Anxiety/depression 2024     Severe protein-calorie malnutrition (HCC) 2024     COPD, severe ([facility]) 2024     Abnormal CT scan 2023     Infectious sialoadenitis of major salivary gland 2023     Left upper lobe pulmonary nodule 2023     Postnasal drip 2023     Raynaud's phenomenon without gangrene 2022     Temporal arteritis ([facility]) 2020     Hypertension 10/11/2018     Other specified hypothyroidism 2018     Gastroesophageal reflux disease without esophagitis 2018     Acute exacerbation of chronic obstructive pulmonary disease (COPD) (HCC) 2012     Vitamin D deficiency 2012     Osteoarthritis 2012        LOS (days): 13  Geometric Mean LOS (GMLOS) (days): 3.4  Days to GMLOS:-9.6     OBJECTIVE:  Risk of Unplanned Readmission Score: 46.52         Current admission status: Inpatient   Preferred Pharmacy:   [facility] - [place] - [address]  [address]  [address]  [place] [address]  Phone: [phone] Fax: [phone]    [facility] - [place] - [address]  [address] [address]  Phone: [phone] Fax: [phone]    Primary Care Provider: Nicolas Nix MD    Primary Insurance: [employer]  REP  Secondary Insurance:     DISCHARGE DETAILS:    Discharge planning discussed with:: Patient's daughterDebora Torres  Freedom of Choice: Yes  Comments - Freedom of Choice: Patient and family agreeable to [facility]  CM contacted family/caregiver?: Yes (CM spoke with patient's daughterMelissa)  Were Treatment Team discharge recommendations reviewed with patient/caregiver?: Yes  Did patient/caregiver verbalize understanding of patient care needs?: N/A- going to facility  Were patient/caregiver advised of the risks associated with not following Treatment Team discharge recommendations?: Yes    Contacts  Patient Contacts: Melissa Cunningham (Daughter)  [phone] (Mobile)  Relationship to Patient:: Family  Contact Method: Phone  Phone Number: Melissa Cunningham (Daughter)  [phone] (Mobile)  Reason/Outcome: Emergency Contact, Discharge Planning, Continuity of Care    Requested Home Health Care         Is the patient interested in [facility] at discharge?: No    Other Referral/Resources/Interventions Provided:  Interventions: Short Term Rehab  Referral Comments: [facility]         Treatment Team Recommendation: Short Term Rehab  Discharge Destination Plan:: Short Term Rehab  Transport at Discharge : [facility] Ambulance     Number/Name of Dispatcher: Scott [phone]     ETA of Transport (Date): 10/23/24  ETA of Transport (Time): 1130         IMM Given to:: Family  Family notified:: Patient's daughter, Melissa  Additional Comments: CM spoke with patient's daughterMelissa via phone; CM informed patient's daughter of discharge this morning; CM to request transport for 11:30A.M. CM confirmed no further questions for pateint's family. CM informed bedside nurse, [facility] and [facility] attending. CM requested transportation with [facility] for 11:30A.M. CM to follow for further discharge planning needs.

## 2024-10-23 NOTE — CASE MANAGEMENT
[facility] has received APPROVED authorization.  Insurance:   [employer]  Called in by Rep:elysia JIMÉNEZ#  [phone]  Authorization received for: [facility]  Facility: [facility]   Authorization #:[id number]   Start of Care:10/22  Next Review Date:10/29  Continued Stay Care Coordinator:   TINO#: [phone]  Submit next review to: fax [phone]     Care Manager notified:delma bruner     Please reach out to  for updates on any clinical information.

## 2024-10-23 NOTE — PLAN OF CARE
Problem: PAIN - ADULT  Goal: Verbalizes/displays adequate comfort level or baseline comfort level  Description: Interventions:  - Encourage patient to monitor pain and request assistance  - Assess pain using appropriate pain scale  - Administer analgesics based on type and severity of pain and evaluate response  - Implement non-pharmacological measures as appropriate and evaluate response  - Consider cultural and social influences on pain and pain management  - Notify physician/advanced practitioner if interventions unsuccessful or patient reports new pain  Outcome: Progressing     Problem: INFECTION - ADULT  Goal: Absence or prevention of progression during hospitalization  Description: INTERVENTIONS:  - Assess and monitor for signs and symptoms of infection  - Monitor lab/diagnostic results  - Monitor all insertion sites, i.e. indwelling lines, tubes, and drains  - Monitor endotracheal if appropriate and nasal secretions for changes in amount and color  - [place] appropriate cooling/warming therapies per order  - Administer medications as ordered  - Instruct and encourage patient and family to use good hand hygiene technique  - Identify and instruct in appropriate isolation precautions for identified infection/condition  Outcome: Progressing     Problem: SAFETY ADULT  Goal: Patient will remain free of falls  Description: INTERVENTIONS:  - Educate patient/family on patient safety including physical limitations  - Instruct patient to call for assistance with activity   - Consult OT/PT to assist with strengthening/mobility   - Keep Call bell within reach  - Keep bed low and locked with side rails adjusted as appropriate  - Keep care items and personal belongings within reach  - Initiate and maintain comfort rounds  - Make Fall Risk Sign visible to staff  - Offer Toileting every  Hours, in advance of need  - Initiate/Maintain alarm  - Obtain necessary fall risk management equipment:   - Apply yellow socks and  bracelet for high fall risk patients  - Consider moving patient to room near nurses station  Outcome: Progressing  Goal: Maintain or return to baseline ADL function  Description: INTERVENTIONS:  -  Assess patient's ability to carry out ADLs; assess patient's baseline for ADL function and identify physical deficits which impact ability to perform ADLs (bathing, care of mouth/teeth, toileting, grooming, dressing, etc.)  - Assess/evaluate cause of self-care deficits   - Assess range of motion  - Assess patient's mobility; develop plan if impaired  - Assess patient's need for assistive devices and provide as appropriate  - Encourage maximum independence but intervene and supervise when necessary  - Involve family in performance of ADLs  - Assess for home care needs following discharge   - Consider OT consult to assist with ADL evaluation and planning for discharge  - Provide patient education as appropriate  Outcome: Progressing  Goal: Maintains/Returns to pre admission functional level  Description: INTERVENTIONS:  - Perform AM-PAC 6 Click Basic Mobility/ Daily Activity assessment daily.  - Set and communicate daily mobility goal to care team and patient/family/caregiver.   - Collaborate with rehabilitation services on mobility goals if consulted  - Perform Range of Motion  times a day.  - Reposition patient every  hours.  - Dangle patient  times a day  - Stand patient  times a day  - Ambulate patient  times a day  - Out of bed to chair  times a day   - Out of bed for meals  times a day  - Out of bed for toileting  - Record patient progress and toleration of activity level   Outcome: Progressing     Problem: DISCHARGE PLANNING  Goal: Discharge to home or other facility with appropriate resources  Description: INTERVENTIONS:  - Identify barriers to discharge w/patient and caregiver  - Arrange for needed discharge resources and transportation as appropriate  - Identify discharge learning needs (meds, wound care, etc.)  -  Arrange for interpretive services to assist at discharge as needed  - Refer to Case Management Department for coordinating discharge planning if the patient needs post-hospital services based on physician/advanced practitioner order or complex needs related to functional status, cognitive ability, or social support system  Outcome: Progressing     Problem: Knowledge Deficit  Goal: Patient/family/caregiver demonstrates understanding of disease process, treatment plan, medications, and discharge instructions  Description: Complete learning assessment and assess knowledge base.  Interventions:  - Provide teaching at level of understanding  - Provide teaching via preferred learning methods  Outcome: Progressing     Problem: RESPIRATORY - ADULT  Goal: Achieves optimal ventilation and oxygenation  Description: INTERVENTIONS:  - Assess for changes in respiratory status  - Assess for changes in mentation and behavior  - Position to facilitate oxygenation and minimize respiratory effort  - Oxygen administered by appropriate delivery if ordered  - Initiate smoking cessation education as indicated  - Encourage broncho-pulmonary hygiene including cough, deep breathe, Incentive Spirometry  - Assess the need for suctioning and aspirate as needed  - Assess and instruct to report SOB or any respiratory difficulty  - Respiratory Therapy support as indicated  Outcome: Progressing     Problem: Nutrition/Hydration-ADULT  Goal: Nutrient/Hydration intake appropriate for improving, restoring or maintaining nutritional needs  Description: Monitor and assess patient's nutrition/hydration status for malnutrition. Collaborate with interdisciplinary team and initiate plan and interventions as ordered.  Monitor patient's weight and dietary intake as ordered or per policy. Utilize nutrition screening tool and intervene as necessary. Determine patient's food preferences and provide high-protein, high-caloric foods as appropriate.      INTERVENTIONS:  - Monitor oral intake, urinary output, labs, and treatment plans  - Assess nutrition and hydration status and recommend course of action  - Evaluate amount of meals eaten  - Assist patient with eating if necessary   - Allow adequate time for meals  - Recommend/ encourage appropriate diets, oral nutritional supplements, and vitamin/mineral supplements  - Order, calculate, and assess calorie counts as needed  - Recommend, monitor, and adjust tube feedings and TPN/PPN based on assessed needs  - Assess need for intravenous fluids  - Provide specific nutrition/hydration education as appropriate  - Include patient/family/caregiver in decisions related to nutrition  Outcome: Progressing     Problem: Prexisting or High Potential for Compromised Skin Integrity  Goal: Skin integrity is maintained or improved  Description: INTERVENTIONS:  - Identify patients at risk for skin breakdown  - Assess and monitor skin integrity  - Assess and monitor nutrition and hydration status  - Monitor labs   - Assess for incontinence   - Turn and reposition patient  - Assist with mobility/ambulation  - Relieve pressure over bony prominences  - Avoid friction and shearing  - Provide appropriate hygiene as needed including keeping skin clean and dry  - Evaluate need for skin moisturizer/barrier cream  - Collaborate with interdisciplinary team   - Patient/family teaching  - Consider wound care consult   Outcome: Progressing     Problem: SKIN/TISSUE INTEGRITY - ADULT  Goal: Skin Integrity remains intact(Skin Breakdown Prevention)  Description: Assess:  -Perform Lonnie assessment every   -Clean and moisturize skin every   -Inspect skin when repositioning, toileting, and assisting with ADLS  -Assess under medical devices such as  every   -Assess extremities for adequate circulation and sensation     Bed Management:  -Have minimal linens on bed & keep smooth, unwrinkled  -Change linens as needed when moist or perspiring  -Avoid  sitting or lying in one position for more than  hours while in bed  -Keep HOB at degrees     Toileting:  -Offer bedside commode  -Assess for incontinence every   -Use incontinent care products after each incontinent episode such as     Activity:  -Mobilize patient  times a day  -Encourage activity and walks on unit  -Encourage or provide ROM exercises   -Turn and reposition patient every  Hours  -Use appropriate equipment to lift or move patient in bed  -Instruct/ Assist with weight shifting every  when out of bed in chair  -Consider limitation of chair time  hour intervals    Skin Care:  -Avoid use of baby powder, tape, friction and shearing, hot water or constrictive clothing  -Relieve pressure over bony prominences using   -Do not massage red bony areas    Next Steps:  -Teach patient strategies to minimize risks such as    -Consider consults to  interdisciplinary teams such as  Outcome: Progressing     Problem: MUSCULOSKELETAL - ADULT  Goal: Maintain or return mobility to safest level of function  Description: INTERVENTIONS:  - Assess patient's ability to carry out ADLs; assess patient's baseline for ADL function and identify physical deficits which impact ability to perform ADLs (bathing, care of mouth/teeth, toileting, grooming, dressing, etc.)  - Assess/evaluate cause of self-care deficits   - Assess range of motion  - Assess patient's mobility  - Assess patient's need for assistive devices and provide as appropriate  - Encourage maximum independence but intervene and supervise when necessary  - Involve family in performance of ADLs  - Assess for home care needs following discharge   - Consider OT consult to assist with ADL evaluation and planning for discharge  - Provide patient education as appropriate  Outcome: Progressing  Goal: Maintain proper alignment of affected body part  Description: INTERVENTIONS:  - Support, maintain and protect limb and body alignment  - Provide patient/ family with appropriate  education  Outcome: Progressing     Problem: COPING  Goal: Pt/Family able to verbalize concerns and demonstrate effective coping strategies  Description: INTERVENTIONS:  - Assist patient/family to identify coping skills, available support systems and cultural and spiritual values  - Provide emotional support, including active listening and acknowledgement of concerns of patient and caregivers  - Reduce environmental stimuli, as able  - Provide patient education  - Assess for spiritual pain/suffering and initiate spiritual care, including notification of Pastoral Care or felicitas based community as needed  - Assess effectiveness of coping strategies  Outcome: Progressing  Goal: Will report anxiety at manageable levels  Description: INTERVENTIONS:  - Administer medication as ordered  - Teach and encourage coping skills  - Provide emotional support  - Assess patient/family for anxiety and ability to cope  Outcome: Progressing

## 2024-10-23 NOTE — ASSESSMENT & PLAN NOTE
Chronically elevated CO2 with VBG noting hypercapnia  Pulmonary recommending BiPAP at bedtime on discharge at [facility]  Will need to follow-up outpatient with pulmonology for formal BiPAP evaluation

## 2024-10-23 NOTE — CASE MANAGEMENT
Case Management Discharge Planning Note    Patient name Sarah Zayas  Location [facility] M* MRN [id number]  : 1945 Date 10/23/2024       Current Admission Date: 10/10/2024  Current Admission Diagnosis:Anxiety/depression   Patient Active Problem List    Diagnosis Date Noted Date Diagnosed    Metabolic alkalosis with respiratory acidosis 10/18/2024     Hyperkalemia 10/18/2024     Seizure-like activity (HCC) 10/18/2024     Hyponatremia 10/17/2024     Chronic respiratory failure with hypercapnia (HCC) 10/17/2024     Metabolic encephalopathy 10/16/2024     Urinary retention 10/16/2024     Macular degeneration 10/15/2024     Hypothyroidism 10/13/2024     Acute on chronic respiratory failure with hypoxia and hypercapnia (HCC) 10/10/2024     Acute respiratory failure with hypercapnia (HCC) 10/10/2024     Venous insufficiency of lower extremity 2024     Epigastric pain 2024     Advance care planning 2024     At risk for constipation 2024     At risk for delirium 2024     Physical deconditioning 2024     Periodontitis 2024     Polypharmacy 2024     Diastolic dysfunction 2024     Elevated vitamin B12 level 2024     Normocytic anemia 2024     Neck swelling 2024     Anxiety/depression 2024     Severe protein-calorie malnutrition (HCC) 2024     COPD, severe ([facility]) 2024     Abnormal CT scan 2023     Infectious sialoadenitis of major salivary gland 2023     Left upper lobe pulmonary nodule 2023     Postnasal drip 2023     Raynaud's phenomenon without gangrene 2022     Temporal arteritis ([facility]) 2020     Hypertension 10/11/2018     Other specified hypothyroidism 2018     Gastroesophageal reflux disease without esophagitis 2018     Acute exacerbation of chronic obstructive pulmonary disease (COPD) (HCC) 2012     Vitamin D deficiency 2012     Osteoarthritis 2012        LOS (days): 13  Geometric Mean LOS (GMLOS) (days): 3.4  Days to GMLOS:-9.5     OBJECTIVE:  Risk of Unplanned Readmission Score: 46.52         Current admission status: Inpatient   Preferred Pharmacy:   [facility] - [place] - [address]  [address]  [address]  [place] [facility] [address]  Phone: [phone] Fax: [phone]    [facility] [place] - [address] [facility] [facility] [address]  Phone: [phone] Fax: [phone]    Primary Care Provider: Nicolas Nix MD    Primary Insurance: ALVA SINHA  Secondary Insurance:     DISCHARGE DETAILS:                                                                                                               Facility Insurance Auth Number: [id number]

## 2024-10-24 ENCOUNTER — PATIENT OUTREACH (OUTPATIENT)
Dept: CASE MANAGEMENT | Facility: OTHER | Age: 79
End: 2024-10-24

## 2024-10-24 NOTE — PROGRESS NOTES
Outpatient care management referral via HRR report 7/30/24. Admitted 10/10/24 to [facility]. Discharged 10/23/24 to [facility]. Email sent to facility to inform them I will be following the patient during their skilled stay.  This Admin Coordinator will continue to monitor via chart review.      Received call from patient's wife, Carmina, asking if remote device checks show up in MyChart and when patient's next remote was.  Explained that only the in-clinic dates show up in MyChart.  Also reviewed that the next remote date is typically provided when the results of the scheduled remote are called and reviewed with the patient.  Next remote date is 7/12/23.  Carmina verbalized understanding and appreciation for information.     SACHIN Torres

## 2024-10-24 NOTE — UTILIZATION REVIEW
NOTIFICATION OF ADMISSION DISCHARGE   This is a Notification of Discharge from [facility]. Please be advised that this patient has been discharge from our facility. Below you will find the admission and discharge date and time including the patient’s disposition.   UTILIZATION REVIEW CONTACT:  Antonia Dewey  Utilization   Network Utilization Review Department  Phone: [phone] x carefully listen to the prompts. All voicemails are confidential.  Email: [email or web address]     ADMISSION INFORMATION  PRESENTATION DATE: 10/10/2024 10:14 AM  OBERVATION ADMISSION DATE: N/A  INPATIENT ADMISSION DATE: 10/10/24 10:14 AM   DISCHARGE DATE: 10/23/2024  2:44 PM   DISPOSITION:Non [facility] SNF/TCU/SNU    Network Utilization Review Department  ATTENTION: Please call with any questions or concerns to [phone] and carefully listen to the prompts so that you are directed to the right person. All voicemails are confidential.   For Discharge needs, contact Care Management DC Support Team at [phone] opt. 2  Send all requests for admission clinical reviews, approved or denied determinations and any other requests to dedicated fax number below belonging to the campus where the patient is receiving treatment. List of dedicated fax numbers for the Facilities:  FACILITY NAME UR FAX NUMBER   ADMISSION DENIALS (Administrative/Medical Necessity) [phone]   DISCHARGE SUPPORT TEAM ([facility]) [phone]   PARENT CHILD HEALTH (Maternity/NICU/Pediatrics) [phone]   [facility] [phone]   [facility] [phone]   [facility] [phone]   [facility] [phone]   [facility] [phone]   [facility] [phone]   [facility] [phone]   [facility]  [phone]   [facility] [phone]   [facility] [phone]   [facility] [phone]   [facility] [phone]

## 2024-10-25 ENCOUNTER — APPOINTMENT (EMERGENCY)
Dept: RADIOLOGY | Facility: HOSPITAL | Age: 79
DRG: 189 | End: 2024-10-25
Payer: COMMERCIAL

## 2024-10-25 ENCOUNTER — HOSPITAL ENCOUNTER (INPATIENT)
Facility: HOSPITAL | Age: 79
LOS: 7 days | Discharge: NURSING, REHAB OR LONG-TERM CARE | DRG: 189 | End: 2024-11-02
Attending: EMERGENCY MEDICINE | Admitting: STUDENT IN AN ORGANIZED HEALTH CARE EDUCATION/TRAINING PROGRAM
Payer: COMMERCIAL

## 2024-10-25 ENCOUNTER — APPOINTMENT (EMERGENCY)
Dept: CT IMAGING | Facility: HOSPITAL | Age: 79
DRG: 189 | End: 2024-10-25
Payer: COMMERCIAL

## 2024-10-25 DIAGNOSIS — N64.9 BREAST LESION: ICD-10-CM

## 2024-10-25 DIAGNOSIS — F41.9 ANXIETY: ICD-10-CM

## 2024-10-25 DIAGNOSIS — Z87.09 HISTORY OF COPD: ICD-10-CM

## 2024-10-25 DIAGNOSIS — E43 SEVERE PROTEIN-CALORIE MALNUTRITION (HCC): ICD-10-CM

## 2024-10-25 DIAGNOSIS — R79.89 TSH ELEVATION: ICD-10-CM

## 2024-10-25 DIAGNOSIS — J96.01 ACUTE RESPIRATORY FAILURE WITH HYPOXIA AND HYPERCARBIA (HCC): Primary | ICD-10-CM

## 2024-10-25 DIAGNOSIS — J96.02 ACUTE RESPIRATORY FAILURE WITH HYPOXIA AND HYPERCARBIA (HCC): Primary | ICD-10-CM

## 2024-10-25 DIAGNOSIS — N17.9 AKI (ACUTE KIDNEY INJURY) (HCC): ICD-10-CM

## 2024-10-25 DIAGNOSIS — N39.0 UTI (URINARY TRACT INFECTION): ICD-10-CM

## 2024-10-25 DIAGNOSIS — J98.4 SCARRING OF LUNG: ICD-10-CM

## 2024-10-25 DIAGNOSIS — J90 PLEURAL EFFUSION: ICD-10-CM

## 2024-10-25 LAB
ALBUMIN SERPL BCG-MCNC: 4.1 G/DL (ref 3.5–5)
ALP SERPL-CCNC: 44 U/L (ref 34–104)
ALT SERPL W P-5'-P-CCNC: 18 U/L (ref 7–52)
ANION GAP SERPL CALCULATED.3IONS-SCNC: 6 MMOL/L (ref 4–13)
APTT PPP: 26 SECONDS (ref 23–34)
AST SERPL W P-5'-P-CCNC: 24 U/L (ref 13–39)
BASE EX.OXY STD BLDV CALC-SCNC: 59.2 % (ref 60–80)
BASE EXCESS BLDV CALC-SCNC: 9.9 MMOL/L
BASOPHILS # BLD AUTO: 0.04 THOUSANDS/ΜL (ref 0–0.1)
BASOPHILS NFR BLD AUTO: 0 % (ref 0–1)
BILIRUB SERPL-MCNC: 0.35 MG/DL (ref 0.2–1)
BNP SERPL-MCNC: 197 PG/ML (ref 0–100)
BUN SERPL-MCNC: 28 MG/DL (ref 5–25)
CALCIUM SERPL-MCNC: 10.7 MG/DL (ref 8.4–10.2)
CARDIAC TROPONIN I PNL SERPL HS: 9 NG/L
CHLORIDE SERPL-SCNC: 91 MMOL/L (ref 96–108)
CO2 SERPL-SCNC: 40 MMOL/L (ref 21–32)
CREAT SERPL-MCNC: 0.76 MG/DL (ref 0.6–1.3)
D DIMER PPP FEU-MCNC: 1.69 UG/ML FEU
EOSINOPHIL # BLD AUTO: 0.7 THOUSAND/ΜL (ref 0–0.61)
EOSINOPHIL NFR BLD AUTO: 6 % (ref 0–6)
ERYTHROCYTE [DISTWIDTH] IN BLOOD BY AUTOMATED COUNT: 14.4 % (ref 11.6–15.1)
FLUAV AG UPPER RESP QL IA.RAPID: NEGATIVE
FLUBV AG UPPER RESP QL IA.RAPID: NEGATIVE
GFR SERPL CREATININE-BSD FRML MDRD: 74 ML/MIN/1.73SQ M
GLUCOSE SERPL-MCNC: 107 MG/DL (ref 65–140)
GLUCOSE SERPL-MCNC: 113 MG/DL (ref 65–140)
HCO3 BLDV-SCNC: 39.1 MMOL/L (ref 24–30)
HCT VFR BLD AUTO: 36.6 % (ref 34.8–46.1)
HGB BLD-MCNC: 11 G/DL (ref 11.5–15.4)
IMM GRANULOCYTES # BLD AUTO: 0.06 THOUSAND/UL (ref 0–0.2)
IMM GRANULOCYTES NFR BLD AUTO: 1 % (ref 0–2)
INR PPP: 0.87 (ref 0.85–1.19)
LACTATE SERPL-SCNC: 0.8 MMOL/L (ref 0.5–2)
LYMPHOCYTES # BLD AUTO: 0.29 THOUSANDS/ΜL (ref 0.6–4.47)
LYMPHOCYTES NFR BLD AUTO: 2 % (ref 14–44)
MAGNESIUM SERPL-MCNC: 2.5 MG/DL (ref 1.9–2.7)
MCH RBC QN AUTO: 28.9 PG (ref 26.8–34.3)
MCHC RBC AUTO-ENTMCNC: 30.1 G/DL (ref 31.4–37.4)
MCV RBC AUTO: 96 FL (ref 82–98)
MONOCYTES # BLD AUTO: 0.59 THOUSAND/ΜL (ref 0.17–1.22)
MONOCYTES NFR BLD AUTO: 5 % (ref 4–12)
NEUTROPHILS # BLD AUTO: 10.37 THOUSANDS/ΜL (ref 1.85–7.62)
NEUTS SEG NFR BLD AUTO: 86 % (ref 43–75)
NRBC BLD AUTO-RTO: 0 /100 WBCS
O2 CT BLDV-SCNC: 10.2 ML/DL
PCO2 BLDV: 79.8 MM HG (ref 42–50)
PH BLDV: 7.31 [PH] (ref 7.3–7.4)
PLATELET # BLD AUTO: 308 THOUSANDS/UL (ref 149–390)
PMV BLD AUTO: 10.2 FL (ref 8.9–12.7)
PO2 BLDV: 31.3 MM HG (ref 35–45)
POTASSIUM SERPL-SCNC: 5.6 MMOL/L (ref 3.5–5.3)
PROT SERPL-MCNC: 6.7 G/DL (ref 6.4–8.4)
PROTHROMBIN TIME: 12.5 SECONDS (ref 12.3–15)
RBC # BLD AUTO: 3.81 MILLION/UL (ref 3.81–5.12)
SARS-COV+SARS-COV-2 AG RESP QL IA.RAPID: NEGATIVE
SODIUM SERPL-SCNC: 137 MMOL/L (ref 135–147)
WBC # BLD AUTO: 12.05 THOUSAND/UL (ref 4.31–10.16)

## 2024-10-25 PROCEDURE — 84484 ASSAY OF TROPONIN QUANT: CPT | Performed by: EMERGENCY MEDICINE

## 2024-10-25 PROCEDURE — 84145 PROCALCITONIN (PCT): CPT | Performed by: EMERGENCY MEDICINE

## 2024-10-25 PROCEDURE — 99291 CRITICAL CARE FIRST HOUR: CPT | Performed by: EMERGENCY MEDICINE

## 2024-10-25 PROCEDURE — 84443 ASSAY THYROID STIM HORMONE: CPT | Performed by: EMERGENCY MEDICINE

## 2024-10-25 PROCEDURE — 82948 REAGENT STRIP/BLOOD GLUCOSE: CPT

## 2024-10-25 PROCEDURE — 85610 PROTHROMBIN TIME: CPT | Performed by: EMERGENCY MEDICINE

## 2024-10-25 PROCEDURE — 71045 X-RAY EXAM CHEST 1 VIEW: CPT

## 2024-10-25 PROCEDURE — 83735 ASSAY OF MAGNESIUM: CPT | Performed by: EMERGENCY MEDICINE

## 2024-10-25 PROCEDURE — 99285 EMERGENCY DEPT VISIT HI MDM: CPT

## 2024-10-25 PROCEDURE — 87449 NOS EACH ORGANISM AG IA: CPT | Performed by: EMERGENCY MEDICINE

## 2024-10-25 PROCEDURE — 81001 URINALYSIS AUTO W/SCOPE: CPT | Performed by: EMERGENCY MEDICINE

## 2024-10-25 PROCEDURE — 80053 COMPREHEN METABOLIC PANEL: CPT | Performed by: EMERGENCY MEDICINE

## 2024-10-25 PROCEDURE — 82805 BLOOD GASES W/O2 SATURATION: CPT | Performed by: EMERGENCY MEDICINE

## 2024-10-25 PROCEDURE — 85379 FIBRIN DEGRADATION QUANT: CPT | Performed by: EMERGENCY MEDICINE

## 2024-10-25 PROCEDURE — 87804 INFLUENZA ASSAY W/OPTIC: CPT | Performed by: EMERGENCY MEDICINE

## 2024-10-25 PROCEDURE — 85730 THROMBOPLASTIN TIME PARTIAL: CPT | Performed by: EMERGENCY MEDICINE

## 2024-10-25 PROCEDURE — 87040 BLOOD CULTURE FOR BACTERIA: CPT | Performed by: EMERGENCY MEDICINE

## 2024-10-25 PROCEDURE — 83605 ASSAY OF LACTIC ACID: CPT | Performed by: EMERGENCY MEDICINE

## 2024-10-25 PROCEDURE — 36415 COLL VENOUS BLD VENIPUNCTURE: CPT | Performed by: EMERGENCY MEDICINE

## 2024-10-25 PROCEDURE — 84439 ASSAY OF FREE THYROXINE: CPT | Performed by: EMERGENCY MEDICINE

## 2024-10-25 PROCEDURE — 83880 ASSAY OF NATRIURETIC PEPTIDE: CPT | Performed by: EMERGENCY MEDICINE

## 2024-10-25 PROCEDURE — 87811 SARS-COV-2 COVID19 W/OPTIC: CPT | Performed by: EMERGENCY MEDICINE

## 2024-10-25 PROCEDURE — 93005 ELECTROCARDIOGRAM TRACING: CPT

## 2024-10-25 PROCEDURE — 70450 CT HEAD/BRAIN W/O DYE: CPT

## 2024-10-25 PROCEDURE — 96365 THER/PROPH/DIAG IV INF INIT: CPT

## 2024-10-25 PROCEDURE — 85025 COMPLETE CBC W/AUTO DIFF WBC: CPT | Performed by: EMERGENCY MEDICINE

## 2024-10-25 RX ADMIN — SODIUM CHLORIDE, SODIUM LACTATE, POTASSIUM CHLORIDE, AND CALCIUM CHLORIDE 1500 ML: .6; .31; .03; .02 INJECTION, SOLUTION INTRAVENOUS at 23:53

## 2024-10-26 ENCOUNTER — APPOINTMENT (EMERGENCY)
Dept: CT IMAGING | Facility: HOSPITAL | Age: 79
DRG: 189 | End: 2024-10-26
Payer: COMMERCIAL

## 2024-10-26 PROBLEM — N17.9 AKI (ACUTE KIDNEY INJURY) (HCC): Status: ACTIVE | Noted: 2024-10-26

## 2024-10-26 LAB
2HR DELTA HS TROPONIN: -5 NG/L
ANION GAP SERPL CALCULATED.3IONS-SCNC: 14 MMOL/L (ref 4–13)
ATRIAL RATE: 68 BPM
BACTERIA UR QL AUTO: ABNORMAL /HPF
BASE EX.OXY STD BLDV CALC-SCNC: 95.6 % (ref 60–80)
BASE EXCESS BLDA CALC-SCNC: 9 MMOL/L (ref -2–3)
BASE EXCESS BLDV CALC-SCNC: 1.9 MMOL/L
BASOPHILS # BLD AUTO: 0.02 THOUSANDS/ΜL (ref 0–0.1)
BASOPHILS NFR BLD AUTO: 0 % (ref 0–1)
BILIRUB UR QL STRIP: NEGATIVE
BUN SERPL-MCNC: 27 MG/DL (ref 5–25)
CA-I BLD-SCNC: 1.31 MMOL/L (ref 1.12–1.32)
CALCIUM SERPL-MCNC: 9.4 MG/DL (ref 8.4–10.2)
CARDIAC TROPONIN I PNL SERPL HS: 4 NG/L
CHLORIDE SERPL-SCNC: 96 MMOL/L (ref 96–108)
CLARITY UR: ABNORMAL
CO2 SERPL-SCNC: 24 MMOL/L (ref 21–32)
COLOR UR: YELLOW
CREAT SERPL-MCNC: 0.59 MG/DL (ref 0.6–1.3)
EOSINOPHIL # BLD AUTO: 0.11 THOUSAND/ΜL (ref 0–0.61)
EOSINOPHIL NFR BLD AUTO: 1 % (ref 0–6)
ERYTHROCYTE [DISTWIDTH] IN BLOOD BY AUTOMATED COUNT: 14.5 % (ref 11.6–15.1)
GFR SERPL CREATININE-BSD FRML MDRD: 87 ML/MIN/1.73SQ M
GLUCOSE SERPL-MCNC: 108 MG/DL (ref 65–140)
GLUCOSE SERPL-MCNC: 127 MG/DL (ref 65–140)
GLUCOSE UR STRIP-MCNC: NEGATIVE MG/DL
HCO3 BLDA-SCNC: 35.9 MMOL/L (ref 22–28)
HCO3 BLDV-SCNC: 27.6 MMOL/L (ref 24–30)
HCT VFR BLD AUTO: 36.4 % (ref 34.8–46.1)
HCT VFR BLD CALC: 31 % (ref 34.8–46.1)
HGB BLD-MCNC: 10.9 G/DL (ref 11.5–15.4)
HGB BLDA-MCNC: 10.5 G/DL (ref 11.5–15.4)
HGB UR QL STRIP.AUTO: NEGATIVE
HYALINE CASTS #/AREA URNS LPF: ABNORMAL /LPF
IMM GRANULOCYTES # BLD AUTO: 0.06 THOUSAND/UL (ref 0–0.2)
IMM GRANULOCYTES NFR BLD AUTO: 1 % (ref 0–2)
KETONES UR STRIP-MCNC: ABNORMAL MG/DL
L PNEUMO1 AG UR QL IA.RAPID: NEGATIVE
LACTATE SERPL-SCNC: 1 MMOL/L (ref 0.5–2)
LEUKOCYTE ESTERASE UR QL STRIP: ABNORMAL
LYMPHOCYTES # BLD AUTO: 0.26 THOUSANDS/ΜL (ref 0.6–4.47)
LYMPHOCYTES NFR BLD AUTO: 2 % (ref 14–44)
MAGNESIUM SERPL-MCNC: 2.3 MG/DL (ref 1.9–2.7)
MCH RBC QN AUTO: 28.8 PG (ref 26.8–34.3)
MCHC RBC AUTO-ENTMCNC: 29.9 G/DL (ref 31.4–37.4)
MCV RBC AUTO: 96 FL (ref 82–98)
MONOCYTES # BLD AUTO: 0.13 THOUSAND/ΜL (ref 0.17–1.22)
MONOCYTES NFR BLD AUTO: 1 % (ref 4–12)
MUCOUS THREADS UR QL AUTO: ABNORMAL
NEUTROPHILS # BLD AUTO: 11.16 THOUSANDS/ΜL (ref 1.85–7.62)
NEUTS SEG NFR BLD AUTO: 95 % (ref 43–75)
NITRITE UR QL STRIP: NEGATIVE
NON-SQ EPI CELLS URNS QL MICRO: ABNORMAL /HPF
NRBC BLD AUTO-RTO: 0 /100 WBCS
O2 CT BLDV-SCNC: 12.2 ML/DL
PCO2 BLD: 38 MMOL/L (ref 21–32)
PCO2 BLD: 61.3 MM HG (ref 36–44)
PCO2 BLDV: 49 MM HG (ref 42–50)
PH BLD: 7.38 [PH] (ref 7.35–7.45)
PH BLDV: 7.37 [PH] (ref 7.3–7.4)
PH UR STRIP.AUTO: 5.5 [PH]
PHOSPHATE SERPL-MCNC: 3.8 MG/DL (ref 2.3–4.1)
PLATELET # BLD AUTO: 239 THOUSANDS/UL (ref 149–390)
PLATELET BLD QL SMEAR: ADEQUATE
PMV BLD AUTO: 9.9 FL (ref 8.9–12.7)
PO2 BLD: 57 MM HG (ref 75–129)
PO2 BLDV: 109.7 MM HG (ref 35–45)
POTASSIUM BLD-SCNC: 4.8 MMOL/L (ref 3.5–5.3)
POTASSIUM SERPL-SCNC: 5.4 MMOL/L (ref 3.5–5.3)
PR INTERVAL: 152 MS
PROCALCITONIN SERPL-MCNC: 0.95 NG/ML
PROT UR STRIP-MCNC: ABNORMAL MG/DL
QRS AXIS: 128 DEGREES
QRSD INTERVAL: 70 MS
QT INTERVAL: 382 MS
QTC INTERVAL: 406 MS
RBC # BLD AUTO: 3.78 MILLION/UL (ref 3.81–5.12)
RBC #/AREA URNS AUTO: ABNORMAL /HPF
RBC MORPH BLD: NORMAL
S PNEUM AG UR QL: NEGATIVE
SAO2 % BLD FROM PO2: 88 % (ref 60–85)
SODIUM BLD-SCNC: 133 MMOL/L (ref 136–145)
SODIUM SERPL-SCNC: 134 MMOL/L (ref 135–147)
SP GR UR STRIP.AUTO: 1.02 (ref 1–1.03)
SPECIMEN SOURCE: ABNORMAL
T WAVE AXIS: 109 DEGREES
T4 FREE SERPL-MCNC: 0.95 NG/DL (ref 0.61–1.12)
TSH SERPL DL<=0.05 MIU/L-ACNC: 7.11 UIU/ML (ref 0.45–4.5)
UROBILINOGEN UR STRIP-ACNC: <2 MG/DL
VENTRICULAR RATE: 68 BPM
WBC # BLD AUTO: 11.74 THOUSAND/UL (ref 4.31–10.16)
WBC #/AREA URNS AUTO: ABNORMAL /HPF

## 2024-10-26 PROCEDURE — 83605 ASSAY OF LACTIC ACID: CPT

## 2024-10-26 PROCEDURE — 99223 1ST HOSP IP/OBS HIGH 75: CPT | Performed by: STUDENT IN AN ORGANIZED HEALTH CARE EDUCATION/TRAINING PROGRAM

## 2024-10-26 PROCEDURE — 84484 ASSAY OF TROPONIN QUANT: CPT | Performed by: EMERGENCY MEDICINE

## 2024-10-26 PROCEDURE — 82803 BLOOD GASES ANY COMBINATION: CPT

## 2024-10-26 PROCEDURE — 84295 ASSAY OF SERUM SODIUM: CPT

## 2024-10-26 PROCEDURE — 85025 COMPLETE CBC W/AUTO DIFF WBC: CPT

## 2024-10-26 PROCEDURE — 36415 COLL VENOUS BLD VENIPUNCTURE: CPT | Performed by: EMERGENCY MEDICINE

## 2024-10-26 PROCEDURE — 82330 ASSAY OF CALCIUM: CPT

## 2024-10-26 PROCEDURE — 71275 CT ANGIOGRAPHY CHEST: CPT

## 2024-10-26 PROCEDURE — 94760 N-INVAS EAR/PLS OXIMETRY 1: CPT

## 2024-10-26 PROCEDURE — 82947 ASSAY GLUCOSE BLOOD QUANT: CPT

## 2024-10-26 PROCEDURE — 96368 THER/DIAG CONCURRENT INF: CPT

## 2024-10-26 PROCEDURE — 93010 ELECTROCARDIOGRAM REPORT: CPT | Performed by: INTERNAL MEDICINE

## 2024-10-26 PROCEDURE — 94002 VENT MGMT INPAT INIT DAY: CPT

## 2024-10-26 PROCEDURE — 94664 DEMO&/EVAL PT USE INHALER: CPT

## 2024-10-26 PROCEDURE — 84100 ASSAY OF PHOSPHORUS: CPT

## 2024-10-26 PROCEDURE — 96365 THER/PROPH/DIAG IV INF INIT: CPT

## 2024-10-26 PROCEDURE — 94640 AIRWAY INHALATION TREATMENT: CPT

## 2024-10-26 PROCEDURE — 96366 THER/PROPH/DIAG IV INF ADDON: CPT

## 2024-10-26 PROCEDURE — 83735 ASSAY OF MAGNESIUM: CPT

## 2024-10-26 PROCEDURE — 80048 BASIC METABOLIC PNL TOTAL CA: CPT

## 2024-10-26 PROCEDURE — 82805 BLOOD GASES W/O2 SATURATION: CPT | Performed by: EMERGENCY MEDICINE

## 2024-10-26 PROCEDURE — 96375 TX/PRO/DX INJ NEW DRUG ADDON: CPT

## 2024-10-26 PROCEDURE — 85014 HEMATOCRIT: CPT

## 2024-10-26 PROCEDURE — 84132 ASSAY OF SERUM POTASSIUM: CPT

## 2024-10-26 RX ORDER — HEPARIN SODIUM 5000 [USP'U]/ML
5000 INJECTION, SOLUTION INTRAVENOUS; SUBCUTANEOUS EVERY 8 HOURS SCHEDULED
Status: DISCONTINUED | OUTPATIENT
Start: 2024-10-26 | End: 2024-11-02 | Stop reason: HOSPADM

## 2024-10-26 RX ORDER — METHYLPREDNISOLONE SODIUM SUCCINATE 40 MG/ML
40 INJECTION, POWDER, LYOPHILIZED, FOR SOLUTION INTRAMUSCULAR; INTRAVENOUS DAILY
Status: DISCONTINUED | OUTPATIENT
Start: 2024-10-27 | End: 2024-10-26

## 2024-10-26 RX ORDER — LEVALBUTEROL INHALATION SOLUTION 1.25 MG/3ML
1.25 SOLUTION RESPIRATORY (INHALATION)
Status: DISCONTINUED | OUTPATIENT
Start: 2024-10-26 | End: 2024-10-30

## 2024-10-26 RX ORDER — LEVOTHYROXINE SODIUM 25 UG/1
25 TABLET ORAL
Status: DISCONTINUED | OUTPATIENT
Start: 2024-10-26 | End: 2024-11-02 | Stop reason: HOSPADM

## 2024-10-26 RX ORDER — BUDESONIDE AND FORMOTEROL FUMARATE DIHYDRATE 160; 4.5 UG/1; UG/1
2 AEROSOL RESPIRATORY (INHALATION) 2 TIMES DAILY
Status: DISCONTINUED | OUTPATIENT
Start: 2024-10-26 | End: 2024-10-26

## 2024-10-26 RX ORDER — METHYLPREDNISOLONE SODIUM SUCCINATE 40 MG/ML
40 INJECTION, POWDER, LYOPHILIZED, FOR SOLUTION INTRAMUSCULAR; INTRAVENOUS EVERY 12 HOURS SCHEDULED
Status: DISCONTINUED | OUTPATIENT
Start: 2024-10-26 | End: 2024-10-26

## 2024-10-26 RX ORDER — FORMOTEROL FUMARATE DIHYDRATE 20 UG/2ML
20 SOLUTION RESPIRATORY (INHALATION)
Status: DISCONTINUED | OUTPATIENT
Start: 2024-10-26 | End: 2024-10-27

## 2024-10-26 RX ORDER — METHYLPREDNISOLONE SODIUM SUCCINATE 40 MG/ML
40 INJECTION, POWDER, LYOPHILIZED, FOR SOLUTION INTRAMUSCULAR; INTRAVENOUS EVERY 12 HOURS SCHEDULED
Status: DISCONTINUED | OUTPATIENT
Start: 2024-10-26 | End: 2024-10-27

## 2024-10-26 RX ORDER — CHLORHEXIDINE GLUCONATE ORAL RINSE 1.2 MG/ML
15 SOLUTION DENTAL EVERY 12 HOURS SCHEDULED
Status: DISCONTINUED | OUTPATIENT
Start: 2024-10-26 | End: 2024-10-27

## 2024-10-26 RX ORDER — METHYLPREDNISOLONE SODIUM SUCCINATE 125 MG/2ML
100 INJECTION, POWDER, LYOPHILIZED, FOR SOLUTION INTRAMUSCULAR; INTRAVENOUS ONCE
Status: COMPLETED | OUTPATIENT
Start: 2024-10-26 | End: 2024-10-26

## 2024-10-26 RX ORDER — IPRATROPIUM BROMIDE AND ALBUTEROL SULFATE 2.5; .5 MG/3ML; MG/3ML
3 SOLUTION RESPIRATORY (INHALATION) ONCE
Status: COMPLETED | OUTPATIENT
Start: 2024-10-26 | End: 2024-10-26

## 2024-10-26 RX ORDER — DULOXETIN HYDROCHLORIDE 30 MG/1
30 CAPSULE, DELAYED RELEASE ORAL
Status: DISCONTINUED | OUTPATIENT
Start: 2024-10-26 | End: 2024-11-02 | Stop reason: HOSPADM

## 2024-10-26 RX ADMIN — LEVALBUTEROL HYDROCHLORIDE 1.25 MG: 1.25 SOLUTION RESPIRATORY (INHALATION) at 20:59

## 2024-10-26 RX ADMIN — IPRATROPIUM BROMIDE 0.5 MG: 0.5 SOLUTION RESPIRATORY (INHALATION) at 15:05

## 2024-10-26 RX ADMIN — FORMOTEROL FUMARATE DIHYDRATE 20 MCG: 20 SOLUTION RESPIRATORY (INHALATION) at 08:45

## 2024-10-26 RX ADMIN — IPRATROPIUM BROMIDE 0.5 MG: 0.5 SOLUTION RESPIRATORY (INHALATION) at 20:59

## 2024-10-26 RX ADMIN — HEPARIN SODIUM 5000 UNITS: 5000 INJECTION INTRAVENOUS; SUBCUTANEOUS at 21:58

## 2024-10-26 RX ADMIN — CEFEPIME 2000 MG: 2 INJECTION, POWDER, FOR SOLUTION INTRAVENOUS at 00:33

## 2024-10-26 RX ADMIN — METHYLPREDNISOLONE SODIUM SUCCINATE 100 MG: 125 INJECTION, POWDER, FOR SOLUTION INTRAMUSCULAR; INTRAVENOUS at 00:30

## 2024-10-26 RX ADMIN — LEVALBUTEROL HYDROCHLORIDE 1.25 MG: 1.25 SOLUTION RESPIRATORY (INHALATION) at 08:45

## 2024-10-26 RX ADMIN — FORMOTEROL FUMARATE DIHYDRATE 20 MCG: 20 SOLUTION RESPIRATORY (INHALATION) at 20:59

## 2024-10-26 RX ADMIN — IOHEXOL 50 ML: 350 INJECTION, SOLUTION INTRAVENOUS at 00:15

## 2024-10-26 RX ADMIN — CHLORHEXIDINE GLUCONATE 15 ML: 1.2 RINSE ORAL at 21:58

## 2024-10-26 RX ADMIN — LEVALBUTEROL HYDROCHLORIDE 1.25 MG: 1.25 SOLUTION RESPIRATORY (INHALATION) at 15:05

## 2024-10-26 RX ADMIN — IPRATROPIUM BROMIDE AND ALBUTEROL SULFATE 3 ML: 2.5; .5 SOLUTION RESPIRATORY (INHALATION) at 00:41

## 2024-10-26 RX ADMIN — METHYLPREDNISOLONE SODIUM SUCCINATE 40 MG: 40 INJECTION, POWDER, FOR SOLUTION INTRAMUSCULAR; INTRAVENOUS at 11:18

## 2024-10-26 RX ADMIN — LEVOTHYROXINE SODIUM 25 MCG: 25 TABLET ORAL at 12:04

## 2024-10-26 RX ADMIN — METHYLPREDNISOLONE SODIUM SUCCINATE 40 MG: 40 INJECTION, POWDER, FOR SOLUTION INTRAMUSCULAR; INTRAVENOUS at 21:58

## 2024-10-26 RX ADMIN — DULOXETINE HYDROCHLORIDE 30 MG: 30 CAPSULE, DELAYED RELEASE ORAL at 22:00

## 2024-10-26 RX ADMIN — VANCOMYCIN HYDROCHLORIDE 1250 MG: 5 INJECTION, POWDER, LYOPHILIZED, FOR SOLUTION INTRAVENOUS at 01:03

## 2024-10-26 RX ADMIN — HEPARIN SODIUM 5000 UNITS: 5000 INJECTION INTRAVENOUS; SUBCUTANEOUS at 05:15

## 2024-10-26 RX ADMIN — HEPARIN SODIUM 5000 UNITS: 5000 INJECTION INTRAVENOUS; SUBCUTANEOUS at 13:47

## 2024-10-26 RX ADMIN — IPRATROPIUM BROMIDE 0.5 MG: 0.5 SOLUTION RESPIRATORY (INHALATION) at 08:45

## 2024-10-26 NOTE — RESPIRATORY THERAPY NOTE
10/26/24 0846   Respiratory Assessment   Assessment Type Assess only   General Appearance Drowsy;Sleeping   Respiratory Pattern Tachypneic;Shallow   Chest Assessment Chest expansion symmetrical   Bilateral Breath Sounds Diminished   Resp Comments Pt tidal volumes between 200-250 on current bipap settings. Appropriate tidal volume for pt is 300-400mL. Increased bipap settings until appropriate tidal volumes reached. Pt now at 16/6. Order pended.   O2 Device bipap   Non-Invasive Information   O2 Interface Device Full face mask   SpO2 94 %   $ Pulse Oximetry Spot Check Charge Completed   Non-Invasive Settings   IPAP (cm) 16 cm   EPAP (cm) 6 cm   Rate (Set) 10   FiO2 (%) 30   Pressure Support (cm H2O) 10   Rise Time 3   Inspiratory Time (Set) 1.2   Non-Invasive Readings   Total Rate 14   Peak Pressure (Obs) 16   Spontaneous Vt (mL) 342   Leak (lpm) 22   Spontaneous MV (mL) 5   Non-Invasive Alarms   Insp Pressure High (cm H20) 30   Insp Pressure Low (cm H20) 5   Low Insp Pressure Time (sec) 20 sec   MV Low (L/min) 3   Vt High (mL) 1000   Vt Low (mL) 200   High Resp Rate (BPM) 40 BPM   Low Resp Rate (BPM) 8 BPM

## 2024-10-26 NOTE — ASSESSMENT & PLAN NOTE
Likely due to dehydration and poor p.o. intake recently.    Plan:  Strict I/O  Fluid bolus given in ED  Hold home torsemide

## 2024-10-26 NOTE — ED NOTES
Patients oxygen saturation was dropping into the 80's, respiratory was called and patient was placed on bipap     Linsey Bowen RN  10/26/24 0036

## 2024-10-26 NOTE — ED ATTENDING ATTESTATION
10/25/2024  I, Arias Murphy MD, saw and evaluated the patient. I have discussed the patient with the resident/non-physician practitioner and agree with the resident's/non-physician practitioner's findings, Plan of Care, and MDM as documented in the resident's/non-physician practitioner's note, except where noted. All available labs and Radiology studies were reviewed.  I was present for key portions of any procedure(s) performed by the resident/non-physician practitioner and I was immediately available to provide assistance.       At this point I agree with the current assessment done in the Emergency Department.  I have conducted an independent evaluation of this patient a history and physical is as follows: Patient is a 79 year old female with hypoxia (pulse ox in the 80s) at nursing facility on 2 lpm nasal cannula and rate was increased to 4 lpm. Patient denies sob, cough, chest pain, fever, N/V. (+) fatigue. Was last seen at [facility] on 10/10/24 for anxiety, depression. Has h/o COPD. [facility] website checked on this patient and last Rx filled was on 8/26/24 for xanax for 7 day supply. NCAT. No scleral icterus. PERRL. Moist mucous membranes. Lung sounds diminished bilateral bases. Heart regular without murmur. Abdomen soft and nontender. Good bowel sounds. No edema. No rash noted. Tired but oriented X3. DDx including but not limited to: metabolic abnormality, dehydration, viral illness, anemia, thyroid disease, intracranial process, other infectious process including UTI; doubt Gu[place]ain [place] syndrome or myasthenia gravis or botulism or multiple sclerosis or transverse myelitis.  DDX including but not limited to: pneumonia, pleural effusion, CHF, SCAPE, PE, PTX, ACS, MI, asthma exacerbation, COPD exacerbation, COVID 19, anemia, metabolic abnormality, renal failure. Will check EKG, CXR, labs and CT.     ED Course         Critical Care Time  Procedures

## 2024-10-26 NOTE — ED NOTES
Trial off Bipap at this time- respiratory at bedside-  placed on 3ltrs O2, patient dipped to 91, upped to 4ltrs, up to 94     Linsey Bowen RN  10/26/24 8440

## 2024-10-26 NOTE — ED PROCEDURE NOTE
PROCEDURE  CriticalCare Time    Date/Time: 10/25/2024 10:30 PM    Performed by: Arias Murphy MD  Authorized by: Arias Murphy MD    Critical care provider statement:     Critical care time (minutes):  35    Critical care time was exclusive of:  Separately billable procedures and treating other patients and teaching time    Critical care was necessary to treat or prevent imminent or life-threatening deterioration of the following conditions:  Respiratory failure (hypoxia and hypercarbia)    Critical care was time spent personally by me on the following activities:  Obtaining history from patient or surrogate, development of treatment plan with patient or surrogate, evaluation of patient's response to treatment, examination of patient, ordering and performing treatments and interventions, ordering and review of laboratory studies, ordering and review of radiographic studies, re-evaluation of patient's condition, review of old charts and ventilator management    I assumed direction of critical care for this patient from another provider in my specialty: no         Arias Murphy MD  10/26/24 0212

## 2024-10-26 NOTE — ASSESSMENT & PLAN NOTE
Patient on chronic 2 L O2 found to be hypoxic and altered by nursing home staff, in emergency department patient desatted to 80s and placed on BiPAP with improvement.  Workup otherwise largely unremarkable but when attempted to remove BiPAP, patient again became altered and difficult to arouse, BiPAP replaced and patient admitted to [facility].    Patient did have recent admission to [facility] with recurrent episodes of worsening respiratory status and being transferred to the ICU and then downgraded to [facility] soon afterwards.    Plan:  Continue BiPAP, wean as tolerated  Monitor with a.m. labs.  Contnue home budesinide, Xopenex, Atrovent  NPO  Monitor AM labs

## 2024-10-26 NOTE — ED PROVIDER NOTES
"Time reflects when diagnosis was documented in both MDM as applicable and the Disposition within this note       Time User Action Codes Description Comment    10/26/2024 12:10 AM Ahmed, Meng Add [J96.01,  J96.02] Acute respiratory failure with hypoxia and hypercarbia (HCC)     10/26/2024 12:10 AM Ahmed, Meng Add [N39.0] UTI (urinary tract infection)     10/26/2024 12:10 AM Ahmed, Meng Add [J18.9] Pneumonia     10/26/2024 12:11 AM Ahmed, Meng Add [R79.89] TSH elevation     10/26/2024 12:11 AM Ahmed, Meng Add [N17.9] BERTO (acute kidney injury) ([facility])     10/26/2024  2:43 AM Ahmed, Meng Add [Z87.09] History of COPD     10/26/2024  2:43 AM Ahmed, Meng Remove [J18.9] Pneumonia     10/26/2024  3:01 AM Ahmed, Meng Add [N64.9] Breast lesion     10/26/2024  3:01 AM Ahmed, Meng Add [J90] Pleural effusion     10/26/2024  3:01 AM Ahmed, Meng Add [J98.4] Scarring of lung           ED Disposition       None          Assessment & Plan       Medical Decision Making  Patient with history as below presented to triage with CC: \"Patient presents with:  Fatigue: Sent via EMS from [facility], a facility doctor was in and felt she was in respiratory failure due to her pulse ox at 81% on 2ltrs, facility then placed her on 4ltrs.  Patient only complains of fatigue. Patient also has not been eating, states \": I do not like their food\"  \"  Hx obtained from pt and EMS    80 y/o hx of COPD on 2L from [facility] presenting with resp failure with hypoxia and hypercarbia. Not meeting SIRS criteria on arrival. Was found to be altered and hypoxic on 2L at nursing home, EMS put her on 6 and brought her back to >92% and she was Aox4 at that point. Given history of respiratory distress, will initiate broad workup.      DDX including but not limited to: pneumonia, pleural effusion, CHF, SCAPE, PE, PTX, ACS, MI, asthma exacerbation, COPD exacerbation, COVID 19, EVALI, anemia, metabolic abnormality, renal failure.    Plan: Labs, CT head, " EKG, chest x-ray, supplemental oxygen as needed, infectious cultures, UA, IV antibiotics, likely admission given new onset hypoxia with respiratory distress.    Trialed her on 2L in ED, but she dropped her sats to low 80s. VBG 7.3 with CO2 79.8 indicating hypercarbic respiratory failure.. CXR with worsening of chronic R middle lobe opacity concerning for possible PNA so chest CT was ordered.  D-dimer elevated, so PE study was ordered.  Given 1 duoneb, solumedrol and Started on BiPAP. Nml lactate, elev procal.  w/o fluid overload on exam. Pt otherwise asymptomatic other than dec appetite. Labs significant for WBC 12, BERTO w/ Cr inc of >0.3, ?UTI, hyperK at 5.6.  TSH elevation.  Trop 9 w. EKG sinus w. LPFB w/o ischemic changes or peaked twaves. Head CT neg. Started on cefepime and vanc and given 1.5L LR (which meets 30cc/kg bolus requirement).  PE study negative for PE, pneumonia showing chronic scarring of the right middle lobe, with trace effusions.  VBG improved after BiPAP.  Discussed case with critical care, patient stable for stepdown to admission.    I have independently ordered, reviewed and interpreted the following: labs and/or imaging and/or EKG studies listed below  Reviewed external records including notes, and prior labs/imaging results.    Disposition: Patient condition, stable.  Discussed need for admission with patient for further management and workup with pt and they are agreeable with plan. Discussed case with [facility] hospitalist , who agreed to admit patient to SD2 status.     See ED Course for further MDM.      PLEASE NOTE:  This encounter was completed utilizing the [employer] Direct Speech Voice Recognition Software. Grammatical errors, random word insertions, pronoun errors and incomplete sentences are occasional inherent consequences of the system due to software limitations, ambient noise and hardware issues.These may be missed by proof reading prior to affixing electronic  signature. Any questions or concerns about the content, text or information contained within the body of this dictation should be directly addressed to the physician for clarification. Please do not hesitate to call me directly if you have any questions or concerns.      Amount and/or Complexity of Data Reviewed  Independent Historian: EMS  External Data Reviewed: labs, radiology, ECG and notes.  Labs: ordered. Decision-making details documented in ED Course.  Radiology: ordered and independent interpretation performed. Decision-making details documented in ED Course.  ECG/medicine tests: ordered and independent interpretation performed. Decision-making details documented in ED Course.    Risk  Prescription drug management.  Decision regarding hospitalization.        ED Course as of 10/26/24 0535   Fri Oct 25, 2024   2258 WBC(!): 12.05   2258 Hemoglobin(!): 11.0   2258 Platelet Count: 308   2316 FLU/COVID Rapid Antigen (30 min. TAT) - Preferred screening test in ED  Negative.   2343 pCO2, Julian(!!): 79.8  Patient otherwise mentating,, appears to be chronically elevated at secondary to CO2 retention from COPD., wears BiPAP at bedtime, will likely continue.   2344 D-Dimer, Quant(!): 1.69   2345 Creatinine: 0.76  Within normal range of lab, however increased from baseline concerning for BRETO.   2348 Potassium(!): 5.6  Mildly elevated, with large but not peaked T waves.   Sat Oct 26, 2024   0004 Leukocytes, UA(!): Trace   0005 Nitrite, UA: Negative   0010 WBC, UA(!): 4-10   0010 TSH 3RD GENERATON(!): 7.111   0010 Procalcitonin(!): 0.95   0117 CTA chest pe study  No evidence for pulmonary embolus.     Stable right middle lobe scarring/atelectasis. Otherwise no focal airspace consolidation to suggest pneumonia.     Stable trace bilateral pleural effusions with suspected mild pulmonary interstitial edema. Recommend clinical correlation.     Stable right breast soft tissue lesion. Correlation with dedicated breast imaging  again recommended.        0155 Delta 2hr hsTnI: -5   0243 CT PE study negative for PE.  No focal consolidation concerning for pneumonia seen on CT scan.  Chronic changes of the right middle lobe.  Trace bilateral pleural effusions.  Discussed with critical care, patient stable for the floors.  Repeat gas with improved pH and CO2.  Will take off BiPAP and transition to nasal cannula.  Patient still requiring 4 L, greater than her baseline.  Discussed with [facility], will admit to stepdown 2.       Medications   lactated ringers bolus 1,500 mL (1,500 mL Intravenous New Bag 10/25/24 2353)   ipratropium-albuterol (DUO-NEB) 0.5-2.5 mg/3 mL inhalation solution 3 mL (3 mL Nebulization Given 10/26/24 0041)   cefepime (MAXIPIME) 2 g/50 mL dextrose IVPB (0 mg Intravenous Stopped 10/26/24 0103)   vancomycin (VANCOCIN) 1,250 mg in sodium chloride 0.9 % 250 mL IVPB (1,250 mg Intravenous New Bag 10/26/24 0103)   methylPREDNISolone sodium succinate (Solu-MEDROL) injection 100 mg (100 mg Intravenous Given 10/26/24 0030)   iohexol (OMNIPAQUE) 350 MG/ML injection (MULTI-DOSE) 50 mL (50 mL Intravenous Given 10/26/24 0015)       ED Risk Strat Scores                           SBIRT 20yo+      Flowsheet Row Most Recent Value   Initial Alcohol Screen: US AUDIT-C     1. How often do you have a drink containing alcohol? 0 Filed at: 10/26/2024 0156   2. How many drinks containing alcohol do you have on a typical day you are drinking?  0 Filed at: 10/26/2024 0156   3b. FEMALE Any Age, or MALE 65+: How often do you have 4 or more drinks on one occassion? 0 Filed at: 10/26/2024 0156   Audit-C Score 0 Filed at: 10/26/2024 0156   MARIMAR: How many times in the past year have you...    Used an illegal drug or used a prescription medication for non-medical reasons? Never Filed at: 10/26/2024 0156                            History of Present Illness       Chief Complaint   Patient presents with    Fatigue     Sent via EMS from [facility], a facility  "doctor was in and felt she was in respiratory failure due to her pulse ox at 81% on 2ltrs, facility then placed her on 4ltrs.  Patient only complains of fatigue. Patient also has not been eating, states \": I do not like their food\"       Past Medical History:   Diagnosis Date    Anxiety 2024    Asthma     COPD (chronic obstructive pulmonary disease) ([facility])     Disease of thyroid gland     GERD (gastroesophageal reflux disease)     Hypertension 10/11/2018    Hypothyroid     Normocytic anemia 2024    Osteoarthritis 2012    Seizure-like activity ([facility]) 10/18/2024    Temporal arteritis ([facility])     Type 2 diabetes mellitus with complication, without long-term current use of insulin ([facility]) 2020      Past Surgical History:   Procedure Laterality Date    EYE SURGERY Right 2019    cataract LVCFS    HYSTERECTOMY      NO PAST SURGERIES      ALSO NO RELEVANT PAST SURRGICAL HX AS PER [facility]      Family History   Problem Relation Age of Onset    Breast cancer Mother     Emphysema Father       Social History     Tobacco Use    Smoking status: Former     Current packs/day: 0.00     Average packs/day: 1 pack/day for 54.0 years (54.0 ttl pk-yrs)     Types: Cigarettes     Start date:      Quit date:      Years since quittin.8     Passive exposure: Past    Smokeless tobacco: Never    Tobacco comments:     TOBACCO USE, NO PASSIVE SMOKE EXPOSURE   Vaping Use    Vaping status: Never Used   Substance Use Topics    Alcohol use: Never    Drug use: No      E-Cigarette/Vaping    E-Cigarette Use Never User       E-Cigarette/Vaping Substances    Nicotine No     THC No     CBD No     Flavoring No     Other No     Unknown No       I have reviewed and agree with the history as documented.     79-year-old female history of COPD presenting to the ED from [facility] via EMS presenting for acute respiratory distress with hypoxia.  She is found by nursing staff noted to be hypoxic to the low " 80s on 2 L.  Nursing staff put her up to 6 L, with mild improvement and called EMS.  EMS had patient on 2 L satting well and route without acute respiratory distress.  Vital signs stable en route without fever.  Patient otherwise with symptomatic other than generalized fatigue.  Per nursing staff, patient with decreased oral intake.  Denies fever, chills, headache, focal weakness, chest pain, shortness of breath, neck pain, back pain, abdominal pain, nausea, vomiting, diarrhea, bloody/tarry stool, urinary symptoms, leg pain, leg swelling.        Review of Systems   Constitutional:  Positive for appetite change and fatigue. Negative for chills and fever.   HENT:  Negative for congestion and sore throat.    Eyes:  Negative for photophobia, pain, redness and visual disturbance.   Respiratory:  Negative for cough, chest tightness and shortness of breath.    Cardiovascular:  Negative for chest pain and palpitations.   Gastrointestinal:  Negative for abdominal pain, constipation, diarrhea, nausea and vomiting.   Genitourinary:  Negative for difficulty urinating, dysuria, flank pain, frequency, hematuria, pelvic pain, urgency, vaginal bleeding, vaginal discharge and vaginal pain.   Musculoskeletal:  Negative for arthralgias, back pain, neck pain and neck stiffness.   Skin:  Negative for color change and rash.   Neurological:  Negative for dizziness, seizures, syncope, light-headedness, numbness and headaches.   All other systems reviewed and are negative.          Objective       ED Triage Vitals   Temperature Pulse Blood Pressure Respirations SpO2 Patient Position - Orthostatic VS   10/25/24 2204 10/25/24 2204 10/25/24 2204 10/25/24 2204 10/25/24 2204 10/25/24 2204   98.3 °F (36.8 °C) 66 135/63 16 98 % Sitting      Temp Source Heart Rate Source BP Location FiO2 (%) Pain Score    10/26/24 0413 10/25/24 2204 10/25/24 2204 10/26/24 0413 10/25/24 2204    Axillary Monitor Right arm 28 No Pain      Vitals      Date and Time  Temp Pulse SpO2 Resp BP Pain Score FACES Pain Rating User   10/26/24 0500 -- 65 93 % 19 107/56 -- -- TC   10/26/24 0431 -- 64 -- 24 92/54 -- -- TC   10/26/24 0431 -- -- 91 % -- -- -- -- JK   10/26/24 0415 -- 69 91 % 26 120/58 -- -- TC   10/26/24 0413 98.3 °F (36.8 °C) 73 91 % 28 139/65 -- -- TC   10/26/24 0328 -- -- 97 % -- -- -- -- JK   10/26/24 0306 -- 70 94 % 16 112/59 -- -- ML   10/26/24 0245 -- 64 96 % 16 97/51 -- -- ML   10/26/24 0238 -- -- 94 % -- -- -- -- JK   10/26/24 0230 -- 75 94 % 16 136/64 -- -- ML   10/26/24 0041 -- -- 94 % -- -- -- -- JK   10/26/24 0034 -- -- 96 % -- -- -- -- JK   10/26/24 0030 -- 72 97 % 16 128/61 -- -- ML   10/26/24 0000 -- 65 91 % 16 125/61 -- -- ML   10/25/24 2204 98.3 °F (36.8 °C) 66 98 % 16 135/63 No Pain -- ML            Physical Exam  Vitals and nursing note reviewed.   Constitutional:       General: She is not in acute distress.     Appearance: She is ill-appearing (Chronically ill-appearing.). She is not toxic-appearing.   HENT:      Head: Normocephalic and atraumatic.      Right Ear: External ear normal.      Left Ear: External ear normal.      Nose: Nose normal.      Mouth/Throat:      Mouth: Mucous membranes are dry.      Pharynx: Oropharynx is clear. No oropharyngeal exudate or posterior oropharyngeal erythema.   Eyes:      Extraocular Movements: Extraocular movements intact.      Conjunctiva/sclera: Conjunctivae normal.      Pupils: Pupils are equal, round, and reactive to light.   Cardiovascular:      Rate and Rhythm: Normal rate and regular rhythm.      Pulses: Normal pulses.      Heart sounds: No murmur heard.  Pulmonary:      Breath sounds: Decreased air movement present. No wheezing, rhonchi or rales.   Abdominal:      General: Abdomen is flat.      Tenderness: There is no abdominal tenderness. There is no right CVA tenderness, left CVA tenderness, guarding or rebound.   Musculoskeletal:         General: No swelling, tenderness, deformity or signs of injury. Normal  "range of motion.      Cervical back: Normal range of motion and neck supple.      Right lower leg: No edema.   Skin:     General: Skin is warm and dry.      Capillary Refill: Capillary refill takes less than 2 seconds.      Findings: No bruising, erythema or lesion.   Neurological:      General: No focal deficit present.      Mental Status: She is alert and oriented to person, place, and time. Mental status is at baseline.   Psychiatric:         Mood and Affect: Mood normal.         Behavior: Behavior normal.         Results Reviewed       Procedure Component Value Units Date/Time    POCT Blood Gas (CG8+) [[id number]]  (Abnormal) Collected: 10/26/24 0347    Lab Status: Edited Result - FINAL Specimen: Venous Updated: 10/26/24 0534     pH, Art i-STAT 7.376     ph, Julian ISTAT --     pCO2, Art i-STAT 61.3 mm HG      pCO2, Julian i-STAT --     pO2, ART i-STAT 57.0 mm HG      pO2, Julian i-STAT --     BE, i-STAT 9 mmol/L      HCO3, Art i-STAT 35.9 mmol/L      HCO3, Julian i-STAT --     CO2, i-STAT 38 mmol/L      O2 Sat, i-STAT 88 %      SODIUM, I-STAT 133 mmol/l      Potassium, i-STAT 4.8 mmol/L      Calcium, Ionized i-STAT 1.31 mmol/L      Hct, i-STAT 31 %      Hgb, i-STAT 10.5 g/dl      Glucose, i-STAT 127 mg/dl      Specimen Type Arterial    T4, free [[id number]]  (Normal) Collected: 10/25/24 2313    Lab Status: Final result Specimen: Blood from Arm, Left Updated: 10/26/24 0450     Free T4 0.95 ng/dL     Narrative:        \"Therapeutic range for patients medicated with thyroid disorders: 0.61-1.24 ng/dL.\"    Blood gas, venous [[id number]]  (Abnormal) Collected: 10/26/24 0201    Lab Status: Final result Specimen: Blood from Arm, Right Updated: 10/26/24 0211     pH, Julian 7.369     pCO2, Julian 49.0 mm Hg      pO2, Julian 109.7 mm Hg      HCO3, Julian 27.6 mmol/L      Base Excess, Julian 1.9 mmol/L      O2 Content, Julian 12.2 ml/dL      O2 HGB, VENOUS 95.6 %     HS Troponin I 2hr [[id number]]  (Normal) Collected: 10/26/24 0102    Lab Status: Final " result Specimen: Blood from Arm, Left Updated: 10/26/24 0144     hs TnI 2hr 4 ng/L      Delta 2hr hsTnI -5 ng/L     Legionella antigen, urine [[id number]] Collected: 10/25/24 2313    Lab Status: In process Specimen: Urine, Catheter Updated: 10/26/24 0010    Urine Microscopic [[id number]]  (Abnormal) Collected: 10/25/24 2349    Lab Status: Final result Specimen: Urine, Straight Cath Updated: 10/26/24 0008     RBC, UA 4-10 /hpf      WBC, UA 4-10 /hpf      Epithelial Cells Occasional /hpf      Bacteria, UA Occasional /hpf      MUCUS THREADS Moderate     Hyaline Casts, UA 10-25 /lpf     TSH, 3rd generation with Free T4 reflex [[id number]]  (Abnormal) Collected: 10/25/24 2313    Lab Status: Final result Specimen: Blood from Arm, Left Updated: 10/26/24 0005     TSH 3RD GENERATON 7.111 uIU/mL     Procalcitonin [[id number]]  (Abnormal) Collected: 10/25/24 2313    Lab Status: Final result Specimen: Blood from Arm, Left Updated: 10/26/24 0005     Procalcitonin 0.95 ng/ml     UA w Reflex to Microscopic w Reflex to Culture [[id number]]  (Abnormal) Collected: 10/25/24 2349    Lab Status: Final result Specimen: Urine, Straight Cath Updated: 10/26/24 0004     Color, UA Yellow     Clarity, UA Turbid     Specific Gravity, UA 1.019     pH, UA 5.5     Leukocytes, UA Trace     Nitrite, UA Negative     Protein, UA 50 (1+) mg/dl      Glucose, UA Negative mg/dl      Ketones, UA Trace mg/dl      Urobilinogen, UA <2.0 mg/dl      Bilirubin, UA Negative     Occult Blood, UA Negative    B-Type Natriuretic Peptide(BNP) [[id number]]  (Abnormal) Collected: 10/25/24 2313    Lab Status: Final result Specimen: Blood from Arm, Left Updated: 10/25/24 2356      pg/mL     Fingerstick Glucose (POCT) [[id number]]  (Normal) Collected: 10/25/24 2348    Lab Status: Final result Specimen: Blood Updated: 10/25/24 2349     POC Glucose 107 mg/dl     D-dimer, quantitative [[id number]]  (Abnormal) Collected: 10/25/24 7201    Lab Status: Final result Specimen: Blood  from Arm, Left Updated: 10/25/24 2344     D-Dimer, Quant 1.69 ug/ml FEU     Narrative:      In the evaluation for possible pulmonary embolism, in the appropriate (Well's Score of 4 or less) patient, the age adjusted d-dimer cutoff for this patient can be calculated as:    Age x 0.01 (in ug/mL) for Age-adjusted D-dimer exclusion threshold for a patient over 50 years.    Blood gas, venous [[id number]]  (Abnormal) Collected: 10/25/24 2303    Lab Status: Final result Specimen: Blood from Arm, Left Updated: 10/25/24 2333     pH, Julian 7.308     pCO2, Julian 79.8 mm Hg      pO2, Julian 31.3 mm Hg      HCO3, Julian 39.1 mmol/L      Base Excess, Julian 9.9 mmol/L      O2 Content, Julian 10.2 ml/dL      O2 HGB, VENOUS 59.2 %     Lactic acid, plasma (w/reflex if result > 2.0) [[id number]]  (Normal) Collected: 10/25/24 2241    Lab Status: Final result Specimen: Blood from Arm, Left Updated: 10/25/24 2331     LACTIC ACID 0.8 mmol/L     Narrative:      Result may be elevated if tourniquet was used during collection.    Blood culture #2 [[id number]] Collected: 10/25/24 2313    Lab Status: In process Specimen: Blood from Arm, Left Updated: 10/25/24 2331    Blood culture #1 [[id number]] Collected: 10/25/24 2317    Lab Status: In process Specimen: Blood from Arm, Left Updated: 10/25/24 2331    Strep Pneumoniae, Urine [[id number]] Collected: 10/25/24 2313    Lab Status: In process Specimen: Urine, Clean Catch Updated: 10/25/24 2330    Comprehensive metabolic panel [[id number]]  (Abnormal) Collected: 10/25/24 2241    Lab Status: Final result Specimen: Blood from Arm, Left Updated: 10/25/24 2328     Sodium 137 mmol/L      Potassium 5.6 mmol/L      Chloride 91 mmol/L      CO2 40 mmol/L      ANION GAP 6 mmol/L      BUN 28 mg/dL      Creatinine 0.76 mg/dL      Glucose 113 mg/dL      Calcium 10.7 mg/dL      AST 24 U/L      ALT 18 U/L      Alkaline Phosphatase 44 U/L      Total Protein 6.7 g/dL      Albumin 4.1 g/dL      Total Bilirubin 0.35 mg/dL      eGFR 74  ml/min/1.73sq m     Narrative:      National Kidney Disease Foundation guidelines for Chronic Kidney Disease (CKD):     Stage 1 with normal or high GFR (GFR > 90 mL/min/1.73 square meters)    Stage 2 Mild CKD (GFR = 60-89 mL/min/1.73 square meters)    Stage 3A Moderate CKD (GFR = 45-59 mL/min/1.73 square meters)    Stage 3B Moderate CKD (GFR = 30-44 mL/min/1.73 square meters)    Stage 4 Severe CKD (GFR = 15-29 mL/min/1.73 square meters)    Stage 5 End Stage CKD (GFR <15 mL/min/1.73 square meters)  Note: GFR calculation is accurate only with a steady state creatinine    HS Troponin 0hr (reflex protocol) [[id number]]  (Normal) Collected: 10/25/24 2241    Lab Status: Final result Specimen: Blood from Arm, Left Updated: 10/25/24 2319     hs TnI 0hr 9 ng/L     FLU/COVID Rapid Antigen (30 min. TAT) - Preferred screening test in ED [[id number]]  (Normal) Collected: 10/25/24 2241    Lab Status: Final result Specimen: Nares from Nose Updated: 10/25/24 2316     SARS COV Rapid Antigen Negative     Influenza A Rapid Antigen Negative     Influenza B Rapid Antigen Negative    Narrative:      This test has been performed using the Quidel Delilah 2 FLU+SARS Antigen test under the Emergency Use Authorization (EUA). This test has been validated by the  and verified by the performing laboratory. The Delilah uses lateral flow immunofluorescent sandwich assay to detect SARS-COV, Influenza A and Influenza B Antigen.     The Quidel Delilah 2 SARS Antigen test does not differentiate between SARS-CoV and SARS-CoV-2.     Negative results are presumptive and may be confirmed with a molecular assay, if necessary, for patient management. Negative results do not rule out SARS-CoV-2 or influenza infection and should not be used as the sole basis for treatment or patient management decisions. A negative test result may occur if the level of antigen in a sample is below the limit of detection of this test.     Positive results are indicative of  the presence of viral antigens, but do not rule out bacterial infection or co-infection with other viruses.     All test results should be used as an adjunct to clinical observations and other information available to the provider.    FOR PEDIATRIC PATIENTS - copy/paste COVID Guidelines URL to browser: [email or web address]    Magnesium [[id number]]  (Normal) Collected: 10/25/24 2241    Lab Status: Final result Specimen: Blood from Arm, Left Updated: 10/25/24 2314     Magnesium 2.5 mg/dL     Protime-INR [[id number]]  (Normal) Collected: 10/25/24 2241    Lab Status: Final result Specimen: Blood from Arm, Left Updated: 10/25/24 2308     Protime 12.5 seconds      INR 0.87    Narrative:      INR Therapeutic Range    Indication                                             INR Range      Atrial Fibrillation                                               2.0-3.0  Hypercoagulable State                                    2.0.2.3  Left Ventricular Asist Device                            2.0-3.0  Mechanical Heart Valve                                  -    Aortic(with afib, MI, embolism, HF, LA enlargement,    and/or coagulopathy)                                     2.0-3.0 (2.5-3.5)     Mitral                                                             2.5-3.5  Prosthetic/Bioprosthetic Heart Valve               2.0-3.0  Venous thromboembolism (VTE: VT, PE        2.0-3.0    APTT [[id number]]  (Normal) Collected: 10/25/24 2241    Lab Status: Final result Specimen: Blood from Arm, Left Updated: 10/25/24 2308     PTT 26 seconds     CBC and differential [[id number]]  (Abnormal) Collected: 10/25/24 2241    Lab Status: Final result Specimen: Blood from Arm, Left Updated: 10/25/24 2256     WBC 12.05 Thousand/uL      RBC 3.81 Million/uL      Hemoglobin 11.0 g/dL      Hematocrit 36.6 %      MCV 96 fL      MCH 28.9 pg      MCHC 30.1 g/dL      RDW 14.4 %      MPV 10.2 fL      Platelets 308  Thousands/uL      nRBC 0 /100 WBCs      Segmented % 86 %      Immature Grans % 1 %      Lymphocytes % 2 %      Monocytes % 5 %      Eosinophils Relative 6 %      Basophils Relative 0 %      Absolute Neutrophils 10.37 Thousands/µL      Absolute Immature Grans 0.06 Thousand/uL      Absolute Lymphocytes 0.29 Thousands/µL      Absolute Monocytes 0.59 Thousand/µL      Eosinophils Absolute 0.70 Thousand/µL      Basophils Absolute 0.04 Thousands/µL     Sputum culture and Gram stain [[id number]]     Lab Status: No result Specimen: Sputum             CTA chest pe study   Final Interpretation by Tim Ahuja MD (10/26 0117)      No evidence for pulmonary embolus.      Stable right middle lobe scarring/atelectasis. Otherwise no focal airspace consolidation to suggest pneumonia.      Stable trace bilateral pleural effusions with suspected mild pulmonary interstitial edema. Recommend clinical correlation.      Stable right breast soft tissue lesion. Correlation with dedicated breast imaging again recommended.      Workstation performed: [id number]         CT head without contrast   Final Interpretation by Tim Ahuja MD (10/26 1646)      No acute intracranial abnormality or interval change from the prior recent study.                  Workstation performed: [id number]         XR chest portable   ED Interpretation by Meng Schroeder DO (10/25 4290)   Worsening of chronic right lobe opacity, pneumonia not excluded..  No pneumothorax.  No pleural effusion.  Flattened diaphragm.          ECG 12 Lead Documentation Only    Date/Time: 10/25/2024 10:51 PM    Performed by: Meng Schroeder DO  Authorized by: Meng Schroeder DO    Indications / Diagnosis:  Sob  ECG reviewed by me, the ED Provider: yes    Patient location:  ED  Previous ECG:     Previous ECG:  Compared to current    Comparison ECG info:  October 17, 2024    Similarity:  Changes noted  Interpretation:     Interpretation: non-specific    Rate:     ECG rate:  68  Rhythm:     Rhythm:  sinus rhythm    Conduction:     Conduction: abnormal      Abnormal conduction: LPFB    ST segments:     ST segments:  Non-specific    Elevation:  III  T waves:     T waves: inverted      Inverted:  AVL  CriticalCare Time    Date/Time: 10/26/2024 12:04 AM    Performed by: Meng Schroeder DO  Authorized by: Meng Schroeder DO    Critical care provider statement:     Critical care time was exclusive of:  Separately billable procedures and treating other patients and teaching time    Critical care was necessary to treat or prevent imminent or life-threatening deterioration of the following conditions:  Respiratory failure    Critical care was time spent personally by me on the following activities:  Obtaining history from patient or surrogate, development of treatment plan with patient or surrogate, evaluation of patient's response to treatment, examination of patient, interpretation of cardiac output measurements, ordering and performing treatments and interventions, ordering and review of laboratory studies, ordering and review of radiographic studies, re-evaluation of patient's condition and review of old charts      ED Medication and Procedure Management   Prior to Admission Medications   Prescriptions Last Dose Informant Patient Reported? Taking?   BLACK ELDERBERRY PO   Yes No   Sig: Take 2 tablets by mouth daily.   Calcium Carb-Cholecalciferol (CALCIUM 600/VITAMIN D3 PO)   Yes No   Sig: Take 1 tablet by mouth daily.   DULoxetine (CYMBALTA) 30 mg delayed release capsule   No No   Sig: Take 1 capsule (30 mg total) by mouth daily at bedtime   Diclofenac Sodium (VOLTAREN) 1 %   No No   Sig: Apply 2 g topically 4 (four) times a day   Humidifier MISC   No No   Sig: Use if needed (Attach to Oxygen concentrator)   Multiple Vitamins-Minerals (PreserVision AREDS 2) CAPS   No No   Sig: Take 1 capsule by mouth in the morning   Multiple Vitamins-Minerals (PreserVision AREDS 2+Multi Vit) CAPS  Self, Other (Specify) Yes No   Sig: Take  1 capsule by mouth every morning   acetaminophen (TYLENOL) 500 mg tablet  Self No No   Sig: Take 1 tablet (500 mg total) by mouth every 6 (six) hours as needed (pain fevers)   albuterol (PROVENTIL HFA,VENTOLIN HFA) 90 mcg/act inhaler  Self No No   Sig: Inhale 2 puffs every 6 (six) hours as needed for wheezing or shortness of breath   amLODIPine (NORVASC) 5 mg tablet   No No   Sig: Take 1 tablet (5 mg total) by mouth daily   ascorbic acid (VITAMIN C) 500 mg tablet   Yes No   Sig: Take 500 mg by mouth daily.   aspirin (ECOTRIN LOW STRENGTH) 81 mg EC tablet  Self No No   Sig: Take 1 tablet (81 mg total) by mouth daily   budesonide (Pulmicort) 0.5 mg/2 mL nebulizer solution   No No   Sig: Take 2 mL (0.5 mg total) by nebulization 2 (two) times a day Rinse mouth after use. May take this right after taking levabuterol/ipratropium.   carboxymethylcellulose (Refresh Tears) 0.5 % SOLN   Yes No   Sig: Administer 2 drops to both eyes daily.   ipratropium (ATROVENT) 0.02 % nebulizer solution   No No   Sig: Take 2.5 mL (0.5 mg total) by nebulization 2 (two) times a day   levalbuterol (XOPENEX) 1.25 mg/3 mL nebulizer solution  Self Yes No   Sig: Take 1.25 mg by nebulization 2 (two) times a day   levothyroxine 25 mcg tablet   No No   Sig: Take 1 tablet (25 mcg total) by mouth daily in the early morning   levothyroxine 25 mcg tablet   No No   Sig: Take 1 tablet (25 mcg total) by mouth daily in the early morning   lidocaine (LIDODERM) 5 %   No No   Sig: Apply 1 patch topically over 12 hours daily Remove & Discard patch within 12 hours or as directed by MD   magnesium hydroxide (MILK OF MAGNESIA) 400 mg/5 mL oral suspension   No No   Sig: Take 15 mL by mouth daily as needed for constipation   melatonin 3 mg   No No   Sig: Take 1 tablet (3 mg total) by mouth daily at bedtime   menthol-methyl salicylate (BENGAY) 10-15 % cream   No No   Sig: Apply topically 3 (three) times a day   nitrofurantoin (MACROBID) 100 mg capsule   No No   Sig:  Take 1 capsule (100 mg total) by mouth daily after lunch   oxygen gas   Yes No   Sig: Inhale 2 L/min continuous. via nasal cannula.    pantoprazole (PROTONIX) 40 mg tablet   No No   Sig: TAKE ONE TABLET BY MOUTH ONCE DAILY   polyethylene glycol (MIRALAX) 17 g packet   No No   Sig: Take 17 g by mouth daily   predniSONE 2.5 mg tablet   No No   Sig: TAKE ONE TABLET BY MOUTH ONCE DAILY WITH BREAKFAST   propranolol (INDERAL) 40 mg tablet   No No   Sig: Take 1 tablet (40 mg total) by mouth every 12 (twelve) hours   senna-docusate sodium (SENOKOT S) 8.6-50 mg per tablet   No No   Sig: Take 1 tablet by mouth 2 (two) times a day   sodium chloride (OCEAN) 0.65 % nasal spray   No No   Si spray into each nostril 3 (three) times a day   sodium chloride 1 g tablet   No No   Sig: Take 1 tablet (1 g total) by mouth 2 (two) times a day with meals   torsemide (DEMADEX) 5 MG tablet   No No   Sig: Take 1 tablet (5 mg total) by mouth daily   triamcinolone (KENALOG) 0.1 % cream   No No   Sig: Use on Q Tip in left ear, twice daily   vitamin B-12 (VITAMIN B-12) 1,000 mcg tablet   Yes No   Sig: Take 1,000 mcg by mouth daily.      Facility-Administered Medications: None     Current Discharge Medication List        CONTINUE these medications which have NOT CHANGED    Details   acetaminophen (TYLENOL) 500 mg tablet Take 1 tablet (500 mg total) by mouth every 6 (six) hours as needed (pain fevers)  Qty: 30 tablet, Refills: 0    Associated Diagnoses: Influenza A      albuterol (PROVENTIL HFA,VENTOLIN HFA) 90 mcg/act inhaler Inhale 2 puffs every 6 (six) hours as needed for wheezing or shortness of breath  Qty: 18 g, Refills: 2    Comments: Substitution to a formulary equivalent within the same pharmaceutical class is authorized.  Associated Diagnoses: Pulmonary emphysema, unspecified emphysema type (HCC)      amLODIPine (NORVASC) 5 mg tablet Take 1 tablet (5 mg total) by mouth daily    Associated Diagnoses: Hypertension      ascorbic acid  (VITAMIN C) 500 mg tablet Take 500 mg by mouth daily.      aspirin (ECOTRIN LOW STRENGTH) 81 mg EC tablet Take 1 tablet (81 mg total) by mouth daily  Qty: 90 tablet, Refills: 3    Associated Diagnoses: Acute exacerbation of chronic obstructive pulmonary disease (COPD) ([facility]); Pulmonary emphysema, unspecified emphysema type ([facility])      BLACK ELDERBERRY PO Take 2 tablets by mouth daily.      budesonide (Pulmicort) 0.5 mg/2 mL nebulizer solution Take 2 mL (0.5 mg total) by nebulization 2 (two) times a day Rinse mouth after use. May take this right after taking levabuterol/ipratropium.  Qty: 120 mL, Refills: 0    Associated Diagnoses: COPD, severe ([facility])      Calcium Carb-Cholecalciferol (CALCIUM 600/VITAMIN D3 PO) Take 1 tablet by mouth daily.      carboxymethylcellulose (Refresh Tears) 0.5 % SOLN Administer 2 drops to both eyes daily.      Diclofenac Sodium (VOLTAREN) 1 % Apply 2 g topically 4 (four) times a day    Associated Diagnoses: Shoulder pain      DULoxetine (CYMBALTA) 30 mg delayed release capsule Take 1 capsule (30 mg total) by mouth daily at bedtime    Associated Diagnoses: Anxiety      Humidifier MISC Use if needed (Attach to Oxygen concentrator)  Qty: 1 each, Refills: 0    Associated Diagnoses: Bloody nose      ipratropium (ATROVENT) 0.02 % nebulizer solution Take 2.5 mL (0.5 mg total) by nebulization 2 (two) times a day    Associated Diagnoses: Acute exacerbation of chronic obstructive pulmonary disease (COPD) ([facility])      levalbuterol (XOPENEX) 1.25 mg/3 mL nebulizer solution Take 1.25 mg by nebulization 2 (two) times a day      !! levothyroxine 25 mcg tablet Take 1 tablet (25 mcg total) by mouth daily in the early morning    Associated Diagnoses: Acute exacerbation of chronic obstructive pulmonary disease (COPD) ([facility])      !! levothyroxine 25 mcg tablet Take 1 tablet (25 mcg total) by mouth daily in the early morning    Associated Diagnoses: Other specified hypothyroidism      lidocaine (LIDODERM) 5 % Apply  1 patch topically over 12 hours daily Remove & Discard patch within 12 hours or as directed by MD    Associated Diagnoses: Shoulder pain      magnesium hydroxide (MILK OF MAGNESIA) 400 mg/5 mL oral suspension Take 15 mL by mouth daily as needed for constipation    Associated Diagnoses: Constipation      melatonin 3 mg Take 1 tablet (3 mg total) by mouth daily at bedtime    Associated Diagnoses: At risk for delirium      menthol-methyl salicylate (BENGAY) 10-15 % cream Apply topically 3 (three) times a day    Associated Diagnoses: Shoulder pain; Osteoarthritis      Multiple Vitamins-Minerals (PreserVision AREDS 2+Multi Vit) CAPS Take 1 capsule by mouth every morning      nitrofurantoin (MACROBID) 100 mg capsule Take 1 capsule (100 mg total) by mouth daily after lunch  Qty: 30 capsule, Refills: 2    Associated Diagnoses: Urinary frequency      oxygen gas Inhale 2 L/min continuous. via nasal cannula.       pantoprazole (PROTONIX) 40 mg tablet TAKE ONE TABLET BY MOUTH ONCE DAILY  Qty: 90 tablet, Refills: 1    Associated Diagnoses: Gastroesophageal reflux disease      polyethylene glycol (MIRALAX) 17 g packet Take 17 g by mouth daily    Associated Diagnoses: Constipation      predniSONE 2.5 mg tablet TAKE ONE TABLET BY MOUTH ONCE DAILY WITH BREAKFAST  Qty: 30 tablet, Refills: 1    Associated Diagnoses: Temporal arteritis (HCC)      propranolol (INDERAL) 40 mg tablet Take 1 tablet (40 mg total) by mouth every 12 (twelve) hours    Associated Diagnoses: Anxiety; Hypertension      senna-docusate sodium (SENOKOT S) 8.6-50 mg per tablet Take 1 tablet by mouth 2 (two) times a day    Associated Diagnoses: Constipation      sodium chloride (OCEAN) 0.65 % nasal spray 1 spray into each nostril 3 (three) times a day  Qty: 15 mL, Refills: 3    Associated Diagnoses: Bleeding nose      sodium chloride 1 g tablet Take 1 tablet (1 g total) by mouth 2 (two) times a day with meals    Associated Diagnoses: Hyponatremia      torsemide  (DEMADEX) 5 MG tablet Take 1 tablet (5 mg total) by mouth daily    Associated Diagnoses: Chronic respiratory failure with hypercapnia (HCC)      triamcinolone (KENALOG) 0.1 % cream Use on Q Tip in left ear, twice daily  Qty: 15 g, Refills: 1    Associated Diagnoses: Chronic eczematous otitis externa of left ear      vitamin B-12 (VITAMIN B-12) 1,000 mcg tablet Take 1,000 mcg by mouth daily.       !! - Potential duplicate medications found. Please discuss with provider.        No discharge procedures on file.  ED SEPSIS DOCUMENTATION   Time reflects when diagnosis was documented in both MDM as applicable and the Disposition within this note       Time User Action Codes Description Comment    10/26/2024 12:10 AM Ahmed, Meng Add [J96.01,  J96.02] Acute respiratory failure with hypoxia and hypercarbia (HCC)     10/26/2024 12:10 AM Ahmed, Meng Add [N39.0] UTI (urinary tract infection)     10/26/2024 12:10 AM Ahmed, Meng Add [J18.9] Pneumonia     10/26/2024 12:11 AM Ahmed, Meng Add [R79.89] TSH elevation     10/26/2024 12:11 AM Ahmed, Meng Add [N17.9] BERTO (acute kidney injury) (HCC)     10/26/2024  2:43 AM Ahmed, Meng Add [Z87.09] History of COPD     10/26/2024  2:43 AM Ahmed, Meng Remove [J18.9] Pneumonia     10/26/2024  3:01 AM Ahmed, Meng Add [N64.9] Breast lesion     10/26/2024  3:01 AM Ahmed, Meng Add [J90] Pleural effusion     10/26/2024  3:01 AM Ahmed, Meng Add [J98.4] Scarring of lung            Initial Sepsis Screening       Row Name 10/25/24 3141                Is the patient's history suggestive of a new or worsening infection? Yes (Proceed)  -HA        Suspected source of infection pneumonia  -HA        Indicate SIRS criteria Leukocytosis (WBC > 13681 IJL) OR Leukopenia (WBC <4000 IJL) OR Bandemia (WBC >10% bands)  -HA        Are two or more of the above signs & symptoms of infection both present and new to the patient? No  -HA                  User Key  (r) = Recorded By, (t) = Taken By, (c) =  Cosigned By      Initials Name Provider Type    SABAS Schroeder DO Resident                       Meng Schroeder DO  10/26/24 0581

## 2024-10-26 NOTE — H&P
H&P - Critical Care/ICU   Name: Sarah Zayas 79 y.o. female I MRN: [id number]  Unit/Bed#: ICU 11 I Date of Admission: 10/25/2024   Date of Service: 10/26/2024 I Hospital Day: 0       Assessment & Plan  Acute on chronic respiratory failure with hypoxia and hypercapnia (HCC)  Patient on chronic 2 L O2 found to be hypoxic and altered by nursing home staff, in emergency department patient desatted to 80s and placed on BiPAP with improvement.  Workup otherwise largely unremarkable but when attempted to remove BiPAP, patient again became altered and difficult to arouse, BiPAP replaced and patient admitted to SD1.    Patient did have recent admission to [facility] with recurrent episodes of worsening respiratory status and being transferred to the ICU and then downgraded to [facility] soon afterwards.    Plan:  Continue BiPAP, wean as tolerated  Monitor with a.m. labs.  Contnue home budesinide, Xopenex, Atrovent  NPO  Monitor AM labs      COPD, severe (HCC)  Plan:  Contnue home budesinide, Xopenex, Atrovent  Wean BiPAP as tolerated  Hyperkalemia  Plan:  Monitor AM lab  BERTO (acute kidney injury) (HCC)  Likely due to dehydration and poor p.o. intake recently.    Plan:  Strict I/O  Fluid bolus given in ED  Hold home torsemide  Disposition: Stepdown Level 1    History of Present Illness   Sarah Zayas is a 79 y.o. history of severe COPD presenting due to worsening hypoxia and altered mentation at nursing home and was found to be satting in 80% in emergency department on 4 L nasal cannula.  Patient typically wears 2 L nasal cannula at baseline.  Patient endorses feeling short of breath but on BiPAP states this resolves and she only feels tired.  Patient was worked up in ED and found to have an BERTO and respiratory status improved on BiPAP.  An attempt was made to admit stepdown to but when BiPAP was removed, patient's mentation worsened patient was placed back on BiPAP admitted to stepdown 1.     History obtained from chart review and the  patient.      Historical Information   Past Medical History:  08/18/2024: Anxiety  No date: Asthma  No date: COPD (chronic obstructive pulmonary disease) ([facility])  No date: Disease of thyroid gland  No date: GERD (gastroesophageal reflux disease)  10/11/2018: Hypertension  No date: Hypothyroid  08/28/2024: Normocytic anemia  09/26/2012: Osteoarthritis  10/18/2024: Seizure-like activity ([facility])  No date: Temporal arteritis ([facility])  01/14/2020: Type 2 diabetes mellitus with complication, without long-  term current use of insulin ([facility]) Past Surgical History:  12/06/2019: EYE SURGERY; Right      Comment:  cataract LVCFS  No date: HYSTERECTOMY  No date: NO PAST SURGERIES      Comment:  ALSO NO RELEVANT PAST SURRGICAL HX AS PER [facility]   Current Outpatient Medications   Medication Instructions    acetaminophen (TYLENOL) 500 mg, Oral, Every 6 hours PRN    albuterol (PROVENTIL HFA,VENTOLIN HFA) 90 mcg/act inhaler 2 puffs, Inhalation, Every 6 hours PRN    amLODIPine (NORVASC) 5 mg, Oral, Daily    ascorbic acid (VITAMIN C) 500 mg, Oral, Daily    aspirin (ECOTRIN LOW STRENGTH) 81 mg, Oral, Daily    BLACK ELDERBERRY PO 2 tablets, Oral, Daily    budesonide (PULMICORT) 0.5 mg, Nebulization, 2 times daily, Rinse mouth after use. May take this right after taking levabuterol/ipratropium.    Calcium Carb-Cholecalciferol (CALCIUM 600/VITAMIN D3 PO) 1 tablet, Oral, Daily    carboxymethylcellulose (Refresh Tears) 0.5 % SOLN 2 drops, Both Eyes, Daily    Diclofenac Sodium (VOLTAREN) 2 g, Topical, 4 times daily    DULoxetine (CYMBALTA) 30 mg, Oral, Daily at bedtime    Humidifier MISC Does not apply, As needed    ipratropium (ATROVENT) 0.5 mg, Nebulization, 2 times daily    levalbuterol (XOPENEX) 1.25 mg, Nebulization, 2 times daily    levothyroxine 25 mcg, Oral, Daily (early morning)    levothyroxine 25 mcg, Oral, Daily (early morning)    lidocaine (LIDODERM) 5 % 1 patch, Topical, Daily, Remove & Discard patch within 12 hours or as directed by  MD    magnesium hydroxide (MILK OF MAGNESIA) 400 mg/5 mL oral suspension 15 mL, Oral, Daily PRN    melatonin 3 mg, Oral, Daily at bedtime    menthol-methyl salicylate (BENGAY) 10-15 % cream Apply externally, 3 times daily    Multiple Vitamins-Minerals (PreserVision AREDS 2) CAPS 1 capsule, Oral, Daily    Multiple Vitamins-Minerals (PreserVision AREDS 2+Multi Vit) CAPS 1 capsule, Oral, Every morning    nitrofurantoin (MACROBID) 100 mg, Oral, Daily after lunch    oxygen 2 L/min, Inhalation, Continuous, via nasal cannula.     pantoprazole (PROTONIX) 40 mg, Oral, Daily    polyethylene glycol (MIRALAX) 17 g, Oral, Daily    predniSONE 2.5 mg, Oral, Daily with breakfast    propranolol (INDERAL) 40 mg, Oral, Every 12 hours scheduled    senna-docusate sodium (SENOKOT S) 8.6-50 mg per tablet 1 tablet, Oral, 2 times daily    sodium chloride (OCEAN) 0.65 % nasal spray 1 spray, Nasal, 3 times daily    sodium chloride 1 g, Oral, 2 times daily with meals    torsemide (DEMADEX) 5 mg, Oral, Daily    triamcinolone (KENALOG) 0.1 % cream Use on Q Tip in left ear, twice daily    vitamin B-12 (VITAMIN B-12) 1,000 mcg, Oral, Daily    No Known Allergies   Social History     Tobacco Use    Smoking status: Former     Current packs/day: 0.00     Average packs/day: 1 pack/day for 54.0 years (54.0 ttl pk-yrs)     Types: Cigarettes     Start date:      Quit date:      Years since quittin.8     Passive exposure: Past    Smokeless tobacco: Never    Tobacco comments:     TOBACCO USE, NO PASSIVE SMOKE EXPOSURE   Vaping Use    Vaping status: Never Used   Substance Use Topics    Alcohol use: Never    Drug use: No    Family History   Problem Relation Age of Onset    Breast cancer Mother     Emphysema Father           Objective :                   Vitals I/O      Most Recent Min/Max in 24hrs   Temp 98.3 °F (36.8 °C) Temp  Min: 98.3 °F (36.8 °C)  Max: 98.3 °F (36.8 °C)   Pulse 65 Pulse  Min: 64  Max: 75   Resp 19 Resp  Min: 16  Max: 28   BP  107/56 BP  Min: 92/54  Max: 139/65   O2 Sat 93 % SpO2  Min: 91 %  Max: 98 %      Intake/Output Summary (Last 24 hours) at 10/26/2024 0538  Last data filed at 10/26/2024 0103  Gross per 24 hour   Intake 50 ml   Output --   Net 50 ml       Diet NPO    Invasive Monitoring           Physical Exam   Physical Exam  Eyes:      Extraocular Movements: Extraocular movements intact.      Conjunctiva/sclera: Conjunctivae normal.   Skin:     General: Skin is warm.   HENT:      Head: Normocephalic and atraumatic.      Nose: No congestion.      Mouth/Throat:      Comments: BiPAP in place  Cardiovascular:      Rate and Rhythm: Normal rate and regular rhythm.      Pulses: Normal pulses.   Musculoskeletal:      Right lower leg: Trace Edema present.      Left lower leg: Trace Edema present.   Abdominal:      Palpations: Abdomen is soft.   Constitutional:       General: She is in acute distress.   Pulmonary:      Effort: Respiratory distress present.      Breath sounds: No wheezing, rhonchi or rales.   Neurological:      Mental Status: She is alert. She is calm.          Diagnostic Studies        Lab Results: I have reviewed the following results:     Medications:  Scheduled PRN   budesonide-formoterol, 2 puff, BID  chlorhexidine, 15 mL, Q12H RACHEL  heparin (porcine), 5,000 Units, Q8H RACHEL  ipratropium, 0.5 mg, TID  levalbuterol, 1.25 mg, TID          Continuous          Labs:   CBC    Recent Labs     10/25/24  2241 10/26/24  0347   WBC 12.05*  --    HGB 11.0* 10.5*   HCT 36.6 31*     --      BMP    Recent Labs     10/25/24  2241 10/26/24  0347   SODIUM 137  --    K 5.6*  --    CL 91*  --    CO2 40* 38*   AGAP 6  --    BUN 28*  --    CREATININE 0.76  --    CALCIUM 10.7*  --        Coags    Recent Labs     10/25/24  2241   INR 0.87   PTT 26        Additional Electrolytes  Recent Labs     10/25/24  2241 10/26/24  0347   MG 2.5  --    CAIONIZED  --  1.31          Blood Gas    No recent results  Recent Labs     10/26/24  0201   PHVEN  7.369   NRM3DAE 49.0   PO2VEN 109.7*   [id number] 27.6   BEVEN 1.9   V2WJIZF 95.6*    LFTs  Recent Labs     10/25/24  2241   ALT 18   AST 24   ALKPHOS 44   ALB 4.1   TBILI 0.35       Infectious  Recent Labs     10/25/24  2313   PROCALCITONI 0.95*     Glucose  Recent Labs     10/25/24  2241   GLUC 113

## 2024-10-26 NOTE — SEPSIS NOTE
Sepsis Note   Sarah Zayas 79 y.o. female MRN: [id number]  Unit/Bed#: ED-11 Encounter: [id number]       Initial Sepsis Screening       Row Name 10/25/24 9070                Is the patient's history suggestive of a new or worsening infection? Yes (Proceed)  -HA        Suspected source of infection pneumonia  -HA        Indicate SIRS criteria Leukocytosis (WBC > 58368 IJL) OR Leukopenia (WBC <4000 IJL) OR Bandemia (WBC >10% bands)  -HA        Are two or more of the above signs & symptoms of infection both present and new to the patient? No  -HA                  User Key  (r) = Recorded By, (t) = Taken By, (c) = Cosigned By      Initials Name Provider Type    DO Dakota Turcios                        There is no height or weight on file to calculate BMI.  Wt Readings from Last 1 Encounters:   10/23/24 50 kg (110 lb 3.7 oz)        Ideal body weight: 50.1 kg (110 lb 7.2 oz)

## 2024-10-27 LAB
ALBUMIN SERPL BCG-MCNC: 3.5 G/DL (ref 3.5–5)
ALP SERPL-CCNC: 37 U/L (ref 34–104)
ALT SERPL W P-5'-P-CCNC: 16 U/L (ref 7–52)
ANION GAP SERPL CALCULATED.3IONS-SCNC: 5 MMOL/L (ref 4–13)
AST SERPL W P-5'-P-CCNC: 16 U/L (ref 13–39)
BASE EX.OXY STD BLDV CALC-SCNC: 96.5 % (ref 60–80)
BASE EXCESS BLDV CALC-SCNC: 10.9 MMOL/L
BASOPHILS # BLD AUTO: 0.01 THOUSANDS/ΜL (ref 0–0.1)
BASOPHILS NFR BLD AUTO: 0 % (ref 0–1)
BILIRUB SERPL-MCNC: 0.33 MG/DL (ref 0.2–1)
BUN SERPL-MCNC: 33 MG/DL (ref 5–25)
CALCIUM SERPL-MCNC: 9 MG/DL (ref 8.4–10.2)
CHLORIDE SERPL-SCNC: 95 MMOL/L (ref 96–108)
CO2 SERPL-SCNC: 37 MMOL/L (ref 21–32)
CREAT SERPL-MCNC: 0.4 MG/DL (ref 0.6–1.3)
EOSINOPHIL # BLD AUTO: 0.02 THOUSAND/ΜL (ref 0–0.61)
EOSINOPHIL NFR BLD AUTO: 0 % (ref 0–6)
ERYTHROCYTE [DISTWIDTH] IN BLOOD BY AUTOMATED COUNT: 14.4 % (ref 11.6–15.1)
GFR SERPL CREATININE-BSD FRML MDRD: 99 ML/MIN/1.73SQ M
GLUCOSE SERPL-MCNC: 96 MG/DL (ref 65–140)
HCO3 BLDV-SCNC: 37.8 MMOL/L (ref 24–30)
HCT VFR BLD AUTO: 30 % (ref 34.8–46.1)
HGB BLD-MCNC: 9.3 G/DL (ref 11.5–15.4)
IMM GRANULOCYTES # BLD AUTO: 0.05 THOUSAND/UL (ref 0–0.2)
IMM GRANULOCYTES NFR BLD AUTO: 1 % (ref 0–2)
LYMPHOCYTES # BLD AUTO: 0.34 THOUSANDS/ΜL (ref 0.6–4.47)
LYMPHOCYTES NFR BLD AUTO: 4 % (ref 14–44)
MAGNESIUM SERPL-MCNC: 2.1 MG/DL (ref 1.9–2.7)
MCH RBC QN AUTO: 29.1 PG (ref 26.8–34.3)
MCHC RBC AUTO-ENTMCNC: 31 G/DL (ref 31.4–37.4)
MCV RBC AUTO: 94 FL (ref 82–98)
MONOCYTES # BLD AUTO: 0.92 THOUSAND/ΜL (ref 0.17–1.22)
MONOCYTES NFR BLD AUTO: 10 % (ref 4–12)
NEUTROPHILS # BLD AUTO: 7.78 THOUSANDS/ΜL (ref 1.85–7.62)
NEUTS SEG NFR BLD AUTO: 85 % (ref 43–75)
NRBC BLD AUTO-RTO: 0 /100 WBCS
O2 CT BLDV-SCNC: 14.3 ML/DL
PCO2 BLDV: 63.8 MM HG (ref 42–50)
PH BLDV: 7.39 [PH] (ref 7.3–7.4)
PHOSPHATE SERPL-MCNC: 2.8 MG/DL (ref 2.3–4.1)
PLATELET # BLD AUTO: 255 THOUSANDS/UL (ref 149–390)
PMV BLD AUTO: 9.8 FL (ref 8.9–12.7)
PO2 BLDV: 98.1 MM HG (ref 35–45)
POTASSIUM SERPL-SCNC: 4.5 MMOL/L (ref 3.5–5.3)
PROT SERPL-MCNC: 5.8 G/DL (ref 6.4–8.4)
RBC # BLD AUTO: 3.2 MILLION/UL (ref 3.81–5.12)
SODIUM SERPL-SCNC: 137 MMOL/L (ref 135–147)
WBC # BLD AUTO: 9.12 THOUSAND/UL (ref 4.31–10.16)

## 2024-10-27 PROCEDURE — 94640 AIRWAY INHALATION TREATMENT: CPT

## 2024-10-27 PROCEDURE — 94760 N-INVAS EAR/PLS OXIMETRY 1: CPT

## 2024-10-27 PROCEDURE — 80053 COMPREHEN METABOLIC PANEL: CPT | Performed by: NURSE PRACTITIONER

## 2024-10-27 PROCEDURE — 94660 CPAP INITIATION&MGMT: CPT

## 2024-10-27 PROCEDURE — 99232 SBSQ HOSP IP/OBS MODERATE 35: CPT | Performed by: STUDENT IN AN ORGANIZED HEALTH CARE EDUCATION/TRAINING PROGRAM

## 2024-10-27 PROCEDURE — 83735 ASSAY OF MAGNESIUM: CPT | Performed by: NURSE PRACTITIONER

## 2024-10-27 PROCEDURE — NC001 PR NO CHARGE: Performed by: STUDENT IN AN ORGANIZED HEALTH CARE EDUCATION/TRAINING PROGRAM

## 2024-10-27 PROCEDURE — 84100 ASSAY OF PHOSPHORUS: CPT | Performed by: NURSE PRACTITIONER

## 2024-10-27 PROCEDURE — 85025 COMPLETE CBC W/AUTO DIFF WBC: CPT | Performed by: NURSE PRACTITIONER

## 2024-10-27 PROCEDURE — 82805 BLOOD GASES W/O2 SATURATION: CPT | Performed by: NURSE PRACTITIONER

## 2024-10-27 RX ORDER — PREDNISONE 20 MG/1
40 TABLET ORAL DAILY
Status: COMPLETED | OUTPATIENT
Start: 2024-10-28 | End: 2024-11-01

## 2024-10-27 RX ORDER — METHYLPREDNISOLONE SODIUM SUCCINATE 40 MG/ML
40 INJECTION, POWDER, LYOPHILIZED, FOR SOLUTION INTRAMUSCULAR; INTRAVENOUS EVERY 12 HOURS SCHEDULED
Status: COMPLETED | OUTPATIENT
Start: 2024-10-27 | End: 2024-10-27

## 2024-10-27 RX ORDER — BUDESONIDE 0.5 MG/2ML
0.5 INHALANT ORAL
Status: DISCONTINUED | OUTPATIENT
Start: 2024-10-27 | End: 2024-11-02 | Stop reason: HOSPADM

## 2024-10-27 RX ADMIN — IPRATROPIUM BROMIDE 0.5 MG: 0.5 SOLUTION RESPIRATORY (INHALATION) at 14:58

## 2024-10-27 RX ADMIN — DULOXETINE HYDROCHLORIDE 30 MG: 30 CAPSULE, DELAYED RELEASE ORAL at 20:55

## 2024-10-27 RX ADMIN — LEVOTHYROXINE SODIUM 25 MCG: 25 TABLET ORAL at 05:59

## 2024-10-27 RX ADMIN — IPRATROPIUM BROMIDE 0.5 MG: 0.5 SOLUTION RESPIRATORY (INHALATION) at 20:04

## 2024-10-27 RX ADMIN — CHLORHEXIDINE GLUCONATE 15 ML: 1.2 RINSE ORAL at 08:40

## 2024-10-27 RX ADMIN — IPRATROPIUM BROMIDE 0.5 MG: 0.5 SOLUTION RESPIRATORY (INHALATION) at 08:03

## 2024-10-27 RX ADMIN — METHYLPREDNISOLONE SODIUM SUCCINATE 40 MG: 40 INJECTION, POWDER, FOR SOLUTION INTRAMUSCULAR; INTRAVENOUS at 08:40

## 2024-10-27 RX ADMIN — METHYLPREDNISOLONE SODIUM SUCCINATE 40 MG: 40 INJECTION, POWDER, FOR SOLUTION INTRAMUSCULAR; INTRAVENOUS at 20:55

## 2024-10-27 RX ADMIN — HEPARIN SODIUM 5000 UNITS: 5000 INJECTION INTRAVENOUS; SUBCUTANEOUS at 05:59

## 2024-10-27 RX ADMIN — LEVALBUTEROL HYDROCHLORIDE 1.25 MG: 1.25 SOLUTION RESPIRATORY (INHALATION) at 14:58

## 2024-10-27 RX ADMIN — BUDESONIDE INHALATION 0.5 MG: 0.5 SUSPENSION RESPIRATORY (INHALATION) at 20:12

## 2024-10-27 RX ADMIN — HEPARIN SODIUM 5000 UNITS: 5000 INJECTION INTRAVENOUS; SUBCUTANEOUS at 13:18

## 2024-10-27 RX ADMIN — LEVALBUTEROL HYDROCHLORIDE 1.25 MG: 1.25 SOLUTION RESPIRATORY (INHALATION) at 08:03

## 2024-10-27 RX ADMIN — FORMOTEROL FUMARATE DIHYDRATE 20 MCG: 20 SOLUTION RESPIRATORY (INHALATION) at 08:03

## 2024-10-27 RX ADMIN — HEPARIN SODIUM 5000 UNITS: 5000 INJECTION INTRAVENOUS; SUBCUTANEOUS at 20:56

## 2024-10-27 RX ADMIN — LEVALBUTEROL HYDROCHLORIDE 1.25 MG: 1.25 SOLUTION RESPIRATORY (INHALATION) at 20:04

## 2024-10-27 NOTE — ASSESSMENT & PLAN NOTE
Patient on chronic 2 L O2 found to be hypoxic and altered by nursing home staff, in emergency department patient desatted to 80s and placed on BiPAP with improvement.  Workup otherwise largely unremarkable but when attempted to remove BiPAP, patient again became altered and difficult to arouse, BiPAP replaced and patient admitted to [facility].    Patient did have recent admission to [facility] with recurrent episodes of worsening respiratory status and being transferred to the ICU and then downgraded to [facility] soon afterwards.    Plan:  Patient weaned from BiPAP.  Currently on 3 L nasal cannula.  Wean to 2 L as tolerated  Contnue home budesinide, Xopenex, Atrovent  Encourage oral hydration  Monitor AM labs  Patient stable to transfer to [facility] floor

## 2024-10-27 NOTE — ASSESSMENT & PLAN NOTE
Likely due to dehydration and poor p.o. intake recently.  Encourage increased oral hydration    Plan:  Strict I/O  Fluid bolus given in ED  Hold home torsemide

## 2024-10-27 NOTE — PROGRESS NOTES
Progress Note - Critical Care/ICU   Name: Sarah Zayas 79 y.o. female I MRN: [id number]  Unit/Bed#: ICU 11 I Date of Admission: 10/25/2024   Date of Service: 10/27/2024 I Hospital Day: 1       Assessment & Plan  Acute on chronic respiratory failure with hypoxia and hypercapnia (HCC)  Patient on chronic 2 L O2 found to be hypoxic and altered by nursing home staff, in emergency department patient desatted to 80s and placed on BiPAP with improvement.  Workup otherwise largely unremarkable but when attempted to remove BiPAP, patient again became altered and difficult to arouse, BiPAP replaced and patient admitted to SD1.    Patient did have recent admission to [facility] with recurrent episodes of worsening respiratory status and being transferred to the ICU and then downgraded to [facility] soon afterwards.    Plan:  Patient weaned from BiPAP.  Currently on 3 L nasal cannula.  Wean to 2 L as tolerated  Contnue home budesinide, Xopenex, Atrovent  Encourage oral hydration  Monitor AM labs  Patient stable to transfer to [facility] floor      COPD, severe (HCC)  Plan:  Contnue home budesinide, Xopenex, Atrovent  Wean nasal cannula to baseline as tolerated  Hyperkalemia  Plan:  Resolved  BERTO (acute kidney injury) ([facility])  Likely due to dehydration and poor p.o. intake recently.  Encourage increased oral hydration    Plan:  Strict I/O  Fluid bolus given in ED  Hold home torsemide  Disposition: Med [facility]    ICU Core Measures     A: Assess, Prevent, and Manage Pain Has pain been assessed? Yes  Need for changes to pain regimen? No   B: Both SAT/SAT  N/A   C: Choice of Sedation RASS Goal: 0 Alert and Calm or N/A patient not on sedation  Need for changes to sedation or analgesia regimen? No   D: Delirium CAM-ICU: Negative   E: Early Mobility  Plan for early mobility? Yes   F: Family Engagement Plan for family engagement today? Yes         Prophylaxis:  VTE VTE covered by:  heparin (porcine), Subcutaneous, 5,000 Units at 10/27/24 0559       Stress  Ulcer  not orderedcovered bypantoprazole (PROTONIX) 40 mg tablet [[id number]] (Long-Term Med)         24 Hour Events : Patient weaned off BiPAP to low-flow nasal cannula.  Patient reports fatigue but otherwise feels well.  No other acute overnight events  Subjective   Review of Systems: Review of Systems   Constitutional:  Positive for activity change, appetite change and fatigue. Negative for chills, diaphoresis and fever.   HENT:  Negative for congestion, rhinorrhea and sore throat.    Eyes:  Negative for visual disturbance.   Respiratory:  Positive for shortness of breath. Negative for cough.    Gastrointestinal:  Negative for abdominal distention, abdominal pain, constipation, diarrhea and nausea.   Endocrine: Negative for cold intolerance and heat intolerance.   Genitourinary:  Negative for difficulty urinating, dysuria, hematuria, pelvic pain and urgency.   Musculoskeletal:  Negative for neck pain.   Skin:  Negative for color change and rash.   Neurological:  Positive for weakness. Negative for light-headedness and numbness.   Psychiatric/Behavioral:  Negative for agitation.        Objective :                   Vitals I/O      Most Recent Min/Max in 24hrs   Temp 98.3 °F (36.8 °C) Temp  Min: 98.1 °F (36.7 °C)  Max: 98.3 °F (36.8 °C)   Pulse 85 Pulse  Min: 85  Max: 90   Resp (!) 27 Resp  Min: 27  Max: 29   /70 BP  Min: 106/58  Max: 133/66   O2 Sat 96 % SpO2  Min: 94 %  Max: 96 %      Intake/Output Summary (Last 24 hours) at 10/27/2024 0838  Last data filed at 10/27/2024 0722  Gross per 24 hour   Intake 480 ml   Output 250 ml   Net 230 ml       Diet Regular; Regular House    Invasive Monitoring           Physical Exam   Physical Exam  Vitals and nursing note reviewed.   Eyes:      General: No scleral icterus.        Right eye: No discharge.         Left eye: No discharge.      Conjunctiva/sclera: Conjunctivae normal.   Skin:     General: Skin is warm and dry.      Coloration: Skin is not jaundiced.   HENT:       Head: Normocephalic and atraumatic.      Nose: No congestion or rhinorrhea.      Mouth/Throat:      Mouth: Mucous membranes are dry.      Comments: Dry mouth with cheilitis  Cardiovascular:      Rate and Rhythm: Normal rate and regular rhythm.      Heart sounds: Normal heart sounds.   Musculoskeletal:      Right lower leg: No edema.      Left lower leg: No edema.   Abdominal:      Palpations: Abdomen is soft.      Tenderness: There is no abdominal tenderness. There is no guarding.   Constitutional:       General: She is not in acute distress.     Appearance: She is well-developed. She is ill-appearing.      Interventions: She is not sedated, not intubated and not restrained.     Comments: Chronically ill-appearing   Pulmonary:      Effort: Tachypnea present. No respiratory distress. She is not intubated.      Breath sounds: No wheezing.      Comments: Decreased breath sounds bilaterally.  No wheezes or rhonchi noted.  Patient on 3 L nasal cannula  Chest:      Chest wall: No tenderness.   Psychiatric:         Mood and Affect:  mood and affect abnormal.        Speech: Speech is not no expressive aphasia.   Neurological:      General: No focal deficit present.      Mental Status: She is alert. She is calm, disoriented to person, disoriented to place and disoriented to time. She is not agitated.      Cranial Nerves: No facial asymmetry.      Sensory: No sensory deficit.      Motor: No motor deficit.      Comments: 45 strength bilaterally in lower extremities.  Sensation equal bilaterally          Diagnostic Studies        Lab Results: I have reviewed the following results:     Medications:  Scheduled PRN   chlorhexidine, 15 mL, Q12H RACHEL  DULoxetine, 30 mg, HS  formoterol, 20 mcg, Q12H  heparin (porcine), 5,000 Units, Q8H RACHEL  ipratropium, 0.5 mg, TID  levalbuterol, 1.25 mg, TID  levothyroxine, 25 mcg, Early Morning  methylPREDNISolone sodium succinate, 40 mg, Q12H RACHEL          Continuous          Labs:    CBC    Recent Labs     10/26/24  0554 10/27/24  0442   WBC 11.74* 9.12   HGB 10.9* 9.3*   HCT 36.4 30.0*    255     BMP    Recent Labs     10/26/24  0513 10/27/24  0442   SODIUM 134* 137   K 5.4* 4.5   CL 96 95*   CO2 24 37*   AGAP 14* 5   BUN 27* 33*   CREATININE 0.59* 0.40*   CALCIUM 9.4 9.0       Coags    Recent Labs     10/25/24  2241   INR 0.87   PTT 26        Additional Electrolytes  Recent Labs     10/26/24  0347 10/26/24  0513 10/27/24  0442   MG  --  2.3 2.1   PHOS  --  3.8 2.8   CAIONIZED 1.31  --   --           Blood Gas    No recent results  Recent Labs     10/27/24  0442   PHVEN 7.390   ZKL9MSO 63.8*   PO2VEN 98.1*   WQE1YAX 37.8*   BEVEN 10.9   [id number] 96.5*    LFTs  Recent Labs     10/25/24  2241 10/27/24  0442   ALT 18 16   AST 24 16   ALKPHOS 44 37   ALB 4.1 3.5   TBILI 0.35 0.33       Infectious  Recent Labs     10/25/24  2313   PROCALCITONI 0.95*     Glucose  Recent Labs     10/25/24  2241 10/26/24  0513 10/27/24  0442   GLUC 113 108 96

## 2024-10-27 NOTE — UTILIZATION REVIEW
"Initial Clinical Review    Admission: Date/Time/Statement:   Admission Orders (From admission, onward)       Ordered        10/26/24 0243  INPATIENT ADMISSION  Once                          Orders Placed This Encounter   Procedures    INPATIENT ADMISSION     Standing Status:   Standing     Number of Occurrences:   1     Order Specific Question:   Level of Care     Answer:   Level 2 Stepdown / HOT [14]     Order Specific Question:   Estimated length of stay     Answer:   More than 2 Midnights     Order Specific Question:   Certification     Answer:   I certify that inpatient services are medically necessary for this patient for a duration of greater than two midnights. See H&P and MD Progress Notes for additional information about the patient's course of treatment.     ED Arrival Information       Expected   -    Arrival   10/25/2024 21:59    Acuity   Urgent              Means of arrival   Ambulance    Escorted by   [facility]    Service   Hospitalist    Admission type   Emergency              Arrival complaint   EMS             Chief Complaint   Patient presents with    Fatigue     Sent via EMS from [facility], a facility doctor was in and felt she was in respiratory failure due to her pulse ox at 81% on 2ltrs, facility then placed her on 4ltrs.  Patient only complains of fatigue. Patient also has not been eating, states \": I do not like their food\"       Initial Presentation: 79 y.o. female with hx severe COPD on 2 L O2 nc as baseline,  recently discharged from hospital for a COPD exacerbation (felt to be polypharmacy, was in the ICU on BiPAP and improved w, d/c'ed to rehab and was advised to obtain BiPAP qualifications once discharged from rehab. )  who presents to ED 10/25 from [facility] via EMS with  worsening hypoxia and altered mentation at facility . Pt was found to be satting in 80% in ED on 4 L nasal cannula. Patient endorses feeling short of breath but on BiPAP states this " resolves and she only feels tired. When BiPAP was removed, patient's mentation worsened and was  placed back on BiPAP   On exam, decreased air movement, dry mucous membranes .  Labs : Venous PCO2 79.8 , D dimer 1.69, K 5.6,  WBC 12.05, procal 0.95. Creat 0.59--->0.4 .  CT head shows nothing acute. CXR shows  chronic R mid lobe atelectasis . Pt given IVF, Duoneb, IV steroid IV abx in ED. Pt admitted as Inpatient to level 1 SD/ ICU critical care 10/26  with Acute on chronic respiratory failure with hypoxia and hypercapnia . Severe CPOD .Hyperkalemia. BERTO.  Plan- Continue Bi PAP, wean as able . NPO . AM labs . Continue home budesinide, Xopenex, Atrovent . Monitor creat . Hold home Torsemide .       Date: 10/26    Pt oriented x 4 , responds to commands, anxious per nsg . GCS 15 . Telemetry- sinus rhythm . Lungs clear, diminished . Currently on BiPAP 16/6, receiving nebulizers and IV steroids. CT with emphysema, mild bronchiectasis in the right middle lobe, no history of lobectomy but right middle lobe very difficult to discern on imaging, possible [place] syndrome. AGMA with hyperkalemia . Monitor lytes.      Date: 10/27   Day 3: Has surpassed a 2nd midnight with active treatments and services.   Pt agreeable to level 3 code status but would like us to reach out to her daughter who is POA.   Currently improving with  nebulizers, IV steroids, Bi PAP.  Pt able to be weaned from BI PAP overnight, on  3 L O2 nc today . Pt reports fatigue .  Has decreased breath sounds bilaterally. No wheezes or rhonchi noted .   Dry mouth with cheilitis . 4/5 strength bilaterally in lower extremities. She is calm, disoriented to person, disoriented to place and disoriented to time.    Patient is agreeable to BiPAP, we can attempt BiPAP qualifications again during this hospitalization. Continue  Nebs,  Pulmicort as this is her outpatient regimen. Plan to start 40 mg prednisone for 5 days tomorrow and then eventually back to her PTA  prednisone (2.5 mg for temporal arteritis ). 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 K normalized today . Plan to downgrade out of ICU today to med surg . PT/OT eval placed .       ED Treatment-Medication Administration from 10/25/2024 2159 to 10/26/2024 0405         Date/Time Order Dose Route Action     10/25/2024 2353 lactated ringers bolus 1,500 mL 1,500 mL Intravenous New Bag     10/26/2024 0041 ipratropium-albuterol (DUO-NEB) 0.5-2.5 mg/3 mL inhalation solution 3 mL 3 mL Nebulization Given     10/26/2024 0033 cefepime (MAXIPIME) 2 g/50 mL dextrose IVPB 2,000 mg Intravenous New Bag     10/26/2024 0103 vancomycin (VANCOCIN) 1,250 mg in sodium chloride 0.9 % 250 mL IVPB 1,250 mg Intravenous New Bag     10/26/2024 0030 methylPREDNISolone sodium succinate (Solu-MEDROL) injection 100 mg 100 mg Intravenous Given     10/26/2024 0015 iohexol (OMNIPAQUE) 350 MG/ML injection (MULTI-DOSE) 50 mL 50 mL Intravenous Given            Scheduled Medications:   budesonide, 0.5 mg, Nebulization, Q12H   chlorhexidine, 15 mL, Mouth/Throat, Q12H RACHEL  DULoxetine, 30 mg, Oral, HS   heparin (porcine), 5,000 Units, Subcutaneous, Q8H RACHEL   ipratropium, 0.5 mg, Nebulization, TID   levalbuterol, 1.25 mg, Nebulization, TID   levothyroxine, 25 mcg, Oral, Early Morning   methylPREDNISolone sodium succinate, 40 mg, Intravenous, Q12H RACHEL   predniSONE, 40 mg, Oral, Daily  Start: 10/28/24 0900     Formoterol (PERFOROMIST) nebulizer solution 20 mcg  Dose: 20 mcg  Freq: Every 12 hours (RESP) Route: NEBULIZATION  Start: 10/26/24 0815 End: 10/27/24 1042      Continuous IV Infusions:     PRN Meds:     ED Triage Vitals [10/25/24 2204]   Temperature Pulse Respirations Blood Pressure SpO2 Pain Score   98.3 °F (36.8 °C) 66 16 135/63 98 % No Pain     Weight (last 2 days)       Date/Time Weight    10/26/24 0446 46.9 (103.4)            Vital Signs (last 3 days)       Date/Time Temp Pulse Resp BP MAP (mmHg) SpO2 FiO2 (%) Calculated FIO2 (%) - Nasal Cannula  Nasal Cannula O2 Flow Rate (L/min) O2 Device O2 Interface Device Patient Position - Orthostatic VS Annette Coma Scale Score Pain    10/27/24 15:54:58 -- 62 14 133/71 92 99 % -- 28 2 L/min Nasal cannula -- Lying -- --    10/27/24 1500 99 °F (37.2 °C) -- -- 134/71 97 -- -- -- -- -- -- -- -- --    10/27/24 1110 98.3 °F (36.8 °C) -- -- 133/73 98 -- -- -- -- Nasal cannula -- Sitting -- --    10/27/24 0805 -- -- -- -- -- -- -- 28 2 L/min Nasal cannula -- -- -- --    10/27/24 0722 98.3 °F (36.8 °C) -- -- 126/70 93 -- -- -- -- Nasal cannula -- Lying 15 --    10/27/24 0700 -- -- -- -- -- -- -- -- -- -- -- -- 15 No Pain    10/27/24 0330 -- -- -- -- -- -- -- -- -- -- -- -- 15 --    10/27/24 0000 -- -- -- -- -- -- -- -- -- -- -- -- 15 --    10/26/24 2300 98.3 °F (36.8 °C) 85 27 122/62 80 96 % -- -- -- -- -- -- -- --    10/26/24 2059 -- -- -- -- -- -- -- 36 4 L/min Nasal cannula -- -- -- --    10/26/24 2000 -- -- -- -- -- -- -- -- -- -- -- -- 15 No Pain    10/26/24 1900 98.2 °F (36.8 °C) 90 29 115/56 80 95 % -- -- -- -- -- -- -- --    10/26/24 1600 -- -- -- -- -- -- -- -- -- -- -- -- 15 --    10/26/24 1507 -- -- -- -- -- 94 % -- -- -- -- -- -- -- --    10/26/24 1456 98.1 °F (36.7 °C) -- -- 133/66 93 -- -- -- -- -- -- -- -- --    10/26/24 1200 -- -- -- -- -- -- -- -- -- -- -- -- 15 --    10/26/24 1115 -- -- -- 106/58 79 -- -- -- -- -- -- -- -- --    10/26/24 0846 -- -- -- -- -- 94 % -- -- -- -- Full face mask -- -- --    10/26/24 0800 -- -- -- -- -- -- -- -- -- -- -- -- 14 --    10/26/24 0602 98.5 °F (36.9 °C) 62 16 104/59 75 95 % -- -- -- -- -- -- -- --    10/26/24 0500 -- 65 19 107/56 78 93 % -- -- -- -- -- -- -- --    10/26/24 0446 -- -- -- -- -- -- 30 -- -- BiPAP Full face mask -- -- --    10/26/24 0431 -- 64 24 92/54 67 91 % -- -- -- -- Full face mask -- -- --    10/26/24 0415 -- 69 26 120/58 80 91 % -- -- -- -- -- -- -- --    10/26/24 0413 98.3 °F (36.8 °C) 73 28 139/65 94 91 % 28 -- -- BiPAP -- -- -- --    10/26/24 0400 --  -- -- -- -- -- -- -- -- -- -- -- 14 --    10/26/24 0328 -- -- -- -- -- 97 % -- -- -- -- Full face mask -- -- --    10/26/24 0306 -- 70 16 112/59 82 94 % -- 36 4 L/min Nasal cannula -- Lying -- --    10/26/24 0245 -- 64 16 97/51 71 96 % -- 36 4 L/min Nasal cannula -- Sitting -- --    10/26/24 0238 -- -- -- -- -- 94 % -- 36 4 L/min Nasal cannula -- -- -- --    10/26/24 0230 -- 75 16 136/64 92 94 % -- -- -- BiPAP -- Sitting -- --    10/26/24 0041 -- -- -- -- -- 94 % -- -- -- -- -- -- -- --    10/26/24 0034 -- -- -- -- -- 96 % -- -- -- -- Full face mask -- -- --    10/26/24 0030 -- 72 16 128/61 88 97 % -- -- -- BiPAP -- Sitting -- --    10/26/24 0000 -- 65 16 125/61 87 91 % -- 28 2 L/min Nasal cannula -- Sitting -- --    10/25/24 2210 -- -- -- -- -- -- -- -- -- Nasal cannula -- -- -- --    10/25/24 2204 98.3 °F (36.8 °C) 66 16 135/63 91 98 % -- 28 2 L/min Nasal cannula -- Sitting -- No Pain              Pertinent Labs/Diagnostic Test Results:   Radiology:  CTA chest pe study   Final Interpretation by Tim Ahuja MD (10/26 0117)      No evidence for pulmonary embolus.      Stable right middle lobe scarring/atelectasis. Otherwise no focal airspace consolidation to suggest pneumonia.      Stable trace bilateral pleural effusions with suspected mild pulmonary interstitial edema. Recommend clinical correlation.      Stable right breast soft tissue lesion. Correlation with dedicated breast imaging again recommended.      Workstation performed: [id number]         CT head without contrast   Final Interpretation by Tim Ahuja MD (10/26 0046)      No acute intracranial abnormality or interval change from the prior recent study.                  Workstation performed: [id number]         XR chest portable   ED Interpretation by Meng Schroeder DO (10/25 2956)   Worsening of chronic right lobe opacity, pneumonia not excluded..  No pneumothorax.  No pleural effusion.  Flattened diaphragm.      Final Interpretation by Curtis Wang  MD Luz (10/26 8224)      Unchanged chronic right middle lobe atelectasis. See the subsequent CTA chest report.            Workstation performed: [id number]           Cardiology:  ECG 12 lead   Final Result by Olivier Clarke MD (10/26 0968)   Normal sinus rhythm with sinus arrhythmia   Left posterior fascicular block   Abnormal ECG   When compared with ECG of 17-OCT-2024 06:47,   Left posterior fascicular block is now Present   T wave inversion now evident in Lateral leads   Confirmed by Olivier Clarke ([id number]) on 10/26/2024 1:02:21 PM                Results from last 7 days   Lab Units 10/27/24  0442 10/26/24  0554 10/26/24  0347 10/25/24  2241 10/21/24  0449   WBC Thousand/uL 9.12 11.74*  --  12.05* 8.70   HEMOGLOBIN g/dL 9.3* 10.9*  --  11.0* 9.8*   I STAT HEMOGLOBIN g/dl  --   --  10.5*  --   --    HEMATOCRIT % 30.0* 36.4  --  36.6 32.7*   HEMATOCRIT, ISTAT %  --   --  31*  --   --    PLATELETS Thousands/uL 255 239  --  308 251   TOTAL NEUT ABS Thousands/µL 7.78* 11.16*  --  10.37*  --          Results from last 7 days   Lab Units 10/27/24  0442 10/26/24  0513 10/26/24  0347 10/25/24  2241 10/22/24  0535 10/21/24  0449   SODIUM mmol/L 137 134*  --  137 136 137   POTASSIUM mmol/L 4.5 5.4*  --  5.6* 4.5 5.1   CHLORIDE mmol/L 95* 96  --  91* 88* 88*   CO2 mmol/L 37* 24  --  40* >45* >45*   CO2, I-STAT mmol/L  --   --  38*  --   --   --    ANION GAP mmol/L 5 14*  --  6  --   --    BUN mg/dL 33* 27*  --  28* 19 20   CREATININE mg/dL 0.40* 0.59*  --  0.76 0.34* 0.47*   EGFR ml/min/1.73sq m 99 87  --  74 104 94   CALCIUM mg/dL 9.0 9.4  --  10.7* 8.7 9.5   CALCIUM, IONIZED, ISTAT mmol/L  --   --  1.31  --   --   --    MAGNESIUM mg/dL 2.1 2.3  --  2.5 1.8* 1.8*   PHOSPHORUS mg/dL 2.8 3.8  --   --   --  3.9     Results from last 7 days   Lab Units 10/27/24  0442 10/25/24  2241   AST U/L 16 24   ALT U/L 16 18   ALK PHOS U/L 37 44   TOTAL PROTEIN g/dL 5.8* 6.7   ALBUMIN g/dL 3.5 4.1   TOTAL BILIRUBIN mg/dL 0.33  "0.35     Results from last 7 days   Lab Units 10/25/24  2348   POC GLUCOSE mg/dl 107     Results from last 7 days   Lab Units 10/27/24  0442 10/26/24  0513 10/25/24  2241 10/22/24  0535 10/21/24  0449   GLUCOSE RANDOM mg/dL 96 108 113 86 101             No results found for: \"BETA-HYDROXYBUTYRATE\"       Results from last 7 days   Lab Units 10/27/24  0442 10/26/24  0201 10/25/24  2303   PH YINKA  7.390 7.369 7.308   PCO2 YINKA mm Hg 63.8* 49.0 79.8*   PO2 YINKA mm Hg 98.1* 109.7* 31.3*   HCO3 YINKA mmol/L 37.8* 27.6 39.1*   BASE EXC YINKA mmol/L 10.9 1.9 9.9   O2 CONTENT YINKA ml/dL 14.3 12.2 10.2   O2 HGB, VENOUS % 96.5* 95.6* 59.2*     Results from last 7 days   Lab Units 10/26/24  0347   I STAT BASE EXC mmol/L 9*   I STAT O2 SAT % 88*   ISTAT PH ART  7.376   I STAT ART PCO2 mm HG 61.3*   I STAT ART PO2 mm HG 57.0*   I STAT ART HCO3 mmol/L 35.9*         Results from last 7 days   Lab Units 10/26/24  0102 10/25/24  2241   HS TNI 0HR ng/L  --  9   HS TNI 2HR ng/L 4  --    HSTNI D2 ng/L -5  --      Results from last 7 days   Lab Units 10/25/24  2241   D-DIMER QUANTITATIVE ug/ml FEU 1.69*     Results from last 7 days   Lab Units 10/25/24  2241   PROTIME seconds 12.5   INR  0.87   PTT seconds 26     Results from last 7 days   Lab Units 10/25/24  2313   TSH 3RD GENERATON uIU/mL 7.111*     Results from last 7 days   Lab Units 10/25/24  2313   PROCALCITONIN ng/ml 0.95*     Results from last 7 days   Lab Units 10/26/24  0800 10/25/24  2241   LACTIC ACID mmol/L 1.0 0.8             Results from last 7 days   Lab Units 10/25/24  2313   BNP pg/mL 197*                                     Results from last 7 days   Lab Units 10/25/24  2349   CLARITY UA  Turbid   COLOR UA  Yellow   SPEC GRAV UA  1.019   PH UA  5.5   GLUCOSE UA mg/dl Negative   KETONES UA mg/dl Trace*   BLOOD UA  Negative   PROTEIN UA mg/dl 50 (1+)*   NITRITE UA  Negative   BILIRUBIN UA  Negative   UROBILINOGEN UA (BE) mg/dl <2.0   LEUKOCYTES UA  Trace*   WBC UA /hpf 4-10* "   RBC UA /hpf 4-10*   BACTERIA UA /hpf Occasional   EPITHELIAL CELLS WET PREP /hpf Occasional   MUCUS THREADS  Moderate*     Results from last 7 days   Lab Units 10/25/24  2313   STREP PNEUMONIAE ANTIGEN, URINE  Negative   LEGIONELLA URINARY ANTIGEN  Negative                             Results from last 7 days   Lab Units 10/25/24  2317 10/25/24  2313   BLOOD CULTURE  No Growth at 24 hrs. No Growth at 24 hrs.                   Past Medical History:   Diagnosis Date    Anxiety 08/18/2024    Asthma     COPD (chronic obstructive pulmonary disease) ([facility])     Disease of thyroid gland     GERD (gastroesophageal reflux disease)     Hypertension 10/11/2018    Hypothyroid     Normocytic anemia 08/28/2024    Osteoarthritis 09/26/2012    Seizure-like activity ([facility]) 10/18/2024    Temporal arteritis ([facility])     Type 2 diabetes mellitus with complication, without long-term current use of insulin ([facility]) 01/14/2020     Present on Admission:   Acute on chronic respiratory failure with hypoxia and hypercapnia ([facility])   Hyperkalemia   COPD, severe ([facility])      Admitting Diagnosis: UTI (urinary tract infection) [N39.0]  Fatigue [R53.83]  Pleural effusion [J90]  BERTO (acute kidney injury) ([facility]) [N17.9]  History of COPD [Z87.09]  Breast lesion [N64.9]  TSH elevation [R79.89]  Acute respiratory failure with hypoxia and hypercarbia (HCC) [J96.01, J96.02]  Scarring of lung [J98.4]  Age/Sex: 79 y.o. female    Network Utilization Review Department  ATTENTION: Please call with any questions or concerns to [phone] and carefully listen to the prompts so that you are directed to the right person. All voicemails are confidential.   For Discharge needs, contact Care Management DC Support Team at [phone] opt. [phone]  Send all requests for admission clinical reviews, approved or denied determinations and any other requests to dedicated fax number below belonging to the campus where the patient is receiving treatment. List of dedicated fax numbers for  the Facilities:  FACILITY NAME UR FAX NUMBER   ADMISSION DENIALS (Administrative/Medical Necessity) [phone]   DISCHARGE SUPPORT TEAM (NETWORK) [phone]   PARENT CHILD HEALTH (Maternity/NICU/Pediatrics) [phone]   [facility] [phone]   [facility] [phone]   [facility] [phone]   [facility] [phone]   [facility] [phone]   [facility] [phone]   [facility] [phone]   [facility] [phone]   [facility] [phone]   [facility] [phone]   [facility] [phone]   [facility] [phone]

## 2024-10-27 NOTE — ACP (ADVANCE CARE PLANNING)
Spoke with patient as well as patient's daughter over the phone about the patient's CODE STATUS as it is currently level 1.  Patient was agreeable to changing to level 3 as she expressed she did not want any tubes or lines but she also wanted to talk to her daughter first before making a final decision.  Spoke with patient's daughter over the phone, who is the power of , and patient's daughter agreed that Level 3 would be best for her mom's long-term care goals.

## 2024-10-28 ENCOUNTER — PATIENT OUTREACH (OUTPATIENT)
Dept: CASE MANAGEMENT | Facility: OTHER | Age: 79
End: 2024-10-28

## 2024-10-28 PROBLEM — E87.5 HYPERKALEMIA: Status: RESOLVED | Noted: 2024-10-18 | Resolved: 2024-10-28

## 2024-10-28 PROBLEM — N17.9 AKI (ACUTE KIDNEY INJURY) (HCC): Status: RESOLVED | Noted: 2024-10-26 | Resolved: 2024-10-28

## 2024-10-28 PROBLEM — H57.89 IRRITATION OF BOTH EYES: Status: ACTIVE | Noted: 2024-10-28

## 2024-10-28 PROCEDURE — 99232 SBSQ HOSP IP/OBS MODERATE 35: CPT | Performed by: INTERNAL MEDICINE

## 2024-10-28 PROCEDURE — 94640 AIRWAY INHALATION TREATMENT: CPT

## 2024-10-28 PROCEDURE — 94760 N-INVAS EAR/PLS OXIMETRY 1: CPT

## 2024-10-28 RX ORDER — ACETAMINOPHEN 325 MG/1
650 TABLET ORAL EVERY 6 HOURS PRN
Status: DISCONTINUED | OUTPATIENT
Start: 2024-10-28 | End: 2024-11-02 | Stop reason: HOSPADM

## 2024-10-28 RX ADMIN — IPRATROPIUM BROMIDE 0.5 MG: 0.5 SOLUTION RESPIRATORY (INHALATION) at 07:39

## 2024-10-28 RX ADMIN — IPRATROPIUM BROMIDE 0.5 MG: 0.5 SOLUTION RESPIRATORY (INHALATION) at 19:25

## 2024-10-28 RX ADMIN — LEVOTHYROXINE SODIUM 25 MCG: 25 TABLET ORAL at 05:28

## 2024-10-28 RX ADMIN — BUDESONIDE INHALATION 0.5 MG: 0.5 SUSPENSION RESPIRATORY (INHALATION) at 07:39

## 2024-10-28 RX ADMIN — LEVALBUTEROL HYDROCHLORIDE 1.25 MG: 1.25 SOLUTION RESPIRATORY (INHALATION) at 14:37

## 2024-10-28 RX ADMIN — GLYCERIN 2 DROP: .002; .002; .01 SOLUTION/ DROPS OPHTHALMIC at 16:16

## 2024-10-28 RX ADMIN — DULOXETINE HYDROCHLORIDE 30 MG: 30 CAPSULE, DELAYED RELEASE ORAL at 21:49

## 2024-10-28 RX ADMIN — LEVALBUTEROL HYDROCHLORIDE 1.25 MG: 1.25 SOLUTION RESPIRATORY (INHALATION) at 07:39

## 2024-10-28 RX ADMIN — LEVALBUTEROL HYDROCHLORIDE 1.25 MG: 1.25 SOLUTION RESPIRATORY (INHALATION) at 19:25

## 2024-10-28 RX ADMIN — HEPARIN SODIUM 5000 UNITS: 5000 INJECTION INTRAVENOUS; SUBCUTANEOUS at 16:15

## 2024-10-28 RX ADMIN — HEPARIN SODIUM 5000 UNITS: 5000 INJECTION INTRAVENOUS; SUBCUTANEOUS at 05:28

## 2024-10-28 RX ADMIN — PREDNISONE 40 MG: 20 TABLET ORAL at 08:29

## 2024-10-28 RX ADMIN — HEPARIN SODIUM 5000 UNITS: 5000 INJECTION INTRAVENOUS; SUBCUTANEOUS at 21:49

## 2024-10-28 RX ADMIN — ACETAMINOPHEN 650 MG: 325 TABLET, FILM COATED ORAL at 05:28

## 2024-10-28 RX ADMIN — BUDESONIDE INHALATION 0.5 MG: 0.5 SUSPENSION RESPIRATORY (INHALATION) at 19:25

## 2024-10-28 RX ADMIN — IPRATROPIUM BROMIDE 0.5 MG: 0.5 SOLUTION RESPIRATORY (INHALATION) at 14:37

## 2024-10-28 NOTE — PROGRESS NOTES
Progress Note - Hospitalist   Name: Sarah Zayas 79 y.o. female I MRN: [id number]  Unit/Bed#: S -01 I Date of Admission: 10/25/2024   Date of Service: 10/28/2024 I Hospital Day: 2     Assessment & Plan  Acute on chronic respiratory failure with hypoxia and hypercapnia (HCC)  Continue oxygen to keep oxygen saturations above 89%.  For overnight pulse oximetry with ABGs in the morning, to possibly qualify patient for home BiPAP.  Needs to assess home oxygen requirements prior to discharge.  As per pulmonologist, recommend nocturnal BiPAP and wean oxygen to keep saturations just at 90 to 92%.  COPD, severe (HCC)  Continue budesonide, ipratropium, Xopenex nebulizations while inpatient.  Continue prednisone 40 mg daily for 5 doses.  Will need outpatient formal PFTs.  Pulmonologist on board.  Normocytic anemia  No noted or reported bleeding.  Continue to monitor.  Follow-up results of the iron panel.  BERTO (acute kidney injury) (HCC) (Resolved: 10/28/2024)  Resolved.  Monitor BMP.  Monitor input and output.  Hyperkalemia (Resolved: 10/28/2024)  Resolved.  Irritation of both eyes  Patient today complaint of irritation of her eyes.  Patient used to take Restasis at home. Thus, artificial tears eyedrops ordered here.  Spoke to patient's son if someone could bring her Restasis it will be much better.  For further evaluation and management if no improvement.    VTE Pharmacologic Prophylaxis: VTE Score: 6 High Risk (Score >/= 5) - Pharmacological DVT Prophylaxis Ordered: heparin. Sequential Compression Devices Ordered.    Mobility:   Basic Mobility Inpatient Raw Score: 9  JH-HLM Goal: 3: Sit at edge of bed  JH-HLM Achieved: 2: Bed activities/Dependent transfer  JH-HLM Goal NOT achieved. Continue with multidisciplinary rounding and encourage appropriate mobility to improve upon JH-HLM goals.    Patient Centered Rounds: I performed bedside rounds with nursing staff today.   Discussions with Specialists or Other Care Team  Provider: Pulmonologist.  Case management.    Education and Discussions with Family / Patient: Updated  (son) at bedside.    Current Length of Stay: 2 day(s)  Current Patient Status: Inpatient   Certification Statement: The patient will continue to require additional inpatient hospital stay due to above findings and plans.  Discharge Plan: Anticipate discharge in 48 hrs to discharge location to be determined pending rehab evaluations.    Code Status: Level 3 - DNAR and DNI    Subjective   Patient was seen and examined this morning and again visited the patient this afternoon with patient's son was at bedside.  Patient did not have shortness of breath anymore at the time.  Patient still has occasional dry cough, but better as compared to the previous days.  Patient denied any pains or any nausea or vomiting or any fever or chills.  Patient complained of eye irritation.  It was found out, the patient takes Restasis eyedrops at home.    Objective :  Temp:  [98.3 °F (36.8 °C)] 98.3 °F (36.8 °C)  HR:  [] 103  BP: (134-146)/(70-76) 144/75  Resp:  [12-17] 17  SpO2:  [94 %-97 %] 95 %  O2 Device: Nasal cannula  Nasal Cannula O2 Flow Rate (L/min):  [2 L/min] 2 L/min  FiO2 (%):  [30] 30    Body mass index is 18.91 kg/m².     Input and Output Summary (last 24 hours):     Intake/Output Summary (Last 24 hours) at 10/28/2024 1854  Last data filed at 10/28/2024 1636  Gross per 24 hour   Intake 360 ml   Output 1100 ml   Net -740 ml       Physical Exam  Vitals and nursing note reviewed.   Constitutional:       General: She is not in acute distress.     Appearance: She is ill-appearing. She is not toxic-appearing or diaphoretic.      Comments: Cachectic.   Eyes:      General: No scleral icterus.        Right eye: No discharge.         Left eye: No discharge.      Comments: Hyperemic palpebral conjunctivae, bilateral.   Cardiovascular:      Rate and Rhythm: Normal rate and regular rhythm.      Heart sounds: Normal  heart sounds.   Pulmonary:      Effort: Pulmonary effort is normal. No respiratory distress.      Breath sounds: No stridor. No wheezing, rhonchi or rales.      Comments: Diminished breath sounds bilaterally.  Abdominal:      General: Bowel sounds are normal. There is no distension.      Palpations: Abdomen is soft.      Tenderness: There is no abdominal tenderness. There is no guarding.   Musculoskeletal:      Right lower leg: No edema.      Left lower leg: No edema.   Skin:     General: Skin is warm.      Coloration: Skin is not pale.      Findings: No erythema or rash.   Neurological:      General: No focal deficit present.      Mental Status: She is alert and oriented to person, place, and time. Mental status is at baseline.   Psychiatric:         Mood and Affect: Mood normal.         Behavior: Behavior normal.         Thought Content: Thought content normal.             Lines/Drains:              Lab Results: I have reviewed the following results:   Results from last 7 days   Lab Units 10/27/24  0442   WBC Thousand/uL 9.12   HEMOGLOBIN g/dL 9.3*   HEMATOCRIT % 30.0*   PLATELETS Thousands/uL 255   SEGS PCT % 85*   LYMPHO PCT % 4*   MONO PCT % 10   EOS PCT % 0     Results from last 7 days   Lab Units 10/27/24  0442   SODIUM mmol/L 137   POTASSIUM mmol/L 4.5   CHLORIDE mmol/L 95*   CO2 mmol/L 37*   BUN mg/dL 33*   CREATININE mg/dL 0.40*   ANION GAP mmol/L 5   CALCIUM mg/dL 9.0   ALBUMIN g/dL 3.5   TOTAL BILIRUBIN mg/dL 0.33   ALK PHOS U/L 37   ALT U/L 16   AST U/L 16   GLUCOSE RANDOM mg/dL 96     Results from last 7 days   Lab Units 10/25/24  2241   INR  0.87     Results from last 7 days   Lab Units 10/25/24  2348   POC GLUCOSE mg/dl 107         Results from last 7 days   Lab Units 10/26/24  0800 10/25/24  2313 10/25/24  2241   LACTIC ACID mmol/L 1.0  --  0.8   PROCALCITONIN ng/ml  --  0.95*  --        Recent Cultures (last 7 days):   Results from last 7 days   Lab Units 10/25/24  2317 10/25/24  2313   BLOOD  CULTURE  No Growth at 48 hrs. No Growth at 48 hrs.   LEGIONELLA URINARY ANTIGEN   --  Negative       Imaging Results Review: I reviewed radiology reports from this admission including: chest xray, CT chest, and CT head.  Other Study Results Review: EKG was reviewed.     Last 24 Hours Medication List:     Current Facility-Administered Medications:     acetaminophen (TYLENOL) tablet 650 mg, Q6H PRN    Artificial Tears Op Soln 2 drop, Daily    budesonide (PULMICORT) inhalation solution 0.5 mg, Q12H    DULoxetine (CYMBALTA) delayed release capsule 30 mg, HS    heparin (porcine) subcutaneous injection 5,000 Units, Q8H RACHEL **AND** [CANCELED] Platelet count, Once    ipratropium (ATROVENT) 0.02 % inhalation solution 0.5 mg, TID    levalbuterol (XOPENEX) inhalation solution 1.25 mg, TID    levothyroxine tablet 25 mcg, Early Morning    predniSONE tablet 40 mg, Daily    Administrative Statements   Today, Patient Was Seen By: Martina Gray MD      **Please Note: This note may have been constructed using a voice recognition system.**

## 2024-10-28 NOTE — ASSESSMENT & PLAN NOTE
Patient today complaint of irritation of her eyes.  Patient used to take Restasis at home. Thus, artificial tears eyedrops ordered here.  Spoke to patient's son if someone could bring her Restasis it will be much better.  For further evaluation and management if no improvement.

## 2024-10-28 NOTE — PROGRESS NOTES
ADT alert received the patient admitted 10/25/24 to [facility]. This Admin Coordinator will continue to monitor via chart review.

## 2024-10-28 NOTE — PLAN OF CARE
Problem: Potential for Falls  Goal: Patient will remain free of falls  Description: INTERVENTIONS:  - Educate patient/family on patient safety including physical limitations  - Instruct patient to call for assistance with activity   - Consult OT/PT to assist with strengthening/mobility   - Keep Call bell within reach  - Keep bed low and locked with side rails adjusted as appropriate  - Keep care items and personal belongings within reach  - Initiate and maintain comfort rounds  - Make Fall Risk Sign visible to staff  - Offer Toileting every 2 Hours, in advance of need  - Initiate/Maintain bed alarm  - Obtain necessary fall risk management equipment  - Apply yellow socks and bracelet for high fall risk patients  - Consider moving patient to room near nurses station  Outcome: Progressing     Problem: Prexisting or High Potential for Compromised Skin Integrity  Goal: Skin integrity is maintained or improved  Description: INTERVENTIONS:  - Identify patients at risk for skin breakdown  - Assess and monitor skin integrity  - Assess and monitor nutrition and hydration status  - Monitor labs   - Assess for incontinence   - Turn and reposition patient  - Assist with mobility/ambulation  - Relieve pressure over bony prominences  - Avoid friction and shearing  - Provide appropriate hygiene as needed including keeping skin clean and dry  - Evaluate need for skin moisturizer/barrier cream  - Collaborate with interdisciplinary team   - Patient/family teaching  - Consider wound care consult   Outcome: Progressing     Problem: RESPIRATORY - ADULT  Goal: Achieves optimal ventilation and oxygenation  Description: INTERVENTIONS:  - Assess for changes in respiratory status  - Assess for changes in mentation and behavior  - Position to facilitate oxygenation and minimize respiratory effort  - Oxygen administered by appropriate delivery if ordered  - Initiate smoking cessation education as indicated  - Encourage broncho-pulmonary  hygiene including cough, deep breathe, Incentive Spirometry  - Assess the need for suctioning and aspirate as needed  - Assess and instruct to report SOB or any respiratory difficulty  - Respiratory Therapy support as indicated  Outcome: Progressing

## 2024-10-28 NOTE — ASSESSMENT & PLAN NOTE
-95% on 2 L NC, patient currently using 2 L supplemental O2 at home  -VBG this a.m. shows pCO2 63.8  -Continue to maintain saturation greater than 89%  -Pulmonary hygiene encouraged: Deep breathing with cough, OOB as tolerated, incentive spirometry and flutter valve  -Will order overnight pulse oximetry with check ABGs in the a.m. to qualify patient for home BiPAP  -Assess home O2 requirements prior to discharge

## 2024-10-28 NOTE — PROGRESS NOTES
Progress Note - Pulmonology   Name: Sarah Zayas 79 y.o. female I MRN: [id number]  Unit/Bed#: S -01 I Date of Admission: 10/25/2024   Date of Service: 10/28/2024 I Hospital Day: 2    Assessment & Plan  Acute on chronic respiratory failure with hypoxia and hypercapnia (HCC)  -95% on 2 L NC, patient currently using 2 L supplemental O2 at home  -VBG this a.m. shows pCO2 63.8  -Continue to maintain saturation greater than 89%  -Pulmonary hygiene encouraged: Deep breathing with cough, OOB as tolerated, incentive spirometry and flutter valve  -Will order overnight pulse oximetry with check ABGs in the a.m. to qualify patient for home BiPAP  -Assess home O2 requirements prior to discharge    COPD, severe (HCC)  -In office spirometry May 2024 shows FEV1 22%  -Home medication regime: Pulmicort 0.5 Mg 2 times daily, Xopenex 1.25 Mg twice daily ipratropium 0.2% 2 times daily, albuterol 90 mcg/ACT 2 puffs every 6 hours as needed, prednisone 2.5 Mg 1 tablet daily with breakfast for temporal arteritis  -Continue budesonide ipratropium lev albuterol nebulizers while inpatient  -Continue prednisone 40 mg daily for 5 doses  -needs outpatient formal PFTs    24 Hour Events : no overnight events  Subjective : Patient seen and examined at bedside.  Found resting comfortably in bed.  Does not appear in any respiratory distress.  Currently at baseline oxygen requirements    Objective :  Temp:  [98.3 °F (36.8 °C)] 98.3 °F (36.8 °C)  HR:  [] 103  BP: (133-146)/(70-76) 144/75  Resp:  [12-17] 17  SpO2:  [94 %-99 %] 95 %  O2 Device: Nasal cannula  Nasal Cannula O2 Flow Rate (L/min):  [2 L/min] 2 L/min  FiO2 (%):  [30] 30    Physical Exam  Vitals reviewed.   Constitutional:       General: She is not in acute distress.     Appearance: She is ill-appearing.   HENT:      Head: Normocephalic and atraumatic.      Right Ear: External ear normal.      Left Ear: External ear normal.      Mouth/Throat:      Mouth: Mucous membranes are dry.       Pharynx: Oropharynx is clear.   Eyes:      Extraocular Movements: Extraocular movements intact.      Pupils: Pupils are equal, round, and reactive to light.   Cardiovascular:      Rate and Rhythm: Normal rate and regular rhythm.      Pulses: Normal pulses.      Heart sounds: Normal heart sounds.   Pulmonary:      Effort: Pulmonary effort is normal.      Breath sounds: No wheezing or rhonchi.      Comments: Breath sounds with fine bibasilar crackles  Abdominal:      General: Bowel sounds are normal.   Musculoskeletal:         General: Normal range of motion.      Cervical back: Normal range of motion and neck supple.      Right lower leg: No edema.      Left lower leg: No edema.   Skin:     General: Skin is warm and dry.   Neurological:      Mental Status: She is alert and oriented to person, place, and time.   Psychiatric:         Mood and Affect: Mood normal.         Behavior: Behavior normal.         Thought Content: Thought content normal.         Judgment: Judgment normal.           Lab Results: I have reviewed the following results:   No new results in last 24 hours.  ABG: No new results in last 24 hours.    Imaging Results Review: I reviewed radiology reports from this admission including: CT chest.  CTA chest PE study 10/26/2024 No evidence for pulmonary embolus.   Stable right middle lobe scarring/atelectasis. Otherwise no focal airspace consolidation to suggest pneumonia.   Stable trace bilateral pleural effusions with suspected mild pulmonary interstitial edema. Recommend clinical correlation.   Stable right breast soft tissue lesion. Correlation with dedicated breast imaging again recommended.  Other Study Results Review: Other studies reviewed include: ECHO  Echo 10/2/2023 LVEF 75% systolic function hyperdynamic.  Wall motion normal.  Diastolic function is mildly abnormal consistent with grade 1 relaxation  PFT Results Reviewed: reviewed  In office spirometry May 2024 shows FEV1 22%, needs outpatient  formal PFTs

## 2024-10-28 NOTE — ASSESSMENT & PLAN NOTE
Continue budesonide, ipratropium, Xopenex nebulizations while inpatient.  Continue prednisone 40 mg daily for 5 doses.  Will need outpatient formal PFTs.  Pulmonologist on board.

## 2024-10-28 NOTE — PHYSICAL THERAPY NOTE
Physical Therapy Cancellation Note       10/28/24 9414   Note Type   Note type Cancelled Session   Cancel Reasons Other   Additional Comments referral received for PT eval and tx. attempted to see pt for eval but Benny CONTRERAS reported pt would need approximately 15 minutes to complete breathing tx. will follow and initiate PT as medically appropriate and schedule allows.     Sky López, PT

## 2024-10-28 NOTE — ASSESSMENT & PLAN NOTE
Continue oxygen to keep oxygen saturations above 89%.  For overnight pulse oximetry with ABGs in the morning, to possibly qualify patient for home BiPAP.  Needs to assess home oxygen requirements prior to discharge.  As per pulmonologist, recommend nocturnal BiPAP and wean oxygen to keep saturations just at 90 to 92%.

## 2024-10-28 NOTE — ASSESSMENT & PLAN NOTE
-In office spirometry May 2024 shows FEV1 22%  -Home medication regime: Pulmicort 0.5 Mg 2 times daily, Xopenex 1.25 Mg twice daily ipratropium 0.2% 2 times daily, albuterol 90 mcg/ACT 2 puffs every 6 hours as needed, prednisone 2.5 Mg 1 tablet daily with breakfast for temporal arteritis  -Continue budesonide ipratropium lev albuterol nebulizers while inpatient  -Continue prednisone 40 mg daily for 5 doses  -needs outpatient formal PFTs

## 2024-10-28 NOTE — CASE MANAGEMENT
Case Management Assessment & Discharge Planning Note    Patient name Sarah NIETO /S -01 MRN [id number]  : 1945 Date 10/28/2024       Current Admission Date: 10/25/2024  Current Admission Diagnosis:Acute on chronic respiratory failure with hypoxia and hypercapnia (HCC)   Patient Active Problem List    Diagnosis Date Noted Date Diagnosed    BERTO (acute kidney injury) (HCC) 10/26/2024     Metabolic alkalosis with respiratory acidosis 10/18/2024     Hyperkalemia 10/18/2024     Seizure-like activity (HCC) 10/18/2024     Hyponatremia 10/17/2024     Chronic respiratory failure with hypercapnia (HCC) 10/17/2024     Metabolic encephalopathy 10/16/2024     Urinary retention 10/16/2024     Macular degeneration 10/15/2024     Hypothyroidism 10/13/2024     Acute on chronic respiratory failure with hypoxia and hypercapnia (HCC) 10/10/2024     Acute respiratory failure with hypercapnia (HCC) 10/10/2024     Venous insufficiency of lower extremity 2024     Epigastric pain 2024     Advance care planning 2024     At risk for constipation 2024     At risk for delirium 2024     Physical deconditioning 2024     Periodontitis 2024     Polypharmacy 2024     Diastolic dysfunction 2024     Elevated vitamin B12 level 2024     Normocytic anemia 2024     Neck swelling 2024     Anxiety/depression 2024     Severe protein-calorie malnutrition (HCC) 2024     COPD, severe (HCC) 2024     Abnormal CT scan 2023     Infectious sialoadenitis of major salivary gland 2023     Left upper lobe pulmonary nodule 2023     Postnasal drip 2023     Raynaud's phenomenon without gangrene 2022     Temporal arteritis (HCC) 2020     Hypertension 10/11/2018     Other specified hypothyroidism 2018     Gastroesophageal reflux disease without esophagitis 2018     Acute exacerbation of chronic  obstructive pulmonary disease (COPD) ([facility]) 09/26/2012     Vitamin D deficiency 09/26/2012     Osteoarthritis 09/26/2012       LOS (days): 2  Geometric Mean LOS (GMLOS) (days):   Days to GMLOS:     OBJECTIVE:  PATIENT READMITTED TO HOSPITAL  Risk of Unplanned Readmission Score: 46.46         Current admission status: Inpatient       Preferred Pharmacy:   [facility] - [place] - [address]  [address]  [address]  [place] [address]  Phone: [phone] Fax: [phone]    [facility] - [place] - [address]  [address] [address]  Phone: [phone] Fax: [phone]    Primary Care Provider: Nicolas Nix MD    Primary Insurance: [employer] REP  Secondary Insurance:     ASSESSMENT:  Active Health Care Proxies       Melissa Cunningham [facility] Care Representative - Daughter   Primary Phone: [phone] (Mobile)  Work Phone: [phone]                           Readmission Root Cause  30 Day Readmission: Yes  During your hospital stay, did someone (provider, nurse, ) explain your care to you in a way you could understand?: Yes  Did you feel medically stable to leave the hospital?: Yes  Were you able to pay for your medication at the pharmacy?: Yes  Did you have reliable transportation to take you to your appointments?: Yes  During previous admission, was a post-acute recommendation made?: Yes  What post-acute resources were offered?: STR  Patient was readmitted due to: acute on chronic respiratory  Action Plan: return to SNF placement-blanket referrals sent.    Patient Information  Admitted from:: Home ([facility] for [facility])  Mental Status: Confused  During Assessment patient was accompanied by: Not accompanied during assessment  Assessment information provided by:: Daughter  Support Systems: Daughter, Family members  Home entry access options. Select all that apply.: Stairs  Number of steps to enter home.: 2  Do the steps  have railings?: Yes  Type of Current Residence: [address] [place] home  Upon entering residence, is there a bedroom on the main floor (no further steps)?: Yes  Upon entering residence, is there a bathroom on the main floor (no further steps)?: Yes  Living Arrangements: Lives Alone (patient's sister checks on her frequently.)    Activities of Daily Living Prior to Admission  Functional Status: Assistance (Home aides just started at 2x a week for 2hrs.)  Completes ADLs independently?: No (Bathing-sponges baths)  Level of ADL dependence: Assistance  Ambulates independently?: No  Level of ambulatory dependence: Assistance  Does patient use assisted devices?: Yes  Assisted Devices (DME) used: Home Oxygen concentrator, Hospital Bed, Walker  Does patient currently own DME?: Yes  What DME does the patient currently own?: Home Oxygen concentrator, Walker, Hospital Bed  Does patient have a history of Outpatient Therapy (PT/OT)?: Yes  Does the patient have a history of Short-Term Rehab?: Yes ([facility])  Does patient have a history of HHC?: Yes  Does patient currently have HHC?: No         Patient Information Continued  Income Source: Pension/MCC  Does patient have prescription coverage?: Yes  Does patient receive dialysis treatments?: No  Does patient have a history of substance abuse?: No  Does patient have a history of Mental Health Diagnosis?: No                    DISCHARGE DETAILS:    Discharge planning discussed with:: DaughterNessa  Freedom of Choice: Yes  Comments - Freedom of Choice: CC called daughter to introduce role of CM. Daughter is unaware of the Home O2 provider, but son Steven would most likely know. Per daughter, they are in agreement with resumption of rehab but patient does not want to return to [facility]. CC will place blanket referral to other rehab locations in search of a rehab bed.  CM contacted family/caregiver?: Yes  Were Treatment Team discharge recommendations reviewed with  patient/caregiver?: Yes  Did patient/caregiver verbalize understanding of patient care needs?: N/A- going to facility  Were patient/caregiver advised of the risks associated with not following Treatment Team discharge recommendations?: Yes    Contacts  Patient Contacts: Melissa Cunningham (Daughter)  [phone] (Mobile)  Relationship to Patient:: Family  Contact Method: Phone  Phone Number: Melissa Cunningham (Daughter)  [phone] (Mobile)  Reason/Outcome: Emergency Contact, Discharge Planning, Continuity of Care    Requested Home Health Care         Is the patient interested in HHC at discharge?: No    DME Referral Provided  Referral made for DME?: No    Other Referral/Resources/Interventions Provided:  Referral Comments: blanket referral for rehab

## 2024-10-29 DIAGNOSIS — M31.6 TEMPORAL ARTERITIS (HCC): ICD-10-CM

## 2024-10-29 LAB
ANION GAP SERPL CALCULATED.3IONS-SCNC: 3 MMOL/L (ref 4–13)
ARTERIAL PATENCY WRIST A: YES
ARTERIAL PATENCY WRIST A: YES
BASE EXCESS BLDA CALC-SCNC: 6.5 MMOL/L
BASE EXCESS BLDA CALC-SCNC: 7.9 MMOL/L
BUN SERPL-MCNC: 29 MG/DL (ref 5–25)
CALCIUM SERPL-MCNC: 8.7 MG/DL (ref 8.4–10.2)
CHLORIDE SERPL-SCNC: 97 MMOL/L (ref 96–108)
CO2 SERPL-SCNC: 38 MMOL/L (ref 21–32)
CREAT SERPL-MCNC: 0.39 MG/DL (ref 0.6–1.3)
ERYTHROCYTE [DISTWIDTH] IN BLOOD BY AUTOMATED COUNT: 14.5 % (ref 11.6–15.1)
FERRITIN SERPL-MCNC: 47 NG/ML (ref 11–307)
GFR SERPL CREATININE-BSD FRML MDRD: 100 ML/MIN/1.73SQ M
GLUCOSE SERPL-MCNC: 108 MG/DL (ref 65–140)
GLUCOSE SERPL-MCNC: 152 MG/DL (ref 65–140)
GLUCOSE SERPL-MCNC: 89 MG/DL (ref 65–140)
GLUCOSE SERPL-MCNC: 99 MG/DL (ref 65–140)
HCO3 BLDA-SCNC: 32.9 MMOL/L (ref 22–28)
HCO3 BLDA-SCNC: 33.2 MMOL/L (ref 22–28)
HCT VFR BLD AUTO: 32.8 % (ref 34.8–46.1)
HGB BLD-MCNC: 9.8 G/DL (ref 11.5–15.4)
IRON SATN MFR SERPL: 14 % (ref 15–50)
IRON SERPL-MCNC: 42 UG/DL (ref 50–212)
MCH RBC QN AUTO: 28.4 PG (ref 26.8–34.3)
MCHC RBC AUTO-ENTMCNC: 29.9 G/DL (ref 31.4–37.4)
MCV RBC AUTO: 95 FL (ref 82–98)
NASAL CANNULA: 1
NASAL CANNULA: 2
O2 CT BLDA-SCNC: 14.2 ML/DL (ref 16–23)
O2 CT BLDA-SCNC: 14.8 ML/DL (ref 16–23)
OXYHGB MFR BLDA: 95.8 % (ref 94–97)
OXYHGB MFR BLDA: 96.1 % (ref 94–97)
PCO2 BLDA: 50 MM HG (ref 36–44)
PCO2 BLDA: 57.3 MM HG (ref 36–44)
PH BLDA: 7.38 [PH] (ref 7.35–7.45)
PH BLDA: 7.44 [PH] (ref 7.35–7.45)
PLATELET # BLD AUTO: 265 THOUSANDS/UL (ref 149–390)
PMV BLD AUTO: 10.2 FL (ref 8.9–12.7)
PO2 BLDA: 82 MM HG (ref 75–129)
PO2 BLDA: 92.7 MM HG (ref 75–129)
POTASSIUM SERPL-SCNC: 4.3 MMOL/L (ref 3.5–5.3)
RBC # BLD AUTO: 3.45 MILLION/UL (ref 3.81–5.12)
SODIUM SERPL-SCNC: 138 MMOL/L (ref 135–147)
SPECIMEN SOURCE: ABNORMAL
SPECIMEN SOURCE: ABNORMAL
TIBC SERPL-MCNC: 306 UG/DL (ref 250–450)
UIBC SERPL-MCNC: 264 UG/DL (ref 155–355)
WBC # BLD AUTO: 7.71 THOUSAND/UL (ref 4.31–10.16)

## 2024-10-29 PROCEDURE — 97167 OT EVAL HIGH COMPLEX 60 MIN: CPT

## 2024-10-29 PROCEDURE — 83540 ASSAY OF IRON: CPT | Performed by: INTERNAL MEDICINE

## 2024-10-29 PROCEDURE — 97163 PT EVAL HIGH COMPLEX 45 MIN: CPT

## 2024-10-29 PROCEDURE — 82805 BLOOD GASES W/O2 SATURATION: CPT | Performed by: NURSE PRACTITIONER

## 2024-10-29 PROCEDURE — 85027 COMPLETE CBC AUTOMATED: CPT | Performed by: INTERNAL MEDICINE

## 2024-10-29 PROCEDURE — 80048 BASIC METABOLIC PNL TOTAL CA: CPT | Performed by: INTERNAL MEDICINE

## 2024-10-29 PROCEDURE — 83550 IRON BINDING TEST: CPT | Performed by: INTERNAL MEDICINE

## 2024-10-29 PROCEDURE — 94640 AIRWAY INHALATION TREATMENT: CPT

## 2024-10-29 PROCEDURE — 94760 N-INVAS EAR/PLS OXIMETRY 1: CPT

## 2024-10-29 PROCEDURE — 36600 WITHDRAWAL OF ARTERIAL BLOOD: CPT

## 2024-10-29 PROCEDURE — 94762 N-INVAS EAR/PLS OXIMTRY CONT: CPT

## 2024-10-29 PROCEDURE — 99232 SBSQ HOSP IP/OBS MODERATE 35: CPT

## 2024-10-29 PROCEDURE — 87081 CULTURE SCREEN ONLY: CPT

## 2024-10-29 PROCEDURE — 82728 ASSAY OF FERRITIN: CPT | Performed by: INTERNAL MEDICINE

## 2024-10-29 PROCEDURE — 97116 GAIT TRAINING THERAPY: CPT

## 2024-10-29 PROCEDURE — 82948 REAGENT STRIP/BLOOD GLUCOSE: CPT

## 2024-10-29 RX ORDER — POLYETHYLENE GLYCOL 3350 17 G/17G
17 POWDER, FOR SOLUTION ORAL DAILY PRN
Status: DISCONTINUED | OUTPATIENT
Start: 2024-10-29 | End: 2024-11-02 | Stop reason: HOSPADM

## 2024-10-29 RX ADMIN — LEVALBUTEROL HYDROCHLORIDE 1.25 MG: 1.25 SOLUTION RESPIRATORY (INHALATION) at 20:46

## 2024-10-29 RX ADMIN — HEPARIN SODIUM 5000 UNITS: 5000 INJECTION INTRAVENOUS; SUBCUTANEOUS at 06:24

## 2024-10-29 RX ADMIN — LEVOTHYROXINE SODIUM 25 MCG: 25 TABLET ORAL at 06:24

## 2024-10-29 RX ADMIN — HEPARIN SODIUM 5000 UNITS: 5000 INJECTION INTRAVENOUS; SUBCUTANEOUS at 21:20

## 2024-10-29 RX ADMIN — IPRATROPIUM BROMIDE 0.5 MG: 0.5 SOLUTION RESPIRATORY (INHALATION) at 13:51

## 2024-10-29 RX ADMIN — DULOXETINE HYDROCHLORIDE 30 MG: 30 CAPSULE, DELAYED RELEASE ORAL at 21:20

## 2024-10-29 RX ADMIN — BUDESONIDE INHALATION 0.5 MG: 0.5 SUSPENSION RESPIRATORY (INHALATION) at 20:46

## 2024-10-29 RX ADMIN — LEVALBUTEROL HYDROCHLORIDE 1.25 MG: 1.25 SOLUTION RESPIRATORY (INHALATION) at 07:39

## 2024-10-29 RX ADMIN — PREDNISONE 40 MG: 20 TABLET ORAL at 08:23

## 2024-10-29 RX ADMIN — BUDESONIDE INHALATION 0.5 MG: 0.5 SUSPENSION RESPIRATORY (INHALATION) at 07:39

## 2024-10-29 RX ADMIN — GLYCERIN 2 DROP: .002; .002; .01 SOLUTION/ DROPS OPHTHALMIC at 08:23

## 2024-10-29 RX ADMIN — IPRATROPIUM BROMIDE 0.5 MG: 0.5 SOLUTION RESPIRATORY (INHALATION) at 20:46

## 2024-10-29 RX ADMIN — ACETAMINOPHEN 650 MG: 325 TABLET, FILM COATED ORAL at 12:02

## 2024-10-29 RX ADMIN — LEVALBUTEROL HYDROCHLORIDE 1.25 MG: 1.25 SOLUTION RESPIRATORY (INHALATION) at 13:51

## 2024-10-29 RX ADMIN — HEPARIN SODIUM 5000 UNITS: 5000 INJECTION INTRAVENOUS; SUBCUTANEOUS at 14:27

## 2024-10-29 RX ADMIN — IPRATROPIUM BROMIDE 0.5 MG: 0.5 SOLUTION RESPIRATORY (INHALATION) at 07:39

## 2024-10-29 NOTE — RESPIRATORY THERAPY NOTE
"   10/28/24 9506   Respiratory Assessment   Resp Comments Overnight pulse ox started on 2LNC as ordered. Pt expressed extreme anxiety regarding not wearing bipap for the overnight study as pt was not told pt would be having study tonight.  pt states \"doctors told me I will die if I do not wear it at night\"   Oxygen Therapy/Pulse Ox   O2 Device Nasal cannula   O2 Therapy Oxygen humidified   Nasal Cannula O2 Flow Rate (L/min) 2 L/min   Calculated FIO2 (%) - Nasal Cannula 28   SpO2 Activity At Rest   Oxygen On/Off (RCP) Continued   Oximetry Probe Site Changed Yes   $ Pulse Oximetry Spot Check Charge Completed   $ Overnight Pulse Ox Started       "

## 2024-10-29 NOTE — PROGRESS NOTES
Progress Note - Hospitalist   Name: Sarah Zayas 79 y.o. female I MRN: [id number]  Unit/Bed#: S -01 I Date of Admission: 10/25/2024   Date of Service: 10/29/2024 I Hospital Day: 3    Assessment & Plan  Acute on chronic respiratory failure with hypoxia and hypercapnia (HCC)  Continue oxygen to keep oxygen saturations above 89%.  As per pulmonologist, recommend nocturnal BiPAP and wean oxygen to keep saturations just at 90 to 92%.  Home o2 eval  COPD, severe (HCC)  Continue budesonide, ipratropium, Xopenex nebulizations while inpatient.  Continue prednisone 40 mg daily for 5 doses.  Will need outpatient formal PFTs  Overnight pulse ox completed overnight. ABG this AM: 7.37/57/92.7/32.9  Pulmonologist on board  Normocytic anemia  No noted or reported bleeding.  Continue to monitor.  Follow-up results of the iron panel; Ferritin 47; other values still pending  Irritation of both eyes  Patient today complaint of irritation of her eyes.  Patient used to take Restasis at home. Thus, artificial tears eyedrops ordered here.  Spoke to patient's son if someone could bring her Restasis it will be much better.  For further evaluation and management if no improvement.    VTE Pharmacologic Prophylaxis: VTE Score: 6 High Risk (Score >/= 5) - Pharmacological DVT Prophylaxis Ordered: heparin. Sequential Compression Devices Ordered.    Mobility:   Basic Mobility Inpatient Raw Score: 13  JH-HLM Goal: 4: Move to chair/commode  JH-HLM Achieved: 6: Walk 10 steps or more  JH-HLM Goal achieved. Continue to encourage appropriate mobility.    Patient Centered Rounds: I performed bedside rounds with nursing staff today.   Discussions with Specialists or Other Care Team Provider: Case management    Education and Discussions with Family / Patient:  Will call patient's daughter this afternoon.     Current Length of Stay: 3 day(s)  Current Patient Status: Inpatient   Certification Statement: The patient will continue to require additional  inpatient hospital stay due to placement  Discharge Plan: Anticipate discharge in 24-48 hrs to rehab facility.    Code Status: Level 3 - DNAR and DNI    Subjective   Patient is resting in her bedside chair comfortably with nasal cannula in place.  She reports improvement in her breathing, she denies any wheezing or shortness of breath.  Nursing reports urinary retention, requiring bladder scan and straight cath. Pt reports no BM today. Will order Miralax    Objective :  Temp:  [98.3 °F (36.8 °C)-99.1 °F (37.3 °C)] 99.1 °F (37.3 °C)  HR:  [] 85  BP: (143-145)/(75-77) 143/77  Resp:  [18] 18  SpO2:  [95 %-98 %] 96 %  O2 Device: Nasal cannula  Nasal Cannula O2 Flow Rate (L/min):  [2 L/min] 2 L/min    Body mass index is 18.91 kg/m².     Input and Output Summary (last 24 hours):     Intake/Output Summary (Last 24 hours) at 10/29/2024 1411  Last data filed at 10/29/2024 1309  Gross per 24 hour   Intake 510 ml   Output 1250 ml   Net -740 ml       Physical Exam  Vitals and nursing note reviewed.   Constitutional:       General: She is not in acute distress.     Appearance: She is well-developed.   HENT:      Head: Normocephalic and atraumatic.   Eyes:      Extraocular Movements: Extraocular movements intact.   Cardiovascular:      Rate and Rhythm: Normal rate and regular rhythm.      Heart sounds: Normal heart sounds. No murmur heard.  Pulmonary:      Effort: Pulmonary effort is normal. No respiratory distress.      Breath sounds: Normal breath sounds.   Abdominal:      General: Bowel sounds are normal. There is distension.      Palpations: Abdomen is soft. There is no mass.      Tenderness: There is no abdominal tenderness. There is no guarding.   Musculoskeletal:         General: No swelling.      Cervical back: Neck supple.      Right lower leg: No edema.      Left lower leg: No edema.   Skin:     General: Skin is warm and dry.      Capillary Refill: Capillary refill takes less than 2 seconds.   Neurological:       Mental Status: She is alert.   Psychiatric:         Mood and Affect: Mood normal.       Lines/Drains:        Lab Results: I have reviewed the following results:   Results from last 7 days   Lab Units 10/29/24  0532 10/27/24  0442   WBC Thousand/uL 7.71 9.12   HEMOGLOBIN g/dL 9.8* 9.3*   HEMATOCRIT % 32.8* 30.0*   PLATELETS Thousands/uL 265 255   SEGS PCT %  --  85*   LYMPHO PCT %  --  4*   MONO PCT %  --  10   EOS PCT %  --  0     Results from last 7 days   Lab Units 10/29/24  0532 10/27/24  0442   SODIUM mmol/L 138 137   POTASSIUM mmol/L 4.3 4.5   CHLORIDE mmol/L 97 95*   CO2 mmol/L 38* 37*   BUN mg/dL 29* 33*   CREATININE mg/dL 0.39* 0.40*   ANION GAP mmol/L 3* 5   CALCIUM mg/dL 8.7 9.0   ALBUMIN g/dL  --  3.5   TOTAL BILIRUBIN mg/dL  --  0.33   ALK PHOS U/L  --  37   ALT U/L  --  16   AST U/L  --  16   GLUCOSE RANDOM mg/dL 89 96     Results from last 7 days   Lab Units 10/25/24  2241   INR  0.87     Results from last 7 days   Lab Units 10/29/24  0754 10/25/24  2348   POC GLUCOSE mg/dl 99 107         Results from last 7 days   Lab Units 10/26/24  0800 10/25/24  2313 10/25/24  2241   LACTIC ACID mmol/L 1.0  --  0.8   PROCALCITONIN ng/ml  --  0.95*  --        Recent Cultures (last 7 days):   Results from last 7 days   Lab Units 10/25/24  2317 10/25/24  2313   BLOOD CULTURE  No Growth at 72 hrs. No Growth at 72 hrs.   LEGIONELLA URINARY ANTIGEN   --  Negative       Imaging Results Review: I reviewed radiology reports from this admission including: CT chest.  Other Study Results Review: No additional pertinent studies reviewed.    Last 24 Hours Medication List:     Current Facility-Administered Medications:     acetaminophen (TYLENOL) tablet 650 mg, Q6H PRN    Artificial Tears Op Soln 2 drop, Daily    budesonide (PULMICORT) inhalation solution 0.5 mg, Q12H    DULoxetine (CYMBALTA) delayed release capsule 30 mg, HS    heparin (porcine) subcutaneous injection 5,000 Units, Q8H RACHEL **AND** [CANCELED] Platelet count,  Once    ipratropium (ATROVENT) 0.02 % inhalation solution 0.5 mg, TID    levalbuterol (XOPENEX) inhalation solution 1.25 mg, TID    levothyroxine tablet 25 mcg, Early Morning    predniSONE tablet 40 mg, Daily    Administrative Statements   Today, Patient Was Seen By: Elaine Andrews DO      **Please Note: This note may have been constructed using a voice recognition system.**

## 2024-10-29 NOTE — PLAN OF CARE
Problem: PHYSICAL THERAPY ADULT  Goal: Performs mobility at highest level of function for planned discharge setting.  See evaluation for individualized goals.  Description: Treatment/Interventions: ADL retraining, Functional transfer training, LE strengthening/ROM, Elevations, Therapeutic exercise, Endurance training, Patient/family training, Equipment eval/education, Bed mobility, Gait training, Compensatory technique education, Spoke to nursing, Spoke to case management, OT          See flowsheet documentation for full assessment, interventions and recommendations.  Note:    Problem List: Decreased strength, Decreased endurance, Impaired balance, Decreased mobility, Decreased coordination, Decreased safety awareness, Pain  Assessment: Patient seen for Physical Therapy evaluation. Patient admitted with Acute on chronic respiratory failure with hypoxia and hypercapnia (HCC).  Comorbidities affecting patient's physical performance include: COPD, vitamin D deficiency, HTN, OA, temporal arteritis, physical deconditioning, polypharmacy, macular degeneration, metabolic encephalopathy, urinary retention.  Personal factors affecting patient at time of initial evaluation include: lives in multi story house, ambulating with assistive device, stairs to enter home, inability to navigate community distances, inability to navigate level surfaces without external assistance, inability to perform dynamic tasks in community, limited home support, anxiety, and depression. Prior to admission, patient was requiring assist for functional mobility with RW, requiring assist for ADLS, requiring assist for IADLS, living alone in one story home with 2 steps to enter, ambulating household distance, in short term rehab, was at [facility], and lives in a multilevel house but has 1st floor setup.  Please find objective findings from Physical Therapy assessment regarding body systems outlined above with impairments and limitations including weakness,  impaired balance, decreased endurance, impaired coordination, gait deviations, pain, decreased activity tolerance, decreased functional mobility tolerance, decreased safety awareness, fall risk, and SOB upon exertion.  The Barthel Index was used as a functional outcome tool presenting with a score of Barthel Index Score: 40 today indicating marked limitations of functional mobility and ADLS.  Patient's clinical presentation is currently unstable/unpredictable as seen in patient's presentation of vital sign response, changing level of pain, increased fall risk, new onset of impairment of functional mobility, decreased endurance, and new onset of weakness. Pt would benefit from continued Physical Therapy treatment to address deficits as defined above and maximize level of functional mobility. As demonstrated by objective findings, the assigned level of complexity for this evaluation is high.The patient's AM-PAC Basic Mobility Inpatient Short Form Raw Score is 13. A Raw score of less than or equal to 16 suggests the patient may benefit from discharge to post-acute rehabilitation services. Please also refer to the recommendation of the Physical Therapist for safe discharge planning.        Rehab Resource Intensity Level, PT: II (Moderate Resource Intensity)    See flowsheet documentation for full assessment.

## 2024-10-29 NOTE — PHYSICAL THERAPY NOTE
PHYSICAL THERAPY EVALUATION/TREATMENT    NAME:  Sarah Zayas  AGE:   79 y.o.  Mrn:   [id number]  Length Of Stay: 3    ADMIT DX:  UTI (urinary tract infection) [N39.0]  Fatigue [R53.83]  Pleural effusion [J90]  BERTO (acute kidney injury) (HCC) [N17.9]  History of COPD [Z87.09]  Breast lesion [N64.9]  TSH elevation [R79.89]  Acute respiratory failure with hypoxia and hypercarbia (HCC) [J96.01, J96.02]  Scarring of lung [J98.4]    Past Medical History:   Diagnosis Date    Anxiety 08/18/2024    Asthma     COPD (chronic obstructive pulmonary disease) ([facility])     Disease of thyroid gland     GERD (gastroesophageal reflux disease)     Hypertension 10/11/2018    Hypothyroid     Normocytic anemia 08/28/2024    Osteoarthritis 09/26/2012    Seizure-like activity ([facility]) 10/18/2024    Temporal arteritis ([facility])     Type 2 diabetes mellitus with complication, without long-term current use of insulin ([facility]) 01/14/2020     Past Surgical History:   Procedure Laterality Date    EYE SURGERY Right 12/06/2019    cataract LVCFS    HYSTERECTOMY      NO PAST SURGERIES      ALSO NO RELEVANT PAST SURRGICAL HX AS PER [facility]        10/29/24 0902   PT Last Visit   PT Visit Date 10/29/24   Note Type   Note type Evaluation   Pain Assessment   Pain Assessment Tool 0-10   Pain Score 8  (3/10 pain at rest; 8/10 pain with mobility)   Pain Location/Orientation Orientation: Left  (Ankle)   Pain Onset/Description Onset: Ongoing;Frequency: Intermittent   Effect of Pain on Daily Activities Limits comfort and activity tolerance   Patient's Stated Pain Goal No pain   Hospital Pain Intervention(s) Repositioned;Ambulation/increased activity;Emotional support;Rest   Restrictions/Precautions   Other Precautions Fall Risk;Bed Alarm;Chair Alarm;Pain;O2  (2 L O2 via NC; Wei)   Home Living   Type of Home House   Home Layout Multi-level;Able to live on main level with bedroom/bathroom;Performs ADLs on one level;Stairs to enter with rails;1/2 bath on main  "level  (2 steps to enter with a rail; patient maintains a first-floor set up)   Bathroom Shower/Tub Tub/shower unit  (Patient sponge bathes at baseline)   Bathroom Toilet Standard   Bathroom Accessibility Accessible   Home Equipment Cane  (Roller walker; home O2 2 L 24/7)   Additional Comments Patient is admitted from short-term rehab   Prior Function   Level of [place] Needs assistance with ADLs;Needs assistance with functional mobility;Needs assistance with IADLS   Lives With Facility staff   Receives Help From Personal care attendant  (When home, patient's sister will stay with patient as needed)   IADLs Family/Friend/Other provides meals;Family/Friend/Other provides medication management;Family/Friend/Other provides transportation   Falls in the last 6 months 0  (Denies)   Comments Patient ambulatory with a roller walker prior to admission.  However, patient reporting \"I have not got out of bed much at rehab\" patient was at rehab for approximately 2 days   General   Additional Pertinent History Patient is admitted with altered mental status, hypoxia - was found to be in the 80s in the ED, and (+) BERTO found in the ED   Family/Caregiver Present Yes  (Family member)   Cognition   Overall Cognitive Status WFL  (Patient is very anxious)   Arousal/Participation Cooperative   Orientation Level Oriented X4   Memory Decreased recall of precautions;Decreased recall of recent events;Decreased short term memory   Following Commands Follows one step commands with increased time or repetition   Comments At least 2 patient identifiers including name and date of birth   Subjective   Subjective \"I do not know if I can do this\"   RLE Assessment   RLE Assessment WFL  (Grossly 3-3+/5)   LLE Assessment   LLE Assessment WFL  (Grossly 3-3+/5)   Vision-Basic Assessment   Current Vision Wears glasses all the time  (+ readers - \"currently lost\")   Light Touch   RLE Light Touch Grossly intact   LLE Light Touch Grossly intact "   Proprioception   RLE Proprioception Grossly intact   LLE Proprioception Grossly Intact   Bed Mobility   Supine to Sit 4  Minimal assistance   Additional items Assist x 1;Verbal cues;Increased time required;HOB elevated;Bedrails   Transfers   Sit to Stand 3  Moderate assistance   Additional items Assist x 1;Verbal cues;Increased time required;Armrests  (Cues for safe hand placement)   Stand to Sit 3  Moderate assistance   Additional items Assist x 1;Verbal cues;Increased time required;Armrests  (Cues for safe hand placement)   Ambulation/Elevation   Gait pattern Foward flexed;Decreased foot clearance;Short stride;Step through pattern;Decreased heel strike;Inconsistent tarun;Redundant gait at times;Retropulsion   Gait Assistance 3  Moderate assist  (Fluctuating mod <--> min A)   Additional items Assist x 1;Verbal cues;Tactile cues   Assistive Device Rolling walker   Distance 15 feet with change in direction; patient minimally fatigued at end of ambulation   Balance   Static Sitting Fair   Static Standing   (P+/F- with a roller walker)   Ambulatory   (P/P+ with a roller walker)   Endurance Deficit   Endurance Deficit Yes   Endurance Deficit Description Impaired activity, standing and gait endurance; fatigued with limited activity   Activity Tolerance   Activity Tolerance Patient limited by fatigue;Patient limited by pain;Treatment limited secondary to medical complications (Comment)  (Weakness and deconditioning; anxiety)   Medical Staff Made Aware Care coordination with ALFIE Moreira   Nurse Made Aware BRANDEN Plascencia   Assessment   Problem List Decreased strength;Decreased endurance;Impaired balance;Decreased mobility;Decreased coordination;Decreased safety awareness;Pain   Assessment Patient seen for Physical Therapy evaluation. Patient admitted with Acute on chronic respiratory failure with hypoxia and hypercapnia (HCC).  Comorbidities affecting patient's physical performance include: COPD, vitamin D deficiency, HTN,  OA, temporal arteritis, physical deconditioning, polypharmacy, macular degeneration, metabolic encephalopathy, urinary retention.  Personal factors affecting patient at time of initial evaluation include: lives in multi story house, ambulating with assistive device, stairs to enter home, inability to navigate community distances, inability to navigate level surfaces without external assistance, inability to perform dynamic tasks in community, limited home support, anxiety, and depression. Prior to admission, patient was requiring assist for functional mobility with RW, requiring assist for ADLS, requiring assist for IADLS, living alone in one story home with 2 steps to enter, ambulating household distance, in short term rehab, was at [facility], and lives in a multilevel house but has 1st floor setup.  Please find objective findings from Physical Therapy assessment regarding body systems outlined above with impairments and limitations including weakness, impaired balance, decreased endurance, impaired coordination, gait deviations, pain, decreased activity tolerance, decreased functional mobility tolerance, decreased safety awareness, fall risk, and SOB upon exertion.  The Barthel Index was used as a functional outcome tool presenting with a score of Barthel Index Score: 40 today indicating marked limitations of functional mobility and ADLS.  Patient's clinical presentation is currently unstable/unpredictable as seen in patient's presentation of vital sign response, changing level of pain, increased fall risk, new onset of impairment of functional mobility, decreased endurance, and new onset of weakness. Pt would benefit from continued Physical Therapy treatment to address deficits as defined above and maximize level of functional mobility. As demonstrated by objective findings, the assigned level of complexity for this evaluation is high.    The patient's AM-PAC Basic Mobility Inpatient Short Form Raw Score is 13. A Raw  "score of less than or equal to 16 suggests the patient may benefit from discharge to post-acute rehabilitation services. Please also refer to the recommendation of the Physical Therapist for safe discharge planning.   Goals   Patient Goals \"Not to go back to the rehab I was at\" \"to go home\"   [facility] Expiration Date 11/08/24   Short Term Goal #1 Patient will: Increase bilateral LE strength 1/2 grade to facilitate independent mobility, Perform all bed mobility tasks independently to improve pt's independence w/ repositioning for decrease risk of skin breakdown, Perform all transfers independently consistently from various height surfaces in order to improve I w/ engagement w/ real-world environments/situations, Ambulate at least 50 ft. with roller walker independently w/o LOB to facilitate return and engagement w/ previous living environment, Navigate 2 stairs w/ supervision with unilateral handrail to either improve independence w/ entering home and/or so patient can fully access living areas in home, Increase all balance 1 grade to decrease risk for falls, Tolerate at least 15 consecutive minutes of activity to demonstrate improved activity tolerance and endurance, and Tolerate 3 hr OOB to faciliate upright tolerance   Plan   Treatment/Interventions ADL retraining;Functional transfer training;LE strengthening/ROM;Elevations;Therapeutic exercise;Endurance training;Patient/family training;Equipment eval/education;Bed mobility;Gait training;Compensatory technique education;Spoke to nursing;Spoke to case management;OT   PT Frequency 3-5x/wk   Discharge Recommendation   Rehab Resource Intensity Level, PT II (Moderate Resource Intensity)   AM-PAC Basic Mobility Inpatient   Turning in Flat Bed Without Bedrails 3   Lying on Back to Sitting on Edge of Flat Bed Without Bedrails 3   Moving Bed to Chair 2   Standing Up From Chair Using Arms 2   Walk in Room 2   Climb 3-5 Stairs With Railing 1   Basic Mobility Inpatient Raw Score " 13   Basic Mobility Standardized Score 33.99   [facility] Highest Level Of Mobility   -[facility] Goal 4: Move to chair/commode   -[facility] Achieved 6: Walk 10 steps or more   Barthel Index   Feeding 5   Bathing 0   Grooming Score 0   Dressing Score 5   Bladder Score 10   Bowels Score 10   Toilet Use Score 5   Transfers (Bed/Chair) Score 5   Mobility (Level Surface) Score 0   Stairs Score 0   Barthel Index Score 40   Additional Treatment Session   Start Time 0902   End Time 0913   Treatment Assessment Patient agreeable to additional ambulation with a roller walker.  Patient transfers sit to stand with cues for safe hand placement with mod/min assist of 1 and ambulates with a roller walker 15 feet with change in direction with mod/min assist of 1.  Patient transfers stand to sit with mod/min assist of 1 and cues for safe hand placement.  A: patient with fair tolerance to PT evaluation and treatment.  Patient is very anxious, fatigues easily with limited activity and is noted with generalized weakness and deconditioning.  While admitted, patient will continue to benefit from skilled physical therapy services to continue to increase the patient's strength, balance, endurance, safe functional mobility and gait with a roller walker.  When medically stable for discharge patient is appropriate for Level II Moderate Resource Intensity.   End of Consult   Patient Position at End of Consult Bed/Chair alarm activated;Bedside chair;All needs within reach   Licensure   [place] License Number  Maria [id number] L RADHA Figueroa   Portions of the documentation may have been created using voice recognition software. Occasional wrong word or sound alike substitutions may have occurred due to the inherent limitation of the voice recognition software. Read the chart carefully and recognize, using context, where substitutions have occurred.

## 2024-10-29 NOTE — ASSESSMENT & PLAN NOTE
No noted or reported bleeding.  Continue to monitor.  Follow-up results of the iron panel; Ferritin 47; other values still pending

## 2024-10-29 NOTE — PROGRESS NOTES
Progress Note - Pulmonology   Name: Sarah Zayas 79 y.o. female I MRN: [id number]  Unit/Bed#: S -01 I Date of Admission: 10/25/2024   Date of Service: 10/29/2024 I Hospital Day: 3    Assessment & Plan  Acute on chronic respiratory failure with hypoxia and hypercapnia (HCC)  -95% on 2 L NC, patient currently using 2 L supplemental O2 at home  -VBG this a.m. shows pCO2 63.8  -Continue to maintain saturation greater than 89%  -Pulmonary hygiene encouraged: Deep breathing with cough, OOB as tolerated, incentive spirometry and flutter valve  -Overnight pulse oximetry test showed no desaturation while on 2 L NC  -pCO2 on ABG is 57.3  -Patient has chronic respiratory failure due to COPD.  Patient has evidence for CO2 retention and requires noninvasive ventilation with volume targeted mode at home for optimal management.  BiPAP was tried and patient remains hypercapnic due to the inability to provide noninvasive ventilation of adequate degree.  Continuous alarm systems and backup are required for optimal management due to power outage.  This patient is at high risk for respiratory failure without noninvasive ventilation at home.  This could prevent repeated hospital admissions and improve the quality of life for the patient  -Would recommend tidal volume of 300 respiratory rate of 14 for Trilogy  -Assess home O2 requirements prior to discharge    COPD, severe (HCC)  -In office spirometry May 2024 shows FEV1 22%  -Home medication regime: Pulmicort 0.5 Mg 2 times daily, Xopenex 1.25 Mg twice daily ipratropium 0.2% 2 times daily, albuterol 90 mcg/ACT 2 puffs every 6 hours as needed, prednisone 2.5 Mg 1 tablet daily with breakfast for temporal arteritis  -Continue budesonide ipratropium lev albuterol nebulizers while inpatient  -Continue prednisone 40 mg daily for 5 doses  -needs outpatient formal PFTs    24 Hour Events : Overnight pulse oximetry completed  Subjective : Patient seen and examined at bedside.  Found resting  comfortably in bed.  Appears lethargic today on assessment.  Lungs diminished but clear.  Patient does not appear in any respiratory distress.  Oxygen decreased to 1 L patient saturations remained stable.  No fever chills night sweats chest pain or hemoptysis    Objective :  Temp:  [98.5 °F (36.9 °C)-99.1 °F (37.3 °C)] 99.1 °F (37.3 °C)  HR:  [85-95] 85  BP: (143-145)/(76-77) 143/77  Resp:  [18] 18  SpO2:  [96 %-98 %] 96 %  O2 Device: Nasal cannula  Nasal Cannula O2 Flow Rate (L/min):  [2 L/min] 2 L/min    Physical Exam  Vitals reviewed.   Constitutional:       General: She is not in acute distress.     Appearance: She is ill-appearing.   HENT:      Head: Normocephalic and atraumatic.      Right Ear: External ear normal.      Left Ear: External ear normal.      Nose: Nose normal.      Mouth/Throat:      Mouth: Mucous membranes are moist.      Pharynx: Oropharynx is clear.   Eyes:      Extraocular Movements: Extraocular movements intact.      Pupils: Pupils are equal, round, and reactive to light.   Cardiovascular:      Rate and Rhythm: Normal rate and regular rhythm.      Pulses: Normal pulses.      Heart sounds: Normal heart sounds.   Pulmonary:      Effort: Pulmonary effort is normal.      Breath sounds: Normal breath sounds.   Abdominal:      General: Bowel sounds are normal.   Musculoskeletal:         General: Normal range of motion.      Cervical back: Normal range of motion and neck supple.      Right lower leg: No edema.      Left lower leg: No edema.   Skin:     General: Skin is warm and dry.   Neurological:      Mental Status: She is alert and oriented to person, place, and time.      Comments: Pt lethargic    Psychiatric:         Mood and Affect: Mood normal.         Thought Content: Thought content normal.         Judgment: Judgment normal.           Lab Results: I have reviewed the following results:   .     10/29/24  0532   WBC 7.71   HGB 9.8*   HCT 32.8*      SODIUM 138   K 4.3   CL 97   CO2 38*    BUN 29*   CREATININE 0.39*   GLUC 89     ABG:   .     10/29/24  0529   PHART 7.377   YLU7EIN 57.3*   PO2ART 92.7   WEB5YCY 32.9*   BEART 6.5       Imaging Results Review: I reviewed radiology reports from this admission including: CT chest.  CTA chest PE study 10/26/2024 No evidence for pulmonary embolus.   Stable right middle lobe scarring/atelectasis. Otherwise no focal airspace consolidation to suggest pneumonia.   Stable trace bilateral pleural effusions with suspected mild pulmonary interstitial edema. Recommend clinical correlation.   Stable right breast soft tissue lesion. Correlation with dedicated breast imaging again recommended.  Other Study Results Review: Other studies reviewed include: ECHO  Echo 10/2/2023 LVEF 75% systolic function hyperdynamic. Wall motion normal. Diastolic function is mildly abnormal consistent with grade 1 relaxation   PFT Results Reviewed: In office spirometry May 2024 shows FEV1 22%, needs outpatient formal PFTs

## 2024-10-29 NOTE — ASSESSMENT & PLAN NOTE
Continue oxygen to keep oxygen saturations above 89%.  As per pulmonologist, recommend nocturnal BiPAP and wean oxygen to keep saturations just at 90 to 92%.  Home o2 eval

## 2024-10-29 NOTE — ASSESSMENT & PLAN NOTE
Continue budesonide, ipratropium, Xopenex nebulizations while inpatient.  Continue prednisone 40 mg daily for 5 doses.  Will need outpatient formal PFTs  Overnight pulse ox completed overnight. ABG this AM: 7.37/57/92.7/32.9  Pulmonologist on board

## 2024-10-29 NOTE — PLAN OF CARE
Problem: Potential for Falls  Goal: Patient will remain free of falls  Description: INTERVENTIONS:  - Educate patient/family on patient safety including physical limitations  - Instruct patient to call for assistance with activity   - Consult OT/PT to assist with strengthening/mobility   - Keep Call bell within reach  - Keep bed low and locked with side rails adjusted as appropriate  - Keep care items and personal belongings within reach  - Initiate and maintain comfort rounds  - Make Fall Risk Sign visible to staff  - Offer Toileting every 2 Hours, in advance of need  - Initiate/Maintain alarm  - Obtain necessary fall risk management equipment:   - Apply yellow socks and bracelet for high fall risk patients  - Consider moving patient to room near nurses station  Outcome: Progressing     Problem: Prexisting or High Potential for Compromised Skin Integrity  Goal: Skin integrity is maintained or improved  Description: INTERVENTIONS:  - Identify patients at risk for skin breakdown  - Assess and monitor skin integrity  - Assess and monitor nutrition and hydration status  - Monitor labs   - Assess for incontinence   - Turn and reposition patient  - Assist with mobility/ambulation  - Relieve pressure over bony prominences  - Avoid friction and shearing  - Provide appropriate hygiene as needed including keeping skin clean and dry  - Evaluate need for skin moisturizer/barrier cream  - Collaborate with interdisciplinary team   - Patient/family teaching  - Consider wound care consult   Outcome: Progressing     Problem: RESPIRATORY - ADULT  Goal: Achieves optimal ventilation and oxygenation  Description: INTERVENTIONS:  - Assess for changes in respiratory status  - Assess for changes in mentation and behavior  - Position to facilitate oxygenation and minimize respiratory effort  - Oxygen administered by appropriate delivery if ordered  - Initiate smoking cessation education as indicated  - Encourage broncho-pulmonary hygiene  including cough, deep breathe, Incentive Spirometry  - Assess the need for suctioning and aspirate as needed  - Assess and instruct to report SOB or any respiratory difficulty  - Respiratory Therapy support as indicated  Outcome: Progressing

## 2024-10-29 NOTE — PLAN OF CARE
Problem: OCCUPATIONAL THERAPY ADULT  Goal: Performs self-care activities at highest level of function for planned discharge setting.  See evaluation for individualized goals.  Description: Treatment Interventions: ADL retraining, Functional transfer training, Endurance training, Cognitive reorientation, Patient/family training, Equipment evaluation/education, Compensatory technique education, Energy conservation, Activityengagement          See flowsheet documentation for full assessment, interventions and recommendations.   Note: Limitation: Decreased ADL status, Decreased UE strength, Decreased Safe judgement during ADL, Decreased cognition, Decreased endurance, Decreased self-care trans, Decreased high-level ADLs (impaired balance, fxnl mobility, act donn, fxnl reach, standing donn, strength, safety awareness, insight, pacing, problem solving, learning new tasks, response time)  Prognosis: Good  Assessment: Pt is a 79 y.o. female seen for OT evaluation s/p admission to [facility] on 10/25/2024 due to SOB + AMS. Diagnosed with Acute on chronic respiratory failure with hypoxia and hypercapnia (HCC). Personal and env factors supporting pt at time of IE include (I) PLOF and FFSU. Personal and env factors inhibiting engagement in occupations include advanced age, lifestyle patterns, limited social support, difficulty completing ADLs, and difficulty completing IADLs. Performance deficits that affect the pt’s occupational performance can be seen above. Due to pt's current functional limitations and medical complications pt is functioning below baseline. Pt would benefit from continued skilled OT treatment in order to maximize safety, independence and overall performance with ADLs, functional mobility, functional transfers, and cognition in order to achieve highest level of function.     Rehab Resource Intensity Level, OT: II (Moderate Resource Intensity)

## 2024-10-29 NOTE — ASSESSMENT & PLAN NOTE
-95% on 2 L NC, patient currently using 2 L supplemental O2 at home  -VBG this a.m. shows pCO2 63.8  -Continue to maintain saturation greater than 89%  -Pulmonary hygiene encouraged: Deep breathing with cough, OOB as tolerated, incentive spirometry and flutter valve  -Overnight pulse oximetry test showed no desaturation while on 2 L NC  -pCO2 on ABG is 57.3  -Patient has chronic respiratory failure due to COPD.  Patient has evidence for CO2 retention and requires noninvasive ventilation with volume targeted mode at home for optimal management.  BiPAP was tried and patient remains hypercapnic due to the inability to provide noninvasive ventilation of adequate degree.  Continuous alarm systems and backup are required for optimal management due to power outage.  This patient is at high risk for respiratory failure without noninvasive ventilation at home.  This could prevent repeated hospital admissions and improve the quality of life for the patient  -Would recommend tidal volume of 300 respiratory rate of 14 for Trilogy  -Assess home O2 requirements prior to discharge

## 2024-10-29 NOTE — OCCUPATIONAL THERAPY NOTE
Occupational Therapy Evaluation     Patient Name: Sarah Zayas  Today's Date: 10/29/2024  Problem List  Principal Problem:    Acute on chronic respiratory failure with hypoxia and hypercapnia (HCC)  Active Problems:    COPD, severe (HCC)    Normocytic anemia    Irritation of both eyes    Past Medical History  Past Medical History:   Diagnosis Date    Anxiety 08/18/2024    Asthma     COPD (chronic obstructive pulmonary disease) (HCC)     Disease of thyroid gland     GERD (gastroesophageal reflux disease)     Hypertension 10/11/2018    Hypothyroid     Normocytic anemia 08/28/2024    Osteoarthritis 09/26/2012    Seizure-like activity (HCC) 10/18/2024    Temporal arteritis ([facility])     Type 2 diabetes mellitus with complication, without long-term current use of insulin ([facility]) 01/14/2020     Past Surgical History  Past Surgical History:   Procedure Laterality Date    EYE SURGERY Right 12/06/2019    cataract LVCFS    HYSTERECTOMY      NO PAST SURGERIES      ALSO NO RELEVANT PAST SURRGICAL HX AS PER [facility]             10/29/24 0915   OT Last Visit   OT Visit Date 10/29/24   Note Type   Note type Evaluation   Pain Assessment   Pain Assessment Tool FLACC   Pain Score 8   Pain Location/Orientation Orientation: Left;Location: Foot  (ankle)   Hospital Pain Intervention(s) Repositioned;Ambulation/increased activity   Pain Rating: FLACC (Rest) - Face 0   Pain Rating: FLACC (Rest) - Legs 0   Pain Rating: FLACC (Rest) - Activity 0   Pain Rating: FLACC (Rest) - Cry 0   Pain Rating: FLACC (Rest) - Consolability 0   Score: FLACC (Rest) 0   Pain Rating: FLACC (Activity) - Face 0   Pain Rating: FLACC (Activity) - Legs 0   Pain Rating: FLACC (Activity) - Activity 0   Pain Rating: FLACC (Activity) - Cry 0   Pain Rating: FLACC (Activity) - Consolability 0   Score: FLACC (Activity) 0   Restrictions/Precautions   Weight Bearing Precautions Per Order No   Other Precautions Cognitive;Bed Alarm;Chair Alarm;Multiple lines;Fall Risk;Pain   Home  "Living   Type of Home House  (Admit from  STR)   Home Layout Multi-level;Performs ADLs on one level;1/2 bath on main level  (2 ARCADIO)   Bathroom Shower/Tub Tub/shower unit  (sponge bathes at baseline)   Bathroom Toilet Standard   Bathroom Accessibility Accessible   Home Equipment Cane;Walker  (2L O2 at baseline)   Additional Comments no use of AD at baseline, has been using RW for the last few weeks at [facility] and at the hospital   Prior Function   Level of [place] Needs assistance with ADLs   Lives With   (currently at [facility] - typically living alone in 2SH)   Receives Help From Family;Home health  (sister assists pt and has HHAs)   IADLs Independent with meal prep;Independent with medication management;Family/Friend/Other provides transportation   Falls in the last 6 months 0   Vocational Retired   Lifestyle   Autonomy PTA pt at  for STR, prior to rehab stay pt was living alone in 2SH with FFSU, pt requiring (A) with ADLs and IADL, (-)falls, (-)drives   Reciprocal Relationships supportive sister   Service to Others retired   Intrinsic Gratification enjoys being home   General   Additional Pertinent History Admit due to SOB + AMS. PMH: COPD, BERTO, Wears 2L O2 at baseline   Family/Caregiver Present   (vane's wife Linsey at bedside)   Subjective   Subjective \"I can't do it, I might as well just lay here forever\"   ADL   Eating Assistance 5  Supervision/Setup   Grooming Assistance 5  Supervision/Setup   UB Bathing Assistance 4  Minimal Assistance   LB Bathing Assistance 3  Moderate Assistance   UB Dressing Assistance 4  Minimal Assistance   UB Dressing Deficit Increased time to complete;Thread RUE;Thread LUE;Pull around back  (donning robe)   LB Dressing Assistance 3  Moderate Assistance   LB Dressing Deficit Increased time to complete;Supervision/safety;Verbal cueing;Don/doff R sock;Don/doff L sock   Toileting Assistance  4  Minimal Assistance   Bed Mobility   Supine to Sit 4  Minimal assistance   Additional items " Assist x 1;Increased time required;Verbal cues;LE management;Bedrails   Transfers   Sit to Stand 3  Moderate assistance   Additional items Assist x 1;Increased time required;Verbal cues   Stand to Sit 3  Moderate assistance   Additional items Assist x 1;Increased time required;Verbal cues   Additional Comments use of RW   Functional Mobility   Functional Mobility 3  Moderate assistance   Additional Comments Ax1 intermittent periods of min A x1 bed > bathroom > recliner chair  (Pt anxious with mobility, requiring encouragement)   Additional items Rolling walker   Balance   Static Sitting Fair   Dynamic Sitting Poor +   Static Standing Poor +   Dynamic Standing Poor +   Ambulatory Poor +   Activity Tolerance   Activity Tolerance Patient limited by fatigue  (self limiting due to anxiety)   Medical Staff Made Aware PT Indu, BRANDEN Plascencia   RUE Assessment   RUE Assessment WFL   LUE Assessment   LUE Assessment WFL   Hand Function   Gross Motor Coordination Functional   Fine Motor Coordination Functional   Vision-Basic Assessment   Current Vision Wears glasses all the time  (reports that she lost her reading glasses recently)   Cognition   Overall Cognitive Status Impaired   Arousal/Participation Alert;Cooperative   Attention Attends with cues to redirect   Orientation Level Oriented X4   Memory Decreased recall of recent events;Decreased short term memory;Decreased recall of precautions   Following Commands Follows one step commands inconsistently   Comments pleasant and cooperative, whifty at times, repetative in conversation. POOR insight into her abilities, self limiting   Assessment   Limitation Decreased ADL status;Decreased UE strength;Decreased Safe judgement during ADL;Decreased cognition;Decreased endurance;Decreased self-care trans;Decreased high-level ADLs  (impaired balance, fxnl mobility, act donn, fxnl reach, standing donn, strength, safety awareness, insight, pacing, problem solving, learning new tasks, response  "time)   Prognosis Good   Assessment Pt is a 79 y.o. female seen for OT evaluation s/p admission to [facility] on 10/25/2024 due to SOB + AMS. Diagnosed with Acute on chronic respiratory failure with hypoxia and hypercapnia (HCC). Personal and env factors supporting pt at time of IE include (I) PLOF and FFSU. Personal and env factors inhibiting engagement in occupations include advanced age, lifestyle patterns, limited social support, difficulty completing ADLs, and difficulty completing IADLs. Performance deficits that affect the pt’s occupational performance can be seen above. Due to pt's current functional limitations and medical complications pt is functioning below baseline. Pt would benefit from continued skilled OT treatment in order to maximize safety, independence and overall performance with ADLs, functional mobility, functional transfers, and cognition in order to achieve highest level of function.   Goals   Patient Goals \"I do NOT want to go back there (rehab), it was a dungeon\"   LTG Time Frame 10-14   Long Term Goal see goals listed below   Plan   Treatment Interventions ADL retraining;Functional transfer training;Endurance training;Cognitive reorientation;Patient/family training;Equipment evaluation/education;Compensatory technique education;Energy conservation;Activityengagement   Goal Expiration Date 11/08/24   OT Treatment Day 0   OT Frequency 3-5x/wk   Discharge Recommendation   Rehab Resource Intensity Level, OT II (Moderate Resource Intensity)   AM-PAC Daily Activity Inpatient   Lower Body Dressing 2   Bathing 2   Toileting 3   Upper Body Dressing 3   Grooming 3   Eating 3   Daily Activity Raw Score 16   Daily Activity Standardized Score (Calc for Raw Score >=11) 35.96   AM-PAC Applied Cognition Inpatient   Following a Speech/Presentation 2   Understanding Ordinary Conversation 3   Taking Medications 2   Remembering Where Things Are Placed or Put Away 2   Remembering List of 4-5 Errands 1   Taking Care " of Complicated Tasks 1   Applied Cognition Raw Score 11   Applied Cognition Standardized Score 27.03   End of Consult   Patient Position at End of Consult Bedside chair;Bed/Chair alarm activated;All needs within reach     GOALS:      -Patient will perform grooming tasks standing at sink with overall Mod I in order to increase overall independence    -Patient will be Mod I with UB dressing using AE and AD as needed in order to increase (I) with ADLs    -Patient will be Mod I with UB bathing using AE and AD as needed in order to increase (I) with ADLs    -Patient will be Mod I with LB dressing with use of AE and AD as needed in order to increase (I) with ADLs    -Patient will be Mod I with LB bathing with use of AE and AD as needed in order to increase (I) with ADLs    -Patient will complete toileting w/ Mod I w/ G hygiene/thoroughness in order to reduce caregiver burden    -Patient will demonstrate Mod I with bed mobility for ability to manage own comfort and initiate OOB tasks.     -Patient will perform functional transfers with Mod I to/from all surfaces using DME as needed in order to increase (I) with functional tasks    -Patient will be Mod I with functional mobility to/from bathroom for increased independence with toileting tasks    -Patient will tolerate therapeutic activities for greater than 30 min, in order to increase tolerance for functional activities.     -Patient will engage in ongoing cognitive assessment in order to assist with safe discharge planning/recommendations.    -Patient will demonstrate standing for 3 min in order to increase active participation in functional activities        The patient's raw score on the -PAC Daily Activity Inpatient Short Form is 16. A raw score of less than 19 suggests the patient may benefit from discharge to post-acute rehabilitation services. HOWEVER please refer to the recommendation of the Occupational Therapist for safe discharge planning.    This session, pt  required and most appropriately benefited from skilled OT/PT co-eval due to significant regression from functional baseline and decreased activity tolerance. OT and PT goals were addressed separately as seen in documentation.     Lillie Cortez MS, OTR/L

## 2024-10-30 LAB
ANION GAP SERPL CALCULATED.3IONS-SCNC: 6 MMOL/L (ref 4–13)
BUN SERPL-MCNC: 19 MG/DL (ref 5–25)
CALCIUM SERPL-MCNC: 9 MG/DL (ref 8.4–10.2)
CHLORIDE SERPL-SCNC: 94 MMOL/L (ref 96–108)
CO2 SERPL-SCNC: 35 MMOL/L (ref 21–32)
CREAT SERPL-MCNC: 0.46 MG/DL (ref 0.6–1.3)
DME PARACHUTE DELIVERY DATE REQUESTED: NORMAL
DME PARACHUTE ITEM DESCRIPTION: NORMAL
DME PARACHUTE ORDER STATUS: NORMAL
DME PARACHUTE SUPPLIER NAME: NORMAL
DME PARACHUTE SUPPLIER PHONE: NORMAL
ERYTHROCYTE [DISTWIDTH] IN BLOOD BY AUTOMATED COUNT: 13.9 % (ref 11.6–15.1)
GFR SERPL CREATININE-BSD FRML MDRD: 94 ML/MIN/1.73SQ M
GLUCOSE SERPL-MCNC: 104 MG/DL (ref 65–140)
GLUCOSE SERPL-MCNC: 137 MG/DL (ref 65–140)
GLUCOSE SERPL-MCNC: 177 MG/DL (ref 65–140)
GLUCOSE SERPL-MCNC: 85 MG/DL (ref 65–140)
GLUCOSE SERPL-MCNC: 94 MG/DL (ref 65–140)
HCT VFR BLD AUTO: 37.9 % (ref 34.8–46.1)
HGB BLD-MCNC: 11.5 G/DL (ref 11.5–15.4)
MCH RBC QN AUTO: 28.6 PG (ref 26.8–34.3)
MCHC RBC AUTO-ENTMCNC: 30.3 G/DL (ref 31.4–37.4)
MCV RBC AUTO: 94 FL (ref 82–98)
MRSA NOSE QL CULT: NORMAL
PLATELET # BLD AUTO: 255 THOUSANDS/UL (ref 149–390)
PMV BLD AUTO: 9.7 FL (ref 8.9–12.7)
POTASSIUM SERPL-SCNC: 4.8 MMOL/L (ref 3.5–5.3)
RBC # BLD AUTO: 4.02 MILLION/UL (ref 3.81–5.12)
SODIUM SERPL-SCNC: 135 MMOL/L (ref 135–147)
WBC # BLD AUTO: 8.5 THOUSAND/UL (ref 4.31–10.16)

## 2024-10-30 PROCEDURE — 85027 COMPLETE CBC AUTOMATED: CPT

## 2024-10-30 PROCEDURE — 80048 BASIC METABOLIC PNL TOTAL CA: CPT

## 2024-10-30 PROCEDURE — 94760 N-INVAS EAR/PLS OXIMETRY 1: CPT

## 2024-10-30 PROCEDURE — 94640 AIRWAY INHALATION TREATMENT: CPT

## 2024-10-30 PROCEDURE — 82948 REAGENT STRIP/BLOOD GLUCOSE: CPT

## 2024-10-30 PROCEDURE — 94660 CPAP INITIATION&MGMT: CPT

## 2024-10-30 PROCEDURE — 99232 SBSQ HOSP IP/OBS MODERATE 35: CPT | Performed by: INTERNAL MEDICINE

## 2024-10-30 PROCEDURE — 99232 SBSQ HOSP IP/OBS MODERATE 35: CPT

## 2024-10-30 RX ORDER — PROPRANOLOL HCL 20 MG
40 TABLET ORAL EVERY 12 HOURS SCHEDULED
Status: DISCONTINUED | OUTPATIENT
Start: 2024-10-30 | End: 2024-11-02 | Stop reason: HOSPADM

## 2024-10-30 RX ORDER — ASPIRIN 81 MG/1
81 TABLET ORAL DAILY
Status: DISCONTINUED | OUTPATIENT
Start: 2024-10-30 | End: 2024-11-02 | Stop reason: HOSPADM

## 2024-10-30 RX ORDER — AMOXICILLIN 250 MG
1 CAPSULE ORAL 2 TIMES DAILY
Status: DISCONTINUED | OUTPATIENT
Start: 2024-10-30 | End: 2024-11-02 | Stop reason: HOSPADM

## 2024-10-30 RX ORDER — PANTOPRAZOLE SODIUM 40 MG/1
40 TABLET, DELAYED RELEASE ORAL DAILY
Status: DISCONTINUED | OUTPATIENT
Start: 2024-10-30 | End: 2024-11-02 | Stop reason: HOSPADM

## 2024-10-30 RX ORDER — ALBUTEROL SULFATE 90 UG/1
2 INHALANT RESPIRATORY (INHALATION) EVERY 6 HOURS PRN
Status: DISCONTINUED | OUTPATIENT
Start: 2024-10-30 | End: 2024-11-02 | Stop reason: HOSPADM

## 2024-10-30 RX ORDER — LEVALBUTEROL INHALATION SOLUTION 1.25 MG/3ML
1.25 SOLUTION RESPIRATORY (INHALATION)
Status: DISCONTINUED | OUTPATIENT
Start: 2024-10-30 | End: 2024-11-02 | Stop reason: HOSPADM

## 2024-10-30 RX ORDER — AMLODIPINE BESYLATE 5 MG/1
5 TABLET ORAL DAILY
Status: DISCONTINUED | OUTPATIENT
Start: 2024-10-30 | End: 2024-11-02 | Stop reason: HOSPADM

## 2024-10-30 RX ORDER — ECHINACEA PURPUREA EXTRACT 125 MG
1 TABLET ORAL 3 TIMES DAILY
Status: DISCONTINUED | OUTPATIENT
Start: 2024-10-30 | End: 2024-11-02 | Stop reason: HOSPADM

## 2024-10-30 RX ORDER — TORSEMIDE 10 MG/1
5 TABLET ORAL DAILY
Status: DISCONTINUED | OUTPATIENT
Start: 2024-10-30 | End: 2024-10-31

## 2024-10-30 RX ORDER — PREDNISONE 2.5 MG/1
2.5 TABLET ORAL
Qty: 30 TABLET | Refills: 0 | Status: SHIPPED | OUTPATIENT
Start: 2024-10-30

## 2024-10-30 RX ADMIN — PREDNISONE 40 MG: 20 TABLET ORAL at 08:35

## 2024-10-30 RX ADMIN — SALINE NASAL SPRAY 1 SPRAY: 1.5 SOLUTION NASAL at 20:58

## 2024-10-30 RX ADMIN — BUDESONIDE INHALATION 0.5 MG: 0.5 SUSPENSION RESPIRATORY (INHALATION) at 19:50

## 2024-10-30 RX ADMIN — IPRATROPIUM BROMIDE 0.5 MG: 0.5 SOLUTION RESPIRATORY (INHALATION) at 07:34

## 2024-10-30 RX ADMIN — SENNOSIDES AND DOCUSATE SODIUM 1 TABLET: 8.6; 5 TABLET ORAL at 18:28

## 2024-10-30 RX ADMIN — ASPIRIN 81 MG: 81 TABLET, COATED ORAL at 18:28

## 2024-10-30 RX ADMIN — HEPARIN SODIUM 5000 UNITS: 5000 INJECTION INTRAVENOUS; SUBCUTANEOUS at 14:37

## 2024-10-30 RX ADMIN — IPRATROPIUM BROMIDE 0.5 MG: 0.5 SOLUTION RESPIRATORY (INHALATION) at 13:26

## 2024-10-30 RX ADMIN — GLYCERIN 2 DROP: .002; .002; .01 SOLUTION/ DROPS OPHTHALMIC at 08:35

## 2024-10-30 RX ADMIN — LEVALBUTEROL HYDROCHLORIDE 1.25 MG: 1.25 SOLUTION RESPIRATORY (INHALATION) at 19:50

## 2024-10-30 RX ADMIN — LEVALBUTEROL HYDROCHLORIDE 1.25 MG: 1.25 SOLUTION RESPIRATORY (INHALATION) at 07:34

## 2024-10-30 RX ADMIN — HEPARIN SODIUM 5000 UNITS: 5000 INJECTION INTRAVENOUS; SUBCUTANEOUS at 21:01

## 2024-10-30 RX ADMIN — IRON SUCROSE 300 MG: 20 INJECTION, SOLUTION INTRAVENOUS at 18:38

## 2024-10-30 RX ADMIN — LEVOTHYROXINE SODIUM 25 MCG: 25 TABLET ORAL at 06:26

## 2024-10-30 RX ADMIN — PANTOPRAZOLE SODIUM 40 MG: 40 TABLET, DELAYED RELEASE ORAL at 18:28

## 2024-10-30 RX ADMIN — AMLODIPINE BESYLATE 5 MG: 5 TABLET ORAL at 18:28

## 2024-10-30 RX ADMIN — CYANOCOBALAMIN TAB 500 MCG 1000 MCG: 500 TAB at 18:28

## 2024-10-30 RX ADMIN — BUDESONIDE INHALATION 0.5 MG: 0.5 SUSPENSION RESPIRATORY (INHALATION) at 07:34

## 2024-10-30 RX ADMIN — PROPRANOLOL HYDROCHLORIDE 40 MG: 20 TABLET ORAL at 21:01

## 2024-10-30 RX ADMIN — HEPARIN SODIUM 5000 UNITS: 5000 INJECTION INTRAVENOUS; SUBCUTANEOUS at 06:26

## 2024-10-30 RX ADMIN — DULOXETINE HYDROCHLORIDE 30 MG: 30 CAPSULE, DELAYED RELEASE ORAL at 21:01

## 2024-10-30 RX ADMIN — LEVALBUTEROL HYDROCHLORIDE 1.25 MG: 1.25 SOLUTION RESPIRATORY (INHALATION) at 13:26

## 2024-10-30 NOTE — ASSESSMENT & PLAN NOTE
-In office spirometry May 2024 shows FEV1 22%  -Home medication regime: Pulmicort 0.5 Mg 2 times daily, Xopenex 1.25 Mg twice daily ipratropium 0.2% 2 times daily, albuterol 90 mcg/ACT 2 puffs every 6 hours as needed, prednisone 2.5 Mg 1 tablet daily with breakfast for temporal arteritis  -Continue budesonide ipratropium lev albuterol nebulizers while inpatient  -Discharge on home medication regime  -Continue prednisone 40 mg daily for 5 doses, currently on day # 3  -needs outpatient formal PFTs

## 2024-10-30 NOTE — CASE MANAGEMENT
[place] [facility] received request for authorization from Care Manager.  Authorization request submitted for: [facility]  Facility Name:  [facility] NPI:[id number]  Facility MD:  Jackie Sotomayor    NPI: [id number]  Authorization initiated by contacting insurance:  hema  Via: [facility] Portal   Clinicals submitted via [facility] attachment   Pending Reference #:[id number]     Care Manager notified: atiya vazquez     Updates to authorization status will be noted in chart. Please reach out to CM for updates on any clinical information.

## 2024-10-30 NOTE — CASE MANAGEMENT
Case Management Discharge Planning Note    Patient name Sarah Zayas  Location S /S -01 MRN [id number]  : 1945 Date 10/30/2024       Current Admission Date: 10/25/2024  Current Admission Diagnosis:Acute on chronic respiratory failure with hypoxia and hypercapnia (HCC)   Patient Active Problem List    Diagnosis Date Noted Date Diagnosed    Irritation of both eyes 10/28/2024     Metabolic alkalosis with respiratory acidosis 10/18/2024     Seizure-like activity (HCC) 10/18/2024     Hyponatremia 10/17/2024     Chronic respiratory failure with hypercapnia (HCC) 10/17/2024     Metabolic encephalopathy 10/16/2024     Urinary retention 10/16/2024     Macular degeneration 10/15/2024     Hypothyroidism 10/13/2024     Acute on chronic respiratory failure with hypoxia and hypercapnia (HCC) 10/10/2024     Acute respiratory failure with hypercapnia (HCC) 10/10/2024     Venous insufficiency of lower extremity 2024     Epigastric pain 2024     Advance care planning 2024     At risk for constipation 2024     At risk for delirium 2024     Physical deconditioning 2024     Periodontitis 2024     Polypharmacy 2024     Diastolic dysfunction 2024     Elevated vitamin B12 level 2024     Normocytic anemia 2024     Neck swelling 2024     Anxiety/depression 2024     Severe protein-calorie malnutrition (HCC) 2024     COPD, severe ([facility]) 2024     Abnormal CT scan 2023     Infectious sialoadenitis of major salivary gland 2023     Left upper lobe pulmonary nodule 2023     Postnasal drip 2023     Raynaud's phenomenon without gangrene 2022     Temporal arteritis ([facility]) 2020     Hypertension 10/11/2018     Other specified hypothyroidism 2018     Gastroesophageal reflux disease without esophagitis 2018     Acute exacerbation of chronic obstructive pulmonary disease (COPD) ([facility]) 2012      Vitamin D deficiency 09/26/2012     Osteoarthritis 09/26/2012       LOS (days): 4  Geometric Mean LOS (GMLOS) (days): 3.5  Days to GMLOS:-1     OBJECTIVE:  Risk of Unplanned Readmission Score: 38.53         Current admission status: Inpatient   Preferred Pharmacy:   [facility] - [place] - [address]  [address]  [address]  [facility] [address]  Phone: [phone] Fax: [phone]    [facility] [place] - [address]  [address] [address]  Phone: [phone] Fax: [phone]    Primary Care Provider: iNcolas Nix MD    Primary Insurance: [employer]INE SINHA  Secondary Insurance:     DISCHARGE DETAILS:    Other Referral/Resources/Interventions Provided:  Referral Comments: CM spoke with pt's daughter, Melissa, and reviewed pt choice list. Pt's family has chosen [facility] at this time. CM reserved and requested NPI information to submit for insurance authorization. CM awaiting paperwork from Dandre from [employer] to be able to order trilogy machine. Once paperwork is received, CM will have provider sign forms.  Programs:: COPD    ADDENDUM 2:47pm: CM received NPI information and tasked CM discharge support with submitting insurance auth.

## 2024-10-30 NOTE — ASSESSMENT & PLAN NOTE
Lab Results   Component Value Date    IRON 42 (L) 10/29/2024    FERRITIN 47 10/29/2024    TIBC 306 10/29/2024    CONCFE 14 (L) 10/29/2024     No noted or reported bleeding.  Continue to monitor.  IV iron ordered x 3  Continue B12

## 2024-10-30 NOTE — ASSESSMENT & PLAN NOTE
-95% on 2 L NC, patient currently using 2 L supplemental O2 at home  -Continue to maintain saturation greater than 89%  -Pulmonary hygiene encouraged: Deep breathing with cough, OOB as tolerated, incentive spirometry and flutter valve  -Overnight pulse oximetry test showed no desaturation while on 2 L NC  -pCO2 on ABG yesterday is 50  -Patient has chronic respiratory failure due to COPD.  Patient has evidence for CO2 retention and requires noninvasive ventilation with volume targeted mode at home for optimal management.  BiPAP was tried and patient remains hypercapnic due to the inability to provide noninvasive ventilation of adequate degree.  Continuous alarm systems and backup are required for optimal management due to power outage.  This patient is at high risk for respiratory failure without noninvasive ventilation at home.  This could prevent repeated hospital admissions and improve the quality of life for the patient  -Would recommend tidal volume of 300 respiratory rate of 14 for Trilogy  -Assess home O2 requirements prior to discharge

## 2024-10-30 NOTE — PLAN OF CARE
Problem: Potential for Falls  Goal: Patient will remain free of falls  Description: INTERVENTIONS:  - Educate patient/family on patient safety including physical limitations  - Instruct patient to call for assistance with activity   - Consult OT/PT to assist with strengthening/mobility   - Keep Call bell within reach  - Keep bed low and locked with side rails adjusted as appropriate  - Keep care items and personal belongings within reach  - Initiate and maintain comfort rounds  - Make Fall Risk Sign visible to staff  - Offer Toileting every 2 Hours, in advance of need  - Initiate/Maintain bed and chair alarm  - Obtain necessary fall risk management equipment:   - Apply yellow socks and bracelet for high fall risk patients  - Consider moving patient to room near nurses station  Outcome: Progressing     Problem: Prexisting or High Potential for Compromised Skin Integrity  Goal: Skin integrity is maintained or improved  Description: INTERVENTIONS:  - Identify patients at risk for skin breakdown  - Assess and monitor skin integrity  - Assess and monitor nutrition and hydration status  - Monitor labs   - Assess for incontinence   - Turn and reposition patient  - Assist with mobility/ambulation  - Relieve pressure over bony prominences  - Avoid friction and shearing  - Provide appropriate hygiene as needed including keeping skin clean and dry  - Evaluate need for skin moisturizer/barrier cream  - Collaborate with interdisciplinary team   - Patient/family teaching  - Consider wound care consult   Outcome: Progressing     Problem: RESPIRATORY - ADULT  Goal: Achieves optimal ventilation and oxygenation  Description: INTERVENTIONS:  - Assess for changes in respiratory status  - Assess for changes in mentation and behavior  - Position to facilitate oxygenation and minimize respiratory effort  - Oxygen administered by appropriate delivery if ordered  - Initiate smoking cessation education as indicated  - Encourage  broncho-pulmonary hygiene including cough, deep breathe, Incentive Spirometry  - Assess the need for suctioning and aspirate as needed  - Assess and instruct to report SOB or any respiratory difficulty  - Respiratory Therapy support as indicated  Outcome: Progressing

## 2024-10-30 NOTE — PROGRESS NOTES
Progress Note - Hospitalist   Name: Sarah Zayas 79 y.o. female I MRN: [id number]  Unit/Bed#: S -01 I Date of Admission: 10/25/2024   Date of Service: 10/30/2024 I Hospital Day: 4    Assessment & Plan  Acute on chronic respiratory failure with hypoxia and hypercapnia (HCC)  Continue oxygen to keep oxygen saturations above 89%.  As per pulmonologist, recommend nocturnal BiPAP and wean oxygen to keep saturations just at 90 to 92%.  BiPAP order approved in [place]  COPD, severe (HCC)  Continue budesonide, ipratropium, Xopenex nebulizations while inpatient.  Continue prednisone 40 mg daily for total 5 doses.  Will need outpatient formal PFTs  Overnight pulse ox completed overnight. ABG this AM: 7.37/57/92.7/32.9  Pulmonologist on board  NIPPV order approved in [place] today, pending authorization  Normocytic anemia  Lab Results   Component Value Date    IRON 42 (L) 10/29/2024    FERRITIN 47 10/29/2024    TIBC 306 10/29/2024    CONCFE 14 (L) 10/29/2024     No noted or reported bleeding.  Continue to monitor.  IV iron ordered x 3  Continue B12  Irritation of both eyes  Artificial tears are ordered    VTE Pharmacologic Prophylaxis: VTE Score: 6 High Risk (Score >/= 5) - Pharmacological DVT Prophylaxis Ordered: heparin. Sequential Compression Devices Ordered.    Mobility:   Basic Mobility Inpatient Raw Score: 13  JH-HLM Goal: 4: Move to chair/commode  JH-HLM Achieved: 4: Move to chair/commode  JH-HLM Goal achieved. Continue to encourage appropriate mobility.    Patient Centered Rounds: I performed bedside rounds with nursing staff today.   Discussions with Specialists or Other Care Team Provider: Pulmonology note reviewed, case management    Education and Discussions with Family / Patient: Updated  (daughter) via phone.    Current Length of Stay: 4 day(s)  Current Patient Status: Inpatient   Certification Statement: The patient will continue to require additional inpatient hospital stay due to  placement  Discharge Plan:  tbd    Code Status: Level 3 - DNAR and DNI    Subjective   Patient is feeling okay today, her breathing is improved. She had some soreness in her bottom, but was about to be repositioned in her bedside chair.     Objective :  Temp:  [97 °F (36.1 °C)-98.8 °F (37.1 °C)] 97.7 °F (36.5 °C)  HR:  [] 104  BP: (140-157)/(76-86) 153/86  Resp:  [16-19] 19  SpO2:  [94 %-98 %] 94 %  O2 Device: Nasal cannula  Nasal Cannula O2 Flow Rate (L/min):  [2 L/min] 2 L/min    Body mass index is 18.91 kg/m².     Input and Output Summary (last 24 hours):     Intake/Output Summary (Last 24 hours) at 10/30/2024 1559  Last data filed at 10/30/2024 0824  Gross per 24 hour   Intake 240 ml   Output 150 ml   Net 90 ml       Physical Exam  Vitals and nursing note reviewed.   Constitutional:       General: She is not in acute distress.     Appearance: She is well-developed.   HENT:      Head: Normocephalic and atraumatic.   Eyes:      Extraocular Movements: Extraocular movements intact.   Cardiovascular:      Rate and Rhythm: Normal rate and regular rhythm.      Heart sounds: No murmur heard.  Pulmonary:      Effort: Pulmonary effort is normal. No respiratory distress.      Breath sounds: Normal breath sounds.   Abdominal:      General: Abdomen is flat. Bowel sounds are normal. There is no distension.      Palpations: Abdomen is soft.      Tenderness: There is no abdominal tenderness.   Musculoskeletal:         General: No swelling.      Cervical back: Neck supple.      Right lower leg: No edema.      Left lower leg: No edema.   Skin:     General: Skin is warm and dry.      Capillary Refill: Capillary refill takes less than 2 seconds.   Neurological:      Mental Status: She is alert and oriented to person, place, and time.   Psychiatric:         Mood and Affect: Mood normal.           Lines/Drains:              Lab Results: I have reviewed the following results:   Results from last 7 days   Lab Units 10/30/24  0357  10/29/24  0532 10/27/24  0442   WBC Thousand/uL 8.50   < > 9.12   HEMOGLOBIN g/dL 11.5   < > 9.3*   HEMATOCRIT % 37.9   < > 30.0*   PLATELETS Thousands/uL 255   < > 255   SEGS PCT %  --   --  85*   LYMPHO PCT %  --   --  4*   MONO PCT %  --   --  10   EOS PCT %  --   --  0    < > = values in this interval not displayed.     Results from last 7 days   Lab Units 10/30/24  0647 10/29/24  0532 10/27/24  0442   SODIUM mmol/L 135   < > 137   POTASSIUM mmol/L 4.8   < > 4.5   CHLORIDE mmol/L 94*   < > 95*   CO2 mmol/L 35*   < > 37*   BUN mg/dL 19   < > 33*   CREATININE mg/dL 0.46*   < > 0.40*   ANION GAP mmol/L 6   < > 5   CALCIUM mg/dL 9.0   < > 9.0   ALBUMIN g/dL  --   --  3.5   TOTAL BILIRUBIN mg/dL  --   --  0.33   ALK PHOS U/L  --   --  37   ALT U/L  --   --  16   AST U/L  --   --  16   GLUCOSE RANDOM mg/dL 85   < > 96    < > = values in this interval not displayed.     Results from last 7 days   Lab Units 10/25/24  2241   INR  0.87     Results from last 7 days   Lab Units 10/30/24  1529 10/30/24  1153 10/30/24  0747 10/29/24  2208 10/29/24  1602 10/29/24  0754 10/25/24  2348   POC GLUCOSE mg/dl 177* 104 94 108 152* 99 107         Results from last 7 days   Lab Units 10/26/24  0800 10/25/24  2313 10/25/24  2241   LACTIC ACID mmol/L 1.0  --  0.8   PROCALCITONIN ng/ml  --  0.95*  --        Recent Cultures (last 7 days):   Results from last 7 days   Lab Units 10/25/24  2317 10/25/24  2313   BLOOD CULTURE  No Growth After 4 Days. No Growth After 4 Days.   LEGIONELLA URINARY ANTIGEN   --  Negative       Imaging Results Review: No pertinent imaging studies reviewed.  Other Study Results Review: No additional pertinent studies reviewed.    Last 24 Hours Medication List:     Current Facility-Administered Medications:     acetaminophen (TYLENOL) tablet 650 mg, Q6H PRN    [Held by provider] albuterol (PROVENTIL HFA,VENTOLIN HFA) inhaler 2 puff, Q6H PRN    amLODIPine (NORVASC) tablet 5 mg, Daily    Artificial Tears Op Soln 2  drop, Daily    aspirin (ECOTRIN LOW STRENGTH) EC tablet 81 mg, Daily    budesonide (PULMICORT) inhalation solution 0.5 mg, Q12H    cyanocobalamin (VITAMIN B-12) tablet 1,000 mcg, Daily    DULoxetine (CYMBALTA) delayed release capsule 30 mg, HS    heparin (porcine) subcutaneous injection 5,000 Units, Q8H RACHEL **AND** [CANCELED] Platelet count, Once    [START ON 10/31/2024] ipratropium (ATROVENT) 0.02 % inhalation solution 0.5 mg, BID    iron sucrose (VENOFER) 300 mg in sodium chloride 0.9 % 250 mL IVPB, Once per day on Monday Wednesday Friday    levalbuterol (XOPENEX) inhalation solution 1.25 mg, BID    levothyroxine tablet 25 mcg, Early Morning    pantoprazole (PROTONIX) EC tablet 40 mg, Daily    polyethylene glycol (MIRALAX) packet 17 g, Daily PRN    predniSONE tablet 40 mg, Daily    propranolol (INDERAL) tablet 40 mg, Q12H RACHEL    senna-docusate sodium (SENOKOT S) 8.6-50 mg per tablet 1 tablet, BID    sodium chloride (OCEAN) 0.65 % nasal spray 1 spray, TID    [Held by provider] torsemide (DEMADEX) tablet 5 mg, Daily    Administrative Statements   Today, Patient Was Seen By: Elaine Andrews DO      **Please Note: This note may have been constructed using a voice recognition system.**

## 2024-10-30 NOTE — ASSESSMENT & PLAN NOTE
Continue oxygen to keep oxygen saturations above 89%.  As per pulmonologist, recommend nocturnal BiPAP and wean oxygen to keep saturations just at 90 to 92%.  [facility]

## 2024-10-30 NOTE — CASE MANAGEMENT
Case Management Discharge Planning Note    Patient name Sarah Zayas  Location S /S -01 MRN [id number]  : 1945 Date 10/30/2024       Current Admission Date: 10/25/2024  Current Admission Diagnosis:Acute on chronic respiratory failure with hypoxia and hypercapnia (HCC)   Patient Active Problem List    Diagnosis Date Noted Date Diagnosed    Irritation of both eyes 10/28/2024     Metabolic alkalosis with respiratory acidosis 10/18/2024     Seizure-like activity (HCC) 10/18/2024     Hyponatremia 10/17/2024     Chronic respiratory failure with hypercapnia (HCC) 10/17/2024     Metabolic encephalopathy 10/16/2024     Urinary retention 10/16/2024     Macular degeneration 10/15/2024     Hypothyroidism 10/13/2024     Acute on chronic respiratory failure with hypoxia and hypercapnia (HCC) 10/10/2024     Acute respiratory failure with hypercapnia (HCC) 10/10/2024     Venous insufficiency of lower extremity 2024     Epigastric pain 2024     Advance care planning 2024     At risk for constipation 2024     At risk for delirium 2024     Physical deconditioning 2024     Periodontitis 2024     Polypharmacy 2024     Diastolic dysfunction 2024     Elevated vitamin B12 level 2024     Normocytic anemia 2024     Neck swelling 2024     Anxiety/depression 2024     Severe protein-calorie malnutrition (HCC) 2024     COPD, severe ([facility]) 2024     Abnormal CT scan 2023     Infectious sialoadenitis of major salivary gland 2023     Left upper lobe pulmonary nodule 2023     Postnasal drip 2023     Raynaud's phenomenon without gangrene 2022     Temporal arteritis ([facility]) 2020     Hypertension 10/11/2018     Other specified hypothyroidism 2018     Gastroesophageal reflux disease without esophagitis 2018     Acute exacerbation of chronic obstructive pulmonary disease (COPD) ([facility]) 2012      Vitamin D deficiency 09/26/2012     Osteoarthritis 09/26/2012       LOS (days): 4  Geometric Mean LOS (GMLOS) (days): 3.5  Days to GMLOS:-1     OBJECTIVE:  Risk of Unplanned Readmission Score: 38.53         Current admission status: Inpatient   Preferred Pharmacy:   [facility] - [place] - [address]  [address]  [address]  [place] [address]  Phone: [phone] Fax: [phone]    [facility] - [facility] [address]  [address] [address]  Phone: [phone] Fax: [phone]    Primary Care Provider: Nicolas Nix MD    Primary Insurance: [facility]  Secondary Insurance:     DISCHARGE DETAILS:    DME Referral Provided  Referral made for DME?: Yes  DME referral completed for the following items:: Other (Trilogy machine.)  DME Supplier Name:: [facility]    Other Referral/Resources/Interventions Provided:  Referral Comments:  placed order for trilogy in [place] at this time. Pending acceptance and approval by insurance.

## 2024-10-30 NOTE — CASE MANAGEMENT
Case Management Progress Note    Patient name Sarah Zayas  Location S /S -01 MRN [id number]  : 1945 Date 10/30/2024       LOS (days): 4  Geometric Mean LOS (GMLOS) (days): 3.5  Days to GMLOS:-1        OBJECTIVE:        Current admission status: Inpatient  Preferred Pharmacy:   [facility] - [place] - [address]  [address]  [address]  [place] [address]  Phone: [phone] Fax: [phone]    [facility] - [place] - [address]  [address] [facility] [address]  Phone: [phone] Fax: [phone]    Primary Care Provider: Nicolas Nix MD    Primary Insurance: [employer]  Secondary Insurance:     PROGRESS NOTE:    Weekly Care Management Length of Stay Review     Current LOS: 4 Days    Most Recent Labs:     Lab Results   Component Value Date/Time    WBC 8.50 10/30/2024 06:47 AM    HGB 11.5 10/30/2024 06:47 AM    HCT 37.9 10/30/2024 06:47 AM     10/30/2024 06:47 AM    SODIUM 135 10/30/2024 06:47 AM    K 4.8 10/30/2024 06:47 AM    CL 94 (L) 10/30/2024 06:47 AM    CO2 35 (H) 10/30/2024 06:47 AM    BUN 19 10/30/2024 06:47 AM    CREATININE 0.46 (L) 10/30/2024 06:47 AM    GLUC 85 10/30/2024 06:47 AM       Most Recent Vitals:   Vitals:    10/30/24 1327   BP:    Pulse:    Resp:    Temp:    SpO2: 96%        Identified Barriers to Discharge/Discharge Goals/Care Management Interventions: acute on chronic respiratory failure, Trilegy needed    Intended Discharge Disposition: Needs rehab. Choice needed    Expected Discharge Date:  TBD

## 2024-10-30 NOTE — PROGRESS NOTES
Progress Note - Pulmonology   Name: Sarah Zayas 79 y.o. female I MRN: [id number]  Unit/Bed#: S -01 I Date of Admission: 10/25/2024   Date of Service: 10/30/2024 I Hospital Day: 4    Assessment & Plan  Acute on chronic respiratory failure with hypoxia and hypercapnia (HCC)  -95% on 2 L NC, patient currently using 2 L supplemental O2 at home  -Continue to maintain saturation greater than 89%  -Pulmonary hygiene encouraged: Deep breathing with cough, OOB as tolerated, incentive spirometry and flutter valve  -Overnight pulse oximetry test showed no desaturation while on 2 L NC  -pCO2 on ABG yesterday is 50  -Patient has chronic respiratory failure due to COPD.  Patient has evidence for CO2 retention and requires noninvasive ventilation with volume targeted mode at home for optimal management.  BiPAP was tried and patient remains hypercapnic due to the inability to provide noninvasive ventilation of adequate degree.  Continuous alarm systems and backup are required for optimal management due to power outage.  This patient is at high risk for respiratory failure without noninvasive ventilation at home.  This could prevent repeated hospital admissions and improve the quality of life for the patient  -Would recommend tidal volume of 300 respiratory rate of 14 for Trilogy  -Assess home O2 requirements prior to discharge    COPD, severe (HCC)  -In office spirometry May 2024 shows FEV1 22%  -Home medication regime: Pulmicort 0.5 Mg 2 times daily, Xopenex 1.25 Mg twice daily ipratropium 0.2% 2 times daily, albuterol 90 mcg/ACT 2 puffs every 6 hours as needed, prednisone 2.5 Mg 1 tablet daily with breakfast for temporal arteritis  -Continue budesonide ipratropium lev albuterol nebulizers while inpatient  -Discharge on home medication regime  -Continue prednisone 40 mg daily for 5 doses, currently on day # 3  -needs outpatient formal PFTs    24 Hour Events : No overnight events  Subjective : Patient seen and examined at  bedside.  Found sitting up comfortably in her recliner chair.  No fever chills night sweats chest pain or hemoptysis.  Currently 96% on 2 L NC, patient does not appear in any respiratory distress    Objective :  Temp:  [97 °F (36.1 °C)-98.8 °F (37.1 °C)] 98.8 °F (37.1 °C)  HR:  [91-99] 91  BP: (140-157)/(76-85) 157/85  Resp:  [16] 16  SpO2:  [95 %-98 %] 98 %  O2 Device: Nasal cannula  Nasal Cannula O2 Flow Rate (L/min):  [2 L/min] 2 L/min    Physical Exam  Vitals reviewed.   Constitutional:       General: She is not in acute distress.     Appearance: She is ill-appearing.   HENT:      Head: Normocephalic and atraumatic.      Right Ear: External ear normal.      Left Ear: External ear normal.      Nose: Nose normal.      Mouth/Throat:      Mouth: Mucous membranes are moist.      Pharynx: Oropharynx is clear.   Eyes:      Extraocular Movements: Extraocular movements intact.      Pupils: Pupils are equal, round, and reactive to light.   Cardiovascular:      Rate and Rhythm: Normal rate and regular rhythm.      Pulses: Normal pulses.      Heart sounds: Normal heart sounds.   Pulmonary:      Effort: Pulmonary effort is normal.      Comments: Diminished breath sounds bilaterally with fine bibasilar crackles present  Abdominal:      General: Bowel sounds are normal.   Musculoskeletal:      Cervical back: Normal range of motion and neck supple.      Right lower leg: No edema.      Left lower leg: No edema.   Skin:     General: Skin is warm and dry.   Neurological:      Mental Status: She is alert and oriented to person, place, and time.   Psychiatric:         Mood and Affect: Mood normal.         Behavior: Behavior normal.         Thought Content: Thought content normal.         Judgment: Judgment normal.         Lab Results: I have reviewed the following results:   .     10/30/24  0647   WBC 8.50   HGB 11.5   HCT 37.9      SODIUM 135   K 4.8   CL 94*   CO2 35*   BUN 19   CREATININE 0.46*   GLUC 85     ABG:   .      10/29/24  1636   PHART 7.440   [id number] 50.0*   PO2ART 82.0   [id number] 33.2*   BEART 7.9       Imaging Results Review: I reviewed radiology reports from this admission including: CT chest.  CT chest 10/26/2024 shows no evidence of pulmonary emboli.  Stable right middle lobe scarring/atelectasis.  Otherwise no focal airspace consolidation to suggest pneumonia.  Stable trace bilateral pleural effusions with suspected mild pulmonary interstitial edema.  Recommend clinical correlation stable right breast soft tissue lesion.  Correlate with dedicated breast imaging again recommended  Other Study Results Review: Other studies reviewed include: ECHO  Echo 10/2/2023 LVEF 75% systolic function hyperdynamic wall motion normal diastolic function is mildly abnormal consistent with grade 1 relaxation  PFT Results Reviewed: reviewed   In office spirometry May 2024 shows FEV1 22%, needs outpatient formal PFTs

## 2024-10-30 NOTE — ASSESSMENT & PLAN NOTE
Continue budesonide, ipratropium, Xopenex nebulizations while inpatient.  Continue prednisone 40 mg daily for total 5 doses.  Will need outpatient formal PFTs  Overnight pulse ox completed overnight. ABG this AM: 7.37/57/92.7/32.9  Pulmonologist on board  NIPPV order approved in [place] today, pending authorization

## 2024-10-30 NOTE — PROGRESS NOTES
Patient:  SHAHNAZ SOLOMON    MRN:  [id number]    Aidin Request ID:  [id number]    Level of care reserved:  Skilled Nursing Facility    Partner Reserved:  [facility], [place] [address] [phone]    Clinical needs requested:    Geography searched:  10 miles around [id number]    Start of Service:    Request sent:  12:11pm EDT on 10/28/2024 by Shelton Duncan    Partner reserved:  2:01pm EDT on 10/30/2024 by Audrey Hall    Choice list shared:

## 2024-10-31 PROBLEM — E11.8 TYPE 2 DIABETES MELLITUS WITH COMPLICATION, WITHOUT LONG-TERM CURRENT USE OF INSULIN (HCC): Status: RESOLVED | Noted: 2020-01-14 | Resolved: 2024-10-31

## 2024-10-31 LAB
ANION GAP SERPL CALCULATED.3IONS-SCNC: 3 MMOL/L (ref 4–13)
BACTERIA BLD CULT: NORMAL
BACTERIA BLD CULT: NORMAL
BUN SERPL-MCNC: 19 MG/DL (ref 5–25)
CALCIUM SERPL-MCNC: 8.4 MG/DL (ref 8.4–10.2)
CHLORIDE SERPL-SCNC: 97 MMOL/L (ref 96–108)
CO2 SERPL-SCNC: 36 MMOL/L (ref 21–32)
CREAT SERPL-MCNC: 0.43 MG/DL (ref 0.6–1.3)
ERYTHROCYTE [DISTWIDTH] IN BLOOD BY AUTOMATED COUNT: 14 % (ref 11.6–15.1)
GFR SERPL CREATININE-BSD FRML MDRD: 97 ML/MIN/1.73SQ M
GLUCOSE SERPL-MCNC: 110 MG/DL (ref 65–140)
GLUCOSE SERPL-MCNC: 114 MG/DL (ref 65–140)
GLUCOSE SERPL-MCNC: 200 MG/DL (ref 65–140)
GLUCOSE SERPL-MCNC: 88 MG/DL (ref 65–140)
GLUCOSE SERPL-MCNC: 89 MG/DL (ref 65–140)
HCT VFR BLD AUTO: 33.6 % (ref 34.8–46.1)
HGB BLD-MCNC: 10.3 G/DL (ref 11.5–15.4)
MAGNESIUM SERPL-MCNC: 1.9 MG/DL (ref 1.9–2.7)
MCH RBC QN AUTO: 28.7 PG (ref 26.8–34.3)
MCHC RBC AUTO-ENTMCNC: 30.7 G/DL (ref 31.4–37.4)
MCV RBC AUTO: 94 FL (ref 82–98)
PLATELET # BLD AUTO: 222 THOUSANDS/UL (ref 149–390)
PMV BLD AUTO: 10 FL (ref 8.9–12.7)
POTASSIUM SERPL-SCNC: 4 MMOL/L (ref 3.5–5.3)
RBC # BLD AUTO: 3.59 MILLION/UL (ref 3.81–5.12)
SODIUM SERPL-SCNC: 136 MMOL/L (ref 135–147)
WBC # BLD AUTO: 8.97 THOUSAND/UL (ref 4.31–10.16)

## 2024-10-31 PROCEDURE — 85027 COMPLETE CBC AUTOMATED: CPT

## 2024-10-31 PROCEDURE — 94760 N-INVAS EAR/PLS OXIMETRY 1: CPT

## 2024-10-31 PROCEDURE — 99232 SBSQ HOSP IP/OBS MODERATE 35: CPT | Performed by: INTERNAL MEDICINE

## 2024-10-31 PROCEDURE — 97110 THERAPEUTIC EXERCISES: CPT

## 2024-10-31 PROCEDURE — 94640 AIRWAY INHALATION TREATMENT: CPT

## 2024-10-31 PROCEDURE — 82948 REAGENT STRIP/BLOOD GLUCOSE: CPT

## 2024-10-31 PROCEDURE — 97116 GAIT TRAINING THERAPY: CPT

## 2024-10-31 PROCEDURE — 97530 THERAPEUTIC ACTIVITIES: CPT

## 2024-10-31 PROCEDURE — 94660 CPAP INITIATION&MGMT: CPT

## 2024-10-31 PROCEDURE — 83735 ASSAY OF MAGNESIUM: CPT

## 2024-10-31 PROCEDURE — 80048 BASIC METABOLIC PNL TOTAL CA: CPT

## 2024-10-31 PROCEDURE — 99232 SBSQ HOSP IP/OBS MODERATE 35: CPT

## 2024-10-31 RX ORDER — MAGNESIUM HYDROXIDE/ALUMINUM HYDROXICE/SIMETHICONE 120; 1200; 1200 MG/30ML; MG/30ML; MG/30ML
30 SUSPENSION ORAL EVERY 4 HOURS PRN
Status: DISCONTINUED | OUTPATIENT
Start: 2024-10-31 | End: 2024-11-02 | Stop reason: HOSPADM

## 2024-10-31 RX ADMIN — SALINE NASAL SPRAY 1 SPRAY: 1.5 SOLUTION NASAL at 21:52

## 2024-10-31 RX ADMIN — CYANOCOBALAMIN TAB 500 MCG 1000 MCG: 500 TAB at 08:37

## 2024-10-31 RX ADMIN — PROPRANOLOL HYDROCHLORIDE 40 MG: 20 TABLET ORAL at 21:52

## 2024-10-31 RX ADMIN — LEVALBUTEROL HYDROCHLORIDE 1.25 MG: 1.25 SOLUTION RESPIRATORY (INHALATION) at 07:36

## 2024-10-31 RX ADMIN — IPRATROPIUM BROMIDE 0.5 MG: 0.5 SOLUTION RESPIRATORY (INHALATION) at 07:36

## 2024-10-31 RX ADMIN — POLYETHYLENE GLYCOL 3350 17 G: 17 POWDER, FOR SOLUTION ORAL at 17:51

## 2024-10-31 RX ADMIN — GLYCERIN 2 DROP: .002; .002; .01 SOLUTION/ DROPS OPHTHALMIC at 08:38

## 2024-10-31 RX ADMIN — HEPARIN SODIUM 5000 UNITS: 5000 INJECTION INTRAVENOUS; SUBCUTANEOUS at 15:06

## 2024-10-31 RX ADMIN — ALUMINUM HYDROXIDE, MAGNESIUM HYDROXIDE, AND DIMETHICONE 30 ML: 200; 20; 200 SUSPENSION ORAL at 06:01

## 2024-10-31 RX ADMIN — BUDESONIDE INHALATION 0.5 MG: 0.5 SUSPENSION RESPIRATORY (INHALATION) at 07:36

## 2024-10-31 RX ADMIN — HEPARIN SODIUM 5000 UNITS: 5000 INJECTION INTRAVENOUS; SUBCUTANEOUS at 21:51

## 2024-10-31 RX ADMIN — SALINE NASAL SPRAY 1 SPRAY: 1.5 SOLUTION NASAL at 15:07

## 2024-10-31 RX ADMIN — DULOXETINE HYDROCHLORIDE 30 MG: 30 CAPSULE, DELAYED RELEASE ORAL at 21:51

## 2024-10-31 RX ADMIN — PANTOPRAZOLE SODIUM 40 MG: 40 TABLET, DELAYED RELEASE ORAL at 08:37

## 2024-10-31 RX ADMIN — ACETAMINOPHEN 650 MG: 325 TABLET, FILM COATED ORAL at 15:06

## 2024-10-31 RX ADMIN — SENNOSIDES AND DOCUSATE SODIUM 1 TABLET: 8.6; 5 TABLET ORAL at 17:51

## 2024-10-31 RX ADMIN — LEVOTHYROXINE SODIUM 25 MCG: 25 TABLET ORAL at 05:11

## 2024-10-31 RX ADMIN — AMLODIPINE BESYLATE 5 MG: 5 TABLET ORAL at 08:37

## 2024-10-31 RX ADMIN — HEPARIN SODIUM 5000 UNITS: 5000 INJECTION INTRAVENOUS; SUBCUTANEOUS at 05:11

## 2024-10-31 RX ADMIN — PROPRANOLOL HYDROCHLORIDE 40 MG: 20 TABLET ORAL at 08:37

## 2024-10-31 RX ADMIN — SALINE NASAL SPRAY 1 SPRAY: 1.5 SOLUTION NASAL at 08:38

## 2024-10-31 RX ADMIN — BUDESONIDE INHALATION 0.5 MG: 0.5 SUSPENSION RESPIRATORY (INHALATION) at 19:41

## 2024-10-31 RX ADMIN — ASPIRIN 81 MG: 81 TABLET, COATED ORAL at 08:37

## 2024-10-31 RX ADMIN — LEVALBUTEROL HYDROCHLORIDE 1.25 MG: 1.25 SOLUTION RESPIRATORY (INHALATION) at 19:41

## 2024-10-31 RX ADMIN — IPRATROPIUM BROMIDE 0.5 MG: 0.5 SOLUTION RESPIRATORY (INHALATION) at 19:41

## 2024-10-31 RX ADMIN — PREDNISONE 40 MG: 20 TABLET ORAL at 08:37

## 2024-10-31 RX ADMIN — SENNOSIDES AND DOCUSATE SODIUM 1 TABLET: 8.6; 5 TABLET ORAL at 08:37

## 2024-10-31 NOTE — ASSESSMENT & PLAN NOTE
-96% on 2 L NC, patient currently using 2 L supplemental O2 at home  -Continue to maintain saturation greater than 89%  -Pulmonary hygiene encouraged: Deep breathing with cough, OOB as tolerated, incentive spirometry and flutter valve  -Overnight pulse oximetry test showed no desaturation while on 2 L NC  -pCO2 off of BiPAP was 50  -Patient has chronic respiratory failure due to COPD.  Patient has evidence for CO2 retention and requires noninvasive ventilation with volume targeted mode at home for optimal management.  BiPAP was tried and patient remains hypercapnic due to the inability to provide noninvasive ventilation of adequate degree.  Continuous alarm systems and backup are required for optimal management due to power outage.  This patient is at high risk for respiratory failure without noninvasive ventilation at home.  This could prevent repeated hospital admissions and improve the quality of life for the patient  -Would recommend tidal volume of 300 respiratory rate of 14 for Trilogy  -Assess home O2 requirements prior to discharge

## 2024-10-31 NOTE — ASSESSMENT & PLAN NOTE
-In office spirometry May 2024 shows FEV1 22%  -Home medication regime: Pulmicort 0.5 Mg 2 times daily, Xopenex 1.25 Mg twice daily ipratropium 0.2% 2 times daily, albuterol 90 mcg/ACT 2 puffs every 6 hours as needed, prednisone 2.5 Mg 1 tablet daily with breakfast for temporal arteritis  -Continue budesonide ipratropium lev albuterol nebulizers while inpatient  -Discharge on home medication regime  -Continue prednisone 40 mg daily for 5 doses, currently on day # 4  -needs outpatient follow up and formal PFTs

## 2024-10-31 NOTE — ASSESSMENT & PLAN NOTE
Continue budesonide, ipratropium, Xopenex nebulizations while inpatient.  Continue prednisone 40 mg daily for total 5 doses.  Will need outpatient formal PFTs  Overnight pulse ox completed overnight. ABG this AM: 7.37/57/92.7/32.9  Pulmonologist on board  NIPPV ordered  Can wean off o2 as appropriate  Continue prednisone until tomorrow

## 2024-10-31 NOTE — PLAN OF CARE
Problem: PHYSICAL THERAPY ADULT  Goal: Performs mobility at highest level of function for planned discharge setting.  See evaluation for individualized goals.  Description: Treatment/Interventions: ADL retraining, Functional transfer training, LE strengthening/ROM, Elevations, Therapeutic exercise, Endurance training, Patient/family training, Equipment eval/education, Bed mobility, Gait training, Compensatory technique education, Spoke to nursing, Spoke to case management, OT          See flowsheet documentation for full assessment, interventions and recommendations.  Outcome: Progressing  Note:    Problem List: Decreased strength, Decreased endurance, Impaired balance, Decreased mobility, Decreased coordination, Decreased safety awareness, Pain  Assessment: pt began tx session seated OOB in the recliner as pt was agreeable to participate in PT intervention. PT intervention consistant of TA for facilitation of transitional movements for safe techniques w/ transfer training, TE activities, posture/balance with gait and stair trails. pt completed multiple functional transfesr with RW in [facility] tx session. pt level of assistance varied as pt required min ax1 from recliner and mod ax1 from standard toilet. pt did pt required VC's for hand placement. pt was able to increase activity tolerance and ambulation distance as pt ambulated 30'x1 RW and 15'x1 RW. pt did required min ax1 for safety and balance as pt had diifculty due to impaired vision as pt required RW management. pt did participate in TE activities while seated in the recliner with AROM, no increases in pain in order to strengthen LE's and increase pt activity tolerance. pt was educated with verbal/visual demonstration on all safe stair trail strategies prior to inititing steps. pt required mod ax1 to complete 2 steps in [facility] tx session with bilateral hand rails. pt continues to demonstarte the following deficits: limited funcitonal transfers, activity  tolereance, ambulation distance and steps completed w/o requiring physical assistanec . Continue to recommend Dc w/ level 2 moderate rehab resourec intensity when medically cleared        Rehab Resource Intensity Level, PT: II (Moderate Resource Intensity)    See flowsheet documentation for full assessment.

## 2024-10-31 NOTE — ASSESSMENT & PLAN NOTE
Continue oxygen to keep oxygen saturations above 89%.  As per pulmonologist, recommend nocturnal BiPAP and wean oxygen to keep saturations just at 90 to 92%.  BiPAP ordered  D/c home demadex indefinitely; pt is euvolemic

## 2024-10-31 NOTE — CASE MANAGEMENT
[facility] has received APPROVED authorization.  Insurance:   [employer]et[employer]  Auth obtained via portal.  Authorization received for: SNF  Facility: [facility]   Authorization #:[id number]   Start of Care:10/31  Next Review Date:11/06  Continued Stay Care Coordinator:  n/a  Submit next review to: fax [phone]     Care Manager notified:rasheed centeno     Please reach out to  for updates on any clinical information.

## 2024-10-31 NOTE — PHYSICAL THERAPY NOTE
PHYSICAL THERAPY NOTE          Patient Name: Sarah Zayas  Today's Date: 10/31/2024           10/31/24 1506   PT Last Visit   PT Visit Date 10/31/24   Note Type   Note Type Treatment   Pain Assessment   Pain Assessment Tool 0-10   Pain Score No Pain  (pt reports her buttocks feeling sore. RN notified via [place]  Simultaneous filing. User may not have seen previous data.)   Pain Location/Orientation Orientation: Left;Location: Leg   Effect of Pain on Daily Activities limits comfort seated in the recliner   Patient's Stated Pain Goal No pain   Hospital Pain Intervention(s) Repositioned;Ambulation/increased activity;Elevated;Emotional support;Rest   Multiple Pain Sites No   Pain Rating: FLACC (Rest) - Face 0   Pain Rating: FLACC (Rest) - Legs 0   Pain Rating: FLACC (Rest) - Activity 0   Pain Rating: FLACC (Rest) - Cry 0   Pain Rating: FLACC (Rest) - Consolability 0   Score: FLACC (Rest) 0   Pain Rating: FLACC (Activity) - Face 0   Pain Rating: FLACC (Activity) - Legs 0   Pain Rating: FLACC (Activity) - Activity 0   Pain Rating: FLACC (Activity) - Cry 0   Pain Rating: FLACC (Activity) - Consolability 0   Score: FLACC (Activity) 0   Restrictions/Precautions   Weight Bearing Precautions Per Order No   Other Precautions Cognitive;Chair Alarm;Bed Alarm;Fall Risk;Multiple lines;Pain   General   Chart Reviewed Yes   Response to Previous Treatment Patient with no complaints from previous session.   Family/Caregiver Present No   Cognition   Overall Cognitive Status Impaired   Arousal/Participation Alert;Responsive;Cooperative   Attention Attends with cues to redirect   Orientation Level Oriented X4   Memory Decreased short term memory;Decreased recall of recent events;Decreased recall of precautions   Following Commands Follows one step commands with increased time or repetition   Comments pt was pleasant and cooperatiev throughout PT intervention    Subjective   Subjective pt was agreeable to participate in PT intervention and stated 0/10 pain in todays tx sesison but reports buttocks feeling sore. RN made aware via [place]   Bed Mobility   Additional Comments pt was seated OOB in the recliner pre/post tx session with call bell, legs elevated, chair alarm activated and all pt needs met   Transfers   Sit to Stand 4  Minimal assistance   Additional items Assist x 1;Armrests;Increased time required;Verbal cues   Stand to Sit 4  Minimal assistance   Additional items Assist x 1;Armrests;Increased time required;Verbal cues   Stand pivot Unable to assess   Toilet transfer 3  Moderate assistance   Additional items Assist x 1;Increased time required;Verbal cues;Standard toilet   Additional Comments pt required min ax1 for all transfers from recliner but required mod ax1 for transfers from standard toilet   Ambulation/Elevation   Gait pattern Decreased foot clearance;Foward flexed;Short stride;Excessively slow;Decreased heel strike;Decreased hip extension;Decreased toe off;Inconsistent tarun;Improper Weight shift   Gait Assistance 4  Minimal assist   Additional items Assist x 1;Verbal cues   Assistive Device Rolling walker   Distance 30'x1 RW 15'x1 RW   Stair Management Assistance 3  Moderate assist   Additional items Assist x 1;Verbal cues;Increased time required   Stair Management Technique Two rails;Step to pattern;Foreward;Backward   Number of Stairs 2   Balance   Static Sitting Fair   Dynamic Sitting Poor +   Static Standing Fair -   Dynamic Standing Poor +   Ambulatory Fair -  (w/ RW)   Endurance Deficit   Endurance Deficit Yes   Endurance Deficit Description limited ambulation distance and steps completed in todays stair trial   Activity Tolerance   Activity Tolerance Patient limited by fatigue   Nurse Made Aware text RN via [place]   Exercises   Quad Sets Sitting;10 reps;AROM;Bilateral  (long sit position)   Hip Abduction Sitting;15 reps;AROM;Bilateral   Hip  Adduction Sitting;15 reps;AROM;Bilateral  (pillow squeezes)   Knee AROM Long Arc Quad Sitting;10 reps;AROM;Bilateral   Ankle Pumps Sitting;20 reps;AROM;Bilateral   Marching Sitting;10 reps;AROM;Bilateral   Assessment   Problem List Decreased strength;Decreased endurance;Impaired balance;Decreased mobility;Decreased coordination;Decreased safety awareness;Pain   Assessment pt began tx session seated OOB in the recliner as pt was agreeable to participate in PT intervention. PT intervention consistant of TA for facilitation of transitional movements for safe techniques w/ transfer training, TE activities, posture/balance with gait and stair trails. pt completed multiple functional transfesr with RW in [facility] tx session. pt level of assistance varied as pt required min ax1 from recliner and mod ax1 from standard toilet. pt did pt required VC's for hand placement. pt was able to increase activity tolerance and ambulation distance as pt ambulated 30'x1 RW and 15'x1 RW. pt did required min ax1 for safety and balance as pt had diifculty due to impaired vision as pt required RW management. pt did participate in TE activities while seated in the recliner with AROM, no increases in pain in order to strengthen LE's and increase pt activity tolerance. pt was educated with verbal/visual demonstration on all safe stair trail strategies prior to inititing steps. pt required mod ax1 to complete 2 steps in [facility] tx session with bilateral hand rails. pt continues to demonstarte the following deficits: limited funcitonal transfers, activity tolereance, ambulation distance and steps completed w/o requiring physical assistanec . Continue to recommend Dc w/ level 2 moderate rehab resourec intensity when medically cleared   Goals   Patient Goals pt stated she does not want to go to rehab   STG Expiration Date 11/08/24   Plan   Treatment/Interventions ADL retraining;Functional transfer training;LE strengthening/ROM;Elevations;Therapeutic  exercise;Endurance training;Cognitive reorientation;Patient/family training;Equipment eval/education;Bed mobility;Gait training;Spoke to nursing;Compensatory technique education   Progress Slow progress, decreased activity tolerance   PT Frequency 3-5x/wk   Discharge Recommendation   Rehab Resource Intensity Level, PT II (Moderate Resource Intensity)   AM-PAC Basic Mobility Inpatient   Turning in Flat Bed Without Bedrails 3   Lying on Back to Sitting on Edge of Flat Bed Without Bedrails 3   Moving Bed to Chair 3   Standing Up From Chair Using Arms 3   Walk in Room 3   Climb 3-5 Stairs With Railing 2   Basic Mobility Inpatient Raw Score 17   Basic Mobility Standardized Score 39.67   [facility] Highest Level Of Mobility   -[facility] Goal 5: Stand one or more mins   -[facility] Achieved 7: Walk 25 feet or more   Education   Education Provided Mobility training;Assistive device   Patient Demonstrates acceptance/verbal understanding   End of Consult   Patient Position at End of Consult Bedside chair;Bed/Chair alarm activated;All needs within reach   The patient's AM-PAC Basic Mobility Inpatient Short Form Raw Score is 17. A Raw score of greater than 16 suggests the patient may benefit from discharge to home. Please also refer to the recommendation of the Physical Therapist for safe discharge planning.    Pt continues to be limited with functional transfers, ambulation distance, steps completed w/o requiring physical assistance. Pt would benefit from continued skilled PT intervention in order to address deficits listed above   Kirby Stone

## 2024-10-31 NOTE — CASE MANAGEMENT
Case Management Discharge Planning Note    Patient name Sarah Zayas  Location S /S -01 MRN [id number]  : 1945 Date 10/31/2024       Current Admission Date: 10/25/2024  Current Admission Diagnosis:Acute on chronic respiratory failure with hypoxia and hypercapnia (HCC)   Patient Active Problem List    Diagnosis Date Noted Date Diagnosed    Irritation of both eyes 10/28/2024     Metabolic alkalosis with respiratory acidosis 10/18/2024     Seizure-like activity (HCC) 10/18/2024     Hyponatremia 10/17/2024     Chronic respiratory failure with hypercapnia (HCC) 10/17/2024     Metabolic encephalopathy 10/16/2024     Urinary retention 10/16/2024     Macular degeneration 10/15/2024     Hypothyroidism 10/13/2024     Acute on chronic respiratory failure with hypoxia and hypercapnia (HCC) 10/10/2024     Acute respiratory failure with hypercapnia (HCC) 10/10/2024     Venous insufficiency of lower extremity 2024     Epigastric pain 2024     Advance care planning 2024     At risk for constipation 2024     At risk for delirium 2024     Physical deconditioning 2024     Periodontitis 2024     Polypharmacy 2024     Diastolic dysfunction 2024     Elevated vitamin B12 level 2024     Normocytic anemia 2024     Neck swelling 2024     Anxiety/depression 2024     Severe protein-calorie malnutrition (HCC) 2024     COPD, severe ([facility]) 2024     Abnormal CT scan 2023     Infectious sialoadenitis of major salivary gland 2023     Left upper lobe pulmonary nodule 2023     Postnasal drip 2023     Raynaud's phenomenon without gangrene 2022     Temporal arteritis ([facility]) 2020     Hypertension 10/11/2018     Other specified hypothyroidism 2018     Gastroesophageal reflux disease without esophagitis 2018     Acute exacerbation of chronic obstructive pulmonary disease (COPD) ([facility]) 2012      Vitamin D deficiency 09/26/2012     Osteoarthritis 09/26/2012       LOS (days): 5  Geometric Mean LOS (GMLOS) (days): 3.5  Days to GMLOS:-2     OBJECTIVE:  Risk of Unplanned Readmission Score: 44.63         Current admission status: Inpatient   Preferred Pharmacy:   [facility] - [place], [place] - [address]  [address]  [address]  [facility] [address]  Phone: [phone] Fax: [phone]    [facility] - [place] - [address] [facility] [address]  Phone: [phone] Fax: [phone]    Primary Care Provider: Nicolas Nix MD    Primary Insurance: ALVA SINHA  Secondary Insurance:     DISCHARGE DETAILS:         Additional Comments: Approved Insurance auth received. [facility] will be able to accept Pt tomorrow. Trilogy machine approved and will be delivered to Pt at [facility].

## 2024-10-31 NOTE — PROGRESS NOTES
Progress Note - Hospitalist   Name: Sarah Zayas 79 y.o. female I MRN: [id number]  Unit/Bed#: S -01 I Date of Admission: 10/25/2024   Date of Service: 10/31/2024 I Hospital Day: 5    Assessment & Plan  Acute on chronic respiratory failure with hypoxia and hypercapnia (HCC)  Continue oxygen to keep oxygen saturations above 89%.  As per pulmonologist, recommend nocturnal BiPAP and wean oxygen to keep saturations just at 90 to 92%.  BiPAP ordered  D/c home demadex indefinitely; pt is euvolemic   COPD, severe (HCC)  Continue budesonide, ipratropium, Xopenex nebulizations while inpatient.  Continue prednisone 40 mg daily for total 5 doses.  Will need outpatient formal PFTs  Overnight pulse ox completed overnight. ABG this AM: 7.37/57/92.7/32.9  Pulmonologist on board  NIPPV ordered  Can wean off o2 as appropriate  Continue prednisone until tomorrow  Normocytic anemia  Lab Results   Component Value Date    IRON 42 (L) 10/29/2024    FERRITIN 47 10/29/2024    TIBC 306 10/29/2024    CONCFE 14 (L) 10/29/2024     No noted or reported bleeding.  Continue to monitor.  IV iron ordered x 3  Continue B12  Irritation of both eyes  Artificial tears are ordered    VTE Pharmacologic Prophylaxis: VTE Score: 6 High Risk (Score >/= 5) - Pharmacological DVT Prophylaxis Ordered: heparin. Sequential Compression Devices Ordered.    Mobility:   Basic Mobility Inpatient Raw Score: 17  JH-HLM Goal: 5: Stand one or more mins  JH-HLM Achieved: 7: Walk 25 feet or more  JH-HLM Goal achieved. Continue to encourage appropriate mobility.    Patient Centered Rounds: I performed bedside rounds with nursing staff today.   Discussions with Specialists or Other Care Team Provider: Pulmonology note reviewed, case management    Education and Discussions with Family / Patient: Updated  (daughter) via phone.    Current Length of Stay: 5 day(s)  Current Patient Status: Inpatient   Certification Statement: The patient will continue to require  additional inpatient hospital stay due to placement  Discharge Plan:  tbd    Code Status: Level 3 - DNAR and DNI    Subjective   Patient is feeling okay today, her breathing is stable. Some urinary retention noted but did not require straight cath.      Objective :  Temp:  [98.6 °F (37 °C)-98.9 °F (37.2 °C)] 98.9 °F (37.2 °C)  HR:  [] 82  BP: (127-157)/(58-96) 127/58  Resp:  [18] 18  SpO2:  [94 %-98 %] 98 %  O2 Device: Nasal cannula  Nasal Cannula O2 Flow Rate (L/min):  [2 L/min] 2 L/min    Body mass index is 18.91 kg/m².     Input and Output Summary (last 24 hours):     Intake/Output Summary (Last 24 hours) at 10/31/2024 1543  Last data filed at 10/31/2024 1201  Gross per 24 hour   Intake 240 ml   Output 250 ml   Net -10 ml       Physical Exam  Vitals and nursing note reviewed.   Constitutional:       General: She is not in acute distress.     Appearance: She is well-developed.   HENT:      Head: Normocephalic and atraumatic.   Eyes:      Extraocular Movements: Extraocular movements intact.   Cardiovascular:      Rate and Rhythm: Normal rate and regular rhythm.      Heart sounds: No murmur heard.  Pulmonary:      Effort: Pulmonary effort is normal. No respiratory distress.      Breath sounds: Normal breath sounds.   Abdominal:      General: Bowel sounds are normal.   Musculoskeletal:         General: No swelling or tenderness.      Cervical back: Neck supple.      Right lower leg: No edema.      Left lower leg: No edema.   Skin:     General: Skin is warm and dry.   Neurological:      Mental Status: She is alert. She is disoriented.           Lines/Drains:              Lab Results: I have reviewed the following results:   Results from last 7 days   Lab Units 10/31/24  0633 10/29/24  0532 10/27/24  0442   WBC Thousand/uL 8.97   < > 9.12   HEMOGLOBIN g/dL 10.3*   < > 9.3*   HEMATOCRIT % 33.6*   < > 30.0*   PLATELETS Thousands/uL 222   < > 255   SEGS PCT %  --   --  85*   LYMPHO PCT %  --   --  4*   MONO PCT %   --   --  10   EOS PCT %  --   --  0    < > = values in this interval not displayed.     Results from last 7 days   Lab Units 10/31/24  0633 10/29/24  0532 10/27/24  0442   SODIUM mmol/L 136   < > 137   POTASSIUM mmol/L 4.0   < > 4.5   CHLORIDE mmol/L 97   < > 95*   CO2 mmol/L 36*   < > 37*   BUN mg/dL 19   < > 33*   CREATININE mg/dL 0.43*   < > 0.40*   ANION GAP mmol/L 3*   < > 5   CALCIUM mg/dL 8.4   < > 9.0   ALBUMIN g/dL  --   --  3.5   TOTAL BILIRUBIN mg/dL  --   --  0.33   ALK PHOS U/L  --   --  37   ALT U/L  --   --  16   AST U/L  --   --  16   GLUCOSE RANDOM mg/dL 89   < > 96    < > = values in this interval not displayed.     Results from last 7 days   Lab Units 10/25/24  2241   INR  0.87     Results from last 7 days   Lab Units 10/31/24  1110 10/31/24  0741 10/30/24  2042 10/30/24  1529 10/30/24  1153 10/30/24  0747 10/29/24  2208 10/29/24  1602 10/29/24  0754 10/25/24  2348   POC GLUCOSE mg/dl 114 88 137 177* 104 94 108 152* 99 107         Results from last 7 days   Lab Units 10/26/24  0800 10/25/24  2313 10/25/24  2241   LACTIC ACID mmol/L 1.0  --  0.8   PROCALCITONIN ng/ml  --  0.95*  --        Recent Cultures (last 7 days):   Results from last 7 days   Lab Units 10/25/24  2317 10/25/24  2313   BLOOD CULTURE  No Growth After 5 Days. No Growth After 5 Days.   LEGIONELLA URINARY ANTIGEN   --  Negative       Imaging Results Review: No pertinent imaging studies reviewed.  Other Study Results Review: No additional pertinent studies reviewed.    Last 24 Hours Medication List:     Current Facility-Administered Medications:     acetaminophen (TYLENOL) tablet 650 mg, Q6H PRN    [Held by provider] albuterol (PROVENTIL HFA,VENTOLIN HFA) inhaler 2 puff, Q6H PRN    aluminum-magnesium hydroxide-simethicone (MAALOX) oral suspension 30 mL, Q4H PRN    amLODIPine (NORVASC) tablet 5 mg, Daily    Artificial Tears Op Soln 2 drop, Daily    aspirin (ECOTRIN LOW STRENGTH) EC tablet 81 mg, Daily    budesonide (PULMICORT)  inhalation solution 0.5 mg, Q12H    cyanocobalamin (VITAMIN B-12) tablet 1,000 mcg, Daily    DULoxetine (CYMBALTA) delayed release capsule 30 mg, HS    heparin (porcine) subcutaneous injection 5,000 Units, Q8H RACHEL **AND** [CANCELED] Platelet count, Once    ipratropium (ATROVENT) 0.02 % inhalation solution 0.5 mg, BID    iron sucrose (VENOFER) 300 mg in sodium chloride 0.9 % 250 mL IVPB, Once per day on Monday Wednesday Friday, Last Rate: 300 mg (10/30/24 1838)    levalbuterol (XOPENEX) inhalation solution 1.25 mg, BID    levothyroxine tablet 25 mcg, Early Morning    pantoprazole (PROTONIX) EC tablet 40 mg, Daily    polyethylene glycol (MIRALAX) packet 17 g, Daily PRN    predniSONE tablet 40 mg, Daily    propranolol (INDERAL) tablet 40 mg, Q12H RACHEL    senna-docusate sodium (SENOKOT S) 8.6-50 mg per tablet 1 tablet, BID    sodium chloride (OCEAN) 0.65 % nasal spray 1 spray, TID    [Held by provider] torsemide (DEMADEX) tablet 5 mg, Daily    Administrative Statements   Today, Patient Was Seen By: Elaine Andrews DO      **Please Note: This note may have been constructed using a voice recognition system.**

## 2024-10-31 NOTE — CASE MANAGEMENT
Case Management Discharge Planning Note    Patient name Sarah Zayas  Location S /S -01 MRN [id number]  : 1945 Date 10/31/2024       Current Admission Date: 10/25/2024  Current Admission Diagnosis:Acute on chronic respiratory failure with hypoxia and hypercapnia (HCC)   Patient Active Problem List    Diagnosis Date Noted Date Diagnosed    Irritation of both eyes 10/28/2024     Metabolic alkalosis with respiratory acidosis 10/18/2024     Seizure-like activity (HCC) 10/18/2024     Hyponatremia 10/17/2024     Chronic respiratory failure with hypercapnia (HCC) 10/17/2024     Metabolic encephalopathy 10/16/2024     Urinary retention 10/16/2024     Macular degeneration 10/15/2024     Hypothyroidism 10/13/2024     Acute on chronic respiratory failure with hypoxia and hypercapnia (HCC) 10/10/2024     Acute respiratory failure with hypercapnia (HCC) 10/10/2024     Venous insufficiency of lower extremity 2024     Epigastric pain 2024     Advance care planning 2024     At risk for constipation 2024     At risk for delirium 2024     Physical deconditioning 2024     Periodontitis 2024     Polypharmacy 2024     Diastolic dysfunction 2024     Elevated vitamin B12 level 2024     Normocytic anemia 2024     Neck swelling 2024     Anxiety/depression 2024     Severe protein-calorie malnutrition (HCC) 2024     COPD, severe ([facility]) 2024     Abnormal CT scan 2023     Infectious sialoadenitis of major salivary gland 2023     Left upper lobe pulmonary nodule 2023     Postnasal drip 2023     Raynaud's phenomenon without gangrene 2022     Temporal arteritis ([facility]) 2020     Hypertension 10/11/2018     Other specified hypothyroidism 2018     Gastroesophageal reflux disease without esophagitis 2018     Acute exacerbation of chronic obstructive pulmonary disease (COPD) ([facility]) 2012      Vitamin D deficiency 09/26/2012     Osteoarthritis 09/26/2012       LOS (days): 5  Geometric Mean LOS (GMLOS) (days): 3.5  Days to GMLOS:-1.8     OBJECTIVE:  Risk of Unplanned Readmission Score: 44.56         Current admission status: Inpatient   Preferred Pharmacy:   [facility] - [place], [place] - [id number] [facility]  [address]  [facility] [address]  Phone: [phone] Fax: [phone]    [facility] - [place] - [facility] [address] [facility] [address]  Phone: [phone] Fax: [phone]    Primary Care Provider: Nicolas Nix MD    Primary Insurance: ALVA SINHA  Secondary Insurance:     DISCHARGE DETAILS:                                                                                                               Facility Insurance Auth Number: [id number]

## 2024-10-31 NOTE — PROGRESS NOTES
Progress Note - Pulmonology   Name: Sarah Zayas 79 y.o. female I MRN: [id number]  Unit/Bed#: S -01 I Date of Admission: 10/25/2024   Date of Service: 10/31/2024 I Hospital Day: 5    Assessment & Plan  Acute on chronic respiratory failure with hypoxia and hypercapnia (HCC)  -96% on 2 L NC, patient currently using 2 L supplemental O2 at home  -Continue to maintain saturation greater than 89%  -Pulmonary hygiene encouraged: Deep breathing with cough, OOB as tolerated, incentive spirometry and flutter valve  -Overnight pulse oximetry test showed no desaturation while on 2 L NC  -pCO2 off of BiPAP was 50  -Patient has chronic respiratory failure due to COPD.  Patient has evidence for CO2 retention and requires noninvasive ventilation with volume targeted mode at home for optimal management.  BiPAP was tried and patient remains hypercapnic due to the inability to provide noninvasive ventilation of adequate degree.  Continuous alarm systems and backup are required for optimal management due to power outage.  This patient is at high risk for respiratory failure without noninvasive ventilation at home.  This could prevent repeated hospital admissions and improve the quality of life for the patient  -Would recommend tidal volume of 300 respiratory rate of 14 for Trilogy  -Assess home O2 requirements prior to discharge    COPD, severe (HCC)  -In office spirometry May 2024 shows FEV1 22%  -Home medication regime: Pulmicort 0.5 Mg 2 times daily, Xopenex 1.25 Mg twice daily ipratropium 0.2% 2 times daily, albuterol 90 mcg/ACT 2 puffs every 6 hours as needed, prednisone 2.5 Mg 1 tablet daily with breakfast for temporal arteritis  -Continue budesonide ipratropium lev albuterol nebulizers while inpatient  -Discharge on home medication regime  -Continue prednisone 40 mg daily for 5 doses, currently on day # 4  -needs outpatient follow up and formal PFTs    24 Hour Events : No overnight events  Subjective : Patient seen and  examined at bedside.  Patient found sitting up comfortably in a recliner chair.  Endorsed decent appetite for breakfast.  Is currently at baseline oxygen requirements.  She denies any fever chills night sweats chest pain or hemoptysis.  Endorses feeling tired and weak which is expected due to decline in her health.  She is working with PT OT and using incentive spirometer    Objective :  Temp:  [97.7 °F (36.5 °C)-98.9 °F (37.2 °C)] 98.9 °F (37.2 °C)  HR:  [] 82  BP: (127-157)/(58-96) 127/58  Resp:  [18-19] 18  SpO2:  [94 %-98 %] 98 %  O2 Device: Nasal cannula  Nasal Cannula O2 Flow Rate (L/min):  [2 L/min] 2 L/min    Physical Exam  Vitals reviewed.   Constitutional:       General: She is not in acute distress.     Appearance: She is ill-appearing.   HENT:      Head: Normocephalic and atraumatic.      Right Ear: External ear normal.      Left Ear: External ear normal.      Nose: Nose normal.      Mouth/Throat:      Mouth: Mucous membranes are moist.      Pharynx: Oropharynx is clear.   Eyes:      Extraocular Movements: Extraocular movements intact.      Pupils: Pupils are equal, round, and reactive to light.   Cardiovascular:      Rate and Rhythm: Normal rate and regular rhythm.      Pulses: Normal pulses.      Heart sounds: Normal heart sounds.   Pulmonary:      Effort: Pulmonary effort is normal.      Comments: Diminished breat sounds bilaterally  Abdominal:      General: Bowel sounds are normal.      Tenderness: There is no abdominal tenderness.   Musculoskeletal:      Right lower leg: No edema.      Left lower leg: No edema.   Skin:     General: Skin is warm and dry.   Neurological:      Mental Status: She is alert and oriented to person, place, and time.   Psychiatric:         Mood and Affect: Mood normal.         Behavior: Behavior normal.         Thought Content: Thought content normal.         Judgment: Judgment normal.         Lab Results: I have reviewed the following results:   .     10/31/24  4965    WBC 8.97   HGB 10.3*   HCT 33.6*      SODIUM 136   K 4.0   CL 97   CO2 36*   BUN 19   CREATININE 0.43*   GLUC 89   MG 1.9     ABG: No new results in last 24 hours.    Imaging Results Review: I reviewed radiology reports from this admission including: CT chest.  CT chest 10/26/2024 shows no evidence of pulmonary emboli.  Stable right middle lobe scarring/atelectasis.  Otherwise no focal airspace consolidation to suggest pneumonia.  Stable trace bilateral pleural effusions with suspected mild pulmonary interstitial edema.  Recommend clinical correlation stable right breast soft tissue lesion.  Correlate with dedicated breast imaging again recommended   Other Study Results Review: Other studies reviewed include: ECHO  Echo 10/2/2023 LVEF 75% systolic function hyperdynamic wall motion normal diastolic function is mildly abnormal consistent with grade 1 relaxation   PFT Results Reviewed: reviewed  In office spirometry May 2024 shows FEV1 22%, needs outpatient formal PFTs

## 2024-11-01 LAB
ANION GAP SERPL CALCULATED.3IONS-SCNC: 2 MMOL/L (ref 4–13)
BUN SERPL-MCNC: 18 MG/DL (ref 5–25)
CALCIUM SERPL-MCNC: 8.6 MG/DL (ref 8.4–10.2)
CHLORIDE SERPL-SCNC: 97 MMOL/L (ref 96–108)
CO2 SERPL-SCNC: 36 MMOL/L (ref 21–32)
CREAT SERPL-MCNC: 0.41 MG/DL (ref 0.6–1.3)
ERYTHROCYTE [DISTWIDTH] IN BLOOD BY AUTOMATED COUNT: 13.8 % (ref 11.6–15.1)
GFR SERPL CREATININE-BSD FRML MDRD: 98 ML/MIN/1.73SQ M
GLUCOSE SERPL-MCNC: 168 MG/DL (ref 65–140)
GLUCOSE SERPL-MCNC: 198 MG/DL (ref 65–140)
GLUCOSE SERPL-MCNC: 85 MG/DL (ref 65–140)
HCT VFR BLD AUTO: 33.7 % (ref 34.8–46.1)
HGB BLD-MCNC: 10.3 G/DL (ref 11.5–15.4)
MCH RBC QN AUTO: 28.5 PG (ref 26.8–34.3)
MCHC RBC AUTO-ENTMCNC: 30.6 G/DL (ref 31.4–37.4)
MCV RBC AUTO: 93 FL (ref 82–98)
PLATELET # BLD AUTO: 228 THOUSANDS/UL (ref 149–390)
PMV BLD AUTO: 9.9 FL (ref 8.9–12.7)
POTASSIUM SERPL-SCNC: 4.6 MMOL/L (ref 3.5–5.3)
RBC # BLD AUTO: 3.62 MILLION/UL (ref 3.81–5.12)
SODIUM SERPL-SCNC: 135 MMOL/L (ref 135–147)
WBC # BLD AUTO: 8.76 THOUSAND/UL (ref 4.31–10.16)

## 2024-11-01 PROCEDURE — 82948 REAGENT STRIP/BLOOD GLUCOSE: CPT

## 2024-11-01 PROCEDURE — 85027 COMPLETE CBC AUTOMATED: CPT

## 2024-11-01 PROCEDURE — 94640 AIRWAY INHALATION TREATMENT: CPT

## 2024-11-01 PROCEDURE — 99232 SBSQ HOSP IP/OBS MODERATE 35: CPT

## 2024-11-01 PROCEDURE — 94760 N-INVAS EAR/PLS OXIMETRY 1: CPT

## 2024-11-01 PROCEDURE — 99232 SBSQ HOSP IP/OBS MODERATE 35: CPT | Performed by: INTERNAL MEDICINE

## 2024-11-01 PROCEDURE — 80048 BASIC METABOLIC PNL TOTAL CA: CPT

## 2024-11-01 RX ORDER — MIRTAZAPINE 15 MG/1
7.5 TABLET, FILM COATED ORAL
Status: DISCONTINUED | OUTPATIENT
Start: 2024-11-01 | End: 2024-11-02 | Stop reason: HOSPADM

## 2024-11-01 RX ADMIN — PANTOPRAZOLE SODIUM 40 MG: 40 TABLET, DELAYED RELEASE ORAL at 09:16

## 2024-11-01 RX ADMIN — LEVALBUTEROL HYDROCHLORIDE 1.25 MG: 1.25 SOLUTION RESPIRATORY (INHALATION) at 19:23

## 2024-11-01 RX ADMIN — SENNOSIDES AND DOCUSATE SODIUM 1 TABLET: 8.6; 5 TABLET ORAL at 09:16

## 2024-11-01 RX ADMIN — HEPARIN SODIUM 5000 UNITS: 5000 INJECTION INTRAVENOUS; SUBCUTANEOUS at 14:25

## 2024-11-01 RX ADMIN — PREDNISONE 40 MG: 20 TABLET ORAL at 09:16

## 2024-11-01 RX ADMIN — IPRATROPIUM BROMIDE 0.5 MG: 0.5 SOLUTION RESPIRATORY (INHALATION) at 07:13

## 2024-11-01 RX ADMIN — IPRATROPIUM BROMIDE 0.5 MG: 0.5 SOLUTION RESPIRATORY (INHALATION) at 19:23

## 2024-11-01 RX ADMIN — AMLODIPINE BESYLATE 5 MG: 5 TABLET ORAL at 09:16

## 2024-11-01 RX ADMIN — LEVALBUTEROL HYDROCHLORIDE 1.25 MG: 1.25 SOLUTION RESPIRATORY (INHALATION) at 07:13

## 2024-11-01 RX ADMIN — BUDESONIDE INHALATION 0.5 MG: 0.5 SUSPENSION RESPIRATORY (INHALATION) at 07:13

## 2024-11-01 RX ADMIN — PROPRANOLOL HYDROCHLORIDE 40 MG: 20 TABLET ORAL at 20:53

## 2024-11-01 RX ADMIN — PROPRANOLOL HYDROCHLORIDE 40 MG: 20 TABLET ORAL at 09:16

## 2024-11-01 RX ADMIN — BUDESONIDE INHALATION 0.5 MG: 0.5 SUSPENSION RESPIRATORY (INHALATION) at 19:23

## 2024-11-01 RX ADMIN — MIRTAZAPINE 7.5 MG: 15 TABLET, FILM COATED ORAL at 20:53

## 2024-11-01 RX ADMIN — HEPARIN SODIUM 5000 UNITS: 5000 INJECTION INTRAVENOUS; SUBCUTANEOUS at 20:54

## 2024-11-01 RX ADMIN — LEVOTHYROXINE SODIUM 25 MCG: 25 TABLET ORAL at 06:41

## 2024-11-01 RX ADMIN — ASPIRIN 81 MG: 81 TABLET, COATED ORAL at 09:16

## 2024-11-01 RX ADMIN — IRON SUCROSE 300 MG: 20 INJECTION, SOLUTION INTRAVENOUS at 09:44

## 2024-11-01 RX ADMIN — HEPARIN SODIUM 5000 UNITS: 5000 INJECTION INTRAVENOUS; SUBCUTANEOUS at 06:42

## 2024-11-01 RX ADMIN — CYANOCOBALAMIN TAB 500 MCG 1000 MCG: 500 TAB at 09:16

## 2024-11-01 RX ADMIN — DULOXETINE HYDROCHLORIDE 30 MG: 30 CAPSULE, DELAYED RELEASE ORAL at 20:53

## 2024-11-01 NOTE — MALNUTRITION/BMI
This medical record reflects one or more clinical indicators suggestive of malnutrition.    Malnutrition Findings:   Adult Malnutrition type: Chronic illness  Adult Degree of Malnutrition: Other severe protein calorie malnutrition  Malnutrition Characteristics: Weight loss, Fat loss, Muscle loss                360 Statement: related to decreased appetite with increased energy needs as evidenced by hollow supraclavicular space, sunken orbital, depressed temporalis, wasted interosseous, 12% weight loss 5/6-11/1/24, very low body weight. Treatment Regular diet, ONS.    BMI Findings:  Adult BMI Classifications: Underweight < 18.5        Body mass index is 17.18 kg/m².     See Nutrition note dated 11/1/2024 for additional details.  Completed nutrition assessment is viewable in the nutrition documentation.

## 2024-11-01 NOTE — NURSING NOTE
Consulted to place a new PIV however, I attempted x2 at PIV access without success. The 22g PIV in the left arm flushed without resistance. RN aware.

## 2024-11-01 NOTE — ASSESSMENT & PLAN NOTE
-In office spirometry May 2024 shows FEV1 22%  -Home medication regime: Pulmicort 0.5 Mg 2 times daily, Xopenex 1.25 Mg twice daily ipratropium 0.2% 2 times daily, albuterol 90 mcg/ACT 2 puffs every 6 hours as needed, prednisone 2.5 Mg 1 tablet daily with breakfast for temporal arteritis  -Continue budesonide ipratropium lev albuterol nebulizers while inpatient  -Discharge on home medication regime  -Continue prednisone 40 mg daily for 5 doses, currently on day # 5  -needs outpatient follow up and formal PFTs

## 2024-11-01 NOTE — ASSESSMENT & PLAN NOTE
Continue budesonide, ipratropium, Xopenex nebulizations while inpatient.  Continue prednisone 40 mg daily for total 5 doses.  Will need outpatient formal PFTs  Overnight pulse ox completed overnight. ABG this AM: 7.37/57/92.7/32.9  Pulmonologist on board  NIPPV ordered- trilogy to be to facility tomorrow  Can wean off o2 as appropriate  Completed prednisone

## 2024-11-01 NOTE — PROGRESS NOTES
Progress Note - Hospitalist   Name: Sarah Zayas 79 y.o. female I MRN: [id number]  Unit/Bed#: S -01 I Date of Admission: 10/25/2024   Date of Service: 11/1/2024 I Hospital Day: 6    Assessment & Plan  Acute on chronic respiratory failure with hypoxia and hypercapnia (HCC)  Continue oxygen to keep oxygen saturations above 89%.  As per pulmonologist, recommend nocturnal BiPAP and wean oxygen to keep saturations just at 90 to 92%.  BiPAP ordered  D/c home demadex indefinitely; pt is euvolemic   COPD, severe (HCC)  Continue budesonide, ipratropium, Xopenex nebulizations while inpatient.  Continue prednisone 40 mg daily for total 5 doses.  Will need outpatient formal PFTs  Overnight pulse ox completed overnight. ABG this AM: 7.37/57/92.7/32.9  Pulmonologist on board  NIPPV ordered- trilogy to be to facility tomorrow  Can wean off o2 as appropriate  Completed prednisone  Normocytic anemia  Lab Results   Component Value Date    IRON 42 (L) 10/29/2024    FERRITIN 47 10/29/2024    TIBC 306 10/29/2024    CONCFE 14 (L) 10/29/2024     No noted or reported bleeding.  Continue to monitor.  IV iron ordered x 3  Continue B12  Irritation of both eyes  Artificial tears are ordered  Anxiety/depression  Continue Cymbalta  Add Remeron 7.5 mg nightly    VTE Pharmacologic Prophylaxis: VTE Score: 6 High Risk (Score >/= 5) - Pharmacological DVT Prophylaxis Ordered: heparin. Sequential Compression Devices Ordered.    Mobility:   Basic Mobility Inpatient Raw Score: 17  JH-HLM Goal: 5: Stand one or more mins  JH-HLM Achieved: 7: Walk 25 feet or more  JH-HLM Goal achieved. Continue to encourage appropriate mobility.    Patient Centered Rounds: I performed bedside rounds with nursing staff today.   Discussions with Specialists or Other Care Team Provider: Case management    Education and Discussions with Family / Patient: Updated  (daughter) via phone.    Current Length of Stay: 6 day(s)  Current Patient Status: Inpatient    Certification Statement: The patient will continue to require additional inpatient hospital stay due to placement facility will not be able to get her trilogy until tomorrow  Discharge Plan: Anticipate discharge tomorrow to rehab facility.    Code Status: Level 3 - DNAR and DNI    Subjective   Patient expresses feeling nervous about transport to her facility and going to the facility itself.  Otherwise her breathing is improved.    Objective :  Temp:  [98.7 °F (37.1 °C)] 98.7 °F (37.1 °C)  HR:  [64-72] 64  BP: (126-133)/(60-74) 126/61  Resp:  [18] 18  SpO2:  [94 %-100 %] 100 %  O2 Device: Nasal cannula  Nasal Cannula O2 Flow Rate (L/min):  [1 L/min-2 L/min] 1 L/min  FiO2 (%):  [30] 30    Body mass index is 17.18 kg/m².     Input and Output Summary (last 24 hours):     Intake/Output Summary (Last 24 hours) at 11/1/2024 1453  Last data filed at 11/1/2024 1257  Gross per 24 hour   Intake 480 ml   Output 600 ml   Net -120 ml       Physical Exam  Vitals and nursing note reviewed.   Constitutional:       General: She is not in acute distress.     Appearance: She is well-developed.   HENT:      Head: Normocephalic and atraumatic.   Eyes:      Conjunctiva/sclera: Conjunctivae normal.   Cardiovascular:      Rate and Rhythm: Normal rate and regular rhythm.      Heart sounds: No murmur heard.  Pulmonary:      Effort: Pulmonary effort is normal. No respiratory distress.      Breath sounds: Normal breath sounds. No wheezing or rales.   Abdominal:      General: Bowel sounds are normal. There is no distension.      Palpations: Abdomen is soft.      Tenderness: There is no abdominal tenderness.   Musculoskeletal:         General: No swelling.      Cervical back: Neck supple.   Skin:     General: Skin is warm and dry.      Capillary Refill: Capillary refill takes less than 2 seconds.   Neurological:      Mental Status: She is alert and oriented to person, place, and time. Mental status is at baseline.   Psychiatric:         Mood  and Affect: Mood normal.         Lines/Drains:              Lab Results: I have reviewed the following results:   Results from last 7 days   Lab Units 11/01/24  0638 10/29/24  0532 10/27/24  0442   WBC Thousand/uL 8.76   < > 9.12   HEMOGLOBIN g/dL 10.3*   < > 9.3*   HEMATOCRIT % 33.7*   < > 30.0*   PLATELETS Thousands/uL 228   < > 255   SEGS PCT %  --   --  85*   LYMPHO PCT %  --   --  4*   MONO PCT %  --   --  10   EOS PCT %  --   --  0    < > = values in this interval not displayed.     Results from last 7 days   Lab Units 11/01/24  0638 10/29/24  0532 10/27/24  0442   SODIUM mmol/L 135   < > 137   POTASSIUM mmol/L 4.6   < > 4.5   CHLORIDE mmol/L 97   < > 95*   CO2 mmol/L 36*   < > 37*   BUN mg/dL 18   < > 33*   CREATININE mg/dL 0.41*   < > 0.40*   ANION GAP mmol/L 2*   < > 5   CALCIUM mg/dL 8.6   < > 9.0   ALBUMIN g/dL  --   --  3.5   TOTAL BILIRUBIN mg/dL  --   --  0.33   ALK PHOS U/L  --   --  37   ALT U/L  --   --  16   AST U/L  --   --  16   GLUCOSE RANDOM mg/dL 85   < > 96    < > = values in this interval not displayed.     Results from last 7 days   Lab Units 10/25/24  2241   INR  0.87     Results from last 7 days   Lab Units 10/31/24  2131 10/31/24  1607 10/31/24  1110 10/31/24  0741 10/30/24  2042 10/30/24  1529 10/30/24  1153 10/30/24  0747 10/29/24  2208 10/29/24  1602 10/29/24  0754 10/25/24  2348   POC GLUCOSE mg/dl 110 200* 114 88 137 177* 104 94 108 152* 99 107         Results from last 7 days   Lab Units 10/26/24  0800 10/25/24  2313 10/25/24  2241   LACTIC ACID mmol/L 1.0  --  0.8   PROCALCITONIN ng/ml  --  0.95*  --        Recent Cultures (last 7 days):   Results from last 7 days   Lab Units 10/25/24  2317 10/25/24  2313   BLOOD CULTURE  No Growth After 5 Days. No Growth After 5 Days.   LEGIONELLA URINARY ANTIGEN   --  Negative       Last 24 Hours Medication List:     Current Facility-Administered Medications:     acetaminophen (TYLENOL) tablet 650 mg, Q6H PRN    albuterol (PROVENTIL HFA,VENTOLIN  HFA) inhaler 2 puff, Q6H PRN    aluminum-magnesium hydroxide-simethicone (MAALOX) oral suspension 30 mL, Q4H PRN    amLODIPine (NORVASC) tablet 5 mg, Daily    Artificial Tears Op Soln 2 drop, Daily    aspirin (ECOTRIN LOW STRENGTH) EC tablet 81 mg, Daily    budesonide (PULMICORT) inhalation solution 0.5 mg, Q12H    cyanocobalamin (VITAMIN B-12) tablet 1,000 mcg, Daily    DULoxetine (CYMBALTA) delayed release capsule 30 mg, HS    heparin (porcine) subcutaneous injection 5,000 Units, Q8H RACHEL **AND** [CANCELED] Platelet count, Once    ipratropium (ATROVENT) 0.02 % inhalation solution 0.5 mg, BID    iron sucrose (VENOFER) 300 mg in sodium chloride 0.9 % 250 mL IVPB, Once per day on Monday Wednesday Friday, Last Rate: 300 mg (11/01/24 0944)    levalbuterol (XOPENEX) inhalation solution 1.25 mg, BID    levothyroxine tablet 25 mcg, Early Morning    pantoprazole (PROTONIX) EC tablet 40 mg, Daily    polyethylene glycol (MIRALAX) packet 17 g, Daily PRN    propranolol (INDERAL) tablet 40 mg, Q12H RACHEL    senna-docusate sodium (SENOKOT S) 8.6-50 mg per tablet 1 tablet, BID    sodium chloride (OCEAN) 0.65 % nasal spray 1 spray, TID    Administrative Statements   Today, Patient Was Seen By: Elaine Andrews DO    **Please Note: This note may have been constructed using a voice recognition system.**

## 2024-11-01 NOTE — CASE MANAGEMENT
Case Management Progress Note    Patient name Sarah Zayas  Location S /S -01 MRN [id number]  : 1945 Date 2024       LOS (days): 6  Geometric Mean LOS (GMLOS) (days): 3.5  Days to GMLOS:-3.1        OBJECTIVE:        Current admission status: Inpatient  Preferred Pharmacy:   [facility] - [place], [place] - [address] [facility]  [address] [facility] [address]  [address]  [facility] [address]  Phone: [phone] Fax: [phone]    [facility] - [place], [place] - [address] [facility]  [address] [facility] [address]  Phone: [phone] Fax: [phone]    Primary Care Provider: Nicolas Nix MD    Primary Insurance: Risk I/O  Secondary Insurance:     PROGRESS NOTE:    Per SLIM - patient medically stable for d/c. CM f/u with [facility] liaison Gina and Bret's liaison Dandre re: STR placement today to [facility] and Trilogy needed for patient. Julio Amos - not able to provide trilogy for patient while at STR due to insurance coverage and will deliver to pt prior to d/c home from STR. Gina confirmed will be able to accept patient to [facility] tomorrow as will order trilogy for patient but will not be delivered to facility until tomorrow - patient able to admit to facility tomorrow due to trilogy needed for safe dcp. CM notified SLIM of same.     Transport requested for 1100 tomorrow morning to [facility] - pending confirmation of p/u time.     CM to f/u in AM for confirmed transport time.

## 2024-11-01 NOTE — PROGRESS NOTES
Progress Note - Pulmonology   Name: Sarah Zayas 79 y.o. female I MRN: [id number]  Unit/Bed#: S -01 I Date of Admission: 10/25/2024   Date of Service: 11/1/2024 I Hospital Day: 6    Assessment & Plan  Acute on chronic respiratory failure with hypoxia and hypercapnia (HCC)  -96% on 2 L NC, patient currently using 2 L supplemental O2 at home  -Continue to maintain saturation greater than 89%  -Pulmonary hygiene encouraged: Deep breathing with cough, OOB as tolerated, incentive spirometry and flutter valve  -Overnight pulse oximetry test showed no desaturation while on 2 L NC  -pCO2 off of BiPAP was 50  -Patient has chronic respiratory failure due to COPD.  Patient has evidence for CO2 retention and requires noninvasive ventilation with volume targeted mode at home for optimal management.  BiPAP was tried and patient remains hypercapnic due to the inability to provide noninvasive ventilation of adequate degree.  Continuous alarm systems and backup are required for optimal management due to power outage.  This patient is at high risk for respiratory failure without noninvasive ventilation at home.  This could prevent repeated hospital admissions and improve the quality of life for the patient  -Would recommend tidal volume of 300 respiratory rate of 14 for Trilogy  -Assess home O2 requirements prior to discharge    COPD, severe (HCC)  -In office spirometry May 2024 shows FEV1 22%  -Home medication regime: Pulmicort 0.5 Mg 2 times daily, Xopenex 1.25 Mg twice daily ipratropium 0.2% 2 times daily, albuterol 90 mcg/ACT 2 puffs every 6 hours as needed, prednisone 2.5 Mg 1 tablet daily with breakfast for temporal arteritis  -Continue budesonide ipratropium lev albuterol nebulizers while inpatient  -Discharge on home medication regime  -Continue prednisone 40 mg daily for 5 doses, currently on day # 5  -needs outpatient follow up and formal PFTs    24 Hour Events : No overnight events  Subjective : Patient seen and  examined at bedside.  Found resting comfortably in a recliner chair.  Currently on 2 L nasal cannula.  Does not appear in any respiratory distress.  She denies any fever chills night sweats chest pain or hemoptysis.  Plan is for her to transfer to rehab center today.  Patient stable from a pulmonary standpoint for discharge    Objective :  Temp:  [98.7 °F (37.1 °C)] 98.7 °F (37.1 °C)  HR:  [64-72] 64  BP: (126-133)/(60-74) 126/61  Resp:  [18] 18  SpO2:  [94 %-100 %] 100 %  O2 Device: Nasal cannula  Nasal Cannula O2 Flow Rate (L/min):  [1 L/min-2 L/min] 1 L/min    Physical Exam  Vitals reviewed.   Constitutional:       General: She is not in acute distress.     Appearance: She is not ill-appearing.   HENT:      Head: Normocephalic and atraumatic.      Right Ear: External ear normal.      Left Ear: External ear normal.      Nose: Nose normal.      Mouth/Throat:      Mouth: Mucous membranes are moist.   Eyes:      Extraocular Movements: Extraocular movements intact.      Pupils: Pupils are equal, round, and reactive to light.   Cardiovascular:      Rate and Rhythm: Normal rate and regular rhythm.      Pulses: Normal pulses.      Heart sounds: Normal heart sounds.   Pulmonary:      Effort: Pulmonary effort is normal.      Breath sounds: Normal breath sounds.      Comments: Diminished breath sounds bilaterally  Abdominal:      General: Bowel sounds are normal.   Musculoskeletal:      Cervical back: Normal range of motion and neck supple.      Right lower leg: No edema.      Left lower leg: No edema.   Skin:     General: Skin is warm and dry.   Neurological:      Mental Status: She is alert and oriented to person, place, and time.   Psychiatric:         Mood and Affect: Mood normal.         Behavior: Behavior normal.         Thought Content: Thought content normal.         Judgment: Judgment normal.           Lab Results: I have reviewed the following results:   .     11/01/24  0638   WBC 8.76   HGB 10.3*   HCT 33.7*       SODIUM 135   K 4.6   CL 97   CO2 36*   BUN 18   CREATININE 0.41*   GLUC 85     ABG: No new results in last 24 hours.    Imaging Results Review: I reviewed radiology reports from this admission including: CT chest.  CT chest 10/26/2024 shows no evidence of pulmonary emboli. Stable right middle lobe scarring/atelectasis. Otherwise no focal airspace consolidation to suggest pneumonia. Stable trace bilateral pleural effusions with suspected mild pulmonary interstitial edema. Recommend clinical correlation stable right breast soft tissue lesion. Correlate with dedicated breast imaging again recommended     Other Study Results Review: Other studies reviewed include: ECHO  Echo 10/2/2023 LVEF 75% systolic function hyperdynamic wall motion normal diastolic function is mildly abnormal consistent with grade 1 relaxation   PFT Results Reviewed: reviewed  In office spirometry May 2024 shows FEV1 22%, needs outpatient formal PFTs

## 2024-11-02 VITALS
OXYGEN SATURATION: 100 % | HEART RATE: 66 BPM | BODY MASS INDEX: 18.01 KG/M2 | DIASTOLIC BLOOD PRESSURE: 61 MMHG | TEMPERATURE: 98.5 F | HEIGHT: 62 IN | WEIGHT: 97.88 LBS | SYSTOLIC BLOOD PRESSURE: 139 MMHG | RESPIRATION RATE: 18 BRPM

## 2024-11-02 LAB
GLUCOSE SERPL-MCNC: 72 MG/DL (ref 65–140)
GLUCOSE SERPL-MCNC: 96 MG/DL (ref 65–140)

## 2024-11-02 PROCEDURE — 94660 CPAP INITIATION&MGMT: CPT

## 2024-11-02 PROCEDURE — 94640 AIRWAY INHALATION TREATMENT: CPT

## 2024-11-02 PROCEDURE — 82948 REAGENT STRIP/BLOOD GLUCOSE: CPT

## 2024-11-02 PROCEDURE — 99239 HOSP IP/OBS DSCHRG MGMT >30: CPT

## 2024-11-02 PROCEDURE — 94760 N-INVAS EAR/PLS OXIMETRY 1: CPT

## 2024-11-02 RX ORDER — MIRTAZAPINE 7.5 MG/1
7.5 TABLET, FILM COATED ORAL
Start: 2024-11-02

## 2024-11-02 RX ADMIN — HEPARIN SODIUM 5000 UNITS: 5000 INJECTION INTRAVENOUS; SUBCUTANEOUS at 04:43

## 2024-11-02 RX ADMIN — GLYCERIN 2 DROP: .002; .002; .01 SOLUTION/ DROPS OPHTHALMIC at 08:22

## 2024-11-02 RX ADMIN — AMLODIPINE BESYLATE 5 MG: 5 TABLET ORAL at 08:21

## 2024-11-02 RX ADMIN — PROPRANOLOL HYDROCHLORIDE 40 MG: 20 TABLET ORAL at 08:21

## 2024-11-02 RX ADMIN — SENNOSIDES AND DOCUSATE SODIUM 1 TABLET: 8.6; 5 TABLET ORAL at 08:21

## 2024-11-02 RX ADMIN — PANTOPRAZOLE SODIUM 40 MG: 40 TABLET, DELAYED RELEASE ORAL at 08:21

## 2024-11-02 RX ADMIN — ACETAMINOPHEN 650 MG: 325 TABLET, FILM COATED ORAL at 04:48

## 2024-11-02 RX ADMIN — BUDESONIDE INHALATION 0.5 MG: 0.5 SUSPENSION RESPIRATORY (INHALATION) at 07:30

## 2024-11-02 RX ADMIN — ASPIRIN 81 MG: 81 TABLET, COATED ORAL at 08:21

## 2024-11-02 RX ADMIN — CYANOCOBALAMIN TAB 500 MCG 1000 MCG: 500 TAB at 08:21

## 2024-11-02 RX ADMIN — IPRATROPIUM BROMIDE 0.5 MG: 0.5 SOLUTION RESPIRATORY (INHALATION) at 07:30

## 2024-11-02 RX ADMIN — LEVOTHYROXINE SODIUM 25 MCG: 25 TABLET ORAL at 04:43

## 2024-11-02 RX ADMIN — LEVALBUTEROL HYDROCHLORIDE 1.25 MG: 1.25 SOLUTION RESPIRATORY (INHALATION) at 07:30

## 2024-11-02 NOTE — ASSESSMENT & PLAN NOTE
Continue budesonide, ipratropium, Xopenex nebulizations while inpatient.  Continue prednisone 40 mg daily for total 5 doses.  Will need outpatient formal PFTs  Overnight pulse ox completed  ABG : 7.37/57/92.7/32.9  NIPPV ordered- trilogy to be to facility today  Can wean off o2 as appropriate  Completed prednisone, however was on 2.5 mg daily at home and will continue this on discharge

## 2024-11-02 NOTE — PLAN OF CARE
Problem: Potential for Falls  Goal: Patient will remain free of falls  Description: INTERVENTIONS:  - Educate patient/family on patient safety including physical limitations  - Instruct patient to call for assistance with activity   - Consult OT/PT to assist with strengthening/mobility   - Keep Call bell within reach  - Keep bed low and locked with side rails adjusted as appropriate  - Keep care items and personal belongings within reach  - Initiate and maintain comfort rounds  - Make Fall Risk Sign visible to staff  - Offer Toileting every 2 Hours, in advance of need  - Initiate/Maintain bed/chair alarm  - Obtain necessary fall risk management equipment  - Apply yellow socks and bracelet for high fall risk patients  - Consider moving patient to room near nurses station  Outcome: Progressing     Problem: Prexisting or High Potential for Compromised Skin Integrity  Goal: Skin integrity is maintained or improved  Description: INTERVENTIONS:  - Identify patients at risk for skin breakdown  - Assess and monitor skin integrity  - Assess and monitor nutrition and hydration status  - Monitor labs   - Assess for incontinence   - Turn and reposition patient  - Assist with mobility/ambulation  - Relieve pressure over bony prominences  - Avoid friction and shearing  - Provide appropriate hygiene as needed including keeping skin clean and dry  - Evaluate need for skin moisturizer/barrier cream  - Collaborate with interdisciplinary team   - Patient/family teaching  - Consider wound care consult   Outcome: Progressing     Problem: RESPIRATORY - ADULT  Goal: Achieves optimal ventilation and oxygenation  Description: INTERVENTIONS:  - Assess for changes in respiratory status  - Assess for changes in mentation and behavior  - Position to facilitate oxygenation and minimize respiratory effort  - Oxygen administered by appropriate delivery if ordered  - Initiate smoking cessation education as indicated  - Encourage broncho-pulmonary  hygiene including cough, deep breathe, Incentive Spirometry  - Assess the need for suctioning and aspirate as needed  - Assess and instruct to report SOB or any respiratory difficulty  - Respiratory Therapy support as indicated  Outcome: Progressing     Problem: Nutrition/Hydration-ADULT  Goal: Nutrient/Hydration intake appropriate for improving, restoring or maintaining nutritional needs  Description: Monitor and assess patient's nutrition/hydration status for malnutrition. Collaborate with interdisciplinary team and initiate plan and interventions as ordered.  Monitor patient's weight and dietary intake as ordered or per policy. Utilize nutrition screening tool and intervene as necessary. Determine patient's food preferences and provide high-protein, high-caloric foods as appropriate.     INTERVENTIONS:  - Monitor oral intake, urinary output, labs, and treatment plans  - Assess nutrition and hydration status and recommend course of action  - Evaluate amount of meals eaten  - Assist patient with eating if necessary   - Allow adequate time for meals  - Recommend/ encourage appropriate diets, oral nutritional supplements, and vitamin/mineral supplements  - Order, calculate, and assess calorie counts as needed  - Recommend, monitor, and adjust tube feedings and TPN/PPN based on assessed needs  - Assess need for intravenous fluids  - Provide specific nutrition/hydration education as appropriate  - Include patient/family/caregiver in decisions related to nutrition  Outcome: Progressing

## 2024-11-02 NOTE — ASSESSMENT & PLAN NOTE
Continue oxygen to keep oxygen saturations above 89%.  As per pulmonologist, recommend nocturnal BiPAP and wean oxygen to keep saturations just at 90 to 92%.  BiPAP ordered-to be received by the nursing facility today  D/c home demadex indefinitely; pt is euvolemic

## 2024-11-02 NOTE — DISCHARGE SUMMARY
Discharge Summary - Hospitalist   Name: Sarah Zayas 79 y.o. female I MRN: [id number]  Unit/Bed#: S -01 I Date of Admission: 10/25/2024   Date of Service: 11/2/2024 I Hospital Day: 7     Assessment & Plan  Acute on chronic respiratory failure with hypoxia and hypercapnia (HCC)  Continue oxygen to keep oxygen saturations above 89%.  As per pulmonologist, recommend nocturnal BiPAP and wean oxygen to keep saturations just at 90 to 92%.  BiPAP ordered-to be received by the nursing facility today  D/c home demadex indefinitely; pt is euvolemic   COPD, severe (HCC)  Continue budesonide, ipratropium, Xopenex nebulizations while inpatient.  Continue prednisone 40 mg daily for total 5 doses.  Will need outpatient formal PFTs  Overnight pulse ox completed  ABG : 7.37/57/92.7/32.9  NIPPV ordered- trilogy to be to facility today  Can wean off o2 as appropriate  Completed prednisone, however was on 2.5 mg daily at home and will continue this on discharge  Normocytic anemia  Lab Results   Component Value Date    IRON 42 (L) 10/29/2024    FERRITIN 47 10/29/2024    TIBC 306 10/29/2024    CONCFE 14 (L) 10/29/2024     No noted or reported bleeding.  Continue to monitor.  IV iron completed x 3, continue oral B12  Irritation of both eyes  Continue eyedrops  Anxiety/depression  Continue Cymbalta  Added Remeron 7.5 mg nightly due to ongoing depression, poor appetite-should be monitored for any serotonergic or psychiatric changes     Medical Problems       Resolved Problems  Date Reviewed: 10/29/2024            Resolved    Hyperkalemia 10/28/2024     Resolved by  Martina Gray MD    BERTO (acute kidney injury) (HCC) 10/28/2024     Resolved by  Martina Gray MD        Discharging Physician / Practitioner: Elaine Andrews DO  PCP: Nicolas Nix MD  Admission Date:   Admission Orders (From admission, onward)       Ordered        10/26/24 0243  INPATIENT ADMISSION  Once                          Discharge Date:  "11/02/24    Consultations During Hospital Stay:  Pulmonology    Procedures Performed:   Overnight pulse ox    Significant Findings / Test Results:   Requirement for BiPAP at night    Incidental Findings:   None    Test Results Pending at Discharge (will require follow up):   None     Outpatient Tests Requested:  PFTs    Complications: None    Reason for Admission: Worsening hypoxia and altered mentation at her nursing facility    Hospital Course:   Sarah Zayas is a 79 y.o. female patient who originally presented to the hospital on 10/25/2024 due to hypoxia.  Patient was admitted to the ICU given acute hypoxic and hypercapnic respiratory failure.  She was treated with BiPAP, nebulizers and inhalers.  She was maintained on a prednisone x 4 days while she was here.  She was evaluated for BiPAP at home, and was approved for trilogy that will be sent to her facility prior to discharge.    During her stay patient did also endorse depression and poor appetite.  She was started on Remeron nightly.    Please see above list of diagnoses and related plan for additional information.     Condition at Discharge: stable    Discharge Day Visit / Exam:   Subjective: Patient endorses feeling very anxious about going to her nursing facility, and riding in the ambulance.  Vitals: Blood Pressure: 139/61 (11/02/24 0737)  Pulse: 66 (11/02/24 0737)  Temperature: 98.5 °F (36.9 °C) (11/02/24 0737)  Temp Source: Oral (10/31/24 1603)  Respirations: 18 (10/31/24 1603)  Height: 5' 2\" (157.5 cm) (10/26/24 0800)  Weight - Scale: 44.4 kg (97 lb 14.2 oz) (11/02/24 0519)  SpO2: 100 % (11/02/24 0737)  Physical Exam  Vitals and nursing note reviewed.   Constitutional:       General: She is not in acute distress.     Appearance: She is well-developed.   HENT:      Head: Normocephalic and atraumatic.   Eyes:      Conjunctiva/sclera: Conjunctivae normal.   Cardiovascular:      Rate and Rhythm: Normal rate and regular rhythm.      Heart sounds: Normal " heart sounds. No murmur heard.  Pulmonary:      Effort: Pulmonary effort is normal. No respiratory distress.      Breath sounds: Normal breath sounds.   Abdominal:      General: Abdomen is flat. Bowel sounds are normal. There is no distension.      Palpations: Abdomen is soft.      Tenderness: There is no abdominal tenderness.   Musculoskeletal:         General: No swelling.      Cervical back: Neck supple.      Right lower leg: No edema.      Left lower leg: No edema.   Skin:     General: Skin is warm and dry.      Capillary Refill: Capillary refill takes less than 2 seconds.   Neurological:      Mental Status: She is alert. Mental status is at baseline.   Psychiatric:         Mood and Affect: Mood is anxious.          Discussion with Family: Spoke with daughter over the phone.     Discharge instructions/Information to patient and family:   See after visit summary for information provided to patient and family.      Provisions for Follow-Up Care:  See after visit summary for information related to follow-up care and any pertinent home health orders.      Mobility at time of Discharge:   Basic Mobility Inpatient Raw Score: 17  JH-HLM Goal: 5: Stand one or more mins  JH-HLM Achieved: 6: Walk 10 steps or more  HLM Goal achieved. Continue to encourage appropriate mobility.     Disposition:   [facility]    Planned Readmission: no    Discharge Medications:  See after visit summary for reconciled discharge medications provided to patient and/or family.      Administrative Statements   Discharge Statement:  I have spent a total time of 45 minutes in caring for this patient on the day of the visit/encounter.     **Please Note: This note may have been constructed using a voice recognition system**

## 2024-11-02 NOTE — PROGRESS NOTES
Called report to [facility].  Patient sent with transport team. All belongings sent with patient.

## 2024-11-02 NOTE — ASSESSMENT & PLAN NOTE
Continue Cymbalta  Added Remeron 7.5 mg nightly due to ongoing depression, poor appetite-should be monitored for any serotonergic or psychiatric changes

## 2024-11-02 NOTE — ASSESSMENT & PLAN NOTE
Lab Results   Component Value Date    IRON 42 (L) 10/29/2024    FERRITIN 47 10/29/2024    TIBC 306 10/29/2024    CONCFE 14 (L) 10/29/2024     No noted or reported bleeding.  Continue to monitor.  IV iron completed x 3, continue oral B12

## 2024-11-04 ENCOUNTER — PATIENT OUTREACH (OUTPATIENT)
Dept: CASE MANAGEMENT | Facility: OTHER | Age: 79
End: 2024-11-04

## 2024-11-04 ENCOUNTER — TRANSITIONAL CARE MANAGEMENT (OUTPATIENT)
Dept: FAMILY MEDICINE CLINIC | Facility: CLINIC | Age: 79
End: 2024-11-04

## 2024-11-04 NOTE — UTILIZATION REVIEW
NOTIFICATION OF ADMISSION DISCHARGE   This is a Notification of Discharge from [facility]. Please be advised that this patient has been discharge from our facility. Below you will find the admission and discharge date and time including the patient’s disposition.   UTILIZATION REVIEW CONTACT:  Amelia Medrano  Utilization   Network Utilization Review Department  Phone: [phone] x carefully listen to the prompts. All voicemails are confidential.  Email: [email or web address]     ADMISSION INFORMATION  PRESENTATION DATE: 10/25/2024  9:59 PM  OBERVATION ADMISSION DATE: N/A  INPATIENT ADMISSION DATE: 10/26/24  2:43 AM   DISCHARGE DATE: 11/2/2024  1:53 PM   [facility]    Network Utilization Review Department  ATTENTION: Please call with any questions or concerns to [phone] and carefully listen to the prompts so that you are directed to the right person. All voicemails are confidential.   For Discharge needs, contact Care Management DC Support Team at [phone] opt. 2  Send all requests for admission clinical reviews, approved or denied determinations and any other requests to dedicated fax number below belonging to the campus where the patient is receiving treatment. List of dedicated fax numbers for the Facilities:  FACILITY NAME UR FAX NUMBER   ADMISSION DENIALS (Administrative/Medical Necessity) [phone]   DISCHARGE SUPPORT TEAM ([facility]) [phone]   PARENT CHILD HEALTH (Maternity/NICU/Pediatrics) [phone]   [facility] [phone]   [facility] [phone]   [facility] [phone]   [facility] [phone]   [facility] [phone]   [facility] [phone]   [facility] [phone]   [facility]  [phone]   [facility] [phone]   [facility] [phone]   [facility] [phone]   [facility] [phone]

## 2024-11-04 NOTE — PROGRESS NOTES
Received ADT alert patient was discharged to [facility] for STR.  Will send patient to CM who will follow patient during rehab stay.

## 2024-11-06 ENCOUNTER — NURSING HOME VISIT (OUTPATIENT)
Dept: PULMONOLOGY | Facility: CLINIC | Age: 79
End: 2024-11-06
Payer: COMMERCIAL

## 2024-11-06 DIAGNOSIS — J96.11 CHRONIC RESPIRATORY FAILURE WITH HYPOXIA AND HYPERCAPNIA (HCC): Primary | ICD-10-CM

## 2024-11-06 DIAGNOSIS — J96.12 CHRONIC RESPIRATORY FAILURE WITH HYPOXIA AND HYPERCAPNIA (HCC): Primary | ICD-10-CM

## 2024-11-06 DIAGNOSIS — M31.6 TEMPORAL ARTERITIS (HCC): ICD-10-CM

## 2024-11-06 DIAGNOSIS — F17.211 CIGARETTE NICOTINE DEPENDENCE IN REMISSION: ICD-10-CM

## 2024-11-06 DIAGNOSIS — R91.1 LEFT UPPER LOBE PULMONARY NODULE: ICD-10-CM

## 2024-11-06 DIAGNOSIS — J44.9 COPD, SEVERE (HCC): ICD-10-CM

## 2024-11-06 PROBLEM — J96.02 ACUTE RESPIRATORY FAILURE WITH HYPERCAPNIA (HCC): Status: RESOLVED | Noted: 2024-10-10 | Resolved: 2024-11-06

## 2024-11-06 PROCEDURE — G2211 COMPLEX E/M VISIT ADD ON: HCPCS | Performed by: INTERNAL MEDICINE

## 2024-11-06 PROCEDURE — 99305 1ST NF CARE MODERATE MDM 35: CPT | Performed by: INTERNAL MEDICINE

## 2024-11-06 NOTE — PROGRESS NOTES
POS:  short term        Pulmonary Consult Note   Sarah Zayas 79 y.o. female MRN: [id number]  11/6/2024      Outpatient pulmonologist: Dr. Nicholson    Assessment and Plan:    Chronic respiratory failure with hypoxia and hypercapnia (HCC)  - cont. 2LPM oxygen for goal pulse ox 88-92%  - Cont. Trilogy with all sleep  - Avoid sedating medications    COPD, severe (HCC)  Very severe COPD, FEV1 0.41L  - Prednisone is not for lungs, is for TA  - Cont. Nebulizers as ordered  - Activity as able  - flu, COVID, RSV vaccines    Left upper lobe pulmonary nodule  - Stable nodules. Repeat CT in 1 year for surveillance.    Temporal arteritis (HCC)  - Prednisone 2.5mg daily    Cigarette nicotine dependence in remission  Quit in 2013, 1 ppd x 54 years  Committed to abstinence  Qualifies for yearly screening CT when nodule work up is complete      Diagnoses and all orders for this visit:    Chronic respiratory failure with hypoxia and hypercapnia (HCC)    COPD, severe (HCC)    Left upper lobe pulmonary nodule    Temporal arteritis (HCC)    Cigarette nicotine dependence in remission    Discussed with pt. Concerns addressed.  We will see again in 1 month or sooner if needed.    History of Present Illness   HPI:  Sarah Zayas is a 79 y.o. female PMH chronic hypoxic and hypercapnic respiratory failure, severe COPD, anemia, cachexia, anxiety who is here for rehab.  Chart reviewed by me. Pt admitted [facility] 10/25-11/2/24 for AECOPD. Complicated by anxiety/depression, started on Mirtazapine.     Found in chair, in good spirits. Still winded with exertion. Feels like her breathing is at baseline. Denies significant cough.    2LPM continuous oxygen    Difficult time tolerating Trilogy due to mask discomfort.    Review of Systems  Positive as above and negative otherwise    Historical Information   Past Medical History:   Diagnosis Date    Anxiety 08/18/2024    Asthma     COPD (chronic obstructive pulmonary disease) (HCC)     Disease of thyroid  gland     GERD (gastroesophageal reflux disease)     Hypertension 10/11/2018    Hypothyroid     Normocytic anemia 08/28/2024    Osteoarthritis 09/26/2012    Seizure-like activity (HCC) 10/18/2024    Temporal arteritis (HCC)     Type 2 diabetes mellitus with complication, without long-term current use of insulin (HCC) 01/14/2020     Past Surgical History:   Procedure Laterality Date    EYE SURGERY Right 12/06/2019    cataract LVCFS    HYSTERECTOMY      NO PAST SURGERIES      ALSO NO RELEVANT PAST SURRGICAL HX AS PER [facility]     Family History   Problem Relation Age of Onset    Breast cancer Mother     Emphysema Father          Meds/Allergies     Current Outpatient Medications:     acetaminophen (TYLENOL) 500 mg tablet, Take 1 tablet (500 mg total) by mouth every 6 (six) hours as needed (pain fevers), Disp: 30 tablet, Rfl: 0    albuterol (PROVENTIL HFA,VENTOLIN HFA) 90 mcg/act inhaler, Inhale 2 puffs every 6 (six) hours as needed for wheezing or shortness of breath, Disp: 18 g, Rfl: 2    amLODIPine (NORVASC) 5 mg tablet, Take 1 tablet (5 mg total) by mouth daily, Disp: , Rfl:     ascorbic acid (VITAMIN C) 500 mg tablet, Take 500 mg by mouth daily., Disp: , Rfl:     aspirin (ECOTRIN LOW STRENGTH) 81 mg EC tablet, Take 1 tablet (81 mg total) by mouth daily, Disp: 90 tablet, Rfl: 3    BLACK ELDERBERRY PO, Take 2 tablets by mouth daily., Disp: , Rfl:     budesonide (Pulmicort) 0.5 mg/2 mL nebulizer solution, Take 2 mL (0.5 mg total) by nebulization 2 (two) times a day Rinse mouth after use. May take this right after taking levabuterol/ipratropium., Disp: 120 mL, Rfl: 0    Calcium Carb-Cholecalciferol (CALCIUM 600/VITAMIN D3 PO), Take 1 tablet by mouth daily., Disp: , Rfl:     carboxymethylcellulose (Refresh Tears) 0.5 % SOLN, Administer 2 drops to both eyes daily., Disp: , Rfl:     Diclofenac Sodium (VOLTAREN) 1 %, Apply 2 g topically 4 (four) times a day, Disp: , Rfl:     DULoxetine (CYMBALTA) 30 mg delayed release  capsule, Take 1 capsule (30 mg total) by mouth daily at bedtime, Disp: , Rfl:     Humidifier MISC, Use if needed (Attach to Oxygen concentrator), Disp: 1 each, Rfl: 0    ipratropium (ATROVENT) 0.02 % nebulizer solution, Take 2.5 mL (0.5 mg total) by nebulization 2 (two) times a day, Disp: , Rfl:     levalbuterol (XOPENEX) 1.25 mg/3 mL nebulizer solution, Take 1.25 mg by nebulization 2 (two) times a day, Disp: , Rfl:     levothyroxine 25 mcg tablet, Take 1 tablet (25 mcg total) by mouth daily in the early morning, Disp: , Rfl:     lidocaine (LIDODERM) 5 %, Apply 1 patch topically over 12 hours daily Remove & Discard patch within 12 hours or as directed by MD, Disp: , Rfl:     magnesium hydroxide (MILK OF MAGNESIA) 400 mg/5 mL oral suspension, Take 15 mL by mouth daily as needed for constipation, Disp: , Rfl:     melatonin 3 mg, Take 1 tablet (3 mg total) by mouth daily at bedtime, Disp: , Rfl:     menthol-methyl salicylate (BENGAY) 10-15 % cream, Apply topically 3 (three) times a day, Disp: , Rfl:     mirtazapine (REMERON) 7.5 MG tablet, Take 1 tablet (7.5 mg total) by mouth daily at bedtime, Disp: , Rfl:     Multiple Vitamins-Minerals (PreserVision AREDS 2+Multi Vit) CAPS, Take 1 capsule by mouth every morning, Disp: , Rfl:     nitrofurantoin (MACROBID) 100 mg capsule, Take 1 capsule (100 mg total) by mouth daily after lunch, Disp: 30 capsule, Rfl: 2    oxygen gas, Inhale 2 L/min continuous. via nasal cannula. , Disp: , Rfl:     pantoprazole (PROTONIX) 40 mg tablet, TAKE ONE TABLET BY MOUTH ONCE DAILY, Disp: 90 tablet, Rfl: 1    polyethylene glycol (MIRALAX) 17 g packet, Take 17 g by mouth daily, Disp: , Rfl:     predniSONE 2.5 mg tablet, TAKE ONE TABLET BY MOUTH ONCE DAILY WITH BREAKFAST, Disp: 30 tablet, Rfl: 0    propranolol (INDERAL) 40 mg tablet, Take 1 tablet (40 mg total) by mouth every 12 (twelve) hours, Disp: , Rfl:     senna-docusate sodium (SENOKOT S) 8.6-50 mg per tablet, Take 1 tablet by mouth 2 (two)  "times a day, Disp: , Rfl:     sodium chloride (OCEAN) 0.65 % nasal spray, 1 spray into each nostril 3 (three) times a day, Disp: 15 mL, Rfl: 3    triamcinolone (KENALOG) 0.1 % cream, Use on Q Tip in left ear, twice daily, Disp: 15 g, Rfl: 1    vitamin B-12 (VITAMIN B-12) 1,000 mcg tablet, Take 1,000 mcg by mouth daily., Disp: , Rfl:   No Known Allergies    Vitals: 124/77; 97.6; 85; 97%    Physical Exam  Gen: appears chronically ill  Lungs: decreased diffusely b/l, no wheezing    Labs: I have personally reviewed pertinent lab results.  Lab Results   Component Value Date    WBC 8.76 2024    HGB 10.3 (L) 2024    HCT 33.7 (L) 2024    MCV 93 2024     2024     Lab Results   Component Value Date    GLUCOSE 127 10/26/2024    CALCIUM 8.6 2024    K 4.6 2024    CO2 36 (H) 2024    CL 97 2024    BUN 18 2024    CREATININE 0.41 (L) 2024     No results found for: \"IGE\"  Lab Results   Component Value Date    ALT 16 10/27/2024    AST 16 10/27/2024    ALKPHOS 37 10/27/2024     Labs per my interpretation show normal renal function, anemia.    Imaging and other studies: Results Review Statement: I personally reviewed the following image studies in PACS and associated radiology reports: CT chest. My interpretation of the radiology images/reports is: CTA chest 10/26/24: per my review shows severe emphysema with biapical pleural parenchymal scarring mild pulmonary edema; 5 mm LLL nodule; stable RML opacity.    Pulmonary function testin24 per my review shows very severe obstruction FEV1 0.41L 22% predicted    EKG, Pathology, and Other Studies: Results Review Statement: I reviewed radiology reports from this admission including: Echocardiogram.  ECHO 10/2/23: EF normal, grade 1 diastolic dysfunction; PAP 56 mm Hg    Ashli Wu D.O.  [facility]      "

## 2024-11-06 NOTE — ASSESSMENT & PLAN NOTE
Very severe COPD, FEV1 0.41L  - Prednisone is not for lungs, is for TA  - Cont. Nebulizers as ordered  - Activity as able  - flu, COVID, RSV vaccines

## 2024-11-06 NOTE — ASSESSMENT & PLAN NOTE
- cont. 2LPM oxygen for goal pulse ox 88-92%  - Cont. Trilogy with all sleep  - Avoid sedating medications

## 2024-11-06 NOTE — ASSESSMENT & PLAN NOTE
Quit in 2013, 1 ppd x 54 years  Committed to abstinence  Qualifies for yearly screening CT when nodule work up is complete

## 2024-11-11 ENCOUNTER — PATIENT OUTREACH (OUTPATIENT)
Dept: CASE MANAGEMENT | Facility: OTHER | Age: 79
End: 2024-11-11

## 2024-11-15 ENCOUNTER — PATIENT OUTREACH (OUTPATIENT)
Dept: CASE MANAGEMENT | Facility: OTHER | Age: 79
End: 2024-11-15

## 2024-11-19 ENCOUNTER — TELEPHONE (OUTPATIENT)
Dept: OTHER | Facility: OTHER | Age: 79
End: 2024-11-19

## 2024-11-19 ENCOUNTER — NURSING HOME VISIT (OUTPATIENT)
Dept: PULMONOLOGY | Facility: CLINIC | Age: 79
End: 2024-11-19
Payer: COMMERCIAL

## 2024-11-19 DIAGNOSIS — J44.9 COPD, SEVERE (HCC): ICD-10-CM

## 2024-11-19 DIAGNOSIS — J96.12 CHRONIC RESPIRATORY FAILURE WITH HYPOXIA AND HYPERCAPNIA (HCC): Primary | ICD-10-CM

## 2024-11-19 DIAGNOSIS — J96.11 CHRONIC RESPIRATORY FAILURE WITH HYPOXIA AND HYPERCAPNIA (HCC): Primary | ICD-10-CM

## 2024-11-19 DIAGNOSIS — F17.211 CIGARETTE NICOTINE DEPENDENCE IN REMISSION: ICD-10-CM

## 2024-11-19 DIAGNOSIS — R91.1 LEFT UPPER LOBE PULMONARY NODULE: ICD-10-CM

## 2024-11-19 PROCEDURE — G2211 COMPLEX E/M VISIT ADD ON: HCPCS | Performed by: INTERNAL MEDICINE

## 2024-11-19 PROCEDURE — 99309 SBSQ NF CARE MODERATE MDM 30: CPT | Performed by: INTERNAL MEDICINE

## 2024-11-20 NOTE — TELEPHONE ENCOUNTER
Pt called stating that she was seen by the [facility] and that they told her that she needed to schedule with her PCP.    Asked pt what she was coming in for and she was unsure. Asked pt if it was for a PreOp clx and pt advised that she was not having surgery. Pt stated the [facility] wanted her to be seen ASAP to see if further testing is needed.    Advised pt that she was scheduled for 12/9/24 and asked her if she needed to be seen sooner. Pt was unsure.    Warm transferred to Janey with clerical to further assist pt with her needs.

## 2024-11-20 NOTE — PROGRESS NOTES
POS:  short term        Pulmonary Follow Up Note   Sarah Zayas 79 y.o. female MRN: [id number]  11/20/2024      Outpatient pulmonologist: Dr. Nicholson    Assessment and Plan:    Chronic respiratory failure with hypoxia and hypercapnia (HCC)  Cont. 2LPM oxygen for goal pulse ox 88-92%  Cont. Trilogy with all sleep  I reviewed risks of untreated hypercapnia including death, MI, arrhythmia, etc. I encouraged pt to place Trilogy on early to avoid not wearing it with sleep.  - Avoid sedatng medications    COPD, severe (HCC)  Very severe CPD, FEV1 0.41L  - Cont. all nebulized regimen with Budesonide BID and Duonebs.  Increase Duonebs to Q6H.  - I believe she had one AECOPD this year.  If recurs, would consider adding Azithromycin three times weekly for frequent exacerbations  - ambulate as able  - flu/COVID/RSV vaccines all indicated.    Left upper lobe pulmonary nodule  Stable on imaging. Planned repeat CT in one year for surveillance - due 10/2025    Cigarette nicotine dependence in remission  Quit smoking 2013; 1 ppd x 54 years  Qualifies for yearly screening CT scan when nodule work up is complete      Diagnoses and all orders for this visit:    Chronic respiratory failure with hypoxia and hypercapnia (HCC)    COPD, severe (HCC)    Left upper lobe pulmonary nodule    Cigarette nicotine dependence in remission    Plan of care discussed with pt. Concerns addressed.   Discussed with Izaiah Rosas PA-C, primary team.  Return for follow up in 1 month or sooner if needed.    History of Present Illness   HPI:  Sarah Zayas is a 79 y.o. female who has known severe COPD, chronic hypoxic and hypercapnic respiratory failure, anemia, cachexia and is here for rehab. Last seen by me 11/6/2024.  Since then, she is doing okay, working with PT. She had a brief episode of confusion last week that quickly resolved and work up was unrevealing. She wears her Trilogy nightly.    Found in chair. Winded after walking from bathroom. Feels her  breathing is at its baseline. Perturbed that she did not wear her Trilogy last night because she fell asleep and forgot to ask nursing to put it on. Frustrated that the RN did not wake her to put it on.     Review of Systems  Positive as above and negative otherwise    Historical Information   Past Medical History:   Diagnosis Date    Anxiety 08/18/2024    Asthma     COPD (chronic obstructive pulmonary disease) (HCC)     Disease of thyroid gland     GERD (gastroesophageal reflux disease)     Hypertension 10/11/2018    Hypothyroid     Normocytic anemia 08/28/2024    Osteoarthritis 09/26/2012    Seizure-like activity ([facility]) 10/18/2024    Temporal arteritis ([facility])     Type 2 diabetes mellitus with complication, without long-term current use of insulin ([facility]) 01/14/2020     Past Surgical History:   Procedure Laterality Date    EYE SURGERY Right 12/06/2019    cataract LVCFS    HYSTERECTOMY      NO PAST SURGERIES      ALSO NO RELEVANT PAST SURRGICAL HX AS PER [facility]     Family History   Problem Relation Age of Onset    Breast cancer Mother     Emphysema Father          Meds/Allergies     Current Outpatient Medications:     acetaminophen (TYLENOL) 500 mg tablet, Take 1 tablet (500 mg total) by mouth every 6 (six) hours as needed (pain fevers), Disp: 30 tablet, Rfl: 0    albuterol (PROVENTIL HFA,VENTOLIN HFA) 90 mcg/act inhaler, Inhale 2 puffs every 6 (six) hours as needed for wheezing or shortness of breath, Disp: 18 g, Rfl: 2    amLODIPine (NORVASC) 5 mg tablet, Take 1 tablet (5 mg total) by mouth daily, Disp: , Rfl:     ascorbic acid (VITAMIN C) 500 mg tablet, Take 500 mg by mouth daily., Disp: , Rfl:     aspirin (ECOTRIN LOW STRENGTH) 81 mg EC tablet, Take 1 tablet (81 mg total) by mouth daily, Disp: 90 tablet, Rfl: 3    BLACK ELDERBERRY PO, Take 2 tablets by mouth daily., Disp: , Rfl:     budesonide (Pulmicort) 0.5 mg/2 mL nebulizer solution, Take 2 mL (0.5 mg total) by nebulization 2 (two) times a day Rinse mouth after  use. May take this right after taking levabuterol/ipratropium., Disp: 120 mL, Rfl: 0    Calcium Carb-Cholecalciferol (CALCIUM 600/VITAMIN D3 PO), Take 1 tablet by mouth daily., Disp: , Rfl:     carboxymethylcellulose (Refresh Tears) 0.5 % SOLN, Administer 2 drops to both eyes daily., Disp: , Rfl:     Diclofenac Sodium (VOLTAREN) 1 %, Apply 2 g topically 4 (four) times a day, Disp: , Rfl:     DULoxetine (CYMBALTA) 30 mg delayed release capsule, Take 1 capsule (30 mg total) by mouth daily at bedtime, Disp: , Rfl:     Humidifier MISC, Use if needed (Attach to Oxygen concentrator), Disp: 1 each, Rfl: 0    ipratropium (ATROVENT) 0.02 % nebulizer solution, Take 2.5 mL (0.5 mg total) by nebulization 2 (two) times a day, Disp: , Rfl:     levalbuterol (XOPENEX) 1.25 mg/3 mL nebulizer solution, Take 1.25 mg by nebulization 2 (two) times a day, Disp: , Rfl:     levothyroxine 25 mcg tablet, Take 1 tablet (25 mcg total) by mouth daily in the early morning, Disp: , Rfl:     lidocaine (LIDODERM) 5 %, Apply 1 patch topically over 12 hours daily Remove & Discard patch within 12 hours or as directed by MD, Disp: , Rfl:     magnesium hydroxide (MILK OF MAGNESIA) 400 mg/5 mL oral suspension, Take 15 mL by mouth daily as needed for constipation, Disp: , Rfl:     melatonin 3 mg, Take 1 tablet (3 mg total) by mouth daily at bedtime, Disp: , Rfl:     menthol-methyl salicylate (BENGAY) 10-15 % cream, Apply topically 3 (three) times a day, Disp: , Rfl:     mirtazapine (REMERON) 7.5 MG tablet, Take 1 tablet (7.5 mg total) by mouth daily at bedtime, Disp: , Rfl:     Multiple Vitamins-Minerals (PreserVision AREDS 2+Multi Vit) CAPS, Take 1 capsule by mouth every morning, Disp: , Rfl:     nitrofurantoin (MACROBID) 100 mg capsule, Take 1 capsule (100 mg total) by mouth daily after lunch, Disp: 30 capsule, Rfl: 2    oxygen gas, Inhale 2 L/min continuous. via nasal cannula. , Disp: , Rfl:     pantoprazole (PROTONIX) 40 mg tablet, TAKE ONE TABLET BY  "MOUTH ONCE DAILY, Disp: 90 tablet, Rfl: 1    polyethylene glycol (MIRALAX) 17 g packet, Take 17 g by mouth daily, Disp: , Rfl:     predniSONE 2.5 mg tablet, TAKE ONE TABLET BY MOUTH ONCE DAILY WITH BREAKFAST, Disp: 30 tablet, Rfl: 0    propranolol (INDERAL) 40 mg tablet, Take 1 tablet (40 mg total) by mouth every 12 (twelve) hours, Disp: , Rfl:     senna-docusate sodium (SENOKOT S) 8.6-50 mg per tablet, Take 1 tablet by mouth 2 (two) times a day, Disp: , Rfl:     sodium chloride (OCEAN) 0.65 % nasal spray, 1 spray into each nostril 3 (three) times a day, Disp: 15 mL, Rfl: 3    triamcinolone (KENALOG) 0.1 % cream, Use on Q Tip in left ear, twice daily, Disp: 15 g, Rfl: 1    vitamin B-12 (VITAMIN B-12) 1,000 mcg tablet, Take 1,000 mcg by mouth daily., Disp: , Rfl:   No Known Allergies    Vitals: 135/70; 97.6; 18bpm; HR 81 bpm     Physical Exam  Gen: thin, mild respiratory distress  HEENT: HEENT, NCAT  Lungs: +pursed lip breathing      Labs: I have personally reviewed pertinent lab results.  Lab Results   Component Value Date    WBC 8.76 2024    HGB 10.3 (L) 2024    HCT 33.7 (L) 2024    MCV 93 2024     2024     Lab Results   Component Value Date    GLUCOSE 127 10/26/2024    CALCIUM 8.6 2024    K 4.6 2024    CO2 36 (H) 2024    CL 97 2024    BUN 18 2024    CREATININE 0.41 (L) 2024     No results found for: \"IGE\"  Lab Results   Component Value Date    ALT 16 10/27/2024    AST 16 10/27/2024    ALKPHOS 37 10/27/2024     Labs per my interpretation show normal renal function    Imaging and other studies: Results Review Statement: I personally reviewed the following image studies in PACS and associated radiology reports: chest xray. My interpretation of the radiology images/reports is: CXR per my review shows clear lungs, hyperinflation, severe kyphoscoliosis .    Pulmonary function testin24: very severe obstruction FEV1 0.41L 22% predicted "     Ashli uW D.O.  [facility]

## 2024-11-20 NOTE — TELEPHONE ENCOUNTER
Spoke to patient. She states she is still taking the prednisone 2.5.  she states she has no new symptoms,.  However, do you want her do blood work before her Dec 9 appointment?  Reena advisotf

## 2024-11-20 NOTE — ASSESSMENT & PLAN NOTE
Cont. 2LPM oxygen for goal pulse ox 88-92%  Cont. Trilogy with all sleep  I reviewed risks of untreated hypercapnia including death, MI, arrhythmia, etc. I encouraged pt to place Trilogy on early to avoid not wearing it with sleep.  - Avoid sedatng medications

## 2024-11-20 NOTE — ASSESSMENT & PLAN NOTE
Very severe CPD, FEV1 0.41L  - Cont. all nebulized regimen with Budesonide BID and Duonebs.  Increase Duonebs to Q6H.  - I believe she had one AECOPD this year.  If recurs, would consider adding Azithromycin three times weekly for frequent exacerbations  - ambulate as able  - flu/COVID/RSV vaccines all indicated.

## 2024-11-20 NOTE — TELEPHONE ENCOUNTER
Patient called saying that she had a visit today with the center for [facility] and was told she needs to make an appointment with her PCP. Patient is asking if the office can give her a call to schedule the appointment.

## 2024-11-20 NOTE — ASSESSMENT & PLAN NOTE
Quit smoking 2013; 1 ppd x 54 years  Qualifies for yearly screening CT scan when nodule work up is complete

## 2024-11-21 ENCOUNTER — TELEPHONE (OUTPATIENT)
Age: 79
End: 2024-11-21

## 2024-11-21 DIAGNOSIS — M31.6 TEMPORAL ARTERITIS (HCC): Primary | ICD-10-CM

## 2024-11-21 NOTE — TELEPHONE ENCOUNTER
Patient called in regards to wanting her blood work faxed to [facility] and there phone number is [phone].Patient stated that she is not sure if this is there fax number and would like office to call and confirm that this is there fax number before faxing over labs to [phone], Patient would like a call back once labs have been faxed.

## 2024-11-21 NOTE — TELEPHONE ENCOUNTER
Patient calls to report that she needs her lab orders faxed to her rehab facility. Patient was unsure of her facility name. [facility]. I am unsure if the facility accepts orders from outside physicians. Dr. Nix placed orders yesterday. Please check with the facility to see if they accept orders from Dr. Nix.

## 2024-11-22 ENCOUNTER — TELEPHONE (OUTPATIENT)
Age: 79
End: 2024-11-22

## 2024-11-22 ENCOUNTER — PATIENT OUTREACH (OUTPATIENT)
Dept: CASE MANAGEMENT | Facility: OTHER | Age: 79
End: 2024-11-22

## 2024-11-22 NOTE — PROGRESS NOTES
Chart review complete the patient is currently admitted to [facility]. This Admin Coordinator will continue to monitor via chart review.

## 2024-11-22 NOTE — TELEPHONE ENCOUNTER
Pt calls in and states she missed her appt 11/18 she has been in and out of the hospital and rehab. Pt wants to apologize and states she will call back with her son to r/s her appt

## 2024-12-02 ENCOUNTER — PATIENT OUTREACH (OUTPATIENT)
Dept: CASE MANAGEMENT | Facility: OTHER | Age: 79
End: 2024-12-02

## 2024-12-02 NOTE — PROGRESS NOTES
Chart review complete It has been 30 days since SNF/STR Surveillance episode was opened I will no longer be following the patient per protocol. I have removed myself from the care team, updated the Care Coordination note, and closed the episode.

## 2024-12-03 ENCOUNTER — PATIENT OUTREACH (OUTPATIENT)
Dept: CASE MANAGEMENT | Facility: OTHER | Age: 79
End: 2024-12-03

## 2024-12-03 NOTE — PROGRESS NOTES
Received in[employer] message patient has been  in rehab for over 30 days with no discharge date.  Will close to complex  care management at this time.

## 2024-12-04 ENCOUNTER — TELEPHONE (OUTPATIENT)
Age: 79
End: 2024-12-04

## 2024-12-04 NOTE — TELEPHONE ENCOUNTER
Patient called in regards to wanting to know if provider submitted form for patient to get home health care, patient was informed the form was filled out and submitted.

## 2024-12-06 ENCOUNTER — TELEPHONE (OUTPATIENT)
Age: 79
End: 2024-12-06

## 2024-12-06 DIAGNOSIS — U07.1 LAB TEST POSITIVE FOR DETECTION OF COVID-19 VIRUS: Primary | ICD-10-CM

## 2024-12-06 RX ORDER — NIRMATRELVIR AND RITONAVIR 150-100 MG
2 KIT ORAL 2 TIMES DAILY
Qty: 20 TABLET | Refills: 0 | Status: SHIPPED | OUTPATIENT
Start: 2024-12-06 | End: 2024-12-11

## 2024-12-06 NOTE — TELEPHONE ENCOUNTER
Patient called stating that she is currently in rehab at [facility].  She tested positive for Covid today and the nurse at the rehab told her to call her PCP to see if she would be safe to take Paxlovid.  Please return patient's call at [phone]

## 2024-12-06 NOTE — TELEPHONE ENCOUNTER
Patient called in again and states that she needs this medication due to having COPD and was advised that  is not in the office.

## 2024-12-09 ENCOUNTER — TELEPHONE (OUTPATIENT)
Age: 79
End: 2024-12-09

## 2024-12-09 NOTE — TELEPHONE ENCOUNTER
Hugo from [facility] called and stated that they have usually provided pt with all her meds in a bubblepack up until 10/14/24 when she entered into a rehab facility.     Pt will be getting out of rehab frandy and they would like an updated list of her medication sent to the pharmacy.    Hugo also asks that the scripts for her new and current medications are faxed to pharmacy @[phone] so there is no change or lapse in her taking her medication.    Please advise.

## 2024-12-10 RX ORDER — METOPROLOL TARTRATE 25 MG/1
1 TABLET, FILM COATED ORAL EVERY 12 HOURS
COMMUNITY
Start: 2024-10-24

## 2024-12-10 NOTE — TELEPHONE ENCOUNTER
[facility] called to confirm that they will be getting the new and current medication scripts faxed to [phone]. Confirmed this is being worked on per note from Dr. Nix. Please release scripts so the pharmacy can prepare patients bubble packs.

## 2024-12-10 NOTE — TELEPHONE ENCOUNTER
Spoke with the pharmacist to let her know that we do not have current med list.  The patient needs to follow up with our office.  She verbalized understanding.

## 2024-12-20 LAB

## 2024-12-24 ENCOUNTER — TELEPHONE (OUTPATIENT)
Age: 79
End: 2024-12-24

## 2024-12-24 NOTE — TELEPHONE ENCOUNTER
Marcia Barboza from [facility] called inquiring about patient's last office visit. I advised that the last office visit was on 9/16/2024. She stated the patient was just discharged from Inpatient Rehab on 12/19/2024. She told the patient to call for a TCM appointment earlier this week. She requested if we can call the patient to schedule a TCM appointment. I advised that Dr. Nix will not be back in the office until 1/6/2024.

## 2024-12-30 NOTE — TELEPHONE ENCOUNTER
Pt called back to confirm her tcm appt and inquire about labs. Confirmed appt for 1/13/25 @ 10am and advised pt there are active labs Dr. Nix ordered on 11/21. She was going to do lab work prior to appt but will wait until after visit on 1/13 in case Dr. Nix would like to add any additional testing.

## 2025-01-03 ENCOUNTER — TELEPHONE (OUTPATIENT)
Age: 80
End: 2025-01-03

## 2025-01-03 DIAGNOSIS — Z91.89 AT RISK FOR DELIRIUM: ICD-10-CM

## 2025-01-03 DIAGNOSIS — F41.9 ANXIETY: ICD-10-CM

## 2025-01-03 DIAGNOSIS — I10 HYPERTENSION: ICD-10-CM

## 2025-01-03 NOTE — TELEPHONE ENCOUNTER
Patient called in regards having a UTI due to having to go due frequently and her urine is cloud. Patient would like a mobile lab to come out to take her urine and take her blood also for her upcoming appointment. Patient would like a call back once a mobile lab comes out.

## 2025-01-06 ENCOUNTER — TELEPHONE (OUTPATIENT)
Dept: LAB | Facility: HOSPITAL | Age: 80
End: 2025-01-06

## 2025-01-06 ENCOUNTER — TELEPHONE (OUTPATIENT)
Age: 80
End: 2025-01-06

## 2025-01-06 DIAGNOSIS — N39.0 ACUTE UTI: Primary | ICD-10-CM

## 2025-01-06 RX ORDER — CIPROFLOXACIN 250 MG/1
250 TABLET, FILM COATED ORAL 2 TIMES DAILY WITH MEALS
Qty: 14 TABLET | Refills: 0 | Status: SHIPPED | OUTPATIENT
Start: 2025-01-06 | End: 2025-01-10

## 2025-01-06 NOTE — TELEPHONE ENCOUNTER
Patient called and stated that she is on antibiotic and she now has a UTI.  She was hopeful that something could be sent in to the [facility] prior to her appt next week?  She also wanted to see if we can set up the mobile lab for her blood work?  [facility] call and advise

## 2025-01-06 NOTE — TELEPHONE ENCOUNTER
Spoke with the patient.  Gave her info to call the mobile lab.  Also informed her that Dr. Nix sent in [facility] for her UTI.

## 2025-01-07 ENCOUNTER — TELEPHONE (OUTPATIENT)
Dept: LAB | Facility: HOSPITAL | Age: 80
End: 2025-01-07

## 2025-01-07 NOTE — TELEPHONE ENCOUNTER
Pt scheduled with mobile lab for 1/15, is asking if orders of urinalysis can be added as well. Please advise, thank you.

## 2025-01-08 RX ORDER — PROPRANOLOL HYDROCHLORIDE 40 MG/1
TABLET ORAL
Qty: 28 TABLET | OUTPATIENT
Start: 2025-01-08

## 2025-01-08 NOTE — TELEPHONE ENCOUNTER
Requested medication(s) are due for refill today: No  Patient has already received a courtesy refill: No  Other reason request has been forwarded to provider: not order by one of our providers, please assist

## 2025-01-09 ENCOUNTER — TELEPHONE (OUTPATIENT)
Dept: FAMILY MEDICINE CLINIC | Facility: CLINIC | Age: 80
End: 2025-01-09

## 2025-01-09 NOTE — TELEPHONE ENCOUNTER
Spoke to patient. She is going to call the rehab center. They need to fax her discharge medication list to us.  Leyn has her follow up with Dr Nix on Monday, Jan 13, and he will go over the med list at that time.

## 2025-01-09 NOTE — TELEPHONE ENCOUNTER
Patient called in stating that [facility] pharmacy told her they have made numerous attempts to get her medications refilled and haven't heard anything back. She isn't sure which ones she needs and would like if someone from the office could give them a phone call to get this straightened out because she needs her medications.

## 2025-01-09 NOTE — TELEPHONE ENCOUNTER
[facility] called the RX Refill Line. Message is being forwarded to the office.     They are trying to bubble pack her meds but they aren't sure what meds that she was discharged form the rehab center she is supposed to continue taking.  They stated her med list got all screwed up and they dont know what was prescribed by PCP and what was prescribed while in rehab.  They would like someone from the office to call them to go over the meds that they can prepare in the bubble packs for the patient.     Please contact Linsey at [employer] [phone]

## 2025-01-10 DIAGNOSIS — J43.9 PULMONARY EMPHYSEMA, UNSPECIFIED EMPHYSEMA TYPE (HCC): ICD-10-CM

## 2025-01-10 DIAGNOSIS — M31.6 TEMPORAL ARTERITIS (HCC): ICD-10-CM

## 2025-01-10 DIAGNOSIS — K21.9 GASTROESOPHAGEAL REFLUX DISEASE: ICD-10-CM

## 2025-01-10 DIAGNOSIS — J44.9 COPD, SEVERE (HCC): ICD-10-CM

## 2025-01-10 DIAGNOSIS — J44.1 ACUTE EXACERBATION OF CHRONIC OBSTRUCTIVE PULMONARY DISEASE (COPD) (HCC): ICD-10-CM

## 2025-01-10 DIAGNOSIS — F41.9 ANXIETY: ICD-10-CM

## 2025-01-10 DIAGNOSIS — E43 SEVERE PROTEIN-CALORIE MALNUTRITION (HCC): ICD-10-CM

## 2025-01-10 RX ORDER — METOPROLOL TARTRATE 25 MG/1
25 TABLET, FILM COATED ORAL EVERY 12 HOURS SCHEDULED
Qty: 60 TABLET | Refills: 3 | Status: SHIPPED | OUTPATIENT
Start: 2025-01-10 | End: 2025-01-13 | Stop reason: SDUPTHER

## 2025-01-10 RX ORDER — PANTOPRAZOLE SODIUM 40 MG/1
40 TABLET, DELAYED RELEASE ORAL DAILY
Qty: 90 TABLET | Refills: 1 | Status: SHIPPED | OUTPATIENT
Start: 2025-01-10 | End: 2025-01-13 | Stop reason: SDUPTHER

## 2025-01-10 RX ORDER — PREDNISONE 2.5 MG/1
2.5 TABLET ORAL
Qty: 30 TABLET | Refills: 0 | Status: SHIPPED | OUTPATIENT
Start: 2025-01-10 | End: 2025-01-13 | Stop reason: SDUPTHER

## 2025-01-10 RX ORDER — ALBUTEROL SULFATE 90 UG/1
2 INHALANT RESPIRATORY (INHALATION) EVERY 6 HOURS PRN
Qty: 18 G | Refills: 2 | Status: SHIPPED | OUTPATIENT
Start: 2025-01-10

## 2025-01-10 RX ORDER — MIRTAZAPINE 7.5 MG/1
7.5 TABLET, FILM COATED ORAL
Qty: 30 TABLET | Refills: 3 | Status: SHIPPED | OUTPATIENT
Start: 2025-01-10

## 2025-01-10 RX ORDER — BUDESONIDE 0.5 MG/2ML
0.5 INHALANT ORAL 2 TIMES DAILY
Qty: 120 ML | Refills: 0 | Status: SHIPPED | OUTPATIENT
Start: 2025-01-10 | End: 2025-02-09

## 2025-01-10 NOTE — TELEPHONE ENCOUNTER
L/m on patient's phone that we received mediation list from the rehab, and Dr Nix sent in medications.

## 2025-01-13 ENCOUNTER — OFFICE VISIT (OUTPATIENT)
Dept: FAMILY MEDICINE CLINIC | Facility: CLINIC | Age: 80
End: 2025-01-13
Payer: COMMERCIAL

## 2025-01-13 VITALS
WEIGHT: 97 LBS | HEIGHT: 62 IN | BODY MASS INDEX: 17.85 KG/M2 | RESPIRATION RATE: 15 BRPM | HEART RATE: 88 BPM | TEMPERATURE: 97.3 F | OXYGEN SATURATION: 93 % | SYSTOLIC BLOOD PRESSURE: 124 MMHG | DIASTOLIC BLOOD PRESSURE: 70 MMHG

## 2025-01-13 DIAGNOSIS — E11.69 HYPERLIPIDEMIA ASSOCIATED WITH TYPE 2 DIABETES MELLITUS  (HCC): ICD-10-CM

## 2025-01-13 DIAGNOSIS — G89.29 CHRONIC MIDLINE LOW BACK PAIN WITHOUT SCIATICA: ICD-10-CM

## 2025-01-13 DIAGNOSIS — M31.6 TEMPORAL ARTERITIS (HCC): ICD-10-CM

## 2025-01-13 DIAGNOSIS — M54.50 CHRONIC MIDLINE LOW BACK PAIN WITHOUT SCIATICA: ICD-10-CM

## 2025-01-13 DIAGNOSIS — I10 PRIMARY HYPERTENSION: ICD-10-CM

## 2025-01-13 DIAGNOSIS — K21.9 GASTROESOPHAGEAL REFLUX DISEASE: ICD-10-CM

## 2025-01-13 DIAGNOSIS — R73.09 ELEVATED HEMOGLOBIN A1C: ICD-10-CM

## 2025-01-13 DIAGNOSIS — Z23 ENCOUNTER FOR IMMUNIZATION: ICD-10-CM

## 2025-01-13 DIAGNOSIS — E78.5 HYPERLIPIDEMIA ASSOCIATED WITH TYPE 2 DIABETES MELLITUS  (HCC): ICD-10-CM

## 2025-01-13 DIAGNOSIS — E06.3 HYPOTHYROIDISM DUE TO HASHIMOTO'S THYROIDITIS: ICD-10-CM

## 2025-01-13 DIAGNOSIS — J44.9 COPD, SEVERE (HCC): Primary | ICD-10-CM

## 2025-01-13 DIAGNOSIS — F41.9 ANXIETY: ICD-10-CM

## 2025-01-13 DIAGNOSIS — E43 SEVERE PROTEIN-CALORIE MALNUTRITION (HCC): ICD-10-CM

## 2025-01-13 DIAGNOSIS — M81.8 IDIOPATHIC OSTEOPOROSIS: ICD-10-CM

## 2025-01-13 PROBLEM — R56.9 SEIZURE-LIKE ACTIVITY (HCC): Status: RESOLVED | Noted: 2024-10-18 | Resolved: 2025-01-13

## 2025-01-13 PROBLEM — J96.11 CHRONIC RESPIRATORY FAILURE WITH HYPOXIA AND HYPERCAPNIA (HCC): Status: RESOLVED | Noted: 2024-10-10 | Resolved: 2025-01-13

## 2025-01-13 PROBLEM — J96.12 CHRONIC RESPIRATORY FAILURE WITH HYPOXIA AND HYPERCAPNIA (HCC): Status: RESOLVED | Noted: 2024-10-10 | Resolved: 2025-01-13

## 2025-01-13 LAB — SL AMB POCT HEMOGLOBIN AIC: 5.8 (ref ?–6.5)

## 2025-01-13 PROCEDURE — 99496 TRANSJ CARE MGMT HIGH F2F 7D: CPT | Performed by: INTERNAL MEDICINE

## 2025-01-13 PROCEDURE — 83036 HEMOGLOBIN GLYCOSYLATED A1C: CPT | Performed by: INTERNAL MEDICINE

## 2025-01-13 PROCEDURE — 90662 IIV NO PRSV INCREASED AG IM: CPT

## 2025-01-13 PROCEDURE — G0008 ADMIN INFLUENZA VIRUS VAC: HCPCS

## 2025-01-13 RX ORDER — ASPIRIN 81 MG/1
81 TABLET ORAL EVERY OTHER DAY
Qty: 45 TABLET | Refills: 3 | Status: SHIPPED | OUTPATIENT
Start: 2025-01-13

## 2025-01-13 RX ORDER — CAL/D3/MAG11/ZINC/COP/MANG/BOR 600 MG-800
1 TABLET ORAL 2 TIMES DAILY WITH MEALS
Qty: 200 TABLET | Refills: 6 | Status: SHIPPED | OUTPATIENT
Start: 2025-01-13

## 2025-01-13 RX ORDER — PREDNISONE 2.5 MG/1
2.5 TABLET ORAL
Qty: 30 TABLET | Refills: 0 | Status: SHIPPED | OUTPATIENT
Start: 2025-01-13

## 2025-01-13 RX ORDER — LEVOTHYROXINE SODIUM 25 UG/1
25 TABLET ORAL
Qty: 90 TABLET | Refills: 3 | Status: SHIPPED | OUTPATIENT
Start: 2025-01-13

## 2025-01-13 RX ORDER — PANTOPRAZOLE SODIUM 40 MG/1
40 TABLET, DELAYED RELEASE ORAL DAILY
Qty: 90 TABLET | Refills: 3 | Status: SHIPPED | OUTPATIENT
Start: 2025-01-13

## 2025-01-13 RX ORDER — METOPROLOL TARTRATE 25 MG/1
25 TABLET, FILM COATED ORAL EVERY 12 HOURS SCHEDULED
Qty: 200 TABLET | Refills: 3 | Status: SHIPPED | OUTPATIENT
Start: 2025-01-13

## 2025-01-13 RX ORDER — AMLODIPINE BESYLATE 5 MG/1
5 TABLET ORAL DAILY
Qty: 90 TABLET | Refills: 3 | Status: SHIPPED | OUTPATIENT
Start: 2025-01-13

## 2025-01-13 RX ORDER — LANOLIN ALCOHOL/MO/W.PET/CERES
1000 CREAM (GRAM) TOPICAL DAILY
Qty: 90 TABLET | Refills: 3 | Status: SHIPPED | OUTPATIENT
Start: 2025-01-13

## 2025-01-13 NOTE — ASSESSMENT & PLAN NOTE
Stable on prednisone 2.5 mg daily  RTC in 1-2 mos w :  Orders:    metoprolol tartrate (LOPRESSOR) 25 mg tablet; Take 1 tablet (25 mg total) by mouth every 12 (twelve) hours    predniSONE 2.5 mg tablet; Take 1 tablet (2.5 mg total) by mouth daily with breakfast    UA (URINE) with reflex to Scope; Future    Magnesium; Future    Vitamin B12; Future    Ferritin; Future    Sedimentation rate, automated; Future    C-reactive protein; Future    Vitamin D 25 hydroxy; Future

## 2025-01-13 NOTE — PROGRESS NOTES
Transition of Care Visit  Name: Sarah Zayas      : 1945      MRN: [id number]  Encounter Provider: Nicolas Nix MD  Encounter Date: 2025   Encounter department: [facility]    Assessment & Plan  Encounter for immunization    Orders:    influenza vaccine, high-dose, PF 0.5 mL (Fluzone High Dose)    Hypothyroidism due to Hashimoto's thyroiditis    Orders:    POCT hemoglobin A1c    Comprehensive metabolic panel; Future    CBC and differential; Future    Lipid Panel with Direct LDL reflex; Future    TSH, 3rd generation with Free T4 reflex; Future    Calcium Carbonate-Vit D-Min (Caltrate 600+D Plus Minerals) 600-800 MG-UNIT TABS; Take 1 tablet by mouth 2 (two) times a day with meals    vitamin B-12 (VITAMIN B-12) 1,000 mcg tablet; Take 1 tablet (1,000 mcg total) by mouth daily    UA (URINE) with reflex to Scope; Future    Magnesium; Future    Vitamin B12; Future    Ferritin; Future    Sedimentation rate, automated; Future    C-reactive protein; Future    Vitamin D 25 hydroxy; Future    Elevated hemoglobin A1c    Orders:    POCT hemoglobin A1c    Comprehensive metabolic panel; Future    CBC and differential; Future    Lipid Panel with Direct LDL reflex; Future    TSH, 3rd generation with Free T4 reflex; Future    Calcium Carbonate-Vit D-Min (Caltrate 600+D Plus Minerals) 600-800 MG-UNIT TABS; Take 1 tablet by mouth 2 (two) times a day with meals    vitamin B-12 (VITAMIN B-12) 1,000 mcg tablet; Take 1 tablet (1,000 mcg total) by mouth daily    UA (URINE) with reflex to Scope; Future    Magnesium; Future    Vitamin B12; Future    Ferritin; Future    Sedimentation rate, automated; Future    C-reactive protein; Future    Vitamin D 25 hydroxy; Future    Hyperlipidemia associated with type 2 diabetes mellitus  (HCC)    Lab Results   Component Value Date    HGBA1C 5.8 2025       Orders:    POCT hemoglobin A1c    Comprehensive metabolic panel; Future    CBC and differential;  Future    Lipid Panel with Direct LDL reflex; Future    TSH, 3rd generation with Free T4 reflex; Future    Calcium Carbonate-Vit D-Min (Caltrate 600+D Plus Minerals) 600-800 MG-UNIT TABS; Take 1 tablet by mouth 2 (two) times a day with meals    vitamin B-12 (VITAMIN B-12) 1,000 mcg tablet; Take 1 tablet (1,000 mcg total) by mouth daily    Primary hypertension    Orders:    amLODIPine (NORVASC) 5 mg tablet; Take 1 tablet (5 mg total) by mouth daily    BMI (body mass index), pediatric, less than 5th percentile for age    Orders:    aspirin (ECOTRIN LOW STRENGTH) 81 mg EC tablet; Take 1 tablet (81 mg total) by mouth every other day    levothyroxine 25 mcg tablet; Take 1 tablet (25 mcg total) by mouth daily in the early morning    metoprolol tartrate (LOPRESSOR) 25 mg tablet; Take 1 tablet (25 mg total) by mouth every 12 (twelve) hours    COPD, severe (HCC)  Continue Home O2  Neb txs  FU w Pulmonary  Use inhalers  RTC in 1-2 mos w Blood work  Orders:    metoprolol tartrate (LOPRESSOR) 25 mg tablet; Take 1 tablet (25 mg total) by mouth every 12 (twelve) hours    Anxiety/depression    Orders:    metoprolol tartrate (LOPRESSOR) 25 mg tablet; Take 1 tablet (25 mg total) by mouth every 12 (twelve) hours    Severe protein-calorie malnutrition (HCC)    Orders:    metoprolol tartrate (LOPRESSOR) 25 mg tablet; Take 1 tablet (25 mg total) by mouth every 12 (twelve) hours    Gastroesophageal reflux disease    Orders:    metoprolol tartrate (LOPRESSOR) 25 mg tablet; Take 1 tablet (25 mg total) by mouth every 12 (twelve) hours    pantoprazole (PROTONIX) 40 mg tablet; Take 1 tablet (40 mg total) by mouth daily    Temporal arteritis (HCC)  Stable on prednisone 2.5 mg daily  RTC in 1-2 mos w :  Orders:    metoprolol tartrate (LOPRESSOR) 25 mg tablet; Take 1 tablet (25 mg total) by mouth every 12 (twelve) hours    predniSONE 2.5 mg tablet; Take 1 tablet (2.5 mg total) by mouth daily with breakfast    UA (URINE) with reflex to Scope;  Future    Magnesium; Future    Vitamin B12; Future    Ferritin; Future    Sedimentation rate, automated; Future    C-reactive protein; Future    Vitamin D 25 hydroxy; Future    Idiopathic osteoporosis  Continue :  Orders:    Calcium Carbonate-Vit D-Min (Caltrate 600+D Plus Minerals) 600-800 MG-UNIT TABS; Take 1 tablet by mouth 2 (two) times a day with meals    vitamin B-12 (VITAMIN B-12) 1,000 mcg tablet; Take 1 tablet (1,000 mcg total) by mouth daily    UA (URINE) with reflex to Scope; Future    Magnesium; Future    Vitamin B12; Future    Ferritin; Future    Sedimentation rate, automated; Future    C-reactive protein; Future    Vitamin D 25 hydroxy; Future    DXA bone density spine hip and pelvis; Future    Chronic midline low back pain without sciatica  Moist Heat  Home P.T.  Orders:    Lumbar Back Brace  On daytime, off nighttime.       History of Present Illness     Transitional Care Management Review:   Sarah Zayas is a 79 y.o. female here for TCM follow up.     During the TCM phone call patient stated:  TCM Call       Date and time call was made  11/4/2024  9:33 AM    Patient [facility] hospitial[facility] at  [facility]    Date of Admission  10/25/24    Date of discharge  11/02/24    Disposition  Nursing Home          TCM Call       Scheduled for follow up?  Not clinically warranted    Referrals needed  patient transferred to non SNF    I have advised the patient to call PCP with any new or worsening symptoms  Janey Hoskins, MR          79 Y O Lady is here for Post Hospital /TCM visit, she feels better, D/C summary and med list reviewed,...      Review of Systems   Constitutional:  Positive for fatigue. Negative for chills and fever.   HENT:  Negative for congestion, ear pain, facial swelling, sore throat, trouble swallowing and voice change.    Eyes:  Negative for pain, discharge and visual disturbance.   Respiratory:  Positive for shortness of breath. Negative for cough and wheezing.    Cardiovascular:  Negative  "for chest pain, palpitations and leg swelling.   Gastrointestinal:  Negative for abdominal pain, blood in stool, constipation, diarrhea, nausea and vomiting.   Endocrine: Negative for polydipsia, polyphagia and polyuria.   Genitourinary:  Negative for difficulty urinating, dysuria, hematuria and urgency.   Musculoskeletal:  Negative for arthralgias, back pain and myalgias.   Skin:  Negative for color change and rash.   Neurological:  Positive for dizziness. Negative for tremors, seizures, syncope, weakness and headaches.   Hematological:  Negative for adenopathy. Does not bruise/bleed easily.   Psychiatric/Behavioral:  Negative for dysphoric mood, sleep disturbance and suicidal ideas.    All other systems reviewed and are negative.    Objective   /70 (BP Location: Left arm, Patient Position: Sitting, Cuff Size: Standard)   Pulse 88   Temp (!) 97.3 °F (36.3 °C) (Tympanic Core)   Resp 15   Ht 5' 2\" (1.575 m)   Wt 44 kg (97 lb)   SpO2 93%   BMI 17.74 kg/m²     Physical Exam  Vitals and nursing note reviewed.   Constitutional:       General: She is not in acute distress.     Appearance: She is well-developed. She is not diaphoretic.   HENT:      Head: Normocephalic and atraumatic.      Right Ear: External ear normal.      Left Ear: External ear normal.      Nose: Nose normal.   Eyes:      General:         Right eye: No discharge.         Left eye: No discharge.      Conjunctiva/sclera: Conjunctivae normal.      Pupils: Pupils are equal, round, and reactive to light.   Neck:      Thyroid: No thyromegaly.      Trachea: No tracheal deviation.   Cardiovascular:      Rate and Rhythm: Normal rate and regular rhythm.      Heart sounds: Murmur heard.      No friction rub.   Pulmonary:      Effort: Pulmonary effort is normal. No respiratory distress.      Breath sounds: No stridor. Wheezing present. No rales.      Comments: Decreased Breathing sounds both bases  Abdominal:      General: Bowel sounds are normal. " There is no distension.      Palpations: Abdomen is soft.      Tenderness: There is no abdominal tenderness. There is no guarding.   Musculoskeletal:         General: No swelling, tenderness or deformity. Normal range of motion.      Cervical back: Normal range of motion and neck supple.   Lymphadenopathy:      Cervical: No cervical adenopathy.   Skin:     General: Skin is warm and dry.      Capillary Refill: Capillary refill takes less than 2 seconds.      Coloration: Skin is not pale.      Findings: No erythema or rash.   Neurological:      Mental Status: She is alert and oriented to person, place, and time.      Cranial Nerves: No cranial nerve deficit.      Sensory: Sensory deficit present.      Motor: Weakness present.      Coordination: Coordination normal.      Comments: Pt uses a walker to support gait   Psychiatric:         Mood and Affect: Mood normal.         Behavior: Behavior normal.       Medications have been reviewed by provider in current encounter

## 2025-01-13 NOTE — PROGRESS NOTES
TCM Call       Date and time call was made  11/4/2024  9:33 AM    Patient was hospitialized at  [facility]    Date of Admission  10/25/24    Date of discharge  11/02/24    Disposition  Nursing Home          TCM Call       Scheduled for follow up?  Not clinically warranted    Referrals needed  patient transferred to non SNF    I have advised the patient to call PCP with any new or worsening symptoms  Janey Hoskins MR

## 2025-01-13 NOTE — ASSESSMENT & PLAN NOTE
Continue Home O2  Neb txs  FU w Pulmonary  Use inhalers  RTC in 1-2 mos w Blood work  Orders:    metoprolol tartrate (LOPRESSOR) 25 mg tablet; Take 1 tablet (25 mg total) by mouth every 12 (twelve) hours

## 2025-01-13 NOTE — PATIENT INSTRUCTIONS

## 2025-01-15 ENCOUNTER — APPOINTMENT (OUTPATIENT)
Dept: LAB | Facility: HOSPITAL | Age: 80
End: 2025-01-15
Payer: COMMERCIAL

## 2025-01-15 DIAGNOSIS — E78.5 HYPERLIPIDEMIA ASSOCIATED WITH TYPE 2 DIABETES MELLITUS  (HCC): ICD-10-CM

## 2025-01-15 DIAGNOSIS — M31.6 TEMPORAL ARTERITIS (HCC): ICD-10-CM

## 2025-01-15 DIAGNOSIS — E06.3 HYPOTHYROIDISM DUE TO HASHIMOTO'S THYROIDITIS: ICD-10-CM

## 2025-01-15 DIAGNOSIS — E87.1 HYPONATREMIA: ICD-10-CM

## 2025-01-15 DIAGNOSIS — I10 PRIMARY HYPERTENSION: ICD-10-CM

## 2025-01-15 DIAGNOSIS — M81.8 IDIOPATHIC OSTEOPOROSIS: ICD-10-CM

## 2025-01-15 DIAGNOSIS — R73.09 ELEVATED HEMOGLOBIN A1C: ICD-10-CM

## 2025-01-15 DIAGNOSIS — E11.69 HYPERLIPIDEMIA ASSOCIATED WITH TYPE 2 DIABETES MELLITUS  (HCC): ICD-10-CM

## 2025-01-15 LAB
25(OH)D3 SERPL-MCNC: 55.2 NG/ML (ref 30–100)
ALBUMIN SERPL BCG-MCNC: 4.1 G/DL (ref 3.5–5)
ALP SERPL-CCNC: 49 U/L (ref 34–104)
ALT SERPL W P-5'-P-CCNC: 12 U/L (ref 7–52)
ANION GAP SERPL CALCULATED.3IONS-SCNC: 3 MMOL/L (ref 4–13)
AST SERPL W P-5'-P-CCNC: 17 U/L (ref 13–39)
BACTERIA UR QL AUTO: ABNORMAL /HPF
BASOPHILS # BLD AUTO: 0.05 THOUSANDS/ΜL (ref 0–0.1)
BASOPHILS NFR BLD AUTO: 1 % (ref 0–1)
BILIRUB SERPL-MCNC: 0.4 MG/DL (ref 0.2–1)
BILIRUB UR QL STRIP: NEGATIVE
BUN SERPL-MCNC: 19 MG/DL (ref 5–25)
CALCIUM SERPL-MCNC: 9.5 MG/DL (ref 8.4–10.2)
CAOX CRY URNS QL MICRO: ABNORMAL /HPF
CHLORIDE SERPL-SCNC: 97 MMOL/L (ref 96–108)
CHOLEST SERPL-MCNC: 108 MG/DL (ref ?–200)
CLARITY UR: CLEAR
CO2 SERPL-SCNC: 42 MMOL/L (ref 21–32)
COLOR UR: COLORLESS
CREAT SERPL-MCNC: 0.42 MG/DL (ref 0.6–1.3)
CRP SERPL QL: 4.8 MG/L
EOSINOPHIL # BLD AUTO: 0.28 THOUSAND/ΜL (ref 0–0.61)
EOSINOPHIL NFR BLD AUTO: 4 % (ref 0–6)
ERYTHROCYTE [DISTWIDTH] IN BLOOD BY AUTOMATED COUNT: 13.3 % (ref 11.6–15.1)
ERYTHROCYTE [SEDIMENTATION RATE] IN BLOOD: 22 MM/HOUR (ref 0–29)
FERRITIN SERPL-MCNC: 103 NG/ML (ref 11–307)
GFR SERPL CREATININE-BSD FRML MDRD: 97 ML/MIN/1.73SQ M
GLUCOSE P FAST SERPL-MCNC: 88 MG/DL (ref 65–99)
GLUCOSE UR STRIP-MCNC: NEGATIVE MG/DL
HCT VFR BLD AUTO: 38.8 % (ref 34.8–46.1)
HDLC SERPL-MCNC: 55 MG/DL
HGB BLD-MCNC: 11.4 G/DL (ref 11.5–15.4)
HGB UR QL STRIP.AUTO: ABNORMAL
IMM GRANULOCYTES # BLD AUTO: 0.02 THOUSAND/UL (ref 0–0.2)
IMM GRANULOCYTES NFR BLD AUTO: 0 % (ref 0–2)
KETONES UR STRIP-MCNC: NEGATIVE MG/DL
LDLC SERPL CALC-MCNC: 44 MG/DL (ref 0–100)
LEUKOCYTE ESTERASE UR QL STRIP: NEGATIVE
LYMPHOCYTES # BLD AUTO: 1.15 THOUSANDS/ΜL (ref 0.6–4.47)
LYMPHOCYTES NFR BLD AUTO: 17 % (ref 14–44)
MAGNESIUM SERPL-MCNC: 2 MG/DL (ref 1.9–2.7)
MCH RBC QN AUTO: 27.8 PG (ref 26.8–34.3)
MCHC RBC AUTO-ENTMCNC: 29.4 G/DL (ref 31.4–37.4)
MCV RBC AUTO: 95 FL (ref 82–98)
MONOCYTES # BLD AUTO: 0.75 THOUSAND/ΜL (ref 0.17–1.22)
MONOCYTES NFR BLD AUTO: 11 % (ref 4–12)
NEUTROPHILS # BLD AUTO: 4.6 THOUSANDS/ΜL (ref 1.85–7.62)
NEUTS SEG NFR BLD AUTO: 67 % (ref 43–75)
NITRITE UR QL STRIP: NEGATIVE
NON-SQ EPI CELLS URNS QL MICRO: ABNORMAL /HPF
NRBC BLD AUTO-RTO: 0 /100 WBCS
PH UR STRIP.AUTO: 7 [PH]
PLATELET # BLD AUTO: 201 THOUSANDS/UL (ref 149–390)
PMV BLD AUTO: 11.1 FL (ref 8.9–12.7)
POTASSIUM SERPL-SCNC: 4.4 MMOL/L (ref 3.5–5.3)
PROT SERPL-MCNC: 6.6 G/DL (ref 6.4–8.4)
PROT UR STRIP-MCNC: NEGATIVE MG/DL
RBC # BLD AUTO: 4.1 MILLION/UL (ref 3.81–5.12)
RBC #/AREA URNS AUTO: ABNORMAL /HPF
SODIUM SERPL-SCNC: 142 MMOL/L (ref 135–147)
SP GR UR STRIP.AUTO: 1.01 (ref 1–1.03)
TRIGL SERPL-MCNC: 46 MG/DL (ref ?–150)
TSH SERPL DL<=0.05 MIU/L-ACNC: 4.44 UIU/ML (ref 0.45–4.5)
UROBILINOGEN UR STRIP-ACNC: <2 MG/DL
VIT B12 SERPL-MCNC: 468 PG/ML (ref 180–914)
WBC # BLD AUTO: 6.85 THOUSAND/UL (ref 4.31–10.16)
WBC #/AREA URNS AUTO: ABNORMAL /HPF

## 2025-01-15 PROCEDURE — 81001 URINALYSIS AUTO W/SCOPE: CPT

## 2025-01-15 PROCEDURE — 83735 ASSAY OF MAGNESIUM: CPT

## 2025-01-15 PROCEDURE — 80053 COMPREHEN METABOLIC PANEL: CPT

## 2025-01-15 PROCEDURE — 82306 VITAMIN D 25 HYDROXY: CPT

## 2025-01-15 PROCEDURE — 85025 COMPLETE CBC W/AUTO DIFF WBC: CPT

## 2025-01-15 PROCEDURE — 82607 VITAMIN B-12: CPT

## 2025-01-15 PROCEDURE — 84443 ASSAY THYROID STIM HORMONE: CPT

## 2025-01-15 PROCEDURE — 80061 LIPID PANEL: CPT

## 2025-01-15 PROCEDURE — 86140 C-REACTIVE PROTEIN: CPT

## 2025-01-15 PROCEDURE — 85652 RBC SED RATE AUTOMATED: CPT

## 2025-01-15 PROCEDURE — 82728 ASSAY OF FERRITIN: CPT

## 2025-01-15 PROCEDURE — 36415 COLL VENOUS BLD VENIPUNCTURE: CPT

## 2025-01-16 ENCOUNTER — RESULTS FOLLOW-UP (OUTPATIENT)
Dept: FAMILY MEDICINE CLINIC | Facility: CLINIC | Age: 80
End: 2025-01-16

## 2025-01-16 NOTE — TELEPHONE ENCOUNTER
----- Message from Nicolas Nix MD sent at 1/16/2025  7:36 AM EST -----  Do Neb Txs at home Q 6 Hours. Do Not use more than 2 L/M of O2  ----- Message -----  From: Lab, Background User  Sent: 1/15/2025  11:29 AM EST  To: Nicolas Nix MD

## 2025-01-17 ENCOUNTER — TELEPHONE (OUTPATIENT)
Age: 80
End: 2025-01-17

## 2025-01-17 NOTE — TELEPHONE ENCOUNTER
PTA calls regarding test results. Please review lab results and then call and discuss with the patient. Patient can be reached at [phone].

## 2025-01-20 NOTE — TELEPHONE ENCOUNTER
Left a message for the patient that due to her Co2 levels she should not use more than 2 l/min of O2.

## 2025-01-24 RX ORDER — NITROFURANTOIN 25; 75 MG/1; MG/1
CAPSULE ORAL
COMMUNITY
Start: 2025-01-13

## 2025-01-27 ENCOUNTER — OFFICE VISIT (OUTPATIENT)
Dept: PULMONOLOGY | Facility: CLINIC | Age: 80
End: 2025-01-27
Payer: COMMERCIAL

## 2025-01-27 VITALS
WEIGHT: 103 LBS | DIASTOLIC BLOOD PRESSURE: 70 MMHG | BODY MASS INDEX: 18.25 KG/M2 | HEART RATE: 104 BPM | SYSTOLIC BLOOD PRESSURE: 110 MMHG | OXYGEN SATURATION: 94 % | HEIGHT: 63 IN

## 2025-01-27 DIAGNOSIS — J96.12 CHRONIC RESPIRATORY FAILURE WITH HYPOXIA AND HYPERCAPNIA (HCC): ICD-10-CM

## 2025-01-27 DIAGNOSIS — J96.11 CHRONIC RESPIRATORY FAILURE WITH HYPOXIA AND HYPERCAPNIA (HCC): ICD-10-CM

## 2025-01-27 DIAGNOSIS — F17.211 CIGARETTE NICOTINE DEPENDENCE IN REMISSION: ICD-10-CM

## 2025-01-27 DIAGNOSIS — R06.02 SOB (SHORTNESS OF BREATH): ICD-10-CM

## 2025-01-27 DIAGNOSIS — R91.8 LUNG NODULES: ICD-10-CM

## 2025-01-27 DIAGNOSIS — J44.9 COPD, SEVERE (HCC): Primary | ICD-10-CM

## 2025-01-27 PROCEDURE — 99214 OFFICE O/P EST MOD 30 MIN: CPT | Performed by: INTERNAL MEDICINE

## 2025-01-27 NOTE — PROGRESS NOTES
Office Progress Note - Pulmonary    Sarah Zayas 79 y.o. female MRN: [id number]    Encounter: [id number]      Assessment:  Chronic obstructive pulmonary disease.  Chronic hypercapnic and hypoxic respiratory failure.  Dyspnea on exertion.  History of tobacco use.  Lung nodules.    Plan:   Budesonide 0.5 mg nebulized twice a day.  Albuterol/ipratropium nebulizer treatments twice a day.  Oxygen supplement.  Trilogy at night.  Regular walks to improve stamina.  CT of the chest to follow-up on the lung nodules in October 2025.  Follow-up in 4 months.    Discussion:   The patient's COPD is well treated.  I have maintained her on the budesonide 0.5 mg nebulized twice a day.  She will also use albuterol/ipratropium nebulizer treatments twice a day.  I told her she can have an extra treatment if she feels tight or wheezy.  She is using the Trilogy at night.  She has pulmonary nodules which have been stable.  She will be due for lung cancer screening CT in October 2025.  She is very concerned about the pain she experiences when she lays down for the CT scan.  She may need pain medications during the procedure.  We will discuss this further during the next visit.  I will see her in 4 months.      Subjective:   The patient is here for a follow-up visit.  Recently she has been doing fairly well.  She has shortness of breath on exertion.  She has occasional cough.  No significant sputum production.  She is using the oxygen 24 hours a day.  She is on budesonide 0.5 mg nebulized twice a day and albuterol/ipratropium nebulized twice a day.  She is using Trilogy at night.  She has fair appetite.  She tries to remain active.  She has very supportive family.    Review of systems:  A 12 point system review is done and aside from what is stated above the rest of the review of systems is negative.      Family history and social history are reviewed.    Medications list is reviewed.      Vitals: Blood pressure 110/70, pulse 104, height 5'  "2.5\" (1.588 m), weight 46.7 kg (103 lb), SpO2 94%, not currently breastfeeding.,     Physical Exam  Gen: Awake, alert, oriented x 3, no acute distress  HEENT: Mucous membranes moist, no oral lesions, no thrush  NECK: No accessory muscle use, JVP not elevated  Cardiac: Regular, single S1, single S2, no murmurs, no rubs, no gallops  Lungs: Diminished breath sounds.  No wheezing or rhonchi.  Abdomen: normoactive bowel sounds, soft nontender, nondistended, no rebound or rigidity, no guarding  Extremities: no cyanosis, no clubbing, no edema  Neuro:  Grossly nonfocal.  Skin:  No rash.      CT scan of the chest which was done in October 2024 is reviewed on the PACS system.  It showed stable pulmonary nodules.    Lab Results   Component Value Date    WBC 6.85 01/15/2025    HGB 11.4 (L) 01/15/2025    HCT 38.8 01/15/2025    MCV 95 01/15/2025     01/15/2025     Lab Results   Component Value Date    SODIUM 142 01/15/2025    K 4.4 01/15/2025    CL 97 01/15/2025    CO2 42 (H) 01/15/2025    BUN 19 01/15/2025    CREATININE 0.42 (L) 01/15/2025    GLUC 85 11/01/2024    CALCIUM 9.5 01/15/2025         "

## 2025-01-30 ENCOUNTER — TELEPHONE (OUTPATIENT)
Age: 80
End: 2025-01-30

## 2025-01-30 NOTE — TELEPHONE ENCOUNTER
Patient asking for a Lidoderm Patch for left shoulder pain. Asking for a call back to let the patient know.

## 2025-01-31 DIAGNOSIS — M25.519 ACUTE SHOULDER PAIN, UNSPECIFIED LATERALITY: Primary | ICD-10-CM

## 2025-01-31 RX ORDER — LIDOCAINE 50 MG/G
1 PATCH TOPICAL DAILY
Qty: 30 PATCH | Refills: 0 | Status: SHIPPED | OUTPATIENT
Start: 2025-01-31 | End: 2025-02-06

## 2025-02-03 ENCOUNTER — TELEPHONE (OUTPATIENT)
Dept: FAMILY MEDICINE CLINIC | Facility: CLINIC | Age: 80
End: 2025-02-03

## 2025-02-03 NOTE — TELEPHONE ENCOUNTER
Lidocaine 5% patches prior auth    Acute shoulder pain M25.519    Chronic midline low back pain without sciatica    M54.50, G89.29

## 2025-02-05 NOTE — TELEPHONE ENCOUNTER
PA for LIDOCAINE 5%SUBMITTED to [facility]    via    []CMM-KEY:    [x]Surescripts-Ca  se ID # [id number]   []Availity-Auth ID #  NDC #    []Faxed to plan    []Other website    []Phone call Case ID #      [x]PA sent as URGENT    All office notes, labs and other pertaining documents and studies sent. Clinical questions answered. Awaiting determination from insurance company.     Turnaround time for your insurance to make a decision on your Prior Authorization can take 7-21 business days.

## 2025-02-06 DIAGNOSIS — M25.519 ACUTE SHOULDER PAIN, UNSPECIFIED LATERALITY: Primary | ICD-10-CM

## 2025-02-06 RX ORDER — LIDOCAINE 50 MG/G
1 PATCH TOPICAL DAILY
Qty: 30 PATCH | Refills: 0 | Status: SHIPPED | OUTPATIENT
Start: 2025-02-06

## 2025-02-06 NOTE — TELEPHONE ENCOUNTER
PA for LIDOCAINE 5% DENIED    Reason:      Message sent to office clinical pool Yes    Denial letter scanned into Media Yes    Appeal started No (Provider will need to decide if appeal is warranted and send clinical documentation to Prior Authorization Team for initiation.)    **Please follow up with your patient regarding denial and next steps**

## 2025-02-10 DIAGNOSIS — E78.5 HYPERLIPIDEMIA ASSOCIATED WITH TYPE 2 DIABETES MELLITUS  (HCC): ICD-10-CM

## 2025-02-10 DIAGNOSIS — E11.69 HYPERLIPIDEMIA ASSOCIATED WITH TYPE 2 DIABETES MELLITUS  (HCC): ICD-10-CM

## 2025-02-10 DIAGNOSIS — E06.3 HYPOTHYROIDISM DUE TO HASHIMOTO'S THYROIDITIS: Primary | ICD-10-CM

## 2025-02-10 DIAGNOSIS — R35.0 URINARY FREQUENCY: ICD-10-CM

## 2025-02-11 RX ORDER — NITROFURANTOIN 25; 75 MG/1; MG/1
CAPSULE ORAL
Qty: 30 CAPSULE | Refills: 3 | Status: SHIPPED | OUTPATIENT
Start: 2025-02-11

## 2025-02-13 ENCOUNTER — TELEPHONE (OUTPATIENT)
Age: 80
End: 2025-02-13

## 2025-02-17 DIAGNOSIS — M31.6 TEMPORAL ARTERITIS (HCC): ICD-10-CM

## 2025-02-17 DIAGNOSIS — E43 SEVERE PROTEIN-CALORIE MALNUTRITION (HCC): ICD-10-CM

## 2025-02-17 DIAGNOSIS — J44.9 COPD, SEVERE (HCC): Primary | ICD-10-CM

## 2025-02-17 DIAGNOSIS — F41.9 ANXIETY: ICD-10-CM

## 2025-02-18 ENCOUNTER — TELEPHONE (OUTPATIENT)
Dept: LAB | Facility: HOSPITAL | Age: 80
End: 2025-02-18

## 2025-02-19 DIAGNOSIS — E11.69 HYPERLIPIDEMIA ASSOCIATED WITH TYPE 2 DIABETES MELLITUS  (HCC): Primary | ICD-10-CM

## 2025-02-19 DIAGNOSIS — E06.3 HYPOTHYROIDISM DUE TO HASHIMOTO'S THYROIDITIS: ICD-10-CM

## 2025-02-19 DIAGNOSIS — E78.5 HYPERLIPIDEMIA ASSOCIATED WITH TYPE 2 DIABETES MELLITUS  (HCC): Primary | ICD-10-CM

## 2025-02-19 NOTE — TELEPHONE ENCOUNTER
Received call from patient stating mobile lab was to be at pts home 2/26 but states they do not have any order for this patient   Would you like to add labs and repeat urine prior to 3/3 apt?    Please call patient once orders are placed as she needs to call mobile lab to notify.

## 2025-02-27 DIAGNOSIS — M31.6 TEMPORAL ARTERITIS (HCC): ICD-10-CM

## 2025-02-28 RX ORDER — PREDNISONE 2.5 MG/1
2.5 TABLET ORAL
Qty: 30 TABLET | Refills: 0 | Status: SHIPPED | OUTPATIENT
Start: 2025-02-28

## 2025-03-03 ENCOUNTER — OFFICE VISIT (OUTPATIENT)
Dept: FAMILY MEDICINE CLINIC | Facility: CLINIC | Age: 80
End: 2025-03-03
Payer: COMMERCIAL

## 2025-03-03 VITALS
TEMPERATURE: 98.4 F | OXYGEN SATURATION: 96 % | SYSTOLIC BLOOD PRESSURE: 124 MMHG | RESPIRATION RATE: 16 BRPM | DIASTOLIC BLOOD PRESSURE: 70 MMHG | HEART RATE: 74 BPM | WEIGHT: 103 LBS | HEIGHT: 63 IN | BODY MASS INDEX: 18.25 KG/M2

## 2025-03-03 DIAGNOSIS — J44.9 COPD, SEVERE (HCC): ICD-10-CM

## 2025-03-03 DIAGNOSIS — E06.3 HYPOTHYROIDISM DUE TO HASHIMOTO'S THYROIDITIS: Primary | ICD-10-CM

## 2025-03-03 DIAGNOSIS — E11.69 HYPERLIPIDEMIA ASSOCIATED WITH TYPE 2 DIABETES MELLITUS  (HCC): ICD-10-CM

## 2025-03-03 DIAGNOSIS — E78.5 HYPERLIPIDEMIA ASSOCIATED WITH TYPE 2 DIABETES MELLITUS  (HCC): ICD-10-CM

## 2025-03-03 DIAGNOSIS — Z12.31 ENCOUNTER FOR SCREENING MAMMOGRAM FOR BREAST CANCER: ICD-10-CM

## 2025-03-03 DIAGNOSIS — Z00.01 ENCOUNTER FOR GENERAL ADULT MEDICAL EXAMINATION WITH ABNORMAL FINDINGS: ICD-10-CM

## 2025-03-03 PROCEDURE — G2211 COMPLEX E/M VISIT ADD ON: HCPCS | Performed by: INTERNAL MEDICINE

## 2025-03-03 PROCEDURE — G0439 PPPS, SUBSEQ VISIT: HCPCS | Performed by: INTERNAL MEDICINE

## 2025-03-03 PROCEDURE — 99214 OFFICE O/P EST MOD 30 MIN: CPT | Performed by: INTERNAL MEDICINE

## 2025-03-03 NOTE — PATIENT INSTRUCTIONS
Medicare Preventive Visit Patient Instructions  Thank you for completing your Welcome to Medicare Visit or Medicare Annual Wellness Visit today. Your next wellness visit will be due in one year (3/4/2026).  The screening/preventive services that you may require over the next 5-10 years are detailed below. Some tests may not apply to you based off risk factors and/or age. Screening tests ordered at today's visit but not completed yet may show as past due. Also, please note that scanned in results may not display below.  Preventive Screenings:  Service Recommendations Previous Testing/Comments   Colorectal Cancer Screening  * Colonoscopy    * Fecal Occult Blood Test (FOBT)/Fecal Immunochemical Test (FIT)  * Fecal DNA/Cologuard Test  * Flexible Sigmoidoscopy Age: 45-75 years old   Colonoscopy: every 10 years (may be performed more frequently if at higher risk)  OR  FOBT/FIT: every 1 year  OR  Cologuard: every 3 years  OR  Sigmoidoscopy: every 5 years  Screening may be recommended earlier than age 45 if at higher risk for colorectal cancer. Also, an individualized decision between you and your healthcare provider will decide whether screening between the ages of 76-85 would be appropriate. Colonoscopy: 02/01/2018  FOBT/FIT: Not on file  Cologuard: Not on file  Sigmoidoscopy: Not on file          Breast Cancer Screening Age: 40+ years old  Frequency: every 1-2 years  Not required if history of left and right mastectomy Mammogram: 06/22/2017        Cervical Cancer Screening Between the ages of 21-29, pap smear recommended once every 3 years.   Between the ages of 30-65, can perform pap smear with HPV co-testing every 5 years.   Recommendations may differ for women with a history of total hysterectomy, cervical cancer, or abnormal pap smears in past. Pap Smear: Not on file    Screening Not Indicated   Hepatitis C Screening Once for adults born between 1945 and 1965  More frequently in patients at high risk for Hepatitis C  Hep C Antibody: 02/28/2019    Screening Current   Diabetes Screening 1-2 times per year if you're at risk for diabetes or have pre-diabetes Fasting glucose: 88 mg/dL (1/15/2025)  A1C: 5.8 (1/13/2025)  Screening Not Indicated  History Diabetes   Cholesterol Screening Once every 5 years if you don't have a lipid disorder. May order more often based on risk factors. Lipid panel: 01/15/2025    Screening Not Indicated  History Lipid Disorder     Other Preventive Screenings Covered by Medicare:  Abdominal Aortic Aneurysm (AAA) Screening: covered once if your at risk. You're considered to be at risk if you have a family history of AAA.  Lung Cancer Screening: covers low dose CT scan once per year if you meet all of the following conditions: (1) Age 55-77; (2) No signs or symptoms of lung cancer; (3) Current smoker or have quit smoking within the last 15 years; (4) You have a tobacco smoking history of at least 20 pack years (packs per day multiplied by number of years you smoked); (5) You get a written order from a healthcare provider.  Glaucoma Screening: covered annually if you're considered high risk: (1) You have diabetes OR (2) Family history of glaucoma OR (3)  aged 50 and older OR (4)  American aged 65 and older  Osteoporosis Screening: covered every 2 years if you meet one of the following conditions: (1) You're estrogen deficient and at risk for osteoporosis based off medical history and other findings; (2) Have a vertebral abnormality; (3) On glucocorticoid therapy for more than 3 months; (4) Have primary hyperparathyroidism; (5) On osteoporosis medications and need to assess response to drug therapy.   Last bone density test (DXA Scan): 01/22/2020.  HIV Screening: covered annually if you're between the age of 15-65. Also covered annually if you are younger than 15 and older than 65 with risk factors for HIV infection. For pregnant patients, it is covered up to 3 times per  pregnancy.    Immunizations:  Immunization Recommendations   Influenza Vaccine Annual influenza vaccination during flu season is recommended for all persons aged >= 6 months who do not have contraindications   Pneumococcal Vaccine   * Pneumococcal conjugate vaccine = PCV13 (Prevnar 13), PCV15 (Vaxneuvance), PCV20 (Prevnar 20)  * Pneumococcal polysaccharide vaccine = PPSV23 (Pneumovax) Adults 19-63 yo with certain risk factors or if 65+ yo  If never received any pneumonia vaccine: recommend Prevnar 20 (PCV20)  Give PCV20 if previously received 1 dose of PCV13 or PPSV23   Hepatitis B Vaccine 3 dose series if at intermediate or high risk (ex: diabetes, end stage renal disease, liver disease)   Respiratory syncytial virus (RSV) Vaccine - COVERED BY MEDICARE PART D  * RSVPreF3 (Arexvy) CDC recommends that adults 60 years of age and older may receive a single dose of RSV vaccine using shared clinical decision-making (SCDM)   Tetanus (Td) Vaccine - COST NOT COVERED BY MEDICARE PART B Following completion of primary series, a booster dose should be given every 10 years to maintain immunity against tetanus. Td may also be given as tetanus wound prophylaxis.   Tdap Vaccine - COST NOT COVERED BY MEDICARE PART B Recommended at least once for all adults. For pregnant patients, recommended with each pregnancy.   Shingles Vaccine (Shingrix) - COST NOT COVERED BY MEDICARE PART B  2 shot series recommended in those 19 years and older who have or will have weakened immune systems or those 50 years and older     Health Maintenance Due:      Topic Date Due   • Breast Cancer Screening: Mammogram  06/22/2019   • Lung Cancer Screening  10/26/2025   • Hepatitis C Screening  Completed   • Colorectal Cancer Screening  Discontinued     Immunizations Due:      Topic Date Due   • Hepatitis A Vaccine (1 of 2 - Risk 2-dose series) Never done   • COVID-19 Vaccine (4 - 2024-25 season) 09/01/2024     Advance Directives   What are advance directives?   Advance directives are legal documents that state your wishes and plans for medical care. These plans are made ahead of time in case you lose your ability to make decisions for yourself. Advance directives can apply to any medical decision, such as the treatments you want, and if you want to donate organs.   What are the types of advance directives?  There are many types of advance directives, and each state has rules about how to use them. You may choose a combination of any of the following:  Living will:  This is a written record of the treatment you want. You can also choose which treatments you do not want, which to limit, and which to stop at a certain time. This includes surgery, medicine, IV fluid, and tube feedings.   Durable power of  for healthcare (DPAHC):  This is a written record that states who you want to make healthcare choices for you when you are unable to make them for yourself. This person, called a proxy, is usually a family member or a friend. You may choose more than 1 proxy.  Do not resuscitate (DNR) order:  A DNR order is used in case your heart stops beating or you stop breathing. It is a request not to have certain forms of treatment, such as CPR. A DNR order may be included in other types of advance directives.  Medical directive:  This covers the care that you want if you are in a coma, near death, or unable to make decisions for yourself. You can list the treatments you want for each condition. Treatment may include pain medicine, surgery, blood transfusions, dialysis, IV or tube feedings, and a ventilator (breathing machine).  Values history:  This document has questions about your views, beliefs, and how you feel and think about life. This information can help others choose the care that you would choose.  Why are advance directives important?  An advance directive helps you control your care. Although spoken wishes may be used, it is better to have your wishes written down.  Spoken wishes can be misunderstood, or not followed. Treatments may be given even if you do not want them. An advance directive may make it easier for your family to make difficult choices about your care.       © Copyright [employer] 2018 Information is for End User's use only and may not be sold, redistributed or otherwise used for commercial purposes. All illustrations and images included in CareNotes® are the copyrighted property of [employer] or [employer]

## 2025-03-03 NOTE — PROGRESS NOTES
Name: Sarah Zayas      : 1945      MRN: [id number]  Encounter Provider: Nicolas Nix MD  Encounter Date: 3/3/2025   Encounter department: [facility] CARE [facility]  :  Assessment & Plan  Encounter for screening mammogram for breast cancer    Orders:    Mammo screening bilateral w 3d and cad; Future    Hypothyroidism due to Hashimoto's thyroiditis         Hyperlipidemia associated with type 2 diabetes mellitus  (HCC)    Lab Results   Component Value Date    HGBA1C 5.8 2025     Improved        COPD, severe (HCC)  Continue same  Continue Home O2       Encounter for general adult medical examination with abnormal findings  Done in detail  RTC in 2-3 mos w  Blood work              History of Present Illness   79 Y O Lady is here for AWV and regular check up, she has few symptoms, recent blood work and med list Reviewed,...      Review of Systems   Constitutional:  Negative for chills, fatigue and fever.   HENT:  Negative for congestion, ear pain, facial swelling, sore throat, trouble swallowing and voice change.    Eyes:  Negative for pain, discharge and visual disturbance.   Respiratory:  Positive for shortness of breath. Negative for cough and wheezing.    Cardiovascular:  Negative for chest pain, palpitations and leg swelling.   Gastrointestinal:  Negative for abdominal pain, blood in stool, constipation, diarrhea, nausea and vomiting.   Endocrine: Negative for polydipsia, polyphagia and polyuria.   Genitourinary:  Negative for difficulty urinating, dysuria, hematuria and urgency.   Musculoskeletal:  Negative for arthralgias, back pain and myalgias.   Skin:  Negative for color change and rash.   Neurological:  Negative for dizziness, tremors, seizures, syncope, weakness and headaches.   Hematological:  Negative for adenopathy. Does not bruise/bleed easily.   Psychiatric/Behavioral:  Negative for dysphoric mood, sleep disturbance and suicidal ideas.    All other systems reviewed  "and are negative.      Objective   /70 (BP Location: Left arm, Patient Position: Sitting, Cuff Size: Standard)   Pulse 74   Temp 98.4 °F (36.9 °C) (Tympanic Core)   Resp 16   Ht 5' 2.5\" (1.588 m)   Wt 46.7 kg (103 lb)   SpO2 96%   BMI 18.54 kg/m²      Physical Exam  Constitutional:       General: She is not in acute distress.     Appearance: She is well-developed. She is not diaphoretic.   HENT:      Head: Normocephalic.      Right Ear: External ear normal.      Left Ear: External ear normal.      Nose: Nose normal.   Eyes:      General:         Right eye: No discharge.         Left eye: No discharge.      Conjunctiva/sclera: Conjunctivae normal.      Pupils: Pupils are equal, round, and reactive to light.   Neck:      Thyroid: No thyromegaly.      Trachea: No tracheal deviation.   Cardiovascular:      Rate and Rhythm: Normal rate and regular rhythm.      Heart sounds: Murmur heard.      No friction rub.   Pulmonary:      Effort: Pulmonary effort is normal. No respiratory distress.      Breath sounds: No stridor. Wheezing present. No rales.   Abdominal:      General: Bowel sounds are normal. There is no distension.      Palpations: Abdomen is soft.      Tenderness: There is no abdominal tenderness. There is no guarding.   Musculoskeletal:         General: No tenderness or deformity. Normal range of motion.      Cervical back: Normal range of motion and neck supple.   Lymphadenopathy:      Cervical: No cervical adenopathy.   Skin:     General: Skin is warm.      Coloration: Skin is not pale.      Findings: No erythema or rash.   Neurological:      Mental Status: She is alert and oriented to person, place, and time.      Cranial Nerves: No cranial nerve deficit.      Coordination: Coordination normal.   Psychiatric:         Behavior: Behavior normal.         "

## 2025-03-03 NOTE — PROGRESS NOTES
Name: Sarah Zayas      : 1945      MRN: [id number]  Encounter Provider: Nicolas Nix MD  Encounter Date: 3/3/2025   Encounter department: [facility] CARE [facility]    Assessment & Plan       Preventive health issues were discussed with patient, and age appropriate screening tests were ordered as noted in patient's After Visit Summary. Personalized health advice and appropriate referrals for health education or preventive services given if needed, as noted in patient's After Visit Summary.    History of Present Illness     HPI   Patient Care Team:  Nicolas Nix MD as PCP - General (Internal Medicine)    Review of Systems  Medical History Reviewed by provider this encounter:       Annual Wellness Visit Questionnaire       Health Risk Assessment:   Patient rates overall health as poor. Patient feels that their physical health rating is slightly worse. Patient is dissatisfied with their life. Eyesight was rated as same. Hearing was rated as same. Patient feels that their emotional and mental health rating is slightly worse. Patients states they are never, rarely angry. Patient states they are sometimes unusually tired/fatigued. Pain experienced in the last 7 days has been none. Patient states that she has experienced no weight loss or gain in last 6 months.     Depression Screening:   PHQ-2 Score: 0      Fall Risk Screening:   In the past year, patient has experienced: no history of falling in past year      Urinary Incontinence Screening:   Patient has not leaked urine accidently in the last six months.     Home Safety:  Patient has trouble with stairs inside or outside of their home. Patient has working smoke alarms and has working carbon monoxide detector. Home safety hazards include: none.     Nutrition:   Current diet is Regular.     Medications:   Patient is not currently taking any over-the-counter supplements. Patient is able to manage medications.     Activities of Daily Living  (ADLs)/Instrumental Activities of Daily Living (IADLs):   Walk and transfer into and out of bed and chair?: Yes  Dress and groom yourself?: Yes    Bathe or shower yourself?: Yes    Feed yourself? Yes  Do your laundry/housekeeping?: No  Manage your money, pay your bills and track your expenses?: Yes  Make your own meals?: Yes    Do your own shopping?: No    Previous Hospitalizations:   Any hospitalizations or ED visits within the last 12 months?: Yes    How many hospitalizations have you had in the last year?: more than 4    Advance Care Planning:   Living will: No    Durable POA for healthcare: No    Advanced directive counseling given: No    Five wishes given: No      PREVENTIVE SCREENINGS      Cardiovascular Screening:    General: Screening Not Indicated, History Lipid Disorder and Risks and Benefits Discussed      Diabetes Screening:     General: Screening Not Indicated, History Diabetes, Risks and Benefits Discussed and Screening Current      Colorectal Cancer Screening:     General: Risks and Benefits Discussed      Breast Cancer Screening:     General: Risks and Benefits Discussed      Cervical Cancer Screening:    General: Screening Not Indicated and Risks and Benefits Discussed      Osteoporosis Screening:    General: Screening Not Indicated, History Osteoporosis and Risks and Benefits Discussed      Abdominal Aortic Aneurysm (AAA) Screening:        General: Risks and Benefits Discussed      Lung Cancer Screening:     General: Screening Not Indicated and Risks and Benefits Discussed      Hepatitis C Screening:    General: Screening Current and Risks and Benefits Discussed    Screening, Brief Intervention, and Referral to Treatment (SBIRT)     Screening      Single Item Drug Screening:  How often have you used an illegal drug (including marijuana) or a prescription medication for non-medical reasons in the past year? never    Single Item Drug Screen Score: 0  Interpretation: Negative screen for possible drug  use disorder    Other Counseling Topics:   Car/seat belt/driving safety, skin self-exam, sunscreen and regular weightbearing exercise and calcium and vitamin D intake.     Social Drivers of Health     Financial Resource Strain: Low Risk  (2023)    Overall Financial Resource Strain (CARDIA)     Difficulty of Paying Living Expenses: Not hard at all   Food Insecurity: No Food Insecurity (10/23/2024)    Nursing - Inadequate Food Risk Classification     Worried About Running Out of Food in the Last Year: Never true     Ran Out of Food in the Last Year: Never true     Ran Out of Food in the Last Year: Never true   Transportation Needs: No Transportation Needs (10/23/2024)    Nursing - Transportation Risk Classification     Lack of Transportation: No   Housing Stability: Unknown (10/23/2024)    Nursing: Inadequate Housing Risk Classification     Unable to Pay for Housing in the Last Year: No     Has Housin   Utilities: Not At Risk (10/23/2024)    Nursing - Utilities Risk Classification     Threatened with loss of utilities: No     No results found.    Objective   There were no vitals taken for this visit.    Physical Exam

## 2025-03-07 ENCOUNTER — APPOINTMENT (OUTPATIENT)
Dept: LAB | Facility: HOSPITAL | Age: 80
End: 2025-03-07
Payer: COMMERCIAL

## 2025-03-07 DIAGNOSIS — E06.3 HYPOTHYROIDISM DUE TO HASHIMOTO'S THYROIDITIS: ICD-10-CM

## 2025-03-07 DIAGNOSIS — E78.5 HYPERLIPIDEMIA ASSOCIATED WITH TYPE 2 DIABETES MELLITUS  (HCC): ICD-10-CM

## 2025-03-07 DIAGNOSIS — E11.69 HYPERLIPIDEMIA ASSOCIATED WITH TYPE 2 DIABETES MELLITUS  (HCC): ICD-10-CM

## 2025-03-07 LAB
ALBUMIN SERPL BCG-MCNC: 4.1 G/DL (ref 3.5–5)
ALP SERPL-CCNC: 55 U/L (ref 34–104)
ALT SERPL W P-5'-P-CCNC: 14 U/L (ref 7–52)
AMORPH URATE CRY URNS QL MICRO: ABNORMAL
ANION GAP SERPL CALCULATED.3IONS-SCNC: 4 MMOL/L (ref 4–13)
AST SERPL W P-5'-P-CCNC: 21 U/L (ref 13–39)
BACTERIA UR QL AUTO: ABNORMAL /HPF
BILIRUB SERPL-MCNC: 0.45 MG/DL (ref 0.2–1)
BILIRUB UR QL STRIP: NEGATIVE
BUN SERPL-MCNC: 22 MG/DL (ref 5–25)
CALCIUM SERPL-MCNC: 10.2 MG/DL (ref 8.4–10.2)
CAOX CRY URNS QL MICRO: ABNORMAL /HPF
CHLORIDE SERPL-SCNC: 98 MMOL/L (ref 96–108)
CLARITY UR: ABNORMAL
CO2 SERPL-SCNC: 40 MMOL/L (ref 21–32)
COLOR UR: YELLOW
CREAT SERPL-MCNC: 0.52 MG/DL (ref 0.6–1.3)
GFR SERPL CREATININE-BSD FRML MDRD: 91 ML/MIN/1.73SQ M
GLUCOSE SERPL-MCNC: 79 MG/DL (ref 65–140)
GLUCOSE UR STRIP-MCNC: NEGATIVE MG/DL
HGB UR QL STRIP.AUTO: NEGATIVE
KETONES UR STRIP-MCNC: NEGATIVE MG/DL
LEUKOCYTE ESTERASE UR QL STRIP: ABNORMAL
MAGNESIUM SERPL-MCNC: 2.1 MG/DL (ref 1.9–2.7)
NITRITE UR QL STRIP: NEGATIVE
NON-SQ EPI CELLS URNS QL MICRO: ABNORMAL /HPF
PH UR STRIP.AUTO: 6.5 [PH]
POTASSIUM SERPL-SCNC: 4.7 MMOL/L (ref 3.5–5.3)
PROT SERPL-MCNC: 6.5 G/DL (ref 6.4–8.4)
PROT UR STRIP-MCNC: NEGATIVE MG/DL
RBC #/AREA URNS AUTO: ABNORMAL /HPF
SODIUM SERPL-SCNC: 142 MMOL/L (ref 135–147)
SP GR UR STRIP.AUTO: 1.02 (ref 1–1.03)
UROBILINOGEN UR STRIP-ACNC: <2 MG/DL
WBC #/AREA URNS AUTO: ABNORMAL /HPF

## 2025-03-07 PROCEDURE — 80053 COMPREHEN METABOLIC PANEL: CPT

## 2025-03-07 PROCEDURE — 83735 ASSAY OF MAGNESIUM: CPT

## 2025-03-07 PROCEDURE — 36415 COLL VENOUS BLD VENIPUNCTURE: CPT

## 2025-03-07 PROCEDURE — 81001 URINALYSIS AUTO W/SCOPE: CPT

## 2025-03-21 DIAGNOSIS — M81.8 IDIOPATHIC OSTEOPOROSIS: ICD-10-CM

## 2025-03-21 RX ORDER — DENOSUMAB 60 MG/ML
INJECTION SUBCUTANEOUS
Qty: 1 ML | Refills: 0 | Status: SHIPPED | OUTPATIENT
Start: 2025-03-21

## 2025-04-07 DIAGNOSIS — J44.9 COPD, SEVERE (HCC): Primary | ICD-10-CM

## 2025-04-07 NOTE — TELEPHONE ENCOUNTER
Refill must be reviewed and completed by the office or provider. The refill is unable to be approved or denied by the medication management team.    Historic Provider - Please review to see if the refill is appropriate.

## 2025-04-08 ENCOUNTER — TELEPHONE (OUTPATIENT)
Age: 80
End: 2025-04-08

## 2025-04-08 DIAGNOSIS — J44.9 COPD, SEVERE (HCC): ICD-10-CM

## 2025-04-08 RX ORDER — LEVALBUTEROL INHALATION SOLUTION 1.25 MG/3ML
SOLUTION RESPIRATORY (INHALATION)
Qty: 300 ML | Refills: 13 | Status: SHIPPED | OUTPATIENT
Start: 2025-04-08

## 2025-04-08 RX ORDER — LEVALBUTEROL INHALATION SOLUTION 1.25 MG/3ML
1.25 SOLUTION RESPIRATORY (INHALATION) 2 TIMES DAILY
Qty: 150 ML | Refills: 0 | OUTPATIENT
Start: 2025-04-08

## 2025-04-08 RX ORDER — BUDESONIDE 0.5 MG/2ML
0.5 INHALANT ORAL 2 TIMES DAILY
Qty: 120 ML | Refills: 2 | Status: SHIPPED | OUTPATIENT
Start: 2025-04-08 | End: 2025-05-08

## 2025-04-08 NOTE — TELEPHONE ENCOUNTER
Patient was in rehab for a few months but now she is home and completely out of her medication.      Reason for call:   [x] Refill   [] Prior Auth  [] Other:     Office:   [x] PCP/Provider - Nicolas Nix MD   [] Specialty/Provider -     Medication: budesonide (Pulmicort) 0.5 mg/2 mL nebulizer solution     Dose/Frequency: Take 2 mL (0.5 mg total) by nebulization 2 (two) times a day Rinse mouth after use. May take this right after taking levabuterol/ipratropium.     Quantity: 120 ml    Medication: levalbuterol (XOPENEX) 1.25 mg/3 mL nebulizer solution     Dose/Frequency: Take 1.25 mg by nebulization 2 (two) times a day                Quantity: 150 mg    Pharmacy: [address], [facility] [address]  Phone: [phone]     Local Pharmacy   Does the patient have enough for 3 days?   [] Yes   [x] No - Send as HP to POD    Mail Away Pharmacy   Does the patient have enough for 10 days?   [] Yes   [] No - Send as HP to POD

## 2025-04-08 NOTE — TELEPHONE ENCOUNTER
Reached out to pt in regards to Medication Management wait list maintenance & to gather pt preferences.     Pt is no longer interested in services. Pt removed from wait list.

## 2025-04-08 NOTE — TELEPHONE ENCOUNTER
Patient called to request a refill for their Levalbuterol advised a refill was requested on 4/8/25 and is pending approval. Patient verbalized understanding and is in agreement.     Does the patient have enough for 3 days?   [] Yes   [x] No - Send as HP to POD

## 2025-04-14 DIAGNOSIS — M31.6 TEMPORAL ARTERITIS (HCC): ICD-10-CM

## 2025-04-14 DIAGNOSIS — F41.9 ANXIETY: ICD-10-CM

## 2025-04-14 DIAGNOSIS — E43 SEVERE PROTEIN-CALORIE MALNUTRITION (HCC): ICD-10-CM

## 2025-04-15 ENCOUNTER — DOCUMENTATION (OUTPATIENT)
Dept: ADMINISTRATIVE | Facility: OTHER | Age: 80
End: 2025-04-15

## 2025-04-15 NOTE — PROGRESS NOTES
04/15/25 7:57 AM    Annual Wellness Visit outreach is not required, an AWV was completed at the PCP office.    Thank you.  CHILO WILKINS MA  [facility]

## 2025-04-16 RX ORDER — MIRTAZAPINE 7.5 MG/1
7.5 TABLET, FILM COATED ORAL
Qty: 30 TABLET | Refills: 5 | Status: SHIPPED | OUTPATIENT
Start: 2025-04-16

## 2025-04-16 RX ORDER — PREDNISONE 2.5 MG/1
2.5 TABLET ORAL
Qty: 30 TABLET | Refills: 0 | Status: SHIPPED | OUTPATIENT
Start: 2025-04-16

## 2025-04-18 DIAGNOSIS — J44.9 COPD, SEVERE (HCC): ICD-10-CM

## 2025-04-18 RX ORDER — BUDESONIDE 0.5 MG/2ML
INHALANT ORAL
Qty: 120 ML | Refills: 1 | OUTPATIENT
Start: 2025-04-18

## 2025-04-24 DIAGNOSIS — J44.1 ACUTE EXACERBATION OF CHRONIC OBSTRUCTIVE PULMONARY DISEASE (COPD) (HCC): ICD-10-CM

## 2025-04-24 NOTE — TELEPHONE ENCOUNTER
Reason for call:   [x] Refill   [] Prior Auth  [] Other:     Office:   [x] PCP/Provider -  Nicolas Nix MD / [facility]   [] Specialty/Provider -     Medication: ipratropium (ATROVENT) 0.02 % nebulizer solution      Dose/Frequency:  Take 2.5 mL (0.5 mg total) by nebulization 2 (two) times a day     Quantity: 250    Pharmacy: [facility] - [address] [place] [address] Pharmacy   Does the patient have enough for 3 days?   [] Yes   [x] No - Send as HP to POD    Mail Away Pharmacy   Does the patient have enough for 10 days?   [] Yes   [] No - Send as HP to POD

## 2025-05-07 NOTE — TELEPHONE ENCOUNTER
Patient calling for refill of budesonide. Made aware there are still 2 refills on file at pharmacy. Patient to follow up with pharmacy

## 2025-05-09 DIAGNOSIS — E43 SEVERE PROTEIN-CALORIE MALNUTRITION (HCC): ICD-10-CM

## 2025-05-09 DIAGNOSIS — M31.6 TEMPORAL ARTERITIS (HCC): ICD-10-CM

## 2025-05-09 DIAGNOSIS — F41.9 ANXIETY: ICD-10-CM

## 2025-05-11 RX ORDER — MIRTAZAPINE 7.5 MG/1
7.5 TABLET, FILM COATED ORAL
Qty: 30 TABLET | Refills: 4 | OUTPATIENT
Start: 2025-05-11

## 2025-05-12 RX ORDER — PREDNISONE 2.5 MG/1
2.5 TABLET ORAL
Qty: 30 TABLET | Refills: 0 | Status: SHIPPED | OUTPATIENT
Start: 2025-05-12

## 2025-05-22 ENCOUNTER — OFFICE VISIT (OUTPATIENT)
Dept: PULMONOLOGY | Facility: CLINIC | Age: 80
End: 2025-05-22

## 2025-05-22 VITALS
TEMPERATURE: 96.5 F | HEART RATE: 94 BPM | HEIGHT: 62 IN | SYSTOLIC BLOOD PRESSURE: 126 MMHG | BODY MASS INDEX: 18.95 KG/M2 | DIASTOLIC BLOOD PRESSURE: 70 MMHG | WEIGHT: 103 LBS | OXYGEN SATURATION: 96 %

## 2025-05-22 DIAGNOSIS — J96.12 CHRONIC RESPIRATORY FAILURE WITH HYPOXIA AND HYPERCAPNIA (HCC): ICD-10-CM

## 2025-05-22 DIAGNOSIS — R06.02 SOB (SHORTNESS OF BREATH): ICD-10-CM

## 2025-05-22 DIAGNOSIS — J44.9 COPD, SEVERE (HCC): Primary | ICD-10-CM

## 2025-05-22 DIAGNOSIS — F17.211 CIGARETTE NICOTINE DEPENDENCE IN REMISSION: ICD-10-CM

## 2025-05-22 DIAGNOSIS — J96.11 CHRONIC RESPIRATORY FAILURE WITH HYPOXIA AND HYPERCAPNIA (HCC): ICD-10-CM

## 2025-05-22 DIAGNOSIS — R91.8 LUNG NODULES: ICD-10-CM

## 2025-05-22 NOTE — PROGRESS NOTES
Office Progress Note - Pulmonary    Sarah Zayas 79 y.o. female MRN: [id number]    Encounter: [id number]      Assessment:  Chronic obstructive pulmonary disease.  Chronic hypercapnic and hypoxic respiratory failure.  Dyspnea on exertion.  History of tobacco use.  Lung nodules.    Plan:   Budesonide 0.5 mg nebulized twice a day.  Albuterol/ipratropium nebulizer treatments twice a day.  Oxygen supplement.  Regular walks to improve stamina.  CT of the chest to follow-up on the lung nodules in October 2025.  Follow-up in 4 months.    Discussion:   The patient's COPD is well treated.  I have maintained her on the budesonide 0.5 mg nebulized twice a day.  She will also use albuterol/ipratropium nebulizer treatments twice a day.  I told her she can have an extra treatment if she feels tight or wheezy.  Trilogy at night is discontinued.      She has pulmonary nodules which have been stable.  She will be due for lung cancer screening CT in October 2025.  She is very concerned about the pain she experiences when she lays down for the CT scan.  She may need pain medications during the procedure.  She will discuss potentially receiving steroid injection 1 week prior to CT scan with her PCP.    We will discuss this further during the next visit.  I will see her in 4 months.      Subjective:   The patient is here for a follow-up visit.  Recently she has been doing fairly well.  She has shortness of breath on exertion (when putting on her socks).  Denies cough.  She is using the oxygen 24 hours a day, at 2 L.  She is on budesonide 0.5 mg nebulized twice a day and albuterol/ipratropium nebulized twice a day.  She is not using Trelegy at night.  She has fair appetite.  She tries to remain active.  She has very supportive family.    Review of systems:  A 12 point system review is done and aside from what is stated above the rest of the review of systems is negative.      Family history and social history are reviewed.    Medications  "list is reviewed.      Vitals: Blood pressure 126/70, pulse 94, temperature (!) 96.5 °F (35.8 °C), temperature source Tympanic, height 5' 2\" (1.575 m), weight 46.7 kg (103 lb), SpO2 96%, not currently breastfeeding.,     Physical Exam  Gen: Awake, alert, oriented x 3, no acute distress  HEENT: Mucous membranes moist, no oral lesions, no thrush  NECK: No accessory muscle use, JVP not elevated  Cardiac: Regular, single S1, single S2, no murmurs, no rubs, no gallops  Lungs: Diminished breath sounds.  No wheezing or rhonchi.  Abdomen: normoactive bowel sounds, soft nontender, nondistended, no rebound or rigidity, no guarding  Extremities: no cyanosis, no clubbing, no edema  Neuro:  Grossly nonfocal.  Skin:  No rash.      CT scan of the chest which was done in October 2024 is reviewed on the PACS system.  It showed stable pulmonary nodules.    Lab Results   Component Value Date    WBC 6.85 01/15/2025    HGB 11.4 (L) 01/15/2025    HCT 38.8 01/15/2025    MCV 95 01/15/2025     01/15/2025     Lab Results   Component Value Date    SODIUM 142 03/07/2025    K 4.7 03/07/2025    CL 98 03/07/2025    CO2 40 (H) 03/07/2025    BUN 22 03/07/2025    CREATININE 0.52 (L) 03/07/2025    GLUC 79 03/07/2025    CALCIUM 10.2 03/07/2025         "

## 2025-05-23 ENCOUNTER — TELEPHONE (OUTPATIENT)
Age: 80
End: 2025-05-23

## 2025-05-23 NOTE — TELEPHONE ENCOUNTER
Patient called and stated she ha an upcoming appointment on 6/9/25 with Dr Nix. She said she generally gets in home labs and wanted to know if orders can be placed and have it scheduled for her.

## 2025-05-23 NOTE — TELEPHONE ENCOUNTER
Called patient to schedule appointment from referral. Patient declined to schedule and will be following up with her family doctor.    Closed referral.

## 2025-05-23 NOTE — TELEPHONE ENCOUNTER
Patient reports she received a call from Neurology for a consult that was placed when she was in the hospital for an intracranial aneurysm. She was never aware of having an aneurysm and is very upset. She would like her PCP to review and a call back today to advise.

## 2025-05-23 NOTE — TELEPHONE ENCOUNTER
Patients son Steven, on consent, called in inquiring about the referral to Neurology.    Patient has no neurologic symptoms and will not be scheduling.

## 2025-05-23 NOTE — TELEPHONE ENCOUNTER
Patient called wants provider to disregard message that was put in as she had her son call Neurology.

## 2025-05-27 DIAGNOSIS — J44.9 COPD, SEVERE (HCC): ICD-10-CM

## 2025-05-27 NOTE — TELEPHONE ENCOUNTER
Patient informed there are refills remaining on current prescription. Patient states there are no refills at the pharmacy and is requesting prescription to be refilled.      Reason for call:   [x] Refill   [] Prior Auth  [] Other:     Office:   [x] PCP/Provider -   [] Specialty/Provider -     Medication: budesonide (Pulmicort) 0.5 mg/2 mL nebulizer solution     Dose/Frequency: Take 2 mL (0.5 mg total) by nebulization 2 (two) times a day     Quantity: 120 mL    [facility] [address] [facility]   Does the patient have enough for 3 days?   [x] Yes   [] No - Send as HP to POD    Mail Away Pharmacy   Does the patient have enough for 10 days?   [] Yes   [] No - Send as HP to POD

## 2025-05-28 RX ORDER — BUDESONIDE 0.5 MG/2ML
0.5 INHALANT ORAL 2 TIMES DAILY
Qty: 120 ML | Refills: 5 | Status: SHIPPED | OUTPATIENT
Start: 2025-05-28 | End: 2025-08-27

## 2025-06-05 DIAGNOSIS — R35.0 URINARY FREQUENCY: ICD-10-CM

## 2025-06-05 RX ORDER — NITROFURANTOIN 25; 75 MG/1; MG/1
CAPSULE ORAL
Qty: 30 CAPSULE | Refills: 6 | Status: SHIPPED | OUTPATIENT
Start: 2025-06-05 | End: 2025-06-09 | Stop reason: SDUPTHER

## 2025-06-06 DIAGNOSIS — M31.6 TEMPORAL ARTERITIS (HCC): ICD-10-CM

## 2025-06-06 RX ORDER — PREDNISONE 2.5 MG/1
2.5 TABLET ORAL
Qty: 30 TABLET | Refills: 0 | Status: SHIPPED | OUTPATIENT
Start: 2025-06-06 | End: 2025-06-09 | Stop reason: SDUPTHER

## 2025-06-09 ENCOUNTER — TELEPHONE (OUTPATIENT)
Age: 80
End: 2025-06-09

## 2025-06-09 ENCOUNTER — OFFICE VISIT (OUTPATIENT)
Dept: FAMILY MEDICINE CLINIC | Facility: CLINIC | Age: 80
End: 2025-06-09
Payer: COMMERCIAL

## 2025-06-09 VITALS
SYSTOLIC BLOOD PRESSURE: 130 MMHG | HEART RATE: 95 BPM | HEIGHT: 62 IN | OXYGEN SATURATION: 95 % | BODY MASS INDEX: 19.14 KG/M2 | RESPIRATION RATE: 16 BRPM | DIASTOLIC BLOOD PRESSURE: 80 MMHG | WEIGHT: 104 LBS | TEMPERATURE: 98.2 F

## 2025-06-09 DIAGNOSIS — M31.6 TEMPORAL ARTERITIS (HCC): ICD-10-CM

## 2025-06-09 DIAGNOSIS — E11.69 HYPERLIPIDEMIA ASSOCIATED WITH TYPE 2 DIABETES MELLITUS  (HCC): ICD-10-CM

## 2025-06-09 DIAGNOSIS — I10 PRIMARY HYPERTENSION: ICD-10-CM

## 2025-06-09 DIAGNOSIS — K21.9 GASTROESOPHAGEAL REFLUX DISEASE: ICD-10-CM

## 2025-06-09 DIAGNOSIS — G89.29 CHRONIC MIDLINE LOW BACK PAIN WITHOUT SCIATICA: ICD-10-CM

## 2025-06-09 DIAGNOSIS — R35.0 URINARY FREQUENCY: ICD-10-CM

## 2025-06-09 DIAGNOSIS — M54.50 CHRONIC MIDLINE LOW BACK PAIN WITHOUT SCIATICA: ICD-10-CM

## 2025-06-09 DIAGNOSIS — J44.9 COPD, SEVERE (HCC): ICD-10-CM

## 2025-06-09 DIAGNOSIS — E06.3 HYPOTHYROIDISM DUE TO HASHIMOTO'S THYROIDITIS: Primary | ICD-10-CM

## 2025-06-09 DIAGNOSIS — M81.8 IDIOPATHIC OSTEOPOROSIS: ICD-10-CM

## 2025-06-09 DIAGNOSIS — R73.09 ELEVATED HEMOGLOBIN A1C: ICD-10-CM

## 2025-06-09 DIAGNOSIS — E78.5 HYPERLIPIDEMIA ASSOCIATED WITH TYPE 2 DIABETES MELLITUS  (HCC): ICD-10-CM

## 2025-06-09 DIAGNOSIS — E43 SEVERE PROTEIN-CALORIE MALNUTRITION (HCC): ICD-10-CM

## 2025-06-09 DIAGNOSIS — F41.9 ANXIETY: ICD-10-CM

## 2025-06-09 DIAGNOSIS — E06.3 HYPOTHYROIDISM DUE TO HASHIMOTO'S THYROIDITIS: ICD-10-CM

## 2025-06-09 DIAGNOSIS — Z12.31 ENCOUNTER FOR SCREENING MAMMOGRAM FOR BREAST CANCER: ICD-10-CM

## 2025-06-09 LAB — SL AMB POCT HEMOGLOBIN AIC: 5.6 (ref ?–6.5)

## 2025-06-09 PROCEDURE — 99214 OFFICE O/P EST MOD 30 MIN: CPT | Performed by: INTERNAL MEDICINE

## 2025-06-09 PROCEDURE — G2211 COMPLEX E/M VISIT ADD ON: HCPCS | Performed by: INTERNAL MEDICINE

## 2025-06-09 PROCEDURE — 83036 HEMOGLOBIN GLYCOSYLATED A1C: CPT | Performed by: INTERNAL MEDICINE

## 2025-06-09 RX ORDER — NITROFURANTOIN 25; 75 MG/1; MG/1
CAPSULE ORAL
Qty: 30 CAPSULE | Refills: 6 | Status: CANCELLED | OUTPATIENT
Start: 2025-06-09

## 2025-06-09 RX ORDER — AMLODIPINE BESYLATE 5 MG/1
5 TABLET ORAL DAILY
Qty: 90 TABLET | Refills: 3 | Status: CANCELLED | OUTPATIENT
Start: 2025-06-09

## 2025-06-09 RX ORDER — PANTOPRAZOLE SODIUM 40 MG/1
40 TABLET, DELAYED RELEASE ORAL DAILY
Qty: 90 TABLET | Refills: 3 | Status: SHIPPED | OUTPATIENT
Start: 2025-06-09

## 2025-06-09 RX ORDER — NITROFURANTOIN 25; 75 MG/1; MG/1
100 CAPSULE ORAL
Qty: 30 CAPSULE | Refills: 3 | Status: SHIPPED | OUTPATIENT
Start: 2025-06-09

## 2025-06-09 RX ORDER — MIRTAZAPINE 7.5 MG/1
7.5 TABLET, FILM COATED ORAL
Qty: 30 TABLET | Refills: 5 | Status: SHIPPED | OUTPATIENT
Start: 2025-06-09

## 2025-06-09 RX ORDER — PANTOPRAZOLE SODIUM 40 MG/1
40 TABLET, DELAYED RELEASE ORAL DAILY
Qty: 90 TABLET | Refills: 3 | Status: CANCELLED | OUTPATIENT
Start: 2025-06-09

## 2025-06-09 RX ORDER — METOPROLOL TARTRATE 25 MG/1
25 TABLET, FILM COATED ORAL EVERY 12 HOURS SCHEDULED
Qty: 200 TABLET | Refills: 3 | Status: SHIPPED | OUTPATIENT
Start: 2025-06-09 | End: 2025-06-09 | Stop reason: SDUPTHER

## 2025-06-09 RX ORDER — CAL/D3/MAG11/ZINC/COP/MANG/BOR 600 MG-800
1 TABLET ORAL 2 TIMES DAILY WITH MEALS
Qty: 200 TABLET | Refills: 6 | Status: CANCELLED | OUTPATIENT
Start: 2025-06-09

## 2025-06-09 RX ORDER — LANOLIN ALCOHOL/MO/W.PET/CERES
1000 CREAM (GRAM) TOPICAL DAILY
Qty: 90 TABLET | Refills: 3 | Status: CANCELLED | OUTPATIENT
Start: 2025-06-09

## 2025-06-09 RX ORDER — ASPIRIN 81 MG/1
81 TABLET ORAL EVERY OTHER DAY
Qty: 45 TABLET | Refills: 3 | Status: SHIPPED | OUTPATIENT
Start: 2025-06-09

## 2025-06-09 RX ORDER — LEVOTHYROXINE SODIUM 25 UG/1
25 TABLET ORAL
Qty: 90 TABLET | Refills: 3 | Status: SHIPPED | OUTPATIENT
Start: 2025-06-09

## 2025-06-09 RX ORDER — METOPROLOL TARTRATE 25 MG/1
25 TABLET, FILM COATED ORAL EVERY 12 HOURS SCHEDULED
Qty: 200 TABLET | Refills: 3 | Status: SHIPPED | OUTPATIENT
Start: 2025-06-09

## 2025-06-09 RX ORDER — PREDNISONE 2.5 MG/1
2.5 TABLET ORAL
Qty: 30 TABLET | Refills: 6 | Status: SHIPPED | OUTPATIENT
Start: 2025-06-09

## 2025-06-09 RX ORDER — AMLODIPINE BESYLATE 5 MG/1
5 TABLET ORAL DAILY
Qty: 90 TABLET | Refills: 3 | Status: SHIPPED | OUTPATIENT
Start: 2025-06-09

## 2025-06-09 RX ORDER — METOPROLOL TARTRATE 25 MG/1
25 TABLET, FILM COATED ORAL EVERY 12 HOURS SCHEDULED
Qty: 200 TABLET | Refills: 3 | Status: CANCELLED | OUTPATIENT
Start: 2025-06-09

## 2025-06-09 RX ORDER — PREDNISONE 2.5 MG/1
2.5 TABLET ORAL
Qty: 30 TABLET | Refills: 0 | Status: CANCELLED | OUTPATIENT
Start: 2025-06-09

## 2025-06-09 RX ORDER — LANOLIN ALCOHOL/MO/W.PET/CERES
1000 CREAM (GRAM) TOPICAL DAILY
Qty: 90 TABLET | Refills: 3 | Status: SHIPPED | OUTPATIENT
Start: 2025-06-09

## 2025-06-09 RX ORDER — CAL/D3/MAG11/ZINC/COP/MANG/BOR 600 MG-800
1 TABLET ORAL 2 TIMES DAILY WITH MEALS
Qty: 200 TABLET | Refills: 6 | Status: SHIPPED | OUTPATIENT
Start: 2025-06-09

## 2025-06-09 RX ORDER — MIRTAZAPINE 7.5 MG/1
7.5 TABLET, FILM COATED ORAL
Qty: 30 TABLET | Refills: 5 | Status: CANCELLED | OUTPATIENT
Start: 2025-06-09

## 2025-06-09 RX ORDER — PROPRANOLOL HYDROCHLORIDE 40 MG/1
40 TABLET ORAL
Qty: 90 TABLET | Refills: 3 | Status: SHIPPED | OUTPATIENT
Start: 2025-06-09 | End: 2025-06-09

## 2025-06-09 RX ORDER — LEVOTHYROXINE SODIUM 25 UG/1
25 TABLET ORAL
Qty: 90 TABLET | Refills: 3 | Status: CANCELLED | OUTPATIENT
Start: 2025-06-09

## 2025-06-09 NOTE — TELEPHONE ENCOUNTER
Call from [facility] pharmacy. They received new order for propanolol. Calling to clarify that the metoprolol should be dc'd. Please clarify with pharmacy.

## 2025-06-09 NOTE — PATIENT INSTRUCTIONS

## 2025-06-09 NOTE — ASSESSMENT & PLAN NOTE
Orders:    metoprolol tartrate (LOPRESSOR) 25 mg tablet; Take 1 tablet (25 mg total) by mouth every 12 (twelve) hours    predniSONE 2.5 mg tablet; Take 1 tablet (2.5 mg total) by mouth daily with breakfast

## 2025-06-09 NOTE — ASSESSMENT & PLAN NOTE
Improved on ;  Orders:    metoprolol tartrate (LOPRESSOR) 25 mg tablet; Take 1 tablet (25 mg total) by mouth every 12 (twelve) hours    propranolol (INDERAL) 40 mg tablet; Take 1 tablet (40 mg total) by mouth daily in the early morning    mirtazapine (REMERON) 7.5 MG tablet; Take 1 tablet (7.5 mg total) by mouth daily at bedtime

## 2025-06-09 NOTE — PROGRESS NOTES
Name: Sarah Zayas      : 1945      MRN: [id number]  Encounter Provider: Nicolas Nix MD  Encounter Date: 2025   Encounter department: [facility] ([facility]  :  Assessment & Plan  Encounter for screening mammogram for breast cancer    Orders:    Mammo screening bilateral w 3d and cad; Future    COPD, severe (HCC)    Orders:    metoprolol tartrate (LOPRESSOR) 25 mg tablet; Take 1 tablet (25 mg total) by mouth every 12 (twelve) hours    Temporal arteritis (HCC)    Orders:    metoprolol tartrate (LOPRESSOR) 25 mg tablet; Take 1 tablet (25 mg total) by mouth every 12 (twelve) hours    predniSONE 2.5 mg tablet; Take 1 tablet (2.5 mg total) by mouth daily with breakfast    Hypothyroidism due to Hashimoto's thyroiditis    Orders:    POCT hemoglobin A1c    Comprehensive metabolic panel; Future    CBC and differential; Future    Lipid Panel with Direct LDL reflex; Future    TSH, 3rd generation with Free T4 reflex; Future    Calcium Carbonate-Vit D-Min (Caltrate 600+D Plus Minerals) 600-800 MG-UNIT TABS; Take 1 tablet by mouth 2 (two) times a day with meals    vitamin B-12 (VITAMIN B-12) 1,000 mcg tablet; Take 1 tablet (1,000 mcg total) by mouth daily    Hyperlipidemia associated with type 2 diabetes mellitus  (HCC)    Lab Results   Component Value Date    HGBA1C 5.6 2025       Orders:    POCT hemoglobin A1c    Comprehensive metabolic panel; Future    CBC and differential; Future    Lipid Panel with Direct LDL reflex; Future    TSH, 3rd generation with Free T4 reflex; Future    Calcium Carbonate-Vit D-Min (Caltrate 600+D Plus Minerals) 600-800 MG-UNIT TABS; Take 1 tablet by mouth 2 (two) times a day with meals    vitamin B-12 (VITAMIN B-12) 1,000 mcg tablet; Take 1 tablet (1,000 mcg total) by mouth daily    Elevated hemoglobin A1c    Orders:    POCT hemoglobin A1c    Comprehensive metabolic panel; Future    CBC and differential; Future    Lipid Panel with Direct LDL reflex;  Future    TSH, 3rd generation with Free T4 reflex; Future    Calcium Carbonate-Vit D-Min (Caltrate 600+D Plus Minerals) 600-800 MG-UNIT TABS; Take 1 tablet by mouth 2 (two) times a day with meals    vitamin B-12 (VITAMIN B-12) 1,000 mcg tablet; Take 1 tablet (1,000 mcg total) by mouth daily    Idiopathic osteoporosis    Orders:    DXA bone density spine hip and pelvis; Future    Calcium Carbonate-Vit D-Min (Caltrate 600+D Plus Minerals) 600-800 MG-UNIT TABS; Take 1 tablet by mouth 2 (two) times a day with meals    vitamin B-12 (VITAMIN B-12) 1,000 mcg tablet; Take 1 tablet (1,000 mcg total) by mouth daily    Primary hypertension    Orders:    amLODIPine (NORVASC) 5 mg tablet; Take 1 tablet (5 mg total) by mouth daily    BMI (body mass index), pediatric, less than 5th percentile for age  improving  Orders:    levothyroxine 25 mcg tablet; Take 1 tablet (25 mcg total) by mouth daily in the early morning    metoprolol tartrate (LOPRESSOR) 25 mg tablet; Take 1 tablet (25 mg total) by mouth every 12 (twelve) hours    COPD, severe (HCC)    Orders:    metoprolol tartrate (LOPRESSOR) 25 mg tablet; Take 1 tablet (25 mg total) by mouth every 12 (twelve) hours    Anxiety/depression  Improved on ;  Orders:    metoprolol tartrate (LOPRESSOR) 25 mg tablet; Take 1 tablet (25 mg total) by mouth every 12 (twelve) hours    propranolol (INDERAL) 40 mg tablet; Take 1 tablet (40 mg total) by mouth daily in the early morning    mirtazapine (REMERON) 7.5 MG tablet; Take 1 tablet (7.5 mg total) by mouth daily at bedtime    Gastroesophageal reflux disease  Continue and Renew ;  Orders:    metoprolol tartrate (LOPRESSOR) 25 mg tablet; Take 1 tablet (25 mg total) by mouth every 12 (twelve) hours    pantoprazole (PROTONIX) 40 mg tablet; Take 1 tablet (40 mg total) by mouth daily    Urinary frequency  Improved on :  Orders:    nitrofurantoin (MACROBID) 100 mg capsule; Take 1 capsule (100 mg total) by mouth daily with lunch    Chronic midline low  "back pain without sciatica  Moist Heat  Home P.T.  Try :  Orders:    Lumbar Back Brace           History of Present Illness   79 Y O lady is here for Regular check up, she feels better than last visit, recent Blood work and med list reviewed,...      Review of Systems   Constitutional:  Negative for chills, fatigue and fever.   HENT:  Positive for postnasal drip. Negative for congestion, ear pain, facial swelling, sore throat, trouble swallowing and voice change.    Eyes:  Negative for pain, discharge and visual disturbance.   Respiratory:  Negative for cough, shortness of breath and wheezing.    Cardiovascular:  Negative for chest pain, palpitations and leg swelling.   Gastrointestinal:  Negative for abdominal pain, blood in stool, constipation, diarrhea, nausea and vomiting.   Endocrine: Negative for polydipsia, polyphagia and polyuria.   Genitourinary:  Negative for difficulty urinating, dysuria, hematuria and urgency.   Musculoskeletal:  Negative for arthralgias, back pain and myalgias.   Skin:  Negative for color change and rash.   Neurological:  Negative for dizziness, tremors, seizures, syncope, weakness and headaches.   Hematological:  Negative for adenopathy. Does not bruise/bleed easily.   Psychiatric/Behavioral:  Negative for dysphoric mood, sleep disturbance and suicidal ideas. The patient is nervous/anxious.    All other systems reviewed and are negative.      Objective   /80 (BP Location: Left arm, Patient Position: Sitting, Cuff Size: Standard)   Pulse 95   Temp 98.2 °F (36.8 °C) (Tympanic Core)   Resp 16   Ht 5' 2\" (1.575 m)   Wt 47.2 kg (104 lb)   SpO2 95%   BMI 19.02 kg/m²      Physical Exam  Vitals and nursing note reviewed.   Constitutional:       General: She is not in acute distress.     Appearance: She is well-developed. She is not diaphoretic.   HENT:      Head: Normocephalic and atraumatic.      Right Ear: External ear normal.      Left Ear: External ear normal.      Nose: Nose " normal.     Eyes:      General:         Right eye: No discharge.         Left eye: No discharge.      Conjunctiva/sclera: Conjunctivae normal.      Pupils: Pupils are equal, round, and reactive to light.     Neck:      Thyroid: No thyromegaly.      Trachea: No tracheal deviation.     Cardiovascular:      Rate and Rhythm: Normal rate and regular rhythm.      Heart sounds: Murmur heard.      No friction rub.   Pulmonary:      Effort: Pulmonary effort is normal. No respiratory distress.      Breath sounds: Normal breath sounds. No stridor. No wheezing or rales.      Comments: Decreased Breathing sounds Both Bases  Abdominal:      General: Bowel sounds are normal. There is no distension.      Palpations: Abdomen is soft.      Tenderness: There is no abdominal tenderness. There is no guarding.     Musculoskeletal:         General: No swelling, tenderness or deformity. Normal range of motion.      Cervical back: Normal range of motion and neck supple.   Lymphadenopathy:      Cervical: No cervical adenopathy.     Skin:     General: Skin is warm and dry.      Capillary Refill: Capillary refill takes less than 2 seconds.      Coloration: Skin is not pale.      Findings: No erythema or rash.     Neurological:      Mental Status: She is alert and oriented to person, place, and time.      Cranial Nerves: No cranial nerve deficit.      Coordination: Coordination normal.     Psychiatric:         Mood and Affect: Mood normal.         Behavior: Behavior normal.

## 2025-06-30 ENCOUNTER — TELEPHONE (OUTPATIENT)
Dept: PULMONOLOGY | Facility: CLINIC | Age: 80
End: 2025-06-30

## 2025-06-30 NOTE — TELEPHONE ENCOUNTER
LVM for patient in regards to scheduling a 4M FU around 9/22/2025 with  in the [place] office.

## 2025-07-29 DIAGNOSIS — J44.1 ACUTE EXACERBATION OF CHRONIC OBSTRUCTIVE PULMONARY DISEASE (COPD) (HCC): ICD-10-CM

## 2025-07-29 DIAGNOSIS — J43.9 PULMONARY EMPHYSEMA, UNSPECIFIED EMPHYSEMA TYPE (HCC): ICD-10-CM

## 2025-07-30 RX ORDER — ALBUTEROL SULFATE 90 UG/1
INHALANT RESPIRATORY (INHALATION)
Qty: 6.7 G | Refills: 1 | Status: SHIPPED | OUTPATIENT
Start: 2025-07-30

## 2025-08-18 ENCOUNTER — TELEPHONE (OUTPATIENT)
Dept: LAB | Facility: HOSPITAL | Age: 80
End: 2025-08-18

## 2025-08-18 ENCOUNTER — TELEPHONE (OUTPATIENT)
Dept: FAMILY MEDICINE CLINIC | Facility: CLINIC | Age: 80
End: 2025-08-18